# Patient Record
Sex: FEMALE | Race: WHITE | HISPANIC OR LATINO | Employment: UNEMPLOYED | URBAN - METROPOLITAN AREA
[De-identification: names, ages, dates, MRNs, and addresses within clinical notes are randomized per-mention and may not be internally consistent; named-entity substitution may affect disease eponyms.]

---

## 2018-11-07 ENCOUNTER — APPOINTMENT (INPATIENT)
Dept: CT IMAGING | Facility: HOSPITAL | Age: 58
DRG: 681 | End: 2018-11-07
Payer: COMMERCIAL

## 2018-11-07 ENCOUNTER — APPOINTMENT (EMERGENCY)
Dept: CT IMAGING | Facility: HOSPITAL | Age: 58
DRG: 681 | End: 2018-11-07
Payer: COMMERCIAL

## 2018-11-07 ENCOUNTER — HOSPITAL ENCOUNTER (INPATIENT)
Facility: HOSPITAL | Age: 58
LOS: 3 days | Discharge: HOME | DRG: 681 | End: 2018-11-10
Attending: EMERGENCY MEDICINE | Admitting: FAMILY MEDICINE
Payer: COMMERCIAL

## 2018-11-07 DIAGNOSIS — R19.00 ABDOMINAL MASS, UNSPECIFIED ABDOMINAL LOCATION: Primary | ICD-10-CM

## 2018-11-07 DIAGNOSIS — C85.93 NON-HODGKIN LYMPHOMA OF INTRA-ABDOMINAL LYMPH NODES (HCC): ICD-10-CM

## 2018-11-07 DIAGNOSIS — R10.84 GENERALIZED ABDOMINAL PAIN: ICD-10-CM

## 2018-11-07 DIAGNOSIS — C85.93 LYMPHOMA OF INTRA-ABDOMINAL LYMPH NODES, UNSPECIFIED LYMPHOMA TYPE (HCC): ICD-10-CM

## 2018-11-07 PROBLEM — K59.01 SLOW TRANSIT CONSTIPATION: Status: ACTIVE | Noted: 2018-11-07

## 2018-11-07 PROBLEM — F33.41 RECURRENT MAJOR DEPRESSIVE DISORDER, IN PARTIAL REMISSION (HCC): Status: ACTIVE | Noted: 2018-11-07

## 2018-11-07 PROBLEM — R10.9 ABDOMINAL PAIN: Status: ACTIVE | Noted: 2018-11-07

## 2018-11-07 PROBLEM — I10 ESSENTIAL HYPERTENSION: Status: ACTIVE | Noted: 2018-11-07

## 2018-11-07 LAB
ALBUMIN SERPL BCP-MCNC: 3.5 G/DL (ref 3–5.2)
ALP SERPL-CCNC: 150 U/L (ref 43–122)
ALT SERPL W P-5'-P-CCNC: 21 U/L (ref 9–52)
ANION GAP SERPL CALCULATED.3IONS-SCNC: 8 MMOL/L (ref 5–14)
AST SERPL W P-5'-P-CCNC: 36 U/L (ref 14–36)
ATRIAL RATE: 0 BPM
ATRIAL RATE: 70 BPM
BILIRUB SERPL-MCNC: 0.4 MG/DL
BUN SERPL-MCNC: 19 MG/DL (ref 5–25)
CALCIUM SERPL-MCNC: 9.4 MG/DL (ref 8.4–10.2)
CHLORIDE SERPL-SCNC: 104 MMOL/L (ref 97–108)
CO2 SERPL-SCNC: 29 MMOL/L (ref 22–30)
CREAT SERPL-MCNC: 1.02 MG/DL (ref 0.6–1.2)
EOSINOPHIL # BLD AUTO: 0.81 THOUSAND/UL (ref 0–0.4)
EOSINOPHIL NFR BLD MANUAL: 7 % (ref 0–6)
ERYTHROCYTE [DISTWIDTH] IN BLOOD BY AUTOMATED COUNT: 18.1 %
GFR SERPL CREATININE-BSD FRML MDRD: 62 ML/MIN/1.73SQ M
GLUCOSE SERPL-MCNC: 104 MG/DL (ref 70–99)
HCT VFR BLD AUTO: 30.9 % (ref 36–46)
HGB BLD-MCNC: 9.5 G/DL (ref 12–16)
HYPERCHROMIA BLD QL SMEAR: PRESENT
LDH SERPL-CCNC: 1170 U/L (ref 313–618)
LIPASE SERPL-CCNC: 32 U/L (ref 23–300)
LYMPHOCYTES # BLD AUTO: 47 % (ref 20–50)
LYMPHOCYTES # BLD AUTO: 5.41 THOUSAND/UL (ref 0.5–4)
MCH RBC QN AUTO: 21.4 PG (ref 26–34)
MCHC RBC AUTO-ENTMCNC: 30.7 G/DL (ref 31–36)
MCV RBC AUTO: 70 FL (ref 80–100)
MONOCYTES # BLD AUTO: 0.92 THOUSAND/UL (ref 0.2–0.9)
MONOCYTES NFR BLD AUTO: 8 % (ref 1–10)
NEUTS BAND NFR BLD MANUAL: 3 % (ref 0–8)
NEUTS SEG # BLD: 4.37 THOUSAND/UL (ref 1.8–7.8)
NEUTS SEG NFR BLD AUTO: 35 %
P AXIS: 34 DEGREES
PLATELET # BLD AUTO: 117 THOUSANDS/UL (ref 150–450)
PLATELET BLD QL SMEAR: ABNORMAL
PMV BLD AUTO: 9 FL (ref 8.9–12.7)
POTASSIUM SERPL-SCNC: 3.9 MMOL/L (ref 3.6–5)
PR INTERVAL: 154 MS
PROT SERPL-MCNC: 6.4 G/DL (ref 5.9–8.4)
QRS AXIS: 0 DEGREES
QRS AXIS: 16 DEGREES
QRSD INTERVAL: 0 MS
QRSD INTERVAL: 80 MS
QT INTERVAL: 0 MS
QT INTERVAL: 408 MS
QTC INTERVAL: 0 MS
QTC INTERVAL: 440 MS
RBC # BLD AUTO: 4.44 MILLION/UL (ref 4–5.2)
RBC MORPH BLD: ABNORMAL
SODIUM SERPL-SCNC: 141 MMOL/L (ref 137–147)
T WAVE AXIS: 0 DEGREES
T WAVE AXIS: 18 DEGREES
TOTAL CELLS COUNTED SPEC: 100
VENTRICULAR RATE: 0 BPM
VENTRICULAR RATE: 70 BPM
WBC # BLD AUTO: 11.5 THOUSAND/UL (ref 4.5–11)

## 2018-11-07 PROCEDURE — 96361 HYDRATE IV INFUSION ADD-ON: CPT

## 2018-11-07 PROCEDURE — 96375 TX/PRO/DX INJ NEW DRUG ADDON: CPT

## 2018-11-07 PROCEDURE — 71260 CT THORAX DX C+: CPT

## 2018-11-07 PROCEDURE — 85007 BL SMEAR W/DIFF WBC COUNT: CPT | Performed by: EMERGENCY MEDICINE

## 2018-11-07 PROCEDURE — 74177 CT ABD & PELVIS W/CONTRAST: CPT

## 2018-11-07 PROCEDURE — 83550 IRON BINDING TEST: CPT | Performed by: FAMILY MEDICINE

## 2018-11-07 PROCEDURE — 96374 THER/PROPH/DIAG INJ IV PUSH: CPT

## 2018-11-07 PROCEDURE — 99285 EMERGENCY DEPT VISIT HI MDM: CPT

## 2018-11-07 PROCEDURE — 85027 COMPLETE CBC AUTOMATED: CPT | Performed by: EMERGENCY MEDICINE

## 2018-11-07 PROCEDURE — 83540 ASSAY OF IRON: CPT | Performed by: FAMILY MEDICINE

## 2018-11-07 PROCEDURE — 83615 LACTATE (LD) (LDH) ENZYME: CPT | Performed by: FAMILY MEDICINE

## 2018-11-07 PROCEDURE — 83690 ASSAY OF LIPASE: CPT | Performed by: EMERGENCY MEDICINE

## 2018-11-07 PROCEDURE — 80053 COMPREHEN METABOLIC PANEL: CPT | Performed by: EMERGENCY MEDICINE

## 2018-11-07 PROCEDURE — 36415 COLL VENOUS BLD VENIPUNCTURE: CPT | Performed by: EMERGENCY MEDICINE

## 2018-11-07 PROCEDURE — 93010 ELECTROCARDIOGRAM REPORT: CPT | Performed by: INTERNAL MEDICINE

## 2018-11-07 PROCEDURE — 93005 ELECTROCARDIOGRAM TRACING: CPT

## 2018-11-07 PROCEDURE — 99222 1ST HOSP IP/OBS MODERATE 55: CPT | Performed by: FAMILY MEDICINE

## 2018-11-07 RX ORDER — OXYCODONE HYDROCHLORIDE AND ACETAMINOPHEN 5; 325 MG/1; MG/1
1 TABLET ORAL 3 TIMES DAILY
Status: DISCONTINUED | OUTPATIENT
Start: 2018-11-07 | End: 2018-11-08

## 2018-11-07 RX ORDER — POLYETHYLENE GLYCOL 3350 17 G/17G
17 POWDER, FOR SOLUTION ORAL DAILY
Status: DISCONTINUED | OUTPATIENT
Start: 2018-11-08 | End: 2018-11-10 | Stop reason: HOSPADM

## 2018-11-07 RX ORDER — LOSARTAN POTASSIUM 100 MG/1
100 TABLET ORAL DAILY
COMMUNITY
End: 2018-12-10 | Stop reason: HOSPADM

## 2018-11-07 RX ORDER — ACETAMINOPHEN 325 MG/1
650 TABLET ORAL EVERY 6 HOURS PRN
Status: DISCONTINUED | OUTPATIENT
Start: 2018-11-07 | End: 2018-11-10 | Stop reason: HOSPADM

## 2018-11-07 RX ORDER — CITALOPRAM 20 MG/1
10 TABLET ORAL DAILY
COMMUNITY
End: 2019-07-09 | Stop reason: ALTCHOICE

## 2018-11-07 RX ORDER — SENNOSIDES 8.6 MG
1 TABLET ORAL 2 TIMES DAILY
Status: DISCONTINUED | OUTPATIENT
Start: 2018-11-07 | End: 2018-11-10 | Stop reason: HOSPADM

## 2018-11-07 RX ORDER — SODIUM CHLORIDE 9 MG/ML
100 INJECTION, SOLUTION INTRAVENOUS CONTINUOUS
Status: DISCONTINUED | OUTPATIENT
Start: 2018-11-07 | End: 2018-11-08

## 2018-11-07 RX ORDER — HYDROCHLOROTHIAZIDE 25 MG/1
50 TABLET ORAL DAILY
COMMUNITY
End: 2018-12-10 | Stop reason: HOSPADM

## 2018-11-07 RX ORDER — BISACODYL 10 MG
10 SUPPOSITORY, RECTAL RECTAL DAILY PRN
Status: DISCONTINUED | OUTPATIENT
Start: 2018-11-07 | End: 2018-11-10 | Stop reason: HOSPADM

## 2018-11-07 RX ORDER — ONDANSETRON 2 MG/ML
4 INJECTION INTRAMUSCULAR; INTRAVENOUS ONCE
Status: COMPLETED | OUTPATIENT
Start: 2018-11-07 | End: 2018-11-07

## 2018-11-07 RX ORDER — MORPHINE SULFATE 4 MG/ML
4 INJECTION, SOLUTION INTRAMUSCULAR; INTRAVENOUS ONCE
Status: COMPLETED | OUTPATIENT
Start: 2018-11-07 | End: 2018-11-07

## 2018-11-07 RX ORDER — ONDANSETRON 2 MG/ML
4 INJECTION INTRAMUSCULAR; INTRAVENOUS EVERY 6 HOURS PRN
Status: DISCONTINUED | OUTPATIENT
Start: 2018-11-07 | End: 2018-11-08

## 2018-11-07 RX ADMIN — SODIUM CHLORIDE 1000 ML: 9 INJECTION, SOLUTION INTRAVENOUS at 12:30

## 2018-11-07 RX ADMIN — IOHEXOL 100 ML: 350 INJECTION, SOLUTION INTRAVENOUS at 14:33

## 2018-11-07 RX ADMIN — SODIUM CHLORIDE 100 ML/HR: 9 INJECTION, SOLUTION INTRAVENOUS at 20:27

## 2018-11-07 RX ADMIN — IODIXANOL 50 ML: 320 INJECTION, SOLUTION INTRAVASCULAR at 18:51

## 2018-11-07 RX ADMIN — IOHEXOL 50 ML: 240 INJECTION, SOLUTION INTRATHECAL; INTRAVASCULAR; INTRAVENOUS; ORAL at 12:40

## 2018-11-07 RX ADMIN — MORPHINE SULFATE 4 MG: 4 INJECTION INTRAVENOUS at 12:40

## 2018-11-07 RX ADMIN — OXYCODONE HYDROCHLORIDE AND ACETAMINOPHEN 1 TABLET: 5; 325 TABLET ORAL at 20:26

## 2018-11-07 RX ADMIN — ONDANSETRON 4 MG: 2 INJECTION, SOLUTION INTRAMUSCULAR; INTRAVENOUS at 12:40

## 2018-11-07 RX ADMIN — OXYCODONE HYDROCHLORIDE AND ACETAMINOPHEN 1 TABLET: 5; 325 TABLET ORAL at 17:34

## 2018-11-07 RX ADMIN — SENNOSIDES 8.6 MG: 8.6 TABLET, FILM COATED ORAL at 17:35

## 2018-11-07 NOTE — ASSESSMENT & PLAN NOTE
Patient came to the ER secondary to progressively worsening swelling of her abdomen and also secondary to increasing abdominal pain  CT of the abdomen pelvis shows Massive lymphadenopathy throughout the abdomen and pelvis raising suspicion for lymphoma  Associated hepatomegaly and massive splenomegaly  Patient has no known history of any malignancy or knowledge of having any of these problems  Will do CT of the chest for further evaluation for lymphoma  Will plan for an IR guided biopsy or biopsy with General surgery as per the results  Patient is agreeable in pursuing further investigation and treatment    Will place on IV morphine and oral Percocet for pain control in addition to Tylenol

## 2018-11-07 NOTE — ASSESSMENT & PLAN NOTE
Patient has history of hypertension and takes medications for that  I have asked her to bring in her medication bottles so that we may resume them here    So far her blood pressure is well controlled

## 2018-11-07 NOTE — NURSING NOTE
Admission Note  Patient arrive on unit A & O X 3  Pleasant, cooperative and speaks only [place],  in room for admission questions and a family member who spoke a little english  Vitals stable  Oriented to room with call bell within reach  Patient stated she does not see properly out of her right eye and does not hear well out of her left ear  Stated pain at level 0 medicated with 1 Percocet  All safety maintained  Will continue to monitor

## 2018-11-07 NOTE — ED PROVIDER NOTES
History  Chief Complaint   Patient presents with    Abdominal Pain     patient states that she feels a lump on the left side of abdomen for several months, but today the pain to the site has become unbearable, no meds taken, has nausea, denies vomiting or diarrhea     Patient is a 61-year-old female who presents with a 3+ week history left lower quadrant pain  Patient was recently admitted to a psychiatric facility in [place] when she started having the left lower quadrant pain  States that she feels a ball there  Instructed by the staff there just to watch it  But pain has been consistent for the patient and actually gotten worse over the course of yesterday  Tenderness 10/10 nonradiating and does endorse some nausea but no vomiting or diarrhea patient states also has some constipation with this  But is passing gas  No fevers sweats or chills no medication taken for it  No history of any pain like this in the past and does have a surgical history of cholecystectomy  None       Past Medical History:   Diagnosis Date    Asthma     Migraine     Osteoporosis     Psychiatric disorder        Past Surgical History:   Procedure Laterality Date    CHOLECYSTECTOMY         Family History   Problem Relation Age of Onset    Cancer Mother     Cancer Father      I have reviewed and agree with the history as documented  Social History   Substance Use Topics    Smoking status: Former Smoker    Smokeless tobacco: Never Used    Alcohol use No        Review of Systems   Constitutional: Positive for appetite change  Negative for fatigue and fever  HENT: Negative  Eyes: Negative  Respiratory: Negative  Negative for cough and shortness of breath  Cardiovascular: Negative  Negative for chest pain  Gastrointestinal: Positive for abdominal distention, abdominal pain, constipation and nausea  Negative for diarrhea and vomiting  Endocrine: Negative  Genitourinary: Negative  Musculoskeletal: Negative  Skin: Negative  Allergic/Immunologic: Negative  Neurological: Negative  Hematological: Negative  Psychiatric/Behavioral: Negative  All other systems reviewed and are negative  Physical Exam  Physical Exam   Constitutional: She is oriented to person, place, and time  She appears well-developed and well-nourished  HENT:   Head: Normocephalic  Left Ear: External ear normal    Nose: Nose normal    Mouth/Throat: Oropharynx is clear and moist    Eyes: Pupils are equal, round, and reactive to light  Conjunctivae and EOM are normal    Neck: Normal range of motion  Neck supple  Cardiovascular: Normal rate, regular rhythm, normal heart sounds and intact distal pulses  Pulmonary/Chest: Effort normal and breath sounds normal    Abdominal: Soft  She exhibits distension  There is tenderness  Tenderness to the left lower quadrant  No guarding or rebound  Musculoskeletal: Normal range of motion  Neurological: She is alert and oriented to person, place, and time  Skin: Skin is warm and dry  Capillary refill takes less than 2 seconds  Psychiatric: She has a normal mood and affect  Her behavior is normal    Nursing note and vitals reviewed        Vital Signs  ED Triage Vitals [11/07/18 1146]   Temperature Pulse Respirations Blood Pressure SpO2   97 7 °F (36 5 °C) 73 18 136/70 98 %      Temp Source Heart Rate Source Patient Position - Orthostatic VS BP Location FiO2 (%)   Tympanic Monitor Sitting Left arm --      Pain Score       8           Vitals:    11/07/18 1146 11/07/18 1444 11/07/18 1609   BP: 136/70 157/73 122/64   Pulse: 73 71 70   Patient Position - Orthostatic VS: Sitting Sitting Lying       Visual Acuity      ED Medications  Medications   morphine (PF) 4 mg/mL injection 4 mg (4 mg Intravenous Given 11/7/18 1240)   ondansetron (ZOFRAN) injection 4 mg (4 mg Intravenous Given 11/7/18 1240)   sodium chloride 0 9 % bolus 1,000 mL (0 mL Intravenous Stopped 11/7/18 1355)   iohexol (OMNIPAQUE) 240 MG/ML solution 50 mL (50 mL Oral Given 11/7/18 1240)   iohexol (OMNIPAQUE) 350 MG/ML injection (MULTI-DOSE) 100 mL (100 mL Intravenous Given 11/7/18 1433)       Diagnostic Studies  Results Reviewed     Procedure Component Value Units Date/Time    Lipase [[id number]]  (Normal) Collected:  11/07/18 1239    Lab Status:  Final result Specimen:  Blood from Arm, Right Updated:  11/07/18 1303     Lipase 32 u/L     Comprehensive metabolic panel [[id number]]  (Abnormal) Collected:  11/07/18 1239    Lab Status:  Final result Specimen:  Blood from Arm, Right Updated:  11/07/18 1303     Sodium 141 mmol/L      Potassium 3 9 mmol/L      Chloride 104 mmol/L      CO2 29 mmol/L      ANION GAP 8 mmol/L      BUN 19 mg/dL      Creatinine 1 02 mg/dL      Glucose 104 (H) mg/dL      Calcium 9 4 mg/dL      AST 36 U/L      ALT 21 U/L      Alkaline Phosphatase 150 (H) U/L      Total Protein 6 4 g/dL      Albumin 3 5 g/dL      Total Bilirubin 0 40 mg/dL      eGFR 62 ml/min/1 73sq m     Narrative:         National Kidney Disease Education Program recommendations are as follows:  GFR calculation is accurate only with a steady state creatinine  Chronic Kidney disease less than 60 ml/min/1 73 sq  meters  Kidney failure less than 15 ml/min/1 73 sq  meters  CBC and differential [[id number]]  (Abnormal) Collected:  11/07/18 1239    Lab Status:  Final result Specimen:  Blood from Arm, Right Updated:  11/07/18 1259     WBC 11 50 (H) Thousand/uL      RBC 4 44 Million/uL      Hemoglobin 9 5 (L) g/dL      Hematocrit 30 9 (L) %      MCV 70 (L) fL      MCH 21 4 (L) pg      MCHC 30 7 (L) g/dL      RDW 18 1 (H) %      MPV 9 0 fL      Platelets 823 (L) Thousands/uL                  CT abdomen pelvis w contrast   Final Result by Aure Rider MD (11/07 1452)   Massive lymphadenopathy throughout the abdomen and pelvis raising suspicion for lymphoma  Associated hepatomegaly and massive splenomegaly        No acute inflammatory or infectious process  I personally discussed this study with Rosalina Crum on 11/7/2018 at 2:43 PM                         Workstation performed: [id number]                    Procedures  Procedures       Phone Contacts  ED Phone Contact    ED Course  ED Course as of Nov 07 1654 Wed Nov 07, 2018   1450 Called by Radiology most likely diagnosis lymphoma from CT scan  Spoke with patient about it  Slim paged for admission  [address] with Dr Nathan Patel will admit med surge  MDM  Number of Diagnoses or Management Options  Abdominal mass, unspecified abdominal location:      Amount and/or Complexity of Data Reviewed  Clinical lab tests: ordered and reviewed  Tests in the radiology section of CPT®: ordered and reviewed  Discuss the patient with other providers: yes      CritCare Time    Disposition  Final diagnoses:   Abdominal mass, unspecified abdominal location     Time reflects when diagnosis was documented in both MDM as applicable and the Disposition within this note     Time User Action Codes Description Comment    11/7/2018  3:18 PM Hermilo Petersen [R19 [id number]] Abdominal mass, unspecified abdominal location       ED Disposition     ED Disposition Condition Comment    Admit  Case was discussed with Dr Nathan Patel and the patient's admission status was agreed to be Admission Status: inpatient status to the service of Dr Nathan Patel   Follow-up Information    None         Patient's Medications    No medications on file     No discharge procedures on file      ED Provider  Electronically Signed by           Grant Rodriguez MD  11/07/18 5302

## 2018-11-07 NOTE — H&P
H&P- Savanah Feliciano 1960, 66 y o  female MRN: [id number]    Unit/Bed#: HECTOR Encounter: [id number]    Primary Care Provider: Stefany Aldrich DO   Date and time admitted to hospital: 11/7/2018 11:43 AM        * Abdominal pain   Assessment & Plan    Patient came to the ER secondary to progressively worsening swelling of her abdomen and also secondary to increasing abdominal pain  CT of the abdomen pelvis shows Massive lymphadenopathy throughout the abdomen and pelvis raising suspicion for lymphoma  Associated hepatomegaly and massive splenomegaly  Patient has no known history of any malignancy or knowledge of having any of these problems  Will do CT of the chest for further evaluation for lymphoma  Will plan for an IR guided biopsy or biopsy with General surgery as per the results  Patient is agreeable in pursuing further investigation and treatment  Will place on IV morphine and oral Percocet for pain control in addition to Tylenol         Slow transit constipation   Assessment & Plan    Patient is complaining of some constipation abdominal distention  Will place her on laxatives and observe for now  Essential hypertension   Assessment & Plan    Patient has history of hypertension and takes medications for that  I have asked her to bring in her medication bottles so that we may resume them here  So far her blood pressure is well controlled     Recurrent major depressive disorder, in partial remission [facility])   Assessment & Plan    Patient takes medications for depression however she does not know the names of them  Will ask her family to bring them in so that we can place her on it here  Patient has been seeking care in [place] in the past and hence we have no record of her care in [place]     Lymphoma of intra-abdominal lymph nodes [facility])   Assessment & Plan    Patient has an abnormal CT of the abdomen pelvis which is suspicious for lymphoma    Will plan on further investigations and workup to establish a diagnosis  Check an LDH level  VTE Prophylaxis: Enoxaparin (Lovenox)  / sequential compression device   Code Status:  full Code  POLST: There is no POLST form on file for this patient (pre-hospital)  Discussion with family:   None    Anticipated Length of Stay:  Patient will be admitted on an Inpatient basis with an anticipated length of stay of   more than 2 midnights  Justification for Hospital Stay:   Abdominal pain    Total Time for Visit, including Counseling / Coordination of Care: 45 minutes  Greater than 50% of this total time spent on direct patient counseling and coordination of care  Chief Complaint:     Abdominal pain    History of Present Illness:    Hailey Boateng is a 66 y o  female who presents with abdominal pain  Patient states that she has been noticing progressive worsening swelling and pain of her abdomen for the last few weeks  She was recently admitted to a psychiatric facility for her depression in [place] and was told that she needs to follow up outpatient for her abdominal issues  She does not have a PCP and hence did not pursue anything  Today her pain continued to get worse and it was 8/10  Her swelling and distention was also worsening and she decided to come to the emergency room for help  She denies any chest pain but does feel shortness of breath secondary to an enlarged abdomen  She denies any fevers chills, no nausea or vomiting, no diarrhea  She is complaining of some constipation at this time  Review of Systems:    Review of Systems   Constitutional: Positive for appetite change and fatigue  Negative for chills and fever  HENT: Negative for hearing loss, sore throat and trouble swallowing  Eyes: Negative for photophobia, discharge and visual disturbance  Respiratory: Positive for chest tightness  Cardiovascular: Negative for chest pain and palpitations     Gastrointestinal: Positive for abdominal distention, abdominal pain and constipation  Negative for blood in stool and vomiting  Endocrine: Negative for polydipsia and polyuria  Genitourinary: Positive for flank pain  Negative for difficulty urinating, dysuria and hematuria  Musculoskeletal: Positive for arthralgias and back pain  Negative for gait problem  Skin: Negative for rash  Allergic/Immunologic: Negative for environmental allergies and food allergies  Neurological: Negative for dizziness, seizures, syncope and headaches  Hematological: Does not bruise/bleed easily  Psychiatric/Behavioral: Positive for behavioral problems  The patient is nervous/anxious  All other systems reviewed and are negative  Past Medical and Surgical History:     Past Medical History:   Diagnosis Date    Asthma     Migraine     Osteoporosis     Psychiatric disorder        Past Surgical History:   Procedure Laterality Date    CHOLECYSTECTOMY         Meds/Allergies:    Prior to Admission medications    Not on File     I have been unable to obtain / verify an up to date medication list despite all reasonable attempts  Allergies: Allergies   Allergen Reactions    Penicillins        Social History:     Marital Status: Single   History   Alcohol Use No     History   Smoking Status    Former Smoker   Smokeless Tobacco    Never Used     History   Drug Use No       Family History:    Family History   Problem Relation Age of Onset    Cancer Mother     Cancer Father        Physical Exam:     Vitals:   Blood Pressure: 122/64 (11/07/18 1609)  Pulse: 70 (11/07/18 1609)  Temperature: 97 7 °F (36 5 °C) (11/07/18 1146)  Temp Source: Tympanic (11/07/18 1146)  Respirations: 12 (11/07/18 1609)  Height: 5' 6" (167 6 cm) (11/07/18 1146)  Weight - Scale: 91 6 kg (202 lb) (11/07/18 1146)  SpO2: 97 % (11/07/18 1609)    Physical Exam   Constitutional: She is oriented to person, place, and time  She appears well-developed and well-nourished  HENT:   Head: Normocephalic and atraumatic  Eyes: Pupils are equal, round, and reactive to light  Neck: Neck supple  Cardiovascular: Normal rate and regular rhythm  Pulmonary/Chest: Effort normal    Abdominal: She exhibits distension and mass  There is tenderness  There is guarding  There is no rebound  Hypoactive bowel sounds   Musculoskeletal: Normal range of motion  Neurological: She is alert and oriented to person, place, and time  Skin: Skin is warm  Psychiatric: She has a normal mood and affect  Additional Data:     Lab Results: I have personally reviewed pertinent films in PACS      Results from last 7 days  Lab Units 11/07/18  1239   WBC Thousand/uL 11 50*   HEMOGLOBIN g/dL 9 5*   HEMATOCRIT % 30 9*   PLATELETS Thousands/uL 117*   BANDS PCT % 3   TOTAL NEUT ABS Thousand/uL 4 37   LYMPHO PCT % 47   MONO PCT % 8   EOS PCT % 7*       Results from last 7 days  Lab Units 11/07/18  1239   POTASSIUM mmol/L 3 9   CHLORIDE mmol/L 104   CO2 mmol/L 29   BUN mg/dL 19   CREATININE mg/dL 1 02   ANION GAP mmol/L 8   CALCIUM mg/dL 9 4   ALBUMIN g/dL 3 5   TOTAL BILIRUBIN mg/dL 0 40   ALK PHOS U/L 150*   ALT U/L 21   AST U/L 36                       Imaging: I have personally reviewed pertinent films in PACS    CT abdomen pelvis w contrast   Final Result by Jd Cobb MD (11/07 1452)   Massive lymphadenopathy throughout the abdomen and pelvis raising suspicion for lymphoma  Associated hepatomegaly and massive splenomegaly  No acute inflammatory or infectious process  I personally discussed this study with Marisol Maloney on 11/7/2018 at 2:43 PM                         Workstation performed: [id number]             EKG, Pathology, and Other Studies Reviewed on Admission:   · EKG: ordered    Allscripts / Epic Records Reviewed: Yes     ** Please Note: This note has been constructed using a voice recognition system   **

## 2018-11-07 NOTE — ASSESSMENT & PLAN NOTE
Patient is complaining of some constipation abdominal distention  Will place her on laxatives and observe for now

## 2018-11-07 NOTE — ED NOTES
Pt ambulatory within room with steady gait  Pt in no distress  Pt stable        Felix Rosas, RN  11/07/18 7015

## 2018-11-07 NOTE — ASSESSMENT & PLAN NOTE
Patient takes medications for depression however she does not know the names of them  Will ask her family to bring them in so that we can place her on it here    Patient has been seeking care in [place] in the past and hence we have no record of her care in [place]

## 2018-11-07 NOTE — ASSESSMENT & PLAN NOTE
Patient has an abnormal CT of the abdomen pelvis which is suspicious for lymphoma  Will plan on further investigations and workup to establish a diagnosis  Check an LDH level

## 2018-11-08 PROBLEM — R74.01 TRANSAMINITIS: Status: ACTIVE | Noted: 2018-11-08

## 2018-11-08 PROBLEM — D69.6 THROMBOCYTOPENIA (HCC): Status: ACTIVE | Noted: 2018-11-08

## 2018-11-08 LAB
ALBUMIN SERPL BCP-MCNC: 3.4 G/DL (ref 3–5.2)
ALP SERPL-CCNC: 165 U/L (ref 43–122)
ALT SERPL W P-5'-P-CCNC: 28 U/L (ref 9–52)
ANION GAP SERPL CALCULATED.3IONS-SCNC: 7 MMOL/L (ref 5–14)
AST SERPL W P-5'-P-CCNC: 50 U/L (ref 14–36)
BILIRUB SERPL-MCNC: 0.6 MG/DL
BUN SERPL-MCNC: 19 MG/DL (ref 5–25)
CALCIUM SERPL-MCNC: 8.9 MG/DL (ref 8.4–10.2)
CHLORIDE SERPL-SCNC: 104 MMOL/L (ref 97–108)
CO2 SERPL-SCNC: 27 MMOL/L (ref 22–30)
CREAT SERPL-MCNC: 0.97 MG/DL (ref 0.6–1.2)
ERYTHROCYTE [DISTWIDTH] IN BLOOD BY AUTOMATED COUNT: 18.9 %
FERRITIN SERPL-MCNC: 124 NG/ML (ref 8–388)
GFR SERPL CREATININE-BSD FRML MDRD: 65 ML/MIN/1.73SQ M
GLUCOSE SERPL-MCNC: 73 MG/DL (ref 70–99)
HCT VFR BLD AUTO: 31.6 % (ref 36–46)
HEMOCCULT STL QL: NEGATIVE
HGB BLD-MCNC: 9.6 G/DL (ref 12–16)
IRON SATN MFR SERPL: 18 %
IRON SERPL-MCNC: 44 UG/DL (ref 50–170)
MCH RBC QN AUTO: 21.3 PG (ref 26–34)
MCHC RBC AUTO-ENTMCNC: 30.3 G/DL (ref 31–36)
MCV RBC AUTO: 70 FL (ref 80–100)
PLATELET # BLD AUTO: 118 THOUSANDS/UL (ref 150–450)
PMV BLD AUTO: 9.2 FL (ref 8.9–12.7)
POTASSIUM SERPL-SCNC: 4.3 MMOL/L (ref 3.6–5)
PROT SERPL-MCNC: 6.2 G/DL (ref 5.9–8.4)
RBC # BLD AUTO: 4.5 MILLION/UL (ref 4–5.2)
SODIUM SERPL-SCNC: 138 MMOL/L (ref 137–147)
TIBC SERPL-MCNC: 249 UG/DL (ref 250–450)
TSH SERPL DL<=0.05 MIU/L-ACNC: 3.45 UIU/ML (ref 0.47–4.68)
WBC # BLD AUTO: 10.7 THOUSAND/UL (ref 4.5–11)

## 2018-11-08 PROCEDURE — 82272 OCCULT BLD FECES 1-3 TESTS: CPT | Performed by: FAMILY MEDICINE

## 2018-11-08 PROCEDURE — 82728 ASSAY OF FERRITIN: CPT | Performed by: FAMILY MEDICINE

## 2018-11-08 PROCEDURE — 85027 COMPLETE CBC AUTOMATED: CPT | Performed by: FAMILY MEDICINE

## 2018-11-08 PROCEDURE — 80053 COMPREHEN METABOLIC PANEL: CPT | Performed by: FAMILY MEDICINE

## 2018-11-08 PROCEDURE — 84443 ASSAY THYROID STIM HORMONE: CPT | Performed by: FAMILY MEDICINE

## 2018-11-08 PROCEDURE — 99232 SBSQ HOSP IP/OBS MODERATE 35: CPT | Performed by: FAMILY MEDICINE

## 2018-11-08 RX ORDER — OXYCODONE HCL 10 MG/1
10 TABLET, FILM COATED, EXTENDED RELEASE ORAL EVERY 12 HOURS SCHEDULED
Status: DISCONTINUED | OUTPATIENT
Start: 2018-11-08 | End: 2018-11-10 | Stop reason: HOSPADM

## 2018-11-08 RX ORDER — OXYCODONE HYDROCHLORIDE 5 MG/1
5 TABLET ORAL EVERY 4 HOURS PRN
Status: DISCONTINUED | OUTPATIENT
Start: 2018-11-08 | End: 2018-11-10 | Stop reason: HOSPADM

## 2018-11-08 RX ORDER — MORPHINE SULFATE 4 MG/ML
4 INJECTION, SOLUTION INTRAMUSCULAR; INTRAVENOUS EVERY 4 HOURS PRN
Status: DISCONTINUED | OUTPATIENT
Start: 2018-11-08 | End: 2018-11-10 | Stop reason: HOSPADM

## 2018-11-08 RX ORDER — MAGNESIUM CARB/ALUMINUM HYDROX 105-160MG
296 TABLET,CHEWABLE ORAL ONCE
Status: DISCONTINUED | OUTPATIENT
Start: 2018-11-08 | End: 2018-11-08

## 2018-11-08 RX ORDER — MAGNESIUM CARB/ALUMINUM HYDROX 105-160MG
148 TABLET,CHEWABLE ORAL ONCE
Status: COMPLETED | OUTPATIENT
Start: 2018-11-08 | End: 2018-11-08

## 2018-11-08 RX ORDER — MORPHINE SULFATE 4 MG/ML
4 INJECTION, SOLUTION INTRAMUSCULAR; INTRAVENOUS EVERY 4 HOURS PRN
Status: DISCONTINUED | OUTPATIENT
Start: 2018-11-08 | End: 2018-11-08

## 2018-11-08 RX ORDER — SODIUM PHOSPHATE, DIBASIC AND SODIUM PHOSPHATE, MONOBASIC 7; 19 G/133ML; G/133ML
1 ENEMA RECTAL ONCE
Status: COMPLETED | OUTPATIENT
Start: 2018-11-08 | End: 2018-11-08

## 2018-11-08 RX ADMIN — ENOXAPARIN SODIUM 40 MG: 40 INJECTION SUBCUTANEOUS at 08:26

## 2018-11-08 RX ADMIN — MAGESIUM CITRATE 148 ML: 1.75 LIQUID ORAL at 15:37

## 2018-11-08 RX ADMIN — POLYETHYLENE GLYCOL 3350 17 G: 17 POWDER, FOR SOLUTION ORAL at 08:26

## 2018-11-08 RX ADMIN — SODIUM PHOSPHATE, DIBASIC AND SODIUM PHOSPHATE, MONOBASIC 1 ENEMA: 7; 19 ENEMA RECTAL at 15:37

## 2018-11-08 RX ADMIN — OXYCODONE HYDROCHLORIDE 10 MG: 10 TABLET, FILM COATED, EXTENDED RELEASE ORAL at 21:17

## 2018-11-08 RX ADMIN — OXYCODONE HYDROCHLORIDE 10 MG: 10 TABLET, FILM COATED, EXTENDED RELEASE ORAL at 15:34

## 2018-11-08 RX ADMIN — OXYCODONE HYDROCHLORIDE AND ACETAMINOPHEN 1 TABLET: 5; 325 TABLET ORAL at 08:25

## 2018-11-08 RX ADMIN — SENNOSIDES 8.6 MG: 8.6 TABLET, FILM COATED ORAL at 17:17

## 2018-11-08 RX ADMIN — SENNOSIDES 8.6 MG: 8.6 TABLET, FILM COATED ORAL at 08:26

## 2018-11-08 NOTE — MEDICAL STUDENT
MEDICAL STUDENT  Inpatient Progress Note for TRAINING ONLY  Not Part of Legal Medical Record       Progress Note - Hospitalist   Angella Jordan 66 y o  female MRN: [id number]  Unit/Bed#: 5T -01 Encounter: [id number]          Assessment and Plan    Abdominal pain  · Related to probable lymphoma and constipation  · Added OxyContin 10 mg BID with PRN Morphine Oxycodone  · Discontinued Tylenol  · Mag Citrate once today  · Encourage ambulation  · Continue Miralax, senna      Lymphoma of intra-abdominal lymph nodes (HCC)  · CT of Abdomen and Pelvis showed massive diffuse lymphadenopathy suspicious for Malignancy  · Surgery consulted for Biopsy      Recurrent major depressive disorder, in partial remission ([facility] [address] )  · Was recently in [facility] in [place] for depression, during that stay she reports she experienced increasing abdominal pain and distension  · Came to ED reporting severe abdominal pain  · Does not know the names of her depression medications  · Requesting that family bring in medications from home      Essential hypertension  · Patient states that she does take BP medications but does not know the names  · BP currently 119/55  · Requested that family bring in medications        Slow transit constipation  · Continue Senna and Miralax  · Mag Citrate once today  · Encourage PO fluids and fiber  · Encourage ambulation      Transaminitis  · Hold Tylenol  · AST 50  · Check Hepatitis panel  · Avoid hepatotoxic agents      Thrombocytopenia (HCC)  · Platelet count 820-705  · Daily CBC  · SCDs       ------------------------------------------------------------------------------------------------  Chief Complaint: Abdominal pain and distenstion    HPI/24hr events: Angella Jordan is a 66 y o  female who presents with abdominal pain  Patient states that she has been noticing progressive worsening swelling and pain of her abdomen for the last few weeks    She was recently admitted to a psychiatric facility for her depression in [place] and was told that she needs to follow up outpatient for her abdominal issues  She does not have a PCP and hence did not pursue anything  Today her pain continued to get worse and it was 8/10  Her swelling and distention was also worsening and she decided to come to the emergency room for help  She denies any chest pain but does feel shortness of breath secondary to an enlarged abdomen  She denies any fevers chills, no nausea or vomiting, no diarrhea  She is complaining of some constipation at this time  ----------------------------------------------------------------------------------------------  Subjective: "My belly hurts"      Physical Exam   Constitutional: She appears well-developed and well-nourished  She appears distressed  HENT:   Head: Normocephalic  Eyes: Pupils are equal, round, and reactive to light  Cardiovascular: Regular rhythm and intact distal pulses  Tachycardia present  Pulses:       Radial pulses are 2+ on the right side, and 2+ on the left side  Dorsalis pedis pulses are 2+ on the right side, and 2+ on the left side  Pulmonary/Chest: Tachypnea noted  No respiratory distress  She has decreased breath sounds in the right lower field and the left lower field  Abdominal: She exhibits distension  There is tenderness  There is guarding  Lymphadenopathy:     She has cervical adenopathy  Left cervical: Deep cervical adenopathy present  Neurological: She is alert  Skin: Skin is warm and dry  Psychiatric: She has a normal mood and affect                 Vitals   Vitals:    11/07/18 1900 11/08/18 0001 11/08/18 0724 11/08/18 1522   BP: 115/62 123/71 119/55 154/73   BP Location: Left arm Left arm Left arm Left arm   Pulse: 69 71 71 79   Resp: 18 18 20 20   Temp: 97 9 °F (36 6 °C) (!) 97 °F (36 1 °C) 97 5 °F (36 4 °C) 97 5 °F (36 4 °C)   TempSrc: Temporal Temporal Temporal Temporal   SpO2: 94% 95% 96% 98%   Weight:       Height: Temp (24hrs), Av 6 °F (36 4 °C), Min:97 °F (36 1 °C), Max:97 9 °F (36 6 °C)  Current: Temperature: 97 5 °F (36 4 °C)  HR: 79  BP: 119/55  RR: 20  SpO2: 98      Height and Weights   Height: 5' 6" (167 6 cm)  IBW: 59 3 kg  Body mass index is 32 91 kg/m²  Weight (last 2 days)     Date/Time   Weight    18 1705  92 5 (203 93)    18 1146  91 6 (202)              Intake and Output  I/O       701 -  -  - 700    P  O    300    IV Piggyback  1000     Total Intake(mL/kg)  1000 (10 8) 300 (3 2)    Net   +1000 +300                    Nutrition       Diet Orders            Start     Ordered    18 0001  Diet NPO  Diet effective midnight     Question Answer Comment   Diet Type NPO    RD to adjust diet per protocol? Yes        18 1407    18 1705  Diet Regular; Regular House  Diet effective now     Question Answer Comment   Diet Type Regular    Regular Regular House    RD to adjust diet per protocol? Yes        18 1704          Laboratory and Diagnostics:    Results from last 7 days  Lab Units 18  0501 18  1239   WBC Thousand/uL 10 70 11 50*   HEMOGLOBIN g/dL 9 6* 9 5*   HEMATOCRIT % 31 6* 30 9*   PLATELETS Thousands/uL 118* 117*   BANDS PCT %  --  3   MONO PCT %  --  8       Results from last 7 days  Lab Units 18  0502 18  1239   POTASSIUM mmol/L 4 3 3 9   CHLORIDE mmol/L 104 104   CO2 mmol/L 27 29   BUN mg/dL 19 19   CREATININE mg/dL 0 97 1 02   CALCIUM mg/dL 8 9 9 4   ALBUMIN g/dL 3 4 3 5   ALK PHOS U/L 165* 150*   ALT U/L 28 21   AST U/L 50* 36                      EKG: Reviewed  Imaging: I have personally reviewed pertinent reports        Micro        Allergies   Allergies   Allergen Reactions    Penicillins        Active Medications  Scheduled Meds:  Current Facility-Administered Medications:  acetaminophen 650 mg Oral Q6H PRN Joaquín Dupont MD   bisacodyl 10 mg Rectal Daily PRN Joaquín Dupont MD   morphine injection 4 mg Intravenous Q4H PRN Joaquín Dupont MD   oxyCODONE 10 mg Oral Q12H [address] Joaquín Dupont MD   oxyCODONE 5 mg Oral Q4H PRN Joaquín Dupont MD   polyethylene glycol 17 g Oral Daily Joaquín Dupont MD   senna 1 tablet Oral BID Joaquín Dupont MD     Continuous Infusions:     PRN Meds:     acetaminophen 650 mg Q6H PRN   bisacodyl 10 mg Daily PRN   morphine injection 4 mg Q4H PRN   oxyCODONE 5 mg Q4H PRN       VTE Pharmacologic Prophylaxis: Pharmacologic VTE Prophylaxis contraindicated due to Thrombocytopenia   VTE Mechanical Prophylaxis: sequential compression device

## 2018-11-08 NOTE — NURSING NOTE
Patient given fleets enema and mag citrate at this time, had a small hard BM, was told not seen  Family members in room visiting  All safety maintained  Will continue to monitor

## 2018-11-08 NOTE — NURSING NOTE
On initial rounds patient in no apparent distress  Skin warm and dry to touch  At present time patient complained of generalize itching and DR Ayala on unit and is aware  Ambulate well by self encourage to call for assistance when needed  All safety maintained  Will continue to monitor

## 2018-11-08 NOTE — ASSESSMENT & PLAN NOTE
Patient has history of hypertension and takes medications for that  I have asked her to bring in her medication bottles   Blood pressure is well controlled without any medications

## 2018-11-08 NOTE — UTILIZATION REVIEW
Initial Clinical Review    Admission: Date/Time/Statement: 11/7/18 @ 1519 INPATIENT    Orders Placed This Encounter   Procedures    Inpatient Admission (expected length of stay for this patient is greater than two midnights)     Standing Status:   Standing     Number of Occurrences:   1     Order Specific Question:   Admitting Physician     Answer:   Suzanne Blake [[id number]]     Order Specific Question:   Level of Care     Answer:   Med Surg [16]     Order Specific Question:   Estimated length of stay     Answer:   More than 2 Midnights     Order Specific Question:   Certification     Answer:   I certify that inpatient services are medically necessary for this patient for a duration of greater than two midnights  See H&P and MD Progress Notes for additional information about the patient's course of treatment  ED: Date/Time/Mode of Arrival:   ED Arrival Information     Expected Arrival Acuity Means of Arrival Escorted By Service Admission Type    - 11/7/2018 11:27 Urgent Walk-In Self General Medicine Urgent    Arrival Complaint    abdominal pain         Chief Complaint:   Chief Complaint   Patient presents with    Abdominal Pain     patient states that she feels a lump on the left side of abdomen for several months, but today the pain to the site has become unbearable, no meds taken, has nausea, denies vomiting or diarrhea     History of Illness:      Patient is a 51-year-old female who presents with a 3+ week history left lower quadrant pain  Tenderness 10/10 nonradiating and does endorse some nausea and constipation         ED Vital Signs:   ED Triage Vitals [11/07/18 1146]   Temperature Pulse Respirations Blood Pressure SpO2   97 7 °F (36 5 °C) 73 18 136/70 98 %   Pain Score       8        Wt Readings from Last 1 Encounters:   11/07/18 92 5 kg (203 lb 14 8 oz)     Vital Signs: WNL    Abnormal Labs/Diagnostic Test Results:     CT abdomen and pelvis: Massive lymphadenopathy throughout the abdomen and pelvis raising suspicion for lymphoma  Associated hepatomegaly and massive splenomegaly  CT chest:  Bulky supraclavicular, axillary and mediastinal lymphadenopathy  Extensive abdominal pelvic lymphadenopathy, further described on the abdomen and pelvis CT report  Findings are likely to represent lymphoma  Alk Phos = 150     11/07/18 1239     - 618 U/L 1,170       11/07/18 1239     WBC 4 50 - 11 00 Thousand/uL 11 50     RBC 4 00 - 5 20 Million/uL 4 44    Hemoglobin 12 0 - 16 0 g/dL 9 5     Hematocrit 36 0 - 46 0 % 30 9     MCV 80 - 100 fL 70     Comment:     MCH 26 0 - 34 0 pg 21 4     MCHC 31 0 - 36 0 g/dL 30 7     RDW <15 3 % 18 1     MPV 8 9 - 12 7 fL 9 0    Platelets 614 - 363 Thousands/uL 117       ED Treatment:   Medication Administration from 11/07/2018 1127 to 11/07/2018 1701       Date/Time Order Dose Route Action     11/07/2018 1240 morphine (PF) 4 mg/mL injection 4 mg 4 mg Intravenous Given     11/07/2018 1240 ondansetron (ZOFRAN) injection 4 mg 4 mg Intravenous Given     11/07/2018 1230 sodium chloride 0 9 % bolus 1,000 mL 1,000 mL Intravenous New Bag     11/07/2018 1240 iohexol (OMNIPAQUE) 240 MG/ML solution 50 mL 50 mL Oral Given     11/07/2018 1433 iohexol (OMNIPAQUE) 350 MG/ML injection (MULTI-DOSE) 100 mL 100 mL Intravenous Given        Past Medical/Surgical History:     Past Medical History:   Diagnosis Date    Asthma     Migraine     Osteoporosis     Psychiatric disorder        Admitting Diagnosis: Abdominal pain [R10 9]  Abdominal mass, unspecified abdominal location [R19 00]    Age/Sex: 66 y o  female    Assessment/Plan:      Daniel Brown is a 66 y o  female who presented to the ED with progressive worsening, swelling and pain of her abdomen for the last few weeks  CT suspicious for lymphoma      * Abdominal pain   Assessment & Plan     Plan for an IR guided biopsy or biopsy with General surgery as per the results   ill place on IV morphine and oral Percocet for pain control in addition to Tylenol       Slow transit constipation   Assessment & Plan     Patient is complaining of some constipation abdominal distention  Will place her on laxatives and observe for now       Recurrent major depressive disorder, in partial remission [facility])   Assessment & Plan     Patient takes medications for depression however she does not know the names of them  Will ask her family to bring them in so that we can place her on it here  Patient has been seeking care in [place] in the past and hence we have no record of her care in [place]      Lymphoma of intra-abdominal lymph nodes [facility])   Assessment & Plan     Patient has an abnormal CT of the abdomen pelvis which is suspicious for lymphoma  Will plan on further investigations and workup to establish a diagnosis  Check an LDH level          Admission Orders: sequential compression device  Scheduled Meds:   Current Facility-Administered Medications:  acetaminophen 650 mg Oral Q6H PRN   bisacodyl 10 mg Rectal Daily PRN   enoxaparin 40 mg Subcutaneous Daily   morphine injection 2 mg Intravenous Q4H PRN   ondansetron 4 mg Intravenous Q6H PRN   oxyCODONE-acetaminophen 1 tablet Oral TID   polyethylene glycol 17 g Oral Daily   senna 1 tablet Oral BID                 [address] Utilization Review Department  Phone: [phone]; Fax [phone]  [email or web address]  [email or web address]  ATTENTION: Please call with any questions or concerns to [phone]  and carefully listen to the prompts so that you are directed to the right person  Send all requests for admission clinical reviews, approved or denied determinations and any other requests to fax [phone]   All voicemails are confidential

## 2018-11-08 NOTE — ASSESSMENT & PLAN NOTE
Patient is complaining of some constipation abdominal distention  Will place her on laxatives and observe for now  Also added mag citrate and an enema as she has not had a bowel movement for over a week

## 2018-11-08 NOTE — ASSESSMENT & PLAN NOTE
Patient came to the ER secondary to progressively worsening swelling of her abdomen and also secondary to increasing abdominal pain  CT of the abdomen pelvis shows Massive lymphadenopathy throughout the abdomen and pelvis raising suspicion for lymphoma  Associated hepatomegaly and massive splenomegaly  Patient has no known history of any malignancy or knowledge of having any of these problems  CT of the chest for further evaluation for lymphoma done and shows supraclavicular, axillary and mediastinal lymphadenopathy as well  Will plan for an IR guided biopsy or biopsy with General surgery as per the results  Patient is agreeable in pursuing further investigation and treatment  Will place on IV morphine and oral oxycodone and Oxy Contin for pain control  Patient's abdominal pain is still 6/10    Hence pain medications adjusted further

## 2018-11-08 NOTE — ASSESSMENT & PLAN NOTE
Patient has an abnormal CT of the abdomen pelvis which is suspicious for lymphoma  Will plan on further investigations and workup to establish a diagnosis  Consult placed to General surgery to see if biopsy possible    If not then will ask interventional Radiology to proceed with biopsy

## 2018-11-08 NOTE — NURSING NOTE
AOX3 HR regular Lungs clear hypoactive Bowel sounds x4 + PP ABD softly distended  Denies any pain  Will continue to monitor call bell within reach

## 2018-11-08 NOTE — CONSULTS
Consulting physician:  Dr Yohannes Melton    Physician consulted:  Dr Piedad Belle    Reason for consultation:  Generalized lymphadenopathy  Possible lymphoma  For lymph node biopsy and placement of Port-A-Cath  Patient is a 51-year-old Eritrean-speaking female who presented to the emergency room at [facility] with progressively worsening abdominal distension with increasing pain  She had the obligatory laboratory values that demonstrated a minimally elevated white count but hemoglobin of 9 5  There are also a few mildly elevated liver function tests  She did have a CT scan of the chest abdomen and pelvis that demonstrated massive intra abdominal lymphadenopathy as well as significant hepatosplenomegaly  She has no history of malignancy  Consultation was obtained for 1  Lymph node biopsy for a diagnosis  2   Port-A-Cath placement    Physical exam:  Middle-aged  female awake alert in no distress  A  was in the room to facilitate history taking  Neck:  There is a posterior triangle palpable lymph node on the left side  No obvious cervical adenopathy is appreciated  Axilla:  Some slightly tender lymph nodes deep in the axilla  Abdomen:  Distended soft mildly tender to palpation  Hepatosplenomegaly is appreciated  Impression:  Generalized adenopathy most likely lymphoma  A tissue diagnosis is necessary  Also venous access for chemotherapy is also essential     Plan:  Biopsy left cervical lymph node and placement of Port-A-Cath at the same time ASAP

## 2018-11-08 NOTE — ASSESSMENT & PLAN NOTE
Patient takes medications for depression however she does not know the names of them  Will ask her family to bring them in so that we can place her on it here    Patient has been seeking care in [place] in the past and hence we have no record of her care in [place]

## 2018-11-08 NOTE — PROGRESS NOTES
Progress Note - Gibson Parsons 1960, 66 y o  female MRN: [id number]    Unit/Bed#: 5T -01 Encounter: [id number]    Primary Care Provider: Jacqueline Suh DO   Date and time admitted to hospital: 11/7/2018 11:43 AM        * Abdominal pain   Assessment & Plan    Patient came to the ER secondary to progressively worsening swelling of her abdomen and also secondary to increasing abdominal pain  CT of the abdomen pelvis shows Massive lymphadenopathy throughout the abdomen and pelvis raising suspicion for lymphoma  Associated hepatomegaly and massive splenomegaly  Patient has no known history of any malignancy or knowledge of having any of these problems  CT of the chest for further evaluation for lymphoma done and shows supraclavicular, axillary and mediastinal lymphadenopathy as well  Will plan for an IR guided biopsy or biopsy with General surgery as per the results  Patient is agreeable in pursuing further investigation and treatment  Will place on IV morphine and oral oxycodone and Oxy Contin for pain control  Patient's abdominal pain is still 6/10  Hence pain medications adjusted further         Thrombocytopenia (HCC)   Assessment & Plan    Patient's platelet count today is 118  Continue to observe for now  Transaminitis   Assessment & Plan    Hold Tylenol secondary to AST of 50  Will check hepatitis panel  Avoid hepatotoxic agents     Slow transit constipation   Assessment & Plan    Patient is complaining of some constipation abdominal distention  Will place her on laxatives and observe for now  Also added mag citrate and an enema as she has not had a bowel movement for over a week  Essential hypertension   Assessment & Plan    Patient has history of hypertension and takes medications for that  I have asked her to bring in her medication bottles   Blood pressure is well controlled without any medications       Recurrent major depressive disorder, in partial remission ([facility] [address] ) Assessment & Plan    Patient takes medications for depression however she does not know the names of them  Will ask her family to bring them in so that we can place her on it here  Patient has been seeking care in [place] in the past and hence we have no record of her care in [place]     Lymphoma of intra-abdominal lymph nodes [facility])   Assessment & Plan    Patient has an abnormal CT of the abdomen pelvis which is suspicious for lymphoma  Will plan on further investigations and workup to establish a diagnosis  Consult placed to General surgery to see if biopsy possible  If not then will ask interventional Radiology to proceed with biopsy       VTE Pharmacologic Prophylaxis:   Pharmacologic: Enoxaparin (Lovenox)  Mechanical VTE Prophylaxis in Place: Yes    Patient Centered Rounds: I have performed bedside rounds with nursing staff today  Discussions with Specialists or Other Care Team Provider:   Discussed the case with Oncology and General surgery    Education and Discussions with Family / Patient:   Discussed with patient at bedside in detail about her hospital course and the need to proceed with a biopsy    Time Spent for Care: 30 minutes  More than 50% of total time spent on counseling and coordination of care as described above  Current Length of Stay: 1 day(s)    Current Patient Status: Inpatient   Certification Statement: The patient will continue to require additional inpatient hospital stay due to Abdominal pain    Discharge Plan:   In 1-2 days to go home    Code Status: Level 1 - Full Code      Subjective:   Patient complains of 4 to 6/10 abdominal pain and distension  She still has not had any bowel movements  She feels a little dyspneic while sitting in bed secondary to her distended abdomen      Objective:     Vitals:   Temp (24hrs), Av 6 °F (36 4 °C), Min:97 °F (36 1 °C), Max:97 9 °F (36 6 °C)    Temp:  [97 °F (36 1 °C)-97 9 °F (36 6 °C)] 97 5 °F (36 4 °C)  HR:  [69-71] 71  Resp: [12-20] 20  BP: (115-157)/(55-73) 119/55  SpO2:  [94 %-97 %] 96 %  Body mass index is 32 91 kg/m²  Input and Output Summary (last 24 hours): Intake/Output Summary (Last 24 hours) at 11/08/18 1351  Last data filed at 11/08/18 1249   Gross per 24 hour   Intake             1300 ml   Output                0 ml   Net             1300 ml       Physical Exam:     Physical Exam   Constitutional: She is oriented to person, place, and time  She appears well-developed  HENT:   Head: Normocephalic and atraumatic  Eyes: Pupils are equal, round, and reactive to light  Conjunctivae are normal    Neck: Normal range of motion  Neck supple  Cardiovascular: Normal rate, regular rhythm and normal heart sounds  Pulmonary/Chest: Effort normal and breath sounds normal  No respiratory distress  She has no wheezes  She has no rales  Abdominal: Soft  She exhibits distension  She exhibits no mass  There is tenderness  There is no rebound and no guarding  Moderate distention with tenderness generalized  No rebound tenderness noted  Voluntary guarding noted  Hypoactive bowel sounds   Genitourinary:   Genitourinary Comments: deferred   Musculoskeletal: She exhibits edema  Neurological: She is alert and oriented to person, place, and time  Skin: Skin is warm and dry  No rash noted  Psychiatric:   Anxious   Nursing note and vitals reviewed          Additional Data:     Labs:      Results from last 7 days  Lab Units 11/08/18  0501 11/07/18  1239   WBC Thousand/uL 10 70 11 50*   HEMOGLOBIN g/dL 9 6* 9 5*   HEMATOCRIT % 31 6* 30 9*   PLATELETS Thousands/uL 118* 117*   BANDS PCT %  --  3   LYMPHO PCT %  --  47   MONO PCT %  --  8   EOS PCT %  --  7*       Results from last 7 days  Lab Units 11/08/18  0502   POTASSIUM mmol/L 4 3   CHLORIDE mmol/L 104   CO2 mmol/L 27   BUN mg/dL 19   CREATININE mg/dL 0 97   ANION GAP mmol/L 7   CALCIUM mg/dL 8 9   ALBUMIN g/dL 3 4   TOTAL BILIRUBIN mg/dL 0 60   ALK PHOS U/L 165*   ALT U/L 28   AST U/L 50*                           * I Have Reviewed All Lab Data Listed Above  * Additional Pertinent Lab Tests Reviewed: [address] Admission Reviewed    Imaging:    Imaging Reports Reviewed Today Include: ct chest abd pelvis  Imaging Personally Reviewed by Myself Includes:  Ct chest abd pelvis    Recent Cultures (last 7 days):           Last 24 Hours Medication List:     Current Facility-Administered Medications:  acetaminophen 650 mg Oral Q6H PRN Barbara Gibson MD   bisacodyl 10 mg Rectal Daily PRN Barbara Gibson MD   enoxaparin 40 mg Subcutaneous Daily Barbara Gibson MD   magnesium citrate 148 mL Oral Once Barbara Gibson MD   morphine injection 4 mg Intravenous Q4H PRN Barbara Gibson MD   oxyCODONE 10 mg Oral Q12H [facility] Barbara Gibson MD   oxyCODONE 5 mg Oral Q4H PRN Barbara Gibson MD   polyethylene glycol 17 g Oral Daily Barbara Gibson MD   senna 1 tablet Oral BID Barbara Gibson MD   sodium phosphate-biphosphate 1 enema Rectal Once Barbara Gibson MD        Today, Patient Was Seen By: Barbara Gibson MD    ** Please Note: Dictation voice to text software may have been used in the creation of this document   **

## 2018-11-08 NOTE — SOCIAL WORK
SW met with this pt, her daughter-in-law, and grandchildren x2 today using a peer to translate  Pt resides with her son and dil in their 2 story home  She is independent with all daily tasks but her dil cooks the meals and family transports  Pt goes to [address] for her PCP appointments and has been put on antidepressants since a recent hospital stay in [address] [place] [address]  Pt will follow up with her PCP for additional scripts on 12/20/18  SW will follow for additional needs

## 2018-11-09 ENCOUNTER — APPOINTMENT (INPATIENT)
Dept: RADIOLOGY | Facility: HOSPITAL | Age: 58
DRG: 681 | End: 2018-11-09
Payer: COMMERCIAL

## 2018-11-09 ENCOUNTER — ANESTHESIA EVENT (OUTPATIENT)
Dept: MEDSURG UNIT | Facility: HOSPITAL | Age: 58
End: 2018-11-09

## 2018-11-09 ENCOUNTER — ANESTHESIA (INPATIENT)
Dept: PERIOP | Facility: HOSPITAL | Age: 58
DRG: 681 | End: 2018-11-09
Payer: COMMERCIAL

## 2018-11-09 ENCOUNTER — ANESTHESIA EVENT (INPATIENT)
Dept: PERIOP | Facility: HOSPITAL | Age: 58
DRG: 681 | End: 2018-11-09
Payer: COMMERCIAL

## 2018-11-09 ENCOUNTER — ANESTHESIA (OUTPATIENT)
Dept: MEDSURG UNIT | Facility: HOSPITAL | Age: 58
End: 2018-11-09

## 2018-11-09 PROBLEM — D50.8 IRON DEFICIENCY ANEMIA SECONDARY TO INADEQUATE DIETARY IRON INTAKE: Status: ACTIVE | Noted: 2018-11-09

## 2018-11-09 PROCEDURE — C1788 PORT, INDWELLING, IMP: HCPCS | Performed by: SPECIALIST

## 2018-11-09 PROCEDURE — 88341 IMHCHEM/IMCYTCHM EA ADD ANTB: CPT | Performed by: PATHOLOGY

## 2018-11-09 PROCEDURE — 88305 TISSUE EXAM BY PATHOLOGIST: CPT | Performed by: PATHOLOGY

## 2018-11-09 PROCEDURE — 36561 INSERT TUNNELED CV CATH: CPT | Performed by: SPECIALIST

## 2018-11-09 PROCEDURE — 71045 X-RAY EXAM CHEST 1 VIEW: CPT

## 2018-11-09 PROCEDURE — 88185 FLOWCYTOMETRY/TC ADD-ON: CPT

## 2018-11-09 PROCEDURE — 77001 FLUOROGUIDE FOR VEIN DEVICE: CPT

## 2018-11-09 PROCEDURE — B5181ZA FLUOROSCOPY OF SUPERIOR VENA CAVA USING LOW OSMOLAR CONTRAST, GUIDANCE: ICD-10-PCS | Performed by: SPECIALIST

## 2018-11-09 PROCEDURE — 07B20ZX EXCISION OF LEFT NECK LYMPHATIC, OPEN APPROACH, DIAGNOSTIC: ICD-10-PCS | Performed by: SPECIALIST

## 2018-11-09 PROCEDURE — 0JH60WZ INSERTION OF TOTALLY IMPLANTABLE VASCULAR ACCESS DEVICE INTO CHEST SUBCUTANEOUS TISSUE AND FASCIA, OPEN APPROACH: ICD-10-PCS | Performed by: SPECIALIST

## 2018-11-09 PROCEDURE — 99232 SBSQ HOSP IP/OBS MODERATE 35: CPT | Performed by: FAMILY MEDICINE

## 2018-11-09 PROCEDURE — 88360 TUMOR IMMUNOHISTOCHEM/MANUAL: CPT | Performed by: PATHOLOGY

## 2018-11-09 PROCEDURE — 88184 FLOWCYTOMETRY/ TC 1 MARKER: CPT | Performed by: SPECIALIST

## 2018-11-09 PROCEDURE — 88342 IMHCHEM/IMCYTCHM 1ST ANTB: CPT | Performed by: PATHOLOGY

## 2018-11-09 PROCEDURE — 02HV33Z INSERTION OF INFUSION DEVICE INTO SUPERIOR VENA CAVA, PERCUTANEOUS APPROACH: ICD-10-PCS | Performed by: SPECIALIST

## 2018-11-09 PROCEDURE — 38500 BIOPSY/REMOVAL LYMPH NODES: CPT | Performed by: SPECIALIST

## 2018-11-09 RX ORDER — SODIUM CHLORIDE, SODIUM LACTATE, POTASSIUM CHLORIDE, CALCIUM CHLORIDE 600; 310; 30; 20 MG/100ML; MG/100ML; MG/100ML; MG/100ML
50 INJECTION, SOLUTION INTRAVENOUS CONTINUOUS
Status: DISCONTINUED | OUTPATIENT
Start: 2018-11-09 | End: 2018-11-09

## 2018-11-09 RX ORDER — PROPOFOL 10 MG/ML
INJECTION, EMULSION INTRAVENOUS CONTINUOUS PRN
Status: DISCONTINUED | OUTPATIENT
Start: 2018-11-09 | End: 2018-11-09 | Stop reason: SURG

## 2018-11-09 RX ORDER — HYDROCHLOROTHIAZIDE 25 MG/1
50 TABLET ORAL DAILY
Status: DISCONTINUED | OUTPATIENT
Start: 2018-11-09 | End: 2018-11-10 | Stop reason: HOSPADM

## 2018-11-09 RX ORDER — GLYCOPYRROLATE 0.2 MG/ML
INJECTION INTRAMUSCULAR; INTRAVENOUS AS NEEDED
Status: DISCONTINUED | OUTPATIENT
Start: 2018-11-09 | End: 2018-11-09 | Stop reason: SURG

## 2018-11-09 RX ORDER — ONDANSETRON 2 MG/ML
4 INJECTION INTRAMUSCULAR; INTRAVENOUS ONCE AS NEEDED
Status: COMPLETED | OUTPATIENT
Start: 2018-11-09 | End: 2018-11-09

## 2018-11-09 RX ORDER — PROPOFOL 10 MG/ML
INJECTION, EMULSION INTRAVENOUS AS NEEDED
Status: DISCONTINUED | OUTPATIENT
Start: 2018-11-09 | End: 2018-11-09 | Stop reason: SURG

## 2018-11-09 RX ORDER — LOSARTAN POTASSIUM 50 MG/1
100 TABLET ORAL DAILY
Status: DISCONTINUED | OUTPATIENT
Start: 2018-11-09 | End: 2018-11-10 | Stop reason: HOSPADM

## 2018-11-09 RX ORDER — HYDROMORPHONE HCL/PF 1 MG/ML
0.5 SYRINGE (ML) INJECTION
Status: DISCONTINUED | OUTPATIENT
Start: 2018-11-09 | End: 2018-11-09 | Stop reason: HOSPADM

## 2018-11-09 RX ORDER — LIDOCAINE HYDROCHLORIDE 10 MG/ML
INJECTION, SOLUTION INFILTRATION; PERINEURAL AS NEEDED
Status: DISCONTINUED | OUTPATIENT
Start: 2018-11-09 | End: 2018-11-09 | Stop reason: SURG

## 2018-11-09 RX ORDER — LIDOCAINE HYDROCHLORIDE 10 MG/ML
INJECTION, SOLUTION INFILTRATION; PERINEURAL AS NEEDED
Status: DISCONTINUED | OUTPATIENT
Start: 2018-11-09 | End: 2018-11-09 | Stop reason: HOSPADM

## 2018-11-09 RX ORDER — MIDAZOLAM HYDROCHLORIDE 1 MG/ML
INJECTION INTRAMUSCULAR; INTRAVENOUS AS NEEDED
Status: DISCONTINUED | OUTPATIENT
Start: 2018-11-09 | End: 2018-11-09 | Stop reason: SURG

## 2018-11-09 RX ORDER — CITALOPRAM 20 MG/1
10 TABLET ORAL DAILY
Status: DISCONTINUED | OUTPATIENT
Start: 2018-11-09 | End: 2018-11-10 | Stop reason: HOSPADM

## 2018-11-09 RX ORDER — FENTANYL CITRATE 50 UG/ML
INJECTION, SOLUTION INTRAMUSCULAR; INTRAVENOUS AS NEEDED
Status: DISCONTINUED | OUTPATIENT
Start: 2018-11-09 | End: 2018-11-09 | Stop reason: SURG

## 2018-11-09 RX ORDER — MEPERIDINE HYDROCHLORIDE 25 MG/ML
12.5 INJECTION INTRAMUSCULAR; INTRAVENOUS; SUBCUTANEOUS
Status: DISCONTINUED | OUTPATIENT
Start: 2018-11-09 | End: 2018-11-09 | Stop reason: HOSPADM

## 2018-11-09 RX ADMIN — HYDROCHLOROTHIAZIDE 50 MG: 25 TABLET ORAL at 17:22

## 2018-11-09 RX ADMIN — SENNOSIDES 8.6 MG: 8.6 TABLET, FILM COATED ORAL at 17:23

## 2018-11-09 RX ADMIN — CEFAZOLIN SODIUM 2000 MG: 2 SOLUTION INTRAVENOUS at 13:30

## 2018-11-09 RX ADMIN — OXYCODONE HYDROCHLORIDE 10 MG: 10 TABLET, FILM COATED, EXTENDED RELEASE ORAL at 20:06

## 2018-11-09 RX ADMIN — LOSARTAN POTASSIUM 100 MG: 50 TABLET, FILM COATED ORAL at 17:23

## 2018-11-09 RX ADMIN — ONDANSETRON 4 MG: 2 INJECTION, SOLUTION INTRAMUSCULAR; INTRAVENOUS at 15:28

## 2018-11-09 RX ADMIN — PROPOFOL 30 MG: 10 INJECTION, EMULSION INTRAVENOUS at 13:36

## 2018-11-09 RX ADMIN — OXYCODONE HYDROCHLORIDE 10 MG: 10 TABLET, FILM COATED, EXTENDED RELEASE ORAL at 08:53

## 2018-11-09 RX ADMIN — CITALOPRAM HYDROBROMIDE 10 MG: 20 TABLET ORAL at 17:21

## 2018-11-09 RX ADMIN — OXYCODONE HYDROCHLORIDE 5 MG: 5 TABLET ORAL at 23:45

## 2018-11-09 RX ADMIN — PROPOFOL 75 MCG/KG/MIN: 10 INJECTION, EMULSION INTRAVENOUS at 13:36

## 2018-11-09 RX ADMIN — SODIUM CHLORIDE, POTASSIUM CHLORIDE, SODIUM LACTATE AND CALCIUM CHLORIDE: 600; 310; 30; 20 INJECTION, SOLUTION INTRAVENOUS at 13:26

## 2018-11-09 RX ADMIN — LIDOCAINE HYDROCHLORIDE 50 MG: 10 INJECTION, SOLUTION INFILTRATION; PERINEURAL at 13:36

## 2018-11-09 RX ADMIN — SENNOSIDES 8.6 MG: 8.6 TABLET, FILM COATED ORAL at 08:53

## 2018-11-09 RX ADMIN — GLYCOPYRROLATE 0.1 MG: 0.2 INJECTION, SOLUTION INTRAMUSCULAR; INTRAVENOUS at 13:36

## 2018-11-09 RX ADMIN — OXYCODONE HYDROCHLORIDE 5 MG: 5 TABLET ORAL at 17:22

## 2018-11-09 RX ADMIN — FENTANYL CITRATE 50 MCG: 50 INJECTION INTRAMUSCULAR; INTRAVENOUS at 13:34

## 2018-11-09 RX ADMIN — MIDAZOLAM HYDROCHLORIDE 1 MG: 1 INJECTION, SOLUTION INTRAMUSCULAR; INTRAVENOUS at 13:34

## 2018-11-09 RX ADMIN — MIDAZOLAM HYDROCHLORIDE 1 MG: 1 INJECTION, SOLUTION INTRAMUSCULAR; INTRAVENOUS at 13:30

## 2018-11-09 RX ADMIN — FENTANYL CITRATE 50 MCG: 50 INJECTION INTRAMUSCULAR; INTRAVENOUS at 13:30

## 2018-11-09 RX ADMIN — SODIUM CHLORIDE, POTASSIUM CHLORIDE, SODIUM LACTATE AND CALCIUM CHLORIDE 50 ML/HR: 600; 310; 30; 20 INJECTION, SOLUTION INTRAVENOUS at 13:07

## 2018-11-09 NOTE — ASSESSMENT & PLAN NOTE
Patient came to the ER secondary to progressively worsening swelling of her abdomen and also secondary to increasing abdominal pain  CT of the abdomen pelvis shows Massive lymphadenopathy throughout the abdomen and pelvis raising suspicion for lymphoma  Associated hepatomegaly and massive splenomegaly  Patient has no known history of any malignancy or knowledge of having any of these problems  CT of the chest for further evaluation for lymphoma done and shows supraclavicular, axillary and mediastinal lymphadenopathy as well  Patient underwent excisional biopsy of neck lymph nodes and also placement of Port-A-Cath today  Her abdominal pain is moderately well controlled with current regimen of narcotics    She finally had a bowel movement this morning and is feeling better now

## 2018-11-09 NOTE — ANESTHESIA PREPROCEDURE EVALUATION
Review of Systems/Medical History      No history of anesthetic complications     Cardiovascular  Exercise tolerance (METS): <4,  Hypertension controlled,    Pulmonary  Smoker: quit 2 months ago  , Asthma , well controlled/ stable , Shortness of breath,   Comment: cough     GI/Hepatic      Comment: abdominall Lymphaadenopathy  Constipation   Abdominal pain   AST elevated          Endo/Other     GYN       Hematology   Musculoskeletal       Neurology    Headaches,    Psychology   Depression (with recent h/o hospitalization for it ) ,              Physical Exam    Airway    Mallampati score: II  TM Distance: >3 FB  Neck ROM: full     Dental       Cardiovascular  Cardiovascular exam normal    Pulmonary  Pulmonary exam normal     Other Findings        Anesthesia Plan  ASA Score- 3     Anesthesia Type- IV sedation with anesthesia with ASA Monitors  Additional Monitors:   Airway Plan:         Plan Factors-Patient not instructed to abstain from smoking on day of procedure  Patient did not smoke on day of surgery  Induction- intravenous  Postoperative Plan- Plan for postoperative opioid use  Informed Consent- Anesthetic plan and risks discussed with patient and son (pt wants her son to sign consent)                No results found for: HGBA1C    Lab Results   Component Value Date    K 4 3 11/08/2018     11/08/2018    CO2 27 11/08/2018    BUN 19 11/08/2018    CREATININE 0 97 11/08/2018    CALCIUM 8 9 11/08/2018    AST 50 (H) 11/08/2018    ALT 28 11/08/2018    ALKPHOS 165 (H) 11/08/2018    EGFR 65 11/08/2018       Lab Results   Component Value Date    WBC 10 70 11/08/2018    HGB 9 6 (L) 11/08/2018    HCT 31 6 (L) 11/08/2018    MCV 70 (L) 11/08/2018     (L) 11/08/2018   Normal sinus rhythm  Normal ECG  No previous ECGs available

## 2018-11-09 NOTE — OP NOTE
OPERATIVE REPORT  PATIENT NAME: Yuni Wright    :  1960  MRN: [id number]  Pt Location:  OR ROOM 07    SURGERY DATE: 2018    Surgeon(s) and Role:     * Dominga Spencer MD - Primary    Preop Diagnosis:  Lymphoma of intra-abdominal lymph nodes, unspecified lymphoma type ([address] ) [C85 93]  Non-Hodgkin lymphoma of intra-abdominal lymph nodes ([address] ) [C85 93]    Post-Op Diagnosis Codes:  Path pending     * Lymphoma of intra-abdominal lymph nodes, unspecified lymphoma type ([address] ) [C85 93]     * Non-Hodgkin lymphoma of intra-abdominal lymph nodes ([address] ) [C85 93]    Procedure(s) (LRB):  EXCISION BIOPSY LYMPH NODE SUPRACLAVICULAR (Left)  INSERTION OF PORT-A-CATH (N/A)    Specimen(s):  ID Type Source Tests Collected by Time Destination   1 : Left Neck Tissue Lymph Node TISSUE EXAM Dominga Spencer MD 2018  2:07 PM    2 : Left Neck Tissue Lymph Node TISSUE EXAM, LEUKEMIA/LYMPHOMA FLOW CYTOMETRY Dominga Spencer MD 2018  2:59 PM        Estimated Blood Loss:   Minimal    Drains:       Anesthesia Type:   IV Sedation with Anesthesia    Operative Indications:  Lymphoma of intra-abdominal lymph nodes, unspecified lymphoma type ([address] ) [C85 93]  Non-Hodgkin lymphoma of intra-abdominal lymph nodes ([address] ) [C85 93]      Operative Findings:      Complications:   None    Procedure and Technique:  Patient brought to the operating room placed on the operating table in a supine position  Under adequate IV sedation had the left neck than left superior chest and clavicular area were prepped and draped in usual sterile fashion  A palpable posterior left cervical lymph node was identified  1% lidocaine was used to infiltrate the skin and subcutaneous tissue over this  A curvilinear transverse incision was made over this about 2 cm with a 15  Scalpel blade  CT infected tissue using the Bovie  Bleeders were cauterized at that time  The node was palpated and the platysma was divided in direction of the incision    Gentle dissection of the node proceeded and was eventually visualized  Some of the connective tissue attached the node was grasped and was rotated and carefully dissected from the surrounding tissue  Small hemoclips were used to facilitate this  The node was eventually circumferentially released from the surrounding tissue and sent to pathology for histological diagnosis  There was inspected for bleeding small amount of bleeding was controlled electrocautery  The sponge was placed in the wound and attention was placed to the left of clavicular area  At this time the patient was placed in Trendelenburg  Some local anesthesia was used to infiltrate the improve clavicular area  The subclavian vein was cannulated on the 2nd pass and the guidewire was passed through the needle without difficulty  The position of the wire was verified it was noted be going down toward the heart  At that point additional local was used to infiltrate the infraclavicular skin and subcutaneous tissue  A 15  Scalpel was used to make an incision under the clavicle and a transverse fashion  A pocket was created under the subcutaneous tissue anterior to the pectoralis muscle using the Bovie and also gentle blunt dissection  The port was obtained and passed to the pocket to measure it  Appeared to be more than adequate to house the port  At this point the cannula and sheath were passed over the wire without difficulty  The wire was removed as was the cannula  The catheter was passed without difficulty through the sheath and passed down toward the heart  The sheath was removed and repeat a x-ray was obtained  Catheter was in the somewhat formed backed out to an appropriate position  The Port-A-Cath was obtained and flushed  The catheter itself was cut to the appropriate position after measuring on the chest x-ray  It was attached to the port and the port was placed in the pocket    Repeat fluoro verified that the catheter was in the right position but was slightly in 2 4  Catheter was then shortened and the entire process was repeated  Repeat fluoro demonstrated the catheter to be in excellent position and the port was also in good position  At that point port was sutured in place using 2-0 Prolenes  Port was flushed and was quite functional as blood was aspirated and flushed  At that point the areas that were bleeding no bleeding was noted  The wound was then closed with interrupted Vicryl in the subcutaneous tissue and then the skin was closed with 4 0 Monocryl  Benzoin Steri-Strips were applied  Going back to the cervical wound there was no bleeding noted at this time  Platysma and subcu reapproximated 3 0 Vicryl interrupted fashion  The skin was closed for Monocryl in a running subcuticular fashion  Benzoin Steri-Strips applied  The EBL for both procedures minimal the patient about tolerated the procedure well and delivered to the recovery room in stable condition chest x-ray will be obtained in the PACU       I was present for the entire procedure    Patient Disposition:  PACU     SIGNATURE: Jeffeyr Dc MD  DATE: November 9, 2018  TIME: 3:10 PM

## 2018-11-09 NOTE — ASSESSMENT & PLAN NOTE
Patient finally had a bowel movement after being constipated for a week  Continue laxatives    Encourage oral diet

## 2018-11-09 NOTE — ANESTHESIA POSTPROCEDURE EVALUATION
Post-Op Assessment Note      CV Status:  Stable    Mental Status:  Alert and awake    Hydration Status:  Euvolemic    PONV Controlled:  Controlled    Airway Patency:  Patent    Post Op Vitals Reviewed: Yes          Staff: Anesthesiologist           BP   172/78   Temp 97 5   Pulse 81   Resp 20   SpO2 97%RA

## 2018-11-09 NOTE — ASSESSMENT & PLAN NOTE
Patient has chronic iron-deficiency anemia with hemoglobin of 9 6  Now that constipation has resolved will place her on iron supplements

## 2018-11-09 NOTE — ASSESSMENT & PLAN NOTE
Patient has history of hypertension and takes medications for that  I have asked her to bring in her medication bottles   Blood pressure is moderately well controlled without any medications

## 2018-11-09 NOTE — ASSESSMENT & PLAN NOTE
Patient takes medications for depression however she does not know the names of them  Will ask her family to bring them in so that we can place her on it here    Patient has been seeking care in [place] in the past and hence we have no record of her care in [place]

## 2018-11-09 NOTE — NURSING NOTE
Pt sleeping ,easily aroused to verbal stimuli  VS stable  Denies pain/SOB/N/V  LUQ, LLQ tender to touch  Hypoactive BS  Skin dry warm to touch  No new changes from previous assessment  Pt instructed to be on NPO after MN, verbalizes understanding  Will continue to monitor  Call bell in reach

## 2018-11-09 NOTE — PROGRESS NOTES
Progress Note - Maribel Steele 1960, 66 y o  female MRN: [id number]    Unit/Bed#: 5T -01 Encounter: [id number]    Primary Care Provider: Dileep Olsen DO   Date and time admitted to hospital: 11/7/2018 11:43 AM        * Abdominal pain   Assessment & Plan    Patient came to the ER secondary to progressively worsening swelling of her abdomen and also secondary to increasing abdominal pain  CT of the abdomen pelvis shows Massive lymphadenopathy throughout the abdomen and pelvis raising suspicion for lymphoma  Associated hepatomegaly and massive splenomegaly  Patient has no known history of any malignancy or knowledge of having any of these problems  CT of the chest for further evaluation for lymphoma done and shows supraclavicular, axillary and mediastinal lymphadenopathy as well  Patient underwent excisional biopsy of neck lymph nodes and also placement of Port-A-Cath today  Her abdominal pain is moderately well controlled with current regimen of narcotics  She finally had a bowel movement this morning and is feeling better now        Iron deficiency anemia secondary to inadequate dietary iron intake   Assessment & Plan    Patient has chronic iron-deficiency anemia with hemoglobin of 9 6  Now that constipation has resolved will place her on iron supplements  Thrombocytopenia ([address] )   Assessment & Plan    Patient's platelet count today is 118  Continue to observe for now  Transaminitis   Assessment & Plan    Hold Tylenol secondary to AST of 50  Will check hepatitis panel  Avoid hepatotoxic agents     Slow transit constipation   Assessment & Plan    Patient finally had a bowel movement after being constipated for a week  Continue laxatives  Encourage oral diet     Essential hypertension   Assessment & Plan    Patient has history of hypertension and takes medications for that  I have asked her to bring in her medication bottles     Blood pressure is moderately well controlled without any medications  Recurrent major depressive disorder, in partial remission [facility])   Assessment & Plan    Patient takes medications for depression however she does not know the names of them  Will ask her family to bring them in so that we can place her on it here  Patient has been seeking care in [place] in the past and hence we have no record of her care in [place]     Lymphoma of intra-abdominal lymph nodes [facility])   Assessment & Plan    Patient has an abnormal CT of the abdomen pelvis which is suspicious for lymphoma  Excisional biopsy done  Discussed with Heme-Onc and arranged for outpatient follow-up next week  I had a long talk with the family and explained to them about her hospital course      Diarrhea: Will check stool culture and C diff  VTE Pharmacologic Prophylaxis:   Pharmacologic: Pharmacologic VTE Prophylaxis contraindicated due to low hb  Mechanical VTE Prophylaxis in Place: Yes    Patient Centered Rounds: I have performed bedside rounds with nursing staff today  Discussions with Specialists or Other Care Team Provider:  Discussed with surgery    Education and Discussions with Family / Patient:  Discussed with patient at bedside about hospital course    Time Spent for Care: 30 minutes  More than 50% of total time spent on counseling and coordination of care as described above  Current Length of Stay: 2 day(s)    Current Patient Status: Inpatient   Certification Statement: The patient will continue to require additional inpatient hospital stay due to Abdominal pain    Discharge Plan:  Discharge home tomorrow    Code Status: Level 1 - Full Code      Subjective:   Patient states her abdominal pain is 4/10  She is currently having pain in her neck at the site of biopsy being done    States her shortness of breath and abdominal pain are overall improving    Objective:     Vitals:   Temp (24hrs), Av 8 °F (36 6 °C), Min:97 4 °F (36 3 °C), Max:98 4 °F (36 9 °C)    Temp:  [97 4 °F (36 3 °C)-98 4 °F (36 9 °C)] 97 5 °F (36 4 °C)  HR:  [75-88] 81  Resp:  [16-20] 19  BP: (118-172)/(52-78) 158/73  SpO2:  [94 %-97 %] 97 %  Body mass index is 32 91 kg/m²  Input and Output Summary (last 24 hours): Intake/Output Summary (Last 24 hours) at 11/09/18 1658  Last data filed at 11/09/18 1445   Gross per 24 hour   Intake             1440 ml   Output             1180 ml   Net              260 ml       Physical Exam:     Physical Exam   Constitutional: She is oriented to person, place, and time  She appears well-developed  HENT:   Head: Normocephalic and atraumatic  Right Ear: External ear normal    Left Ear: External ear normal    Mouth/Throat: Oropharynx is clear and moist    Eyes: Pupils are equal, round, and reactive to light  Conjunctivae and EOM are normal    Neck:   Dressings on the neck   Cardiovascular: Normal rate, regular rhythm and normal heart sounds  Pulmonary/Chest: Effort normal and breath sounds normal  She has no wheezes  She has no rales  She exhibits no tenderness  Abdominal: Soft  Bowel sounds are normal  She exhibits distension  She exhibits no mass  There is tenderness  There is no rebound and no guarding  Genitourinary:   Genitourinary Comments: deferred   Musculoskeletal: She exhibits no edema or tenderness  Neurological: She is alert and oriented to person, place, and time  Skin: Skin is warm and dry  No rash noted  Psychiatric: She has a normal mood and affect  Nursing note and vitals reviewed          Additional Data:     Labs:      Results from last 7 days  Lab Units 11/08/18  0501 11/07/18  1239   WBC Thousand/uL 10 70 11 50*   HEMOGLOBIN g/dL 9 6* 9 5*   HEMATOCRIT % 31 6* 30 9*   PLATELETS Thousands/uL 118* 117*   BANDS PCT %  --  3   LYMPHO PCT %  --  47   MONO PCT %  --  8   EOS PCT %  --  7*       Results from last 7 days  Lab Units 11/08/18  0502   POTASSIUM mmol/L 4 3   CHLORIDE mmol/L 104   CO2 mmol/L 27   BUN mg/dL 19   CREATININE mg/dL 0 97 ANION GAP mmol/L 7   CALCIUM mg/dL 8 9   ALBUMIN g/dL 3 4   TOTAL BILIRUBIN mg/dL 0 60   ALK PHOS U/L 165*   ALT U/L 28   AST U/L 50*                           * I Have Reviewed All Lab Data Listed Above  * Additional Pertinent Lab Tests Reviewed: [address] Admission Reviewed    Imaging:    Imaging Reports Reviewed Today Include: none  Imaging Personally Reviewed by Myself Includes:  none    Recent Cultures (last 7 days):           Last 24 Hours Medication List:     Current Facility-Administered Medications:  acetaminophen 650 mg Oral Q6H PRN Chepe Mederos MD   bisacodyl 10 mg Rectal Daily PRN Chepe Mederos MD   citalopram 10 mg Oral Daily Toya Engle MD   hydrochlorothiazide 50 mg Oral Daily Toya Engle MD   losartan 100 mg Oral Daily Toya Engle MD   morphine injection 4 mg Intravenous Q4H PRN Chepe Mederos MD   oxyCODONE 10 mg Oral Q12H [facility] Chepe Mederos MD   oxyCODONE 5 mg Oral Q4H PRN Chepe Mederos MD   polyethylene glycol 17 g Oral Daily Chepe Mederos MD   senna 1 tablet Oral BID Chepe Mederos MD        Today, Patient Was Seen By: Chepe Mederos MD    ** Please Note: Dictation voice to text software may have been used in the creation of this document   **

## 2018-11-09 NOTE — ASSESSMENT & PLAN NOTE
Patient has an abnormal CT of the abdomen pelvis which is suspicious for lymphoma  Excisional biopsy done  Discussed with Heme-Onc and arranged for outpatient follow-up next week    I had a long talk with the family and explained to them about her hospital course

## 2018-11-10 VITALS
BODY MASS INDEX: 32.77 KG/M2 | HEIGHT: 66 IN | HEART RATE: 68 BPM | WEIGHT: 203.93 LBS | TEMPERATURE: 97.1 F | SYSTOLIC BLOOD PRESSURE: 160 MMHG | RESPIRATION RATE: 18 BRPM | DIASTOLIC BLOOD PRESSURE: 80 MMHG | OXYGEN SATURATION: 94 %

## 2018-11-10 PROCEDURE — 80074 ACUTE HEPATITIS PANEL: CPT | Performed by: FAMILY MEDICINE

## 2018-11-10 PROCEDURE — 99239 HOSP IP/OBS DSCHRG MGMT >30: CPT | Performed by: NURSE PRACTITIONER

## 2018-11-10 RX ORDER — OXYCODONE HYDROCHLORIDE 5 MG/1
5 TABLET ORAL EVERY 4 HOURS PRN
Qty: 20 TABLET | Refills: 0 | Status: SHIPPED | OUTPATIENT
Start: 2018-11-10 | End: 2018-11-10

## 2018-11-10 RX ORDER — SENNOSIDES 8.6 MG
1 TABLET ORAL
Qty: 120 EACH | Refills: 0 | Status: SHIPPED | OUTPATIENT
Start: 2018-11-10 | End: 2018-11-10

## 2018-11-10 RX ORDER — OXYCODONE HCL 10 MG/1
10 TABLET, FILM COATED, EXTENDED RELEASE ORAL EVERY 12 HOURS SCHEDULED
Qty: 20 TABLET | Refills: 0 | Status: SHIPPED | OUTPATIENT
Start: 2018-11-10 | End: 2018-11-20

## 2018-11-10 RX ORDER — OXYCODONE HYDROCHLORIDE 5 MG/1
5 TABLET ORAL EVERY 4 HOURS PRN
Qty: 20 TABLET | Refills: 0 | Status: SHIPPED | OUTPATIENT
Start: 2018-11-10 | End: 2018-11-27 | Stop reason: SDUPTHER

## 2018-11-10 RX ORDER — OXYCODONE HCL 10 MG/1
10 TABLET, FILM COATED, EXTENDED RELEASE ORAL EVERY 12 HOURS SCHEDULED
Qty: 60 TABLET | Refills: 0 | Status: SHIPPED | OUTPATIENT
Start: 2018-11-10 | End: 2018-11-10

## 2018-11-10 RX ORDER — SENNOSIDES 8.6 MG
1 TABLET ORAL
Qty: 120 EACH | Refills: 0 | Status: SHIPPED | OUTPATIENT
Start: 2018-11-10 | End: 2019-10-14 | Stop reason: SDUPTHER

## 2018-11-10 RX ORDER — POLYETHYLENE GLYCOL 3350 17 G/17G
17 POWDER, FOR SOLUTION ORAL DAILY
Qty: 14 EACH | Refills: 0 | Status: SHIPPED | OUTPATIENT
Start: 2018-11-11 | End: 2018-11-10

## 2018-11-10 RX ORDER — POLYETHYLENE GLYCOL 3350 17 G/17G
17 POWDER, FOR SOLUTION ORAL DAILY
Qty: 14 EACH | Refills: 0 | Status: SHIPPED | OUTPATIENT
Start: 2018-11-11 | End: 2019-10-14 | Stop reason: SDUPTHER

## 2018-11-10 RX ADMIN — SENNOSIDES 8.6 MG: 8.6 TABLET, FILM COATED ORAL at 09:03

## 2018-11-10 RX ADMIN — OXYCODONE HYDROCHLORIDE 10 MG: 10 TABLET, FILM COATED, EXTENDED RELEASE ORAL at 09:02

## 2018-11-10 RX ADMIN — CITALOPRAM HYDROBROMIDE 10 MG: 20 TABLET ORAL at 09:01

## 2018-11-10 RX ADMIN — LOSARTAN POTASSIUM 100 MG: 50 TABLET, FILM COATED ORAL at 09:02

## 2018-11-10 RX ADMIN — HYDROCHLOROTHIAZIDE 50 MG: 25 TABLET ORAL at 09:02

## 2018-11-10 NOTE — NURSING NOTE
Patient is A+Ox3, VSS  Patient returned from PACU in stable condition  Steri-strips to L neck are C/D/I  Steri-strips to LCW are C/D/I  Patient is tolerating diet, good appetite  Denies N/V  C/O tenderness and pain to abdomen LUQ, pain med given with relief  Patient ambulates to  with standby assist, gait steady  Voiding adequate amounts of urine  Patient is resting comfortably in bed, SCDs on, call bell in reach  Visitors at bedside  Will continue to monitor closely

## 2018-11-10 NOTE — ASSESSMENT & PLAN NOTE
Patient has chronic iron-deficiency anemia with hemoglobin of 9 6  Now that constipation resolved, started on iron supplements

## 2018-11-10 NOTE — ASSESSMENT & PLAN NOTE
Patient has an abnormal CT of the abdomen pelvis which is suspicious for lymphoma  Excisional biopsy done  Port-a-cath placed  Discussed with Heme-Onc and arranged for outpatient follow-up next week, Wednesday     Additionally provider had a long talk with the family and explained to them about her hospital course

## 2018-11-10 NOTE — NURSING NOTE
Patient in bed  C/O pain at IV site  IV site noted to with erythema  IV d/c'd  VSS  C/O headache  Pain medicine given as ordered with relief  See flowsheet for details  Call bell in reach  Will continue to monitor

## 2018-11-10 NOTE — ASSESSMENT & PLAN NOTE
Patient had a bowel movement after being constipated for a week  Continue laxatives  Encourage oral diet

## 2018-11-10 NOTE — NURSING NOTE
Patient discharged to home  Instructions given to patient via translation from son  Scripts given to patient

## 2018-11-10 NOTE — SOCIAL WORK
SW discussed IMM #2 with patient, patient signed and copy placed in scan bin  Patient will contact her son to pick her up  Nursing made aware

## 2018-11-10 NOTE — NURSING NOTE
Patient given scheduled HS pain medication, no other complaints upon rounds  Assessment as charted  Family at bedside, call bell in reach  Will continue to monitor

## 2018-11-10 NOTE — ASSESSMENT & PLAN NOTE
Patient has been seeking care in [place] in the past and hence we have no record of her care [facility]

## 2018-11-10 NOTE — ASSESSMENT & PLAN NOTE
Patient came to the ER secondary to progressively worsening swelling of her abdomen and also secondary to increasing abdominal pain  CT of the abdomen pelvis showed massive lymphadenopathy throughout the abdomen and pelvis raising suspicion for lymphoma  Associated hepatomegaly and massive splenomegaly  Patient has no known history of any malignancy or knowledge of having any of these problems  CT of the chest for further evaluation for lymphoma done and shows supraclavicular, axillary and mediastinal lymphadenopathy as well      Patient underwent excisional biopsy of neck lymph nodes and also placement of Port-A-Cath 11/9/18 with Dr Franklyn Jane  Her abdominal pain is moderately well controlled with current regimen, Oxycodone 12 hr tablet BID and Oxycodone 5 mg IR as needed  Continue Senakot at HS and Miralax daily   Pt had a normal BM on 11/9 and feels better

## 2018-11-10 NOTE — DISCHARGE SUMMARY
Discharge- Will Monge 1960, 66 y o  female MRN: [id number]    Unit/Bed#: 5T -01 Encounter: [id number]    Primary Care Provider: Lizbeth Acosta DO   Date and time admitted to hospital: 11/7/2018 11:43 AM        Lymphoma of intra-abdominal lymph nodes [facility])   Assessment & Plan    Patient has an abnormal CT of the abdomen pelvis which is suspicious for lymphoma  Excisional biopsy done  Port-a-cath placed  Discussed with Heme-Onc and arranged for outpatient follow-up next week, Wednesday  Additionally provider had a long talk with the family and explained to them about her hospital course     * Abdominal pain   Assessment & Plan    Patient came to the ER secondary to progressively worsening swelling of her abdomen and also secondary to increasing abdominal pain  CT of the abdomen pelvis showed massive lymphadenopathy throughout the abdomen and pelvis raising suspicion for lymphoma  Associated hepatomegaly and massive splenomegaly  Patient has no known history of any malignancy or knowledge of having any of these problems  CT of the chest for further evaluation for lymphoma done and shows supraclavicular, axillary and mediastinal lymphadenopathy as well  Patient underwent excisional biopsy of neck lymph nodes and also placement of Port-A-Cath 11/9/18 with Dr Tyron Block  Her abdominal pain is moderately well controlled with current regimen, Oxycodone 12 hr tablet BID and Oxycodone 5 mg IR as needed  Continue Senakot at HS and Miralax daily   Pt had a normal BM on 11/9 and feels better        Thrombocytopenia ([address] )   Assessment & Plan    Patient's platelet count 669  Monitor counts as outpatient with oncology  Slow transit constipation   Assessment & Plan    Patient had a bowel movement after being constipated for a week  Continue laxatives  Encourage oral diet       Iron deficiency anemia secondary to inadequate dietary iron intake   Assessment & Plan    Patient has chronic iron-deficiency anemia with hemoglobin of 9 6  Now that constipation resolved, started on iron supplements  Transaminitis   Assessment & Plan    AST of 50  ALK Phos 165  F/U hepatitis panel  Avoid hepatotoxic agents  Oncology follow-up  Essential hypertension   Assessment & Plan    Continue home medications, Losartan, HCTZ     Recurrent major depressive disorder, in partial remission [facility])   Assessment & Plan    Patient has been seeking care in [place] in the past and hence we have no record of her care in [place]  Continue Celexa            Discharging Physician / Practitioner: YE Muñoz  PCP: Mara Valiente DO  Admission Date:   Admission Orders     Ordered        11/07/18 1519  Inpatient Admission (expected length of stay for this patient is greater than two midnights)  Once             Discharge Date: 11/10/18    Resolved Problems  Date Reviewed: 11/10/2018    None          Consultations During Hospital Stay:  · General surgery - Dr Carrillo Band     Procedures Performed:     · CT abd/pelvis: Massive lymphadenopathy throughout the abdomen and pelvis raising suspicion for lymphoma   Associated hepatomegaly and massive splenomegaly  No acute inflammatory or infectious process  · CT chest: Bulky supraclavicular, axillary and mediastinal lymphadenopathy   Extensive abdominal pelvic lymphadenopathy, further described on the abdomen and pelvis CT report   Findings are likely to represent lymphoma  · CXR: No pneumothorax status post left subclavian chest port placement  Significant Findings / Test Results:     · Massive lymphadenopathy throughout the abdomen and pelvis raising suspicion for lymphoma  · Hepatomegaly and massive splenomegaly  · Bulky supraclavicular, axillary and mediastinal lymphadenopathy   Extensive abdominal pelvic lymphadenopathy    Incidental Findings:   · As above     Test Results Pending at Discharge (will require follow up):    · Hepatitis panel, Leukemia/ Lymphoma floq cytometry, tissue exam     Outpatient Tests Requested:  · None    Complications:  None    Reason for Admission: Abdominal pain     Hospital Course:     Coleen Liang is a 66 y o  female patient who originally presented to the hospital on 11/7/2018 due to worsening swelling of her abdomen and abdominal pain  Work up included a CT of the abdomen/ pelvis revealing massive lymphadenopathy throughout the abdomen and pelvis raising suspicion for lymphoma associated hepatomegaly and massive splenomegaly  A CT chest was done for further investigation which revealed bulky supraclavicular, axillary and mediastinal lymphadenopathy   Extensive abdominal pelvic lymphadenopathy, findings are likely to represent lymphoma  A surgical consultation was obtained  Oncologist, Dr Teddy Willis was made aware of the findings  Patient underwent a left anterior chest wall port-a-cath insertion and left supraclavicular lymph node biopsy on 11/9/18  Her abdominal pain is controlled on Oxycodone 10 mg 12 hr tablet BID and Oxycodone IR 5 mg tablet as needed  She had a BM on 11/9/18 with the initiation of laxatives  She will be discharged on above pain medication with Miralax and Senakot daily  She has a follow-up appt with oncology this Wednesday  Please see above list of diagnoses and related plan for additional information  Condition at Discharge: stable     Discharge Day Visit / Exam:     Subjective:  Patient observed resting in bed  States she had a normal BM yesterday  She is ambulating to the bathroom without concern  Tolerating oral intake  Pain level controlled  Feels tired  Would like to go home today  Denies headache, dizziness, chest pain, shortness of breath  A [facility] [employer] speaking employee helped with initial translation  Patient requesting I speak with her son at time of discharge which I will do as well       Vitals: Blood Pressure: 160/80 (11/10/18 0902)  Pulse: 68 (11/10/18 0755)  Temperature: (!) 97 1 °F (36 2 °C) (11/10/18 0755)  Temp Source: Temporal (11/10/18 0755)  Respirations: 18 (11/10/18 0755)  Height: 5' 6" (167 6 cm) (11/07/18 1705)  Weight - Scale: 92 5 kg (203 lb 14 8 oz) (11/07/18 1705)  SpO2: 94 % (11/10/18 0755)  Exam:   Physical Exam   Constitutional: She is oriented to person, place, and time  She appears well-developed  No distress  HENT:   Head: Normocephalic  Neck: Normal range of motion  Cardiovascular: Normal rate and regular rhythm  Pulmonary/Chest: Effort normal  No accessory muscle usage  No respiratory distress  She has decreased breath sounds in the right lower field and the left lower field  She has no wheezes  She has no rhonchi  She has no rales  Abdominal: Soft  Bowel sounds are normal  She exhibits distension  There is tenderness  Large   Musculoskeletal: Normal range of motion  She exhibits no edema or tenderness  Neurological: She is alert and oriented to person, place, and time  Skin: Skin is warm and dry  She is not diaphoretic  Left neck, supraclavicular biopsy site with dry, clean dressing  Left anterior chest port-a-cath in place, no signs of infection      Psychiatric: Her speech is normal and behavior is normal  She exhibits a depressed mood  Nursing note and vitals reviewed  Discussion with Family: Will discuss with son at bedside     Discharge instructions/Information to patient and family:   See after visit summary for information provided to patient and family  Provisions for Follow-Up Care:  See after visit summary for information related to follow-up care and any pertinent home health orders  Disposition:     Home    For Discharges to [facility] SNF:   · Not Applicable to this Patient - Not Applicable to this Patient    Planned Readmission: None      Discharge Statement:  I spent > 30 minutes discharging the patient  This time was spent on the day of discharge  I had direct contact with the patient on the day of discharge  Greater than 50% of the total time was spent examining patient, answering all patient questions, arranging and discussing plan of care with patient as well as directly providing post-discharge instructions  Additional time then spent on discharge activities  Discharge Medications:  See after visit summary for reconciled discharge medications provided to patient and family        ** Please Note: This note has been constructed using a voice recognition system **

## 2018-11-10 NOTE — DISCHARGE INSTRUCTIONS
Abdominal pain:  CT of the abdomen pelvis shows massive lymphadenopathy, hepatosplenomegaly throughout the abdomen and pelvis raising suspicion for lymphoma     Underwent excisional biopsy of neck lymph nodes and also placement of Port-A-Cath  Follow-up with oncology for the appointment scheduled for this Wednesday with Dr Inna Sylvester   For pain control, start Oxycodone 12 hr tablet twice daily along with stool softeners/ laxatives to prevent constipation   For breakthrough pain, you may take Oxycodone IR 5 mg tablet as needed

## 2018-11-11 LAB
HAV IGM SER QL: NORMAL
HBV CORE IGM SER QL: NORMAL
HBV SURFACE AG SER QL: NORMAL
HCV AB SER QL: NORMAL

## 2018-11-11 NOTE — UTILIZATION REVIEW
Notification of Discharge  This is a Notification of Discharge from our facility [address]  Please be advised that this patient has been discharge from our facility  Below you will find the admission and discharge date and time including the patients disposition  PRESENTATION DATE: 11/7/2018 11:43 AM  IP ADMISSION DATE: 11/7/18 1519  DISCHARGE DATE: 11/10/2018  1:30 PM  DISPOSITION: [facility] Utilization Review Department  Phone: [phone]; Fax [phone]  ATTENTION: Please call with any questions or concerns to [phone]  and carefully listen to the prompts so that you are directed to the right person  Send all requests for admission clinical reviews, approved or denied determinations and any other requests to fax [phone]   All voicemails are confidential

## 2018-11-14 ENCOUNTER — OFFICE VISIT (OUTPATIENT)
Dept: HEMATOLOGY ONCOLOGY | Facility: CLINIC | Age: 58
End: 2018-11-14
Payer: COMMERCIAL

## 2018-11-14 VITALS
SYSTOLIC BLOOD PRESSURE: 110 MMHG | HEIGHT: 66 IN | RESPIRATION RATE: 18 BRPM | OXYGEN SATURATION: 96 % | WEIGHT: 204 LBS | TEMPERATURE: 98.6 F | BODY MASS INDEX: 32.78 KG/M2 | DIASTOLIC BLOOD PRESSURE: 78 MMHG | HEART RATE: 72 BPM

## 2018-11-14 DIAGNOSIS — C85.93 LYMPHOMA OF INTRA-ABDOMINAL LYMPH NODES, UNSPECIFIED LYMPHOMA TYPE (HCC): Primary | ICD-10-CM

## 2018-11-14 LAB — SCAN RESULT: NORMAL

## 2018-11-14 PROCEDURE — 99205 OFFICE O/P NEW HI 60 MIN: CPT | Performed by: INTERNAL MEDICINE

## 2018-11-14 NOTE — PROGRESS NOTES
Hematology/Oncology Outpatient Follow-up  Daniel Brown 66 y o  female 1960 [id number]    Date:  11/14/2018        Assessment and Plan:  1  Lymphoma of intra-abdominal lymph nodes, unspecified lymphoma type ([address] )  The patient has massive adenopathy above and below the diaphragm compatible with non-Hodgkin's lymphoma the exact subtype of lymphoma is not entirely clear at this point in time  I would like to pursue a bone marrow biopsy tomorrow and get a PET-CT scan along with initial workup to accelerate the initiation of chemotherapy treatment as much as possible  The patient is at a very high risk for tumor lysis syndrome once chemotherapy get started  And for that reason I think it is more appropriate to do the chemotherapy in the hospital after obtaining the final pathology  All this was explained to the patient and her daughter-in-law through an , they seem to understand the gravity of the situation and agree with the plan  - CBC and differential; Future  - Comprehensive metabolic panel; Future  - C-reactive protein; Future  - Vitamin B12; Future  - Iron Panel; Future  - TSH, 3rd generation with Free T4 reflex; Future  - LD,Blood; Future  - Hemolysis Smear; Future  - Sedimentation rate, automated; Future  - Reticulocytes; Future  - Uric acid; Future  - IgG, IgA, IgM; Future  - Protein electrophoresis, serum; Future  - Echo complete with contrast if indicated; Future  - NM PET CT skull base to mid thigh; Future  - Chronic Hepatitis Panel; Future  - CBC and differential  - Comprehensive metabolic panel  - C-reactive protein  - Vitamin B12  - TSH, 3rd generation with Free T4 reflex  - LD,Blood  - Sedimentation rate, automated  - Reticulocytes  - Uric acid  - Protein electrophoresis, serum        HPI:  This is a 51-year-old  female originally from [place] who [place] ago    She has a history of asthma, osteoporosis, psychiatric disorder, and migraines  The patient started to notice significant abdominal pain about 8 months ago she then was evaluated in the hospital recently where she had a CT scan of the chest abdomen pelvis on the 7th of November  This showed massive lymphadenopathy throughout the abdomen and pelvis with hepatic and massive splenomegaly  She also has bulky supraclavicular, axillary and mediastinal adenopathy  She then had a supra clavicular lymph node excisional biopsy on the 9th of November , the pathology report is still pending  However, the preliminary result is compatible with non-Hodgkin's lymphoma pending further immunohistochemical staining by the hematopathologist   Her CBC on the 8th of November showed white cell count of 10 7 with hemoglobin of 9 7 with MCV of 70 and platelet count of a [id number]  The patient is very symptomatic in seems to have shallow breathing with the significant abdominal pain responding to the Percocet  Interval history:    ROS: Review of Systems   Constitutional: Positive for appetite change, diaphoresis, fatigue and unexpected weight change  Negative for activity change, chills and fever  Severe fatigue, diaphoresis  HENT: Negative for congestion, dental problem, ear discharge, ear pain, facial swelling, hearing loss, mouth sores, nosebleeds, postnasal drip, sore throat, tinnitus and trouble swallowing  Eyes: Negative for discharge, redness, itching and visual disturbance  Respiratory: Positive for cough and shortness of breath ( on minimal activities)  Negative for chest tightness and wheezing  Cardiovascular: Negative for chest pain, palpitations and leg swelling  Gastrointestinal: Positive for abdominal pain (Severe abdominal pain breast finding to Percocet), constipation and nausea  Negative for abdominal distention, anal bleeding, blood in stool, diarrhea and vomiting  Genitourinary: Negative for difficulty urinating, dysuria, flank pain, frequency, hematuria and urgency  Musculoskeletal: Negative for arthralgias, back pain, gait problem, joint swelling, myalgias and neck pain  Skin: Negative for color change, pallor, rash and wound  Neurological: Positive for dizziness, numbness and headaches  Negative for syncope, speech difficulty, weakness and light-headedness  Hematological: Negative for adenopathy  Does not bruise/bleed easily  Psychiatric/Behavioral: Positive for sleep disturbance  Negative for agitation, behavioral problems and confusion         Past Medical History:   Diagnosis Date    Asthma     Migraine     Osteoporosis     Psychiatric disorder        Past Surgical History:   Procedure Laterality Date    CHOLECYSTECTOMY      FL GUIDED CENTRAL VENOUS ACCESS REPLACEMENT  11/9/2018    LYMPH NODE BIOPSY Left 11/9/2018    Procedure: EXCISION BIOPSY LYMPH NODE SUPRACLAVICULAR;  Surgeon: Jeffery Dc MD;  Location: [facility] MAIN OR;  Service: General    TUNNELED VENOUS PORT PLACEMENT N/A 11/9/2018    Procedure: INSERTION OF PORT-A-CATH;  Surgeon: Jeffery Dc MD;  Location: [facility] MAIN OR;  Service: General       Social History     Social History    Marital status: Single     Spouse name: N/A    Number of children: N/A    Years of education: N/A     Social History Main Topics    Smoking status: Former Smoker    Smokeless tobacco: Never Used    Alcohol use No    Drug use: No    Sexual activity: Not Asked     Other Topics Concern    None     Social History Narrative    None       Family History   Problem Relation Age of Onset    Cancer Mother     Cancer Father        Allergies   Allergen Reactions    Penicillins          Current Outpatient Prescriptions:     citalopram (CeleXA) 20 mg tablet, Take 10 mg by mouth daily, Disp: , Rfl:     hydrochlorothiazide (HYDRODIURIL) 25 mg tablet, Take 50 mg by mouth daily, Disp: , Rfl:     losartan (COZAAR) 100 MG tablet, Take 100 mg by mouth daily, Disp: , Rfl:     oxyCODONE (OxyCONTIN) 10 mg 12 hr tablet, Take 1 tablet (10 mg total) by mouth every 12 (twelve) hours for 10 days Max Daily Amount: 20 mg, Disp: 20 tablet, Rfl: 0    oxyCODONE (ROXICODONE) 5 mg immediate release tablet, Take 1 tablet (5 mg total) by mouth every 4 (four) hours as needed for moderate pain for up to 20 doses Max Daily Amount: 30 mg, Disp: 20 tablet, Rfl: 0    polyethylene glycol (MIRALAX) 17 g packet, Take 17 g by mouth daily, Disp: 14 each, Rfl: 0    senna (SENOKOT) 8 6 mg, Take 1 tablet (8 6 mg total) by mouth daily at bedtime, Disp: 120 each, Rfl: 0      Physical Exam:  /78 (BP Location: Left arm, Patient Position: Sitting, Cuff Size: Adult)   Pulse 72   Temp 98 6 °F (37 °C) (Tympanic)   Resp 18   Ht 5' 6" (1 676 m)   Wt 92 5 kg (204 lb)   SpO2 96%   BMI 32 93 kg/m²     Physical Exam   Constitutional: She is oriented to person, place, and time  She appears well-developed and well-nourished  No distress  HENT:   Head: Normocephalic and atraumatic  Nose: Nose normal    Mouth/Throat: Oropharynx is clear and moist    Eyes: Pupils are equal, round, and reactive to light  Conjunctivae and EOM are normal  Right eye exhibits no discharge  Left eye exhibits no discharge  No scleral icterus  Neck: Normal range of motion  Neck supple  No JVD present  No tracheal deviation present  No thyromegaly present  Cardiovascular: Normal rate, regular rhythm and normal heart sounds  Exam reveals no friction rub  No murmur heard  Pulmonary/Chest: Effort normal and breath sounds normal  No stridor  No respiratory distress  She has no wheezes  She has no rales  She exhibits no tenderness  Abdominal: Soft  Bowel sounds are normal  She exhibits distension  She exhibits no mass  There is no hepatosplenomegaly, splenomegaly or hepatomegaly  There is tenderness (Specially in the left upper quadrant and lower quadrant area)  There is no rebound and no guarding  Musculoskeletal: Normal range of motion  She exhibits no edema, tenderness or deformity  Lymphadenopathy:     She has cervical adenopathy  Neurological: She is alert and oriented to person, place, and time  She has normal reflexes  No cranial nerve deficit  Coordination normal    Skin: Skin is warm and dry  No rash noted  She is not diaphoretic  No erythema  No pallor  Psychiatric: She has a normal mood and affect  Her behavior is normal  Judgment and thought content normal          Labs:  Lab Results   Component Value Date    WBC 10 70 11/08/2018    HGB 9 6 (L) 11/08/2018    HCT 31 6 (L) 11/08/2018    MCV 70 (L) 11/08/2018     (L) 11/08/2018     Lab Results   Component Value Date    K 4 3 11/08/2018     11/08/2018    CO2 27 11/08/2018    BUN 19 11/08/2018    CREATININE 0 97 11/08/2018    CALCIUM 8 9 11/08/2018    AST 50 (H) 11/08/2018    ALT 28 11/08/2018    ALKPHOS 165 (H) 11/08/2018    EGFR 65 11/08/2018     No results found for: TSH    Patient voiced understanding and agreement in the above discussion  Aware to contact our office with questions/symptoms in the interim

## 2018-11-15 ENCOUNTER — DOCUMENTATION (OUTPATIENT)
Dept: HEMATOLOGY ONCOLOGY | Facility: CLINIC | Age: 58
End: 2018-11-15

## 2018-11-15 ENCOUNTER — PROCEDURE VISIT (OUTPATIENT)
Dept: HEMATOLOGY ONCOLOGY | Facility: CLINIC | Age: 58
End: 2018-11-15
Payer: COMMERCIAL

## 2018-11-15 DIAGNOSIS — C85.93 LYMPHOMA OF INTRA-ABDOMINAL LYMPH NODES, UNSPECIFIED LYMPHOMA TYPE (HCC): Primary | ICD-10-CM

## 2018-11-15 PROCEDURE — 88341 IMHCHEM/IMCYTCHM EA ADD ANTB: CPT | Performed by: PATHOLOGY

## 2018-11-15 PROCEDURE — 88313 SPECIAL STAINS GROUP 2: CPT | Performed by: PATHOLOGY

## 2018-11-15 PROCEDURE — 88305 TISSUE EXAM BY PATHOLOGIST: CPT | Performed by: PATHOLOGY

## 2018-11-15 PROCEDURE — 88311 DECALCIFY TISSUE: CPT | Performed by: PATHOLOGY

## 2018-11-15 PROCEDURE — 88342 IMHCHEM/IMCYTCHM 1ST ANTB: CPT | Performed by: PATHOLOGY

## 2018-11-15 PROCEDURE — 88365 INSITU HYBRIDIZATION (FISH): CPT | Performed by: PATHOLOGY

## 2018-11-15 PROCEDURE — 38222 DX BONE MARROW BX & ASPIR: CPT | Performed by: INTERNAL MEDICINE

## 2018-11-15 PROCEDURE — 88189 FLOWCYTOMETRY/READ 16 & >: CPT | Performed by: PATHOLOGY

## 2018-11-15 NOTE — PROGRESS NOTES
Attempted bone marrow biopsy procedure completed at 12:40pm   Only a core was obtained  Patient was unable to tolerate the procedure  Patient placed in supine position applying direct pressure to the site  At 1:10pm   dressing to the right iliac crest is clean dry and intact no oozing noted from the site  Applied large Band-Aid  Patient denies pain, dizziness or headache  Blood pressure 110/70 , heart rate 74bpm , O2 on room air  97%  Patient instructed to keep the site clean and dry for 24 hours and may resume bathing and remove bandage in 24 hours  Educated to call if he/she develops severe pain, significant bleeding from the site or infection/fever  Also instructed to take over-the-counter Tylenol as needed for pain and discomfort especially during the 1st 24-48 hours  Patient discharged home

## 2018-11-15 NOTE — PROGRESS NOTES
I just spoke with Joseph Hopkins, Patients daughter in law who stated she is the  and will be assisting the patient with any and all concerns  I spoke with Jen regarding the insurance and a possible change and she stated she will make the call as they were planning on making changes to her PCP anyways  I did advise once they start the process if they needed any further assistance to contact me and I will assist them  They will be making the call sometime tomorrow as the patient had a procedure today and they are focusing on her at the moment

## 2018-11-15 NOTE — PROGRESS NOTES
Hematology/Oncology Outpatient Follow-up  Augustin Mcclain 66 y o  female 1960 [id number]    Date:  11/15/2018        Assessment and Plan:  1  Lymphoma of intra-abdominal lymph nodes, unspecified lymphoma type [facility])      The patient was asked to get her PET-CT scan and initial blood work done as soon as possible so we can get her started on chemotherapy as soon as we get the final pathology  Bone Marrow Biopsy and Aspiration Procedure Note     Informed consent was obtained and potential risks including bleeding, infection and pain were reviewed with the patient  Posterior iliac crest(s) prepped with Betadine  Lidocaine 2% local anesthesia infiltrated into the subcutaneous tissue  A small bone marrow biopsy from the right posterior iliac crest area was obtain  However, I was not able to obtain an aspirate since the patient was in severe pain and was not able to tolerate the procedure to be completed  We Will ask the interventional radiology team to obtain a bone marrow aspirate and a better core biopsy of the bone marrow under anesthesia as soon as possible  Physician: Anum Cortez MD      HPI:  The patient came in today to get her bone marrow biopsy done for further staging of her very bulky in massive at least stage IIIB non-Hodgkin's lymphoma  Interval history:    ROS: Review of Systems   Constitutional: Positive for diaphoresis and fatigue  Negative for activity change, appetite change, chills, fever and unexpected weight change  HENT: Negative for congestion, dental problem, ear discharge, ear pain, facial swelling, hearing loss, mouth sores, nosebleeds, postnasal drip, sore throat, tinnitus and trouble swallowing  Eyes: Negative for discharge, redness, itching and visual disturbance  Respiratory: Negative for cough, chest tightness, shortness of breath and wheezing  Cardiovascular: Negative for chest pain, palpitations and leg swelling     Gastrointestinal: Negative for abdominal distention, abdominal pain, anal bleeding, blood in stool, constipation, diarrhea, nausea and vomiting  Genitourinary: Negative for difficulty urinating, dysuria, flank pain, frequency, hematuria and urgency  Musculoskeletal: Negative for arthralgias, back pain, gait problem, joint swelling, myalgias and neck pain  Skin: Negative for color change, pallor, rash and wound  Neurological: Negative for dizziness, syncope, speech difficulty, weakness, light-headedness, numbness and headaches  Hematological: Negative for adenopathy  Does not bruise/bleed easily  Psychiatric/Behavioral: Negative for agitation, behavioral problems, confusion and sleep disturbance         Past Medical History:   Diagnosis Date    Asthma     Migraine     Osteoporosis     Psychiatric disorder        Past Surgical History:   Procedure Laterality Date    CHOLECYSTECTOMY      [facility]AC[facility]  11/9/2018    LYMPH NODE BIOPSY Left 11/9/2018    Procedure: EXCISION BIOPSY LYMPH NODE SUPRACLAVICULAR;  Surgeon: Garland Beyer MD;  Location: [address] [place] [address];  Service: General    TUNNELED VENOUS PORT PLACEMENT N/A 11/9/2018    Procedure: INSERTION OF PORT-A-CATH;  Surgeon: Garland Beyer MD;  Location: [address] [place] [address];  Service: General       Social History     Social History    Marital status: Single     Spouse name: N/A    Number of children: N/A    Years of education: N/A     Social History Main Topics    Smoking status: Former Smoker    Smokeless tobacco: Never Used    Alcohol use No    Drug use: No    Sexual activity: Not on file     Other Topics Concern    Not on file     Social History Narrative    No narrative on file       Family History   Problem Relation Age of Onset    Cancer Mother     Cancer Father        Allergies   Allergen Reactions    Penicillins          Current Outpatient Prescriptions:     citalopram (CeleXA) 20 mg tablet, Take 10 mg by mouth daily, Disp: , Rfl:    hydrochlorothiazide (HYDRODIURIL) 25 mg tablet, Take 50 mg by mouth daily, Disp: , Rfl:     losartan (COZAAR) 100 MG tablet, Take 100 mg by mouth daily, Disp: , Rfl:     oxyCODONE (OxyCONTIN) 10 mg 12 hr tablet, Take 1 tablet (10 mg total) by mouth every 12 (twelve) hours for 10 days Max Daily Amount: 20 mg, Disp: 20 tablet, Rfl: 0    oxyCODONE (ROXICODONE) 5 mg immediate release tablet, Take 1 tablet (5 mg total) by mouth every 4 (four) hours as needed for moderate pain for up to 20 doses Max Daily Amount: 30 mg, Disp: 20 tablet, Rfl: 0    polyethylene glycol (MIRALAX) 17 g packet, Take 17 g by mouth daily, Disp: 14 each, Rfl: 0    senna (SENOKOT) 8 6 mg, Take 1 tablet (8 6 mg total) by mouth daily at bedtime, Disp: 120 each, Rfl: 0      Physical Exam:  There were no vitals taken for this visit  Physical Exam   Constitutional: She is oriented to person, place, and time  She appears well-developed and well-nourished  No distress  HENT:   Head: Normocephalic and atraumatic  Nose: Nose normal    Mouth/Throat: Oropharynx is clear and moist    Eyes: Pupils are equal, round, and reactive to light  Conjunctivae and EOM are normal  Right eye exhibits no discharge  Left eye exhibits no discharge  No scleral icterus  Neck: Normal range of motion  Neck supple  No JVD present  No tracheal deviation present  No thyromegaly present  Cardiovascular: Normal rate, regular rhythm and normal heart sounds  Exam reveals no friction rub  No murmur heard  Pulmonary/Chest: Effort normal and breath sounds normal  No stridor  No respiratory distress  She has no wheezes  She has no rales  She exhibits no tenderness  Abdominal: Soft  Bowel sounds are normal  She exhibits distension  She exhibits no mass  There is no hepatosplenomegaly, splenomegaly or hepatomegaly  There is tenderness  There is no rebound and no guarding  Musculoskeletal: Normal range of motion  She exhibits no edema, tenderness or deformity  Lymphadenopathy:     She has no cervical adenopathy  Neurological: She is alert and oriented to person, place, and time  She has normal reflexes  No cranial nerve deficit  Coordination normal    Skin: Skin is warm and dry  No rash noted  She is not diaphoretic  No erythema  No pallor  Psychiatric: She has a normal mood and affect  Her behavior is normal  Judgment and thought content normal          Labs:  Lab Results   Component Value Date    WBC 10 70 11/08/2018    HGB 9 6 (L) 11/08/2018    HCT 31 6 (L) 11/08/2018    MCV 70 (L) 11/08/2018     (L) 11/08/2018     Lab Results   Component Value Date    K 4 3 11/08/2018     11/08/2018    CO2 27 11/08/2018    BUN 19 11/08/2018    CREATININE 0 97 11/08/2018    CALCIUM 8 9 11/08/2018    AST 50 (H) 11/08/2018    ALT 28 11/08/2018    ALKPHOS 165 (H) 11/08/2018    EGFR 65 11/08/2018     No results found for: TSH    Patient voiced understanding and agreement in the above discussion  Aware to contact our office with questions/symptoms in the interim

## 2018-11-16 ENCOUNTER — LAB REQUISITION (OUTPATIENT)
Dept: LAB | Facility: HOSPITAL | Age: 58
End: 2018-11-16
Payer: COMMERCIAL

## 2018-11-16 DIAGNOSIS — C85.90 NON-HODGKIN LYMPHOMA (HCC): ICD-10-CM

## 2018-11-19 ENCOUNTER — APPOINTMENT (OUTPATIENT)
Dept: LAB | Facility: HOSPITAL | Age: 58
End: 2018-11-19
Attending: INTERNAL MEDICINE
Payer: COMMERCIAL

## 2018-11-19 DIAGNOSIS — C85.93 LYMPHOMA OF INTRA-ABDOMINAL LYMPH NODES, UNSPECIFIED LYMPHOMA TYPE (HCC): ICD-10-CM

## 2018-11-19 LAB
BLD SMEAR INTERP: NORMAL
FERRITIN SERPL-MCNC: 137 NG/ML (ref 8–388)
IGA SERPL-MCNC: 392 MG/DL (ref 70–400)
IGG SERPL-MCNC: 969 MG/DL (ref 700–1600)
IGM SERPL-MCNC: 82 MG/DL (ref 40–230)
IRON SATN MFR SERPL: 19 %
IRON SERPL-MCNC: 53 UG/DL (ref 50–170)
TIBC SERPL-MCNC: 286 UG/DL (ref 250–450)

## 2018-11-19 PROCEDURE — 36415 COLL VENOUS BLD VENIPUNCTURE: CPT

## 2018-11-19 PROCEDURE — 83540 ASSAY OF IRON: CPT

## 2018-11-19 PROCEDURE — 87340 HEPATITIS B SURFACE AG IA: CPT

## 2018-11-19 PROCEDURE — 86803 HEPATITIS C AB TEST: CPT

## 2018-11-19 PROCEDURE — 86705 HEP B CORE ANTIBODY IGM: CPT

## 2018-11-19 PROCEDURE — 83550 IRON BINDING TEST: CPT

## 2018-11-19 PROCEDURE — 86704 HEP B CORE ANTIBODY TOTAL: CPT

## 2018-11-19 PROCEDURE — 82784 ASSAY IGA/IGD/IGG/IGM EACH: CPT

## 2018-11-19 PROCEDURE — 82728 ASSAY OF FERRITIN: CPT

## 2018-11-20 LAB
HBV CORE AB SER QL: NORMAL
HBV CORE IGM SER QL: NORMAL
HBV SURFACE AG SER QL: NORMAL
HCV AB SER QL: NORMAL

## 2018-11-27 DIAGNOSIS — R10.84 GENERALIZED ABDOMINAL PAIN: ICD-10-CM

## 2018-11-27 RX ORDER — OXYCODONE HYDROCHLORIDE 5 MG/1
5 TABLET ORAL EVERY 4 HOURS PRN
Qty: 30 TABLET | Refills: 0 | Status: SHIPPED | OUTPATIENT
Start: 2018-11-27 | End: 2018-11-27 | Stop reason: SDUPTHER

## 2018-11-27 RX ORDER — OXYCODONE HYDROCHLORIDE 5 MG/1
5 TABLET ORAL EVERY 4 HOURS PRN
Qty: 30 TABLET | Refills: 0 | Status: SHIPPED | OUTPATIENT
Start: 2018-11-27 | End: 2018-12-03 | Stop reason: SDUPTHER

## 2018-11-27 NOTE — TELEPHONE ENCOUNTER
Shanna Ania and her son stopped in  He spoke some english he  Wanted to know if she could get a rx for ocycodone for  Her pain called into [address] [place] [address] pharmacy  If you have any   Questions you can call the son @ [phone]   Miranda

## 2018-11-28 PROBLEM — C82.18 GRADE 2 FOLLICULAR LYMPHOMA OF LYMPH NODES OF MULTIPLE REGIONS (HCC): Status: ACTIVE | Noted: 2018-11-07

## 2018-11-28 PROBLEM — C82.13 FOLLICULAR LYMPHOMA GRADE II OF INTRA-ABDOMINAL LYMPH NODES (HCC): Status: ACTIVE | Noted: 2018-11-07

## 2018-12-03 DIAGNOSIS — R10.84 GENERALIZED ABDOMINAL PAIN: ICD-10-CM

## 2018-12-03 RX ORDER — OXYCODONE HYDROCHLORIDE 5 MG/1
5 TABLET ORAL EVERY 4 HOURS PRN
Qty: 30 TABLET | Refills: 0 | Status: SHIPPED | OUTPATIENT
Start: 2018-12-03 | End: 2018-12-20 | Stop reason: SDUPTHER

## 2018-12-03 RX ORDER — OXYCODONE HYDROCHLORIDE 5 MG/1
5 TABLET ORAL EVERY 4 HOURS PRN
Qty: 30 TABLET | Refills: 0 | Status: SHIPPED | OUTPATIENT
Start: 2018-12-03 | End: 2018-12-03 | Stop reason: SDUPTHER

## 2018-12-04 ENCOUNTER — HOSPITAL ENCOUNTER (EMERGENCY)
Facility: HOSPITAL | Age: 58
Discharge: HOME | End: 2018-12-04
Attending: EMERGENCY MEDICINE
Payer: COMMERCIAL

## 2018-12-04 ENCOUNTER — APPOINTMENT (EMERGENCY)
Dept: CT IMAGING | Facility: HOSPITAL | Age: 58
End: 2018-12-04
Payer: COMMERCIAL

## 2018-12-04 ENCOUNTER — APPOINTMENT (EMERGENCY)
Dept: RADIOLOGY | Facility: HOSPITAL | Age: 58
End: 2018-12-04
Payer: COMMERCIAL

## 2018-12-04 VITALS
DIASTOLIC BLOOD PRESSURE: 86 MMHG | TEMPERATURE: 97.8 F | OXYGEN SATURATION: 95 % | SYSTOLIC BLOOD PRESSURE: 162 MMHG | RESPIRATION RATE: 18 BRPM | HEART RATE: 94 BPM

## 2018-12-04 DIAGNOSIS — R10.9 ABDOMINAL PAIN: Primary | ICD-10-CM

## 2018-12-04 DIAGNOSIS — C85.90 LYMPHOMA (HCC): ICD-10-CM

## 2018-12-04 DIAGNOSIS — D64.9 ANEMIA: ICD-10-CM

## 2018-12-04 LAB
ALBUMIN SERPL BCP-MCNC: 4.1 G/DL (ref 3–5.2)
ALP SERPL-CCNC: 170 U/L (ref 43–122)
ALT SERPL W P-5'-P-CCNC: 21 U/L (ref 9–52)
ANION GAP SERPL CALCULATED.3IONS-SCNC: 10 MMOL/L (ref 5–14)
ANISOCYTOSIS BLD QL SMEAR: PRESENT
APTT PPP: 21 SECONDS (ref 23–34)
AST SERPL W P-5'-P-CCNC: 48 U/L (ref 14–36)
BACTERIA UR QL AUTO: ABNORMAL /HPF
BILIRUB SERPL-MCNC: 0.7 MG/DL
BILIRUB UR QL STRIP: NEGATIVE
BUN SERPL-MCNC: 15 MG/DL (ref 5–25)
CALCIUM SERPL-MCNC: 9.8 MG/DL (ref 8.4–10.2)
CHLORIDE SERPL-SCNC: 102 MMOL/L (ref 97–108)
CLARITY UR: CLEAR
CO2 SERPL-SCNC: 26 MMOL/L (ref 22–30)
COLOR UR: YELLOW
CREAT SERPL-MCNC: 1.03 MG/DL (ref 0.6–1.2)
EOSINOPHIL # BLD AUTO: 0.24 THOUSAND/UL (ref 0–0.4)
EOSINOPHIL NFR BLD MANUAL: 2 % (ref 0–6)
ERYTHROCYTE [DISTWIDTH] IN BLOOD BY AUTOMATED COUNT: 18.8 %
GFR SERPL CREATININE-BSD FRML MDRD: 61 ML/MIN/1.73SQ M
GLUCOSE SERPL-MCNC: 102 MG/DL (ref 70–99)
GLUCOSE UR STRIP-MCNC: NEGATIVE MG/DL
HCT VFR BLD AUTO: 32.2 % (ref 36–46)
HGB BLD-MCNC: 9.6 G/DL (ref 12–16)
HGB UR QL STRIP.AUTO: NEGATIVE
HYPERCHROMIA BLD QL SMEAR: PRESENT
INR PPP: 1.03 (ref 0.89–1.1)
KETONES UR STRIP-MCNC: NEGATIVE MG/DL
LEUKOCYTE ESTERASE UR QL STRIP: NEGATIVE
LYMPHOCYTES # BLD AUTO: 39 % (ref 20–50)
LYMPHOCYTES # BLD AUTO: 4.6 THOUSAND/UL (ref 0.5–4)
MCH RBC QN AUTO: 21.1 PG (ref 26–34)
MCHC RBC AUTO-ENTMCNC: 29.8 G/DL (ref 31–36)
MCV RBC AUTO: 71 FL (ref 80–100)
MONOCYTES # BLD AUTO: 0.47 THOUSAND/UL (ref 0.2–0.9)
MONOCYTES NFR BLD AUTO: 4 % (ref 1–10)
NEUTS BAND NFR BLD MANUAL: 10 % (ref 0–8)
NEUTS SEG # BLD: 4.01 THOUSAND/UL (ref 1.8–7.8)
NEUTS SEG NFR BLD AUTO: 24 %
NITRITE UR QL STRIP: NEGATIVE
NON-SQ EPI CELLS URNS QL MICRO: ABNORMAL /HPF
NRBC BLD AUTO-RTO: 1 /100 WBC (ref 0–2)
PH UR STRIP.AUTO: 5 [PH] (ref 4.5–8)
PLATELET # BLD AUTO: 134 THOUSANDS/UL (ref 150–450)
PLATELET BLD QL SMEAR: ABNORMAL
PMV BLD AUTO: 8.4 FL (ref 8.9–12.7)
POLYCHROMASIA BLD QL SMEAR: PRESENT
POTASSIUM SERPL-SCNC: 4.3 MMOL/L (ref 3.6–5)
PROT SERPL-MCNC: 7.3 G/DL (ref 5.9–8.4)
PROT UR STRIP-MCNC: ABNORMAL MG/DL
PROTHROMBIN TIME: 10.9 SECONDS (ref 9.5–11.6)
RBC # BLD AUTO: 4.54 MILLION/UL (ref 4–5.2)
RBC #/AREA URNS AUTO: ABNORMAL /HPF
RBC MORPH BLD: ABNORMAL
SMUDGE CELLS BLD QL SMEAR: PRESENT
SODIUM SERPL-SCNC: 138 MMOL/L (ref 137–147)
SP GR UR STRIP.AUTO: 1.01 (ref 1–1.04)
TOTAL CELLS COUNTED SPEC: 100
TROPONIN I SERPL-MCNC: <0.01 NG/ML (ref 0–0.03)
UROBILINOGEN UA: NEGATIVE MG/DL
VARIANT LYMPHS # BLD AUTO: 21 % (ref 0–0)
WBC # BLD AUTO: 11.8 THOUSAND/UL (ref 4.5–11)
WBC #/AREA URNS AUTO: ABNORMAL /HPF

## 2018-12-04 PROCEDURE — 36415 COLL VENOUS BLD VENIPUNCTURE: CPT | Performed by: EMERGENCY MEDICINE

## 2018-12-04 PROCEDURE — 96361 HYDRATE IV INFUSION ADD-ON: CPT

## 2018-12-04 PROCEDURE — 71046 X-RAY EXAM CHEST 2 VIEWS: CPT

## 2018-12-04 PROCEDURE — 96375 TX/PRO/DX INJ NEW DRUG ADDON: CPT

## 2018-12-04 PROCEDURE — 80053 COMPREHEN METABOLIC PANEL: CPT | Performed by: EMERGENCY MEDICINE

## 2018-12-04 PROCEDURE — 85007 BL SMEAR W/DIFF WBC COUNT: CPT | Performed by: EMERGENCY MEDICINE

## 2018-12-04 PROCEDURE — 85730 THROMBOPLASTIN TIME PARTIAL: CPT | Performed by: EMERGENCY MEDICINE

## 2018-12-04 PROCEDURE — 93005 ELECTROCARDIOGRAM TRACING: CPT

## 2018-12-04 PROCEDURE — 99285 EMERGENCY DEPT VISIT HI MDM: CPT

## 2018-12-04 PROCEDURE — 81001 URINALYSIS AUTO W/SCOPE: CPT | Performed by: EMERGENCY MEDICINE

## 2018-12-04 PROCEDURE — 84484 ASSAY OF TROPONIN QUANT: CPT | Performed by: EMERGENCY MEDICINE

## 2018-12-04 PROCEDURE — 85027 COMPLETE CBC AUTOMATED: CPT | Performed by: EMERGENCY MEDICINE

## 2018-12-04 PROCEDURE — 85610 PROTHROMBIN TIME: CPT | Performed by: EMERGENCY MEDICINE

## 2018-12-04 PROCEDURE — 74177 CT ABD & PELVIS W/CONTRAST: CPT

## 2018-12-04 PROCEDURE — 96374 THER/PROPH/DIAG INJ IV PUSH: CPT

## 2018-12-04 RX ORDER — MORPHINE SULFATE 4 MG/ML
4 INJECTION, SOLUTION INTRAMUSCULAR; INTRAVENOUS ONCE
Status: COMPLETED | OUTPATIENT
Start: 2018-12-04 | End: 2018-12-04

## 2018-12-04 RX ORDER — ONDANSETRON 2 MG/ML
4 INJECTION INTRAMUSCULAR; INTRAVENOUS ONCE
Status: COMPLETED | OUTPATIENT
Start: 2018-12-04 | End: 2018-12-04

## 2018-12-04 RX ADMIN — SODIUM CHLORIDE 1000 ML: 9 INJECTION, SOLUTION INTRAVENOUS at 13:09

## 2018-12-04 RX ADMIN — ONDANSETRON 4 MG: 2 INJECTION INTRAMUSCULAR; INTRAVENOUS at 13:18

## 2018-12-04 RX ADMIN — IOHEXOL 50 ML: 240 INJECTION, SOLUTION INTRATHECAL; INTRAVASCULAR; INTRAVENOUS; ORAL at 13:20

## 2018-12-04 RX ADMIN — MORPHINE SULFATE 4 MG: 4 INJECTION INTRAVENOUS at 13:18

## 2018-12-04 RX ADMIN — IOHEXOL 100 ML: 350 INJECTION, SOLUTION INTRAVENOUS at 15:37

## 2018-12-04 NOTE — ED PROVIDER NOTES
History  Chief Complaint   Patient presents with    Abdominal Pain     pt states that she has stomach cancer and feels like the mass is getting bigger since this morning  pt also having body aches  72-year-old female presents with complaint of increasing abdominal discomfort  She has a history of stomach cancer and states that she feels like the swelling is worse  She now has anorexia along her increasing pain and nausea  She denies vomiting, diarrhea, fevers, chest pain, shortness of breath, or other acute issues at this point time  She reports taking her medications as prescribed but is unaware of who her physician is or what her medications are or what her medications are for  Abdominal Pain   Pain location:  Epigastric  Pain quality: aching    Pain radiates to:  Does not radiate  Onset quality:  Gradual  Timing:  Constant  Progression:  Worsening  Context: not alcohol use, not diet changes and not eating    Relieved by:  Nothing  Worsened by:  Nothing  Ineffective treatments:  None tried  Associated symptoms: anorexia, fatigue and nausea    Associated symptoms: no belching, no chest pain, no chills, no constipation, no cough, no diarrhea, no dysuria, no fever, no flatus, no hematuria, no melena, no shortness of breath, no sore throat and no vomiting        Prior to Admission Medications   Prescriptions Last Dose Informant Patient Reported?  Taking?   citalopram (CeleXA) 20 mg tablet   Yes No   Sig: Take 10 mg by mouth daily   hydrochlorothiazide (HYDRODIURIL) 25 mg tablet   Yes No   Sig: Take 50 mg by mouth daily   losartan (COZAAR) 100 MG tablet   Yes No   Sig: Take 100 mg by mouth daily   oxyCODONE (ROXICODONE) 5 mg immediate release tablet   No No   Sig: Take 1 tablet (5 mg total) by mouth every 4 (four) hours as needed for moderate pain for up to 30 doses Earliest Fill Date: 12/3/18 Max Daily Amount: 30 mg   polyethylene glycol (MIRALAX) 17 g packet   No No   Sig: Take 17 g by mouth daily senna (SENOKOT) 8 6 mg   No No   Sig: Take 1 tablet (8 6 mg total) by mouth daily at bedtime      Facility-Administered Medications: None       Past Medical History:   Diagnosis Date    Asthma     Cancer ([facility] [address] )     Migraine     Osteoporosis     Psychiatric disorder        Past Surgical History:   Procedure Laterality Date    CHOLECYSTECTOMY      [place] GUIDED CENTRAL VENOUS ACCESS REPLACEMENT  11/9/2018    HYSTERECTOMY      LYMPH NODE BIOPSY Left 11/9/2018    Procedure: EXCISION BIOPSY LYMPH NODE SUPRACLAVICULAR;  Surgeon: Thiago Aguilera MD;  Location: [address] [place] [address];  Service: General    TUNNELED VENOUS PORT PLACEMENT N/A 11/9/2018    Procedure: INSERTION OF PORT-A-CATH;  Surgeon: Thiago Aguilera MD;  Location: [address] [place] [address];  Service: General       Family History   Problem Relation Age of Onset    Cancer Mother     Cancer Father      I have reviewed and agree with the history as documented  Social History   Substance Use Topics    Smoking status: Former Smoker    Smokeless tobacco: Never Used    Alcohol use No        Review of Systems   Constitutional: Positive for fatigue  Negative for appetite change, chills and fever  HENT: Negative for postnasal drip, sinus pain, sore throat and trouble swallowing  Eyes: Negative for redness and itching  Respiratory: Negative for cough, chest tightness, shortness of breath and wheezing  Cardiovascular: Negative for chest pain and leg swelling  Gastrointestinal: Positive for abdominal pain, anorexia and nausea  Negative for constipation, diarrhea, flatus, melena and vomiting  Endocrine: Negative  Genitourinary: Negative for difficulty urinating, dysuria and hematuria  Musculoskeletal: Negative for back pain and myalgias  Skin: Negative for rash  Allergic/Immunologic: Negative  Neurological: Negative for dizziness, numbness and headaches  Hematological: Negative  Psychiatric/Behavioral: Negative          Physical Exam  Physical Exam Constitutional: She is oriented to person, place, and time  She appears well-developed and well-nourished  HENT:   Head: Normocephalic and atraumatic  Nose: Nose normal    Mouth/Throat: Oropharynx is clear and moist    Eyes: Pupils are equal, round, and reactive to light  Conjunctivae and EOM are normal    Neck: Normal range of motion  Neck supple  Cardiovascular: Normal rate, regular rhythm and normal heart sounds  Pulmonary/Chest: Effort normal and breath sounds normal  No respiratory distress  She has no wheezes  Abdominal: Soft  Bowel sounds are normal  There is tenderness (Diffuse abdominal tenderness most pronounced across the upper aspect of her abdomen)  There is no guarding  Musculoskeletal: She exhibits no edema, tenderness or deformity  Neurological: She is alert and oriented to person, place, and time  Skin: Skin is warm and dry  Capillary refill takes less than 2 seconds  No rash noted  Psychiatric: She has a normal mood and affect  Her behavior is normal    Nursing note and vitals reviewed        Vital Signs  ED Triage Vitals   Temperature Pulse Respirations Blood Pressure SpO2   12/04/18 1234 12/04/18 1234 12/04/18 1234 12/04/18 1234 12/04/18 1234   97 8 °F (36 6 °C) 95 14 (!) 177/91 98 %      Temp Source Heart Rate Source Patient Position - Orthostatic VS BP Location FiO2 (%)   12/04/18 1234 12/04/18 1234 12/04/18 1234 12/04/18 1234 --   Tympanic Monitor Lying Left arm       Pain Score       12/04/18 1422       No Pain           Vitals:    12/04/18 1234 12/04/18 1324 12/04/18 1422   BP: (!) 177/91 150/84 156/90   Pulse: 95 90 92   Patient Position - Orthostatic VS: Lying Lying Lying       Visual Acuity      ED Medications  Medications   sodium chloride 0 9 % bolus 1,000 mL (1,000 mL Intravenous New Bag 12/4/18 1309)   morphine (PF) 4 mg/mL injection 4 mg (4 mg Intravenous Given 12/4/18 1318)   ondansetron (ZOFRAN) injection 4 mg (4 mg Intravenous Given 12/4/18 1318)   iohexol (OMNIPAQUE) 240 MG/ML solution 50 mL (50 mL Oral Given 12/4/18 1320)   iohexol (OMNIPAQUE) 350 MG/ML injection (MULTI-DOSE) 100 mL (100 mL Intravenous Given 12/4/18 1537)       Diagnostic Studies  Results Reviewed     Procedure Component Value Units Date/Time    Troponin I [[id number]]  (Normal) Collected:  12/04/18 1307    Lab Status:  Final result Specimen:  Blood from Arm, Left Updated:  12/04/18 1349     Troponin I <0 01 ng/mL     Protime-INR [[id number]]  (Normal) Collected:  12/04/18 1307    Lab Status:  Final result Specimen:  Blood from Arm, Left Updated:  12/04/18 1340     Protime 10 9 seconds      INR 1 03    Narrative:       INR:  ,PROTIME:      APTT [[id number]]  (Abnormal) Collected:  12/04/18 1307    Lab Status:  Final result Specimen:  Blood from Arm, Left Updated:  12/04/18 1340     PTT 21 (L) seconds     Narrative:       PTT:      CBC and differential [[id number]]  (Abnormal) Collected:  12/04/18 1307    Lab Status:  Final result Specimen:  Blood from Arm, Left Updated:  12/04/18 1338     WBC 11 80 (H) Thousand/uL      RBC 4 54 Million/uL      Hemoglobin 9 6 (L) g/dL      Hematocrit 32 2 (L) %      MCV 71 (L) fL      MCH 21 1 (L) pg      MCHC 29 8 (L) g/dL      RDW 18 8 (H) %      MPV 8 4 (L) fL      Platelets 951 (L) Thousands/uL     Comprehensive metabolic panel [[id number]]  (Abnormal) Collected:  12/04/18 1307    Lab Status:  Final result Specimen:  Blood from Arm, Left Updated:  12/04/18 1337     Sodium 138 mmol/L      Potassium 4 3 mmol/L      Chloride 102 mmol/L      CO2 26 mmol/L      ANION GAP 10 mmol/L      BUN 15 mg/dL      Creatinine 1 03 mg/dL      Glucose 102 (H) mg/dL      Calcium 9 8 mg/dL      AST 48 (H) U/L      ALT 21 U/L      Alkaline Phosphatase 170 (H) U/L      Total Protein 7 3 g/dL      Albumin 4 1 g/dL      Total Bilirubin 0 70 mg/dL      eGFR 61 ml/min/1 73sq m     Narrative:         National Kidney Disease Education Program recommendations are as follows:  GFR calculation is accurate only with a steady state creatinine  Chronic Kidney disease less than 60 ml/min/1 73 sq  meters  Kidney failure less than 15 ml/min/1 73 sq  meters  UA w Reflex to Microscopic [[id number]]     Lab Status:  No result Specimen:  Urine                  CT abdomen pelvis with contrast   Final Result by Edenilson Anderson MD (12/04 1713)      Extensive adenopathy in the chest, abdomen, retroperitoneum and groin as detailed above  Although much of the adenopathy is not measurably different within the abdomen and retroperitoneum the overall bulk does appear mildly increased  There is some    measurable degree of interval increase regarding the iliac and inguinal adenopathy as detailed above  Severe splenomegaly, increasing compared to prior  Minimal ascites  Workstation performed: [id number]         XR chest 2 views   Final Result by Chelsy Garcia MD (12/04 7471)      No acute cardiopulmonary disease  Workstation performed: [id number]                    Procedures  ECG 12 Lead Documentation  Date/Time: 12/4/2018 3:58 PM  Performed by: Diya Santamaria  Authorized by: Diya Santamaria     Rate:     ECG rate:  85    ECG rate assessment: normal    Rhythm:     Rhythm: sinus rhythm    QRS:     QRS axis:  Normal  Conduction:     Conduction: normal    ST segments:     ST segments:  Normal  T waves:     T waves: inverted             Phone Contacts  ED Phone Contact    ED Course                               MDM  Number of Diagnoses or Management Options  Diagnosis management comments: 61-year-old female presents with increase in her abdominal pain  She has a history of lymphoma and is undergoing treatment for this  She states that today she was having more pain and also felt like there could have been increased swelling in her abdomen as well  She denies any vomiting, diarrhea, fevers, chest pain, shortness of breath, or other acute issues    She was given IV fluids and one dose of morphine  She remained completely comfortable after this  Her laboratory studies show chronic, ongoing anemia  Her CT scan shows minimal increase and the sides of her lymph nodes  There are otherwise no acute findings  I discussed workup results with the patient and she is comfortable with discharge home and outpatient follow-up  She does have a prescription for oxycodone but a it is not a complete prescription as she could not afford to have it fully filled  Discussed need to take this medication as prescribed and follow up very closely as an outpatient  She is aware of reasons return to the ER  Amount and/or Complexity of Data Reviewed  Clinical lab tests: ordered and reviewed  Tests in the radiology section of CPT®: ordered and reviewed  Tests in the medicine section of CPT®: ordered and reviewed  Independent visualization of images, tracings, or specimens: yes      CritCare Time    Disposition  Final diagnoses:   None     ED Disposition     None      Follow-up Information    None         Patient's Medications   Discharge Prescriptions    No medications on file     No discharge procedures on file      ED Provider  Electronically Signed by           Connie Perry DO  12/04/18 4346

## 2018-12-04 NOTE — DISCHARGE INSTRUCTIONS
Dolor abdominal, cuidados ambulatorios   INFORMACIÓN GENERAL:   El dolor abdominal  puede ser sordo, molesto o [place]  Puede sentir dolor en amaury dom del abdomen o en todo el abdomen  El dolor puede deberse a ciertos estados aurelio estreñimiento, sensibilidad o intoxicación alimentaria, infección o amaury obstrucción  Asimismo, el dolor abdominal puede deberse a amaury hernia, apendicitis o úlcera  La causa del dolor abdominal puede ser desconocida  Busque cuidados inmediatos para los siguientes síntomas:   · [place] dolor de pecho o falta de aliento    · Dolor pulsátil en el abdomen superior o en la parte inferior de la espalda que de repente es pearl    · Dolor que se localiza en el abdomen inferior derecho y empeora con el movimiento    · Fiebre por encima de 100 4°F (38°C) o escalofríos quin    · Vómito de todo lo que usted come y monet    · Dolor que no mejora o más carline empeora adriano las siguientes 8 a 12 horas    · Ruddy en el vómito o heces que se gurjit negras y alquitranadas    · La piel o el tamayo de los ojos se vuelven amarillentos    · Grandes cantidades de sangrado vaginal que no es valdez periodo menstrual  El tratamiento para el dolor abdominal  puede llegar a incluir medicamentos para calmar valdez estómago, prevenir el vómito o disminuir el dolor  Programe amaury mars con valdez proveedor de [employer] se le haya indicado: Anote miko preguntas para que se acuerde de hacerlas adriano miko visitas  ACUERDOS SOBRE VALDEZ CUIDADO:   Usted tiene el derecho de participar en la planificación de valdez cuidado  Aprenda todo lo que pueda sobre valdez condición y aurelio darle tratamiento  Discuta con miko médicos miko opciones de tratamiento para juntos decidir el cuidado que usted quiere recibir  Usted siempre tiene el derecho a rechazar valdez tratamiento  Esta información es sólo para uso en educación  Valdez intención no es darle un consejo médico sobre enfermedades o tratamientos   Colsulte con valdez Charna Heckler farmacéutico antes de seguir cualquier régimen médico para saber si es seguro y efectivo para usted  © 2014 [address] is for End User's use only and may not be sold, redistributed or otherwise used for commercial purposes  All illustrations and images included in CareNotes® are the copyrighted property of A D A M , Inc  or Darrell Townsend

## 2018-12-05 ENCOUNTER — OFFICE VISIT (OUTPATIENT)
Dept: HEMATOLOGY ONCOLOGY | Facility: CLINIC | Age: 58
End: 2018-12-05
Payer: COMMERCIAL

## 2018-12-05 VITALS
TEMPERATURE: 99.2 F | HEIGHT: 66 IN | RESPIRATION RATE: 18 BRPM | BODY MASS INDEX: 32.14 KG/M2 | SYSTOLIC BLOOD PRESSURE: 140 MMHG | OXYGEN SATURATION: 99 % | WEIGHT: 200 LBS | DIASTOLIC BLOOD PRESSURE: 82 MMHG | HEART RATE: 87 BPM

## 2018-12-05 DIAGNOSIS — C82.18 GRADE 2 FOLLICULAR LYMPHOMA OF LYMPH NODES OF MULTIPLE REGIONS (HCC): Primary | ICD-10-CM

## 2018-12-05 DIAGNOSIS — Z51.11 ENCOUNTER FOR ANTINEOPLASTIC CHEMOTHERAPY: ICD-10-CM

## 2018-12-05 PROCEDURE — 99215 OFFICE O/P EST HI 40 MIN: CPT | Performed by: INTERNAL MEDICINE

## 2018-12-05 RX ORDER — ALLOPURINOL 100 MG/1
100 TABLET ORAL DAILY
Qty: 30 TABLET | Refills: 0 | Status: SHIPPED | OUTPATIENT
Start: 2018-12-05 | End: 2019-02-20 | Stop reason: ALTCHOICE

## 2018-12-05 RX ORDER — ACETAMINOPHEN 325 MG/1
650 TABLET ORAL ONCE
Status: COMPLETED | OUTPATIENT
Start: 2018-12-06 | End: 2018-12-06

## 2018-12-05 RX ORDER — OMEPRAZOLE 20 MG/1
20 CAPSULE, DELAYED RELEASE ORAL DAILY
Qty: 30 CAPSULE | Refills: 5 | Status: SHIPPED | OUTPATIENT
Start: 2018-12-05 | End: 2020-03-25 | Stop reason: SDUPTHER

## 2018-12-05 RX ORDER — HEPARIN SODIUM (PORCINE) LOCK FLUSH IV SOLN 100 UNIT/ML 100 UNIT/ML
300 SOLUTION INTRAVENOUS AS NEEDED
Status: DISCONTINUED | OUTPATIENT
Start: 2018-12-06 | End: 2018-12-06

## 2018-12-05 RX ORDER — SODIUM CHLORIDE 9 MG/ML
1000 INJECTION, SOLUTION INTRAVENOUS ONCE
Status: COMPLETED | OUTPATIENT
Start: 2018-12-06 | End: 2018-12-06

## 2018-12-05 RX ORDER — ONDANSETRON HYDROCHLORIDE 8 MG/1
8 TABLET, FILM COATED ORAL EVERY 8 HOURS PRN
Qty: 20 TABLET | Refills: 5 | Status: SHIPPED | OUTPATIENT
Start: 2018-12-05 | End: 2019-10-14

## 2018-12-05 RX ORDER — SODIUM CHLORIDE 9 MG/ML
20 INJECTION, SOLUTION INTRAVENOUS ONCE
Status: COMPLETED | OUTPATIENT
Start: 2018-12-06 | End: 2018-12-06

## 2018-12-05 NOTE — PROGRESS NOTES
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 [facility]  The patient has extensive very bulky stage IVB low-grade follicular lymphoma which needs to be treated as soon as possible with bendamustine and rituximab along with Neulasta  We will aim to pursue a PET-CT scan pretty soon as well but it should not be a limiting factor for initiating the treatment  The patient will need to be monitored closely for any hint of tumor lysis during the treatment with the potential need for hospitalization with any hint of worsening of her overall condition  I explained to the patient and her family through the  the gravity of the situation and the need for immediate treatment for her advanced stage follicular lymphoma  HPI:  This is a 57-year-old  female originally from [facility] [place] who moved to the [facility]  about 8 months ago  She has a history of asthma, osteoporosis, psychiatric disorder, and migraines  The patient started to notice significant abdominal pain about 8 months ago she then was evaluated in the hospital recently where she had a CT scan of the chest abdomen pelvis on the 7th of November  This showed massive lymphadenopathy throughout the abdomen and pelvis with hepatic and massive splenomegaly  She also has bulky supraclavicular, axillary and mediastinal adenopathy  She then had a supra clavicular lymph node excisional biopsy on the 9th of November , the pathology was compatible with Follicular lymphoma, WHO grade 1-2  She then had a bone marrow biopsy on the 20th of November which showed also low grade B-cell lymphoma CD10 positive compromising 63% of the total cells  The patient presented to the emergency room yesterday with the same abdominal symptoms including severe pain, nausea and distension    His CT scan of the abdomen pelvis was done with contrast which showed worsening of her massive adenopathy in comparison to the previous study from the 7th of November  A chest x-ray was read as normal         Interval history:    ROS: Review of Systems   Constitutional: Positive for appetite change, fatigue and unexpected weight change  Negative for activity change, chills, diaphoresis and fever  HENT: Positive for hearing loss and trouble swallowing  Negative for congestion, dental problem, ear discharge, ear pain, facial swelling, mouth sores, nosebleeds, postnasal drip, sore throat and tinnitus  Eyes: Negative for discharge, redness, itching and visual disturbance  Respiratory: Positive for shortness of breath  Negative for cough, chest tightness and wheezing  Cardiovascular: Negative for chest pain, palpitations and leg swelling  Gastrointestinal: Positive for abdominal distention, abdominal pain (Severe abdominal pain and distention), constipation and nausea  Negative for anal bleeding, blood in stool, diarrhea and vomiting  Genitourinary: Negative for difficulty urinating, dysuria, flank pain, frequency, hematuria and urgency  Musculoskeletal: Negative for arthralgias, back pain, gait problem, joint swelling, myalgias and neck pain  Skin: Negative for color change, pallor, rash and wound  Neurological: Positive for dizziness, numbness and headaches  Negative for syncope, speech difficulty, weakness and light-headedness  Hematological: Negative for adenopathy  Does not bruise/bleed easily  Psychiatric/Behavioral: Positive for sleep disturbance  Negative for agitation, behavioral problems and confusion          Depressed mood       Past Medical History:   Diagnosis Date    Asthma     Cancer ([facility] [address] )     Migraine     Osteoporosis     Psychiatric disorder        Past Surgical History:   Procedure Laterality Date    CHOLECYSTECTOMY      FL GUIDED CENTRAL VENOUS ACCESS REPLACEMENT  11/9/2018    HYSTERECTOMY      LYMPH NODE BIOPSY Left 11/9/2018    Procedure: EXCISION BIOPSY LYMPH NODE SUPRACLAVICULAR;  Surgeon: Dee Morel MD;  Location: [address] [place] [address] OR;  Service: General    TUNNELED VENOUS PORT PLACEMENT N/A 11/9/2018    Procedure: INSERTION OF PORT-A-CATH;  Surgeon: Dee Morel MD;  Location: [address] [place] [address] OR;  Service: General       Social History     Social History    Marital status: Single     Spouse name: N/A    Number of children: N/A    Years of education: N/A     Social History Main Topics    Smoking status: Former Smoker    Smokeless tobacco: Never Used    Alcohol use No    Drug use: No    Sexual activity: Not on file     Other Topics Concern    Not on file     Social History Narrative    No narrative on file       Family History   Problem Relation Age of Onset    Cancer Mother     Cancer Father        Allergies   Allergen Reactions    Penicillins          Current Outpatient Prescriptions:     citalopram (CeleXA) 20 mg tablet, Take 10 mg by mouth daily, Disp: , Rfl:     hydrochlorothiazide (HYDRODIURIL) 25 mg tablet, Take 50 mg by mouth daily, Disp: , Rfl:     losartan (COZAAR) 100 MG tablet, Take 100 mg by mouth daily, Disp: , Rfl:     oxyCODONE (ROXICODONE) 5 mg immediate release tablet, Take 1 tablet (5 mg total) by mouth every 4 (four) hours as needed for moderate pain for up to 30 doses Earliest Fill Date: 12/3/18 Max Daily Amount: 30 mg, Disp: 30 tablet, Rfl: 0    polyethylene glycol (MIRALAX) 17 g packet, Take 17 g by mouth daily, Disp: 14 each, Rfl: 0    senna (SENOKOT) 8 6 mg, Take 1 tablet (8 6 mg total) by mouth daily at bedtime, Disp: 120 each, Rfl: 0    allopurinol (ZYLOPRIM) 100 mg tablet, Take 1 tablet (100 mg total) by mouth daily For 30 days than stop, Disp: 30 tablet, Rfl: 0    omeprazole (PriLOSEC) 20 mg delayed release capsule, Take 1 capsule (20 mg total) by mouth daily To prevent stomach upset, Disp: 30 capsule, Rfl: 5    ondansetron (ZOFRAN) 8 mg tablet, Take 1 tablet (8 mg total) by mouth every 8 (eight) hours as needed for nausea or vomiting, Disp: 20 tablet, Rfl: 5  No current facility-administered medications for this visit  Physical Exam:  /82 (BP Location: Left arm, Patient Position: Sitting)   Pulse 87   Temp 99 2 °F (37 3 °C) (Tympanic)   Resp 18   Ht 5' 6" (1 676 m)   Wt 90 7 kg (200 lb)   SpO2 99%   BMI 32 28 kg/m²     Physical Exam   Constitutional: She is oriented to person, place, and time  She appears well-developed and well-nourished  She appears distressed  HENT:   Head: Normocephalic and atraumatic  Nose: Nose normal    Mouth/Throat: Oropharynx is clear and moist    Eyes: Pupils are equal, round, and reactive to light  Conjunctivae and EOM are normal  Right eye exhibits no discharge  Left eye exhibits no discharge  No scleral icterus  Neck: Normal range of motion  Neck supple  No JVD present  No tracheal deviation present  No thyromegaly present  Cardiovascular: Normal rate, regular rhythm and normal heart sounds  Exam reveals no friction rub  No murmur heard  Pulmonary/Chest: Effort normal and breath sounds normal  No stridor  No respiratory distress  She has no wheezes  She has no rales  She exhibits no tenderness  Abdominal: Soft  Bowel sounds are normal  She exhibits distension  She exhibits no mass  There is no hepatosplenomegaly, splenomegaly or hepatomegaly  There is tenderness (To mild palpation all over her abdomen)  There is rebound and guarding  Musculoskeletal: Normal range of motion  She exhibits edema (Mild lower extremities)  She exhibits no tenderness or deformity  Lymphadenopathy:     She has no cervical adenopathy  Neurological: She is alert and oriented to person, place, and time  She has normal reflexes  No cranial nerve deficit  Coordination normal    Skin: Skin is warm  No rash noted  No erythema  No pallor  Psychiatric: She has a normal mood and affect   Her behavior is normal  Judgment and thought content normal          Labs:  Lab Results   Component Value Date    WBC 11 80 (H) 12/04/2018    HGB 9 6 (L) 12/04/2018    HCT 32 2 (L) 12/04/2018    MCV 71 (L) 12/04/2018     (L) 12/04/2018     Lab Results   Component Value Date    K 4 3 12/04/2018     12/04/2018    CO2 26 12/04/2018    BUN 15 12/04/2018    CREATININE 1 03 12/04/2018    CALCIUM 9 8 12/04/2018    AST 48 (H) 12/04/2018    ALT 21 12/04/2018    ALKPHOS 170 (H) 12/04/2018    EGFR 61 12/04/2018     No results found for: TSH    Patient voiced understanding and agreement in the above discussion  Aware to contact our office with questions/symptoms in the interim

## 2018-12-05 NOTE — PROGRESS NOTES
Patient education given on Rituxan, Bendamustine and Neulasta on pro  Patient's son and daughter-in-law present and engaged  The patient and her family are all [place]-speaking,  via [employer] system  Discussed mechanism of action, administration, adverse effects, and supportive care measures  Patient provided with a new patient packet, educational brochures on each medication and a thermometer  All patient education provided in [place]  Patient instructed to call if she develops significant adverse effects, temperature over 100° F, or with any further questions or concerns  She was encouraged to drink a lot of fluid po while on treatment, minimum of 2lt per day  Patient is aware of how to contact provider/nurse during and after office hours  Patient and family's questions answered to their satisfaction and the patient expresses understanding and acceptance of instructions  Patient is scheduled to start treatment tomorrow am at 8 am, [place] infusions  Patient's son will stop by our office tomorrow afternoon to  paper work and give an update on patients pain to see if further titration is needed  She will be hydrated IV daily x 5 days and monitored closely for TLS with cycle 1

## 2018-12-06 ENCOUNTER — HOSPITAL ENCOUNTER (OUTPATIENT)
Dept: INFUSION CENTER | Facility: HOSPITAL | Age: 58
Discharge: HOME | End: 2018-12-06
Payer: COMMERCIAL

## 2018-12-06 VITALS
HEART RATE: 99 BPM | SYSTOLIC BLOOD PRESSURE: 121 MMHG | DIASTOLIC BLOOD PRESSURE: 66 MMHG | TEMPERATURE: 99.2 F | BODY MASS INDEX: 33.5 KG/M2 | HEIGHT: 65 IN | RESPIRATION RATE: 18 BRPM | WEIGHT: 201.06 LBS

## 2018-12-06 LAB
ATRIAL RATE: 85 BPM
P AXIS: 37 DEGREES
PR INTERVAL: 150 MS
QRS AXIS: 6 DEGREES
QRSD INTERVAL: 86 MS
QT INTERVAL: 384 MS
QTC INTERVAL: 456 MS
T WAVE AXIS: 20 DEGREES
VENTRICULAR RATE: 85 BPM

## 2018-12-06 PROCEDURE — 96411 CHEMO IV PUSH ADDL DRUG: CPT

## 2018-12-06 PROCEDURE — 96375 TX/PRO/DX INJ NEW DRUG ADDON: CPT

## 2018-12-06 PROCEDURE — 93010 ELECTROCARDIOGRAM REPORT: CPT | Performed by: INTERNAL MEDICINE

## 2018-12-06 PROCEDURE — 96367 TX/PROPH/DG ADDL SEQ IV INF: CPT

## 2018-12-06 PROCEDURE — 96413 CHEMO IV INFUSION 1 HR: CPT

## 2018-12-06 PROCEDURE — 96415 CHEMO IV INFUSION ADDL HR: CPT

## 2018-12-06 RX ORDER — SODIUM CHLORIDE 9 MG/ML
999 INJECTION, SOLUTION INTRAVENOUS ONCE
Status: COMPLETED | OUTPATIENT
Start: 2018-12-07 | End: 2018-12-07

## 2018-12-06 RX ORDER — SODIUM CHLORIDE 9 MG/ML
20 INJECTION, SOLUTION INTRAVENOUS ONCE
Status: COMPLETED | OUTPATIENT
Start: 2018-12-07 | End: 2018-12-07

## 2018-12-06 RX ORDER — HEPARIN SODIUM (PORCINE) LOCK FLUSH IV SOLN 100 UNIT/ML 100 UNIT/ML
300 SOLUTION INTRAVENOUS AS NEEDED
Status: DISPENSED | OUTPATIENT
Start: 2018-12-07 | End: 2018-12-07

## 2018-12-06 RX ORDER — KETOROLAC TROMETHAMINE 30 MG/ML
30 INJECTION, SOLUTION INTRAMUSCULAR; INTRAVENOUS ONCE
Status: COMPLETED | OUTPATIENT
Start: 2018-12-06 | End: 2018-12-06

## 2018-12-06 RX ADMIN — RITUXIMAB 758 MG: 10 INJECTION, SOLUTION INTRAVENOUS at 10:48

## 2018-12-06 RX ADMIN — SODIUM CHLORIDE 20 ML/HR: 9 INJECTION, SOLUTION INTRAVENOUS at 08:35

## 2018-12-06 RX ADMIN — DEXAMETHASONE SODIUM PHOSPHATE: 10 INJECTION, SOLUTION INTRAMUSCULAR; INTRAVENOUS at 09:36

## 2018-12-06 RX ADMIN — Medication 300 UNITS: at 15:16

## 2018-12-06 RX ADMIN — SODIUM CHLORIDE 150 MG: 9 INJECTION, SOLUTION INTRAVENOUS at 09:02

## 2018-12-06 RX ADMIN — DIPHENHYDRAMINE HYDROCHLORIDE 50 MG: 50 INJECTION, SOLUTION INTRAMUSCULAR; INTRAVENOUS at 10:01

## 2018-12-06 RX ADMIN — KETOROLAC TROMETHAMINE 30 MG: 30 INJECTION, SOLUTION INTRAMUSCULAR; INTRAVENOUS at 12:31

## 2018-12-06 RX ADMIN — BENDAMUSTINE HYDROCHLORIDE 182 MG: 25 INJECTION, SOLUTION INTRAVENOUS at 14:57

## 2018-12-06 RX ADMIN — SODIUM CHLORIDE 1000 ML/HR: 9 INJECTION, SOLUTION INTRAVENOUS at 08:41

## 2018-12-06 RX ADMIN — ACETAMINOPHEN 650 MG: 325 TABLET ORAL at 10:02

## 2018-12-06 NOTE — PLAN OF CARE
Problem: Potential for Falls  Goal: Patient will remain free of falls  INTERVENTIONS:  - Assess patient frequently for physical needs  -  Identify cognitive and physical deficits and behaviors that affect risk of falls    -  [place] fall precautions as indicated by assessment   - Educate patient/family on patient safety including physical limitations  - Instruct patient to call for assistance with activity based on assessment  - Modify environment to reduce risk of injury  - Consider OT/PT consult to assist with strengthening/mobility   Outcome: Progressing

## 2018-12-06 NOTE — PROGRESS NOTES
Patient tolerated chemo without complications other than headache  Patient ate snacks and ham sandwich  Still continues 10/10 with headache, appears without distress  YE Solomon aware  Patient states she has her oxycodone at home to take and will reassess when patient returns tomorrow  Patient given AVS and confirmed appt

## 2018-12-07 ENCOUNTER — TELEPHONE (OUTPATIENT)
Dept: INFUSION CENTER | Facility: CLINIC | Age: 58
End: 2018-12-07

## 2018-12-07 ENCOUNTER — HOSPITAL ENCOUNTER (OUTPATIENT)
Dept: INFUSION CENTER | Facility: HOSPITAL | Age: 58
Discharge: HOME | End: 2018-12-07
Payer: COMMERCIAL

## 2018-12-07 ENCOUNTER — HOSPITAL ENCOUNTER (INPATIENT)
Facility: HOSPITAL | Age: 58
LOS: 3 days | Discharge: HOME | DRG: 469 | End: 2018-12-10
Attending: EMERGENCY MEDICINE | Admitting: FAMILY MEDICINE
Payer: COMMERCIAL

## 2018-12-07 VITALS
RESPIRATION RATE: 16 BRPM | DIASTOLIC BLOOD PRESSURE: 65 MMHG | TEMPERATURE: 97.5 F | SYSTOLIC BLOOD PRESSURE: 152 MMHG | HEART RATE: 69 BPM

## 2018-12-07 DIAGNOSIS — T45.1X5A TUMOR LYSIS SYNDROME FOLLOWING ANTINEOPLASTIC DRUG THERAPY: Primary | ICD-10-CM

## 2018-12-07 DIAGNOSIS — C82.18 GRADE 2 FOLLICULAR LYMPHOMA OF LYMPH NODES OF MULTIPLE REGIONS (HCC): ICD-10-CM

## 2018-12-07 DIAGNOSIS — R79.89 ELEVATED SERUM CREATININE: Primary | ICD-10-CM

## 2018-12-07 DIAGNOSIS — E79.0 ELEVATED URIC ACID IN BLOOD: ICD-10-CM

## 2018-12-07 DIAGNOSIS — E88.3 TUMOR LYSIS SYNDROME FOLLOWING ANTINEOPLASTIC DRUG THERAPY: Primary | ICD-10-CM

## 2018-12-07 DIAGNOSIS — E86.0 DEHYDRATION: ICD-10-CM

## 2018-12-07 DIAGNOSIS — T45.1X5A TUMOR LYSIS SYNDROME FOLLOWING ANTINEOPLASTIC DRUG THERAPY: ICD-10-CM

## 2018-12-07 DIAGNOSIS — E88.3 TUMOR LYSIS SYNDROME FOLLOWING ANTINEOPLASTIC DRUG THERAPY: ICD-10-CM

## 2018-12-07 DIAGNOSIS — I10 ESSENTIAL HYPERTENSION: ICD-10-CM

## 2018-12-07 PROBLEM — R29.6 FREQUENT FALLS: Status: ACTIVE | Noted: 2018-12-07

## 2018-12-07 PROBLEM — Z87.39 HISTORY OF GOUT: Status: ACTIVE | Noted: 2018-12-07

## 2018-12-07 LAB
ALBUMIN SERPL BCP-MCNC: 3.5 G/DL (ref 3–5.2)
ALP SERPL-CCNC: 147 U/L (ref 43–122)
ALT SERPL W P-5'-P-CCNC: 21 U/L (ref 9–52)
ANION GAP SERPL CALCULATED.3IONS-SCNC: 6 MMOL/L (ref 5–14)
ANION GAP SERPL CALCULATED.3IONS-SCNC: 9 MMOL/L (ref 5–14)
AST SERPL W P-5'-P-CCNC: 44 U/L (ref 14–36)
BACTERIA UR QL AUTO: ABNORMAL /HPF
BASOPHILS # BLD AUTO: 0 THOUSANDS/ΜL (ref 0–0.1)
BASOPHILS NFR BLD AUTO: 0 % (ref 0–1)
BILIRUB SERPL-MCNC: 0.4 MG/DL
BILIRUB UR QL STRIP: NEGATIVE
BUN SERPL-MCNC: 34 MG/DL (ref 5–25)
BUN SERPL-MCNC: 36 MG/DL (ref 5–25)
CALCIUM SERPL-MCNC: 9.1 MG/DL (ref 8.4–10.2)
CALCIUM SERPL-MCNC: 9.2 MG/DL (ref 8.4–10.2)
CHLORIDE SERPL-SCNC: 104 MMOL/L (ref 97–108)
CHLORIDE SERPL-SCNC: 106 MMOL/L (ref 97–108)
CK SERPL-CCNC: <20 U/L (ref 30–135)
CLARITY UR: CLEAR
CO2 SERPL-SCNC: 25 MMOL/L (ref 22–30)
CO2 SERPL-SCNC: 26 MMOL/L (ref 22–30)
COLOR UR: ABNORMAL
CREAT SERPL-MCNC: 1.53 MG/DL (ref 0.6–1.2)
CREAT SERPL-MCNC: 1.62 MG/DL (ref 0.6–1.2)
EOSINOPHIL # BLD AUTO: 0 THOUSAND/ΜL (ref 0–0.4)
EOSINOPHIL NFR BLD AUTO: 0 % (ref 0–6)
ERYTHROCYTE [DISTWIDTH] IN BLOOD BY AUTOMATED COUNT: 18.8 %
GFR SERPL CREATININE-BSD FRML MDRD: 35 ML/MIN/1.73SQ M
GFR SERPL CREATININE-BSD FRML MDRD: 38 ML/MIN/1.73SQ M
GLUCOSE SERPL-MCNC: 123 MG/DL (ref 70–99)
GLUCOSE SERPL-MCNC: 149 MG/DL (ref 70–99)
GLUCOSE UR STRIP-MCNC: NEGATIVE MG/DL
HCT VFR BLD AUTO: 26.2 % (ref 36–46)
HELMET CELLS BLD QL SMEAR: PRESENT
HGB BLD-MCNC: 7.9 G/DL (ref 12–16)
HGB UR QL STRIP.AUTO: NEGATIVE
HYALINE CASTS #/AREA URNS LPF: ABNORMAL /LPF
HYPERCHROMIA BLD QL SMEAR: PRESENT
KETONES UR STRIP-MCNC: NEGATIVE MG/DL
LACTATE SERPL-SCNC: 1.5 MMOL/L (ref 0.7–2)
LDH SERPL-CCNC: 2019 U/L (ref 313–618)
LDH SERPL-CCNC: 2141 U/L (ref 313–618)
LEUKOCYTE ESTERASE UR QL STRIP: NEGATIVE
LIPASE SERPL-CCNC: 38 U/L (ref 23–300)
LYMPHOCYTES # BLD AUTO: 1.6 THOUSANDS/ΜL (ref 0.5–4)
LYMPHOCYTES NFR BLD AUTO: 22 % (ref 25–45)
MAGNESIUM SERPL-MCNC: 1.9 MG/DL (ref 1.6–2.3)
MCH RBC QN AUTO: 21.3 PG (ref 26–34)
MCHC RBC AUTO-ENTMCNC: 29.9 G/DL (ref 31–36)
MCV RBC AUTO: 71 FL (ref 80–100)
MONOCYTES # BLD AUTO: 0.2 THOUSAND/ΜL (ref 0.2–0.9)
MONOCYTES NFR BLD AUTO: 3 % (ref 1–10)
MUCOUS THREADS UR QL AUTO: ABNORMAL
NEUTROPHILS # BLD AUTO: 5.3 THOUSANDS/ΜL (ref 1.8–7.8)
NEUTS SEG NFR BLD AUTO: 75 % (ref 45–65)
NITRITE UR QL STRIP: POSITIVE
NON-SQ EPI CELLS URNS QL MICRO: ABNORMAL /HPF
NT-PROBNP SERPL-MCNC: 2150 PG/ML (ref 0–299)
OVALOCYTES BLD QL SMEAR: PRESENT
PH UR STRIP.AUTO: 5 [PH] (ref 4.5–8)
PHOSPHATE SERPL-MCNC: 4.9 MG/DL (ref 2.5–4.8)
PHOSPHATE SERPL-MCNC: 5.1 MG/DL (ref 2.5–4.8)
PLATELET # BLD AUTO: 76 THOUSANDS/UL (ref 150–450)
PLATELET BLD QL SMEAR: ABNORMAL
PMV BLD AUTO: 8.6 FL (ref 8.9–12.7)
POTASSIUM SERPL-SCNC: 4.3 MMOL/L (ref 3.6–5)
POTASSIUM SERPL-SCNC: 4.8 MMOL/L (ref 3.6–5)
PROT SERPL-MCNC: 6.5 G/DL (ref 5.9–8.4)
PROT UR STRIP-MCNC: ABNORMAL MG/DL
RBC # BLD AUTO: 3.69 MILLION/UL (ref 4–5.2)
RBC #/AREA URNS AUTO: ABNORMAL /HPF
RBC MORPH BLD: ABNORMAL
SCHISTOCYTES BLD QL SMEAR: PRESENT
SODIUM SERPL-SCNC: 138 MMOL/L (ref 137–147)
SODIUM SERPL-SCNC: 138 MMOL/L (ref 137–147)
SP GR UR STRIP.AUTO: 1.01 (ref 1–1.04)
URATE SERPL-MCNC: 11.7 MG/DL (ref 2.7–7.5)
URATE SERPL-MCNC: 8.8 MG/DL (ref 2.7–7.5)
UROBILINOGEN UA: NEGATIVE MG/DL
WBC # BLD AUTO: 7.2 THOUSAND/UL (ref 4.5–11)
WBC #/AREA URNS AUTO: ABNORMAL /HPF

## 2018-12-07 PROCEDURE — 80048 BASIC METABOLIC PNL TOTAL CA: CPT | Performed by: INTERNAL MEDICINE

## 2018-12-07 PROCEDURE — 84550 ASSAY OF BLOOD/URIC ACID: CPT | Performed by: NURSE PRACTITIONER

## 2018-12-07 PROCEDURE — 99285 EMERGENCY DEPT VISIT HI MDM: CPT

## 2018-12-07 PROCEDURE — 84100 ASSAY OF PHOSPHORUS: CPT | Performed by: EMERGENCY MEDICINE

## 2018-12-07 PROCEDURE — 84100 ASSAY OF PHOSPHORUS: CPT | Performed by: NURSE PRACTITIONER

## 2018-12-07 PROCEDURE — 83615 LACTATE (LD) (LDH) ENZYME: CPT | Performed by: INTERNAL MEDICINE

## 2018-12-07 PROCEDURE — 84100 ASSAY OF PHOSPHORUS: CPT | Performed by: INTERNAL MEDICINE

## 2018-12-07 PROCEDURE — 82550 ASSAY OF CK (CPK): CPT | Performed by: EMERGENCY MEDICINE

## 2018-12-07 PROCEDURE — 80053 COMPREHEN METABOLIC PANEL: CPT | Performed by: EMERGENCY MEDICINE

## 2018-12-07 PROCEDURE — 96377 APPLICATON ON-BODY INJECTOR: CPT

## 2018-12-07 PROCEDURE — 84550 ASSAY OF BLOOD/URIC ACID: CPT | Performed by: INTERNAL MEDICINE

## 2018-12-07 PROCEDURE — 99223 1ST HOSP IP/OBS HIGH 75: CPT | Performed by: FAMILY MEDICINE

## 2018-12-07 PROCEDURE — 81001 URINALYSIS AUTO W/SCOPE: CPT | Performed by: EMERGENCY MEDICINE

## 2018-12-07 PROCEDURE — 85025 COMPLETE CBC W/AUTO DIFF WBC: CPT | Performed by: EMERGENCY MEDICINE

## 2018-12-07 PROCEDURE — 93005 ELECTROCARDIOGRAM TRACING: CPT

## 2018-12-07 PROCEDURE — 83615 LACTATE (LD) (LDH) ENZYME: CPT | Performed by: NURSE PRACTITIONER

## 2018-12-07 PROCEDURE — 83880 ASSAY OF NATRIURETIC PEPTIDE: CPT | Performed by: EMERGENCY MEDICINE

## 2018-12-07 PROCEDURE — 96367 TX/PROPH/DG ADDL SEQ IV INF: CPT

## 2018-12-07 PROCEDURE — 83690 ASSAY OF LIPASE: CPT | Performed by: EMERGENCY MEDICINE

## 2018-12-07 PROCEDURE — 84550 ASSAY OF BLOOD/URIC ACID: CPT | Performed by: EMERGENCY MEDICINE

## 2018-12-07 PROCEDURE — 83605 ASSAY OF LACTIC ACID: CPT | Performed by: EMERGENCY MEDICINE

## 2018-12-07 PROCEDURE — 83615 LACTATE (LD) (LDH) ENZYME: CPT | Performed by: EMERGENCY MEDICINE

## 2018-12-07 PROCEDURE — 80048 BASIC METABOLIC PNL TOTAL CA: CPT | Performed by: NURSE PRACTITIONER

## 2018-12-07 PROCEDURE — 36415 COLL VENOUS BLD VENIPUNCTURE: CPT | Performed by: EMERGENCY MEDICINE

## 2018-12-07 PROCEDURE — 83735 ASSAY OF MAGNESIUM: CPT | Performed by: EMERGENCY MEDICINE

## 2018-12-07 PROCEDURE — 96361 HYDRATE IV INFUSION ADD-ON: CPT

## 2018-12-07 PROCEDURE — 96409 CHEMO IV PUSH SNGL DRUG: CPT

## 2018-12-07 RX ORDER — POLYETHYLENE GLYCOL 3350 17 G/17G
17 POWDER, FOR SOLUTION ORAL DAILY
Status: DISCONTINUED | OUTPATIENT
Start: 2018-12-08 | End: 2018-12-10 | Stop reason: HOSPADM

## 2018-12-07 RX ORDER — LOSARTAN POTASSIUM 50 MG/1
100 TABLET ORAL DAILY
Status: DISCONTINUED | OUTPATIENT
Start: 2018-12-08 | End: 2018-12-08

## 2018-12-07 RX ORDER — SODIUM CHLORIDE 9 MG/ML
75 INJECTION, SOLUTION INTRAVENOUS CONTINUOUS
Status: DISCONTINUED | OUTPATIENT
Start: 2018-12-07 | End: 2018-12-10 | Stop reason: HOSPADM

## 2018-12-07 RX ORDER — OXYCODONE HYDROCHLORIDE AND ACETAMINOPHEN 5; 325 MG/1; MG/1
1 TABLET ORAL AS NEEDED
COMMUNITY
End: 2018-12-20

## 2018-12-07 RX ORDER — HYDROCHLOROTHIAZIDE 25 MG/1
50 TABLET ORAL DAILY
Status: DISCONTINUED | OUTPATIENT
Start: 2018-12-08 | End: 2018-12-08

## 2018-12-07 RX ORDER — ACETAMINOPHEN 325 MG/1
650 TABLET ORAL EVERY 6 HOURS PRN
Status: DISCONTINUED | OUTPATIENT
Start: 2018-12-07 | End: 2018-12-10 | Stop reason: HOSPADM

## 2018-12-07 RX ORDER — ONDANSETRON 4 MG/1
8 TABLET, ORALLY DISINTEGRATING ORAL EVERY 8 HOURS PRN
Status: DISCONTINUED | OUTPATIENT
Start: 2018-12-07 | End: 2018-12-10 | Stop reason: HOSPADM

## 2018-12-07 RX ORDER — ALLOPURINOL 100 MG/1
100 TABLET ORAL DAILY
Status: DISCONTINUED | OUTPATIENT
Start: 2018-12-08 | End: 2018-12-10 | Stop reason: HOSPADM

## 2018-12-07 RX ORDER — HEPARIN SODIUM (PORCINE) LOCK FLUSH IV SOLN 100 UNIT/ML 100 UNIT/ML
300 SOLUTION INTRAVENOUS AS NEEDED
Status: ACTIVE | OUTPATIENT
Start: 2018-12-10 | End: 2018-12-11

## 2018-12-07 RX ORDER — CITALOPRAM 20 MG/1
10 TABLET ORAL DAILY
Status: DISCONTINUED | OUTPATIENT
Start: 2018-12-08 | End: 2018-12-10 | Stop reason: HOSPADM

## 2018-12-07 RX ORDER — SODIUM CHLORIDE 9 MG/ML
125 INJECTION, SOLUTION INTRAVENOUS CONTINUOUS
Status: CANCELLED | OUTPATIENT
Start: 2018-12-07

## 2018-12-07 RX ORDER — SODIUM CHLORIDE 9 MG/ML
999 INJECTION, SOLUTION INTRAVENOUS ONCE
Status: DISCONTINUED | OUTPATIENT
Start: 2018-12-10 | End: 2018-12-14 | Stop reason: HOSPADM

## 2018-12-07 RX ORDER — SENNOSIDES 8.6 MG
1 TABLET ORAL
Status: DISCONTINUED | OUTPATIENT
Start: 2018-12-07 | End: 2018-12-10 | Stop reason: HOSPADM

## 2018-12-07 RX ORDER — PANTOPRAZOLE SODIUM 40 MG/1
40 TABLET, DELAYED RELEASE ORAL
Status: DISCONTINUED | OUTPATIENT
Start: 2018-12-08 | End: 2018-12-10 | Stop reason: HOSPADM

## 2018-12-07 RX ADMIN — PEGFILGRASTIM 6 MG: KIT SUBCUTANEOUS at 13:54

## 2018-12-07 RX ADMIN — SENNOSIDES 8.6 MG: 8.6 TABLET, FILM COATED ORAL at 21:18

## 2018-12-07 RX ADMIN — SODIUM CHLORIDE 999 ML/HR: 9 INJECTION, SOLUTION INTRAVENOUS at 12:14

## 2018-12-07 RX ADMIN — SODIUM CHLORIDE 6 MG: 9 INJECTION, SOLUTION INTRAVENOUS at 14:43

## 2018-12-07 RX ADMIN — DEXAMETHASONE SODIUM PHOSPHATE: 10 INJECTION, SOLUTION INTRAMUSCULAR; INTRAVENOUS at 12:18

## 2018-12-07 RX ADMIN — SODIUM CHLORIDE 20 ML/HR: 9 INJECTION, SOLUTION INTRAVENOUS at 12:10

## 2018-12-07 RX ADMIN — BENDAMUSTINE HYDROCHLORIDE 182 MG: 25 INJECTION, SOLUTION INTRAVENOUS at 13:28

## 2018-12-07 RX ADMIN — SODIUM CHLORIDE 1000 ML: 9 INJECTION, SOLUTION INTRAVENOUS at 16:18

## 2018-12-07 RX ADMIN — SODIUM CHLORIDE 200 ML/HR: 9 INJECTION, SOLUTION INTRAVENOUS at 18:13

## 2018-12-07 NOTE — ED PROCEDURE NOTE
PROCEDURE  ECG 12 Lead Documentation  Date/Time: 12/7/2018 4:28 PM  Performed by: Veena Ojeda by: Indigo Mishra     ECG reviewed by me, the ED Provider: yes    Patient location:  ED  Previous ECG:     Previous ECG:  Compared to current    Similarity:  No change  Interpretation:     Interpretation: normal    Rate:     ECG rate assessment: normal    Rhythm:     Rhythm: sinus rhythm    Ectopy:     Ectopy: none    QRS:     QRS axis:  Normal    QRS intervals:  Normal  Conduction:     Conduction: normal    ST segments:     ST segments:  Normal  T waves:     T waves: normal           Dale Cooley DO  12/07/18 1628

## 2018-12-07 NOTE — PROGRESS NOTES
Patient in infusions today for cycle 1 day 2 chemotherapy (Rituxan plus bendamustine)  Her tumor lysis labs from today are indicative of tumor lysis syndrome with worsening kidney function creatinine 1 53 GFR 38, uric acid is elevated 11 7, LDH is almost doubled at 2141, phosphorus mildly elevated 4 9, calcium and potassium within normal limits  Ordered stat dose of rasburicase at high risk dosing 0 2 milligrams/kilogram x1 now  Discussed case with Dr Araceli Granda he would like patient to be further evaluated in the emergency department after completing her treatment today and admission for close monitoring over the weekend of the tumor lysis syndrome  She will likely need aggressive hydration, close monitoring of tumor lysis labs, and possible repeated doses of rasburicase if her uric acid continues to be elevated

## 2018-12-07 NOTE — ED NOTES
Pt transported with belongings and chart to assigned unit  Pt in no distress upon transport        Dearl Sharyn, JASON  12/07/18 9809

## 2018-12-07 NOTE — ASSESSMENT & PLAN NOTE
The patient appears to be a poor historian  States that she has cancer everywhere but does not know what type of cancer she has  States that it started in her stomach and her initial symptom was abdominal pain  She currently has a distended abdomen but denies pain nausea vomiting and diarrhea  She does report constipation  She had a small bowel movement 2 days ago  Her only symptom at this time is fatigue  She follows with Dr Hien Glover, and I spoke to his nurse practitioner who stated she would check BMP,ldh, uric acid, and phosphorus every 6 hours  Will also check daily magnesium, CMP, and CK  She is currently getting normal saline at 200 cc an hour and had a 1 L normal saline bolus ordered prior to that  Dr Hien Glover was consulted to follow patient

## 2018-12-07 NOTE — ASSESSMENT & PLAN NOTE
Hg 7 9  Discussed with heme onc  Will repeat CBC in AM and if Hg drops more, will give 1u leukoreduced PRBC  Pt feels tired, but no SOB

## 2018-12-07 NOTE — H&P
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  Patient had infusion today for cycle 1 day 2 chemotherapy (Rituxan plus bendamustine)  Her tumor lysis labs from today are indicative of tumor lysis syndrome with worsening kidney function  She ws given a stat dose of rasburicase, and may need another dose if uric acid levels trend up, per CRNP with heme/onc  Dr Rosemary Gordillo wanted  patient to be further evaluated in the emergency department after completing her treatment today, and admission for close monitoring over the weekend for her tumor lysis syndrome  She will need aggressive hydration, close monitoring of tumor lysis labs, and possible repeated doses of rasburicase if her uric acid continues to be elevated, as per discussion with heme/onc  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 [facility])   Assessment & Plan    The patient appears to be a poor historian  States that she has cancer everywhere but does not know what type of cancer she has  States that it started in her stomach and her initial symptom was abdominal pain  She currently has a distended abdomen but denies pain nausea vomiting and diarrhea  She does report constipation  She had a small bowel movement 2 days ago  Her only symptom at this time is fatigue  She follows with Dr Rosemary Gordillo, and I spoke to his nurse practitioner who stated she would check BMP,ldh, uric acid, and phosphorus every 6 hours  Will also check daily magnesium, CMP, and CK  She is currently getting normal saline at 200 cc an hour and had a 1 L normal saline bolus ordered prior to that  Dr Rosemary Gordillo was consulted to follow patient  Elevated uric acid in blood   Assessment & Plan    Continue allopurinol  Frequent falls   Assessment & Plan    Patient reports living home with her son  Has had frequent falls due to dizziness and weakness in legs  Denies ever hitting her head  Maintain fall precautions  Consult PT/OT  Pt aware she must call for assistance when getting out of bed  Iron deficiency anemia secondary to inadequate dietary iron intake   Assessment & Plan    Hg 7 9  Discussed with heme onc  Will repeat CBC in AM and if Hg drops more, will give 1u leukoreduced PRBC  Pt feels tired, but no SOB  Transaminitis   Assessment & Plan    Will repeat daily CMP and monitor for now  Slow transit constipation   Assessment & Plan    Will order bowel regimen  Had small BM 2 days ago  Essential hypertension   Assessment & Plan    Will continue cozaar and HCTZ  VS stable at this time  Continue to monitor  Recurrent major depressive disorder, in partial remission (HCC)   Assessment & Plan    Stable on celexa - continue to assess  VTE Prophylaxis: Hg 7 9  / sequential compression device   Code Status: Level 1 Full Code  POLST: POLST form is not discussed and not completed at this time  Anticipated Length of Stay:  Patient will be admitted on an Inpatient basis with an anticipated length of stay of  > 2 midnights  Justification for Hospital Stay: Tumor Lysis Syndrome    Total Time for Visit, including Counseling / Coordination of Care: 45 minutes  Greater than 50% of this total time spent on direct patient counseling and coordination of care  Chief Complaint:   fatigue    History of Present Illness:    Yuni Wright is a 66 y o  female who presents with fatigue and abnormal labs indicative of tumor lysis syndrome  She was admitted for monitoring over the weekend per heme-onc  Review of Systems:    Review of Systems   Constitutional: Negative for chills and fever     HENT: Negative for congestion, rhinorrhea, sinus pain, sinus pressure, sneezing and sore throat  Respiratory: Negative  Cardiovascular: Negative  Gastrointestinal: Negative for abdominal distention, abdominal pain, constipation, diarrhea, nausea and vomiting  Genitourinary: Negative for difficulty urinating and dysuria  Musculoskeletal: Negative for arthralgias, back pain and myalgias  Neurological: Negative for dizziness and light-headedness  Hx of falls with dizziness, but none current   Psychiatric/Behavioral: Negative for agitation, behavioral problems and confusion  Past Medical and Surgical History:     Past Medical History:   Diagnosis Date    Arthritis     Asthma     Cancer ([address] )     Migraine     Osteoporosis     Psychiatric disorder        Past Surgical History:   Procedure Laterality Date    CHOLECYSTECTOMY      FL GUIDED CENTRAL VENOUS ACCESS REPLACEMENT  11/9/2018    HYSTERECTOMY      LYMPH NODE BIOPSY Left 11/9/2018    Procedure: EXCISION BIOPSY LYMPH NODE SUPRACLAVICULAR;  Surgeon: Sheila Arriaza MD;  Location: [facility] MAIN OR;  Service: General    TUNNELED VENOUS PORT PLACEMENT N/A 11/9/2018    Procedure: INSERTION OF PORT-A-CATH;  Surgeon: Sheila Arriaza MD;  Location: [facility] MAIN OR;  Service: General       Meds/Allergies:    Prior to Admission medications    Medication Sig Start Date End Date Taking?  Authorizing Provider   allopurinol (ZYLOPRIM) 100 mg tablet Take 1 tablet (100 mg total) by mouth daily For 30 days than stop 12/5/18  Yes YE Brown   citalopram (CeleXA) 20 mg tablet Take 10 mg by mouth daily   Yes Historical Provider, MD   hydrochlorothiazide (HYDRODIURIL) 25 mg tablet Take 50 mg by mouth daily   Yes Historical Provider, MD   losartan (COZAAR) 100 MG tablet Take 100 mg by mouth daily   Yes Historical Provider, MD   omeprazole (PriLOSEC) 20 mg delayed release capsule Take 1 capsule (20 mg total) by mouth daily To prevent stomach upset 12/5/18  Yes YE Enciso ondansetron (ZOFRAN) 8 mg tablet Take 1 tablet (8 mg total) by mouth every 8 (eight) hours as needed for nausea or vomiting 12/5/18  Yes YE Luu   oxyCODONE (ROXICODONE) 5 mg immediate release tablet Take 1 tablet (5 mg total) by mouth every 4 (four) hours as needed for moderate pain for up to 30 doses Earliest Fill Date: 12/3/18 Max Daily Amount: 30 mg 12/3/18  Yes Louisa Thomas MD   oxyCODONE-acetaminophen (PERCOCET) 5-325 mg per tablet Take 1 tablet by mouth as needed for moderate pain (Pt unsure of last dose)   Yes Historical Provider, MD   polyethylene glycol (MIRALAX) 17 g packet Take 17 g by mouth daily 11/11/18   YE Jorgensen   senna (SENOKOT) 8 6 mg Take 1 tablet (8 6 mg total) by mouth daily at bedtime 11/10/18   YE Jorgensen     I have reviewed home medications with patient personally  Allergies: Allergies   Allergen Reactions    Penicillins        Social History:     Marital Status: Single   Occupation: unemployed   Patient Pre-hospital Living Situation: lives with son  Patient Pre-hospital Level of Mobility: needs assistance with ADL's - modified independence  Patient Pre-hospital Diet Restrictions: none  Substance Use History:   History   Alcohol Use No     History   Smoking Status    Former Smoker   Smokeless Tobacco    Never Used     History   Drug Use No       Family History:    non-contributory    Physical Exam:     Vitals:   Blood Pressure: 152/70 (12/07/18 1741)  Pulse: 73 (12/07/18 1741)  Temperature: (!) 97 1 °F (36 2 °C) (12/07/18 1545)  Temp Source: Tympanic (12/07/18 1545)  Respirations: 16 (12/07/18 1741)  Weight - Scale: 91 2 kg (201 lb) (12/07/18 1545)  SpO2: 96 % (12/07/18 1741)    Physical Exam   Constitutional: She is oriented to person, place, and time  She appears well-developed and well-nourished  No distress  HENT:   Head: Normocephalic and atraumatic  Eyes: Right eye exhibits no discharge  Left eye exhibits no discharge  Neck: No JVD present  Cardiovascular: Normal rate, regular rhythm, normal heart sounds and intact distal pulses  Exam reveals no gallop and no friction rub  No murmur heard  Pulmonary/Chest: Effort normal and breath sounds normal  No stridor  No respiratory distress  She has no wheezes  She has no rales  Abdominal: Soft  Bowel sounds are normal  She exhibits distension  There is no tenderness  There is no rebound and no guarding  Musculoskeletal: She exhibits edema (trace BLE edema)  She exhibits no tenderness  Neurological: She is alert and oriented to person, place, and time  Skin: Skin is warm and dry  She is not diaphoretic  Psychiatric: She has a normal mood and affect  Additional Data:     Lab Results: I have personally reviewed pertinent reports  Results from last 7 days  Lab Units 12/07/18  1618 12/04/18  1307   WBC Thousand/uL 7 20 11 80*   HEMOGLOBIN g/dL 7 9* 9 6*   HEMATOCRIT % 26 2* 32 2*   PLATELETS Thousands/uL 76* 134*   BANDS PCT %  --  10*   NEUTROS PCT % 75*  --    LYMPHS PCT % 22*  --    LYMPHO PCT %  --  39   MONOS PCT % 3  --    MONO PCT %  --  4   EOS PCT % 0 2       Results from last 7 days  Lab Units 12/07/18  1619   SODIUM mmol/L 138   POTASSIUM mmol/L 4 8   CHLORIDE mmol/L 106   CO2 mmol/L 26   BUN mg/dL 34*   CREATININE mg/dL 1 62*   ANION GAP mmol/L 6   CALCIUM mg/dL 9 1   ALBUMIN g/dL 3 5   TOTAL BILIRUBIN mg/dL 0 40   ALK PHOS U/L 147*   ALT U/L 21   AST U/L 44*   GLUCOSE RANDOM mg/dL 149*       Results from last 7 days  Lab Units 12/04/18  1307   INR  1 03               Results from last 7 days  Lab Units 12/07/18  1618   LACTIC ACID mmol/L 1 5       Imaging: I have personally reviewed pertinent reports  VAS lower limb venous duplex study, complete bilateral    (Results Pending)       EKG, Pathology, and Other Studies Reviewed on Admission:   · EKG: sinus arrhythmia 12/4/18    [facility] Reviewed:  Yes

## 2018-12-07 NOTE — NURSING NOTE
PT arrived on floor @ 1800 from ER AOX3 HR regular Lungs clear + bowel Sounds x4 + PP ABD soft distended  Denies any pain  PT resting comfortable no distress noted  Will continue to monitor call bell within reach

## 2018-12-07 NOTE — PLAN OF CARE
Problem: Potential for Falls  Goal: Patient will remain free of falls  INTERVENTIONS:  - Assess patient frequently for physical needs  -  Identify cognitive and physical deficits and behaviors that affect risk of falls    -  [place] fall precautions as indicated by assessment   - Educate patient/family on patient safety including physical limitations  - Instruct patient to call for assistance with activity based on assessment  - Modify environment to reduce risk of injury  - Consider OT/PT consult to assist with strengthening/mobility   Outcome: Progressing

## 2018-12-07 NOTE — PROGRESS NOTES
Pt here for day 2 bendeka  States she is feeling well  No side effects to report from yesterday  Her headache is now a 0/10  Patient walked into infusion center day  Yesterday she came in by wheel chair  Patient tolerated Bendeka well today  Also gave 1 hour NSS and central labs per order  Labs reported to [facility] [place], and will give elitek to patient and then send to ER for patient to be evaluated over the weekend for tumor lysis syndrome

## 2018-12-07 NOTE — ASSESSMENT & PLAN NOTE
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  Patient had infusion today for cycle 1 day 2 chemotherapy (Rituxan plus bendamustine)  Her tumor lysis labs from today are indicative of tumor lysis syndrome with worsening kidney function  She ws given a stat dose of rasburicase, and may need another dose if uric acid levels trend up, per CRNP with heme/onc  Dr Alexei Rose wanted  patient to be further evaluated in the emergency department after completing her treatment today, and admission for close monitoring over the weekend for her tumor lysis syndrome  She will need aggressive hydration, close monitoring of tumor lysis labs, and possible repeated doses of rasburicase if her uric acid continues to be elevated, as per discussion with heme/onc

## 2018-12-07 NOTE — ASSESSMENT & PLAN NOTE
Patient reports living home with her son  Has had frequent falls due to dizziness and weakness in legs  Denies ever hitting her head  Maintain fall precautions  Consult PT/OT  Pt aware she must call for assistance when getting out of bed

## 2018-12-07 NOTE — ED PROVIDER NOTES
History  Chief Complaint   Patient presents with    Fatigue     Pt brought to [facility] from GI lab for observation after rec'ing meds for tumor lysis syndrome  Pts only complaint is fatigue  Pt arrives with left sidedSQ port accessed  Pt AAO x 3  History provided by:  Patient  Fatigue   Severity:  Moderate  Onset quality:  Sudden  Duration:  30 minutes  Timing:  Rare  Progression:  Resolved  Chronicity:  New  Relieved by:  Nothing  Worsened by:  Nothing  Ineffective treatments:  None tried  Associated symptoms: no abdominal pain, no chest pain, no cough, no diarrhea, no dizziness, no dysuria, no fever, no headaches, no myalgias, no nausea, no shortness of breath and no urgency    Associated symptoms comment:  Emotional upset sxs and anxiety noted         Prior to Admission Medications   Prescriptions Last Dose Informant Patient Reported?  Taking?   allopurinol (ZYLOPRIM) 100 mg tablet 12/6/2018 at Unknown time  No Yes   Sig: Take 1 tablet (100 mg total) by mouth daily For 30 days than stop   citalopram (CeleXA) 20 mg tablet 12/6/2018 at Unknown time Family Member Yes Yes   Sig: Take 10 mg by mouth daily   hydrochlorothiazide (HYDRODIURIL) 25 mg tablet Past Week at Unknown time Family Member Yes Yes   Sig: Take 50 mg by mouth daily   losartan (COZAAR) 100 MG tablet 12/6/2018 at Unknown time Family Member Yes Yes   Sig: Take 100 mg by mouth daily   omeprazole (PriLOSEC) 20 mg delayed release capsule Past Week at Unknown time  No Yes   Sig: Take 1 capsule (20 mg total) by mouth daily To prevent stomach upset   ondansetron (ZOFRAN) 8 mg tablet 12/6/2018 at Unknown time  No Yes   Sig: Take 1 tablet (8 mg total) by mouth every 8 (eight) hours as needed for nausea or vomiting   oxyCODONE (ROXICODONE) 5 mg immediate release tablet Past Week at Unknown time Family Member No Yes   Sig: Take 1 tablet (5 mg total) by mouth every 4 (four) hours as needed for moderate pain for up to 30 doses Earliest Fill Date: 12/3/18 Max Daily Amount: 30 mg   oxyCODONE-acetaminophen (PERCOCET) 5-325 mg per tablet   Yes Yes   Sig: Take 1 tablet by mouth as needed for moderate pain (Pt unsure of last dose)   polyethylene glycol (MIRALAX) 17 g packet Unknown at Unknown time Family Member No No   Sig: Take 17 g by mouth daily   senna (SENOKOT) 8 6 mg Unknown at Unknown time Family Member No No   Sig: Take 1 tablet (8 6 mg total) by mouth daily at bedtime      Facility-Administered Medications: None       Past Medical History:   Diagnosis Date    Arthritis     Asthma     Cancer ([facility] [address] )     Migraine     Osteoporosis     Psychiatric disorder        Past Surgical History:   Procedure Laterality Date    CHOLECYSTECTOMY      FL GUIDED CENTRAL VENOUS ACCESS REPLACEMENT  11/9/2018    HYSTERECTOMY      LYMPH NODE BIOPSY Left 11/9/2018    Procedure: EXCISION BIOPSY LYMPH NODE SUPRACLAVICULAR;  Surgeon: Danny Mccracken MD;  Location: [facility] MAIN OR;  Service: General    TUNNELED VENOUS PORT PLACEMENT N/A 11/9/2018    Procedure: INSERTION OF PORT-A-CATH;  Surgeon: Danny Mccracken MD;  Location: [facility] MAIN OR;  Service: General       Family History   Problem Relation Age of Onset    Cancer Mother     Cancer Father      I have reviewed and agree with the history as documented  Social History   Substance Use Topics    Smoking status: Former Smoker    Smokeless tobacco: Never Used    Alcohol use No        Review of Systems   Constitutional: Positive for fatigue  Negative for chills and fever  HENT: Negative for rhinorrhea, sore throat and trouble swallowing  Eyes: Negative for pain  Respiratory: Positive for chest tightness  Negative for cough, shortness of breath, wheezing and stridor  Cardiovascular: Negative for chest pain and leg swelling  Gastrointestinal: Negative for abdominal pain, diarrhea and nausea  Endocrine: Negative for polyuria  Genitourinary: Negative for dysuria, flank pain and urgency     Musculoskeletal: Negative for joint swelling, myalgias and neck stiffness  Skin: Negative for rash  Allergic/Immunologic: Negative for immunocompromised state  Neurological: Negative for dizziness, syncope, weakness, numbness and headaches  Psychiatric/Behavioral: Negative for confusion and suicidal ideas  All other systems reviewed and are negative  Physical Exam  Physical Exam   Constitutional: She is oriented to person, place, and time  She appears well-developed and well-nourished  HENT:   Head: Normocephalic and atraumatic  Dry MM noted      Eyes: Pupils are equal, round, and reactive to light  EOM are normal    Neck: Normal range of motion  Neck supple  Cardiovascular: Normal rate and regular rhythm  Exam reveals no friction rub  No murmur heard  Pulmonary/Chest: Breath sounds normal  No respiratory distress  She has no wheezes  She has no rales  Abdominal: Soft  Bowel sounds are normal  She exhibits no distension  There is no tenderness  Musculoskeletal: Normal range of motion  She exhibits no edema or tenderness  Neurological: She is alert and oriented to person, place, and time  Skin: Skin is warm  No rash noted  Psychiatric: She has a normal mood and affect  Nursing note and vitals reviewed        Vital Signs  ED Triage Vitals [12/07/18 1545]   Temperature Pulse Respirations Blood Pressure SpO2   (!) 97 1 °F (36 2 °C) 70 16 148/81 98 %      Temp Source Heart Rate Source Patient Position - Orthostatic VS BP Location FiO2 (%)   Tympanic Monitor Sitting Left arm --      Pain Score       --           Vitals:    12/07/18 1545   BP: 148/81   Pulse: 70   Patient Position - Orthostatic VS: Sitting       Visual Acuity      ED Medications  Medications   sodium chloride 0 9 % bolus 1,000 mL (not administered)       Diagnostic Studies  Results Reviewed     Procedure Component Value Units Date/Time    Uric acid [[id number]]     Lab Status:  No result Specimen:  Blood     CK [[id number]]     Lab Status:  No result Specimen:  Blood     LD,Blood [[id number]]     Lab Status:  No result Specimen:  Blood     Phosphorus [[id number]]     Lab Status:  No result Specimen:  Blood     Magnesium [[id number]]     Lab Status:  No result Specimen:  Blood     CBC and differential [[id number]]     Lab Status:  No result Specimen:  Blood     Comprehensive metabolic panel [[id number]]     Lab Status:  No result Specimen:  Blood     Lipase [[id number]]     Lab Status:  No result Specimen:  Blood     Lactic acid, plasma [[id number]]     Lab Status:  No result Specimen:  Blood     B-type natriuretic peptide [[id number]]     Lab Status:  No result Specimen:  Blood     UA w Reflex to Microscopic [[id number]]     Lab Status:  No result Specimen:  Urine                  No orders to display              Procedures  Procedures       Phone Contacts  ED Phone Contact    ED Course                               MDM  Number of Diagnoses or Management Options  Dehydration: new and requires workup  Elevated serum creatinine: new and requires workup  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: new and requires workup  Diagnosis management comments: 72-year-old female presents with possible tumor lysis syndrome from Oncology Department  Lab sent off at this point time IV fluids to be given and patient to be admitted to the medical service         Amount and/or Complexity of Data Reviewed  Clinical lab tests: reviewed  Tests in the radiology section of CPT®: ordered and reviewed  Decide to obtain previous medical records or to obtain history from someone other than the patient: yes  Review and summarize past medical records: yes  Independent visualization of images, tracings, or specimens: yes (All labs reviewed and utilized in the medical decision making process    All radiology studies independently viewed by me and interpreted by the radiologist )      CritCare Time    Disposition  Final diagnoses:   Elevated serum creatinine   Dehydration   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     Time reflects when diagnosis was documented in both MDM as applicable and the Disposition within this note     Time User Action Codes Description Comment    12/7/2018  4:11 PM Cr Francie Add [R79 89] Elevated serum creatinine     12/7/2018  4:11 PM Cr Occidental Add [E86 0] Dehydration     12/7/2018  4:11 PM Ab Lan Add [E88 3,  T45 1X5A] 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

## 2018-12-07 NOTE — PROGRESS NOTES
Report given to Glen in ER    Notified patient's port will remain accessed and Neulasta onpro is applied on patient's arm (pt advised to take off at 6:00pm tomorrow, 12-8-18)

## 2018-12-08 ENCOUNTER — TELEPHONE (OUTPATIENT)
Dept: OTHER | Facility: OTHER | Age: 58
End: 2018-12-08

## 2018-12-08 LAB
ABO GROUP BLD: NORMAL
ABO GROUP BLD: NORMAL
ALBUMIN SERPL BCP-MCNC: 3.2 G/DL (ref 3–5.2)
ALP SERPL-CCNC: 114 U/L (ref 43–122)
ALT SERPL W P-5'-P-CCNC: 16 U/L (ref 9–52)
ANION GAP SERPL CALCULATED.3IONS-SCNC: 5 MMOL/L (ref 5–14)
ANION GAP SERPL CALCULATED.3IONS-SCNC: 7 MMOL/L (ref 5–14)
ANISOCYTOSIS BLD QL SMEAR: PRESENT
AST SERPL W P-5'-P-CCNC: 29 U/L (ref 14–36)
BASOPHILS # BLD AUTO: 0 THOUSANDS/ΜL (ref 0–0.1)
BASOPHILS NFR BLD AUTO: 0 % (ref 0–1)
BILIRUB SERPL-MCNC: 0.3 MG/DL
BLD GP AB SCN SERPL QL: NEGATIVE
BUN SERPL-MCNC: 32 MG/DL (ref 5–25)
BUN SERPL-MCNC: 36 MG/DL (ref 5–25)
CALCIUM SERPL-MCNC: 7.6 MG/DL (ref 8.4–10.2)
CALCIUM SERPL-MCNC: 8.5 MG/DL (ref 8.4–10.2)
CHLORIDE SERPL-SCNC: 108 MMOL/L (ref 97–108)
CHLORIDE SERPL-SCNC: 112 MMOL/L (ref 97–108)
CK SERPL-CCNC: <20 U/L (ref 30–135)
CO2 SERPL-SCNC: 23 MMOL/L (ref 22–30)
CO2 SERPL-SCNC: 24 MMOL/L (ref 22–30)
CREAT SERPL-MCNC: 1.3 MG/DL (ref 0.6–1.2)
CREAT SERPL-MCNC: 1.4 MG/DL (ref 0.6–1.2)
EOSINOPHIL # BLD AUTO: 0 THOUSAND/ΜL (ref 0–0.4)
EOSINOPHIL NFR BLD AUTO: 1 % (ref 0–6)
ERYTHROCYTE [DISTWIDTH] IN BLOOD BY AUTOMATED COUNT: 19.1 %
GFR SERPL CREATININE-BSD FRML MDRD: 42 ML/MIN/1.73SQ M
GFR SERPL CREATININE-BSD FRML MDRD: 46 ML/MIN/1.73SQ M
GLUCOSE SERPL-MCNC: 130 MG/DL (ref 70–99)
GLUCOSE SERPL-MCNC: 149 MG/DL (ref 70–99)
GLUCOSE SERPL-MCNC: 86 MG/DL (ref 70–99)
HCT VFR BLD AUTO: 24.9 % (ref 36–46)
HGB BLD-MCNC: 7.5 G/DL (ref 12–16)
LDH SERPL-CCNC: 1363 U/L (ref 313–618)
LDH SERPL-CCNC: 1445 U/L (ref 313–618)
LYMPHOCYTES # BLD AUTO: 1.2 THOUSANDS/ΜL (ref 0.5–4)
LYMPHOCYTES NFR BLD AUTO: 25 % (ref 25–45)
MAGNESIUM SERPL-MCNC: 1.9 MG/DL (ref 1.6–2.3)
MCH RBC QN AUTO: 21.5 PG (ref 26–34)
MCHC RBC AUTO-ENTMCNC: 30.3 G/DL (ref 31–36)
MCV RBC AUTO: 71 FL (ref 80–100)
MICROCYTES BLD QL AUTO: PRESENT
MONOCYTES # BLD AUTO: 0.2 THOUSAND/ΜL (ref 0.2–0.9)
MONOCYTES NFR BLD AUTO: 4 % (ref 1–10)
NEUTROPHILS # BLD AUTO: 3.3 THOUSANDS/ΜL (ref 1.8–7.8)
NEUTS SEG NFR BLD AUTO: 70 % (ref 45–65)
OVALOCYTES BLD QL SMEAR: PRESENT
PHOSPHATE SERPL-MCNC: 5 MG/DL (ref 2.5–4.8)
PHOSPHATE SERPL-MCNC: 5.7 MG/DL (ref 2.5–4.8)
PHOSPHATE SERPL-MCNC: 5.8 MG/DL (ref 2.5–4.8)
PLATELET # BLD AUTO: 64 THOUSANDS/UL (ref 150–450)
PLATELET BLD QL SMEAR: ABNORMAL
PMV BLD AUTO: 8.6 FL (ref 8.9–12.7)
POIKILOCYTOSIS BLD QL SMEAR: PRESENT
POTASSIUM SERPL-SCNC: 4.3 MMOL/L (ref 3.6–5)
POTASSIUM SERPL-SCNC: 4.5 MMOL/L (ref 3.6–5)
PROT SERPL-MCNC: 5.9 G/DL (ref 5.9–8.4)
RBC # BLD AUTO: 3.51 MILLION/UL (ref 4–5.2)
RBC MORPH BLD: PRESENT
RH BLD: POSITIVE
RH BLD: POSITIVE
SODIUM SERPL-SCNC: 139 MMOL/L (ref 137–147)
SODIUM SERPL-SCNC: 140 MMOL/L (ref 137–147)
SPECIMEN EXPIRATION DATE: NORMAL
URATE SERPL-MCNC: 5.5 MG/DL (ref 2.7–7.5)
URATE SERPL-MCNC: 5.6 MG/DL (ref 2.7–7.5)
WBC # BLD AUTO: 4.7 THOUSAND/UL (ref 4.5–11)

## 2018-12-08 PROCEDURE — 30233N1 TRANSFUSION OF NONAUTOLOGOUS RED BLOOD CELLS INTO PERIPHERAL VEIN, PERCUTANEOUS APPROACH: ICD-10-PCS | Performed by: FAMILY MEDICINE

## 2018-12-08 PROCEDURE — 85025 COMPLETE CBC W/AUTO DIFF WBC: CPT | Performed by: NURSE PRACTITIONER

## 2018-12-08 PROCEDURE — P9016 RBC LEUKOCYTES REDUCED: HCPCS

## 2018-12-08 PROCEDURE — 99233 SBSQ HOSP IP/OBS HIGH 50: CPT | Performed by: FAMILY MEDICINE

## 2018-12-08 PROCEDURE — 82948 REAGENT STRIP/BLOOD GLUCOSE: CPT

## 2018-12-08 PROCEDURE — 82550 ASSAY OF CK (CPK): CPT | Performed by: NURSE PRACTITIONER

## 2018-12-08 PROCEDURE — 83615 LACTATE (LD) (LDH) ENZYME: CPT | Performed by: NURSE PRACTITIONER

## 2018-12-08 PROCEDURE — 84100 ASSAY OF PHOSPHORUS: CPT | Performed by: NURSE PRACTITIONER

## 2018-12-08 PROCEDURE — 86850 RBC ANTIBODY SCREEN: CPT | Performed by: FAMILY MEDICINE

## 2018-12-08 PROCEDURE — 86920 COMPATIBILITY TEST SPIN: CPT

## 2018-12-08 PROCEDURE — 84550 ASSAY OF BLOOD/URIC ACID: CPT | Performed by: NURSE PRACTITIONER

## 2018-12-08 PROCEDURE — 80053 COMPREHEN METABOLIC PANEL: CPT | Performed by: NURSE PRACTITIONER

## 2018-12-08 PROCEDURE — 86901 BLOOD TYPING SEROLOGIC RH(D): CPT | Performed by: FAMILY MEDICINE

## 2018-12-08 PROCEDURE — 84100 ASSAY OF PHOSPHORUS: CPT | Performed by: FAMILY MEDICINE

## 2018-12-08 PROCEDURE — 83735 ASSAY OF MAGNESIUM: CPT | Performed by: NURSE PRACTITIONER

## 2018-12-08 PROCEDURE — 86900 BLOOD TYPING SEROLOGIC ABO: CPT | Performed by: FAMILY MEDICINE

## 2018-12-08 RX ORDER — FUROSEMIDE 10 MG/ML
20 INJECTION INTRAMUSCULAR; INTRAVENOUS ONCE
Status: COMPLETED | OUTPATIENT
Start: 2018-12-08 | End: 2018-12-08

## 2018-12-08 RX ORDER — AMLODIPINE BESYLATE 5 MG/1
5 TABLET ORAL DAILY
Status: DISCONTINUED | OUTPATIENT
Start: 2018-12-09 | End: 2018-12-10 | Stop reason: HOSPADM

## 2018-12-08 RX ORDER — AMLODIPINE BESYLATE 5 MG/1
5 TABLET ORAL DAILY
Status: DISCONTINUED | OUTPATIENT
Start: 2018-12-08 | End: 2018-12-08

## 2018-12-08 RX ADMIN — SODIUM CHLORIDE 200 ML/HR: 9 INJECTION, SOLUTION INTRAVENOUS at 04:14

## 2018-12-08 RX ADMIN — SENNOSIDES 8.6 MG: 8.6 TABLET, FILM COATED ORAL at 21:35

## 2018-12-08 RX ADMIN — CITALOPRAM HYDROBROMIDE 10 MG: 20 TABLET ORAL at 08:39

## 2018-12-08 RX ADMIN — POLYETHYLENE GLYCOL 3350 17 G: 17 POWDER, FOR SOLUTION ORAL at 08:39

## 2018-12-08 RX ADMIN — FUROSEMIDE 20 MG: 10 INJECTION, SOLUTION INTRAVENOUS at 09:39

## 2018-12-08 RX ADMIN — ALLOPURINOL 100 MG: 100 TABLET ORAL at 08:38

## 2018-12-08 RX ADMIN — HYDROCHLOROTHIAZIDE 50 MG: 25 TABLET ORAL at 08:38

## 2018-12-08 RX ADMIN — PANTOPRAZOLE SODIUM 40 MG: 40 TABLET, DELAYED RELEASE ORAL at 06:21

## 2018-12-08 RX ADMIN — LOSARTAN POTASSIUM 100 MG: 50 TABLET, FILM COATED ORAL at 08:38

## 2018-12-08 NOTE — TELEPHONE ENCOUNTER
Lb [facility] oncologist (approximated)  CONFIDENTIALTY NOTICE: This fax transmission is intended only for the addressee  It contains information that is legally privileged,  confidential or otherwise protected from use or disclosure  If you are not the intended recipient, you are strictly prohibited from reviewing,  disclosing, copying using or disseminating any of this information or taking any action in reliance on or regarding this information  If you have  received this fax in error, please notify us immediately by telephone so that we can arrange for its return to us  Page: 1 of 2  Call Id: [id number]  Health Call  Standard Call Report  Health Call  Patient Name: Lb Lovell oncologist  Gender: Female  : (approximated)  Age:  Return Phone  Number: [phone] (Work)  Address:  City/State/Zip:  Practice Name: [address] [place] [address]  Practice Charged:  Physician:  [address]  Name:  Relationship To  Patient:  Return Phone Number: Unavailable  Presenting Problem: Routine/SL KRISTA/Helen  Service Type: Consults  Charged Service 1: Messages  Pharmacy Name and  Number:  Nurse Assessment  Nurse: Anabel Esposito Date/Time: 2018 10:13:10 AM  Type of assessment required:  ---Consult  DateTime called Dee Burns Name  ---2018 @ 0847/Wei Monteiro of appointment:  ---Routine  Facility/Unit/Room Number/Unit Phone Number  ---[facility]/T5/512/ [phone]  Practice consulted:  ---Oncology  Requesting physician:  ---Jazmine Barclay  Patient's name//Medical Record Number  ---Kassidy Lyle [facility]/6 58 1216/ MR #[id number]  Admitting diagnosis/Reason for consult  ---Tumorlicis Syndrome following antineoplastic drug therapy (both)  Physician Notified/DateTime:  ---Paged Dr Ginna Garrison on 2018 @ 2158

## 2018-12-08 NOTE — UTILIZATION REVIEW
Initial Clinical Review    Admission: Date/Time/Statement: 12/7/18 @ 1610     Orders Placed This Encounter   Procedures    Inpatient Admission (expected length of stay for this patient is greater than two midnights)     Standing Status:   Standing     Number of Occurrences:   1     Order Specific Question:   Admitting Physician     Answer:   Suzanne Blake [[id number]]     Order Specific Question:   Level of Care     Answer:   Med Surg [16]     Order Specific Question:   Estimated length of stay     Answer:   More than 2 Midnights     Order Specific Question:   Certification     Answer:   I certify that inpatient services are medically necessary for this patient for a duration of greater than two midnights  See H&P and MD Progress Notes for additional information about the patient's course of treatment  ED: Date/Time/Mode of Arrival:   ED Arrival Information     Expected Arrival Acuity Means of Arrival Escorted By Service Admission Type    12/7/2018 12/7/2018 15:30 Urgent Other Other General Medicine Urgent    Arrival Complaint    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          Chief Complaint:   Chief Complaint   Patient presents with    Fatigue     Pt brought to [facility] from GI lab for observation after rec'ing meds for tumor lysis syndrome  Pts only complaint is fatigue  Pt arrives with left sidedSQ port accessed  Pt AAO x 3          History of Illness: 67 yo female to ED from home w/ fatigue and abnormal labs indicative of tumor lysis syndrome     PE : abd distention , edema     ED Vital Signs:   ED Triage Vitals   Temperature Pulse Respirations Blood Pressure SpO2   12/07/18 1545 12/07/18 1545 12/07/18 1545 12/07/18 1545 12/07/18 1545   (!) 97 1 °F (36 2 °C) 70 16 148/81 98 %      Temp Source Heart Rate Source Patient Position - Orthostatic VS BP Location FiO2 (%)   12/07/18 1545 12/07/18 1545 12/07/18 1545 12/07/18 1545 --   Tympanic Monitor Sitting Left arm       Pain Score 12/07/18 1819       No Pain        Wt Readings from Last 1 Encounters:   12/08/18 97 9 kg (215 lb 13 3 oz)       Vital Signs (abnormal): wnl     Abnormal Labs/Diagnostic Test Results:BNP 2150, uric acid   8 8, phos  5 1, LD  2019, total CK  <20  BUN creat 34 1 62, gluc  149, ast 44, alk phos  147, H&H  7 9 26 2, plt  76    ED Treatment:   Medication Administration from 12/07/2018 1356 to 12/07/2018 1809       Date/Time Order Dose Route Action Action by Comments     12/07/2018 1618 sodium chloride 0 9 % bolus 1,000 mL 1,000 mL Intravenous New Bag Casie Villegas RN           Past Medical/Surgical History: Active Ambulatory Problems     Diagnosis Date Noted    Abdominal pain 71/36/6331    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 ([facility] ) 11/07/2018    Recurrent major depressive disorder, in partial remission ([facility] ) 11/07/2018    Essential hypertension 11/07/2018    Slow transit constipation 11/07/2018    Transaminitis 11/08/2018    Thrombocytopenia ([facility] [address] ) 11/08/2018    Iron deficiency anemia secondary to inadequate dietary iron intake 11/09/2018    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 12/07/2018     Past Medical History:   Diagnosis Date    Arthritis     Asthma     [facility]     Migraine     Osteoporosis     Psychiatric disorder        Admitting Diagnosis: Dehydration [E86 0]  Fatigue [R53 83]  Elevated serum creatinine [R79 89]  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 [E88 3, T45 1X5A]    Age/Sex: 66 y o  female    Assessment/Plan:   * 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   Assessment & Plan     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  Patient had infusion today for cycle 1 day 2 chemotherapy (Rituxan plus bendamustine)     Her tumor lysis labs from today are indicative of tumor lysis syndrome with worsening kidney function  She ws given a stat dose of rasburicase, and may need another dose if uric acid levels trend up, per CRNP with heme/onc   Dr Ky Jack wanted  patient to be further evaluated in the emergency department after completing her treatment today, and admission for close monitoring over the weekend for her tumor lysis syndrome  She will need aggressive hydration, close monitoring of tumor lysis labs, and possible repeated doses of rasburicase if her uric acid continues to be elevated, as per discussion with heme/onc       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 [facility])   Assessment & Plan     The patient appears to be a poor historian  States that she has cancer everywhere but does not know what type of cancer she has  States that it started in her stomach and her initial symptom was abdominal pain  She currently has a distended abdomen but denies pain nausea vomiting and diarrhea  She does report constipation  She had a small bowel movement 2 days ago  Her only symptom at this time is fatigue  She follows with Dr Ky Jack, and I spoke to his nurse practitioner who stated she would check BMP,ldh, uric acid, and phosphorus every 6 hours  Will also check daily magnesium, CMP, and CK  She is currently getting normal saline at 200 cc an hour and had a 1 L normal saline bolus ordered prior to that  Dr Ky Jack was consulted to follow patient       Elevated uric acid in blood   Assessment & Plan     Continue allopurinol        Frequent falls   Assessment & Plan     Patient reports living home with her son  Has had frequent falls due to dizziness and weakness in legs  Denies ever hitting her head  Maintain fall precautions  Consult PT/OT  Pt aware she must call for assistance when getting out of bed        Iron deficiency anemia secondary to inadequate dietary iron intake   Assessment & Plan     Hg 7 9  Discussed with heme onc  Will repeat CBC in AM and if Hg drops more, will give 1u leukoreduced PRBC  Pt feels tired, but no SOB         Transaminitis   Assessment & Plan     Will repeat daily CMP and monitor for now        Slow transit constipation   Assessment & Plan     Will order bowel regimen  Had small BM 2 days ago       Essential hypertension   Assessment & Plan     Will continue cozaar and HCTZ  VS stable at this time   Continue to monitor       Recurrent major depressive disorder, in partial remission (HCC)   Assessment & Plan     Stable on celexa - continue to assess              Admission Orders:  Scheduled Meds:   Current Facility-Administered Medications:  acetaminophen 650 mg Oral Q6H PRN     allopurinol 100 mg Oral Daily     citalopram 10 mg Oral Daily     hydrochlorothiazide 50 mg Oral Daily     losartan 100 mg Oral Daily     ondansetron 8 mg Oral Q8H PRN     pantoprazole 40 mg Oral Early Morning     polyethylene glycol 17 g Oral Daily     senna 1 tablet Oral HS     sodium chloride 200 mL/hr Intravenous Continuous  Last Rate: 200 mL/hr (12/08/18 0414)       SCD  Daily weight   Oncology consult   I&O   OT PT eval   Up w/ assist  Cardiac diet   Access port a cath  Echo

## 2018-12-08 NOTE — PROGRESS NOTES
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  Patient had infusion today for cycle 1 day 2 chemotherapy (Rituxan plus bendamustine)  patient developed tumor lysis syndrome yesterday  Received a dose of rasburicase yesterday  Along with aggressive IV hydration  Her renal function, uric acid level and LDH level are improving  She has some worsening of her phosphorus level today  Will recheck a phosphorus level in the evening and again in the morning tomorrow  Will decrease IV fluid hydration rate and gave her dose of Lasix that she was feeling fluid overload and short of breath  Will continue hydration with normal saline at 75 cc/hour and also give 1 unit of PRBC  Will try to increase IV fluid hydration again if her shortness of breath improves     Elevated uric acid in blood   Assessment & Plan    Continue allopurinol  Also received a dose of rasburicase  Frequent falls   Assessment & Plan    Patient reports living home with her son  Has had frequent falls due to dizziness and weakness in legs  Denies ever hitting her head  Maintain fall precautions  Consult PT/OT  Pt aware she must call for assistance when getting out of bed  Iron deficiency anemia secondary to inadequate dietary iron intake   Assessment & Plan    Will check stool Hemoccult  She does have low iron levels and requires to be on iron therapy  Hemoglobin dropped from 7 9-7 5 probably secondary to dilutional effect however she is also short of breath today  Will give her 1 unit of packed RBCs which are leuko reduced and recheck hemoglobin again tomorrow       Transaminitis   Assessment & Plan Resolved with hydration     Slow transit constipation   Assessment & Plan    Will order bowel regimen  Had small BM 2 days ago  Essential hypertension   Assessment & Plan    Blood pressure is well controlled at this time  Will hold losartan secondary to Pop I and also hold HCTZ secondary to the hyperuricemia and tumor lysis syndrome  Will place her on Norvasc if required for blood pressure control     Recurrent major depressive disorder, in partial remission ([facility] [address] )   Assessment & Plan    Stable on celexa - continue to assess  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 [facility])   Assessment & Plan    The patient appears to be a poor historian  She received her 1st round of chemotherapy recently and developed tumor lysis syndrome from it  Will continue outpatient follow-up with Heme-Onc for continual chemotherapy as she does have bulky tumor with metastasis      Pop I on CKD stage 3:  Creatinine has improved compared to yesterday and is down to 1 4 which is her baseline  VTE Pharmacologic Prophylaxis:   Pharmacologic: Pharmacologic VTE Prophylaxis contraindicated due to low hb  Mechanical VTE Prophylaxis in Place: Yes    Patient Centered Rounds: I have performed bedside rounds with nursing staff today  Discussions with Specialists or Other Care Team Provider: heme onc    Education and Discussions with Family / Patient:  Discussed with patient at bedside about her hospital course    Time Spent for Care: 45 minutes  More than 50% of total time spent on counseling and coordination of care as described above  Current Length of Stay: 1 day(s)    Current Patient Status: Inpatient   Certification Statement: The patient will continue to require additional inpatient hospital stay due to tumor lysis syndrome    Discharge Plan: in 1-2 days      Code Status: Level 1 - Full Code      Subjective:   Patient states that she feels fatigued and also short of breath at rest and with activity    She denies any chest pain or abdominal pain  Objective:     Vitals:   Temp (24hrs), Av 5 °F (36 4 °C), Min:96 7 °F (35 9 °C), Max:98 4 °F (36 9 °C)    Temp:  [96 7 °F (35 9 °C)-98 4 °F (36 9 °C)] 96 7 °F (35 9 °C)  HR:  [67-77] 73  Resp:  [16-20] 20  BP: (133-152)/(60-81) 133/60  SpO2:  [96 %-98 %] 98 %  Body mass index is 35 92 kg/m²  Input and Output Summary (last 24 hours): Intake/Output Summary (Last 24 hours) at 18 1134  Last data filed at 18 0834   Gross per 24 hour   Intake             1740 ml   Output                0 ml   Net             1740 ml       Physical Exam:     Physical Exam   Constitutional: She is oriented to person, place, and time  She appears well-developed and well-nourished  HENT:   Head: Normocephalic and atraumatic  Eyes: Conjunctivae and EOM are normal    Neck: Normal range of motion  Neck supple  Cardiovascular: Normal rate, regular rhythm and normal heart sounds  Pulmonary/Chest: She is in respiratory distress  Mild decreased breath sounds bilateral bases  Positive use of accessory muscles of respiration   Abdominal: Soft  Bowel sounds are normal  She exhibits distension  She exhibits no mass  There is no tenderness  There is no rebound and no guarding  Genitourinary:   Genitourinary Comments: deferred   Musculoskeletal: Normal range of motion  She exhibits edema  Neurological: She is alert and oriented to person, place, and time  Skin: Skin is warm and dry  No rash noted  Psychiatric: She has a normal mood and affect  Nursing note and vitals reviewed          Additional Data:     Labs:      Results from last 7 days  Lab Units 18  0501  18  1307   WBC Thousand/uL 4 70  < > 11 80*   HEMOGLOBIN g/dL 7 5*  < > 9 6*   HEMATOCRIT % 24 9*  < > 32 2*   PLATELETS Thousands/uL 64*  < > 134*   BANDS PCT %  --   --  10*   NEUTROS PCT % 70*  < >  --    LYMPHS PCT % 25  < >  --    LYMPHO PCT %  --   --  39   MONOS PCT % 4  < >  --    MONO PCT %  --   --  4   EOS PCT % 1  < > 2   < > = values in this interval not displayed  Results from last 7 days  Lab Units 12/08/18  0501   SODIUM mmol/L 139   POTASSIUM mmol/L 4 5   CHLORIDE mmol/L 108   CO2 mmol/L 24   BUN mg/dL 36*   CREATININE mg/dL 1 40*   ANION GAP mmol/L 7   CALCIUM mg/dL 8 5   ALBUMIN g/dL 3 2   TOTAL BILIRUBIN mg/dL 0 30   ALK PHOS U/L 114   ALT U/L 16   AST U/L 29   GLUCOSE RANDOM mg/dL 130*       Results from last 7 days  Lab Units 12/04/18  1307   INR  1 03       Results from last 7 days  Lab Units 12/08/18  1108   POC GLUCOSE mg/dl 86           Results from last 7 days  Lab Units 12/07/18  1618   LACTIC ACID mmol/L 1 5           * I Have Reviewed All Lab Data Listed Above  * Additional Pertinent Lab Tests Reviewed:  [address] Admission Reviewed    Imaging:    Imaging Reports Reviewed Today Include: none  Imaging Personally Reviewed by Myself Includes:  none    Recent Cultures (last 7 days):           Last 24 Hours Medication List:     Current Facility-Administered Medications:  acetaminophen 650 mg Oral Q6H PRN Analia Ciminieri, YE    allopurinol 100 mg Oral Daily YE Sood    [START ON 12/9/2018] amLODIPine 5 mg Oral Daily Robinson Sheikh MD    citalopram 10 mg Oral Daily Analia Ciminieri, YE    ondansetron 8 mg Oral Q8H PRN Analia Ciminieri, YE    pantoprazole 40 mg Oral Early Morning Analia Ciminieri, YE    polyethylene glycol 17 g Oral Daily Analia Ciminieri, YE    senna 1 tablet Oral HS Analia Ciminieri, JULIANNP    sodium chloride 75 mL/hr Intravenous Continuous Robinson Sheikh MD Last Rate: 75 mL/hr (12/08/18 0939)     Facility-Administered Medications Ordered in Other Encounters:  [START ON 12/10/2018] heparin flush (porcine) 300 Units Intracatheter PRN MD Jose Daniel Marie ON 12/10/2018] sodium chloride 999 mL/hr Intravenous Once Nara Davis MD        Today, Patient Was Seen By: Robinson Sheikh MD    ** Please Note: Dictation voice to text software may have been used in the creation of this document   **

## 2018-12-08 NOTE — ASSESSMENT & PLAN NOTE
Will check stool Hemoccult  She does have low iron levels and requires to be on iron therapy  Hemoglobin dropped from 7 9-7 5 probably secondary to dilutional effect however she is also short of breath today  Will give her 1 unit of packed RBCs which are leuko reduced and recheck hemoglobin again tomorrow

## 2018-12-08 NOTE — ASSESSMENT & PLAN NOTE
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  Patient had infusion today for cycle 1 day 2 chemotherapy (Rituxan plus bendamustine)  patient developed tumor lysis syndrome yesterday  Received a dose of rasburicase yesterday  Along with aggressive IV hydration  Her renal function, uric acid level and LDH level are improving  She has some worsening of her phosphorus level today  Will recheck a phosphorus level in the evening and again in the morning tomorrow  Will decrease IV fluid hydration rate and gave her dose of Lasix that she was feeling fluid overload and short of breath  Will continue hydration with normal saline at 75 cc/hour and also give 1 unit of PRBC    Will try to increase IV fluid hydration again if her shortness of breath improves

## 2018-12-08 NOTE — NURSING NOTE
Pt comfortable  Breathing improved after lasix  VSS  Denies pain   Continues with left sided abdominal pain, pt reports minimal   Call bell in reach Will continue to monitor

## 2018-12-08 NOTE — ASSESSMENT & PLAN NOTE
The patient appears to be a poor historian  She received her 1st round of chemotherapy recently and developed tumor lysis syndrome from it    Will continue outpatient follow-up with Heme-Onc for continual chemotherapy as she does have bulky tumor with metastasis

## 2018-12-08 NOTE — ASSESSMENT & PLAN NOTE
Blood pressure is well controlled at this time  Will hold losartan secondary to Pop I and also hold HCTZ secondary to the hyperuricemia and tumor lysis syndrome    Will place her on Norvasc if required for blood pressure control

## 2018-12-08 NOTE — NURSING NOTE
Pt sitting up in bed, blood transfusing  VSS  No complaints  SR on monitor  Call bell in reach Will continue to monitor

## 2018-12-09 LAB
ALBUMIN SERPL BCP-MCNC: 3 G/DL (ref 3–5.2)
ALP SERPL-CCNC: 109 U/L (ref 43–122)
ALT SERPL W P-5'-P-CCNC: 20 U/L (ref 9–52)
ANION GAP SERPL CALCULATED.3IONS-SCNC: 5 MMOL/L (ref 5–14)
ANISOCYTOSIS BLD QL SMEAR: PRESENT
AST SERPL W P-5'-P-CCNC: 25 U/L (ref 14–36)
BASOPHILS # BLD AUTO: 0 THOUSANDS/ΜL (ref 0–0.1)
BASOPHILS NFR BLD AUTO: 1 % (ref 0–1)
BILIRUB SERPL-MCNC: 0.4 MG/DL
BUN SERPL-MCNC: 29 MG/DL (ref 5–25)
CALCIUM SERPL-MCNC: 8.5 MG/DL (ref 8.4–10.2)
CHLORIDE SERPL-SCNC: 108 MMOL/L (ref 97–108)
CO2 SERPL-SCNC: 25 MMOL/L (ref 22–30)
CREAT SERPL-MCNC: 1.24 MG/DL (ref 0.6–1.2)
DACRYOCYTES BLD QL SMEAR: PRESENT
EOSINOPHIL # BLD AUTO: 0.1 THOUSAND/ΜL (ref 0–0.4)
EOSINOPHIL NFR BLD AUTO: 3 % (ref 0–6)
ERYTHROCYTE [DISTWIDTH] IN BLOOD BY AUTOMATED COUNT: 19.4 %
GFR SERPL CREATININE-BSD FRML MDRD: 49 ML/MIN/1.73SQ M
GLUCOSE SERPL-MCNC: 80 MG/DL (ref 70–99)
HCT VFR BLD AUTO: 27.4 % (ref 36–46)
HGB BLD-MCNC: 8.4 G/DL (ref 12–16)
LDH SERPL-CCNC: 1125 U/L (ref 313–618)
LYMPHOCYTES # BLD AUTO: 0.9 THOUSANDS/ΜL (ref 0.5–4)
LYMPHOCYTES NFR BLD AUTO: 23 % (ref 25–45)
MAGNESIUM SERPL-MCNC: 1.6 MG/DL (ref 1.6–2.3)
MCH RBC QN AUTO: 22.2 PG (ref 26–34)
MCHC RBC AUTO-ENTMCNC: 30.8 G/DL (ref 31–36)
MCV RBC AUTO: 72 FL (ref 80–100)
MICROCYTES BLD QL AUTO: PRESENT
MONOCYTES # BLD AUTO: 0.2 THOUSAND/ΜL (ref 0.2–0.9)
MONOCYTES NFR BLD AUTO: 5 % (ref 1–10)
NEUTROPHILS # BLD AUTO: 2.5 THOUSANDS/ΜL (ref 1.8–7.8)
NEUTS SEG NFR BLD AUTO: 68 % (ref 45–65)
OVALOCYTES BLD QL SMEAR: PRESENT
PHOSPHATE SERPL-MCNC: 5.3 MG/DL (ref 2.5–4.8)
PLATELET # BLD AUTO: 68 THOUSANDS/UL (ref 150–450)
PLATELET BLD QL SMEAR: ABNORMAL
PMV BLD AUTO: 8.4 FL (ref 8.9–12.7)
POIKILOCYTOSIS BLD QL SMEAR: PRESENT
POTASSIUM SERPL-SCNC: 4.7 MMOL/L (ref 3.6–5)
PROT SERPL-MCNC: 5.7 G/DL (ref 5.9–8.4)
RBC # BLD AUTO: 3.8 MILLION/UL (ref 4–5.2)
RBC MORPH BLD: PRESENT
SODIUM SERPL-SCNC: 138 MMOL/L (ref 137–147)
URATE SERPL-MCNC: 4.8 MG/DL (ref 2.7–7.5)
WBC # BLD AUTO: 3.7 THOUSAND/UL (ref 4.5–11)

## 2018-12-09 PROCEDURE — 83615 LACTATE (LD) (LDH) ENZYME: CPT | Performed by: FAMILY MEDICINE

## 2018-12-09 PROCEDURE — 84100 ASSAY OF PHOSPHORUS: CPT | Performed by: FAMILY MEDICINE

## 2018-12-09 PROCEDURE — 84550 ASSAY OF BLOOD/URIC ACID: CPT | Performed by: FAMILY MEDICINE

## 2018-12-09 PROCEDURE — 99232 SBSQ HOSP IP/OBS MODERATE 35: CPT | Performed by: FAMILY MEDICINE

## 2018-12-09 PROCEDURE — 85025 COMPLETE CBC W/AUTO DIFF WBC: CPT | Performed by: NURSE PRACTITIONER

## 2018-12-09 PROCEDURE — 83735 ASSAY OF MAGNESIUM: CPT | Performed by: NURSE PRACTITIONER

## 2018-12-09 PROCEDURE — 80053 COMPREHEN METABOLIC PANEL: CPT | Performed by: NURSE PRACTITIONER

## 2018-12-09 RX ORDER — FUROSEMIDE 10 MG/ML
20 INJECTION INTRAMUSCULAR; INTRAVENOUS ONCE
Status: COMPLETED | OUTPATIENT
Start: 2018-12-09 | End: 2018-12-09

## 2018-12-09 RX ORDER — MAGNESIUM GLUCONATE 27 MG(500)
500 TABLET ORAL DAILY
Status: DISCONTINUED | OUTPATIENT
Start: 2018-12-09 | End: 2018-12-09

## 2018-12-09 RX ADMIN — SENNOSIDES 8.6 MG: 8.6 TABLET, FILM COATED ORAL at 21:25

## 2018-12-09 RX ADMIN — SODIUM CHLORIDE 75 ML/HR: 9 INJECTION, SOLUTION INTRAVENOUS at 17:30

## 2018-12-09 RX ADMIN — AMLODIPINE BESYLATE 5 MG: 5 TABLET ORAL at 08:29

## 2018-12-09 RX ADMIN — PANTOPRAZOLE SODIUM 40 MG: 40 TABLET, DELAYED RELEASE ORAL at 05:52

## 2018-12-09 RX ADMIN — CITALOPRAM HYDROBROMIDE 10 MG: 20 TABLET ORAL at 08:29

## 2018-12-09 RX ADMIN — FUROSEMIDE 20 MG: 10 INJECTION, SOLUTION INTRAVENOUS at 12:37

## 2018-12-09 RX ADMIN — ALLOPURINOL 100 MG: 100 TABLET ORAL at 08:29

## 2018-12-09 RX ADMIN — MAGNESIUM OXIDE TAB 400 MG (241.3 MG ELEMENTAL MG) 400 MG: 400 (241.3 MG) TAB at 12:57

## 2018-12-09 RX ADMIN — POLYETHYLENE GLYCOL 3350 17 G: 17 POWDER, FOR SOLUTION ORAL at 08:28

## 2018-12-09 NOTE — ASSESSMENT & PLAN NOTE
Patient reports living home with her son  Has had frequent falls due to dizziness and weakness in legs  Denies ever hitting her head  Maintain fall precautions    Plan discharge home tomorrow

## 2018-12-09 NOTE — PROGRESS NOTES
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   Assessment & Plan    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  Patient had infusion today for cycle 1 day 2 chemotherapy (Rituxan plus bendamustine)  patient developed tumor lysis syndrome yesterday  Received a dose of rasburicase on admission  Along with aggressive IV hydration  Her renal function, uric acid level and LDH level are improving  Phosphorus is still slightly elevated  Continue IV fluid hydration at 75 mL/hour and will give a dose of Lasix again today  Planned discharge tomorrow following review of labs including renal function, uric acid, LDH and phosphorus level     Elevated uric acid in blood   Assessment & Plan    Continue allopurinol  Also received a dose of rasburicase  Frequent falls   Assessment & Plan    Patient reports living home with her son  Has had frequent falls due to dizziness and weakness in legs  Denies ever hitting her head  Maintain fall precautions  Plan discharge home tomorrow     Iron deficiency anemia secondary to inadequate dietary iron intake   Assessment & Plan    Hemoglobin improved from 7 5-8 4 after 1 unit PRBC infusion yesterday  Transaminitis   Assessment & Plan    Resolved with hydration     Slow transit constipation   Assessment & Plan    resolved     Essential hypertension   Assessment & Plan    Blood pressure is well controlled at this time  Will hold losartan secondary to Pop I and also hold HCTZ secondary to the hyperuricemia and tumor lysis syndrome    Will place her on Norvasc if required for blood pressure control     Recurrent major depressive disorder, in partial remission ([facility] [address] )   Assessment & Plan    Stable on celexa - continue to assess  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 [facility])   Assessment & Plan    The patient appears to be a poor historian  She received her 1st round of chemotherapy recently and developed tumor lysis syndrome from it  Will continue outpatient follow-up with Heme-Onc for continual chemotherapy as she does have bulky tumor with metastasis       VTE Pharmacologic Prophylaxis:   Pharmacologic: Pharmacologic VTE Prophylaxis contraindicated due to low hb  Mechanical VTE Prophylaxis in Place: Yes    Patient Centered Rounds: I have performed bedside rounds with nursing staff today  Discussions with Specialists or Other Care Team Provider:  None    Education and Discussions with Family / Patient:  Discussed with patient at bedside about hospital course    Time Spent for Care: 30 minutes  More than 50% of total time spent on counseling and coordination of care as described above  Current Length of Stay: 2 day(s)    Current Patient Status: Inpatient   Certification Statement: The patient will continue to require additional inpatient hospital stay due to tumor lysis syndrome    Discharge Plan:  Discharge home tomorrow    Code Status: Level 1 - Full Code      Subjective:   Patient denies any shortness of breath today  She feels better  Denies any chest pain  Denies any abdominal pain  Objective:     Vitals:   Temp (24hrs), Av 5 °F (36 4 °C), Min:97 °F (36 1 °C), Max:98 3 °F (36 8 °C)    Temp:  [97 °F (36 1 °C)-98 3 °F (36 8 °C)] 97 °F (36 1 °C)  HR:  [75-96] 83  Resp:  [16-20] 18  BP: (103-142)/(55-66) 142/66  SpO2:  [93 %-98 %] 98 %  Body mass index is 35 95 kg/m²  Input and Output Summary (last 24 hours):        Intake/Output Summary (Last 24 hours) at 18 1229  Last data filed at 18 0849   Gross per 24 hour   Intake             1270 ml   Output                0 ml   Net             1270 ml       Physical Exam:     Physical Exam   Constitutional: She is oriented to person, place, and time  She appears well-developed and well-nourished  HENT:   Head: Normocephalic and atraumatic  Eyes: Conjunctivae and EOM are normal    Neck: Normal range of motion  Neck supple  No JVD present  No thyromegaly present  Cardiovascular: Normal rate, regular rhythm, normal heart sounds and intact distal pulses  Pulmonary/Chest: Effort normal and breath sounds normal    Abdominal: Soft  Bowel sounds are normal  She exhibits distension  She exhibits no mass  There is no tenderness  There is no rebound and no guarding  Genitourinary:   Genitourinary Comments: deferred   Musculoskeletal: Normal range of motion  Neurological: She is alert and oriented to person, place, and time  Skin: Skin is warm and dry  No rash noted  Psychiatric: She has a normal mood and affect  Nursing note and vitals reviewed  Additional Data:     Labs:      Results from last 7 days  Lab Units 12/09/18  0451  12/04/18  1307   WBC Thousand/uL 3 70*  < > 11 80*   HEMOGLOBIN g/dL 8 4*  < > 9 6*   HEMATOCRIT % 27 4*  < > 32 2*   PLATELETS Thousands/uL 68*  < > 134*   BANDS PCT %  --   --  10*   NEUTROS PCT % 68*  < >  --    LYMPHS PCT % 23*  < >  --    LYMPHO PCT %  --   --  39   MONOS PCT % 5  < >  --    MONO PCT %  --   --  4   EOS PCT % 3  < > 2   < > = values in this interval not displayed      Results from last 7 days  Lab Units 12/09/18  0451   SODIUM mmol/L 138   POTASSIUM mmol/L 4 7   CHLORIDE mmol/L 108   CO2 mmol/L 25   BUN mg/dL 29*   CREATININE mg/dL 1 24*   ANION GAP mmol/L 5   CALCIUM mg/dL 8 5   ALBUMIN g/dL 3 0   TOTAL BILIRUBIN mg/dL 0 40   ALK PHOS U/L 109   ALT U/L 20   AST U/L 25   GLUCOSE RANDOM mg/dL 80       Results from last 7 days  Lab Units 12/04/18  1307   INR  1 03       Results from last 7 days  Lab Units 12/08/18  1108   POC GLUCOSE mg/dl 86           Results from last 7 days  Lab Units 12/07/18  1618   LACTIC ACID mmol/L 1 5           * I Have Reviewed All Lab Data Listed Above  * Additional Pertinent Lab Tests Reviewed: [address] Admission Reviewed    Imaging:    Imaging Reports Reviewed Today Include: none  Imaging Personally Reviewed by Myself Includes:  none    Recent Cultures (last 7 days):           Last 24 Hours Medication List:     Current Facility-Administered Medications:  acetaminophen 650 mg Oral Q6H PRN Analia Ciminieri, CRNP    allopurinol 100 mg Oral Daily Analia Ciminieri, CRNP    amLODIPine 5 mg Oral Daily Chepe Mederos MD    citalopram 10 mg Oral Daily Analia Ciminieri, CRNP    furosemide 20 mg Intravenous Once Chepe Mederos MD    ondansetron 8 mg Oral Q8H PRN Analia Ciminieri, CRNP    pantoprazole 40 mg Oral Early Morning Analia Ciminieri, CRNP    polyethylene glycol 17 g Oral Daily Analia Ciminieri, CRNP    senna 1 tablet Oral HS Analia Ciminieri, CRNP    sodium chloride 75 mL/hr Intravenous Continuous Chepe Mederos MD Last Rate: 75 mL/hr (12/08/18 0939)     Facility-Administered Medications Ordered in Other Encounters:  [START ON 12/10/2018] heparin flush (porcine) 300 Units Intracatheter PRN MD Екатерина Velarde ON 12/10/2018] sodium chloride 999 mL/hr Intravenous Once Cyndi Cardoso MD        Today, Patient Was Seen By: Chepe Mederos MD    ** Please Note: Dictation voice to text software may have been used in the creation of this document   **

## 2018-12-09 NOTE — NURSING NOTE
Pt with no complaints    IV fluids hanging,  SR on the monitor  Call bell with in reach, continue to monitor

## 2018-12-09 NOTE — NURSING NOTE
AOX3 HR regular Lungs decreased clear + bowel Sounds x4 + PP ABD softly distended  Denies any pain  Will continue to monitor call bell within reach

## 2018-12-09 NOTE — ASSESSMENT & PLAN NOTE
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

## 2018-12-10 ENCOUNTER — HOSPITAL ENCOUNTER (OUTPATIENT)
Dept: INFUSION CENTER | Facility: HOSPITAL | Age: 58
Discharge: HOME | End: 2018-12-10

## 2018-12-10 ENCOUNTER — APPOINTMENT (INPATIENT)
Dept: NON INVASIVE DIAGNOSTICS | Facility: HOSPITAL | Age: 58
DRG: 469 | End: 2018-12-10
Payer: COMMERCIAL

## 2018-12-10 VITALS
OXYGEN SATURATION: 91 % | TEMPERATURE: 97.3 F | DIASTOLIC BLOOD PRESSURE: 67 MMHG | HEART RATE: 92 BPM | SYSTOLIC BLOOD PRESSURE: 142 MMHG | RESPIRATION RATE: 20 BRPM | WEIGHT: 207.67 LBS | HEIGHT: 65 IN | BODY MASS INDEX: 34.6 KG/M2

## 2018-12-10 LAB
ANION GAP SERPL CALCULATED.3IONS-SCNC: 7 MMOL/L (ref 5–14)
ANISOCYTOSIS BLD QL SMEAR: PRESENT
ATRIAL RATE: 76 BPM
BASOPHILS # BLD AUTO: 0 THOUSANDS/ΜL (ref 0–0.1)
BASOPHILS NFR BLD AUTO: 1 % (ref 0–1)
BUN SERPL-MCNC: 22 MG/DL (ref 5–25)
CALCIUM SERPL-MCNC: 8.6 MG/DL (ref 8.4–10.2)
CHLORIDE SERPL-SCNC: 106 MMOL/L (ref 97–108)
CO2 SERPL-SCNC: 25 MMOL/L (ref 22–30)
CREAT SERPL-MCNC: 1.01 MG/DL (ref 0.6–1.2)
EOSINOPHIL # BLD AUTO: 0.1 THOUSAND/ΜL (ref 0–0.4)
EOSINOPHIL NFR BLD AUTO: 3 % (ref 0–6)
ERYTHROCYTE [DISTWIDTH] IN BLOOD BY AUTOMATED COUNT: 19.4 %
GFR SERPL CREATININE-BSD FRML MDRD: 62 ML/MIN/1.73SQ M
GLUCOSE SERPL-MCNC: 93 MG/DL (ref 70–99)
HCT VFR BLD AUTO: 27.5 % (ref 36–46)
HGB BLD-MCNC: 8.4 G/DL (ref 12–16)
LDH SERPL-CCNC: 907 U/L (ref 313–618)
LYMPHOCYTES # BLD AUTO: 0.6 THOUSANDS/ΜL (ref 0.5–4)
LYMPHOCYTES NFR BLD AUTO: 16 % (ref 25–45)
MAGNESIUM SERPL-MCNC: 1.6 MG/DL (ref 1.6–2.3)
MCH RBC QN AUTO: 22 PG (ref 26–34)
MCHC RBC AUTO-ENTMCNC: 30.6 G/DL (ref 31–36)
MCV RBC AUTO: 72 FL (ref 80–100)
MONOCYTES # BLD AUTO: 0.2 THOUSAND/ΜL (ref 0.2–0.9)
MONOCYTES NFR BLD AUTO: 6 % (ref 1–10)
NEUTROPHILS # BLD AUTO: 2.6 THOUSANDS/ΜL (ref 1.8–7.8)
NEUTS SEG NFR BLD AUTO: 74 % (ref 45–65)
P AXIS: 31 DEGREES
PHOSPHATE SERPL-MCNC: 4.5 MG/DL (ref 2.5–4.8)
PLATELET # BLD AUTO: 71 THOUSANDS/UL (ref 150–450)
PLATELET BLD QL SMEAR: ABNORMAL
PMV BLD AUTO: 8.3 FL (ref 8.9–12.7)
POIKILOCYTOSIS BLD QL SMEAR: PRESENT
POTASSIUM SERPL-SCNC: 4.5 MMOL/L (ref 3.6–5)
PR INTERVAL: 166 MS
QRS AXIS: 12 DEGREES
QRSD INTERVAL: 88 MS
QT INTERVAL: 400 MS
QTC INTERVAL: 450 MS
RBC # BLD AUTO: 3.83 MILLION/UL (ref 4–5.2)
RBC MORPH BLD: PRESENT
SODIUM SERPL-SCNC: 138 MMOL/L (ref 137–147)
T WAVE AXIS: 16 DEGREES
VENTRICULAR RATE: 76 BPM
WBC # BLD AUTO: 3.6 THOUSAND/UL (ref 4.5–11)

## 2018-12-10 PROCEDURE — 93306 TTE W/DOPPLER COMPLETE: CPT

## 2018-12-10 PROCEDURE — 83615 LACTATE (LD) (LDH) ENZYME: CPT | Performed by: FAMILY MEDICINE

## 2018-12-10 PROCEDURE — 93970 EXTREMITY STUDY: CPT

## 2018-12-10 PROCEDURE — 93010 ELECTROCARDIOGRAM REPORT: CPT | Performed by: INTERNAL MEDICINE

## 2018-12-10 PROCEDURE — 99239 HOSP IP/OBS DSCHRG MGMT >30: CPT | Performed by: INTERNAL MEDICINE

## 2018-12-10 PROCEDURE — 84100 ASSAY OF PHOSPHORUS: CPT | Performed by: FAMILY MEDICINE

## 2018-12-10 PROCEDURE — 93970 EXTREMITY STUDY: CPT | Performed by: SURGERY

## 2018-12-10 PROCEDURE — 93306 TTE W/DOPPLER COMPLETE: CPT | Performed by: INTERNAL MEDICINE

## 2018-12-10 PROCEDURE — 85025 COMPLETE CBC W/AUTO DIFF WBC: CPT | Performed by: NURSE PRACTITIONER

## 2018-12-10 PROCEDURE — 80048 BASIC METABOLIC PNL TOTAL CA: CPT | Performed by: FAMILY MEDICINE

## 2018-12-10 PROCEDURE — 83735 ASSAY OF MAGNESIUM: CPT | Performed by: NURSE PRACTITIONER

## 2018-12-10 RX ORDER — SODIUM CHLORIDE 9 MG/ML
999 INJECTION, SOLUTION INTRAVENOUS ONCE
Status: COMPLETED | OUTPATIENT
Start: 2018-12-11 | End: 2018-12-11

## 2018-12-10 RX ORDER — HEPARIN SODIUM (PORCINE) LOCK FLUSH IV SOLN 100 UNIT/ML 100 UNIT/ML
300 SOLUTION INTRAVENOUS AS NEEDED
Status: DISCONTINUED | OUTPATIENT
Start: 2018-12-10 | End: 2018-12-10 | Stop reason: HOSPADM

## 2018-12-10 RX ORDER — AMLODIPINE BESYLATE 5 MG/1
5 TABLET ORAL DAILY
Qty: 30 TABLET | Refills: 0 | Status: SHIPPED | OUTPATIENT
Start: 2018-12-11 | End: 2021-12-07

## 2018-12-10 RX ADMIN — Medication 300 UNITS: at 12:49

## 2018-12-10 RX ADMIN — SODIUM CHLORIDE 75 ML/HR: 9 INJECTION, SOLUTION INTRAVENOUS at 05:26

## 2018-12-10 RX ADMIN — MAGNESIUM OXIDE TAB 400 MG (241.3 MG ELEMENTAL MG) 400 MG: 400 (241.3 MG) TAB at 08:46

## 2018-12-10 RX ADMIN — AMLODIPINE BESYLATE 5 MG: 5 TABLET ORAL at 08:46

## 2018-12-10 RX ADMIN — ALLOPURINOL 100 MG: 100 TABLET ORAL at 08:46

## 2018-12-10 RX ADMIN — PANTOPRAZOLE SODIUM 40 MG: 40 TABLET, DELAYED RELEASE ORAL at 05:21

## 2018-12-10 RX ADMIN — CITALOPRAM HYDROBROMIDE 10 MG: 20 TABLET ORAL at 08:46

## 2018-12-10 NOTE — NURSING NOTE
Patient sitting up in bed  Patient denies pain at this time and is absent from signs of distress  Patient transferred off unit for VDE and Echo  NSS running @ 75 mL/hr

## 2018-12-10 NOTE — PLAN OF CARE
INFECTION - ADULT     Absence or prevention of progression during hospitalization Adequate for Discharge     Absence of fever/infection during neutropenic period Adequate for Discharge        PAIN - ADULT     Verbalizes/displays adequate comfort level or baseline comfort level Adequate for Discharge        Potential for Falls     Patient will remain free of falls Adequate for Discharge        SAFETY ADULT     Maintain or return to baseline ADL function Adequate for Discharge     Maintain or return mobility status to optimal level Adequate for Discharge     Patient will remain free of falls Adequate for Discharge

## 2018-12-10 NOTE — DISCHARGE SUMMARY
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 (HCC)    Recurrent major depressive disorder, in partial remission ([facility] [address] )    Essential hypertension    Slow transit constipation    Transaminitis    Iron deficiency anemia secondary to inadequate dietary iron intake    Frequent falls    Elevated uric acid in blood  Resolved Problems:    * No resolved hospital problems  *      Consultations During Hospital Stay:  · Oncology    Procedures Performed:     Xr Chest 2 Views    Result Date: 12/4/2018  Narrative: CHEST INDICATION:   Epigastric pain  COMPARISON:  Chest radiograph 11/9/2018 EXAM PERFORMED/VIEWS:  XR CHEST PA & LATERAL FINDINGS:  Left chest wall port with catheter tip in the region the SVC  Heart shadow is enlarged but unchanged from prior exam  The lungs are clear  No pneumothorax or pleural effusion  Osseous structures appear within normal limits for patient age  Impression: No acute cardiopulmonary disease  Workstation performed: [id number]     Vas Lower Limb Venous Duplex Study, Complete Bilateral    Result Date: 12/10/2018  Narrative:  [facility] REPORT CLINICAL: Indications: Patient presents with bilateral lower extremity edema  Patient currently has lymphoma and has enlarged lymph nodes in bilateral groins  Risk Factors The patient has history of HTN    FINDINGS:  Segment  Right            Left              Impression       Impression       CFV      Normal (Patent) Normal (Patent)     CONCLUSION:  Impression: RIGHT LOWER LIMB: No evidence of acute or chronic deep vein thrombosis  No evidence of superficial thrombophlebitis noted  Doppler evaluation shows a normal response to augmentation maneuvers  Popliteal, posterior tibial and anterior tibial arterial Doppler waveforms are triphasic  LEFT LOWER LIMB: No evidence of acute or chronic deep vein thrombosis  No evidence of superficial thrombophlebitis noted  Doppler evaluation shows a normal response to augmentation maneuvers  Popliteal, posterior tibial and anterior tibial arterial Doppler waveforms are triphasic  Tech Note: There is an echogenic structure located in the bilateral inguinal region consistent with enlarged lymph node and channels  SIGNATURE: Electronically Signed by: Missael Santacruz on 2018-12-10 11:31:32 AM    Ct Abdomen Pelvis With Contrast    Result Date: 12/4/2018  Narrative: CT ABDOMEN AND PELVIS WITH IV CONTRAST INDICATION:   Severe abdominal pain in the setting of cancer  History of lymphoma  Patient complaint of increasing abdomen size today  COMPARISON:  November 7, 2018 TECHNIQUE:  CT examination of the abdomen and pelvis was performed  Axial, sagittal, and coronal 2D reformatted images were created from the source data and submitted for interpretation  Radiation dose length product (DLP) for this visit:  918 mGy-cm   This examination, like all CT scans performed in the [facility], was performed utilizing techniques to minimize radiation dose exposure, including the use of iterative reconstruction and automated exposure control  IV Contrast:  100 mL of iohexol (OMNIPAQUE) Enteric Contrast:  Enteric contrast was not administered  FINDINGS: ABDOMEN LOWER CHEST:  Lung bases are clear  No pleural fluid collections evident  Mild cardiomegaly  Stable adenopathy seen in the cardiophrenic angle bilaterally and posterior mediastinum  LIVER/BILIARY TREE:  Unremarkable  GALLBLADDER:  No calcified gallstones  No pericholecystic inflammatory change  SPLEEN:  Severe daily, 21 cm in length, overall increasing compared to prior, although not significantly different in length, is increasing in overall bulbous contour and bulk  [place] PANCREAS:  Unremarkable  ADRENAL GLANDS:  Unremarkable  KIDNEYS/URETERS:  Unremarkable  No hydronephrosis  STOMACH AND BOWEL:  Unremarkable  APPENDIX:  A normal appendix was visualized  ABDOMINOPELVIC CAVITY:  No ascites or free intraperitoneal air  There is extensive bulky adenopathy seen throughout the retroperitoneum, jun hepatis, gastrohepatic, mesenteric and retrocrural spaces  This is extensive bulky adenopathy does not show any significant change compared to the recent prior  There is also bilateral internal and external iliac adenopathy, the iliac chain adenopathy bilaterally is mildly larger compared to the prior  Specific examples right sided lymph node measuring 3 8 cm in short axis dimension compared to 3 6 cm  Similarly, the bilateral inguinal adenopathy also shows mild interval increase in overall dimensions and bulk  Left inguinal node 3 0 cm (prior 2 5 cm)  Right inguinal node, 2 3 cm (prior 1 8 cm  Mild ascites present, increased compared to prior  No loculated collections  VESSELS:  Unremarkable for patient's age  PELVIS REPRODUCTIVE ORGANS:  Unremarkable for patient's age  URINARY BLADDER:  Unremarkable  ABDOMINAL WALL/INGUINAL REGIONS:  Unremarkable  OSSEOUS STRUCTURES:  No acute fracture or destructive osseous lesion  Impression: Extensive adenopathy in the chest, abdomen, retroperitoneum and groin as detailed above  Although much of the adenopathy is not measurably different within the abdomen and retroperitoneum the overall bulk does appear mildly increased  There is some measurable degree of interval increase regarding the iliac and inguinal adenopathy as detailed above  Severe splenomegaly, increasing compared to prior  Minimal ascites  Workstation performed: [id number]         Significant Findings:     · 51-year-old female with known grade 2 follicular lymphoma of lymph nodes and multiple regions underwent cycle 1 of day 2 chemotherapy with Rituxanan bendamustine/with entrance in to tumor lysis syndrome with worsening acute kidney injury elevated uric acid levels 11 7 elevated LDH of 1/20/2041 with a phosphorus of 5 7 and hemoglobin 7 5  Creatinine on entrance 1 53 rising  within 12 hours after admission  Radiologic imaging as above with extensive adenopathy in the chest abdomen retroperitoneum and groin severe splenomegaly  · Patient was admitted and placed on vigorous IV fluids and IV diuresis with furosemide and a dose of Rasburicase  Given at high risk dosage of 0 2 milligrams/kilos  · Consultation was placed with Oncology and Dr Gilbert Mccall  · If she responded with IV diuresis and IV fluids with trending reductions in LDH phosphorus and uric acid and at time of discharge uric acid of 4 5 she will continue her dosage of previous allopurinol  · We have chosen to withhold further usage of losartan and hydrochlorothiazide as may aggravate uric acid levels further and acute kidney injury in favor of placing her on amlodipine 5 mg p  O  Daily she was given prescription for that at time of discharge  Incidental Findings:   · Bilateral a venous Doppler DVTs were within normal limits  · A 2D echocardiogram is likewise within normal limits other than grade 1 diastolic dysfunction with normal left atrial pressures and trace valvulopathy to the mitral, tricuspid, pulmonic valve  No AS or regurg was noted  · Discharge creatinine 1 01, discharge phosphorus 4 5    Test Results Pending at Discharge (will require follow up):    · None     Outpatient Tests Requested:  · None    Complications:      Hospital Course:     Maggy Heath is a 66 y o  female patient who originally presented to the hospital on 12/7/2018 due to weakness abdominal distension  Please see above significant findings for hospital course and treatment plan    Condition at Discharge: good     Discharge Day Visit / Exam:     * Please refer to separate progress for these details *    Discharge instructions/Information to patient and family:   See after visit summary for information provided to patient and family  Provisions for Follow-Up Care:  See after visit summary for information related to follow-up care and any pertinent home health orders  Disposition:     Home    For Discharges to [facility]:   · Not Applicable to this Patient - Not Applicable to this Patient      Discharge Statement:  I spent 45 minutes discharging the patient  This time was spent on the day of discharge  I had direct contact with the patient on the day of discharge  Greater than 50% of the total time was spent examining patient, answering all patient questions, arranging and discussing plan of care with patient as well as directly providing post-discharge instructions  Additional time then spent on discharge activities  Discharge Medications:  See after visit summary for reconciled discharge medications provided to patient and family  ** Please Note: Dragon 360 Dictation voice to text software may have been used in the creation of this document   **

## 2018-12-10 NOTE — NURSING NOTE
Pt very quiet, no complaints of any pain  Independent to the bathroom  Call bell with in reach, continue to monitor

## 2018-12-10 NOTE — NURSING NOTE
[place] version of AVS printed and given to patient's son and daughter whom are at the bedside  Family has no questions at this time  Port flushed with 300 mL heparin and de-accessed  Patient tolerated well  Belongings present and accounted for at the bedside  Patient left unit with family and RN

## 2018-12-11 ENCOUNTER — HOSPITAL ENCOUNTER (OUTPATIENT)
Dept: INFUSION CENTER | Facility: HOSPITAL | Age: 58
Discharge: HOME | End: 2018-12-11
Payer: COMMERCIAL

## 2018-12-11 VITALS
RESPIRATION RATE: 18 BRPM | HEART RATE: 84 BPM | TEMPERATURE: 98.6 F | DIASTOLIC BLOOD PRESSURE: 67 MMHG | SYSTOLIC BLOOD PRESSURE: 143 MMHG

## 2018-12-11 LAB
ANION GAP SERPL CALCULATED.3IONS-SCNC: 5 MMOL/L (ref 5–14)
BUN SERPL-MCNC: 15 MG/DL (ref 5–25)
CALCIUM SERPL-MCNC: 8.6 MG/DL (ref 8.4–10.2)
CHLORIDE SERPL-SCNC: 104 MMOL/L (ref 97–108)
CO2 SERPL-SCNC: 27 MMOL/L (ref 22–30)
CREAT SERPL-MCNC: 0.82 MG/DL (ref 0.6–1.2)
GFR SERPL CREATININE-BSD FRML MDRD: 80 ML/MIN/1.73SQ M
GLUCOSE SERPL-MCNC: 108 MG/DL (ref 70–99)
LDH SERPL-CCNC: 866 U/L (ref 313–618)
PHOSPHATE SERPL-MCNC: 3.7 MG/DL (ref 2.5–4.8)
POTASSIUM SERPL-SCNC: 4.3 MMOL/L (ref 3.6–5)
SODIUM SERPL-SCNC: 136 MMOL/L (ref 137–147)
URATE SERPL-MCNC: 6.3 MG/DL (ref 2.7–7.5)

## 2018-12-11 PROCEDURE — 83615 LACTATE (LD) (LDH) ENZYME: CPT | Performed by: INTERNAL MEDICINE

## 2018-12-11 PROCEDURE — 84550 ASSAY OF BLOOD/URIC ACID: CPT | Performed by: INTERNAL MEDICINE

## 2018-12-11 PROCEDURE — 96360 HYDRATION IV INFUSION INIT: CPT

## 2018-12-11 PROCEDURE — 84100 ASSAY OF PHOSPHORUS: CPT | Performed by: INTERNAL MEDICINE

## 2018-12-11 PROCEDURE — 80048 BASIC METABOLIC PNL TOTAL CA: CPT | Performed by: INTERNAL MEDICINE

## 2018-12-11 RX ORDER — HEPARIN SODIUM (PORCINE) LOCK FLUSH IV SOLN 100 UNIT/ML 100 UNIT/ML
300 SOLUTION INTRAVENOUS AS NEEDED
Status: ACTIVE | OUTPATIENT
Start: 2018-12-11 | End: 2018-12-12

## 2018-12-11 RX ORDER — SODIUM CHLORIDE 9 MG/ML
999 INJECTION, SOLUTION INTRAVENOUS ONCE
Status: DISCONTINUED | OUTPATIENT
Start: 2018-12-12 | End: 2018-12-15 | Stop reason: HOSPADM

## 2018-12-11 RX ORDER — HEPARIN SODIUM (PORCINE) LOCK FLUSH IV SOLN 100 UNIT/ML 100 UNIT/ML
300 SOLUTION INTRAVENOUS AS NEEDED
Status: ACTIVE | OUTPATIENT
Start: 2018-12-12 | End: 2018-12-13

## 2018-12-11 RX ADMIN — SODIUM CHLORIDE 999 ML/HR: 9 INJECTION, SOLUTION INTRAVENOUS at 12:03

## 2018-12-11 RX ADMIN — Medication 300 UNITS: at 13:14

## 2018-12-11 NOTE — UTILIZATION REVIEW
Notification of Discharge  This is a Notification of Discharge from our facility [address]  Please be advised that this patient has been discharge from our facility  Below you will find the admission and discharge date and time including the patients disposition  PRESENTATION DATE: 12/7/2018  4:10 PM  IP ADMISSION DATE: 12/7/18 1610  DISCHARGE DATE: 12/10/2018  1:02 PM  DISPOSITION: [facility] Utilization Review Department  Phone: [phone]; Fax [phone]  ATTENTION: Please call with any questions or concerns to [phone]  and carefully listen to the prompts so that you are directed to the right person  Send all requests for admission clinical reviews, approved or denied determinations and any other requests to fax [phone]   All voicemails are confidential

## 2018-12-11 NOTE — PROGRESS NOTES
Todays labs reviewed  Pt received 1 liter of ivf over 1 hour  Tolerated well  Port flushed and deaccessed  Awaiting family

## 2018-12-12 ENCOUNTER — HOSPITAL ENCOUNTER (OUTPATIENT)
Dept: INFUSION CENTER | Facility: HOSPITAL | Age: 58
Discharge: HOME | End: 2018-12-12
Payer: COMMERCIAL

## 2018-12-12 VITALS
HEART RATE: 87 BPM | DIASTOLIC BLOOD PRESSURE: 58 MMHG | TEMPERATURE: 98.7 F | RESPIRATION RATE: 20 BRPM | SYSTOLIC BLOOD PRESSURE: 125 MMHG

## 2018-12-12 LAB
ANION GAP SERPL CALCULATED.3IONS-SCNC: 7 MMOL/L (ref 5–14)
BUN SERPL-MCNC: 15 MG/DL (ref 5–25)
CALCIUM SERPL-MCNC: 8.8 MG/DL (ref 8.4–10.2)
CHLORIDE SERPL-SCNC: 105 MMOL/L (ref 97–108)
CO2 SERPL-SCNC: 27 MMOL/L (ref 22–30)
CREAT SERPL-MCNC: 0.78 MG/DL (ref 0.6–1.2)
GFR SERPL CREATININE-BSD FRML MDRD: 85 ML/MIN/1.73SQ M
GLUCOSE SERPL-MCNC: 115 MG/DL (ref 70–99)
LDH SERPL-CCNC: 821 U/L (ref 313–618)
PHOSPHATE SERPL-MCNC: 3.8 MG/DL (ref 2.5–4.8)
POTASSIUM SERPL-SCNC: 4.1 MMOL/L (ref 3.6–5)
SODIUM SERPL-SCNC: 139 MMOL/L (ref 137–147)
URATE SERPL-MCNC: 6.8 MG/DL (ref 2.7–7.5)

## 2018-12-12 PROCEDURE — 84550 ASSAY OF BLOOD/URIC ACID: CPT | Performed by: NURSE PRACTITIONER

## 2018-12-12 PROCEDURE — 83615 LACTATE (LD) (LDH) ENZYME: CPT | Performed by: NURSE PRACTITIONER

## 2018-12-12 PROCEDURE — 96360 HYDRATION IV INFUSION INIT: CPT

## 2018-12-12 PROCEDURE — 84100 ASSAY OF PHOSPHORUS: CPT | Performed by: NURSE PRACTITIONER

## 2018-12-12 PROCEDURE — 80048 BASIC METABOLIC PNL TOTAL CA: CPT | Performed by: NURSE PRACTITIONER

## 2018-12-12 RX ORDER — SODIUM CHLORIDE 9 MG/ML
999 INJECTION, SOLUTION INTRAVENOUS ONCE
Status: COMPLETED | OUTPATIENT
Start: 2018-12-12 | End: 2018-12-12

## 2018-12-12 RX ORDER — HEPARIN SODIUM (PORCINE) LOCK FLUSH IV SOLN 100 UNIT/ML 100 UNIT/ML
300 SOLUTION INTRAVENOUS AS NEEDED
Status: DISPENSED | OUTPATIENT
Start: 2018-12-12 | End: 2018-12-13

## 2018-12-12 RX ADMIN — Medication 300 UNITS: at 12:07

## 2018-12-12 RX ADMIN — SODIUM CHLORIDE 999 ML/HR: 9 INJECTION, SOLUTION INTRAVENOUS at 10:50

## 2018-12-12 NOTE — PROGRESS NOTES
Pt here for hydration and labs  Central labs taken via port, port flushed per protocol  Patient tolerated hydration without complications   services used and patient aware of labs normal today and will f/u with Dr Maryann Waddell next week and AVS provided

## 2018-12-12 NOTE — PLAN OF CARE
Problem: Potential for Falls  Goal: Patient will remain free of falls  INTERVENTIONS:  - Assess patient frequently for physical needs  -  Identify cognitive and physical deficits and behaviors that affect risk of falls    -  [place] fall precautions as indicated by assessment   - Educate patient/family on patient safety including physical limitations  - Instruct patient to call for assistance with activity based on assessment  - Modify environment to reduce risk of injury  - Consider OT/PT consult to assist with strengthening/mobility   Outcome: Progressing

## 2018-12-20 ENCOUNTER — OFFICE VISIT (OUTPATIENT)
Dept: HEMATOLOGY ONCOLOGY | Facility: CLINIC | Age: 58
End: 2018-12-20
Payer: COMMERCIAL

## 2018-12-20 VITALS
DIASTOLIC BLOOD PRESSURE: 80 MMHG | RESPIRATION RATE: 18 BRPM | SYSTOLIC BLOOD PRESSURE: 140 MMHG | OXYGEN SATURATION: 98 % | HEIGHT: 65 IN | WEIGHT: 183 LBS | TEMPERATURE: 98.6 F | HEART RATE: 78 BPM | BODY MASS INDEX: 30.49 KG/M2

## 2018-12-20 DIAGNOSIS — C82.18 GRADE 2 FOLLICULAR LYMPHOMA OF LYMPH NODES OF MULTIPLE REGIONS (HCC): Primary | ICD-10-CM

## 2018-12-20 DIAGNOSIS — R10.84 GENERALIZED ABDOMINAL PAIN: ICD-10-CM

## 2018-12-20 PROCEDURE — 99214 OFFICE O/P EST MOD 30 MIN: CPT | Performed by: INTERNAL MEDICINE

## 2018-12-20 RX ORDER — OXYCODONE HYDROCHLORIDE 5 MG/1
5 TABLET ORAL EVERY 4 HOURS PRN
Qty: 30 TABLET | Refills: 0 | Status: SHIPPED | OUTPATIENT
Start: 2018-12-20 | End: 2019-01-09 | Stop reason: SDUPTHER

## 2018-12-20 NOTE — PROGRESS NOTES
Hematology/Oncology Outpatient Follow-up  Edilberto Ashley 66 y o  female 1960 [id number]    Date:  12/20/2018        Assessment and Plan:  1  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 [facility]  The patient is responding to the current line of treatment for her stage IVB follicular lymphoma  We will pursue cycle 2 of rituximab and bendamustine on the 3rd of January with the support of Neulasta as planned  We will monitor her closely for tumor lysis in consider hospitalization with any hint of renal dysfunction or TLS  - CBC and differential; Future  - Comprehensive metabolic panel; Future  - LD,Blood; Future  - Uric acid; Future  - CBC and differential  - Comprehensive metabolic panel  - LD,Blood  - Uric acid  - CBC and differential; Future  - Comprehensive metabolic panel; Future  - LD,Blood; Future  - Uric acid; Future  - CBC and differential  - Comprehensive metabolic panel  - LD,Blood  - Uric acid    2  Generalized abdominal pain  Will refill her oxycodone for pain management which hopefully will get better as we go forward with the chemotherapy  HPI:  The patient came today for a follow-up visit  She was treated with cycle 1 rituximab and bendamustine on the 6th of December with the supportive Neulasta  She was then admitted to the hospital for close monitoring since she had a high risk of tumor lysis  She was given then 2 doses of rasburicase which lowered her uric acid level and improved her kidney function  Subsequently the patient had a PET-CT scan on the 12 of December at [facility] which showed as expected extensive hypermetabolic activities above and below the diaphragm with splenomegaly and bone marrow involvement compatible with stage IVB follicular lymphoma WHO grade 2  Interval history:  The patient after she got discharged from the hospital for had significant improvement of her abdominal distention and pain    She has not been using pain medication for about 2 weeks  She did lose about 13-17 lb since last visit  All in all, she feels much better in comparison to how she felt prior initiating chemotherapy  ROS: Review of Systems   Constitutional: Positive for appetite change and unexpected weight change (17 lb since last visit)  Negative for activity change, chills, diaphoresis, fatigue and fever  HENT: Positive for trouble swallowing  Negative for congestion, dental problem, ear discharge, ear pain, facial swelling, hearing loss, mouth sores, nosebleeds, postnasal drip, sore throat and tinnitus  Eyes: Negative for discharge, redness, itching and visual disturbance  Respiratory: Positive for cough and shortness of breath  Negative for chest tightness and wheezing  Cardiovascular: Negative for chest pain, palpitations and leg swelling  Gastrointestinal: Positive for abdominal pain and constipation  Negative for abdominal distention, anal bleeding, blood in stool, diarrhea, nausea and vomiting  Genitourinary: Negative for difficulty urinating, dysuria, flank pain, frequency, hematuria and urgency  Musculoskeletal: Negative for arthralgias, back pain, gait problem, joint swelling, myalgias and neck pain  Skin: Negative for color change, pallor, rash and wound  Neurological: Positive for dizziness and headaches  Negative for syncope, speech difficulty, weakness, light-headedness and numbness  Hematological: Negative for adenopathy  Does not bruise/bleed easily  Psychiatric/Behavioral: Positive for sleep disturbance  Negative for agitation, behavioral problems and confusion         Past Medical History:   Diagnosis Date    Arthritis     Asthma     Cancer ([facility] [address] )     Migraine     Osteoporosis     Psychiatric disorder        Past Surgical History:   Procedure Laterality Date    CHOLECYSTECTOMY      FL GUIDED CENTRAL VENOUS ACCESS REPLACEMENT  11/9/2018    HYSTERECTOMY      LYMPH NODE BIOPSY Left 11/9/2018    Procedure: EXCISION BIOPSY LYMPH NODE SUPRACLAVICULAR;  Surgeon: Andrzej Guerrier MD;  Location: [facility] MAIN OR;  Service: General    TUNNELED VENOUS PORT PLACEMENT N/A 11/9/2018    Procedure: INSERTION OF PORT-A-CATH;  Surgeon: Andrzej Guerrier MD;  Location: [facility] MAIN OR;  Service: General       Social History     Social History    Marital status: Single     Spouse name: N/A    Number of children: N/A    Years of education: N/A     Social History Main Topics    Smoking status: Former Smoker    Smokeless tobacco: Never Used    Alcohol use No    Drug use: No    Sexual activity: Not Asked     Other Topics Concern    None     Social History Narrative    None       Family History   Problem Relation Age of Onset    Cancer Mother     Cancer Father        Allergies   Allergen Reactions    Penicillins          Current Outpatient Prescriptions:     allopurinol (ZYLOPRIM) 100 mg tablet, Take 1 tablet (100 mg total) by mouth daily For 30 days than stop, Disp: 30 tablet, Rfl: 0    amLODIPine (NORVASC) 5 mg tablet, Take 1 tablet (5 mg total) by mouth daily, Disp: 30 tablet, Rfl: 0    citalopram (CeleXA) 20 mg tablet, Take 10 mg by mouth daily, Disp: , Rfl:     omeprazole (PriLOSEC) 20 mg delayed release capsule, Take 1 capsule (20 mg total) by mouth daily To prevent stomach upset, Disp: 30 capsule, Rfl: 5    ondansetron (ZOFRAN) 8 mg tablet, Take 1 tablet (8 mg total) by mouth every 8 (eight) hours as needed for nausea or vomiting, Disp: 20 tablet, Rfl: 5    oxyCODONE (ROXICODONE) 5 mg immediate release tablet, Take 1 tablet (5 mg total) by mouth every 4 (four) hours as needed for moderate pain for up to 30 doses Earliest Fill Date: 12/3/18 Max Daily Amount: 30 mg, Disp: 30 tablet, Rfl: 0    polyethylene glycol (MIRALAX) 17 g packet, Take 17 g by mouth daily, Disp: 14 each, Rfl: 0    senna (SENOKOT) 8 6 mg, Take 1 tablet (8 6 mg total) by mouth daily at bedtime, Disp: 120 each, Rfl: 0      Physical Exam:  /80 (BP Location: Left arm, Patient Position: Sitting, Cuff Size: Large)   Pulse 78   Temp 98 6 °F (37 °C) (Tympanic)   Resp 18   Ht 5' 5" (1 651 m)   Wt 83 kg (183 lb)   SpO2 98%   BMI 30 45 kg/m²     Physical Exam   Constitutional: She is oriented to person, place, and time  She appears well-developed and well-nourished  No distress  HENT:   Head: Normocephalic and atraumatic  Nose: Nose normal    Mouth/Throat: Oropharynx is clear and moist    Eyes: Pupils are equal, round, and reactive to light  Conjunctivae and EOM are normal  Right eye exhibits no discharge  Left eye exhibits no discharge  No scleral icterus  Neck: Normal range of motion  Neck supple  No JVD present  No tracheal deviation present  No thyromegaly present  Cardiovascular: Normal rate, regular rhythm and normal heart sounds  Exam reveals no friction rub  No murmur heard  Pulmonary/Chest: Effort normal and breath sounds normal  No stridor  No respiratory distress  She has no wheezes  She has no rales  She exhibits no tenderness  Abdominal: Soft  Bowel sounds are normal  She exhibits distension (Less distended)  She exhibits no mass  There is no hepatosplenomegaly, splenomegaly or hepatomegaly  There is tenderness ( on mild palpation all over her abdomen)  There is no rebound and no guarding  Musculoskeletal: Normal range of motion  She exhibits no edema, tenderness or deformity  Lymphadenopathy:     She has no cervical adenopathy  Neurological: She is alert and oriented to person, place, and time  She has normal reflexes  No cranial nerve deficit  Coordination normal    Skin: Skin is warm and dry  No rash noted  She is not diaphoretic  No erythema  No pallor  Psychiatric: She has a normal mood and affect   Her behavior is normal  Judgment and thought content normal          Labs:  Lab Results   Component Value Date    WBC 3 60 (L) 12/10/2018    HGB 8 4 (L) 12/10/2018    HCT 27 5 (L) 12/10/2018    MCV 72 (L) 12/10/2018    PLT 71 (L) 12/10/2018 Lab Results   Component Value Date    K 4 1 12/12/2018     12/12/2018    CO2 27 12/12/2018    BUN 15 12/12/2018    CREATININE 0 78 12/12/2018    CALCIUM 8 8 12/12/2018    AST 25 12/09/2018    ALT 20 12/09/2018    ALKPHOS 109 12/09/2018    EGFR 85 12/12/2018     No results found for: TSH    Patient voiced understanding and agreement in the above discussion  Aware to contact our office with questions/symptoms in the interim

## 2018-12-21 ENCOUNTER — APPOINTMENT (OUTPATIENT)
Dept: LAB | Facility: HOSPITAL | Age: 58
End: 2018-12-21
Attending: INTERNAL MEDICINE
Payer: COMMERCIAL

## 2018-12-21 ENCOUNTER — TRANSCRIBE ORDERS (OUTPATIENT)
Dept: ADMINISTRATIVE | Facility: HOSPITAL | Age: 58
End: 2018-12-21

## 2018-12-21 DIAGNOSIS — C82.18 FOLLICULAR LYMPHOMA GRADE II OF LYMPH NODES OF MULTIPLE SITES (HCC): Primary | ICD-10-CM

## 2018-12-21 DIAGNOSIS — C82.12: Primary | ICD-10-CM

## 2018-12-21 LAB
ALBUMIN SERPL BCP-MCNC: 3.8 G/DL (ref 3–5.2)
ALP SERPL-CCNC: 90 U/L (ref 43–122)
ALT SERPL W P-5'-P-CCNC: 18 U/L (ref 9–52)
ANION GAP SERPL CALCULATED.3IONS-SCNC: 8 MMOL/L (ref 5–14)
ANISOCYTOSIS BLD QL SMEAR: PRESENT
AST SERPL W P-5'-P-CCNC: 29 U/L (ref 14–36)
BASOPHILS # BLD AUTO: 0 THOUSANDS/ΜL (ref 0–0.1)
BASOPHILS NFR BLD AUTO: 0 % (ref 0–1)
BILIRUB SERPL-MCNC: 0.5 MG/DL
BUN SERPL-MCNC: 17 MG/DL (ref 5–25)
CALCIUM SERPL-MCNC: 9.1 MG/DL (ref 8.4–10.2)
CHLORIDE SERPL-SCNC: 103 MMOL/L (ref 97–108)
CO2 SERPL-SCNC: 28 MMOL/L (ref 22–30)
CREAT SERPL-MCNC: 0.66 MG/DL (ref 0.6–1.2)
CRP SERPL QL: 11.5 MG/L
EOSINOPHIL # BLD AUTO: 0.1 THOUSAND/ΜL (ref 0–0.4)
EOSINOPHIL NFR BLD AUTO: 3 % (ref 0–6)
ERYTHROCYTE [DISTWIDTH] IN BLOOD BY AUTOMATED COUNT: 20.3 %
ERYTHROCYTE [SEDIMENTATION RATE] IN BLOOD: 55 MM/HOUR (ref 1–20)
GFR SERPL CREATININE-BSD FRML MDRD: 99 ML/MIN/1.73SQ M
GLUCOSE P FAST SERPL-MCNC: 85 MG/DL (ref 70–99)
HCT VFR BLD AUTO: 29.9 % (ref 36–46)
HGB BLD-MCNC: 9.1 G/DL (ref 12–16)
HYPERCHROMIA BLD QL SMEAR: PRESENT
LDH SERPL-CCNC: 791 U/L (ref 313–618)
LYMPHOCYTES # BLD AUTO: 3.1 THOUSANDS/ΜL (ref 0.5–4)
LYMPHOCYTES NFR BLD AUTO: 55 % (ref 25–45)
MCH RBC QN AUTO: 22.1 PG (ref 26–34)
MCHC RBC AUTO-ENTMCNC: 30.4 G/DL (ref 31–36)
MCV RBC AUTO: 73 FL (ref 80–100)
MICROCYTES BLD QL AUTO: PRESENT
MONOCYTES # BLD AUTO: 0.6 THOUSAND/ΜL (ref 0.2–0.9)
MONOCYTES NFR BLD AUTO: 10 % (ref 1–10)
NEUTROPHILS # BLD AUTO: 1.9 THOUSANDS/ΜL (ref 1.8–7.8)
NEUTS SEG NFR BLD AUTO: 32 % (ref 45–65)
PLATELET # BLD AUTO: 229 THOUSANDS/UL (ref 150–450)
PLATELET BLD QL SMEAR: ADEQUATE
PMV BLD AUTO: 8.4 FL (ref 8.9–12.7)
POIKILOCYTOSIS BLD QL SMEAR: PRESENT
POTASSIUM SERPL-SCNC: 3.9 MMOL/L (ref 3.6–5)
PROT SERPL-MCNC: 6.8 G/DL (ref 5.9–8.4)
RBC # BLD AUTO: 4.12 MILLION/UL (ref 4–5.2)
RBC MORPH BLD: NORMAL
RETICS # CALC: 2.39 % (ref 0.87–2.63)
SODIUM SERPL-SCNC: 139 MMOL/L (ref 137–147)
TSH SERPL DL<=0.05 MIU/L-ACNC: 2.53 UIU/ML (ref 0.47–4.68)
URATE SERPL-MCNC: 5.8 MG/DL (ref 2.7–7.5)
VIT B12 SERPL-MCNC: 432 PG/ML (ref 100–900)
WBC # BLD AUTO: 5.7 THOUSAND/UL (ref 4.5–11)

## 2018-12-21 PROCEDURE — 85045 AUTOMATED RETICULOCYTE COUNT: CPT

## 2018-12-21 PROCEDURE — 85652 RBC SED RATE AUTOMATED: CPT

## 2018-12-21 PROCEDURE — 84165 PROTEIN E-PHORESIS SERUM: CPT | Performed by: PATHOLOGY

## 2018-12-21 PROCEDURE — 84443 ASSAY THYROID STIM HORMONE: CPT

## 2018-12-21 PROCEDURE — 84165 PROTEIN E-PHORESIS SERUM: CPT

## 2018-12-21 PROCEDURE — 85025 COMPLETE CBC W/AUTO DIFF WBC: CPT

## 2018-12-21 PROCEDURE — 80053 COMPREHEN METABOLIC PANEL: CPT

## 2018-12-21 PROCEDURE — 86140 C-REACTIVE PROTEIN: CPT

## 2018-12-21 PROCEDURE — 36415 COLL VENOUS BLD VENIPUNCTURE: CPT

## 2018-12-21 PROCEDURE — 84550 ASSAY OF BLOOD/URIC ACID: CPT

## 2018-12-21 PROCEDURE — 83615 LACTATE (LD) (LDH) ENZYME: CPT

## 2018-12-21 PROCEDURE — 82607 VITAMIN B-12: CPT

## 2018-12-25 LAB
ALBUMIN SERPL ELPH-MCNC: 3.74 G/DL (ref 3.5–5)
ALBUMIN SERPL ELPH-MCNC: 58.5 % (ref 52–65)
ALPHA1 GLOB SERPL ELPH-MCNC: 0.39 G/DL (ref 0.1–0.4)
ALPHA1 GLOB SERPL ELPH-MCNC: 6.1 % (ref 2.5–5)
ALPHA2 GLOB SERPL ELPH-MCNC: 0.79 G/DL (ref 0.4–1.2)
ALPHA2 GLOB SERPL ELPH-MCNC: 12.4 % (ref 7–13)
BETA GLOB ABNORMAL SERPL ELPH-MCNC: 0.4 G/DL (ref 0.4–0.8)
BETA1 GLOB SERPL ELPH-MCNC: 6.2 % (ref 5–13)
BETA2 GLOB SERPL ELPH-MCNC: 5.7 % (ref 2–8)
BETA2+GAMMA GLOB SERPL ELPH-MCNC: 0.36 G/DL (ref 0.2–0.5)
GAMMA GLOB ABNORMAL SERPL ELPH-MCNC: 0.71 G/DL (ref 0.5–1.6)
GAMMA GLOB SERPL ELPH-MCNC: 11.1 % (ref 12–22)
IGG/ALB SER: 1.41 {RATIO} (ref 1.1–1.8)
PROT PATTERN SERPL ELPH-IMP: ABNORMAL
PROT SERPL-MCNC: 6.4 G/DL (ref 6.4–8.2)

## 2019-01-02 ENCOUNTER — APPOINTMENT (OUTPATIENT)
Dept: LAB | Facility: HOSPITAL | Age: 59
End: 2019-01-02
Attending: INTERNAL MEDICINE
Payer: COMMERCIAL

## 2019-01-02 DIAGNOSIS — C82.18 FOLLICULAR LYMPHOMA GRADE II OF LYMPH NODES OF MULTIPLE SITES (HCC): ICD-10-CM

## 2019-01-02 LAB
ALBUMIN SERPL BCP-MCNC: 3.6 G/DL (ref 3–5.2)
ALP SERPL-CCNC: 85 U/L (ref 43–122)
ALT SERPL W P-5'-P-CCNC: 23 U/L (ref 9–52)
ANION GAP SERPL CALCULATED.3IONS-SCNC: 10 MMOL/L (ref 5–14)
ANISOCYTOSIS BLD QL SMEAR: PRESENT
AST SERPL W P-5'-P-CCNC: 20 U/L (ref 14–36)
BASOPHILS # BLD AUTO: 0 THOUSANDS/ΜL (ref 0–0.1)
BASOPHILS NFR BLD AUTO: 1 % (ref 0–1)
BILIRUB SERPL-MCNC: 0.3 MG/DL
BUN SERPL-MCNC: 13 MG/DL (ref 5–25)
CALCIUM SERPL-MCNC: 9.1 MG/DL (ref 8.4–10.2)
CHLORIDE SERPL-SCNC: 106 MMOL/L (ref 97–108)
CO2 SERPL-SCNC: 25 MMOL/L (ref 22–30)
CREAT SERPL-MCNC: 0.7 MG/DL (ref 0.6–1.2)
EOSINOPHIL # BLD AUTO: 0.1 THOUSAND/ΜL (ref 0–0.4)
EOSINOPHIL NFR BLD AUTO: 3 % (ref 0–6)
ERYTHROCYTE [DISTWIDTH] IN BLOOD BY AUTOMATED COUNT: 18.2 %
GFR SERPL CREATININE-BSD FRML MDRD: 97 ML/MIN/1.73SQ M
GLUCOSE P FAST SERPL-MCNC: 85 MG/DL (ref 70–99)
HCT VFR BLD AUTO: 30.3 % (ref 36–46)
HGB BLD-MCNC: 9.1 G/DL (ref 12–16)
HYPERCHROMIA BLD QL SMEAR: PRESENT
LDH SERPL-CCNC: 447 U/L (ref 313–618)
LYMPHOCYTES # BLD AUTO: 1.4 THOUSANDS/ΜL (ref 0.5–4)
LYMPHOCYTES NFR BLD AUTO: 41 % (ref 25–45)
MCH RBC QN AUTO: 21.8 PG (ref 26–34)
MCHC RBC AUTO-ENTMCNC: 29.9 G/DL (ref 31–36)
MCV RBC AUTO: 73 FL (ref 80–100)
MICROCYTES BLD QL AUTO: PRESENT
MONOCYTES # BLD AUTO: 0.3 THOUSAND/ΜL (ref 0.2–0.9)
MONOCYTES NFR BLD AUTO: 8 % (ref 1–10)
NEUTROPHILS # BLD AUTO: 1.7 THOUSANDS/ΜL (ref 1.8–7.8)
NEUTS SEG NFR BLD AUTO: 49 % (ref 45–65)
OVALOCYTES BLD QL SMEAR: PRESENT
PLATELET # BLD AUTO: 104 THOUSANDS/UL (ref 150–450)
PLATELET BLD QL SMEAR: ABNORMAL
PMV BLD AUTO: 9 FL (ref 8.9–12.7)
POIKILOCYTOSIS BLD QL SMEAR: PRESENT
POTASSIUM SERPL-SCNC: 3.7 MMOL/L (ref 3.6–5)
PROT SERPL-MCNC: 6.4 G/DL (ref 5.9–8.4)
RBC # BLD AUTO: 4.17 MILLION/UL (ref 4–5.2)
RBC MORPH BLD: ABNORMAL
SODIUM SERPL-SCNC: 141 MMOL/L (ref 137–147)
URATE SERPL-MCNC: 3.5 MG/DL (ref 2.7–7.5)
WBC # BLD AUTO: 3.5 THOUSAND/UL (ref 4.5–11)

## 2019-01-02 PROCEDURE — 36415 COLL VENOUS BLD VENIPUNCTURE: CPT

## 2019-01-02 PROCEDURE — 80053 COMPREHEN METABOLIC PANEL: CPT

## 2019-01-02 PROCEDURE — 84550 ASSAY OF BLOOD/URIC ACID: CPT

## 2019-01-02 PROCEDURE — 85025 COMPLETE CBC W/AUTO DIFF WBC: CPT

## 2019-01-02 PROCEDURE — 83615 LACTATE (LD) (LDH) ENZYME: CPT

## 2019-01-02 RX ORDER — SODIUM CHLORIDE 9 MG/ML
20 INJECTION, SOLUTION INTRAVENOUS ONCE
Status: DISCONTINUED | OUTPATIENT
Start: 2019-01-03 | End: 2019-01-07 | Stop reason: HOSPADM

## 2019-01-02 RX ORDER — ACETAMINOPHEN 325 MG/1
650 TABLET ORAL ONCE
Status: DISCONTINUED | OUTPATIENT
Start: 2019-01-03 | End: 2019-01-07 | Stop reason: HOSPADM

## 2019-01-03 ENCOUNTER — HOSPITAL ENCOUNTER (OUTPATIENT)
Dept: INFUSION CENTER | Facility: HOSPITAL | Age: 59
Discharge: HOME | End: 2019-01-03

## 2019-01-03 ENCOUNTER — OFFICE VISIT (OUTPATIENT)
Dept: HEMATOLOGY ONCOLOGY | Facility: CLINIC | Age: 59
End: 2019-01-03
Payer: COMMERCIAL

## 2019-01-03 VITALS
WEIGHT: 181 LBS | RESPIRATION RATE: 18 BRPM | HEIGHT: 65 IN | DIASTOLIC BLOOD PRESSURE: 76 MMHG | BODY MASS INDEX: 30.16 KG/M2 | SYSTOLIC BLOOD PRESSURE: 140 MMHG | TEMPERATURE: 98.6 F | HEART RATE: 78 BPM

## 2019-01-03 DIAGNOSIS — C82.18 GRADE 2 FOLLICULAR LYMPHOMA OF LYMPH NODES OF MULTIPLE REGIONS (HCC): Primary | ICD-10-CM

## 2019-01-03 PROCEDURE — 99214 OFFICE O/P EST MOD 30 MIN: CPT | Performed by: INTERNAL MEDICINE

## 2019-01-03 RX ORDER — LEVOFLOXACIN 500 MG/1
500 TABLET, FILM COATED ORAL EVERY 24 HOURS
Qty: 14 TABLET | Refills: 0 | Status: SHIPPED | OUTPATIENT
Start: 2019-01-03 | End: 2019-01-09 | Stop reason: ALTCHOICE

## 2019-01-03 NOTE — PROGRESS NOTES
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 [facility])  The patient seems to have upper respiratory airway infection with low white cell count and absolute neutrophil count  It would be more appropriate to delayed the second cycle of chemotherapy for a week and treat her with Levaquin for 5-7 days  This will also allow her white cell count for further recovery  We will then see her again next week to make a decision if she would be ready for cycle 2 of rituximab and bendamustine with the support of Neulasta  - levofloxacin (LEVAQUIN) 500 mg tablet; Take 1 tablet (500 mg total) by mouth every 24 hours for 10 days  Dispense: 14 tablet; Refill: 0  - CBC and differential; Future  - Comprehensive metabolic panel; Future        HPI:  The patient came today for a follow-up visit  She received her 1st cycle of rituximab and bendamustine with the support of Neulasta on the 6 of December 2018  She is due for cycle 2 today  The patient complained about significant productive cough with shortness of breath  Her abdominal distension and PN seems to be much better but she continues to have some residual pain mainly in the epigastric area  She did complain about couple episodes of vomiting recently  Her most recent blood work on the 2nd of January showed a white cell count of 3 5 with hemoglobin of 9 1 her platelet count was [id number] with ANC of 1 7  Oncology History    The patient started to notice significant abdominal pain about 8 months prior to presentation, she then was evaluated in the hospital with a CT scan of the chest abdomen pelvis on the 7th of November   This showed massive lymphadenopathy throughout the abdomen and pelvis with hepatic and massive splenomegaly   She also was found to have bulky supraclavicular, axillary and mediastinal adenopathy  She then had a supra clavicular lymph node excisional biopsy on the 9th of November , the pathology was compatible with Follicular lymphoma, WHO grade 1-2  She then had a bone marrow biopsy on the 20th of November which showed also low grade B-cell lymphoma CD10 positive compromising 63% of the total cells  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 ([facility] [address] )    11/7/2018 Initial Diagnosis     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 ([facility] [address] )         12/6/2018 -  Chemotherapy     Cycle 1 of rituximab and bendamustine was started on 12/6/2018 12/7/2018 Adverse Reaction     C1D2 labs reflective of tumor lysis syndrome, dose of rasburicase given x1 and admitted for close observation/hydration            Interval history:    ROS: Review of Systems   Constitutional: Negative for activity change, appetite change, chills, diaphoresis, fatigue, fever and unexpected weight change  HENT: Negative for congestion, dental problem, ear discharge, ear pain, facial swelling, hearing loss, mouth sores, nosebleeds, postnasal drip, sore throat, tinnitus and trouble swallowing  Eyes: Negative for discharge, redness, itching and visual disturbance  Respiratory: Positive for cough and shortness of breath  Negative for chest tightness and wheezing  Cardiovascular: Negative for chest pain, palpitations and leg swelling  Gastrointestinal: Positive for abdominal pain  Negative for abdominal distention, anal bleeding, blood in stool, constipation, diarrhea, nausea and vomiting  Genitourinary: Negative for difficulty urinating, dysuria, flank pain, frequency, hematuria and urgency  Musculoskeletal: Negative for arthralgias, back pain, gait problem, joint swelling, myalgias and neck pain  Skin: Negative for color change, pallor, rash and wound  Neurological: Negative for dizziness, syncope, speech difficulty, weakness, light-headedness, numbness and headaches  Hematological: Negative for adenopathy  Does not bruise/bleed easily  Psychiatric/Behavioral: Negative for agitation, behavioral problems, confusion and sleep disturbance         Past Medical History:   Diagnosis Date    Arthritis     Asthma     Cancer ([address] )     Migraine     Osteoporosis     Psychiatric disorder        Past Surgical History:   Procedure Laterality Date    CHOLECYSTECTOMY      FL GUIDED CENTRAL VENOUS ACCESS REPLACEMENT  11/9/2018    HYSTERECTOMY      LYMPH NODE BIOPSY Left 11/9/2018    Procedure: EXCISION BIOPSY LYMPH NODE SUPRACLAVICULAR;  Surgeon: Andreia Schulte MD;  Location[facility] MAIN OR;  Service: General    TUNNELED VENOUS PORT PLACEMENT N/A 11/9/2018    Procedure: INSERTION OF PORT-A-CATH;  Surgeon: Andreia Schulte MD;  Location: [facility] MAIN OR;  Service: General       Social History     Social History    Marital status: Single     Spouse name: N/A    Number of children: N/A    Years of education: N/A     Social History Main Topics    Smoking status: Former Smoker    Smokeless tobacco: Never Used    Alcohol use No    Drug use: No    Sexual activity: Not on file     Other Topics Concern    Not on file     Social History Narrative    No narrative on file       Family History   Problem Relation Age of Onset    Cancer Mother     Cancer Father        Allergies   Allergen Reactions    Penicillins          Current Outpatient Prescriptions:     allopurinol (ZYLOPRIM) 100 mg tablet, Take 1 tablet (100 mg total) by mouth daily For 30 days than stop, Disp: 30 tablet, Rfl: 0    amLODIPine (NORVASC) 5 mg tablet, Take 1 tablet (5 mg total) by mouth daily, Disp: 30 tablet, Rfl: 0    citalopram (CeleXA) 20 mg tablet, Take 10 mg by mouth daily, Disp: , Rfl:     omeprazole (PriLOSEC) 20 mg delayed release capsule, Take 1 capsule (20 mg total) by mouth daily To prevent stomach upset, Disp: 30 capsule, Rfl: 5    ondansetron (ZOFRAN) 8 mg tablet, Take 1 tablet (8 mg total) by mouth every 8 (eight) hours as needed for nausea or vomiting, Disp: 20 tablet, Rfl: 5    oxyCODONE (ROXICODONE) 5 mg immediate release tablet, Take 1 tablet (5 mg total) by mouth every 4 (four) hours as needed for moderate pain for up to 30 doses Max Daily Amount: 30 mg, Disp: 30 tablet, Rfl: 0    polyethylene glycol (MIRALAX) 17 g packet, Take 17 g by mouth daily, Disp: 14 each, Rfl: 0    senna (SENOKOT) 8 6 mg, Take 1 tablet (8 6 mg total) by mouth daily at bedtime, Disp: 120 each, Rfl: 0    levofloxacin (LEVAQUIN) 500 mg tablet, Take 1 tablet (500 mg total) by mouth every 24 hours for 10 days, Disp: 14 tablet, Rfl: 0  No current facility-administered medications for this visit  Facility-Administered Medications Ordered in Other Visits:     acetaminophen (TYLENOL) tablet 650 mg, 650 mg, Oral, Once, Shree Greene MD    bendamustine HCl (BENDEKA) 174 mg in sodium chloride 0 9 % 50 mL IVPB, 174 mg, Intravenous, Once, Shree Greene MD    diphenhydrAMINE (BENADRYL) 50 mg in sodium chloride 0 9 % 50 mL IVPB, 50 mg, Intravenous, Once, Shree Greene MD    fosaprepitant (EMEND) 150 mg in sodium chloride 0 9 % 250 mL IVPB, 150 mg, Intravenous, Once, Shree Greene MD    ondansetron (ZOFRAN) 16 mg, dexamethasone (DECADRON) 10 mg in sodium chloride 0 9 % 50 mL IVPB, , Intravenous, Once, Shree Greene MD    riTUXimab (RITUXAN) 700 mg in sodium chloride 0 9 % 280 mL subsequent titrated chemo infusion, 700 mg, Intravenous, Once, Shree Greene MD    sodium chloride 0 9 % infusion, 20 mL/hr, Intravenous, Once, Shree Greene MD      Physical Exam:  /76 (BP Location: Left arm, Patient Position: Sitting, Cuff Size: Adult)   Pulse 78   Temp 98 6 °F (37 °C) (Tympanic)   Resp 18   Ht 5' 5" (1 651 m)   Wt 82 1 kg (181 lb)   BMI 30 12 kg/m²     Physical Exam   Constitutional: She is oriented to person, place, and time  She appears well-developed and well-nourished  No distress  HENT:   Head: Normocephalic and atraumatic     Nose: Nose normal    Mouth/Throat: Oropharynx is clear and moist    Eyes: Pupils are equal, round, and reactive to light  Conjunctivae and EOM are normal  Right eye exhibits no discharge  Left eye exhibits no discharge  No scleral icterus  Neck: Normal range of motion  Neck supple  No JVD present  No tracheal deviation present  No thyromegaly present  Cardiovascular: Normal rate, regular rhythm and normal heart sounds  Exam reveals no friction rub  No murmur heard  Pulmonary/Chest: Effort normal and breath sounds normal  No stridor  No respiratory distress  She has no wheezes  She has no rales  She exhibits no tenderness  Abdominal: Soft  Bowel sounds are normal  She exhibits distension  She exhibits no mass  There is no hepatosplenomegaly, splenomegaly or hepatomegaly  There is tenderness (On very mild palpation all over the abdomen including mainly the epigastric area)  There is no rebound and no guarding  Musculoskeletal: Normal range of motion  She exhibits no edema, tenderness or deformity  Lymphadenopathy:     She has no cervical adenopathy  Neurological: She is alert and oriented to person, place, and time  She has normal reflexes  No cranial nerve deficit  Coordination normal    Skin: Skin is warm and dry  No rash noted  She is not diaphoretic  No erythema  No pallor  Psychiatric: She has a normal mood and affect   Her behavior is normal  Judgment and thought content normal          Labs:  Lab Results   Component Value Date    WBC 3 50 (L) 01/02/2019    HGB 9 1 (L) 01/02/2019    HCT 30 3 (L) 01/02/2019    MCV 73 (L) 01/02/2019     (L) 01/02/2019     Lab Results   Component Value Date    K 3 7 01/02/2019     01/02/2019    CO2 25 01/02/2019    BUN 13 01/02/2019    CREATININE 0 70 01/02/2019    GLUF 85 01/02/2019    CALCIUM 9 1 01/02/2019    AST 20 01/02/2019    ALT 23 01/02/2019    ALKPHOS 85 01/02/2019    EGFR 97 01/02/2019     No results found for: TSH    Patient voiced understanding and agreement in the above discussion  Aware to contact our office with questions/symptoms in the interim

## 2019-01-03 NOTE — PROGRESS NOTES
TIME OUT:    Spoke with YE Edmondson [id number] 1-3-19, delay chemo x 1 week due to URI, will be seen before next treatment next week  Neelam Camara aware in pharmacy

## 2019-01-08 ENCOUNTER — APPOINTMENT (OUTPATIENT)
Dept: LAB | Facility: HOSPITAL | Age: 59
End: 2019-01-08
Attending: INTERNAL MEDICINE
Payer: COMMERCIAL

## 2019-01-08 DIAGNOSIS — C82.18 GRADE 2 FOLLICULAR LYMPHOMA OF LYMPH NODES OF MULTIPLE REGIONS (HCC): ICD-10-CM

## 2019-01-08 LAB
ALBUMIN SERPL BCP-MCNC: 3.6 G/DL (ref 3–5.2)
ALP SERPL-CCNC: 88 U/L (ref 43–122)
ALT SERPL W P-5'-P-CCNC: 20 U/L (ref 9–52)
ANION GAP SERPL CALCULATED.3IONS-SCNC: 7 MMOL/L (ref 5–14)
ANISOCYTOSIS BLD QL SMEAR: PRESENT
AST SERPL W P-5'-P-CCNC: 23 U/L (ref 14–36)
BASOPHILS # BLD AUTO: 0.04 THOUSAND/UL (ref 0–0.1)
BASOPHILS NFR MAR MANUAL: 1 % (ref 0–1)
BILIRUB SERPL-MCNC: 0.4 MG/DL
BUN SERPL-MCNC: 12 MG/DL (ref 5–25)
CALCIUM SERPL-MCNC: 9.5 MG/DL (ref 8.4–10.2)
CHLORIDE SERPL-SCNC: 104 MMOL/L (ref 97–108)
CO2 SERPL-SCNC: 29 MMOL/L (ref 22–30)
CREAT SERPL-MCNC: 0.73 MG/DL (ref 0.6–1.2)
EOSINOPHIL # BLD AUTO: 0.04 THOUSAND/UL (ref 0–0.4)
EOSINOPHIL NFR BLD MANUAL: 1 % (ref 0–6)
ERYTHROCYTE [DISTWIDTH] IN BLOOD BY AUTOMATED COUNT: 17.9 %
GFR SERPL CREATININE-BSD FRML MDRD: 92 ML/MIN/1.73SQ M
GLUCOSE P FAST SERPL-MCNC: 92 MG/DL (ref 70–99)
HCT VFR BLD AUTO: 29.5 % (ref 36–46)
HGB BLD-MCNC: 9 G/DL (ref 12–16)
HYPERCHROMIA BLD QL SMEAR: PRESENT
LYMPHOCYTES # BLD AUTO: 1.6 THOUSAND/UL (ref 0.5–4)
LYMPHOCYTES # BLD AUTO: 41 % (ref 25–45)
MCH RBC QN AUTO: 21.9 PG (ref 26–34)
MCHC RBC AUTO-ENTMCNC: 30.5 G/DL (ref 31–36)
MCV RBC AUTO: 72 FL (ref 80–100)
MONOCYTES # BLD AUTO: 0.2 THOUSAND/UL (ref 0.2–0.9)
MONOCYTES NFR BLD AUTO: 5 % (ref 1–10)
NEUTS BAND NFR BLD MANUAL: 11 % (ref 0–8)
NEUTS SEG # BLD: 2.03 THOUSAND/UL (ref 1.8–7.8)
NEUTS SEG NFR BLD AUTO: 41 %
OVALOCYTES BLD QL SMEAR: PRESENT
PLATELET # BLD AUTO: 149 THOUSANDS/UL (ref 150–450)
PLATELET BLD QL SMEAR: ABNORMAL
PMV BLD AUTO: 9.2 FL (ref 8.9–12.7)
POIKILOCYTOSIS BLD QL SMEAR: PRESENT
POTASSIUM SERPL-SCNC: 4.1 MMOL/L (ref 3.6–5)
PROT SERPL-MCNC: 6.3 G/DL (ref 5.9–8.4)
RBC # BLD AUTO: 4.12 MILLION/UL (ref 4–5.2)
RBC MORPH BLD: ABNORMAL
SODIUM SERPL-SCNC: 140 MMOL/L (ref 137–147)
TOTAL CELLS COUNTED SPEC: 100
WBC # BLD AUTO: 3.9 THOUSAND/UL (ref 4.5–11)

## 2019-01-08 PROCEDURE — 80053 COMPREHEN METABOLIC PANEL: CPT

## 2019-01-08 PROCEDURE — 36415 COLL VENOUS BLD VENIPUNCTURE: CPT

## 2019-01-08 PROCEDURE — 85007 BL SMEAR W/DIFF WBC COUNT: CPT

## 2019-01-08 PROCEDURE — 85027 COMPLETE CBC AUTOMATED: CPT

## 2019-01-08 RX ORDER — SODIUM CHLORIDE 9 MG/ML
20 INJECTION, SOLUTION INTRAVENOUS ONCE
Status: COMPLETED | OUTPATIENT
Start: 2019-01-09 | End: 2019-01-09

## 2019-01-08 RX ORDER — ACETAMINOPHEN 325 MG/1
650 TABLET ORAL ONCE
Status: COMPLETED | OUTPATIENT
Start: 2019-01-09 | End: 2019-01-09

## 2019-01-09 ENCOUNTER — HOSPITAL ENCOUNTER (OUTPATIENT)
Dept: INFUSION CENTER | Facility: HOSPITAL | Age: 59
Discharge: HOME | End: 2019-01-09
Payer: COMMERCIAL

## 2019-01-09 ENCOUNTER — OFFICE VISIT (OUTPATIENT)
Dept: HEMATOLOGY ONCOLOGY | Facility: CLINIC | Age: 59
End: 2019-01-09
Payer: COMMERCIAL

## 2019-01-09 VITALS
RESPIRATION RATE: 20 BRPM | SYSTOLIC BLOOD PRESSURE: 120 MMHG | HEIGHT: 65 IN | DIASTOLIC BLOOD PRESSURE: 70 MMHG | HEART RATE: 63 BPM | WEIGHT: 182.8 LBS | BODY MASS INDEX: 30.46 KG/M2 | TEMPERATURE: 98.1 F

## 2019-01-09 VITALS
HEART RATE: 54 BPM | BODY MASS INDEX: 30.71 KG/M2 | TEMPERATURE: 98.7 F | WEIGHT: 184.3 LBS | SYSTOLIC BLOOD PRESSURE: 107 MMHG | DIASTOLIC BLOOD PRESSURE: 50 MMHG | HEIGHT: 65 IN | RESPIRATION RATE: 18 BRPM

## 2019-01-09 DIAGNOSIS — C82.18 GRADE 2 FOLLICULAR LYMPHOMA OF LYMPH NODES OF MULTIPLE REGIONS (HCC): Primary | ICD-10-CM

## 2019-01-09 DIAGNOSIS — R21 RASH AND NONSPECIFIC SKIN ERUPTION: ICD-10-CM

## 2019-01-09 DIAGNOSIS — M79.601 PAIN OF RIGHT UPPER EXTREMITY: ICD-10-CM

## 2019-01-09 DIAGNOSIS — D50.9 MICROCYTIC ANEMIA: ICD-10-CM

## 2019-01-09 DIAGNOSIS — R10.84 GENERALIZED ABDOMINAL PAIN: ICD-10-CM

## 2019-01-09 PROCEDURE — 96411 CHEMO IV PUSH ADDL DRUG: CPT

## 2019-01-09 PROCEDURE — 96367 TX/PROPH/DG ADDL SEQ IV INF: CPT

## 2019-01-09 PROCEDURE — 96415 CHEMO IV INFUSION ADDL HR: CPT

## 2019-01-09 PROCEDURE — 96413 CHEMO IV INFUSION 1 HR: CPT

## 2019-01-09 PROCEDURE — 99215 OFFICE O/P EST HI 40 MIN: CPT | Performed by: NURSE PRACTITIONER

## 2019-01-09 RX ORDER — OXYCODONE HYDROCHLORIDE 5 MG/1
5 TABLET ORAL EVERY 4 HOURS PRN
Qty: 30 TABLET | Refills: 0 | Status: SHIPPED | OUTPATIENT
Start: 2019-01-09 | End: 2019-01-23 | Stop reason: SDUPTHER

## 2019-01-09 RX ORDER — SODIUM CHLORIDE 9 MG/ML
20 INJECTION, SOLUTION INTRAVENOUS ONCE
Status: COMPLETED | OUTPATIENT
Start: 2019-01-10 | End: 2019-01-10

## 2019-01-09 RX ADMIN — BENDAMUSTINE HYDROCHLORIDE 174 MG: 25 INJECTION, SOLUTION INTRAVENOUS at 14:23

## 2019-01-09 RX ADMIN — ACETAMINOPHEN 650 MG: 325 TABLET ORAL at 10:33

## 2019-01-09 RX ADMIN — SODIUM CHLORIDE 20 ML/HR: 9 INJECTION, SOLUTION INTRAVENOUS at 10:05

## 2019-01-09 RX ADMIN — SODIUM CHLORIDE 150 MG: 9 INJECTION, SOLUTION INTRAVENOUS at 10:54

## 2019-01-09 RX ADMIN — RITUXIMAB 700 MG: 10 INJECTION, SOLUTION INTRAVENOUS at 11:34

## 2019-01-09 RX ADMIN — DIPHENHYDRAMINE HYDROCHLORIDE 50 MG: 50 INJECTION, SOLUTION INTRAMUSCULAR; INTRAVENOUS at 10:33

## 2019-01-09 RX ADMIN — DEXAMETHASONE SODIUM PHOSPHATE: 10 INJECTION, SOLUTION INTRAMUSCULAR; INTRAVENOUS at 10:12

## 2019-01-09 NOTE — PROGRESS NOTES
Pt here for cycle 2 of rituxan/bendeka  Patient tolerated well without complications  Patient given AVS to return tomorrow for bendeka only

## 2019-01-09 NOTE — PLAN OF CARE
Problem: Potential for Falls  Goal: Patient will remain free of falls  INTERVENTIONS:  - Assess patient frequently for physical needs  -  Identify cognitive and physical deficits and behaviors that affect risk of falls    -  Rebecca fall precautions as indicated by assessment   - Educate patient/family on patient safety including physical limitations  - Instruct patient to call for assistance with activity based on assessment  - Modify environment to reduce risk of injury  - Consider OT/PT consult to assist with strengthening/mobility   Outcome: Progressing

## 2019-01-09 NOTE — PROGRESS NOTES
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 [facility])  Patient will resume her chemotherapy of Bendamustine and Rituximab with Neulasta support  She is due for cycle # 2 today  We will see patient back in 2 weeks for a lesvia visit for close monitoring  Her lab work is within the normal limits with improvement in her absolute neutrophil count this week  - CBC and differential; Future  - Comprehensive metabolic panel; Future  - LD,Blood; Future  - Uric acid; Future  - C-reactive protein; Future  - Sedimentation rate, automated; Future  - Ferritin; Future  - Iron Panel; Future  - oxyCODONE (ROXICODONE) 5 mg immediate release tablet; Take 1 tablet (5 mg total) by mouth every 4 (four) hours as needed for moderate pain for up to 30 doses Max Daily Amount: 30 mg  Dispense: 30 tablet; Refill: 0    2  Generalized abdominal pain  Patient continues to have chronic abdominal pain, likely due to bulky abdominal adenopathy and splenomegaly  She does note some improvement after her 1st cycle of chemotherapy  Continue chemo and p r n  Oxycodone for moderate to severe pain  Refill sent to patient's pharmacy  [place] PDMP checked and appropriate  - oxyCODONE (ROXICODONE) 5 mg immediate release tablet; Take 1 tablet (5 mg total) by mouth every 4 (four) hours as needed for moderate pain for up to 30 doses Max Daily Amount: 30 mg  Dispense: 30 tablet; Refill: 0    3  Microcytic anemia  Patient continues to have microcytic anemia which is stable hemoglobin 9 0  Will check iron panel with next blood draw to evaluate for iron deficiency  - CBC and differential; Future  - Comprehensive metabolic panel; Future  - LD,Blood; Future  - Uric acid; Future  - C-reactive protein; Future  - Sedimentation rate, automated; Future  - Ferritin; Future  - Iron Panel; Future    4   Pain of right upper extremity  Patient does have chronic right upper extremity pain but mentions that the pain seems to be getting worse  She is rating the pain at an 8/10 today  We will order a Doppler study of the right upper extremity to rule out DVT since patient does have active malignancy and high risk of thromboembolic events  - VAS upper limb venous duplex scan, unilateral/limited; Future  - oxyCODONE (ROXICODONE) 5 mg immediate release tablet; Take 1 tablet (5 mg total) by mouth every 4 (four) hours as needed for moderate pain for up to 30 doses Max Daily Amount: 30 mg  Dispense: 30 tablet; Refill: 0    5  Rash and nonspecific skin eruption (Grade 2)  According to patient she had this rash prior to starting therapy with some improvement after her 1st treatment  Rash will likely improve again after her treatment today, may be related to her lymphoma  We will continue to monitor  Instructed patient to take an over-the-counter Benadryl pill 1 every 6 hr as needed for itching/discomfort from the rash  Cautioned her that the Benadryl will likely make her drowsy  She could also apply Benadryl cream/gel directly to the rash as needed for itching  I encouraged her to continue to apply the Aquaphor liberally to her rash and also her dry skin  HPI:  Patient presents today with her daughter for a 1 week follow-up appointment  Patient and daughter Burkinan speaking only  via [employer] interpretation system  Her treatment was held last week due to an upper respiratory infection  Patient reports completing 7 days of her Levaquin with significant improvement in her respiratory symptoms  She denies shortness of breath, cough or fevers  She is complaining about pain in her entire right arm especially above the elbow  She rates the pain 8/10  States that she  has had this pain since she was in [place] prior to treatment and diagnosis but it appears to be getting worse    She also continues to complain about pain on the left side of her abdomen but does note slight improvement since her last chemo treatment  Patient also reports a generalized rash mostly on the upper extremities, back and groin  She states that she had the rash prior to her chemo treatment which improved after her 1st cycle  Now the rash is reoccurring, it is pruritic and she is applying Aquaphor moisturizer  Oncology History    The patient started to notice significant abdominal pain about 8 months prior to presentation, she then was evaluated in the hospital with a CT scan of the chest abdomen pelvis on the 7th of November   This showed massive lymphadenopathy throughout the abdomen and pelvis with hepatic and massive splenomegaly   She also was found to have bulky supraclavicular, axillary and mediastinal adenopathy  She then had a supra clavicular lymph node excisional biopsy on the 9th of November , the pathology was compatible with Follicular lymphoma, WHO grade 1-2  She then had a bone marrow biopsy on the 20th of November which showed also low grade B-cell lymphoma CD10 positive compromising 63% of the total cells  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 ([facility] [address] )    11/7/2018 Initial Diagnosis     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 ([facility] [address] )         12/6/2018 -  Chemotherapy     Cycle 1 of rituximab and bendamustine was started on 12/6/2018 12/7/2018 Adverse Reaction     C1D2 labs reflective of tumor lysis syndrome, dose of rasburicase given x1 and admitted for close observation/hydration            Interval history:    ROS: Review of Systems   Constitutional: Negative for activity change, appetite change, chills, fatigue, fever and unexpected weight change  Reports hot flashes more than 1 per day   HENT: Negative for congestion, mouth sores, nosebleeds, sore throat and trouble swallowing  Reports sore mouth  Eyes: Negative      Respiratory: Negative for cough, chest tightness and shortness of breath  Cardiovascular: Negative for chest pain, palpitations and leg swelling  Gastrointestinal: Positive for constipation  Negative for abdominal distention, abdominal pain, blood in stool, diarrhea, nausea and vomiting  Genitourinary: Negative for difficulty urinating, dysuria, frequency, hematuria and urgency  Musculoskeletal: Positive for arthralgias  Negative for back pain, gait problem, joint swelling and myalgias  Skin: Positive for rash  Negative for color change and pallor  Neurological: Positive for dizziness ( moderate, occasional), numbness ( and tingling) and headaches (Moderate at times, admits she has a history of chronic migraines)  Negative for weakness and light-headedness  Hematological: Negative for adenopathy  Does not bruise/bleed easily  Psychiatric/Behavioral: Positive for dysphoric mood ( mild) and sleep disturbance ( occasional)  The patient is not nervous/anxious          Past Medical History:   Diagnosis Date    Arthritis     Asthma     Cancer ([facility] [address] )     Migraine     Osteoporosis     Psychiatric disorder        Past Surgical History:   Procedure Laterality Date    CHOLECYSTECTOMY      FL GUIDED CENTRAL VENOUS ACCESS REPLACEMENT  11/9/2018    HYSTERECTOMY      LYMPH NODE BIOPSY Left 11/9/2018    Procedure: EXCISION BIOPSY LYMPH NODE SUPRACLAVICULAR;  Surgeon: Cheyenne Sherman MD;  Location: [facility] MAIN OR;  Service: General    TUNNELED VENOUS PORT PLACEMENT N/A 11/9/2018    Procedure: Iona Latin;  Surgeon: Cheyenne Sherman MD;  Location: [facility] MAIN OR;  Service: General       Social History     Social History    Marital status: Single     Spouse name: N/A    Number of children: N/A    Years of education: N/A     Social History Main Topics    Smoking status: Former Smoker    Smokeless tobacco: Never Used    Alcohol use No    Drug use: No    Sexual activity: Not Asked     Other Topics Concern    None Social History Narrative    None       Family History   Problem Relation Age of Onset    Cancer Mother     Cancer Father        Allergies   Allergen Reactions    Penicillins          Current Outpatient Prescriptions:     allopurinol (ZYLOPRIM) 100 mg tablet, Take 1 tablet (100 mg total) by mouth daily For 30 days than stop, Disp: 30 tablet, Rfl: 0    amLODIPine (NORVASC) 5 mg tablet, Take 1 tablet (5 mg total) by mouth daily, Disp: 30 tablet, Rfl: 0    citalopram (CeleXA) 20 mg tablet, Take 10 mg by mouth daily, Disp: , Rfl:     omeprazole (PriLOSEC) 20 mg delayed release capsule, Take 1 capsule (20 mg total) by mouth daily To prevent stomach upset, Disp: 30 capsule, Rfl: 5    ondansetron (ZOFRAN) 8 mg tablet, Take 1 tablet (8 mg total) by mouth every 8 (eight) hours as needed for nausea or vomiting, Disp: 20 tablet, Rfl: 5    polyethylene glycol (MIRALAX) 17 g packet, Take 17 g by mouth daily, Disp: 14 each, Rfl: 0    senna (SENOKOT) 8 6 mg, Take 1 tablet (8 6 mg total) by mouth daily at bedtime, Disp: 120 each, Rfl: 0    oxyCODONE (ROXICODONE) 5 mg immediate release tablet, Take 1 tablet (5 mg total) by mouth every 4 (four) hours as needed for moderate pain for up to 30 doses Max Daily Amount: 30 mg, Disp: 30 tablet, Rfl: 0  No current facility-administered medications for this visit       Facility-Administered Medications Ordered in Other Visits:     acetaminophen (TYLENOL) tablet 650 mg, 650 mg, Oral, Once, Guru Arnold MD    bendamustine HCl (BENDEKA) 174 mg in sodium chloride 0 9 % 50 mL IVPB, 174 mg, Intravenous, Once, Guru Arnold MD    diphenhydrAMINE (BENADRYL) 50 mg in sodium chloride 0 9 % 50 mL IVPB, 50 mg, Intravenous, Once, Guru Arnold MD    fosaprepitant (EMEND) 150 mg in sodium chloride 0 9 % 250 mL IVPB, 150 mg, Intravenous, Once, Guru Arnold MD    ondansetron (ZOFRAN) 16 mg, dexamethasone (DECADRON) 10 mg in sodium chloride 0 9 % 50 mL IVPB, , Intravenous, Once, Guru Arnold MD    riTUXimab (RITUXAN) 700 mg in sodium chloride 0 9 % 280 mL subsequent titrated chemo infusion, 700 mg, Intravenous, Once, Coleen Stewart MD    sodium chloride 0 9 % infusion, 20 mL/hr, Intravenous, Once, Coleen Stewart MD      Physical Exam:  /70 (BP Location: Left arm, Cuff Size: Large)   Pulse 63   Temp 98 1 °F (36 7 °C) (Tympanic)   Resp 20   Ht 5' 5" (1 651 m)   Wt 82 9 kg (182 lb 12 8 oz)   BMI 30 42 kg/m²     Physical Exam   Constitutional: She is oriented to person, place, and time  She appears well-developed and well-nourished  No distress  HENT:   Head: Normocephalic and atraumatic  Mouth/Throat: Oropharynx is clear and moist  No oropharyngeal exudate  Eyes: Pupils are equal, round, and reactive to light  Conjunctivae are normal  No scleral icterus  Neck: Normal range of motion  Neck supple  No thyromegaly present  Cardiovascular: Normal rate, regular rhythm, normal heart sounds and intact distal pulses  No murmur heard  Pulmonary/Chest: Effort normal and breath sounds normal  No respiratory distress  Abdominal: Soft  Bowel sounds are normal  She exhibits no distension  There is no hepatosplenomegaly  There is tenderness (Right upper quadrant and left upper quadrant)  Musculoskeletal: Normal range of motion  She exhibits edema (Trace bilateral lower extremities)  Lymphadenopathy:     She has no cervical adenopathy  She has axillary adenopathy  Right axillary: Pectoral adenopathy present  Neurological: She is alert and oriented to person, place, and time  Skin: Skin is warm and dry  Rash (localized maculopapular red rash noted to bilateral upper extremities and 1 area at right thigh proximal to the groin) noted  She is not diaphoretic  No erythema  No pallor  Patient has extremely dry skin  Psychiatric: Her behavior is normal  Judgment and thought content normal    Flat affect   Vitals reviewed          Labs:  Lab Results   Component Value Date    WBC 3 90 (L) 01/08/2019    HGB 9 0 (L) 01/08/2019    HCT 29 5 (L) 01/08/2019    MCV 72 (L) 01/08/2019     (L) 01/08/2019     Lab Results   Component Value Date    K 4 1 01/08/2019     01/08/2019    CO2 29 01/08/2019    BUN 12 01/08/2019    CREATININE 0 73 01/08/2019    GLUF 92 01/08/2019    CALCIUM 9 5 01/08/2019    AST 23 01/08/2019    ALT 20 01/08/2019    ALKPHOS 88 01/08/2019    EGFR 92 01/08/2019     Component      Latest Ref Rng & Units 1/8/2019   Abs Neutrophils      1 80 - 7 80 Thousand/uL 2 03         Patient voiced understanding and agreement in the above discussion  Aware to contact our office with questions/symptoms in the interim

## 2019-01-10 ENCOUNTER — HOSPITAL ENCOUNTER (OUTPATIENT)
Dept: NON INVASIVE DIAGNOSTICS | Facility: HOSPITAL | Age: 59
Discharge: HOME | End: 2019-01-10
Payer: COMMERCIAL

## 2019-01-10 ENCOUNTER — HOSPITAL ENCOUNTER (OUTPATIENT)
Dept: INFUSION CENTER | Facility: HOSPITAL | Age: 59
Discharge: HOME | End: 2019-01-10
Payer: COMMERCIAL

## 2019-01-10 VITALS
SYSTOLIC BLOOD PRESSURE: 128 MMHG | TEMPERATURE: 98.7 F | DIASTOLIC BLOOD PRESSURE: 58 MMHG | HEART RATE: 58 BPM | RESPIRATION RATE: 16 BRPM

## 2019-01-10 DIAGNOSIS — M79.601 PAIN OF RIGHT UPPER EXTREMITY: ICD-10-CM

## 2019-01-10 PROCEDURE — 96367 TX/PROPH/DG ADDL SEQ IV INF: CPT

## 2019-01-10 PROCEDURE — 96409 CHEMO IV PUSH SNGL DRUG: CPT

## 2019-01-10 PROCEDURE — 93971 EXTREMITY STUDY: CPT | Performed by: SURGERY

## 2019-01-10 PROCEDURE — 93971 EXTREMITY STUDY: CPT

## 2019-01-10 PROCEDURE — 96377 APPLICATON ON-BODY INJECTOR: CPT

## 2019-01-10 RX ADMIN — SODIUM CHLORIDE 20 ML/HR: 9 INJECTION, SOLUTION INTRAVENOUS at 14:20

## 2019-01-10 RX ADMIN — BENDAMUSTINE HYDROCHLORIDE 174 MG: 25 INJECTION, SOLUTION INTRAVENOUS at 14:58

## 2019-01-10 RX ADMIN — PEGFILGRASTIM 6 MG: KIT SUBCUTANEOUS at 15:16

## 2019-01-10 RX ADMIN — DEXAMETHASONE SODIUM PHOSPHATE: 10 INJECTION, SOLUTION INTRAMUSCULAR; INTRAVENOUS at 14:27

## 2019-01-10 NOTE — PLAN OF CARE
Problem: Potential for Falls  Goal: Patient will remain free of falls  INTERVENTIONS:  - Assess patient frequently for physical needs  -  Identify cognitive and physical deficits and behaviors that affect risk of falls    -  [place] fall precautions as indicated by assessment   - Educate patient/family on patient safety including physical limitations  - Instruct patient to call for assistance with activity based on assessment  - Modify environment to reduce risk of injury  - Consider OT/PT consult to assist with strengthening/mobility   Outcome: Progressing

## 2019-01-10 NOTE — PROGRESS NOTES
Pt here for day 2 chemo  Tolerated well without complications  Neulasta onpro applied to left upper arm and patient aware of take off tomorrow at 7:30pm   AVS provided and appts confirmed

## 2019-01-22 ENCOUNTER — APPOINTMENT (OUTPATIENT)
Dept: LAB | Facility: HOSPITAL | Age: 59
End: 2019-01-22
Payer: COMMERCIAL

## 2019-01-22 DIAGNOSIS — D50.9 MICROCYTIC ANEMIA: ICD-10-CM

## 2019-01-22 DIAGNOSIS — C82.18 GRADE 2 FOLLICULAR LYMPHOMA OF LYMPH NODES OF MULTIPLE REGIONS (HCC): ICD-10-CM

## 2019-01-22 LAB
ALBUMIN SERPL BCP-MCNC: 4 G/DL (ref 3–5.2)
ALP SERPL-CCNC: 103 U/L (ref 43–122)
ALT SERPL W P-5'-P-CCNC: 25 U/L (ref 9–52)
ANION GAP SERPL CALCULATED.3IONS-SCNC: 5 MMOL/L (ref 5–14)
ANISOCYTOSIS BLD QL SMEAR: PRESENT
AST SERPL W P-5'-P-CCNC: 34 U/L (ref 14–36)
BILIRUB SERPL-MCNC: 0.4 MG/DL
BUN SERPL-MCNC: 11 MG/DL (ref 5–25)
CALCIUM SERPL-MCNC: 9.8 MG/DL (ref 8.4–10.2)
CHLORIDE SERPL-SCNC: 104 MMOL/L (ref 97–108)
CO2 SERPL-SCNC: 30 MMOL/L (ref 22–30)
CREAT SERPL-MCNC: 0.73 MG/DL (ref 0.6–1.2)
CRP SERPL QL: 4.3 MG/L
EOSINOPHIL # BLD AUTO: 0.21 THOUSAND/UL (ref 0–0.4)
EOSINOPHIL NFR BLD MANUAL: 4 % (ref 0–6)
ERYTHROCYTE [DISTWIDTH] IN BLOOD BY AUTOMATED COUNT: 17.9 %
ERYTHROCYTE [SEDIMENTATION RATE] IN BLOOD: 35 MM/HOUR (ref 1–20)
FERRITIN SERPL-MCNC: 306 NG/ML (ref 8–388)
GFR SERPL CREATININE-BSD FRML MDRD: 92 ML/MIN/1.73SQ M
GLUCOSE P FAST SERPL-MCNC: 99 MG/DL (ref 70–99)
HCT VFR BLD AUTO: 33.8 % (ref 36–46)
HGB BLD-MCNC: 10.5 G/DL (ref 12–16)
IRON SATN MFR SERPL: 17 %
IRON SERPL-MCNC: 44 UG/DL (ref 50–170)
LDH SERPL-CCNC: 1133 U/L (ref 313–618)
LYMPHOCYTES # BLD AUTO: 1.17 THOUSAND/UL (ref 0.5–4)
LYMPHOCYTES # BLD AUTO: 22 % (ref 25–45)
MCH RBC QN AUTO: 22.4 PG (ref 26–34)
MCHC RBC AUTO-ENTMCNC: 31 G/DL (ref 31–36)
MCV RBC AUTO: 72 FL (ref 80–100)
MICROCYTES BLD QL AUTO: PRESENT
MONOCYTES # BLD AUTO: 0.42 THOUSAND/UL (ref 0.2–0.9)
MONOCYTES NFR BLD AUTO: 8 % (ref 1–10)
NEUTS BAND NFR BLD MANUAL: 8 % (ref 0–8)
NEUTS SEG # BLD: 3.5 THOUSAND/UL (ref 1.8–7.8)
NEUTS SEG NFR BLD AUTO: 58 %
OVALOCYTES BLD QL SMEAR: PRESENT
PLATELET # BLD AUTO: 149 THOUSANDS/UL (ref 150–450)
PLATELET BLD QL SMEAR: ABNORMAL
PMV BLD AUTO: 9.1 FL (ref 8.9–12.7)
POIKILOCYTOSIS BLD QL SMEAR: PRESENT
POTASSIUM SERPL-SCNC: 4.3 MMOL/L (ref 3.6–5)
PROT SERPL-MCNC: 6.8 G/DL (ref 5.9–8.4)
RBC # BLD AUTO: 4.67 MILLION/UL (ref 4–5.2)
RBC MORPH BLD: ABNORMAL
SODIUM SERPL-SCNC: 139 MMOL/L (ref 137–147)
TIBC SERPL-MCNC: 262 UG/DL (ref 250–450)
TOTAL CELLS COUNTED SPEC: 100
URATE SERPL-MCNC: 4.3 MG/DL (ref 2.7–7.5)
WBC # BLD AUTO: 5.3 THOUSAND/UL (ref 4.5–11)

## 2019-01-22 PROCEDURE — 36415 COLL VENOUS BLD VENIPUNCTURE: CPT

## 2019-01-22 PROCEDURE — 84550 ASSAY OF BLOOD/URIC ACID: CPT

## 2019-01-22 PROCEDURE — 85652 RBC SED RATE AUTOMATED: CPT

## 2019-01-22 PROCEDURE — 80053 COMPREHEN METABOLIC PANEL: CPT

## 2019-01-22 PROCEDURE — 83540 ASSAY OF IRON: CPT

## 2019-01-22 PROCEDURE — 83615 LACTATE (LD) (LDH) ENZYME: CPT

## 2019-01-22 PROCEDURE — 83550 IRON BINDING TEST: CPT

## 2019-01-22 PROCEDURE — 85007 BL SMEAR W/DIFF WBC COUNT: CPT

## 2019-01-22 PROCEDURE — 82728 ASSAY OF FERRITIN: CPT

## 2019-01-22 PROCEDURE — 85027 COMPLETE CBC AUTOMATED: CPT

## 2019-01-22 PROCEDURE — 86140 C-REACTIVE PROTEIN: CPT

## 2019-01-22 RX ORDER — SODIUM CHLORIDE 9 MG/ML
20 INJECTION, SOLUTION INTRAVENOUS ONCE
Status: DISCONTINUED | OUTPATIENT
Start: 2019-01-23 | End: 2019-01-27 | Stop reason: HOSPADM

## 2019-01-23 ENCOUNTER — HOSPITAL ENCOUNTER (OUTPATIENT)
Dept: INFUSION CENTER | Facility: HOSPITAL | Age: 59
Discharge: HOME | End: 2019-01-23

## 2019-01-23 ENCOUNTER — OFFICE VISIT (OUTPATIENT)
Dept: HEMATOLOGY ONCOLOGY | Facility: CLINIC | Age: 59
End: 2019-01-23
Payer: COMMERCIAL

## 2019-01-23 VITALS
TEMPERATURE: 98.1 F | HEART RATE: 70 BPM | HEIGHT: 65 IN | RESPIRATION RATE: 16 BRPM | DIASTOLIC BLOOD PRESSURE: 80 MMHG | OXYGEN SATURATION: 99 % | WEIGHT: 179 LBS | SYSTOLIC BLOOD PRESSURE: 140 MMHG | BODY MASS INDEX: 29.82 KG/M2

## 2019-01-23 DIAGNOSIS — R21 RASH AND NONSPECIFIC SKIN ERUPTION: ICD-10-CM

## 2019-01-23 DIAGNOSIS — D50.9 MICROCYTIC ANEMIA: ICD-10-CM

## 2019-01-23 DIAGNOSIS — M79.601 PAIN OF RIGHT UPPER EXTREMITY: ICD-10-CM

## 2019-01-23 DIAGNOSIS — C82.18 GRADE 2 FOLLICULAR LYMPHOMA OF LYMPH NODES OF MULTIPLE REGIONS (HCC): Primary | ICD-10-CM

## 2019-01-23 DIAGNOSIS — R10.84 GENERALIZED ABDOMINAL PAIN: ICD-10-CM

## 2019-01-23 PROBLEM — E88.3 TUMOR LYSIS SYNDROME FOLLOWING ANTINEOPLASTIC DRUG THERAPY: Status: RESOLVED | Noted: 2018-12-07 | Resolved: 2019-01-23

## 2019-01-23 PROBLEM — E79.0 ELEVATED URIC ACID IN BLOOD: Status: RESOLVED | Noted: 2018-12-07 | Resolved: 2019-01-23

## 2019-01-23 PROBLEM — T45.1X5A TUMOR LYSIS SYNDROME FOLLOWING ANTINEOPLASTIC DRUG THERAPY: Status: RESOLVED | Noted: 2018-12-07 | Resolved: 2019-01-23

## 2019-01-23 PROCEDURE — 99214 OFFICE O/P EST MOD 30 MIN: CPT | Performed by: NURSE PRACTITIONER

## 2019-01-23 RX ORDER — OXYCODONE HYDROCHLORIDE 5 MG/1
5 TABLET ORAL EVERY 4 HOURS PRN
Qty: 30 TABLET | Refills: 0 | Status: SHIPPED | OUTPATIENT
Start: 2019-01-23 | End: 2019-02-06 | Stop reason: SDUPTHER

## 2019-01-23 NOTE — PROGRESS NOTES
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)  Patient tolerated cycle 2  bendamustine and Rituxan well  She will be back in 2 weeks for repeat labs and follow-up visit prior to cycle 3       - CBC and differential; Future  - Comprehensive metabolic panel; Future  - C-reactive protein; Future  - Sedimentation rate, automated; Future  - LD,Blood; Future  - oxyCODONE (ROXICODONE) 5 mg immediate release tablet; Take 1 tablet (5 mg total) by mouth every 4 (four) hours as needed for moderate pain for up to 30 doses Max Daily Amount: 30 mg  Dispense: 30 tablet; Refill: 0    2  Rash and nonspecific skin eruption  No evidence of rash on today's exam   Patient does have dry scaly skin to the bilateral upper extremities encouraged her to continue to moisturize frequently  3  Generalized abdominal pain  Continue oxycodone 5mg as needed for severe pain  Hopefully this will continue to improve with  Chemotherapy  [place] checked and appropriate  - oxyCODONE (ROXICODONE) 5 mg immediate release tablet; Take 1 tablet (5 mg total) by mouth every 4 (four) hours as needed for moderate pain for up to 30 doses Max Daily Amount: 30 mg  Dispense: 30 tablet; Refill: 0    4  Microcytic anemia  Anemia is somewhat improved with the hemoglobin of 10 5  Iron level is slightly low with normal ferritin, no iron indicated at this time  We will continue to monitor closely and recheck at iron panel in a few months  HPI:  Patient presents today for a 2 week lesvia visit with her daughter  Yoruba speaking and interpretor via [employer] interpretation system  She states that she is feeling well  Reports no new problems or symptoms  States that her rash is much improved after chemotherapy 2 weeks ago    She continues to have abdominal pain mostly on the left side which she currently rates 8/10  She is taking her oxycodone as needed for severe pain which is effective  She also continues to have mild right arm pain mostly in the antecubital/elbow area that comes and goes  She did have a venous duplex done of the right upper extremity 2 weeks ago which was negative for DVT/thrombosis  Oncology History    The patient started to notice significant abdominal pain about 8 months prior to presentation, she then was evaluated in the hospital with a CT scan of the chest abdomen pelvis on the 7th of November   This showed massive lymphadenopathy throughout the abdomen and pelvis with hepatic and massive splenomegaly   She also was found to have bulky supraclavicular, axillary and mediastinal adenopathy  She then had a supra clavicular lymph node excisional biopsy on the 9th of November , the pathology was compatible with Follicular lymphoma, WHO grade 1-2  She then had a bone marrow biopsy on the 20th of November which showed also low grade B-cell lymphoma CD10 positive compromising 63% of the total cells  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 [address] )         12/6/2018 -  Chemotherapy     Cycle 1 of rituximab and bendamustine was started on 12/6/2018 12/7/2018 Adverse Reaction     C1D2 labs reflective of tumor lysis syndrome, dose of rasburicase given x1 and admitted for close observation/hydration            Interval history:    ROS: Review of Systems   Constitutional: Positive for fatigue (Mild)  Negative for activity change, appetite change, chills, fever and unexpected weight change  HENT: Positive for hearing loss ( does not interfere with daily activities) and trouble swallowing ( able to eat)  Negative for congestion, mouth sores, nosebleeds and sore throat  Eyes: Negative      Respiratory: Positive for shortness of breath ( with exertion)  Negative for cough and chest tightness  Cardiovascular: Negative for chest pain, palpitations and leg swelling  Gastrointestinal: Positive for abdominal pain, constipation and nausea ( mild after eating, no vomiting)  Negative for abdominal distention, blood in stool, diarrhea and vomiting  Genitourinary: Negative for difficulty urinating, dysuria, frequency, hematuria and urgency  Musculoskeletal: Negative for arthralgias, back pain, gait problem, joint swelling and myalgias  Skin: Positive for rash ( bilateral upper extremities, improved after chemotherapy, + pruritus relieved with p r n  Benadryl)  Negative for color change and pallor  Neurological: Positive for dizziness ( moderate, no change from baseline), numbness ( and tingling, chronic) and headaches ( mild/chronic, at times)  Negative for weakness and light-headedness  Hematological: Negative for adenopathy  Does not bruise/bleed easily  Psychiatric/Behavioral: Positive for dysphoric mood  Negative for sleep disturbance  The patient is not nervous/anxious          Past Medical History:   Diagnosis Date    Arthritis     Asthma     Cancer ([facility] [address] )     Migraine     Osteoporosis     Psychiatric disorder        Past Surgical History:   Procedure Laterality Date    CHOLECYSTECTOMY      FL GUIDED CENTRAL VENOUS ACCESS REPLACEMENT  11/9/2018    HYSTERECTOMY      LYMPH NODE BIOPSY Left 11/9/2018    Procedure: EXCISION BIOPSY LYMPH NODE SUPRACLAVICULAR;  Surgeon: Maricel Conway MD;  Location: [facility] MAIN OR;  Service: General    TUNNELED VENOUS PORT PLACEMENT N/A 11/9/2018    Procedure: Ravi Ma;  Surgeon: Maricel Conway MD;  Location: [facility] MAIN OR;  Service: General       Social History     Social History    Marital status: Single     Spouse name: N/A    Number of children: N/A    Years of education: N/A     Social History Main Topics    Smoking status: Former Smoker    Smokeless tobacco: Never Used    Alcohol use No  Drug use: No    Sexual activity: Not Asked     Other Topics Concern    None     Social History Narrative    None       Family History   Problem Relation Age of Onset    Cancer Mother     Cancer Father        Allergies   Allergen Reactions    Penicillins          Current Outpatient Prescriptions:     allopurinol (ZYLOPRIM) 100 mg tablet, Take 1 tablet (100 mg total) by mouth daily For 30 days than stop, Disp: 30 tablet, Rfl: 0    amLODIPine (NORVASC) 5 mg tablet, Take 1 tablet (5 mg total) by mouth daily, Disp: 30 tablet, Rfl: 0    citalopram (CeleXA) 20 mg tablet, Take 10 mg by mouth daily, Disp: , Rfl:     omeprazole (PriLOSEC) 20 mg delayed release capsule, Take 1 capsule (20 mg total) by mouth daily To prevent stomach upset, Disp: 30 capsule, Rfl: 5    ondansetron (ZOFRAN) 8 mg tablet, Take 1 tablet (8 mg total) by mouth every 8 (eight) hours as needed for nausea or vomiting, Disp: 20 tablet, Rfl: 5    oxyCODONE (ROXICODONE) 5 mg immediate release tablet, Take 1 tablet (5 mg total) by mouth every 4 (four) hours as needed for moderate pain for up to 30 doses Max Daily Amount: 30 mg, Disp: 30 tablet, Rfl: 0    polyethylene glycol (MIRALAX) 17 g packet, Take 17 g by mouth daily, Disp: 14 each, Rfl: 0    senna (SENOKOT) 8 6 mg, Take 1 tablet (8 6 mg total) by mouth daily at bedtime, Disp: 120 each, Rfl: 0  No current facility-administered medications for this visit  Facility-Administered Medications Ordered in Other Visits:     sodium chloride 0 9 % infusion, 20 mL/hr, Intravenous, Once, Ever MD Leon      Physical Exam:  /80 (BP Location: Left arm)   Pulse 70   Temp 98 1 °F (36 7 °C) (Tympanic)   Resp 16   Ht 5' 5" (1 651 m)   Wt 81 2 kg (179 lb)   SpO2 99%   BMI 29 79 kg/m²     Physical Exam   Constitutional: She is oriented to person, place, and time  She appears well-developed and well-nourished  No distress  HENT:   Head: Normocephalic and atraumatic     Mouth/Throat: Oropharynx is clear and moist  No oropharyngeal exudate  Eyes: Pupils are equal, round, and reactive to light  Conjunctivae are normal  No scleral icterus  Neck: Normal range of motion  Neck supple  No thyromegaly present  Cardiovascular: Normal rate, regular rhythm, normal heart sounds and intact distal pulses  No murmur heard  Pulmonary/Chest: Effort normal and breath sounds normal  No respiratory distress  Abdominal: Soft  Bowel sounds are normal  She exhibits no distension  There is no hepatosplenomegaly  There is tenderness (Left upper quadrant)  Musculoskeletal: Normal range of motion  She exhibits edema (Trace bilateral lower extremities)  Lymphadenopathy:        Head (right side): Submental (tiny, tender to touch) adenopathy present  She has no cervical adenopathy  Neurological: She is alert and oriented to person, place, and time  Skin: Skin is warm and dry  No rash noted  She is not diaphoretic  No erythema  No pallor  Bilateral upper extremities dry and scaly   Psychiatric: She has a normal mood and affect  Her behavior is normal  Judgment and thought content normal    Vitals reviewed          Labs:  Lab Results   Component Value Date    WBC 5 30 01/22/2019    HGB 10 5 (L) 01/22/2019    HCT 33 8 (L) 01/22/2019    MCV 72 (L) 01/22/2019     (L) 01/22/2019     Lab Results   Component Value Date    K 4 3 01/22/2019     01/22/2019    CO2 30 01/22/2019    BUN 11 01/22/2019    CREATININE 0 73 01/22/2019    GLUF 99 01/22/2019    CALCIUM 9 8 01/22/2019    AST 34 01/22/2019    ALT 25 01/22/2019    ALKPHOS 103 01/22/2019    EGFR 92 01/22/2019     Component      Latest Ref Rng & Units 1/22/2019   Ferritin      8 - 388 ng/mL 306     Component      Latest Ref Rng & Units 1/22/2019   Iron Saturation      % 17   TIBC      250 - 450 ug/dL 262   Iron      50 - 170 ug/dL 44 (L)     Component      Latest Ref Rng & Units 1/22/2019   C-REACTIVE PROTEIN      <3 0 mg/L 4 3 (H) Component      Latest Ref Rng & Units 1/22/2019   Sed Rate      1 - 20 mm/hour 35 (H)         Patient voiced understanding and agreement in the above discussion  Aware to contact our office with questions/symptoms in the interim

## 2019-01-31 ENCOUNTER — APPOINTMENT (EMERGENCY)
Dept: CT IMAGING | Facility: HOSPITAL | Age: 59
End: 2019-01-31
Payer: COMMERCIAL

## 2019-01-31 ENCOUNTER — HOSPITAL ENCOUNTER (EMERGENCY)
Facility: HOSPITAL | Age: 59
Discharge: HOME | End: 2019-01-31
Attending: EMERGENCY MEDICINE | Admitting: EMERGENCY MEDICINE
Payer: COMMERCIAL

## 2019-01-31 VITALS
RESPIRATION RATE: 16 BRPM | BODY MASS INDEX: 30.95 KG/M2 | HEART RATE: 65 BPM | TEMPERATURE: 98 F | OXYGEN SATURATION: 97 % | SYSTOLIC BLOOD PRESSURE: 133 MMHG | WEIGHT: 186 LBS | DIASTOLIC BLOOD PRESSURE: 71 MMHG

## 2019-01-31 DIAGNOSIS — R51.9 HEADACHE: Primary | ICD-10-CM

## 2019-01-31 LAB
ALBUMIN SERPL BCP-MCNC: 4.2 G/DL (ref 3–5.2)
ALP SERPL-CCNC: 119 U/L (ref 43–122)
ALT SERPL W P-5'-P-CCNC: 19 U/L (ref 9–52)
ANION GAP SERPL CALCULATED.3IONS-SCNC: 8 MMOL/L (ref 5–14)
AST SERPL W P-5'-P-CCNC: 27 U/L (ref 14–36)
BASOPHILS # BLD AUTO: 0.07 THOUSAND/UL (ref 0–0.1)
BASOPHILS NFR MAR MANUAL: 1 % (ref 0–1)
BILIRUB SERPL-MCNC: 0.5 MG/DL
BUN SERPL-MCNC: 13 MG/DL (ref 5–25)
CALCIUM SERPL-MCNC: 9.6 MG/DL (ref 8.4–10.2)
CHLORIDE SERPL-SCNC: 103 MMOL/L (ref 97–108)
CO2 SERPL-SCNC: 27 MMOL/L (ref 22–30)
CREAT SERPL-MCNC: 0.58 MG/DL (ref 0.6–1.2)
EOSINOPHIL # BLD AUTO: 0.49 THOUSAND/UL (ref 0–0.4)
EOSINOPHIL NFR BLD MANUAL: 7 % (ref 0–6)
ERYTHROCYTE [DISTWIDTH] IN BLOOD BY AUTOMATED COUNT: 17.3 %
GFR SERPL CREATININE-BSD FRML MDRD: 103 ML/MIN/1.73SQ M
GLUCOSE SERPL-MCNC: 89 MG/DL (ref 70–99)
HCT VFR BLD AUTO: 35.2 % (ref 36–46)
HGB BLD-MCNC: 11 G/DL (ref 12–16)
HYPERCHROMIA BLD QL SMEAR: PRESENT
LYMPHOCYTES # BLD AUTO: 1.96 THOUSAND/UL (ref 0.5–4)
LYMPHOCYTES # BLD AUTO: 28 % (ref 25–45)
MCH RBC QN AUTO: 22.5 PG (ref 26–34)
MCHC RBC AUTO-ENTMCNC: 31.1 G/DL (ref 31–36)
MCV RBC AUTO: 72 FL (ref 80–100)
MICROCYTES BLD QL AUTO: PRESENT
MONOCYTES # BLD AUTO: 0.35 THOUSAND/UL (ref 0.2–0.9)
MONOCYTES NFR BLD AUTO: 5 % (ref 1–10)
NEUTS BAND NFR BLD MANUAL: 11 % (ref 0–8)
NEUTS SEG # BLD: 3.78 THOUSAND/UL (ref 1.8–7.8)
NEUTS SEG NFR BLD AUTO: 43 %
PLATELET # BLD AUTO: 173 THOUSANDS/UL (ref 150–450)
PLATELET BLD QL SMEAR: ADEQUATE
PMV BLD AUTO: 9.1 FL (ref 8.9–12.7)
POIKILOCYTOSIS BLD QL SMEAR: PRESENT
POTASSIUM SERPL-SCNC: 3.9 MMOL/L (ref 3.6–5)
PROT SERPL-MCNC: 7 G/DL (ref 5.9–8.4)
RBC # BLD AUTO: 4.87 MILLION/UL (ref 4–5.2)
RBC MORPH BLD: ABNORMAL
SODIUM SERPL-SCNC: 138 MMOL/L (ref 137–147)
TOTAL CELLS COUNTED SPEC: 100
VARIANT LYMPHS # BLD AUTO: 5 % (ref 0–0)
WBC # BLD AUTO: 7 THOUSAND/UL (ref 4.5–11)

## 2019-01-31 PROCEDURE — 36415 COLL VENOUS BLD VENIPUNCTURE: CPT | Performed by: PHYSICIAN ASSISTANT

## 2019-01-31 PROCEDURE — 85027 COMPLETE CBC AUTOMATED: CPT | Performed by: PHYSICIAN ASSISTANT

## 2019-01-31 PROCEDURE — 96375 TX/PRO/DX INJ NEW DRUG ADDON: CPT

## 2019-01-31 PROCEDURE — 85007 BL SMEAR W/DIFF WBC COUNT: CPT | Performed by: PHYSICIAN ASSISTANT

## 2019-01-31 PROCEDURE — 96374 THER/PROPH/DIAG INJ IV PUSH: CPT

## 2019-01-31 PROCEDURE — 96361 HYDRATE IV INFUSION ADD-ON: CPT

## 2019-01-31 PROCEDURE — 80053 COMPREHEN METABOLIC PANEL: CPT | Performed by: PHYSICIAN ASSISTANT

## 2019-01-31 PROCEDURE — 99284 EMERGENCY DEPT VISIT MOD MDM: CPT

## 2019-01-31 PROCEDURE — 70450 CT HEAD/BRAIN W/O DYE: CPT

## 2019-01-31 RX ORDER — METOCLOPRAMIDE HYDROCHLORIDE 5 MG/ML
10 INJECTION INTRAMUSCULAR; INTRAVENOUS ONCE
Status: COMPLETED | OUTPATIENT
Start: 2019-01-31 | End: 2019-01-31

## 2019-01-31 RX ORDER — DIPHENHYDRAMINE HYDROCHLORIDE 50 MG/ML
25 INJECTION INTRAMUSCULAR; INTRAVENOUS ONCE
Status: COMPLETED | OUTPATIENT
Start: 2019-01-31 | End: 2019-01-31

## 2019-01-31 RX ORDER — DEXAMETHASONE SODIUM PHOSPHATE 4 MG/ML
10 INJECTION, SOLUTION INTRA-ARTICULAR; INTRALESIONAL; INTRAMUSCULAR; INTRAVENOUS; SOFT TISSUE ONCE
Status: COMPLETED | OUTPATIENT
Start: 2019-01-31 | End: 2019-01-31

## 2019-01-31 RX ADMIN — METOCLOPRAMIDE 10 MG: 5 INJECTION, SOLUTION INTRAMUSCULAR; INTRAVENOUS at 13:01

## 2019-01-31 RX ADMIN — SODIUM CHLORIDE 1000 ML: 9 INJECTION, SOLUTION INTRAVENOUS at 13:01

## 2019-01-31 RX ADMIN — DEXAMETHASONE SODIUM PHOSPHATE 10 MG: 4 INJECTION, SOLUTION INTRAMUSCULAR; INTRAVENOUS at 13:02

## 2019-01-31 RX ADMIN — DIPHENHYDRAMINE HYDROCHLORIDE 25 MG: 50 INJECTION INTRAMUSCULAR; INTRAVENOUS at 13:02

## 2019-01-31 NOTE — DISCHARGE INSTRUCTIONS
Dolor de russ severo, cuidados ambulatorios   INFORMACIÓN GENERAL:   Un dolor de russ severo  es un dolor o incomodidad que empieza de repente y empeora rápidamente  Se desconoce la causa de un dolor de russ severo o aaron  Es posible que sea provocado por estrés, fatiga, hormonas, alimentos o traumas  Los siguientes son los síntomas más comunes de un aaron dolor de russ:   · Fiebre    · Presión en los senos paranasales (sinusitis)    · Pérdida de la memoria    · Náuseas o vómito    · Problemas con la visión, aurelio ojos rojos, lagrimeo, pérdida de la visión o dolor con la sai brillante    · Rigidez del jacqui    · Sensibilidad del área de la russ y el jacqui    · Mayor dificultad de la acostumbrada para permanecer despierto o para estar alerta que     · Debilidad o falta de energía  Busque atención inmediata al presentar los siguientes síntomas:   · Dolor aaron    · Un dolor de russ que ocurre después de un golpe a la russ, amaury caída o un trauma     · Confusión o estar olvidadizo    · Entumecimiento a un costado de la maximiliano o del cuerpo  El tratamiento para un dolor de russ aaron  puede incluir medicamento para tratar el dolor  También puede necesitar terapia de retro-alimentiación (biofeedback) o terapia de comportamiento cognitivo  Consulte con smith proveedor de [employer] y otros tratamientos para un dolor de russ aaron  La forma de lidiar con los síntomas:   · Aplique calor  en smith russ adriano 20 a 30 minutos cada 2 horas por los [employer] indicaron  El calor ayuda a disminuir el dolor y los espasmos musculares  Se puede alternar entre calor y frío  · Aplique hielo  en smith russ por 15 a 20 minutos cada hora según las indicaciones  Use amaury compresa de hielo o coloque hielo triturado en amaury bolsa plástica y cúbrala con amaury toalla  El hielo disminuye el dolor  · Relaje miko músculos  Acuéstese en amaury posición cómoda y cierre los ojos  Relaje miko músculos despacio   Empiece con los dedos de miko pies y Daphine Furry subiendo por valdez cuerpo  · Mantenga un registro de los mary de [place]  Henery Early y terminó valdez dolor de [place]  Incluya los síntomas y lo que estaba haciendo cuando el dolor de [place]  Escriba en el registro lo que usted comió o bebió adriano las 24 horas antes de que empezara valdez dolor de [place]  Flory Setswana dolor y [employer]  Registre lo que hizo para tratar valdez dolor de russ y si funciono o no  Acuda a valdez mars de control con valdez proveedor de jerome según le indicaron:  Lleve valdez registro de los mary de russ cuando acuda a la [place] con valdez proveedor de jerome  Escriba las preguntas que tenga para que no se le olvide hacerlas adriano las citas médicas  ACUERDOS SOBRE VALDEZ CUIDADO:   Usted tiene el derecho de participar en la planificación de valdez cuidado  Aprenda todo lo que pueda sobre valdez condición y aurelio darle tratamiento  Discuta con miko médicos miko opciones de tratamiento para juntos decidir el cuidado que usted quiere recibir  Usted siempre tiene el derecho a rechazar valdez tratamiento  Esta información es sólo para uso en educación  Valdez intención no es darle un consejo médico sobre enfermedades o tratamientos  Colsulte con valdez [employer] farm[employer] antes de seguir cualquier régimen médico para saber si es seguro y efectivo para usted  © 2014 [address] is for End User's use only and may not be sold, redistributed or otherwise used for commercial purposes  All illustrations and images included in CareNotes® are the copyrighted property of A D A M , Inc  or Darrell Townsend

## 2019-01-31 NOTE — ED NOTES
Patient reports that headache is improved, pain 7/10 from 9/10    Nausea has resolved     Eliel Garcia RN  01/31/19 0376

## 2019-01-31 NOTE — ED NOTES
Pt attached to continuous bedside cardiac and pulse oximetry monitoring     Dayami Logan RN  01/31/19 0348

## 2019-01-31 NOTE — ED PROVIDER NOTES
History  Chief Complaint   Patient presents with    Headache     pt c/o sinus pressure, headache, and left-sided facial numbness which all started about 1 week ago, states it feels like her typical migraine      81-year-old female past medical history of migraines, lupus, lymphoma currently receiving chemotherapy presenting with right-sided headache x1 week  She reports that this headache was gradual in onset and feels similar to her previous migraines  She states that she has been using her Percocet at home but has run out of her prescription and has not went to the pharmacy for refill  She reports nausea and photophobia denies any phonophobia  She denies any blurry vision dizziness loss of vision diplopia numbness tingling or weakness in the upper or lower extremities  She denies any chest pain shortness breath abdominal pain vomiting or diarrhea  History was provided using the language line  Prior to Admission Medications   Prescriptions Last Dose Informant Patient Reported?  Taking?   allopurinol (ZYLOPRIM) 100 mg tablet   No No   Sig: Take 1 tablet (100 mg total) by mouth daily For 30 days than stop   amLODIPine (NORVASC) 5 mg tablet   No No   Sig: Take 1 tablet (5 mg total) by mouth daily   citalopram (CeleXA) 20 mg tablet  Family Member Yes No   Sig: Take 10 mg by mouth daily   omeprazole (PriLOSEC) 20 mg delayed release capsule   No No   Sig: Take 1 capsule (20 mg total) by mouth daily To prevent stomach upset   ondansetron (ZOFRAN) 8 mg tablet   No No   Sig: Take 1 tablet (8 mg total) by mouth every 8 (eight) hours as needed for nausea or vomiting   oxyCODONE (ROXICODONE) 5 mg immediate release tablet   No No   Sig: Take 1 tablet (5 mg total) by mouth every 4 (four) hours as needed for moderate pain for up to 30 doses Max Daily Amount: 30 mg   polyethylene glycol (MIRALAX) 17 g packet  Family Member No No   Sig: Take 17 g by mouth daily   senna (SENOKOT) 8 6 mg  Family Member No No   Sig: Take 1 tablet (8 6 mg total) by mouth daily at bedtime      Facility-Administered Medications: None       Past Medical History:   Diagnosis Date    Arthritis     Asthma     Lupus     Migraine     Osteoporosis     Psychiatric disorder        Past Surgical History:   Procedure Laterality Date    CHOLECYSTECTOMY      FL GUIDED CENTRAL VENOUS ACCESS REPLACEMENT  11/9/2018    HYSTERECTOMY      LYMPH NODE BIOPSY Left 11/9/2018    Procedure: EXCISION BIOPSY LYMPH NODE SUPRACLAVICULAR;  Surgeon: Dominga Spencer MD;  Location: [address] [place] [address];  Service: General    TUNNELED VENOUS PORT PLACEMENT N/A 11/9/2018    Procedure: INSERTION OF PORT-A-CATH;  Surgeon: Dominga Spencer MD;  Location: [address] [place] [address];  Service: General       Family History   Problem Relation Age of Onset    Cancer Mother     Cancer Father      I have reviewed and agree with the history as documented  Social History   Substance Use Topics    Smoking status: Former Smoker    Smokeless tobacco: Never Used    Alcohol use No        Review of Systems   All other systems reviewed and are negative  Physical Exam  Physical Exam   Constitutional: She is oriented to person, place, and time  She appears well-developed and well-nourished  No distress  HENT:   Head: Normocephalic and atraumatic  Eyes: Pupils are equal, round, and reactive to light  Conjunctivae and EOM are normal    Neck: Normal range of motion  Neck supple  No JVD present  Cardiovascular: Normal rate  Pulmonary/Chest: Effort normal  No respiratory distress  She has no wheezes  Abdominal: Soft  Musculoskeletal: She exhibits no edema, tenderness or deformity  FROM, steady gait, cap refill brisk, strength and sensation grossly intact throughout   Lymphadenopathy:     She has no cervical adenopathy  Neurological: She is alert and oriented to person, place, and time  No cranial nerve deficit or sensory deficit  She exhibits normal muscle tone  Skin: Skin is warm and dry  Capillary refill takes less than 2 seconds  Psychiatric: She has a normal mood and affect  Her behavior is normal    Nursing note and vitals reviewed        Vital Signs  ED Triage Vitals   Temperature Pulse Respirations Blood Pressure SpO2   01/31/19 1200 01/31/19 1200 01/31/19 1200 01/31/19 1200 01/31/19 1357   98 °F (36 7 °C) 68 (!) 24 132/58 100 %      Temp Source Heart Rate Source Patient Position - Orthostatic VS BP Location FiO2 (%)   01/31/19 1200 01/31/19 1357 01/31/19 1200 01/31/19 1200 --   Tympanic Monitor Lying Left arm       Pain Score       01/31/19 1230       9           Vitals:    01/31/19 1200 01/31/19 1357   BP: 132/58 114/64   Pulse: 68 66   Patient Position - Orthostatic VS: Lying Lying       Visual Acuity  Visual Acuity      Most Recent Value   L Pupil Size (mm)  2   R Pupil Size (mm)  2          ED Medications  Medications   sodium chloride 0 9 % bolus 1,000 mL (0 mL Intravenous Stopped 1/31/19 1450)   diphenhydrAMINE (BENADRYL) injection 25 mg (25 mg Intravenous Given 1/31/19 1302)   metoclopramide (REGLAN) injection 10 mg (10 mg Intravenous Given 1/31/19 1301)   dexamethasone (DECADRON) injection 10 mg (10 mg Intravenous Given 1/31/19 1302)       Diagnostic Studies  Results Reviewed     Procedure Component Value Units Date/Time    Comprehensive metabolic panel [[id number]]  (Abnormal) Collected:  01/31/19 1245    Lab Status:  Final result Specimen:  Blood from Arm, Left Updated:  01/31/19 1302     Sodium 138 mmol/L      Potassium 3 9 mmol/L      Chloride 103 mmol/L      CO2 27 mmol/L      ANION GAP 8 mmol/L      BUN 13 mg/dL      Creatinine 0 58 (L) mg/dL      Glucose 89 mg/dL      Calcium 9 6 mg/dL      AST 27 U/L      ALT 19 U/L      Alkaline Phosphatase 119 U/L      Total Protein 7 0 g/dL      Albumin 4 2 g/dL      Total Bilirubin 0 50 mg/dL      eGFR 103 ml/min/1 73sq m     Narrative:         National Kidney Disease Education Program recommendations are as follows:  GFR calculation is accurate only with a steady state creatinine  Chronic Kidney disease less than 60 ml/min/1 73 sq  meters  Kidney failure less than 15 ml/min/1 73 sq  meters  CBC and differential [[id number]]  (Abnormal) Collected:  01/31/19 1245    Lab Status:  Final result Specimen:  Blood from Arm, Left Updated:  01/31/19 1254     WBC 7 00 Thousand/uL      RBC 4 87 Million/uL      Hemoglobin 11 0 (L) g/dL      Hematocrit 35 2 (L) %      MCV 72 (L) fL      MCH 22 5 (L) pg      MCHC 31 1 g/dL      RDW 17 3 (H) %      MPV 9 1 fL      Platelets 748 Thousands/uL                  CT head without contrast   Final Result by Inocencia Ferrer MD (01/31 1453)      No acute intracranial abnormality  Encephalomalacia within the left frontal lobe  Workstation performed: [id number]                    Procedures  Procedures       Phone Contacts  ED Phone Contact    ED Course  ED Course as of Jan 31 1502   Thu Jan 31, 2019   1451 Pt feeling improved after migraine cocktail, will await CTHead results and then dispo                                MDM  Number of Diagnoses or Management Options  Diagnosis management comments: 68-year-old female with past history significant for migraine presenting today with 1 week history of gradual onset migraine, similar to past HA, patient felt improved with the Benadryl IV fluids and Decadron here, CT of the head showed no acute intracranial hemorrhage or fracture, there was some encephalomalacia in the frontal lobe noted, advised patient to follow up with her PCP who prescribed her Percocet, she has f/u appt next week, otherwise no focal neuro deficits, f/u with pcp as needed outpatient as needed    All labs and imaging discussed with patient, strict return to ED precautions discussed  Pt verbalizes understanding and agrees with plan  Pt is stable for discharge    Portions of the record may have been created with voice recognition software   Occasional wrong word or "sound a like" substitutions may have occurred due to the inherent limitations of voice recognition software  Read the chart carefully and recognize, using context, where substitutions have occurred  Disposition  Final diagnoses:   Headache     Time reflects when diagnosis was documented in both MDM as applicable and the Disposition within this note     Time User Action Codes Description Comment    1/31/2019  3:02 PM Thaokerry Alcala Add [R51] Headache       ED Disposition     ED Disposition Condition Date/Time Comment    Discharge  Thu Jan 31, 2019  3:02 PM Maribel Beckycarlos a discharge to home/self care  Condition at discharge: Good        Follow-up Information     Follow up With Specialties Details Why Contact Info Additional [address] [facility] [address],  Family Medicine Schedule an appointment as soon as possible for a visit As needed [address] Neurology [facility] Neurology Schedule an appointment as soon as possible for a visit As needed [place] [phone]  [address] [facility] Neurology [facility], [address] [place], [place], [address]          Patient's Medications   Discharge Prescriptions    No medications on file     No discharge procedures on file      ED Provider  Electronically Signed by           Ramesh Can PA-C  01/31/19 4937

## 2019-02-05 RX ORDER — ACETAMINOPHEN 325 MG/1
650 TABLET ORAL ONCE
Status: COMPLETED | OUTPATIENT
Start: 2019-02-06 | End: 2019-02-06

## 2019-02-05 RX ORDER — SODIUM CHLORIDE 9 MG/ML
20 INJECTION, SOLUTION INTRAVENOUS ONCE
Status: COMPLETED | OUTPATIENT
Start: 2019-02-06 | End: 2019-02-06

## 2019-02-06 ENCOUNTER — HOSPITAL ENCOUNTER (OUTPATIENT)
Dept: INFUSION CENTER | Facility: HOSPITAL | Age: 59
Discharge: HOME | End: 2019-02-06
Payer: COMMERCIAL

## 2019-02-06 ENCOUNTER — OFFICE VISIT (OUTPATIENT)
Dept: HEMATOLOGY ONCOLOGY | Facility: CLINIC | Age: 59
End: 2019-02-06
Payer: COMMERCIAL

## 2019-02-06 VITALS
RESPIRATION RATE: 18 BRPM | BODY MASS INDEX: 31.22 KG/M2 | SYSTOLIC BLOOD PRESSURE: 148 MMHG | DIASTOLIC BLOOD PRESSURE: 78 MMHG | WEIGHT: 187.4 LBS | HEART RATE: 64 BPM | TEMPERATURE: 97.7 F | HEIGHT: 65 IN

## 2019-02-06 VITALS
WEIGHT: 187.39 LBS | TEMPERATURE: 97.7 F | BODY MASS INDEX: 31.22 KG/M2 | DIASTOLIC BLOOD PRESSURE: 83 MMHG | HEART RATE: 60 BPM | RESPIRATION RATE: 16 BRPM | HEIGHT: 65 IN | SYSTOLIC BLOOD PRESSURE: 144 MMHG

## 2019-02-06 DIAGNOSIS — C82.18 GRADE 2 FOLLICULAR LYMPHOMA OF LYMPH NODES OF MULTIPLE REGIONS (HCC): Primary | ICD-10-CM

## 2019-02-06 DIAGNOSIS — M79.601 PAIN OF RIGHT UPPER EXTREMITY: ICD-10-CM

## 2019-02-06 DIAGNOSIS — R10.84 GENERALIZED ABDOMINAL PAIN: ICD-10-CM

## 2019-02-06 PROCEDURE — 96413 CHEMO IV INFUSION 1 HR: CPT

## 2019-02-06 PROCEDURE — 96411 CHEMO IV PUSH ADDL DRUG: CPT

## 2019-02-06 PROCEDURE — 96367 TX/PROPH/DG ADDL SEQ IV INF: CPT

## 2019-02-06 PROCEDURE — 96415 CHEMO IV INFUSION ADDL HR: CPT

## 2019-02-06 PROCEDURE — 99214 OFFICE O/P EST MOD 30 MIN: CPT | Performed by: INTERNAL MEDICINE

## 2019-02-06 RX ORDER — OXYCODONE HYDROCHLORIDE 5 MG/1
5 TABLET ORAL EVERY 4 HOURS PRN
Qty: 30 TABLET | Refills: 0 | Status: SHIPPED | OUTPATIENT
Start: 2019-02-06 | End: 2019-02-20 | Stop reason: SDUPTHER

## 2019-02-06 RX ORDER — SODIUM CHLORIDE 9 MG/ML
20 INJECTION, SOLUTION INTRAVENOUS ONCE
Status: COMPLETED | OUTPATIENT
Start: 2019-02-07 | End: 2019-02-07

## 2019-02-06 RX ADMIN — BENDAMUSTINE HYDROCHLORIDE 174 MG: 25 INJECTION, SOLUTION INTRAVENOUS at 13:43

## 2019-02-06 RX ADMIN — SODIUM CHLORIDE 20 ML/HR: 9 INJECTION, SOLUTION INTRAVENOUS at 09:15

## 2019-02-06 RX ADMIN — ACETAMINOPHEN 650 MG: 325 TABLET ORAL at 10:17

## 2019-02-06 RX ADMIN — RITUXIMAB 700 MG: 10 INJECTION, SOLUTION INTRAVENOUS at 10:54

## 2019-02-06 RX ADMIN — SODIUM CHLORIDE 150 MG: 9 INJECTION, SOLUTION INTRAVENOUS at 09:46

## 2019-02-06 RX ADMIN — DEXAMETHASONE SODIUM PHOSPHATE: 10 INJECTION, SOLUTION INTRAMUSCULAR; INTRAVENOUS at 09:24

## 2019-02-06 RX ADMIN — DIPHENHYDRAMINE HYDROCHLORIDE 50 MG: 50 INJECTION, SOLUTION INTRAMUSCULAR; INTRAVENOUS at 10:19

## 2019-02-06 NOTE — PLAN OF CARE
Problem: Potential for Falls  Goal: Patient will remain free of falls  INTERVENTIONS:  - Assess patient frequently for physical needs  -  Identify cognitive and physical deficits and behaviors that affect risk of falls    -  [place] fall precautions as indicated by assessment   - Educate patient/family on patient safety including physical limitations  - Instruct patient to call for assistance with activity based on assessment  - Modify environment to reduce risk of injury  - Consider OT/PT consult to assist with strengthening/mobility   Outcome: Progressing

## 2019-02-07 ENCOUNTER — HOSPITAL ENCOUNTER (OUTPATIENT)
Dept: INFUSION CENTER | Facility: HOSPITAL | Age: 59
Discharge: HOME | End: 2019-02-07
Payer: COMMERCIAL

## 2019-02-07 VITALS
HEART RATE: 65 BPM | TEMPERATURE: 98 F | DIASTOLIC BLOOD PRESSURE: 71 MMHG | RESPIRATION RATE: 16 BRPM | SYSTOLIC BLOOD PRESSURE: 161 MMHG

## 2019-02-07 PROCEDURE — 96377 APPLICATON ON-BODY INJECTOR: CPT

## 2019-02-07 PROCEDURE — 96367 TX/PROPH/DG ADDL SEQ IV INF: CPT

## 2019-02-07 PROCEDURE — 96413 CHEMO IV INFUSION 1 HR: CPT

## 2019-02-07 RX ADMIN — DEXAMETHASONE SODIUM PHOSPHATE: 10 INJECTION, SOLUTION INTRAMUSCULAR; INTRAVENOUS at 10:31

## 2019-02-07 RX ADMIN — BENDAMUSTINE HYDROCHLORIDE 174 MG: 25 INJECTION, SOLUTION INTRAVENOUS at 10:51

## 2019-02-07 RX ADMIN — PEGFILGRASTIM 6 MG: KIT SUBCUTANEOUS at 11:13

## 2019-02-07 RX ADMIN — SODIUM CHLORIDE 20 ML/HR: 9 INJECTION, SOLUTION INTRAVENOUS at 10:27

## 2019-02-19 ENCOUNTER — TELEPHONE (OUTPATIENT)
Dept: HEMATOLOGY ONCOLOGY | Facility: CLINIC | Age: 59
End: 2019-02-19

## 2019-02-19 ENCOUNTER — APPOINTMENT (OUTPATIENT)
Dept: LAB | Facility: HOSPITAL | Age: 59
End: 2019-02-19
Payer: COMMERCIAL

## 2019-02-19 ENCOUNTER — TRANSCRIBE ORDERS (OUTPATIENT)
Dept: ADMINISTRATIVE | Facility: HOSPITAL | Age: 59
End: 2019-02-19

## 2019-02-19 DIAGNOSIS — C82.18 GRADE 2 FOLLICULAR LYMPHOMA OF LYMPH NODES OF MULTIPLE REGIONS (HCC): ICD-10-CM

## 2019-02-19 LAB
ALBUMIN SERPL BCP-MCNC: 4 G/DL (ref 3–5.2)
ALP SERPL-CCNC: 104 U/L (ref 43–122)
ALT SERPL W P-5'-P-CCNC: 23 U/L (ref 9–52)
ANION GAP SERPL CALCULATED.3IONS-SCNC: 8 MMOL/L (ref 5–14)
AST SERPL W P-5'-P-CCNC: 20 U/L (ref 14–36)
BASOPHILS # BLD AUTO: 0.01 THOUSAND/UL (ref 0–0.1)
BASOPHILS NFR MAR MANUAL: 1 % (ref 0–1)
BILIRUB SERPL-MCNC: 0.4 MG/DL
BUN SERPL-MCNC: 12 MG/DL (ref 5–25)
CALCIUM SERPL-MCNC: 9.3 MG/DL (ref 8.4–10.2)
CHLORIDE SERPL-SCNC: 105 MMOL/L (ref 97–108)
CO2 SERPL-SCNC: 27 MMOL/L (ref 22–30)
CREAT SERPL-MCNC: 0.7 MG/DL (ref 0.6–1.2)
CRP SERPL QL: 3.5 MG/L
EOSINOPHIL # BLD AUTO: 0.2 THOUSAND/UL (ref 0–0.4)
EOSINOPHIL NFR BLD MANUAL: 17 % (ref 0–6)
ERYTHROCYTE [DISTWIDTH] IN BLOOD BY AUTOMATED COUNT: 15.6 %
ERYTHROCYTE [SEDIMENTATION RATE] IN BLOOD: 26 MM/HOUR (ref 1–20)
GFR SERPL CREATININE-BSD FRML MDRD: 97 ML/MIN/1.73SQ M
GLUCOSE P FAST SERPL-MCNC: 81 MG/DL (ref 70–99)
HCT VFR BLD AUTO: 33.1 % (ref 36–46)
HGB BLD-MCNC: 10.2 G/DL (ref 12–16)
LDH SERPL-CCNC: 482 U/L (ref 313–618)
LYMPHOCYTES # BLD AUTO: 0.49 THOUSAND/UL (ref 0.5–4)
LYMPHOCYTES # BLD AUTO: 41 % (ref 25–45)
MAGNESIUM SERPL-MCNC: 2 MG/DL (ref 1.6–2.3)
MCH RBC QN AUTO: 21.8 PG (ref 26–34)
MCHC RBC AUTO-ENTMCNC: 30.9 G/DL (ref 31–36)
MCV RBC AUTO: 70 FL (ref 80–100)
MICROCYTES BLD QL AUTO: PRESENT
MONOCYTES # BLD AUTO: 0.2 THOUSAND/UL (ref 0.2–0.9)
MONOCYTES NFR BLD AUTO: 17 % (ref 1–10)
NEUTS SEG # BLD: 0.29 THOUSAND/UL (ref 1.8–7.8)
NEUTS SEG NFR BLD AUTO: 24 %
PLATELET # BLD AUTO: 101 THOUSANDS/UL (ref 150–450)
PLATELET BLD QL SMEAR: ABNORMAL
PMV BLD AUTO: 9 FL (ref 8.9–12.7)
POTASSIUM SERPL-SCNC: 4.2 MMOL/L (ref 3.6–5)
PROT SERPL-MCNC: 6.5 G/DL (ref 5.9–8.4)
RBC # BLD AUTO: 4.7 MILLION/UL (ref 4–5.2)
RBC MORPH BLD: PRESENT
SODIUM SERPL-SCNC: 140 MMOL/L (ref 137–147)
TOTAL CELLS COUNTED SPEC: 100
WBC # BLD AUTO: 1.2 THOUSAND/UL (ref 4.5–11)

## 2019-02-19 PROCEDURE — 85007 BL SMEAR W/DIFF WBC COUNT: CPT

## 2019-02-19 PROCEDURE — 36415 COLL VENOUS BLD VENIPUNCTURE: CPT

## 2019-02-19 PROCEDURE — 83615 LACTATE (LD) (LDH) ENZYME: CPT

## 2019-02-19 PROCEDURE — 85027 COMPLETE CBC AUTOMATED: CPT

## 2019-02-19 PROCEDURE — 83735 ASSAY OF MAGNESIUM: CPT

## 2019-02-19 PROCEDURE — 85652 RBC SED RATE AUTOMATED: CPT

## 2019-02-19 PROCEDURE — 80053 COMPREHEN METABOLIC PANEL: CPT

## 2019-02-19 PROCEDURE — 86140 C-REACTIVE PROTEIN: CPT

## 2019-02-19 NOTE — TELEPHONE ENCOUNTER
Jasmin Wilson from [facility] called with critical white count of 1 2 for the patient  Clifford Lima can be reached at [phone]

## 2019-02-20 ENCOUNTER — OFFICE VISIT (OUTPATIENT)
Dept: HEMATOLOGY ONCOLOGY | Facility: CLINIC | Age: 59
End: 2019-02-20
Payer: COMMERCIAL

## 2019-02-20 VITALS
HEART RATE: 72 BPM | SYSTOLIC BLOOD PRESSURE: 130 MMHG | DIASTOLIC BLOOD PRESSURE: 80 MMHG | BODY MASS INDEX: 30.32 KG/M2 | WEIGHT: 182 LBS | TEMPERATURE: 98 F | HEIGHT: 65 IN | OXYGEN SATURATION: 99 % | RESPIRATION RATE: 20 BRPM

## 2019-02-20 DIAGNOSIS — K59.01 SLOW TRANSIT CONSTIPATION: ICD-10-CM

## 2019-02-20 DIAGNOSIS — R21 RASH AND NONSPECIFIC SKIN ERUPTION: ICD-10-CM

## 2019-02-20 DIAGNOSIS — D70.1 CHEMOTHERAPY INDUCED NEUTROPENIA (HCC): ICD-10-CM

## 2019-02-20 DIAGNOSIS — T45.1X5A CHEMOTHERAPY INDUCED NEUTROPENIA (HCC): ICD-10-CM

## 2019-02-20 DIAGNOSIS — R10.84 GENERALIZED ABDOMINAL PAIN: ICD-10-CM

## 2019-02-20 DIAGNOSIS — C82.18 GRADE 2 FOLLICULAR LYMPHOMA OF LYMPH NODES OF MULTIPLE REGIONS (HCC): Primary | ICD-10-CM

## 2019-02-20 DIAGNOSIS — G93.89 ENCEPHALOMALACIA ON IMAGING STUDY: ICD-10-CM

## 2019-02-20 PROCEDURE — 99215 OFFICE O/P EST HI 40 MIN: CPT | Performed by: NURSE PRACTITIONER

## 2019-02-20 RX ORDER — OXYCODONE HYDROCHLORIDE 5 MG/1
5 TABLET ORAL EVERY 4 HOURS PRN
Qty: 30 TABLET | Refills: 0 | Status: SHIPPED | OUTPATIENT
Start: 2019-02-20 | End: 2019-03-27 | Stop reason: SDUPTHER

## 2019-02-20 NOTE — PROGRESS NOTES
Hematology/Oncology Outpatient Follow-up  Angella Jordan 66 y o  female 1960 [id number]    Date:  2/20/2019      Assessment and Plan:  1  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 [facility])  Patient is tolerating her bendamustine and Rituxan fairly well  She will be back in 2 weeks for follow-up appointment and repeat blood work prior to cycle 4      - CBC and differential; Future  - Comprehensive metabolic panel; Future  - LD,Blood; Future  - oxyCODONE (ROXICODONE) 5 mg immediate release tablet; Take 1 tablet (5 mg total) by mouth every 4 (four) hours as needed for moderate pain for up to 30 dosesMax Daily Amount: 30 mg  Dispense: 30 tablet; Refill: 0    2  Chemotherapy induced neutropenia ([address] )  Patient's most recent blood work that was drawn yesterday reveal significant chemo induced neutropenia with an [address] of 0 29  She is currently afebrile without any signs of infection  Patient and her daughter educated on neutropenic precautions  She was encouraged to monitor her temperature and symptoms and report any temperature over 100 or any signs and symptoms of infection immediately or go to the emergency department if that should occur  They agree  - CBC and differential; Future    3  Encephalomalacia on imaging study  Patient does have history of chronic migraines and history of falls  Report on CT scan from 01/31/2019 reveals a focus of left frontal encephalomalacia consistent with remote infarct  We will send patient to the Neurology team for further workup/recommendations  - Ambulatory referral to Neurology; Future    4  Generalized abdominal pain  Patient is also reporting constipation which may be aggravating her abdominal pain  Patient will continue her oxycodone every 4-6 hours as needed for moderate/severe pain  We will continue to monitor     - oxyCODONE (ROXICODONE) 5 mg immediate release tablet;  Take 1 tablet (5 mg total) by mouth every 4 (four) hours as needed for moderate pain for up to 30 dosesMax Daily Amount: 30 mg  Dispense: 30 tablet; Refill: 0    5  Slow transit constipation  Patient is not taking her laxatives that have been prescribed  Educated patient her daughter that constipation will likely be chronic and ongoing while she is taking oxycodone  Encouraged her to take her MiraLax on a daily basis which can be increased to b i d  If it is ineffective  6  Rash and nonspecific skin eruption  Grade 1 rash to hands, arms and per patient's report thighs as well  Patient encouraged to continue to use moisturizer and Benadryl cream as needed  HPI:  Patient presents today for a lesvia visit accompanied by her daughter, there Russian-speaking only interpretation done via [employer] interpretation system  She continues to report abdominal pain rated 8/10 mostly in the left upper quadrant but radiates across entire upper abdomen  Taking her oxycodone as needed with relief  She denies indigestion  States that she does have moderate amount of nausea with no vomiting, her antiemetic relieves the nausea  She also admits to constipation and infrequent bowel movements  She is not currently taking the laxatives that were prescribed for her, she is only drinking water and juice  Patient did present to the emergency department on 01/31/2019 with complaints of headache for 1 week  She does have history of migraine headaches and falls  She was treated in the ER department for the headache and a CT of the head without contrast was performed which was read as:  IMPRESSION:  No acute intracranial abnormality  Encephalomalacia within the left frontal lobe    Patient's lab work drawn yesterday revealed low WBC 1 2 she is neutropenic with an ANC of 0 29  Hemoglobin 10 2 and platelets 670  She is currently afebrile and denies any signs or symptoms of infection at present      Oncology History    The patient started to notice significant abdominal pain about 8 months prior to presentation, she then was evaluated in the hospital with a CT scan of the chest abdomen pelvis on the 7th of November   This showed massive lymphadenopathy throughout the abdomen and pelvis with hepatic and massive splenomegaly   She also was found to have bulky supraclavicular, axillary and mediastinal adenopathy  She then had a supra clavicular lymph node excisional biopsy on the 9th of November , the pathology was compatible with Follicular lymphoma, WHO grade 1-2  She then had a bone marrow biopsy on the 20th of November which showed also low grade B-cell lymphoma CD10 positive compromising 63% of the total cells  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 ([facility] [address] )    11/7/2018 Initial Diagnosis     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 ([facility] [address] )         12/6/2018 -  Chemotherapy     Cycle 1 of rituximab and bendamustine was started on 12/6/2018 12/7/2018 Adverse Reaction     C1D2 labs reflective of tumor lysis syndrome, dose of rasburicase given x1 and admitted for close observation/hydration            Interval history:    ROS: Review of Systems   Constitutional: Negative for activity change, appetite change, chills, fatigue, fever and unexpected weight change  HENT: Negative for congestion, mouth sores, nosebleeds, sore throat and trouble swallowing  Eyes: Negative  Respiratory: Positive for shortness of breath (exertional)  Negative for cough and chest tightness  Cardiovascular: Positive for leg swelling (at times, comes and goes)  Negative for chest pain and palpitations  Gastrointestinal: Positive for abdominal pain, constipation and nausea  Negative for abdominal distention, blood in stool, diarrhea and vomiting  Genitourinary: Negative for difficulty urinating, dysuria, frequency, hematuria and urgency  Musculoskeletal: Negative for arthralgias, back pain, gait problem, joint swelling and myalgias  Skin: Positive for rash  Negative for color change and pallor  Neurological: Positive for dizziness and headaches  Negative for weakness, light-headedness and numbness  Hematological: Positive for adenopathy (tenderness to LN right neck)  Does not bruise/bleed easily  Psychiatric/Behavioral: Positive for sleep disturbance  The patient is not nervous/anxious          Past Medical History:   Diagnosis Date    Arthritis     Asthma     Lupus     Migraine     Osteoporosis     Psychiatric disorder        Past Surgical History:   Procedure Laterality Date    CHOLECYSTECTOMY      FL GUIDED CENTRAL VENOUS ACCESS REPLACEMENT  11/9/2018    HYSTERECTOMY      LYMPH NODE BIOPSY Left 11/9/2018    Procedure: EXCISION BIOPSY LYMPH NODE SUPRACLAVICULAR;  Surgeon: Zhane Rothman MD;  Location: [address] [place] [address];  Service: General    TUNNELED VENOUS PORT PLACEMENT N/A 11/9/2018    Procedure: INSERTION OF PORT-A-CATH;  Surgeon: Zhane Rothman MD;  Location: [address] [place] [address];  Service: General       Social History     Socioeconomic History    Marital status: Single     Spouse name: None    Number of children: None    Years of education: None    Highest education level: None   Occupational History    None   Social Needs    Financial resource strain: None    Food insecurity:     Worry: None     Inability: None    Transportation needs:     Medical: None     Non-medical: None   Tobacco Use    Smoking status: Former Smoker    Smokeless tobacco: Never Used   Substance and Sexual Activity    Alcohol use: No    Drug use: No    Sexual activity: None   Lifestyle    Physical activity:     Days per week: None     Minutes per session: None    Stress: None   Relationships    Social connections:     Talks on phone: None     Gets together: None     Attends Restorationist service: None     Active member of club or organization: None     Attends meetings of clubs or organizations: None     Relationship status: None    Intimate partner violence:     Fear of current or ex partner: None     Emotionally abused: None     Physically abused: None     Forced sexual activity: None   Other Topics Concern    None   Social History Narrative    None       Family History   Problem Relation Age of Onset    Cancer Mother     Cancer Father        Allergies   Allergen Reactions    Penicillins          Current Outpatient Medications:     amLODIPine (NORVASC) 5 mg tablet, Take 1 tablet (5 mg total) by mouth daily, Disp: 30 tablet, Rfl: 0    citalopram (CeleXA) 20 mg tablet, Take 10 mg by mouth daily, Disp: , Rfl:     omeprazole (PriLOSEC) 20 mg delayed release capsule, Take 1 capsule (20 mg total) by mouth daily To prevent stomach upset, Disp: 30 capsule, Rfl: 5    ondansetron (ZOFRAN) 8 mg tablet, Take 1 tablet (8 mg total) by mouth every 8 (eight) hours as needed for nausea or vomiting, Disp: 20 tablet, Rfl: 5    oxyCODONE (ROXICODONE) 5 mg immediate release tablet, Take 1 tablet (5 mg total) by mouth every 4 (four) hours as needed for moderate pain for up to 30 dosesMax Daily Amount: 30 mg, Disp: 30 tablet, Rfl: 0    polyethylene glycol (MIRALAX) 17 g packet, Take 17 g by mouth daily, Disp: 14 each, Rfl: 0    senna (SENOKOT) 8 6 mg, Take 1 tablet (8 6 mg total) by mouth daily at bedtime, Disp: 120 each, Rfl: 0      Physical Exam:  /80 (BP Location: Left arm, Cuff Size: Large)   Pulse 72   Temp 98 °F (36 7 °C) (Tympanic)   Resp 20   Ht 5' 4 96" (1 65 m)   Wt 82 6 kg (182 lb)   SpO2 99%   BMI 30 32 kg/m²     Physical Exam   Constitutional: She is oriented to person, place, and time  She appears well-developed and well-nourished  No distress  HENT:   Head: Normocephalic and atraumatic  Mouth/Throat: Oropharynx is clear and moist  No oropharyngeal exudate  Eyes: Pupils are equal, round, and reactive to light  Conjunctivae are normal  No scleral icterus  Neck: Normal range of motion  Neck supple  No thyromegaly present     Cardiovascular: Normal rate, regular rhythm, normal heart sounds and intact distal pulses  No murmur heard  Pulmonary/Chest: Effort normal and breath sounds normal  No respiratory distress  Abdominal: Soft  Bowel sounds are normal  She exhibits no distension  There is no hepatosplenomegaly  There is tenderness (widespread with light palpation)  Musculoskeletal: Normal range of motion  She exhibits no edema  Lymphadenopathy:     She has no cervical adenopathy  She has no axillary adenopathy  Neurological: She is alert and oriented to person, place, and time  Skin: Skin is warm and dry  Rash (mild pink macular rash at the crease of bilateral elbows) noted  She is not diaphoretic  No erythema  No pallor  Dry scaley patch skin between left thumb and pointer finger   Psychiatric: Her behavior is normal  Judgment and thought content normal  Her affect is blunt  Vitals reviewed  Labs:  Lab Results   Component Value Date    WBC 1 20 (LL) 02/19/2019    HGB 10 2 (L) 02/19/2019    HCT 33 1 (L) 02/19/2019    MCV 70 (L) 02/19/2019     (L) 02/19/2019     Lab Results   Component Value Date    K 4 2 02/19/2019     02/19/2019    CO2 27 02/19/2019    BUN 12 02/19/2019    CREATININE 0 70 02/19/2019    GLUF 81 02/19/2019    CALCIUM 9 3 02/19/2019    AST 20 02/19/2019    ALT 23 02/19/2019    ALKPHOS 104 02/19/2019    EGFR 97 02/19/2019     Component      Latest Ref Rng & Units 2/19/2019   Abs Neutrophils      1 80 - 7 80 Thousand/uL 0 29 (L)         Patient voiced understanding and agreement in the above discussion  Aware to contact our office with questions/symptoms in the interim

## 2019-03-05 ENCOUNTER — APPOINTMENT (OUTPATIENT)
Dept: LAB | Facility: HOSPITAL | Age: 59
End: 2019-03-05
Payer: COMMERCIAL

## 2019-03-05 ENCOUNTER — TELEPHONE (OUTPATIENT)
Dept: HEMATOLOGY ONCOLOGY | Facility: CLINIC | Age: 59
End: 2019-03-05

## 2019-03-05 DIAGNOSIS — D70.1 CHEMOTHERAPY INDUCED NEUTROPENIA (HCC): ICD-10-CM

## 2019-03-05 DIAGNOSIS — T45.1X5A CHEMOTHERAPY INDUCED NEUTROPENIA (HCC): ICD-10-CM

## 2019-03-05 DIAGNOSIS — C82.18 GRADE 2 FOLLICULAR LYMPHOMA OF LYMPH NODES OF MULTIPLE REGIONS (HCC): ICD-10-CM

## 2019-03-05 LAB
ALBUMIN SERPL BCP-MCNC: 4.2 G/DL (ref 3–5.2)
ALP SERPL-CCNC: 120 U/L (ref 43–122)
ALT SERPL W P-5'-P-CCNC: 22 U/L (ref 9–52)
ANION GAP SERPL CALCULATED.3IONS-SCNC: 9 MMOL/L (ref 5–14)
ANISOCYTOSIS BLD QL SMEAR: PRESENT
AST SERPL W P-5'-P-CCNC: 25 U/L (ref 14–36)
BASOPHILS # BLD AUTO: 0.03 THOUSAND/UL (ref 0–0.1)
BASOPHILS NFR MAR MANUAL: 2 % (ref 0–1)
BILIRUB SERPL-MCNC: 0.5 MG/DL
BUN SERPL-MCNC: 14 MG/DL (ref 5–25)
CALCIUM SERPL-MCNC: 9.8 MG/DL (ref 8.4–10.2)
CHLORIDE SERPL-SCNC: 103 MMOL/L (ref 97–108)
CO2 SERPL-SCNC: 29 MMOL/L (ref 22–30)
CREAT SERPL-MCNC: 0.76 MG/DL (ref 0.6–1.2)
EOSINOPHIL # BLD AUTO: 0.27 THOUSAND/UL (ref 0–0.4)
EOSINOPHIL NFR BLD MANUAL: 16 % (ref 0–6)
ERYTHROCYTE [DISTWIDTH] IN BLOOD BY AUTOMATED COUNT: 15.4 %
GFR SERPL CREATININE-BSD FRML MDRD: 88 ML/MIN/1.73SQ M
GLUCOSE P FAST SERPL-MCNC: 81 MG/DL (ref 70–99)
HCT VFR BLD AUTO: 36.1 % (ref 36–46)
HGB BLD-MCNC: 11 G/DL (ref 12–16)
HYPERCHROMIA BLD QL SMEAR: PRESENT
LDH SERPL-CCNC: 386 U/L (ref 313–618)
LYMPHOCYTES # BLD AUTO: 0.7 THOUSAND/UL (ref 0.5–4)
LYMPHOCYTES # BLD AUTO: 41 % (ref 25–45)
MCH RBC QN AUTO: 21.4 PG (ref 26–34)
MCHC RBC AUTO-ENTMCNC: 30.4 G/DL (ref 31–36)
MCV RBC AUTO: 70 FL (ref 80–100)
MONOCYTES # BLD AUTO: 0.24 THOUSAND/UL (ref 0.2–0.9)
MONOCYTES NFR BLD AUTO: 14 % (ref 1–10)
NEUTS BAND NFR BLD MANUAL: 5 % (ref 0–8)
NEUTS SEG # BLD: 0.46 THOUSAND/UL (ref 1.8–7.8)
NEUTS SEG NFR BLD AUTO: 22 %
PLATELET # BLD AUTO: 131 THOUSANDS/UL (ref 150–450)
PLATELET BLD QL SMEAR: ABNORMAL
PMV BLD AUTO: 9.1 FL (ref 8.9–12.7)
POIKILOCYTOSIS BLD QL SMEAR: PRESENT
POTASSIUM SERPL-SCNC: 4.5 MMOL/L (ref 3.6–5)
PROT SERPL-MCNC: 6.9 G/DL (ref 5.9–8.4)
RBC # BLD AUTO: 5.14 MILLION/UL (ref 4–5.2)
RBC MORPH BLD: ABNORMAL
SODIUM SERPL-SCNC: 141 MMOL/L (ref 137–147)
TOTAL CELLS COUNTED SPEC: 100
WBC # BLD AUTO: 1.7 THOUSAND/UL (ref 4.5–11)

## 2019-03-05 PROCEDURE — 36415 COLL VENOUS BLD VENIPUNCTURE: CPT

## 2019-03-05 PROCEDURE — 85027 COMPLETE CBC AUTOMATED: CPT

## 2019-03-05 PROCEDURE — 85007 BL SMEAR W/DIFF WBC COUNT: CPT

## 2019-03-05 PROCEDURE — 80053 COMPREHEN METABOLIC PANEL: CPT

## 2019-03-05 PROCEDURE — 83615 LACTATE (LD) (LDH) ENZYME: CPT

## 2019-03-05 RX ORDER — ACETAMINOPHEN 325 MG/1
650 TABLET ORAL ONCE
Status: DISCONTINUED | OUTPATIENT
Start: 2019-03-06 | End: 2019-03-10 | Stop reason: HOSPADM

## 2019-03-05 RX ORDER — SODIUM CHLORIDE 9 MG/ML
20 INJECTION, SOLUTION INTRAVENOUS ONCE
Status: DISCONTINUED | OUTPATIENT
Start: 2019-03-06 | End: 2019-03-10 | Stop reason: HOSPADM

## 2019-03-06 ENCOUNTER — HOSPITAL ENCOUNTER (OUTPATIENT)
Dept: INFUSION CENTER | Facility: HOSPITAL | Age: 59
Discharge: HOME | End: 2019-03-06

## 2019-03-06 ENCOUNTER — OFFICE VISIT (OUTPATIENT)
Dept: HEMATOLOGY ONCOLOGY | Facility: CLINIC | Age: 59
End: 2019-03-06
Payer: COMMERCIAL

## 2019-03-06 ENCOUNTER — DOCUMENTATION (OUTPATIENT)
Dept: HEMATOLOGY ONCOLOGY | Facility: CLINIC | Age: 59
End: 2019-03-06

## 2019-03-06 VITALS
RESPIRATION RATE: 18 BRPM | OXYGEN SATURATION: 99 % | WEIGHT: 180.6 LBS | SYSTOLIC BLOOD PRESSURE: 140 MMHG | HEART RATE: 72 BPM | HEIGHT: 65 IN | DIASTOLIC BLOOD PRESSURE: 80 MMHG | BODY MASS INDEX: 30.09 KG/M2 | TEMPERATURE: 98.2 F

## 2019-03-06 DIAGNOSIS — C82.18 GRADE 2 FOLLICULAR LYMPHOMA OF LYMPH NODES OF MULTIPLE REGIONS (HCC): Primary | ICD-10-CM

## 2019-03-06 PROCEDURE — 99214 OFFICE O/P EST MOD 30 MIN: CPT | Performed by: INTERNAL MEDICINE

## 2019-03-06 NOTE — PROGRESS NOTES
TIME OUT:  Spoke with Navid Escalona RN, delay treatment x 1 week d/t decreased ANC  Keke in pharmacy aware

## 2019-03-06 NOTE — PROGRESS NOTES
Time out was phoned to [facility], rn in the  infusion room as chemo to be delayed by one week D/T neutropenia

## 2019-03-06 NOTE — PROGRESS NOTES
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)  The patient seems to be very neutropenic due to the current chemotherapy related to the bendamustine  We will put the chemotherapy treatment on hold until she has a good recovery of the white cells  We will recheck her CBC in about a week from now to see if we can move forward with cycle 4  Possibly with adjusted dose of the bendamustine  The patient was prescribed the Magic mouthwash for her a grade 2 mucositis  She also should continue with the oxycodone for her abdominal pain which needs to be further investigated if she continues to have severe pain with imaging study  However, the most likely etiology of her pain is the lymphoma which needs to be evaluated after the completion of 6 cycles of chemotherapy  - al mag oxide-diphenhydramine-lidocaine viscous (MAGIC MOUTHWASH) 1:1:1 suspension; Swish and spit 10 mL every 4 (four) hours as needed for mouth pain or discomfort  Dispense: 180 mL; Refill: 0  - CBC and differential; Future  - Comprehensive metabolic panel; Future  - LD,Blood; Future        HPI:  The patient came today for a follow-up visit  She received so far is 3 cycles worth of chemotherapy with bendamustine and rituximab with the supportive Neulasta for her stage IV follicular lymphoma  Today she complained about significant mucositis, ulcerated skin lesion in the left armpit area and diffuse tenderness/pain of her abdominal area as well  Her blood work from the 5th of March showed significant neutropenia with a white cell count of 1 7 and ANC of 0 4, her hemoglobin was 11 0 and platelet count of 387  She denied high temperature or any significant hint of infection    Oncology History    The patient started to notice significant abdominal pain about 8 months prior to presentation, she then was evaluated in the hospital with a CT scan of the chest abdomen pelvis on the 7th of November   This showed massive lymphadenopathy throughout the abdomen and pelvis with hepatic and massive splenomegaly   She also was found to have bulky supraclavicular, axillary and mediastinal adenopathy  She then had a supra clavicular lymph node excisional biopsy on the 9th of November , the pathology was compatible with Follicular lymphoma, WHO grade 1-2  She then had a bone marrow biopsy on the 20th of November which showed also low grade B-cell lymphoma CD10 positive compromising 63% of the total cells  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 ([facility] [place] [address] )    11/7/2018 Initial Diagnosis     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 ([facility] [place] [address] )         12/6/2018 -  Chemotherapy     Cycle 1 of rituximab and bendamustine was started on 12/6/2018 12/7/2018 Adverse Reaction     C1D2 labs reflective of tumor lysis syndrome, dose of rasburicase given x1 and admitted for close observation/hydration            Interval history:    ROS: Review of Systems   Constitutional: Positive for fatigue  Negative for activity change, appetite change, chills, diaphoresis, fever and unexpected weight change  HENT: Positive for mouth sores and trouble swallowing  Negative for congestion, dental problem, ear discharge, ear pain, facial swelling, hearing loss, nosebleeds, postnasal drip, sore throat and tinnitus  Eyes: Negative for discharge, redness, itching and visual disturbance  Respiratory: Positive for cough and shortness of breath  Negative for chest tightness and wheezing  Cardiovascular: Negative for chest pain, palpitations and leg swelling  Gastrointestinal: Positive for constipation  Negative for abdominal distention, abdominal pain, anal bleeding, blood in stool, diarrhea, nausea and vomiting     Genitourinary: Negative for difficulty urinating, dysuria, flank pain, frequency, hematuria and urgency  Musculoskeletal: Negative for arthralgias, back pain, gait problem, joint swelling, myalgias and neck pain  Skin: Positive for rash  Negative for color change, pallor and wound  Neurological: Positive for dizziness, numbness and headaches  Negative for syncope, speech difficulty, weakness and light-headedness  Hematological: Negative for adenopathy  Does not bruise/bleed easily  Psychiatric/Behavioral: Positive for sleep disturbance  Negative for agitation, behavioral problems and confusion         Past Medical History:   Diagnosis Date    Arthritis     Asthma     Lupus     Migraine     Osteoporosis     Psychiatric disorder        Past Surgical History:   Procedure Laterality Date    CHOLECYSTECTOMY      FL GUIDED CENTRAL VENOUS ACCESS REPLACEMENT  11/9/2018    HYSTERECTOMY      LYMPH NODE BIOPSY Left 11/9/2018    Procedure: EXCISION BIOPSY LYMPH NODE SUPRACLAVICULAR;  Surgeon: Naila Rodríguez MD;  Location: [address] [place] [address];  Service: General    TUNNELED VENOUS PORT PLACEMENT N/A 11/9/2018    Procedure: INSERTION OF PORT-A-CATH;  Surgeon: Naila Rodríguez MD;  Location: [address] [place] [address];  Service: General       Social History     Socioeconomic History    Marital status: Single     Spouse name: None    Number of children: None    Years of education: None    Highest education level: None   Occupational History    None   Social Needs    Financial resource strain: None    Food insecurity:     Worry: None     Inability: None    Transportation needs:     Medical: None     Non-medical: None   Tobacco Use    Smoking status: Former Smoker    Smokeless tobacco: Never Used   Substance and Sexual Activity    Alcohol use: No    Drug use: No    Sexual activity: None   Lifestyle    Physical activity:     Days per week: None     Minutes per session: None    Stress: None   Relationships    Social connections:     Talks on phone: None     Gets together: None Attends Episcopal service: None     Active member of club or organization: None     Attends meetings of clubs or organizations: None     Relationship status: None    Intimate partner violence:     Fear of current or ex partner: None     Emotionally abused: None     Physically abused: None     Forced sexual activity: None   Other Topics Concern    None   Social History Narrative    None       Family History   Problem Relation Age of Onset    Cancer Mother     Cancer Father        Allergies   Allergen Reactions    Penicillins          Current Outpatient Medications:     amLODIPine (NORVASC) 5 mg tablet, Take 1 tablet (5 mg total) by mouth daily, Disp: 30 tablet, Rfl: 0    citalopram (CeleXA) 20 mg tablet, Take 10 mg by mouth daily, Disp: , Rfl:     omeprazole (PriLOSEC) 20 mg delayed release capsule, Take 1 capsule (20 mg total) by mouth daily To prevent stomach upset, Disp: 30 capsule, Rfl: 5    ondansetron (ZOFRAN) 8 mg tablet, Take 1 tablet (8 mg total) by mouth every 8 (eight) hours as needed for nausea or vomiting, Disp: 20 tablet, Rfl: 5    oxyCODONE (ROXICODONE) 5 mg immediate release tablet, Take 1 tablet (5 mg total) by mouth every 4 (four) hours as needed for moderate pain for up to 30 dosesMax Daily Amount: 30 mg, Disp: 30 tablet, Rfl: 0    polyethylene glycol (MIRALAX) 17 g packet, Take 17 g by mouth daily, Disp: 14 each, Rfl: 0    senna (SENOKOT) 8 6 mg, Take 1 tablet (8 6 mg total) by mouth daily at bedtime, Disp: 120 each, Rfl: 0    al mag oxide-diphenhydramine-lidocaine viscous (MAGIC MOUTHWASH) 1:1:1 suspension, Swish and spit 10 mL every 4 (four) hours as needed for mouth pain or discomfort, Disp: 180 mL, Rfl: 0  No current facility-administered medications for this visit       Facility-Administered Medications Ordered in Other Visits:     acetaminophen (TYLENOL) tablet 650 mg, 650 mg, Oral, Once, Herlinda Butler MD    diphenhydrAMINE (BENADRYL) 50 mg in sodium chloride 0 9 % 50 mL IVPB, 50 mg, Intravenous, Once, Krissy Nelson MD    fosaprepitant (EMEND) 150 mg in sodium chloride 0 9 % 250 mL IVPB, 150 mg, Intravenous, Once, Krissy Nelson MD    ondansetron (ZOFRAN) 16 mg, dexamethasone (DECADRON) 10 mg in sodium chloride 0 9 % 50 mL IVPB, , Intravenous, Once, Krissy Nelson MD    sodium chloride 0 9 % infusion, 20 mL/hr, Intravenous, Once, Krissy Nelson MD      Physical Exam:  /80 (BP Location: Right arm, Cuff Size: Adult)   Pulse 72   Temp 98 2 °F (36 8 °C) (Tympanic)   Resp 18   Ht 5' 4 96" (1 65 m)   Wt 81 9 kg (180 lb 9 6 oz)   SpO2 99%   BMI 30 09 kg/m²     Physical Exam   Constitutional: She is oriented to person, place, and time  She appears well-developed and well-nourished  No distress  HENT:   Head: Normocephalic and atraumatic  Nose: Nose normal    She has mild mucositis in the region of the hard palate   Eyes: Pupils are equal, round, and reactive to light  Conjunctivae and EOM are normal  Right eye exhibits no discharge  Left eye exhibits no discharge  No scleral icterus  Neck: Normal range of motion  Neck supple  No JVD present  No tracheal deviation present  No thyromegaly present  Cardiovascular: Normal rate, regular rhythm and normal heart sounds  Exam reveals no friction rub  No murmur heard  Pulmonary/Chest: Effort normal and breath sounds normal  No stridor  No respiratory distress  She has no wheezes  She has no rales  She exhibits no tenderness  Abdominal: Soft  Bowel sounds are normal  She exhibits no distension and no mass  There is no hepatosplenomegaly, splenomegaly or hepatomegaly  There is tenderness (To mild palpation in all over her abdomen)  There is no rebound and no guarding  Musculoskeletal: Normal range of motion  She exhibits no edema, tenderness or deformity  Lymphadenopathy:     She has no cervical adenopathy  Neurological: She is alert and oriented to person, place, and time  She has normal reflexes  No cranial nerve deficit  Coordination normal    Skin: Skin is warm and dry  No rash noted  She is not diaphoretic  No erythema  No pallor  Psychiatric: She has a normal mood and affect  Her behavior is normal  Judgment and thought content normal          Labs:  Lab Results   Component Value Date    WBC 1 70 (LL) 03/05/2019    HGB 11 0 (L) 03/05/2019    HCT 36 1 03/05/2019    MCV 70 (L) 03/05/2019     (L) 03/05/2019     Lab Results   Component Value Date    K 4 5 03/05/2019     03/05/2019    CO2 29 03/05/2019    BUN 14 03/05/2019    CREATININE 0 76 03/05/2019    GLUF 81 03/05/2019    CALCIUM 9 8 03/05/2019    AST 25 03/05/2019    ALT 22 03/05/2019    ALKPHOS 120 03/05/2019    EGFR 88 03/05/2019     No results found for: TSH    Patient voiced understanding and agreement in the above discussion  Aware to contact our office with questions/symptoms in the interim

## 2019-03-07 ENCOUNTER — DOCUMENTATION (OUTPATIENT)
Dept: HEMATOLOGY ONCOLOGY | Facility: CLINIC | Age: 59
End: 2019-03-07

## 2019-03-07 NOTE — PROGRESS NOTES
3/6/2019 received a message stating that the pt stated she is unable to  her Oxycodone 5 mg since it was not authorized  Pt has Alvin Cantor  ID [facility][id number]  [id number]  [facility] [id number]    Completed the auth request form and sent it to Dr Siomara Tompkins for review and signature  Received it back and submitted for auth  3/6/2019 received approval letter from [facility]  Latrice Back is valid from 3/7/2019 through 3/7/2020  The pt is allowed to get 204 tablets for 34 days     [address] [facility] @ 3:27 ([phone]) spoke with the pharmacist who stated the claim went through and the pt can come down and  the medication    Notified clinical   Also called the pt @ 3:35 but there was not an answer

## 2019-03-12 ENCOUNTER — APPOINTMENT (OUTPATIENT)
Dept: LAB | Facility: HOSPITAL | Age: 59
End: 2019-03-12
Attending: INTERNAL MEDICINE
Payer: COMMERCIAL

## 2019-03-12 DIAGNOSIS — C82.18 GRADE 2 FOLLICULAR LYMPHOMA OF LYMPH NODES OF MULTIPLE REGIONS (HCC): ICD-10-CM

## 2019-03-12 LAB
ALBUMIN SERPL BCP-MCNC: 3.9 G/DL (ref 3–5.2)
ALP SERPL-CCNC: 115 U/L (ref 43–122)
ALT SERPL W P-5'-P-CCNC: 21 U/L (ref 9–52)
ANION GAP SERPL CALCULATED.3IONS-SCNC: 8 MMOL/L (ref 5–14)
ANISOCYTOSIS BLD QL SMEAR: PRESENT
AST SERPL W P-5'-P-CCNC: 20 U/L (ref 14–36)
BASOPHILS # BLD AUTO: 0 THOUSANDS/ΜL (ref 0–0.1)
BASOPHILS NFR BLD AUTO: 1 % (ref 0–1)
BILIRUB SERPL-MCNC: 0.3 MG/DL
BUN SERPL-MCNC: 15 MG/DL (ref 5–25)
CALCIUM SERPL-MCNC: 9.4 MG/DL (ref 8.4–10.2)
CHLORIDE SERPL-SCNC: 104 MMOL/L (ref 97–108)
CO2 SERPL-SCNC: 28 MMOL/L (ref 22–30)
CREAT SERPL-MCNC: 0.73 MG/DL (ref 0.6–1.2)
EOSINOPHIL # BLD AUTO: 0.2 THOUSAND/ΜL (ref 0–0.4)
EOSINOPHIL NFR BLD AUTO: 7 % (ref 0–6)
ERYTHROCYTE [DISTWIDTH] IN BLOOD BY AUTOMATED COUNT: 15.6 %
GFR SERPL CREATININE-BSD FRML MDRD: 92 ML/MIN/1.73SQ M
GLUCOSE SERPL-MCNC: 98 MG/DL (ref 70–99)
HCT VFR BLD AUTO: 34.9 % (ref 36–46)
HGB BLD-MCNC: 10.6 G/DL (ref 12–16)
HYPERCHROMIA BLD QL SMEAR: PRESENT
LDH SERPL-CCNC: 389 U/L (ref 313–618)
LYMPHOCYTES # BLD AUTO: 0.5 THOUSANDS/ΜL (ref 0.5–4)
LYMPHOCYTES NFR BLD AUTO: 19 % (ref 25–45)
MCH RBC QN AUTO: 21.2 PG (ref 26–34)
MCHC RBC AUTO-ENTMCNC: 30.3 G/DL (ref 31–36)
MCV RBC AUTO: 70 FL (ref 80–100)
MONOCYTES # BLD AUTO: 0.3 THOUSAND/ΜL (ref 0.2–0.9)
MONOCYTES NFR BLD AUTO: 10 % (ref 1–10)
NEUTROPHILS # BLD AUTO: 1.8 THOUSANDS/ΜL (ref 1.8–7.8)
NEUTS SEG NFR BLD AUTO: 64 % (ref 45–65)
PLATELET # BLD AUTO: 141 THOUSANDS/UL (ref 150–450)
PLATELET BLD QL SMEAR: ABNORMAL
PMV BLD AUTO: 9.2 FL (ref 8.9–12.7)
POIKILOCYTOSIS BLD QL SMEAR: PRESENT
POTASSIUM SERPL-SCNC: 4.5 MMOL/L (ref 3.6–5)
PROT SERPL-MCNC: 6.3 G/DL (ref 5.9–8.4)
RBC # BLD AUTO: 5 MILLION/UL (ref 4–5.2)
RBC MORPH BLD: ABNORMAL
SODIUM SERPL-SCNC: 140 MMOL/L (ref 137–147)
WBC # BLD AUTO: 2.9 THOUSAND/UL (ref 4.5–11)

## 2019-03-12 PROCEDURE — 85025 COMPLETE CBC W/AUTO DIFF WBC: CPT

## 2019-03-12 PROCEDURE — 83615 LACTATE (LD) (LDH) ENZYME: CPT

## 2019-03-12 PROCEDURE — 80053 COMPREHEN METABOLIC PANEL: CPT

## 2019-03-12 PROCEDURE — 36415 COLL VENOUS BLD VENIPUNCTURE: CPT

## 2019-03-12 RX ORDER — SODIUM CHLORIDE 9 MG/ML
20 INJECTION, SOLUTION INTRAVENOUS ONCE
Status: COMPLETED | OUTPATIENT
Start: 2019-03-13 | End: 2019-03-13

## 2019-03-12 RX ORDER — ACETAMINOPHEN 325 MG/1
650 TABLET ORAL ONCE
Status: COMPLETED | OUTPATIENT
Start: 2019-03-13 | End: 2019-03-13

## 2019-03-13 ENCOUNTER — HOSPITAL ENCOUNTER (OUTPATIENT)
Dept: INFUSION CENTER | Facility: HOSPITAL | Age: 59
Discharge: HOME | End: 2019-03-13
Payer: COMMERCIAL

## 2019-03-13 ENCOUNTER — OFFICE VISIT (OUTPATIENT)
Dept: HEMATOLOGY ONCOLOGY | Facility: CLINIC | Age: 59
End: 2019-03-13
Payer: COMMERCIAL

## 2019-03-13 VITALS
BODY MASS INDEX: 30.66 KG/M2 | WEIGHT: 184 LBS | RESPIRATION RATE: 18 BRPM | HEART RATE: 75 BPM | DIASTOLIC BLOOD PRESSURE: 60 MMHG | SYSTOLIC BLOOD PRESSURE: 130 MMHG | HEIGHT: 65 IN | TEMPERATURE: 98.3 F | OXYGEN SATURATION: 98 %

## 2019-03-13 VITALS
RESPIRATION RATE: 18 BRPM | SYSTOLIC BLOOD PRESSURE: 130 MMHG | HEART RATE: 75 BPM | BODY MASS INDEX: 30.67 KG/M2 | HEIGHT: 65 IN | DIASTOLIC BLOOD PRESSURE: 60 MMHG | WEIGHT: 184.08 LBS | TEMPERATURE: 98.3 F

## 2019-03-13 DIAGNOSIS — R39.89 BLADDER PAIN: Primary | ICD-10-CM

## 2019-03-13 DIAGNOSIS — R39.89 BLADDER PAIN: ICD-10-CM

## 2019-03-13 DIAGNOSIS — R10.84 GENERALIZED ABDOMINAL PAIN: ICD-10-CM

## 2019-03-13 DIAGNOSIS — N30.00 ACUTE CYSTITIS WITHOUT HEMATURIA: ICD-10-CM

## 2019-03-13 DIAGNOSIS — C82.18 GRADE 2 FOLLICULAR LYMPHOMA OF LYMPH NODES OF MULTIPLE REGIONS (HCC): Primary | ICD-10-CM

## 2019-03-13 LAB
BACTERIA UR QL AUTO: ABNORMAL /HPF
BILIRUB UR QL STRIP: NEGATIVE
CLARITY UR: CLEAR
COLOR UR: YELLOW
GLUCOSE UR STRIP-MCNC: NEGATIVE MG/DL
HGB UR QL STRIP.AUTO: NEGATIVE
KETONES UR STRIP-MCNC: NEGATIVE MG/DL
LEUKOCYTE ESTERASE UR QL STRIP: 500
MUCOUS THREADS UR QL AUTO: ABNORMAL
NITRITE UR QL STRIP: NEGATIVE
NON-SQ EPI CELLS URNS QL MICRO: ABNORMAL /HPF
PH UR STRIP.AUTO: 5 [PH]
PROT UR STRIP-MCNC: NEGATIVE MG/DL
RBC #/AREA URNS AUTO: ABNORMAL /HPF
SP GR UR STRIP.AUTO: 1.02 (ref 1–1.04)
UROBILINOGEN UA: NEGATIVE MG/DL
WBC #/AREA URNS AUTO: ABNORMAL /HPF

## 2019-03-13 PROCEDURE — 87086 URINE CULTURE/COLONY COUNT: CPT

## 2019-03-13 PROCEDURE — 96415 CHEMO IV INFUSION ADDL HR: CPT

## 2019-03-13 PROCEDURE — 81001 URINALYSIS AUTO W/SCOPE: CPT | Performed by: INTERNAL MEDICINE

## 2019-03-13 PROCEDURE — 96411 CHEMO IV PUSH ADDL DRUG: CPT

## 2019-03-13 PROCEDURE — 96377 APPLICATON ON-BODY INJECTOR: CPT

## 2019-03-13 PROCEDURE — 96367 TX/PROPH/DG ADDL SEQ IV INF: CPT

## 2019-03-13 PROCEDURE — 99214 OFFICE O/P EST MOD 30 MIN: CPT | Performed by: NURSE PRACTITIONER

## 2019-03-13 PROCEDURE — 96413 CHEMO IV INFUSION 1 HR: CPT

## 2019-03-13 RX ORDER — SODIUM CHLORIDE 9 MG/ML
20 INJECTION, SOLUTION INTRAVENOUS ONCE
Status: DISCONTINUED | OUTPATIENT
Start: 2019-03-14 | End: 2019-03-13

## 2019-03-13 RX ORDER — CIPROFLOXACIN 500 MG/1
500 TABLET, FILM COATED ORAL EVERY 12 HOURS SCHEDULED
Qty: 6 TABLET | Refills: 0 | Status: SHIPPED | OUTPATIENT
Start: 2019-03-13 | End: 2019-03-16

## 2019-03-13 RX ADMIN — DEXAMETHASONE SODIUM PHOSPHATE: 10 INJECTION, SOLUTION INTRAMUSCULAR; INTRAVENOUS at 10:22

## 2019-03-13 RX ADMIN — SODIUM CHLORIDE 20 ML/HR: 9 INJECTION, SOLUTION INTRAVENOUS at 09:55

## 2019-03-13 RX ADMIN — PEGFILGRASTIM 6 MG: KIT SUBCUTANEOUS at 15:01

## 2019-03-13 RX ADMIN — SODIUM CHLORIDE 150 MG: 9 INJECTION, SOLUTION INTRAVENOUS at 11:06

## 2019-03-13 RX ADMIN — ACETAMINOPHEN 650 MG: 325 TABLET ORAL at 11:06

## 2019-03-13 RX ADMIN — DIPHENHYDRAMINE HYDROCHLORIDE 50 MG: 50 INJECTION INTRAMUSCULAR; INTRAVENOUS at 10:45

## 2019-03-13 RX ADMIN — RITUXIMAB 700 MG: 10 INJECTION, SOLUTION INTRAVENOUS at 11:46

## 2019-03-13 RX ADMIN — BENDAMUSTINE HYDROCHLORIDE 173 MG: 25 INJECTION, SOLUTION INTRAVENOUS at 14:37

## 2019-03-13 NOTE — PROGRESS NOTES
Patient here for rituxan/bendeka  Tolerated well  Patient more tired today than usual and slept whole visit  Patient spoke with YE Hester on phone end of visit via interpretor at office and patient has possible UTI and is going on antibiotics  Neulasta onpro applied to left upper arm and daughter aware to take off tomorrow at 7:00pm   Confirmed next appts and AVS provided

## 2019-03-13 NOTE — PROGRESS NOTES
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 ([address] )  Patient's blood counts have recovered over the past week, she will proceed with cycle #4 Rituxan and bendamustine with Neulasta support today as scheduled  She will be back in 2 weeks for a lesvia visit for close monitoring     - CBC and differential; Future  - Comprehensive metabolic panel; Future    2  Generalized abdominal pain  Patient continues to have generalized abdominal pain which does not seem to be worsening  There were some delays in obtaining her pain medication but hopefully she will be able to get it today  Patient was encouraged to continue her Percocet every 4-6 hours as needed for severe pain  - US abdomen limited; Future    3  Bladder pain  Patient is now complaining of a new suprapubic pain which is not typical of her chronic abdominal pain  She also mentions that she is having a twisting or squeezing sensation at that area as well  She denies any urinary symptoms  We will pursue an ultrasound of the abdomen/bladder to further investigate the new finding  We will also check a urinalysis today for completeness sake  - US abdomen limited; Future  - Urinalysis with reflex to microscopic    HPI:  Patient presents today for a 1 week follow-up appointment  She is Vietnamese-speaking only translation done via [employer] interpretation system  Patient's chemotherapy was delayed last week due to neutropenia  Her blood counts have recovered, most recent blood work from yesterday revealed a low white cell count of 2 9 with a normal ANC of 1 8, stable anemia with a hemoglobin of 10 6, and mildly decreased platelets 637  Her chemistry is unremarkable and her LDH is in the normal range  Patient continues to complain about widespread abdominal pain rated 8/10    She states that she has not been able to get her Percocet yet, authorization was obtained on 03/07/2019 and patient will be stopping by the pharmacy today to see if she can pick it up  She does report that she has a new abdominal/pelvic pain now in the lower abdomen (suprapubic) which she also rates 8/10  She states that she is also noticing of squeezing or twisting sensation to that area  She denies any urinary symptoms including hematuria, dysuria, frequency or urgency  She also mentions that she has bone aches especially in the knees that she feels is most likely related to her arthritis  The bone pain is exacerbated by the cold weather  Patient was supposed to go to neurology for evaluation of her abnormal CT  She states that the appointment was canceled by the doctor's office and she was told to seek care with her PCP  Oncology History    The patient started to notice significant abdominal pain about 8 months prior to presentation, she then was evaluated in the hospital with a CT scan of the chest abdomen pelvis on the 7th of November   This showed massive lymphadenopathy throughout the abdomen and pelvis with hepatic and massive splenomegaly   She also was found to have bulky supraclavicular, axillary and mediastinal adenopathy  She then had a supra clavicular lymph node excisional biopsy on the 9th of November , the pathology was compatible with Follicular lymphoma, WHO grade 1-2  She then had a bone marrow biopsy on the 20th of November which showed also low grade B-cell lymphoma CD10 positive compromising 63% of the total cells          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 ([address] )    11/7/2018 Initial Diagnosis     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 ([address] )         12/6/2018 -  Chemotherapy     Cycle 1 of rituximab and bendamustine was started on 12/6/2018 12/7/2018 Adverse Reaction     C1D2 labs reflective of tumor lysis syndrome, dose of rasburicase given x1 and admitted for close observation/hydration            Interval history:    ROS: Review of Systems   Constitutional: Positive for fatigue (Mild)  Negative for activity change, appetite change, chills, fever and unexpected weight change  HENT: Negative for congestion, mouth sores, nosebleeds, sore throat and trouble swallowing  Eyes: Negative  Respiratory: Positive for cough  Negative for chest tightness and shortness of breath  Cardiovascular: Negative for chest pain, palpitations and leg swelling  Gastrointestinal: Positive for abdominal pain and constipation ( improved with p r n  MiraLax)  Negative for abdominal distention, blood in stool, diarrhea, nausea and vomiting  Genitourinary: Negative for difficulty urinating, dysuria, frequency, hematuria and urgency  Musculoskeletal: Positive for arthralgias (8/10)  Negative for back pain, gait problem, joint swelling and myalgias  Skin: Positive for rash ( left axilla, improves with chemotherapy)  Negative for color change and pallor  Neurological: Positive for dizziness ( chronic), numbness ( and tingling lips, upper torso and back  Has been present for about 2-3 months improved with chemotherapy) and headaches ( chronic migraines)  Hematological: Negative for adenopathy  Does not bruise/bleed easily  Psychiatric/Behavioral: Positive for dysphoric mood and sleep disturbance         Past Medical History:   Diagnosis Date    Arthritis     Asthma     Lupus     Migraine     Osteoporosis     Psychiatric disorder        Past Surgical History:   Procedure Laterality Date    CHOLECYSTECTOMY      [facility] GUIDED CENTRAL VENOUS ACCESS REPLACEMENT  11/9/2018    HYSTERECTOMY      LYMPH NODE BIOPSY Left 11/9/2018    Procedure: EXCISION BIOPSY LYMPH NODE SUPRACLAVICULAR;  Surgeon: Erika Kimball MD;  Location: [facility] MAIN OR;  Service: General    TUNNELED VENOUS PORT PLACEMENT N/A 11/9/2018    Procedure: INSERTION OF PORT-A-CATH;  Surgeon: Fermín Bonner Evelyn Moody MD;  Location: [address] [place] [address] OR;  Service: General       Social History     Socioeconomic History    Marital status: Single     Spouse name: None    Number of children: None    Years of education: None    Highest education level: None   Occupational History    None   Social Needs    Financial resource strain: None    Food insecurity:     Worry: None     Inability: None    Transportation needs:     Medical: None     Non-medical: None   Tobacco Use    Smoking status: Former Smoker    Smokeless tobacco: Never Used   Substance and Sexual Activity    Alcohol use: No    Drug use: No    Sexual activity: None   Lifestyle    Physical activity:     Days per week: None     Minutes per session: None    Stress: None   Relationships    Social connections:     Talks on phone: None     Gets together: None     Attends Yarsanism service: None     Active member of club or organization: None     Attends meetings of clubs or organizations: None     Relationship status: None    Intimate partner violence:     Fear of current or ex partner: None     Emotionally abused: None     Physically abused: None     Forced sexual activity: None   Other Topics Concern    None   Social History Narrative    None       Family History   Problem Relation Age of Onset    Cancer Mother     Cancer Father        Allergies   Allergen Reactions    Penicillins          Current Outpatient Medications:     al mag oxide-diphenhydramine-lidocaine viscous (MAGIC MOUTHWASH) 1:1:1 suspension, Swish and spit 10 mL every 4 (four) hours as needed for mouth pain or discomfort, Disp: 180 mL, Rfl: 0    amLODIPine (NORVASC) 5 mg tablet, Take 1 tablet (5 mg total) by mouth daily, Disp: 30 tablet, Rfl: 0    citalopram (CeleXA) 20 mg tablet, Take 10 mg by mouth daily, Disp: , Rfl:     omeprazole (PriLOSEC) 20 mg delayed release capsule, Take 1 capsule (20 mg total) by mouth daily To prevent stomach upset, Disp: 30 capsule, Rfl: 5    ondansetron (ZOFRAN) 8 mg tablet, Take 1 tablet (8 mg total) by mouth every 8 (eight) hours as needed for nausea or vomiting, Disp: 20 tablet, Rfl: 5    oxyCODONE (ROXICODONE) 5 mg immediate release tablet, Take 1 tablet (5 mg total) by mouth every 4 (four) hours as needed for moderate pain for up to 30 dosesMax Daily Amount: 30 mg, Disp: 30 tablet, Rfl: 0    polyethylene glycol (MIRALAX) 17 g packet, Take 17 g by mouth daily, Disp: 14 each, Rfl: 0    senna (SENOKOT) 8 6 mg, Take 1 tablet (8 6 mg total) by mouth daily at bedtime, Disp: 120 each, Rfl: 0  No current facility-administered medications for this visit  Facility-Administered Medications Ordered in Other Visits:     bendamustine HCl (BENDEKA) 173 mg in sodium chloride 0 9 % 50 mL IVPB, 173 mg, Intravenous, Once, Pinky Rob MD    fosaprepitant (EMEND) 150 mg in sodium chloride 0 9 % 250 mL IVPB, 150 mg, Intravenous, Once, Pinky Rob MD, Last Rate: 500 mL/hr at 03/13/19 1106, 150 mg at 03/13/19 1106    riTUXimab (RITUXAN) 700 mg in sodium chloride 0 9 % 280 mL first titrated chemo infusion, 700 mg, Intravenous, Once, Pinky Rob MD      Physical Exam:  /60 (BP Location: Left arm, Cuff Size: Adult)   Pulse 75   Temp 98 3 °F (36 8 °C) (Tympanic)   Resp 18   Ht 5' 4 96" (1 65 m)   Wt 83 5 kg (184 lb)   SpO2 98%   BMI 30 66 kg/m²     Physical Exam   Constitutional: She is oriented to person, place, and time  She appears well-developed and well-nourished  No distress  HENT:   Head: Normocephalic and atraumatic  Mouth/Throat: Oropharynx is clear and moist  No oropharyngeal exudate  Eyes: Pupils are equal, round, and reactive to light  Conjunctivae are normal  No scleral icterus  Neck: Normal range of motion  Neck supple  No thyromegaly present  Cardiovascular: Normal rate, regular rhythm, normal heart sounds and intact distal pulses  No murmur heard  Pulmonary/Chest: Effort normal and breath sounds normal  No respiratory distress  Abdominal: Soft  Bowel sounds are normal  She exhibits no distension  There is no hepatosplenomegaly  There is generalized tenderness (Diffuse with palpation) and tenderness in the suprapubic area  Musculoskeletal: Normal range of motion  She exhibits no edema  Lymphadenopathy:     She has no cervical adenopathy  She has no axillary adenopathy  Neurological: She is alert and oriented to person, place, and time  Skin: Skin is warm and dry  Rash (Papular red rash noted below left axilla, small area) noted  She is not diaphoretic  No erythema  There is pallor  Small scabbed lesion noted left axilla  Tender to the touch approximately 1 5 x 1 5 cm  Pink discolored skin at the web of the left hand   Psychiatric: Her behavior is normal  Judgment and thought content normal  Her affect is blunt  Vitals reviewed  Labs:  Lab Results   Component Value Date    WBC 2 90 (L) 03/12/2019    HGB 10 6 (L) 03/12/2019    HCT 34 9 (L) 03/12/2019    MCV 70 (L) 03/12/2019     (L) 03/12/2019     Lab Results   Component Value Date    K 4 5 03/12/2019     03/12/2019    CO2 28 03/12/2019    BUN 15 03/12/2019    CREATININE 0 73 03/12/2019    GLUF 81 03/05/2019    CALCIUM 9 4 03/12/2019    AST 20 03/12/2019    ALT 21 03/12/2019    ALKPHOS 115 03/12/2019    EGFR 92 03/12/2019         Patient voiced understanding and agreement in the above discussion  Aware to contact our office with questions/symptoms in the interim

## 2019-03-13 NOTE — PLAN OF CARE
Problem: Potential for Falls  Goal: Patient will remain free of falls  Description  INTERVENTIONS:  - Assess patient frequently for physical needs  -  Identify cognitive and physical deficits and behaviors that affect risk of falls    -  [place] fall precautions as indicated by assessment   - Educate patient/family on patient safety including physical limitations  - Instruct patient to call for assistance with activity based on assessment  - Modify environment to reduce risk of injury  - Consider OT/PT consult to assist with strengthening/mobility  Outcome: Progressing

## 2019-03-13 NOTE — PROGRESS NOTES
Patient's urinalysis appears to be consistent with a UTI leukocytes 500, WBC 10-20, positive moderate amount of bacteria  Patient was symptomatic complaining of suprapubic pain and discomfort  Will pursue urine culture if possible and start patient on Cipro 500 mg p o  B i d  For 3 days  Attempt to notifiy patient in infusions she is resting quietly at present, infusions will call back when patient awakens in order to give her results/instructions for antibiotics

## 2019-03-13 NOTE — PROGRESS NOTES
TIME OUT:  SPOKE WITH Tess Valdez, [address] 3-13-19 AT [phone], HOLD DAY 2 D/T PREVIOUS NEUTROPENIA AND GIVE NEULASTA ONPRO TODAY  JODEE IN PHARM AWARE

## 2019-03-14 ENCOUNTER — HOSPITAL ENCOUNTER (OUTPATIENT)
Dept: INFUSION CENTER | Facility: HOSPITAL | Age: 59
Discharge: HOME | End: 2019-03-14

## 2019-03-14 ENCOUNTER — HOSPITAL ENCOUNTER (OUTPATIENT)
Dept: ULTRASOUND IMAGING | Facility: HOSPITAL | Age: 59
Discharge: HOME | End: 2019-03-14
Payer: COMMERCIAL

## 2019-03-14 DIAGNOSIS — R39.89 BLADDER PAIN: ICD-10-CM

## 2019-03-14 DIAGNOSIS — R10.84 GENERALIZED ABDOMINAL PAIN: ICD-10-CM

## 2019-03-14 LAB — BACTERIA UR CULT: NORMAL

## 2019-03-14 PROCEDURE — 76857 US EXAM PELVIC LIMITED: CPT

## 2019-03-25 ENCOUNTER — TELEPHONE (OUTPATIENT)
Dept: HEMATOLOGY ONCOLOGY | Facility: CLINIC | Age: 59
End: 2019-03-25

## 2019-03-25 NOTE — TELEPHONE ENCOUNTER
I called the patient and I spoke to her daughter-in-law Refugio Arora who said that the patient is going to get the blood work done tomorrow 03/26/19

## 2019-03-26 ENCOUNTER — APPOINTMENT (OUTPATIENT)
Dept: LAB | Facility: HOSPITAL | Age: 59
End: 2019-03-26
Payer: COMMERCIAL

## 2019-03-26 DIAGNOSIS — C82.18 GRADE 2 FOLLICULAR LYMPHOMA OF LYMPH NODES OF MULTIPLE REGIONS (HCC): ICD-10-CM

## 2019-03-26 LAB
ALBUMIN SERPL BCP-MCNC: 3.8 G/DL (ref 3–5.2)
ALP SERPL-CCNC: 117 U/L (ref 43–122)
ALT SERPL W P-5'-P-CCNC: 23 U/L (ref 9–52)
ANION GAP SERPL CALCULATED.3IONS-SCNC: 7 MMOL/L (ref 5–14)
ANISOCYTOSIS BLD QL SMEAR: PRESENT
AST SERPL W P-5'-P-CCNC: 25 U/L (ref 14–36)
BACTERIA UR QL AUTO: ABNORMAL /HPF
BILIRUB SERPL-MCNC: 0.3 MG/DL
BILIRUB UR QL STRIP: NEGATIVE
BUN SERPL-MCNC: 20 MG/DL (ref 5–25)
CALCIUM SERPL-MCNC: 9.5 MG/DL (ref 8.4–10.2)
CHLORIDE SERPL-SCNC: 105 MMOL/L (ref 97–108)
CLARITY UR: CLEAR
CO2 SERPL-SCNC: 28 MMOL/L (ref 22–30)
COLOR UR: YELLOW
CREAT SERPL-MCNC: 0.78 MG/DL (ref 0.6–1.2)
EOSINOPHIL # BLD AUTO: 0.17 THOUSAND/UL (ref 0–0.4)
EOSINOPHIL NFR BLD MANUAL: 6 % (ref 0–6)
ERYTHROCYTE [DISTWIDTH] IN BLOOD BY AUTOMATED COUNT: 16.4 %
GFR SERPL CREATININE-BSD FRML MDRD: 85 ML/MIN/1.73SQ M
GLUCOSE P FAST SERPL-MCNC: 82 MG/DL (ref 70–99)
GLUCOSE UR STRIP-MCNC: NEGATIVE MG/DL
HCT VFR BLD AUTO: 35.9 % (ref 36–46)
HGB BLD-MCNC: 11 G/DL (ref 12–16)
HGB UR QL STRIP.AUTO: NEGATIVE
HYPERCHROMIA BLD QL SMEAR: PRESENT
KETONES UR STRIP-MCNC: NEGATIVE MG/DL
LDH SERPL-CCNC: 367 U/L (ref 313–618)
LEUKOCYTE ESTERASE UR QL STRIP: 500
LYMPHOCYTES # BLD AUTO: 0.32 THOUSAND/UL (ref 0.5–4)
LYMPHOCYTES # BLD AUTO: 11 % (ref 25–45)
MCH RBC QN AUTO: 21.3 PG (ref 26–34)
MCHC RBC AUTO-ENTMCNC: 30.6 G/DL (ref 31–36)
MCV RBC AUTO: 70 FL (ref 80–100)
MICROCYTES BLD QL AUTO: PRESENT
MONOCYTES # BLD AUTO: 0.35 THOUSAND/UL (ref 0.2–0.9)
MONOCYTES NFR BLD AUTO: 12 % (ref 1–10)
NEUTS SEG # BLD: 2.06 THOUSAND/UL (ref 1.8–7.8)
NEUTS SEG NFR BLD AUTO: 71 %
NITRITE UR QL STRIP: NEGATIVE
NON-SQ EPI CELLS URNS QL MICRO: ABNORMAL /HPF
PH UR STRIP.AUTO: 5 [PH]
PLATELET # BLD AUTO: 158 THOUSANDS/UL (ref 150–450)
PLATELET BLD QL SMEAR: ADEQUATE
PMV BLD AUTO: 9 FL (ref 8.9–12.7)
POIKILOCYTOSIS BLD QL SMEAR: PRESENT
POTASSIUM SERPL-SCNC: 3.9 MMOL/L (ref 3.6–5)
PROT SERPL-MCNC: 6.4 G/DL (ref 5.9–8.4)
PROT UR STRIP-MCNC: NEGATIVE MG/DL
RBC # BLD AUTO: 5.16 MILLION/UL (ref 4–5.2)
RBC #/AREA URNS AUTO: ABNORMAL /HPF
RBC MORPH BLD: ABNORMAL
SODIUM SERPL-SCNC: 140 MMOL/L (ref 137–147)
SP GR UR STRIP.AUTO: 1.02 (ref 1–1.04)
TOTAL CELLS COUNTED SPEC: 100
UROBILINOGEN UA: NEGATIVE MG/DL
WBC # BLD AUTO: 2.9 THOUSAND/UL (ref 4.5–11)
WBC #/AREA URNS AUTO: ABNORMAL /HPF

## 2019-03-26 PROCEDURE — 85007 BL SMEAR W/DIFF WBC COUNT: CPT

## 2019-03-26 PROCEDURE — 85027 COMPLETE CBC AUTOMATED: CPT

## 2019-03-26 PROCEDURE — 83615 LACTATE (LD) (LDH) ENZYME: CPT

## 2019-03-26 PROCEDURE — 81001 URINALYSIS AUTO W/SCOPE: CPT | Performed by: NURSE PRACTITIONER

## 2019-03-26 PROCEDURE — 36415 COLL VENOUS BLD VENIPUNCTURE: CPT

## 2019-03-26 PROCEDURE — 80053 COMPREHEN METABOLIC PANEL: CPT

## 2019-03-27 ENCOUNTER — OFFICE VISIT (OUTPATIENT)
Dept: HEMATOLOGY ONCOLOGY | Facility: CLINIC | Age: 59
End: 2019-03-27
Payer: COMMERCIAL

## 2019-03-27 VITALS
RESPIRATION RATE: 18 BRPM | BODY MASS INDEX: 31.49 KG/M2 | DIASTOLIC BLOOD PRESSURE: 70 MMHG | WEIGHT: 189 LBS | HEART RATE: 69 BPM | HEIGHT: 65 IN | SYSTOLIC BLOOD PRESSURE: 124 MMHG | OXYGEN SATURATION: 99 % | TEMPERATURE: 97.7 F

## 2019-03-27 DIAGNOSIS — R10.84 GENERALIZED ABDOMINAL PAIN: ICD-10-CM

## 2019-03-27 DIAGNOSIS — C82.18 GRADE 2 FOLLICULAR LYMPHOMA OF LYMPH NODES OF MULTIPLE REGIONS (HCC): Primary | ICD-10-CM

## 2019-03-27 PROCEDURE — 99214 OFFICE O/P EST MOD 30 MIN: CPT | Performed by: NURSE PRACTITIONER

## 2019-03-27 RX ORDER — OXYCODONE HYDROCHLORIDE 5 MG/1
5 TABLET ORAL EVERY 4 HOURS PRN
Qty: 30 TABLET | Refills: 0 | Status: SHIPPED | OUTPATIENT
Start: 2019-03-27 | End: 2019-04-10 | Stop reason: SDUPTHER

## 2019-03-27 NOTE — PROGRESS NOTES
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 [facility])  Patient tolerated her last cycle of treatment well, she is not currently neutropenic  She will be back in 2 weeks for follow-up prior to cycle #5  We will make a decision if she will resume day 2 or discontinue it permanently  Patient was encouraged to make an appointment for an eye exam which most likely will correct her vision  This may also improve some of her symptoms including headaches that she often gets  - CBC and differential; Future  - Comprehensive metabolic panel; Future  - LD,Blood; Future  - Sedimentation rate, automated; Future  - C-reactive protein; Future  - oxyCODONE (ROXICODONE) 5 mg immediate release tablet; Take 1 tablet (5 mg total) by mouth every 4 (four) hours as needed for moderate pain for up to 30 dosesMax Daily Amount: 30 mg  Dispense: 30 tablet; Refill: 0    2  Generalized abdominal pain  Suprapubic pain has resolved; patient instructed to report if she has a recurrence of her urinary symptoms or suprapubic pain  Patient continues to have widespread abdominal pain which is stable  She states that she does get relief with her p r n  Oxycodone  Script refilled  [place] PDMP checked and appropriate  - oxyCODONE (ROXICODONE) 5 mg immediate release tablet; Take 1 tablet (5 mg total) by mouth every 4 (four) hours as needed for moderate pain for up to 30 dosesMax Daily Amount: 30 mg  Dispense: 30 tablet; Refill: 0    HPI:  Patient presents today for a 2 week lesvia visit  She is Yoruba-speaking only translation done via [employer] system  Patient received cycle #4 Bendamustine and Rituxan on 03/13/2019 with Neulasta support  Her day 2 was held due to significant neutropenia with her last cycle    Her most recent blood work from yesterday showed WBC  2 9 ANC normal at 2 0, stable hemoglobin at 11 and normal platelets 786  She did have a repeat urine for some reason yesterday which showed some white cells and bacteria but patient is completely asymptomatic after completing her course of Cipro 2 weeks ago  She denies any urinary symptoms and her suprapubic pain has resolved  Her culture on 03/13/2019 was negative and ultrasound the bladder was also unremarkable on 03/14/2019  Patient states that she feels good today  She does mention that she can feel a lymph node in her right axilla with associated tenderness  She also mentions that she has been having dizziness for about 2 months as well as worsening vision since she started chemotherapy  She did have a CT scan of her brain done recently at the end of January which was negative for any intracranial abnormality with the exception of some encephalomalacia of the left frontal lobe  She admits that she has not had her eyes checked for a long time  Oncology History    The patient started to notice significant abdominal pain about 8 months prior to presentation, she then was evaluated in the hospital with a CT scan of the chest abdomen pelvis on the 7th of November   This showed massive lymphadenopathy throughout the abdomen and pelvis with hepatic and massive splenomegaly   She also was found to have bulky supraclavicular, axillary and mediastinal adenopathy  She then had a supra clavicular lymph node excisional biopsy on the 9th of November , the pathology was compatible with Follicular lymphoma, WHO grade 1-2  She then had a bone marrow biopsy on the 20th of November which showed also low grade B-cell lymphoma CD10 positive compromising 63% of the total cells          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 ([facility] )    11/7/2018 Initial Diagnosis     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 ([facility] )         12/6/2018 -  Chemotherapy     Cycle 1 of rituximab and bendamustine was started on 12/6/2018 12/7/2018 Adverse Reaction     C1D2 labs reflective of tumor lysis syndrome, dose of rasburicase given x1 and admitted for close observation/hydration            Interval history:    ROS: Review of Systems   Constitutional: Positive for fatigue (Mild)  Negative for activity change, appetite change, chills, fever and unexpected weight change  HENT: Negative for congestion, mouth sores, nosebleeds, sore throat and trouble swallowing  Eyes: Positive for visual disturbance  Respiratory: Positive for shortness of breath ( mild exertional)  Negative for cough and chest tightness  Cardiovascular: Negative for chest pain, palpitations and leg swelling  Gastrointestinal: Positive for abdominal pain ( right upper quadrant rated 6 or 7/10 at present) and constipation ( mild)  Negative for abdominal distention, blood in stool, diarrhea, nausea and vomiting  Genitourinary: Negative for difficulty urinating, dysuria, frequency, hematuria and urgency  Musculoskeletal: Negative for arthralgias, back pain, gait problem, joint swelling and myalgias  Skin: Positive for rash  Negative for color change  Reports breast tenderness bilaterally   Neurological: Positive for dizziness ( mild) and numbness ( and tingling mild)  Negative for weakness, light-headedness and headaches  Hematological: Positive for adenopathy ( right axilla, tenderness to bilateral axilla)  Does not bruise/bleed easily  Psychiatric/Behavioral: Positive for dysphoric mood ( mild) and sleep disturbance ( occasional)         Past Medical History:   Diagnosis Date    Arthritis     Asthma     Lupus     Migraine     Osteoporosis     Psychiatric disorder        Past Surgical History:   Procedure Laterality Date    CHOLECYSTECTOMY      [place] GUIDED CENTRAL VENOUS ACCESS REPLACEMENT  11/9/2018    HYSTERECTOMY      LYMPH NODE BIOPSY Left 11/9/2018    Procedure: EXCISION BIOPSY LYMPH NODE SUPRACLAVICULAR; Surgeon: Jeffery Dc MD;  Location: [facility] MAIN OR;  Service: General    TUNNELED VENOUS PORT PLACEMENT N/A 11/9/2018    Procedure: INSERTION OF PORT-A-CATH;  Surgeon: Jeffery Dc MD;  Location: [facility] MAIN OR;  Service: General       Social History     Socioeconomic History    Marital status: Single     Spouse name: None    Number of children: None    Years of education: None    Highest education level: None   Occupational History    None   Social Needs    Financial resource strain: None    Food insecurity:     Worry: None     Inability: None    Transportation needs:     Medical: None     Non-medical: None   Tobacco Use    Smoking status: Former Smoker    Smokeless tobacco: Never Used   Substance and Sexual Activity    Alcohol use: No    Drug use: No    Sexual activity: None   Lifestyle    Physical activity:     Days per week: None     Minutes per session: None    Stress: None   Relationships    Social connections:     Talks on phone: None     Gets together: None     Attends Restorationism service: None     Active member of club or organization: None     Attends meetings of clubs or organizations: None     Relationship status: None    Intimate partner violence:     Fear of current or ex partner: None     Emotionally abused: None     Physically abused: None     Forced sexual activity: None   Other Topics Concern    None   Social History Narrative    None       Family History   Problem Relation Age of Onset    Cancer Mother     Cancer Father        Allergies   Allergen Reactions    Penicillins          Current Outpatient Medications:     al mag oxide-diphenhydramine-lidocaine viscous (MAGIC MOUTHWASH) 1:1:1 suspension, Swish and spit 10 mL every 4 (four) hours as needed for mouth pain or discomfort, Disp: 180 mL, Rfl: 0    amLODIPine (NORVASC) 5 mg tablet, Take 1 tablet (5 mg total) by mouth daily, Disp: 30 tablet, Rfl: 0    citalopram (CeleXA) 20 mg tablet, Take 10 mg by mouth daily, Disp: , Rfl:   omeprazole (PriLOSEC) 20 mg delayed release capsule, Take 1 capsule (20 mg total) by mouth daily To prevent stomach upset, Disp: 30 capsule, Rfl: 5    ondansetron (ZOFRAN) 8 mg tablet, Take 1 tablet (8 mg total) by mouth every 8 (eight) hours as needed for nausea or vomiting, Disp: 20 tablet, Rfl: 5    oxyCODONE (ROXICODONE) 5 mg immediate release tablet, Take 1 tablet (5 mg total) by mouth every 4 (four) hours as needed for moderate pain for up to 30 dosesMax Daily Amount: 30 mg, Disp: 30 tablet, Rfl: 0    polyethylene glycol (MIRALAX) 17 g packet, Take 17 g by mouth daily, Disp: 14 each, Rfl: 0    senna (SENOKOT) 8 6 mg, Take 1 tablet (8 6 mg total) by mouth daily at bedtime, Disp: 120 each, Rfl: 0      Physical Exam:  /70 (BP Location: Left arm, Cuff Size: Adult)   Pulse 69   Temp 97 7 °F (36 5 °C) (Tympanic)   Resp 18   Ht 5' 4 96" (1 65 m)   Wt 85 7 kg (189 lb)   SpO2 99%   BMI 31 49 kg/m²     Physical Exam   Constitutional: She is oriented to person, place, and time  She appears well-developed and well-nourished  No distress  HENT:   Head: Normocephalic and atraumatic  Mouth/Throat: Oropharynx is clear and moist  No oropharyngeal exudate  Eyes: Pupils are equal, round, and reactive to light  Conjunctivae are normal  No scleral icterus  Neck: Normal range of motion  Neck supple  No thyromegaly present  Cardiovascular: Normal rate, regular rhythm, normal heart sounds and intact distal pulses  No murmur heard  Pulmonary/Chest: Effort normal and breath sounds normal  No respiratory distress  Abdominal: Soft  Bowel sounds are normal  She exhibits no distension  There is no hepatosplenomegaly  There is tenderness ( widespread with light palpation)  Musculoskeletal: Normal range of motion  She exhibits no edema  Lymphadenopathy:     She has no cervical adenopathy  She has axillary adenopathy ( patient is very tender to the touch both axilla)          Right axillary: Lateral (One node palpated) adenopathy present  Neurological: She is alert and oriented to person, place, and time  Skin: Skin is warm and dry  No rash noted  She is not diaphoretic  No erythema  There is pallor  Psychiatric: Her behavior is normal  Judgment and thought content normal  Her affect is blunt  Vitals reviewed  Labs:  Lab Results   Component Value Date    WBC 2 90 (L) 03/26/2019    HGB 11 0 (L) 03/26/2019    HCT 35 9 (L) 03/26/2019    MCV 70 (L) 03/26/2019     03/26/2019     Lab Results   Component Value Date    K 3 9 03/26/2019     03/26/2019    CO2 28 03/26/2019    BUN 20 03/26/2019    CREATININE 0 78 03/26/2019    GLUF 82 03/26/2019    CALCIUM 9 5 03/26/2019    AST 25 03/26/2019    ALT 23 03/26/2019    ALKPHOS 117 03/26/2019    EGFR 85 03/26/2019     Component      Latest Ref Rng & Units 3/26/2019   Abs Neutrophils      1 80 - 7 80 Thousand/uL 2 06          Ref Range & Units 3/26/19  9:08 AM    - 618 U/L 367        Patient voiced understanding and agreement in the above discussion  Aware to contact our office with questions/symptoms in the interim

## 2019-04-09 ENCOUNTER — TRANSCRIBE ORDERS (OUTPATIENT)
Dept: ADMINISTRATIVE | Facility: HOSPITAL | Age: 59
End: 2019-04-09

## 2019-04-09 ENCOUNTER — TELEPHONE (OUTPATIENT)
Dept: HEMATOLOGY ONCOLOGY | Facility: CLINIC | Age: 59
End: 2019-04-09

## 2019-04-09 ENCOUNTER — APPOINTMENT (OUTPATIENT)
Dept: LAB | Facility: HOSPITAL | Age: 59
End: 2019-04-09
Payer: COMMERCIAL

## 2019-04-09 DIAGNOSIS — C82.18 GRADE 2 FOLLICULAR LYMPHOMA OF LYMPH NODES OF MULTIPLE REGIONS (HCC): ICD-10-CM

## 2019-04-09 LAB
ALBUMIN SERPL BCP-MCNC: 3.9 G/DL (ref 3–5.2)
ALP SERPL-CCNC: 117 U/L (ref 43–122)
ALT SERPL W P-5'-P-CCNC: 28 U/L (ref 9–52)
ANION GAP SERPL CALCULATED.3IONS-SCNC: 7 MMOL/L (ref 5–14)
ANISOCYTOSIS BLD QL SMEAR: PRESENT
AST SERPL W P-5'-P-CCNC: 28 U/L (ref 14–36)
BILIRUB SERPL-MCNC: 0.4 MG/DL
BUN SERPL-MCNC: 15 MG/DL (ref 5–25)
CALCIUM SERPL-MCNC: 9.4 MG/DL (ref 8.4–10.2)
CHLORIDE SERPL-SCNC: 103 MMOL/L (ref 97–108)
CO2 SERPL-SCNC: 28 MMOL/L (ref 22–30)
CREAT SERPL-MCNC: 0.71 MG/DL (ref 0.6–1.2)
CRP SERPL QL: <3 MG/L
EOSINOPHIL # BLD AUTO: 0.4 THOUSAND/UL (ref 0–0.4)
EOSINOPHIL NFR BLD MANUAL: 22 % (ref 0–6)
ERYTHROCYTE [DISTWIDTH] IN BLOOD BY AUTOMATED COUNT: 17 %
ERYTHROCYTE [SEDIMENTATION RATE] IN BLOOD: 17 MM/HOUR (ref 1–20)
GFR SERPL CREATININE-BSD FRML MDRD: 96 ML/MIN/1.73SQ M
GLUCOSE P FAST SERPL-MCNC: 86 MG/DL (ref 70–99)
HCT VFR BLD AUTO: 34.6 % (ref 36–46)
HGB BLD-MCNC: 10.7 G/DL (ref 12–16)
HYPERCHROMIA BLD QL SMEAR: PRESENT
LDH SERPL-CCNC: 417 U/L (ref 313–618)
LYMPHOCYTES # BLD AUTO: 0.25 THOUSAND/UL (ref 0.5–4)
LYMPHOCYTES # BLD AUTO: 14 % (ref 25–45)
MCH RBC QN AUTO: 21.3 PG (ref 26–34)
MCHC RBC AUTO-ENTMCNC: 30.8 G/DL (ref 31–36)
MCV RBC AUTO: 69 FL (ref 80–100)
MONOCYTES # BLD AUTO: 0.41 THOUSAND/UL (ref 0.2–0.9)
MONOCYTES NFR BLD AUTO: 23 % (ref 1–10)
NEUTS BAND NFR BLD MANUAL: 3 % (ref 0–8)
NEUTS SEG # BLD: 0.74 THOUSAND/UL (ref 1.8–7.8)
NEUTS SEG NFR BLD AUTO: 38 %
OVALOCYTES BLD QL SMEAR: PRESENT
PLATELET # BLD AUTO: 130 THOUSANDS/UL (ref 150–450)
PLATELET BLD QL SMEAR: ADEQUATE
PMV BLD AUTO: 9.2 FL (ref 8.9–12.7)
POIKILOCYTOSIS BLD QL SMEAR: PRESENT
POTASSIUM SERPL-SCNC: 4.4 MMOL/L (ref 3.6–5)
PROT SERPL-MCNC: 6.3 G/DL (ref 5.9–8.4)
RBC # BLD AUTO: 5.01 MILLION/UL (ref 4–5.2)
RBC MORPH BLD: ABNORMAL
SODIUM SERPL-SCNC: 138 MMOL/L (ref 137–147)
TOTAL CELLS COUNTED SPEC: 100
WBC # BLD AUTO: 1.8 THOUSAND/UL (ref 4.5–11)

## 2019-04-09 PROCEDURE — 85652 RBC SED RATE AUTOMATED: CPT

## 2019-04-09 PROCEDURE — 36415 COLL VENOUS BLD VENIPUNCTURE: CPT

## 2019-04-09 PROCEDURE — 80053 COMPREHEN METABOLIC PANEL: CPT

## 2019-04-09 PROCEDURE — 85007 BL SMEAR W/DIFF WBC COUNT: CPT

## 2019-04-09 PROCEDURE — 85027 COMPLETE CBC AUTOMATED: CPT

## 2019-04-09 PROCEDURE — 86140 C-REACTIVE PROTEIN: CPT

## 2019-04-09 PROCEDURE — 83615 LACTATE (LD) (LDH) ENZYME: CPT

## 2019-04-09 RX ORDER — ACETAMINOPHEN 325 MG/1
650 TABLET ORAL ONCE
Status: DISCONTINUED | OUTPATIENT
Start: 2019-04-10 | End: 2019-04-10

## 2019-04-09 RX ORDER — SODIUM CHLORIDE 9 MG/ML
20 INJECTION, SOLUTION INTRAVENOUS ONCE
Status: DISCONTINUED | OUTPATIENT
Start: 2019-04-10 | End: 2019-04-10

## 2019-04-09 NOTE — PROGRESS NOTES
TIME OUT:  Spoke with Delmi Cancino, RN  Hold treatment on 4-10-19 due to [address]  Patient will have Dr Neva Bennett on 4-10-19  Fidelia Lizarraga in pharmacy aware

## 2019-04-10 ENCOUNTER — OFFICE VISIT (OUTPATIENT)
Dept: HEMATOLOGY ONCOLOGY | Facility: CLINIC | Age: 59
End: 2019-04-10
Payer: COMMERCIAL

## 2019-04-10 ENCOUNTER — HOSPITAL ENCOUNTER (OUTPATIENT)
Dept: INFUSION CENTER | Facility: HOSPITAL | Age: 59
Discharge: HOME | End: 2019-04-10

## 2019-04-10 VITALS
WEIGHT: 195.6 LBS | RESPIRATION RATE: 18 BRPM | HEIGHT: 65 IN | TEMPERATURE: 98 F | HEART RATE: 74 BPM | SYSTOLIC BLOOD PRESSURE: 140 MMHG | DIASTOLIC BLOOD PRESSURE: 74 MMHG | BODY MASS INDEX: 32.59 KG/M2

## 2019-04-10 DIAGNOSIS — C82.18 GRADE 2 FOLLICULAR LYMPHOMA OF LYMPH NODES OF MULTIPLE REGIONS (HCC): Primary | ICD-10-CM

## 2019-04-10 DIAGNOSIS — R10.84 GENERALIZED ABDOMINAL PAIN: ICD-10-CM

## 2019-04-10 PROCEDURE — 99214 OFFICE O/P EST MOD 30 MIN: CPT | Performed by: INTERNAL MEDICINE

## 2019-04-10 RX ORDER — OXYCODONE HYDROCHLORIDE 5 MG/1
5 TABLET ORAL EVERY 4 HOURS PRN
Qty: 30 TABLET | Refills: 0 | Status: SHIPPED | OUTPATIENT
Start: 2019-04-10 | End: 2019-04-17 | Stop reason: SDUPTHER

## 2019-04-16 ENCOUNTER — APPOINTMENT (OUTPATIENT)
Dept: LAB | Facility: HOSPITAL | Age: 59
End: 2019-04-16
Attending: INTERNAL MEDICINE
Payer: COMMERCIAL

## 2019-04-16 ENCOUNTER — TELEPHONE (OUTPATIENT)
Dept: HEMATOLOGY ONCOLOGY | Facility: CLINIC | Age: 59
End: 2019-04-16

## 2019-04-16 DIAGNOSIS — C82.18 GRADE 2 FOLLICULAR LYMPHOMA OF LYMPH NODES OF MULTIPLE REGIONS (HCC): ICD-10-CM

## 2019-04-16 LAB
ALBUMIN SERPL BCP-MCNC: 4.2 G/DL (ref 3–5.2)
ALP SERPL-CCNC: 108 U/L (ref 43–122)
ALT SERPL W P-5'-P-CCNC: 29 U/L (ref 9–52)
ANION GAP SERPL CALCULATED.3IONS-SCNC: 10 MMOL/L (ref 5–14)
AST SERPL W P-5'-P-CCNC: 22 U/L (ref 14–36)
BILIRUB SERPL-MCNC: 0.4 MG/DL
BLD SMEAR INTERP: NORMAL
BUN SERPL-MCNC: 20 MG/DL (ref 5–25)
CALCIUM SERPL-MCNC: 9.7 MG/DL (ref 8.4–10.2)
CHLORIDE SERPL-SCNC: 103 MMOL/L (ref 97–108)
CO2 SERPL-SCNC: 27 MMOL/L (ref 22–30)
CREAT SERPL-MCNC: 0.78 MG/DL (ref 0.6–1.2)
EOSINOPHIL # BLD AUTO: 0.29 THOUSAND/UL (ref 0–0.4)
EOSINOPHIL NFR BLD MANUAL: 18 % (ref 0–6)
ERYTHROCYTE [DISTWIDTH] IN BLOOD BY AUTOMATED COUNT: 16.6 %
ERYTHROCYTE [SEDIMENTATION RATE] IN BLOOD: 23 MM/HOUR (ref 1–20)
GFR SERPL CREATININE-BSD FRML MDRD: 85 ML/MIN/1.73SQ M
GLUCOSE P FAST SERPL-MCNC: 86 MG/DL (ref 70–99)
HCT VFR BLD AUTO: 35.6 % (ref 36–46)
HGB BLD-MCNC: 11.4 G/DL (ref 12–16)
LDH SERPL-CCNC: 435 U/L (ref 313–618)
LYMPHOCYTES # BLD AUTO: 0.32 THOUSAND/UL (ref 0.5–4)
LYMPHOCYTES # BLD AUTO: 20 % (ref 25–45)
MCH RBC QN AUTO: 22 PG (ref 26–34)
MCHC RBC AUTO-ENTMCNC: 32.1 G/DL (ref 31–36)
MCV RBC AUTO: 69 FL (ref 80–100)
MICROCYTES BLD QL AUTO: PRESENT
MONOCYTES # BLD AUTO: 0.29 THOUSAND/UL (ref 0.2–0.9)
MONOCYTES NFR BLD AUTO: 18 % (ref 1–10)
NEUTS BAND NFR BLD MANUAL: 4 % (ref 0–8)
NEUTS SEG # BLD: 0.7 THOUSAND/UL (ref 1.8–7.8)
NEUTS SEG NFR BLD AUTO: 40 %
PLATELET # BLD AUTO: 147 THOUSANDS/UL (ref 150–450)
PLATELET BLD QL SMEAR: ABNORMAL
PMV BLD AUTO: 8.9 FL (ref 8.9–12.7)
POTASSIUM SERPL-SCNC: 4.2 MMOL/L (ref 3.6–5)
PROT SERPL-MCNC: 7 G/DL (ref 5.9–8.4)
RBC # BLD AUTO: 5.19 MILLION/UL (ref 4–5.2)
RBC MORPH BLD: ABNORMAL
SODIUM SERPL-SCNC: 140 MMOL/L (ref 137–147)
TOTAL CELLS COUNTED SPEC: 50
WBC # BLD AUTO: 1.6 THOUSAND/UL (ref 4.5–11)

## 2019-04-16 PROCEDURE — 85007 BL SMEAR W/DIFF WBC COUNT: CPT

## 2019-04-16 PROCEDURE — 85652 RBC SED RATE AUTOMATED: CPT

## 2019-04-16 PROCEDURE — 83615 LACTATE (LD) (LDH) ENZYME: CPT

## 2019-04-16 PROCEDURE — 36415 COLL VENOUS BLD VENIPUNCTURE: CPT

## 2019-04-16 PROCEDURE — 80053 COMPREHEN METABOLIC PANEL: CPT

## 2019-04-16 PROCEDURE — 83020 HEMOGLOBIN ELECTROPHORESIS: CPT

## 2019-04-16 PROCEDURE — 83540 ASSAY OF IRON: CPT

## 2019-04-16 PROCEDURE — 85027 COMPLETE CBC AUTOMATED: CPT

## 2019-04-16 PROCEDURE — 83550 IRON BINDING TEST: CPT

## 2019-04-16 PROCEDURE — 82728 ASSAY OF FERRITIN: CPT

## 2019-04-16 PROCEDURE — 82607 VITAMIN B-12: CPT

## 2019-04-16 PROCEDURE — 86140 C-REACTIVE PROTEIN: CPT

## 2019-04-16 RX ORDER — SODIUM CHLORIDE 9 MG/ML
20 INJECTION, SOLUTION INTRAVENOUS ONCE
Status: COMPLETED | OUTPATIENT
Start: 2019-04-17 | End: 2019-04-17

## 2019-04-16 RX ORDER — ACETAMINOPHEN 325 MG/1
650 TABLET ORAL ONCE
Status: COMPLETED | OUTPATIENT
Start: 2019-04-17 | End: 2019-04-17

## 2019-04-17 ENCOUNTER — OFFICE VISIT (OUTPATIENT)
Dept: HEMATOLOGY ONCOLOGY | Facility: CLINIC | Age: 59
End: 2019-04-17
Payer: COMMERCIAL

## 2019-04-17 ENCOUNTER — HOSPITAL ENCOUNTER (OUTPATIENT)
Dept: INFUSION CENTER | Facility: HOSPITAL | Age: 59
Discharge: HOME | End: 2019-04-17
Payer: COMMERCIAL

## 2019-04-17 VITALS
RESPIRATION RATE: 18 BRPM | HEIGHT: 65 IN | SYSTOLIC BLOOD PRESSURE: 133 MMHG | DIASTOLIC BLOOD PRESSURE: 75 MMHG | BODY MASS INDEX: 31.81 KG/M2 | WEIGHT: 190.92 LBS | TEMPERATURE: 98 F | HEART RATE: 63 BPM

## 2019-04-17 VITALS
DIASTOLIC BLOOD PRESSURE: 70 MMHG | RESPIRATION RATE: 18 BRPM | WEIGHT: 191.2 LBS | SYSTOLIC BLOOD PRESSURE: 150 MMHG | HEIGHT: 65 IN | TEMPERATURE: 98.7 F | HEART RATE: 73 BPM | BODY MASS INDEX: 31.86 KG/M2 | OXYGEN SATURATION: 99 %

## 2019-04-17 DIAGNOSIS — D51.8 OTHER VITAMIN B12 DEFICIENCY ANEMIAS: ICD-10-CM

## 2019-04-17 DIAGNOSIS — C82.18 GRADE 2 FOLLICULAR LYMPHOMA OF LYMPH NODES OF MULTIPLE REGIONS (HCC): Primary | ICD-10-CM

## 2019-04-17 DIAGNOSIS — R10.84 GENERALIZED ABDOMINAL PAIN: ICD-10-CM

## 2019-04-17 LAB
CRP SERPL QL: <3 MG/L
FERRITIN SERPL-MCNC: 83 NG/ML (ref 8–388)
IRON SATN MFR SERPL: 21 %
IRON SERPL-MCNC: 61 UG/DL (ref 50–170)
TIBC SERPL-MCNC: 297 UG/DL (ref 250–450)
VIT B12 SERPL-MCNC: 298 PG/ML (ref 100–900)

## 2019-04-17 PROCEDURE — 99214 OFFICE O/P EST MOD 30 MIN: CPT | Performed by: NURSE PRACTITIONER

## 2019-04-17 PROCEDURE — 96415 CHEMO IV INFUSION ADDL HR: CPT

## 2019-04-17 PROCEDURE — 96413 CHEMO IV INFUSION 1 HR: CPT

## 2019-04-17 PROCEDURE — 96372 THER/PROPH/DIAG INJ SC/IM: CPT

## 2019-04-17 PROCEDURE — 96367 TX/PROPH/DG ADDL SEQ IV INF: CPT

## 2019-04-17 RX ORDER — CYANOCOBALAMIN 1000 UG/ML
1000 INJECTION INTRAMUSCULAR; SUBCUTANEOUS ONCE
Status: COMPLETED | OUTPATIENT
Start: 2019-04-17 | End: 2019-04-17

## 2019-04-17 RX ORDER — OXYCODONE HYDROCHLORIDE 5 MG/1
5 TABLET ORAL EVERY 4 HOURS PRN
Qty: 20 TABLET | Refills: 0 | Status: SHIPPED | OUTPATIENT
Start: 2019-04-17 | End: 2019-05-15 | Stop reason: SDUPTHER

## 2019-04-17 RX ORDER — CYANOCOBALAMIN 1000 UG/ML
1000 INJECTION INTRAMUSCULAR; SUBCUTANEOUS ONCE
Status: CANCELLED | OUTPATIENT
Start: 2019-05-15

## 2019-04-17 RX ADMIN — CYANOCOBALAMIN 1000 MCG: 1000 INJECTION INTRAMUSCULAR; SUBCUTANEOUS at 09:40

## 2019-04-17 RX ADMIN — RITUXIMAB 700 MG: 10 INJECTION, SOLUTION INTRAVENOUS at 10:14

## 2019-04-17 RX ADMIN — DIPHENHYDRAMINE HYDROCHLORIDE 50 MG: 50 INJECTION INTRAMUSCULAR; INTRAVENOUS at 09:26

## 2019-04-17 RX ADMIN — ACETAMINOPHEN 650 MG: 325 TABLET ORAL at 09:26

## 2019-04-17 RX ADMIN — SODIUM CHLORIDE 20 ML/HR: 9 INJECTION, SOLUTION INTRAVENOUS at 09:26

## 2019-04-17 RX ADMIN — DEXAMETHASONE SODIUM PHOSPHATE 10 MG: 10 INJECTION, SOLUTION INTRAMUSCULAR; INTRAVENOUS at 09:52

## 2019-04-17 NOTE — PLAN OF CARE
Problem: SAFETY ADULT  Goal: Patient will remain free of falls  Description  INTERVENTIONS:  - Assess patient frequently for physical needs  -  Identify cognitive and physical deficits and behaviors that affect risk of falls  -  [place] fall precautions as indicated by assessment   - Educate patient/family on patient safety including physical limitations  - Instruct patient to call for assistance with activity based on assessment  - Modify environment to reduce risk of injury  - Consider OT/PT consult to assist with strengthening/mobility  Outcome: Progressing     Problem: Knowledge Deficit  Goal: Patient/family/caregiver demonstrates understanding of disease process, treatment plan, medications, and discharge instructions  Description  Complete learning assessment and assess knowledge base    Interventions:  - Provide teaching at level of understanding  - Provide teaching via preferred learning methods  Outcome: Progressing

## 2019-04-17 NOTE — PROGRESS NOTES
TIME OUT    Spoke with JULIAN PollockNP  Discontinue Zofran, Emend, Bendeka, and Neulasta Onpro kit  Will continue with Tylenol, Benadryl, decadron, and Rituxan  Add B12 injection monthly, starting today  Al, pharmacist notified  Orders received

## 2019-04-18 DIAGNOSIS — C82.18 GRADE 2 FOLLICULAR LYMPHOMA OF LYMPH NODES OF MULTIPLE REGIONS (HCC): ICD-10-CM

## 2019-04-18 LAB
DEPRECATED HGB OTHER BLD-IMP: 0 %
HGB A MFR BLD: 2.1 % (ref 1.8–3.2)
HGB A MFR BLD: 97.9 % (ref 96.4–98.8)
HGB C MFR BLD: 0 %
HGB F MFR BLD: 0 % (ref 0–2)
HGB FRACT BLD-IMP: NORMAL
HGB S BLD QL SOLY: NEGATIVE
HGB S MFR BLD: 0 %

## 2019-04-18 RX ORDER — SODIUM CHLORIDE 9 MG/ML
20 INJECTION, SOLUTION INTRAVENOUS ONCE
Status: CANCELLED | OUTPATIENT
Start: 2019-05-15

## 2019-04-18 RX ORDER — ACETAMINOPHEN 325 MG/1
650 TABLET ORAL ONCE
Status: CANCELLED | OUTPATIENT
Start: 2019-05-15

## 2019-04-18 RX ORDER — DEXAMETHASONE SODIUM PHOSPHATE 10 MG/ML
10 INJECTION, SOLUTION INTRAMUSCULAR; INTRAVENOUS ONCE
Status: CANCELLED
Start: 2019-05-15

## 2019-05-06 ENCOUNTER — HOSPITAL ENCOUNTER (OUTPATIENT)
Dept: NUCLEAR MEDICINE | Facility: HOSPITAL | Age: 59
Discharge: HOME | End: 2019-05-06
Payer: COMMERCIAL

## 2019-05-06 DIAGNOSIS — C82.18 GRADE 2 FOLLICULAR LYMPHOMA OF LYMPH NODES OF MULTIPLE REGIONS (HCC): ICD-10-CM

## 2019-05-06 LAB — GLUCOSE SERPL-MCNC: 95 MG/DL (ref 65–140)

## 2019-05-06 PROCEDURE — A9552 F18 FDG: HCPCS

## 2019-05-06 PROCEDURE — 82948 REAGENT STRIP/BLOOD GLUCOSE: CPT

## 2019-05-06 PROCEDURE — 78815 PET IMAGE W/CT SKULL-THIGH: CPT

## 2019-05-14 ENCOUNTER — APPOINTMENT (OUTPATIENT)
Dept: LAB | Facility: HOSPITAL | Age: 59
End: 2019-05-14
Payer: COMMERCIAL

## 2019-05-14 DIAGNOSIS — C82.18 GRADE 2 FOLLICULAR LYMPHOMA OF LYMPH NODES OF MULTIPLE REGIONS (HCC): ICD-10-CM

## 2019-05-14 LAB
ALBUMIN SERPL BCP-MCNC: 4 G/DL (ref 3–5.2)
ALP SERPL-CCNC: 101 U/L (ref 43–122)
ALT SERPL W P-5'-P-CCNC: 19 U/L (ref 9–52)
ANION GAP SERPL CALCULATED.3IONS-SCNC: 8 MMOL/L (ref 5–14)
ANISOCYTOSIS BLD QL SMEAR: PRESENT
AST SERPL W P-5'-P-CCNC: 17 U/L (ref 14–36)
BILIRUB SERPL-MCNC: 0.3 MG/DL
BUN SERPL-MCNC: 20 MG/DL (ref 5–25)
CALCIUM SERPL-MCNC: 9.4 MG/DL (ref 8.4–10.2)
CHLORIDE SERPL-SCNC: 107 MMOL/L (ref 97–108)
CO2 SERPL-SCNC: 26 MMOL/L (ref 22–30)
CREAT SERPL-MCNC: 0.72 MG/DL (ref 0.6–1.2)
CRP SERPL QL: <3 MG/L
EOSINOPHIL # BLD AUTO: 0.24 THOUSAND/UL (ref 0–0.4)
EOSINOPHIL NFR BLD MANUAL: 12 % (ref 0–6)
ERYTHROCYTE [DISTWIDTH] IN BLOOD BY AUTOMATED COUNT: 16.9 %
ERYTHROCYTE [SEDIMENTATION RATE] IN BLOOD: 19 MM/HOUR (ref 1–20)
GFR SERPL CREATININE-BSD FRML MDRD: 94 ML/MIN/1.73SQ M
GLUCOSE P FAST SERPL-MCNC: 88 MG/DL (ref 70–99)
HCT VFR BLD AUTO: 35.8 % (ref 36–46)
HGB BLD-MCNC: 11.2 G/DL (ref 12–16)
HYPERCHROMIA BLD QL SMEAR: PRESENT
LDH SERPL-CCNC: 381 U/L (ref 313–618)
LYMPHOCYTES # BLD AUTO: 0.8 THOUSAND/UL (ref 0.5–4)
LYMPHOCYTES # BLD AUTO: 40 % (ref 25–45)
MCH RBC QN AUTO: 21.4 PG (ref 26–34)
MCHC RBC AUTO-ENTMCNC: 31.2 G/DL (ref 31–36)
MCV RBC AUTO: 69 FL (ref 80–100)
MONOCYTES # BLD AUTO: 0.38 THOUSAND/UL (ref 0.2–0.9)
MONOCYTES NFR BLD AUTO: 19 % (ref 1–10)
NEUTS BAND NFR BLD MANUAL: 4 % (ref 0–8)
NEUTS SEG # BLD: 0.58 THOUSAND/UL (ref 1.8–7.8)
NEUTS SEG NFR BLD AUTO: 25 %
PLATELET # BLD AUTO: 171 THOUSANDS/UL (ref 150–450)
PLATELET BLD QL SMEAR: ADEQUATE
PMV BLD AUTO: 9.4 FL (ref 8.9–12.7)
POIKILOCYTOSIS BLD QL SMEAR: PRESENT
POTASSIUM SERPL-SCNC: 4.1 MMOL/L (ref 3.6–5)
PROT SERPL-MCNC: 6.4 G/DL (ref 5.9–8.4)
RBC # BLD AUTO: 5.21 MILLION/UL (ref 4–5.2)
RBC MORPH BLD: ABNORMAL
SODIUM SERPL-SCNC: 141 MMOL/L (ref 137–147)
TOTAL CELLS COUNTED SPEC: 100
WBC # BLD AUTO: 2 THOUSAND/UL (ref 4.5–11)

## 2019-05-14 PROCEDURE — 80053 COMPREHEN METABOLIC PANEL: CPT

## 2019-05-14 PROCEDURE — 36415 COLL VENOUS BLD VENIPUNCTURE: CPT

## 2019-05-14 PROCEDURE — 86140 C-REACTIVE PROTEIN: CPT

## 2019-05-14 PROCEDURE — 85007 BL SMEAR W/DIFF WBC COUNT: CPT

## 2019-05-14 PROCEDURE — 83615 LACTATE (LD) (LDH) ENZYME: CPT

## 2019-05-14 PROCEDURE — 85027 COMPLETE CBC AUTOMATED: CPT

## 2019-05-14 PROCEDURE — 85652 RBC SED RATE AUTOMATED: CPT

## 2019-05-15 ENCOUNTER — OFFICE VISIT (OUTPATIENT)
Dept: HEMATOLOGY ONCOLOGY | Facility: CLINIC | Age: 59
End: 2019-05-15
Payer: COMMERCIAL

## 2019-05-15 ENCOUNTER — HOSPITAL ENCOUNTER (OUTPATIENT)
Dept: INFUSION CENTER | Facility: HOSPITAL | Age: 59
Discharge: HOME | End: 2019-05-15
Payer: COMMERCIAL

## 2019-05-15 VITALS
DIASTOLIC BLOOD PRESSURE: 71 MMHG | RESPIRATION RATE: 18 BRPM | HEART RATE: 64 BPM | TEMPERATURE: 98.2 F | WEIGHT: 197.97 LBS | BODY MASS INDEX: 32.98 KG/M2 | HEIGHT: 65 IN | SYSTOLIC BLOOD PRESSURE: 147 MMHG

## 2019-05-15 VITALS
HEIGHT: 65 IN | DIASTOLIC BLOOD PRESSURE: 90 MMHG | WEIGHT: 198 LBS | BODY MASS INDEX: 32.99 KG/M2 | HEART RATE: 71 BPM | OXYGEN SATURATION: 98 % | SYSTOLIC BLOOD PRESSURE: 130 MMHG | TEMPERATURE: 98.4 F | RESPIRATION RATE: 18 BRPM

## 2019-05-15 DIAGNOSIS — C82.18 GRADE 2 FOLLICULAR LYMPHOMA OF LYMPH NODES OF MULTIPLE REGIONS (HCC): ICD-10-CM

## 2019-05-15 DIAGNOSIS — R10.84 GENERALIZED ABDOMINAL PAIN: ICD-10-CM

## 2019-05-15 DIAGNOSIS — D51.8 OTHER VITAMIN B12 DEFICIENCY ANEMIAS: Primary | ICD-10-CM

## 2019-05-15 DIAGNOSIS — C82.18 GRADE 2 FOLLICULAR LYMPHOMA OF LYMPH NODES OF MULTIPLE REGIONS (HCC): Primary | ICD-10-CM

## 2019-05-15 PROCEDURE — 96372 THER/PROPH/DIAG INJ SC/IM: CPT

## 2019-05-15 PROCEDURE — 99214 OFFICE O/P EST MOD 30 MIN: CPT | Performed by: INTERNAL MEDICINE

## 2019-05-15 PROCEDURE — 96413 CHEMO IV INFUSION 1 HR: CPT

## 2019-05-15 PROCEDURE — 96367 TX/PROPH/DG ADDL SEQ IV INF: CPT

## 2019-05-15 PROCEDURE — 96415 CHEMO IV INFUSION ADDL HR: CPT

## 2019-05-15 RX ORDER — CYANOCOBALAMIN 1000 UG/ML
1000 INJECTION INTRAMUSCULAR; SUBCUTANEOUS ONCE
Status: COMPLETED | OUTPATIENT
Start: 2019-05-15 | End: 2019-05-15

## 2019-05-15 RX ORDER — ACETAMINOPHEN 325 MG/1
650 TABLET ORAL ONCE
Status: COMPLETED | OUTPATIENT
Start: 2019-05-15 | End: 2019-05-15

## 2019-05-15 RX ORDER — OXYCODONE HYDROCHLORIDE 5 MG/1
5 TABLET ORAL EVERY 4 HOURS PRN
Qty: 20 TABLET | Refills: 0 | Status: SHIPPED | OUTPATIENT
Start: 2019-05-15 | End: 2019-06-17 | Stop reason: SDUPTHER

## 2019-05-15 RX ORDER — CYANOCOBALAMIN 1000 UG/ML
1000 INJECTION INTRAMUSCULAR; SUBCUTANEOUS ONCE
Status: CANCELLED | OUTPATIENT
Start: 2019-06-12

## 2019-05-15 RX ORDER — SODIUM CHLORIDE 9 MG/ML
20 INJECTION, SOLUTION INTRAVENOUS ONCE
Status: COMPLETED | OUTPATIENT
Start: 2019-05-15 | End: 2019-05-15

## 2019-05-15 RX ADMIN — DIPHENHYDRAMINE HYDROCHLORIDE 50 MG: 50 INJECTION INTRAMUSCULAR; INTRAVENOUS at 09:58

## 2019-05-15 RX ADMIN — SODIUM CHLORIDE 20 ML/HR: 9 INJECTION, SOLUTION INTRAVENOUS at 09:45

## 2019-05-15 RX ADMIN — RITUXIMAB 727.6 MG: 10 INJECTION, SOLUTION INTRAVENOUS at 10:51

## 2019-05-15 RX ADMIN — DEXAMETHASONE SODIUM PHOSPHATE 10 MG: 10 INJECTION, SOLUTION INTRAMUSCULAR; INTRAVENOUS at 10:22

## 2019-05-15 RX ADMIN — CYANOCOBALAMIN 1000 MCG: 1000 INJECTION INTRAMUSCULAR; SUBCUTANEOUS at 13:50

## 2019-05-15 RX ADMIN — ACETAMINOPHEN 650 MG: 325 TABLET ORAL at 10:03

## 2019-05-15 NOTE — PROGRESS NOTES
Pt tolerated rituximab without adverse reaction  Port with excellent blood return before na dafter treatment  Port deaccessed per routine   Vitamin b 12 injection given in left deltoid at pt request  Discharged ambulatory with daughter and avs

## 2019-05-15 NOTE — PLAN OF CARE
Problem: Potential for Falls  Goal: Patient will remain free of falls  Description  INTERVENTIONS:  - Assess patient frequently for physical needs  -  Identify cognitive and physical deficits and behaviors that affect risk of falls    -  [place] fall precautions as indicated by assessment   - Educate patient/family on patient safety including physical limitations  - Instruct patient to call for assistance with activity based on assessment  - Modify environment to reduce risk of injury  - Consider OT/PT consult to assist with strengthening/mobility  Outcome: Progressing

## 2019-06-06 ENCOUNTER — APPOINTMENT (OUTPATIENT)
Dept: LAB | Facility: HOSPITAL | Age: 59
End: 2019-06-06
Attending: INTERNAL MEDICINE
Payer: COMMERCIAL

## 2019-06-06 LAB
ALBUMIN SERPL BCP-MCNC: 4 G/DL (ref 3–5.2)
ALP SERPL-CCNC: 96 U/L (ref 43–122)
ALT SERPL W P-5'-P-CCNC: 21 U/L (ref 9–52)
ANION GAP SERPL CALCULATED.3IONS-SCNC: 7 MMOL/L (ref 5–14)
AST SERPL W P-5'-P-CCNC: 23 U/L (ref 14–36)
BILIRUB SERPL-MCNC: 0.5 MG/DL
BUN SERPL-MCNC: 19 MG/DL (ref 5–25)
CALCIUM SERPL-MCNC: 9.2 MG/DL (ref 8.4–10.2)
CHLORIDE SERPL-SCNC: 104 MMOL/L (ref 97–108)
CO2 SERPL-SCNC: 30 MMOL/L (ref 22–30)
CREAT SERPL-MCNC: 0.78 MG/DL (ref 0.6–1.2)
EOSINOPHIL # BLD AUTO: 0.08 THOUSAND/UL (ref 0–0.4)
EOSINOPHIL NFR BLD MANUAL: 3 % (ref 0–6)
ERYTHROCYTE [DISTWIDTH] IN BLOOD BY AUTOMATED COUNT: 16.8 %
GFR SERPL CREATININE-BSD FRML MDRD: 85 ML/MIN/1.73SQ M
GLUCOSE P FAST SERPL-MCNC: 91 MG/DL (ref 70–99)
HCT VFR BLD AUTO: 34.7 % (ref 36–46)
HGB BLD-MCNC: 11 G/DL (ref 12–16)
LYMPHOCYTES # BLD AUTO: 0.59 THOUSAND/UL (ref 0.5–4)
LYMPHOCYTES # BLD AUTO: 21 % (ref 25–45)
MCH RBC QN AUTO: 21.7 PG (ref 26–34)
MCHC RBC AUTO-ENTMCNC: 31.6 G/DL (ref 31–36)
MCV RBC AUTO: 69 FL (ref 80–100)
MICROCYTES BLD QL AUTO: PRESENT
MONOCYTES # BLD AUTO: 0.2 THOUSAND/UL (ref 0.2–0.9)
MONOCYTES NFR BLD AUTO: 7 % (ref 1–10)
NEUTS BAND NFR BLD MANUAL: 1 % (ref 0–8)
NEUTS SEG # BLD: 1.93 THOUSAND/UL (ref 1.8–7.8)
NEUTS SEG NFR BLD AUTO: 68 %
PLATELET # BLD AUTO: 180 THOUSANDS/UL (ref 150–450)
PLATELET BLD QL SMEAR: ADEQUATE
PMV BLD AUTO: 9.4 FL (ref 8.9–12.7)
POTASSIUM SERPL-SCNC: 4.2 MMOL/L (ref 3.6–5)
PROT SERPL-MCNC: 6.6 G/DL (ref 5.9–8.4)
RBC # BLD AUTO: 5.06 MILLION/UL (ref 4–5.2)
RBC MORPH BLD: ABNORMAL
SODIUM SERPL-SCNC: 141 MMOL/L (ref 137–147)
TOTAL CELLS COUNTED SPEC: 100
WBC # BLD AUTO: 2.8 THOUSAND/UL (ref 4.5–11)

## 2019-06-06 PROCEDURE — 80053 COMPREHEN METABOLIC PANEL: CPT | Performed by: INTERNAL MEDICINE

## 2019-06-06 PROCEDURE — 36415 COLL VENOUS BLD VENIPUNCTURE: CPT | Performed by: INTERNAL MEDICINE

## 2019-06-06 PROCEDURE — 85007 BL SMEAR W/DIFF WBC COUNT: CPT | Performed by: INTERNAL MEDICINE

## 2019-06-06 PROCEDURE — 85027 COMPLETE CBC AUTOMATED: CPT | Performed by: INTERNAL MEDICINE

## 2019-06-12 ENCOUNTER — HOSPITAL ENCOUNTER (OUTPATIENT)
Dept: INFUSION CENTER | Facility: HOSPITAL | Age: 59
Discharge: HOME | End: 2019-06-12

## 2019-06-12 ENCOUNTER — OFFICE VISIT (OUTPATIENT)
Dept: FAMILY MEDICINE CLINIC | Facility: CLINIC | Age: 59
End: 2019-06-12

## 2019-06-12 VITALS
HEART RATE: 80 BPM | TEMPERATURE: 97.3 F | HEIGHT: 64 IN | DIASTOLIC BLOOD PRESSURE: 80 MMHG | WEIGHT: 198.3 LBS | OXYGEN SATURATION: 98 % | RESPIRATION RATE: 16 BRPM | BODY MASS INDEX: 33.86 KG/M2 | SYSTOLIC BLOOD PRESSURE: 134 MMHG

## 2019-06-12 DIAGNOSIS — H60.391 OTHER INFECTIVE ACUTE OTITIS EXTERNA OF RIGHT EAR: ICD-10-CM

## 2019-06-12 DIAGNOSIS — Z12.11 SCREENING FOR COLON CANCER: ICD-10-CM

## 2019-06-12 DIAGNOSIS — Z23 NEED FOR VACCINATION: ICD-10-CM

## 2019-06-12 DIAGNOSIS — Z12.39 BREAST CANCER SCREENING: ICD-10-CM

## 2019-06-12 DIAGNOSIS — H61.21 IMPACTED CERUMEN OF RIGHT EAR: ICD-10-CM

## 2019-06-12 DIAGNOSIS — Z00.00 PHYSICAL EXAM, ANNUAL: Primary | ICD-10-CM

## 2019-06-12 PROBLEM — H60.90 OTITIS EXTERNA: Status: ACTIVE | Noted: 2019-06-12

## 2019-06-12 PROCEDURE — 99386 PREV VISIT NEW AGE 40-64: CPT | Performed by: FAMILY MEDICINE

## 2019-06-12 RX ORDER — OFLOXACIN 3 MG/ML
5 SOLUTION AURICULAR (OTIC) DAILY
Qty: 10 ML | Refills: 0 | Status: SHIPPED | OUTPATIENT
Start: 2019-06-12 | End: 2019-06-19

## 2019-06-14 ENCOUNTER — TRANSCRIBE ORDERS (OUTPATIENT)
Dept: ADMINISTRATIVE | Facility: HOSPITAL | Age: 59
End: 2019-06-14

## 2019-06-14 ENCOUNTER — APPOINTMENT (OUTPATIENT)
Dept: LAB | Facility: HOSPITAL | Age: 59
End: 2019-06-14
Attending: INTERNAL MEDICINE
Payer: COMMERCIAL

## 2019-06-14 DIAGNOSIS — C82.18 GRADE 2 FOLLICULAR LYMPHOMA OF LYMPH NODES OF MULTIPLE REGIONS (HCC): ICD-10-CM

## 2019-06-14 LAB
ALBUMIN SERPL BCP-MCNC: 4.3 G/DL (ref 3–5.2)
ALP SERPL-CCNC: 113 U/L (ref 43–122)
ALT SERPL W P-5'-P-CCNC: 21 U/L (ref 9–52)
ANION GAP SERPL CALCULATED.3IONS-SCNC: 11 MMOL/L (ref 5–14)
ANISOCYTOSIS BLD QL SMEAR: PRESENT
AST SERPL W P-5'-P-CCNC: 22 U/L (ref 14–36)
BASOPHILS # BLD AUTO: 0 THOUSANDS/ΜL (ref 0–0.1)
BASOPHILS NFR BLD AUTO: 1 % (ref 0–1)
BILIRUB SERPL-MCNC: 0.4 MG/DL
BUN SERPL-MCNC: 15 MG/DL (ref 5–25)
CALCIUM SERPL-MCNC: 9.3 MG/DL (ref 8.4–10.2)
CHLORIDE SERPL-SCNC: 104 MMOL/L (ref 97–108)
CO2 SERPL-SCNC: 28 MMOL/L (ref 22–30)
CREAT SERPL-MCNC: 0.72 MG/DL (ref 0.6–1.2)
EOSINOPHIL # BLD AUTO: 0.3 THOUSAND/ΜL (ref 0–0.4)
EOSINOPHIL NFR BLD AUTO: 10 % (ref 0–6)
ERYTHROCYTE [DISTWIDTH] IN BLOOD BY AUTOMATED COUNT: 16.2 %
GFR SERPL CREATININE-BSD FRML MDRD: 93 ML/MIN/1.73SQ M
GLUCOSE P FAST SERPL-MCNC: 93 MG/DL (ref 70–99)
HCT VFR BLD AUTO: 37.3 % (ref 36–46)
HGB BLD-MCNC: 11.6 G/DL (ref 12–16)
HYPERCHROMIA BLD QL SMEAR: PRESENT
LYMPHOCYTES # BLD AUTO: 0.8 THOUSANDS/ΜL (ref 0.5–4)
LYMPHOCYTES NFR BLD AUTO: 26 % (ref 25–45)
MCH RBC QN AUTO: 21.7 PG (ref 26–34)
MCHC RBC AUTO-ENTMCNC: 31.2 G/DL (ref 31–36)
MCV RBC AUTO: 70 FL (ref 80–100)
MICROCYTES BLD QL AUTO: PRESENT
MONOCYTES # BLD AUTO: 0.3 THOUSAND/ΜL (ref 0.2–0.9)
MONOCYTES NFR BLD AUTO: 11 % (ref 1–10)
NEUTROPHILS # BLD AUTO: 1.6 THOUSANDS/ΜL (ref 1.8–7.8)
NEUTS SEG NFR BLD AUTO: 53 % (ref 45–65)
OVALOCYTES BLD QL SMEAR: PRESENT
PLATELET # BLD AUTO: 210 THOUSANDS/UL (ref 150–450)
PLATELET BLD QL SMEAR: ADEQUATE
PMV BLD AUTO: 9.5 FL (ref 8.9–12.7)
POIKILOCYTOSIS BLD QL SMEAR: PRESENT
POTASSIUM SERPL-SCNC: 4.2 MMOL/L (ref 3.6–5)
PROT SERPL-MCNC: 7 G/DL (ref 5.9–8.4)
RBC # BLD AUTO: 5.36 MILLION/UL (ref 4–5.2)
RBC MORPH BLD: NORMAL
SODIUM SERPL-SCNC: 143 MMOL/L (ref 137–147)
WBC # BLD AUTO: 3 THOUSAND/UL (ref 4.5–11)

## 2019-06-14 PROCEDURE — 80053 COMPREHEN METABOLIC PANEL: CPT

## 2019-06-14 PROCEDURE — 85025 COMPLETE CBC W/AUTO DIFF WBC: CPT

## 2019-06-14 PROCEDURE — 36415 COLL VENOUS BLD VENIPUNCTURE: CPT

## 2019-06-17 ENCOUNTER — HOSPITAL ENCOUNTER (OUTPATIENT)
Dept: INFUSION CENTER | Facility: HOSPITAL | Age: 59
Discharge: HOME | End: 2019-06-17
Payer: COMMERCIAL

## 2019-06-17 ENCOUNTER — OFFICE VISIT (OUTPATIENT)
Dept: HEMATOLOGY ONCOLOGY | Facility: CLINIC | Age: 59
End: 2019-06-17
Payer: COMMERCIAL

## 2019-06-17 VITALS
DIASTOLIC BLOOD PRESSURE: 80 MMHG | RESPIRATION RATE: 18 BRPM | TEMPERATURE: 98.3 F | HEART RATE: 78 BPM | WEIGHT: 203 LBS | SYSTOLIC BLOOD PRESSURE: 150 MMHG | OXYGEN SATURATION: 98 % | HEIGHT: 65 IN | BODY MASS INDEX: 33.82 KG/M2

## 2019-06-17 DIAGNOSIS — D51.8 OTHER VITAMIN B12 DEFICIENCY ANEMIAS: ICD-10-CM

## 2019-06-17 DIAGNOSIS — R10.84 GENERALIZED ABDOMINAL PAIN: ICD-10-CM

## 2019-06-17 DIAGNOSIS — C82.18 GRADE 2 FOLLICULAR LYMPHOMA OF LYMPH NODES OF MULTIPLE REGIONS (HCC): ICD-10-CM

## 2019-06-17 DIAGNOSIS — D51.8 OTHER VITAMIN B12 DEFICIENCY ANEMIAS: Primary | ICD-10-CM

## 2019-06-17 DIAGNOSIS — C82.18 GRADE 2 FOLLICULAR LYMPHOMA OF LYMPH NODES OF MULTIPLE REGIONS (HCC): Primary | ICD-10-CM

## 2019-06-17 PROCEDURE — 99214 OFFICE O/P EST MOD 30 MIN: CPT | Performed by: NURSE PRACTITIONER

## 2019-06-17 PROCEDURE — 96372 THER/PROPH/DIAG INJ SC/IM: CPT

## 2019-06-17 RX ORDER — CYANOCOBALAMIN 1000 UG/ML
1000 INJECTION INTRAMUSCULAR; SUBCUTANEOUS ONCE
Status: CANCELLED | OUTPATIENT
Start: 2019-06-17

## 2019-06-17 RX ORDER — CYANOCOBALAMIN 1000 UG/ML
1000 INJECTION INTRAMUSCULAR; SUBCUTANEOUS ONCE
Status: CANCELLED | OUTPATIENT
Start: 2019-07-15

## 2019-06-17 RX ORDER — ACETAMINOPHEN 325 MG/1
650 TABLET ORAL ONCE
Status: CANCELLED | OUTPATIENT
Start: 2019-07-10

## 2019-06-17 RX ORDER — OXYCODONE HYDROCHLORIDE 5 MG/1
5 TABLET ORAL EVERY 4 HOURS PRN
Qty: 15 TABLET | Refills: 0 | Status: SHIPPED | OUTPATIENT
Start: 2019-06-17 | End: 2019-07-08 | Stop reason: SDUPTHER

## 2019-06-17 RX ORDER — SODIUM CHLORIDE 9 MG/ML
20 INJECTION, SOLUTION INTRAVENOUS ONCE
Status: CANCELLED | OUTPATIENT
Start: 2019-07-10

## 2019-06-17 RX ORDER — CYANOCOBALAMIN 1000 UG/ML
1000 INJECTION INTRAMUSCULAR; SUBCUTANEOUS ONCE
Status: COMPLETED | OUTPATIENT
Start: 2019-06-17 | End: 2019-06-17

## 2019-06-17 RX ADMIN — CYANOCOBALAMIN 1000 MCG: 1000 INJECTION INTRAMUSCULAR; SUBCUTANEOUS at 09:45

## 2019-07-08 ENCOUNTER — OFFICE VISIT (OUTPATIENT)
Dept: PALLIATIVE MEDICINE | Facility: CLINIC | Age: 59
End: 2019-07-08
Payer: COMMERCIAL

## 2019-07-08 VITALS
RESPIRATION RATE: 20 BRPM | SYSTOLIC BLOOD PRESSURE: 140 MMHG | WEIGHT: 202.6 LBS | BODY MASS INDEX: 33.76 KG/M2 | OXYGEN SATURATION: 98 % | TEMPERATURE: 97.7 F | HEART RATE: 62 BPM | DIASTOLIC BLOOD PRESSURE: 72 MMHG

## 2019-07-08 DIAGNOSIS — C82.18 GRADE 2 FOLLICULAR LYMPHOMA OF LYMPH NODES OF MULTIPLE REGIONS (HCC): Primary | ICD-10-CM

## 2019-07-08 DIAGNOSIS — M79.601 PAIN OF RIGHT UPPER EXTREMITY: ICD-10-CM

## 2019-07-08 DIAGNOSIS — R10.84 GENERALIZED ABDOMINAL PAIN: ICD-10-CM

## 2019-07-08 DIAGNOSIS — F33.41 RECURRENT MAJOR DEPRESSIVE DISORDER, IN PARTIAL REMISSION (HCC): ICD-10-CM

## 2019-07-08 PROCEDURE — 99204 OFFICE O/P NEW MOD 45 MIN: CPT | Performed by: NURSE PRACTITIONER

## 2019-07-08 RX ORDER — OXYCODONE HYDROCHLORIDE 5 MG/1
5 TABLET ORAL 4 TIMES DAILY PRN
Qty: 120 TABLET | Refills: 0 | Status: SHIPPED | OUTPATIENT
Start: 2019-07-08 | End: 2019-08-19 | Stop reason: SDUPTHER

## 2019-07-08 NOTE — PROGRESS NOTES
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 ([address] )    2  Generalized abdominal pain    3  Pain of right upper extremity    4  Recurrent major depressive disorder, in partial remission (HCC)        Requested Prescriptions     Signed Prescriptions Disp Refills    oxyCODONE (ROXICODONE) 5 mg immediate release tablet 120 tablet 0     Sig: Take 1 tablet (5 mg total) by mouth 4 (four) times a day as needed for severe painMax Daily Amount: 20 mg     No orders of the defined types were placed in this encounter  Oxycodone prescribed as above  She will continue current bowel regimen and will call office if she develops constipation  Will consider adjunctive meds for neuropathic component to pain in the future, such as duloxetine (which may also help with depression)  Will consider referral to orthopedics for further eval of     Daughter in law will be calling to schedule counseling for patient and has location in mind, which also has psychiatrists available  Follow up in one month      Subjective    Chief Concern  New patient consultation for symptom management         History of Present Illness  Patient ID: Mauro Fox is a 64 y o  female with grade 2 follicular lymphoma initially diagnosed in Nov 2018  Tumor lysis syndrome in Dec 2018  Received chemo in early 2019  Started maintenance rituxan q 8 weeks starting May 2019  She presents today with her daughter-in-law  Phone translation line was utilized  Patient has abdominal pain and left arm pain that began around time of diagnosis of lymphoma  Pain is throughout left arm and has been worse since starting chemo  Pain is also worse with movement  It is associated decreased strength and movement of fingers and especially of left thumb  She has intermittent numbness of fingers of left hand   Abdominal pain is generalized, she notes that it extends down to the suprapubic area  Describes pain as "like (it's) eating me " She has had some improvement in abdominal pain since starting treatments  Patient also reports history of severe arthritic pain of joints of legs which has been longstanding  Patient was prescribed oxyIR q 4h PRN by oncology and was taking q 4h ATC  She ran out about 2 weeks ago  She got good relief and improvement of function from oxycodone  Since then she has been taking naproxen with minimal improvement in abdominal and arm pain  She usually takes naproxen for migraines  She is not sleeping well due to pain  She is tired  She has history of depression and had inpatient hospital stay for this reason in the past, in [place]  DIL reports that she was started on medications while inpatient but is not currently on any of them, such as citalopram  She is depressed  She is afraid to do counseling as she is worried that she would be hospitalized again  She does not have any suicidal ideation at present  Patient previously lived in [place]   She is staying with son, daughter-in-law, grandchildren right now and will be staying through the duration of her treatment  The following portions of the patient's history were reviewed and updated as appropriate: allergies, current medications, past family history, past medical history, past social history, past surgical history and problem list       Visit Information    Accompanied By: daughter in law  Source of History: Self, Family member  History Limitations: Language Barrier  Phone  utilized  ROS  Review of Systems   Constitutional: Positive for fatigue  Gastrointestinal: Positive for abdominal pain  Negative for constipation  Musculoskeletal: Positive for arthralgias  Neurological: Positive for weakness and numbness  Psychiatric/Behavioral: Positive for dysphoric mood and sleep disturbance  Negative for suicidal ideas             Objective     Physical Exam Constitutional: She is oriented to person, place, and time  She is cooperative  Pulmonary/Chest: Effort normal    Musculoskeletal:   Full ROM of right shoulder, with pain   Neurological: She is alert and oriented to person, place, and time  Skin: Skin is warm and dry  Psychiatric: Cognition and memory are normal  She exhibits a depressed mood           /72 (BP Location: Right arm, Patient Position: Sitting, Cuff Size: Standard)   Pulse 62   Temp 97 7 °F (36 5 °C) (Oral)   Resp 20   Wt 91 9 kg (202 lb 9 6 oz)   SpO2 98%   BMI 33 76 kg/m²         Current Outpatient Medications:     al mag oxide-diphenhydramine-lidocaine viscous (MAGIC MOUTHWASH) 1:1:1 suspension, Swish and spit 10 mL every 4 (four) hours as needed for mouth pain or discomfort, Disp: 180 mL, Rfl: 0    amLODIPine (NORVASC) 5 mg tablet, Take 1 tablet (5 mg total) by mouth daily, Disp: 30 tablet, Rfl: 0    carbamide peroxide (DEBROX) 6 5 % otic solution, Administer 5 drops to the right ear 2 (two) times a day, Disp: 15 mL, Rfl: 0    cyanocobalamin 1000 MCG tablet, Take 1 tablet (1,000 mcg total) by mouth daily, Disp: 90 tablet, Rfl: 3    omeprazole (PriLOSEC) 20 mg delayed release capsule, Take 1 capsule (20 mg total) by mouth daily To prevent stomach upset, Disp: 30 capsule, Rfl: 5    ondansetron (ZOFRAN) 8 mg tablet, Take 1 tablet (8 mg total) by mouth every 8 (eight) hours as needed for nausea or vomiting, Disp: 20 tablet, Rfl: 5    oxyCODONE (ROXICODONE) 5 mg immediate release tablet, Take 1 tablet (5 mg total) by mouth 4 (four) times a day as needed for severe painMax Daily Amount: 20 mg, Disp: 120 tablet, Rfl: 0    polyethylene glycol (MIRALAX) 17 g packet, Take 17 g by mouth daily, Disp: 14 each, Rfl: 0    senna (SENOKOT) 8 6 mg, Take 1 tablet (8 6 mg total) by mouth daily at bedtime, Disp: 120 each, Rfl: 0      Dede Weaver, [address] [place] [address] Palliative and Supportive Care  [phone]        Representatives have queried the patient's controlled substance dispensing history in the Prescription Drug Monitoring Program in compliance with the [facility] regulations before I have prescribed any controlled substances  The prescription history is consistent with prescribed therapy and our practice policies

## 2019-07-09 ENCOUNTER — APPOINTMENT (OUTPATIENT)
Dept: LAB | Facility: HOSPITAL | Age: 59
End: 2019-07-09
Payer: COMMERCIAL

## 2019-07-09 DIAGNOSIS — D51.8 OTHER VITAMIN B12 DEFICIENCY ANEMIAS: ICD-10-CM

## 2019-07-09 DIAGNOSIS — C82.18 GRADE 2 FOLLICULAR LYMPHOMA OF LYMPH NODES OF MULTIPLE REGIONS (HCC): ICD-10-CM

## 2019-07-09 LAB
ALBUMIN SERPL BCP-MCNC: 4.2 G/DL (ref 3–5.2)
ALP SERPL-CCNC: 97 U/L (ref 43–122)
ALT SERPL W P-5'-P-CCNC: 18 U/L (ref 9–52)
ANION GAP SERPL CALCULATED.3IONS-SCNC: 9 MMOL/L (ref 5–14)
ANISOCYTOSIS BLD QL SMEAR: PRESENT
AST SERPL W P-5'-P-CCNC: 24 U/L (ref 14–36)
BILIRUB SERPL-MCNC: 0.5 MG/DL
BUN SERPL-MCNC: 12 MG/DL (ref 5–25)
CALCIUM SERPL-MCNC: 9.2 MG/DL (ref 8.4–10.2)
CHLORIDE SERPL-SCNC: 108 MMOL/L (ref 97–108)
CO2 SERPL-SCNC: 27 MMOL/L (ref 22–30)
CREAT SERPL-MCNC: 0.68 MG/DL (ref 0.6–1.2)
CRP SERPL QL: 4.9 MG/L
EOSINOPHIL # BLD AUTO: 0.32 THOUSAND/UL (ref 0–0.4)
EOSINOPHIL NFR BLD MANUAL: 9 % (ref 0–6)
ERYTHROCYTE [DISTWIDTH] IN BLOOD BY AUTOMATED COUNT: 16.2 %
ERYTHROCYTE [SEDIMENTATION RATE] IN BLOOD: 26 MM/HOUR (ref 1–20)
GFR SERPL CREATININE-BSD FRML MDRD: 97 ML/MIN/1.73SQ M
GLUCOSE P FAST SERPL-MCNC: 128 MG/DL (ref 70–99)
HCT VFR BLD AUTO: 37 % (ref 36–46)
HGB BLD-MCNC: 11.5 G/DL (ref 12–16)
HYPERCHROMIA BLD QL SMEAR: PRESENT
LDH SERPL-CCNC: 718 U/L (ref 313–618)
LYMPHOCYTES # BLD AUTO: 0.65 THOUSAND/UL (ref 0.5–4)
LYMPHOCYTES # BLD AUTO: 18 % (ref 25–45)
MCH RBC QN AUTO: 21.9 PG (ref 26–34)
MCHC RBC AUTO-ENTMCNC: 31 G/DL (ref 31–36)
MCV RBC AUTO: 71 FL (ref 80–100)
MICROCYTES BLD QL AUTO: PRESENT
MONOCYTES # BLD AUTO: 0.47 THOUSAND/UL (ref 0.2–0.9)
MONOCYTES NFR BLD AUTO: 13 % (ref 1–10)
NEUTS SEG # BLD: 2.16 THOUSAND/UL (ref 1.8–7.8)
NEUTS SEG NFR BLD AUTO: 60 %
OVALOCYTES BLD QL SMEAR: PRESENT
PLATELET # BLD AUTO: 161 THOUSANDS/UL (ref 150–450)
PLATELET BLD QL SMEAR: ADEQUATE
PMV BLD AUTO: 9.6 FL (ref 8.9–12.7)
POIKILOCYTOSIS BLD QL SMEAR: PRESENT
POTASSIUM SERPL-SCNC: 3.8 MMOL/L (ref 3.6–5)
PROT SERPL-MCNC: 7 G/DL (ref 5.9–8.4)
RBC # BLD AUTO: 5.26 MILLION/UL (ref 4–5.2)
RBC MORPH BLD: ABNORMAL
SODIUM SERPL-SCNC: 144 MMOL/L (ref 137–147)
TOTAL CELLS COUNTED SPEC: 100
VIT B12 SERPL-MCNC: 835 PG/ML (ref 100–900)
WBC # BLD AUTO: 3.6 THOUSAND/UL (ref 4.5–11)

## 2019-07-09 PROCEDURE — 85652 RBC SED RATE AUTOMATED: CPT

## 2019-07-09 PROCEDURE — 80053 COMPREHEN METABOLIC PANEL: CPT

## 2019-07-09 PROCEDURE — 86140 C-REACTIVE PROTEIN: CPT

## 2019-07-09 PROCEDURE — 82607 VITAMIN B-12: CPT

## 2019-07-09 PROCEDURE — 83615 LACTATE (LD) (LDH) ENZYME: CPT

## 2019-07-09 PROCEDURE — 85007 BL SMEAR W/DIFF WBC COUNT: CPT

## 2019-07-09 PROCEDURE — 36415 COLL VENOUS BLD VENIPUNCTURE: CPT

## 2019-07-09 PROCEDURE — 85027 COMPLETE CBC AUTOMATED: CPT

## 2019-07-10 ENCOUNTER — HOSPITAL ENCOUNTER (OUTPATIENT)
Dept: INFUSION CENTER | Facility: HOSPITAL | Age: 59
Discharge: HOME | End: 2019-07-10
Attending: INTERNAL MEDICINE
Payer: COMMERCIAL

## 2019-07-10 ENCOUNTER — OFFICE VISIT (OUTPATIENT)
Dept: HEMATOLOGY ONCOLOGY | Facility: CLINIC | Age: 59
End: 2019-07-10
Payer: COMMERCIAL

## 2019-07-10 VITALS
HEIGHT: 65 IN | WEIGHT: 204.4 LBS | RESPIRATION RATE: 18 BRPM | SYSTOLIC BLOOD PRESSURE: 145 MMHG | DIASTOLIC BLOOD PRESSURE: 65 MMHG | BODY MASS INDEX: 34.05 KG/M2 | TEMPERATURE: 98.1 F | HEART RATE: 59 BPM

## 2019-07-10 VITALS
SYSTOLIC BLOOD PRESSURE: 128 MMHG | WEIGHT: 204.4 LBS | TEMPERATURE: 98.3 F | RESPIRATION RATE: 16 BRPM | OXYGEN SATURATION: 98 % | BODY MASS INDEX: 34.05 KG/M2 | HEART RATE: 69 BPM | HEIGHT: 65 IN | DIASTOLIC BLOOD PRESSURE: 70 MMHG

## 2019-07-10 DIAGNOSIS — D51.8 OTHER VITAMIN B12 DEFICIENCY ANEMIAS: ICD-10-CM

## 2019-07-10 DIAGNOSIS — C82.18 GRADE 2 FOLLICULAR LYMPHOMA OF LYMPH NODES OF MULTIPLE REGIONS (HCC): ICD-10-CM

## 2019-07-10 DIAGNOSIS — C82.18 GRADE 2 FOLLICULAR LYMPHOMA OF LYMPH NODES OF MULTIPLE REGIONS (HCC): Primary | ICD-10-CM

## 2019-07-10 DIAGNOSIS — D51.8 OTHER VITAMIN B12 DEFICIENCY ANEMIAS: Primary | ICD-10-CM

## 2019-07-10 PROCEDURE — 96367 TX/PROPH/DG ADDL SEQ IV INF: CPT

## 2019-07-10 PROCEDURE — 96372 THER/PROPH/DIAG INJ SC/IM: CPT

## 2019-07-10 PROCEDURE — 99214 OFFICE O/P EST MOD 30 MIN: CPT | Performed by: NURSE PRACTITIONER

## 2019-07-10 PROCEDURE — 96413 CHEMO IV INFUSION 1 HR: CPT

## 2019-07-10 PROCEDURE — 96415 CHEMO IV INFUSION ADDL HR: CPT

## 2019-07-10 RX ORDER — CYANOCOBALAMIN 1000 UG/ML
1000 INJECTION INTRAMUSCULAR; SUBCUTANEOUS ONCE
Status: COMPLETED | OUTPATIENT
Start: 2019-07-10 | End: 2019-07-10

## 2019-07-10 RX ORDER — SODIUM CHLORIDE 9 MG/ML
20 INJECTION, SOLUTION INTRAVENOUS ONCE
Status: CANCELLED | OUTPATIENT
Start: 2019-09-04

## 2019-07-10 RX ORDER — CYANOCOBALAMIN 1000 UG/ML
1000 INJECTION INTRAMUSCULAR; SUBCUTANEOUS ONCE
Status: CANCELLED | OUTPATIENT
Start: 2019-07-15

## 2019-07-10 RX ORDER — CYANOCOBALAMIN 1000 UG/ML
1000 INJECTION INTRAMUSCULAR; SUBCUTANEOUS ONCE
Status: CANCELLED | OUTPATIENT
Start: 2019-08-07

## 2019-07-10 RX ORDER — ACETAMINOPHEN 325 MG/1
650 TABLET ORAL ONCE
Status: CANCELLED | OUTPATIENT
Start: 2019-09-04

## 2019-07-10 RX ORDER — SODIUM CHLORIDE 9 MG/ML
20 INJECTION, SOLUTION INTRAVENOUS ONCE
Status: COMPLETED | OUTPATIENT
Start: 2019-07-10 | End: 2019-07-10

## 2019-07-10 RX ORDER — ACETAMINOPHEN 325 MG/1
650 TABLET ORAL ONCE
Status: COMPLETED | OUTPATIENT
Start: 2019-07-10 | End: 2019-07-10

## 2019-07-10 RX ADMIN — RITUXIMAB 700 MG: 10 INJECTION, SOLUTION INTRAVENOUS at 10:11

## 2019-07-10 RX ADMIN — SODIUM CHLORIDE 20 ML/HR: 9 INJECTION, SOLUTION INTRAVENOUS at 09:04

## 2019-07-10 RX ADMIN — DIPHENHYDRAMINE HYDROCHLORIDE 50 MG: 50 INJECTION, SOLUTION INTRAMUSCULAR; INTRAVENOUS at 09:16

## 2019-07-10 RX ADMIN — ACETAMINOPHEN 650 MG: 325 TABLET ORAL at 09:19

## 2019-07-10 RX ADMIN — CYANOCOBALAMIN 1000 MCG: 1000 INJECTION INTRAMUSCULAR; SUBCUTANEOUS at 13:21

## 2019-07-10 RX ADMIN — DEXAMETHASONE SODIUM PHOSPHATE 10 MG: 10 INJECTION, SOLUTION INTRAMUSCULAR; INTRAVENOUS at 09:40

## 2019-07-10 NOTE — PLAN OF CARE
Problem: Potential for Falls  Goal: Patient will remain free of falls  Description  INTERVENTIONS:  - Assess patient frequently for physical needs  -  Identify cognitive and physical deficits and behaviors that affect risk of falls    -  [place] fall precautions as indicated by assessment   - Educate patient/family on patient safety including physical limitations  - Instruct patient to call for assistance with activity based on assessment  - Modify environment to reduce risk of injury  - Consider OT/PT consult to assist with strengthening/mobility  Outcome: Progressing

## 2019-07-10 NOTE — PROGRESS NOTES
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 [facility])  Patient will be continued on her maintenance rituximab, she is due for her 2nd treatment today  Patient is without any constitutional symptoms and feels great  LDH is slightly elevated, we will continue to monitor it closely if it continues to be elevated repeat imaging will be pursued the near future  She will follow up in about 8 weeks prior to her next scheduled treatment  - Infusion Calculated Appointment Request; Future  - CBC and differential; Future  - Comprehensive metabolic panel; Future  - CBC and differential; Future  - Comprehensive metabolic panel; Future  - C-reactive protein; Future  - LD,Blood; Future  - Sedimentation rate, automated; Future  - Uric acid; Future    2  Other vitamin B12 deficiency anemias  Patient will continue her monthly vitamin B12 injections  HPI:  Patient presents for a follow-up visit today she is due for her maintenance Rituxan after the visit  She is Bulgarian speaking translation done via [email or web address] system  Patient states that she feels great, she has good appetite and better energy  Denies any constitutional symptoms  She continues to report chronic abdominal pain but states that it is significantly improved  She was seen in consultation by the palliative care team this week and will continue to follow them for her pain and symptom management  Patient is reporting some mild numbness and tingling in her left thumb and left fingers which may be related to a pinched nerve; palliative care is considering sending her to Orthopedics for further evaluation  Patient's most recent laboratory studies from yesterday showed WBC 3 6 with normal ANC 2 16, she has chronic stable microcytic anemia  H&H 11 5/37 MCV 71, normal platelet count 660  Her chemistry is unremarkable vitamin B12 is appropriate  Her inflammatory markers are increased CRP 4 9 and sed rate 26  Her LDH is slightly elevated at 718  Oncology History    The patient started to notice significant abdominal pain about 8 months prior to presentation, she then was evaluated in the hospital with a CT scan of the chest abdomen pelvis on the 7th of November   This showed massive lymphadenopathy throughout the abdomen and pelvis with hepatic and massive splenomegaly   She also was found to have bulky supraclavicular, axillary and mediastinal adenopathy  She then had a supra clavicular lymph node excisional biopsy on the 9th of November , the pathology was compatible with Follicular lymphoma, WHO grade 1-2  She then had a bone marrow biopsy on the 20th of November which showed also low grade B-cell lymphoma CD10 positive compromising 63% of the total cells  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 ([facility] )    11/7/2018 Initial Diagnosis     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 ([facility] )      12/6/2018 -  Chemotherapy     1   rituximab and bendamustine with neulasta support 12/6/2018- 3/13/19 (4 cycles total)  - day 2 bendamustine held with cycle 4  - administered Rituxan only 4/7/19    2  Started maintenance Rituxan every 8 weeks 5/15/19        12/7/2018 Adverse Reaction     C1D2 labs reflective of tumor lysis syndrome, dose of rasburicase given x1 and admitted for close observation/hydration         Interval history:    ROS: Review of Systems   Constitutional: Positive for fatigue (mild)  Negative for activity change, appetite change, chills, fever and unexpected weight change  HENT: Positive for hearing loss  Negative for congestion, mouth sores, nosebleeds, sore throat and trouble swallowing  Eyes: Negative  Respiratory: Positive for cough ( mild)  Negative for chest tightness and shortness of breath      Cardiovascular: Negative for chest pain, palpitations and leg swelling  Gastrointestinal: Positive for abdominal pain and constipation ( mild)  Negative for abdominal distention, blood in stool, diarrhea, nausea and vomiting  Genitourinary: Negative for difficulty urinating, dysuria, frequency, hematuria and urgency  Musculoskeletal: Negative for arthralgias, back pain, gait problem, joint swelling and myalgias  Skin: Negative for color change, pallor and rash  Neurological: Positive for dizziness ( mild chronic) and numbness ( and tingling left thumb and left fingers)  Negative for weakness and headaches  Hematological: Negative for adenopathy  Does not bruise/bleed easily  Psychiatric/Behavioral: Positive for dysphoric mood ( mild) and sleep disturbance ( mild)         Past Medical History:   Diagnosis Date    Anemia     Arthritis     Asthma     [facility]     Lymphoma ([facility] )     Lymphoma ([facility] )     Migraine     Osteoporosis     Psychiatric disorder        Past Surgical History:   Procedure Laterality Date    CHOLECYSTECTOMY      FL GUIDED CENTRAL VENOUS ACCESS DEVICE INSERTION  11/9/2018    HYSTERECTOMY      LYMPH NODE BIOPSY Left 11/9/2018    Procedure: EXCISION BIOPSY LYMPH NODE SUPRACLAVICULAR;  Surgeon: Sharon Mcdaniel MD;  Location: [address] [place] [address];  Service: General    TUNNELED VENOUS PORT PLACEMENT N/A 11/9/2018    Procedure: INSERTION OF PORT-A-CATH;  Surgeon: Sharon Mcdaniel MD;  Location: [address] [place] [address];  Service: General       Social History     Socioeconomic History    Marital status: Single     Spouse name: None    Number of children: None    Years of education: None    Highest education level: None   Occupational History    None   Social Needs    Financial resource strain: None    Food insecurity:     Worry: None     Inability: None    Transportation needs:     Medical: None     Non-medical: None   Tobacco Use    Smoking status: Former Smoker     Last attempt to quit: 6/12/2017     Years since quitting: 2 0    Smokeless tobacco: Never Used   Substance and Sexual Activity    Alcohol use: No    Drug use: No    Sexual activity: None   Lifestyle    Physical activity:     Days per week: None     Minutes per session: None    Stress: None   Relationships    Social connections:     Talks on phone: None     Gets together: None     Attends Judaism service: None     Active member of club or organization: None     Attends meetings of clubs or organizations: None     Relationship status: None    Intimate partner violence:     Fear of current or ex partner: None     Emotionally abused: None     Physically abused: None     Forced sexual activity: None   Other Topics Concern    None   Social History Narrative    None       Family History   Problem Relation Age of Onset    Cancer Mother     Diabetes Mother     Cancer Father     Diabetes Father        Allergies   Allergen Reactions    Penicillins          Current Outpatient Medications:     al mag oxide-diphenhydramine-lidocaine viscous (MAGIC MOUTHWASH) 1:1:1 suspension, Swish and spit 10 mL every 4 (four) hours as needed for mouth pain or discomfort, Disp: 180 mL, Rfl: 0    amLODIPine (NORVASC) 5 mg tablet, Take 1 tablet (5 mg total) by mouth daily, Disp: 30 tablet, Rfl: 0    carbamide peroxide (DEBROX) 6 5 % otic solution, Administer 5 drops to the right ear 2 (two) times a day, Disp: 15 mL, Rfl: 0    cyanocobalamin 1000 MCG tablet, Take 1 tablet (1,000 mcg total) by mouth daily, Disp: 90 tablet, Rfl: 3    omeprazole (PriLOSEC) 20 mg delayed release capsule, Take 1 capsule (20 mg total) by mouth daily To prevent stomach upset, Disp: 30 capsule, Rfl: 5    ondansetron (ZOFRAN) 8 mg tablet, Take 1 tablet (8 mg total) by mouth every 8 (eight) hours as needed for nausea or vomiting, Disp: 20 tablet, Rfl: 5    oxyCODONE (ROXICODONE) 5 mg immediate release tablet, Take 1 tablet (5 mg total) by mouth 4 (four) times a day as needed for severe painMax Daily Amount: 20 mg, Disp: 120 tablet, Rfl: 0    polyethylene glycol (MIRALAX) 17 g packet, Take 17 g by mouth daily, Disp: 14 each, Rfl: 0    senna (SENOKOT) 8 6 mg, Take 1 tablet (8 6 mg total) by mouth daily at bedtime, Disp: 120 each, Rfl: 0  No current facility-administered medications for this visit  Facility-Administered Medications Ordered in Other Visits:     acetaminophen (TYLENOL) tablet 650 mg, 650 mg, Oral, Once, Ericka Gracia MD    dexamethasone (PF) (DECADRON) 10 mg in sodium chloride 0 9 % 50 mL IVPB, 10 mg, Intravenous, Once, Ericka Gracia MD    diphenhydrAMINE (BENADRYL) 50 mg in sodium chloride 0 9 % 50 mL IVPB, 50 mg, Intravenous, Once, Ericka Gracia MD    riTUXimab (RITUXAN) 700 mg in sodium chloride 0 9 % 280 mL subsequent titrated chemo infusion, 700 mg, Intravenous, Once, Ericka Gracia MD      Physical Exam:  /70 (BP Location: Left arm, Cuff Size: Large)   Pulse 69   Temp 98 3 °F (36 8 °C) (Tympanic)   Resp 16   Ht 5' 4 96" (1 65 m)   Wt 92 7 kg (204 lb 6 4 oz)   SpO2 98%   BMI 34 06 kg/m²     Physical Exam   Constitutional: She is oriented to person, place, and time  She appears well-developed and well-nourished  No distress  HENT:   Head: Normocephalic and atraumatic  Mouth/Throat: Oropharynx is clear and moist  No oropharyngeal exudate  Eyes: Pupils are equal, round, and reactive to light  Conjunctivae are normal  No scleral icterus  Neck: Normal range of motion  Neck supple  No thyromegaly present  Cardiovascular: Normal rate, regular rhythm, normal heart sounds and intact distal pulses  No murmur heard  Pulmonary/Chest: Effort normal and breath sounds normal  No respiratory distress  Abdominal: Soft  Bowel sounds are normal  She exhibits no distension  There is no hepatosplenomegaly  There is generalized tenderness (with mild palpation)  Musculoskeletal: Normal range of motion  She exhibits no edema  Lymphadenopathy:     She has no cervical adenopathy       She has no axillary adenopathy  Neurological: She is alert and oriented to person, place, and time  Skin: Skin is warm and dry  No rash noted  She is not diaphoretic  No erythema  No pallor  Psychiatric: She has a normal mood and affect  Her behavior is normal  Judgment and thought content normal    Vitals reviewed  Labs:  Lab Results   Component Value Date    WBC 3 60 (L) 07/09/2019    HGB 11 5 (L) 07/09/2019    HCT 37 0 07/09/2019    MCV 71 (L) 07/09/2019     07/09/2019     Lab Results   Component Value Date    K 3 8 07/09/2019     07/09/2019    CO2 27 07/09/2019    BUN 12 07/09/2019    CREATININE 0 68 07/09/2019    GLUF 128 (H) 07/09/2019    CALCIUM 9 2 07/09/2019    AST 24 07/09/2019    ALT 18 07/09/2019    ALKPHOS 97 07/09/2019    EGFR 97 07/09/2019         Patient voiced understanding and agreement in the above discussion  Aware to contact our office with questions/symptoms in the interim

## 2019-07-10 NOTE — PROGRESS NOTES
Patient tolerated treatment, offers no complaints  AVS reviewed with and provided to patient and family

## 2019-07-16 ENCOUNTER — TELEPHONE (OUTPATIENT)
Dept: PALLIATIVE MEDICINE | Facility: CLINIC | Age: 59
End: 2019-07-16

## 2019-07-30 ENCOUNTER — TELEPHONE (OUTPATIENT)
Dept: FAMILY MEDICINE CLINIC | Facility: CLINIC | Age: 59
End: 2019-07-30

## 2019-07-30 NOTE — TELEPHONE ENCOUNTER
Called number of file asked for pt, was told "no one here by that name"  Letter and orders mailed to address on file for pt to s/x appt  Left Message in regard to patients out standing referral  Letter and orders mailed to address on file, detailing referral and where to call  If patient were to call the office please inform of missing referral and have them contact either central scheduling or the appropriate specialist office    Test or Specialist: Mammogram  Location:  Phone Number: [phone]

## 2019-08-07 RX ORDER — CYANOCOBALAMIN 1000 UG/ML
1000 INJECTION INTRAMUSCULAR; SUBCUTANEOUS ONCE
Status: CANCELLED | OUTPATIENT
Start: 2019-08-12

## 2019-08-19 ENCOUNTER — OFFICE VISIT (OUTPATIENT)
Dept: PALLIATIVE MEDICINE | Facility: CLINIC | Age: 59
End: 2019-08-19
Payer: COMMERCIAL

## 2019-08-19 VITALS
DIASTOLIC BLOOD PRESSURE: 80 MMHG | RESPIRATION RATE: 18 BRPM | TEMPERATURE: 97.5 F | BODY MASS INDEX: 35.25 KG/M2 | WEIGHT: 211.6 LBS | SYSTOLIC BLOOD PRESSURE: 140 MMHG | OXYGEN SATURATION: 99 % | HEIGHT: 65 IN | HEART RATE: 65 BPM

## 2019-08-19 DIAGNOSIS — C82.18 GRADE 2 FOLLICULAR LYMPHOMA OF LYMPH NODES OF MULTIPLE REGIONS (HCC): Primary | ICD-10-CM

## 2019-08-19 DIAGNOSIS — M79.601 PAIN OF RIGHT UPPER EXTREMITY: ICD-10-CM

## 2019-08-19 DIAGNOSIS — R10.84 GENERALIZED ABDOMINAL PAIN: ICD-10-CM

## 2019-08-19 PROCEDURE — 99214 OFFICE O/P EST MOD 30 MIN: CPT | Performed by: NURSE PRACTITIONER

## 2019-08-19 RX ORDER — OXYCODONE HYDROCHLORIDE 5 MG/1
5 TABLET ORAL 4 TIMES DAILY PRN
Qty: 120 TABLET | Refills: 0 | Status: SHIPPED | OUTPATIENT
Start: 2019-08-19 | End: 2019-09-16 | Stop reason: SDUPTHER

## 2019-08-19 NOTE — PATIENT INSTRUCTIONS
Oxycodone- maximum 4 tablets per day  Try to take less  Will start to taper down on amount in next couple months

## 2019-08-19 NOTE — PROGRESS NOTES
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 ([address] )    2  Generalized abdominal pain    3  Pain of right upper extremity        Requested Prescriptions     Signed Prescriptions Disp Refills    oxyCODONE (ROXICODONE) 5 mg immediate release tablet 120 tablet 0     Sig: Take 1 tablet (5 mg total) by mouth 4 (four) times a day as needed for severe painMax Daily Amount: 20 mg       Continue oxycodone as she has had good improvement of function and quality of life  We discussed that use of oxycodone will be short term and plan will ultimately be to taper off  Goal is improvement of function while she is going through treatments  If pain does not improve as she is going through treatments, could consider alternative explanations aside from cancer pain, such as neuropathic pain or fibromyalgia, and alternative meds could be considered, such as duloxetine  She will be seeing orthopedics in two days for further eval of left arm pain, weakness, numbness  She appears to have missed last infusion  Encouraged patient's son to call Dr Lonnie Finch office to reschedule         Follow up in one month      Subjective    Chief Concern  Follow up visit for:  Symptom management         History of Present Illness  Patient ID: Toya Pettit is a 64 y o  female with grade 2 follicular lymphoma initially diagnosed in Nov 2018  Tumor lysis syndrome in Dec 2018  Received chemo in early 2019  Started maintenance rituxan q 8 weeks starting May 2019  She presents today with her son  Phone translation line was utilized      Overall she has been feeling well  Ongoing bilateral lower abdominal pain has been improving over recent months with treatments and is improved by PRN oxycodone  She is taking oxycodone about three times daily   She has improvement of function and quality of life with increased amount of oxycodone  She will be seeing orthopedics in two days for ongoing left arm pain with decreased strength and numbness, as noted at last visit  Patient previously lived in [facility]   She is staying with son, daughter-in-law, grandchildren right now and will be staying through the duration of her treatment  The following portions of the patient's history were reviewed and updated as appropriate: allergies, current medications, past family history, past medical history, past social history, past surgical history and problem list       Visit Information    Accompanied By: son  Source of History: Self  History Limitations: Language Barrier, phone  utilized    ROS  Review of Systems   Constitutional: Negative  Gastrointestinal: Positive for abdominal pain  Negative for constipation (taking miralax)  Musculoskeletal: Positive for arthralgias (left shoulder)  Neurological: Positive for weakness and numbness  Objective     Physical Exam   Constitutional: She is oriented to person, place, and time  She is cooperative  Pulmonary/Chest: Effort normal    Neurological: She is alert and oriented to person, place, and time  Skin: Skin is warm and dry  Psychiatric: She has a normal mood and affect   Cognition and memory are normal          /80 (BP Location: Right arm, Patient Position: Sitting, Cuff Size: Standard)   Pulse 65   Temp 97 5 °F (36 4 °C) (Oral)   Resp 18   Ht 5' 4 96" (1 65 m)   Wt 96 kg (211 lb 9 6 oz)   SpO2 99%   BMI 35 26 kg/m²           Current Outpatient Medications:     al mag oxide-diphenhydramine-lidocaine viscous (MAGIC MOUTHWASH) 1:1:1 suspension, Swish and spit 10 mL every 4 (four) hours as needed for mouth pain or discomfort, Disp: 180 mL, Rfl: 0    amLODIPine (NORVASC) 5 mg tablet, Take 1 tablet (5 mg total) by mouth daily, Disp: 30 tablet, Rfl: 0    carbamide peroxide (DEBROX) 6 5 % otic solution, Administer 5 drops to the right ear 2 (two) times a day, Disp: 15 mL, Rfl: 0    cyanocobalamin 1000 MCG tablet, Take 1 tablet (1,000 mcg total) by mouth daily, Disp: 90 tablet, Rfl: 3    omeprazole (PriLOSEC) 20 mg delayed release capsule, Take 1 capsule (20 mg total) by mouth daily To prevent stomach upset, Disp: 30 capsule, Rfl: 5    ondansetron (ZOFRAN) 8 mg tablet, Take 1 tablet (8 mg total) by mouth every 8 (eight) hours as needed for nausea or vomiting, Disp: 20 tablet, Rfl: 5    oxyCODONE (ROXICODONE) 5 mg immediate release tablet, Take 1 tablet (5 mg total) by mouth 4 (four) times a day as needed for severe painMax Daily Amount: 20 mg, Disp: 120 tablet, Rfl: 0    polyethylene glycol (MIRALAX) 17 g packet, Take 17 g by mouth daily, Disp: 14 each, Rfl: 0    senna (SENOKOT) 8 6 mg, Take 1 tablet (8 6 mg total) by mouth daily at bedtime, Disp: 120 each, Rfl: [address] [phone]        Representatives have queried the patient's controlled substance dispensing history in the Prescription Drug Monitoring Program in compliance with the [facility] regulations before I have prescribed any controlled substances  The prescription history is consistent with prescribed therapy and our practice policies

## 2019-09-03 ENCOUNTER — APPOINTMENT (OUTPATIENT)
Dept: LAB | Facility: HOSPITAL | Age: 59
End: 2019-09-03
Attending: INTERNAL MEDICINE
Payer: COMMERCIAL

## 2019-09-03 DIAGNOSIS — C82.18 GRADE 2 FOLLICULAR LYMPHOMA OF LYMPH NODES OF MULTIPLE REGIONS (HCC): ICD-10-CM

## 2019-09-03 LAB
ALBUMIN SERPL BCP-MCNC: 4.2 G/DL (ref 3–5.2)
ALP SERPL-CCNC: 92 U/L (ref 43–122)
ALT SERPL W P-5'-P-CCNC: 21 U/L (ref 9–52)
ANION GAP SERPL CALCULATED.3IONS-SCNC: 10 MMOL/L (ref 5–14)
ANISOCYTOSIS BLD QL SMEAR: PRESENT
AST SERPL W P-5'-P-CCNC: 26 U/L (ref 14–36)
BILIRUB SERPL-MCNC: 0.4 MG/DL
BUN SERPL-MCNC: 18 MG/DL (ref 5–25)
CALCIUM SERPL-MCNC: 9.4 MG/DL (ref 8.4–10.2)
CHLORIDE SERPL-SCNC: 105 MMOL/L (ref 97–108)
CO2 SERPL-SCNC: 26 MMOL/L (ref 22–30)
CREAT SERPL-MCNC: 0.84 MG/DL (ref 0.6–1.2)
CRP SERPL QL: 7.2 MG/L
EOSINOPHIL # BLD AUTO: 0.16 THOUSAND/UL (ref 0–0.4)
EOSINOPHIL NFR BLD MANUAL: 3 % (ref 0–6)
ERYTHROCYTE [DISTWIDTH] IN BLOOD BY AUTOMATED COUNT: 15.9 %
ERYTHROCYTE [SEDIMENTATION RATE] IN BLOOD: 28 MM/HOUR (ref 1–20)
GFR SERPL CREATININE-BSD FRML MDRD: 77 ML/MIN/1.73SQ M
GLUCOSE P FAST SERPL-MCNC: 101 MG/DL (ref 70–99)
HCT VFR BLD AUTO: 37.6 % (ref 36–46)
HGB BLD-MCNC: 11.6 G/DL (ref 12–16)
HYPERCHROMIA BLD QL SMEAR: PRESENT
LDH SERPL-CCNC: 843 U/L (ref 313–618)
LYMPHOCYTES # BLD AUTO: 0.86 THOUSAND/UL (ref 0.5–4)
LYMPHOCYTES # BLD AUTO: 16 % (ref 25–45)
MCH RBC QN AUTO: 21.7 PG (ref 26–34)
MCHC RBC AUTO-ENTMCNC: 30.8 G/DL (ref 31–36)
MCV RBC AUTO: 71 FL (ref 80–100)
MICROCYTES BLD QL AUTO: PRESENT
MONOCYTES # BLD AUTO: 0.43 THOUSAND/UL (ref 0.2–0.9)
MONOCYTES NFR BLD AUTO: 8 % (ref 1–10)
NEUTS SEG # BLD: 3.94 THOUSAND/UL (ref 1.8–7.8)
NEUTS SEG NFR BLD AUTO: 73 %
OVALOCYTES BLD QL SMEAR: PRESENT
PLATELET # BLD AUTO: 154 THOUSANDS/UL (ref 150–450)
PLATELET BLD QL SMEAR: ADEQUATE
PMV BLD AUTO: 10.1 FL (ref 8.9–12.7)
POIKILOCYTOSIS BLD QL SMEAR: PRESENT
POTASSIUM SERPL-SCNC: 4.1 MMOL/L (ref 3.6–5)
PROT SERPL-MCNC: 7 G/DL (ref 5.9–8.4)
RBC # BLD AUTO: 5.33 MILLION/UL (ref 4–5.2)
RBC MORPH BLD: ABNORMAL
SODIUM SERPL-SCNC: 141 MMOL/L (ref 137–147)
TOTAL CELLS COUNTED SPEC: 100
URATE SERPL-MCNC: 4.6 MG/DL (ref 2.7–7.5)
WBC # BLD AUTO: 5.4 THOUSAND/UL (ref 4.5–11)

## 2019-09-03 PROCEDURE — 86140 C-REACTIVE PROTEIN: CPT

## 2019-09-03 PROCEDURE — 84550 ASSAY OF BLOOD/URIC ACID: CPT

## 2019-09-03 PROCEDURE — 85007 BL SMEAR W/DIFF WBC COUNT: CPT

## 2019-09-03 PROCEDURE — 80053 COMPREHEN METABOLIC PANEL: CPT

## 2019-09-03 PROCEDURE — 85027 COMPLETE CBC AUTOMATED: CPT

## 2019-09-03 PROCEDURE — 83615 LACTATE (LD) (LDH) ENZYME: CPT

## 2019-09-03 PROCEDURE — 85652 RBC SED RATE AUTOMATED: CPT

## 2019-09-03 PROCEDURE — 36415 COLL VENOUS BLD VENIPUNCTURE: CPT

## 2019-09-03 RX ORDER — CYANOCOBALAMIN 1000 UG/ML
1000 INJECTION INTRAMUSCULAR; SUBCUTANEOUS ONCE
Status: CANCELLED | OUTPATIENT
Start: 2019-09-04

## 2019-09-04 ENCOUNTER — OFFICE VISIT (OUTPATIENT)
Dept: HEMATOLOGY ONCOLOGY | Facility: CLINIC | Age: 59
End: 2019-09-04
Payer: COMMERCIAL

## 2019-09-04 ENCOUNTER — HOSPITAL ENCOUNTER (OUTPATIENT)
Dept: INFUSION CENTER | Facility: HOSPITAL | Age: 59
Discharge: HOME | End: 2019-09-04
Attending: INTERNAL MEDICINE
Payer: COMMERCIAL

## 2019-09-04 VITALS
RESPIRATION RATE: 16 BRPM | SYSTOLIC BLOOD PRESSURE: 120 MMHG | BODY MASS INDEX: 35.89 KG/M2 | DIASTOLIC BLOOD PRESSURE: 78 MMHG | TEMPERATURE: 97.8 F | HEART RATE: 68 BPM | OXYGEN SATURATION: 98 % | HEIGHT: 65 IN | WEIGHT: 215.4 LBS

## 2019-09-04 VITALS
WEIGHT: 215.39 LBS | HEART RATE: 61 BPM | TEMPERATURE: 97.9 F | BODY MASS INDEX: 35.89 KG/M2 | SYSTOLIC BLOOD PRESSURE: 139 MMHG | HEIGHT: 65 IN | DIASTOLIC BLOOD PRESSURE: 62 MMHG | RESPIRATION RATE: 18 BRPM

## 2019-09-04 DIAGNOSIS — C82.18 GRADE 2 FOLLICULAR LYMPHOMA OF LYMPH NODES OF MULTIPLE REGIONS (HCC): Primary | ICD-10-CM

## 2019-09-04 DIAGNOSIS — D51.8 OTHER VITAMIN B12 DEFICIENCY ANEMIAS: ICD-10-CM

## 2019-09-04 PROCEDURE — 96367 TX/PROPH/DG ADDL SEQ IV INF: CPT

## 2019-09-04 PROCEDURE — 96413 CHEMO IV INFUSION 1 HR: CPT

## 2019-09-04 PROCEDURE — 99214 OFFICE O/P EST MOD 30 MIN: CPT | Performed by: INTERNAL MEDICINE

## 2019-09-04 PROCEDURE — 96415 CHEMO IV INFUSION ADDL HR: CPT

## 2019-09-04 PROCEDURE — 96372 THER/PROPH/DIAG INJ SC/IM: CPT

## 2019-09-04 RX ORDER — SODIUM CHLORIDE 9 MG/ML
20 INJECTION, SOLUTION INTRAVENOUS ONCE
Status: CANCELLED | OUTPATIENT
Start: 2019-10-30

## 2019-09-04 RX ORDER — SODIUM CHLORIDE 9 MG/ML
20 INJECTION, SOLUTION INTRAVENOUS ONCE
Status: COMPLETED | OUTPATIENT
Start: 2019-09-04 | End: 2019-09-04

## 2019-09-04 RX ORDER — ACETAMINOPHEN 325 MG/1
650 TABLET ORAL ONCE
Status: COMPLETED | OUTPATIENT
Start: 2019-09-04 | End: 2019-09-04

## 2019-09-04 RX ORDER — CYANOCOBALAMIN 1000 UG/ML
1000 INJECTION INTRAMUSCULAR; SUBCUTANEOUS ONCE
Status: CANCELLED | OUTPATIENT
Start: 2019-10-02

## 2019-09-04 RX ORDER — ACETAMINOPHEN 325 MG/1
650 TABLET ORAL ONCE
Status: CANCELLED | OUTPATIENT
Start: 2019-10-30

## 2019-09-04 RX ORDER — CYANOCOBALAMIN 1000 UG/ML
1000 INJECTION INTRAMUSCULAR; SUBCUTANEOUS ONCE
Status: COMPLETED | OUTPATIENT
Start: 2019-09-04 | End: 2019-09-04

## 2019-09-04 RX ADMIN — DEXAMETHASONE SODIUM PHOSPHATE 10 MG: 10 INJECTION, SOLUTION INTRAMUSCULAR; INTRAVENOUS at 10:05

## 2019-09-04 RX ADMIN — SODIUM CHLORIDE 20 ML/HR: 9 INJECTION, SOLUTION INTRAVENOUS at 09:30

## 2019-09-04 RX ADMIN — DIPHENHYDRAMINE HYDROCHLORIDE 50 MG: 50 INJECTION, SOLUTION INTRAMUSCULAR; INTRAVENOUS at 09:42

## 2019-09-04 RX ADMIN — CYANOCOBALAMIN 1000 MCG: 1000 INJECTION INTRAMUSCULAR; SUBCUTANEOUS at 13:27

## 2019-09-04 RX ADMIN — RITUXIMAB 700 MG: 10 INJECTION, SOLUTION INTRAVENOUS at 10:34

## 2019-09-04 RX ADMIN — ACETAMINOPHEN 650 MG: 325 TABLET ORAL at 09:32

## 2019-09-04 NOTE — PROGRESS NOTES
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 [facility])  The patient seems to have further increase of her LDH which is concerning of progression of her follicular lymphoma  She did not have complete remission after the 4 cycles of bendamustine rituximab  I think would be appropriate to continue the patient on the current maintenance rituximab for now until we see significant hint of progression of her follicular lymphoma  If she becomes more symptomatic in the near future then imaging study will be ordered like a CT scan or a PET-CT scan preferably  With any hint of progression second-line therapy like the combination of rituximab and Revlimid will be entertained  - Infusion Calculated Appointment Request; Future  - CBC and differential; Future  - Comprehensive metabolic panel; Future  - CBC and differential; Future  - Comprehensive metabolic panel; Future  - C-reactive protein; Future  - Sedimentation rate, automated; Future  - Vitamin B12; Future  - Iron Panel (Includes Ferritin, Iron Sat%, Iron, and TIBC); Future  - Ferritin; Future  - LD,Blood; Future        HPI:  The patient came in today for a follow-up visit  She complained today about occasional gum bleeding when she brushes her teeth  She also seems to have pain in her left knee and mild fatigue  Her most recent blood work from the 3rd of September showed further increase of her LDH up to 843  Her white cell count was 5 4 with hemoglobin of 11 6 and MCV of 71  The platelet count was 011  Her creatinine was 0 8 with the normal liver enzymes    Oncology History    The patient started to notice significant abdominal pain about 8 months prior to presentation, she then was evaluated in the hospital with a CT scan of the chest abdomen pelvis on the 7th of November   This showed massive lymphadenopathy throughout the abdomen and pelvis with hepatic and massive splenomegaly   She also was found to have bulky supraclavicular, axillary and mediastinal adenopathy  She then had a supra clavicular lymph node excisional biopsy on the 9th of November , the pathology was compatible with Follicular lymphoma, WHO grade 1-2  She then had a bone marrow biopsy on the 20th of November which showed also low grade B-cell lymphoma CD10 positive compromising 63% of the total cells  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    11/7/2018 Initial Diagnosis     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 ([facility] )      12/6/2018 -  Chemotherapy     1   rituximab and bendamustine with neulasta support 12/6/2018- 3/13/19 (4 cycles total)  - day 2 bendamustine held with cycle 4  - administered Rituxan only 4/7/19    2  Started maintenance Rituxan every 8 weeks 5/15/19        12/7/2018 Adverse Reaction     C1D2 labs reflective of tumor lysis syndrome, dose of rasburicase given x1 and admitted for close observation/hydration         Interval history:    ROS: Review of Systems   Constitutional: Positive for fatigue  Negative for activity change, appetite change, chills, diaphoresis, fever and unexpected weight change  HENT: Negative for congestion, dental problem, ear discharge, ear pain, facial swelling, hearing loss, mouth sores, nosebleeds, postnasal drip, sore throat, tinnitus and trouble swallowing  Eyes: Negative for discharge, redness, itching and visual disturbance  Respiratory: Positive for cough  Negative for chest tightness, shortness of breath and wheezing  Cardiovascular: Negative for chest pain, palpitations and leg swelling  Gastrointestinal: Positive for constipation  Negative for abdominal distention, abdominal pain, anal bleeding, blood in stool, diarrhea, nausea and vomiting     Genitourinary: Negative for difficulty urinating, dysuria, flank pain, frequency, hematuria and urgency  Musculoskeletal: Positive for arthralgias  Negative for back pain, gait problem, joint swelling, myalgias and neck pain  Skin: Negative for color change, pallor, rash and wound  Neurological: Positive for dizziness and numbness  Negative for syncope, speech difficulty, weakness, light-headedness and headaches  Hematological: Negative for adenopathy  Does not bruise/bleed easily  Psychiatric/Behavioral: Positive for sleep disturbance  Negative for agitation, behavioral problems and confusion         Past Medical History:   Diagnosis Date    Anemia     Arthritis     Asthma     Lupus ([facility] )     Lymphoma ([facility] )     Lymphoma ([address] [address] )     Migraine     Osteoporosis     Psychiatric disorder        Past Surgical History:   Procedure Laterality Date    CHOLECYSTECTOMY      FL GUIDED CENTRAL VENOUS ACCESS DEVICE INSERTION  2018    HYSTERECTOMY      LYMPH NODE BIOPSY Left 2018    Procedure: EXCISION BIOPSY LYMPH NODE SUPRACLAVICULAR;  Surgeon: Kari Guevara MD;  Location: [address] [place] [address];  Service: General    TUNNELED VENOUS PORT PLACEMENT N/A 2018    Procedure: INSERTION OF PORT-A-CATH;  Surgeon: Kari Guevara MD;  Location: [address] [place] [address];  Service: General       Social History     Socioeconomic History    Marital status: Single     Spouse name: None    Number of children: None    Years of education: None    Highest education level: None   Occupational History    None   Social Needs    Financial resource strain: None    Food insecurity:     Worry: None     Inability: None    Transportation needs:     Medical: None     Non-medical: None   Tobacco Use    Smoking status: Former Smoker     Last attempt to quit: 2017     Years since quittin 2    Smokeless tobacco: Never Used   Substance and Sexual Activity    Alcohol use: No    Drug use: No    Sexual activity: None   Lifestyle    Physical activity:     Days per week: None     Minutes per session: None  Stress: None   Relationships    Social connections:     Talks on phone: None     Gets together: None     Attends Denominational service: None     Active member of club or organization: None     Attends meetings of clubs or organizations: None     Relationship status: None    Intimate partner violence:     Fear of current or ex partner: None     Emotionally abused: None     Physically abused: None     Forced sexual activity: None   Other Topics Concern    None   Social History Narrative    None       Family History   Problem Relation Age of Onset    Cancer Mother     Diabetes Mother     Cancer Father     Diabetes Father        Allergies   Allergen Reactions    Penicillins          Current Outpatient Medications:     al mag oxide-diphenhydramine-lidocaine viscous (MAGIC MOUTHWASH) 1:1:1 suspension, Swish and spit 10 mL every 4 (four) hours as needed for mouth pain or discomfort, Disp: 180 mL, Rfl: 0    amLODIPine (NORVASC) 5 mg tablet, Take 1 tablet (5 mg total) by mouth daily, Disp: 30 tablet, Rfl: 0    carbamide peroxide (DEBROX) 6 5 % otic solution, Administer 5 drops to the right ear 2 (two) times a day, Disp: 15 mL, Rfl: 0    cyanocobalamin 1000 MCG tablet, Take 1 tablet (1,000 mcg total) by mouth daily, Disp: 90 tablet, Rfl: 3    omeprazole (PriLOSEC) 20 mg delayed release capsule, Take 1 capsule (20 mg total) by mouth daily To prevent stomach upset, Disp: 30 capsule, Rfl: 5    ondansetron (ZOFRAN) 8 mg tablet, Take 1 tablet (8 mg total) by mouth every 8 (eight) hours as needed for nausea or vomiting, Disp: 20 tablet, Rfl: 5    oxyCODONE (ROXICODONE) 5 mg immediate release tablet, Take 1 tablet (5 mg total) by mouth 4 (four) times a day as needed for severe painMax Daily Amount: 20 mg, Disp: 120 tablet, Rfl: 0    polyethylene glycol (MIRALAX) 17 g packet, Take 17 g by mouth daily, Disp: 14 each, Rfl: 0    senna (SENOKOT) 8 6 mg, Take 1 tablet (8 6 mg total) by mouth daily at bedtime, Disp: 120 each, Rfl: 0      Physical Exam:  /78 (BP Location: Left arm, Cuff Size: Large)   Pulse 68   Temp 97 8 °F (36 6 °C) (Tympanic)   Resp 16   Ht 5' 4 96" (1 65 m)   Wt 97 7 kg (215 lb 6 4 oz)   SpO2 98%   BMI 35 89 kg/m²     Physical Exam   Constitutional: She is oriented to person, place, and time  She appears well-developed and well-nourished  No distress  HENT:   Head: Normocephalic and atraumatic  Nose: Nose normal    Mouth/Throat: Oropharynx is clear and moist    Eyes: Pupils are equal, round, and reactive to light  Conjunctivae and EOM are normal  Right eye exhibits no discharge  Left eye exhibits no discharge  No scleral icterus  Neck: Normal range of motion  Neck supple  No JVD present  No tracheal deviation present  No thyromegaly present  Cardiovascular: Normal rate, regular rhythm and normal heart sounds  Exam reveals no friction rub  No murmur heard  Pulmonary/Chest: Effort normal and breath sounds normal  No stridor  No respiratory distress  She has no wheezes  She has no rales  She exhibits no tenderness  Abdominal: Soft  Bowel sounds are normal  She exhibits no distension and no mass  There is no hepatosplenomegaly, splenomegaly or hepatomegaly  There is tenderness (On deep palpation in the epigastric area and right upper quadrant)  There is no rebound and no guarding  Musculoskeletal: Normal range of motion  She exhibits no edema, tenderness or deformity  Lymphadenopathy:     She has no cervical adenopathy  Neurological: She is alert and oriented to person, place, and time  She has normal reflexes  No cranial nerve deficit  Coordination normal    Skin: Skin is warm and dry  No rash noted  She is not diaphoretic  No erythema  No pallor  Psychiatric: She has a normal mood and affect   Her behavior is normal  Judgment and thought content normal          Labs:  Lab Results   Component Value Date    WBC 5 40 09/03/2019    HGB 11 6 (L) 09/03/2019    HCT 37 6 09/03/2019    MCV 71 (L) 09/03/2019     09/03/2019     Lab Results   Component Value Date    K 4 1 09/03/2019     09/03/2019    CO2 26 09/03/2019    BUN 18 09/03/2019    CREATININE 0 84 09/03/2019    GLUF 101 (H) 09/03/2019    CALCIUM 9 4 09/03/2019    AST 26 09/03/2019    ALT 21 09/03/2019    ALKPHOS 92 09/03/2019    EGFR 77 09/03/2019     No results found for: TSH    Patient voiced understanding and agreement in the above discussion  Aware to contact our office with questions/symptoms in the interim

## 2019-09-04 NOTE — PROGRESS NOTES
Pt tolerated pre meds and rituixan iv without adverse reactions  Port flushed and deaccessed per routine  vitamin b12 injection dose given as per monthly order  Awaiting dtr for

## 2019-09-04 NOTE — PLAN OF CARE
Problem: Knowledge Deficit  Goal: Patient/family/caregiver demonstrates understanding of disease process, treatment plan, medications, and discharge instructions  Description  Complete learning assessment and assess knowledge base    Interventions:  - Provide teaching at level of understanding  - Provide teaching via preferred learning methods  9/4/2019 0921 by Chon Larkin RN  Outcome: Progressing  9/4/2019 0920 by Chon Larkin RN  Outcome: Progressing

## 2019-09-12 ENCOUNTER — OFFICE VISIT (OUTPATIENT)
Dept: FAMILY MEDICINE CLINIC | Facility: CLINIC | Age: 59
End: 2019-09-12

## 2019-09-12 VITALS
HEART RATE: 78 BPM | OXYGEN SATURATION: 98 % | WEIGHT: 217.7 LBS | SYSTOLIC BLOOD PRESSURE: 140 MMHG | HEIGHT: 64 IN | TEMPERATURE: 97.3 F | DIASTOLIC BLOOD PRESSURE: 72 MMHG | RESPIRATION RATE: 16 BRPM | BODY MASS INDEX: 37.17 KG/M2

## 2019-09-12 DIAGNOSIS — I10 ESSENTIAL HYPERTENSION: Primary | ICD-10-CM

## 2019-09-12 DIAGNOSIS — R42 DIZZINESS: ICD-10-CM

## 2019-09-12 PROCEDURE — 99213 OFFICE O/P EST LOW 20 MIN: CPT | Performed by: FAMILY MEDICINE

## 2019-09-12 RX ORDER — MECLIZINE HCL 12.5 MG/1
12.5 TABLET ORAL EVERY 8 HOURS PRN
Qty: 30 TABLET | Refills: 3 | Status: ON HOLD | OUTPATIENT
Start: 2019-09-12 | End: 2021-01-26 | Stop reason: SDUPTHER

## 2019-09-12 NOTE — ASSESSMENT & PLAN NOTE
Blood pressure currently well controlled with current antihypertensive therapy  No complaints of adverse effects, including visual changes, dizziness, headaches or  syncope  Will continue current therapy  Encouraged diet and exercise regimen as tolerated

## 2019-09-12 NOTE — PROGRESS NOTES
Assessment/Plan:    Essential hypertension  Blood pressure currently well controlled with current antihypertensive therapy  No complaints of adverse effects, including visual changes, dizziness, headaches or  syncope  Will continue current therapy  Encouraged diet and exercise regimen as tolerated      Dizziness  Complains of dizziness, feeling the room is spinning for the last couple days  She had a previous episode in the past that felt similar  Positive head tilt test  Will start on meclizine 12 5 mg PRN TID  Follow-up in 4 weeks, will consider vestibular rehab if no improvement of symptoms       Diagnoses and all orders for this visit:    Essential hypertension    Dizziness  -     meclizine (ANTIVERT) 12 5 MG tablet; Take 1 tablet (12 5 mg total) by mouth every 8 (eight) hours as needed for dizziness          Subjective:      Patient ID: Aleida Hager is a 64 y o  female  Patient presents to follow-up on hypertension  She has complaints of dizziness that started couple days ago and feels like room spinning and related to blurry vision as well  She has similar episodes in the past couple years ago that resolved  No other new complaints at this time  The following portions of the patient's history were reviewed and updated as appropriate: allergies, current medications, past family history, past medical history, past social history, past surgical history and problem list     Review of Systems   Constitutional: Negative for chills, diaphoresis, fatigue and fever  Eyes: Negative for photophobia, pain and discharge  Respiratory: Negative for cough, chest tightness, shortness of breath and wheezing  Cardiovascular: Negative for chest pain, palpitations and leg swelling  Gastrointestinal: Negative for abdominal distention, abdominal pain, blood in stool, constipation, diarrhea and nausea  Genitourinary: Negative for difficulty urinating and frequency     Musculoskeletal: Positive for arthralgias (bilateral chronic knee pain)  Negative for joint swelling and neck stiffness  Skin: Negative for color change, pallor and rash  Neurological: Positive for dizziness  Negative for syncope, numbness and headaches  Objective:      /72 (BP Location: Right arm, Patient Position: Sitting, Cuff Size: Large)   Pulse 78   Temp (!) 97 3 °F (36 3 °C) (Tympanic)   Resp 16   Ht 5' 4" (1 626 m)   Wt 98 7 kg (217 lb 11 2 oz)   SpO2 98%   Breastfeeding? No   BMI 37 37 kg/m²          Physical Exam   Constitutional: She is oriented to person, place, and time  She appears well-developed and well-nourished  No distress  HENT:   Head: Normocephalic and atraumatic  Eyes: Pupils are equal, round, and reactive to light  EOM are normal  No scleral icterus  Neck: Normal range of motion  Cardiovascular: Normal rate, regular rhythm and normal heart sounds  Exam reveals no gallop and no friction rub  No murmur heard  Pulmonary/Chest: Effort normal and breath sounds normal  No respiratory distress  She has no wheezes  She has no rales  She exhibits no tenderness  Abdominal: Soft  Bowel sounds are normal  She exhibits no distension  There is no tenderness  There is no rebound  Musculoskeletal: Normal range of motion  She exhibits no edema, tenderness or deformity  Neurological: She is alert and oriented to person, place, and time  Positive modified head tilt test   Skin: Skin is warm and dry  No rash noted  No erythema  No pallor  Psychiatric: She has a normal mood and affect

## 2019-09-12 NOTE — ASSESSMENT & PLAN NOTE
Complains of dizziness, feeling the room is spinning for the last couple days    She had a previous episode in the past that felt similar  Positive head tilt test  Will start on meclizine 12 5 mg PRN TID  Follow-up in 4 weeks, will consider vestibular rehab if no improvement of symptoms

## 2019-09-16 ENCOUNTER — OFFICE VISIT (OUTPATIENT)
Dept: PALLIATIVE MEDICINE | Facility: CLINIC | Age: 59
End: 2019-09-16
Payer: COMMERCIAL

## 2019-09-16 VITALS
HEART RATE: 64 BPM | SYSTOLIC BLOOD PRESSURE: 150 MMHG | HEIGHT: 64 IN | DIASTOLIC BLOOD PRESSURE: 80 MMHG | OXYGEN SATURATION: 98 % | BODY MASS INDEX: 36.6 KG/M2 | WEIGHT: 214.4 LBS | RESPIRATION RATE: 16 BRPM | TEMPERATURE: 97.6 F

## 2019-09-16 DIAGNOSIS — C82.18 GRADE 2 FOLLICULAR LYMPHOMA OF LYMPH NODES OF MULTIPLE REGIONS (HCC): Primary | ICD-10-CM

## 2019-09-16 DIAGNOSIS — R10.84 GENERALIZED ABDOMINAL PAIN: ICD-10-CM

## 2019-09-16 PROCEDURE — 99214 OFFICE O/P EST MOD 30 MIN: CPT | Performed by: NURSE PRACTITIONER

## 2019-09-16 RX ORDER — OXYCODONE HYDROCHLORIDE 5 MG/1
5 TABLET ORAL 4 TIMES DAILY PRN
Qty: 120 TABLET | Refills: 0 | Status: SHIPPED | OUTPATIENT
Start: 2019-09-16 | End: 2019-10-14 | Stop reason: SDUPTHER

## 2019-09-16 NOTE — PROGRESS NOTES
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 ([address] )    2  Generalized abdominal pain        Requested Prescriptions     Signed Prescriptions Disp Refills    oxyCODONE (ROXICODONE) 5 mg immediate release tablet 120 tablet 0     Sig: Take 1 tablet (5 mg total) by mouth 4 (four) times a day as needed for severe painMax Daily Amount: 20 mg       No changes to oxycodone this month  Ok to increase senna to BID, continue Miralax  Encouraged patient to discuss symptoms such as ongoing umbilical discharge, ongoing vaginal discharge, bleeding of gums, etc with primary care and oncology  Encouraged to make appointment with orthopedics for evaluation of symptoms of left arm, thumb  Follow up in one month      Subjective    Chief Concern  Follow up visit for:  Symptom management         History of Present Illness  Patient ID: Guzman Melo is a 64 y o  female with grade 2 follicular lymphoma initially diagnosed in Nov 2018  Tumor lysis syndrome in Dec 2018  Received chemo in early 2019  Started maintenance rituxan q 8 weeks starting May 2019  At most recent visit with oncology, there was concern for possible progression  Plan is for her to continue maintenance rituximab for now  If progression is noted, potentially 2nd line therapy would be considered  She presents today with her son  Phone translation line was utilized  Seen by PCP last week, for vertigo  She notes worsened abdominal pain over past couple months  Taking oxycodone about 4x/day  Takes 3 times daily routinely, then often wakes at night with pain and takes another  She notes yellow discharge from umbilicus as well as vaginal discharge for months  States she was treated with antibiotic about 4 months ago, without improvement  She notes bleeding from gums with teeth brushing, for months    Ongoing left arm pain with thumb issues (locking)  Missed appt with orthopedics  The following portions of the patient's history were reviewed and updated as appropriate: allergies, current medications, past family history, past medical history, past social history, past surgical history and problem list       Visit Information    Accompanied By: son present for part of the visit  Source of History: Self,   History Limitations: Language Barrier, phone  utilized    ROS  Review of Systems   Gastrointestinal: Positive for abdominal pain  Negative for constipation  Genitourinary: Positive for vaginal discharge  Musculoskeletal: Positive for arthralgias  Objective     Physical Exam   Constitutional: She is oriented to person, place, and time  She is cooperative  Overweight, pleasant   Pulmonary/Chest: Effort normal    Neurological: She is alert and oriented to person, place, and time  Psychiatric: She has a normal mood and affect   Cognition and memory are normal          /80 (BP Location: Left arm, Patient Position: Sitting, Cuff Size: Standard)   Pulse 64   Temp 97 6 °F (36 4 °C) (Oral)   Resp 16   Ht 5' 4" (1 626 m)   Wt 97 3 kg (214 lb 6 4 oz)   SpO2 98%   BMI 36 80 kg/m²           Current Outpatient Medications:     al mag oxide-diphenhydramine-lidocaine viscous (MAGIC MOUTHWASH) 1:1:1 suspension, Swish and spit 10 mL every 4 (four) hours as needed for mouth pain or discomfort (Patient not taking: Reported on 9/12/2019), Disp: 180 mL, Rfl: 0    amLODIPine (NORVASC) 5 mg tablet, Take 1 tablet (5 mg total) by mouth daily, Disp: 30 tablet, Rfl: 0    carbamide peroxide (DEBROX) 6 5 % otic solution, Administer 5 drops to the right ear 2 (two) times a day, Disp: 15 mL, Rfl: 0    cyanocobalamin 1000 MCG tablet, Take 1 tablet (1,000 mcg total) by mouth daily, Disp: 90 tablet, Rfl: 3    meclizine (ANTIVERT) 12 5 MG tablet, Take 1 tablet (12 5 mg total) by mouth every 8 (eight) hours as needed for dizziness, Disp: 30 tablet, Rfl: 3    omeprazole (PriLOSEC) 20 mg delayed release capsule, Take 1 capsule (20 mg total) by mouth daily To prevent stomach upset, Disp: 30 capsule, Rfl: 5    ondansetron (ZOFRAN) 8 mg tablet, Take 1 tablet (8 mg total) by mouth every 8 (eight) hours as needed for nausea or vomiting, Disp: 20 tablet, Rfl: 5    oxyCODONE (ROXICODONE) 5 mg immediate release tablet, Take 1 tablet (5 mg total) by mouth 4 (four) times a day as needed for severe painMax Daily Amount: 20 mg, Disp: 120 tablet, Rfl: 0    polyethylene glycol (MIRALAX) 17 g packet, Take 17 g by mouth daily, Disp: 14 each, Rfl: 0    senna (SENOKOT) 8 6 mg, Take 1 tablet (8 6 mg total) by mouth daily at bedtime, Disp: 120 each, Rfl: [address] [phone]        Representatives have queried the patient's controlled substance dispensing history in the Prescription Drug Monitoring Program in compliance with the [facility] regulations before I have prescribed any controlled substances  The prescription history is consistent with prescribed therapy and our practice policies

## 2019-09-16 NOTE — PATIENT INSTRUCTIONS
No changes to oxycodone this month      Ok to take senna (laxative) - 1 tab twice daily  Continue Miralax    Please call to schedule orthopedics appointment: [phone]     Call primary care or oncology regarding other symptoms

## 2019-10-14 ENCOUNTER — OFFICE VISIT (OUTPATIENT)
Dept: PALLIATIVE MEDICINE | Facility: CLINIC | Age: 59
End: 2019-10-14
Payer: COMMERCIAL

## 2019-10-14 VITALS
TEMPERATURE: 97.7 F | RESPIRATION RATE: 16 BRPM | BODY MASS INDEX: 37.52 KG/M2 | HEIGHT: 64 IN | SYSTOLIC BLOOD PRESSURE: 150 MMHG | OXYGEN SATURATION: 99 % | DIASTOLIC BLOOD PRESSURE: 90 MMHG | WEIGHT: 219.8 LBS | HEART RATE: 62 BPM

## 2019-10-14 DIAGNOSIS — R10.84 GENERALIZED ABDOMINAL PAIN: ICD-10-CM

## 2019-10-14 DIAGNOSIS — C82.18 GRADE 2 FOLLICULAR LYMPHOMA OF LYMPH NODES OF MULTIPLE REGIONS (HCC): Primary | ICD-10-CM

## 2019-10-14 DIAGNOSIS — R11.2 NAUSEA AND VOMITING, INTRACTABILITY OF VOMITING NOT SPECIFIED, UNSPECIFIED VOMITING TYPE: ICD-10-CM

## 2019-10-14 PROCEDURE — 99214 OFFICE O/P EST MOD 30 MIN: CPT | Performed by: NURSE PRACTITIONER

## 2019-10-14 RX ORDER — POLYETHYLENE GLYCOL 3350 17 G/17G
17 POWDER, FOR SOLUTION ORAL DAILY
Qty: 30 EACH | Refills: 1 | Status: SHIPPED | OUTPATIENT
Start: 2019-10-14 | End: 2019-12-17 | Stop reason: SDUPTHER

## 2019-10-14 RX ORDER — SENNOSIDES 8.6 MG
1 TABLET ORAL 2 TIMES DAILY
Qty: 60 EACH | Refills: 1 | Status: SHIPPED | OUTPATIENT
Start: 2019-10-14 | End: 2019-12-17 | Stop reason: SDUPTHER

## 2019-10-14 RX ORDER — ONDANSETRON 4 MG/1
4 TABLET, FILM COATED ORAL EVERY 8 HOURS PRN
Qty: 20 TABLET | Refills: 0 | Status: SHIPPED | OUTPATIENT
Start: 2019-10-14 | End: 2020-09-15 | Stop reason: SDUPTHER

## 2019-10-14 RX ORDER — OXYCODONE HYDROCHLORIDE 5 MG/1
5 TABLET ORAL 4 TIMES DAILY PRN
Qty: 120 TABLET | Refills: 0 | Status: SHIPPED | OUTPATIENT
Start: 2019-10-14 | End: 2019-11-18 | Stop reason: SDUPTHER

## 2019-10-14 NOTE — PROGRESS NOTES
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 ([address] )    2  Generalized abdominal pain    3  Nausea and vomiting, intractability of vomiting not specified, unspecified vomiting type        Requested Prescriptions     Signed Prescriptions Disp Refills    ondansetron (ZOFRAN) 4 mg tablet 20 tablet 0     Sig: Take 1 tablet (4 mg total) by mouth every 8 (eight) hours as needed for nausea or vomiting    oxyCODONE (ROXICODONE) 5 mg immediate release tablet 120 tablet 0     Sig: Take 1 tablet (5 mg total) by mouth 4 (four) times a day as needed for severe painMax Daily Amount: 20 mg    senna (SENOKOT) 8 6 mg 60 each 1     Sig: Take 1 tablet (8 6 mg total) by mouth 2 (two) times a day    polyethylene glycol (MIRALAX) 17 g packet 30 each 1     Sig: Take 17 g by mouth daily       Symptom management: abdominal pain, nausea and vomiting, constipation, arm/hand pain and numbness  - no increase to oxycodone at this time  Would like to keep dose as low as possible, and would prefer further evaluation of pains before any increases of dose  - Pt to discuss vomiting and cough with PCP  Asked patient to attempt to move appointment sooner  - again encouraged patient to schedule (or have family schedule) appointment for orthopedics to evaluate arm and hand symptoms  - increase senna to BID, continue miralax- sent to pharmacy    - start ondansetron PRN for nausea/vomiting      Follow up in one month      Subjective    Chief Concern  Follow up visit for:  Symptom management         History of Present Illness  Patient ID: Jovita Rothman is a 64 y o  female with grade 2 follicular lymphoma initially diagnosed in Nov 2018  Tumor lysis syndrome in Dec 2018  Received chemo in early 2019  Started maintenance rituxan q 8 weeks starting May 2019   PET scan in May 2019 demonstrated improvement of adenopathy however at last oncology visit it was noted that she had had increase in LDH concerning for progression  Plan is for her to continue maintenance rituximab for now  If progression is noted, potentially 2nd line therapy would be considered  Phone translation line was utilized      Patient notes increase in abdominal pain, especially left lower quadrant and midline suprapubic area  She has had vomiting for past week, wakes at 4am  Feels nauseated during day, which is worse with eating  No vomiting during day  She has not been having fevers  No urinary symptoms  Mild constipation  Taking oxycodone 4x/day, including dose at night  She does not get as much relief with this as previously  Ongoing pain of left arm, also notes pain of right arm, and pain/ numbness in fingers  Ongoing dizziness/ vertigo, which has been occurring for months  The following portions of the patient's history were reviewed and updated as appropriate: allergies, current medications, past family history, past medical history, past social history, past surgical history and problem list       Visit Information    Accompanied By: No one  Source of History: Self  History Limitations: Language Barrier, phone interpretor utilized    ROS  Review of Systems   Constitutional: Negative for fever  Gastrointestinal: Positive for abdominal pain, constipation, nausea and vomiting  Genitourinary: Negative for difficulty urinating, dysuria and vaginal pain  Musculoskeletal: Positive for arthralgias  Neurological: Positive for dizziness and numbness  Psychiatric/Behavioral: Positive for sleep disturbance  Objective     Physical Exam   Constitutional: She is oriented to person, place, and time  She is cooperative  Pulmonary/Chest: Effort normal    Neurological: She is alert and oriented to person, place, and time  Psychiatric: She has a normal mood and affect   Cognition and memory are normal          /90   Pulse 62   Temp 97 7 °F (36 5 °C) (Oral)   Resp 16   Ht 5' 4" (1 626 m)   Wt 99 7 kg (219 lb 12 8 oz)   SpO2 99%   BMI 37 73 kg/m²           Current Outpatient Medications:     al mag oxide-diphenhydramine-lidocaine viscous (MAGIC MOUTHWASH) 1:1:1 suspension, Swish and spit 10 mL every 4 (four) hours as needed for mouth pain or discomfort (Patient not taking: Reported on 9/12/2019), Disp: 180 mL, Rfl: 0    amLODIPine (NORVASC) 5 mg tablet, Take 1 tablet (5 mg total) by mouth daily, Disp: 30 tablet, Rfl: 0    carbamide peroxide (DEBROX) 6 5 % otic solution, Administer 5 drops to the right ear 2 (two) times a day, Disp: 15 mL, Rfl: 0    cyanocobalamin 1000 MCG tablet, Take 1 tablet (1,000 mcg total) by mouth daily, Disp: 90 tablet, Rfl: 3    meclizine (ANTIVERT) 12 5 MG tablet, Take 1 tablet (12 5 mg total) by mouth every 8 (eight) hours as needed for dizziness, Disp: 30 tablet, Rfl: 3    omeprazole (PriLOSEC) 20 mg delayed release capsule, Take 1 capsule (20 mg total) by mouth daily To prevent stomach upset, Disp: 30 capsule, Rfl: 5    ondansetron (ZOFRAN) 4 mg tablet, Take 1 tablet (4 mg total) by mouth every 8 (eight) hours as needed for nausea or vomiting, Disp: 20 tablet, Rfl: 0    oxyCODONE (ROXICODONE) 5 mg immediate release tablet, Take 1 tablet (5 mg total) by mouth 4 (four) times a day as needed for severe painMax Daily Amount: 20 mg, Disp: 120 tablet, Rfl: 0    polyethylene glycol (MIRALAX) 17 g packet, Take 17 g by mouth daily, Disp: 30 each, Rfl: 1    senna (SENOKOT) 8 6 mg, Take 1 tablet (8 6 mg total) by mouth 2 (two) times a day, Disp: 60 each, Rfl: [address] [facility]  [phone]        Representatives have queried the patient's controlled substance dispensing history in the Prescription Drug Monitoring Program in compliance with the [facility] regulations before I have prescribed any controlled substances    The prescription history is consistent with prescribed therapy and our practice policies

## 2019-10-14 NOTE — PATIENT INSTRUCTIONS
Call primary care office to move appointment sooner to discuss vomiting, cough    Call for appointment with orthopedics regarding arm and hand pain [phone]     Start ondansetron - 1 tab every 8 hr as needed for nausea    Increase senna to 1 tab twice daily for constipation  Continue Miralax (polyethylene glycol) once daily for constipation

## 2019-10-18 ENCOUNTER — APPOINTMENT (OUTPATIENT)
Dept: LAB | Facility: HOSPITAL | Age: 59
End: 2019-10-18
Attending: INTERNAL MEDICINE
Payer: COMMERCIAL

## 2019-10-18 DIAGNOSIS — C82.18 GRADE 2 FOLLICULAR LYMPHOMA OF LYMPH NODES OF MULTIPLE REGIONS (HCC): ICD-10-CM

## 2019-10-18 LAB
ALBUMIN SERPL BCP-MCNC: 4.3 G/DL (ref 3–5.2)
ALP SERPL-CCNC: 97 U/L (ref 43–122)
ALT SERPL W P-5'-P-CCNC: 32 U/L (ref 9–52)
ANION GAP SERPL CALCULATED.3IONS-SCNC: 8 MMOL/L (ref 5–14)
ANISOCYTOSIS BLD QL SMEAR: PRESENT
AST SERPL W P-5'-P-CCNC: 31 U/L (ref 14–36)
BASOPHILS # BLD AUTO: 0 THOUSANDS/ΜL (ref 0–0.1)
BASOPHILS NFR BLD AUTO: 1 % (ref 0–1)
BILIRUB SERPL-MCNC: 0.4 MG/DL
BUN SERPL-MCNC: 15 MG/DL (ref 5–25)
CALCIUM SERPL-MCNC: 9.5 MG/DL (ref 8.4–10.2)
CHLORIDE SERPL-SCNC: 104 MMOL/L (ref 97–108)
CO2 SERPL-SCNC: 29 MMOL/L (ref 22–30)
CREAT SERPL-MCNC: 0.77 MG/DL (ref 0.6–1.2)
CRP SERPL QL: 9.7 MG/L
EOSINOPHIL # BLD AUTO: 0.2 THOUSAND/ΜL (ref 0–0.4)
EOSINOPHIL NFR BLD AUTO: 4 % (ref 0–6)
ERYTHROCYTE [DISTWIDTH] IN BLOOD BY AUTOMATED COUNT: 15.6 %
ERYTHROCYTE [SEDIMENTATION RATE] IN BLOOD: 28 MM/HOUR (ref 1–20)
FERRITIN SERPL-MCNC: 126 NG/ML (ref 8–388)
GFR SERPL CREATININE-BSD FRML MDRD: 86 ML/MIN/1.73SQ M
GLUCOSE P FAST SERPL-MCNC: 106 MG/DL (ref 70–99)
HCT VFR BLD AUTO: 39.1 % (ref 36–46)
HGB BLD-MCNC: 12.2 G/DL (ref 12–16)
IRON SATN MFR SERPL: 18 %
IRON SERPL-MCNC: 51 UG/DL (ref 50–170)
LDH SERPL-CCNC: 792 U/L (ref 313–618)
LYMPHOCYTES # BLD AUTO: 1.1 THOUSANDS/ΜL (ref 0.5–4)
LYMPHOCYTES NFR BLD AUTO: 19 % (ref 25–45)
MCH RBC QN AUTO: 22 PG (ref 26–34)
MCHC RBC AUTO-ENTMCNC: 31.2 G/DL (ref 31–36)
MCV RBC AUTO: 71 FL (ref 80–100)
MICROCYTES BLD QL AUTO: PRESENT
MONOCYTES # BLD AUTO: 0.4 THOUSAND/ΜL (ref 0.2–0.9)
MONOCYTES NFR BLD AUTO: 7 % (ref 1–10)
NEUTROPHILS # BLD AUTO: 4.2 THOUSANDS/ΜL (ref 1.8–7.8)
NEUTS SEG NFR BLD AUTO: 70 % (ref 45–65)
OVALOCYTES BLD QL SMEAR: PRESENT
PLATELET # BLD AUTO: 172 THOUSANDS/UL (ref 150–450)
PLATELET BLD QL SMEAR: ADEQUATE
PMV BLD AUTO: 9.8 FL (ref 8.9–12.7)
POIKILOCYTOSIS BLD QL SMEAR: PRESENT
POTASSIUM SERPL-SCNC: 4.4 MMOL/L (ref 3.6–5)
PROT SERPL-MCNC: 7.2 G/DL (ref 5.9–8.4)
RBC # BLD AUTO: 5.54 MILLION/UL (ref 4–5.2)
RBC MORPH BLD: NORMAL
SODIUM SERPL-SCNC: 141 MMOL/L (ref 137–147)
TIBC SERPL-MCNC: 278 UG/DL (ref 250–450)
VIT B12 SERPL-MCNC: 1130 PG/ML (ref 100–900)
WBC # BLD AUTO: 6 THOUSAND/UL (ref 4.5–11)

## 2019-10-18 PROCEDURE — 82607 VITAMIN B-12: CPT

## 2019-10-18 PROCEDURE — 86140 C-REACTIVE PROTEIN: CPT

## 2019-10-18 PROCEDURE — 83550 IRON BINDING TEST: CPT

## 2019-10-18 PROCEDURE — 85025 COMPLETE CBC W/AUTO DIFF WBC: CPT

## 2019-10-18 PROCEDURE — 85652 RBC SED RATE AUTOMATED: CPT

## 2019-10-18 PROCEDURE — 80053 COMPREHEN METABOLIC PANEL: CPT

## 2019-10-18 PROCEDURE — 83540 ASSAY OF IRON: CPT

## 2019-10-18 PROCEDURE — 83615 LACTATE (LD) (LDH) ENZYME: CPT

## 2019-10-18 PROCEDURE — 36415 COLL VENOUS BLD VENIPUNCTURE: CPT

## 2019-10-18 PROCEDURE — 82728 ASSAY OF FERRITIN: CPT

## 2019-10-28 ENCOUNTER — OFFICE VISIT (OUTPATIENT)
Dept: FAMILY MEDICINE CLINIC | Facility: CLINIC | Age: 59
End: 2019-10-28

## 2019-10-28 VITALS
OXYGEN SATURATION: 99 % | SYSTOLIC BLOOD PRESSURE: 140 MMHG | HEART RATE: 70 BPM | DIASTOLIC BLOOD PRESSURE: 86 MMHG | HEIGHT: 64 IN | RESPIRATION RATE: 18 BRPM | BODY MASS INDEX: 35.34 KG/M2 | TEMPERATURE: 97.2 F | WEIGHT: 207 LBS

## 2019-10-28 DIAGNOSIS — M79.641 PAIN IN BOTH HANDS: ICD-10-CM

## 2019-10-28 DIAGNOSIS — M79.642 PAIN IN BOTH HANDS: ICD-10-CM

## 2019-10-28 DIAGNOSIS — R42 DIZZINESS: Primary | ICD-10-CM

## 2019-10-28 PROCEDURE — 99213 OFFICE O/P EST LOW 20 MIN: CPT | Performed by: FAMILY MEDICINE

## 2019-10-28 NOTE — ASSESSMENT & PLAN NOTE
Complains of the pain in PIP of the thumbs and index fingers bilaterally, she states that she has been diagnosed with arthritis in the past  She also states that her finger "get stuck sometimes"  Most likely she has trigger fingers, offered the steroid injection to the patient, but she refused  She is currently taking Oxycodone for her lymphoma complications, instructed that that may help with pain in her hands as well  She has a scheduled appointment with orthopedic surgery in few weeks  Instructed to call the clinic if worsening of the symptoms

## 2019-10-28 NOTE — ASSESSMENT & PLAN NOTE
Patient states her dizziness is improving, and is not as bad as she had before   she still takes meclizine p r n , will continue

## 2019-10-28 NOTE — PROGRESS NOTES
Assessment/Plan:    Dizziness  Patient states her dizziness is improving, and is not as bad as she had before   she still takes meclizine p r n , will continue    Pain in both hands  Complains of the pain in PIP of the thumbs and index fingers bilaterally, she states that she has been diagnosed with arthritis in the past  She also states that her finger "get stuck sometimes"  Most likely she has trigger fingers, offered the steroid injection to the patient, but she refused  She is currently taking Oxycodone for her lymphoma complications, instructed that that may help with pain in her hands as well  She has a scheduled appointment with orthopedic surgery in few weeks  Instructed to call the clinic if worsening of the symptoms       Diagnoses and all orders for this visit:    Dizziness    Pain in both hands          Subjective:      Patient ID: Nancy Cage is a 64 y o  female  Patient presents to follow-up on dizziness  She states her dizziness is improving  She complains of the pain in both hands (at the PIP joints of the thumbs and index fingers)that has been going on for couple years, that get progressively worse  It is also worse with movements such as making a fist  She denies fever, swelling, and it is not hot to touch  She denies sensation changes, numbness or tingling  Dizziness   This is a recurrent problem  The current episode started more than 1 month ago  The problem occurs intermittently  The problem has been waxing and waning  Associated symptoms include arthralgias (fingers)  Pertinent negatives include no abdominal pain, chest pain, chills, congestion, coughing, diaphoresis, fatigue, fever, headaches, joint swelling, nausea, numbness, rash or sore throat  The symptoms are aggravated by bending and twisting  Treatments tried: meclizine  The treatment provided moderate relief         The following portions of the patient's history were reviewed and updated as appropriate: allergies, current medications, past family history, past medical history, past social history, past surgical history and problem list     Review of Systems   Constitutional: Negative for chills, diaphoresis, fatigue and fever  HENT: Negative for congestion, ear discharge, ear pain, mouth sores, rhinorrhea, sore throat and trouble swallowing  Eyes: Negative for photophobia, pain and discharge  Respiratory: Negative for cough, chest tightness, shortness of breath and wheezing  Cardiovascular: Negative for chest pain, palpitations and leg swelling  Gastrointestinal: Negative for abdominal distention, abdominal pain, blood in stool, constipation, diarrhea and nausea  Genitourinary: Negative for difficulty urinating and frequency  Musculoskeletal: Positive for arthralgias (fingers)  Negative for joint swelling and neck stiffness  Skin: Negative for color change, pallor and rash  Neurological: Positive for dizziness  Negative for syncope, numbness and headaches  Objective:      /86 (BP Location: Left arm, Patient Position: Sitting, Cuff Size: Large)   Pulse 70   Temp (!) 97 2 °F (36 2 °C) (Temporal)   Resp 18   Ht 5' 4" (1 626 m)   Wt 93 9 kg (207 lb)   SpO2 99%   Breastfeeding? No   BMI 35 53 kg/m²          Physical Exam   Constitutional: She is oriented to person, place, and time  She appears well-developed and well-nourished  No distress  HENT:   Head: Normocephalic and atraumatic  Cardiovascular: Normal rate, regular rhythm and normal heart sounds  Pulmonary/Chest: Effort normal and breath sounds normal  No respiratory distress  Abdominal: Soft  Bowel sounds are normal  She exhibits no distension  Musculoskeletal: She exhibits no edema, tenderness or deformity  Right hand: She exhibits decreased range of motion (thumb)  Left hand: She exhibits decreased range of motion (thumb)  Hands:  Neurological: She is alert and oriented to person, place, and time  Skin: Skin is warm and dry  No rash noted  No erythema  No pallor  Psychiatric: She has a normal mood and affect

## 2019-10-29 ENCOUNTER — DOCUMENTATION (OUTPATIENT)
Dept: HEMATOLOGY ONCOLOGY | Facility: CLINIC | Age: 59
End: 2019-10-29

## 2019-10-30 ENCOUNTER — HOSPITAL ENCOUNTER (OUTPATIENT)
Dept: INFUSION CENTER | Facility: HOSPITAL | Age: 59
Discharge: HOME | End: 2019-10-30
Attending: INTERNAL MEDICINE
Payer: COMMERCIAL

## 2019-10-30 ENCOUNTER — OFFICE VISIT (OUTPATIENT)
Dept: HEMATOLOGY ONCOLOGY | Facility: CLINIC | Age: 59
End: 2019-10-30
Payer: COMMERCIAL

## 2019-10-30 VITALS — BODY MASS INDEX: 37.41 KG/M2 | HEIGHT: 64 IN | WEIGHT: 219.14 LBS

## 2019-10-30 VITALS
HEIGHT: 64 IN | WEIGHT: 219 LBS | DIASTOLIC BLOOD PRESSURE: 80 MMHG | HEART RATE: 63 BPM | TEMPERATURE: 98 F | BODY MASS INDEX: 37.39 KG/M2 | RESPIRATION RATE: 20 BRPM | SYSTOLIC BLOOD PRESSURE: 128 MMHG | OXYGEN SATURATION: 97 %

## 2019-10-30 DIAGNOSIS — D51.8 OTHER VITAMIN B12 DEFICIENCY ANEMIAS: Primary | ICD-10-CM

## 2019-10-30 DIAGNOSIS — C82.18 GRADE 2 FOLLICULAR LYMPHOMA OF LYMPH NODES OF MULTIPLE REGIONS (HCC): ICD-10-CM

## 2019-10-30 DIAGNOSIS — D51.8 OTHER VITAMIN B12 DEFICIENCY ANEMIAS: ICD-10-CM

## 2019-10-30 DIAGNOSIS — C82.18 GRADE 2 FOLLICULAR LYMPHOMA OF LYMPH NODES OF MULTIPLE REGIONS (HCC): Primary | ICD-10-CM

## 2019-10-30 PROCEDURE — 96372 THER/PROPH/DIAG INJ SC/IM: CPT

## 2019-10-30 PROCEDURE — 96367 TX/PROPH/DG ADDL SEQ IV INF: CPT

## 2019-10-30 PROCEDURE — 96413 CHEMO IV INFUSION 1 HR: CPT

## 2019-10-30 PROCEDURE — 99214 OFFICE O/P EST MOD 30 MIN: CPT | Performed by: NURSE PRACTITIONER

## 2019-10-30 PROCEDURE — 96415 CHEMO IV INFUSION ADDL HR: CPT

## 2019-10-30 RX ORDER — CYANOCOBALAMIN 1000 UG/ML
1000 INJECTION INTRAMUSCULAR; SUBCUTANEOUS ONCE
Status: CANCELLED | OUTPATIENT
Start: 2019-11-27

## 2019-10-30 RX ORDER — ACETAMINOPHEN 325 MG/1
650 TABLET ORAL ONCE
Status: COMPLETED | OUTPATIENT
Start: 2019-10-30 | End: 2019-10-30

## 2019-10-30 RX ORDER — CYANOCOBALAMIN 1000 UG/ML
1000 INJECTION INTRAMUSCULAR; SUBCUTANEOUS ONCE
Status: COMPLETED | OUTPATIENT
Start: 2019-10-30 | End: 2019-10-30

## 2019-10-30 RX ORDER — ACETAMINOPHEN 325 MG/1
650 TABLET ORAL ONCE
Status: CANCELLED | OUTPATIENT
Start: 2019-12-26

## 2019-10-30 RX ORDER — SODIUM CHLORIDE 9 MG/ML
20 INJECTION, SOLUTION INTRAVENOUS ONCE
Status: CANCELLED | OUTPATIENT
Start: 2019-12-26

## 2019-10-30 RX ORDER — SODIUM CHLORIDE 9 MG/ML
20 INJECTION, SOLUTION INTRAVENOUS ONCE
Status: COMPLETED | OUTPATIENT
Start: 2019-10-30 | End: 2019-10-30

## 2019-10-30 RX ADMIN — ACETAMINOPHEN 650 MG: 325 TABLET ORAL at 09:12

## 2019-10-30 RX ADMIN — SODIUM CHLORIDE 20 ML/HR: 0.9 INJECTION, SOLUTION INTRAVENOUS at 09:09

## 2019-10-30 RX ADMIN — RITUXIMAB 700 MG: 10 INJECTION, SOLUTION INTRAVENOUS at 09:54

## 2019-10-30 RX ADMIN — DIPHENHYDRAMINE HYDROCHLORIDE 50 MG: 50 INJECTION, SOLUTION INTRAMUSCULAR; INTRAVENOUS at 09:09

## 2019-10-30 RX ADMIN — DEXAMETHASONE SODIUM PHOSPHATE 10 MG: 10 INJECTION, SOLUTION INTRAMUSCULAR; INTRAVENOUS at 09:31

## 2019-10-30 RX ADMIN — CYANOCOBALAMIN 1000 MCG: 1000 INJECTION INTRAMUSCULAR; SUBCUTANEOUS at 12:47

## 2019-10-30 NOTE — PROGRESS NOTES
Pt tolerated iv rituxan without issue today  Port flushed and deaccessed per routine  Tolerated vitamin b12 injection as well to left deltoid   Discharged ambulatory

## 2019-10-30 NOTE — PROGRESS NOTES
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 [facility])  Patient continues to have elevated LDH which has been stable since July 2019 without any significant increase  She does not appear to be symptomatic at this time reporting good appetite and good energy  Patient will be continued on her maintenance Rituxan every 8 weeks she is due for treatment today  Her next treatment will be delayed by 1 day due to the Christmas holiday  Should patient become symptomatic or if we see significant rise in her LDH further imaging with a PET-CT scan will be pursued  - Infusion Calculated Appointment Request; Future  - Infusion Calculated Appointment Request; Future  - Infusion Calculated Appointment Request; Future  - CBC and differential; Future  - Comprehensive metabolic panel; Future  - LD,Blood; Future  - Uric acid; Future  - C-reactive protein; Future  - Sedimentation rate, automated; Future    2  Other vitamin B12 deficiency anemias  Patient will be continued on her B12 injections which appear to be maintaining her B12 levels  - Vitamin B12; Future    HPI:  Patient presents today for a follow-up appointment she is due for her maintenance Rituxan after the visit  The patient is primarily Uzbek speaking translation was done via [employer] interpretation system  She reports that she is doing well she has good energy and appetite  The patient does admit to occasional night sweats but no increased or more than usual   She states that she continues to have chronic abdominal pain but notes improvement with her treatment from prior and states that is well controlled on her current pain medication        Her most recent laboratory studies from 10/18/2019 showed WBC 6 0, H&H 12 2/39 1, to continues to have microcytosis MCV 71 which is a chronic process, platelet count 172  CMP is essentially normal   Sed rate is slightly elevated in stable 28 C reactive protein was normal   B12 1130, transferrin iron saturation 18%, TIBC 278, serum iron 51 and ferritin 126  Her LDH continues to be elevated at 792 but somewhat improved from 1 month ago  Oncology History    The patient started to notice significant abdominal pain about 8 months prior to presentation, she then was evaluated in the hospital with a CT scan of the chest abdomen pelvis on the 7th of November   This showed massive lymphadenopathy throughout the abdomen and pelvis with hepatic and massive splenomegaly   She also was found to have bulky supraclavicular, axillary and mediastinal adenopathy  She then had a supra clavicular lymph node excisional biopsy on the 9th of November , the pathology was compatible with Follicular lymphoma, WHO grade 1-2  She then had a bone marrow biopsy on the 20th of November which showed also low grade B-cell lymphoma CD10 positive compromising 63% of the total cells  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 ([facility] [address] )    11/7/2018 Initial Diagnosis     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 ([address] )      12/6/2018 -  Chemotherapy     1   rituximab and bendamustine with neulasta support 12/6/2018- 3/13/19 (4 cycles total)  - day 2 bendamustine held with cycle 4  - administered Rituxan only 4/7/19    2  Started maintenance Rituxan every 8 weeks 5/15/19        12/7/2018 Adverse Reaction     C1D2 labs reflective of tumor lysis syndrome, dose of rasburicase given x1 and admitted for close observation/hydration         Interval history:    ROS: Review of Systems   Constitutional: Negative for activity change, appetite change, chills, fatigue, fever and unexpected weight change  HENT: Positive for trouble swallowing (admits to reflux)  Negative for congestion, mouth sores, nosebleeds and sore throat  Eyes: Negative      Respiratory: Positive for shortness of breath  Negative for cough and chest tightness  Cardiovascular: Negative for chest pain, palpitations and leg swelling  Gastrointestinal: Positive for constipation, diarrhea and nausea (mild)  Negative for abdominal distention, abdominal pain, blood in stool and vomiting  Genitourinary: Negative for difficulty urinating, dysuria, frequency, hematuria and urgency  Musculoskeletal: Positive for arthralgias (7/10 feet) and myalgias (2/10)  Negative for back pain, gait problem and joint swelling  Skin: Positive for rash  Negative for color change and pallor  Neurological: Positive for headaches (mild chronic)  Negative for dizziness, weakness, light-headedness and numbness  Hematological: Negative for adenopathy  Does not bruise/bleed easily  Psychiatric/Behavioral: Positive for dysphoric mood and sleep disturbance         Past Medical History:   Diagnosis Date    Anemia     Arthritis     Asthma     [facility]up[facility] )     Lymphoma ([facility] )     Lymphoma ([place] [address] )     Migraine     Osteoporosis     Psychiatric disorder        Past Surgical History:   Procedure Laterality Date    CHOLECYSTECTOMY      FL GUIDED CENTRAL VENOUS ACCESS DEVICE INSERTION  11/9/2018    HYSTERECTOMY      LYMPH NODE BIOPSY Left 11/9/2018    Procedure: EXCISION BIOPSY LYMPH NODE SUPRACLAVICULAR;  Surgeon: Kate Meyer MD;  Location: [address] [place] [address];  Service: General    TUNNELED VENOUS PORT PLACEMENT N/A 11/9/2018    Procedure: Gage Foncomfort;  Surgeon: Kate Meyer MD;  Location: [address] [place] [address];  Service: General       Social History     Socioeconomic History    Marital status: Single     Spouse name: None    Number of children: None    Years of education: None    Highest education level: None   Occupational History    None   Social Needs    Financial resource strain: None    Food insecurity:     Worry: None     Inability: None    Transportation needs:     Medical: None     Non-medical: None   Tobacco Use    Smoking status: Former Smoker     Last attempt to quit: 2017     Years since quittin 3    Smokeless tobacco: Never Used   Substance and Sexual Activity    Alcohol use: Never     Frequency: Never    Drug use: Never    Sexual activity: None   Lifestyle    Physical activity:     Days per week: None     Minutes per session: None    Stress: None   Relationships    Social connections:     Talks on phone: None     Gets together: None     Attends Synagogue service: None     Active member of club or organization: None     Attends meetings of clubs or organizations: None     Relationship status: None    Intimate partner violence:     Fear of current or ex partner: None     Emotionally abused: None     Physically abused: None     Forced sexual activity: None   Other Topics Concern    None   Social History Narrative    None       Family History   Problem Relation Age of Onset    Cancer Mother     Diabetes Mother     Cancer Father     Diabetes Father        Allergies   Allergen Reactions    Penicillins Anaphylaxis         Current Outpatient Medications:     al mag oxide-diphenhydramine-lidocaine viscous (MAGIC MOUTHWASH) 1:1:1 suspension, Swish and spit 10 mL every 4 (four) hours as needed for mouth pain or discomfort (Patient not taking: Reported on 2019), Disp: 180 mL, Rfl: 0    amLODIPine (NORVASC) 5 mg tablet, Take 1 tablet (5 mg total) by mouth daily, Disp: 30 tablet, Rfl: 0    carbamide peroxide (DEBROX) 6 5 % otic solution, Administer 5 drops to the right ear 2 (two) times a day, Disp: 15 mL, Rfl: 0    cyanocobalamin 1000 MCG tablet, Take 1 tablet (1,000 mcg total) by mouth daily, Disp: 90 tablet, Rfl: 3    meclizine (ANTIVERT) 12 5 MG tablet, Take 1 tablet (12 5 mg total) by mouth every 8 (eight) hours as needed for dizziness, Disp: 30 tablet, Rfl: 3    omeprazole (PriLOSEC) 20 mg delayed release capsule, Take 1 capsule (20 mg total) by mouth daily To prevent stomach upset, Disp: 30 capsule, Rfl: 5    ondansetron (ZOFRAN) 4 mg tablet, Take 1 tablet (4 mg total) by mouth every 8 (eight) hours as needed for nausea or vomiting (Patient not taking: Reported on 10/28/2019), Disp: 20 tablet, Rfl: 0    oxyCODONE (ROXICODONE) 5 mg immediate release tablet, Take 1 tablet (5 mg total) by mouth 4 (four) times a day as needed for severe painMax Daily Amount: 20 mg (Patient not taking: Reported on 10/28/2019), Disp: 120 tablet, Rfl: 0    polyethylene glycol (MIRALAX) 17 g packet, Take 17 g by mouth daily, Disp: 30 each, Rfl: 1    senna (SENOKOT) 8 6 mg, Take 1 tablet (8 6 mg total) by mouth 2 (two) times a day (Patient not taking: Reported on 10/28/2019), Disp: 60 each, Rfl: 1      Physical Exam:  /80 (BP Location: Left arm)   Pulse 63   Temp 98 °F (36 7 °C) (Oral)   Resp 20   Ht 5' 4" (1 626 m)   Wt 99 3 kg (219 lb)   SpO2 97%   BMI 37 59 kg/m²     Physical Exam   Constitutional: She is oriented to person, place, and time  She appears well-developed and well-nourished  No distress  HENT:   Head: Normocephalic and atraumatic  Mouth/Throat: Oropharynx is clear and moist  No oropharyngeal exudate  Eyes: Pupils are equal, round, and reactive to light  Conjunctivae are normal  No scleral icterus  Neck: Normal range of motion  Neck supple  No thyromegaly present  Cardiovascular: Normal rate, regular rhythm, normal heart sounds and intact distal pulses  No murmur heard  Pulmonary/Chest: Effort normal and breath sounds normal  No respiratory distress  Abdominal: Soft  Bowel sounds are normal  She exhibits no distension  There is no hepatosplenomegaly  There is generalized tenderness  obese   Musculoskeletal: Normal range of motion  She exhibits no edema  Lymphadenopathy:     She has no cervical adenopathy  She has no axillary adenopathy  Neurological: She is alert and oriented to person, place, and time  Skin: Skin is warm and dry  No rash noted  She is not diaphoretic  No erythema  No pallor  Psychiatric: Her behavior is normal  Judgment and thought content normal  Her affect is blunt  Vitals reviewed  Labs:  Lab Results   Component Value Date    WBC 6 00 10/18/2019    HGB 12 2 10/18/2019    HCT 39 1 10/18/2019    MCV 71 (L) 10/18/2019     10/18/2019     Lab Results   Component Value Date    K 4 4 10/18/2019     10/18/2019    CO2 29 10/18/2019    BUN 15 10/18/2019    CREATININE 0 77 10/18/2019    GLUF 106 (H) 10/18/2019    CALCIUM 9 5 10/18/2019    AST 31 10/18/2019    ALT 32 10/18/2019    ALKPHOS 97 10/18/2019    EGFR 86 10/18/2019         Patient voiced understanding and agreement in the above discussion  Aware to contact our office with questions/symptoms in the interim  This note has been generated by voice recognition software system  Therefore, there may be spelling, grammar, and or syntax errors  Please contact if questions arise

## 2019-10-30 NOTE — PLAN OF CARE
Problem: Potential for Falls  Goal: Patient will remain free of falls  Description  INTERVENTIONS:  - Assess patient frequently for physical needs  -  Identify cognitive and physical deficits and behaviors that affect risk of falls    -  [place] fall precautions as indicated by assessment   - Educate patient/family on patient safety including physical limitations  - Instruct patient to call for assistance with activity based on assessment  - Modify environment to reduce risk of injury  - Consider OT/PT consult to assist with strengthening/mobility  Outcome: Progressing

## 2019-11-12 ENCOUNTER — OFFICE VISIT (OUTPATIENT)
Dept: OBGYN CLINIC | Facility: CLINIC | Age: 59
End: 2019-11-12
Payer: COMMERCIAL

## 2019-11-12 VITALS
HEIGHT: 66 IN | BODY MASS INDEX: 35.36 KG/M2 | DIASTOLIC BLOOD PRESSURE: 84 MMHG | SYSTOLIC BLOOD PRESSURE: 134 MMHG | WEIGHT: 220 LBS | HEART RATE: 80 BPM

## 2019-11-12 DIAGNOSIS — M65.311 TRIGGER THUMB OF RIGHT HAND: Primary | ICD-10-CM

## 2019-11-12 DIAGNOSIS — C82.13 FOLLICULAR LYMPHOMA GRADE II OF INTRA-ABDOMINAL LYMPH NODES (HCC): ICD-10-CM

## 2019-11-12 PROCEDURE — 99244 OFF/OP CNSLTJ NEW/EST MOD 40: CPT | Performed by: ORTHOPAEDIC SURGERY

## 2019-11-12 NOTE — PATIENT INSTRUCTIONS
Plan :  I explained to the patient the use for  fact that she has a trigger thumb or catching of the tendon that leads out to the tip of her thumb  I went over conservative versus operative options for her  She is very tender in frightened of needles and therefore would prefer surgical release which carries a much higher success rate  We are going to try to schedule this around her chemotherapy infusion treatments being done on the 26th of the month  I will schedule this for Thursday 01/16/2020 as an outpatient under local anesthesia with sedation at [facility]   I explained the operation, its risks, its alternatives,  its postop course clearly and fully to the patient  She needs to get preop medical clearance by her family doctor, chemotherapist, and appropriate preop medical testing which I will order

## 2019-11-12 NOTE — LETTER
November 12, 2019     Tezladan Deandrecarlito, DO  [address] [address]    Patient: Mike Swartz   YOB: 1960   Date of Visit: 11/12/2019       Dear Dr Patricia Ramirez: Thank you for referring Mike Swartz to me for evaluation  Below are my notes for this consultation  If you have questions, please do not hesitate to call me  I look forward to following your patient along with you  Sincerely,        Arsen Park MD        CC: MD Amber Bains MD Bradley Snooks, MD  11/12/2019  1:58 PM  Incomplete  Chief Complaint   Patient presents with    Right Hand - Pain           Assessment:   Right trigger thumb    Plan :  I explained to the patient the use for  fact that she has a trigger thumb or catching of the tendon that leads out to the tip of her thumb  I went over conservative versus operative options for her  She is very tender in frightened of needles and therefore would prefer surgical release which carries a much higher success rate  We are going to try to schedule this around her chemotherapy infusion treatments being done on the 26th of the month  I will schedule this for Thursday 01/16/2020 as an outpatient under local anesthesia with sedation at [facility]   I explained the operation, its risks, its alternatives,  its postop course clearly and fully to the patient  She needs to get preop medical clearance by her family doctor, chemotherapist, and appropriate preop medical testing which I will order  HPI:   This 30-year-old Irish female was sent by her family physician for consultation for painful right thumb  This has bothered her  for over 3 years and she notes no injury, trauma, or fall that brought this on  She is left-hand dominant  She is currently being aggressively treated for lymphoma with chemotherapy with injections through her port on a monthly basis    She has no numbness, tingling, or paresthesias in this right hand but significant pain and limited motion with locking    PMHx:         Past Medical History:   Diagnosis Date    Anemia     Arthritis     Asthma     [facility] [address] [facility]     Lymphoma ([facility] [address] [facility]     Lymphoma ([facility] [address] )     Migraine     Osteoporosis     Psychiatric disorder        Past Surgical History:   Procedure Laterality Date    CHOLECYSTECTOMY      FL GUIDED CENTRAL VENOUS ACCESS DEVICE INSERTION  2018    HYSTERECTOMY      LYMPH NODE BIOPSY Left 2018    Procedure: EXCISION BIOPSY LYMPH NODE SUPRACLAVICULAR;  Surgeon: Delta Pappas MD;  Location: [address] [place] [address] OR;  Service: General    TUNNELED VENOUS PORT PLACEMENT N/A 2018    Procedure: INSERTION OF PORT-A-CATH;  Surgeon: Delta Pappas MD;  Location: [address] [place] [address] OR;  Service: General       Family History   Problem Relation Age of Onset    Cancer Mother     Diabetes Mother     Cancer Father     Diabetes Father        Social History     Socioeconomic History    Marital status: Single     Spouse name: Not on file    Number of children: Not on file    Years of education: Not on file    Highest education level: Not on file   Occupational History    Not on file   Social Needs    Financial resource strain: Not on file    Food insecurity:     Worry: Not on file     Inability: Not on file    Transportation needs:     Medical: Not on file     Non-medical: Not on file   Tobacco Use    Smoking status: Former Smoker     Last attempt to quit: 2017     Years since quittin 4    Smokeless tobacco: Never Used   Substance and Sexual Activity    Alcohol use: Never     Frequency: Never    Drug use: Never    Sexual activity: Not on file   Lifestyle    Physical activity:     Days per week: Not on file     Minutes per session: Not on file    Stress: Not on file   Relationships    Social connections:     Talks on phone: Not on file     Gets together: Not on file     Attends Sabianism service: Not on file     Active member of club or organization: Not on file     Attends meetings of clubs or organizations: Not on file     Relationship status: Not on file    Intimate partner violence:     Fear of current or ex partner: Not on file     Emotionally abused: Not on file     Physically abused: Not on file     Forced sexual activity: Not on file   Other Topics Concern    Not on file   Social History Narrative    Not on file       Current Outpatient Medications   Medication Sig Dispense Refill    al mag oxide-diphenhydramine-lidocaine viscous (MAGIC MOUTHWASH) 1:1:1 suspension Swish and spit 10 mL every 4 (four) hours as needed for mouth pain or discomfort (Patient not taking: Reported on 9/12/2019) 180 mL 0    amLODIPine (NORVASC) 5 mg tablet Take 1 tablet (5 mg total) by mouth daily 30 tablet 0    carbamide peroxide (DEBROX) 6 5 % otic solution Administer 5 drops to the right ear 2 (two) times a day 15 mL 0    cyanocobalamin 1000 MCG tablet Take 1 tablet (1,000 mcg total) by mouth daily 90 tablet 3    meclizine (ANTIVERT) 12 5 MG tablet Take 1 tablet (12 5 mg total) by mouth every 8 (eight) hours as needed for dizziness 30 tablet 3    omeprazole (PriLOSEC) 20 mg delayed release capsule Take 1 capsule (20 mg total) by mouth daily To prevent stomach upset 30 capsule 5    ondansetron (ZOFRAN) 4 mg tablet Take 1 tablet (4 mg total) by mouth every 8 (eight) hours as needed for nausea or vomiting (Patient not taking: Reported on 10/28/2019) 20 tablet 0    oxyCODONE (ROXICODONE) 5 mg immediate release tablet Take 1 tablet (5 mg total) by mouth 4 (four) times a day as needed for severe painMax Daily Amount: 20 mg (Patient not taking: Reported on 10/28/2019) 120 tablet 0    polyethylene glycol (MIRALAX) 17 g packet Take 17 g by mouth daily 30 each 1    senna (SENOKOT) 8 6 mg Take 1 tablet (8 6 mg total) by mouth 2 (two) times a day (Patient not taking: Reported on 10/28/2019) 60 each 1     No current facility-administered medications for this visit  Allergies: Penicillins    ROS:  Positive for asthma, migraines, nausea, skin rash, psychiatric disorder by history, easy bruisability, and orthopedic complaints as above    The remaining 5/12 systems on the intake sheet that I reviewed were negative  PE:  /84   Pulse 80   Ht 5' 6" (1 676 m)   Wt 99 8 kg (220 lb)   BMI 35 51 kg/m²    Constitutional: The patient was  oriented to person, place, and time  Well-developed and well-nourished  In no acute distress  HEENT: Vision intact  Hearing normal  Swallowing normal   Head: Normocephalic  Cardiovascular: Intact distal pulses  Pulse regular  Pulmonary/Chest: Effort normal  No respiratory distress  Neurological: Alert and oriented to person, place, and time  Skin: Skin is warm  Psychiatric:  Somewhat anxious and agitated    Ortho Exam:  On exam today she had a painful nodule at the base for right thumb without swelling or redness noted  She cannot flex the IP joint fully and had pain on any attempted passive motion  There was no angulation or rotation of this thumb  Sensation and capillary refill were normal the tip of the thumb  She had a good radial pulse  Elbow and wrist motion were normal   I did not palpate any antecubital adenopathy      Studies reviewed:  None

## 2019-11-12 NOTE — PROGRESS NOTES
Chief Complaint   Patient presents with    Right Hand - Pain           Assessment:   Right trigger thumb    Plan :  I explained to the patient the use for  fact that she has a trigger thumb or catching of the tendon that leads out to the tip of her thumb  I went over conservative versus operative options for her  She is very tender in frightened of needles and therefore would prefer surgical release which carries a much higher success rate  We are going to try to schedule this around her chemotherapy infusion treatments being done on the 26th of the month  I will schedule this for Thursday 01/16/2020 as an outpatient under local anesthesia with sedation at [facility]   I explained the operation, its risks, its alternatives,  its postop course clearly and fully to the patient  She needs to get preop medical clearance by her family doctor, chemotherapist, and appropriate preop medical testing which I will order  HPI:   This 63-year-old [place] female was sent by her family physician for consultation for painful right thumb  This has bothered her  for over 3 years and she notes no injury, trauma, or fall that brought this on  She is left-hand dominant  She is currently being aggressively treated for lymphoma with chemotherapy with injections through her port on a monthly basis    She has no numbness, tingling, or paresthesias in this right hand but significant pain and limited motion with locking    PMHx:         Past Medical History:   Diagnosis Date    Anemia     Arthritis     Asthma     [facility]     Lymphoma [facility] [facility]     [facility]     Migraine     Osteoporosis     Psychiatric disorder        Past Surgical History:   Procedure Laterality Date    CHOLECYSTECTOMY      FL GUIDED CENTRAL VENOUS ACCESS DEVICE INSERTION  11/9/2018    HYSTERECTOMY      LYMPH NODE BIOPSY Left 11/9/2018    Procedure: EXCISION BIOPSY LYMPH NODE SUPRACLAVICULAR;  Surgeon: Ozzy Ngo MD;  Location: [facility] MAIN OR;  Service: General    TUNNELED VENOUS PORT PLACEMENT N/A 2018    Procedure: INSERTION OF PORT-A-CATH;  Surgeon: Ozzy Ngo MD;  Location: [facility] MAIN OR;  Service: General       Family History   Problem Relation Age of Onset    Cancer Mother     Diabetes Mother     Cancer Father     Diabetes Father        Social History     Socioeconomic History    Marital status: Single     Spouse name: Not on file    Number of children: Not on file    Years of education: Not on file    Highest education level: Not on file   Occupational History    Not on file   Social Needs    Financial resource strain: Not on file    Food insecurity:     Worry: Not on file     Inability: Not on file    Transportation needs:     Medical: Not on file     Non-medical: Not on file   Tobacco Use    Smoking status: Former Smoker     Last attempt to quit: 2017     Years since quittin 4    Smokeless tobacco: Never Used   Substance and Sexual Activity    Alcohol use: Never     Frequency: Never    Drug use: Never    Sexual activity: Not on file   Lifestyle    Physical activity:     Days per week: Not on file     Minutes per session: Not on file    Stress: Not on file   Relationships    Social connections:     Talks on phone: Not on file     Gets together: Not on file     Attends Yarsanism service: Not on file     Active member of club or organization: Not on file     Attends meetings of clubs or organizations: Not on file     Relationship status: Not on file    Intimate partner violence:     Fear of current or ex partner: Not on file     Emotionally abused: Not on file     Physically abused: Not on file     Forced sexual activity: Not on file   Other Topics Concern    Not on file   Social History Narrative    Not on file       Current Outpatient Medications   Medication Sig Dispense Refill    al mag oxide-diphenhydramine-lidocaine viscous (MAGIC MOUTHWASH) 1:1:1 suspension Swish and spit 10 mL every 4 (four) hours as needed for mouth pain or discomfort (Patient not taking: Reported on 9/12/2019) 180 mL 0    amLODIPine (NORVASC) 5 mg tablet Take 1 tablet (5 mg total) by mouth daily 30 tablet 0    carbamide peroxide (DEBROX) 6 5 % otic solution Administer 5 drops to the right ear 2 (two) times a day 15 mL 0    cyanocobalamin 1000 MCG tablet Take 1 tablet (1,000 mcg total) by mouth daily 90 tablet 3    meclizine (ANTIVERT) 12 5 MG tablet Take 1 tablet (12 5 mg total) by mouth every 8 (eight) hours as needed for dizziness 30 tablet 3    omeprazole (PriLOSEC) 20 mg delayed release capsule Take 1 capsule (20 mg total) by mouth daily To prevent stomach upset 30 capsule 5    ondansetron (ZOFRAN) 4 mg tablet Take 1 tablet (4 mg total) by mouth every 8 (eight) hours as needed for nausea or vomiting (Patient not taking: Reported on 10/28/2019) 20 tablet 0    oxyCODONE (ROXICODONE) 5 mg immediate release tablet Take 1 tablet (5 mg total) by mouth 4 (four) times a day as needed for severe painMax Daily Amount: 20 mg (Patient not taking: Reported on 10/28/2019) 120 tablet 0    polyethylene glycol (MIRALAX) 17 g packet Take 17 g by mouth daily 30 each 1    senna (SENOKOT) 8 6 mg Take 1 tablet (8 6 mg total) by mouth 2 (two) times a day (Patient not taking: Reported on 10/28/2019) 60 each 1     No current facility-administered medications for this visit  Allergies: Penicillins    ROS:  Positive for asthma, migraines, nausea, skin rash, psychiatric disorder by history, easy bruisability, and orthopedic complaints as above    The remaining 5/12 systems on the intake sheet that I reviewed were negative  PE:  /84   Pulse 80   Ht 5' 6" (1 676 m)   Wt 99 8 kg (220 lb)   BMI 35 51 kg/m²   Constitutional: The patient was  oriented to person, place, and time  Well-developed and well-nourished  In no acute distress  HEENT: Vision intact   Hearing normal  Swallowing normal   Head: Normocephalic  Cardiovascular: Intact distal pulses  Pulse regular  Pulmonary/Chest: Effort normal  No respiratory distress  Neurological: Alert and oriented to person, place, and time  Skin: Skin is warm  Psychiatric:  Somewhat anxious and agitated    Ortho Exam:  On exam today she had a painful nodule at the base for right thumb without swelling or redness noted  She cannot flex the IP joint fully and had pain on any attempted passive motion  There was no angulation or rotation of this thumb  Sensation and capillary refill were normal the tip of the thumb  She had a good radial pulse  Elbow and wrist motion were normal   I did not palpate any antecubital adenopathy      Studies reviewed:  None

## 2019-11-18 ENCOUNTER — OFFICE VISIT (OUTPATIENT)
Dept: PALLIATIVE MEDICINE | Facility: CLINIC | Age: 59
End: 2019-11-18
Payer: COMMERCIAL

## 2019-11-18 VITALS
SYSTOLIC BLOOD PRESSURE: 150 MMHG | OXYGEN SATURATION: 98 % | RESPIRATION RATE: 16 BRPM | HEART RATE: 67 BPM | BODY MASS INDEX: 35.25 KG/M2 | DIASTOLIC BLOOD PRESSURE: 80 MMHG | TEMPERATURE: 97.9 F | WEIGHT: 218.4 LBS

## 2019-11-18 DIAGNOSIS — R10.84 GENERALIZED ABDOMINAL PAIN: ICD-10-CM

## 2019-11-18 DIAGNOSIS — C82.18 GRADE 2 FOLLICULAR LYMPHOMA OF LYMPH NODES OF MULTIPLE REGIONS (HCC): Primary | ICD-10-CM

## 2019-11-18 PROCEDURE — 99214 OFFICE O/P EST MOD 30 MIN: CPT | Performed by: NURSE PRACTITIONER

## 2019-11-18 RX ORDER — OXYCODONE HYDROCHLORIDE 5 MG/1
5 TABLET ORAL 4 TIMES DAILY PRN
Qty: 120 TABLET | Refills: 0 | Status: SHIPPED | OUTPATIENT
Start: 2019-11-18 | End: 2019-12-17

## 2019-11-18 NOTE — PROGRESS NOTES
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 ([address] )    2  Generalized abdominal pain        Requested Prescriptions     Signed Prescriptions Disp Refills    oxyCODONE (ROXICODONE) 5 mg immediate release tablet 120 tablet 0     Sig: Take 1 tablet (5 mg total) by mouth 4 (four) times a day as needed for severe painMax Daily Amount: 20 mg     No change to oxycodone at this time  Patient is undergoing cancer treatment and has improvement of function and quality of life with use of this med  She will be undergoing surgery for release of trigger finger in January  "Foul odor" of mouth, with gum pain and occasional bleeding from gums (ongoing for months): no sores visualized in mouth  - advised to follow up with dentist       Follow up in one month in [address]    Chief Concern  Follow up visit for:  Symptom management         History of Present Illness  Patient ID: Anette Champion is a 64 y o  female with grade 2 follicular lymphoma initially diagnosed in Nov 2018  Tumor lysis syndrome in Dec 2018  Received chemo in early 2019  Started maintenance rituxan q 8 weeks starting May 2019  PET scan in May 2019 demonstrated improvement of adenopathy  She has had elevated LDH which has been stable since July 2019  Plan is for her to continue maintenance rituximab for now  If she became symptomatic or had further rise in LDH, plan by oncology would be for PET scan  Phone translation line was utilized      Since last visit, patient has seen orthopedics and is scheduled for surgery for trigger finger in January  She has ongoing abdominal pain, which is overall less than previously, but remains severe  Improved by PRN oxycodone  No nausea, constipation, or diarrhea  She has pain of her arms  She has pain in mouth, which seems to be pain of gums  She describes foul odor from her mouth   She occasionally has bleeding from gums, ongoing for months  The following portions of the patient's history were reviewed and updated as appropriate: allergies, current medications, past family history, past medical history, past social history, past surgical history and problem list       Visit Information    Accompanied By: No one  Source of History: Self  History Limitations: Language Barrier, phone  utilized    ROS  Review of Systems   Constitutional: Negative  Respiratory: Positive for cough  Gastrointestinal: Positive for abdominal pain  Negative for diarrhea, nausea and vomiting  Musculoskeletal: Positive for arthralgias  Neurological: Positive for numbness  Objective     Physical Exam   Constitutional: She is oriented to person, place, and time  She is cooperative  Pulmonary/Chest: Effort normal    Neurological: She is alert and oriented to person, place, and time  Psychiatric: She has a normal mood and affect   Her speech is tangential  Cognition and memory are normal          /80 (BP Location: Left arm, Cuff Size: Standard)   Pulse 67   Temp 97 9 °F (36 6 °C) (Oral)   Resp 16   Wt 99 1 kg (218 lb 6 4 oz)   SpO2 98%   BMI 35 25 kg/m²         Current Outpatient Medications:     al mag oxide-diphenhydramine-lidocaine viscous (MAGIC MOUTHWASH) 1:1:1 suspension, Swish and spit 10 mL every 4 (four) hours as needed for mouth pain or discomfort, Disp: 180 mL, Rfl: 0    amLODIPine (NORVASC) 5 mg tablet, Take 1 tablet (5 mg total) by mouth daily, Disp: 30 tablet, Rfl: 0    meclizine (ANTIVERT) 12 5 MG tablet, Take 1 tablet (12 5 mg total) by mouth every 8 (eight) hours as needed for dizziness, Disp: 30 tablet, Rfl: 3    ondansetron (ZOFRAN) 4 mg tablet, Take 1 tablet (4 mg total) by mouth every 8 (eight) hours as needed for nausea or vomiting, Disp: 20 tablet, Rfl: 0    oxyCODONE (ROXICODONE) 5 mg immediate release tablet, Take 1 tablet (5 mg total) by mouth 4 (four) times a day as needed for severe painMax Daily Amount: 20 mg, Disp: 120 tablet, Rfl: 0    polyethylene glycol (MIRALAX) 17 g packet, Take 17 g by mouth daily, Disp: 30 each, Rfl: 1    senna (SENOKOT) 8 6 mg, Take 1 tablet (8 6 mg total) by mouth 2 (two) times a day, Disp: 60 each, Rfl: 1    carbamide peroxide (DEBROX) 6 5 % otic solution, Administer 5 drops to the right ear 2 (two) times a day (Patient not taking: Reported on 11/18/2019), Disp: 15 mL, Rfl: 0    cyanocobalamin 1000 MCG tablet, Take 1 tablet (1,000 mcg total) by mouth daily, Disp: 90 tablet, Rfl: 3    omeprazole (PriLOSEC) 20 mg delayed release capsule, Take 1 capsule (20 mg total) by mouth daily To prevent stomach upset (Patient not taking: Reported on 11/18/2019), Disp: 30 capsule, Rfl: [id number] and Supportive Care  [phone]        Representatives have queried the patient's controlled substance dispensing history in the Prescription Drug Monitoring Program in compliance with the [facility] regulations before I have prescribed any controlled substances  The prescription history is consistent with prescribed therapy and our practice policies

## 2019-11-18 NOTE — PATIENT INSTRUCTIONS
Make appointment with dentist    Call orthopedics to see if surgery date could be changed    Follow up with oncology regarding spots on skin    Follow up with primary care regarding BP although BP was normal at last visit with PCP

## 2019-11-22 ENCOUNTER — HOSPITAL ENCOUNTER (OUTPATIENT)
Dept: NON INVASIVE DIAGNOSTICS | Facility: HOSPITAL | Age: 59
Discharge: HOME | End: 2019-11-22
Attending: ORTHOPAEDIC SURGERY
Payer: COMMERCIAL

## 2019-11-22 ENCOUNTER — APPOINTMENT (OUTPATIENT)
Dept: LAB | Facility: HOSPITAL | Age: 59
End: 2019-11-22
Attending: ORTHOPAEDIC SURGERY
Payer: COMMERCIAL

## 2019-11-22 ENCOUNTER — TRANSCRIBE ORDERS (OUTPATIENT)
Dept: ADMINISTRATIVE | Facility: HOSPITAL | Age: 59
End: 2019-11-22

## 2019-11-22 DIAGNOSIS — C82.13 FOLLICULAR LYMPHOMA GRADE II OF INTRA-ABDOMINAL LYMPH NODES (HCC): ICD-10-CM

## 2019-11-22 LAB
ANION GAP SERPL CALCULATED.3IONS-SCNC: 5 MMOL/L (ref 5–14)
BUN SERPL-MCNC: 16 MG/DL (ref 5–25)
CALCIUM SERPL-MCNC: 9.1 MG/DL (ref 8.4–10.2)
CHLORIDE SERPL-SCNC: 106 MMOL/L (ref 97–108)
CO2 SERPL-SCNC: 28 MMOL/L (ref 22–30)
CREAT SERPL-MCNC: 0.72 MG/DL (ref 0.6–1.2)
ERYTHROCYTE [DISTWIDTH] IN BLOOD BY AUTOMATED COUNT: 15.6 %
GFR SERPL CREATININE-BSD FRML MDRD: 93 ML/MIN/1.73SQ M
GLUCOSE P FAST SERPL-MCNC: 94 MG/DL (ref 70–99)
HCT VFR BLD AUTO: 37.9 % (ref 36–46)
HGB BLD-MCNC: 11.7 G/DL (ref 12–16)
INR PPP: 0.94 (ref 0.91–1.09)
MCH RBC QN AUTO: 22 PG (ref 26–34)
MCHC RBC AUTO-ENTMCNC: 31 G/DL (ref 31–36)
MCV RBC AUTO: 71 FL (ref 80–100)
PLATELET # BLD AUTO: 196 THOUSANDS/UL (ref 150–450)
PMV BLD AUTO: 9.6 FL (ref 8.9–12.7)
POTASSIUM SERPL-SCNC: 4.5 MMOL/L (ref 3.6–5)
PROTHROMBIN TIME: 10.5 SECONDS (ref 9.8–12)
RBC # BLD AUTO: 5.34 MILLION/UL (ref 4–5.2)
SODIUM SERPL-SCNC: 139 MMOL/L (ref 137–147)
WBC # BLD AUTO: 4.1 THOUSAND/UL (ref 4.5–11)

## 2019-11-22 PROCEDURE — 36415 COLL VENOUS BLD VENIPUNCTURE: CPT

## 2019-11-22 PROCEDURE — 80048 BASIC METABOLIC PNL TOTAL CA: CPT

## 2019-11-22 PROCEDURE — 93005 ELECTROCARDIOGRAM TRACING: CPT

## 2019-11-22 PROCEDURE — 85027 COMPLETE CBC AUTOMATED: CPT

## 2019-11-22 PROCEDURE — 85610 PROTHROMBIN TIME: CPT

## 2019-11-23 LAB
ATRIAL RATE: 62 BPM
P AXIS: 39 DEGREES
PR INTERVAL: 152 MS
QRS AXIS: 45 DEGREES
QRSD INTERVAL: 88 MS
QT INTERVAL: 422 MS
QTC INTERVAL: 428 MS
T WAVE AXIS: 51 DEGREES
VENTRICULAR RATE: 62 BPM

## 2019-11-23 PROCEDURE — 93010 ELECTROCARDIOGRAM REPORT: CPT | Performed by: INTERNAL MEDICINE

## 2019-11-25 PROBLEM — Z45.2 ENCOUNTER FOR CENTRAL LINE CARE: Status: ACTIVE | Noted: 2019-11-25

## 2019-11-26 ENCOUNTER — HOSPITAL ENCOUNTER (OUTPATIENT)
Dept: INFUSION CENTER | Facility: HOSPITAL | Age: 59
Discharge: HOME | End: 2019-11-26
Attending: INTERNAL MEDICINE
Payer: COMMERCIAL

## 2019-11-26 DIAGNOSIS — D51.8 OTHER VITAMIN B12 DEFICIENCY ANEMIAS: Primary | ICD-10-CM

## 2019-11-26 PROCEDURE — 96372 THER/PROPH/DIAG INJ SC/IM: CPT

## 2019-11-26 RX ORDER — CYANOCOBALAMIN 1000 UG/ML
1000 INJECTION INTRAMUSCULAR; SUBCUTANEOUS ONCE
Status: CANCELLED | OUTPATIENT
Start: 2019-12-26

## 2019-11-26 RX ORDER — CYANOCOBALAMIN 1000 UG/ML
1000 INJECTION INTRAMUSCULAR; SUBCUTANEOUS ONCE
Status: COMPLETED | OUTPATIENT
Start: 2019-11-26 | End: 2019-11-26

## 2019-11-26 RX ADMIN — CYANOCOBALAMIN 1000 MCG: 1000 INJECTION INTRAMUSCULAR; SUBCUTANEOUS at 09:02

## 2019-11-26 NOTE — PLAN OF CARE
Problem: Potential for Falls  Goal: Patient will remain free of falls  Description  INTERVENTIONS:  - Assess patient frequently for physical needs  -  Identify cognitive and physical deficits and behaviors that affect risk of falls    -  [place] fall precautions as indicated by assessment   - Educate patient/family on patient safety including physical limitations  - Instruct patient to call for assistance with activity based on assessment  - Modify environment to reduce risk of injury  - Consider OT/PT consult to assist with strengthening/mobility  Outcome: Progressing

## 2019-11-26 NOTE — PROGRESS NOTES
Pt here for monthly vit b12 injection  Tolerated well without complications  Confirmed rituxan appt next month and AVS provided

## 2019-11-27 ENCOUNTER — HOSPITAL ENCOUNTER (OUTPATIENT)
Dept: INFUSION CENTER | Facility: HOSPITAL | Age: 59
Discharge: HOME | End: 2019-11-27
Attending: INTERNAL MEDICINE

## 2019-12-16 PROBLEM — Z51.5 PALLIATIVE CARE PATIENT: Status: ACTIVE | Noted: 2019-12-16

## 2019-12-16 NOTE — PROGRESS NOTES
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 ([address] )    2  Generalized abdominal pain    3  Slow transit constipation    4  Recurrent major depressive disorder, in partial remission (HCC)    5  Palliative care patient        Requested Prescriptions     Pending Prescriptions Disp Refills    senna (SENOKOT) 8 6 mg 60 each 3     Sig: Take 1 tablet (8 6 mg total) by mouth 2 (two) times a day    polyethylene glycol (MIRALAX) 17 g packet 30 each 3     Sig: Take 17 g by mouth daily     Signed Prescriptions Disp Refills    oxyCODONE (ROXICODONE) 10 MG TABS 180 tablet 0     Sig: Take 1 tablet (10 mg total) by mouth every 4 (four) hours as needed for moderate pain or severe painMax Daily Amount: 60 mg     Medications Discontinued During This Encounter   Medication Reason    oxyCODONE (ROXICODONE) 5 mg immediate release tablet        Representatives have queried the patient's controlled substance dispensing history in the Prescription Drug Monitoring Program in compliance with regulations before I have prescribed any controlled substances  The prescription history is consistent with prescribed therapy and our practice policies  25 minutes were spent face to face with Ángela Moraes and her son with greater than 50% of the time spent in counseling or coordination of care including discussions of treatment instructions and follow up requirements   All of the patient's questions were answered during this discussion  Return in about 5 weeks (around 1/21/2020), or if symptoms worsen or fail to improve  Subjective:   Chief Complaint  Follow up visit for:  symptom management  HPI     Ángela Moraes is a 64 y o  female with grade 2 follicular lymphoma initially diagnosed in Nov 2018  Tumor lysis syndrome in Dec 2018  Received chemo in early 2019   Started maintenance rituxan q 8 weeks starting May 2019  PET scan in May 2019 demonstrated improvement of adenopathy  She has had elevated LDH which has been stable since July 2019  Plan is for her to continue maintenance Rituxan every 8 weeks, next treatment 12/26/2019  Per oncology notes, if patient becomes symptomatic or if there is significant rise in her LDH further imaging with a PET-CT scan will be pursued  Translation by son and MA/Lizbet      Since last visit, patient has seen orthopedics and is scheduled for surgery for trigger finger in January  She has ongoing intermittent, severe abdominal pain improved by PRN oxycodone  Patient is out of pills and has been taking 1-2 tablets q4h prn pain, mostly 2 tablets  She feels the 5mg doesn't help as well with her pain  We discussed taking xs tylenol as well to assist with pain control  Patient will take 1-2 tabs 3x/day and use the oxycodone as needed for moderate to severe pain  We will increase the oxycodone to 10mg tabs, patient instructed to only take 1 tab q4h prn pain and can 1/2 tab(5mg) for less severe pain  Son/patient seemed to understand plan  Patient c/o difficulty sleeping, mostly due to pain  We discussed taking tylenol + oxy 1 hour prior to bed to assist with pain control throughout the night and hopefully help with maintaining sleep  Patient has constipation, but seems to control with this with adjusting her senna tablets  Patient c/o dry cough and sweating since diagnosis  She has tried medications for this and it hasn't helped  Patient also states her gums bleed when brushing teeth  She was instructed to use a soft bristle brush and notify oncology if these symptoms persist, worsen, or at upcoming visit on 12/26/2019  Five Wishes document discussed as patient has no advance directive  Patient/son will complete prior to next visit        The following portions of the medical history were reviewed: past medical history, problem list, medication list, and social history  2 sons, 1 in [facility], 1 present for visit/[place]  No advance directive      Current Outpatient Medications:     al mag oxide-diphenhydramine-lidocaine viscous (MAGIC MOUTHWASH) 1:1:1 suspension, Swish and spit 10 mL every 4 (four) hours as needed for mouth pain or discomfort, Disp: 180 mL, Rfl: 0    amLODIPine (NORVASC) 5 mg tablet, Take 1 tablet (5 mg total) by mouth daily, Disp: 30 tablet, Rfl: 0    cyanocobalamin 1000 MCG tablet, Take 1 tablet (1,000 mcg total) by mouth daily, Disp: 90 tablet, Rfl: 3    meclizine (ANTIVERT) 12 5 MG tablet, Take 1 tablet (12 5 mg total) by mouth every 8 (eight) hours as needed for dizziness, Disp: 30 tablet, Rfl: 3    ondansetron (ZOFRAN) 4 mg tablet, Take 1 tablet (4 mg total) by mouth every 8 (eight) hours as needed for nausea or vomiting, Disp: 20 tablet, Rfl: 0    oxyCODONE (ROXICODONE) 10 MG TABS, Take 1 tablet (10 mg total) by mouth every 4 (four) hours as needed for moderate pain or severe painMax Daily Amount: 60 mg, Disp: 180 tablet, Rfl: 0    polyethylene glycol (MIRALAX) 17 g packet, Take 17 g by mouth daily, Disp: 30 each, Rfl: 1    senna (SENOKOT) 8 6 mg, Take 1 tablet (8 6 mg total) by mouth 2 (two) times a day, Disp: 60 each, Rfl: 1    carbamide peroxide (DEBROX) 6 5 % otic solution, Administer 5 drops to the right ear 2 (two) times a day (Patient not taking: Reported on 11/18/2019), Disp: 15 mL, Rfl: 0    omeprazole (PriLOSEC) 20 mg delayed release capsule, Take 1 capsule (20 mg total) by mouth daily To prevent stomach upset (Patient not taking: Reported on 11/18/2019), Disp: 30 capsule, Rfl: 5     Review of Systems   Constitutional: Positive for diaphoresis  HENT: Positive for dental problem  Respiratory: Positive for cough  Cardiovascular: Positive for leg swelling  Gastrointestinal: Positive for abdominal pain and constipation  Musculoskeletal: Positive for myalgias  Neurological: Positive for weakness  Psychiatric/Behavioral: Positive for sleep disturbance  All other systems negative    Objective:  Vital Signs  /80 (BP Location: Right arm, Cuff Size: Standard)   Pulse 63   Temp 97 5 °F (36 4 °C) (Tympanic)   Resp 14   Wt 100 kg (220 lb 6 4 oz)   SpO2 96%   BMI 35 57 kg/m²    Physical Exam    Constitutional: Appears well-developed and well-nourished  In no acute physical or emotional distress  Dry cough  Head: Normocephalic and atraumatic  Eyes: EOM are normal  No ocular discharge  No scleral icterus  Neck: No visible adenopathy or masses  Respiratory: Effort normal  No stridor  No respiratory distress  Gastrointestinal: No abdominal distension  +abdominal pain diffuse, no rbt  Musculoskeletal: No edema  Neurological: Alert, oriented and appropriately conversant  Skin: No diaphoresis, no rashes seen on exposed areas of skin  Psychiatric: Displays a normal mood and affect   Behavior, judgement and thought content appear normal

## 2019-12-17 ENCOUNTER — TELEPHONE (OUTPATIENT)
Dept: PALLIATIVE MEDICINE | Facility: CLINIC | Age: 59
End: 2019-12-17

## 2019-12-17 ENCOUNTER — OFFICE VISIT (OUTPATIENT)
Dept: PALLIATIVE MEDICINE | Facility: CLINIC | Age: 59
End: 2019-12-17
Payer: COMMERCIAL

## 2019-12-17 VITALS
SYSTOLIC BLOOD PRESSURE: 140 MMHG | DIASTOLIC BLOOD PRESSURE: 80 MMHG | BODY MASS INDEX: 35.57 KG/M2 | OXYGEN SATURATION: 96 % | WEIGHT: 220.4 LBS | TEMPERATURE: 97.5 F | RESPIRATION RATE: 14 BRPM | HEART RATE: 63 BPM

## 2019-12-17 DIAGNOSIS — K59.01 SLOW TRANSIT CONSTIPATION: ICD-10-CM

## 2019-12-17 DIAGNOSIS — Z51.5 PALLIATIVE CARE PATIENT: ICD-10-CM

## 2019-12-17 DIAGNOSIS — C82.18 GRADE 2 FOLLICULAR LYMPHOMA OF LYMPH NODES OF MULTIPLE REGIONS (HCC): Primary | ICD-10-CM

## 2019-12-17 DIAGNOSIS — R10.84 GENERALIZED ABDOMINAL PAIN: ICD-10-CM

## 2019-12-17 DIAGNOSIS — F33.41 RECURRENT MAJOR DEPRESSIVE DISORDER, IN PARTIAL REMISSION (HCC): ICD-10-CM

## 2019-12-17 PROCEDURE — 99214 OFFICE O/P EST MOD 30 MIN: CPT | Performed by: SURGERY

## 2019-12-17 RX ORDER — SENNOSIDES 8.6 MG
1 TABLET ORAL 2 TIMES DAILY
Qty: 60 EACH | Refills: 3 | Status: SHIPPED | OUTPATIENT
Start: 2019-12-17

## 2019-12-17 RX ORDER — OXYCODONE HYDROCHLORIDE 10 MG/1
10 TABLET ORAL EVERY 4 HOURS PRN
Qty: 180 TABLET | Refills: 0 | Status: SHIPPED | OUTPATIENT
Start: 2019-12-17 | End: 2020-01-21 | Stop reason: SDUPTHER

## 2019-12-17 RX ORDER — POLYETHYLENE GLYCOL 3350 17 G/17G
17 POWDER, FOR SOLUTION ORAL DAILY
Qty: 30 EACH | Refills: 3 | Status: SHIPPED | OUTPATIENT
Start: 2019-12-17

## 2019-12-17 NOTE — TELEPHONE ENCOUNTER
I called left message informing family member to call pharmacy and make sure medication is ready for  if any questions call office before 4pm

## 2019-12-17 NOTE — TELEPHONE ENCOUNTER
Call to [address]  [phone]    ID [id number]    Verbal auth with representative Hosea Lopez  Approval for 1 year 12/17/19 to 12/17/20    [facility] notified and able to run script    [employer] speaking staff will notify pt of approval

## 2019-12-17 NOTE — PATIENT INSTRUCTIONS
PRESCRIPTION REFILL REMINDER:  All medication refills should be requested prior to [facility] on Friday  Any refill requests after noon on Friday would be addressed the following Monday  Tylenol xs(acetaminophen) 500mg, take 1-2 tabs three times a day for mild-moderate pain  Take oxycodone 10mg, take 1 tab every 4 hours as needed for moderate to severe pain

## 2019-12-23 ENCOUNTER — TELEPHONE (OUTPATIENT)
Dept: HEMATOLOGY ONCOLOGY | Facility: CLINIC | Age: 59
End: 2019-12-23

## 2019-12-23 ENCOUNTER — TELEPHONE (OUTPATIENT)
Dept: PALLIATIVE MEDICINE | Facility: CLINIC | Age: 59
End: 2019-12-23

## 2019-12-23 NOTE — TELEPHONE ENCOUNTER
Received another faxed request from cover my meds for Prior auth on Oxycodone 10 mg  This medication as noted is approved x 1 year  Call placed to [facility] to verify  Per representative Vicki Dohrety approved until 12/17/20

## 2019-12-26 ENCOUNTER — HOSPITAL ENCOUNTER (OUTPATIENT)
Dept: INFUSION CENTER | Facility: HOSPITAL | Age: 59
Discharge: HOME | End: 2019-12-26
Attending: INTERNAL MEDICINE
Payer: COMMERCIAL

## 2019-12-26 ENCOUNTER — OFFICE VISIT (OUTPATIENT)
Dept: HEMATOLOGY ONCOLOGY | Facility: CLINIC | Age: 59
End: 2019-12-26
Payer: COMMERCIAL

## 2019-12-26 VITALS
RESPIRATION RATE: 16 BRPM | WEIGHT: 218 LBS | HEART RATE: 67 BPM | BODY MASS INDEX: 35.03 KG/M2 | DIASTOLIC BLOOD PRESSURE: 82 MMHG | TEMPERATURE: 97.1 F | OXYGEN SATURATION: 98 % | SYSTOLIC BLOOD PRESSURE: 138 MMHG | HEIGHT: 66 IN

## 2019-12-26 VITALS
RESPIRATION RATE: 16 BRPM | TEMPERATURE: 97.3 F | WEIGHT: 217.81 LBS | DIASTOLIC BLOOD PRESSURE: 85 MMHG | HEIGHT: 66 IN | BODY MASS INDEX: 35.01 KG/M2 | SYSTOLIC BLOOD PRESSURE: 145 MMHG | HEART RATE: 66 BPM

## 2019-12-26 DIAGNOSIS — R05.9 COUGH: ICD-10-CM

## 2019-12-26 DIAGNOSIS — C82.18 GRADE 2 FOLLICULAR LYMPHOMA OF LYMPH NODES OF MULTIPLE REGIONS (HCC): ICD-10-CM

## 2019-12-26 DIAGNOSIS — D51.8 OTHER VITAMIN B12 DEFICIENCY ANEMIAS: Primary | ICD-10-CM

## 2019-12-26 DIAGNOSIS — D51.8 OTHER VITAMIN B12 DEFICIENCY ANEMIAS: ICD-10-CM

## 2019-12-26 DIAGNOSIS — C82.18 GRADE 2 FOLLICULAR LYMPHOMA OF LYMPH NODES OF MULTIPLE REGIONS (HCC): Primary | ICD-10-CM

## 2019-12-26 LAB
ALBUMIN SERPL BCP-MCNC: 4.1 G/DL (ref 3–5.2)
ALP SERPL-CCNC: 91 U/L (ref 43–122)
ALT SERPL W P-5'-P-CCNC: 28 U/L (ref 9–52)
ANION GAP SERPL CALCULATED.3IONS-SCNC: 10 MMOL/L (ref 5–14)
ANISOCYTOSIS BLD QL SMEAR: PRESENT
AST SERPL W P-5'-P-CCNC: 20 U/L (ref 14–36)
BASOPHILS # BLD AUTO: 0.04 THOUSAND/UL (ref 0–0.1)
BASOPHILS NFR MAR MANUAL: 1 % (ref 0–1)
BILIRUB SERPL-MCNC: 0.7 MG/DL
BUN SERPL-MCNC: 11 MG/DL (ref 5–25)
CALCIUM SERPL-MCNC: 9.5 MG/DL (ref 8.4–10.2)
CHLORIDE SERPL-SCNC: 103 MMOL/L (ref 97–108)
CO2 SERPL-SCNC: 27 MMOL/L (ref 22–30)
CREAT SERPL-MCNC: 0.74 MG/DL (ref 0.6–1.2)
CRP SERPL QL: 9.3 MG/L
EOSINOPHIL # BLD AUTO: 0.2 THOUSAND/UL (ref 0–0.4)
EOSINOPHIL NFR BLD MANUAL: 5 % (ref 0–6)
ERYTHROCYTE [DISTWIDTH] IN BLOOD BY AUTOMATED COUNT: 15.1 %
ERYTHROCYTE [SEDIMENTATION RATE] IN BLOOD: 30 MM/HOUR (ref 1–20)
GFR SERPL CREATININE-BSD FRML MDRD: 90 ML/MIN/1.73SQ M
GLUCOSE SERPL-MCNC: 109 MG/DL (ref 70–99)
HCT VFR BLD AUTO: 37.4 % (ref 36–46)
HGB BLD-MCNC: 12 G/DL (ref 12–16)
HYPERCHROMIA BLD QL SMEAR: PRESENT
LDH SERPL-CCNC: 444 U/L (ref 313–618)
LYMPHOCYTES # BLD AUTO: 0.64 THOUSAND/UL (ref 0.5–4)
LYMPHOCYTES # BLD AUTO: 16 % (ref 25–45)
MCH RBC QN AUTO: 22.2 PG (ref 26–34)
MCHC RBC AUTO-ENTMCNC: 32 G/DL (ref 31–36)
MCV RBC AUTO: 69 FL (ref 80–100)
MICROCYTES BLD QL AUTO: PRESENT
MONOCYTES # BLD AUTO: 0.44 THOUSAND/UL (ref 0.2–0.9)
MONOCYTES NFR BLD AUTO: 11 % (ref 1–10)
NEUTS BAND NFR BLD MANUAL: 3 % (ref 0–8)
NEUTS SEG # BLD: 2.68 THOUSAND/UL (ref 1.8–7.8)
NEUTS SEG NFR BLD AUTO: 64 %
OVALOCYTES BLD QL SMEAR: PRESENT
PLATELET # BLD AUTO: 209 THOUSANDS/UL (ref 150–450)
PLATELET BLD QL SMEAR: ADEQUATE
PMV BLD AUTO: 9.4 FL (ref 8.9–12.7)
POIKILOCYTOSIS BLD QL SMEAR: PRESENT
POTASSIUM SERPL-SCNC: 4.2 MMOL/L (ref 3.6–5)
PROT SERPL-MCNC: 6.9 G/DL (ref 5.9–8.4)
RBC # BLD AUTO: 5.39 MILLION/UL (ref 4–5.2)
RBC MORPH BLD: ABNORMAL
SODIUM SERPL-SCNC: 140 MMOL/L (ref 137–147)
TOTAL CELLS COUNTED SPEC: 100
URATE SERPL-MCNC: 4.5 MG/DL (ref 2.7–7.5)
VIT B12 SERPL-MCNC: 1726 PG/ML (ref 100–900)
WBC # BLD AUTO: 4 THOUSAND/UL (ref 4.5–11)

## 2019-12-26 PROCEDURE — 84550 ASSAY OF BLOOD/URIC ACID: CPT

## 2019-12-26 PROCEDURE — 96415 CHEMO IV INFUSION ADDL HR: CPT

## 2019-12-26 PROCEDURE — 85027 COMPLETE CBC AUTOMATED: CPT

## 2019-12-26 PROCEDURE — 80053 COMPREHEN METABOLIC PANEL: CPT

## 2019-12-26 PROCEDURE — 82607 VITAMIN B-12: CPT

## 2019-12-26 PROCEDURE — 96367 TX/PROPH/DG ADDL SEQ IV INF: CPT

## 2019-12-26 PROCEDURE — 96372 THER/PROPH/DIAG INJ SC/IM: CPT

## 2019-12-26 PROCEDURE — 85652 RBC SED RATE AUTOMATED: CPT

## 2019-12-26 PROCEDURE — 86140 C-REACTIVE PROTEIN: CPT

## 2019-12-26 PROCEDURE — 85007 BL SMEAR W/DIFF WBC COUNT: CPT

## 2019-12-26 PROCEDURE — 83615 LACTATE (LD) (LDH) ENZYME: CPT

## 2019-12-26 PROCEDURE — 96413 CHEMO IV INFUSION 1 HR: CPT

## 2019-12-26 PROCEDURE — 99214 OFFICE O/P EST MOD 30 MIN: CPT | Performed by: NURSE PRACTITIONER

## 2019-12-26 RX ORDER — ACETAMINOPHEN 325 MG/1
650 TABLET ORAL ONCE
Status: COMPLETED | OUTPATIENT
Start: 2019-12-26 | End: 2019-12-26

## 2019-12-26 RX ORDER — BENZONATATE 200 MG/1
200 CAPSULE ORAL 3 TIMES DAILY PRN
Qty: 20 CAPSULE | Refills: 1 | Status: ON HOLD | OUTPATIENT
Start: 2019-12-26 | End: 2021-01-26 | Stop reason: SDUPTHER

## 2019-12-26 RX ORDER — SODIUM CHLORIDE 9 MG/ML
20 INJECTION, SOLUTION INTRAVENOUS ONCE
Status: COMPLETED | OUTPATIENT
Start: 2019-12-26 | End: 2019-12-26

## 2019-12-26 RX ORDER — CYANOCOBALAMIN 1000 UG/ML
1000 INJECTION INTRAMUSCULAR; SUBCUTANEOUS ONCE
Status: COMPLETED | OUTPATIENT
Start: 2019-12-26 | End: 2019-12-26

## 2019-12-26 RX ORDER — AZITHROMYCIN 250 MG/1
TABLET, FILM COATED ORAL
Qty: 6 TABLET | Refills: 0 | Status: SHIPPED | OUTPATIENT
Start: 2019-12-26 | End: 2019-12-30

## 2019-12-26 RX ORDER — CYANOCOBALAMIN 1000 UG/ML
1000 INJECTION INTRAMUSCULAR; SUBCUTANEOUS ONCE
Status: CANCELLED | OUTPATIENT
Start: 2020-01-23

## 2019-12-26 RX ADMIN — RITUXIMAB 750 MG: 10 INJECTION, SOLUTION INTRAVENOUS at 10:13

## 2019-12-26 RX ADMIN — CYANOCOBALAMIN 1000 MCG: 1000 INJECTION INTRAMUSCULAR; SUBCUTANEOUS at 09:06

## 2019-12-26 RX ADMIN — DEXAMETHASONE SODIUM PHOSPHATE 10 MG: 10 INJECTION, SOLUTION INTRAMUSCULAR; INTRAVENOUS at 09:39

## 2019-12-26 RX ADMIN — ACETAMINOPHEN 650 MG: 325 TABLET ORAL at 09:12

## 2019-12-26 RX ADMIN — SODIUM CHLORIDE 20 ML/HR: 0.9 INJECTION, SOLUTION INTRAVENOUS at 09:16

## 2019-12-26 RX ADMIN — DIPHENHYDRAMINE HYDROCHLORIDE 50 MG: 50 INJECTION, SOLUTION INTRAMUSCULAR; INTRAVENOUS at 09:13

## 2019-12-26 NOTE — PROGRESS NOTES
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 [facility])  Patient will proceed with her maintenance rituximab today as scheduled  She will get her repeat laboratory studies when she arrives to the infusion center which will be reviewed and addressed once a become available  The patient continues to have multiple ongoing chronic complaints  She will continue to follow up with the palliative care team for her pain and symptom management  It is unclear if these are just her usual chronic complaints versus related to her malignancy  I think it would be appropriate to pursue PET-CT scan to evaluate her lymphadenopathy and response to her current treatment  We will arrange for the PET-CT within the next month and patient will follow up again in about 8 weeks with her next Rituxan treatment or sooner if needed  - Infusion Calculated Appointment Request  - CBC and differential; Future  - Comprehensive metabolic panel; Future  - C-reactive protein; Future  - Sedimentation rate, automated; Future  - Vitamin B12; Future  - NM PET CT skull base to mid thigh; Future    2  Cough  The patient states she has had an ongoing chronic cough at least the past 6 months which may or may not be due to allergies as she admits to postnasal drip and rhinorrhea  Cough was witnessed during the encounter and somewhat harsh  She reports that she does have occasional yellowish mucus and occasional small amount of red blood when she coughs  We will treat her with a Z-Ty to see if this will improve her cough  Will also start her on Tessalon Perles every 8 hours as needed  Will continue to monitor     - azithromycin (ZITHROMAX) 250 mg tablet; Take 2 tablets today then 1 tablet daily x 4 days  Dispense: 6 tablet; Refill: 0  - benzonatate (TESSALON) 200 MG capsule;  Take 1 capsule (200 mg total) by mouth 3 (three) times a day as needed for cough  Dispense: 20 capsule; Refill: 1    3  Other vitamin B12 deficiency anemias  Patient will be continued on her monthly vitamin B12 injections  - Vitamin B12; Future    HPI:  Patient presents today for follow-up visit she is Sami-speaking and translation done via [employer] system #[id number]  She continues to have multiple ongoing chronic complaints including dizziness, fatigue, generalized arthralgias, constipation, mild nausea, insomnia, cough and abdominal pain  Patient follows with the palliative care team on a regular basis regarding her pain management and symptom management  She states that her abdominal pain is improved in comparison to what it was in the past and well controlled on her oxycodone  The patient continues to have a chronic cough which she states has been going on for about 6 months  She reports that she is bringing up light yellow mucus and does admit to rhinorrhea and postnasal drip  She also is mentioning that occasionally she coughs up a very small amount of bright red blood which has been ongoing for the last 4-5 months but infrequent; the amount of the blood is very small and much less than the size of a dime  Denies fever  She reports tenderness to the right side of her neck without any obvious evidence of lymphadenopathy, her Port-A-Cath is on the left side  Her most recent laboratory studies from 11/22/2019 showed slightly decreased WBC 4 1 no diff was done, H&H 11 7/37 9, MCV 71 platelet count 483  BMP was normal   Patient is scheduled for repeat laboratory studies today in the [facility] center      Oncology History    The patient started to notice significant abdominal pain about 8 months prior to presentation, she then was evaluated in the hospital with a CT scan of the chest abdomen pelvis on the 7th of November   This showed massive lymphadenopathy throughout the abdomen and pelvis with hepatic and massive splenomegaly   She also was found to have bulky supraclavicular, axillary and mediastinal adenopathy  She then had a supra clavicular lymph node excisional biopsy on the 9th of November , the pathology was compatible with Follicular lymphoma, WHO grade 1-2  She then had a bone marrow biopsy on the 20th of November which showed also low grade B-cell lymphoma CD10 positive compromising 63% of the total cells  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 ([facility] [address] )    11/7/2018 Initial Diagnosis     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 ([facility] [address] )      12/6/2018 -  Chemotherapy     1   rituximab and bendamustine with neulasta support 12/6/2018- 3/13/19 (4 cycles total)  - day 2 bendamustine held with cycle 4  - administered Rituxan only 4/7/19    2  Started maintenance Rituxan every 8 weeks 5/15/19        12/7/2018 Adverse Reaction     C1D2 labs reflective of tumor lysis syndrome, dose of rasburicase given x1 and admitted for close observation/hydration         Interval history:    ROS: Review of Systems   Constitutional: Positive for fatigue (mild)  Negative for activity change, appetite change, chills, fever and unexpected weight change  Reports sweats at night to head only ? hotflashes   HENT: Positive for postnasal drip, rhinorrhea and trouble swallowing (mild d/t cough per pt)  Negative for mouth sores, nosebleeds and sore throat  Eyes: Negative  Respiratory: Positive for cough and shortness of breath  Negative for chest tightness  Cardiovascular: Negative for chest pain, palpitations and leg swelling  Gastrointestinal: Positive for abdominal pain (7/10), constipation and nausea (mild)  Negative for abdominal distention, blood in stool, diarrhea and vomiting  Genitourinary: Negative for difficulty urinating, dysuria, frequency, hematuria and urgency  Musculoskeletal: Positive for arthralgias and neck pain (tenderness to the left neck)   Negative for back pain, gait problem, joint swelling and myalgias  Skin: Negative for color change, pallor and rash  Neurological: Positive for dizziness and numbness (And tingling mild-also leg cramps)  Negative for weakness, light-headedness and headaches  Hematological: Negative for adenopathy  Does not bruise/bleed easily  Psychiatric/Behavioral: Positive for sleep disturbance  Negative for dysphoric mood  The patient is not nervous/anxious          Past Medical History:   Diagnosis Date    Anemia     Arthritis     Asthma     Lupus ([facility] )     Lymphoma ([facility] )     Lymphoma ([facility] )     Migraine     Osteoporosis     Psychiatric disorder        Past Surgical History:   Procedure Laterality Date    CHOLECYSTECTOMY      FL GUIDED CENTRAL VENOUS ACCESS DEVICE INSERTION  2018    HYSTERECTOMY      LYMPH NODE BIOPSY Left 2018    Procedure: EXCISION BIOPSY LYMPH NODE SUPRACLAVICULAR;  Surgeon: Hal Acuña MD;  Location: [address] [place] [address];  Service: General    TUNNELED VENOUS PORT PLACEMENT N/A 2018    Procedure: INSERTION OF PORT-A-CATH;  Surgeon: Hal Acuña MD;  Location: [address] [place] [address];  Service: General       Social History     Socioeconomic History    Marital status: Single     Spouse name: None    Number of children: None    Years of education: None    Highest education level: None   Occupational History    None   Social Needs    Financial resource strain: None    Food insecurity:     Worry: None     Inability: None    Transportation needs:     Medical: None     Non-medical: None   Tobacco Use    Smoking status: Former Smoker     Last attempt to quit: 2017     Years since quittin 5    Smokeless tobacco: Never Used   Substance and Sexual Activity    Alcohol use: Never     Frequency: Never    Drug use: Never    Sexual activity: None   Lifestyle    Physical activity:     Days per week: None     Minutes per session: None    Stress: None   Relationships    Social connections: Talks on phone: None     Gets together: None     Attends Restorationism service: None     Active member of club or organization: None     Attends meetings of clubs or organizations: None     Relationship status: None    Intimate partner violence:     Fear of current or ex partner: None     Emotionally abused: None     Physically abused: None     Forced sexual activity: None   Other Topics Concern    None   Social History Narrative    None       Family History   Problem Relation Age of Onset    Cancer Mother     Diabetes Mother     Cancer Father     Diabetes Father        Allergies   Allergen Reactions    Penicillins Anaphylaxis         Current Outpatient Medications:     al mag oxide-diphenhydramine-lidocaine viscous (MAGIC MOUTHWASH) 1:1:1 suspension, Swish and spit 10 mL every 4 (four) hours as needed for mouth pain or discomfort, Disp: 180 mL, Rfl: 0    amLODIPine (NORVASC) 5 mg tablet, Take 1 tablet (5 mg total) by mouth daily, Disp: 30 tablet, Rfl: 0    cyanocobalamin 1000 MCG tablet, Take 1 tablet (1,000 mcg total) by mouth daily, Disp: 90 tablet, Rfl: 3    meclizine (ANTIVERT) 12 5 MG tablet, Take 1 tablet (12 5 mg total) by mouth every 8 (eight) hours as needed for dizziness, Disp: 30 tablet, Rfl: 3    ondansetron (ZOFRAN) 4 mg tablet, Take 1 tablet (4 mg total) by mouth every 8 (eight) hours as needed for nausea or vomiting, Disp: 20 tablet, Rfl: 0    oxyCODONE (ROXICODONE) 10 MG TABS, Take 1 tablet (10 mg total) by mouth every 4 (four) hours as needed for moderate pain or severe painMax Daily Amount: 60 mg, Disp: 180 tablet, Rfl: 0    polyethylene glycol (MIRALAX) 17 g packet, Take 17 g by mouth daily, Disp: 30 each, Rfl: 3    senna (SENOKOT) 8 6 mg, Take 1 tablet (8 6 mg total) by mouth 2 (two) times a day, Disp: 60 each, Rfl: 3    azithromycin (ZITHROMAX) 250 mg tablet, Take 2 tablets today then 1 tablet daily x 4 days, Disp: 6 tablet, Rfl: 0    benzonatate (TESSALON) 200 MG capsule, Take 1 capsule (200 mg total) by mouth 3 (three) times a day as needed for cough, Disp: 20 capsule, Rfl: 1    carbamide peroxide (DEBROX) 6 5 % otic solution, Administer 5 drops to the right ear 2 (two) times a day (Patient not taking: Reported on 11/18/2019), Disp: 15 mL, Rfl: 0    omeprazole (PriLOSEC) 20 mg delayed release capsule, Take 1 capsule (20 mg total) by mouth daily To prevent stomach upset (Patient not taking: Reported on 12/26/2019), Disp: 30 capsule, Rfl: 5  No current facility-administered medications for this visit  Physical Exam:  /82 (BP Location: Left arm, Patient Position: Sitting, Cuff Size: Adult)   Pulse 67   Temp (!) 97 1 °F (36 2 °C) (Tympanic)   Resp 16   Ht 5' 6" (1 676 m)   Wt 98 9 kg (218 lb)   SpO2 98%   BMI 35 19 kg/m²     Physical Exam   Constitutional: She is oriented to person, place, and time  She appears well-developed and well-nourished  No distress  HENT:   Head: Normocephalic and atraumatic  Mouth/Throat: Oropharynx is clear and moist  No oropharyngeal exudate  Eyes: Pupils are equal, round, and reactive to light  Conjunctivae are normal  No scleral icterus  Neck: Normal range of motion  Neck supple  No thyromegaly present  Cardiovascular: Normal rate, regular rhythm, normal heart sounds and intact distal pulses  No murmur heard  Pulmonary/Chest: Effort normal and breath sounds normal  No respiratory distress  Witnessed harsh cough-appears to be dry at this time   Abdominal: Soft  Bowel sounds are normal  She exhibits no distension  There is no hepatosplenomegaly  There is tenderness (Diffuse generalized- increased in the right upper quadrant)  Musculoskeletal: Normal range of motion  She exhibits tenderness (with light palpation to the left neck)  She exhibits no edema  Lymphadenopathy:     She has no cervical adenopathy  She has no axillary adenopathy  Neurological: She is alert and oriented to person, place, and time     Skin: Skin is warm and dry  No rash noted  She is not diaphoretic  No erythema  No pallor  Port-A-Cath left chest wall intact with no erythema or warmth   Psychiatric: Her behavior is normal  Judgment and thought content normal  Her affect is blunt  She exhibits a depressed mood  Vitals reviewed  Labs:  Lab Results   Component Value Date    WBC 4 10 (L) 11/22/2019    HGB 11 7 (L) 11/22/2019    HCT 37 9 11/22/2019    MCV 71 (L) 11/22/2019     11/22/2019     Lab Results   Component Value Date    K 4 5 11/22/2019     11/22/2019    CO2 28 11/22/2019    BUN 16 11/22/2019    CREATININE 0 72 11/22/2019    GLUF 94 11/22/2019    CALCIUM 9 1 11/22/2019    AST 31 10/18/2019    ALT 32 10/18/2019    ALKPHOS 97 10/18/2019    EGFR 93 11/22/2019         Patient voiced understanding and agreement in the above discussion  Aware to contact our office with questions/symptoms in the interim  This note has been generated by voice recognition software system  Therefore, there may be spelling, grammar, and or syntax errors  Please contact if questions arise

## 2019-12-26 NOTE — PROGRESS NOTES
Pt tolerated todays rituxan well  No adverse reactions noted  Port flushed and deaccessed per routine  Excellent blood return before during and after chemo  Pt also given vitamin b12 injection   Discharged ambulatory with avs in Chinese

## 2020-01-13 ENCOUNTER — TELEPHONE (OUTPATIENT)
Dept: HEMATOLOGY ONCOLOGY | Facility: CLINIC | Age: 60
End: 2020-01-13

## 2020-01-13 NOTE — TELEPHONE ENCOUNTER
Carlos from [facility]  called to verify the dose of Rituxan administered on 5/15/19  Rituxan dose administered was 727 6 MG

## 2020-01-15 ENCOUNTER — ANESTHESIA EVENT (OUTPATIENT)
Dept: PERIOP | Facility: HOSPITAL | Age: 60
End: 2020-01-15
Payer: COMMERCIAL

## 2020-01-15 ENCOUNTER — CONSULT (OUTPATIENT)
Dept: FAMILY MEDICINE CLINIC | Facility: CLINIC | Age: 60
End: 2020-01-15

## 2020-01-15 VITALS
WEIGHT: 213 LBS | TEMPERATURE: 97.5 F | HEART RATE: 59 BPM | DIASTOLIC BLOOD PRESSURE: 72 MMHG | RESPIRATION RATE: 18 BRPM | BODY MASS INDEX: 34.23 KG/M2 | HEIGHT: 66 IN | SYSTOLIC BLOOD PRESSURE: 122 MMHG | OXYGEN SATURATION: 99 %

## 2020-01-15 DIAGNOSIS — Z01.818 PRE-OPERATIVE CLEARANCE: Primary | ICD-10-CM

## 2020-01-15 DIAGNOSIS — C82.18 GRADE 2 FOLLICULAR LYMPHOMA OF LYMPH NODES OF MULTIPLE REGIONS (HCC): ICD-10-CM

## 2020-01-15 DIAGNOSIS — M65.311 TRIGGER THUMB OF RIGHT HAND: ICD-10-CM

## 2020-01-15 PROCEDURE — 99242 OFF/OP CONSLTJ NEW/EST SF 20: CPT | Performed by: NURSE PRACTITIONER

## 2020-01-15 NOTE — H&P (VIEW-ONLY)
FAMILY PRACTICE PRE-OPERATIVE EVALUATION  [facility] PHYSICIAN GROUP - [facility] PRACTICE FAN    NAME: Jami Yuen  AGE: 64 y o  SEX: female  : 1960     DATE: 1/15/2020    Family Practice Pre-Operative Evaluation      Chief Complaint: Pre-operative Evaluation     Surgery: right trigger finger release with Dr Chely Jim of orthopedics   Anticipated Date of Surgery: 2020     History of Present Illness:     Patient has a hx of follicular lymphoma and is currently under infusion/ chemotherapy treatment directed by Dr Guillaume Whitman of [facility]   She is scheduled for right thumb trigger finger causing pain in the thumb scheduled for 2020 as an outpatient under local anesthesia with sedation at [facility]  She reports that the pain to her right hand  is constant and  severe all the time  As per Dr Sebastian Last last progress note--she was supposed to get clearance from PCP and hematologist  The patient reports that she was unaware that she was supposed to get clearance from [facility]  She last saw them on 2019  She has an upcoming appointment with [facility] on 2020  Patient has no prior history of bleeding issues or blood clots  Chronic conditions, medications and allergies were reviewed  There is no currently active infectious process  Assessment of Cardiac Risk:   · No unstable or severe angina or MI in the last 6 weeks or history of stent placement in the last year   · No decompensated heart failure (e g  New onset heart failure, NYHA functional class IV heart failure, or worsening existing heart failure) in past 3 mos    · No severe heart valve disease including aortic stenosis or symptomatic mitral stenosis  · ECG done on 2019: normal sinus rhythm, normal ECG      Exercise Capacity:  · Able to walk 4 blocks without symptoms  · Able to walk 2 flights without symptoms    NO Prior Anesthesia Reactions--cholecystectomy and hysterectomy prior w/ no anesthesia rx pre-intra-post operatively      No prolonged steroid use in past 6 mos  P A T  If done reviewed  Review of Systems:     Review of Systems   Constitutional: Negative for activity change, appetite change, chills, fatigue, fever and unexpected weight change  HENT: Negative for hearing loss, nosebleeds, sinus pain, sneezing, sore throat and trouble swallowing  Eyes: Negative for photophobia and visual disturbance  Respiratory: Negative for cough, chest tightness, shortness of breath and wheezing  Cardiovascular: Negative for chest pain, palpitations and leg swelling  Gastrointestinal: Negative for abdominal pain, constipation, nausea and vomiting  Genitourinary: Negative for decreased urine volume, difficulty urinating, dysuria, flank pain, genital sores, hematuria and urgency  Musculoskeletal: Negative for back pain and gait problem  Skin: Negative for pallor, rash and wound  Neurological: Negative for dizziness, seizures, syncope, weakness, numbness and headaches  Hematological: Negative for adenopathy  Does not bruise/bleed easily  Psychiatric/Behavioral: Negative for confusion, hallucinations, self-injury, sleep disturbance and suicidal ideas  The patient is not nervous/anxious          Current Problem List:     Patient Active Problem List   Diagnosis    Abdominal pain    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 (HCC)    Recurrent major depressive disorder, in partial remission (HCC)    Essential hypertension    Slow transit constipation    Transaminitis    Thrombocytopenia (HCC)    Iron deficiency anemia secondary to inadequate dietary iron intake    Frequent falls    Rash and nonspecific skin eruption    Microcytic anemia    Pain of right upper extremity    Bladder pain    Other vitamin B12 deficiency anemias    Physical exam, annual    Otitis externa    Dizziness    Nausea and vomiting    Pain in both hands    Trigger thumb of right hand    Encounter for central line care    Palliative care patient       Allergies:      Allergies   Allergen Reactions    Penicillins Anaphylaxis       Current Medications:       Current Outpatient Medications:     al mag oxide-diphenhydramine-lidocaine viscous (MAGIC MOUTHWASH) 1:1:1 suspension, Swish and spit 10 mL every 4 (four) hours as needed for mouth pain or discomfort, Disp: 180 mL, Rfl: 0    amLODIPine (NORVASC) 5 mg tablet, Take 1 tablet (5 mg total) by mouth daily, Disp: 30 tablet, Rfl: 0    benzonatate (TESSALON) 200 MG capsule, Take 1 capsule (200 mg total) by mouth 3 (three) times a day as needed for cough, Disp: 20 capsule, Rfl: 1    carbamide peroxide (DEBROX) 6 5 % otic solution, Administer 5 drops to the right ear 2 (two) times a day, Disp: 15 mL, Rfl: 0    cyanocobalamin 1000 MCG tablet, Take 1 tablet (1,000 mcg total) by mouth daily, Disp: 90 tablet, Rfl: 3    meclizine (ANTIVERT) 12 5 MG tablet, Take 1 tablet (12 5 mg total) by mouth every 8 (eight) hours as needed for dizziness, Disp: 30 tablet, Rfl: 3    omeprazole (PriLOSEC) 20 mg delayed release capsule, Take 1 capsule (20 mg total) by mouth daily To prevent stomach upset, Disp: 30 capsule, Rfl: 5    ondansetron (ZOFRAN) 4 mg tablet, Take 1 tablet (4 mg total) by mouth every 8 (eight) hours as needed for nausea or vomiting, Disp: 20 tablet, Rfl: 0    oxyCODONE (ROXICODONE) 10 MG TABS, Take 1 tablet (10 mg total) by mouth every 4 (four) hours as needed for moderate pain or severe painMax Daily Amount: 60 mg, Disp: 180 tablet, Rfl: 0    polyethylene glycol (MIRALAX) 17 g packet, Take 17 g by mouth daily, Disp: 30 each, Rfl: 3    senna (SENOKOT) 8 6 mg, Take 1 tablet (8 6 mg total) by mouth 2 (two) times a day, Disp: 60 each, Rfl: 3    Past Medical History:       Past Medical History:   Diagnosis Date    Anemia     iron and B12    Arthritis     Asthma     Cough     Depression     Falls frequently     Lupus (HCC)  [facility]     [facility]     Migraine     Osteoporosis     Psychiatric disorder     Vertigo         Past Surgical History:   Procedure Laterality Date    CHOLECYSTECTOMY      FL GUIDED CENTRAL VENOUS ACCESS DEVICE INSERTION  2018    HYSTERECTOMY      LYMPH NODE BIOPSY Left 2018    Procedure: EXCISION BIOPSY LYMPH NODE SUPRACLAVICULAR;  Surgeon: Maximo Urbina MD;  Location: [address] [place] [address];  Service: General    TUNNELED VENOUS PORT PLACEMENT N/A 2018    Procedure: INSERTION OF PORT-A-CATH;  Surgeon: Maximo Urbina MD;  Location: [address] [place] [address];  Service: General        Family History   Problem Relation Age of Onset    Cancer Mother     Diabetes Mother     Cancer Father     Diabetes Father         Social History     Socioeconomic History    Marital status: Single     Spouse name: Not on file    Number of children: Not on file    Years of education: Not on file    Highest education level: Not on file   Occupational History    Not on file   Social Needs    Financial resource strain: Not on file    Food insecurity:     Worry: Not on file     Inability: Not on file    Transportation needs:     Medical: Not on file     Non-medical: Not on file   Tobacco Use    Smoking status: Former Smoker     Last attempt to quit: 2017     Years since quittin 5    Smokeless tobacco: Never Used   Substance and Sexual Activity    Alcohol use: Never     Frequency: Never    Drug use: Never    Sexual activity: Not on file   Lifestyle    Physical activity:     Days per week: Not on file     Minutes per session: Not on file    Stress: Not on file   Relationships    Social connections:     Talks on phone: Not on file     Gets together: Not on file     Attends [employer] service: Not on file     Active member of club or organization: Not on file     Attends meetings of clubs or organizations: Not on file     Relationship status: Not on file    Intimate partner violence:     Fear of current or ex partner: Not on file     Emotionally abused: Not on file     Physically abused: Not on file     Forced sexual activity: Not on file   Other Topics Concern    Not on file   Social History Narrative    Not on file        Physical Exam:     /72 (BP Location: Right arm, Patient Position: Sitting, Cuff Size: Standard)   Pulse 59   Temp 97 5 °F (36 4 °C) (Temporal)   Resp 18   Ht 5' 6" (1 676 m)   Wt 96 6 kg (213 lb)   SpO2 99%   BMI 34 38 kg/m²     Physical Exam   Constitutional: She is oriented to person, place, and time  She appears well-developed and well-nourished  No distress  HENT:   Head: Normocephalic and atraumatic  Right Ear: External ear normal    Left Ear: External ear normal    Nose: Nose normal    Mouth/Throat: Oropharynx is clear and moist  No oropharyngeal exudate  Eyes: Pupils are equal, round, and reactive to light  Conjunctivae and EOM are normal  Right eye exhibits no discharge  Left eye exhibits no discharge  Neck: Normal range of motion  Neck supple  Cardiovascular: Normal rate, regular rhythm, normal heart sounds and intact distal pulses  No murmur heard  Pulmonary/Chest: Effort normal and breath sounds normal  No respiratory distress  She has no wheezes  Abdominal: Soft  Bowel sounds are normal  She exhibits no distension  There is no tenderness  Musculoskeletal: Normal range of motion  She exhibits no edema or deformity  Hands:  Lymphadenopathy:     She has no cervical adenopathy  Neurological: She is alert and oriented to person, place, and time  She displays normal reflexes  No sensory deficit  Coordination normal    Skin: Skin is warm and dry  Capillary refill takes less than 2 seconds  No rash noted  She is not diaphoretic  Psychiatric: She has a normal mood and affect  Her behavior is normal    Nursing note and vitals reviewed  Operative site has been examined and clear of skin infection and inflammation         Comprehensive metabolic panel    Ref Range & Units 12/26/19  9:01 AM   Sodium 137 - 147 mmol/L 140    Potassium 3 6 - 5 0 mmol/L 4 2    Chloride 97 - 108 mmol/L 103    CO2 22 - 30 mmol/L 27    ANION GAP 5 - 14 mmol/L 10    BUN 5 - 25 mg/dL 11    Creatinine 0 60 - 1 20 mg/dL 0 74    Comment: Standardized to IDMS reference method   Glucose 70 - 99 mg/dL 109High        Calcium 8 4 - 10 2 mg/dL 9 5    AST 14 - 36 U/L 20       ALT 9 - 52 U/L 28        Alkaline Phosphatase 43 - 122 U/L 91    Total Protein 5 9 - 8 4 g/dL 6 9    Albumin 3 0 - 5 2 g/dL 4 1    Total Bilirubin <1 30 mg/dL 0 70    eGFR >60 ml/min/1 73sq m 90                             CBC Ref Range & Units 12/26/19  9:01 AM   WBC 4 50 - 11 00 Thousand/uL 4 00Low     RBC 4 00 - 5 20 Million/uL 5  39High     Hemoglobin 12 0 - 16 0 g/dL 12 0    Hematocrit 36 0 - 46 0 % 37 4    MCV 80 - 100 fL 69Low     MCH 26 0 - 34 0 pg 22 2Low     MCHC 31 0 - 36 0 g/dL 32 0    RDW <15 3 % 15 1    MPV 8 9 - 12 7 fL 9 4    Platelets 770 - 895 Thousands/uL 209          Manual Differential (Non Wam)       Ref Range & Units 12/26/19  9:01 AM   Segmented % 45 - 65 % 64    Bands % 0 - 8 % 3    Lymphocytes % 25 - 45 % 16Low     Monocytes % 1 - 10 % 11High     Eosinophils, % 0 - 6 % 5    Basophils % 0 - 1 % 1    Neutrophils Absolute 1 80 - 7 80 Thousand/uL 2 68    Lymphocytes Absolute 0 50 - 4 00 Thousand/uL 0 64    Monocytes Absolute 0 20 - 0 90 Thousand/uL 0 44    Eosinophils Absolute 0 00 - 0 40 Thousand/uL 0 20    Basophils Absolute 0 00 - 0 10 Thousand/uL 0 04    Total Counted  100    RBC Morphology  abnormal    Anisocytosis  Present    Hypochromia  Present    Microcytes  Present    Ovalocytes  Present    Poikilocytes  Present    Platelet Estimate Adequate Adequate          Specimen Collected: 12/26/19  9:01 AM   Last Resulted: 12/26/19 11:00 AM                   Assessment & Recommendations:     Patient is cleared for surgery   Received communication of surgical clearance from [facility] nurse practitioner, Meggan Valdovinos, [address]  Surgical Procedure risk category:Minimal risk    Patient specific operative risk categegory: Moderate Risk    All of patients questions were answered  Patient understands and agrees with the above plan       YE Hylton  01/15/20

## 2020-01-15 NOTE — PRE-PROCEDURE INSTRUCTIONS
Pre-Surgery Instructions:   Medication Instructions    amLODIPine (NORVASC) 5 mg tablet Instructed patient per Anesthesia Guidelines   benzonatate (TESSALON) 200 MG capsule Instructed patient per Anesthesia Guidelines   oxyCODONE (ROXICODONE) 10 MG TABS Instructed patient per Anesthesia Guidelines  Pre-op Showering Instructions for Surgery Patients    Before your operation, you play an important role in decreasing your risk for infection by washing with special antiseptic soap  This is an effective way to reduce bacteria on the skin which may help to prevent infections at the surgical site  Please read the following directions in advance  1  In the week before your operation, purchase a 4 ounce bottle of antiseptic soap containing chlorhexidine gluconate (CHG)  4%  Some brand names include: Aplicare®, Endure, and Hibiclens®  The cost is usually less than $5 00   For your convenience, the [employer] carries the soap   It may also be available at your doctors office or pre-admission testing center, and at most retail pharmacies   If you are allergic or sensitive to soaps containing CHG, please let your doctor know so another antiseptic can be suggested   CHG antiseptic soap is for external use only  2   The day before your operation, follow these instructions carefully to get ready   Please clean linens (sheets) on your bed; you should sleep on clean sheets after your evening shower   Get clean towels and washcloth ready  you need enough for 2 showers   Set aside clean underwear, pajamas, and clothing to wear after the showers     Reminders:   DO NOT use any other soap or body rinse on your skin during or after the antiseptic showers   DO NOT use lotion, powder, deodorant, or perfume/aftershave of any kind on your skin after your antiseptic shower   DO NOT shave any body parts in the 24 hours/day before your operation   DO NOT get the antiseptic soap in your eyes, ears, nose, mouth, or vaginal area    3  You will need to shower the night before AND the morning of your surgery  Shower 1:   The first evening before the operation, take the first shower   First, shampoo your hair with regular shampoo and rinse it completely before you use the antiseptic soap  After washing and rinsing your hair, rinse your body   Next, use a clean washcloth to apply the antiseptic soap and wash your body from the neck down to your toes using ½ bottle of the antiseptic soap   You will use the other ½ bottle for the second shower   Clean the area where your incision will be; lather this area well for about 2 minutes   If you are having head or neck surgery, wash areas with the antiseptic soap   Rinse yourself completely with running water   Use a clean towel to dry off   Wear clean underwear and clothing/pajamas  Shower 2   The morning of your operation, take the second shower following the same steps as Shower 1 using the second ½ of the bottle of antiseptic soap   Use clean cloths and towels to wash and dry yourself   Wear clean underwear and clothing

## 2020-01-15 NOTE — PROGRESS NOTES
FAMILY PRACTICE PRE-OPERATIVE EVALUATION  [facility] PHYSICIAN GROUP - [facility] PRACTICE FAN    NAME: Ángela Moraes  AGE: 64 y o  SEX: female  : 1960     DATE: 1/15/2020    Family Practice Pre-Operative Evaluation      Chief Complaint: Pre-operative Evaluation     Surgery: right trigger finger release with Dr Venita Whitley of orthopedics   Anticipated Date of Surgery: 2020     History of Present Illness:     Patient has a hx of follicular lymphoma and is currently under infusion/ chemotherapy treatment directed by Dr Barbara Verduzco of [facility]   She is scheduled for right thumb trigger finger causing pain in the thumb scheduled for 2020 as an outpatient under local anesthesia with sedation at [facility]  She reports that the pain to her right hand  is constant and  severe all the time  As per Dr Sidonie Lombard last progress note--she was supposed to get clearance from PCP and hematologist  The patient reports that she was unaware that she was supposed to get clearance from [facility]  She last saw them on 2019  She has an upcoming appointment with [facility] on 2020  Patient has no prior history of bleeding issues or blood clots  Chronic conditions, medications and allergies were reviewed  There is no currently active infectious process  Assessment of Cardiac Risk:   · No unstable or severe angina or MI in the last 6 weeks or history of stent placement in the last year   · No decompensated heart failure (e g  New onset heart failure, NYHA functional class IV heart failure, or worsening existing heart failure) in past 3 mos    · No severe heart valve disease including aortic stenosis or symptomatic mitral stenosis  · ECG done on 2019: normal sinus rhythm, normal ECG      Exercise Capacity:  · Able to walk 4 blocks without symptoms  · Able to walk 2 flights without symptoms    NO Prior Anesthesia Reactions--cholecystectomy and hysterectomy prior w/ no anesthesia rx pre-intra-post operatively      No prolonged steroid use in past 6 mos  P A T  If done reviewed  Review of Systems:     Review of Systems   Constitutional: Negative for activity change, appetite change, chills, fatigue, fever and unexpected weight change  HENT: Negative for hearing loss, nosebleeds, sinus pain, sneezing, sore throat and trouble swallowing  Eyes: Negative for photophobia and visual disturbance  Respiratory: Negative for cough, chest tightness, shortness of breath and wheezing  Cardiovascular: Negative for chest pain, palpitations and leg swelling  Gastrointestinal: Negative for abdominal pain, constipation, nausea and vomiting  Genitourinary: Negative for decreased urine volume, difficulty urinating, dysuria, flank pain, genital sores, hematuria and urgency  Musculoskeletal: Negative for back pain and gait problem  Skin: Negative for pallor, rash and wound  Neurological: Negative for dizziness, seizures, syncope, weakness, numbness and headaches  Hematological: Negative for adenopathy  Does not bruise/bleed easily  Psychiatric/Behavioral: Negative for confusion, hallucinations, self-injury, sleep disturbance and suicidal ideas  The patient is not nervous/anxious          Current Problem List:     Patient Active Problem List   Diagnosis    Abdominal pain    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 (HCC)    Recurrent major depressive disorder, in partial remission (HCC)    Essential hypertension    Slow transit constipation    Transaminitis    Thrombocytopenia (HCC)    Iron deficiency anemia secondary to inadequate dietary iron intake    Frequent falls    Rash and nonspecific skin eruption    Microcytic anemia    Pain of right upper extremity    Bladder pain    Other vitamin B12 deficiency anemias    Physical exam, annual    Otitis externa    Dizziness    Nausea and vomiting    Pain in both hands    Trigger thumb of right hand    Encounter for central line care    Palliative care patient       Allergies:      Allergies   Allergen Reactions    Penicillins Anaphylaxis       Current Medications:       Current Outpatient Medications:     al mag oxide-diphenhydramine-lidocaine viscous (MAGIC MOUTHWASH) 1:1:1 suspension, Swish and spit 10 mL every 4 (four) hours as needed for mouth pain or discomfort, Disp: 180 mL, Rfl: 0    amLODIPine (NORVASC) 5 mg tablet, Take 1 tablet (5 mg total) by mouth daily, Disp: 30 tablet, Rfl: 0    benzonatate (TESSALON) 200 MG capsule, Take 1 capsule (200 mg total) by mouth 3 (three) times a day as needed for cough, Disp: 20 capsule, Rfl: 1    carbamide peroxide (DEBROX) 6 5 % otic solution, Administer 5 drops to the right ear 2 (two) times a day, Disp: 15 mL, Rfl: 0    cyanocobalamin 1000 MCG tablet, Take 1 tablet (1,000 mcg total) by mouth daily, Disp: 90 tablet, Rfl: 3    meclizine (ANTIVERT) 12 5 MG tablet, Take 1 tablet (12 5 mg total) by mouth every 8 (eight) hours as needed for dizziness, Disp: 30 tablet, Rfl: 3    omeprazole (PriLOSEC) 20 mg delayed release capsule, Take 1 capsule (20 mg total) by mouth daily To prevent stomach upset, Disp: 30 capsule, Rfl: 5    ondansetron (ZOFRAN) 4 mg tablet, Take 1 tablet (4 mg total) by mouth every 8 (eight) hours as needed for nausea or vomiting, Disp: 20 tablet, Rfl: 0    oxyCODONE (ROXICODONE) 10 MG TABS, Take 1 tablet (10 mg total) by mouth every 4 (four) hours as needed for moderate pain or severe painMax Daily Amount: 60 mg, Disp: 180 tablet, Rfl: 0    polyethylene glycol (MIRALAX) 17 g packet, Take 17 g by mouth daily, Disp: 30 each, Rfl: 3    senna (SENOKOT) 8 6 mg, Take 1 tablet (8 6 mg total) by mouth 2 (two) times a day, Disp: 60 each, Rfl: 3    Past Medical History:       Past Medical History:   Diagnosis Date    Anemia     iron and B12    Arthritis     Asthma     Cough     Depression     Falls frequently     Lupus (HCC)  Lymphoma ([facility] )     Lymphoma ([facility] )     Migraine     Osteoporosis     Psychiatric disorder     Vertigo         Past Surgical History:   Procedure Laterality Date    CHOLECYSTECTOMY      FL GUIDED CENTRAL VENOUS ACCESS DEVICE INSERTION  2018    HYSTERECTOMY      LYMPH NODE BIOPSY Left 2018    Procedure: EXCISION BIOPSY LYMPH NODE SUPRACLAVICULAR;  Surgeon: Colin Vasquez MD;  Location: [address] [place] [address];  Service: General    TUNNELED VENOUS PORT PLACEMENT N/A 2018    Procedure: INSERTION OF PORT-A-CATH;  Surgeon: Colin Vasquez MD;  Location: [address] [place] [address];  Service: General        Family History   Problem Relation Age of Onset    Cancer Mother     Diabetes Mother     Cancer Father     Diabetes Father         Social History     Socioeconomic History    Marital status: Single     Spouse name: Not on file    Number of children: Not on file    Years of education: Not on file    Highest education level: Not on file   Occupational History    Not on file   Social Needs    Financial resource strain: Not on file    Food insecurity:     Worry: Not on file     Inability: Not on file    Transportation needs:     Medical: Not on file     Non-medical: Not on file   Tobacco Use    Smoking status: Former Smoker     Last attempt to quit: 2017     Years since quittin 5    Smokeless tobacco: Never Used   Substance and Sexual Activity    Alcohol use: Never     Frequency: Never    Drug use: Never    Sexual activity: Not on file   Lifestyle    Physical activity:     Days per week: Not on file     Minutes per session: Not on file    Stress: Not on file   Relationships    Social connections:     Talks on phone: Not on file     Gets together: Not on file     Attends Church service: Not on file     Active member of club or organization: Not on file     Attends meetings of clubs or organizations: Not on file     Relationship status: Not on file    Intimate partner violence:     Fear of current or ex partner: Not on file     Emotionally abused: Not on file     Physically abused: Not on file     Forced sexual activity: Not on file   Other Topics Concern    Not on file   Social History Narrative    Not on file        Physical Exam:     /72 (BP Location: Right arm, Patient Position: Sitting, Cuff Size: Standard)   Pulse 59   Temp 97 5 °F (36 4 °C) (Temporal)   Resp 18   Ht 5' 6" (1 676 m)   Wt 96 6 kg (213 lb)   SpO2 99%   BMI 34 38 kg/m²     Physical Exam   Constitutional: She is oriented to person, place, and time  She appears well-developed and well-nourished  No distress  HENT:   Head: Normocephalic and atraumatic  Right Ear: External ear normal    Left Ear: External ear normal    Nose: Nose normal    Mouth/Throat: Oropharynx is clear and moist  No oropharyngeal exudate  Eyes: Pupils are equal, round, and reactive to light  Conjunctivae and EOM are normal  Right eye exhibits no discharge  Left eye exhibits no discharge  Neck: Normal range of motion  Neck supple  Cardiovascular: Normal rate, regular rhythm, normal heart sounds and intact distal pulses  No murmur heard  Pulmonary/Chest: Effort normal and breath sounds normal  No respiratory distress  She has no wheezes  Abdominal: Soft  Bowel sounds are normal  She exhibits no distension  There is no tenderness  Musculoskeletal: Normal range of motion  She exhibits no edema or deformity  Hands:  Lymphadenopathy:     She has no cervical adenopathy  Neurological: She is alert and oriented to person, place, and time  She displays normal reflexes  No sensory deficit  Coordination normal    Skin: Skin is warm and dry  Capillary refill takes less than 2 seconds  No rash noted  She is not diaphoretic  Psychiatric: She has a normal mood and affect  Her behavior is normal    Nursing note and vitals reviewed  Operative site has been examined and clear of skin infection and inflammation         Comprehensive metabolic panel    Ref Range & Units 12/26/19  9:01 AM   Sodium 137 - 147 mmol/L 140    Potassium 3 6 - 5 0 mmol/L 4 2    Chloride 97 - 108 mmol/L 103    CO2 22 - 30 mmol/L 27    ANION GAP 5 - 14 mmol/L 10    BUN 5 - 25 mg/dL 11    Creatinine 0 60 - 1 20 mg/dL 0 74    Comment: Standardized to IDMS reference method   Glucose 70 - 99 mg/dL 109High        Calcium 8 4 - 10 2 mg/dL 9 5    AST 14 - 36 U/L 20       ALT 9 - 52 U/L 28        Alkaline Phosphatase 43 - 122 U/L 91    Total Protein 5 9 - 8 4 g/dL 6 9    Albumin 3 0 - 5 2 g/dL 4 1    Total Bilirubin <1 30 mg/dL 0 70    eGFR >60 ml/min/1 73sq m 90                             CBC Ref Range & Units 12/26/19  9:01 AM   WBC 4 50 - 11 00 Thousand/uL 4 00Low     RBC 4 00 - 5 20 Million/uL 5  39High     Hemoglobin 12 0 - 16 0 g/dL 12 0    Hematocrit 36 0 - 46 0 % 37 4    MCV 80 - 100 fL 69Low     MCH 26 0 - 34 0 pg 22 2Low     MCHC 31 0 - 36 0 g/dL 32 0    RDW <15 3 % 15 1    MPV 8 9 - 12 7 fL 9 4    Platelets 910 - 162 Thousands/uL 209          Manual Differential (Non Wam)       Ref Range & Units 12/26/19  9:01 AM   Segmented % 45 - 65 % 64    Bands % 0 - 8 % 3    Lymphocytes % 25 - 45 % 16Low     Monocytes % 1 - 10 % 11High     Eosinophils, % 0 - 6 % 5    Basophils % 0 - 1 % 1    Neutrophils Absolute 1 80 - 7 80 Thousand/uL 2 68    Lymphocytes Absolute 0 50 - 4 00 Thousand/uL 0 64    Monocytes Absolute 0 20 - 0 90 Thousand/uL 0 44    Eosinophils Absolute 0 00 - 0 40 Thousand/uL 0 20    Basophils Absolute 0 00 - 0 10 Thousand/uL 0 04    Total Counted  100    RBC Morphology  abnormal    Anisocytosis  Present    Hypochromia  Present    Microcytes  Present    Ovalocytes  Present    Poikilocytes  Present    Platelet Estimate Adequate Adequate          Specimen Collected: 12/26/19  9:01 AM   Last Resulted: 12/26/19 11:00 AM                   Assessment & Recommendations:     Patient is cleared for surgery   Received communication of surgical clearance from [employer] nurse practitioner, Sarika Harmon, [facility]  Surgical Procedure risk category:Minimal risk    Patient specific operative risk categegory: Moderate Risk    All of patients questions were answered  Patient understands and agrees with the above plan       YE Gonzalez  01/15/20

## 2020-01-15 NOTE — ANESTHESIA PREPROCEDURE EVALUATION
Review of Systems/Medical History  Patient summary reviewed  Chart reviewed  No history of anesthetic complications     Cardiovascular  EKG reviewed, Exercise tolerance (METS): <4,  Hypertension controlled,    Pulmonary  Smoker ex-smoker  , Asthma , well controlled/ stable Last rescue: < 1 month ago Asthma type of rescue: PRN medication usage, No recent URI , No sleep apnea ,        GI/Hepatic  Negative GI/hepatic ROS          Negative  ROS        Endo/Other    Obesity    GYN    Hysterectomy,        Hematology  Anemia iron deficiency anemia,  Thrombocytopenia (platler ct now normal), Immunocompromised state (hx of Lupus) , Lymphoma   Musculoskeletal    Comment: Left POC Arthritis     Neurology    Headaches,    Psychology   Depression , depressed and being treated for depression,              Physical Exam    Airway    Mallampati score: II  TM Distance: >3 FB  Neck ROM: full     Dental       Cardiovascular  Cardiovascular exam normal    Pulmonary  Pulmonary exam normal     Other Findings  Fixed upper and lower teeth and in good repair      Anesthesia Plan  ASA Score- 3     Anesthesia Type- IV sedation with anesthesia with ASA Monitors  Additional Monitors:   Airway Plan:     Comment: Old charts reviewed  No interval change in health other than CC  Ms Gordon Tillman is   Plan Factors-Patient not instructed to abstain from smoking on day of procedure  Patient did not smoke on day of surgery  Induction- intravenous  Postoperative Plan- Plan for postoperative opioid use  Informed Consent- Anesthetic plan and risks discussed with patient  I personally reviewed this patient with the CRNA  Discussed and agreed on the Anesthesia Plan with the CRNA  Kameron Shepard

## 2020-01-16 ENCOUNTER — ANESTHESIA (OUTPATIENT)
Dept: PERIOP | Facility: HOSPITAL | Age: 60
End: 2020-01-16
Payer: COMMERCIAL

## 2020-01-16 ENCOUNTER — HOSPITAL ENCOUNTER (OUTPATIENT)
Facility: HOSPITAL | Age: 60
Setting detail: OUTPATIENT SURGERY
Discharge: HOME | End: 2020-01-16
Attending: ORTHOPAEDIC SURGERY | Admitting: ORTHOPAEDIC SURGERY
Payer: COMMERCIAL

## 2020-01-16 VITALS
RESPIRATION RATE: 16 BRPM | TEMPERATURE: 97.2 F | OXYGEN SATURATION: 99 % | SYSTOLIC BLOOD PRESSURE: 119 MMHG | HEART RATE: 52 BPM | DIASTOLIC BLOOD PRESSURE: 63 MMHG

## 2020-01-16 PROCEDURE — 26055 INCISE FINGER TENDON SHEATH: CPT | Performed by: ORTHOPAEDIC SURGERY

## 2020-01-16 PROCEDURE — 26055 INCISE FINGER TENDON SHEATH: CPT | Performed by: PHYSICIAN ASSISTANT

## 2020-01-16 RX ORDER — MIDAZOLAM HYDROCHLORIDE 2 MG/2ML
INJECTION, SOLUTION INTRAMUSCULAR; INTRAVENOUS AS NEEDED
Status: DISCONTINUED | OUTPATIENT
Start: 2020-01-16 | End: 2020-01-16 | Stop reason: SURG

## 2020-01-16 RX ORDER — MAGNESIUM HYDROXIDE 1200 MG/15ML
LIQUID ORAL AS NEEDED
Status: DISCONTINUED | OUTPATIENT
Start: 2020-01-16 | End: 2020-01-16 | Stop reason: HOSPADM

## 2020-01-16 RX ORDER — LIDOCAINE HYDROCHLORIDE 10 MG/ML
INJECTION, SOLUTION EPIDURAL; INFILTRATION; INTRACAUDAL; PERINEURAL AS NEEDED
Status: DISCONTINUED | OUTPATIENT
Start: 2020-01-16 | End: 2020-01-16 | Stop reason: SURG

## 2020-01-16 RX ORDER — FENTANYL CITRATE/PF 50 MCG/ML
25 SYRINGE (ML) INJECTION
Status: DISCONTINUED | OUTPATIENT
Start: 2020-01-16 | End: 2020-01-16 | Stop reason: HOSPADM

## 2020-01-16 RX ORDER — DEXAMETHASONE SODIUM PHOSPHATE 4 MG/ML
4 INJECTION, SOLUTION INTRA-ARTICULAR; INTRALESIONAL; INTRAMUSCULAR; INTRAVENOUS; SOFT TISSUE ONCE AS NEEDED
Status: DISCONTINUED | OUTPATIENT
Start: 2020-01-16 | End: 2020-01-16 | Stop reason: HOSPADM

## 2020-01-16 RX ORDER — MEPERIDINE HYDROCHLORIDE 25 MG/ML
12.5 INJECTION INTRAMUSCULAR; INTRAVENOUS; SUBCUTANEOUS
Status: DISCONTINUED | OUTPATIENT
Start: 2020-01-16 | End: 2020-01-16 | Stop reason: HOSPADM

## 2020-01-16 RX ORDER — DIPHENHYDRAMINE HYDROCHLORIDE 50 MG/ML
12.5 INJECTION INTRAMUSCULAR; INTRAVENOUS ONCE AS NEEDED
Status: DISCONTINUED | OUTPATIENT
Start: 2020-01-16 | End: 2020-01-16 | Stop reason: HOSPADM

## 2020-01-16 RX ORDER — PROMETHAZINE HYDROCHLORIDE 25 MG/ML
12.5 INJECTION, SOLUTION INTRAMUSCULAR; INTRAVENOUS ONCE AS NEEDED
Status: DISCONTINUED | OUTPATIENT
Start: 2020-01-16 | End: 2020-01-16 | Stop reason: HOSPADM

## 2020-01-16 RX ORDER — SODIUM CHLORIDE, SODIUM LACTATE, POTASSIUM CHLORIDE, CALCIUM CHLORIDE 600; 310; 30; 20 MG/100ML; MG/100ML; MG/100ML; MG/100ML
75 INJECTION, SOLUTION INTRAVENOUS CONTINUOUS
Status: DISCONTINUED | OUTPATIENT
Start: 2020-01-16 | End: 2020-01-16 | Stop reason: HOSPADM

## 2020-01-16 RX ORDER — FENTANYL CITRATE/PF 50 MCG/ML
12.5 SYRINGE (ML) INJECTION
Status: DISCONTINUED | OUTPATIENT
Start: 2020-01-16 | End: 2020-01-16 | Stop reason: HOSPADM

## 2020-01-16 RX ORDER — PROPOFOL 10 MG/ML
INJECTION, EMULSION INTRAVENOUS CONTINUOUS PRN
Status: DISCONTINUED | OUTPATIENT
Start: 2020-01-16 | End: 2020-01-16 | Stop reason: SURG

## 2020-01-16 RX ORDER — ROPIVACAINE HYDROCHLORIDE 2 MG/ML
INJECTION, SOLUTION EPIDURAL; INFILTRATION; PERINEURAL AS NEEDED
Status: DISCONTINUED | OUTPATIENT
Start: 2020-01-16 | End: 2020-01-16 | Stop reason: HOSPADM

## 2020-01-16 RX ORDER — FENTANYL CITRATE 50 UG/ML
INJECTION, SOLUTION INTRAMUSCULAR; INTRAVENOUS AS NEEDED
Status: DISCONTINUED | OUTPATIENT
Start: 2020-01-16 | End: 2020-01-16 | Stop reason: SURG

## 2020-01-16 RX ADMIN — SODIUM CHLORIDE, SODIUM LACTATE, POTASSIUM CHLORIDE, AND CALCIUM CHLORIDE: .6; .31; .03; .02 INJECTION, SOLUTION INTRAVENOUS at 07:28

## 2020-01-16 RX ADMIN — MIDAZOLAM HYDROCHLORIDE 2 MG: 1 INJECTION, SOLUTION INTRAMUSCULAR; INTRAVENOUS at 07:30

## 2020-01-16 RX ADMIN — FENTANYL CITRATE 25 MCG: 50 INJECTION, SOLUTION INTRAMUSCULAR; INTRAVENOUS at 08:36

## 2020-01-16 RX ADMIN — FENTANYL CITRATE 100 MCG: 50 INJECTION INTRAMUSCULAR; INTRAVENOUS at 07:30

## 2020-01-16 RX ADMIN — LIDOCAINE HYDROCHLORIDE 50 MG: 10 INJECTION, SOLUTION EPIDURAL; INFILTRATION; INTRACAUDAL; PERINEURAL at 07:34

## 2020-01-16 RX ADMIN — FENTANYL CITRATE 25 MCG: 50 INJECTION, SOLUTION INTRAMUSCULAR; INTRAVENOUS at 08:31

## 2020-01-16 RX ADMIN — PROPOFOL 75 MCG/KG/MIN: 10 INJECTION, EMULSION INTRAVENOUS at 07:34

## 2020-01-16 RX ADMIN — FENTANYL CITRATE 25 MCG: 50 INJECTION, SOLUTION INTRAMUSCULAR; INTRAVENOUS at 08:49

## 2020-01-16 RX ADMIN — SODIUM CHLORIDE, SODIUM LACTATE, POTASSIUM CHLORIDE, AND CALCIUM CHLORIDE 75 ML/HR: .6; .31; .03; .02 INJECTION, SOLUTION INTRAVENOUS at 06:20

## 2020-01-16 RX ADMIN — FENTANYL CITRATE 25 MCG: 50 INJECTION, SOLUTION INTRAMUSCULAR; INTRAVENOUS at 08:41

## 2020-01-16 NOTE — NURSING NOTE
Patients montero removed intact  No noted drainage  No noted distress  Fingers warm/good capillary refill noted

## 2020-01-16 NOTE — INTERVAL H&P NOTE
H&P reviewed  After examining the patient I find no changes in the patients condition since the H&P had been written      Vitals:    01/16/20 0618   BP: 160/70   Pulse: 100   Resp: 16   Temp: (!) 44 6 °F (7 °C)   SpO2: 97%

## 2020-01-16 NOTE — OP NOTE
OPERATIVE REPORT  PATIENT NAME: Kenia Birmingham    :  1960  MRN: [id number]  Pt Location:  OR ROOM 12    SURGERY DATE: 2020    Surgeon(s) and Role:     * Vidal Brady MD - Primary     * Chino Chamberlain PA-C - Assisting    Preop Diagnosis:  Trigger thumb of right hand [M65 311]    Post-Op Diagnosis Codes:     * Trigger thumb of right hand [M65 311]    Procedure(s) (LRB):  RELEASE TRIGGER FINGER RIGHT THUMB (Right)    Specimen(s):  * No specimens in log *    Estimated Blood Loss:   Minimal    Drains:  * No LDAs found *    Anesthesia Type:   IV Sedation with Anesthesia    Operative Indications:  Trigger thumb of right hand [M65 311]  Pain, limited motion, and catching right thumb    Operative Findings:  Marked thickening of the A1 pulley at base of right thumb    Complications:   None    Procedure and Technique:  After satisfactory IV sedation was induced, the right hand and arm were scrubbed with 3% PCMX, painted with ChloraPrep and draped in a sterile manner  The nodule at the base of the right thumb over the A1 pulley was marked and a transverse incision following the  skin lines was marked for approximately 2 5 cm  A digital block with 0 2% Noropin  was accomplished  The limb was then exsanguinated and  the pneumatic tourniquet was inflated to 250 mm of mercury  A skin incision only was made over the marked area only through skin and to the subcutaneous tissue to avoid injury to the digital nerves  Longitudinal dissection with dissecting scissors was done in the line of direction of the tendon and retractors were placed to protect the digital nerves radially and ulnarly  Using a 4 x 4,  soft tissue was dissected off the tendon sheath and  the A1 pulley was visualized  This pulley was markedly thickened  Then a 59 Washburn blade was used to incise the A1 pulley and the underlying tendons could be visualized  There was increased fluid in the tendon sheath upon opening    Dissecting scissors were used to free the pulley both proximally and distally and the flexor tendon was flexed and extended passively showing no further catching or triggering  A small Ragnell was used to lift the long flexor tendon out of the sheath showing again no further constriction or impingement  The tourniquet was then deflated  Bleeding vessels were coagulated with bipolar electrocautery  The skin only was closed with interrupted mattress sutures of 4-0 Prolene sutures  Adaptic gauze, 4x4s, and a bulky compression dressing were applied  There was good capillary refill in the fingers at the end of procedure  The patient tolerated the  procedure well and was transferred to the recovery room in satisfactory stable condition     A physician assistant was required during the procedure for retraction tissue handling,dissection and suturing    Patient Disposition:  PACU     SIGNATURE: Dilia Haley MD  DATE: January 16, 2020  TIME: 8:18 AM

## 2020-01-16 NOTE — DISCHARGE INSTR - AVS FIRST PAGE
Dr William Aguillon   for Patients   following   Trigger Finger Release      1  Apply ice to the finger using a small trash bag or small bag of frozen peas for 15 minutes, 4 times a day, for 2-3 days may help with pain or swelling  2  Remove the large, bulky dressing and cover the wound with band aids on the second day after your surgery  3  Sponge baths only for the next week; you may shower normally after the first week and pat the wounds dry carefully  Do not bathe, soak or swim until you are seen in the office; trash bags leak, do not use them  4  Elevate the arm for the next 4-5 days- rest your elbow on bed or the arm of a chair and put hand above heart; do not let your arm hang down  5  Wiggle, wiggle, wiggle your finger, even if slightly painful- do not let your finger get stiff  6  No lifting or driving for the first 5 days until the pain has decreased  7  Tramadol 50 mg- one tablet- or two Tylenol [or two Advil or one Aleve] every 4-6 hours, if needed, for pain  [facility] [numbers above] to make a post-operative appointment, unless one has been already made for you

## 2020-01-21 ENCOUNTER — OFFICE VISIT (OUTPATIENT)
Dept: PALLIATIVE MEDICINE | Facility: CLINIC | Age: 60
End: 2020-01-21
Payer: COMMERCIAL

## 2020-01-21 VITALS
BODY MASS INDEX: 34.64 KG/M2 | SYSTOLIC BLOOD PRESSURE: 120 MMHG | OXYGEN SATURATION: 99 % | DIASTOLIC BLOOD PRESSURE: 80 MMHG | HEART RATE: 67 BPM | WEIGHT: 214.6 LBS | TEMPERATURE: 97.6 F | RESPIRATION RATE: 14 BRPM

## 2020-01-21 DIAGNOSIS — C82.18 GRADE 2 FOLLICULAR LYMPHOMA OF LYMPH NODES OF MULTIPLE REGIONS (HCC): Primary | ICD-10-CM

## 2020-01-21 DIAGNOSIS — Z51.5 PALLIATIVE CARE PATIENT: ICD-10-CM

## 2020-01-21 DIAGNOSIS — F33.41 RECURRENT MAJOR DEPRESSIVE DISORDER, IN PARTIAL REMISSION (HCC): ICD-10-CM

## 2020-01-21 DIAGNOSIS — K59.01 SLOW TRANSIT CONSTIPATION: ICD-10-CM

## 2020-01-21 DIAGNOSIS — R10.84 GENERALIZED ABDOMINAL PAIN: ICD-10-CM

## 2020-01-21 PROCEDURE — 99214 OFFICE O/P EST MOD 30 MIN: CPT | Performed by: SURGERY

## 2020-01-21 RX ORDER — ACETAMINOPHEN 500 MG
TABLET ORAL
Qty: 30 TABLET | Refills: 0 | Status: SHIPPED | OUTPATIENT
Start: 2020-01-21 | End: 2020-02-25

## 2020-01-21 RX ORDER — OXYCODONE HYDROCHLORIDE 10 MG/1
10 TABLET ORAL EVERY 4 HOURS PRN
Qty: 180 TABLET | Refills: 0 | Status: SHIPPED | OUTPATIENT
Start: 2020-01-21 | End: 2020-02-25

## 2020-01-21 NOTE — PROGRESS NOTES
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 ([facility] [address] )    2  Generalized abdominal pain    3  Recurrent major depressive disorder, in partial remission ([facility] [address] )    4  Slow transit constipation    5  Palliative care patient        Requested Prescriptions     Signed Prescriptions Disp Refills    acetaminophen (TYLENOL) 500 mg tablet 30 tablet 0     Sig: May take 1 tablet (500 mg total) by mouth every 8 (eight) hours as needed for mild pain or moderate pain  May also take 1 tablet (500 mg total) every 8 (eight) hours as needed for mild pain or moderate pain   oxyCODONE (ROXICODONE) 10 MG TABS 180 tablet 0     Sig: Take 1 tablet (10 mg total) by mouth every 4 (four) hours as needed for moderate pain or severe painMax Daily Amount: 60 mg     Medications Discontinued During This Encounter   Medication Reason    oxyCODONE (ROXICODONE) 10 MG TABS Reorder       Representatives have queried the patient's controlled substance dispensing history in the Prescription Drug Monitoring Program in compliance with regulations before I have prescribed any controlled substances  The prescription history is consistent with prescribed therapy and our practice policies  25 minutes were spent face to face with Irsi Davis and her son with greater than 50% of the time spent in counseling or coordination of care including discussions of treatment instructions and follow up requirements   All of the patient's questions were answered during this discussion  Return in about 5 weeks (around 2/25/2020), or if symptoms worsen or fail to improve  Subjective: In attendance at this visit: Iris Davis and her son  Chief Complaint    Follow up visit for:  symptom management  HPI     Iris Davis is a 64 y o  female with  grade 2 follicular lymphoma initially diagnosed in Nov 2018   Tumor lysis syndrome in Dec 2018  Received chemo in early 2019  Started maintenance rituxan q 8 weeks starting May 2019  PET scan in May 2019 demonstrated improvement of adenopathy  She has had elevated LDH which has been stable since July 2019  Plan is for her to continue maintenance Rituxan every 8 weeks, next treatment 1/23/20  Per oncology notes, PET-CT scan will be done, patient states scheduled for 1/27/20  Translation by son and MA/Lizbet      Since last visit, patient had surgery for trigger finger 1/16/20  She c/o painful nodule on palm of hand along tendon sheath of 4th digit R hand  Painful to touch, but able to move finger throughout rom  Patient/son instructed to inform orthopedics of this, she is scheduled for f/u next week  She has ongoing intermittent, severe abdominal pain improved by PRN oxycodone  Patient was increased to oxyIR 10mg q4h last visit and instructed to take 1/2-1 pill q4h prn severe pain  Patient is out of pills and has been taking 1 tablet q4h prn pain, mostly 4+ tablets/day  We discussed taking xs tylenol as well to assist with pain control  Patient did not take  Instructed patient/son for patient to take tylenol xs 1-2 tabs q8h for mild to moderate pain and to use the oxyIR for severe pain  Patient asked to keep a daily log of how many oxyIR takes  Son/patient express understanding and agree with plan      Patient c/o difficulty sleeping, mostly due to pain  We discussed taking tylenol + oxy 1 hour prior to bed to assist with pain control throughout the night and hopefully help with maintaining sleep  Patient c/o R flank/back pain and nodule  Patient states she has been unable to sleep on R side because of this  In trying to exam for nodule, patient experienced to much pain with palpation and I was unable to locate her source of pain    Patient scheduled for PET-CT next week and will determine if there is nodular uptake in this region      Patient has constipation, but seems to control with this with adjusting her senna tablets  Instructed for patient to take this nightly      [employer] W[place] document discussed at last visit as patient has no advance directive  Patient/son did not complete and uncertain of location  Discussed document again and provided [place]  Asked to complete for next visit  The following portions of the medical history were reviewed: past medical history, problem list, medication list, and social history    2 sons, 1 in [place], 1 present for visit/[place]  No advance directive    Current Outpatient Medications:     al mag oxide-diphenhydramine-lidocaine viscous (MAGIC MOUTHWASH) 1:1:1 suspension, Swish and spit 10 mL every 4 (four) hours as needed for mouth pain or discomfort, Disp: 180 mL, Rfl: 0    amLODIPine (NORVASC) 5 mg tablet, Take 1 tablet (5 mg total) by mouth daily, Disp: 30 tablet, Rfl: 0    benzonatate (TESSALON) 200 MG capsule, Take 1 capsule (200 mg total) by mouth 3 (three) times a day as needed for cough, Disp: 20 capsule, Rfl: 1    carbamide peroxide (DEBROX) 6 5 % otic solution, Administer 5 drops to the right ear 2 (two) times a day, Disp: 15 mL, Rfl: 0    cyanocobalamin 1000 MCG tablet, Take 1 tablet (1,000 mcg total) by mouth daily, Disp: 90 tablet, Rfl: 3    meclizine (ANTIVERT) 12 5 MG tablet, Take 1 tablet (12 5 mg total) by mouth every 8 (eight) hours as needed for dizziness, Disp: 30 tablet, Rfl: 3    omeprazole (PriLOSEC) 20 mg delayed release capsule, Take 1 capsule (20 mg total) by mouth daily To prevent stomach upset, Disp: 30 capsule, Rfl: 5    ondansetron (ZOFRAN) 4 mg tablet, Take 1 tablet (4 mg total) by mouth every 8 (eight) hours as needed for nausea or vomiting, Disp: 20 tablet, Rfl: 0    oxyCODONE (ROXICODONE) 10 MG TABS, Take 1 tablet (10 mg total) by mouth every 4 (four) hours as needed for moderate pain or severe painMax Daily Amount: 60 mg, Disp: 180 tablet, Rfl: 0    polyethylene glycol (MIRALAX) 17 g packet, Take 17 g by mouth daily, Disp: 30 each, Rfl: 3    senna (SENOKOT) 8 6 mg, Take 1 tablet (8 6 mg total) by mouth 2 (two) times a day, Disp: 60 each, Rfl: 3    acetaminophen (TYLENOL) 500 mg tablet, May take 1 tablet (500 mg total) by mouth every 8 (eight) hours as needed for mild pain or moderate pain  May also take 1 tablet (500 mg total) every 8 (eight) hours as needed for mild pain or moderate pain , Disp: 30 tablet, Rfl: 0     Review of Systems   Constitutional: Positive for activity change  Gastrointestinal: Positive for constipation  Genitourinary: Positive for flank pain  Musculoskeletal: Positive for back pain and myalgias  R palmar painful nodule along tendon sheath of 4th digit   Neurological: Positive for weakness  Psychiatric/Behavioral: Positive for sleep disturbance  All other systems negative    Objective:  Vital Signs  /80 (BP Location: Left arm, Cuff Size: Standard)   Pulse 67   Temp 97 6 °F (36 4 °C) (Oral)   Resp 14   Wt 97 3 kg (214 lb 9 6 oz)   SpO2 99%   BMI 34 64 kg/m²    Physical Exam    Constitutional: Appears well-developed and well-nourished  In no acute physical or emotional distress  Lithuanian speaking  Head: Normocephalic and atraumatic  Eyes: EOM are normal  No ocular discharge  No scleral icterus  Neck: No visible adenopathy or masses  Respiratory: Effort normal  No stridor  No respiratory distress  Gastrointestinal: No abdominal distension  Musculoskeletal: No edema  Painful R back/flank, patient feels there is nodularity back there, but too painful for me to deeply palpate  R tender nodule palm of 4th digit, 4th digit rom intact  Neurological: Alert, oriented and appropriately conversant  Skin: No diaphoresis, no rashes seen on exposed areas of skin  Psychiatric: Displays a normal mood and affect   Behavior, judgement and thought content appear normal

## 2020-01-23 ENCOUNTER — HOSPITAL ENCOUNTER (OUTPATIENT)
Dept: INFUSION CENTER | Facility: HOSPITAL | Age: 60
Discharge: HOME | End: 2020-01-23
Attending: INTERNAL MEDICINE
Payer: COMMERCIAL

## 2020-01-23 DIAGNOSIS — D51.8 OTHER VITAMIN B12 DEFICIENCY ANEMIAS: Primary | ICD-10-CM

## 2020-01-23 PROCEDURE — 96372 THER/PROPH/DIAG INJ SC/IM: CPT

## 2020-01-23 RX ORDER — CYANOCOBALAMIN 1000 UG/ML
1000 INJECTION INTRAMUSCULAR; SUBCUTANEOUS ONCE
Status: CANCELLED | OUTPATIENT
Start: 2020-02-19

## 2020-01-23 RX ORDER — CYANOCOBALAMIN 1000 UG/ML
1000 INJECTION INTRAMUSCULAR; SUBCUTANEOUS ONCE
Status: COMPLETED | OUTPATIENT
Start: 2020-01-23 | End: 2020-01-23

## 2020-01-23 RX ADMIN — CYANOCOBALAMIN 1000 MCG: 1000 INJECTION INTRAMUSCULAR; SUBCUTANEOUS at 10:31

## 2020-01-27 ENCOUNTER — HOSPITAL ENCOUNTER (OUTPATIENT)
Dept: NUCLEAR MEDICINE | Facility: HOSPITAL | Age: 60
Discharge: HOME | End: 2020-01-27
Payer: COMMERCIAL

## 2020-01-27 DIAGNOSIS — C82.18 GRADE 2 FOLLICULAR LYMPHOMA OF LYMPH NODES OF MULTIPLE REGIONS (HCC): ICD-10-CM

## 2020-01-27 LAB — GLUCOSE SERPL-MCNC: 107 MG/DL (ref 65–140)

## 2020-01-27 PROCEDURE — 82948 REAGENT STRIP/BLOOD GLUCOSE: CPT

## 2020-01-27 PROCEDURE — 78815 PET IMAGE W/CT SKULL-THIGH: CPT

## 2020-01-27 PROCEDURE — A9552 F18 FDG: HCPCS

## 2020-02-17 ENCOUNTER — TELEPHONE (OUTPATIENT)
Dept: HEMATOLOGY ONCOLOGY | Facility: CLINIC | Age: 60
End: 2020-02-17

## 2020-02-17 NOTE — TELEPHONE ENCOUNTER
The patient was called to complete the blood work prior to the visit on 2/19/2020  Kali Tay stated she will informed the patient

## 2020-02-18 ENCOUNTER — OFFICE VISIT (OUTPATIENT)
Dept: OBGYN CLINIC | Facility: CLINIC | Age: 60
End: 2020-02-18

## 2020-02-18 VITALS — WEIGHT: 214 LBS | HEIGHT: 66 IN | BODY MASS INDEX: 34.39 KG/M2

## 2020-02-18 DIAGNOSIS — M65.311 TRIGGER THUMB OF RIGHT HAND: Primary | ICD-10-CM

## 2020-02-18 DIAGNOSIS — C82.13 FOLLICULAR LYMPHOMA GRADE II OF INTRA-ABDOMINAL LYMPH NODES (HCC): ICD-10-CM

## 2020-02-18 PROCEDURE — 99024 POSTOP FOLLOW-UP VISIT: CPT | Performed by: ORTHOPAEDIC SURGERY

## 2020-02-18 PROCEDURE — 3079F DIAST BP 80-89 MM HG: CPT | Performed by: ORTHOPAEDIC SURGERY

## 2020-02-18 PROCEDURE — 3008F BODY MASS INDEX DOCD: CPT | Performed by: ORTHOPAEDIC SURGERY

## 2020-02-18 PROCEDURE — 3074F SYST BP LT 130 MM HG: CPT | Performed by: ORTHOPAEDIC SURGERY

## 2020-02-18 NOTE — PATIENT INSTRUCTIONS
Plan :   I am happy with her clinical improvement and the motion she has gained  She has no further triggering  I am  somewhat disappointed she took the stitches out by herself as that is not what is normally done  She can rub cocoa butter or vitamin-E cream firmly into the scar for 1 minute 4 times a day to soften the scar  She should use her hand for normal activities of daily living  We will keep an eye on her ring finger to make sure she does not develop a similar trigger finger in this finger    I will see her back again in 7 weeks for follow-up of both areas

## 2020-02-18 NOTE — PROGRESS NOTES
Chief Complaint   Patient presents with    Right Hand - Post-op           Assessment:   Right trigger thumb - status post release 01/16/2020    Plan :   I am happy with her clinical improvement and the motion she has gained  She has no further triggering  I am  somewhat disappointed she took the stitches out by herself as that is not what is normally done  She can rub cocoa butter or vitamin-E cream firmly into the scar for 1 minute 4 times a day to soften the scar  She should use her hand for normal activities of daily living  We will keep an eye on her ring finger to make sure she does not develop a similar trigger finger in this finger  I will see her back again in 7 weeks for follow-up of both areas    HPI:   This 53-year-old [place] female was sent by her family physician for consultation for painful right thumb  This has bothered her  for over 3 years and she notes no injury, trauma, or fall that brought this on  She is left-hand dominant  She is currently being aggressively treated for lymphoma with chemotherapy with injections through her port on a monthly basis  She has no numbness, tingling, or paresthesias in this right hand but significant pain and limited motion with locking  She then had release of a right trigger thumb on 01/16/2020 as an outpatient at [facility]   She returns now for her 1st postop visit almost 5 weeks after the surgery on 02/18/2020  She took the stitches out by herself and started moving her finger is doing well  Her thumb does not hurt but she feels something is rubbing now or catching at the base of her ring finger of the same right hand    The remainder of this patient's past medical history, family history, social history, medicines, and allergies was reviewed in the chart  Please see HPI for pertinent review of systems  All other systems reviewed are negative    She does have history of hypertension, skin rash, lymphoma, thrombocytopenia, depression,  anemia, among others    PE:  Ht 5' 6" (1 676 m)   Wt 97 1 kg (214 lb)   BMI 34 54 kg/m²   The stitches are out of the base of her right thumb with some minimal thickening of the scar but no discharge or ecchymosis  She has full motion of the IP joint of the thumb with no further triggering  Sensation to light touch was intact in this finger as the other fingers  She has some pain over the A1 pulley of the right ring finger today without reji triggering or locking  There are no skin changes over this area    Radial pulse was normal   Wrist motion is normal     Studies reviewed:  None

## 2020-02-19 ENCOUNTER — HOSPITAL ENCOUNTER (OUTPATIENT)
Dept: INFUSION CENTER | Facility: HOSPITAL | Age: 60
Discharge: HOME | End: 2020-02-19
Attending: INTERNAL MEDICINE
Payer: COMMERCIAL

## 2020-02-19 ENCOUNTER — OFFICE VISIT (OUTPATIENT)
Dept: HEMATOLOGY ONCOLOGY | Facility: CLINIC | Age: 60
End: 2020-02-19
Payer: COMMERCIAL

## 2020-02-19 VITALS
RESPIRATION RATE: 18 BRPM | HEIGHT: 66 IN | SYSTOLIC BLOOD PRESSURE: 150 MMHG | BODY MASS INDEX: 33.85 KG/M2 | DIASTOLIC BLOOD PRESSURE: 92 MMHG | OXYGEN SATURATION: 98 % | WEIGHT: 210.6 LBS | HEART RATE: 67 BPM | TEMPERATURE: 97.8 F

## 2020-02-19 VITALS
WEIGHT: 210.76 LBS | TEMPERATURE: 97.6 F | SYSTOLIC BLOOD PRESSURE: 148 MMHG | HEART RATE: 60 BPM | RESPIRATION RATE: 16 BRPM | HEIGHT: 65 IN | BODY MASS INDEX: 35.11 KG/M2 | DIASTOLIC BLOOD PRESSURE: 66 MMHG

## 2020-02-19 DIAGNOSIS — D51.8 OTHER VITAMIN B12 DEFICIENCY ANEMIAS: ICD-10-CM

## 2020-02-19 DIAGNOSIS — R10.13 EPIGASTRIC PAIN: ICD-10-CM

## 2020-02-19 DIAGNOSIS — R05.9 COUGH: ICD-10-CM

## 2020-02-19 DIAGNOSIS — C82.18 GRADE 2 FOLLICULAR LYMPHOMA OF LYMPH NODES OF MULTIPLE REGIONS (HCC): Primary | ICD-10-CM

## 2020-02-19 DIAGNOSIS — Z45.2 ENCOUNTER FOR CENTRAL LINE CARE: ICD-10-CM

## 2020-02-19 LAB
ALBUMIN SERPL BCP-MCNC: 3.8 G/DL (ref 3–5.2)
ALP SERPL-CCNC: 109 U/L (ref 43–122)
ALT SERPL W P-5'-P-CCNC: 36 U/L (ref 9–52)
ANION GAP SERPL CALCULATED.3IONS-SCNC: 8 MMOL/L (ref 5–14)
ANISOCYTOSIS BLD QL SMEAR: PRESENT
AST SERPL W P-5'-P-CCNC: 24 U/L (ref 14–36)
BASOPHILS # BLD AUTO: 0.05 THOUSAND/UL (ref 0–0.1)
BASOPHILS NFR MAR MANUAL: 2 % (ref 0–1)
BILIRUB SERPL-MCNC: 0.3 MG/DL
BUN SERPL-MCNC: 18 MG/DL (ref 5–25)
CALCIUM SERPL-MCNC: 9.1 MG/DL (ref 8.4–10.2)
CHLORIDE SERPL-SCNC: 105 MMOL/L (ref 97–108)
CO2 SERPL-SCNC: 27 MMOL/L (ref 22–30)
CREAT SERPL-MCNC: 0.9 MG/DL (ref 0.6–1.2)
CRP SERPL QL: 9.3 MG/L
EOSINOPHIL # BLD AUTO: 0.2 THOUSAND/UL (ref 0–0.4)
EOSINOPHIL NFR BLD MANUAL: 8 % (ref 0–6)
ERYTHROCYTE [DISTWIDTH] IN BLOOD BY AUTOMATED COUNT: 16.2 %
ERYTHROCYTE [SEDIMENTATION RATE] IN BLOOD: 44 MM/HOUR (ref 1–20)
GFR SERPL CREATININE-BSD FRML MDRD: 71 ML/MIN/1.73SQ M
GLUCOSE SERPL-MCNC: 109 MG/DL (ref 70–99)
HCT VFR BLD AUTO: 35.9 % (ref 36–46)
HGB BLD-MCNC: 11 G/DL (ref 12–16)
HYPERCHROMIA BLD QL SMEAR: PRESENT
LYMPHOCYTES # BLD AUTO: 0.75 THOUSAND/UL (ref 0.5–4)
LYMPHOCYTES # BLD AUTO: 30 % (ref 25–45)
MCH RBC QN AUTO: 20.9 PG (ref 26–34)
MCHC RBC AUTO-ENTMCNC: 30.7 G/DL (ref 31–36)
MCV RBC AUTO: 68 FL (ref 80–100)
MICROCYTES BLD QL AUTO: PRESENT
MONOCYTES # BLD AUTO: 0.33 THOUSAND/UL (ref 0.2–0.9)
MONOCYTES NFR BLD AUTO: 13 % (ref 1–10)
NEUTS BAND NFR BLD MANUAL: 4 % (ref 0–8)
NEUTS SEG # BLD: 1.13 THOUSAND/UL (ref 1.8–7.8)
NEUTS SEG NFR BLD AUTO: 41 %
OVALOCYTES BLD QL SMEAR: PRESENT
PLATELET # BLD AUTO: 212 THOUSANDS/UL (ref 150–450)
PLATELET BLD QL SMEAR: ADEQUATE
PMV BLD AUTO: 9.9 FL (ref 8.9–12.7)
POIKILOCYTOSIS BLD QL SMEAR: PRESENT
POTASSIUM SERPL-SCNC: 4.1 MMOL/L (ref 3.6–5)
PROT SERPL-MCNC: 6.7 G/DL (ref 5.9–8.4)
RBC # BLD AUTO: 5.3 MILLION/UL (ref 4–5.2)
RBC MORPH BLD: ABNORMAL
SODIUM SERPL-SCNC: 140 MMOL/L (ref 137–147)
TOTAL CELLS COUNTED SPEC: 100
VARIANT LYMPHS # BLD AUTO: 2 % (ref 0–0)
VIT B12 SERPL-MCNC: 857 PG/ML (ref 100–900)
WBC # BLD AUTO: 2.5 THOUSAND/UL (ref 4.5–11)

## 2020-02-19 PROCEDURE — 96367 TX/PROPH/DG ADDL SEQ IV INF: CPT

## 2020-02-19 PROCEDURE — 82607 VITAMIN B-12: CPT

## 2020-02-19 PROCEDURE — 99215 OFFICE O/P EST HI 40 MIN: CPT | Performed by: INTERNAL MEDICINE

## 2020-02-19 PROCEDURE — 1036F TOBACCO NON-USER: CPT | Performed by: INTERNAL MEDICINE

## 2020-02-19 PROCEDURE — 80053 COMPREHEN METABOLIC PANEL: CPT

## 2020-02-19 PROCEDURE — 96413 CHEMO IV INFUSION 1 HR: CPT

## 2020-02-19 PROCEDURE — 3008F BODY MASS INDEX DOCD: CPT | Performed by: INTERNAL MEDICINE

## 2020-02-19 PROCEDURE — 3078F DIAST BP <80 MM HG: CPT | Performed by: INTERNAL MEDICINE

## 2020-02-19 PROCEDURE — 3077F SYST BP >= 140 MM HG: CPT | Performed by: INTERNAL MEDICINE

## 2020-02-19 PROCEDURE — 85652 RBC SED RATE AUTOMATED: CPT

## 2020-02-19 PROCEDURE — 85027 COMPLETE CBC AUTOMATED: CPT

## 2020-02-19 PROCEDURE — 85007 BL SMEAR W/DIFF WBC COUNT: CPT

## 2020-02-19 PROCEDURE — 96415 CHEMO IV INFUSION ADDL HR: CPT

## 2020-02-19 PROCEDURE — 96372 THER/PROPH/DIAG INJ SC/IM: CPT

## 2020-02-19 PROCEDURE — 86140 C-REACTIVE PROTEIN: CPT

## 2020-02-19 RX ORDER — ACETAMINOPHEN 325 MG/1
650 TABLET ORAL ONCE
Status: CANCELLED | OUTPATIENT
Start: 2020-02-19

## 2020-02-19 RX ORDER — ACETAMINOPHEN 325 MG/1
650 TABLET ORAL ONCE
Status: COMPLETED | OUTPATIENT
Start: 2020-02-19 | End: 2020-02-19

## 2020-02-19 RX ORDER — SODIUM CHLORIDE 9 MG/ML
20 INJECTION, SOLUTION INTRAVENOUS ONCE
Status: COMPLETED | OUTPATIENT
Start: 2020-02-19 | End: 2020-02-19

## 2020-02-19 RX ORDER — LEVOFLOXACIN 500 MG/1
500 TABLET, FILM COATED ORAL EVERY 24 HOURS
Qty: 10 TABLET | Refills: 0 | Status: SHIPPED | OUTPATIENT
Start: 2020-02-19 | End: 2020-02-29

## 2020-02-19 RX ORDER — LENALIDOMIDE 15 MG/1
15 CAPSULE ORAL DAILY
Qty: 21 CAPSULE | Refills: 0 | Status: SHIPPED | OUTPATIENT
Start: 2020-02-19 | End: 2020-02-25 | Stop reason: SDUPTHER

## 2020-02-19 RX ORDER — SODIUM CHLORIDE 9 MG/ML
20 INJECTION, SOLUTION INTRAVENOUS ONCE
Status: CANCELLED | OUTPATIENT
Start: 2020-02-19

## 2020-02-19 RX ORDER — CYANOCOBALAMIN 1000 UG/ML
1000 INJECTION INTRAMUSCULAR; SUBCUTANEOUS ONCE
Status: CANCELLED | OUTPATIENT
Start: 2020-03-18

## 2020-02-19 RX ORDER — CYANOCOBALAMIN 1000 UG/ML
1000 INJECTION INTRAMUSCULAR; SUBCUTANEOUS ONCE
Status: COMPLETED | OUTPATIENT
Start: 2020-02-19 | End: 2020-02-19

## 2020-02-19 RX ADMIN — RITUXIMAB 750 MG: 10 INJECTION, SOLUTION INTRAVENOUS at 11:17

## 2020-02-19 RX ADMIN — ACETAMINOPHEN 650 MG: 325 TABLET ORAL at 10:09

## 2020-02-19 RX ADMIN — CYANOCOBALAMIN 1000 MCG: 1000 INJECTION INTRAMUSCULAR; SUBCUTANEOUS at 10:17

## 2020-02-19 RX ADMIN — DEXAMETHASONE SODIUM PHOSPHATE 10 MG: 10 INJECTION, SOLUTION INTRAMUSCULAR; INTRAVENOUS at 10:37

## 2020-02-19 RX ADMIN — DIPHENHYDRAMINE HYDROCHLORIDE 50 MG: 50 INJECTION INTRAMUSCULAR; INTRAVENOUS at 10:12

## 2020-02-19 RX ADMIN — SODIUM CHLORIDE 20 ML/HR: 0.9 INJECTION, SOLUTION INTRAVENOUS at 10:00

## 2020-02-19 NOTE — PROGRESS NOTES
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 [facility])  I discussed with the patient the PET-CT scan findings which showed show some significant activities in the retroperitoneal region suggesting recurrence of her follicular lymphoma on the maintenance rituximab  The patient was told that we will add Revlimid to the current treatment at 15 mg once a day for 21 days then 1 week off  Subsequently the Revlimid dose will be increased to 20 mg once a day for 21 days on and 1 week off if she can tolerate the increased  The goal is to keep her on the Revlimid for a total of a year if possible  We will then monitor her closely on regular basis  - CBC and differential; Future  - Comprehensive metabolic panel; Future    2  Cough  Patient seems to have significant productive cough we will give her another course of antibiotic with Levaquin for up to 10 days and monitor her closely  If she continues to have significant productive cough after the completion of the antibiotics, we would consider sending her to the pulmonary team for further evaluation   - levofloxacin (LEVAQUIN) 500 mg tablet; Take 1 tablet (500 mg total) by mouth every 24 hours for 10 days  Dispense: 10 tablet; Refill: 0    3  Epigastric pain  She seems to have significant abdominal pain mainly in the epigastric area on mild palpation on physical examination  I think it would be appropriate to send her to the GI team for the consideration of upper endoscopy  - Ambulatory referral to Gastroenterology; Future        HPI:  Patient came today for a follow-up visit  She is on maintenance rituximab on every 2 months basis  She continues to complain about significant cough even though she was treated with azithromycin and Tessalon Perles    She just had a PET-CT scan on 01/27/2020 which showed:  IMPRESSION:  1  Interval increased nodular hypermetabolism within the retroperitoneum and mesentery, most concerning for tumor recurrence  2   Slight increased prominence of a left upper cervical node is nonspecific, possibly reactive, tumor recurrence also not excluded  This may be reassessed on follow-up exam   Her blood work from this morning showed a white cell count of 2 5 with hemoglobin of 11 0 and platelet count of 692  The patient continues to complain about productive cough with the white to green secretion  She also continues to have abdominal pain intermittently  Oncology History    The patient started to notice significant abdominal pain about 8 months prior to presentation, she then was evaluated in the hospital with a CT scan of the chest abdomen pelvis on the 7th of November   This showed massive lymphadenopathy throughout the abdomen and pelvis with hepatic and massive splenomegaly   She also was found to have bulky supraclavicular, axillary and mediastinal adenopathy  She then had a supra clavicular lymph node excisional biopsy on the 9th of November , the pathology was compatible with Follicular lymphoma, WHO grade 1-2  She then had a bone marrow biopsy on the 20th of November which showed also low grade B-cell lymphoma CD10 positive compromising 63% of the total cells  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 ([address] )    11/7/2018 Initial Diagnosis     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 ([address] )      12/6/2018 -  Chemotherapy     1   rituximab and bendamustine with neulasta support 12/6/2018- 3/13/19 (4 cycles total)  - day 2 bendamustine held with cycle 4  - administered Rituxan only 4/7/19    2   Started maintenance Rituxan every 8 weeks 5/15/19        12/7/2018 Adverse Reaction     C1D2 labs reflective of tumor lysis syndrome, dose of rasburicase given x1 and admitted for close observation/hydration         Interval history:    ROS: Review of Systems   Constitutional: Positive for fatigue  Negative for activity change, appetite change, chills, diaphoresis, fever and unexpected weight change  HENT: Negative for congestion, dental problem, ear discharge, ear pain, facial swelling, hearing loss, mouth sores, nosebleeds, postnasal drip, sore throat, tinnitus and trouble swallowing  Eyes: Negative for discharge, redness, itching and visual disturbance  Respiratory: Positive for cough and shortness of breath  Negative for chest tightness and wheezing  Cardiovascular: Negative for chest pain, palpitations and leg swelling  Gastrointestinal: Negative for abdominal distention, abdominal pain, anal bleeding, blood in stool, constipation, diarrhea, nausea and vomiting  Genitourinary: Negative for difficulty urinating, dysuria, flank pain, frequency, hematuria and urgency  Musculoskeletal: Negative for arthralgias, back pain, gait problem, joint swelling, myalgias and neck pain  Skin: Negative for color change, pallor, rash and wound  Neurological: Positive for dizziness and headaches  Negative for syncope, speech difficulty, weakness, light-headedness and numbness  Hematological: Negative for adenopathy  Does not bruise/bleed easily  Psychiatric/Behavioral: Positive for sleep disturbance  Negative for agitation, behavioral problems and confusion         Past Medical History:   Diagnosis Date    Anemia     iron and B12    Arthritis     Asthma     Cough     Depression     Falls frequently     [facility]upus [facility] [address] [facility]     Lymphoma ([facility] [address] [facility]     [facility]     Migraine     Osteoporosis     Psychiatric disorder     Vertigo        Past Surgical History:   Procedure Laterality Date    CHOLECYSTECTOMY      FL GUIDED CENTRAL VENOUS ACCESS DEVICE INSERTION  11/9/2018    HYSTERECTOMY      LYMPH NODE BIOPSY Left 11/9/2018    Procedure: EXCISION BIOPSY LYMPH NODE SUPRACLAVICULAR;  Surgeon: Singh Lopez MD;  Location: [address] [place] [address] MAIN OR;  Service: General    NH INCISE FINGER TENDON SHEATH Right 2020    Procedure: RELEASE TRIGGER FINGER RIGHT THUMB;  Surgeon: Jeremy Sykes MD;  Location: [facility] MAIN OR;  Service: Orthopedics    TUNNELED VENOUS PORT PLACEMENT N/A 2018    Procedure: INSERTION OF PORT-A-CATH;  Surgeon: Robin Whyte MD;  Location: [facility] MAIN OR;  Service: General       Social History     Socioeconomic History    Marital status: Single     Spouse name: None    Number of children: None    Years of education: None    Highest education level: None   Occupational History    None   Social Needs    Financial resource strain: None    Food insecurity:     Worry: None     Inability: None    Transportation needs:     Medical: None     Non-medical: None   Tobacco Use    Smoking status: Former Smoker     Last attempt to quit: 2017     Years since quittin 6    Smokeless tobacco: Never Used   Substance and Sexual Activity    Alcohol use: Never     Frequency: Never    Drug use: Never    Sexual activity: None   Lifestyle    Physical activity:     Days per week: None     Minutes per session: None    Stress: None   Relationships    Social connections:     Talks on phone: None     Gets together: None     Attends Buddhist service: None     Active member of club or organization: None     Attends meetings of clubs or organizations: None     Relationship status: None    Intimate partner violence:     Fear of current or ex partner: None     Emotionally abused: None     Physically abused: None     Forced sexual activity: None   Other Topics Concern    None   Social History Narrative    None       Family History   Problem Relation Age of Onset    Cancer Mother     Diabetes Mother     Cancer Father     Diabetes Father        Allergies   Allergen Reactions    Penicillins Anaphylaxis         Current Outpatient Medications:     acetaminophen (TYLENOL) 500 mg tablet, May take 1 tablet (500 mg total) by mouth every 8 (eight) hours as needed for mild pain or moderate pain   May also take 1 tablet (500 mg total) every 8 (eight) hours as needed for mild pain or moderate pain , Disp: 30 tablet, Rfl: 0    al mag oxide-diphenhydramine-lidocaine viscous (MAGIC MOUTHWASH) 1:1:1 suspension, Swish and spit 10 mL every 4 (four) hours as needed for mouth pain or discomfort, Disp: 180 mL, Rfl: 0    amLODIPine (NORVASC) 5 mg tablet, Take 1 tablet (5 mg total) by mouth daily, Disp: 30 tablet, Rfl: 0    benzonatate (TESSALON) 200 MG capsule, Take 1 capsule (200 mg total) by mouth 3 (three) times a day as needed for cough, Disp: 20 capsule, Rfl: 1    carbamide peroxide (DEBROX) 6 5 % otic solution, Administer 5 drops to the right ear 2 (two) times a day, Disp: 15 mL, Rfl: 0    cyanocobalamin 1000 MCG tablet, Take 1 tablet (1,000 mcg total) by mouth daily, Disp: 90 tablet, Rfl: 3    meclizine (ANTIVERT) 12 5 MG tablet, Take 1 tablet (12 5 mg total) by mouth every 8 (eight) hours as needed for dizziness, Disp: 30 tablet, Rfl: 3    omeprazole (PriLOSEC) 20 mg delayed release capsule, Take 1 capsule (20 mg total) by mouth daily To prevent stomach upset, Disp: 30 capsule, Rfl: 5    ondansetron (ZOFRAN) 4 mg tablet, Take 1 tablet (4 mg total) by mouth every 8 (eight) hours as needed for nausea or vomiting, Disp: 20 tablet, Rfl: 0    oxyCODONE (ROXICODONE) 10 MG TABS, Take 1 tablet (10 mg total) by mouth every 4 (four) hours as needed for moderate pain or severe painMax Daily Amount: 60 mg, Disp: 180 tablet, Rfl: 0    polyethylene glycol (MIRALAX) 17 g packet, Take 17 g by mouth daily, Disp: 30 each, Rfl: 3    senna (SENOKOT) 8 6 mg, Take 1 tablet (8 6 mg total) by mouth 2 (two) times a day, Disp: 60 each, Rfl: 3    levofloxacin (LEVAQUIN) 500 mg tablet, Take 1 tablet (500 mg total) by mouth every 24 hours for 10 days, Disp: 10 tablet, Rfl: 0      Physical Exam:  /92 (BP Location: Left arm, Cuff Size: Adult)   Pulse 67   Temp 97 8 °F (36 6 °C) (Tympanic)   Resp 18   Ht 5' 6" (1 676 m)   Wt 95 5 kg (210 lb 9 6 oz)   SpO2 98%   BMI 33 99 kg/m²     Physical Exam   Constitutional: She is oriented to person, place, and time  She appears well-developed and well-nourished  No distress  HENT:   Head: Normocephalic and atraumatic  Nose: Nose normal    Mouth/Throat: Oropharynx is clear and moist    Eyes: Pupils are equal, round, and reactive to light  Conjunctivae and EOM are normal  Right eye exhibits no discharge  Left eye exhibits no discharge  No scleral icterus  Neck: Normal range of motion  Neck supple  No JVD present  No tracheal deviation present  No thyromegaly present  Cardiovascular: Normal rate, regular rhythm and normal heart sounds  Exam reveals no friction rub  No murmur heard  Pulmonary/Chest: Effort normal and breath sounds normal  No stridor  No respiratory distress  She has no wheezes  She has no rales  She exhibits no tenderness  Abdominal: Soft  Bowel sounds are normal  She exhibits no distension and no mass  There is no hepatosplenomegaly, splenomegaly or hepatomegaly  There is tenderness (In the epigastric area on mild palpation  )  There is no rebound and no guarding  Musculoskeletal: Normal range of motion  She exhibits no edema, tenderness or deformity  Lymphadenopathy:     She has no cervical adenopathy  Neurological: She is alert and oriented to person, place, and time  She has normal reflexes  No cranial nerve deficit  Coordination normal    Skin: Skin is warm and dry  No rash noted  She is not diaphoretic  No erythema  No pallor  Psychiatric: She has a normal mood and affect   Her behavior is normal  Judgment and thought content normal          Labs:  Lab Results   Component Value Date    WBC 2 50 (L) 02/19/2020    HGB 11 0 (L) 02/19/2020    HCT 35 9 (L) 02/19/2020    MCV 68 (L) 02/19/2020     02/19/2020     Lab Results   Component Value Date    K 4 2 12/26/2019     12/26/2019    CO2 27 12/26/2019    BUN 11 12/26/2019    CREATININE 0 74 12/26/2019    GLUF 94 11/22/2019    CALCIUM 9 5 12/26/2019    AST 20 12/26/2019    ALT 28 12/26/2019    ALKPHOS 91 12/26/2019    EGFR 90 12/26/2019     No results found for: TSH    Patient voiced understanding and agreement in the above discussion  Aware to contact our office with questions/symptoms in the interim

## 2020-02-19 NOTE — PLAN OF CARE
Problem: Potential for Falls  Goal: Patient will remain free of falls  Description  INTERVENTIONS:  - Assess patient frequently for physical needs  -  Identify cognitive and physical deficits and behaviors that affect risk of falls  -  [place] fall precautions as indicated by assessment   - Educate patient/family on patient safety including physical limitations  - Instruct patient to call for assistance with activity based on assessment  - Modify environment to reduce risk of injury  - Consider OT/PT consult to assist with strengthening/mobility  Outcome: Progressing     Problem: Knowledge Deficit  Goal: Patient/family/caregiver demonstrates understanding of disease process, treatment plan, medications, and discharge instructions  Description  Complete learning assessment and assess knowledge base    Interventions:  - Provide teaching at level of understanding  - Provide teaching via preferred learning methods  Outcome: Progressing

## 2020-02-23 NOTE — PROGRESS NOTES
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 ([facility] [address] )    2  Generalized abdominal pain    3  Slow transit constipation    4  Nausea and vomiting, intractability of vomiting not specified, unspecified vomiting type    5  Recurrent major depressive disorder, in partial remission ([facility] [address] )    6  Palliative care patient        Requested Prescriptions     Signed Prescriptions Disp Refills    acetaminophen (TYLENOL) 500 mg tablet 180 tablet 0     Sig: May take 1 tablet (500 mg total) by mouth every 8 (eight) hours as needed for mild pain or moderate pain  May also take 1 tablet (500 mg total) every 8 (eight) hours as needed for mild pain or moderate pain   oxyCODONE (ROXICODONE) 10 MG TABS 180 tablet 0     Sig: Take 1 tablet (10 mg total) by mouth every 4 (four) hours as needed for moderate pain or severe painMax Daily Amount: 60 mg     Medications Discontinued During This Encounter   Medication Reason    acetaminophen (TYLENOL) 500 mg tablet     oxyCODONE (ROXICODONE) 10 MG TABS        Representatives have queried the patient's controlled substance dispensing history in the Prescription Drug Monitoring Program in compliance with regulations before I have prescribed any controlled substances  The prescription history is consistent with prescribed therapy and our practice policies  The visit was spent face to face with Doreen Fu and her son with greater than 50% of the time spent in counseling or coordination of care including discussions of treatment instructions and follow up requirements   All of the patient's questions were answered during this discussion  Return in about 4 weeks (around 3/24/2020), or if symptoms worsen or fail to improve  Subjective:   In attendance at this visit: Doreen Fu and her son   Chief Complaint  Follow up visit for:  symptom management  HPI Armando Villegas is a 64 y o  female with grade 2 follicular lymphoma initially diagnosed in Nov 2018  Tumor lysis syndrome in Dec 2018  Received chemo in early 2019  Started maintenance rituxan q 8 weeks starting May 2019  PET scan in May 2019 demonstrated improvement of adenopathy  She has had elevated LDH which has been stable since July 2019  PET-CT scan 1/27/2020 revealed interval increased nodular hypermetabolism within the retroperitoneum and mesentery, most concerning for tumor recurrence, slight increased prominence of a left upper cervical node is nonspecific, possibly reactive, tumor recurrence also not excluded  Patient follows Oncology/Dr Don Pacheco, last seen 2/19/2020  Per note, given recurrence on maintenance rituximab, revlimid will be added to the regimen 3 weeks on, 1 week off with increased dosing as tolerated and goal of maintaining her on this for 1 year  Translation by son and MA/Lizbet      Patient had surgery for trigger finger 1/16/20  She c/o painful nodule on palm of hand along tendon sheath of 4th digit R hand  Painful to touch, but able to move finger throughout rom  Patient/son instructed orthopedics of this during f/u 2/18/2020  Per note, patient will continue to monitor and f/u  Of note, patient removed her stitches on her own prior to post-op f/u      She has ongoing intermittent, severe abdominal pain and flank pain improved by PRN oxycodone  Patient was increased to oxyIR 10mg q4h last visit and instructed to take 1/2-1 pill q4h prn severe pain  Patient has been taking 1 tablet q4h prn pain, mostly 3-4 tablets/day   We discussed taking xs tylenol as well to assist with pain control   Patient did take and feels that has helped with pain control  Instructed patient/son for patient to take tylenol xs 1-2 tabs q8h for mild to moderate pain and to use the oxyIR for severe pain  Patient asked to keep a daily log of how many oxyIR takes but did not do this since last visit  Patient asked again to keep a daily log of pain medications  Son/patient express understanding and agree with plan  We discussed adding decadron to assist with abdominal pain and discomfort given progression of lymphadenopathy  We will hold until GI eval/poss EGD and rediscuss at next visit  Patient has GI eval scheduled for 3/3/2020      Patient c/o difficulty sleeping, mostly due to pain   We discussed taking tylenol + oxy 1 hour prior to bed to assist with pain control throughout the night and hopefully help with maintaining sleep  This has helped with nighttime pain  Patient PET-CT increased uptake in retroperitoneum and mesentery, but on left of midline  Patient has constipation, but seems to control with this with adjusting her senna tablets  Instructed for patient to take this bid now as patient feels she has constipation  Discussed adjusting senna according to bowel movement, can hold for loose stools or diarrhea      Five Wishes document discussed at prior visits as patient has no advance directive  Patient/son completed and brought in for copy and to scan into chart  Patient wishes for life-support treatment unless in a coma and not expected to wake up or recover, then only if it could help  The following portions of the medical history were reviewed: past medical history, family history, problem list, medication list, and social history  2 sons, 1 in [facility], 1 present for visit/Algenes  Five Wishes - names son/Algenes as [facility]       Current Outpatient Medications:     acetaminophen (TYLENOL) 500 mg tablet, May take 1 tablet (500 mg total) by mouth every 8 (eight) hours as needed for mild pain or moderate pain   May also take 1 tablet (500 mg total) every 8 (eight) hours as needed for mild pain or moderate pain , Disp: 180 tablet, Rfl: 0    al mag oxide-diphenhydramine-lidocaine viscous (MAGIC MOUTHWASH) 1:1:1 suspension, Swish and spit 10 mL every 4 (four) hours as needed for mouth pain or discomfort, Disp: 180 mL, Rfl: 0    amLODIPine (NORVASC) 5 mg tablet, Take 1 tablet (5 mg total) by mouth daily, Disp: 30 tablet, Rfl: 0    benzonatate (TESSALON) 200 MG capsule, Take 1 capsule (200 mg total) by mouth 3 (three) times a day as needed for cough, Disp: 20 capsule, Rfl: 1    carbamide peroxide (DEBROX) 6 5 % otic solution, Administer 5 drops to the right ear 2 (two) times a day, Disp: 15 mL, Rfl: 0    cyanocobalamin 1000 MCG tablet, Take 1 tablet (1,000 mcg total) by mouth daily, Disp: 90 tablet, Rfl: 3    lenalidomide (REVLIMID) 15 MG CAPS, Take 1 capsule (15 mg total) by mouth daily 15 mg daily for 21 days then 7 days rest, Disp: 21 capsule, Rfl: 0    levofloxacin (LEVAQUIN) 500 mg tablet, Take 1 tablet (500 mg total) by mouth every 24 hours for 10 days, Disp: 10 tablet, Rfl: 0    omeprazole (PriLOSEC) 20 mg delayed release capsule, Take 1 capsule (20 mg total) by mouth daily To prevent stomach upset, Disp: 30 capsule, Rfl: 5    ondansetron (ZOFRAN) 4 mg tablet, Take 1 tablet (4 mg total) by mouth every 8 (eight) hours as needed for nausea or vomiting, Disp: 20 tablet, Rfl: 0    oxyCODONE (ROXICODONE) 10 MG TABS, Take 1 tablet (10 mg total) by mouth every 4 (four) hours as needed for moderate pain or severe painMax Daily Amount: 60 mg, Disp: 180 tablet, Rfl: 0    polyethylene glycol (MIRALAX) 17 g packet, Take 17 g by mouth daily, Disp: 30 each, Rfl: 3    senna (SENOKOT) 8 6 mg, Take 1 tablet (8 6 mg total) by mouth 2 (two) times a day, Disp: 60 each, Rfl: 3    meclizine (ANTIVERT) 12 5 MG tablet, Take 1 tablet (12 5 mg total) by mouth every 8 (eight) hours as needed for dizziness, Disp: 30 tablet, Rfl: 3    Review of Systems   HENT: Positive for congestion  Gastrointestinal: Positive for abdominal distention, abdominal pain, constipation and diarrhea  Genitourinary: Positive for flank pain  Musculoskeletal: Positive for back pain     Psychiatric/Behavioral: Positive for sleep disturbance  All other systems negative    Objective:  Vital Signs  /72 (BP Location: Right arm, Cuff Size: Standard)   Pulse 62   Temp 97 8 °F (36 6 °C) (Oral)   Resp 16   Wt 95 6 kg (210 lb 12 8 oz)   SpO2 97%   BMI 35 46 kg/m²    Physical Exam    Constitutional: Appears well-developed and well-nourished  In no acute distress  Faroese speaking  Head/Mouth: Normocephalic and atraumatic  Oropharynx moist, no lesions or sores  Eyes: EOM are normal  No ocular discharge  No scleral icterus  Neck: No visible adenopathy or masses  Respiratory: Effort normal  No stridor  No respiratory distress  Gastrointestinal: No abdominal distension  +tenderness with palpation  Musculoskeletal/Extremities: No edema  No cyanosis  ROM appears normal, no tenderness  Neurological: Alert, oriented and appropriately conversant  Skin: Dry, no diaphoresis  Psychiatric: Displays a normal mood and affect   Behavior, judgement and thought content appear normal

## 2020-02-24 ENCOUNTER — DOCUMENTATION (OUTPATIENT)
Dept: HEMATOLOGY ONCOLOGY | Facility: CLINIC | Age: 60
End: 2020-02-24

## 2020-02-24 NOTE — PROGRESS NOTES
2/19/2020 received notification pt will be starting Revlimid 15 mg q d  X 21 days followed by 7 days off    Pt has [facility]  ID # [id number]  [facility] #[id number]  N #[id number]    [phone]    Completed auth request form and sent it to Dr Esther Stark for review and signature  2/20/2020 received the form back and submitted it     2/24/2020 received approval letter from [facility]  Per the approval letter Guy Ba is valid from 2/22/2020 through 2/22/2021     Called perform rx @ 11:05 ([phone]) per Hieu Angeles, they do not carry Revlimid  I asked what they do when they received the authorization notification from [facility] for the revlimid  She stated that they send it to [facility]  Per our past experience, [facility] is non- par with [facility] and they can't bill them  Notified clinical, [facility], and finance    Requested the rx be sent to [facility]   [address], [place]    Forwarded the pt information there as well      Notified [facility]

## 2020-02-25 ENCOUNTER — TELEPHONE (OUTPATIENT)
Dept: INFUSION CENTER | Facility: HOSPITAL | Age: 60
End: 2020-02-25

## 2020-02-25 ENCOUNTER — TELEPHONE (OUTPATIENT)
Dept: HEMATOLOGY ONCOLOGY | Facility: CLINIC | Age: 60
End: 2020-02-25

## 2020-02-25 ENCOUNTER — OFFICE VISIT (OUTPATIENT)
Dept: PALLIATIVE MEDICINE | Facility: CLINIC | Age: 60
End: 2020-02-25
Payer: COMMERCIAL

## 2020-02-25 VITALS
RESPIRATION RATE: 16 BRPM | SYSTOLIC BLOOD PRESSURE: 130 MMHG | TEMPERATURE: 97.8 F | DIASTOLIC BLOOD PRESSURE: 72 MMHG | HEART RATE: 62 BPM | WEIGHT: 210.8 LBS | BODY MASS INDEX: 35.46 KG/M2 | OXYGEN SATURATION: 97 %

## 2020-02-25 DIAGNOSIS — F33.41 RECURRENT MAJOR DEPRESSIVE DISORDER, IN PARTIAL REMISSION (HCC): ICD-10-CM

## 2020-02-25 DIAGNOSIS — C82.18 GRADE 2 FOLLICULAR LYMPHOMA OF LYMPH NODES OF MULTIPLE REGIONS (HCC): ICD-10-CM

## 2020-02-25 DIAGNOSIS — C82.18 GRADE 2 FOLLICULAR LYMPHOMA OF LYMPH NODES OF MULTIPLE REGIONS (HCC): Primary | ICD-10-CM

## 2020-02-25 DIAGNOSIS — Z51.5 PALLIATIVE CARE PATIENT: ICD-10-CM

## 2020-02-25 DIAGNOSIS — R11.2 NAUSEA AND VOMITING, INTRACTABILITY OF VOMITING NOT SPECIFIED, UNSPECIFIED VOMITING TYPE: ICD-10-CM

## 2020-02-25 DIAGNOSIS — K59.01 SLOW TRANSIT CONSTIPATION: ICD-10-CM

## 2020-02-25 DIAGNOSIS — R10.84 GENERALIZED ABDOMINAL PAIN: ICD-10-CM

## 2020-02-25 PROCEDURE — 1036F TOBACCO NON-USER: CPT | Performed by: SURGERY

## 2020-02-25 PROCEDURE — 3078F DIAST BP <80 MM HG: CPT | Performed by: SURGERY

## 2020-02-25 PROCEDURE — 3075F SYST BP GE 130 - 139MM HG: CPT | Performed by: SURGERY

## 2020-02-25 PROCEDURE — 99214 OFFICE O/P EST MOD 30 MIN: CPT | Performed by: SURGERY

## 2020-02-25 RX ORDER — LENALIDOMIDE 15 MG/1
15 CAPSULE ORAL DAILY
Qty: 21 CAPSULE | Refills: 0 | Status: SHIPPED | OUTPATIENT
Start: 2020-02-25 | End: 2020-05-04 | Stop reason: SDUPTHER

## 2020-02-25 RX ORDER — OXYCODONE HYDROCHLORIDE 10 MG/1
10 TABLET ORAL EVERY 4 HOURS PRN
Qty: 180 TABLET | Refills: 0 | Status: SHIPPED | OUTPATIENT
Start: 2020-02-25 | End: 2020-03-23 | Stop reason: SDUPTHER

## 2020-02-25 RX ORDER — ACETAMINOPHEN 500 MG
TABLET ORAL
Qty: 180 TABLET | Refills: 0 | Status: SHIPPED | OUTPATIENT
Start: 2020-02-25 | End: 2020-03-23 | Stop reason: SDUPTHER

## 2020-02-25 NOTE — TELEPHONE ENCOUNTER
----- Message from Anette Romero, RN sent at 2/25/2020 11:19 AM EST -----  Contact: [phone]  [place],       Would you be able to call Alonso Villafana  To tell her that she left [facility] and did not stop back to Dr Rj Alberts office  He wants her started on Rx Revlimid and I have the med for her to  at [address] I am there on Wed and Fri if she can stop by to  and for some teach,  I left a voice message with her relative on a voice message but she speaks limited [place]  I am at [place] [facility] today at [phone]   Thanks, Ellie Souza

## 2020-02-25 NOTE — PATIENT INSTRUCTIONS
PRESCRIPTION REFILL REMINDER:  All medication refills should be requested prior to [facility] on Friday  Any refill requests after noon on Friday would be addressed the following Monday  Continue tylenol 500mg, 1-2 tablets every 8 hours as needed for mild-moderate pain  Do not exceed 3000mg daily  Continue oxycodone 10mg, take 1 tablet every 4 hours as needed for moderate to severe pain  Keep a log of how many you take daily  Increase senna 8 6mg to twice a day  Can hold if develop loose stools or diarrhea

## 2020-02-25 NOTE — TELEPHONE ENCOUNTER
Spoke with patient and she will go in tomorrow 2 26 20 to  the Revlimid & for the teach  She said her son is going with her

## 2020-02-25 NOTE — TELEPHONE ENCOUNTER
Phoned [employer] and they removed flag, patient still has to do her survey, will inform patient tomorrow

## 2020-02-26 DIAGNOSIS — C82.18 GRADE 2 FOLLICULAR LYMPHOMA OF LYMPH NODES OF MULTIPLE REGIONS (HCC): Primary | ICD-10-CM

## 2020-02-26 NOTE — PROGRESS NOTES
Patient education given on Rx Revlimid 15 mg in [place] and [place] used [facility]  [place] #[id number]  Patient present and engaged  Discussed mechanism of action, administration, supportive care measures, and possible adverse effects   Patient provided with educational brochureon  medication  Patient instructed to hydrate herself well (minimum 2 lt per day excluding caffeineated beverages) call if she develops significant adverse effects, temperature over 100 4° F, or with any further questions or concerns  Patient is aware of how to contact provider/nurse during and after office hours  Patient's questions answered to her satisfaction and the patient expresses understanding and acceptance of instructions and treatment  Patient wants to start Revlimid on Friday as her son is going to [facility] and she states she will be depressed  Patient aware that she takes 15 mg daily for 21 days and then is off 7 days  Patient is menopausal and states she is not aware of having a hysterectomy  Chemo consent signed, patient instructed to have labs drawn Q week  And was given her F/U appt

## 2020-03-17 ENCOUNTER — DOCUMENTATION (OUTPATIENT)
Dept: HEMATOLOGY ONCOLOGY | Facility: CLINIC | Age: 60
End: 2020-03-17

## 2020-03-17 ENCOUNTER — TELEPHONE (OUTPATIENT)
Dept: HEMATOLOGY ONCOLOGY | Facility: CLINIC | Age: 60
End: 2020-03-17

## 2020-03-17 NOTE — TELEPHONE ENCOUNTER
I called the patient's   I reviewed the pre appt questions for the COVID-19  The  will contact the patient and go over the questions  If she has any concerns, they will call the office

## 2020-03-18 ENCOUNTER — OFFICE VISIT (OUTPATIENT)
Dept: HEMATOLOGY ONCOLOGY | Facility: CLINIC | Age: 60
End: 2020-03-18
Payer: COMMERCIAL

## 2020-03-18 ENCOUNTER — HOSPITAL ENCOUNTER (OUTPATIENT)
Dept: INFUSION CENTER | Facility: HOSPITAL | Age: 60
Discharge: HOME | End: 2020-03-18
Attending: INTERNAL MEDICINE
Payer: COMMERCIAL

## 2020-03-18 ENCOUNTER — APPOINTMENT (OUTPATIENT)
Dept: LAB | Facility: HOSPITAL | Age: 60
End: 2020-03-18
Payer: COMMERCIAL

## 2020-03-18 VITALS
BODY MASS INDEX: 34.85 KG/M2 | OXYGEN SATURATION: 98 % | TEMPERATURE: 98.4 F | RESPIRATION RATE: 16 BRPM | HEIGHT: 65 IN | HEART RATE: 55 BPM | WEIGHT: 209.2 LBS | DIASTOLIC BLOOD PRESSURE: 74 MMHG | SYSTOLIC BLOOD PRESSURE: 128 MMHG

## 2020-03-18 DIAGNOSIS — C82.18 GRADE 2 FOLLICULAR LYMPHOMA OF LYMPH NODES OF MULTIPLE REGIONS (HCC): Primary | ICD-10-CM

## 2020-03-18 DIAGNOSIS — D51.8 OTHER VITAMIN B12 DEFICIENCY ANEMIAS: Primary | ICD-10-CM

## 2020-03-18 DIAGNOSIS — D51.8 OTHER VITAMIN B12 DEFICIENCY ANEMIAS: ICD-10-CM

## 2020-03-18 DIAGNOSIS — C82.18 GRADE 2 FOLLICULAR LYMPHOMA OF LYMPH NODES OF MULTIPLE REGIONS (HCC): ICD-10-CM

## 2020-03-18 LAB
ALBUMIN SERPL BCP-MCNC: 3.8 G/DL (ref 3–5.2)
ALP SERPL-CCNC: 108 U/L (ref 43–122)
ALT SERPL W P-5'-P-CCNC: 32 U/L (ref 9–52)
ANION GAP SERPL CALCULATED.3IONS-SCNC: 8 MMOL/L (ref 5–14)
AST SERPL W P-5'-P-CCNC: 22 U/L (ref 14–36)
BASOPHILS # BLD AUTO: 0.02 THOUSAND/UL (ref 0–0.1)
BASOPHILS NFR MAR MANUAL: 1 % (ref 0–1)
BILIRUB SERPL-MCNC: 0.3 MG/DL
BUN SERPL-MCNC: 14 MG/DL (ref 5–25)
CALCIUM SERPL-MCNC: 9.1 MG/DL (ref 8.4–10.2)
CHLORIDE SERPL-SCNC: 104 MMOL/L (ref 97–108)
CO2 SERPL-SCNC: 28 MMOL/L (ref 22–30)
CREAT SERPL-MCNC: 0.65 MG/DL (ref 0.6–1.2)
EOSINOPHIL # BLD AUTO: 0.32 THOUSAND/UL (ref 0–0.4)
EOSINOPHIL NFR BLD MANUAL: 20 % (ref 0–6)
ERYTHROCYTE [DISTWIDTH] IN BLOOD BY AUTOMATED COUNT: 16.9 %
GFR SERPL CREATININE-BSD FRML MDRD: 99 ML/MIN/1.73SQ M
GLUCOSE P FAST SERPL-MCNC: 103 MG/DL (ref 70–99)
HCT VFR BLD AUTO: 34.1 % (ref 36–46)
HGB BLD-MCNC: 10.6 G/DL (ref 12–16)
LYMPHOCYTES # BLD AUTO: 0.48 THOUSAND/UL (ref 0.5–4)
LYMPHOCYTES # BLD AUTO: 30 % (ref 25–45)
MCH RBC QN AUTO: 21 PG (ref 26–34)
MCHC RBC AUTO-ENTMCNC: 31.2 G/DL (ref 31–36)
MCV RBC AUTO: 67 FL (ref 80–100)
MICROCYTES BLD QL AUTO: PRESENT
MONOCYTES # BLD AUTO: 0.13 THOUSAND/UL (ref 0.2–0.9)
MONOCYTES NFR BLD AUTO: 8 % (ref 1–10)
NEUTS BAND NFR BLD MANUAL: 4 % (ref 0–8)
NEUTS SEG # BLD: 0.66 THOUSAND/UL (ref 1.8–7.8)
NEUTS SEG NFR BLD AUTO: 37 %
OVALOCYTES BLD QL SMEAR: PRESENT
PLATELET # BLD AUTO: 308 THOUSANDS/UL (ref 150–450)
PLATELET BLD QL SMEAR: ADEQUATE
PMV BLD AUTO: 9.9 FL (ref 8.9–12.7)
POIKILOCYTOSIS BLD QL SMEAR: PRESENT
POTASSIUM SERPL-SCNC: 4.3 MMOL/L (ref 3.6–5)
PROT SERPL-MCNC: 6.8 G/DL (ref 5.9–8.4)
RBC # BLD AUTO: 5.07 MILLION/UL (ref 4–5.2)
RBC MORPH BLD: ABNORMAL
SCHISTOCYTES BLD QL SMEAR: PRESENT
SODIUM SERPL-SCNC: 140 MMOL/L (ref 137–147)
TOTAL CELLS COUNTED SPEC: 100
WBC # BLD AUTO: 1.6 THOUSAND/UL (ref 4.5–11)

## 2020-03-18 PROCEDURE — 85027 COMPLETE CBC AUTOMATED: CPT

## 2020-03-18 PROCEDURE — 36415 COLL VENOUS BLD VENIPUNCTURE: CPT

## 2020-03-18 PROCEDURE — 3008F BODY MASS INDEX DOCD: CPT | Performed by: NURSE PRACTITIONER

## 2020-03-18 PROCEDURE — 80053 COMPREHEN METABOLIC PANEL: CPT

## 2020-03-18 PROCEDURE — 85007 BL SMEAR W/DIFF WBC COUNT: CPT

## 2020-03-18 PROCEDURE — 96372 THER/PROPH/DIAG INJ SC/IM: CPT

## 2020-03-18 PROCEDURE — 3074F SYST BP LT 130 MM HG: CPT | Performed by: NURSE PRACTITIONER

## 2020-03-18 PROCEDURE — 3078F DIAST BP <80 MM HG: CPT | Performed by: NURSE PRACTITIONER

## 2020-03-18 PROCEDURE — 99214 OFFICE O/P EST MOD 30 MIN: CPT | Performed by: NURSE PRACTITIONER

## 2020-03-18 PROCEDURE — 1036F TOBACCO NON-USER: CPT | Performed by: NURSE PRACTITIONER

## 2020-03-18 RX ORDER — ASPIRIN 81 MG/1
81 TABLET ORAL DAILY
Qty: 30 TABLET | Refills: 11 | Status: SHIPPED | OUTPATIENT
Start: 2020-03-18

## 2020-03-18 RX ORDER — CYANOCOBALAMIN 1000 UG/ML
1000 INJECTION INTRAMUSCULAR; SUBCUTANEOUS ONCE
Status: CANCELLED | OUTPATIENT
Start: 2020-04-15

## 2020-03-18 RX ORDER — CYANOCOBALAMIN 1000 UG/ML
1000 INJECTION INTRAMUSCULAR; SUBCUTANEOUS ONCE
Status: COMPLETED | OUTPATIENT
Start: 2020-03-18 | End: 2020-03-18

## 2020-03-18 RX ADMIN — CYANOCOBALAMIN 1000 MCG: 1000 INJECTION, SOLUTION INTRAMUSCULAR; SUBCUTANEOUS at 11:32

## 2020-03-18 NOTE — PROGRESS NOTES
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 [facility])  Patient will be continued on her maintenance rituximab along with the Revlimid 15 mg daily 21 days on with 7 days of a break which was added earlier this month  She will be due for rituximab again next month  She seems to be in her 3rd week of her 1st cycle of Revlimid  which she is tolerating fairly well with the exception of some increased fatigue and increased headaches  Patient is not currently taking a baby aspirin for thrombosis prophylaxis  I did educate her about the increased risk of clotting and recommended that she take a baby aspirin daily with food to decrease her risk of thrombolic events; script was sent to her local pharmacy for compliance sake  I also asked patient to go to the lab today immediately after the visit to have repeat CBC and CMP since she has not had any recent labs since starting Revlimid  We will review and address the results once they become available  I have also asked her to get a weekly CBC done until further notice for close monitoring of cytopenias on the new treatment  She will be back for follow-up in about 2 weeks for close monitoring on her new treatment  We will make a decision in the near future if she will continue Revlimid 15 mg verses increasing the dose to 20 mg as per Dr Arturo Maki  The patient states that she is tentatively planning to travel to [facility] at the end of April 2020  I advised her against making her plans at this time until we know for sure she is stable on her new treatment  - CBC and differential; Future  - Comprehensive metabolic panel; Future  - C-reactive protein; Future  - Sedimentation rate, automated; Future  - LD,Blood;  Future  - Vitamin B12; Future  - CBC and differential; Standing  - TSH, 3rd generation with Free T4 reflex; Future  - aspirin (ECOTRIN LOW STRENGTH) 81 mg EC tablet; Take 1 tablet (81 mg total) by mouth daily  Dispense: 30 tablet; Refill: 11    2  Other vitamin B12 deficiency anemias  Patient will be continued on her monthly vitamin B12 injections which seem to be maintaining her levels  She is due for 1 today after the visit  HPI:  Patient presents today for a follow-up visit, she is Trinidadian-speaking translation done via [employer] system #[id number]  Patient was started on Revlimid 15 mg recently after she was found to have progression on her PET-CT scan  She is not entirely sure what day she started the Revlimid 15 mg but it seems like she is on her 3rd week of treatment at present as she reports she has less than 7 pills left in her bottle  The patient states that since starting Revlimid she has been having more drowsiness and pounding headaches (she does have a history of chronic migraines), otherwise no new complaints  Continues to report decreased appetite, chronic cough, insomnia and abdominal pain  She follows with the palliative care team on a regular basis for her pain and symptom management; is currently using Tylenol and oxycodone for her abdominal pain  The patient did not have any laboratory studies done prior to the visit today  Oncology History    The patient started to notice significant abdominal pain about 8 months prior to presentation, she then was evaluated in the hospital with a CT scan of the chest abdomen pelvis on the 7th of November   This showed massive lymphadenopathy throughout the abdomen and pelvis with hepatic and massive splenomegaly   She also was found to have bulky supraclavicular, axillary and mediastinal adenopathy  She then had a supra clavicular lymph node excisional biopsy on the 9th of November , the pathology was compatible with Follicular lymphoma, WHO grade 1-2    She then had a bone marrow biopsy on the 20th of November which showed also low grade B-cell lymphoma CD10 positive compromising 63% of the total cells  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 ([facility] [address] )    11/7/2018 Initial Diagnosis     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 ([facility] [address] )      12/6/2018 -  Chemotherapy     1   rituximab and bendamustine with neulasta support 12/6/2018- 3/13/19 (4 cycles total)  - day 2 bendamustine held with cycle 4  - administered Rituxan only 4/7/19    2  Started maintenance Rituxan every 8 weeks 5/15/19        12/7/2018 Adverse Reaction     C1D2 labs reflective of tumor lysis syndrome, dose of rasburicase given x1 and admitted for close observation/hydration           ROS: Review of Systems   Constitutional: Positive for appetite change and fatigue  Negative for activity change, chills, fever and unexpected weight change  HENT: Positive for hearing loss  Negative for congestion, mouth sores, nosebleeds, sore throat and trouble swallowing  Eyes: Negative  Respiratory: Positive for cough  Negative for chest tightness and shortness of breath  Cardiovascular: Negative for chest pain, palpitations and leg swelling  Gastrointestinal: Positive for abdominal pain  Negative for abdominal distention, blood in stool, constipation, diarrhea, nausea and vomiting  Genitourinary: Negative for difficulty urinating, dysuria, frequency, hematuria and urgency  Musculoskeletal: Positive for arthralgias (right knee)  Negative for back pain, gait problem, joint swelling and myalgias  Skin: Positive for rash  Negative for color change and pallor  Neurological: Positive for dizziness (mild), numbness (And tingling mild both hands) and headaches  Negative for weakness and light-headedness  Hematological: Negative for adenopathy  Does not bruise/bleed easily  Psychiatric/Behavioral: Positive for sleep disturbance  Negative for dysphoric mood  The patient is not nervous/anxious          Past Medical History: Diagnosis Date    Anemia     iron and B12    Arthritis     Asthma     Cough     Depression     Falls frequently     Lupus ([facility] [address] )     Lymphoma ([facility] [address] )     Lymphoma ([facility] [address] [facility]     Migraine     Osteoporosis     Psychiatric disorder     Vertigo        Past Surgical History:   Procedure Laterality Date    CHOLECYSTECTOMY      FL GUIDED CENTRAL VENOUS ACCESS DEVICE INSERTION  2018    HYSTERECTOMY      LYMPH NODE BIOPSY Left 2018    Procedure: EXCISION BIOPSY LYMPH NODE SUPRACLAVICULAR;  Surgeon: Felix Guadalupe MD;  Location: [address] [place] [address];  Service: General    MT INCISE FINGER TENDON SHEATH Right 2020    Procedure: RELEASE TRIGGER FINGER RIGHT THUMB;  Surgeon: Terry Harding MD;  Location: [address] [place] [address] OR;  Service: Orthopedics    TUNNELED VENOUS PORT PLACEMENT N/A 2018    Procedure: INSERTION OF PORT-A-CATH;  Surgeon: Felix Guadalupe MD;  Location: [address] [place] [address];  Service: General       Social History     Socioeconomic History    Marital status: Single     Spouse name: None    Number of children: None    Years of education: None    Highest education level: None   Occupational History    None   Social Needs    Financial resource strain: None    Food insecurity:     Worry: None     Inability: None    Transportation needs:     Medical: None     Non-medical: None   Tobacco Use    Smoking status: Former Smoker     Last attempt to quit: 2017     Years since quittin 7    Smokeless tobacco: Never Used   Substance and Sexual Activity    Alcohol use: Never     Frequency: Never    Drug use: Never    Sexual activity: None   Lifestyle    Physical activity:     Days per week: None     Minutes per session: None    Stress: None   Relationships    Social connections:     Talks on phone: None     Gets together: None     Attends [employer] service: None     Active member of club or organization: None     Attends meetings of clubs or organizations: None     Relationship status: None    Intimate partner violence:     Fear of current or ex partner: None     Emotionally abused: None     Physically abused: None     Forced sexual activity: None   Other Topics Concern    None   Social History Narrative    None       Family History   Problem Relation Age of Onset    Cancer Mother     Diabetes Mother     Cancer Father     Diabetes Father        Allergies   Allergen Reactions    Penicillins Anaphylaxis         Current Outpatient Medications:     acetaminophen (TYLENOL) 500 mg tablet, May take 1 tablet (500 mg total) by mouth every 8 (eight) hours as needed for mild pain or moderate pain   May also take 1 tablet (500 mg total) every 8 (eight) hours as needed for mild pain or moderate pain , Disp: 180 tablet, Rfl: 0    al mag oxide-diphenhydramine-lidocaine viscous (MAGIC MOUTHWASH) 1:1:1 suspension, Swish and spit 10 mL every 4 (four) hours as needed for mouth pain or discomfort, Disp: 180 mL, Rfl: 0    amLODIPine (NORVASC) 5 mg tablet, Take 1 tablet (5 mg total) by mouth daily, Disp: 30 tablet, Rfl: 0    benzonatate (TESSALON) 200 MG capsule, Take 1 capsule (200 mg total) by mouth 3 (three) times a day as needed for cough, Disp: 20 capsule, Rfl: 1    carbamide peroxide (DEBROX) 6 5 % otic solution, Administer 5 drops to the right ear 2 (two) times a day, Disp: 15 mL, Rfl: 0    cyanocobalamin 1000 MCG tablet, Take 1 tablet (1,000 mcg total) by mouth daily, Disp: 90 tablet, Rfl: 3    lenalidomide (REVLIMID) 15 MG CAPS, Take 1 capsule (15 mg total) by mouth daily 15 mg daily for 21 days then 7 days rest, Disp: 21 capsule, Rfl: 0    meclizine (ANTIVERT) 12 5 MG tablet, Take 1 tablet (12 5 mg total) by mouth every 8 (eight) hours as needed for dizziness, Disp: 30 tablet, Rfl: 3    omeprazole (PriLOSEC) 20 mg delayed release capsule, Take 1 capsule (20 mg total) by mouth daily To prevent stomach upset, Disp: 30 capsule, Rfl: 5    ondansetron (ZOFRAN) 4 mg tablet, Take 1 tablet (4 mg total) by mouth every 8 (eight) hours as needed for nausea or vomiting, Disp: 20 tablet, Rfl: 0    oxyCODONE (ROXICODONE) 10 MG TABS, Take 1 tablet (10 mg total) by mouth every 4 (four) hours as needed for moderate pain or severe painMax Daily Amount: 60 mg, Disp: 180 tablet, Rfl: 0    polyethylene glycol (MIRALAX) 17 g packet, Take 17 g by mouth daily, Disp: 30 each, Rfl: 3    senna (SENOKOT) 8 6 mg, Take 1 tablet (8 6 mg total) by mouth 2 (two) times a day, Disp: 60 each, Rfl: 3    aspirin (ECOTRIN LOW STRENGTH) 81 mg EC tablet, Take 1 tablet (81 mg total) by mouth daily, Disp: 30 tablet, Rfl: 11  No current facility-administered medications for this visit  Physical Exam:  /74 (BP Location: Left arm, Patient Position: Sitting, Cuff Size: Adult)   Pulse 55   Temp 98 4 °F (36 9 °C) (Tympanic)   Resp 16   Ht 5' 4 65" (1 642 m)   Wt 94 9 kg (209 lb 3 2 oz)   SpO2 98%   BMI 35 19 kg/m²     Physical Exam   Constitutional: She is oriented to person, place, and time  She appears well-developed and well-nourished  No distress  HENT:   Head: Normocephalic and atraumatic  Mouth/Throat: Oropharynx is clear and moist  No oropharyngeal exudate  Eyes: Pupils are equal, round, and reactive to light  Conjunctivae are normal  No scleral icterus  Neck: Normal range of motion  Neck supple  No thyromegaly present  Cardiovascular: Normal rate, regular rhythm, normal heart sounds and intact distal pulses  No murmur heard  Pulmonary/Chest: Effort normal and breath sounds normal  No respiratory distress  Abdominal: Soft  Bowel sounds are normal  She exhibits no distension  There is no hepatosplenomegaly  There is tenderness in the epigastric area and left upper quadrant  Musculoskeletal: Normal range of motion  She exhibits no edema  Lymphadenopathy:     She has no cervical adenopathy  She has no axillary adenopathy  Neurological: She is alert and oriented to person, place, and time  Skin: Skin is warm and dry  No rash noted  She is not diaphoretic  No erythema  No pallor  Psychiatric: Her behavior is normal  Judgment and thought content normal  Her affect is blunt  She exhibits a depressed mood  Vitals reviewed  Labs:  Lab Results   Component Value Date    WBC 2 50 (L) 02/19/2020    HGB 11 0 (L) 02/19/2020    HCT 35 9 (L) 02/19/2020    MCV 68 (L) 02/19/2020     02/19/2020     Lab Results   Component Value Date    K 4 1 02/19/2020     02/19/2020    CO2 27 02/19/2020    BUN 18 02/19/2020    CREATININE 0 90 02/19/2020    GLUF 94 11/22/2019    CALCIUM 9 1 02/19/2020    AST 24 02/19/2020    ALT 36 02/19/2020    ALKPHOS 109 02/19/2020    EGFR 71 02/19/2020         Patient voiced understanding and agreement in the above discussion  Aware to contact our office with questions/symptoms in the interim  This note has been generated by voice recognition software system  Therefore, there may be spelling, grammar, and or syntax errors  Please contact if questions arise

## 2020-03-23 DIAGNOSIS — C82.18 GRADE 2 FOLLICULAR LYMPHOMA OF LYMPH NODES OF MULTIPLE REGIONS (HCC): ICD-10-CM

## 2020-03-23 DIAGNOSIS — Z51.5 PALLIATIVE CARE PATIENT: ICD-10-CM

## 2020-03-23 DIAGNOSIS — R10.84 GENERALIZED ABDOMINAL PAIN: ICD-10-CM

## 2020-03-23 RX ORDER — ACETAMINOPHEN 500 MG
TABLET ORAL
Qty: 180 TABLET | Refills: 0 | Status: SHIPPED | OUTPATIENT
Start: 2020-03-23 | End: 2020-04-21 | Stop reason: SDUPTHER

## 2020-03-23 RX ORDER — OXYCODONE HYDROCHLORIDE 10 MG/1
10 TABLET ORAL EVERY 4 HOURS PRN
Qty: 180 TABLET | Refills: 0 | Status: SHIPPED | OUTPATIENT
Start: 2020-03-23 | End: 2020-04-21 | Stop reason: SDUPTHER

## 2020-03-23 NOTE — TELEPHONE ENCOUNTER
This MA reached out to patient who only a Japanese speaking patient to confirm appointment for tomorrow with Palliative Care  Pt stated pain is being manage correctly, does not want to come in to the office due to the COVID-19, patient stated " I will like to stay home as much as possible because of my low Immune system due to my cancer"  per patient will like to get the refill sent to the pharmacy

## 2020-03-25 ENCOUNTER — OFFICE VISIT (OUTPATIENT)
Dept: GASTROENTEROLOGY | Facility: MEDICAL CENTER | Age: 60
End: 2020-03-25

## 2020-03-25 VITALS
BODY MASS INDEX: 34.24 KG/M2 | SYSTOLIC BLOOD PRESSURE: 158 MMHG | DIASTOLIC BLOOD PRESSURE: 80 MMHG | HEIGHT: 65 IN | TEMPERATURE: 97.5 F | WEIGHT: 205.5 LBS

## 2020-03-25 DIAGNOSIS — R10.33 PERIUMBILICAL ABDOMINAL PAIN: ICD-10-CM

## 2020-03-25 DIAGNOSIS — C82.18 GRADE 2 FOLLICULAR LYMPHOMA OF LYMPH NODES OF MULTIPLE REGIONS (HCC): ICD-10-CM

## 2020-03-25 PROCEDURE — 99244 OFF/OP CNSLTJ NEW/EST MOD 40: CPT | Performed by: INTERNAL MEDICINE

## 2020-03-25 RX ORDER — OMEPRAZOLE 20 MG/1
20 CAPSULE, DELAYED RELEASE ORAL DAILY
Qty: 30 CAPSULE | Refills: 5 | Status: SHIPPED | OUTPATIENT
Start: 2020-03-25 | End: 2020-03-25 | Stop reason: SDUPTHER

## 2020-03-25 RX ORDER — OMEPRAZOLE 20 MG/1
40 CAPSULE, DELAYED RELEASE ORAL DAILY
Qty: 60 CAPSULE | Refills: 5 | Status: SHIPPED | OUTPATIENT
Start: 2020-03-25

## 2020-03-31 ENCOUNTER — TELEPHONE (OUTPATIENT)
Dept: HEMATOLOGY ONCOLOGY | Facility: CLINIC | Age: 60
End: 2020-03-31

## 2020-03-31 ENCOUNTER — DOCUMENTATION (OUTPATIENT)
Dept: HEMATOLOGY ONCOLOGY | Facility: CLINIC | Age: 60
End: 2020-03-31

## 2020-03-31 NOTE — TELEPHONE ENCOUNTER
I called PT and LM voicemail asking her to get blood work before her appt on 4/1/20 and also to call the office so we can ask her the Covid-19 questions

## 2020-04-01 ENCOUNTER — OFFICE VISIT (OUTPATIENT)
Dept: HEMATOLOGY ONCOLOGY | Facility: CLINIC | Age: 60
End: 2020-04-01
Payer: COMMERCIAL

## 2020-04-01 VITALS
HEIGHT: 65 IN | WEIGHT: 201.2 LBS | HEART RATE: 72 BPM | OXYGEN SATURATION: 98 % | RESPIRATION RATE: 16 BRPM | SYSTOLIC BLOOD PRESSURE: 134 MMHG | BODY MASS INDEX: 33.52 KG/M2 | DIASTOLIC BLOOD PRESSURE: 84 MMHG | TEMPERATURE: 98.1 F

## 2020-04-01 DIAGNOSIS — D51.8 OTHER VITAMIN B12 DEFICIENCY ANEMIAS: ICD-10-CM

## 2020-04-01 DIAGNOSIS — T45.1X5A CHEMOTHERAPY-INDUCED NEUTROPENIA (HCC): ICD-10-CM

## 2020-04-01 DIAGNOSIS — C82.18 GRADE 2 FOLLICULAR LYMPHOMA OF LYMPH NODES OF MULTIPLE REGIONS (HCC): Primary | ICD-10-CM

## 2020-04-01 DIAGNOSIS — M79.662 PAIN OF LEFT CALF: ICD-10-CM

## 2020-04-01 DIAGNOSIS — D70.1 CHEMOTHERAPY-INDUCED NEUTROPENIA (HCC): ICD-10-CM

## 2020-04-01 PROCEDURE — 3075F SYST BP GE 130 - 139MM HG: CPT | Performed by: NURSE PRACTITIONER

## 2020-04-01 PROCEDURE — 3008F BODY MASS INDEX DOCD: CPT | Performed by: NURSE PRACTITIONER

## 2020-04-01 PROCEDURE — 99214 OFFICE O/P EST MOD 30 MIN: CPT | Performed by: NURSE PRACTITIONER

## 2020-04-01 PROCEDURE — 3079F DIAST BP 80-89 MM HG: CPT | Performed by: NURSE PRACTITIONER

## 2020-04-01 PROCEDURE — 1036F TOBACCO NON-USER: CPT | Performed by: NURSE PRACTITIONER

## 2020-04-10 RX ORDER — ACETAMINOPHEN 325 MG/1
650 TABLET ORAL ONCE
Status: CANCELLED | OUTPATIENT
Start: 2020-04-15

## 2020-04-10 RX ORDER — SODIUM CHLORIDE 9 MG/ML
20 INJECTION, SOLUTION INTRAVENOUS ONCE
Status: CANCELLED | OUTPATIENT
Start: 2020-04-15

## 2020-04-14 ENCOUNTER — TELEPHONE (OUTPATIENT)
Dept: HEMATOLOGY ONCOLOGY | Facility: CLINIC | Age: 60
End: 2020-04-14

## 2020-04-15 ENCOUNTER — HOSPITAL ENCOUNTER (OUTPATIENT)
Dept: INFUSION CENTER | Facility: HOSPITAL | Age: 60
Discharge: HOME | End: 2020-04-15
Attending: INTERNAL MEDICINE
Payer: COMMERCIAL

## 2020-04-15 ENCOUNTER — OFFICE VISIT (OUTPATIENT)
Dept: HEMATOLOGY ONCOLOGY | Facility: CLINIC | Age: 60
End: 2020-04-15
Payer: COMMERCIAL

## 2020-04-15 VITALS
TEMPERATURE: 97.7 F | SYSTOLIC BLOOD PRESSURE: 136 MMHG | HEART RATE: 52 BPM | RESPIRATION RATE: 18 BRPM | OXYGEN SATURATION: 99 % | WEIGHT: 203 LBS | BODY MASS INDEX: 34.66 KG/M2 | DIASTOLIC BLOOD PRESSURE: 82 MMHG | HEIGHT: 64 IN

## 2020-04-15 VITALS — HEIGHT: 64 IN | BODY MASS INDEX: 34.63 KG/M2 | WEIGHT: 202.82 LBS

## 2020-04-15 DIAGNOSIS — D70.1 CHEMOTHERAPY-INDUCED NEUTROPENIA (HCC): ICD-10-CM

## 2020-04-15 DIAGNOSIS — C82.18 GRADE 2 FOLLICULAR LYMPHOMA OF LYMPH NODES OF MULTIPLE REGIONS (HCC): Primary | ICD-10-CM

## 2020-04-15 DIAGNOSIS — D51.8 OTHER VITAMIN B12 DEFICIENCY ANEMIAS: ICD-10-CM

## 2020-04-15 DIAGNOSIS — Z45.2 ENCOUNTER FOR CENTRAL LINE CARE: ICD-10-CM

## 2020-04-15 DIAGNOSIS — T45.1X5A CHEMOTHERAPY-INDUCED NEUTROPENIA (HCC): ICD-10-CM

## 2020-04-15 LAB
ALBUMIN SERPL BCP-MCNC: 4 G/DL (ref 3–5.2)
ALP SERPL-CCNC: 84 U/L (ref 43–122)
ALT SERPL W P-5'-P-CCNC: 18 U/L (ref 9–52)
ANION GAP SERPL CALCULATED.3IONS-SCNC: 7 MMOL/L (ref 5–14)
ANISOCYTOSIS BLD QL SMEAR: PRESENT
AST SERPL W P-5'-P-CCNC: 17 U/L (ref 14–36)
BASOPHILS # BLD AUTO: 0.05 THOUSAND/UL (ref 0–0.1)
BASOPHILS NFR MAR MANUAL: 2 % (ref 0–1)
BILIRUB SERPL-MCNC: 0.3 MG/DL
BUN SERPL-MCNC: 13 MG/DL (ref 5–25)
CALCIUM SERPL-MCNC: 9.1 MG/DL (ref 8.4–10.2)
CHLORIDE SERPL-SCNC: 104 MMOL/L (ref 97–108)
CO2 SERPL-SCNC: 29 MMOL/L (ref 22–30)
CREAT SERPL-MCNC: 0.98 MG/DL (ref 0.6–1.2)
EOSINOPHIL # BLD AUTO: 0.34 THOUSAND/UL (ref 0–0.4)
EOSINOPHIL NFR BLD MANUAL: 13 % (ref 0–6)
ERYTHROCYTE [DISTWIDTH] IN BLOOD BY AUTOMATED COUNT: 19.6 %
GFR SERPL CREATININE-BSD FRML MDRD: 64 ML/MIN/1.73SQ M
GLUCOSE SERPL-MCNC: 93 MG/DL (ref 70–99)
HCT VFR BLD AUTO: 36.8 % (ref 36–46)
HGB BLD-MCNC: 11.5 G/DL (ref 12–16)
LDH SERPL-CCNC: 323 U/L (ref 313–618)
LYMPHOCYTES # BLD AUTO: 0.68 THOUSAND/UL (ref 0.5–4)
LYMPHOCYTES # BLD AUTO: 26 % (ref 25–45)
MCH RBC QN AUTO: 20.9 PG (ref 26–34)
MCHC RBC AUTO-ENTMCNC: 31.3 G/DL (ref 31–36)
MCV RBC AUTO: 67 FL (ref 80–100)
MICROCYTES BLD QL AUTO: PRESENT
MONOCYTES # BLD AUTO: 0.47 THOUSAND/UL (ref 0.2–0.9)
MONOCYTES NFR BLD AUTO: 18 % (ref 1–10)
NEUTS BAND NFR BLD MANUAL: 6 % (ref 0–8)
NEUTS SEG # BLD: 1.07 THOUSAND/UL (ref 1.8–7.8)
NEUTS SEG NFR BLD AUTO: 35 %
OVALOCYTES BLD QL SMEAR: PRESENT
PLATELET # BLD AUTO: 196 THOUSANDS/UL (ref 150–450)
PLATELET BLD QL SMEAR: ADEQUATE
PMV BLD AUTO: 10.3 FL (ref 8.9–12.7)
POIKILOCYTOSIS BLD QL SMEAR: PRESENT
POTASSIUM SERPL-SCNC: 4.5 MMOL/L (ref 3.6–5)
PROT SERPL-MCNC: 6.7 G/DL (ref 5.9–8.4)
RBC # BLD AUTO: 5.51 MILLION/UL (ref 4–5.2)
RBC MORPH BLD: ABNORMAL
SODIUM SERPL-SCNC: 140 MMOL/L (ref 137–147)
TOTAL CELLS COUNTED SPEC: 100
TSH SERPL DL<=0.05 MIU/L-ACNC: 1.37 UIU/ML (ref 0.47–4.68)
WBC # BLD AUTO: 2.6 THOUSAND/UL (ref 4.5–11)

## 2020-04-15 PROCEDURE — 84443 ASSAY THYROID STIM HORMONE: CPT

## 2020-04-15 PROCEDURE — 80053 COMPREHEN METABOLIC PANEL: CPT

## 2020-04-15 PROCEDURE — 85027 COMPLETE CBC AUTOMATED: CPT

## 2020-04-15 PROCEDURE — 3075F SYST BP GE 130 - 139MM HG: CPT | Performed by: NURSE PRACTITIONER

## 2020-04-15 PROCEDURE — 96415 CHEMO IV INFUSION ADDL HR: CPT

## 2020-04-15 PROCEDURE — 96413 CHEMO IV INFUSION 1 HR: CPT

## 2020-04-15 PROCEDURE — 96372 THER/PROPH/DIAG INJ SC/IM: CPT

## 2020-04-15 PROCEDURE — 3008F BODY MASS INDEX DOCD: CPT | Performed by: NURSE PRACTITIONER

## 2020-04-15 PROCEDURE — 99214 OFFICE O/P EST MOD 30 MIN: CPT | Performed by: NURSE PRACTITIONER

## 2020-04-15 PROCEDURE — 3079F DIAST BP 80-89 MM HG: CPT | Performed by: NURSE PRACTITIONER

## 2020-04-15 PROCEDURE — 85007 BL SMEAR W/DIFF WBC COUNT: CPT

## 2020-04-15 PROCEDURE — 83615 LACTATE (LD) (LDH) ENZYME: CPT

## 2020-04-15 PROCEDURE — 1036F TOBACCO NON-USER: CPT | Performed by: NURSE PRACTITIONER

## 2020-04-15 PROCEDURE — 96367 TX/PROPH/DG ADDL SEQ IV INF: CPT

## 2020-04-15 PROCEDURE — 36593 DECLOT VASCULAR DEVICE: CPT

## 2020-04-15 RX ORDER — SODIUM CHLORIDE 9 MG/ML
20 INJECTION, SOLUTION INTRAVENOUS ONCE
Status: COMPLETED | OUTPATIENT
Start: 2020-04-15 | End: 2020-04-15

## 2020-04-15 RX ORDER — ACETAMINOPHEN 325 MG/1
650 TABLET ORAL ONCE
Status: CANCELLED | OUTPATIENT
Start: 2020-06-10

## 2020-04-15 RX ORDER — SODIUM CHLORIDE 9 MG/ML
20 INJECTION, SOLUTION INTRAVENOUS ONCE
Status: CANCELLED | OUTPATIENT
Start: 2020-06-10

## 2020-04-15 RX ORDER — ACETAMINOPHEN 325 MG/1
650 TABLET ORAL ONCE
Status: DISCONTINUED | OUTPATIENT
Start: 2020-04-15 | End: 2020-04-15

## 2020-04-15 RX ORDER — ACETAMINOPHEN 325 MG/1
650 TABLET ORAL ONCE
Status: COMPLETED | OUTPATIENT
Start: 2020-04-15 | End: 2020-04-15

## 2020-04-15 RX ORDER — CYANOCOBALAMIN 1000 UG/ML
1000 INJECTION INTRAMUSCULAR; SUBCUTANEOUS ONCE
Status: CANCELLED | OUTPATIENT
Start: 2020-05-13

## 2020-04-15 RX ORDER — CYANOCOBALAMIN 1000 UG/ML
1000 INJECTION INTRAMUSCULAR; SUBCUTANEOUS ONCE
Status: COMPLETED | OUTPATIENT
Start: 2020-04-15 | End: 2020-04-15

## 2020-04-15 RX ADMIN — CYANOCOBALAMIN 1000 MCG: 1000 INJECTION INTRAMUSCULAR; SUBCUTANEOUS at 15:50

## 2020-04-15 RX ADMIN — DIPHENHYDRAMINE HYDROCHLORIDE 50 MG: 50 INJECTION INTRAMUSCULAR; INTRAVENOUS at 11:57

## 2020-04-15 RX ADMIN — ALTEPLASE 2 MG: 2.2 INJECTION, POWDER, LYOPHILIZED, FOR SOLUTION INTRAVENOUS at 10:57

## 2020-04-15 RX ADMIN — DEXAMETHASONE SODIUM PHOSPHATE 10 MG: 10 INJECTION, SOLUTION INTRAMUSCULAR; INTRAVENOUS at 12:20

## 2020-04-15 RX ADMIN — SODIUM CHLORIDE 20 ML/HR: 9 INJECTION, SOLUTION INTRAVENOUS at 11:57

## 2020-04-15 RX ADMIN — RITUXIMAB 750 MG: 10 INJECTION, SOLUTION INTRAVENOUS at 12:42

## 2020-04-15 RX ADMIN — ACETAMINOPHEN 650 MG: 325 TABLET ORAL at 12:06

## 2020-04-21 ENCOUNTER — TELEMEDICINE (OUTPATIENT)
Dept: PALLIATIVE MEDICINE | Facility: CLINIC | Age: 60
End: 2020-04-21
Payer: COMMERCIAL

## 2020-04-21 DIAGNOSIS — K59.01 SLOW TRANSIT CONSTIPATION: ICD-10-CM

## 2020-04-21 DIAGNOSIS — R63.0 DECREASED APPETITE: ICD-10-CM

## 2020-04-21 DIAGNOSIS — R11.2 NAUSEA AND VOMITING, INTRACTABILITY OF VOMITING NOT SPECIFIED, UNSPECIFIED VOMITING TYPE: ICD-10-CM

## 2020-04-21 DIAGNOSIS — C82.18 GRADE 2 FOLLICULAR LYMPHOMA OF LYMPH NODES OF MULTIPLE REGIONS (HCC): Primary | ICD-10-CM

## 2020-04-21 DIAGNOSIS — F33.41 RECURRENT MAJOR DEPRESSIVE DISORDER, IN PARTIAL REMISSION (HCC): ICD-10-CM

## 2020-04-21 DIAGNOSIS — Z51.5 PALLIATIVE CARE PATIENT: ICD-10-CM

## 2020-04-21 DIAGNOSIS — R10.84 GENERALIZED ABDOMINAL PAIN: ICD-10-CM

## 2020-04-21 DIAGNOSIS — R53.83 DECREASED ENERGY: ICD-10-CM

## 2020-04-21 PROCEDURE — 99214 OFFICE O/P EST MOD 30 MIN: CPT | Performed by: SURGERY

## 2020-04-21 RX ORDER — OXYCODONE HYDROCHLORIDE 10 MG/1
10 TABLET ORAL EVERY 4 HOURS PRN
Qty: 180 TABLET | Refills: 0 | Status: SHIPPED | OUTPATIENT
Start: 2020-04-21 | End: 2020-05-19 | Stop reason: SDUPTHER

## 2020-04-21 RX ORDER — DEXAMETHASONE 1 MG
1 TABLET ORAL
Qty: 15 TABLET | Refills: 3 | Status: SHIPPED | OUTPATIENT
Start: 2020-04-21 | End: 2020-05-19 | Stop reason: SDUPTHER

## 2020-04-21 RX ORDER — ACETAMINOPHEN 500 MG
TABLET ORAL
Qty: 180 TABLET | Refills: 3 | Status: SHIPPED | OUTPATIENT
Start: 2020-04-21 | End: 2020-09-15

## 2020-04-22 ENCOUNTER — TELEPHONE (OUTPATIENT)
Dept: HEMATOLOGY ONCOLOGY | Facility: CLINIC | Age: 60
End: 2020-04-22

## 2020-04-28 ENCOUNTER — TELEPHONE (OUTPATIENT)
Dept: HEMATOLOGY ONCOLOGY | Facility: CLINIC | Age: 60
End: 2020-04-28

## 2020-04-29 ENCOUNTER — TELEPHONE (OUTPATIENT)
Dept: HEMATOLOGY ONCOLOGY | Facility: CLINIC | Age: 60
End: 2020-04-29

## 2020-05-04 ENCOUNTER — OFFICE VISIT (OUTPATIENT)
Dept: HEMATOLOGY ONCOLOGY | Facility: CLINIC | Age: 60
End: 2020-05-04
Payer: COMMERCIAL

## 2020-05-04 VITALS
OXYGEN SATURATION: 98 % | BODY MASS INDEX: 34.89 KG/M2 | SYSTOLIC BLOOD PRESSURE: 142 MMHG | RESPIRATION RATE: 18 BRPM | DIASTOLIC BLOOD PRESSURE: 84 MMHG | HEIGHT: 64 IN | TEMPERATURE: 97.7 F | HEART RATE: 61 BPM | WEIGHT: 204.4 LBS

## 2020-05-04 DIAGNOSIS — D51.8 OTHER VITAMIN B12 DEFICIENCY ANEMIAS: ICD-10-CM

## 2020-05-04 DIAGNOSIS — C82.18 GRADE 2 FOLLICULAR LYMPHOMA OF LYMPH NODES OF MULTIPLE REGIONS (HCC): ICD-10-CM

## 2020-05-04 DIAGNOSIS — C82.18 GRADE 2 FOLLICULAR LYMPHOMA OF LYMPH NODES OF MULTIPLE REGIONS (HCC): Primary | ICD-10-CM

## 2020-05-04 PROCEDURE — 3079F DIAST BP 80-89 MM HG: CPT | Performed by: INTERNAL MEDICINE

## 2020-05-04 PROCEDURE — 1036F TOBACCO NON-USER: CPT | Performed by: INTERNAL MEDICINE

## 2020-05-04 PROCEDURE — 99214 OFFICE O/P EST MOD 30 MIN: CPT | Performed by: INTERNAL MEDICINE

## 2020-05-04 PROCEDURE — 3008F BODY MASS INDEX DOCD: CPT | Performed by: INTERNAL MEDICINE

## 2020-05-04 PROCEDURE — 3077F SYST BP >= 140 MM HG: CPT | Performed by: INTERNAL MEDICINE

## 2020-05-04 RX ORDER — LENALIDOMIDE 15 MG/1
15 CAPSULE ORAL DAILY
Qty: 21 CAPSULE | Refills: 0 | Status: SHIPPED | OUTPATIENT
Start: 2020-05-04 | End: 2020-06-11

## 2020-05-13 ENCOUNTER — HOSPITAL ENCOUNTER (OUTPATIENT)
Dept: INFUSION CENTER | Facility: HOSPITAL | Age: 60
Discharge: HOME | End: 2020-05-13
Attending: INTERNAL MEDICINE
Payer: COMMERCIAL

## 2020-05-13 DIAGNOSIS — D51.8 OTHER VITAMIN B12 DEFICIENCY ANEMIAS: Primary | ICD-10-CM

## 2020-05-13 PROCEDURE — 96372 THER/PROPH/DIAG INJ SC/IM: CPT

## 2020-05-13 RX ORDER — CYANOCOBALAMIN 1000 UG/ML
1000 INJECTION INTRAMUSCULAR; SUBCUTANEOUS ONCE
Status: COMPLETED | OUTPATIENT
Start: 2020-05-13 | End: 2020-05-13

## 2020-05-13 RX ORDER — CYANOCOBALAMIN 1000 UG/ML
1000 INJECTION INTRAMUSCULAR; SUBCUTANEOUS ONCE
Status: CANCELLED | OUTPATIENT
Start: 2020-06-10

## 2020-05-13 RX ADMIN — CYANOCOBALAMIN 1000 MCG: 1000 INJECTION INTRAMUSCULAR; SUBCUTANEOUS at 10:48

## 2020-05-15 ENCOUNTER — TELEPHONE (OUTPATIENT)
Dept: HEMATOLOGY ONCOLOGY | Facility: CLINIC | Age: 60
End: 2020-05-15

## 2020-05-17 PROBLEM — G89.3 CANCER RELATED PAIN: Status: ACTIVE | Noted: 2020-05-17

## 2020-05-19 ENCOUNTER — TELEMEDICINE (OUTPATIENT)
Dept: PALLIATIVE MEDICINE | Facility: CLINIC | Age: 60
End: 2020-05-19
Payer: COMMERCIAL

## 2020-05-19 DIAGNOSIS — K59.01 SLOW TRANSIT CONSTIPATION: ICD-10-CM

## 2020-05-19 DIAGNOSIS — R10.84 GENERALIZED ABDOMINAL PAIN: ICD-10-CM

## 2020-05-19 DIAGNOSIS — G89.3 CANCER RELATED PAIN: ICD-10-CM

## 2020-05-19 DIAGNOSIS — F33.41 RECURRENT MAJOR DEPRESSIVE DISORDER, IN PARTIAL REMISSION (HCC): ICD-10-CM

## 2020-05-19 DIAGNOSIS — R11.2 NAUSEA AND VOMITING, INTRACTABILITY OF VOMITING NOT SPECIFIED, UNSPECIFIED VOMITING TYPE: ICD-10-CM

## 2020-05-19 DIAGNOSIS — C82.18 GRADE 2 FOLLICULAR LYMPHOMA OF LYMPH NODES OF MULTIPLE REGIONS (HCC): Primary | ICD-10-CM

## 2020-05-19 DIAGNOSIS — R63.0 DECREASED APPETITE: ICD-10-CM

## 2020-05-19 DIAGNOSIS — Z51.5 PALLIATIVE CARE PATIENT: ICD-10-CM

## 2020-05-19 DIAGNOSIS — R53.83 DECREASED ENERGY: ICD-10-CM

## 2020-05-19 PROCEDURE — G2012 BRIEF CHECK IN BY MD/QHP: HCPCS | Performed by: SURGERY

## 2020-05-19 RX ORDER — OXYCODONE HYDROCHLORIDE 10 MG/1
10 TABLET ORAL EVERY 4 HOURS PRN
Qty: 180 TABLET | Refills: 0 | Status: SHIPPED | OUTPATIENT
Start: 2020-05-19 | End: 2020-06-16

## 2020-05-19 RX ORDER — DEXAMETHASONE 1 MG
1 TABLET ORAL
Qty: 30 TABLET | Refills: 1 | Status: SHIPPED | OUTPATIENT
Start: 2020-05-19 | End: 2020-06-16

## 2020-05-28 ENCOUNTER — APPOINTMENT (OUTPATIENT)
Dept: LAB | Facility: HOSPITAL | Age: 60
End: 2020-05-28
Attending: INTERNAL MEDICINE
Payer: COMMERCIAL

## 2020-05-28 ENCOUNTER — TRANSCRIBE ORDERS (OUTPATIENT)
Dept: LAB | Facility: HOSPITAL | Age: 60
End: 2020-05-28

## 2020-05-28 DIAGNOSIS — C82.18 GRADE 2 FOLLICULAR LYMPHOMA OF LYMPH NODES OF MULTIPLE REGIONS (HCC): ICD-10-CM

## 2020-05-28 DIAGNOSIS — D51.8 OTHER VITAMIN B12 DEFICIENCY ANEMIAS: ICD-10-CM

## 2020-05-28 LAB
ALBUMIN SERPL BCP-MCNC: 4.1 G/DL (ref 3–5.2)
ALP SERPL-CCNC: 94 U/L (ref 43–122)
ALT SERPL W P-5'-P-CCNC: 18 U/L (ref 9–52)
ANION GAP SERPL CALCULATED.3IONS-SCNC: 6 MMOL/L (ref 5–14)
AST SERPL W P-5'-P-CCNC: 21 U/L (ref 14–36)
BILIRUB SERPL-MCNC: 0.5 MG/DL
BUN SERPL-MCNC: 13 MG/DL (ref 5–25)
CALCIUM SERPL-MCNC: 9.2 MG/DL (ref 8.4–10.2)
CHLORIDE SERPL-SCNC: 105 MMOL/L (ref 97–108)
CO2 SERPL-SCNC: 28 MMOL/L (ref 22–30)
CREAT SERPL-MCNC: 0.74 MG/DL (ref 0.6–1.2)
CRP SERPL QL: 10.1 MG/L
ERYTHROCYTE [SEDIMENTATION RATE] IN BLOOD: 24 MM/HOUR (ref 1–20)
GFR SERPL CREATININE-BSD FRML MDRD: 90 ML/MIN/1.73SQ M
GLUCOSE P FAST SERPL-MCNC: 100 MG/DL (ref 70–99)
LDH SERPL-CCNC: 347 U/L (ref 313–618)
MAGNESIUM SERPL-MCNC: 2.2 MG/DL (ref 1.6–2.3)
POTASSIUM SERPL-SCNC: 4.5 MMOL/L (ref 3.6–5)
PROT SERPL-MCNC: 6.7 G/DL (ref 5.9–8.4)
SODIUM SERPL-SCNC: 139 MMOL/L (ref 137–147)
URATE SERPL-MCNC: 5.1 MG/DL (ref 2.7–7.5)
VIT B12 SERPL-MCNC: 816 PG/ML (ref 100–900)

## 2020-05-28 PROCEDURE — 85652 RBC SED RATE AUTOMATED: CPT

## 2020-05-28 PROCEDURE — 83615 LACTATE (LD) (LDH) ENZYME: CPT

## 2020-05-28 PROCEDURE — 36415 COLL VENOUS BLD VENIPUNCTURE: CPT

## 2020-05-28 PROCEDURE — 84550 ASSAY OF BLOOD/URIC ACID: CPT

## 2020-05-28 PROCEDURE — 83735 ASSAY OF MAGNESIUM: CPT

## 2020-05-28 PROCEDURE — 80053 COMPREHEN METABOLIC PANEL: CPT

## 2020-05-28 PROCEDURE — 82607 VITAMIN B-12: CPT

## 2020-05-28 PROCEDURE — 86140 C-REACTIVE PROTEIN: CPT

## 2020-05-31 ENCOUNTER — DOCUMENTATION (OUTPATIENT)
Dept: OTHER | Facility: HOSPITAL | Age: 60
End: 2020-05-31

## 2020-06-01 ENCOUNTER — OFFICE VISIT (OUTPATIENT)
Dept: HEMATOLOGY ONCOLOGY | Facility: CLINIC | Age: 60
End: 2020-06-01
Payer: COMMERCIAL

## 2020-06-01 VITALS
DIASTOLIC BLOOD PRESSURE: 86 MMHG | BODY MASS INDEX: 34.79 KG/M2 | OXYGEN SATURATION: 98 % | TEMPERATURE: 98.6 F | RESPIRATION RATE: 18 BRPM | HEIGHT: 64 IN | WEIGHT: 203.8 LBS | HEART RATE: 57 BPM | SYSTOLIC BLOOD PRESSURE: 146 MMHG

## 2020-06-01 DIAGNOSIS — D51.8 OTHER VITAMIN B12 DEFICIENCY ANEMIAS: ICD-10-CM

## 2020-06-01 DIAGNOSIS — C82.18 GRADE 2 FOLLICULAR LYMPHOMA OF LYMPH NODES OF MULTIPLE REGIONS (HCC): Primary | ICD-10-CM

## 2020-06-01 PROCEDURE — 1036F TOBACCO NON-USER: CPT | Performed by: NURSE PRACTITIONER

## 2020-06-01 PROCEDURE — 3008F BODY MASS INDEX DOCD: CPT | Performed by: NURSE PRACTITIONER

## 2020-06-01 PROCEDURE — 3077F SYST BP >= 140 MM HG: CPT | Performed by: NURSE PRACTITIONER

## 2020-06-01 PROCEDURE — 3079F DIAST BP 80-89 MM HG: CPT | Performed by: NURSE PRACTITIONER

## 2020-06-01 PROCEDURE — 99214 OFFICE O/P EST MOD 30 MIN: CPT | Performed by: NURSE PRACTITIONER

## 2020-06-10 ENCOUNTER — HOSPITAL ENCOUNTER (OUTPATIENT)
Dept: INFUSION CENTER | Facility: HOSPITAL | Age: 60
Discharge: HOME | End: 2020-06-10
Attending: INTERNAL MEDICINE
Payer: COMMERCIAL

## 2020-06-10 VITALS
HEIGHT: 64 IN | TEMPERATURE: 97.2 F | DIASTOLIC BLOOD PRESSURE: 69 MMHG | WEIGHT: 207.23 LBS | BODY MASS INDEX: 35.38 KG/M2 | SYSTOLIC BLOOD PRESSURE: 164 MMHG | RESPIRATION RATE: 16 BRPM | HEART RATE: 55 BPM

## 2020-06-10 DIAGNOSIS — D51.8 OTHER VITAMIN B12 DEFICIENCY ANEMIAS: Primary | ICD-10-CM

## 2020-06-10 DIAGNOSIS — C82.18 GRADE 2 FOLLICULAR LYMPHOMA OF LYMPH NODES OF MULTIPLE REGIONS (HCC): ICD-10-CM

## 2020-06-10 LAB
ALBUMIN SERPL BCP-MCNC: 3.7 G/DL (ref 3–5.2)
ALP SERPL-CCNC: 90 U/L (ref 43–122)
ALT SERPL W P-5'-P-CCNC: 32 U/L (ref 9–52)
ANION GAP SERPL CALCULATED.3IONS-SCNC: 8 MMOL/L (ref 5–14)
ANISOCYTOSIS BLD QL SMEAR: PRESENT
AST SERPL W P-5'-P-CCNC: 27 U/L (ref 14–36)
BILIRUB SERPL-MCNC: 0.4 MG/DL
BUN SERPL-MCNC: 11 MG/DL (ref 5–25)
CALCIUM SERPL-MCNC: 8.7 MG/DL (ref 8.4–10.2)
CHLORIDE SERPL-SCNC: 107 MMOL/L (ref 97–108)
CO2 SERPL-SCNC: 24 MMOL/L (ref 22–30)
CREAT SERPL-MCNC: 0.65 MG/DL (ref 0.6–1.2)
EOSINOPHIL # BLD AUTO: 0.16 THOUSAND/UL (ref 0–0.4)
EOSINOPHIL NFR BLD MANUAL: 8 % (ref 0–6)
ERYTHROCYTE [DISTWIDTH] IN BLOOD BY AUTOMATED COUNT: 17.5 %
GFR SERPL CREATININE-BSD FRML MDRD: 98 ML/MIN/1.73SQ M
GLUCOSE SERPL-MCNC: 95 MG/DL (ref 70–99)
HCT VFR BLD AUTO: 34.6 % (ref 36–46)
HGB BLD-MCNC: 11 G/DL (ref 12–16)
HYPERCHROMIA BLD QL SMEAR: PRESENT
LDH SERPL-CCNC: 368 U/L (ref 313–618)
LYMPHOCYTES # BLD AUTO: 0.48 THOUSAND/UL (ref 0.5–4)
LYMPHOCYTES # BLD AUTO: 24 % (ref 25–45)
MCH RBC QN AUTO: 22.1 PG (ref 26–34)
MCHC RBC AUTO-ENTMCNC: 31.7 G/DL (ref 31–36)
MCV RBC AUTO: 70 FL (ref 80–100)
MICROCYTES BLD QL AUTO: PRESENT
MONOCYTES # BLD AUTO: 0.4 THOUSAND/UL (ref 0.2–0.9)
MONOCYTES NFR BLD AUTO: 20 % (ref 1–10)
NEUTS BAND NFR BLD MANUAL: 12 % (ref 0–8)
NEUTS SEG # BLD: 0.96 THOUSAND/UL (ref 1.8–7.8)
NEUTS SEG NFR BLD AUTO: 36 %
PLATELET # BLD AUTO: 174 THOUSANDS/UL (ref 150–450)
PLATELET BLD QL SMEAR: ADEQUATE
PMV BLD AUTO: 10.1 FL (ref 8.9–12.7)
POIKILOCYTOSIS BLD QL SMEAR: PRESENT
POTASSIUM SERPL-SCNC: 3.9 MMOL/L (ref 3.6–5)
PROT SERPL-MCNC: 6.3 G/DL (ref 5.9–8.4)
RBC # BLD AUTO: 4.96 MILLION/UL (ref 4–5.2)
RBC MORPH BLD: ABNORMAL
SODIUM SERPL-SCNC: 139 MMOL/L (ref 137–147)
TOTAL CELLS COUNTED SPEC: 50
URATE SERPL-MCNC: 4 MG/DL (ref 2.7–7.5)
WBC # BLD AUTO: 2 THOUSAND/UL (ref 4.5–11)

## 2020-06-10 PROCEDURE — 84550 ASSAY OF BLOOD/URIC ACID: CPT

## 2020-06-10 PROCEDURE — 96413 CHEMO IV INFUSION 1 HR: CPT

## 2020-06-10 PROCEDURE — 96372 THER/PROPH/DIAG INJ SC/IM: CPT

## 2020-06-10 PROCEDURE — 85007 BL SMEAR W/DIFF WBC COUNT: CPT | Performed by: NURSE PRACTITIONER

## 2020-06-10 PROCEDURE — 80053 COMPREHEN METABOLIC PANEL: CPT

## 2020-06-10 PROCEDURE — 85027 COMPLETE CBC AUTOMATED: CPT | Performed by: NURSE PRACTITIONER

## 2020-06-10 PROCEDURE — 96367 TX/PROPH/DG ADDL SEQ IV INF: CPT

## 2020-06-10 PROCEDURE — 96415 CHEMO IV INFUSION ADDL HR: CPT

## 2020-06-10 PROCEDURE — 83615 LACTATE (LD) (LDH) ENZYME: CPT

## 2020-06-10 RX ORDER — SODIUM CHLORIDE 9 MG/ML
20 INJECTION, SOLUTION INTRAVENOUS ONCE
Status: COMPLETED | OUTPATIENT
Start: 2020-06-10 | End: 2020-06-10

## 2020-06-10 RX ORDER — CYANOCOBALAMIN 1000 UG/ML
1000 INJECTION INTRAMUSCULAR; SUBCUTANEOUS ONCE
Status: COMPLETED | OUTPATIENT
Start: 2020-06-10 | End: 2020-06-10

## 2020-06-10 RX ORDER — ACETAMINOPHEN 325 MG/1
650 TABLET ORAL ONCE
Status: COMPLETED | OUTPATIENT
Start: 2020-06-10 | End: 2020-06-10

## 2020-06-10 RX ORDER — CYANOCOBALAMIN 1000 UG/ML
1000 INJECTION INTRAMUSCULAR; SUBCUTANEOUS ONCE
Status: CANCELLED | OUTPATIENT
Start: 2020-07-09

## 2020-06-10 RX ADMIN — DEXAMETHASONE SODIUM PHOSPHATE 10 MG: 10 INJECTION, SOLUTION INTRAMUSCULAR; INTRAVENOUS at 10:22

## 2020-06-10 RX ADMIN — CYANOCOBALAMIN 1000 MCG: 1000 INJECTION INTRAMUSCULAR; SUBCUTANEOUS at 10:05

## 2020-06-10 RX ADMIN — RITUXIMAB 750 MG: 10 INJECTION, SOLUTION INTRAVENOUS at 11:18

## 2020-06-10 RX ADMIN — SODIUM CHLORIDE 20 ML/HR: 9 INJECTION, SOLUTION INTRAVENOUS at 09:57

## 2020-06-10 RX ADMIN — DIPHENHYDRAMINE HYDROCHLORIDE 50 MG: 50 INJECTION INTRAMUSCULAR; INTRAVENOUS at 09:57

## 2020-06-10 RX ADMIN — ACETAMINOPHEN 650 MG: 325 TABLET ORAL at 10:04

## 2020-06-11 DIAGNOSIS — C82.18 GRADE 2 FOLLICULAR LYMPHOMA OF LYMPH NODES OF MULTIPLE REGIONS (HCC): ICD-10-CM

## 2020-06-11 RX ORDER — LENALIDOMIDE 15 MG/1
CAPSULE ORAL
Qty: 21 CAPSULE | Refills: 0 | Status: SHIPPED | OUTPATIENT
Start: 2020-06-11 | End: 2020-07-30

## 2020-06-12 ENCOUNTER — TELEPHONE (OUTPATIENT)
Dept: HEMATOLOGY ONCOLOGY | Facility: CLINIC | Age: 60
End: 2020-06-12

## 2020-06-15 ENCOUNTER — OFFICE VISIT (OUTPATIENT)
Dept: HEMATOLOGY ONCOLOGY | Facility: CLINIC | Age: 60
End: 2020-06-15
Payer: COMMERCIAL

## 2020-06-15 VITALS
DIASTOLIC BLOOD PRESSURE: 76 MMHG | HEIGHT: 64 IN | TEMPERATURE: 97.7 F | RESPIRATION RATE: 18 BRPM | HEART RATE: 57 BPM | OXYGEN SATURATION: 98 % | BODY MASS INDEX: 35.07 KG/M2 | WEIGHT: 205.4 LBS | SYSTOLIC BLOOD PRESSURE: 130 MMHG

## 2020-06-15 DIAGNOSIS — C82.18 GRADE 2 FOLLICULAR LYMPHOMA OF LYMPH NODES OF MULTIPLE REGIONS (HCC): Primary | ICD-10-CM

## 2020-06-15 DIAGNOSIS — D51.8 OTHER VITAMIN B12 DEFICIENCY ANEMIAS: ICD-10-CM

## 2020-06-15 PROCEDURE — 3008F BODY MASS INDEX DOCD: CPT | Performed by: NURSE PRACTITIONER

## 2020-06-15 PROCEDURE — 3075F SYST BP GE 130 - 139MM HG: CPT | Performed by: NURSE PRACTITIONER

## 2020-06-15 PROCEDURE — 1036F TOBACCO NON-USER: CPT | Performed by: NURSE PRACTITIONER

## 2020-06-15 PROCEDURE — 3078F DIAST BP <80 MM HG: CPT | Performed by: NURSE PRACTITIONER

## 2020-06-15 PROCEDURE — 99214 OFFICE O/P EST MOD 30 MIN: CPT | Performed by: NURSE PRACTITIONER

## 2020-06-15 RX ORDER — SODIUM CHLORIDE 9 MG/ML
20 INJECTION, SOLUTION INTRAVENOUS ONCE
Status: CANCELLED | OUTPATIENT
Start: 2020-08-05

## 2020-06-15 RX ORDER — ACETAMINOPHEN 325 MG/1
650 TABLET ORAL ONCE
Status: CANCELLED | OUTPATIENT
Start: 2020-08-05

## 2020-06-16 ENCOUNTER — OFFICE VISIT (OUTPATIENT)
Dept: PALLIATIVE MEDICINE | Facility: CLINIC | Age: 60
End: 2020-06-16
Payer: COMMERCIAL

## 2020-06-16 ENCOUNTER — TELEPHONE (OUTPATIENT)
Dept: PALLIATIVE MEDICINE | Facility: CLINIC | Age: 60
End: 2020-06-16

## 2020-06-16 ENCOUNTER — TELEPHONE (OUTPATIENT)
Dept: HEMATOLOGY ONCOLOGY | Facility: CLINIC | Age: 60
End: 2020-06-16

## 2020-06-16 VITALS
SYSTOLIC BLOOD PRESSURE: 130 MMHG | WEIGHT: 203.2 LBS | DIASTOLIC BLOOD PRESSURE: 80 MMHG | OXYGEN SATURATION: 99 % | HEART RATE: 60 BPM | RESPIRATION RATE: 14 BRPM | TEMPERATURE: 97.3 F | BODY MASS INDEX: 34.88 KG/M2

## 2020-06-16 DIAGNOSIS — K59.01 SLOW TRANSIT CONSTIPATION: ICD-10-CM

## 2020-06-16 DIAGNOSIS — C82.18 GRADE 2 FOLLICULAR LYMPHOMA OF LYMPH NODES OF MULTIPLE REGIONS (HCC): Primary | ICD-10-CM

## 2020-06-16 DIAGNOSIS — R10.84 GENERALIZED ABDOMINAL PAIN: ICD-10-CM

## 2020-06-16 DIAGNOSIS — R53.83 DECREASED ENERGY: ICD-10-CM

## 2020-06-16 DIAGNOSIS — Z51.5 PALLIATIVE CARE PATIENT: ICD-10-CM

## 2020-06-16 DIAGNOSIS — R63.0 DECREASED APPETITE: ICD-10-CM

## 2020-06-16 DIAGNOSIS — G89.3 CANCER RELATED PAIN: ICD-10-CM

## 2020-06-16 DIAGNOSIS — R11.2 NAUSEA AND VOMITING, INTRACTABILITY OF VOMITING NOT SPECIFIED, UNSPECIFIED VOMITING TYPE: ICD-10-CM

## 2020-06-16 DIAGNOSIS — F41.8 ANXIETY ABOUT HEALTH: ICD-10-CM

## 2020-06-16 DIAGNOSIS — F33.41 RECURRENT MAJOR DEPRESSIVE DISORDER, IN PARTIAL REMISSION (HCC): ICD-10-CM

## 2020-06-16 PROCEDURE — 3075F SYST BP GE 130 - 139MM HG: CPT | Performed by: SURGERY

## 2020-06-16 PROCEDURE — 1036F TOBACCO NON-USER: CPT | Performed by: SURGERY

## 2020-06-16 PROCEDURE — 3079F DIAST BP 80-89 MM HG: CPT | Performed by: SURGERY

## 2020-06-16 PROCEDURE — 99214 OFFICE O/P EST MOD 30 MIN: CPT | Performed by: SURGERY

## 2020-06-16 RX ORDER — DULOXETIN HYDROCHLORIDE 20 MG/1
20 CAPSULE, DELAYED RELEASE ORAL DAILY
Qty: 30 CAPSULE | Refills: 3 | Status: SHIPPED | OUTPATIENT
Start: 2020-06-16 | End: 2020-09-15 | Stop reason: SDUPTHER

## 2020-06-16 RX ORDER — OXYCODONE HYDROCHLORIDE 15 MG/1
15 TABLET ORAL EVERY 4 HOURS PRN
Qty: 180 TABLET | Refills: 0 | Status: SHIPPED | OUTPATIENT
Start: 2020-06-16 | End: 2020-07-21 | Stop reason: SDUPTHER

## 2020-06-16 RX ORDER — DEXAMETHASONE 1 MG
1 TABLET ORAL 2 TIMES DAILY WITH MEALS
Qty: 60 TABLET | Refills: 3 | Status: SHIPPED | OUTPATIENT
Start: 2020-06-16 | End: 2020-09-15 | Stop reason: SDUPTHER

## 2020-06-25 ENCOUNTER — TELEPHONE (OUTPATIENT)
Dept: HEMATOLOGY ONCOLOGY | Facility: CLINIC | Age: 60
End: 2020-06-25

## 2020-06-29 ENCOUNTER — OFFICE VISIT (OUTPATIENT)
Dept: HEMATOLOGY ONCOLOGY | Facility: CLINIC | Age: 60
End: 2020-06-29
Payer: COMMERCIAL

## 2020-06-29 VITALS
OXYGEN SATURATION: 99 % | RESPIRATION RATE: 16 BRPM | WEIGHT: 205 LBS | SYSTOLIC BLOOD PRESSURE: 140 MMHG | DIASTOLIC BLOOD PRESSURE: 70 MMHG | HEIGHT: 64 IN | HEART RATE: 48 BPM | TEMPERATURE: 97.5 F | BODY MASS INDEX: 35 KG/M2

## 2020-06-29 DIAGNOSIS — C82.18 GRADE 2 FOLLICULAR LYMPHOMA OF LYMPH NODES OF MULTIPLE REGIONS (HCC): Primary | ICD-10-CM

## 2020-06-29 DIAGNOSIS — R10.13 EPIGASTRIC PAIN: ICD-10-CM

## 2020-06-29 DIAGNOSIS — D51.8 OTHER VITAMIN B12 DEFICIENCY ANEMIAS: ICD-10-CM

## 2020-06-29 PROCEDURE — 3078F DIAST BP <80 MM HG: CPT | Performed by: INTERNAL MEDICINE

## 2020-06-29 PROCEDURE — 3077F SYST BP >= 140 MM HG: CPT | Performed by: INTERNAL MEDICINE

## 2020-06-29 PROCEDURE — 3008F BODY MASS INDEX DOCD: CPT | Performed by: INTERNAL MEDICINE

## 2020-06-29 PROCEDURE — 99214 OFFICE O/P EST MOD 30 MIN: CPT | Performed by: INTERNAL MEDICINE

## 2020-06-29 PROCEDURE — 1036F TOBACCO NON-USER: CPT | Performed by: INTERNAL MEDICINE

## 2020-07-06 ENCOUNTER — HOSPITAL ENCOUNTER (EMERGENCY)
Facility: HOSPITAL | Age: 60
Discharge: HOME | End: 2020-07-06
Attending: EMERGENCY MEDICINE | Admitting: EMERGENCY MEDICINE
Payer: COMMERCIAL

## 2020-07-06 ENCOUNTER — TELEPHONE (OUTPATIENT)
Dept: HEMATOLOGY ONCOLOGY | Facility: CLINIC | Age: 60
End: 2020-07-06

## 2020-07-06 ENCOUNTER — HOSPITAL ENCOUNTER (OUTPATIENT)
Dept: CT IMAGING | Facility: HOSPITAL | Age: 60
Discharge: HOME | End: 2020-07-06
Attending: INTERNAL MEDICINE
Payer: COMMERCIAL

## 2020-07-06 ENCOUNTER — APPOINTMENT (OUTPATIENT)
Dept: LAB | Facility: HOSPITAL | Age: 60
End: 2020-07-06
Payer: COMMERCIAL

## 2020-07-06 VITALS
DIASTOLIC BLOOD PRESSURE: 68 MMHG | TEMPERATURE: 97.4 F | OXYGEN SATURATION: 99 % | HEART RATE: 60 BPM | SYSTOLIC BLOOD PRESSURE: 117 MMHG | BODY MASS INDEX: 35.36 KG/M2 | WEIGHT: 206 LBS | RESPIRATION RATE: 18 BRPM

## 2020-07-06 DIAGNOSIS — C82.18 GRADE 2 FOLLICULAR LYMPHOMA OF LYMPH NODES OF MULTIPLE REGIONS (HCC): ICD-10-CM

## 2020-07-06 DIAGNOSIS — R10.13 EPIGASTRIC PAIN: ICD-10-CM

## 2020-07-06 DIAGNOSIS — F41.9 ANXIETY: Primary | ICD-10-CM

## 2020-07-06 LAB
ALBUMIN SERPL BCP-MCNC: 4.4 G/DL (ref 3–5.2)
ALP SERPL-CCNC: 130 U/L (ref 43–122)
ALT SERPL W P-5'-P-CCNC: 29 U/L (ref 9–52)
ANION GAP SERPL CALCULATED.3IONS-SCNC: 8 MMOL/L (ref 5–14)
ANISOCYTOSIS BLD QL SMEAR: PRESENT
AST SERPL W P-5'-P-CCNC: 32 U/L (ref 14–36)
BACTERIA UR QL AUTO: ABNORMAL /HPF
BILIRUB SERPL-MCNC: 0.4 MG/DL
BILIRUB UR QL STRIP: NEGATIVE
BUN SERPL-MCNC: 11 MG/DL (ref 5–25)
CALCIUM SERPL-MCNC: 9.2 MG/DL (ref 8.4–10.2)
CHLORIDE SERPL-SCNC: 100 MMOL/L (ref 97–108)
CLARITY UR: CLEAR
CO2 SERPL-SCNC: 28 MMOL/L (ref 22–30)
COLOR UR: YELLOW
CREAT SERPL-MCNC: 0.74 MG/DL (ref 0.6–1.2)
CRP SERPL QL: 19.2 MG/L
EOSINOPHIL # BLD AUTO: 0.33 THOUSAND/UL (ref 0–0.4)
EOSINOPHIL NFR BLD MANUAL: 13 % (ref 0–6)
ERYTHROCYTE [DISTWIDTH] IN BLOOD BY AUTOMATED COUNT: 15.6 %
ERYTHROCYTE [SEDIMENTATION RATE] IN BLOOD: 48 MM/HOUR (ref 1–20)
GFR SERPL CREATININE-BSD FRML MDRD: 90 ML/MIN/1.73SQ M
GLUCOSE P FAST SERPL-MCNC: 93 MG/DL (ref 70–99)
GLUCOSE UR STRIP-MCNC: NEGATIVE MG/DL
HCT VFR BLD AUTO: 37.3 % (ref 36–46)
HGB BLD-MCNC: 11.8 G/DL (ref 12–16)
HGB UR QL STRIP.AUTO: NEGATIVE
HYPERCHROMIA BLD QL SMEAR: PRESENT
KETONES UR STRIP-MCNC: NEGATIVE MG/DL
LDH SERPL-CCNC: 404 U/L (ref 313–618)
LEUKOCYTE ESTERASE UR QL STRIP: NEGATIVE
LYMPHOCYTES # BLD AUTO: 0.7 THOUSAND/UL (ref 0.5–4)
LYMPHOCYTES # BLD AUTO: 28 % (ref 25–45)
MCH RBC QN AUTO: 21.5 PG (ref 26–34)
MCHC RBC AUTO-ENTMCNC: 31.5 G/DL (ref 31–36)
MCV RBC AUTO: 68 FL (ref 80–100)
MICROCYTES BLD QL AUTO: PRESENT
MONOCYTES # BLD AUTO: 0.23 THOUSAND/UL (ref 0.2–0.9)
MONOCYTES NFR BLD AUTO: 9 % (ref 1–10)
MUCOUS THREADS UR QL AUTO: ABNORMAL
NEUTS SEG # BLD: 1.25 THOUSAND/UL (ref 1.8–7.8)
NEUTS SEG NFR BLD AUTO: 50 %
NITRITE UR QL STRIP: NEGATIVE
NON-SQ EPI CELLS URNS QL MICRO: ABNORMAL /HPF
PH UR STRIP.AUTO: 5 [PH]
PLATELET # BLD AUTO: 286 THOUSANDS/UL (ref 150–450)
PLATELET BLD QL SMEAR: ADEQUATE
PMV BLD AUTO: 9.7 FL (ref 8.9–12.7)
POIKILOCYTOSIS BLD QL SMEAR: PRESENT
POTASSIUM SERPL-SCNC: 3.5 MMOL/L (ref 3.6–5)
PROT SERPL-MCNC: 7.3 G/DL (ref 5.9–8.4)
PROT UR STRIP-MCNC: ABNORMAL MG/DL
RBC # BLD AUTO: 5.47 MILLION/UL (ref 4–5.2)
RBC #/AREA URNS AUTO: ABNORMAL /HPF
RBC MORPH BLD: ABNORMAL
SODIUM SERPL-SCNC: 136 MMOL/L (ref 137–147)
SP GR UR STRIP.AUTO: 1.01 (ref 1–1.04)
TOTAL CELLS COUNTED SPEC: 100
URATE SERPL-MCNC: 3.8 MG/DL (ref 2.7–7.5)
UROBILINOGEN UA: NEGATIVE MG/DL
WBC # BLD AUTO: 2.5 THOUSAND/UL (ref 4.5–11)
WBC #/AREA URNS AUTO: ABNORMAL /HPF

## 2020-07-06 PROCEDURE — 84550 ASSAY OF BLOOD/URIC ACID: CPT

## 2020-07-06 PROCEDURE — 80053 COMPREHEN METABOLIC PANEL: CPT

## 2020-07-06 PROCEDURE — 81001 URINALYSIS AUTO W/SCOPE: CPT

## 2020-07-06 PROCEDURE — 86140 C-REACTIVE PROTEIN: CPT

## 2020-07-06 PROCEDURE — 36415 COLL VENOUS BLD VENIPUNCTURE: CPT

## 2020-07-06 PROCEDURE — 85007 BL SMEAR W/DIFF WBC COUNT: CPT

## 2020-07-06 PROCEDURE — 74177 CT ABD & PELVIS W/CONTRAST: CPT

## 2020-07-06 PROCEDURE — 99283 EMERGENCY DEPT VISIT LOW MDM: CPT

## 2020-07-06 PROCEDURE — 99282 EMERGENCY DEPT VISIT SF MDM: CPT | Performed by: PHYSICIAN ASSISTANT

## 2020-07-06 PROCEDURE — 85652 RBC SED RATE AUTOMATED: CPT

## 2020-07-06 PROCEDURE — 83615 LACTATE (LD) (LDH) ENZYME: CPT

## 2020-07-06 PROCEDURE — 85027 COMPLETE CBC AUTOMATED: CPT

## 2020-07-06 RX ADMIN — IOHEXOL 100 ML: 350 INJECTION, SOLUTION INTRAVENOUS at 13:18

## 2020-07-06 NOTE — ED PROVIDER NOTES
History  Chief Complaint   Patient presents with    Anxiety     Came from out patient CT scan by tech; states durning procedure felt warm and IV site was uncomfortable and got anxious; brought to ER because staff not sure if patient was having a reaction; patient states she is ok and wants to go home  62-year-old female presents to the ED from the radiology suite after she was brought down but CT after patient became anxious after IV contrast was administered and patient went into the CT scan machine  Patient reports this is her 1st time receiving IV contrast   Patient reports having CT scans in the past and getting anxious when being placed inside the tube  Patient reports that when she was placed in this ET tube this time she felt a generalized body warm sensation, and burning sensation at the IV site when the contrast was administered  Patient reports that symptoms quickly had resolved but she continues to feel anxious  Patient reports now in the ED complete resolution of all symptoms  Patient denies any uticaria, skin reaction, shortness of breath, chest pain, difficulty swallowing, facial swelling, abdominal pain, nausea, vomiting and other complaints at this time  Anxiety   Presenting symptoms: no aggressive behavior, no agitation, no bizarre behavior, no delusions, no depression, no disorganized speech, no hallucinations and no paranoid behavior    Degree of incapacity (severity):  Mild  Onset quality:  Sudden  Progression:  Resolved  Chronicity:  New  Context comment:  Going into CT scanner  Associated symptoms: anxiety    Associated symptoms: no abdominal pain, no chest pain and no headaches        Prior to Admission Medications   Prescriptions Last Dose Informant Patient Reported? Taking?    DULoxetine (CYMBALTA) 20 mg capsule   No No   Sig: Take 1 capsule (20 mg total) by mouth daily   acetaminophen (TYLENOL) 500 mg tablet   No No   Sig: May take 1 tablet (500 mg total) by mouth every 8 (eight) hours as needed for mild pain or moderate pain  May also take 1 tablet (500 mg total) every 8 (eight) hours as needed for mild pain or moderate pain     al mag oxide-diphenhydramine-lidocaine viscous (MAGIC MOUTHWASH) 1:1:1 suspension  Self No No   Sig: Swish and spit 10 mL every 4 (four) hours as needed for mouth pain or discomfort   amLODIPine (NORVASC) 5 mg tablet  Self No No   Sig: Take 1 tablet (5 mg total) by mouth daily   aspirin (ECOTRIN LOW STRENGTH) 81 mg EC tablet  Self No No   Sig: Take 1 tablet (81 mg total) by mouth daily   benzonatate (TESSALON) 200 MG capsule  Self No No   Sig: Take 1 capsule (200 mg total) by mouth 3 (three) times a day as needed for cough   carbamide peroxide (DEBROX) 6 5 % otic solution  Self No No   Sig: Administer 5 drops to the right ear 2 (two) times a day   cyanocobalamin 1000 MCG tablet  Self No No   Sig: Take 1 tablet (1,000 mcg total) by mouth daily   dexamethasone (DECADRON) 1 mg tablet   No No   Sig: Take 1 tablet (1 mg total) by mouth 2 (two) times a day with meals Do not take afternoon dose after 2pm   lenalidomide (Revlimid) 15 MG CAPS   No No   Sig: TAKE 1 CAPSULE BY MOUTH DAILY FOR 21 DAYS, THEN 7 DAYS REST   meclizine (ANTIVERT) 12 5 MG tablet  Self No No   Sig: Take 1 tablet (12 5 mg total) by mouth every 8 (eight) hours as needed for dizziness   omeprazole (PriLOSEC) 20 mg delayed release capsule  Self No No   Sig: Take 2 capsules (40 mg total) by mouth daily To prevent stomach upset   ondansetron (ZOFRAN) 4 mg tablet  Self No No   Sig: Take 1 tablet (4 mg total) by mouth every 8 (eight) hours as needed for nausea or vomiting   oxyCODONE (ROXICODONE) 15 mg immediate release tablet   No No   Sig: Take 1 tablet (15 mg total) by mouth every 4 (four) hours as needed for moderate pain or severe pain Max 6 tablets/dayMax Daily Amount: 90 mg   polyethylene glycol (MIRALAX) 17 g packet  Self No No   Sig: Take 17 g by mouth daily   senna (SENOKOT) 8 6 mg  Self No No   Sig: Take 1 tablet (8 6 mg total) by mouth 2 (two) times a day      Facility-Administered Medications: None       Past Medical History:   Diagnosis Date    Anemia     iron and B12    Arthritis     Asthma     Cough     Depression     Falls frequently     [facility] [address] [facility]     [facility]oma [facility] [address] [facility]     [facility] ([facility]     Migraine     Osteoporosis     Psychiatric disorder     Vertigo        Past Surgical History:   Procedure Laterality Date    CHOLECYSTECTOMY      FL GUIDED CENTRAL VENOUS ACCESS DEVICE INSERTION  11/9/2018    HYSTERECTOMY      LYMPH NODE BIOPSY Left 11/9/2018    Procedure: EXCISION BIOPSY LYMPH NODE SUPRACLAVICULAR;  Surgeon: Andrzej Wills MD;  Location: [facility] MAIN OR;  Service: General    SD INCISE FINGER TENDON SHEATH Right 1/16/2020    Procedure: RELEASE TRIGGER FINGER RIGHT THUMB;  Surgeon: Beto Palacio MD;  Location: [facility] MAIN OR;  Service: Orthopedics    TUNNELED VENOUS PORT PLACEMENT N/A 11/9/2018    Procedure: INSERTION OF PORT-A-CATH;  Surgeon: Andrzej Wills MD;  Location: [facility] MAIN OR;  Service: General       Family History   Problem Relation Age of Onset    Cancer Mother     Diabetes Mother     Cancer Father     Diabetes Father      I have reviewed and agree with the history as documented  E-Cigarette/Vaping    E-Cigarette Use Never User      E-Cigarette/Vaping Substances     Social History     Tobacco Use    Smoking status: Former Smoker     Last attempt to quit: 6/12/2017     Years since quitting: 3 0    Smokeless tobacco: Never Used   Substance Use Topics    Alcohol use: Never     Frequency: Never    Drug use: Never       Review of Systems   Constitutional: Negative for chills, diaphoresis and fever  HENT: Negative for facial swelling and trouble swallowing  Eyes: Negative for pain, redness and visual disturbance  Respiratory: Negative for cough, chest tightness and shortness of breath  Cardiovascular: Negative for chest pain and palpitations  Gastrointestinal: Negative for abdominal pain, nausea and vomiting  Skin: Negative for color change, pallor, rash and wound  Neurological: Negative for dizziness, tremors, weakness, light-headedness, numbness and headaches  Psychiatric/Behavioral: Negative for agitation, hallucinations and paranoia  The patient is nervous/anxious  Physical Exam  Physical Exam   Constitutional: She is oriented to person, place, and time  She appears well-developed and well-nourished  No distress  HENT:   Head: Normocephalic and atraumatic  Right Ear: External ear normal    Left Ear: External ear normal    Nose: Nose normal    Mouth/Throat: Oropharynx is clear and moist  No oropharyngeal exudate  Eyes: Pupils are equal, round, and reactive to light  Conjunctivae and EOM are normal  Right eye exhibits no discharge  Left eye exhibits no discharge  No scleral icterus  Neck: Normal range of motion  Neck supple  Cardiovascular: Normal rate, regular rhythm, normal heart sounds and intact distal pulses  Pulmonary/Chest: Effort normal and breath sounds normal  No stridor  No respiratory distress  She has no wheezes  She has no rales  Abdominal: Soft  There is tenderness in the suprapubic area  There is no rigidity and no rebound  Suprapubic tenderness, chronic in nature according to patient since being diagnosed with cancer several months ago  Musculoskeletal: Normal range of motion  She exhibits no edema or deformity  Lymphadenopathy:     She has no cervical adenopathy  Neurological: She is alert and oriented to person, place, and time  No sensory deficit  She exhibits normal muscle tone  Skin: Skin is warm, dry and intact  Capillary refill takes less than 2 seconds  No petechiae and no rash noted  Rash is not urticarial  She is not diaphoretic  No cyanosis or erythema  No pallor  Upper Left chest port placement  No signs of infection  IV site in the right [facility]    No signs of thrombophlebitis, infection, skin reaction, surrounding erythema, edema, and petechiae  Psychiatric: She has a normal mood and affect  Her behavior is normal    Nursing note and vitals reviewed  Vital Signs  ED Triage Vitals [07/06/20 1354]   Temperature Pulse Respirations Blood Pressure SpO2   (!) 97 4 °F (36 3 °C) 62 16 158/71 98 %      Temp Source Heart Rate Source Patient Position - Orthostatic VS BP Location FiO2 (%)   Tympanic Monitor Lying Left arm --      Pain Score       --           Vitals:    07/06/20 1354   BP: 158/71   Pulse: 62   Patient Position - Orthostatic VS: Lying         Visual Acuity      ED Medications  Medications - No data to display    Diagnostic Studies  Results Reviewed     None                 No orders to display              Procedures  Procedures         ED Course       US AUDIT      Most Recent Value   Initial Alcohol Screen: US AUDIT-C    1  How often do you have a drink containing alcohol?  0 Filed at: 07/06/2020 1359   2  How many drinks containing alcohol do you have on a typical day you are drinking? 0 Filed at: 07/06/2020 1359   3a  Male UNDER 65: How often do you have five or more drinks on one occasion? 0 Filed at: 07/06/2020 1359   3b  FEMALE Any Age, or MALE 65+: How often do you have 4 or more drinks on one occassion? 0 Filed at: 07/06/2020 1359   Audit-C Score  0 Filed at: 07/06/2020 1359                  GIOVANNY/DAST-10      Most Recent Value   How many times in the past year have you    Used an illegal drug or used a prescription medication for non-medical reasons? Never Filed at: 07/06/2020 1359                                MDM  Number of Diagnoses or Management Options  Diagnosis management comments: 56-year-old female was brought down from the radiology suite after she began feeling a burn at the IV site when contrast was administered and then generalized anxiety after placed inside a CT scanner    Patient reports that burning lasted several seconds and then was completely resolved  Patient reports that she has a history of anxiety when receiving IV and CT test   Patient reports that she is 100%" back to her baseline prior to IV and CT test   On physical exam patient has no signs of anaphylaxis, respiratory distress, skin reaction, other symptoms of IV contrast reaction  Patient was observed in the ED for about 30 minutes and showed no signs of changes in symptoms  Patient was reminded to return to the emergency room if she starts to develop any new symptoms that may be indicative of an IV contrast reaction  Patient was thoroughly educated with those symptoms may be  Also informed patient to seek medical evaluation if she begins noticing any worsening suprapubic tenderness, hematuria, decreased urination and other urinary complaints, this could be a sign of SARAH from IV contrast   Vital signs are stable  Patient is stable  Safe for discharge  Amount and/or Complexity of Data Reviewed  Review and summarize past medical records: yes  Discuss the patient with other providers: yes    Risk of Complications, Morbidity, and/or Mortality  Presenting problems: moderate  Diagnostic procedures: low  Management options: low    Patient Progress  Patient progress: resolved        Disposition  Final diagnoses:   Anxiety     Time reflects when diagnosis was documented in both MDM as applicable and the Disposition within this note     Time User Action Codes Description Comment    7/6/2020  2:22 PM Vena Yuli [F41 9] Anxiety     7/6/2020  2:22 PM Merly Christian Add [Z91 041] Allergic to IV contrast     7/6/2020  2:22 PM Merly Christian Remove [Z91 041] Allergic to IV contrast       ED Disposition     ED Disposition Condition Date/Time Comment    Discharge Stable Mon Jul 6, 2020  2:22  W Cooper Mcpherson discharge to home/self care              Follow-up Information    None         Patient's Medications   Discharge Prescriptions    No medications on file     No discharge procedures on file      PDMP Review       Value Time User    PDMP Reviewed  Yes 6/16/2020 11:18 AM Madison Everett DO          ED Provider  Electronically Signed by           Debora Villa PA-C  07/06/20 8966

## 2020-07-06 NOTE — DISCHARGE INSTRUCTIONS
Return to emergency room if symptoms worsen, develop new symptoms, or have concern about progress of your condition  Take all medication as directed  Contact your primary care provider in 48 hours for evaluation of the established treatment plan and discussion on the progress of your condition

## 2020-07-06 NOTE — TELEPHONE ENCOUNTER
Patient is calling in requesting to speak to Palliative Care, I have provided her with palliative care's number, [phone] and have transferred her over

## 2020-07-08 ENCOUNTER — TELEPHONE (OUTPATIENT)
Dept: HEMATOLOGY ONCOLOGY | Facility: CLINIC | Age: 60
End: 2020-07-08

## 2020-07-08 ENCOUNTER — OFFICE VISIT (OUTPATIENT)
Dept: HEMATOLOGY ONCOLOGY | Facility: CLINIC | Age: 60
End: 2020-07-08
Payer: COMMERCIAL

## 2020-07-08 VITALS
OXYGEN SATURATION: 97 % | DIASTOLIC BLOOD PRESSURE: 68 MMHG | HEART RATE: 56 BPM | SYSTOLIC BLOOD PRESSURE: 118 MMHG | TEMPERATURE: 98.5 F | BODY MASS INDEX: 34.76 KG/M2 | WEIGHT: 203.6 LBS | RESPIRATION RATE: 18 BRPM | HEIGHT: 64 IN

## 2020-07-08 DIAGNOSIS — D51.8 OTHER VITAMIN B12 DEFICIENCY ANEMIAS: ICD-10-CM

## 2020-07-08 DIAGNOSIS — R59.1 LYMPHADENOPATHY OF HEAD AND NECK: ICD-10-CM

## 2020-07-08 DIAGNOSIS — C82.18 GRADE 2 FOLLICULAR LYMPHOMA OF LYMPH NODES OF MULTIPLE REGIONS (HCC): Primary | ICD-10-CM

## 2020-07-08 PROCEDURE — 99215 OFFICE O/P EST HI 40 MIN: CPT | Performed by: NURSE PRACTITIONER

## 2020-07-08 PROCEDURE — 1036F TOBACCO NON-USER: CPT | Performed by: NURSE PRACTITIONER

## 2020-07-08 PROCEDURE — 3008F BODY MASS INDEX DOCD: CPT | Performed by: NURSE PRACTITIONER

## 2020-07-08 PROCEDURE — 3074F SYST BP LT 130 MM HG: CPT | Performed by: NURSE PRACTITIONER

## 2020-07-08 PROCEDURE — 3078F DIAST BP <80 MM HG: CPT | Performed by: NURSE PRACTITIONER

## 2020-07-08 NOTE — PROGRESS NOTES
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 [facility])  Patient's recent CT scan of the abdomen and pelvis from this week shows stability without any evidence of pathologic lymphadenopathy or progressive disease  She is reporting new swelling/tenderness in the left neck which was evident and has persisted since her possible CT contrast reaction 2 days ago  We will start with an ultrasound of the neck to evaluate for lymphadenopathy to that area; if abnormal pathology found consider repeat PET-CT imaging  For now we will continue her on her rituximab on an every 8 week basis which will be due again in August   We will try 1 more time to get her restarted on the Revlimid 15 mg 3 weeks on with 1 week of a break since there is no obvious progression on her CT abdominal imaging  She was again educated on the importance of being compliant with her oral chemotherapy in order for it to be effective in treating her malignancy  Will contact her specialty pharmacy today and have them ship her medication to the doctor's office going forward for better compliance  The patient states that she will pick her medication up and get it started as soon as it arrives  If we continue to have issues with compliance going forward will likely need to change her to IV regimen  We will continue to monitor her very closely on an every other week basis since she is not the most reliable patient  Will repeat her lab again prior to her follow-up in 2 weeks  The patient will continue to follow up with the palliative care team for her chronic pain and symptom management  - CBC and differential; Future  - Comprehensive metabolic panel; Future  - LD,Blood; Future    2  Lymphadenopathy of head and neck  As above new tender swelling possible lymphadenopathy to the left neck  It is unclear if this is due to her lymphoma versus a reactive process  We will start with ultrasound of the neck since she had possible reaction to CT contrast earlier this week  If suspicious for progressive malignancy will consider PET-CT scan  - US head neck soft tissue; Future    3  Other vitamin B12 deficiency anemias  Continue vitamin B12 injections on a monthly basis which are maintaining her levels  HPI:  Patient presents today for a follow-up visit  She still has not restarted her Revlimid 15 mg reports that it has been about a month since her last dose  Apparently the package was sent to her home but she did not accepted as she was out therefore was sent back  She had a CT scan of the abdomen and pelvis 2 days ago and afterward developed dizziness and shortness of breath, was evaluated in the emergency department afterward; she felt fine during the ER visit and was sent home  It is not entirely clear if she had a true reaction to the CT dye verses anxiety from the experience  The patient does report that since the episode 2 days ago she has noticed tenderness and swelling to the left neck  Otherwise she has no new complaints  Her most recent laboratory studies from 07/06/2020 showed WBC 2 5 with ANC 1 25, stable macrocytic anemia H&H 11 8/37 3, MCV 68 normal platelet count 637  Potassium is slightly lower than average 3 5, sodium 136, alk-phos 130 remaining metabolic panel normal   Her LDH and uric acid are in the normal range  Inflammatory markers are elevated C reactive protein 19 2 and sed rate 48  UA showed trace proteinuria without any evidence of UTI  Her CT scan of the abdomen and pelvis with contrast from 07/06/2020 showed  IMPRESSION:  1  Diffuse mistiness of the mesenteric fat with a few subcentimeter mesenteric lymph nodes appears grossly stable from prior PET/CT study  This could represent sequela of treated lymphoma or possibly mesenteric panniculitis   No dominant mesenteric mass   or progressive adenopathy  2  Otherwise, no pathologic lymphadenopathy detected      Oncology History    The patient started to notice significant abdominal pain about 8 months prior to presentation, she then was evaluated in the hospital with a CT scan of the chest abdomen pelvis on the 7th of November   This showed massive lymphadenopathy throughout the abdomen and pelvis with hepatic and massive splenomegaly   She also was found to have bulky supraclavicular, axillary and mediastinal adenopathy  She then had a supra clavicular lymph node excisional biopsy on the 9th of November , the pathology was compatible with Follicular lymphoma, WHO grade 1-2  She then had a bone marrow biopsy on the 20th of November which showed also low grade B-cell lymphoma CD10 positive compromising 63% of the total cells  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 ([facility] [address] )    11/7/2018 Initial Diagnosis     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 ([facility] [address] )      12/6/2018 -  Chemotherapy     1   rituximab and bendamustine with neulasta support 12/6/2018- 3/13/19 (4 cycles total)  - day 2 bendamustine held with cycle 4  - administered Rituxan only 4/7/19    2  Started maintenance Rituxan every 8 weeks 5/15/19    3  Revlimid 15 mg 3 weeks on with 1 week of a break added to maintenance Rituxan 3/2020 due to progression        12/7/2018 Adverse Reaction     C1D2 labs reflective of tumor lysis syndrome, dose of rasburicase given x1 and admitted for close observation/hydration         Interval history:    ROS: Review of Systems   Constitutional: Positive for appetite change and fatigue  Negative for activity change, chills, fever and unexpected weight change  HENT: Negative for congestion, mouth sores, nosebleeds, sore throat and trouble swallowing  Eyes: Negative  Respiratory: Positive for cough and shortness of breath  Negative for chest tightness      Cardiovascular: Negative for chest pain, palpitations and leg swelling  Gastrointestinal: Positive for abdominal pain  Negative for abdominal distention, blood in stool, constipation, diarrhea, nausea and vomiting  Genitourinary: Negative for difficulty urinating, dysuria, frequency, hematuria and urgency  Musculoskeletal: Negative for arthralgias, back pain, gait problem, joint swelling and myalgias  Skin: Negative for color change, pallor and rash  Neurological: Positive for dizziness, numbness and headaches  Negative for weakness and light-headedness  Hematological: Positive for adenopathy  Does not bruise/bleed easily  Psychiatric/Behavioral: Positive for sleep disturbance  Negative for dysphoric mood         Past Medical History:   Diagnosis Date    Anemia     iron and B12    Arthritis     Asthma     Cough     Depression     Falls frequently     Lupus [facility] [address] )     [facility]     Lymphoma ([facility] )     Migraine     Osteoporosis     Psychiatric disorder     Vertigo        Past Surgical History:   Procedure Laterality Date    CHOLECYSTECTOMY      FL GUIDED CENTRAL VENOUS ACCESS DEVICE INSERTION  11/9/2018    HYSTERECTOMY      LYMPH NODE BIOPSY Left 11/9/2018    Procedure: EXCISION BIOPSY LYMPH NODE SUPRACLAVICULAR;  Surgeon: Garland Beyer MD;  Location: [facility] MAIN OR;  Service: General    [place] INCISE FINGER TENDON SHEATH Right 1/16/2020    Procedure: RELEASE TRIGGER FINGER RIGHT THUMB;  Surgeon: Harsh Salazar MD;  Location: [facility] MAIN OR;  Service: Orthopedics    TUNNELED VENOUS PORT PLACEMENT N/A 11/9/2018    Procedure: INSERTION OF PORT-A-CATH;  Surgeon: Garland Beyer MD;  Location: [facility] MAIN OR;  Service: General       Social History     Socioeconomic History    Marital status: Single     Spouse name: None    Number of children: None    Years of education: None    Highest education level: None   Occupational History    None   Social Needs    Financial resource strain: None    Food insecurity:     Worry: None Inability: None    Transportation needs:     Medical: None     Non-medical: None   Tobacco Use    Smoking status: Former Smoker     Last attempt to quit: 6/12/2017     Years since quitting: 3 0    Smokeless tobacco: Never Used   Substance and Sexual Activity    Alcohol use: Never     Frequency: Never    Drug use: Never    Sexual activity: None   Lifestyle    Physical activity:     Days per week: None     Minutes per session: None    Stress: None   Relationships    Social connections:     Talks on phone: None     Gets together: None     Attends L[employer] day service: None     Active member of club or organization: None     Attends meetings of clubs or organizations: None     Relationship status: None    Intimate partner violence:     Fear of current or ex partner: None     Emotionally abused: None     Physically abused: None     Forced sexual activity: None   Other Topics Concern    None   Social History Narrative    None       Family History   Problem Relation Age of Onset    Cancer Mother     Diabetes Mother     Cancer Father     Diabetes Father        Allergies   Allergen Reactions    Penicillins Anaphylaxis         Current Outpatient Medications:     acetaminophen (TYLENOL) 500 mg tablet, May take 1 tablet (500 mg total) by mouth every 8 (eight) hours as needed for mild pain or moderate pain   May also take 1 tablet (500 mg total) every 8 (eight) hours as needed for mild pain or moderate pain , Disp: 180 tablet, Rfl: 3    al mag oxide-diphenhydramine-lidocaine viscous (MAGIC MOUTHWASH) 1:1:1 suspension, Swish and spit 10 mL every 4 (four) hours as needed for mouth pain or discomfort, Disp: 180 mL, Rfl: 0    amLODIPine (NORVASC) 5 mg tablet, Take 1 tablet (5 mg total) by mouth daily, Disp: 30 tablet, Rfl: 0    aspirin (ECOTRIN LOW STRENGTH) 81 mg EC tablet, Take 1 tablet (81 mg total) by mouth daily, Disp: 30 tablet, Rfl: 11    benzonatate (TESSALON) 200 MG capsule, Take 1 capsule (200 mg total) by mouth 3 (three) times a day as needed for cough, Disp: 20 capsule, Rfl: 1    carbamide peroxide (DEBROX) 6 5 % otic solution, Administer 5 drops to the right ear 2 (two) times a day, Disp: 15 mL, Rfl: 0    cyanocobalamin 1000 MCG tablet, Take 1 tablet (1,000 mcg total) by mouth daily, Disp: 90 tablet, Rfl: 3    dexamethasone (DECADRON) 1 mg tablet, Take 1 tablet (1 mg total) by mouth 2 (two) times a day with meals Do not take afternoon dose after 2pm, Disp: 60 tablet, Rfl: 3    DULoxetine (CYMBALTA) 20 mg capsule, Take 1 capsule (20 mg total) by mouth daily, Disp: 30 capsule, Rfl: 3    lenalidomide (Revlimid) 15 MG CAPS, TAKE 1 CAPSULE BY MOUTH DAILY FOR 21 DAYS, THEN 7 DAYS REST, Disp: 21 capsule, Rfl: 0    meclizine (ANTIVERT) 12 5 MG tablet, Take 1 tablet (12 5 mg total) by mouth every 8 (eight) hours as needed for dizziness, Disp: 30 tablet, Rfl: 3    omeprazole (PriLOSEC) 20 mg delayed release capsule, Take 2 capsules (40 mg total) by mouth daily To prevent stomach upset, Disp: 60 capsule, Rfl: 5    ondansetron (ZOFRAN) 4 mg tablet, Take 1 tablet (4 mg total) by mouth every 8 (eight) hours as needed for nausea or vomiting, Disp: 20 tablet, Rfl: 0    oxyCODONE (ROXICODONE) 15 mg immediate release tablet, Take 1 tablet (15 mg total) by mouth every 4 (four) hours as needed for moderate pain or severe pain Max 6 tablets/dayMax Daily Amount: 90 mg, Disp: 180 tablet, Rfl: 0    polyethylene glycol (MIRALAX) 17 g packet, Take 17 g by mouth daily, Disp: 30 each, Rfl: 3    senna (SENOKOT) 8 6 mg, Take 1 tablet (8 6 mg total) by mouth 2 (two) times a day, Disp: 60 each, Rfl: 3      Physical Exam:  /68 (BP Location: Left arm, Patient Position: Sitting, Cuff Size: Adult)   Pulse 56   Temp 98 5 °F (36 9 °C) (Tympanic)   Resp 18   Ht 5' 4" (1 626 m)   Wt 92 4 kg (203 lb 9 6 oz)   SpO2 97%   BMI 34 95 kg/m²     Physical Exam   Constitutional: She is oriented to person, place, and time   She appears well-developed and well-nourished  No distress  HENT:   Head: Normocephalic and atraumatic  Eyes: Pupils are equal, round, and reactive to light  Conjunctivae are normal  No scleral icterus  Neck: Normal range of motion  Neck supple  No thyromegaly present  Cardiovascular: Normal rate, regular rhythm and intact distal pulses  Murmur heard  Pulmonary/Chest: Effort normal and breath sounds normal  No respiratory distress  Abdominal: Soft  Bowel sounds are normal  She exhibits no distension  There is no hepatosplenomegaly  There is generalized tenderness  Musculoskeletal: Normal range of motion  She exhibits no edema  Lymphadenopathy:     She has cervical adenopathy  Left cervical: Superficial cervical (x2 areas +tenderness) adenopathy present  She has no axillary adenopathy  Neurological: She is alert and oriented to person, place, and time  Skin: Skin is warm and dry  No rash noted  She is not diaphoretic  No erythema  No pallor  Psychiatric: Her behavior is normal  Judgment and thought content normal  Her affect is blunt  She exhibits a depressed mood  Vitals reviewed  Labs:  Lab Results   Component Value Date    WBC 2 50 (L) 07/06/2020    HGB 11 8 (L) 07/06/2020    HCT 37 3 07/06/2020    MCV 68 (L) 07/06/2020     07/06/2020     Lab Results   Component Value Date    K 3 5 (L) 07/06/2020     07/06/2020    CO2 28 07/06/2020    BUN 11 07/06/2020    CREATININE 0 74 07/06/2020    GLUF 93 07/06/2020    CALCIUM 9 2 07/06/2020    AST 32 07/06/2020    ALT 29 07/06/2020    ALKPHOS 130 (H) 07/06/2020    EGFR 90 07/06/2020       Patient voiced understanding and agreement in the above discussion  Aware to contact our office with questions/symptoms in the interim  This note has been generated by voice recognition software system  Therefore, there may be spelling, grammar, and or syntax errors  Please contact if questions arise

## 2020-07-08 NOTE — TELEPHONE ENCOUNTER
[facility] Revlimid [facility] office as pt missed last shipment  Rx to be shipped Fri

## 2020-07-09 ENCOUNTER — HOSPITAL ENCOUNTER (OUTPATIENT)
Dept: INFUSION CENTER | Facility: HOSPITAL | Age: 60
Discharge: HOME | End: 2020-07-09
Attending: INTERNAL MEDICINE
Payer: COMMERCIAL

## 2020-07-09 DIAGNOSIS — Z45.2 ENCOUNTER FOR CENTRAL LINE CARE: ICD-10-CM

## 2020-07-09 DIAGNOSIS — D51.8 OTHER VITAMIN B12 DEFICIENCY ANEMIAS: Primary | ICD-10-CM

## 2020-07-09 DIAGNOSIS — C82.18 GRADE 2 FOLLICULAR LYMPHOMA OF LYMPH NODES OF MULTIPLE REGIONS (HCC): ICD-10-CM

## 2020-07-09 PROCEDURE — 96372 THER/PROPH/DIAG INJ SC/IM: CPT

## 2020-07-09 RX ORDER — CYANOCOBALAMIN 1000 UG/ML
1000 INJECTION INTRAMUSCULAR; SUBCUTANEOUS ONCE
Status: CANCELLED | OUTPATIENT
Start: 2020-08-05

## 2020-07-09 RX ORDER — CYANOCOBALAMIN 1000 UG/ML
1000 INJECTION INTRAMUSCULAR; SUBCUTANEOUS ONCE
Status: COMPLETED | OUTPATIENT
Start: 2020-07-09 | End: 2020-07-09

## 2020-07-09 RX ADMIN — CYANOCOBALAMIN 1000 MCG: 1000 INJECTION INTRAMUSCULAR; SUBCUTANEOUS at 12:11

## 2020-07-09 NOTE — PLAN OF CARE
Problem: Potential for Falls  Goal: Patient will remain free of falls  Description  INTERVENTIONS:  - Assess patient frequently for physical needs  -  Identify cognitive and physical deficits and behaviors that affect risk of falls    -  [place] fall precautions as indicated by assessment   - Educate patient/family on patient safety including physical limitations  - Instruct patient to call for assistance with activity based on assessment  - Modify environment to reduce risk of injury  - Consider OT/PT consult to assist with strengthening/mobility  Outcome: Progressing

## 2020-07-09 NOTE — PROGRESS NOTES
Pt here for port flush/vit b12 today, port flush per protocol and b12 given without complications  Confirmed appt back next month for Rituxan and AVS provided

## 2020-07-13 ENCOUNTER — TELEPHONE (OUTPATIENT)
Dept: HEMATOLOGY ONCOLOGY | Facility: CLINIC | Age: 60
End: 2020-07-13

## 2020-07-13 ENCOUNTER — HOSPITAL ENCOUNTER (OUTPATIENT)
Dept: ULTRASOUND IMAGING | Facility: HOSPITAL | Age: 60
Discharge: HOME | End: 2020-07-13
Payer: COMMERCIAL

## 2020-07-13 DIAGNOSIS — R59.1 LYMPHADENOPATHY OF HEAD AND NECK: ICD-10-CM

## 2020-07-13 PROCEDURE — 76536 US EXAM OF HEAD AND NECK: CPT

## 2020-07-13 NOTE — TELEPHONE ENCOUNTER
Phoned pt using the [employer]  phones and a message was left informing pt that her Rx Revlimid pills were at our office ready for   I will try phoning her later to see if I can speak to her in person so she knows that no one is here at office on Tuesdays and Thursdays

## 2020-07-13 NOTE — TELEPHONE ENCOUNTER
Phoned pt and told her that he Rx Revlimid is here  She put family member on phone who spoke broken [place]   I gave instruction for him to  med on Wed we open at 8 am

## 2020-07-16 NOTE — PATIENT INSTRUCTIONS
Take your Revlimid (cancer medication), take 1 capsule daily for 21 days, then stop for 7 days per Dr Madonna Worrell instructions  Then start again, 1 capsule daily for 21 days, then stop for 7 days  Call Oncology/Dr Mathis's office if there are questions regarding how to take this medication  PRESCRIPTION REFILL REMINDER:  All medication refills should be requested prior to [facility] on Friday  Any refill requests after noon on Friday would be addressed the following Monday  Please protect yourself from the novel Coronavirus (COVID-19)! Even though we STILL do not have a vaccine or good antiviral drugs for this infection, the following strategies can help you stay healthy:    = Wash your hands with soap and water, or hand  with at least 60% alcohol, often  = Avoid touching your face!   = Avoid close contact with others, even if they seem healthy  Avoid gatherings of more than 10 people, and don't travel unnecessarily  = Stay home, except to get medical care or other essentials  If you can eat and drink and breathe and sleep, please consider calling your doctor's office instead of visiting in person, even if you are ill   = Cover your cough with a tissue, or your elbow  = Clean frequently touched surfaces and objects daily (e g , tables, countertops, light switches, doorknobs, and cabinet handles)  Regular household detergent and water are sufficient  Numbers of cases of Coronavirus are spiking in many [place] States  This is not a more dangerous virus, but a sign that more people in a community are spreading the virus  Please check the local disease reports near you if you consider travelling this summer  We do NOT advise travel to any community or State with a rising viral caseload  Check out [employer] data that are updated daily  http://www [employer]/   Global Epidemics  Org, from [facility] will give you [id number] information on virus cases  Https://globalepidemics  org/

## 2020-07-19 NOTE — PROGRESS NOTES
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 ([facility] [address] )    2  Cancer related pain    3  Generalized abdominal pain    4  Decreased appetite    5  Decreased energy    6  Recurrent major depressive disorder, in partial remission ([facility] [address] )    7  Nausea and vomiting, intractability of vomiting not specified, unspecified vomiting type    8  Slow transit constipation    9  Palliative care patient        Requested Prescriptions     Signed Prescriptions Disp Refills    oxyCODONE (ROXICODONE) 15 mg immediate release tablet 120 tablet 0     Sig: Take 1 tablet (15 mg total) by mouth every 4 (four) hours as needed for moderate pain or severe pain Max 6 tablets/dayMax Daily Amount: 90 mg    oxyCODONE (ROXICODONE) 15 mg immediate release tablet 180 tablet 0     Sig: Take 1 tablet (15 mg total) by mouth every 4 (four) hours as needed for moderate painMax Daily Amount: 90 mg     Medications Discontinued During This Encounter   Medication Reason    oxyCODONE (ROXICODONE) 15 mg immediate release tablet Reorder       Representatives have queried the patient's controlled substance dispensing history in the Prescription Drug Monitoring Program in compliance with regulations before I have prescribed any controlled substances  The prescription history is consistent with prescribed therapy and our practice policies  The visit was spent face to face with Joleen Alcantar with greater than 50% of the time spent in counseling or coordination of care including discussions of treatment instructions and follow up requirements   All of the patient's questions were answered during this discussion  Return in about 8 weeks (around 9/15/2020), or if symptoms worsen or fail to improve  Subjective:   In attendance at this visit: Joleen Alcantar   Chief Complaint  Follow up visit for:  symptom management  HPI Rosina Franco is a 64 y o  female with grade 2 follicular lymphoma initially diagnosed in Nov 2018  Tumor lysis syndrome in Dec 2018  Received chemo in early 2019  Started maintenance rituxan q 8 weeks starting May 2019  PET scan in May 2019 demonstrated improvement of adenopathy  She has [address], 404 on 7/6/2020  Per notes, given recurrence on maintenance rituximab, revlimid was added to the regimen for 3 cycles with goal of maintaining her on this for 1 year   Patient has been on maintenance rituximab and revlimid, issues with neutropenia, concerns with noncompliance and f/u with bloodwork   Per Oncology note, long discussion with patient regarding refractory and progressive grade 2 follicular lymphoma   Patient was encouraged to adhere to Revlimid 15mg once a day 3 weeks on/1 week off in combination with rituximab   Patient has not been compliant with taking revlimid consistently per notes  If continues, patient will be switched back to IV regimen  GI eval 3/3/2020, started on omeprazole 40mg daily, refused EGD/colonoscopy, instructed to increase use of miralax and limit senna  Patient with c/o increase L neck swelling/tenderness, US done       Translation by Deanna MCGINNIS  Patient follows:  PCP/Dr Mari Silverio/Dr Cody Davidson, last seen 6/29/20 and 7/8/20, f/u scheduled for 7/22/20    Imaging:  PETscan 1/27/20: revealed interval increased nodular hypermetabolism within the retroperitoneum and mesentery, most concerning for tumor recurrence, slight increased prominence of a left upper cervical node is nonspecific, possibly reactive, tumor recurrence also not excluded  CTimaging 7/6/20: Diffuse mistiness of the mesenteric fat with a few subcentimeter mesenteric lymph nodes appears grossly stable from prior PET/CT study  This could represent sequela of treated lymphoma or possibly mesenteric panniculitis  No dominant mesenteric mass or progressive adenopathy    Otherwise, no pathologic lymphadenopathy detected  US L neck 7/13/20: Borderline prominent left neck nodes measuring up to 2 3 x 0 9 cm  Previously described hypermetabolic subcentimeter node deep to the left parotid gland is not clearly visualized  Given the history of lymphoma, attention on follow-up PET/CT recommended    She has ongoing intermittent, severe abdominal pain and flank pain that has progressively worsened  Patient now taking oxyIR 15mg q4h daily for pain, mostly 4 tablets/day  Instructed patient to take tylenol xs 1-2 tabs q8h for mild to moderate pain and to use the oxyIR for severe pain  We discussed using a long-acting opioid  Patient states that when she is having severe pain, she just grabs the medicine and takes it, so I don't feel confident that adding a long-acting oral agent will be used properly or cautiously  We will consider transitioning her to fentanyl patch in future if her pain is not controlled on current regimen  Patient instructed that she can take oxyIR 15mg every 4 hours for max of 6 tablets/day   Patient to monitor how many tablets she takes on average daily  Patient asking for early refill in August as she will be traveling to [facility] for her daughter's wedding for 3 weeks  We discussed that insurance will have to approve this  Patient instructed to call insurance for approval, but will send for refill today and for 8/10/2020  In discussing Oncology medication regimen, patient states she is taking her Revlimid 15 days on, 15 days off  We explained how she should be taking this medication multiple times in [place] and wrote the instructions in [place]  We also provided patient with a medication chart, so that she can take this medication as directed by her Oncology team   Patient has f/u appointment tomorrow    Notified Oncology team of above      Patient c/o difficulty sleeping, mostly due to pain   We discussed taking tylenol + oxy 1 hour prior to bed to assist with pain control throughout the night and hopefully help with maintaining sleep  This has helped tremendously with nighttime pain  Patient instructed to try otc melatonin 3mg prn        Patient has constipation, but seems to control with this with adjusting her senna tablets  Discussed adjusting miralax and senna according to bowel movement, can hold for loose stools or diarrhea   Patient expressed understanding  This has been working well      We added decadron to assist with abdominal pain and discomfort given progression of lymphadenopathy   Patient also with intermittent nausea, decreased appetite and decreased energy   Patient's weight now 201lbs, down from 203lbs 6/16/20, weight 210lbs at our office visit 2/25/2020  Alexisrachel Galvan states that she was on steroids prior to chemo before and it made her jittery   Patient tolerating decadron 1mg bid with meals, do not take after 2pm   Discussed continuing daily prilosec with addition of steroid      Patient with anxiety regarding health and kids in [facility]   Patient also feels her mood has been depressed due to her condition  She is will to try cymbalta 20mg to help with depressed mood, anxiety and pain  Patient states that this has been helping      [employer] W[employer] document discussed at prior visits  Alexis Galvan completed and copy scanned into chart  Alexis Galvan wishes for life-support treatment unless in a coma and not expected to wake up or recover, then only if it could help  The following portions of the medical history were reviewed: past medical history, family history, problem list, medication list, and social history  2 sons, now both in [place], Algenes use to accompany patient to visits  [employer] - names son/Algenes as [facility]-son in [facility]      Current Outpatient Medications:     acetaminophen (TYLENOL) 500 mg tablet, May take 1 tablet (500 mg total) by mouth every 8 (eight) hours as needed for mild pain or moderate pain   May also take 1 tablet (500 mg total) every 8 (eight) hours as needed for mild pain or moderate pain , Disp: 180 tablet, Rfl: 3    al mag oxide-diphenhydramine-lidocaine viscous (MAGIC MOUTHWASH) 1:1:1 suspension, Swish and spit 10 mL every 4 (four) hours as needed for mouth pain or discomfort, Disp: 180 mL, Rfl: 0    amLODIPine (NORVASC) 5 mg tablet, Take 1 tablet (5 mg total) by mouth daily, Disp: 30 tablet, Rfl: 0    aspirin (ECOTRIN LOW STRENGTH) 81 mg EC tablet, Take 1 tablet (81 mg total) by mouth daily, Disp: 30 tablet, Rfl: 11    benzonatate (TESSALON) 200 MG capsule, Take 1 capsule (200 mg total) by mouth 3 (three) times a day as needed for cough, Disp: 20 capsule, Rfl: 1    carbamide peroxide (DEBROX) 6 5 % otic solution, Administer 5 drops to the right ear 2 (two) times a day, Disp: 15 mL, Rfl: 0    cyanocobalamin 1000 MCG tablet, Take 1 tablet (1,000 mcg total) by mouth daily, Disp: 90 tablet, Rfl: 3    dexamethasone (DECADRON) 1 mg tablet, Take 1 tablet (1 mg total) by mouth 2 (two) times a day with meals Do not take afternoon dose after 2pm, Disp: 60 tablet, Rfl: 3    DULoxetine (CYMBALTA) 20 mg capsule, Take 1 capsule (20 mg total) by mouth daily, Disp: 30 capsule, Rfl: 3    lenalidomide (Revlimid) 15 MG CAPS, TAKE 1 CAPSULE BY MOUTH DAILY FOR 21 DAYS, THEN 7 DAYS REST, Disp: 21 capsule, Rfl: 0    meclizine (ANTIVERT) 12 5 MG tablet, Take 1 tablet (12 5 mg total) by mouth every 8 (eight) hours as needed for dizziness, Disp: 30 tablet, Rfl: 3    omeprazole (PriLOSEC) 20 mg delayed release capsule, Take 2 capsules (40 mg total) by mouth daily To prevent stomach upset, Disp: 60 capsule, Rfl: 5    ondansetron (ZOFRAN) 4 mg tablet, Take 1 tablet (4 mg total) by mouth every 8 (eight) hours as needed for nausea or vomiting, Disp: 20 tablet, Rfl: 0    oxyCODONE (ROXICODONE) 15 mg immediate release tablet, Take 1 tablet (15 mg total) by mouth every 4 (four) hours as needed for moderate pain or severe pain Max 6 tablets/dayMax Daily Amount: 90 mg, Disp: 120 tablet, Rfl: 0    polyethylene glycol (MIRALAX) 17 g packet, Take 17 g by mouth daily, Disp: 30 each, Rfl: 3    senna (SENOKOT) 8 6 mg, Take 1 tablet (8 6 mg total) by mouth 2 (two) times a day, Disp: 60 each, Rfl: 3    [START ON 8/10/2020] oxyCODONE (ROXICODONE) 15 mg immediate release tablet, Take 1 tablet (15 mg total) by mouth every 4 (four) hours as needed for moderate painMax Daily Amount: 90 mg, Disp: 180 tablet, Rfl: 0    Review of Systems   Constitutional: Positive for activity change, appetite change, fatigue and unexpected weight change  Gastrointestinal: Positive for abdominal pain and constipation  Musculoskeletal: Positive for back pain and neck pain (at enlarged LN site)  Neurological: Positive for weakness  Psychiatric/Behavioral: Positive for sleep disturbance  The patient is nervous/anxious  All other systems negative    Objective:  Vital Signs  /80 (BP Location: Left arm, Cuff Size: Standard)   Pulse 60   Temp (!) 97 1 °F (36 2 °C) (Temporal)   Resp 14   Wt 91 4 kg (201 lb 9 6 oz)   SpO2 98%   BMI 34 60 kg/m²    Physical Exam    Constitutional: Appears well-developed and well-nourished  In no acute distress  Head/Mouth: Normocephalic and atraumatic  Oropharynx moist, no lesions or sores  Eyes: EOM are normal  No ocular discharge  No scleral icterus  Neck: No visible adenopathy or masses  L posterior/lateral enlarged LN, tender  Respiratory: Effort normal  No stridor  No respiratory distress  Gastrointestinal: No abdominal distension  Musculoskeletal/Extremities: No edema  No cyanosis  ROM appears normal, no tenderness  Neurological: Alert, oriented and appropriately conversant  Skin: Dry, no diaphoresis  Psychiatric: Displays a normal mood and affect   Behavior, judgement and thought content appear normal

## 2020-07-21 ENCOUNTER — OFFICE VISIT (OUTPATIENT)
Dept: PALLIATIVE MEDICINE | Facility: CLINIC | Age: 60
End: 2020-07-21
Payer: COMMERCIAL

## 2020-07-21 VITALS
WEIGHT: 201.6 LBS | BODY MASS INDEX: 34.6 KG/M2 | HEART RATE: 60 BPM | DIASTOLIC BLOOD PRESSURE: 80 MMHG | SYSTOLIC BLOOD PRESSURE: 130 MMHG | TEMPERATURE: 97.1 F | OXYGEN SATURATION: 98 % | RESPIRATION RATE: 14 BRPM

## 2020-07-21 DIAGNOSIS — K59.01 SLOW TRANSIT CONSTIPATION: ICD-10-CM

## 2020-07-21 DIAGNOSIS — F33.41 RECURRENT MAJOR DEPRESSIVE DISORDER, IN PARTIAL REMISSION (HCC): ICD-10-CM

## 2020-07-21 DIAGNOSIS — C82.18 GRADE 2 FOLLICULAR LYMPHOMA OF LYMPH NODES OF MULTIPLE REGIONS (HCC): Primary | ICD-10-CM

## 2020-07-21 DIAGNOSIS — R53.83 DECREASED ENERGY: ICD-10-CM

## 2020-07-21 DIAGNOSIS — R11.2 NAUSEA AND VOMITING, INTRACTABILITY OF VOMITING NOT SPECIFIED, UNSPECIFIED VOMITING TYPE: ICD-10-CM

## 2020-07-21 DIAGNOSIS — Z51.5 PALLIATIVE CARE PATIENT: ICD-10-CM

## 2020-07-21 DIAGNOSIS — R10.84 GENERALIZED ABDOMINAL PAIN: ICD-10-CM

## 2020-07-21 DIAGNOSIS — G89.3 CANCER RELATED PAIN: ICD-10-CM

## 2020-07-21 DIAGNOSIS — R63.0 DECREASED APPETITE: ICD-10-CM

## 2020-07-21 PROCEDURE — 1036F TOBACCO NON-USER: CPT | Performed by: SURGERY

## 2020-07-21 PROCEDURE — 99214 OFFICE O/P EST MOD 30 MIN: CPT | Performed by: SURGERY

## 2020-07-21 PROCEDURE — 3079F DIAST BP 80-89 MM HG: CPT | Performed by: SURGERY

## 2020-07-21 PROCEDURE — 3075F SYST BP GE 130 - 139MM HG: CPT | Performed by: SURGERY

## 2020-07-21 RX ORDER — OXYCODONE HYDROCHLORIDE 15 MG/1
15 TABLET ORAL EVERY 4 HOURS PRN
Qty: 180 TABLET | Refills: 0 | Status: SHIPPED | OUTPATIENT
Start: 2020-08-10 | End: 2020-09-15

## 2020-07-21 RX ORDER — OXYCODONE HYDROCHLORIDE 15 MG/1
15 TABLET ORAL EVERY 4 HOURS PRN
Qty: 120 TABLET | Refills: 0 | Status: SHIPPED | OUTPATIENT
Start: 2020-07-21 | End: 2020-09-15

## 2020-07-22 ENCOUNTER — OFFICE VISIT (OUTPATIENT)
Dept: HEMATOLOGY ONCOLOGY | Facility: CLINIC | Age: 60
End: 2020-07-22
Payer: COMMERCIAL

## 2020-07-22 VITALS
BODY MASS INDEX: 33.97 KG/M2 | TEMPERATURE: 98.8 F | OXYGEN SATURATION: 99 % | HEART RATE: 59 BPM | RESPIRATION RATE: 18 BRPM | SYSTOLIC BLOOD PRESSURE: 142 MMHG | WEIGHT: 199 LBS | HEIGHT: 64 IN | DIASTOLIC BLOOD PRESSURE: 86 MMHG

## 2020-07-22 DIAGNOSIS — C82.18 GRADE 2 FOLLICULAR LYMPHOMA OF LYMPH NODES OF MULTIPLE REGIONS (HCC): Primary | ICD-10-CM

## 2020-07-22 DIAGNOSIS — R10.84 GENERALIZED ABDOMINAL PAIN: ICD-10-CM

## 2020-07-22 PROCEDURE — 99214 OFFICE O/P EST MOD 30 MIN: CPT | Performed by: INTERNAL MEDICINE

## 2020-07-22 PROCEDURE — 3008F BODY MASS INDEX DOCD: CPT | Performed by: INTERNAL MEDICINE

## 2020-07-22 PROCEDURE — 1036F TOBACCO NON-USER: CPT | Performed by: INTERNAL MEDICINE

## 2020-07-22 PROCEDURE — 3077F SYST BP >= 140 MM HG: CPT | Performed by: INTERNAL MEDICINE

## 2020-07-22 PROCEDURE — 3079F DIAST BP 80-89 MM HG: CPT | Performed by: INTERNAL MEDICINE

## 2020-07-22 NOTE — PROGRESS NOTES
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 [facility]  The patient was encouraged to continue with the Revlimid at the current dose 15 mg once a day 3 weeks on and 1 week off  She will be off of the Revlimid on the 6th of August for a week  She will then need to be restarted on the next cycle of Revlimid on the 13th of August   She is also due for rituximab on the 5th of August   The patient clinically seems to be better with the current line of treatment  We will continue the current regimen without changes as long as she continues to be compliant with it   - MISC COVID-19 TEST; Future  - CBC and differential; Future  - Comprehensive metabolic panel; Future  - Magnesium; Future  - LD,Blood; Future  - CBC and differential; Future  - Comprehensive metabolic panel; Future  - Magnesium; Future    2  Generalized abdominal pain  The improvement of her abdominal pain is clinical hint that the current regimen is working in controlling her follicular lymphoma  HPI:  Patient came today for a follow-up visit  We used the  during this office visit  The patient claimed that she is now compliant with the Revlimid 15 mg once a day which was started on the 16th of July  She knows that she needs to take the Revlimid on a daily basis 3 weeks on 1 week off  The patient stated that her abdominal symptoms has improved after she started to become more compliant with the Revlimid  She has no significant tenderness of her abdomen  She did have a CT scan of the abdomen and pelvis on 07/06/2020 which showed diffuse subcentimeter mesenteric lymph nodes  She also had recently on ultrasound of the neck since she did complain about left neck pain    The ultrasound on 07/13/2020 showed  IMPRESSION:     Borderline prominent left neck nodes measuring up to 2 3 x 0 9 cm  Previously described hypermetabolic subcentimeter node deep to the left parotid gland is not clearly visualized  Her pain in the neck area is gone  She did not do her blood work prior to this office visit  Oncology History    The patient started to notice significant abdominal pain about 8 months prior to presentation, she then was evaluated in the hospital with a CT scan of the chest abdomen pelvis on the 7th of November   This showed massive lymphadenopathy throughout the abdomen and pelvis with hepatic and massive splenomegaly   She also was found to have bulky supraclavicular, axillary and mediastinal adenopathy  She then had a supra clavicular lymph node excisional biopsy on the 9th of November , the pathology was compatible with Follicular lymphoma, WHO grade 1-2  She then had a bone marrow biopsy on the 20th of November which showed also low grade B-cell lymphoma CD10 positive compromising 63% of the total cells  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 ([facility] [address] )    11/7/2018 Initial Diagnosis     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 ([facility] [address] )      12/6/2018 -  Chemotherapy     1   rituximab and bendamustine with neulasta support 12/6/2018- 3/13/19 (4 cycles total)  - day 2 bendamustine held with cycle 4  - administered Rituxan only 4/7/19    2  Started maintenance Rituxan every 8 weeks 5/15/19    3  Revlimid 15 mg 3 weeks on with 1 week of a break added to maintenance Rituxan 3/2020 due to progression        12/7/2018 Adverse Reaction     C1D2 labs reflective of tumor lysis syndrome, dose of rasburicase given x1 and admitted for close observation/hydration         Interval history:    ROS: Review of Systems   Constitutional: Positive for fatigue  Negative for activity change, appetite change, chills, diaphoresis, fever and unexpected weight change     HENT: Negative for congestion, dental problem, ear discharge, ear pain, facial swelling, hearing loss, mouth sores, nosebleeds, postnasal drip, sore throat, tinnitus and trouble swallowing  Eyes: Negative for discharge, redness, itching and visual disturbance  Respiratory: Negative for cough, chest tightness, shortness of breath and wheezing  Cardiovascular: Negative for chest pain, palpitations and leg swelling  Gastrointestinal: Negative for abdominal distention, abdominal pain, anal bleeding, blood in stool, constipation, diarrhea, nausea and vomiting  Genitourinary: Negative for difficulty urinating, dysuria, flank pain, frequency, hematuria and urgency  Musculoskeletal: Positive for arthralgias  Negative for back pain, gait problem, joint swelling, myalgias and neck pain  Skin: Negative for color change, pallor, rash and wound  Neurological: Negative for dizziness, syncope, speech difficulty, weakness, light-headedness, numbness and headaches  Hematological: Negative for adenopathy  Does not bruise/bleed easily  Psychiatric/Behavioral: Negative for agitation, behavioral problems, confusion and sleep disturbance         Past Medical History:   Diagnosis Date    Anemia     iron and B12    Arthritis     Asthma     Cough     Depression     Falls frequently     [facility]     [facility] [address] [facility]     Lymphoma [facility]     Migraine     Osteoporosis     Psychiatric disorder     Vertigo        Past Surgical History:   Procedure Laterality Date    CHOLECYSTECTOMY      FL GUIDED CENTRAL VENOUS ACCESS DEVICE INSERTION  11/9/2018    HYSTERECTOMY      LYMPH NODE BIOPSY Left 11/9/2018    Procedure: EXCISION BIOPSY LYMPH NODE SUPRACLAVICULAR;  Surgeon: Radha Cortez MD;  Location: [facility] MAIN OR;  Service: General    UT INCISE FINGER TENDON SHEATH Right 1/16/2020    Procedure: RELEASE TRIGGER FINGER RIGHT THUMB;  Surgeon: Renetta Winter MD;  Location: [facility] MAIN OR;  Service: Orthopedics    TUNNELED VENOUS PORT PLACEMENT N/A 11/9/2018    Procedure: INSERTION OF PORT-A-CATH; Surgeon: Damian Del Castillo MD;  Location: [address] [place] [address] OR;  Service: General       Social History     Socioeconomic History    Marital status: Single     Spouse name: None    Number of children: None    Years of education: None    Highest education level: None   Occupational History    None   Social Needs    Financial resource strain: None    Food insecurity:     Worry: None     Inability: None    Transportation needs:     Medical: None     Non-medical: None   Tobacco Use    Smoking status: Former Smoker     Last attempt to quit: 6/12/2017     Years since quitting: 3 1    Smokeless tobacco: Never Used   Substance and Sexual Activity    Alcohol use: Never     Frequency: Never    Drug use: Never    Sexual activity: None   Lifestyle    Physical activity:     Days per week: None     Minutes per session: None    Stress: None   Relationships    Social connections:     Talks on phone: None     Gets together: None     Attends Alevism service: None     Active member of club or organization: None     Attends meetings of clubs or organizations: None     Relationship status: None    Intimate partner violence:     Fear of current or ex partner: None     Emotionally abused: None     Physically abused: None     Forced sexual activity: None   Other Topics Concern    None   Social History Narrative    None       Family History   Problem Relation Age of Onset    Cancer Mother     Diabetes Mother     Cancer Father     Diabetes Father        Allergies   Allergen Reactions    Contrast [Iodinated Diagnostic Agents] Shortness Of Breath and Dizziness    Penicillins Anaphylaxis         Current Outpatient Medications:     acetaminophen (TYLENOL) 500 mg tablet, May take 1 tablet (500 mg total) by mouth every 8 (eight) hours as needed for mild pain or moderate pain   May also take 1 tablet (500 mg total) every 8 (eight) hours as needed for mild pain or moderate pain , Disp: 180 tablet, Rfl: 3    al [employer] oxide-diphenhydramine-lidocaine viscous (MAGIC MOUTHWASH) 1:1:1 suspension, Swish and spit 10 mL every 4 (four) hours as needed for mouth pain or discomfort, Disp: 180 mL, Rfl: 0    amLODIPine (NORVASC) 5 mg tablet, Take 1 tablet (5 mg total) by mouth daily, Disp: 30 tablet, Rfl: 0    aspirin (ECOTRIN LOW STRENGTH) 81 mg EC tablet, Take 1 tablet (81 mg total) by mouth daily, Disp: 30 tablet, Rfl: 11    benzonatate (TESSALON) 200 MG capsule, Take 1 capsule (200 mg total) by mouth 3 (three) times a day as needed for cough, Disp: 20 capsule, Rfl: 1    carbamide peroxide (DEBROX) 6 5 % otic solution, Administer 5 drops to the right ear 2 (two) times a day, Disp: 15 mL, Rfl: 0    cyanocobalamin 1000 MCG tablet, Take 1 tablet (1,000 mcg total) by mouth daily, Disp: 90 tablet, Rfl: 3    dexamethasone (DECADRON) 1 mg tablet, Take 1 tablet (1 mg total) by mouth 2 (two) times a day with meals Do not take afternoon dose after 2pm, Disp: 60 tablet, Rfl: 3    DULoxetine (CYMBALTA) 20 mg capsule, Take 1 capsule (20 mg total) by mouth daily, Disp: 30 capsule, Rfl: 3    lenalidomide (Revlimid) 15 MG CAPS, TAKE 1 CAPSULE BY MOUTH DAILY FOR 21 DAYS, THEN 7 DAYS REST, Disp: 21 capsule, Rfl: 0    meclizine (ANTIVERT) 12 5 MG tablet, Take 1 tablet (12 5 mg total) by mouth every 8 (eight) hours as needed for dizziness, Disp: 30 tablet, Rfl: 3    omeprazole (PriLOSEC) 20 mg delayed release capsule, Take 2 capsules (40 mg total) by mouth daily To prevent stomach upset, Disp: 60 capsule, Rfl: 5    ondansetron (ZOFRAN) 4 mg tablet, Take 1 tablet (4 mg total) by mouth every 8 (eight) hours as needed for nausea or vomiting, Disp: 20 tablet, Rfl: 0    oxyCODONE (ROXICODONE) 15 mg immediate release tablet, Take 1 tablet (15 mg total) by mouth every 4 (four) hours as needed for moderate pain or severe pain Max 6 tablets/dayMax Daily Amount: 90 mg, Disp: 120 tablet, Rfl: 0    [START ON 8/10/2020] oxyCODONE (ROXICODONE) 15 mg immediate release tablet, Take 1 tablet (15 mg total) by mouth every 4 (four) hours as needed for moderate painMax Daily Amount: 90 mg, Disp: 180 tablet, Rfl: 0    polyethylene glycol (MIRALAX) 17 g packet, Take 17 g by mouth daily, Disp: 30 each, Rfl: 3    senna (SENOKOT) 8 6 mg, Take 1 tablet (8 6 mg total) by mouth 2 (two) times a day, Disp: 60 each, Rfl: 3      Physical Exam:  /86 (BP Location: Left arm, Patient Position: Sitting, Cuff Size: Adult)   Pulse 59   Temp 98 8 °F (37 1 °C)   Resp 18   Ht 5' 4" (1 626 m)   Wt 90 3 kg (199 lb)   SpO2 99%   BMI 34 16 kg/m²     Physical Exam   Constitutional: She is oriented to person, place, and time  She appears well-developed and well-nourished  No distress  HENT:   Head: Normocephalic and atraumatic  Eyes: Pupils are equal, round, and reactive to light  Conjunctivae and EOM are normal  Right eye exhibits no discharge  Left eye exhibits no discharge  No scleral icterus  Neck: Normal range of motion  Neck supple  No JVD present  No tracheal deviation present  No thyromegaly present  Cardiovascular: Normal rate, regular rhythm and normal heart sounds  Exam reveals no friction rub  No murmur heard  Pulmonary/Chest: Effort normal and breath sounds normal  No stridor  No respiratory distress  She has no wheezes  She has no rales  She exhibits no tenderness  Abdominal: Soft  Bowel sounds are normal  She exhibits no distension and no mass  There is no hepatosplenomegaly, splenomegaly or hepatomegaly  There is no tenderness  There is no rebound and no guarding  Musculoskeletal: Normal range of motion  She exhibits no edema, tenderness or deformity  Lymphadenopathy:     She has cervical adenopathy  Neurological: She is alert and oriented to person, place, and time  She has normal reflexes  No cranial nerve deficit  Coordination normal    Skin: Skin is warm and dry  No rash noted  She is not diaphoretic  No erythema  No pallor     Psychiatric: She has a normal mood and affect  Her behavior is normal  Judgment and thought content normal          Labs:  Lab Results   Component Value Date    WBC 2 50 (L) 07/06/2020    HGB 11 8 (L) 07/06/2020    HCT 37 3 07/06/2020    MCV 68 (L) 07/06/2020     07/06/2020     Lab Results   Component Value Date    K 3 5 (L) 07/06/2020     07/06/2020    CO2 28 07/06/2020    BUN 11 07/06/2020    CREATININE 0 74 07/06/2020    GLUF 93 07/06/2020    CALCIUM 9 2 07/06/2020    AST 32 07/06/2020    ALT 29 07/06/2020    ALKPHOS 130 (H) 07/06/2020    EGFR 90 07/06/2020     No results found for: TSH    Patient voiced understanding and agreement in the above discussion  Aware to contact our office with questions/symptoms in the interim

## 2020-07-30 DIAGNOSIS — C82.18 GRADE 2 FOLLICULAR LYMPHOMA OF LYMPH NODES OF MULTIPLE REGIONS (HCC): ICD-10-CM

## 2020-07-30 RX ORDER — LENALIDOMIDE 15 MG/1
CAPSULE ORAL
Qty: 21 CAPSULE | Refills: 0 | Status: SHIPPED | OUTPATIENT
Start: 2020-07-30 | End: 2020-08-28

## 2020-08-04 ENCOUNTER — TELEPHONE (OUTPATIENT)
Dept: HEMATOLOGY ONCOLOGY | Facility: CLINIC | Age: 60
End: 2020-08-04

## 2020-08-04 NOTE — TELEPHONE ENCOUNTER
Phoned [facility] to give Lonza Wilver # [phone] as Dr Geena Chambers refilled the Rx via [id number] automatic refill  I provided the Lonza Wilver number and drug will be shipped to [facility] office on Mon 8/10/2020

## 2020-08-05 ENCOUNTER — HOSPITAL ENCOUNTER (OUTPATIENT)
Dept: INFUSION CENTER | Facility: HOSPITAL | Age: 60
Discharge: HOME | End: 2020-08-05
Attending: INTERNAL MEDICINE
Payer: COMMERCIAL

## 2020-08-05 VITALS
HEIGHT: 64 IN | WEIGHT: 200.18 LBS | DIASTOLIC BLOOD PRESSURE: 58 MMHG | HEART RATE: 55 BPM | BODY MASS INDEX: 34.18 KG/M2 | SYSTOLIC BLOOD PRESSURE: 128 MMHG | TEMPERATURE: 97.7 F | RESPIRATION RATE: 16 BRPM | OXYGEN SATURATION: 100 %

## 2020-08-05 DIAGNOSIS — D51.8 OTHER VITAMIN B12 DEFICIENCY ANEMIAS: ICD-10-CM

## 2020-08-05 DIAGNOSIS — C82.18 GRADE 2 FOLLICULAR LYMPHOMA OF LYMPH NODES OF MULTIPLE REGIONS (HCC): Primary | ICD-10-CM

## 2020-08-05 LAB
ALBUMIN SERPL BCP-MCNC: 3.9 G/DL (ref 3–5.2)
ALP SERPL-CCNC: 110 U/L (ref 43–122)
ALT SERPL W P-5'-P-CCNC: 25 U/L (ref 9–52)
ANION GAP SERPL CALCULATED.3IONS-SCNC: 6 MMOL/L (ref 5–14)
ANISOCYTOSIS BLD QL SMEAR: PRESENT
AST SERPL W P-5'-P-CCNC: 23 U/L (ref 14–36)
BILIRUB SERPL-MCNC: 0.6 MG/DL
BUN SERPL-MCNC: 10 MG/DL (ref 5–25)
CALCIUM SERPL-MCNC: 9.3 MG/DL (ref 8.4–10.2)
CHLORIDE SERPL-SCNC: 106 MMOL/L (ref 97–108)
CO2 SERPL-SCNC: 27 MMOL/L (ref 22–30)
CREAT SERPL-MCNC: 0.7 MG/DL (ref 0.6–1.2)
DACRYOCYTES BLD QL SMEAR: PRESENT
EOSINOPHIL # BLD AUTO: 0.32 THOUSAND/UL (ref 0–0.4)
EOSINOPHIL NFR BLD MANUAL: 17 % (ref 0–6)
ERYTHROCYTE [DISTWIDTH] IN BLOOD BY AUTOMATED COUNT: 15.5 %
GFR SERPL CREATININE-BSD FRML MDRD: 96 ML/MIN/1.73SQ M
GLUCOSE SERPL-MCNC: 94 MG/DL (ref 70–99)
HCT VFR BLD AUTO: 34.2 % (ref 36–46)
HGB BLD-MCNC: 11.2 G/DL (ref 12–16)
HYPERCHROMIA BLD QL SMEAR: PRESENT
LDH SERPL-CCNC: 349 U/L (ref 313–618)
LYMPHOCYTES # BLD AUTO: 0.63 THOUSAND/UL (ref 0.5–4)
LYMPHOCYTES # BLD AUTO: 33 % (ref 25–45)
MAGNESIUM SERPL-MCNC: 2.1 MG/DL (ref 1.6–2.3)
MCH RBC QN AUTO: 22.1 PG (ref 26–34)
MCHC RBC AUTO-ENTMCNC: 32.6 G/DL (ref 31–36)
MCV RBC AUTO: 68 FL (ref 80–100)
MICROCYTES BLD QL AUTO: PRESENT
MONOCYTES # BLD AUTO: 0.19 THOUSAND/UL (ref 0.2–0.9)
MONOCYTES NFR BLD AUTO: 10 % (ref 1–10)
NEUTS BAND NFR BLD MANUAL: 4 % (ref 0–8)
NEUTS SEG # BLD: 0.76 THOUSAND/UL (ref 1.8–7.8)
NEUTS SEG NFR BLD AUTO: 36 %
OVALOCYTES BLD QL SMEAR: PRESENT
PLATELET # BLD AUTO: 238 THOUSANDS/UL (ref 150–450)
PLATELET BLD QL SMEAR: ADEQUATE
PMV BLD AUTO: 10.1 FL (ref 8.9–12.7)
POIKILOCYTOSIS BLD QL SMEAR: PRESENT
POTASSIUM SERPL-SCNC: 4 MMOL/L (ref 3.6–5)
PROT SERPL-MCNC: 6.9 G/DL (ref 5.9–8.4)
RBC # BLD AUTO: 5.04 MILLION/UL (ref 4–5.2)
RBC MORPH BLD: ABNORMAL
SCHISTOCYTES BLD QL SMEAR: PRESENT
SODIUM SERPL-SCNC: 139 MMOL/L (ref 137–147)
TOTAL CELLS COUNTED SPEC: 100
WBC # BLD AUTO: 1.9 THOUSAND/UL (ref 4.5–11)

## 2020-08-05 PROCEDURE — 83615 LACTATE (LD) (LDH) ENZYME: CPT

## 2020-08-05 PROCEDURE — 96415 CHEMO IV INFUSION ADDL HR: CPT

## 2020-08-05 PROCEDURE — 96372 THER/PROPH/DIAG INJ SC/IM: CPT

## 2020-08-05 PROCEDURE — 85007 BL SMEAR W/DIFF WBC COUNT: CPT

## 2020-08-05 PROCEDURE — 85027 COMPLETE CBC AUTOMATED: CPT

## 2020-08-05 PROCEDURE — 96367 TX/PROPH/DG ADDL SEQ IV INF: CPT

## 2020-08-05 PROCEDURE — 96413 CHEMO IV INFUSION 1 HR: CPT

## 2020-08-05 PROCEDURE — 80053 COMPREHEN METABOLIC PANEL: CPT

## 2020-08-05 PROCEDURE — 83735 ASSAY OF MAGNESIUM: CPT

## 2020-08-05 RX ORDER — SODIUM CHLORIDE 9 MG/ML
20 INJECTION, SOLUTION INTRAVENOUS ONCE
Status: COMPLETED | OUTPATIENT
Start: 2020-08-05 | End: 2020-08-05

## 2020-08-05 RX ORDER — ACETAMINOPHEN 325 MG/1
650 TABLET ORAL ONCE
Status: COMPLETED | OUTPATIENT
Start: 2020-08-05 | End: 2020-08-05

## 2020-08-05 RX ORDER — CYANOCOBALAMIN 1000 UG/ML
1000 INJECTION INTRAMUSCULAR; SUBCUTANEOUS ONCE
Status: CANCELLED | OUTPATIENT
Start: 2020-08-06

## 2020-08-05 RX ORDER — CYANOCOBALAMIN 1000 UG/ML
1000 INJECTION INTRAMUSCULAR; SUBCUTANEOUS ONCE
Status: COMPLETED | OUTPATIENT
Start: 2020-08-05 | End: 2020-08-05

## 2020-08-05 RX ADMIN — SODIUM CHLORIDE 20 ML/HR: 0.9 INJECTION, SOLUTION INTRAVENOUS at 09:15

## 2020-08-05 RX ADMIN — ACETAMINOPHEN 650 MG: 325 TABLET ORAL at 09:24

## 2020-08-05 RX ADMIN — DIPHENHYDRAMINE HYDROCHLORIDE 50 MG: 50 INJECTION INTRAMUSCULAR; INTRAVENOUS at 09:24

## 2020-08-05 RX ADMIN — DEXAMETHASONE SODIUM PHOSPHATE 10 MG: 10 INJECTION, SOLUTION INTRAMUSCULAR; INTRAVENOUS at 09:46

## 2020-08-05 RX ADMIN — CYANOCOBALAMIN 1000 MCG: 1000 INJECTION INTRAMUSCULAR; SUBCUTANEOUS at 09:21

## 2020-08-05 RX ADMIN — RITUXIMAB 750 MG: 10 INJECTION, SOLUTION INTRAVENOUS at 10:24

## 2020-08-05 NOTE — PLAN OF CARE
Problem: Potential for Falls  Goal: Patient will remain free of falls  Description: INTERVENTIONS:  - Assess patient frequently for physical needs  -  Identify cognitive and physical deficits and behaviors that affect risk of falls    -  [place] fall precautions as indicated by assessment   - Educate patient/family on patient safety including physical limitations  - Instruct patient to call for assistance with activity based on assessment  - Modify environment to reduce risk of injury  - Consider OT/PT consult to assist with strengthening/mobility  Outcome: Progressing

## 2020-08-05 NOTE — PROGRESS NOTES
Pt here for every 8 week Rituxan, central labs drawn that were missed from 7-22-20 (no parameters for treatment today) pt tolerated well without complications, confirmed next appt with Sherita 8-10-20, has no more appt requests for next Rituxan, and AVS provided

## 2020-08-07 ENCOUNTER — APPOINTMENT (OUTPATIENT)
Dept: LAB | Facility: HOSPITAL | Age: 60
End: 2020-08-07
Payer: COMMERCIAL

## 2020-08-07 ENCOUNTER — TRANSCRIBE ORDERS (OUTPATIENT)
Dept: LAB | Facility: HOSPITAL | Age: 60
End: 2020-08-07

## 2020-08-07 DIAGNOSIS — C82.18 GRADE 2 FOLLICULAR LYMPHOMA OF LYMPH NODES OF MULTIPLE REGIONS (HCC): ICD-10-CM

## 2020-08-07 LAB
ALBUMIN SERPL BCP-MCNC: 4 G/DL (ref 3–5.2)
ALP SERPL-CCNC: 98 U/L (ref 43–122)
ALT SERPL W P-5'-P-CCNC: 12 U/L (ref 9–52)
ANION GAP SERPL CALCULATED.3IONS-SCNC: 13 MMOL/L (ref 5–14)
AST SERPL W P-5'-P-CCNC: 17 U/L (ref 14–36)
BILIRUB SERPL-MCNC: 0.3 MG/DL
BUN SERPL-MCNC: 18 MG/DL (ref 5–25)
CALCIUM SERPL-MCNC: 9.2 MG/DL (ref 8.4–10.2)
CHLORIDE SERPL-SCNC: 105 MMOL/L (ref 97–108)
CO2 SERPL-SCNC: 23 MMOL/L (ref 22–30)
CREAT SERPL-MCNC: 0.72 MG/DL (ref 0.6–1.2)
GFR SERPL CREATININE-BSD FRML MDRD: 93 ML/MIN/1.73SQ M
GLUCOSE P FAST SERPL-MCNC: 90 MG/DL (ref 70–99)
LDH SERPL-CCNC: 331 U/L (ref 313–618)
MAGNESIUM SERPL-MCNC: 1.9 MG/DL (ref 1.6–2.3)
POTASSIUM SERPL-SCNC: 3.9 MMOL/L (ref 3.6–5)
PROT SERPL-MCNC: 6.6 G/DL (ref 5.9–8.4)
SODIUM SERPL-SCNC: 141 MMOL/L (ref 137–147)

## 2020-08-07 PROCEDURE — 80053 COMPREHEN METABOLIC PANEL: CPT

## 2020-08-07 PROCEDURE — 83615 LACTATE (LD) (LDH) ENZYME: CPT

## 2020-08-07 PROCEDURE — 83735 ASSAY OF MAGNESIUM: CPT

## 2020-08-12 ENCOUNTER — TELEPHONE (OUTPATIENT)
Dept: HEMATOLOGY ONCOLOGY | Facility: CLINIC | Age: 60
End: 2020-08-12

## 2020-08-12 ENCOUNTER — OFFICE VISIT (OUTPATIENT)
Dept: HEMATOLOGY ONCOLOGY | Facility: CLINIC | Age: 60
End: 2020-08-12
Payer: COMMERCIAL

## 2020-08-12 ENCOUNTER — APPOINTMENT (OUTPATIENT)
Dept: LAB | Facility: HOSPITAL | Age: 60
End: 2020-08-12
Payer: COMMERCIAL

## 2020-08-12 VITALS
OXYGEN SATURATION: 99 % | TEMPERATURE: 98.2 F | SYSTOLIC BLOOD PRESSURE: 126 MMHG | DIASTOLIC BLOOD PRESSURE: 68 MMHG | BODY MASS INDEX: 35.19 KG/M2 | HEIGHT: 63 IN | WEIGHT: 198.6 LBS | HEART RATE: 57 BPM | RESPIRATION RATE: 16 BRPM

## 2020-08-12 DIAGNOSIS — D51.8 OTHER VITAMIN B12 DEFICIENCY ANEMIAS: ICD-10-CM

## 2020-08-12 DIAGNOSIS — C82.18 GRADE 2 FOLLICULAR LYMPHOMA OF LYMPH NODES OF MULTIPLE REGIONS (HCC): ICD-10-CM

## 2020-08-12 DIAGNOSIS — D50.9 MICROCYTIC ANEMIA: ICD-10-CM

## 2020-08-12 DIAGNOSIS — C82.18 GRADE 2 FOLLICULAR LYMPHOMA OF LYMPH NODES OF MULTIPLE REGIONS (HCC): Primary | ICD-10-CM

## 2020-08-12 LAB
ALBUMIN SERPL BCP-MCNC: 4.1 G/DL (ref 3–5.2)
ALP SERPL-CCNC: 107 U/L (ref 43–122)
ALT SERPL W P-5'-P-CCNC: 21 U/L (ref 9–52)
ANION GAP SERPL CALCULATED.3IONS-SCNC: 9 MMOL/L (ref 5–14)
AST SERPL W P-5'-P-CCNC: 28 U/L (ref 14–36)
BASOPHILS # BLD AUTO: 0.08 THOUSAND/UL (ref 0–0.1)
BASOPHILS NFR MAR MANUAL: 4 % (ref 0–1)
BILIRUB SERPL-MCNC: 0.5 MG/DL
BUN SERPL-MCNC: 16 MG/DL (ref 5–25)
CALCIUM SERPL-MCNC: 9.3 MG/DL (ref 8.4–10.2)
CHLORIDE SERPL-SCNC: 103 MMOL/L (ref 97–108)
CO2 SERPL-SCNC: 25 MMOL/L (ref 22–30)
CREAT SERPL-MCNC: 0.67 MG/DL (ref 0.6–1.2)
CRP SERPL QL: 73.9 MG/L
EOSINOPHIL # BLD AUTO: 0.25 THOUSAND/UL (ref 0–0.4)
EOSINOPHIL NFR BLD MANUAL: 13 % (ref 0–6)
ERYTHROCYTE [DISTWIDTH] IN BLOOD BY AUTOMATED COUNT: 15.3 %
ERYTHROCYTE [SEDIMENTATION RATE] IN BLOOD: 57 MM/HOUR (ref 0–29)
FERRITIN SERPL-MCNC: 259 NG/ML (ref 8–388)
FOLATE SERPL-MCNC: 5.2 NG/ML (ref 3.1–17.5)
GFR SERPL CREATININE-BSD FRML MDRD: 97 ML/MIN/1.73SQ M
GLUCOSE P FAST SERPL-MCNC: 97 MG/DL (ref 70–99)
HCT VFR BLD AUTO: 34.4 % (ref 36–46)
HGB BLD-MCNC: 10.9 G/DL (ref 12–16)
IRON SATN MFR SERPL: 9 %
IRON SERPL-MCNC: 20 UG/DL (ref 50–170)
LDH SERPL-CCNC: 405 U/L (ref 313–618)
LYMPHOCYTES # BLD AUTO: 0.55 THOUSAND/UL (ref 0.5–4)
LYMPHOCYTES # BLD AUTO: 29 % (ref 25–45)
MCH RBC QN AUTO: 21.4 PG (ref 26–34)
MCHC RBC AUTO-ENTMCNC: 31.8 G/DL (ref 31–36)
MCV RBC AUTO: 67 FL (ref 80–100)
MICROCYTES BLD QL AUTO: PRESENT
MONOCYTES # BLD AUTO: 0.21 THOUSAND/UL (ref 0.2–0.9)
MONOCYTES NFR BLD AUTO: 11 % (ref 1–10)
NEUTS BAND NFR BLD MANUAL: 2 % (ref 0–8)
NEUTS SEG # BLD: 0.82 THOUSAND/UL (ref 1.8–7.8)
NEUTS SEG NFR BLD AUTO: 41 %
OVALOCYTES BLD QL SMEAR: PRESENT
PLATELET # BLD AUTO: 203 THOUSANDS/UL (ref 150–450)
PLATELET BLD QL SMEAR: ADEQUATE
PMV BLD AUTO: 10.3 FL (ref 8.9–12.7)
POIKILOCYTOSIS BLD QL SMEAR: PRESENT
POTASSIUM SERPL-SCNC: 5 MMOL/L (ref 3.6–5)
PROT SERPL-MCNC: 7.2 G/DL (ref 5.9–8.4)
RBC # BLD AUTO: 5.11 MILLION/UL (ref 4–5.2)
RBC MORPH BLD: PRESENT
SODIUM SERPL-SCNC: 137 MMOL/L (ref 137–147)
TIBC SERPL-MCNC: 221 UG/DL (ref 250–450)
TOTAL CELLS COUNTED SPEC: 100
VIT B12 SERPL-MCNC: 1023 PG/ML (ref 100–900)
WBC # BLD AUTO: 1.9 THOUSAND/UL (ref 4.5–11)

## 2020-08-12 PROCEDURE — 3074F SYST BP LT 130 MM HG: CPT | Performed by: NURSE PRACTITIONER

## 2020-08-12 PROCEDURE — 85007 BL SMEAR W/DIFF WBC COUNT: CPT | Performed by: NURSE PRACTITIONER

## 2020-08-12 PROCEDURE — 3008F BODY MASS INDEX DOCD: CPT | Performed by: NURSE PRACTITIONER

## 2020-08-12 PROCEDURE — 83540 ASSAY OF IRON: CPT

## 2020-08-12 PROCEDURE — 83615 LACTATE (LD) (LDH) ENZYME: CPT

## 2020-08-12 PROCEDURE — 99214 OFFICE O/P EST MOD 30 MIN: CPT | Performed by: NURSE PRACTITIONER

## 2020-08-12 PROCEDURE — 82746 ASSAY OF FOLIC ACID SERUM: CPT

## 2020-08-12 PROCEDURE — 82607 VITAMIN B-12: CPT

## 2020-08-12 PROCEDURE — 86140 C-REACTIVE PROTEIN: CPT

## 2020-08-12 PROCEDURE — 36415 COLL VENOUS BLD VENIPUNCTURE: CPT | Performed by: NURSE PRACTITIONER

## 2020-08-12 PROCEDURE — 80053 COMPREHEN METABOLIC PANEL: CPT

## 2020-08-12 PROCEDURE — 82728 ASSAY OF FERRITIN: CPT

## 2020-08-12 PROCEDURE — 85027 COMPLETE CBC AUTOMATED: CPT | Performed by: NURSE PRACTITIONER

## 2020-08-12 PROCEDURE — 85652 RBC SED RATE AUTOMATED: CPT

## 2020-08-12 PROCEDURE — 3078F DIAST BP <80 MM HG: CPT | Performed by: NURSE PRACTITIONER

## 2020-08-12 PROCEDURE — 1036F TOBACCO NON-USER: CPT | Performed by: NURSE PRACTITIONER

## 2020-08-12 PROCEDURE — 83550 IRON BINDING TEST: CPT

## 2020-08-12 RX ORDER — SODIUM CHLORIDE 9 MG/ML
20 INJECTION, SOLUTION INTRAVENOUS ONCE
Status: CANCELLED | OUTPATIENT
Start: 2020-10-15

## 2020-08-12 RX ORDER — ACETAMINOPHEN 325 MG/1
650 TABLET ORAL ONCE
Status: CANCELLED | OUTPATIENT
Start: 2020-10-15

## 2020-08-12 NOTE — PROGRESS NOTES
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 [facility])  Patient will be continued on her current treatment with rituximab on an every 8 week basis along with Revlimid 15 mg 3 weeks on with 1 week of a break  She took her last dose of her Revlimid cycle yesterday and will be starting her next cycle 08/19/2020  The patient is reporting improvement in her overall status and abdominal pain  Continues to have neutropenia which was present prior to initiating treatment which needs to be monitored closely  She was educated about neutropenic precautions  Instructed to make sure she is wearing her mask and social distancing when possible  Should also wash her hands frequently  Encouraged her to be very cautious on her trip to [facility] not hesitating to seek care at the local emergency with any signs of infection or fever; patient agrees  I have asked patient to get repeat CBC today and weekly with the exception of next week while she is away  Patient will follow-up again in about 4 weeks with additional laboratory studies prior  She will continue to follow up with the palliative care team regarding her pain and symptom management  - Infusion Calculated Appointment Request; Future  - CBC and differential; Standing  - Comprehensive metabolic panel; Future  - LD,Blood; Future  - C-reactive protein; Future  - Sedimentation rate, automated; Future  - Vitamin B12; Future  - Folate; Future  - Iron Panel (Includes Ferritin, Iron Sat%, Iron, and TIBC); Future  - Ferritin; Future  - CBC and differential    2  Microcytic anemia  Patient has chronic stable microcytic anemia which was worked up in the past   We will repeat her iron panel for completeness sake in the near future  - CBC and differential; Standing  - Comprehensive metabolic panel;  Future  - LD,Blood; Future  - C-reactive protein; Future  - Sedimentation rate, automated; Future  - Vitamin B12; Future  - Folate; Future  - Iron Panel (Includes Ferritin, Iron Sat%, Iron, and TIBC); Future  - Ferritin; Future  - CBC and differential    3  Other vitamin B12 deficiency anemias  She will continue to get her vitamin B12 injections on a monthly basis  - Vitamin B12; Future    HPI:  Patient presents today for a follow-up visit; she is Kinyarwanda-speaking translation done via [employer] system #[id number]  She is doing well and has no new complaints  Continues to have chronic abdominal pain which she reports is improving  She does continue to have palpable lymph node in the left neck which has not changed  Reports compliance with her Revlimid 15 mg states that she took her last dose last evening and is starting her week of a break today  The patient states that she will be going to [place] for 1 week trip leaving tomorrow and returning 08/21/2020  States that she will start her next cycle of Revlimid next week 08/19/2020  Her most recent laboratory studies from 08/07/2020 showed WBC 2 4, ANC 0 96, continues to have stable chronic microcytic anemia H&H 10 8/34 1, MCV 69 with normal platelet count 554  CMP is normal   LDH normal 331  Oncology History   The patient started to notice significant abdominal pain about 8 months prior to presentation, she then was evaluated in the hospital with a CT scan of the chest abdomen pelvis on the 7th of November   This showed massive lymphadenopathy throughout the abdomen and pelvis with hepatic and massive splenomegaly   She also was found to have bulky supraclavicular, axillary and mediastinal adenopathy  She then had a supra clavicular lymph node excisional biopsy on the 9th of November , the pathology was compatible with Follicular lymphoma, WHO grade 1-2    She then had a bone marrow biopsy on the 20th of November which showed also low grade B-cell lymphoma CD10 positive compromising 63% of the total cells  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 ([facility] )   11/7/2018 Initial Diagnosis    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 ([facility] )     12/6/2018 -  Chemotherapy    1   rituximab and bendamustine with neulasta support 12/6/2018- 3/13/19 (4 cycles total)  - day 2 bendamustine held with cycle 4  - administered Rituxan only 4/7/19    2  Started maintenance Rituxan every 8 weeks 5/15/19    3  Revlimid 15 mg 3 weeks on with 1 week of a break added to maintenance Rituxan 3/2020 due to progression       12/7/2018 Adverse Reaction    C1D2 labs reflective of tumor lysis syndrome, dose of rasburicase given x1 and admitted for close observation/hydration         Interval history:    ROS: Review of Systems   Constitutional: Negative for activity change, appetite change, chills, fatigue, fever and unexpected weight change  HENT: Negative for congestion, mouth sores, nosebleeds, sore throat and trouble swallowing  Eyes: Negative  Respiratory: Negative for cough, chest tightness and shortness of breath  Cardiovascular: Negative for chest pain, palpitations and leg swelling  Gastrointestinal: Positive for abdominal pain  Negative for abdominal distention, blood in stool, constipation, diarrhea, nausea and vomiting  Genitourinary: Negative for difficulty urinating, dysuria, frequency, hematuria and urgency  Musculoskeletal: Negative for arthralgias, back pain, gait problem, joint swelling and myalgias  Skin: Negative for color change, pallor and rash  Neurological: Positive for dizziness (mild chronic) and headaches (mild chronic)  Negative for weakness, light-headedness and numbness  Hematological: Positive for adenopathy (left neck)  Does not bruise/bleed easily  Psychiatric/Behavioral: Negative for dysphoric mood and sleep disturbance  The patient is not nervous/anxious          Past Medical History: Diagnosis Date    Anemia     iron and B12    Arthritis     Asthma     Cough     Depression     Falls frequently     Lupus ([facility] )     Lymphoma ([facility]     Lymphoma ([facility]     Migraine     Osteoporosis     Psychiatric disorder     Vertigo        Past Surgical History:   Procedure Laterality Date    CHOLECYSTECTOMY      FL GUIDED CENTRAL VENOUS ACCESS DEVICE INSERTION  11/9/2018    HYSTERECTOMY      LYMPH NODE BIOPSY Left 11/9/2018    Procedure: EXCISION BIOPSY LYMPH NODE SUPRACLAVICULAR;  Surgeon: Thiago Aguilera MD;  Location: [facility] MAIN OR;  Service: General    NJ INCISE FINGER TENDON SHEATH Right 1/16/2020    Procedure: RELEASE TRIGGER FINGER RIGHT THUMB;  Surgeon: Gilberto Bernstein MD;  Location: [facility] MAIN OR;  Service: Orthopedics    TUNNELED VENOUS PORT PLACEMENT N/A 11/9/2018    Procedure: INSERTION OF PORT-A-CATH;  Surgeon: Thiago Aguilera MD;  Location: [facility] MAIN OR;  Service: General       Social History     Socioeconomic History    Marital status: Single     Spouse name: None    Number of children: None    Years of education: None    Highest education level: None   Occupational History    None   Social Needs    Financial resource strain: None    Food insecurity     Worry: None     Inability: None    Transportation needs     Medical: None     Non-medical: None   Tobacco Use    Smoking status: Former Smoker     Last attempt to quit: 6/12/2017     Years since quitting: 3 1    Smokeless tobacco: Never Used   Substance and Sexual Activity    Alcohol use: Never     Frequency: Never    Drug use: Never    Sexual activity: None   Lifestyle    Physical activity     Days per week: None     Minutes per session: None    Stress: None   Relationships    Social connections     Talks on phone: None     Gets together: None     Attends [employer] service: None     Active member of club or organization: None     Attends meetings of clubs or organizations: None     Relationship status: None    Intimate partner violence     Fear of current or ex partner: None     Emotionally abused: None     Physically abused: None     Forced sexual activity: None   Other Topics Concern    None   Social History Narrative    None       Family History   Problem Relation Age of Onset    Cancer Mother     Diabetes Mother     Cancer Father     Diabetes Father        Allergies   Allergen Reactions    Contrast [Iodinated Diagnostic Agents] Shortness Of Breath and Dizziness    Penicillins Anaphylaxis         Current Outpatient Medications:     acetaminophen (TYLENOL) 500 mg tablet, May take 1 tablet (500 mg total) by mouth every 8 (eight) hours as needed for mild pain or moderate pain   May also take 1 tablet (500 mg total) every 8 (eight) hours as needed for mild pain or moderate pain , Disp: 180 tablet, Rfl: 3    al mag oxide-diphenhydramine-lidocaine viscous (MAGIC MOUTHWASH) 1:1:1 suspension, Swish and spit 10 mL every 4 (four) hours as needed for mouth pain or discomfort, Disp: 180 mL, Rfl: 0    amLODIPine (NORVASC) 5 mg tablet, Take 1 tablet (5 mg total) by mouth daily, Disp: 30 tablet, Rfl: 0    aspirin (ECOTRIN LOW STRENGTH) 81 mg EC tablet, Take 1 tablet (81 mg total) by mouth daily, Disp: 30 tablet, Rfl: 11    benzonatate (TESSALON) 200 MG capsule, Take 1 capsule (200 mg total) by mouth 3 (three) times a day as needed for cough, Disp: 20 capsule, Rfl: 1    carbamide peroxide (DEBROX) 6 5 % otic solution, Administer 5 drops to the right ear 2 (two) times a day, Disp: 15 mL, Rfl: 0    cyanocobalamin 1000 MCG tablet, Take 1 tablet (1,000 mcg total) by mouth daily, Disp: 90 tablet, Rfl: 3    dexamethasone (DECADRON) 1 mg tablet, Take 1 tablet (1 mg total) by mouth 2 (two) times a day with meals Do not take afternoon dose after 2pm, Disp: 60 tablet, Rfl: 3    DULoxetine (CYMBALTA) 20 mg capsule, Take 1 capsule (20 mg total) by mouth daily, Disp: 30 capsule, Rfl: 3    lenalidomide (Revlimid) 15 MG CAPS, TOME 1 CAPSULA POR VIA ORAL TODOS RAJIV CHILD FOR 21 DAYS, THEN 7 DAYS REST, Disp: 21 capsule, Rfl: 0    meclizine (ANTIVERT) 12 5 MG tablet, Take 1 tablet (12 5 mg total) by mouth every 8 (eight) hours as needed for dizziness, Disp: 30 tablet, Rfl: 3    omeprazole (PriLOSEC) 20 mg delayed release capsule, Take 2 capsules (40 mg total) by mouth daily To prevent stomach upset, Disp: 60 capsule, Rfl: 5    ondansetron (ZOFRAN) 4 mg tablet, Take 1 tablet (4 mg total) by mouth every 8 (eight) hours as needed for nausea or vomiting, Disp: 20 tablet, Rfl: 0    oxyCODONE (ROXICODONE) 15 mg immediate release tablet, Take 1 tablet (15 mg total) by mouth every 4 (four) hours as needed for moderate pain or severe pain Max 6 tablets/dayMax Daily Amount: 90 mg, Disp: 120 tablet, Rfl: 0    oxyCODONE (ROXICODONE) 15 mg immediate release tablet, Take 1 tablet (15 mg total) by mouth every 4 (four) hours as needed for moderate painMax Daily Amount: 90 mg, Disp: 180 tablet, Rfl: 0    polyethylene glycol (MIRALAX) 17 g packet, Take 17 g by mouth daily, Disp: 30 each, Rfl: 3    senna (SENOKOT) 8 6 mg, Take 1 tablet (8 6 mg total) by mouth 2 (two) times a day, Disp: 60 each, Rfl: 3      Physical Exam:  /68 (BP Location: Left arm, Patient Position: Sitting, Cuff Size: Adult)   Pulse 57   Temp 98 2 °F (36 8 °C) (Tympanic)   Resp 16   Ht 5' 3" (1 6 m)   Wt 90 1 kg (198 lb 9 6 oz)   SpO2 99%   BMI 35 18 kg/m²     Physical Exam  Vitals signs reviewed  Constitutional:       General: She is not in acute distress  Appearance: She is well-developed  She is not diaphoretic  HENT:      Head: Normocephalic and atraumatic  Eyes:      General: No scleral icterus  Conjunctiva/sclera: Conjunctivae normal       Pupils: Pupils are equal, round, and reactive to light  Neck:      Musculoskeletal: Normal range of motion and neck supple  Thyroid: No thyromegaly  Cardiovascular:      Rate and Rhythm: Normal rate and regular rhythm        Heart sounds: Murmur present  Pulmonary:      Effort: Pulmonary effort is normal  No respiratory distress  Breath sounds: Normal breath sounds  Abdominal:      General: There is no distension  Palpations: Abdomen is soft  There is no hepatomegaly or splenomegaly  Tenderness: There is generalized abdominal tenderness  Musculoskeletal: Normal range of motion  General: No swelling  Lymphadenopathy:      Cervical: Cervical adenopathy present  Left cervical: Superficial cervical adenopathy present  Upper Body:      Right upper body: No axillary adenopathy  Left upper body: No axillary adenopathy  Skin:     General: Skin is warm and dry  Findings: No erythema or rash  Neurological:      General: No focal deficit present  Mental Status: She is alert and oriented to person, place, and time  Psychiatric:         Mood and Affect: Mood and affect normal          Behavior: Behavior normal  Behavior is cooperative  Thought Content: Thought content normal          Judgment: Judgment normal            Labs:  Lab Results   Component Value Date    WBC 2 40 (L) 08/07/2020    HGB 10 8 (L) 08/07/2020    HCT 34 1 (L) 08/07/2020    MCV 69 (L) 08/07/2020     08/07/2020     Lab Results   Component Value Date    K 3 9 08/07/2020     08/07/2020    CO2 23 08/07/2020    BUN 18 08/07/2020    CREATININE 0 72 08/07/2020    GLUF 90 08/07/2020    CALCIUM 9 2 08/07/2020    AST 17 08/07/2020    ALT 12 08/07/2020    ALKPHOS 98 08/07/2020    EGFR 93 08/07/2020       Patient voiced understanding and agreement in the above discussion  Aware to contact our office with questions/symptoms in the interim  This note has been generated by voice recognition software system  Therefore, there may be spelling, grammar, and or syntax errors  Please contact if questions arise

## 2020-08-17 ENCOUNTER — TELEPHONE (OUTPATIENT)
Dept: HEMATOLOGY ONCOLOGY | Facility: CLINIC | Age: 60
End: 2020-08-17

## 2020-08-17 NOTE — TELEPHONE ENCOUNTER
Phoned pt utilizing the [employer]  phone to inform pt we had her Rx Revlimid here to be picked up  Pt is in [place] and she will be back in town 8/21/2020   informed pt that there is someone at [address] [place] [address] on M-W-F and to  when she gets back in town

## 2020-08-28 DIAGNOSIS — C82.18 GRADE 2 FOLLICULAR LYMPHOMA OF LYMPH NODES OF MULTIPLE REGIONS (HCC): ICD-10-CM

## 2020-08-28 RX ORDER — LENALIDOMIDE 15 MG/1
CAPSULE ORAL
Qty: 21 CAPSULE | Refills: 0 | Status: SHIPPED | OUTPATIENT
Start: 2020-08-28 | End: 2020-08-31 | Stop reason: SDUPTHER

## 2020-08-31 ENCOUNTER — TELEPHONE (OUTPATIENT)
Dept: HEMATOLOGY ONCOLOGY | Facility: CLINIC | Age: 60
End: 2020-08-31

## 2020-08-31 DIAGNOSIS — C82.18 GRADE 2 FOLLICULAR LYMPHOMA OF LYMPH NODES OF MULTIPLE REGIONS (HCC): ICD-10-CM

## 2020-08-31 RX ORDER — CYANOCOBALAMIN 1000 UG/ML
1000 INJECTION INTRAMUSCULAR; SUBCUTANEOUS ONCE
Status: CANCELLED | OUTPATIENT
Start: 2020-09-03

## 2020-08-31 RX ORDER — LENALIDOMIDE 15 MG/1
CAPSULE ORAL
Qty: 21 CAPSULE | Refills: 0 | Status: SHIPPED | OUTPATIENT
Start: 2020-08-31 | End: 2020-10-05

## 2020-08-31 NOTE — TELEPHONE ENCOUNTER
Revlimid received without an auth #  Please call [phone] to provide  Will pass on to Dr Ernie Malcolm RN

## 2020-09-03 ENCOUNTER — HOSPITAL ENCOUNTER (OUTPATIENT)
Dept: INFUSION CENTER | Facility: HOSPITAL | Age: 60
Discharge: HOME | End: 2020-09-03
Attending: INTERNAL MEDICINE
Payer: COMMERCIAL

## 2020-09-03 DIAGNOSIS — D51.8 OTHER VITAMIN B12 DEFICIENCY ANEMIAS: Primary | ICD-10-CM

## 2020-09-03 PROCEDURE — 96372 THER/PROPH/DIAG INJ SC/IM: CPT

## 2020-09-03 RX ORDER — CYANOCOBALAMIN 1000 UG/ML
1000 INJECTION INTRAMUSCULAR; SUBCUTANEOUS ONCE
Status: COMPLETED | OUTPATIENT
Start: 2020-09-03 | End: 2020-09-03

## 2020-09-03 RX ORDER — CYANOCOBALAMIN 1000 UG/ML
1000 INJECTION INTRAMUSCULAR; SUBCUTANEOUS ONCE
Status: CANCELLED | OUTPATIENT
Start: 2020-09-30

## 2020-09-03 RX ADMIN — CYANOCOBALAMIN 1000 MCG: 1000 INJECTION INTRAMUSCULAR; SUBCUTANEOUS at 10:34

## 2020-09-03 NOTE — PLAN OF CARE
Problem: Potential for Falls  Goal: Patient will remain free of falls  Description: INTERVENTIONS:  - Assess patient frequently for physical needs  -  Identify cognitive and physical deficits and behaviors that affect risk of falls    -  [place] fall precautions as indicated by assessment   - Educate patient/family on patient safety including physical limitations  - Instruct patient to call for assistance with activity based on assessment  - Modify environment to reduce risk of injury  - Consider OT/PT consult to assist with strengthening/mobility  Outcome: Progressing

## 2020-09-03 NOTE — PROGRESS NOTES
Pt here for vit b12 injection, tolerated well to left deltoid IM without complications  Confirmed next appt and AVS provided

## 2020-09-10 ENCOUNTER — TELEPHONE (OUTPATIENT)
Dept: HEMATOLOGY ONCOLOGY | Facility: CLINIC | Age: 60
End: 2020-09-10

## 2020-09-14 ENCOUNTER — OFFICE VISIT (OUTPATIENT)
Dept: HEMATOLOGY ONCOLOGY | Facility: CLINIC | Age: 60
End: 2020-09-14
Payer: COMMERCIAL

## 2020-09-14 VITALS
BODY MASS INDEX: 35.68 KG/M2 | RESPIRATION RATE: 18 BRPM | OXYGEN SATURATION: 97 % | HEART RATE: 64 BPM | HEIGHT: 63 IN | WEIGHT: 201.4 LBS | DIASTOLIC BLOOD PRESSURE: 80 MMHG | TEMPERATURE: 98.1 F | SYSTOLIC BLOOD PRESSURE: 130 MMHG

## 2020-09-14 DIAGNOSIS — D50.9 MICROCYTIC ANEMIA: ICD-10-CM

## 2020-09-14 DIAGNOSIS — D51.8 OTHER VITAMIN B12 DEFICIENCY ANEMIAS: ICD-10-CM

## 2020-09-14 DIAGNOSIS — C82.18 GRADE 2 FOLLICULAR LYMPHOMA OF LYMPH NODES OF MULTIPLE REGIONS (HCC): Primary | ICD-10-CM

## 2020-09-14 PROCEDURE — 99214 OFFICE O/P EST MOD 30 MIN: CPT | Performed by: INTERNAL MEDICINE

## 2020-09-14 NOTE — PATIENT INSTRUCTIONS
PRESCRIPTION REFILL REMINDER:  All medication refills should be requested prior to [facility] on Friday  Any refill requests after noon on Friday would be addressed the following Monday  Please protect yourself from the novel Coronavirus (COVID-19)! Even though we STILL do not have a vaccine or good antiviral drugs for this infection, the following strategies can help you stay healthy:    = Wash your hands with soap and water, or hand  with at least 60% alcohol, often  = Avoid touching your face!   = Avoid close contact with others, even if they seem healthy  Avoid gatherings of more than 10 people, and don't travel unnecessarily  = Stay home, except to get medical care or other essentials  If you can eat and drink and breathe and sleep, please consider calling your doctor's office instead of visiting in person, even if you are ill   = Cover your cough with a tissue, or your elbow  = Clean frequently touched surfaces and objects daily (e g , tables, countertops, light switches, doorknobs, and cabinet handles)  Regular household detergent and water are sufficient  Numbers of cases of Coronavirus are spiking in many [place] States  This is not a more dangerous virus, but a sign that more people in a community are spreading the virus  Please check the local disease reports near you if you consider travelling this summer  We do NOT advise travel to any community or State with a rising viral caseload  Check out [place] for [place] data that are updated daily  http://www [employer]/   Global Epidemics  Org, from [facility] (OUTPATIENT C[facility]), will give you [place] information on virus cases  Https://globalepidemics  org/

## 2020-09-14 NOTE — PROGRESS NOTES
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 [facility])  The patient will be continue on the current treatment with rituximab on every 8 week basis along with the Revlimid 15 mg once a day 3 weeks on 1 week off  The Revlimid was added to the maintenance rituximab treatment in March of this year  The patient was encouraged to take the Revlimid as ordered and to get her blood work checked frequently specially prior to her office visit  The adenopathy in the left posterior all neck area which seems to be tender is concerning for progression of her disease  However, her abdominal tenderness in symptoms have improved significantly after the start of Revlimid back in March  The patient will need another imaging study in 1-2 months from now for close monitoring         - CBC and differential; Future  - Comprehensive metabolic panel; Future  - LD,Blood; Future  - C-reactive protein; Future  - Sedimentation rate, automated; Future  - CBC and differential; Future  - Comprehensive metabolic panel; Future  - LD,Blood; Future  - C-reactive protein; Future  - Sedimentation rate, automated; Future    2  Microcytic anemia  She had workup in the past which show showed high ferritin level and low saturation which is most likely compatible with anemia of chronic disease  She might benefit from iron IV with 1-2 doses of Venofer if her hemoglobin drops further  3  Other vitamin B12 deficiency anemias  She will be continue on the vitamin B12 injections on a monthly basis  HPI:  The patient came today for a follow-up visit  Zurn video was used to help with interpretation  The patient is claiming that she is taking the Revlimid with the scheduled dose of 50 mg once a day 3 weeks on 1 week off along with the rituximab    Her most recent available CBC was from 08/12/2020 which showed low white cell count of 1 9 with hemoglobin of 10 9 and normal platelets  Her LDH was normal with elevated C-reactive protein and sed rates  Iron panel showed high ferritin of 259 with saturation of 9%  She denies bleeding from any sites  She did complain about a lump in her neck on the left posterior aspect which is tender  She denied abdominal pain  Oncology History   The patient started to notice significant abdominal pain about 8 months prior to presentation, she then was evaluated in the hospital with a CT scan of the chest abdomen pelvis on the 7th of November   This showed massive lymphadenopathy throughout the abdomen and pelvis with hepatic and massive splenomegaly   She also was found to have bulky supraclavicular, axillary and mediastinal adenopathy  She then had a supra clavicular lymph node excisional biopsy on the 9th of November , the pathology was compatible with Follicular lymphoma, WHO grade 1-2  She then had a bone marrow biopsy on the 20th of November which showed also low grade B-cell lymphoma CD10 positive compromising 63% of the total cells  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 ([facility] [address] )   11/7/2018 Initial Diagnosis    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 ([facility] [address] )     12/6/2018 -  Chemotherapy    1   rituximab and bendamustine with neulasta support 12/6/2018- 3/13/19 (4 cycles total)  - day 2 bendamustine held with cycle 4  - administered Rituxan only 4/7/19    2  Started maintenance Rituxan every 8 weeks 5/15/19    3  Revlimid 15 mg 3 weeks on with 1 week of a break added to maintenance Rituxan 3/2020 due to progression       12/7/2018 Adverse Reaction    C1D2 labs reflective of tumor lysis syndrome, dose of rasburicase given x1 and admitted for close observation/hydration         Interval history:    ROS: Review of Systems   Constitutional: Positive for appetite change and fatigue   Negative for activity change, chills, diaphoresis, fever and unexpected weight change  HENT: Positive for mouth sores  Negative for congestion, dental problem, ear discharge, ear pain, facial swelling, hearing loss, nosebleeds, postnasal drip, sore throat, tinnitus and trouble swallowing  Eyes: Negative for discharge, redness, itching and visual disturbance  Respiratory: Negative for cough, chest tightness, shortness of breath and wheezing  Cardiovascular: Negative for chest pain, palpitations and leg swelling  Gastrointestinal: Negative for abdominal distention, abdominal pain, anal bleeding, blood in stool, constipation, diarrhea, nausea and vomiting  Genitourinary: Negative for difficulty urinating, dysuria, flank pain, frequency, hematuria and urgency  Musculoskeletal: Positive for arthralgias and neck pain  Negative for back pain, gait problem, joint swelling and myalgias  Skin: Negative for color change, pallor, rash and wound  Neurological: Positive for dizziness  Negative for syncope, speech difficulty, weakness, light-headedness, numbness and headaches  Hematological: Negative for adenopathy  Does not bruise/bleed easily  Psychiatric/Behavioral: Positive for sleep disturbance  Negative for agitation, behavioral problems and confusion         Past Medical History:   Diagnosis Date    Anemia     iron and B12    Arthritis     Asthma     Cough     Depression     Falls frequently     [facility]up[facility] [address] [facility]     Lymphoma [facility] [address] [facility]     Lymphoma ([facility] [address]     Migraine     Osteoporosis     Psychiatric disorder     Vertigo        Past Surgical History:   Procedure Laterality Date    CHOLECYSTECTOMY      FL GUIDED CENTRAL VENOUS ACCESS DEVICE INSERTION  11/9/2018    HYSTERECTOMY      LYMPH NODE BIOPSY Left 11/9/2018    Procedure: EXCISION BIOPSY LYMPH NODE SUPRACLAVICULAR;  Surgeon: Renu Paniagua MD;  Location: [address] [place] [address];  Service: General    MA INCISE FINGER TENDON SHEATH Right 1/16/2020    Procedure: RELEASE TRIGGER FINGER RIGHT THUMB;  Surgeon: Jan Miles MD;  Location: [address] [place] [address] OR;  Service: Orthopedics    TUNNELED VENOUS PORT PLACEMENT N/A 11/9/2018    Procedure: INSERTION OF PORT-A-CATH;  Surgeon: Charlee Castellanos MD;  Location: [address] [place] [address];  Service: General       Social History     Socioeconomic History    Marital status: Single     Spouse name: None    Number of children: None    Years of education: None    Highest education level: None   Occupational History    None   Social Needs    Financial resource strain: None    Food insecurity     Worry: None     Inability: None    Transportation needs     Medical: None     Non-medical: None   Tobacco Use    Smoking status: Former Smoker     Last attempt to quit: 6/12/2017     Years since quitting: 3 2    Smokeless tobacco: Never Used   Substance and Sexual Activity    Alcohol use: Never     Frequency: Never    Drug use: Never    Sexual activity: None   Lifestyle    Physical activity     Days per week: None     Minutes per session: None    Stress: None   Relationships    Social connections     Talks on phone: None     Gets together: None     Attends Restorationism service: None     Active member of club or organization: None     Attends meetings of clubs or organizations: None     Relationship status: None    Intimate partner violence     Fear of current or ex partner: None     Emotionally abused: None     Physically abused: None     Forced sexual activity: None   Other Topics Concern    None   Social History Narrative    None       Family History   Problem Relation Age of Onset    Cancer Mother     Diabetes Mother     Cancer Father     Diabetes Father        Allergies   Allergen Reactions    Contrast [Iodinated Diagnostic Agents] Shortness Of Breath and Dizziness    Penicillins Anaphylaxis         Current Outpatient Medications:     acetaminophen (TYLENOL) 500 mg tablet, May take 1 tablet (500 mg total) by mouth every 8 (eight) hours as needed for mild pain or moderate pain   May also take 1 tablet (500 mg total) every 8 (eight) hours as needed for mild pain or moderate pain , Disp: 180 tablet, Rfl: 3    al mag oxide-diphenhydramine-lidocaine viscous (MAGIC MOUTHWASH) 1:1:1 suspension, Swish and spit 10 mL every 4 (four) hours as needed for mouth pain or discomfort, Disp: 180 mL, Rfl: 0    amLODIPine (NORVASC) 5 mg tablet, Take 1 tablet (5 mg total) by mouth daily, Disp: 30 tablet, Rfl: 0    aspirin (ECOTRIN LOW STRENGTH) 81 mg EC tablet, Take 1 tablet (81 mg total) by mouth daily, Disp: 30 tablet, Rfl: 11    benzonatate (TESSALON) 200 MG capsule, Take 1 capsule (200 mg total) by mouth 3 (three) times a day as needed for cough, Disp: 20 capsule, Rfl: 1    carbamide peroxide (DEBROX) 6 5 % otic solution, Administer 5 drops to the right ear 2 (two) times a day, Disp: 15 mL, Rfl: 0    cyanocobalamin 1000 MCG tablet, Take 1 tablet (1,000 mcg total) by mouth daily, Disp: 90 tablet, Rfl: 3    dexamethasone (DECADRON) 1 mg tablet, Take 1 tablet (1 mg total) by mouth 2 (two) times a day with meals Do not take afternoon dose after 2pm, Disp: 60 tablet, Rfl: 3    DULoxetine (CYMBALTA) 20 mg capsule, Take 1 capsule (20 mg total) by mouth daily, Disp: 30 capsule, Rfl: 3    lenalidomide (REVLIMID) 15 MG CAPS, Take 15 mg by mouth daily for 21 days, then 7 days rest, Disp: 21 capsule, Rfl: 0    meclizine (ANTIVERT) 12 5 MG tablet, Take 1 tablet (12 5 mg total) by mouth every 8 (eight) hours as needed for dizziness, Disp: 30 tablet, Rfl: 3    omeprazole (PriLOSEC) 20 mg delayed release capsule, Take 2 capsules (40 mg total) by mouth daily To prevent stomach upset, Disp: 60 capsule, Rfl: 5    ondansetron (ZOFRAN) 4 mg tablet, Take 1 tablet (4 mg total) by mouth every 8 (eight) hours as needed for nausea or vomiting, Disp: 20 tablet, Rfl: 0    oxyCODONE (ROXICODONE) 15 mg immediate release tablet, Take 1 tablet (15 mg total) by mouth every 4 (four) hours as needed for moderate pain or severe pain Max 6 tablets/dayMax Daily Amount: 90 mg, Disp: 120 tablet, Rfl: 0    oxyCODONE (ROXICODONE) 15 mg immediate release tablet, Take 1 tablet (15 mg total) by mouth every 4 (four) hours as needed for moderate painMax Daily Amount: 90 mg, Disp: 180 tablet, Rfl: 0    polyethylene glycol (MIRALAX) 17 g packet, Take 17 g by mouth daily, Disp: 30 each, Rfl: 3    senna (SENOKOT) 8 6 mg, Take 1 tablet (8 6 mg total) by mouth 2 (two) times a day, Disp: 60 each, Rfl: 3      Physical Exam:  /80 (BP Location: Left arm, Patient Position: Sitting, Cuff Size: Adult)   Pulse 64   Temp 98 1 °F (36 7 °C) (Tympanic)   Resp 18   Ht 5' 3" (1 6 m)   Wt 91 4 kg (201 lb 6 4 oz)   SpO2 97%   BMI 35 68 kg/m²     Physical Exam  Constitutional:       General: She is not in acute distress  Appearance: She is well-developed  She is not diaphoretic  HENT:      Head: Normocephalic and atraumatic  Eyes:      General: No scleral icterus  Right eye: No discharge  Left eye: No discharge  Conjunctiva/sclera: Conjunctivae normal       Pupils: Pupils are equal, round, and reactive to light  Neck:      Musculoskeletal: Normal range of motion and neck supple  Thyroid: No thyromegaly  Vascular: No JVD  Trachea: No tracheal deviation  Cardiovascular:      Rate and Rhythm: Normal rate and regular rhythm  Heart sounds: Normal heart sounds  No murmur  No friction rub  Pulmonary:      Effort: Pulmonary effort is normal  No respiratory distress  Breath sounds: Normal breath sounds  No stridor  No wheezing or rales  Chest:      Chest wall: No tenderness  Abdominal:      General: Bowel sounds are normal  There is no distension  Palpations: Abdomen is soft  There is no hepatomegaly, splenomegaly or mass  Tenderness: There is no abdominal tenderness  There is no guarding or rebound  Musculoskeletal: Normal range of motion           General: No tenderness or deformity  Lymphadenopathy:      Cervical: Cervical adenopathy (On the left posterior all aspect 1 lymph node measuring about 1-2 cm) present  Skin:     General: Skin is warm and dry  Coloration: Skin is not pale  Findings: No erythema or rash  Neurological:      Mental Status: She is alert and oriented to person, place, and time  Cranial Nerves: No cranial nerve deficit  Coordination: Coordination normal       Deep Tendon Reflexes: Reflexes are normal and symmetric  Psychiatric:         Behavior: Behavior normal          Thought Content: Thought content normal          Judgment: Judgment normal            Labs:  Lab Results   Component Value Date    WBC 1 90 (LL) 08/12/2020    HGB 10 9 (L) 08/12/2020    HCT 34 4 (L) 08/12/2020    MCV 67 (L) 08/12/2020     08/12/2020     Lab Results   Component Value Date    K 5 0 08/12/2020     08/12/2020    CO2 25 08/12/2020    BUN 16 08/12/2020    CREATININE 0 67 08/12/2020    GLUF 97 08/12/2020    CALCIUM 9 3 08/12/2020    AST 28 08/12/2020    ALT 21 08/12/2020    ALKPHOS 107 08/12/2020    EGFR 97 08/12/2020     No results found for: TSH    Patient voiced understanding and agreement in the above discussion  Aware to contact our office with questions/symptoms in the interim

## 2020-09-15 ENCOUNTER — APPOINTMENT (OUTPATIENT)
Dept: LAB | Facility: HOSPITAL | Age: 60
End: 2020-09-15
Attending: INTERNAL MEDICINE
Payer: COMMERCIAL

## 2020-09-15 ENCOUNTER — TELEPHONE (OUTPATIENT)
Dept: HEMATOLOGY ONCOLOGY | Facility: CLINIC | Age: 60
End: 2020-09-15

## 2020-09-15 ENCOUNTER — OFFICE VISIT (OUTPATIENT)
Dept: PALLIATIVE MEDICINE | Facility: CLINIC | Age: 60
End: 2020-09-15
Payer: COMMERCIAL

## 2020-09-15 VITALS
RESPIRATION RATE: 12 BRPM | WEIGHT: 203.6 LBS | BODY MASS INDEX: 36.07 KG/M2 | SYSTOLIC BLOOD PRESSURE: 120 MMHG | OXYGEN SATURATION: 99 % | HEART RATE: 57 BPM | DIASTOLIC BLOOD PRESSURE: 70 MMHG | TEMPERATURE: 96.6 F

## 2020-09-15 DIAGNOSIS — F41.8 ANXIETY ABOUT HEALTH: ICD-10-CM

## 2020-09-15 DIAGNOSIS — F33.41 RECURRENT MAJOR DEPRESSIVE DISORDER, IN PARTIAL REMISSION (HCC): ICD-10-CM

## 2020-09-15 DIAGNOSIS — G89.3 CANCER RELATED PAIN: ICD-10-CM

## 2020-09-15 DIAGNOSIS — R53.83 DECREASED ENERGY: ICD-10-CM

## 2020-09-15 DIAGNOSIS — C82.18 GRADE 2 FOLLICULAR LYMPHOMA OF LYMPH NODES OF MULTIPLE REGIONS (HCC): ICD-10-CM

## 2020-09-15 DIAGNOSIS — C82.18 GRADE 2 FOLLICULAR LYMPHOMA OF LYMPH NODES OF MULTIPLE REGIONS (HCC): Primary | ICD-10-CM

## 2020-09-15 DIAGNOSIS — Z51.5 PALLIATIVE CARE PATIENT: ICD-10-CM

## 2020-09-15 DIAGNOSIS — K59.01 SLOW TRANSIT CONSTIPATION: ICD-10-CM

## 2020-09-15 DIAGNOSIS — R63.0 DECREASED APPETITE: ICD-10-CM

## 2020-09-15 DIAGNOSIS — K12.30 MUCOSITIS: ICD-10-CM

## 2020-09-15 DIAGNOSIS — R10.84 GENERALIZED ABDOMINAL PAIN: ICD-10-CM

## 2020-09-15 DIAGNOSIS — R11.2 NAUSEA AND VOMITING, INTRACTABILITY OF VOMITING NOT SPECIFIED, UNSPECIFIED VOMITING TYPE: ICD-10-CM

## 2020-09-15 LAB
CRP SERPL QL: 22.3 MG/L
ERYTHROCYTE [SEDIMENTATION RATE] IN BLOOD: 46 MM/HOUR (ref 0–29)

## 2020-09-15 PROCEDURE — 99214 OFFICE O/P EST MOD 30 MIN: CPT | Performed by: SURGERY

## 2020-09-15 PROCEDURE — 86140 C-REACTIVE PROTEIN: CPT

## 2020-09-15 PROCEDURE — 85652 RBC SED RATE AUTOMATED: CPT

## 2020-09-15 RX ORDER — OXYCODONE HYDROCHLORIDE 15 MG/1
15 TABLET ORAL EVERY 4 HOURS PRN
Qty: 180 TABLET | Refills: 0 | Status: SHIPPED | OUTPATIENT
Start: 2020-09-15 | End: 2020-10-21 | Stop reason: SDUPTHER

## 2020-09-15 RX ORDER — ONDANSETRON 4 MG/1
4 TABLET, FILM COATED ORAL EVERY 8 HOURS PRN
Qty: 30 TABLET | Refills: 3 | Status: SHIPPED | OUTPATIENT
Start: 2020-09-15

## 2020-09-15 RX ORDER — DEXAMETHASONE 1 MG
1 TABLET ORAL 2 TIMES DAILY WITH MEALS
Qty: 60 TABLET | Refills: 3 | Status: SHIPPED | OUTPATIENT
Start: 2020-09-15 | End: 2021-03-25 | Stop reason: SDUPTHER

## 2020-09-15 RX ORDER — ACETAMINOPHEN 500 MG
TABLET ORAL
Qty: 100 TABLET | Refills: 0 | Status: SHIPPED | OUTPATIENT
Start: 2020-09-15 | End: 2021-01-26 | Stop reason: HOSPADM

## 2020-09-15 RX ORDER — DULOXETIN HYDROCHLORIDE 20 MG/1
20 CAPSULE, DELAYED RELEASE ORAL DAILY
Qty: 30 CAPSULE | Refills: 3 | Status: SHIPPED | OUTPATIENT
Start: 2020-09-15 | End: 2021-12-07 | Stop reason: SDUPTHER

## 2020-09-15 RX ORDER — LIDOCAINE 40 MG/G
CREAM TOPICAL AS NEEDED
Qty: 30 G | Refills: 0 | Status: SHIPPED | OUTPATIENT
Start: 2020-09-15

## 2020-09-15 NOTE — PROGRESS NOTES
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 ([facility] )    2  Cancer related pain    3  Generalized abdominal pain    4  Decreased appetite    5  Decreased energy    6  Anxiety about health    7  Nausea and vomiting, intractability of vomiting not specified, unspecified vomiting type    8  Mucositis    9  Slow transit constipation    10  Recurrent major depressive disorder, in partial remission ([facility] )    11  Palliative care patient        Requested Prescriptions     Signed Prescriptions Disp Refills    oxyCODONE (ROXICODONE) 15 mg immediate release tablet 180 tablet 0     Sig: Take 1 tablet (15 mg total) by mouth every 4 (four) hours as needed for moderate painMax Daily Amount: 90 mg    DULoxetine (CYMBALTA) 20 mg capsule 30 capsule 3     Sig: Take 1 capsule (20 mg total) by mouth daily    dexamethasone (DECADRON) 1 mg tablet 60 tablet 3     Sig: Take 1 tablet (1 mg total) by mouth 2 (two) times a day with meals Do not take afternoon dose after 2pm    al mag oxide-diphenhydramine-lidocaine viscous (MAGIC MOUTHWASH) 1:1:1 suspension 180 mL 0     Sig: Swish and spit 10 mL every 4 (four) hours as needed for mouth pain or discomfort    ondansetron (ZOFRAN) 4 mg tablet 30 tablet 3     Sig: Take 1 tablet (4 mg total) by mouth every 8 (eight) hours as needed for nausea or vomiting    lidocaine (LMX) 4 % cream 30 g 0     Sig: Apply topically as needed for mild pain To neck    acetaminophen (TYLENOL) 500 mg tablet 100 tablet 0     Sig: May take 1 tablet (500 mg total) by mouth every 8 (eight) hours as needed for mild pain, moderate pain or headaches  May also take 1 tablet (500 mg total) every 8 (eight) hours as needed for mild pain, moderate pain or headaches  Take 1-2 tablets every 8 hours as needed for mild-moderate pain        Medications Discontinued During This Encounter   Medication Reason    oxyCODONE (ROXICODONE) 15 mg immediate release tablet     oxyCODONE (ROXICODONE) 15 mg immediate release tablet     acetaminophen (TYLENOL) 500 mg tablet     DULoxetine (CYMBALTA) 20 mg capsule Reorder    dexamethasone (DECADRON) 1 mg tablet Reorder    al mag oxide-diphenhydramine-lidocaine viscous (MAGIC MOUTHWASH) 1:1:1 suspension Reorder    ondansetron (ZOFRAN) 4 mg tablet Reorder       Representatives have queried the patient's controlled substance dispensing history in the Prescription Drug Monitoring Program in compliance with regulations before I have prescribed any controlled substances  The prescription history is consistent with prescribed therapy and our practice policies  The visit was spent face to face with Felton Kenney with greater than 50% of the time spent in counseling or coordination of care including discussions of treatment instructions and follow up requirements   All of the patient's questions were answered during this discussion  Return in about 1 month (around 10/15/2020), or if symptoms worsen or fail to improve, for w/ Dr Jennifer Orozco  Subjective: In attendance at this visit: Felton Kenney   Chief Complaint  Follow up visit for:  symptom management  HPI     Felton Kenney is a 64 y o  female with grade 2 follicular lymphoma initially diagnosed in Nov 2018  Tumor lysis syndrome in Dec 2018  Received chemo in early 2019  Started maintenance rituxan q 8 weeks starting May 2019  PET scan in May 2019 demonstrated improvement of adenopathy  She has [address], 404 on 7/6/2020    Per notes, given recurrence on maintenance rituximab, revlimid was added to the regimen for 3 cycles with goal of maintaining her on this for 1 year   Patient has been on maintenance rituximab and revlimid, issues with neutropenia, concerns with noncompliance and f/u with bloodwork   Per Oncology note, long discussion with patient regarding refractory and progressive grade 2 follicular lymphoma   Patient was encouraged to adhere to Revlimid 15mg once a day 3 weeks on/1 week off in combination with rituximab   Patient has not been compliant with taking revlimid consistently per notes  If continues, patient will be switched back to IV regimen  GI eval 3/3/2020, started on omeprazole 40mg daily, refused EGD/colonoscopy, instructed to increase use of miralax and limit senna  Patient with c/o increase L neck swelling/tenderness, US done, concerning for progression of her disease      Translation by Jayla MCGINNIS      Patient follows:  PCP/Dr Peter Silverio/Dr Martinez Wagner, last seen 9/15/20, f/u scheduled for 10/12/20     Imaging:  PETscan 1/27/20: revealed interval increased nodular hypermetabolism within the retroperitoneum and mesentery, most concerning for tumor recurrence, slight increased prominence of a left upper cervical node is nonspecific, possibly reactive, tumor recurrence also not excluded  CTimaging 7/6/20: Diffuse mistiness of the mesenteric fat with a few subcentimeter mesenteric lymph nodes appears grossly stable from prior PET/CT study  This could represent sequela of treated lymphoma or possibly mesenteric panniculitis  No dominant mesenteric mass or progressive adenopathy  Otherwise, no pathologic lymphadenopathy detected  US L neck 7/13/20: Borderline prominent left neck nodes measuring up to 2 3 x 0 9 cm  Previously described hypermetabolic subcentimeter node deep to the left parotid gland is not clearly visualized  Cait Nieves the history of lymphoma, attention on follow-up PET/CT recommended     She has ongoing intermittent, severe abdominal pain and flank pain that has progressively worsened   Patient now taking oxyIR 15mg q4h daily for pain, mostly 4-5 tablets/day   Instructed patient to take tylenol xs 1-2 tabs q8h for mild to moderate pain and to use the oxyIR for severe pain    We again discussed using a long-acting opioid  Narcisa Toni states that when she is having severe pain, she just grabs the medicine and takes it, so I don't feel confident that adding a long-acting oral agent will be used properly or cautiously  We discussed transitioning her to fentanyl patch and continuing with oxyIR 15mg q4h prn bt pain  We discussed cautions and safety with this regimen  Patient very apprehensive and did not seem to fully comprehend instructions with the patch  in future if her pain is not controlled on current regimen   Will continue with oxyIR 15mg q4h prn moderate-severe pain  She does feel that she gets relief with this  Patient with increased neck tenderness at site of adenopathy  We will order topical lidocaine cream to that area as needed for pain  Patient also complains of developing mouth sores and pain  In trying to examine mouth, patient pulls away  Some mild mucositis and does appear to have 1-2 ulcerations  Will order magic mouthwash  Patient has been on this before and stated that it helped  Patient instructed to call our office or Oncology if this worsens or there is no improvement in mouth pain  Patient has not seen a dentist in [address]  Encourage her to make an appointment as soon as possible  Patient also with nausea, unable to vomit most times  We will refill her zofran today  She states that this does help with her nausea      Patient c/o difficulty sleeping, mostly due to pain   We discussed taking tylenol + oxy 1 hour prior to bed to assist with pain control throughout the night and hopefully help with maintaining sleep  This has helped tremendously with nighttime pain   Patient instructed to try otc melatonin 3mg prn, she has not tried       Patient has constipation, but seems to control with this with adjusting her senna tablets  Discussed adjusting miralax and senna according to bowel movement, can hold for loose stools or diarrhea   Patient expressed understanding  This has been working well      We added decadron to assist with abdominal pain and discomfort given progression of lymphadenopathy   Patient also with intermittent nausea, decreased appetite and decreased energy   Patient's weight stable at  201lbs   Patient tolerating decadron 1mg bid with meals, do not take after 2pm   Patient does not want to increase dose, as she has become jittery on steroids in the past and feels that this current dosing has worked well  Discussed continuing daily prilosec with addition of steroid      Patient with anxiety regarding health and kids in [address] also feels her mood has been depressed due to her condition   She agreed to cymbalta 20mg to help with depressed mood, anxiety and pain  Patient states that this has been helping and she continues to take daily      [employer] Wishes document discussed at prior visits  Hannah Varun completed and copy scanned into chart  Hannah Bond wishes for life-support treatment unless in a coma and not expected to wake up or recover, then only if it could help      The following portions of the medical history were reviewed: past medical history, family history, problem list, medication list, and social history  2 sons, now both in [place], Algenes use to accompany patient to visits    [employer] W[facility] - names son/Algenes as [facility]-son in [facility]      Current Outpatient Medications:     al mag oxide-diphenhydramine-lidocaine viscous (MAGIC MOUTHWASH) 1:1:1 suspension, Swish and spit 10 mL every 4 (four) hours as needed for mouth pain or discomfort, Disp: 180 mL, Rfl: 0    amLODIPine (NORVASC) 5 mg tablet, Take 1 tablet (5 mg total) by mouth daily, Disp: 30 tablet, Rfl: 0    aspirin (ECOTRIN LOW STRENGTH) 81 mg EC tablet, Take 1 tablet (81 mg total) by mouth daily, Disp: 30 tablet, Rfl: 11    benzonatate (TESSALON) 200 MG capsule, Take 1 capsule (200 mg total) by mouth 3 (three) times a day as needed for cough, Disp: 20 capsule, Rfl: 1    carbamide peroxide (DEBROX) 6 5 % otic solution, Administer 5 drops to the right ear 2 (two) times a day, Disp: 15 mL, Rfl: 0    cyanocobalamin 1000 MCG tablet, Take 1 tablet (1,000 mcg total) by mouth daily, Disp: 90 tablet, Rfl: 3    dexamethasone (DECADRON) 1 mg tablet, Take 1 tablet (1 mg total) by mouth 2 (two) times a day with meals Do not take afternoon dose after 2pm, Disp: 60 tablet, Rfl: 3    DULoxetine (CYMBALTA) 20 mg capsule, Take 1 capsule (20 mg total) by mouth daily, Disp: 30 capsule, Rfl: 3    lenalidomide (REVLIMID) 15 MG CAPS, Take 15 mg by mouth daily for 21 days, then 7 days rest, Disp: 21 capsule, Rfl: 0    meclizine (ANTIVERT) 12 5 MG tablet, Take 1 tablet (12 5 mg total) by mouth every 8 (eight) hours as needed for dizziness, Disp: 30 tablet, Rfl: 3    omeprazole (PriLOSEC) 20 mg delayed release capsule, Take 2 capsules (40 mg total) by mouth daily To prevent stomach upset, Disp: 60 capsule, Rfl: 5    ondansetron (ZOFRAN) 4 mg tablet, Take 1 tablet (4 mg total) by mouth every 8 (eight) hours as needed for nausea or vomiting, Disp: 30 tablet, Rfl: 3    oxyCODONE (ROXICODONE) 15 mg immediate release tablet, Take 1 tablet (15 mg total) by mouth every 4 (four) hours as needed for moderate painMax Daily Amount: 90 mg, Disp: 180 tablet, Rfl: 0    polyethylene glycol (MIRALAX) 17 g packet, Take 17 g by mouth daily, Disp: 30 each, Rfl: 3    senna (SENOKOT) 8 6 mg, Take 1 tablet (8 6 mg total) by mouth 2 (two) times a day, Disp: 60 each, Rfl: 3    acetaminophen (TYLENOL) 500 mg tablet, May take 1 tablet (500 mg total) by mouth every 8 (eight) hours as needed for mild pain, moderate pain or headaches  May also take 1 tablet (500 mg total) every 8 (eight) hours as needed for mild pain, moderate pain or headaches  Take 1-2 tablets every 8 hours as needed for mild-moderate pain   , Disp: 100 tablet, Rfl: 0    lidocaine (LMX) 4 % cream, Apply topically as needed for mild pain To neck, Disp: 30 g, Rfl: 0    Review of Systems   Constitutional: Positive for activity change, appetite change and fatigue  HENT: Positive for mouth sores  Gastrointestinal: Positive for abdominal pain, constipation, nausea and vomiting  Musculoskeletal: Positive for arthralgias, myalgias and neck pain (at adenopathy site)  Hematological: Positive for adenopathy  Psychiatric/Behavioral: Positive for sleep disturbance  The patient is nervous/anxious  All other systems negative    Objective:  Vital Signs  /70 (BP Location: Left arm, Cuff Size: Standard)   Pulse 57   Temp (!) 96 6 °F (35 9 °C) (Temporal)   Resp 12   Wt 92 4 kg (203 lb 9 6 oz)   SpO2 99%   BMI 36 07 kg/m²    Physical Exam    Constitutional: Appears well-developed and well-nourished  In no acute distress  Head/Mouth: Normocephalic and atraumatic  Oropharynx moist, mild erythema, small ulcerations near gum line L, difficult to examine as patient gagged and pulled away  Eyes: EOM are normal  No ocular discharge  No scleral icterus  Neck: L posterior adenopathy, tender, firm  Respiratory: Effort normal  No stridor  No respiratory distress  Gastrointestinal: No abdominal distension  Mild tenderness to palpation  Musculoskeletal/Extremities: No edema  No cyanosis  ROM appears normal, no tenderness  Neurological: Alert, oriented and appropriately conversant  Skin: Dry, no diaphoresis  Psychiatric: Displays a normal mood and affect   Behavior, judgement and thought content appear normal

## 2020-09-16 ENCOUNTER — TELEPHONE (OUTPATIENT)
Dept: HEMATOLOGY ONCOLOGY | Facility: CLINIC | Age: 60
End: 2020-09-16

## 2020-09-16 DIAGNOSIS — C82.18 GRADE 2 FOLLICULAR LYMPHOMA OF LYMPH NODES OF MULTIPLE REGIONS (HCC): Primary | ICD-10-CM

## 2020-09-30 ENCOUNTER — TELEPHONE (OUTPATIENT)
Dept: HEMATOLOGY ONCOLOGY | Facility: CLINIC | Age: 60
End: 2020-09-30

## 2020-09-30 ENCOUNTER — HOSPITAL ENCOUNTER (OUTPATIENT)
Dept: INFUSION CENTER | Facility: HOSPITAL | Age: 60
Discharge: HOME | End: 2020-09-30
Attending: INTERNAL MEDICINE

## 2020-09-30 NOTE — TELEPHONE ENCOUNTER
Pt was phoned by Juan Francisco Emeryer who is fluent in [facility] and reminded pt she had Rituxan infusion today  Pt stated she called and told someone she could not come In due to another appt elsewhere  Pt also stated she had labs drawn last week but she did not have them drawn since 9/15/20  Pt will be here at [facility] infusion 10/6/2020 at 9 AM she was told if she does not get here on 10/6/20 she will be delayed as her insurance wants her switched to [id number] [place] [address]

## 2020-10-05 DIAGNOSIS — C82.18 GRADE 2 FOLLICULAR LYMPHOMA OF LYMPH NODES OF MULTIPLE REGIONS (HCC): ICD-10-CM

## 2020-10-05 RX ORDER — LENALIDOMIDE 15 MG/1
CAPSULE ORAL
Qty: 21 CAPSULE | Refills: 0 | Status: SHIPPED | OUTPATIENT
Start: 2020-10-05 | End: 2020-12-28 | Stop reason: ALTCHOICE

## 2020-10-06 ENCOUNTER — APPOINTMENT (OUTPATIENT)
Dept: LAB | Facility: HOSPITAL | Age: 60
End: 2020-10-06
Attending: INTERNAL MEDICINE
Payer: COMMERCIAL

## 2020-10-06 ENCOUNTER — TELEPHONE (OUTPATIENT)
Dept: HEMATOLOGY ONCOLOGY | Facility: CLINIC | Age: 60
End: 2020-10-06

## 2020-10-06 DIAGNOSIS — C82.18 GRADE 2 FOLLICULAR LYMPHOMA OF LYMPH NODES OF MULTIPLE REGIONS (HCC): ICD-10-CM

## 2020-10-06 LAB
ALBUMIN SERPL BCP-MCNC: 3.6 G/DL (ref 3–5.2)
ALP SERPL-CCNC: 93 U/L (ref 43–122)
ALT SERPL W P-5'-P-CCNC: 20 U/L (ref 9–52)
ANION GAP SERPL CALCULATED.3IONS-SCNC: 5 MMOL/L (ref 5–14)
AST SERPL W P-5'-P-CCNC: 20 U/L (ref 14–36)
BILIRUB SERPL-MCNC: 0.4 MG/DL
BUN SERPL-MCNC: 16 MG/DL (ref 5–25)
CALCIUM SERPL-MCNC: 9 MG/DL (ref 8.4–10.2)
CHLORIDE SERPL-SCNC: 106 MMOL/L (ref 97–108)
CO2 SERPL-SCNC: 29 MMOL/L (ref 22–30)
CREAT SERPL-MCNC: 0.79 MG/DL (ref 0.6–1.2)
CRP SERPL QL: 14.4 MG/L
ERYTHROCYTE [SEDIMENTATION RATE] IN BLOOD: 33 MM/HOUR (ref 0–29)
GFR SERPL CREATININE-BSD FRML MDRD: 83 ML/MIN/1.73SQ M
GLUCOSE P FAST SERPL-MCNC: 94 MG/DL (ref 70–99)
LDH SERPL-CCNC: 385 U/L (ref 313–618)
POTASSIUM SERPL-SCNC: 4.5 MMOL/L (ref 3.6–5)
PROT SERPL-MCNC: 6.2 G/DL (ref 5.9–8.4)
SODIUM SERPL-SCNC: 140 MMOL/L (ref 137–147)

## 2020-10-06 PROCEDURE — 83615 LACTATE (LD) (LDH) ENZYME: CPT

## 2020-10-06 PROCEDURE — 80053 COMPREHEN METABOLIC PANEL: CPT

## 2020-10-06 PROCEDURE — 86140 C-REACTIVE PROTEIN: CPT

## 2020-10-06 PROCEDURE — 85652 RBC SED RATE AUTOMATED: CPT

## 2020-10-07 ENCOUNTER — TELEPHONE (OUTPATIENT)
Dept: HEMATOLOGY ONCOLOGY | Facility: CLINIC | Age: 60
End: 2020-10-07

## 2020-10-09 RX ORDER — CYANOCOBALAMIN 1000 UG/ML
1000 INJECTION INTRAMUSCULAR; SUBCUTANEOUS ONCE
Status: CANCELLED | OUTPATIENT
Start: 2020-10-12

## 2020-10-12 ENCOUNTER — HOSPITAL ENCOUNTER (OUTPATIENT)
Dept: INFUSION CENTER | Facility: HOSPITAL | Age: 60
Discharge: HOME | End: 2020-10-12

## 2020-10-12 ENCOUNTER — OFFICE VISIT (OUTPATIENT)
Dept: HEMATOLOGY ONCOLOGY | Facility: CLINIC | Age: 60
End: 2020-10-12
Payer: COMMERCIAL

## 2020-10-12 VITALS
RESPIRATION RATE: 17 BRPM | SYSTOLIC BLOOD PRESSURE: 128 MMHG | HEIGHT: 63 IN | WEIGHT: 202.2 LBS | OXYGEN SATURATION: 99 % | BODY MASS INDEX: 35.83 KG/M2 | HEART RATE: 81 BPM | TEMPERATURE: 97.4 F | DIASTOLIC BLOOD PRESSURE: 76 MMHG

## 2020-10-12 DIAGNOSIS — C82.18 GRADE 2 FOLLICULAR LYMPHOMA OF LYMPH NODES OF MULTIPLE REGIONS (HCC): Primary | ICD-10-CM

## 2020-10-12 DIAGNOSIS — T45.1X5A CHEMOTHERAPY-INDUCED NEUTROPENIA (HCC): ICD-10-CM

## 2020-10-12 DIAGNOSIS — D70.1 CHEMOTHERAPY-INDUCED NEUTROPENIA (HCC): ICD-10-CM

## 2020-10-12 DIAGNOSIS — D51.8 OTHER VITAMIN B12 DEFICIENCY ANEMIAS: ICD-10-CM

## 2020-10-12 PROCEDURE — 1036F TOBACCO NON-USER: CPT | Performed by: NURSE PRACTITIONER

## 2020-10-12 PROCEDURE — 3078F DIAST BP <80 MM HG: CPT | Performed by: NURSE PRACTITIONER

## 2020-10-12 PROCEDURE — 99214 OFFICE O/P EST MOD 30 MIN: CPT | Performed by: NURSE PRACTITIONER

## 2020-10-12 RX ORDER — SODIUM CHLORIDE 9 MG/ML
20 INJECTION, SOLUTION INTRAVENOUS ONCE
Status: CANCELLED | OUTPATIENT
Start: 2020-12-01

## 2020-10-12 RX ORDER — ACETAMINOPHEN 325 MG/1
650 TABLET ORAL ONCE
Status: CANCELLED | OUTPATIENT
Start: 2020-12-01

## 2020-10-15 ENCOUNTER — HOSPITAL ENCOUNTER (OUTPATIENT)
Dept: INFUSION CENTER | Facility: HOSPITAL | Age: 60
Discharge: HOME | End: 2020-10-15
Payer: COMMERCIAL

## 2020-10-15 VITALS
OXYGEN SATURATION: 100 % | WEIGHT: 201.06 LBS | HEART RATE: 58 BPM | TEMPERATURE: 98.3 F | BODY MASS INDEX: 33.5 KG/M2 | DIASTOLIC BLOOD PRESSURE: 67 MMHG | SYSTOLIC BLOOD PRESSURE: 117 MMHG | RESPIRATION RATE: 18 BRPM | HEIGHT: 65 IN

## 2020-10-15 DIAGNOSIS — C82.18 GRADE 2 FOLLICULAR LYMPHOMA OF LYMPH NODES OF MULTIPLE REGIONS (HCC): Primary | ICD-10-CM

## 2020-10-15 DIAGNOSIS — Z95.828 PORT-A-CATH IN PLACE: Primary | ICD-10-CM

## 2020-10-15 DIAGNOSIS — D70.1 CHEMOTHERAPY-INDUCED NEUTROPENIA (HCC): ICD-10-CM

## 2020-10-15 DIAGNOSIS — T45.1X5A CHEMOTHERAPY-INDUCED NEUTROPENIA (HCC): ICD-10-CM

## 2020-10-15 LAB
DACRYOCYTES BLD QL SMEAR: PRESENT
EOSINOPHIL # BLD AUTO: 0.11 THOUSAND/UL (ref 0–0.4)
EOSINOPHIL NFR BLD MANUAL: 8 % (ref 0–6)
ERYTHROCYTE [DISTWIDTH] IN BLOOD BY AUTOMATED COUNT: 17.9 %
HCT VFR BLD AUTO: 33.4 % (ref 36–46)
HGB BLD-MCNC: 10.4 G/DL (ref 12–16)
LYMPHOCYTES # BLD AUTO: 0.62 THOUSAND/UL (ref 0.5–4)
LYMPHOCYTES # BLD AUTO: 44 % (ref 25–45)
MCH RBC QN AUTO: 20.7 PG (ref 26–34)
MCHC RBC AUTO-ENTMCNC: 31.2 G/DL (ref 31–36)
MCV RBC AUTO: 67 FL (ref 80–100)
MICROCYTES BLD QL AUTO: PRESENT
MONOCYTES # BLD AUTO: 0.11 THOUSAND/UL (ref 0.2–0.9)
MONOCYTES NFR BLD AUTO: 8 % (ref 1–10)
NEUTS BAND NFR BLD MANUAL: 10 % (ref 0–8)
NEUTS SEG # BLD: 0.56 THOUSAND/UL (ref 1.8–7.8)
NEUTS SEG NFR BLD AUTO: 30 %
OVALOCYTES BLD QL SMEAR: PRESENT
PLATELET # BLD AUTO: 205 THOUSANDS/UL (ref 150–450)
PLATELET BLD QL SMEAR: ADEQUATE
PMV BLD AUTO: 10.9 FL (ref 8.9–12.7)
POIKILOCYTOSIS BLD QL SMEAR: PRESENT
RBC # BLD AUTO: 5.03 MILLION/UL (ref 4–5.2)
RBC MORPH BLD: ABNORMAL
SCHISTOCYTES BLD QL SMEAR: PRESENT
TOTAL CELLS COUNTED SPEC: 50
WBC # BLD AUTO: 1.4 THOUSAND/UL (ref 4.5–11)

## 2020-10-15 PROCEDURE — 96367 TX/PROPH/DG ADDL SEQ IV INF: CPT

## 2020-10-15 PROCEDURE — 36593 DECLOT VASCULAR DEVICE: CPT

## 2020-10-15 PROCEDURE — 85007 BL SMEAR W/DIFF WBC COUNT: CPT

## 2020-10-15 PROCEDURE — 96415 CHEMO IV INFUSION ADDL HR: CPT

## 2020-10-15 PROCEDURE — 85027 COMPLETE CBC AUTOMATED: CPT

## 2020-10-15 PROCEDURE — 96413 CHEMO IV INFUSION 1 HR: CPT

## 2020-10-15 RX ORDER — SODIUM CHLORIDE 9 MG/ML
20 INJECTION, SOLUTION INTRAVENOUS ONCE
Status: COMPLETED | OUTPATIENT
Start: 2020-10-15 | End: 2020-10-15

## 2020-10-15 RX ORDER — ACETAMINOPHEN 325 MG/1
650 TABLET ORAL ONCE
Status: COMPLETED | OUTPATIENT
Start: 2020-10-15 | End: 2020-10-15

## 2020-10-15 RX ADMIN — SODIUM CHLORIDE 20 ML/HR: 0.9 INJECTION, SOLUTION INTRAVENOUS at 09:35

## 2020-10-15 RX ADMIN — DEXAMETHASONE SODIUM PHOSPHATE 10 MG: 10 INJECTION, SOLUTION INTRAMUSCULAR; INTRAVENOUS at 10:08

## 2020-10-15 RX ADMIN — ALTEPLASE 2 MG: 2.2 INJECTION, POWDER, LYOPHILIZED, FOR SOLUTION INTRAVENOUS at 13:04

## 2020-10-15 RX ADMIN — SODIUM CHLORIDE 700 MG: 0.9 INJECTION, SOLUTION INTRAVENOUS at 11:21

## 2020-10-15 RX ADMIN — DIPHENHYDRAMINE HYDROCHLORIDE 50 MG: 50 INJECTION INTRAMUSCULAR; INTRAVENOUS at 09:40

## 2020-10-15 RX ADMIN — ACETAMINOPHEN 650 MG: 325 TABLET ORAL at 10:09

## 2020-10-15 RX ADMIN — ALTEPLASE 2 MG: 2.2 INJECTION, POWDER, LYOPHILIZED, FOR SOLUTION INTRAVENOUS at 11:02

## 2020-10-16 ENCOUNTER — PATIENT OUTREACH (OUTPATIENT)
Dept: CASE MANAGEMENT | Facility: HOSPITAL | Age: 60
End: 2020-10-16

## 2020-10-21 DIAGNOSIS — C82.18 GRADE 2 FOLLICULAR LYMPHOMA OF LYMPH NODES OF MULTIPLE REGIONS (HCC): ICD-10-CM

## 2020-10-21 DIAGNOSIS — Z51.5 PALLIATIVE CARE PATIENT: ICD-10-CM

## 2020-10-21 DIAGNOSIS — G89.3 CANCER RELATED PAIN: ICD-10-CM

## 2020-10-21 DIAGNOSIS — R10.84 GENERALIZED ABDOMINAL PAIN: ICD-10-CM

## 2020-10-21 RX ORDER — OXYCODONE HYDROCHLORIDE 15 MG/1
15 TABLET ORAL EVERY 4 HOURS PRN
Qty: 180 TABLET | Refills: 0 | Status: SHIPPED | OUTPATIENT
Start: 2020-10-21 | End: 2020-11-19 | Stop reason: DRUGHIGH

## 2020-10-27 ENCOUNTER — TELEPHONE (OUTPATIENT)
Dept: HEMATOLOGY ONCOLOGY | Facility: CLINIC | Age: 60
End: 2020-10-27

## 2020-10-27 RX ORDER — CYANOCOBALAMIN 1000 UG/ML
1000 INJECTION INTRAMUSCULAR; SUBCUTANEOUS ONCE
Status: CANCELLED | OUTPATIENT
Start: 2020-10-29

## 2020-10-29 ENCOUNTER — TELEPHONE (OUTPATIENT)
Dept: INFUSION CENTER | Facility: HOSPITAL | Age: 60
End: 2020-10-29

## 2020-10-29 ENCOUNTER — HOSPITAL ENCOUNTER (OUTPATIENT)
Dept: INFUSION CENTER | Facility: HOSPITAL | Age: 60
Discharge: HOME | End: 2020-10-29
Attending: INTERNAL MEDICINE
Payer: COMMERCIAL

## 2020-10-29 DIAGNOSIS — D51.8 OTHER VITAMIN B12 DEFICIENCY ANEMIAS: Primary | ICD-10-CM

## 2020-10-29 PROCEDURE — 96372 THER/PROPH/DIAG INJ SC/IM: CPT

## 2020-10-29 RX ORDER — CYANOCOBALAMIN 1000 UG/ML
1000 INJECTION INTRAMUSCULAR; SUBCUTANEOUS ONCE
Status: CANCELLED | OUTPATIENT
Start: 2020-11-25

## 2020-10-29 RX ORDER — CYANOCOBALAMIN 1000 UG/ML
1000 INJECTION INTRAMUSCULAR; SUBCUTANEOUS ONCE
Status: COMPLETED | OUTPATIENT
Start: 2020-10-29 | End: 2020-10-29

## 2020-10-29 RX ADMIN — CYANOCOBALAMIN 1000 MCG: 1000 INJECTION INTRAMUSCULAR; SUBCUTANEOUS at 11:05

## 2020-11-02 ENCOUNTER — HOSPITAL ENCOUNTER (OUTPATIENT)
Dept: NUCLEAR MEDICINE | Facility: HOSPITAL | Age: 60
Discharge: HOME | End: 2020-11-02
Payer: COMMERCIAL

## 2020-11-02 DIAGNOSIS — C82.18 GRADE 2 FOLLICULAR LYMPHOMA OF LYMPH NODES OF MULTIPLE REGIONS (HCC): ICD-10-CM

## 2020-11-02 LAB — GLUCOSE SERPL-MCNC: 89 MG/DL (ref 65–140)

## 2020-11-02 PROCEDURE — 82948 REAGENT STRIP/BLOOD GLUCOSE: CPT

## 2020-11-02 PROCEDURE — G1004 CDSM NDSC: HCPCS

## 2020-11-02 PROCEDURE — 78815 PET IMAGE W/CT SKULL-THIGH: CPT

## 2020-11-02 PROCEDURE — A9552 F18 FDG: HCPCS

## 2020-11-09 ENCOUNTER — DOCUMENTATION (OUTPATIENT)
Dept: HEMATOLOGY ONCOLOGY | Facility: CLINIC | Age: 60
End: 2020-11-09

## 2020-11-09 ENCOUNTER — OFFICE VISIT (OUTPATIENT)
Dept: HEMATOLOGY ONCOLOGY | Facility: CLINIC | Age: 60
End: 2020-11-09
Payer: COMMERCIAL

## 2020-11-09 ENCOUNTER — PREP FOR PROCEDURE (OUTPATIENT)
Dept: INTERVENTIONAL RADIOLOGY/VASCULAR | Facility: CLINIC | Age: 60
End: 2020-11-09

## 2020-11-09 VITALS
DIASTOLIC BLOOD PRESSURE: 70 MMHG | RESPIRATION RATE: 20 BRPM | TEMPERATURE: 99 F | HEART RATE: 63 BPM | BODY MASS INDEX: 33.32 KG/M2 | HEIGHT: 65 IN | WEIGHT: 200 LBS | SYSTOLIC BLOOD PRESSURE: 122 MMHG | OXYGEN SATURATION: 99 %

## 2020-11-09 DIAGNOSIS — C82.18 GRADE 2 FOLLICULAR LYMPHOMA OF LYMPH NODES OF MULTIPLE REGIONS (HCC): Primary | ICD-10-CM

## 2020-11-09 DIAGNOSIS — T45.1X5A CHEMOTHERAPY-INDUCED NEUTROPENIA (HCC): Primary | ICD-10-CM

## 2020-11-09 DIAGNOSIS — D70.1 CHEMOTHERAPY-INDUCED NEUTROPENIA (HCC): Primary | ICD-10-CM

## 2020-11-09 DIAGNOSIS — D70.9 NEUTROPENIA, UNSPECIFIED TYPE (HCC): ICD-10-CM

## 2020-11-09 DIAGNOSIS — C82.18 GRADE 2 FOLLICULAR LYMPHOMA OF LYMPH NODES OF MULTIPLE REGIONS (HCC): ICD-10-CM

## 2020-11-09 PROCEDURE — 99215 OFFICE O/P EST HI 40 MIN: CPT | Performed by: INTERNAL MEDICINE

## 2020-11-09 RX ORDER — PREDNISONE 20 MG/1
100 TABLET ORAL DAILY
Qty: 25 TABLET | Refills: 5 | Status: SHIPPED | OUTPATIENT
Start: 2020-11-09 | End: 2020-12-28 | Stop reason: ALTCHOICE

## 2020-11-10 DIAGNOSIS — D70.1 CHEMOTHERAPY-INDUCED NEUTROPENIA (HCC): Primary | ICD-10-CM

## 2020-11-10 DIAGNOSIS — T45.1X5A CHEMOTHERAPY-INDUCED NEUTROPENIA (HCC): Primary | ICD-10-CM

## 2020-11-10 DIAGNOSIS — C82.18 GRADE 2 FOLLICULAR LYMPHOMA OF LYMPH NODES OF MULTIPLE REGIONS (HCC): ICD-10-CM

## 2020-11-11 ENCOUNTER — HOSPITAL ENCOUNTER (OUTPATIENT)
Dept: NON INVASIVE DIAGNOSTICS | Facility: HOSPITAL | Age: 60
Discharge: HOME | End: 2020-11-11
Attending: INTERNAL MEDICINE
Payer: COMMERCIAL

## 2020-11-11 DIAGNOSIS — C82.18 GRADE 2 FOLLICULAR LYMPHOMA OF LYMPH NODES OF MULTIPLE REGIONS (HCC): ICD-10-CM

## 2020-11-11 PROCEDURE — 93306 TTE W/DOPPLER COMPLETE: CPT

## 2020-11-11 RX ORDER — LENALIDOMIDE 15 MG/1
CAPSULE ORAL
Refills: 0 | OUTPATIENT
Start: 2020-11-11

## 2020-11-12 PROCEDURE — 93306 TTE W/DOPPLER COMPLETE: CPT | Performed by: INTERNAL MEDICINE

## 2020-11-13 ENCOUNTER — TELEPHONE (OUTPATIENT)
Dept: HEMATOLOGY ONCOLOGY | Facility: CLINIC | Age: 60
End: 2020-11-13

## 2020-11-13 DIAGNOSIS — T45.1X5A CHEMOTHERAPY-INDUCED NEUTROPENIA (HCC): Primary | ICD-10-CM

## 2020-11-13 DIAGNOSIS — D70.1 CHEMOTHERAPY-INDUCED NEUTROPENIA (HCC): Primary | ICD-10-CM

## 2020-11-13 DIAGNOSIS — C82.18 GRADE 2 FOLLICULAR LYMPHOMA OF LYMPH NODES OF MULTIPLE REGIONS (HCC): ICD-10-CM

## 2020-11-16 DIAGNOSIS — T45.1X5A CHEMOTHERAPY-INDUCED NEUTROPENIA (HCC): Primary | ICD-10-CM

## 2020-11-16 DIAGNOSIS — C82.18 GRADE 2 FOLLICULAR LYMPHOMA OF LYMPH NODES OF MULTIPLE REGIONS (HCC): ICD-10-CM

## 2020-11-16 DIAGNOSIS — D70.1 CHEMOTHERAPY-INDUCED NEUTROPENIA (HCC): Primary | ICD-10-CM

## 2020-11-16 RX ORDER — ACETAMINOPHEN 325 MG/1
650 TABLET ORAL EVERY 6 HOURS PRN
Status: CANCELLED
Start: 2020-11-18

## 2020-11-16 RX ORDER — DOXORUBICIN HYDROCHLORIDE 2 MG/ML
50 INJECTION, SOLUTION INTRAVENOUS ONCE
Status: CANCELLED | OUTPATIENT
Start: 2020-11-18

## 2020-11-16 RX ORDER — ACETAMINOPHEN 325 MG/1
650 TABLET ORAL EVERY 6 HOURS PRN
Status: CANCELLED
Start: 2020-11-19

## 2020-11-16 RX ORDER — SODIUM CHLORIDE 9 MG/ML
20 INJECTION, SOLUTION INTRAVENOUS ONCE
Status: CANCELLED | OUTPATIENT
Start: 2020-11-18

## 2020-11-17 ENCOUNTER — LAB (OUTPATIENT)
Dept: LAB | Facility: HOSPITAL | Age: 60
End: 2020-11-17
Attending: INTERNAL MEDICINE
Payer: COMMERCIAL

## 2020-11-17 ENCOUNTER — TELEPHONE (OUTPATIENT)
Dept: HEMATOLOGY ONCOLOGY | Facility: CLINIC | Age: 60
End: 2020-11-17

## 2020-11-17 DIAGNOSIS — D70.9 NEUTROPENIA, UNSPECIFIED TYPE (HCC): ICD-10-CM

## 2020-11-17 DIAGNOSIS — C82.18 GRADE 2 FOLLICULAR LYMPHOMA OF LYMPH NODES OF MULTIPLE REGIONS (HCC): ICD-10-CM

## 2020-11-17 LAB
ALBUMIN SERPL BCP-MCNC: 3.9 G/DL (ref 3–5.2)
ALP SERPL-CCNC: 91 U/L (ref 43–122)
ALT SERPL W P-5'-P-CCNC: <6 U/L (ref 9–52)
ANION GAP SERPL CALCULATED.3IONS-SCNC: 5 MMOL/L (ref 5–14)
ANISOCYTOSIS BLD QL SMEAR: PRESENT
AST SERPL W P-5'-P-CCNC: 20 U/L (ref 14–36)
BASOPHILS # BLD AUTO: 0.02 THOUSAND/UL (ref 0–0.1)
BASOPHILS NFR MAR MANUAL: 1 % (ref 0–1)
BILIRUB SERPL-MCNC: 0.5 MG/DL
BUN SERPL-MCNC: 16 MG/DL (ref 5–25)
CALCIUM SERPL-MCNC: 9.2 MG/DL (ref 8.4–10.2)
CHLORIDE SERPL-SCNC: 104 MMOL/L (ref 97–108)
CO2 SERPL-SCNC: 28 MMOL/L (ref 22–30)
CREAT SERPL-MCNC: 0.94 MG/DL (ref 0.6–1.2)
CRP SERPL QL: 124.5 MG/L
EOSINOPHIL # BLD AUTO: 0.26 THOUSAND/UL (ref 0–0.4)
EOSINOPHIL NFR BLD MANUAL: 16 % (ref 0–6)
ERYTHROCYTE [DISTWIDTH] IN BLOOD BY AUTOMATED COUNT: 18.8 %
ERYTHROCYTE [SEDIMENTATION RATE] IN BLOOD: 60 MM/HOUR (ref 0–29)
GFR SERPL CREATININE-BSD FRML MDRD: 67 ML/MIN/1.73SQ M
GLUCOSE P FAST SERPL-MCNC: 120 MG/DL (ref 70–99)
HCT VFR BLD AUTO: 34.7 % (ref 36–46)
HELMET CELLS BLD QL SMEAR: PRESENT
HGB BLD-MCNC: 10.8 G/DL (ref 12–16)
LDH SERPL-CCNC: 360 U/L (ref 313–618)
LYMPHOCYTES # BLD AUTO: 0.56 THOUSAND/UL (ref 0.5–4)
LYMPHOCYTES # BLD AUTO: 35 % (ref 25–45)
MAGNESIUM SERPL-MCNC: 2.1 MG/DL (ref 1.6–2.3)
MCH RBC QN AUTO: 20.3 PG (ref 26–34)
MCHC RBC AUTO-ENTMCNC: 31.1 G/DL (ref 31–36)
MCV RBC AUTO: 65 FL (ref 80–100)
METAMYELOCYTES NFR BLD MANUAL: 1 % (ref 0–1)
MICROCYTES BLD QL AUTO: PRESENT
MONOCYTES # BLD AUTO: 0.3 THOUSAND/UL (ref 0.2–0.9)
MONOCYTES NFR BLD AUTO: 19 % (ref 1–10)
MYELOCYTES NFR BLD MANUAL: 2 % (ref 0–1)
NEUTS BAND NFR BLD MANUAL: 2 % (ref 0–8)
NEUTS SEG # BLD: 0.26 THOUSAND/UL (ref 1.8–7.8)
NEUTS SEG NFR BLD AUTO: 14 %
OVALOCYTES BLD QL SMEAR: PRESENT
PLATELET # BLD AUTO: 249 THOUSANDS/UL (ref 150–450)
PLATELET BLD QL SMEAR: ADEQUATE
PMV BLD AUTO: 11.1 FL (ref 8.9–12.7)
POIKILOCYTOSIS BLD QL SMEAR: PRESENT
POTASSIUM SERPL-SCNC: 4.9 MMOL/L (ref 3.6–5)
PROT SERPL-MCNC: 7 G/DL (ref 5.9–8.4)
RBC # BLD AUTO: 5.32 MILLION/UL (ref 4–5.2)
RBC MORPH BLD: ABNORMAL
SCHISTOCYTES BLD QL SMEAR: PRESENT
SODIUM SERPL-SCNC: 137 MMOL/L (ref 137–147)
TOTAL CELLS COUNTED SPEC: 100
URATE SERPL-MCNC: 4.7 MG/DL (ref 2.7–7.5)
VARIANT LYMPHS # BLD AUTO: 10 % (ref 0–0)
WBC # BLD AUTO: 1.6 THOUSAND/UL (ref 4.5–11)

## 2020-11-17 PROCEDURE — 86140 C-REACTIVE PROTEIN: CPT

## 2020-11-17 PROCEDURE — 85007 BL SMEAR W/DIFF WBC COUNT: CPT

## 2020-11-17 PROCEDURE — 83735 ASSAY OF MAGNESIUM: CPT | Performed by: INTERNAL MEDICINE

## 2020-11-17 PROCEDURE — 83615 LACTATE (LD) (LDH) ENZYME: CPT

## 2020-11-17 PROCEDURE — 85027 COMPLETE CBC AUTOMATED: CPT

## 2020-11-17 PROCEDURE — 84550 ASSAY OF BLOOD/URIC ACID: CPT

## 2020-11-17 PROCEDURE — 36415 COLL VENOUS BLD VENIPUNCTURE: CPT

## 2020-11-17 PROCEDURE — 85652 RBC SED RATE AUTOMATED: CPT

## 2020-11-17 PROCEDURE — 80053 COMPREHEN METABOLIC PANEL: CPT

## 2020-11-18 ENCOUNTER — PREP FOR PROCEDURE (OUTPATIENT)
Dept: INTERVENTIONAL RADIOLOGY/VASCULAR | Facility: CLINIC | Age: 60
End: 2020-11-18

## 2020-11-18 ENCOUNTER — HOSPITAL ENCOUNTER (OUTPATIENT)
Dept: INFUSION CENTER | Facility: HOSPITAL | Age: 60
Discharge: HOME | End: 2020-11-18
Attending: INTERNAL MEDICINE | Admitting: INTERNAL MEDICINE

## 2020-11-18 DIAGNOSIS — C82.18 GRADE 2 FOLLICULAR LYMPHOMA OF LYMPH NODES OF MULTIPLE REGIONS (HCC): Primary | ICD-10-CM

## 2020-11-19 ENCOUNTER — HOSPITAL ENCOUNTER (OUTPATIENT)
Dept: INFUSION CENTER | Facility: HOSPITAL | Age: 60
Discharge: HOME | End: 2020-11-19
Attending: INTERNAL MEDICINE

## 2020-11-19 ENCOUNTER — TELEPHONE (OUTPATIENT)
Dept: PALLIATIVE MEDICINE | Facility: CLINIC | Age: 60
End: 2020-11-19

## 2020-11-19 ENCOUNTER — OFFICE VISIT (OUTPATIENT)
Dept: PALLIATIVE MEDICINE | Facility: CLINIC | Age: 60
End: 2020-11-19
Payer: COMMERCIAL

## 2020-11-19 VITALS
SYSTOLIC BLOOD PRESSURE: 130 MMHG | RESPIRATION RATE: 14 BRPM | TEMPERATURE: 96.7 F | WEIGHT: 203.4 LBS | DIASTOLIC BLOOD PRESSURE: 60 MMHG | BODY MASS INDEX: 34.37 KG/M2 | HEART RATE: 64 BPM | OXYGEN SATURATION: 94 %

## 2020-11-19 DIAGNOSIS — C82.18 GRADE 2 FOLLICULAR LYMPHOMA OF LYMPH NODES OF MULTIPLE REGIONS (HCC): Primary | ICD-10-CM

## 2020-11-19 DIAGNOSIS — R51.9 CHRONIC NONINTRACTABLE HEADACHE, UNSPECIFIED HEADACHE TYPE: ICD-10-CM

## 2020-11-19 DIAGNOSIS — G89.3 CANCER RELATED PAIN: ICD-10-CM

## 2020-11-19 DIAGNOSIS — Z51.5 PALLIATIVE CARE PATIENT: ICD-10-CM

## 2020-11-19 DIAGNOSIS — G89.29 CHRONIC NONINTRACTABLE HEADACHE, UNSPECIFIED HEADACHE TYPE: ICD-10-CM

## 2020-11-19 DIAGNOSIS — R10.30 LOWER ABDOMINAL PAIN: ICD-10-CM

## 2020-11-19 PROCEDURE — 99214 OFFICE O/P EST MOD 30 MIN: CPT | Performed by: INTERNAL MEDICINE

## 2020-11-19 RX ORDER — OXYCODONE HYDROCHLORIDE 20 MG/1
20 TABLET ORAL EVERY 4 HOURS PRN
Qty: 180 TABLET | Refills: 0 | Status: SHIPPED | OUTPATIENT
Start: 2020-11-19 | End: 2020-12-15 | Stop reason: SDUPTHER

## 2020-11-19 RX ORDER — ACETAMINOPHEN, ASPIRIN AND CAFFEINE 250; 250; 65 MG/1; MG/1; MG/1
1 TABLET, FILM COATED ORAL EVERY 6 HOURS PRN
Qty: 30 TABLET | Refills: 0 | Status: SHIPPED | OUTPATIENT
Start: 2020-11-19

## 2020-11-20 ENCOUNTER — HOSPITAL ENCOUNTER (OUTPATIENT)
Dept: INFUSION CENTER | Facility: HOSPITAL | Age: 60
End: 2020-11-20
Attending: INTERNAL MEDICINE

## 2020-11-24 ENCOUNTER — HOSPITAL ENCOUNTER (OUTPATIENT)
Dept: CT IMAGING | Facility: HOSPITAL | Age: 60
Discharge: HOME | End: 2020-11-24
Attending: STUDENT IN AN ORGANIZED HEALTH CARE EDUCATION/TRAINING PROGRAM
Payer: COMMERCIAL

## 2020-11-24 ENCOUNTER — HOSPITAL ENCOUNTER (OUTPATIENT)
Dept: RADIOLOGY | Facility: HOSPITAL | Age: 60
Discharge: HOME | End: 2020-11-24
Attending: STUDENT IN AN ORGANIZED HEALTH CARE EDUCATION/TRAINING PROGRAM
Payer: COMMERCIAL

## 2020-11-24 VITALS
HEART RATE: 61 BPM | OXYGEN SATURATION: 99 % | SYSTOLIC BLOOD PRESSURE: 144 MMHG | DIASTOLIC BLOOD PRESSURE: 67 MMHG | RESPIRATION RATE: 16 BRPM

## 2020-11-24 VITALS
DIASTOLIC BLOOD PRESSURE: 65 MMHG | TEMPERATURE: 97.5 F | HEIGHT: 65 IN | HEART RATE: 72 BPM | WEIGHT: 203 LBS | RESPIRATION RATE: 16 BRPM | SYSTOLIC BLOOD PRESSURE: 133 MMHG | BODY MASS INDEX: 33.82 KG/M2 | OXYGEN SATURATION: 98 %

## 2020-11-24 DIAGNOSIS — C82.18 GRADE 2 FOLLICULAR LYMPHOMA OF LYMPH NODES OF MULTIPLE REGIONS (HCC): ICD-10-CM

## 2020-11-24 PROCEDURE — 76942 ECHO GUIDE FOR BIOPSY: CPT | Performed by: RADIOLOGY

## 2020-11-24 PROCEDURE — 88341 IMHCHEM/IMCYTCHM EA ADD ANTB: CPT | Performed by: PATHOLOGY

## 2020-11-24 PROCEDURE — 88184 FLOWCYTOMETRY/ TC 1 MARKER: CPT | Performed by: INTERNAL MEDICINE

## 2020-11-24 PROCEDURE — 38221 DX BONE MARROW BIOPSIES: CPT

## 2020-11-24 PROCEDURE — 88262 CHROMOSOME ANALYSIS 15-20: CPT | Performed by: INTERNAL MEDICINE

## 2020-11-24 PROCEDURE — 88237 TISSUE CULTURE BONE MARROW: CPT | Performed by: INTERNAL MEDICINE

## 2020-11-24 PROCEDURE — 88184 FLOWCYTOMETRY/ TC 1 MARKER: CPT | Performed by: STUDENT IN AN ORGANIZED HEALTH CARE EDUCATION/TRAINING PROGRAM

## 2020-11-24 PROCEDURE — 38222 DX BONE MARROW BX & ASPIR: CPT | Performed by: RADIOLOGY

## 2020-11-24 PROCEDURE — 88360 TUMOR IMMUNOHISTOCHEM/MANUAL: CPT | Performed by: PATHOLOGY

## 2020-11-24 PROCEDURE — 88333 PATH CONSLTJ SURG CYTO XM 1: CPT | Performed by: PATHOLOGY

## 2020-11-24 PROCEDURE — 88342 IMHCHEM/IMCYTCHM 1ST ANTB: CPT | Performed by: PATHOLOGY

## 2020-11-24 PROCEDURE — 99153 MOD SED SAME PHYS/QHP EA: CPT

## 2020-11-24 PROCEDURE — 85097 BONE MARROW INTERPRETATION: CPT | Performed by: PATHOLOGY

## 2020-11-24 PROCEDURE — 88311 DECALCIFY TISSUE: CPT | Performed by: PATHOLOGY

## 2020-11-24 PROCEDURE — 88365 INSITU HYBRIDIZATION (FISH): CPT | Performed by: PATHOLOGY

## 2020-11-24 PROCEDURE — 88185 FLOWCYTOMETRY/TC ADD-ON: CPT

## 2020-11-24 PROCEDURE — 88305 TISSUE EXAM BY PATHOLOGIST: CPT | Performed by: PATHOLOGY

## 2020-11-24 PROCEDURE — 38505 NEEDLE BIOPSY LYMPH NODES: CPT | Performed by: RADIOLOGY

## 2020-11-24 PROCEDURE — 38505 NEEDLE BIOPSY LYMPH NODES: CPT

## 2020-11-24 PROCEDURE — 99152 MOD SED SAME PHYS/QHP 5/>YRS: CPT

## 2020-11-24 PROCEDURE — 76942 ECHO GUIDE FOR BIOPSY: CPT

## 2020-11-24 PROCEDURE — 99152 MOD SED SAME PHYS/QHP 5/>YRS: CPT | Performed by: RADIOLOGY

## 2020-11-24 PROCEDURE — 77012 CT SCAN FOR NEEDLE BIOPSY: CPT | Performed by: RADIOLOGY

## 2020-11-24 PROCEDURE — NC001 PR NO CHARGE: Performed by: RADIOLOGY

## 2020-11-24 PROCEDURE — 88313 SPECIAL STAINS GROUP 2: CPT | Performed by: PATHOLOGY

## 2020-11-24 PROCEDURE — 88364 INSITU HYBRIDIZATION (FISH): CPT | Performed by: PATHOLOGY

## 2020-11-24 RX ORDER — LIDOCAINE WITH 8.4% SOD BICARB 0.9%(10ML)
SYRINGE (ML) INJECTION CODE/TRAUMA/SEDATION MEDICATION
Status: COMPLETED | OUTPATIENT
Start: 2020-11-24 | End: 2020-11-24

## 2020-11-24 RX ORDER — MIDAZOLAM HYDROCHLORIDE 2 MG/2ML
INJECTION, SOLUTION INTRAMUSCULAR; INTRAVENOUS CODE/TRAUMA/SEDATION MEDICATION
Status: COMPLETED | OUTPATIENT
Start: 2020-11-24 | End: 2020-11-24

## 2020-11-24 RX ORDER — FENTANYL CITRATE 50 UG/ML
INJECTION, SOLUTION INTRAMUSCULAR; INTRAVENOUS CODE/TRAUMA/SEDATION MEDICATION
Status: COMPLETED | OUTPATIENT
Start: 2020-11-24 | End: 2020-11-24

## 2020-11-24 RX ORDER — SODIUM CHLORIDE 9 MG/ML
50 INJECTION, SOLUTION INTRAVENOUS ONCE
Status: COMPLETED | OUTPATIENT
Start: 2020-11-24 | End: 2020-11-24

## 2020-11-24 RX ADMIN — FENTANYL CITRATE 50 MCG: 50 INJECTION INTRAMUSCULAR; INTRAVENOUS at 10:00

## 2020-11-24 RX ADMIN — FENTANYL CITRATE 50 MCG: 50 INJECTION INTRAMUSCULAR; INTRAVENOUS at 10:02

## 2020-11-24 RX ADMIN — Medication 10 ML: at 09:26

## 2020-11-24 RX ADMIN — FENTANYL CITRATE 25 MCG: 50 INJECTION INTRAMUSCULAR; INTRAVENOUS at 09:55

## 2020-11-24 RX ADMIN — MIDAZOLAM 0.5 MG: 1 INJECTION INTRAMUSCULAR; INTRAVENOUS at 09:52

## 2020-11-24 RX ADMIN — MIDAZOLAM 0.5 MG: 1 INJECTION INTRAMUSCULAR; INTRAVENOUS at 09:58

## 2020-11-24 RX ADMIN — Medication 10 ML: at 09:58

## 2020-11-24 RX ADMIN — FENTANYL CITRATE 25 MCG: 50 INJECTION INTRAMUSCULAR; INTRAVENOUS at 09:58

## 2020-11-24 RX ADMIN — MIDAZOLAM 0.5 MG: 1 INJECTION INTRAMUSCULAR; INTRAVENOUS at 09:55

## 2020-11-24 RX ADMIN — MIDAZOLAM 0.5 MG: 1 INJECTION INTRAMUSCULAR; INTRAVENOUS at 10:02

## 2020-11-24 RX ADMIN — FENTANYL CITRATE 50 MCG: 50 INJECTION INTRAMUSCULAR; INTRAVENOUS at 09:32

## 2020-11-24 RX ADMIN — SODIUM CHLORIDE 50 ML/HR: 0.9 INJECTION, SOLUTION INTRAVENOUS at 08:51

## 2020-11-25 ENCOUNTER — HOSPITAL ENCOUNTER (OUTPATIENT)
Dept: INFUSION CENTER | Facility: HOSPITAL | Age: 60
Discharge: HOME | End: 2020-11-25
Attending: INTERNAL MEDICINE

## 2020-11-27 LAB — SCAN RESULT: NORMAL

## 2020-11-30 ENCOUNTER — OFFICE VISIT (OUTPATIENT)
Dept: HEMATOLOGY ONCOLOGY | Facility: CLINIC | Age: 60
End: 2020-11-30
Payer: COMMERCIAL

## 2020-11-30 ENCOUNTER — DOCUMENTATION (OUTPATIENT)
Dept: HEMATOLOGY ONCOLOGY | Facility: CLINIC | Age: 60
End: 2020-11-30

## 2020-11-30 VITALS
BODY MASS INDEX: 33.92 KG/M2 | OXYGEN SATURATION: 99 % | WEIGHT: 203.6 LBS | HEIGHT: 65 IN | RESPIRATION RATE: 18 BRPM | DIASTOLIC BLOOD PRESSURE: 82 MMHG | SYSTOLIC BLOOD PRESSURE: 142 MMHG | HEART RATE: 59 BPM | TEMPERATURE: 98.4 F

## 2020-11-30 DIAGNOSIS — C82.18 GRADE 2 FOLLICULAR LYMPHOMA OF LYMPH NODES OF MULTIPLE REGIONS (HCC): Primary | ICD-10-CM

## 2020-11-30 PROCEDURE — 99214 OFFICE O/P EST MOD 30 MIN: CPT | Performed by: INTERNAL MEDICINE

## 2020-11-30 PROCEDURE — 3079F DIAST BP 80-89 MM HG: CPT | Performed by: INTERNAL MEDICINE

## 2020-11-30 PROCEDURE — 1036F TOBACCO NON-USER: CPT | Performed by: INTERNAL MEDICINE

## 2020-11-30 PROCEDURE — 3077F SYST BP >= 140 MM HG: CPT | Performed by: INTERNAL MEDICINE

## 2020-11-30 PROCEDURE — 3008F BODY MASS INDEX DOCD: CPT | Performed by: INTERNAL MEDICINE

## 2020-12-09 ENCOUNTER — TELEPHONE (OUTPATIENT)
Dept: HEMATOLOGY ONCOLOGY | Facility: CLINIC | Age: 60
End: 2020-12-09

## 2020-12-10 ENCOUNTER — DOCUMENTATION (OUTPATIENT)
Dept: HEMATOLOGY ONCOLOGY | Facility: CLINIC | Age: 60
End: 2020-12-10

## 2020-12-10 LAB
MISCELLANEOUS LAB TEST RESULT: NORMAL
SCAN RESULT: NORMAL

## 2020-12-11 ENCOUNTER — TELEPHONE (OUTPATIENT)
Dept: HEMATOLOGY ONCOLOGY | Facility: CLINIC | Age: 60
End: 2020-12-11

## 2020-12-11 ENCOUNTER — LAB (OUTPATIENT)
Dept: LAB | Facility: HOSPITAL | Age: 60
End: 2020-12-11
Payer: COMMERCIAL

## 2020-12-11 ENCOUNTER — OFFICE VISIT (OUTPATIENT)
Dept: HEMATOLOGY ONCOLOGY | Facility: CLINIC | Age: 60
End: 2020-12-11
Payer: COMMERCIAL

## 2020-12-11 VITALS
HEIGHT: 65 IN | SYSTOLIC BLOOD PRESSURE: 130 MMHG | HEART RATE: 59 BPM | WEIGHT: 202.2 LBS | DIASTOLIC BLOOD PRESSURE: 72 MMHG | TEMPERATURE: 98 F | BODY MASS INDEX: 33.69 KG/M2 | RESPIRATION RATE: 16 BRPM | OXYGEN SATURATION: 98 %

## 2020-12-11 DIAGNOSIS — D51.8 OTHER VITAMIN B12 DEFICIENCY ANEMIAS: ICD-10-CM

## 2020-12-11 DIAGNOSIS — D70.9 NEUTROPENIA, UNSPECIFIED TYPE (HCC): ICD-10-CM

## 2020-12-11 DIAGNOSIS — C82.18 GRADE 2 FOLLICULAR LYMPHOMA OF LYMPH NODES OF MULTIPLE REGIONS (HCC): Primary | ICD-10-CM

## 2020-12-11 DIAGNOSIS — C82.18 GRADE 2 FOLLICULAR LYMPHOMA OF LYMPH NODES OF MULTIPLE REGIONS (HCC): ICD-10-CM

## 2020-12-11 LAB
ALBUMIN SERPL BCP-MCNC: 3.9 G/DL (ref 3–5.2)
ALP SERPL-CCNC: 107 U/L (ref 43–122)
ALT SERPL W P-5'-P-CCNC: 30 U/L (ref 9–52)
ANION GAP SERPL CALCULATED.3IONS-SCNC: 6 MMOL/L (ref 5–14)
ANISOCYTOSIS BLD QL SMEAR: PRESENT
AST SERPL W P-5'-P-CCNC: 41 U/L (ref 14–36)
BILIRUB SERPL-MCNC: 0.5 MG/DL
BUN SERPL-MCNC: 16 MG/DL (ref 5–25)
CALCIUM SERPL-MCNC: 9.4 MG/DL (ref 8.4–10.2)
CHLORIDE SERPL-SCNC: 103 MMOL/L (ref 97–108)
CO2 SERPL-SCNC: 32 MMOL/L (ref 22–30)
CREAT SERPL-MCNC: 0.89 MG/DL (ref 0.6–1.2)
CRP SERPL QL: 38.5 MG/L
EOSINOPHIL # BLD AUTO: 0.19 THOUSAND/UL (ref 0–0.4)
EOSINOPHIL NFR BLD MANUAL: 11 % (ref 0–6)
ERYTHROCYTE [DISTWIDTH] IN BLOOD BY AUTOMATED COUNT: 18.1 %
ERYTHROCYTE [SEDIMENTATION RATE] IN BLOOD: 57 MM/HOUR (ref 0–29)
GFR SERPL CREATININE-BSD FRML MDRD: 72 ML/MIN/1.73SQ M
GLUCOSE P FAST SERPL-MCNC: 97 MG/DL (ref 70–99)
HCT VFR BLD AUTO: 34.8 % (ref 36–46)
HELMET CELLS BLD QL SMEAR: PRESENT
HGB BLD-MCNC: 10.9 G/DL (ref 12–16)
HYPERCHROMIA BLD QL SMEAR: PRESENT
LDH SERPL-CCNC: 427 U/L (ref 313–618)
LYMPHOCYTES # BLD AUTO: 0.2 THOUSAND/UL (ref 0.5–4)
LYMPHOCYTES # BLD AUTO: 12 % (ref 25–45)
MAGNESIUM SERPL-MCNC: 2.1 MG/DL (ref 1.6–2.3)
MCH RBC QN AUTO: 20 PG (ref 26–34)
MCHC RBC AUTO-ENTMCNC: 31.2 G/DL (ref 31–36)
MCV RBC AUTO: 64 FL (ref 80–100)
MICROCYTES BLD QL AUTO: PRESENT
MONOCYTES # BLD AUTO: 0.26 THOUSAND/UL (ref 0.2–0.9)
MONOCYTES NFR BLD AUTO: 15 % (ref 1–10)
NEUTS BAND NFR BLD MANUAL: 7 % (ref 0–8)
NEUTS SEG # BLD: 1 THOUSAND/UL (ref 1.8–7.8)
NEUTS SEG NFR BLD AUTO: 52 %
OVALOCYTES BLD QL SMEAR: PRESENT
PLATELET # BLD AUTO: 262 THOUSANDS/UL (ref 150–450)
PLATELET BLD QL SMEAR: ADEQUATE
PMV BLD AUTO: 9.9 FL (ref 8.9–12.7)
POIKILOCYTOSIS BLD QL SMEAR: PRESENT
POTASSIUM SERPL-SCNC: 4.6 MMOL/L (ref 3.6–5)
PROT SERPL-MCNC: 7.1 G/DL (ref 5.9–8.4)
RBC # BLD AUTO: 5.42 MILLION/UL (ref 4–5.2)
RBC MORPH BLD: ABNORMAL
SCHISTOCYTES BLD QL SMEAR: PRESENT
SODIUM SERPL-SCNC: 141 MMOL/L (ref 137–147)
TOTAL CELLS COUNTED SPEC: 100
VARIANT LYMPHS # BLD AUTO: 3 % (ref 0–0)
WBC # BLD AUTO: 1.7 THOUSAND/UL (ref 4.5–11)

## 2020-12-11 PROCEDURE — 83615 LACTATE (LD) (LDH) ENZYME: CPT

## 2020-12-11 PROCEDURE — 36415 COLL VENOUS BLD VENIPUNCTURE: CPT | Performed by: NURSE PRACTITIONER

## 2020-12-11 PROCEDURE — 80053 COMPREHEN METABOLIC PANEL: CPT | Performed by: NURSE PRACTITIONER

## 2020-12-11 PROCEDURE — 86140 C-REACTIVE PROTEIN: CPT

## 2020-12-11 PROCEDURE — 3075F SYST BP GE 130 - 139MM HG: CPT | Performed by: NURSE PRACTITIONER

## 2020-12-11 PROCEDURE — 3078F DIAST BP <80 MM HG: CPT | Performed by: NURSE PRACTITIONER

## 2020-12-11 PROCEDURE — 85652 RBC SED RATE AUTOMATED: CPT

## 2020-12-11 PROCEDURE — 85027 COMPLETE CBC AUTOMATED: CPT | Performed by: NURSE PRACTITIONER

## 2020-12-11 PROCEDURE — 83735 ASSAY OF MAGNESIUM: CPT | Performed by: NURSE PRACTITIONER

## 2020-12-11 PROCEDURE — 1036F TOBACCO NON-USER: CPT | Performed by: NURSE PRACTITIONER

## 2020-12-11 PROCEDURE — 85007 BL SMEAR W/DIFF WBC COUNT: CPT | Performed by: NURSE PRACTITIONER

## 2020-12-11 PROCEDURE — 3008F BODY MASS INDEX DOCD: CPT | Performed by: NURSE PRACTITIONER

## 2020-12-11 PROCEDURE — 99214 OFFICE O/P EST MOD 30 MIN: CPT | Performed by: NURSE PRACTITIONER

## 2020-12-15 DIAGNOSIS — R10.30 LOWER ABDOMINAL PAIN: ICD-10-CM

## 2020-12-15 DIAGNOSIS — G89.3 CANCER RELATED PAIN: ICD-10-CM

## 2020-12-15 DIAGNOSIS — C82.18 GRADE 2 FOLLICULAR LYMPHOMA OF LYMPH NODES OF MULTIPLE REGIONS (HCC): ICD-10-CM

## 2020-12-15 RX ORDER — OXYCODONE HYDROCHLORIDE 20 MG/1
20 TABLET ORAL EVERY 4 HOURS PRN
Qty: 180 TABLET | Refills: 0 | Status: SHIPPED | OUTPATIENT
Start: 2020-12-15 | End: 2021-01-13 | Stop reason: SDUPTHER

## 2020-12-24 ENCOUNTER — TELEPHONE (OUTPATIENT)
Dept: HEMATOLOGY ONCOLOGY | Facility: CLINIC | Age: 60
End: 2020-12-24

## 2020-12-28 ENCOUNTER — OFFICE VISIT (OUTPATIENT)
Dept: HEMATOLOGY ONCOLOGY | Facility: CLINIC | Age: 60
End: 2020-12-28
Payer: COMMERCIAL

## 2020-12-28 VITALS
OXYGEN SATURATION: 98 % | SYSTOLIC BLOOD PRESSURE: 120 MMHG | WEIGHT: 203.8 LBS | HEART RATE: 66 BPM | TEMPERATURE: 99.3 F | RESPIRATION RATE: 16 BRPM | BODY MASS INDEX: 34.79 KG/M2 | HEIGHT: 64 IN | DIASTOLIC BLOOD PRESSURE: 60 MMHG

## 2020-12-28 DIAGNOSIS — D50.9 MICROCYTIC ANEMIA: ICD-10-CM

## 2020-12-28 DIAGNOSIS — C82.18 GRADE 2 FOLLICULAR LYMPHOMA OF LYMPH NODES OF MULTIPLE REGIONS (HCC): Primary | ICD-10-CM

## 2020-12-28 DIAGNOSIS — D51.8 OTHER VITAMIN B12 DEFICIENCY ANEMIAS: ICD-10-CM

## 2020-12-28 DIAGNOSIS — D70.9 NEUTROPENIA, UNSPECIFIED TYPE (HCC): ICD-10-CM

## 2020-12-28 PROCEDURE — 99214 OFFICE O/P EST MOD 30 MIN: CPT | Performed by: NURSE PRACTITIONER

## 2020-12-28 PROCEDURE — 1036F TOBACCO NON-USER: CPT | Performed by: NURSE PRACTITIONER

## 2020-12-28 PROCEDURE — 3078F DIAST BP <80 MM HG: CPT | Performed by: NURSE PRACTITIONER

## 2020-12-28 PROCEDURE — 3008F BODY MASS INDEX DOCD: CPT | Performed by: NURSE PRACTITIONER

## 2020-12-28 PROCEDURE — 3074F SYST BP LT 130 MM HG: CPT | Performed by: NURSE PRACTITIONER

## 2021-01-06 ENCOUNTER — TELEPHONE (OUTPATIENT)
Dept: HEMATOLOGY ONCOLOGY | Facility: CLINIC | Age: 61
End: 2021-01-06

## 2021-01-06 ENCOUNTER — LAB (OUTPATIENT)
Dept: LAB | Facility: HOSPITAL | Age: 61
End: 2021-01-06
Payer: COMMERCIAL

## 2021-01-06 DIAGNOSIS — D50.9 MICROCYTIC ANEMIA: ICD-10-CM

## 2021-01-06 DIAGNOSIS — C82.18 GRADE 2 FOLLICULAR LYMPHOMA OF LYMPH NODES OF MULTIPLE REGIONS (HCC): ICD-10-CM

## 2021-01-06 DIAGNOSIS — D70.9 NEUTROPENIA, UNSPECIFIED TYPE (HCC): ICD-10-CM

## 2021-01-06 DIAGNOSIS — D51.8 OTHER VITAMIN B12 DEFICIENCY ANEMIAS: ICD-10-CM

## 2021-01-06 LAB
ALBUMIN SERPL BCP-MCNC: 3.9 G/DL (ref 3–5.2)
ALP SERPL-CCNC: 108 U/L (ref 43–122)
ALT SERPL W P-5'-P-CCNC: 18 U/L (ref 9–52)
ANION GAP SERPL CALCULATED.3IONS-SCNC: 5 MMOL/L (ref 5–14)
AST SERPL W P-5'-P-CCNC: 20 U/L (ref 14–36)
BILIRUB SERPL-MCNC: 0.5 MG/DL
BUN SERPL-MCNC: 14 MG/DL (ref 5–25)
CALCIUM SERPL-MCNC: 9.3 MG/DL (ref 8.4–10.2)
CHLORIDE SERPL-SCNC: 105 MMOL/L (ref 97–108)
CO2 SERPL-SCNC: 32 MMOL/L (ref 22–30)
CREAT SERPL-MCNC: 1.19 MG/DL (ref 0.6–1.2)
CRP SERPL QL: 27.9 MG/L
ERYTHROCYTE [SEDIMENTATION RATE] IN BLOOD: 28 MM/HOUR (ref 0–29)
FERRITIN SERPL-MCNC: 223 NG/ML (ref 8–388)
GFR SERPL CREATININE-BSD FRML MDRD: 50 ML/MIN/1.73SQ M
GLUCOSE SERPL-MCNC: 124 MG/DL (ref 70–99)
IRON SATN MFR SERPL: 12 %
IRON SERPL-MCNC: 28 UG/DL (ref 50–170)
LDH SERPL-CCNC: 399 U/L (ref 313–618)
POTASSIUM SERPL-SCNC: 4.6 MMOL/L (ref 3.6–5)
PROT SERPL-MCNC: 6.7 G/DL (ref 5.9–8.4)
SODIUM SERPL-SCNC: 142 MMOL/L (ref 137–147)
TIBC SERPL-MCNC: 230 UG/DL (ref 250–450)
VIT B12 SERPL-MCNC: 715 PG/ML (ref 100–900)

## 2021-01-06 PROCEDURE — 86140 C-REACTIVE PROTEIN: CPT

## 2021-01-06 PROCEDURE — 83550 IRON BINDING TEST: CPT

## 2021-01-06 PROCEDURE — 83615 LACTATE (LD) (LDH) ENZYME: CPT

## 2021-01-06 PROCEDURE — 82728 ASSAY OF FERRITIN: CPT

## 2021-01-06 PROCEDURE — 83540 ASSAY OF IRON: CPT

## 2021-01-06 PROCEDURE — 80053 COMPREHEN METABOLIC PANEL: CPT

## 2021-01-06 PROCEDURE — 85652 RBC SED RATE AUTOMATED: CPT

## 2021-01-06 PROCEDURE — 36415 COLL VENOUS BLD VENIPUNCTURE: CPT

## 2021-01-06 PROCEDURE — 82607 VITAMIN B-12: CPT

## 2021-01-07 ENCOUNTER — HOSPITAL ENCOUNTER (OUTPATIENT)
Dept: INFUSION CENTER | Facility: HOSPITAL | Age: 61
Discharge: HOME | End: 2021-01-07
Payer: COMMERCIAL

## 2021-01-07 ENCOUNTER — TELEPHONE (OUTPATIENT)
Dept: HEMATOLOGY ONCOLOGY | Facility: CLINIC | Age: 61
End: 2021-01-07

## 2021-01-07 VITALS
DIASTOLIC BLOOD PRESSURE: 70 MMHG | TEMPERATURE: 98.1 F | RESPIRATION RATE: 18 BRPM | HEART RATE: 58 BPM | SYSTOLIC BLOOD PRESSURE: 135 MMHG

## 2021-01-07 DIAGNOSIS — D51.8 OTHER VITAMIN B12 DEFICIENCY ANEMIAS: ICD-10-CM

## 2021-01-07 DIAGNOSIS — Z45.2 ENCOUNTER FOR CENTRAL LINE CARE: Primary | ICD-10-CM

## 2021-01-07 DIAGNOSIS — C82.18 GRADE 2 FOLLICULAR LYMPHOMA OF LYMPH NODES OF MULTIPLE REGIONS (HCC): ICD-10-CM

## 2021-01-07 DIAGNOSIS — D51.8 OTHER VITAMIN B12 DEFICIENCY ANEMIAS: Primary | ICD-10-CM

## 2021-01-07 PROBLEM — R51.9 HEADACHE: Status: ACTIVE | Noted: 2021-01-07

## 2021-01-07 PROCEDURE — 96413 CHEMO IV INFUSION 1 HR: CPT

## 2021-01-07 PROCEDURE — 96372 THER/PROPH/DIAG INJ SC/IM: CPT

## 2021-01-07 RX ORDER — CYANOCOBALAMIN 1000 UG/ML
1000 INJECTION INTRAMUSCULAR; SUBCUTANEOUS ONCE
Status: CANCELLED | OUTPATIENT
Start: 2021-02-04

## 2021-01-07 RX ORDER — ACETAMINOPHEN 325 MG/1
650 TABLET ORAL ONCE
Status: COMPLETED | OUTPATIENT
Start: 2021-01-07 | End: 2021-01-07

## 2021-01-07 RX ORDER — ACETAMINOPHEN 325 MG/1
650 TABLET ORAL ONCE
Status: CANCELLED
Start: 2021-02-04

## 2021-01-07 RX ORDER — CYANOCOBALAMIN 1000 UG/ML
1000 INJECTION INTRAMUSCULAR; SUBCUTANEOUS ONCE
Status: COMPLETED | OUTPATIENT
Start: 2021-01-07 | End: 2021-01-07

## 2021-01-07 RX ORDER — SODIUM CHLORIDE 9 MG/ML
20 INJECTION, SOLUTION INTRAVENOUS ONCE
Status: COMPLETED | OUTPATIENT
Start: 2021-01-07 | End: 2021-01-07

## 2021-01-07 RX ORDER — CYANOCOBALAMIN 1000 UG/ML
1000 INJECTION INTRAMUSCULAR; SUBCUTANEOUS ONCE
Status: CANCELLED | OUTPATIENT
Start: 2021-01-20

## 2021-01-07 RX ORDER — ACETAMINOPHEN 325 MG/1
650 TABLET ORAL ONCE
Status: CANCELLED
Start: 2021-01-07

## 2021-01-07 RX ADMIN — CYANOCOBALAMIN 1000 MCG: 1000 INJECTION, SOLUTION INTRAMUSCULAR; SUBCUTANEOUS at 13:51

## 2021-01-07 RX ADMIN — COPANLISIB 60 MG: 15 INJECTION, POWDER, LYOPHILIZED, FOR SOLUTION INTRAVENOUS at 12:14

## 2021-01-07 RX ADMIN — ACETAMINOPHEN 650 MG: 325 TABLET ORAL at 13:52

## 2021-01-07 RX ADMIN — SODIUM CHLORIDE 20 ML/HR: 0.9 INJECTION, SOLUTION INTRAVENOUS at 11:25

## 2021-01-07 NOTE — PROGRESS NOTES
When switching IV to a flush after Copanlisib complete, pt c/o 8/10 headache with light sensitivity  VS checked and /72  Notified Nita Winter RN at Dr Jameson Briggs office  Instructed to give tylenol and monitor pt for 30 min

## 2021-01-07 NOTE — PROGRESS NOTES
30 min observation complete  Pt now states headache decreased to 6/10 and BP now 143/78  Notified Casi Dove RN  Await ok from Dr Doak Peabody for d/c home

## 2021-01-07 NOTE — TELEPHONE ENCOUNTER
Received a phone call from Tiny Valerio RN  infusion nurse to report that after infusion of Copanlisib and the flush pt C/O headache and reated pain an 8/10  BP had gone from 135/70 baseline to 176/72  Pt medicated with tylenol and to be observed for 30 mins  Pt at 2:20 pm rated headache a 7/10 and BP down to 143/78  Per Dr Milton Mosqueda pt may be discharged

## 2021-01-07 NOTE — PLAN OF CARE
Problem: Potential for Falls  Goal: Patient will remain free of falls  Description: INTERVENTIONS:  - Assess patient frequently for physical needs  -  Identify cognitive and physical deficits and behaviors that affect risk of falls  -  [place] fall precautions as indicated by assessment   - Educate patient/family on patient safety including physical limitations  - Instruct patient to call for assistance with activity based on assessment  - Modify environment to reduce risk of injury  - Consider OT/PT consult to assist with strengthening/mobility  Outcome: Progressing     Problem: Knowledge Deficit  Goal: Patient/family/caregiver demonstrates understanding of disease process, treatment plan, medications, and discharge instructions  Description: Complete learning assessment and assess knowledge base    Interventions:  - Provide teaching at level of understanding  - Provide teaching via preferred learning methods  Outcome: Progressing

## 2021-01-07 NOTE — PROGRESS NOTES
Ok to discharge home per Dr Rico Bradley  Pt reports feeling much better  Port flushed and deaccessed per protocol  Also tolerated B12 injection without difficulty  Next appt confirmed and AVS provided  Left ambulatory with [facility]

## 2021-01-08 ENCOUNTER — TELEPHONE (OUTPATIENT)
Dept: HEMATOLOGY ONCOLOGY | Facility: CLINIC | Age: 61
End: 2021-01-08

## 2021-01-08 NOTE — TELEPHONE ENCOUNTER
Called patient through guillermo Canales interpretor to remind  Nita Deluca of her Dr Antonio Garcia appt on   1/ 11/21 and that her   Transportation has been  Scheduled

## 2021-01-12 NOTE — PATIENT INSTRUCTIONS
PRESCRIPTION REFILL REMINDER:  All medication refills should be requested prior to [facility] on Friday  Any refill requests after noon on Friday would be addressed the following Monday  Please protect yourself from the COVID-19! Even though we do not good antiviral drugs for this infection, the following strategies can help you stay healthy:    = Wash your hands! Soap and water, or hand  with at least 60% alcohol, are both effective at killing the virus  = Wear a mask! This will help protect others from any virus particles you might spread  Your mouth and nose BOTH need to be covered  = Keep the distance! Keep 6 feet of distance from others people, even if they seem healthy  Keeping distance protects you from the other person's virus spread     = Get the vaccine! The [employer] and [employer] are approved for emergency use in the [place]  These vaccines have been shown to be 90+% effective at preventing severe infections when combined with masking, hand-washing, and distancing  As of 1/4/2021, only nursing home residents and front-line health workers have access to the first round of vaccines, but more are coming  We are recommending that all our Palliative and Supportive Care patients get two shots of either vaccine, as early as it is available to them  Numbers of Coronavirus cases are spiking in many [place] States  This is not a more dangerous virus, but a sign that more people in a community are spreading the virus  Please check the local disease reports near you if you consider travelling  As of 1/4/21, we do NOT advise travel outside the [facility] ([place] or [place])  Check out [employer] for Diaz data that are updated daily:    http://www [employer]/     Global Epidemics  Org, from [facility] (OUTPATIENT CAMP[facility]), will give you Iukulc-[id number] information on virus cases: Https://globalepidemics  org/

## 2021-01-13 ENCOUNTER — OFFICE VISIT (OUTPATIENT)
Dept: PALLIATIVE MEDICINE | Facility: CLINIC | Age: 61
End: 2021-01-13
Payer: COMMERCIAL

## 2021-01-13 VITALS
WEIGHT: 201.6 LBS | RESPIRATION RATE: 14 BRPM | HEART RATE: 55 BPM | SYSTOLIC BLOOD PRESSURE: 120 MMHG | OXYGEN SATURATION: 98 % | TEMPERATURE: 97.6 F | DIASTOLIC BLOOD PRESSURE: 64 MMHG | BODY MASS INDEX: 34.6 KG/M2

## 2021-01-13 DIAGNOSIS — Z71.89 ADVANCED CARE PLANNING/COUNSELING DISCUSSION: ICD-10-CM

## 2021-01-13 DIAGNOSIS — G89.3 CANCER RELATED PAIN: ICD-10-CM

## 2021-01-13 DIAGNOSIS — C82.18 GRADE 2 FOLLICULAR LYMPHOMA OF LYMPH NODES OF MULTIPLE REGIONS (HCC): Primary | ICD-10-CM

## 2021-01-13 DIAGNOSIS — R10.30 LOWER ABDOMINAL PAIN: ICD-10-CM

## 2021-01-13 DIAGNOSIS — R63.0 DECREASED APPETITE: ICD-10-CM

## 2021-01-13 PROCEDURE — 99214 OFFICE O/P EST MOD 30 MIN: CPT | Performed by: INTERNAL MEDICINE

## 2021-01-13 RX ORDER — SODIUM CHLORIDE 9 MG/ML
20 INJECTION, SOLUTION INTRAVENOUS ONCE
Status: DISCONTINUED | OUTPATIENT
Start: 2021-01-14 | End: 2021-01-18 | Stop reason: HOSPADM

## 2021-01-13 RX ORDER — OXYCODONE HYDROCHLORIDE 20 MG/1
20 TABLET ORAL EVERY 4 HOURS PRN
Qty: 90 TABLET | Refills: 0 | Status: SHIPPED | OUTPATIENT
Start: 2021-01-13 | End: 2021-02-09 | Stop reason: SDUPTHER

## 2021-01-13 NOTE — PROGRESS NOTES
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 ([address] )    2  Lower abdominal pain    3  Cancer related pain    4  Decreased appetite    5  Advanced care planning/counseling discussion         · Continue oxycodone 20mg PO q4H prn - averaging only 3 a day  · Moving her bowels independently  · Tolerating current treatments well  · She missed her appt with Dr Rodriguez Morel  Our office will help notify Dr Isaak Cool office  · Provided copy of the 5 Wishes in [address]  Encouraged her to finish at least Wish 1 - dPOA since most of her blood relatives are in [place]  She wishes for her boyfriend to be her 1st agent  · RTO in 6 weeks    Controlled Substance Review    [place] or [place]  reviewed: No red flags were identified; safe to proceed with prescription  Domo Rashawn 11/20/2020  2   11/19/2020  Oxycodone Hcl 20 MG Tablet  180 00  [address]   [id number]   S h (1081)   0  180 00 MME  [place]   10/21/2020  2   10/21/2020  Oxycodone Hcl 15 MG Tablet  180 00  [address]   [id number]   S h (1081)   0  135 00 [place]ME  [place]   09/15/2020  2   09/15/2020  Oxycodone Hcl 15 MG Tablet  180 00  30 [address]    [id number]   S h (1081)   0  135 00 MME  Medicaid        Requested Prescriptions     Signed Prescriptions Disp Refills    oxyCODONE (ROXICODONE) 20 MG TABS 90 tablet 0     Sig: Take 1 tablet (20 mg total) by mouth every 4 (four) hours as needed for moderate pain Erica 1 tableta cada 4 horas fernandez sea necesario para el dolor de estomagoMax Daily Amount: 120 mg     Medications Discontinued During This Encounter   Medication Reason    oxyCODONE (ROXICODONE) 20 MG TABS Reorder       Representatives have queried the patient's controlled substance dispensing history in the Prescription Drug Monitoring Program in compliance with regulations before I have prescribed any controlled substances    The prescription history is consistent with prescribed therapy and our practice policies  35 minutes were spent face to face with Lakia De Guzman with greater than 50% of the time spent in counseling or coordination of care including discussions of etiology of diagnosis, diagnostic results, impression, and recommendations, risks and benefits of treatment, instructions for disease self management, treatment instructions, follow up requirements, risk factors and risk reduction of disease, patient and family counseling/involvement in care and compliance with treatment regimen   All of the patient's questions were answered during this discussion  No follow-ups on file  Subjective:   Chief Complaint  Follow up visit for:  symptom management, pain, neoplasm related, assessment of goals of care, disease process education and discussion of prognosis, advance care planning  HPI     Lakia De Guzman is a 64 y o  female with grade 2 follicular lymphoma initially diagnosed in Nov 2018  Started on chemo (rituximab + bendamustine) soon after  Developed tumor lysis syndrome in Dec 2018, bendamustine removed  She completed rituxan on 4/2019 and had been on maintenance Rituxan since 5/2019  In March 2020, she had evidence of disease progression and was also started on Revlimid  Per review of notes, there are some concerns with noncompliance and f/u with bloodwork   Per Oncology note, long discussion with patient regarding refractory and progressive grade 2 follicular lymphoma   Patient with c/o increase L neck swelling/tenderness, US done, concerning for progression of her disease  Her last PET-CT showed new LNs in the abdominal area, concerning for transformation to diffuse B cell lymphoma  Her biopsy from the L iliac bone and L cervical LN came back positive for follicular lymphoma  She is currently on Obinutuzumab/CHOP with Udenyca  She is referred to Dr Purnima Valdez in [facility] for further recommendations  She comes in today for routine follow up   She reports that her abdominal pain is well-controlled with oxycodone 20mg PO that she averages 3 pills a day of  She is moving her bowels independently  She reports some but poor oral intake  She is encouraged to eat frequent small meals with protein supplementations (Boost/Ensure)  On review of records, she was supposed to meet with Dr Michell Parker in [facility] on 1/11  However, she said she never went because she had no transportation (notes from oncology indicate that they have arranged transport for her)  The following portions of the medical history were reviewed: past medical history, problem list, medication list, and social history  Current Outpatient Medications:     acetaminophen (TYLENOL) 500 mg tablet, May take 1 tablet (500 mg total) by mouth every 8 (eight) hours as needed for mild pain, moderate pain or headaches  May also take 1 tablet (500 mg total) every 8 (eight) hours as needed for mild pain, moderate pain or headaches  Take 1-2 tablets every 8 hours as needed for mild-moderate pain   , Disp: 100 tablet, Rfl: 0    al mag oxide-diphenhydramine-lidocaine viscous (MAGIC MOUTHWASH) 1:1:1 suspension, Swish and spit 10 mL every 4 (four) hours as needed for mouth pain or discomfort, Disp: 180 mL, Rfl: 0    amLODIPine (NORVASC) 5 mg tablet, Take 1 tablet (5 mg total) by mouth daily, Disp: 30 tablet, Rfl: 0    aspirin (ECOTRIN LOW STRENGTH) 81 mg EC tablet, Take 1 tablet (81 mg total) by mouth daily, Disp: 30 tablet, Rfl: 11    aspirin-acetaminophen-caffeine (EXCEDRIN MIGRAINE) 250-250-65 MG per tablet, Take 1 tablet by mouth every 6 (six) hours as needed for headaches Erica 1 tableta cada 6 horas fernandez sea necesario para el dolor de russ, Disp: 30 tablet, Rfl: 0    benzonatate (TESSALON) 200 MG capsule, Take 1 capsule (200 mg total) by mouth 3 (three) times a day as needed for cough, Disp: 20 capsule, Rfl: 1    carbamide peroxide (DEBROX) 6 5 % otic solution, Administer 5 drops to the right ear 2 (two) times a day, Disp: 15 mL, Rfl: 0    cyanocobalamin 1000 MCG tablet, Take 1 tablet (1,000 mcg total) by mouth daily, Disp: 90 tablet, Rfl: 3    dexamethasone (DECADRON) 1 mg tablet, Take 1 tablet (1 mg total) by mouth 2 (two) times a day with meals Do not take afternoon dose after 2pm, Disp: 60 tablet, Rfl: 3    DULoxetine (CYMBALTA) 20 mg capsule, Take 1 capsule (20 mg total) by mouth daily, Disp: 30 capsule, Rfl: 3    lidocaine (LMX) 4 % cream, Apply topically as needed for mild pain To neck, Disp: 30 g, Rfl: 0    meclizine (ANTIVERT) 12 5 MG tablet, Take 1 tablet (12 5 mg total) by mouth every 8 (eight) hours as needed for dizziness, Disp: 30 tablet, Rfl: 3    omeprazole (PriLOSEC) 20 mg delayed release capsule, Take 2 capsules (40 mg total) by mouth daily To prevent stomach upset, Disp: 60 capsule, Rfl: 5    ondansetron (ZOFRAN) 4 mg tablet, Take 1 tablet (4 mg total) by mouth every 8 (eight) hours as needed for nausea or vomiting, Disp: 30 tablet, Rfl: 3    oxyCODONE (ROXICODONE) 20 MG TABS, Take 1 tablet (20 mg total) by mouth every 4 (four) hours as needed for moderate pain Erica 1 tableta cada 4 horas fernandez sea necesario para el dolor de estomagoMax Daily Amount: 120 mg, Disp: 90 tablet, Rfl: 0    polyethylene glycol (MIRALAX) 17 g packet, Take 17 g by mouth daily, Disp: 30 each, Rfl: 3    senna (SENOKOT) 8 6 mg, Take 1 tablet (8 6 mg total) by mouth 2 (two) times a day, Disp: 60 each, Rfl: 3  Review of Systems   Constitutional: Negative for activity change and appetite change  HENT: Negative for trouble swallowing  Respiratory: Negative for shortness of breath  Gastrointestinal: Negative for constipation and diarrhea  Neurological: Negative for speech difficulty and light-headedness  Psychiatric/Behavioral: Negative for sleep disturbance  The patient is not nervous/anxious  All other systems reviewed and are negative        All other systems negative    Objective:  Vital Signs  /64 (BP Location: Left arm, Cuff Size: Standard)   Pulse 55   Temp 97 6 °F (36 4 °C) (Skin)   Resp 14   Wt 91 4 kg (201 lb 9 6 oz)   SpO2 98%   BMI 34 60 kg/m²    Physical Exam    Constitutional: Appears well-developed and well-nourished, appears chronically ill but not toci appearing  In no acute physical or emotional distress  Head: Normocephalic and atraumatic  Eyes: EOM are normal  No ocular discharge  No scleral icterus  Appears to have pterygium in the R eye  Neck: No visible adenopathy or masses  Respiratory: Effort normal  No stridor  No respiratory distress  Gastrointestinal: No abdominal distension  Musculoskeletal: No edema  Neurological: Alert, oriented and appropriately conversant  Skin: No diaphoresis, no rashes seen on exposed areas of skin  Psychiatric: Displays a normal mood and affect   Behavior, judgement and thought content appear normal      Sharon Ferrer MD  Palliative Medicine & Supportive Care  Internal Medicine  Available via [facility] Text  Office: [phone]  Fax: [phone]

## 2021-01-14 ENCOUNTER — TELEPHONE (OUTPATIENT)
Dept: HEMATOLOGY ONCOLOGY | Facility: CLINIC | Age: 61
End: 2021-01-14

## 2021-01-14 ENCOUNTER — HOSPITAL ENCOUNTER (OUTPATIENT)
Dept: INFUSION CENTER | Facility: HOSPITAL | Age: 61
Discharge: HOME | End: 2021-01-14
Payer: COMMERCIAL

## 2021-01-14 VITALS
TEMPERATURE: 99.3 F | SYSTOLIC BLOOD PRESSURE: 121 MMHG | DIASTOLIC BLOOD PRESSURE: 72 MMHG | RESPIRATION RATE: 18 BRPM | HEART RATE: 67 BPM

## 2021-01-14 DIAGNOSIS — C82.18 GRADE 2 FOLLICULAR LYMPHOMA OF LYMPH NODES OF MULTIPLE REGIONS (HCC): Primary | ICD-10-CM

## 2021-01-14 DIAGNOSIS — Z45.2 ENCOUNTER FOR CENTRAL LINE CARE: ICD-10-CM

## 2021-01-14 DIAGNOSIS — D50.9 MICROCYTIC ANEMIA: ICD-10-CM

## 2021-01-14 DIAGNOSIS — D70.9 NEUTROPENIA, UNSPECIFIED TYPE (HCC): ICD-10-CM

## 2021-01-14 LAB
ALBUMIN SERPL BCP-MCNC: 3.9 G/DL (ref 3–5.2)
ALP SERPL-CCNC: 101 U/L (ref 43–122)
ALT SERPL W P-5'-P-CCNC: 18 U/L (ref 9–52)
ANION GAP SERPL CALCULATED.3IONS-SCNC: 7 MMOL/L (ref 5–14)
ANISOCYTOSIS BLD QL SMEAR: PRESENT
AST SERPL W P-5'-P-CCNC: 22 U/L (ref 14–36)
BASOPHILS # BLD AUTO: 0.01 THOUSAND/UL (ref 0–0.1)
BASOPHILS NFR MAR MANUAL: 2 % (ref 0–1)
BILIRUB SERPL-MCNC: 0.5 MG/DL
BUN SERPL-MCNC: 10 MG/DL (ref 5–25)
CALCIUM SERPL-MCNC: 8.8 MG/DL (ref 8.4–10.2)
CHLORIDE SERPL-SCNC: 104 MMOL/L (ref 97–108)
CO2 SERPL-SCNC: 25 MMOL/L (ref 22–30)
CREAT SERPL-MCNC: 0.81 MG/DL (ref 0.6–1.2)
EOSINOPHIL # BLD AUTO: 0.01 THOUSAND/UL (ref 0–0.4)
EOSINOPHIL NFR BLD MANUAL: 2 % (ref 0–6)
ERYTHROCYTE [DISTWIDTH] IN BLOOD BY AUTOMATED COUNT: 19.5 %
GFR SERPL CREATININE-BSD FRML MDRD: 80 ML/MIN/1.73SQ M
GLUCOSE SERPL-MCNC: 115 MG/DL (ref 70–99)
HCT VFR BLD AUTO: 34.1 % (ref 36–46)
HGB BLD-MCNC: 10.4 G/DL (ref 12–16)
HYPERCHROMIA BLD QL SMEAR: PRESENT
LYMPHOCYTES # BLD AUTO: 0.26 THOUSAND/UL (ref 0.5–4)
LYMPHOCYTES # BLD AUTO: 44 % (ref 25–45)
MAGNESIUM SERPL-MCNC: 2.1 MG/DL (ref 1.6–2.3)
MCH RBC QN AUTO: 19.6 PG (ref 26–34)
MCHC RBC AUTO-ENTMCNC: 30.6 G/DL (ref 31–36)
MCV RBC AUTO: 64 FL (ref 80–100)
MICROCYTES BLD QL AUTO: PRESENT
MONOCYTES # BLD AUTO: 0.16 THOUSAND/UL (ref 0.2–0.9)
MONOCYTES NFR BLD AUTO: 26 % (ref 1–10)
NEUTS BAND NFR BLD MANUAL: 8 % (ref 0–8)
NEUTS SEG # BLD: 0.14 THOUSAND/UL (ref 1.8–7.8)
NEUTS SEG NFR BLD AUTO: 16 %
OVALOCYTES BLD QL SMEAR: PRESENT
PLATELET # BLD AUTO: 183 THOUSANDS/UL (ref 150–450)
PLATELET BLD QL SMEAR: ADEQUATE
PMV BLD AUTO: 9 FL (ref 8.9–12.7)
POIKILOCYTOSIS BLD QL SMEAR: PRESENT
POTASSIUM SERPL-SCNC: 3.7 MMOL/L (ref 3.6–5)
PROT SERPL-MCNC: 6.9 G/DL (ref 5.9–8.4)
RBC # BLD AUTO: 5.32 MILLION/UL (ref 4–5.2)
RBC MORPH BLD: ABNORMAL
SCHISTOCYTES BLD QL SMEAR: PRESENT
SODIUM SERPL-SCNC: 136 MMOL/L (ref 137–147)
TOTAL CELLS COUNTED SPEC: 50
VARIANT LYMPHS # BLD AUTO: 2 % (ref 0–0)
WBC # BLD AUTO: 0.6 THOUSAND/UL (ref 4.5–11)

## 2021-01-14 PROCEDURE — 83735 ASSAY OF MAGNESIUM: CPT

## 2021-01-14 PROCEDURE — 85007 BL SMEAR W/DIFF WBC COUNT: CPT

## 2021-01-14 PROCEDURE — 85027 COMPLETE CBC AUTOMATED: CPT

## 2021-01-14 PROCEDURE — 80053 COMPREHEN METABOLIC PANEL: CPT

## 2021-01-14 RX ORDER — ACETAMINOPHEN 325 MG/1
650 TABLET ORAL ONCE
Status: COMPLETED | OUTPATIENT
Start: 2021-01-14 | End: 2021-01-14

## 2021-01-14 RX ORDER — ACETAMINOPHEN 325 MG/1
650 TABLET ORAL ONCE
Status: CANCELLED
Start: 2021-01-14

## 2021-01-14 RX ADMIN — ACETAMINOPHEN 650 MG: 325 TABLET ORAL at 10:51

## 2021-01-14 NOTE — PROGRESS NOTES
Hold pt today and return next  week and recheck labs (  [address] mush be over 500)  spoke with Ibrahima Clinton

## 2021-01-14 NOTE — TELEPHONE ENCOUNTER
Received a phone call from [facility] infusion nurse to report that lab results are back and her [address] is only 0 14, per Dr Guillaume Whitman pt to be held this week, recheck next week can not have treatment until [address] is >/= 0 5

## 2021-01-14 NOTE — PROGRESS NOTES
Spoke with Jojo Perry - advised that pt is complaining of a headache ( hx of migraines ) will place order for tylenol

## 2021-01-18 ENCOUNTER — HOSPITAL ENCOUNTER (OUTPATIENT)
Dept: INFUSION CENTER | Facility: HOSPITAL | Age: 61
Discharge: HOME | End: 2021-01-18
Payer: COMMERCIAL

## 2021-01-18 ENCOUNTER — TELEPHONE (OUTPATIENT)
Dept: HEMATOLOGY ONCOLOGY | Facility: CLINIC | Age: 61
End: 2021-01-18

## 2021-01-18 ENCOUNTER — OFFICE VISIT (OUTPATIENT)
Dept: HEMATOLOGY ONCOLOGY | Facility: CLINIC | Age: 61
End: 2021-01-18
Payer: COMMERCIAL

## 2021-01-18 VITALS
SYSTOLIC BLOOD PRESSURE: 110 MMHG | BODY MASS INDEX: 34.04 KG/M2 | HEIGHT: 64 IN | WEIGHT: 199.4 LBS | RESPIRATION RATE: 16 BRPM | DIASTOLIC BLOOD PRESSURE: 84 MMHG | TEMPERATURE: 99.4 F | OXYGEN SATURATION: 100 % | HEART RATE: 68 BPM

## 2021-01-18 VITALS — TEMPERATURE: 98.2 F

## 2021-01-18 DIAGNOSIS — C82.18 GRADE 2 FOLLICULAR LYMPHOMA OF LYMPH NODES OF MULTIPLE REGIONS (HCC): Primary | ICD-10-CM

## 2021-01-18 DIAGNOSIS — J06.9 UPPER RESPIRATORY TRACT INFECTION, UNSPECIFIED TYPE: ICD-10-CM

## 2021-01-18 DIAGNOSIS — Z45.2 ENCOUNTER FOR CENTRAL LINE CARE: ICD-10-CM

## 2021-01-18 DIAGNOSIS — D70.9 NEUTROPENIA, UNSPECIFIED TYPE (HCC): ICD-10-CM

## 2021-01-18 DIAGNOSIS — D51.8 OTHER VITAMIN B12 DEFICIENCY ANEMIAS: ICD-10-CM

## 2021-01-18 DIAGNOSIS — D50.8 IRON DEFICIENCY ANEMIA SECONDARY TO INADEQUATE DIETARY IRON INTAKE: ICD-10-CM

## 2021-01-18 DIAGNOSIS — D50.9 MICROCYTIC ANEMIA: ICD-10-CM

## 2021-01-18 LAB
ALBUMIN SERPL BCP-MCNC: 4 G/DL (ref 3–5.2)
ALP SERPL-CCNC: 112 U/L (ref 43–122)
ALT SERPL W P-5'-P-CCNC: 28 U/L (ref 9–52)
ANION GAP SERPL CALCULATED.3IONS-SCNC: 8 MMOL/L (ref 5–14)
ANISOCYTOSIS BLD QL SMEAR: PRESENT
AST SERPL W P-5'-P-CCNC: 40 U/L (ref 14–36)
BILIRUB SERPL-MCNC: 0.9 MG/DL
BUN SERPL-MCNC: 12 MG/DL (ref 5–25)
CALCIUM SERPL-MCNC: 9 MG/DL (ref 8.4–10.2)
CHLORIDE SERPL-SCNC: 103 MMOL/L (ref 97–108)
CO2 SERPL-SCNC: 26 MMOL/L (ref 22–30)
CREAT SERPL-MCNC: 0.73 MG/DL (ref 0.6–1.2)
ERYTHROCYTE [DISTWIDTH] IN BLOOD BY AUTOMATED COUNT: 19.5 %
GFR SERPL CREATININE-BSD FRML MDRD: 91 ML/MIN/1.73SQ M
GLUCOSE SERPL-MCNC: 98 MG/DL (ref 70–99)
HCT VFR BLD AUTO: 34.2 % (ref 36–46)
HGB BLD-MCNC: 10.6 G/DL (ref 12–16)
HYPERCHROMIA BLD QL SMEAR: PRESENT
LYMPHOCYTES # BLD AUTO: 0.26 THOUSAND/UL (ref 0.5–4)
LYMPHOCYTES # BLD AUTO: 44 % (ref 25–45)
MCH RBC QN AUTO: 19.9 PG (ref 26–34)
MCHC RBC AUTO-ENTMCNC: 31 G/DL (ref 31–36)
MCV RBC AUTO: 64 FL (ref 80–100)
MICROCYTES BLD QL AUTO: PRESENT
MONOCYTES # BLD AUTO: 0.18 THOUSAND/UL (ref 0.2–0.9)
MONOCYTES NFR BLD AUTO: 30 % (ref 1–10)
NEUTS BAND NFR BLD MANUAL: 6 % (ref 0–8)
NEUTS SEG # BLD: 0.16 THOUSAND/UL (ref 1.8–7.8)
NEUTS SEG NFR BLD AUTO: 20 %
OVALOCYTES BLD QL SMEAR: PRESENT
PLATELET # BLD AUTO: 121 THOUSANDS/UL (ref 150–450)
PLATELET BLD QL SMEAR: ABNORMAL
PMV BLD AUTO: 10.3 FL (ref 8.9–12.7)
POIKILOCYTOSIS BLD QL SMEAR: PRESENT
POTASSIUM SERPL-SCNC: 4 MMOL/L (ref 3.6–5)
PROT SERPL-MCNC: 7 G/DL (ref 5.9–8.4)
RBC # BLD AUTO: 5.32 MILLION/UL (ref 4–5.2)
RBC MORPH BLD: ABNORMAL
SCHISTOCYTES BLD QL SMEAR: PRESENT
SODIUM SERPL-SCNC: 137 MMOL/L (ref 137–147)
TOTAL CELLS COUNTED SPEC: 50
WBC # BLD AUTO: 0.6 THOUSAND/UL (ref 4.5–11)

## 2021-01-18 PROCEDURE — 99215 OFFICE O/P EST HI 40 MIN: CPT | Performed by: NURSE PRACTITIONER

## 2021-01-18 PROCEDURE — 85007 BL SMEAR W/DIFF WBC COUNT: CPT

## 2021-01-18 PROCEDURE — 80053 COMPREHEN METABOLIC PANEL: CPT

## 2021-01-18 PROCEDURE — 3074F SYST BP LT 130 MM HG: CPT | Performed by: NURSE PRACTITIONER

## 2021-01-18 PROCEDURE — 85027 COMPLETE CBC AUTOMATED: CPT

## 2021-01-18 PROCEDURE — 3008F BODY MASS INDEX DOCD: CPT | Performed by: NURSE PRACTITIONER

## 2021-01-18 PROCEDURE — 3079F DIAST BP 80-89 MM HG: CPT | Performed by: NURSE PRACTITIONER

## 2021-01-18 RX ORDER — LEVOFLOXACIN 500 MG/1
500 TABLET, FILM COATED ORAL EVERY 24 HOURS
Qty: 5 TABLET | Refills: 0 | Status: SHIPPED | OUTPATIENT
Start: 2021-01-18 | End: 2021-01-26 | Stop reason: HOSPADM

## 2021-01-18 RX ORDER — SODIUM CHLORIDE 9 MG/ML
20 INJECTION, SOLUTION INTRAVENOUS ONCE
Status: CANCELLED | OUTPATIENT
Start: 2021-01-21

## 2021-01-18 NOTE — TELEPHONE ENCOUNTER
Phoned [facility] and spoke to Denia Mcdowell, RN to inform nurses that per Dr Maria Sargent her chemo plan will now be days 1 and day 15 Q 4 weeks with Fulphila 6 mg SQ on days 2 and 16  I phoned [facility] and spoke to Keke to let him know that plan was changing and pt will need Fulphila 6 mg on days 2 and 16 and if labs are good she would need this Fri  Keke will work on getting drug in [facility]

## 2021-01-18 NOTE — PROGRESS NOTES
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 [facility])  Patient only received cycle 1 day 1 of her  IV Copalisib 2 weeks ago  Day 8 had to be held last week for significant worsening neutropenia  We are waiting her repeat laboratory studies later this week to make a decision if she will be ready for her cycle 1 day 15 treatment which is scheduled for Thursday 01/21/2021  She will be back for close follow-up again in 2 weeks  Did discussed patient's case with Dr Doak Peabody today after the visit  He is planning to discontinue day 8 of the regimen and add G-CSF support day 2 and day 16  She again missed her appointment with Dr Larry Morris a 2nd time for unknown reason  Not sure if he would be willing to see her at this time after missing 2 appointments  - CBC and differential; Standing  - Comprehensive metabolic panel; Standing  - C-reactive protein; Future  - Sedimentation rate, automated; Future  - LD,Blood; Future  - Lipid panel; Future  - CBC and differential  - Comprehensive metabolic panel    2  Neutropenia, unspecified type ([address] )  The patient is reporting some mild symptoms today which have been occurring over the past week including cough productive for clear mucus, worsening headaches above her baseline and loss of taste  She denies fever or dyspnea  No increased arthralgias/ myalgias beyond her baseline  The patient will be sent to the lab today to have COVID-19 testing done to rule out  COVID-19 infection as the cause of her symptoms  We will also place her on antibiotics Levaquin 500 mg daily for 5 days for completeness sake to prevent worsening infection since she is very neutropenic     - levofloxacin (LEVAQUIN) 500 mg tablet; Take 1 tablet (500 mg total) by mouth every 24 hours for 5 days  Dispense: 5 tablet; Refill: 0    3   Upper respiratory tract infection, unspecified type   as above #2     - Novel Coronavirus 2019(COVID-19), Influenza A/B, RSV [facility] Collected at [facility] or [facility]; Future  - levofloxacin (LEVAQUIN) 500 mg tablet; Take 1 tablet (500 mg total) by mouth every 24 hours for 5 days  Dispense: 5 tablet; Refill: 0    4  Other vitamin B12 deficiency anemias   continue monthly vitamin B12 injections which are maintaining her B12 levels appropriately  5  Microcytic anemia   Patient continues to have chronic stable microcytic anemia which is likely due to anemia of chronic disease /inflammation  Her iron stores seem to be lower than average with the iron saturation 12%  We will treat her with 2 sessions of IV Venofer in hopes that this will improve her anemia and fatigue  6  Iron deficiency anemia secondary to inadequate dietary iron intake   as above  HPI:  Patient presents today for a follow-up visit; she is German-speaking translation done via [employer] #[id number]  She was started on her new treatment with IV Copalisib 2 weeks ago  Her day 8 treatment had to be held last week due to worsening significant neutropenia  Is due for day 15 later this week if her repeat laboratory studies allows  She did not note any significant adverse effects from the treatment per se but has noted over the past week that she has been experiencing worsening headache, congestion and cough productive for clear mucus  She does admit to achiness in her neck but no more than her usual   Admits to loss of taste for the past week or so but can smell  Denies fever  Continues to report chronic abdominal pain which she feels is somewhat increased from her baseline  She again was set up for a 2nd opinion with Dr Tommy Hicks at the [facility] along with transportation which she again did not go to for some reason      Her most recent laboratory studies from last week/ Thursday 01/14/2021 showed WBC 0 6 with low ANC 0 14, she has stable microcytic anemia H&H 10 4/34 1 with MCV 64, platelet count normal 183  Glucose 115, sodium 136 remaining metabolic panel is normal   She had some additional laboratory studies done 01/06/2020 which showed normal  and normal vitamin B12  C-reactive protein elevated 27 9  With normal sed rate 28  Iron panel showed lower than average iron saturation 12%, TIBC decreased 230, serum iron decreased 28 with ferritin 223  Oncology History Overview Note   The patient started to notice significant abdominal pain about 8 months prior to presentation, she then was evaluated in the hospital with a CT scan of the chest abdomen pelvis on the 7th of November   This showed massive lymphadenopathy throughout the abdomen and pelvis with hepatic and massive splenomegaly   She also was found to have bulky supraclavicular, axillary and mediastinal adenopathy  She then had a supra clavicular lymph node excisional biopsy on the 9th of November , the pathology was compatible with Follicular lymphoma, WHO grade 1-2  She then had a bone marrow biopsy on the 20th of November which showed also low grade B-cell lymphoma CD10 positive compromising 63% of the total cells  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 ([address] )   11/7/2018 Initial Diagnosis    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 ([address] )     12/6/2018 -  Chemotherapy    1   rituximab and bendamustine with neulasta support 12/6/2018- 3/13/19 (4 cycles total)  - day 2 bendamustine held with cycle 4  - administered Rituxan only 4/7/19    2  Started maintenance Rituxan every 8 weeks 5/15/19    3   Revlimid 15 mg 3 weeks on with 1 week of a break added to maintenance Rituxan 3/2020 due to progression       12/7/2018 Adverse Reaction    C1D2 labs reflective of tumor lysis syndrome, dose of rasburicase given x1 and admitted for close observation/hydration     11/18/2020 - 11/18/2020 Chemotherapy    DOXOrubicin (ADRIAMYCIN) injection 99 mg, 50 mg/m2 = 99 mg, Intravenous, Once, 0 of 6 cycles  pegfilgrastim-cbqv (UDENYCA) subcutaneous injection 6 mg, 6 mg, Subcutaneous, Once, 0 of 6 cycles  vinCRIStine (ONCOVIN) 2 mg in sodium chloride 0 9 % 50 mL chemo infusion, 2 mg (original dose 1 4 mg/m2), Intravenous, Once, 0 of 6 cycles  Dose modification: 2 mg (original dose 1 4 mg/m2, Cycle 1, Reason: Max Dose Reached)  cyclophosphamide (CYTOXAN) 1,478 mg in sodium chloride 0 9 % 250 mL IVPB, 750 mg/m2 = 1,478 mg, Intravenous, Once, 0 of 6 cycles  fosaprepitant (EMEND) 150 mg in sodium chloride 0 9 % 250 mL IVPB, 150 mg, Intravenous, Once, 0 of 6 cycles         Interval history:    ROS: Review of Systems   Constitutional: Positive for appetite change and fatigue  Negative for activity change, chills, fever and unexpected weight change  HENT: Positive for congestion and trouble swallowing  Negative for mouth sores, nosebleeds and sore throat  Eyes: Negative  Respiratory: Positive for cough  Negative for chest tightness and shortness of breath  Cardiovascular: Negative for chest pain, palpitations and leg swelling  Gastrointestinal: Positive for abdominal pain and nausea  Negative for abdominal distention, blood in stool, constipation, diarrhea and vomiting  Genitourinary: Negative for difficulty urinating, dysuria, frequency, hematuria and urgency  Musculoskeletal: Negative for arthralgias, back pain, gait problem, joint swelling and myalgias  Skin: Negative for color change, pallor and rash  Neurological: Positive for dizziness and headaches (more than usual over the past week)  Negative for weakness, light-headedness and numbness  Hematological: Negative for adenopathy  Does not bruise/bleed easily  Psychiatric/Behavioral: Positive for sleep disturbance  Negative for dysphoric mood         Past Medical History:   Diagnosis Date    Anemia     iron and B12    Arthritis     Asthma     Cough     Depression  Falls frequently     [facility]     Lymphoma [facility]     Lymphoma [facility] )     Migraine     Osteoporosis     Psychiatric disorder     Vertigo        Past Surgical History:   Procedure Laterality Date    CHOLECYSTECTOMY      FL GUIDED CENTRAL VENOUS ACCESS DEVICE INSERTION  11/9/2018    HYSTERECTOMY      IR BIOPSY BONE MARROW  11/24/2020    IR BIOPSY LYMPH NODE  11/24/2020    LYMPH NODE BIOPSY Left 11/9/2018    Procedure: EXCISION BIOPSY LYMPH NODE SUPRACLAVICULAR;  Surgeon: Delta Pappas MD;  Location: [facility] MAIN OR;  Service: General    CO INCISE FINGER TENDON SHEATH Right 1/16/2020    Procedure: RELEASE TRIGGER FINGER RIGHT THUMB;  Surgeon: Judson Ly MD;  Location: [facility] MAIN OR;  Service: Orthopedics    TUNNELED VENOUS PORT PLACEMENT N/A 11/9/2018    Procedure: INSERTION OF PORT-A-CATH;  Surgeon: Delta Pappas MD;  Location: [facility] MAIN OR;  Service: General       Social History     Socioeconomic History    Marital status: Single     Spouse name: Not on file    Number of children: Not on file    Years of education: Not on file    Highest education level: Not on file   Occupational History    Not on file   Social Needs    Financial resource strain: Not on file    Food insecurity     Worry: Not on file     Inability: Not on file    Transportation needs     Medical: Not on file     Non-medical: Not on file   Tobacco Use    Smoking status: Former Smoker     Quit date: 6/12/2017     Years since quitting: 3 6    Smokeless tobacco: Never Used   Substance and Sexual Activity    Alcohol use: Never     Frequency: Never    Drug use: Never    Sexual activity: Not on file   Lifestyle    Physical activity     Days per week: Not on file     Minutes per session: Not on file    Stress: Not on file   Relationships    Social connections     Talks on phone: Not on file     Gets together: Not on file     Attends Je[employer]'s witness service: Not on file     Active member of club or organization: Not on file     Attends meetings of clubs or organizations: Not on file     Relationship status: Not on file    Intimate partner violence     Fear of current or ex partner: Not on file     Emotionally abused: Not on file     Physically abused: Not on file     Forced sexual activity: Not on file   Other Topics Concern    Not on file   Social History Narrative    Not on file       Family History   Problem Relation Age of Onset    Cancer Mother     Diabetes Mother     Cancer Father     Diabetes Father        Allergies   Allergen Reactions    Contrast [Iodinated Diagnostic Agents] Shortness Of Breath and Dizziness    Penicillins Anaphylaxis         Current Outpatient Medications:     acetaminophen (TYLENOL) 500 mg tablet, May take 1 tablet (500 mg total) by mouth every 8 (eight) hours as needed for mild pain, moderate pain or headaches  May also take 1 tablet (500 mg total) every 8 (eight) hours as needed for mild pain, moderate pain or headaches  Take 1-2 tablets every 8 hours as needed for mild-moderate pain   , Disp: 100 tablet, Rfl: 0    al mag oxide-diphenhydramine-lidocaine viscous (MAGIC MOUTHWASH) 1:1:1 suspension, Swish and spit 10 mL every 4 (four) hours as needed for mouth pain or discomfort, Disp: 180 mL, Rfl: 0    amLODIPine (NORVASC) 5 mg tablet, Take 1 tablet (5 mg total) by mouth daily, Disp: 30 tablet, Rfl: 0    aspirin (ECOTRIN LOW STRENGTH) 81 mg EC tablet, Take 1 tablet (81 mg total) by mouth daily, Disp: 30 tablet, Rfl: 11    aspirin-acetaminophen-caffeine (EXCEDRIN MIGRAINE) 250-250-65 MG per tablet, Take 1 tablet by mouth every 6 (six) hours as needed for headaches Erica 1 tableta cada 6 horas fernandez sea necesario para el dolor de russ, Disp: 30 tablet, Rfl: 0    benzonatate (TESSALON) 200 MG capsule, Take 1 capsule (200 mg total) by mouth 3 (three) times a day as needed for cough, Disp: 20 capsule, Rfl: 1    carbamide peroxide (DEBROX) 6 5 % otic solution, Administer 5 drops to the right ear 2 (two) times a day, Disp: 15 mL, Rfl: 0    cyanocobalamin 1000 MCG tablet, Take 1 tablet (1,000 mcg total) by mouth daily, Disp: 90 tablet, Rfl: 3    dexamethasone (DECADRON) 1 mg tablet, Take 1 tablet (1 mg total) by mouth 2 (two) times a day with meals Do not take afternoon dose after 2pm, Disp: 60 tablet, Rfl: 3    DULoxetine (CYMBALTA) 20 mg capsule, Take 1 capsule (20 mg total) by mouth daily, Disp: 30 capsule, Rfl: 3    lidocaine (LMX) 4 % cream, Apply topically as needed for mild pain To neck, Disp: 30 g, Rfl: 0    meclizine (ANTIVERT) 12 5 MG tablet, Take 1 tablet (12 5 mg total) by mouth every 8 (eight) hours as needed for dizziness, Disp: 30 tablet, Rfl: 3    omeprazole (PriLOSEC) 20 mg delayed release capsule, Take 2 capsules (40 mg total) by mouth daily To prevent stomach upset, Disp: 60 capsule, Rfl: 5    ondansetron (ZOFRAN) 4 mg tablet, Take 1 tablet (4 mg total) by mouth every 8 (eight) hours as needed for nausea or vomiting, Disp: 30 tablet, Rfl: 3    oxyCODONE (ROXICODONE) 20 MG TABS, Take 1 tablet (20 mg total) by mouth every 4 (four) hours as needed for moderate pain Erica 1 tableta cada 4 horas fernandez sea necesario para el dolor de estomagoMax Daily Amount: 120 mg, Disp: 90 tablet, Rfl: 0    polyethylene glycol (MIRALAX) 17 g packet, Take 17 g by mouth daily, Disp: 30 each, Rfl: 3    senna (SENOKOT) 8 6 mg, Take 1 tablet (8 6 mg total) by mouth 2 (two) times a day, Disp: 60 each, Rfl: 3    levofloxacin (LEVAQUIN) 500 mg tablet, Take 1 tablet (500 mg total) by mouth every 24 hours for 5 days, Disp: 5 tablet, Rfl: 0  No current facility-administered medications for this visit  Physical Exam:  /84 (BP Location: Left arm, Patient Position: Sitting, Cuff Size: Large)   Pulse 68   Temp 99 4 °F (37 4 °C) (Tympanic)   Resp 16   Ht 5' 4" (1 626 m)   Wt 90 4 kg (199 lb 6 4 oz)   SpO2 100%   BMI 34 23 kg/m²     Physical Exam  Vitals signs reviewed     Constitutional:       General: She is not in acute distress  Appearance: She is well-developed  She is not diaphoretic  HENT:      Head: Normocephalic and atraumatic  Eyes:      General: No scleral icterus  Conjunctiva/sclera: Conjunctivae normal       Pupils: Pupils are equal, round, and reactive to light  Neck:      Musculoskeletal: Normal range of motion and neck supple  Thyroid: No thyromegaly  Cardiovascular:      Rate and Rhythm: Normal rate and regular rhythm  Heart sounds: Normal heart sounds  No murmur  Pulmonary:      Effort: Pulmonary effort is normal  No respiratory distress  Breath sounds: Normal breath sounds  Abdominal:      General: There is no distension  Palpations: Abdomen is soft  There is no hepatomegaly or splenomegaly  Tenderness: There is generalized abdominal tenderness  Musculoskeletal: Normal range of motion  General: No swelling  Lymphadenopathy:      Cervical: Cervical adenopathy present  Left cervical: Posterior cervical adenopathy (tender to touch) present  Upper Body:      Right upper body: No axillary adenopathy  Left upper body: No axillary adenopathy  Skin:     General: Skin is warm and dry  Findings: No erythema or rash  Neurological:      General: No focal deficit present  Mental Status: She is alert and oriented to person, place, and time  Psychiatric:         Mood and Affect: Mood normal  Affect is blunt  Behavior: Behavior normal  Behavior is cooperative  Thought Content:  Thought content normal          Judgment: Judgment normal            Labs:  Lab Results   Component Value Date    WBC 0 60 (LL) 01/14/2021    HGB 10 4 (L) 01/14/2021    HCT 34 1 (L) 01/14/2021    MCV 64 (L) 01/14/2021     01/14/2021     Lab Results   Component Value Date    K 3 7 01/14/2021     01/14/2021    CO2 25 01/14/2021    BUN 10 01/14/2021    CREATININE 0 81 01/14/2021    GLUF 97 12/11/2020    CALCIUM 8 8 01/14/2021 AST 22 01/14/2021    ALT 18 01/14/2021    ALKPHOS 101 01/14/2021    EGFR 80 01/14/2021       Patient voiced understanding and agreement in the above discussion  Aware to contact our office with questions/symptoms in the interim  This note has been generated by voice recognition software system  Therefore, there may be spelling, grammar, and or syntax errors  Please contact if questions arise

## 2021-01-18 NOTE — PROGRESS NOTES
Pt arrived for central lab draw  Pending COVID test noted when lab labels printed  No office note documented yet  Temp currently 98 2  Port accessed and labs obtained  Port flushed and deaccessed per protocol  Next appt confirmed    Left ambulatory declining AVS

## 2021-01-20 ENCOUNTER — TELEPHONE (OUTPATIENT)
Dept: PALLIATIVE MEDICINE | Facility: CLINIC | Age: 61
End: 2021-01-20

## 2021-01-20 ENCOUNTER — TELEPHONE (OUTPATIENT)
Dept: HEMATOLOGY ONCOLOGY | Facility: CLINIC | Age: 61
End: 2021-01-20

## 2021-01-20 DIAGNOSIS — J06.9 UPPER RESPIRATORY TRACT INFECTION, UNSPECIFIED TYPE: ICD-10-CM

## 2021-01-20 PROCEDURE — 87637 SARSCOV2&INF A&B&RSV AMP PRB: CPT | Performed by: NURSE PRACTITIONER

## 2021-01-20 NOTE — PROGRESS NOTES
TIME OUT: spoke with Corky Ruiz, RN  Patient chemo held 1-21-21 due to [address] and COVID test pending  Patient rescheduled one week  Jacob Burnette sending new orders as copanlisib day 1 and day 15 with fulphila day 2 and day 16  Iman Huddle in in pharmacy aware

## 2021-01-20 NOTE — TELEPHONE ENCOUNTER
Storm Aggarwal from Palliative care called regarding patient appointment's with Dr Singleton Re   Call transferred to [facility] in office

## 2021-01-20 NOTE — TELEPHONE ENCOUNTER
Had to phone pt via [employer]  phone and [place] #[id number] was the  who attempted two times to reach pt and a voice message was left instructing pt to go for Covid test today to [address] [place] [address], [place]  If pt does not go for test today her appt tomorrow is being cancelled

## 2021-01-20 NOTE — TELEPHONE ENCOUNTER
I reached out to Hematology & Oncology to explain of this patient, no knowing about Dr Germania Young appointment, Patient does not really understand and comprehen when the translation line is use, information needs to be repeated constantly to get some level of understanding  Patient does not know how to check her voicemail  I had asked her in the past and she had told me she doesn't know, I had written things in Hungarian and she stated she can't see  I spoke to [address] and offer help to reach out to me for me to reach out to patient for Dr Germania Young future appointment

## 2021-01-21 ENCOUNTER — HOSPITAL ENCOUNTER (OUTPATIENT)
Dept: INFUSION CENTER | Facility: HOSPITAL | Age: 61
Discharge: HOME | End: 2021-01-21

## 2021-01-21 LAB
FLUAV RNA NPH QL NAA+PROBE: NOT DETECTED
FLUBV RNA NPH QL NAA+PROBE: NOT DETECTED
RSV RNA NPH QL NAA+PROBE: NOT DETECTED
SARS-COV-2 RNA NPH QL NAA+PROBE: DETECTED

## 2021-01-23 ENCOUNTER — HOSPITAL ENCOUNTER (EMERGENCY)
Facility: HOSPITAL | Age: 61
Discharge: HOME | DRG: 720 | End: 2021-01-23
Attending: EMERGENCY MEDICINE | Admitting: EMERGENCY MEDICINE
Payer: COMMERCIAL

## 2021-01-23 ENCOUNTER — APPOINTMENT (EMERGENCY)
Dept: RADIOLOGY | Facility: HOSPITAL | Age: 61
DRG: 720 | End: 2021-01-23
Payer: COMMERCIAL

## 2021-01-23 VITALS
TEMPERATURE: 97.6 F | HEART RATE: 63 BPM | SYSTOLIC BLOOD PRESSURE: 111 MMHG | BODY MASS INDEX: 33.68 KG/M2 | RESPIRATION RATE: 18 BRPM | WEIGHT: 196.2 LBS | DIASTOLIC BLOOD PRESSURE: 59 MMHG | OXYGEN SATURATION: 96 %

## 2021-01-23 DIAGNOSIS — U07.1 COVID-19: ICD-10-CM

## 2021-01-23 DIAGNOSIS — R10.9 PAIN IN THE ABDOMEN: ICD-10-CM

## 2021-01-23 DIAGNOSIS — R42 DIZZINESS: Primary | ICD-10-CM

## 2021-01-23 LAB
ALBUMIN SERPL BCP-MCNC: 4.1 G/DL (ref 3–5.2)
ALP SERPL-CCNC: 124 U/L (ref 43–122)
ALT SERPL W P-5'-P-CCNC: 32 U/L (ref 9–52)
AMPHETAMINES SERPL QL SCN: NEGATIVE
ANION GAP SERPL CALCULATED.3IONS-SCNC: 8 MMOL/L (ref 5–14)
ANISOCYTOSIS BLD QL SMEAR: PRESENT
AST SERPL W P-5'-P-CCNC: 53 U/L (ref 14–36)
BACTERIA UR QL AUTO: ABNORMAL /HPF
BARBITURATES UR QL: NEGATIVE
BENZODIAZ UR QL: NEGATIVE
BILIRUB SERPL-MCNC: 1 MG/DL
BILIRUB UR QL STRIP: NEGATIVE
BUN SERPL-MCNC: 13 MG/DL (ref 5–25)
CALCIUM SERPL-MCNC: 8.9 MG/DL (ref 8.4–10.2)
CHLORIDE SERPL-SCNC: 102 MMOL/L (ref 97–108)
CLARITY UR: ABNORMAL
CO2 SERPL-SCNC: 28 MMOL/L (ref 22–30)
COCAINE UR QL: NEGATIVE
COLOR UR: YELLOW
CREAT SERPL-MCNC: 0.8 MG/DL (ref 0.6–1.2)
EOSINOPHIL # BLD AUTO: 0.01 THOUSAND/UL (ref 0–0.4)
EOSINOPHIL NFR BLD MANUAL: 1 % (ref 0–6)
ERYTHROCYTE [DISTWIDTH] IN BLOOD BY AUTOMATED COUNT: 19 %
ETHANOL SERPL-MCNC: <10 MG/DL (ref 0–10)
GFR SERPL CREATININE-BSD FRML MDRD: 82 ML/MIN/1.73SQ M
GLUCOSE SERPL-MCNC: 119 MG/DL (ref 70–99)
GLUCOSE UR STRIP-MCNC: NEGATIVE MG/DL
HCT VFR BLD AUTO: 36.9 % (ref 36–46)
HGB BLD-MCNC: 11.5 G/DL (ref 12–16)
HGB UR QL STRIP.AUTO: NEGATIVE
HYPERCHROMIA BLD QL SMEAR: PRESENT
KETONES UR STRIP-MCNC: NEGATIVE MG/DL
LEUKOCYTE ESTERASE UR QL STRIP: NEGATIVE
LIPASE SERPL-CCNC: 36 U/L (ref 23–300)
LYMPHOCYTES # BLD AUTO: 0.31 THOUSAND/UL (ref 0.5–4)
LYMPHOCYTES # BLD AUTO: 52 % (ref 25–45)
MCH RBC QN AUTO: 19.9 PG (ref 26–34)
MCHC RBC AUTO-ENTMCNC: 31 G/DL (ref 31–36)
MCV RBC AUTO: 64 FL (ref 80–100)
METHADONE UR QL: NEGATIVE
MICROCYTES BLD QL AUTO: PRESENT
MONOCYTES # BLD AUTO: 0.12 THOUSAND/UL (ref 0.2–0.9)
MONOCYTES NFR BLD AUTO: 20 % (ref 1–10)
MUCOUS THREADS UR QL AUTO: ABNORMAL
NEUTS BAND NFR BLD MANUAL: 3 % (ref 0–8)
NEUTS SEG # BLD: 0.16 THOUSAND/UL (ref 1.8–7.8)
NEUTS SEG NFR BLD AUTO: 23 %
NITRITE UR QL STRIP: NEGATIVE
NON-SQ EPI CELLS URNS QL MICRO: ABNORMAL /HPF
NT-PROBNP SERPL-MCNC: 32.3 PG/ML (ref 0–299)
OPIATES UR QL SCN: NEGATIVE
OTHER STN SPEC: ABNORMAL
OVALOCYTES BLD QL SMEAR: PRESENT
OXYCODONE+OXYMORPHONE UR QL SCN: NEGATIVE
PCP UR QL: NEGATIVE
PH UR STRIP.AUTO: 6 [PH]
PLATELET # BLD AUTO: 141 THOUSANDS/UL (ref 150–450)
PLATELET BLD QL SMEAR: ABNORMAL
PMV BLD AUTO: 10.1 FL (ref 8.9–12.7)
POIKILOCYTOSIS BLD QL SMEAR: PRESENT
POTASSIUM SERPL-SCNC: 3.7 MMOL/L (ref 3.6–5)
PROT SERPL-MCNC: 7.2 G/DL (ref 5.9–8.4)
PROT UR STRIP-MCNC: ABNORMAL MG/DL
RBC # BLD AUTO: 5.77 MILLION/UL (ref 4–5.2)
RBC #/AREA URNS AUTO: ABNORMAL /HPF
RBC MORPH BLD: ABNORMAL
SCHISTOCYTES BLD QL SMEAR: PRESENT
SODIUM SERPL-SCNC: 138 MMOL/L (ref 137–147)
SP GR UR STRIP.AUTO: 1.02 (ref 1–1.04)
THC UR QL: NEGATIVE
TOTAL CELLS COUNTED SPEC: 100
TROPONIN I SERPL-MCNC: <0.01 NG/ML (ref 0–0.03)
UROBILINOGEN UA: 1 MG/DL
VARIANT LYMPHS # BLD AUTO: 1 % (ref 0–0)
WBC # BLD AUTO: 0.6 THOUSAND/UL (ref 4.5–11)
WBC #/AREA URNS AUTO: ABNORMAL /HPF

## 2021-01-23 PROCEDURE — 96360 HYDRATION IV INFUSION INIT: CPT

## 2021-01-23 PROCEDURE — 83880 ASSAY OF NATRIURETIC PEPTIDE: CPT | Performed by: PHYSICIAN ASSISTANT

## 2021-01-23 PROCEDURE — 87040 BLOOD CULTURE FOR BACTERIA: CPT | Performed by: PHYSICIAN ASSISTANT

## 2021-01-23 PROCEDURE — 80053 COMPREHEN METABOLIC PANEL: CPT | Performed by: PHYSICIAN ASSISTANT

## 2021-01-23 PROCEDURE — 93005 ELECTROCARDIOGRAM TRACING: CPT

## 2021-01-23 PROCEDURE — 85007 BL SMEAR W/DIFF WBC COUNT: CPT | Performed by: PHYSICIAN ASSISTANT

## 2021-01-23 PROCEDURE — 84484 ASSAY OF TROPONIN QUANT: CPT | Performed by: PHYSICIAN ASSISTANT

## 2021-01-23 PROCEDURE — 83690 ASSAY OF LIPASE: CPT | Performed by: PHYSICIAN ASSISTANT

## 2021-01-23 PROCEDURE — 81003 URINALYSIS AUTO W/O SCOPE: CPT | Performed by: PHYSICIAN ASSISTANT

## 2021-01-23 PROCEDURE — 99284 EMERGENCY DEPT VISIT MOD MDM: CPT

## 2021-01-23 PROCEDURE — 85027 COMPLETE CBC AUTOMATED: CPT | Performed by: PHYSICIAN ASSISTANT

## 2021-01-23 PROCEDURE — 36415 COLL VENOUS BLD VENIPUNCTURE: CPT | Performed by: PHYSICIAN ASSISTANT

## 2021-01-23 PROCEDURE — 82077 ASSAY SPEC XCP UR&BREATH IA: CPT | Performed by: PHYSICIAN ASSISTANT

## 2021-01-23 PROCEDURE — 80307 DRUG TEST PRSMV CHEM ANLYZR: CPT | Performed by: PHYSICIAN ASSISTANT

## 2021-01-23 PROCEDURE — 99283 EMERGENCY DEPT VISIT LOW MDM: CPT | Performed by: PHYSICIAN ASSISTANT

## 2021-01-23 PROCEDURE — 71045 X-RAY EXAM CHEST 1 VIEW: CPT

## 2021-01-23 PROCEDURE — 96361 HYDRATE IV INFUSION ADD-ON: CPT

## 2021-01-23 PROCEDURE — 81001 URINALYSIS AUTO W/SCOPE: CPT | Performed by: PHYSICIAN ASSISTANT

## 2021-01-23 RX ORDER — SUCRALFATE 1 G/1
1 TABLET ORAL ONCE
Status: COMPLETED | OUTPATIENT
Start: 2021-01-23 | End: 2021-01-23

## 2021-01-23 RX ORDER — SODIUM CHLORIDE 9 MG/ML
250 INJECTION, SOLUTION INTRAVENOUS CONTINUOUS
Status: DISCONTINUED | OUTPATIENT
Start: 2021-01-23 | End: 2021-01-23 | Stop reason: HOSPADM

## 2021-01-23 RX ORDER — ACETAMINOPHEN 325 MG/1
650 TABLET ORAL ONCE
Status: COMPLETED | OUTPATIENT
Start: 2021-01-23 | End: 2021-01-23

## 2021-01-23 RX ORDER — MAGNESIUM HYDROXIDE/ALUMINUM HYDROXICE/SIMETHICONE 120; 1200; 1200 MG/30ML; MG/30ML; MG/30ML
30 SUSPENSION ORAL ONCE
Status: COMPLETED | OUTPATIENT
Start: 2021-01-23 | End: 2021-01-23

## 2021-01-23 RX ADMIN — SODIUM CHLORIDE 250 ML/HR: 0.9 INJECTION, SOLUTION INTRAVENOUS at 08:59

## 2021-01-23 RX ADMIN — ALUMINUM HYDROXIDE, MAGNESIUM HYDROXIDE, AND SIMETHICONE 30 ML: 200; 200; 20 SUSPENSION ORAL at 10:04

## 2021-01-23 RX ADMIN — ACETAMINOPHEN 650 MG: 325 TABLET ORAL at 10:04

## 2021-01-23 RX ADMIN — SUCRALFATE 1 G: 1 TABLET ORAL at 10:05

## 2021-01-23 NOTE — ED NOTES
Pt denies having to urinate at this time     Mitchell Ng, [facility]  01/23/21 9439

## 2021-01-23 NOTE — ED PROCEDURE NOTE
PROCEDURE  ECG 12 Lead Documentation Only    Date/Time: 1/23/2021 8:50 AM  Performed by: Toney Hatchet, DO  Authorized by: Orly Rod III, DO     Indications / Diagnosis:  Dizziness  Comments:      I personally reviewed this EKG is performed on the patient January 23, 2021  EKG was completed at 8:45 a m  And interpreted by me at 8:50 a m   Normal sinus rhythm with no acute ST abnormalities  All intervals within normal limits           Orly Rod III, DO  01/23/21 1400

## 2021-01-23 NOTE — ED PROVIDER NOTES
History  Chief Complaint   Patient presents with    Dizziness     Pt with 2 weeks of dizziness and abdomen pain,  Pt with no dizziness at this time           Prior to Admission Medications   Prescriptions Last Dose Informant Patient Reported? Taking? DULoxetine (CYMBALTA) 20 mg capsule  Self No No   Sig: Take 1 capsule (20 mg total) by mouth daily   acetaminophen (TYLENOL) 500 mg tablet  Self No No   Sig: May take 1 tablet (500 mg total) by mouth every 8 (eight) hours as needed for mild pain, moderate pain or headaches  May also take 1 tablet (500 mg total) every 8 (eight) hours as needed for mild pain, moderate pain or headaches  Take 1-2 tablets every 8 hours as needed for mild-moderate pain  Julio Pac    al mag oxide-diphenhydramine-lidocaine viscous (MAGIC MOUTHWASH) 1:1:1 suspension  Self No No   Sig: Swish and spit 10 mL every 4 (four) hours as needed for mouth pain or discomfort   amLODIPine (NORVASC) 5 mg tablet  Self No No   Sig: Take 1 tablet (5 mg total) by mouth daily   aspirin (ECOTRIN LOW STRENGTH) 81 mg EC tablet  Self No No   Sig: Take 1 tablet (81 mg total) by mouth daily   aspirin-acetaminophen-caffeine (EXCEDRIN MIGRAINE) 250-250-65 MG per tablet  Self No No   Sig: Take 1 tablet by mouth every 6 (six) hours as needed for headaches Erica 1 tableta cada 6 horas fernandez sea necesario para el dolor de russ   benzonatate (TESSALON) 200 MG capsule  Self No No   Sig: Take 1 capsule (200 mg total) by mouth 3 (three) times a day as needed for cough   carbamide peroxide (DEBROX) 6 5 % otic solution  Self No No   Sig: Administer 5 drops to the right ear 2 (two) times a day   cyanocobalamin 1000 MCG tablet  Self No No   Sig: Take 1 tablet (1,000 mcg total) by mouth daily   dexamethasone (DECADRON) 1 mg tablet  Self No No   Sig: Take 1 tablet (1 mg total) by mouth 2 (two) times a day with meals Do not take afternoon dose after 2pm   levofloxacin (LEVAQUIN) 500 mg tablet   No No   Sig: Take 1 tablet (500 mg total) by mouth every 24 hours for 5 days   lidocaine (LMX) 4 % cream  Self No No   Sig: Apply topically as needed for mild pain To neck   meclizine (ANTIVERT) 12 5 MG tablet  Self No No   Sig: Take 1 tablet (12 5 mg total) by mouth every 8 (eight) hours as needed for dizziness   omeprazole (PriLOSEC) 20 mg delayed release capsule  Self No No   Sig: Take 2 capsules (40 mg total) by mouth daily To prevent stomach upset   ondansetron (ZOFRAN) 4 mg tablet  Self No No   Sig: Take 1 tablet (4 mg total) by mouth every 8 (eight) hours as needed for nausea or vomiting   oxyCODONE (ROXICODONE) 20 MG TABS   No No   Sig: Take 1 tablet (20 mg total) by mouth every 4 (four) hours as needed for moderate pain Erica 1 tableta cada 4 horas fernandez sea necesario para el dolor de estomagoMax Daily Amount: 120 mg   polyethylene glycol (MIRALAX) 17 g packet  Self No No   Sig: Take 17 g by mouth daily   senna (SENOKOT) 8 6 mg  Self No No   Sig: Take 1 tablet (8 6 mg total) by mouth 2 (two) times a day      Facility-Administered Medications: None       Past Medical History:   Diagnosis Date    Anemia     iron and B12    Arthritis     Asthma     Cough     Depression     Falls frequently     [facility]     Lymphoma [facility]     Lymphoma [facility] [facility]     Migraine     Osteoporosis     Psychiatric disorder     Vertigo        Past Surgical History:   Procedure Laterality Date    CHOLECYSTECTOMY      FL GUIDED CENTRAL VENOUS ACCESS DEVICE INSERTION  11/9/2018    HYSTERECTOMY      IR BIOPSY BONE MARROW  11/24/2020    IR BIOPSY LYMPH NODE  11/24/2020    LYMPH NODE BIOPSY Left 11/9/2018    Procedure: EXCISION BIOPSY LYMPH NODE SUPRACLAVICULAR;  Surgeon: Hal Acuña MD;  Location: [address] [place] [address];  Service: General    NC INCISE FINGER TENDON SHEATH Right 1/16/2020    Procedure: RELEASE TRIGGER FINGER RIGHT THUMB;  Surgeon: Vinny Dupont MD;  Location: [address] [place] [address];  Service: Orthopedics    TUNNELED VENOUS PORT PLACEMENT N/A 11/9/2018    Procedure: INSERTION OF PORT-A-CATH;  Surgeon: Felix Guadalupe MD;  Location: [address] [place] [address] OR;  Service: General       Family History   Problem Relation Age of Onset    Cancer Mother     Diabetes Mother     Cancer Father     Diabetes Father      I have reviewed and agree with the history as documented  E-Cigarette/Vaping    E-Cigarette Use Never User      E-Cigarette/Vaping Substances    Nicotine No     THC No     CBD No     Flavoring No     Other No     Unknown No      Social History     Tobacco Use    Smoking status: Former Smoker     Quit date: 6/12/2017     Years since quitting: 3 6    Smokeless tobacco: Never Used   Substance Use Topics    Alcohol use: Never     Frequency: Never    Drug use: Never       Review of Systems   Constitutional: Negative  HENT: Negative  Eyes: Negative  Respiratory: Negative  Cardiovascular: Negative  Gastrointestinal: Positive for abdominal pain  Endocrine: Negative  Genitourinary: Negative  Musculoskeletal: Negative  Skin: Negative  Allergic/Immunologic: Negative  Neurological: Positive for dizziness  Hematological: Negative  Psychiatric/Behavioral: Negative  All other systems reviewed and are negative  Physical Exam  Physical Exam  Vitals signs and nursing note reviewed  Constitutional:       Appearance: Normal appearance  She is normal weight  Comments: 1120am pt is dizzy and abdomen pain free,  Pt with no complaints now    HENT:      Head: Normocephalic  Nose: Nose normal       Mouth/Throat:      Mouth: Mucous membranes are moist    Eyes:      Extraocular Movements: Extraocular movements intact  Pupils: Pupils are equal, round, and reactive to light  Neck:      Musculoskeletal: Normal range of motion and neck supple  Cardiovascular:      Rate and Rhythm: Normal rate  Pulses: Normal pulses  Heart sounds: Normal heart sounds     Pulmonary:      Effort: Pulmonary effort is normal       Breath sounds: Normal breath sounds  Abdominal:      General: Abdomen is flat  Bowel sounds are normal    Musculoskeletal: Normal range of motion  Skin:     Capillary Refill: Capillary refill takes less than 2 seconds  Neurological:      General: No focal deficit present  Mental Status: She is alert  Psychiatric:         Mood and Affect: Mood normal          Behavior: Behavior normal          Vital Signs  ED Triage Vitals [01/23/21 0825]   Temperature Pulse Respirations Blood Pressure SpO2   97 6 °F (36 4 °C) 80 18 145/82 95 %      Temp Source Heart Rate Source Patient Position - Orthostatic VS BP Location FiO2 (%)   Tympanic Monitor Sitting Left arm --      Pain Score       6           Vitals:    01/23/21 0825 01/23/21 1009 01/23/21 1133   BP: 145/82 137/73 111/59   Pulse: 80 70 63   Patient Position - Orthostatic VS: Sitting Lying Lying         Visual Acuity      ED Medications  Medications   acetaminophen (TYLENOL) tablet 650 mg (650 mg Oral Given 1/23/21 1004)   aluminum-magnesium hydroxide-simethicone (MYLANTA) oral suspension 30 mL (30 mL Oral Given 1/23/21 1004)   sucralfate (CARAFATE) tablet 1 g (1 g Oral Given 1/23/21 1005)       Diagnostic Studies  Results Reviewed     Procedure Component Value Units Date/Time    Blood culture [[id number]] Collected: 01/23/21 0853    Lab Status: Preliminary result Specimen: Blood from Arm, Left Updated: 01/23/21 1801     Blood Culture Received in Microbiology Lab  Culture in Progress  Blood culture [[id number]] Collected: 01/23/21 0853    Lab Status: Preliminary result Specimen: Blood from Arm, Right Updated: 01/23/21 1801     Blood Culture Received in Microbiology Lab  Culture in Progress      Urine Microscopic [[id number]]  (Abnormal) Collected: 01/23/21 1003    Lab Status: Final result Specimen: Urine, Clean Catch Updated: 01/23/21 1053     RBC, UA None Seen /hpf      WBC, UA 0-1 /hpf      Epithelial Cells Moderate /hpf      Bacteria, UA Moderate /hpf      OTHER OBSERVATIONS Yeast Cells Present     MUCUS THREADS Moderate    Rapid drug screen, urine [[id number]]  (Normal) Collected: 01/23/21 1003    Lab Status: Final result Specimen: Urine, Clean Catch Updated: 01/23/21 1050     Amph/Meth UR Negative     Barbiturate Ur Negative     Benzodiazepine Urine Negative     Cocaine Urine Negative     Methadone Urine Negative     Opiate Urine Negative     PCP Ur Negative     THC Urine Negative     Oxycodone Urine Negative    Narrative:      FOR MEDICAL PURPOSES ONLY  IF CONFIRMATION NEEDED PLEASE CONTACT THE LAB WITHIN 5 DAYS      Drug Screen Cutoff Levels:  AMPHETAMINE/METHAMPHETAMINES  1000 ng/mL  BARBITURATES     200 ng/mL  BENZODIAZEPINES     200 ng/mL  COCAINE      300 ng/mL  METHADONE      300 ng/mL  OPIATES      300 ng/mL  PHENCYCLIDINE     25 ng/mL  THC       50 ng/mL  OXYCODONE      100 ng/mL    Manual Differential (Non Wam) [[id number]]  (Abnormal) Collected: 01/23/21 0853    Lab Status: Final result Specimen: Blood from Arm, Left Updated: 01/23/21 1039     Segmented % 23 %      Bands % 3 %      Lymphocytes % 52 %      Monocytes % 20 %      Eosinophils, % 1 %      Atypical Lymphocytes % 1 %      Neutrophils Absolute 0 16 Thousand/uL      Lymphocytes Absolute 0 31 Thousand/uL      Monocytes Absolute 0 12 Thousand/uL      Eosinophils Absolute 0 01 Thousand/uL      Total Counted 100     RBC Morphology abnormal     Anisocytosis Present     Hypochromia Present     Microcytes Present     Ovalocytes Present     Poikilocytes Present     Schistocytes Present     Platelet Estimate Borderline    UA w Reflex to Microscopic w Reflex to Culture [[id number]]  (Abnormal) Collected: 01/23/21 1003    Lab Status: Final result Specimen: Urine, Clean Catch Updated: 01/23/21 1021     Color, UA Yellow     Clarity, UA Slightly Cloudy     Specific [place], UA 1 020     pH, UA 6 0     Leukocytes, UA Negative     Nitrite, UA Negative     Protein, UA 30 (1+) mg/dl      Glucose, UA Negative mg/dl      Ketones, UA Negative mg/dl      Bilirubin, UA Negative     Blood, UA Negative     UROBILINOGEN UA 1 0 mg/dL     Troponin I [[id number]]  (Normal) Collected: 01/23/21 0853    Lab Status: Final result Specimen: Blood from Arm, Left Updated: 01/23/21 0930     Troponin I <0 01 ng/mL     NT-BNP PRO [[id number]]  (Normal) Collected: 01/23/21 0853    Lab Status: Final result Specimen: Blood from Arm, Left Updated: 01/23/21 0928     NT-proBNP 32 3 pg/mL     Lipase [[id number]]  (Normal) Collected: 01/23/21 0853    Lab Status: Final result Specimen: Blood from Arm, Left Updated: 01/23/21 0921     Lipase 36 u/L     Ethanol [[id number]]  (Normal) Collected: 01/23/21 0853    Lab Status: Final result Specimen: Blood from Arm, Left Updated: 01/23/21 0920     Ethanol Lvl <10 mg/dL     Comprehensive metabolic panel [[id number]]  (Abnormal) Collected: 01/23/21 0853    Lab Status: Final result Specimen: Blood from Arm, Left Updated: 01/23/21 0920     Sodium 138 mmol/L      Potassium 3 7 mmol/L      Chloride 102 mmol/L      CO2 28 mmol/L      ANION GAP 8 mmol/L      BUN 13 mg/dL      Creatinine 0 80 mg/dL      Glucose 119 mg/dL      Calcium 8 9 mg/dL      AST 53 U/L      ALT 32 U/L      Alkaline Phosphatase 124 U/L      Total Protein 7 2 g/dL      Albumin 4 1 g/dL      Total Bilirubin 1 00 mg/dL      eGFR 82 ml/min/1 73sq m     Narrative:      [place] guidelines for Chronic Kidney Disease (CKD):     Stage 1 with normal or high GFR (GFR > 90 mL/min/1 73 square meters)    Stage 2 Mild CKD (GFR = 60-89 mL/min/1 73 square meters)    Stage 3A Moderate CKD (GFR = 45-59 mL/min/1 73 square meters)    Stage 3B Moderate CKD (GFR = 30-44 mL/min/1 73 square meters)    Stage 4 Severe CKD (GFR = 15-29 mL/min/1 73 square meters)    Stage 5 End Stage CKD (GFR <15 mL/min/1 73 square meters)  Note: GFR calculation is accurate only with a steady state creatinine    CBC and differential [[id number]]  (Abnormal) Collected: 01/23/21 4244 Lab Status: Final result Specimen: Blood from Arm, Left Updated: 01/23/21 0917     WBC 0 60 Thousand/uL      RBC 5 77 Million/uL      Hemoglobin 11 5 g/dL      Hematocrit 36 9 %      MCV 64 fL      MCH 19 9 pg      MCHC 31 0 g/dL      RDW 19 0 %      MPV 10 1 fL      Platelets 404 Thousands/uL                  XR chest 1 view portable   Final Result by Katiuska Martinez MD (01/23 0915)      No acute cardiopulmonary disease  Workstation performed: [id number]                    Procedures  Procedures         ED Course                             SBIRT 22yo+      Most Recent Value   SBIRT (22 yo +)   In order to provide better care to our patients, we are screening all of our patients for alcohol and drug use  Would it be okay to ask you these screening questions? No Filed at: 01/23/2021 3901                    MDM    Disposition  Final diagnoses:   Dizziness   Pain in the abdomen   COVID-19     Time reflects when diagnosis was documented in both MDM as applicable and the Disposition within this note     Time User Action Codes Description Comment    1/23/2021 11:23 AM [facility]  Add [R42] Dizziness     1/23/2021 11:24 AM [employer]  Add [R10 9] Pain in the abdomen     1/23/2021 11:24 AM [facility]  Add [U07 1] COVID-19       ED Disposition     ED Disposition Condition Date/Time Comment    Discharge Stable Sat Jan 23, 2021 11:23 AM Yokasta Moreno discharge to home/self care  Follow-up Information     Follow up With Specialties Details Why [id number] Jordon Hartmann MD Family Medicine   [address]  [phone]            Discharge Medication List as of 1/23/2021 11:24 AM      CONTINUE these medications which have NOT CHANGED    Details   acetaminophen (TYLENOL) 500 mg tablet Multiple Dosages:Starting Tue 9/15/2020May take 1 tablet (500 mg total) by mouth every 8 (eight) hours as needed for mild pain, moderate pain or headaches   May also take 1 tablet (500 mg total) every 8 (eight) hours as needed for mild pain, moderate pa in or headaches  Take 1-2 tablets every 8 hours as needed for mild-moderate pain   , Normal      al mag oxide-diphenhydramine-lidocaine viscous (MAGIC MOUTHWASH) 1:1:1 suspension Swish and spit 10 mL every 4 (four) hours as needed for mouth pain or discomfort, Starting Tue 9/15/2020, Normal      amLODIPine (NORVASC) 5 mg tablet Take 1 tablet (5 mg total) by mouth daily, Starting Tue 12/11/2018, Print      aspirin (ECOTRIN LOW STRENGTH) 81 mg EC tablet Take 1 tablet (81 mg total) by mouth daily, Starting Wed 3/18/2020, Normal      aspirin-acetaminophen-caffeine (EXCEDRIN MIGRAINE) 250-250-65 MG per tablet Take 1 tablet by mouth every 6 (six) hours as needed for headaches Erica 1 tableta cada 6 horas fernandez sea necesario para el dolor de russ, Starting Thu 11/19/2020, Normal      benzonatate (TESSALON) 200 MG capsule Take 1 capsule (200 mg total) by mouth 3 (three) times a day as needed for cough, Starting Thu 12/26/2019, Normal      carbamide peroxide (DEBROX) 6 5 % otic solution Administer 5 drops to the right ear 2 (two) times a day, Starting Wed 6/12/2019, Normal      cyanocobalamin 1000 MCG tablet Take 1 tablet (1,000 mcg total) by mouth daily, Starting Wed 4/17/2019, Normal      dexamethasone (DECADRON) 1 mg tablet Take 1 tablet (1 mg total) by mouth 2 (two) times a day with meals Do not take afternoon dose after 2pm, Starting Tue 9/15/2020, Normal      DULoxetine (CYMBALTA) 20 mg capsule Take 1 capsule (20 mg total) by mouth daily, Starting Tue 9/15/2020, Normal      levofloxacin (LEVAQUIN) 500 mg tablet Take 1 tablet (500 mg total) by mouth every 24 hours for 5 days, Starting Mon 1/18/2021, Until Sat 1/23/2021, Normal      lidocaine (LMX) 4 % cream Apply topically as needed for mild pain To neck, Starting Tue 9/15/2020, Normal      meclizine (ANTIVERT) 12 5 MG tablet Take 1 tablet (12 5 mg total) by mouth every 8 (eight) hours as needed for dizziness, Starting Thu 9/12/2019, Normal      omeprazole (PriLOSEC) 20 mg delayed release capsule Take 2 capsules (40 mg total) by mouth daily To prevent stomach upset, Starting Wed 3/25/2020, Normal      ondansetron (ZOFRAN) 4 mg tablet Take 1 tablet (4 mg total) by mouth every 8 (eight) hours as needed for nausea or vomiting, Starting Tue 9/15/2020, Normal      oxyCODONE (ROXICODONE) 20 MG TABS Take 1 tablet (20 mg total) by mouth every 4 (four) hours as needed for moderate pain Erica 1 tableta cada 4 horas fernandez sea necesario para el dolor de estomagoMax Daily Amount: 120 mg, Starting Wed 1/13/2021, Normal      polyethylene glycol (MIRALAX) 17 g packet Take 17 g by mouth daily, Starting Tue 12/17/2019, Normal      senna (SENOKOT) 8 6 mg Take 1 tablet (8 6 mg total) by mouth 2 (two) times a day, Starting Tue 12/17/2019, Normal           No discharge procedures on file      PDMP Review       Value Time User    PDMP Reviewed  Yes 1/13/2021 10:27 AM Kandi Aleman MD          ED Provider  Electronically Signed by           Candace Kelley PA-C  01/24/21 9981

## 2021-01-24 ENCOUNTER — APPOINTMENT (EMERGENCY)
Dept: CT IMAGING | Facility: HOSPITAL | Age: 61
DRG: 720 | End: 2021-01-24
Payer: COMMERCIAL

## 2021-01-24 ENCOUNTER — HOSPITAL ENCOUNTER (INPATIENT)
Facility: HOSPITAL | Age: 61
LOS: 1 days | Discharge: HOME HEALTH | DRG: 720 | End: 2021-01-26
Attending: EMERGENCY MEDICINE | Admitting: FAMILY MEDICINE
Payer: COMMERCIAL

## 2021-01-24 DIAGNOSIS — R05.9 COUGH: ICD-10-CM

## 2021-01-24 DIAGNOSIS — R63.8 DECREASED ORAL INTAKE: ICD-10-CM

## 2021-01-24 DIAGNOSIS — U07.1 COVID-19: ICD-10-CM

## 2021-01-24 DIAGNOSIS — R42 DIZZINESS: Primary | ICD-10-CM

## 2021-01-24 DIAGNOSIS — R29.6 FREQUENT FALLS: ICD-10-CM

## 2021-01-24 DIAGNOSIS — C82.18 GRADE 2 FOLLICULAR LYMPHOMA OF LYMPH NODES OF MULTIPLE REGIONS (HCC): ICD-10-CM

## 2021-01-24 PROBLEM — A41.9 SEPSIS (HCC): Status: RESOLVED | Noted: 2021-01-24 | Resolved: 2021-01-24

## 2021-01-24 PROBLEM — A41.9 SEPSIS (HCC): Status: ACTIVE | Noted: 2021-01-24

## 2021-01-24 PROBLEM — D72.819 LEUKOPENIA: Status: ACTIVE | Noted: 2021-01-24

## 2021-01-24 LAB
ALBUMIN SERPL BCP-MCNC: 3.8 G/DL (ref 3–5.2)
ALP SERPL-CCNC: 122 U/L (ref 43–122)
ALT SERPL W P-5'-P-CCNC: 22 U/L (ref 9–52)
ANION GAP SERPL CALCULATED.3IONS-SCNC: 8 MMOL/L (ref 5–14)
AST SERPL W P-5'-P-CCNC: 33 U/L (ref 14–36)
ATRIAL RATE: 76 BPM
BILIRUB SERPL-MCNC: 0.8 MG/DL
BUN SERPL-MCNC: 11 MG/DL (ref 5–25)
BURR CELLS BLD QL SMEAR: PRESENT
CALCIUM SERPL-MCNC: 8.7 MG/DL (ref 8.4–10.2)
CHLORIDE SERPL-SCNC: 102 MMOL/L (ref 97–108)
CK SERPL-CCNC: 23 U/L (ref 30–135)
CO2 SERPL-SCNC: 26 MMOL/L (ref 22–30)
CREAT SERPL-MCNC: 0.66 MG/DL (ref 0.6–1.2)
CRP SERPL QL: 52 MG/L
DACRYOCYTES BLD QL SMEAR: PRESENT
EOSINOPHIL # BLD AUTO: 0.03 THOUSAND/UL (ref 0–0.4)
EOSINOPHIL NFR BLD MANUAL: 4 % (ref 0–6)
ERYTHROCYTE [DISTWIDTH] IN BLOOD BY AUTOMATED COUNT: 18.9 %
GFR SERPL CREATININE-BSD FRML MDRD: 98 ML/MIN/1.73SQ M
GLUCOSE SERPL-MCNC: 87 MG/DL (ref 65–140)
GLUCOSE SERPL-MCNC: 98 MG/DL (ref 70–99)
HCT VFR BLD AUTO: 33.8 % (ref 36–46)
HGB BLD-MCNC: 10.6 G/DL (ref 12–16)
LIPASE SERPL-CCNC: 35 U/L (ref 23–300)
LYMPHOCYTES # BLD AUTO: 0.42 THOUSAND/UL (ref 0.5–4)
LYMPHOCYTES # BLD AUTO: 60 % (ref 25–45)
MCH RBC QN AUTO: 19.8 PG (ref 26–34)
MCHC RBC AUTO-ENTMCNC: 31.4 G/DL (ref 31–36)
MCV RBC AUTO: 63 FL (ref 80–100)
MICROCYTES BLD QL AUTO: PRESENT
MONOCYTES # BLD AUTO: 0.1 THOUSAND/UL (ref 0.2–0.9)
MONOCYTES NFR BLD AUTO: 14 % (ref 1–10)
NEUTS BAND NFR BLD MANUAL: 2 % (ref 0–8)
NEUTS SEG # BLD: 0.15 THOUSAND/UL (ref 1.8–7.8)
NEUTS SEG NFR BLD AUTO: 20 %
NT-PROBNP SERPL-MCNC: 77.5 PG/ML (ref 0–299)
OVALOCYTES BLD QL SMEAR: PRESENT
P AXIS: 18 DEGREES
PLATELET # BLD AUTO: 114 THOUSANDS/UL (ref 150–450)
PLATELET BLD QL SMEAR: ABNORMAL
PMV BLD AUTO: 9.4 FL (ref 8.9–12.7)
POIKILOCYTOSIS BLD QL SMEAR: PRESENT
POTASSIUM SERPL-SCNC: 3.6 MMOL/L (ref 3.6–5)
PR INTERVAL: 152 MS
PROT SERPL-MCNC: 6.7 G/DL (ref 5.9–8.4)
QRS AXIS: 30 DEGREES
QRSD INTERVAL: 84 MS
QT INTERVAL: 394 MS
QTC INTERVAL: 443 MS
RBC # BLD AUTO: 5.36 MILLION/UL (ref 4–5.2)
RBC MORPH BLD: ABNORMAL
SCHISTOCYTES BLD QL SMEAR: PRESENT
SODIUM SERPL-SCNC: 136 MMOL/L (ref 137–147)
T WAVE AXIS: 40 DEGREES
TOTAL CELLS COUNTED SPEC: 100
TROPONIN I SERPL-MCNC: <0.01 NG/ML (ref 0–0.03)
VENTRICULAR RATE: 76 BPM
WBC # BLD AUTO: 0.7 THOUSAND/UL (ref 4.5–11)

## 2021-01-24 PROCEDURE — 93010 ELECTROCARDIOGRAM REPORT: CPT

## 2021-01-24 PROCEDURE — 36415 COLL VENOUS BLD VENIPUNCTURE: CPT | Performed by: PHYSICIAN ASSISTANT

## 2021-01-24 PROCEDURE — 99285 EMERGENCY DEPT VISIT HI MDM: CPT | Performed by: PHYSICIAN ASSISTANT

## 2021-01-24 PROCEDURE — XW033E5 INTRODUCTION OF REMDESIVIR ANTI-INFECTIVE INTO PERIPHERAL VEIN, PERCUTANEOUS APPROACH, NEW TECHNOLOGY GROUP 5: ICD-10-PCS | Performed by: FAMILY MEDICINE

## 2021-01-24 PROCEDURE — 82948 REAGENT STRIP/BLOOD GLUCOSE: CPT

## 2021-01-24 PROCEDURE — 85007 BL SMEAR W/DIFF WBC COUNT: CPT | Performed by: PHYSICIAN ASSISTANT

## 2021-01-24 PROCEDURE — 93005 ELECTROCARDIOGRAM TRACING: CPT

## 2021-01-24 PROCEDURE — 80053 COMPREHEN METABOLIC PANEL: CPT | Performed by: PHYSICIAN ASSISTANT

## 2021-01-24 PROCEDURE — 84145 PROCALCITONIN (PCT): CPT | Performed by: STUDENT IN AN ORGANIZED HEALTH CARE EDUCATION/TRAINING PROGRAM

## 2021-01-24 PROCEDURE — 96375 TX/PRO/DX INJ NEW DRUG ADDON: CPT

## 2021-01-24 PROCEDURE — 70450 CT HEAD/BRAIN W/O DYE: CPT

## 2021-01-24 PROCEDURE — 85027 COMPLETE CBC AUTOMATED: CPT | Performed by: PHYSICIAN ASSISTANT

## 2021-01-24 PROCEDURE — 84484 ASSAY OF TROPONIN QUANT: CPT | Performed by: PHYSICIAN ASSISTANT

## 2021-01-24 PROCEDURE — 83690 ASSAY OF LIPASE: CPT | Performed by: PHYSICIAN ASSISTANT

## 2021-01-24 PROCEDURE — 82728 ASSAY OF FERRITIN: CPT | Performed by: STUDENT IN AN ORGANIZED HEALTH CARE EDUCATION/TRAINING PROGRAM

## 2021-01-24 PROCEDURE — 85379 FIBRIN DEGRADATION QUANT: CPT | Performed by: STUDENT IN AN ORGANIZED HEALTH CARE EDUCATION/TRAINING PROGRAM

## 2021-01-24 PROCEDURE — 86140 C-REACTIVE PROTEIN: CPT | Performed by: STUDENT IN AN ORGANIZED HEALTH CARE EDUCATION/TRAINING PROGRAM

## 2021-01-24 PROCEDURE — 82550 ASSAY OF CK (CPK): CPT | Performed by: STUDENT IN AN ORGANIZED HEALTH CARE EDUCATION/TRAINING PROGRAM

## 2021-01-24 PROCEDURE — 83880 ASSAY OF NATRIURETIC PEPTIDE: CPT | Performed by: PHYSICIAN ASSISTANT

## 2021-01-24 PROCEDURE — G1004 CDSM NDSC: HCPCS

## 2021-01-24 PROCEDURE — 96361 HYDRATE IV INFUSION ADD-ON: CPT

## 2021-01-24 PROCEDURE — 96374 THER/PROPH/DIAG INJ IV PUSH: CPT

## 2021-01-24 PROCEDURE — 99285 EMERGENCY DEPT VISIT HI MDM: CPT

## 2021-01-24 RX ORDER — ZINC SULFATE 50(220)MG
220 CAPSULE ORAL DAILY
Status: DISCONTINUED | OUTPATIENT
Start: 2021-01-25 | End: 2021-01-24

## 2021-01-24 RX ORDER — ACETAMINOPHEN 325 MG/1
650 TABLET ORAL ONCE
Status: COMPLETED | OUTPATIENT
Start: 2021-01-24 | End: 2021-01-24

## 2021-01-24 RX ORDER — DIAZEPAM 5 MG/ML
5 INJECTION, SOLUTION INTRAMUSCULAR; INTRAVENOUS ONCE
Status: COMPLETED | OUTPATIENT
Start: 2021-01-24 | End: 2021-01-24

## 2021-01-24 RX ORDER — SODIUM CHLORIDE 9 MG/ML
125 INJECTION, SOLUTION INTRAVENOUS CONTINUOUS
Status: DISCONTINUED | OUTPATIENT
Start: 2021-01-24 | End: 2021-01-25

## 2021-01-24 RX ORDER — ASCORBIC ACID 500 MG
1000 TABLET ORAL EVERY 12 HOURS SCHEDULED
Status: DISCONTINUED | OUTPATIENT
Start: 2021-01-24 | End: 2021-01-26 | Stop reason: HOSPADM

## 2021-01-24 RX ORDER — ACETAMINOPHEN 325 MG/1
650 TABLET ORAL EVERY 6 HOURS PRN
Status: DISCONTINUED | OUTPATIENT
Start: 2021-01-24 | End: 2021-01-26 | Stop reason: HOSPADM

## 2021-01-24 RX ORDER — MECLIZINE HCL 12.5 MG/1
25 TABLET ORAL ONCE
Status: COMPLETED | OUTPATIENT
Start: 2021-01-24 | End: 2021-01-24

## 2021-01-24 RX ORDER — MELATONIN
2000 DAILY
Status: DISCONTINUED | OUTPATIENT
Start: 2021-01-24 | End: 2021-01-26 | Stop reason: HOSPADM

## 2021-01-24 RX ORDER — MELATONIN
2000 DAILY
Status: DISCONTINUED | OUTPATIENT
Start: 2021-01-25 | End: 2021-01-24

## 2021-01-24 RX ORDER — ONDANSETRON 2 MG/ML
4 INJECTION INTRAMUSCULAR; INTRAVENOUS EVERY 6 HOURS PRN
Status: DISCONTINUED | OUTPATIENT
Start: 2021-01-24 | End: 2021-01-26 | Stop reason: HOSPADM

## 2021-01-24 RX ORDER — MULTIVITAMIN/IRON/FOLIC ACID 18MG-0.4MG
1 TABLET ORAL DAILY
Status: DISCONTINUED | OUTPATIENT
Start: 2021-02-01 | End: 2021-01-24

## 2021-01-24 RX ORDER — ZINC SULFATE 50(220)MG
220 CAPSULE ORAL DAILY
Status: DISCONTINUED | OUTPATIENT
Start: 2021-01-24 | End: 2021-01-26 | Stop reason: HOSPADM

## 2021-01-24 RX ORDER — DOCUSATE SODIUM 100 MG/1
100 CAPSULE, LIQUID FILLED ORAL 2 TIMES DAILY
Status: DISCONTINUED | OUTPATIENT
Start: 2021-01-25 | End: 2021-01-26 | Stop reason: HOSPADM

## 2021-01-24 RX ORDER — MECLIZINE HCL 12.5 MG/1
12.5 TABLET ORAL EVERY 8 HOURS PRN
Status: DISCONTINUED | OUTPATIENT
Start: 2021-01-24 | End: 2021-01-26 | Stop reason: HOSPADM

## 2021-01-24 RX ADMIN — MECLIZINE HYDROCHLORIDE 25 MG: 12.5 TABLET ORAL at 20:36

## 2021-01-24 RX ADMIN — HYDROCORTISONE SODIUM SUCCINATE 200 MG: 100 INJECTION, POWDER, FOR SOLUTION INTRAMUSCULAR; INTRAVENOUS at 20:37

## 2021-01-24 RX ADMIN — SODIUM CHLORIDE 125 ML/HR: 0.9 INJECTION, SOLUTION INTRAVENOUS at 23:10

## 2021-01-24 RX ADMIN — ENOXAPARIN SODIUM 30 MG: 30 INJECTION SUBCUTANEOUS at 23:09

## 2021-01-24 RX ADMIN — SODIUM CHLORIDE 1000 ML: 0.9 INJECTION, SOLUTION INTRAVENOUS at 20:34

## 2021-01-24 RX ADMIN — OXYCODONE HYDROCHLORIDE AND ACETAMINOPHEN 1000 MG: 500 TABLET ORAL at 23:10

## 2021-01-24 RX ADMIN — Medication 2000 UNITS: at 23:10

## 2021-01-24 RX ADMIN — ZINC SULFATE 220 MG (50 MG) CAPSULE 220 MG: CAPSULE at 23:10

## 2021-01-24 RX ADMIN — REMDESIVIR 200 MG: 100 INJECTION, POWDER, LYOPHILIZED, FOR SOLUTION INTRAVENOUS at 23:10

## 2021-01-24 RX ADMIN — DIAZEPAM 5 MG: 5 INJECTION, SOLUTION INTRAMUSCULAR; INTRAVENOUS at 20:34

## 2021-01-24 RX ADMIN — ACETAMINOPHEN 650 MG: 325 TABLET ORAL at 20:35

## 2021-01-25 LAB
ALBUMIN SERPL BCP-MCNC: 3.3 G/DL (ref 3–5.2)
ALP SERPL-CCNC: 101 U/L (ref 43–122)
ALT SERPL W P-5'-P-CCNC: 17 U/L (ref 9–52)
ANION GAP SERPL CALCULATED.3IONS-SCNC: 7 MMOL/L (ref 5–14)
ANISOCYTOSIS BLD QL SMEAR: PRESENT
AST SERPL W P-5'-P-CCNC: 26 U/L (ref 14–36)
ATRIAL RATE: 73 BPM
BILIRUB SERPL-MCNC: 0.6 MG/DL
BUN SERPL-MCNC: 10 MG/DL (ref 5–25)
CALCIUM ALBUM COR SERPL-MCNC: 8.7 MG/DL (ref 8.3–10.1)
CALCIUM SERPL-MCNC: 8.1 MG/DL (ref 8.4–10.2)
CHLORIDE SERPL-SCNC: 108 MMOL/L (ref 97–108)
CO2 SERPL-SCNC: 24 MMOL/L (ref 22–30)
CREAT SERPL-MCNC: 0.58 MG/DL (ref 0.6–1.2)
D DIMER PPP FEU-MCNC: 0.86 UG/ML FEU
DACRYOCYTES BLD QL SMEAR: PRESENT
ERYTHROCYTE [DISTWIDTH] IN BLOOD BY AUTOMATED COUNT: 19 %
FERRITIN SERPL-MCNC: 458 NG/ML (ref 8–388)
GFR SERPL CREATININE-BSD FRML MDRD: 102 ML/MIN/1.73SQ M
GLUCOSE P FAST SERPL-MCNC: 181 MG/DL (ref 70–99)
GLUCOSE SERPL-MCNC: 181 MG/DL (ref 70–99)
HCT VFR BLD AUTO: 30.8 % (ref 36–46)
HGB BLD-MCNC: 9.6 G/DL (ref 12–16)
HYPERCHROMIA BLD QL SMEAR: PRESENT
LYMPHOCYTES # BLD AUTO: 0.23 THOUSAND/UL (ref 0.5–4)
LYMPHOCYTES # BLD AUTO: 58 % (ref 25–45)
MCH RBC QN AUTO: 19.8 PG (ref 26–34)
MCHC RBC AUTO-ENTMCNC: 31.3 G/DL (ref 31–36)
MCV RBC AUTO: 63 FL (ref 80–100)
MICROCYTES BLD QL AUTO: PRESENT
MONOCYTES # BLD AUTO: 0.08 THOUSAND/UL (ref 0.2–0.9)
MONOCYTES NFR BLD AUTO: 19 % (ref 1–10)
NEUTS BAND NFR BLD MANUAL: 4 % (ref 0–8)
NEUTS SEG # BLD: 0.09 THOUSAND/UL (ref 1.8–7.8)
NEUTS SEG NFR BLD AUTO: 19 %
OVALOCYTES BLD QL SMEAR: PRESENT
P AXIS: 45 DEGREES
PLATELET # BLD AUTO: 107 THOUSANDS/UL (ref 150–450)
PLATELET BLD QL SMEAR: ABNORMAL
PMV BLD AUTO: 9.7 FL (ref 8.9–12.7)
POIKILOCYTOSIS BLD QL SMEAR: PRESENT
POTASSIUM SERPL-SCNC: 3.6 MMOL/L (ref 3.6–5)
PR INTERVAL: 154 MS
PROCALCITONIN SERPL-MCNC: <0.05 NG/ML
PROT SERPL-MCNC: 5.9 G/DL (ref 5.9–8.4)
QRS AXIS: 42 DEGREES
QRSD INTERVAL: 82 MS
QT INTERVAL: 386 MS
QTC INTERVAL: 425 MS
RBC # BLD AUTO: 4.86 MILLION/UL (ref 4–5.2)
RBC MORPH BLD: ABNORMAL
SCHISTOCYTES BLD QL SMEAR: PRESENT
SODIUM SERPL-SCNC: 139 MMOL/L (ref 137–147)
T WAVE AXIS: 29 DEGREES
TOTAL CELLS COUNTED SPEC: 100
VENTRICULAR RATE: 73 BPM
WBC # BLD AUTO: 0.4 THOUSAND/UL (ref 4.5–11)

## 2021-01-25 PROCEDURE — 85007 BL SMEAR W/DIFF WBC COUNT: CPT | Performed by: STUDENT IN AN ORGANIZED HEALTH CARE EDUCATION/TRAINING PROGRAM

## 2021-01-25 PROCEDURE — 93010 ELECTROCARDIOGRAM REPORT: CPT | Performed by: INTERNAL MEDICINE

## 2021-01-25 PROCEDURE — NC001 PR NO CHARGE: Performed by: FAMILY MEDICINE

## 2021-01-25 PROCEDURE — 85027 COMPLETE CBC AUTOMATED: CPT | Performed by: STUDENT IN AN ORGANIZED HEALTH CARE EDUCATION/TRAINING PROGRAM

## 2021-01-25 PROCEDURE — 99223 1ST HOSP IP/OBS HIGH 75: CPT | Performed by: FAMILY MEDICINE

## 2021-01-25 PROCEDURE — 80053 COMPREHEN METABOLIC PANEL: CPT | Performed by: STUDENT IN AN ORGANIZED HEALTH CARE EDUCATION/TRAINING PROGRAM

## 2021-01-25 RX ORDER — POLYETHYLENE GLYCOL 3350 17 G/17G
17 POWDER, FOR SOLUTION ORAL DAILY
Status: DISCONTINUED | OUTPATIENT
Start: 2021-01-25 | End: 2021-01-26 | Stop reason: HOSPADM

## 2021-01-25 RX ORDER — GUAIFENESIN 100 MG/5ML
200 SOLUTION ORAL EVERY 4 HOURS PRN
Status: DISCONTINUED | OUTPATIENT
Start: 2021-01-25 | End: 2021-01-26

## 2021-01-25 RX ORDER — DULOXETIN HYDROCHLORIDE 20 MG/1
20 CAPSULE, DELAYED RELEASE ORAL DAILY
Status: DISCONTINUED | OUTPATIENT
Start: 2021-01-25 | End: 2021-01-26 | Stop reason: HOSPADM

## 2021-01-25 RX ORDER — ATORVASTATIN CALCIUM 40 MG/1
40 TABLET, FILM COATED ORAL
Status: DISCONTINUED | OUTPATIENT
Start: 2021-01-25 | End: 2021-01-25

## 2021-01-25 RX ORDER — AMLODIPINE BESYLATE 5 MG/1
5 TABLET ORAL DAILY
Status: DISCONTINUED | OUTPATIENT
Start: 2021-01-25 | End: 2021-01-26 | Stop reason: HOSPADM

## 2021-01-25 RX ORDER — PANTOPRAZOLE SODIUM 40 MG/1
40 TABLET, DELAYED RELEASE ORAL
Status: DISCONTINUED | OUTPATIENT
Start: 2021-01-25 | End: 2021-01-26 | Stop reason: HOSPADM

## 2021-01-25 RX ORDER — ATORVASTATIN CALCIUM 40 MG/1
40 TABLET, FILM COATED ORAL
Status: DISCONTINUED | OUTPATIENT
Start: 2021-01-25 | End: 2021-01-26 | Stop reason: HOSPADM

## 2021-01-25 RX ORDER — ASPIRIN 81 MG/1
81 TABLET ORAL DAILY
Status: DISCONTINUED | OUTPATIENT
Start: 2021-01-25 | End: 2021-01-26 | Stop reason: HOSPADM

## 2021-01-25 RX ORDER — BENZONATATE 100 MG/1
200 CAPSULE ORAL 3 TIMES DAILY PRN
Status: DISCONTINUED | OUTPATIENT
Start: 2021-01-25 | End: 2021-01-26 | Stop reason: HOSPADM

## 2021-01-25 RX ORDER — DEXAMETHASONE 0.5 MG/1
1 TABLET ORAL 2 TIMES DAILY WITH MEALS
Status: DISCONTINUED | OUTPATIENT
Start: 2021-01-25 | End: 2021-01-26 | Stop reason: HOSPADM

## 2021-01-25 RX ORDER — OXYCODONE HYDROCHLORIDE 10 MG/1
20 TABLET ORAL EVERY 4 HOURS PRN
Status: DISCONTINUED | OUTPATIENT
Start: 2021-01-25 | End: 2021-01-26 | Stop reason: HOSPADM

## 2021-01-25 RX ORDER — DEXTROSE AND SODIUM CHLORIDE 5; .9 G/100ML; G/100ML
125 INJECTION, SOLUTION INTRAVENOUS CONTINUOUS
Status: DISCONTINUED | OUTPATIENT
Start: 2021-01-25 | End: 2021-01-25

## 2021-01-25 RX ORDER — SODIUM CHLORIDE 9 MG/ML
75 INJECTION, SOLUTION INTRAVENOUS CONTINUOUS
Status: DISCONTINUED | OUTPATIENT
Start: 2021-01-25 | End: 2021-01-26

## 2021-01-25 RX ADMIN — IRON SUCROSE 100 MG: 20 INJECTION, SOLUTION INTRAVENOUS at 11:05

## 2021-01-25 RX ADMIN — CYANOCOBALAMIN TAB 1000 MCG 1000 MCG: 1000 TAB at 08:34

## 2021-01-25 RX ADMIN — DEXAMETHASONE 1 MG: 0.5 TABLET ORAL at 08:33

## 2021-01-25 RX ADMIN — PANTOPRAZOLE SODIUM 40 MG: 40 TABLET, DELAYED RELEASE ORAL at 05:45

## 2021-01-25 RX ADMIN — OXYCODONE HYDROCHLORIDE AND ACETAMINOPHEN 1000 MG: 500 TABLET ORAL at 08:34

## 2021-01-25 RX ADMIN — DULOXETINE HYDROCHLORIDE 20 MG: 20 CAPSULE, DELAYED RELEASE ORAL at 08:34

## 2021-01-25 RX ADMIN — DOCUSATE SODIUM 100 MG: 100 CAPSULE, LIQUID FILLED ORAL at 08:34

## 2021-01-25 RX ADMIN — REMDESIVIR 100 MG: 100 INJECTION, POWDER, LYOPHILIZED, FOR SOLUTION INTRAVENOUS at 23:53

## 2021-01-25 RX ADMIN — ASPIRIN 81 MG: 81 TABLET, COATED ORAL at 08:33

## 2021-01-25 RX ADMIN — Medication 2000 UNITS: at 08:33

## 2021-01-25 RX ADMIN — SODIUM CHLORIDE 125 ML/HR: 0.9 INJECTION, SOLUTION INTRAVENOUS at 08:34

## 2021-01-25 RX ADMIN — DOCUSATE SODIUM 100 MG: 100 CAPSULE, LIQUID FILLED ORAL at 17:59

## 2021-01-25 RX ADMIN — OXYCODONE HYDROCHLORIDE 20 MG: 10 TABLET ORAL at 06:08

## 2021-01-25 RX ADMIN — DEXAMETHASONE 1 MG: 0.5 TABLET ORAL at 11:05

## 2021-01-25 RX ADMIN — ENOXAPARIN SODIUM 30 MG: 30 INJECTION SUBCUTANEOUS at 08:34

## 2021-01-25 RX ADMIN — ENOXAPARIN SODIUM 30 MG: 30 INJECTION SUBCUTANEOUS at 20:58

## 2021-01-25 RX ADMIN — POLYETHYLENE GLYCOL (3350) 17 G: 17 POWDER, FOR SOLUTION ORAL at 08:34

## 2021-01-25 RX ADMIN — AMLODIPINE BESYLATE 5 MG: 5 TABLET ORAL at 08:34

## 2021-01-25 RX ADMIN — ATORVASTATIN CALCIUM 40 MG: 40 TABLET, FILM COATED ORAL at 17:59

## 2021-01-25 RX ADMIN — TBO-FILGRASTIM 480 MCG: 480 INJECTION, SOLUTION SUBCUTANEOUS at 18:42

## 2021-01-25 RX ADMIN — ZINC SULFATE 220 MG (50 MG) CAPSULE 220 MG: CAPSULE at 08:33

## 2021-01-25 RX ADMIN — OXYCODONE HYDROCHLORIDE AND ACETAMINOPHEN 1000 MG: 500 TABLET ORAL at 20:58

## 2021-01-25 RX ADMIN — DEXTROSE AND SODIUM CHLORIDE 125 ML/HR: 5; .9 INJECTION, SOLUTION INTRAVENOUS at 01:45

## 2021-01-25 NOTE — PROGRESS NOTES
Progress Note    Dunia Fields 64 y o  female MRN: [id number]  Unit/Bed#: 7T [facility] 717-02 Encounter: [id number]  Admitting Physician: Zbigniew Cohen MD  PCP: Ashok Corrales MD  Date of Admission:  01/25/21    Assessment and Plan    * Leukopenia  Assessment & Plan  Severe neutropenia - WBC 0 4; ANC 16 36   Likely secondary to chemotherapy in conjunction with covid-19 infection   -Spoke to heme-onc, no official consult as they are familiar with patient   -Started tbp-filgrastim 480 mcg IM t i d, as per heme-onc   -Monitor for neutropenic fever   -Repeat CBC in AM     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 [facility])  Assessment & Plan  Currently managed by Hematology/Oncology and Dr Amber Farrell  Patient status post cycle 1 day 1 of her  IV Copalisib 1/7/2021; day 8 held due to significant worsening neutropenia and COVID infection   - Continue Decadron 1 mg BID with meals for appetite stimulation   - Continue roxicodone 20 mg Q4H PRN for moderate to severe pain as prescribed by palliative care (PDMP reviewed)   - Continue miralax and colace to prevent opioid induced constipation     COVID-19  Assessment & Plan  Mild disease, elevated D-dimer and ferritin  O2 Saturation 94% on RA   -Continue Remdesivir 100 mg IV #day 2/5 as patient is immunocompromised    -Continue Vitamins as per protocol (Day 2/7)   -Continue Atorvastatin 40 qhs, Lovenox 30 mg Q12    Dizziness  Assessment & Plan  Improved: History of vertigo, on meclizine 12 5mg Q8H PRN  Worsened due to dehydration and decreased PO intake in the setting of lymphoma and current COVID infection   - Continue IV fluid hydration with 125 cc/hr D5 NS   - Continue Meclizine 12 5mg Q8H PRN     Microcytic anemia  Assessment & Plan  Chronic; stable microcytic anemia, Likely due to anemia of chronic disease /inflammation  Hgb 9 6  S/p Venofer 100 mg x1 today   - Repeat CBC in AM    Essential hypertension  Assessment & Plan  Controlled as per JNC-8 guidelines   - Continue amlodipine 5mg daily     Sepsis (HCC)resolved as of 2021  Assessment & Plan  Patient meeting SIRS criteria with leukopenia and fever of 100 1      VTE Pharmacologic Prophylaxis:   Pharmacologic: Enoxaparin (Lovenox)  Mechanical VTE Prophylaxis in Place: Yes    Patient Centered Rounds: I have performed bedside rounds with nursing staff today  Discussions with Specialists or Other Care Team Provider:  Hematology-Oncology    Education and Discussions with Family / Patient:  Patient    Time Spent for Care: 45 minutes  More than 50% of total time spent on counseling and coordination of care as described above  Current Length of Stay: 0 day(s)    Current Patient Status: Observation   Certification Statement: The patient will continue to require additional inpatient hospital stay due to The condition in due to lymphoma, chemotherapy and COVID, requiring monitoring of oral intake    Discharge Plan:  Pending monitoring of adequate oral intake, Will revisit tomorrow    Code Status: Level 1 - Full Code      Subjective:   Patient was seen and examined at bedside with the help of a   Today she reports feeling much improved since yesterday  She reported last episode of vomiting last night  She reports resolution of her headache and dizziness and has increased appetite  She reports tolerating oral intake and would like to advance diet  Currently patient denies any fever, chills, shortness of breath, cough, chest pain, palpitations, light headedness, headaches, vision change, abdominal pain, urinary symptoms  Objective:     Vitals:   Temp (24hrs), Av 1 °F (36 7 °C), Min:97 2 °F (36 2 °C), Max:100 1 °F (37 8 °C)    Temp:  [97 2 °F (36 2 °C)-100 1 °F (37 8 °C)] 97 5 °F (36 4 °C)  HR:  [52-94] 65  Resp:  [18-24] 20  BP: (111-138)/(61-88) 137/61  SpO2:  [94 %-95 %] 94 %  Body mass index is 33 68 kg/m²  Input and Output Summary (last 24 hours):        Intake/Output Summary (Last 24 hours) at 1/25/2021 1655  Last data filed at 1/25/2021 1200  Gross per 24 hour   Intake 2145 ml   Output    Net 2145 ml       Physical Exam:     Physical Exam  Vitals signs reviewed  Constitutional:       General: She is not in acute distress  Appearance: She is obese  HENT:      Head: Normocephalic and atraumatic  Eyes:      General:         Right eye: No discharge  Left eye: No discharge  Conjunctiva/sclera: Conjunctivae normal    Neck:      Thyroid: No thyromegaly  Cardiovascular:      Rate and Rhythm: Normal rate and regular rhythm  Heart sounds: Normal heart sounds  No murmur  Pulmonary:      Effort: Pulmonary effort is normal  No respiratory distress  Breath sounds: Normal breath sounds  No wheezing  Abdominal:      General: Bowel sounds are normal       Palpations: Abdomen is soft  Tenderness: There is no abdominal tenderness  There is no right CVA tenderness, left CVA tenderness or guarding  Musculoskeletal:         General: No swelling or tenderness  Right lower leg: No edema  Left lower leg: No edema  Skin:     General: Skin is warm  Findings: No rash  Neurological:      Mental Status: She is alert and oriented to person, place, and time  Psychiatric:         Behavior: Behavior normal          Thought Content:  Thought content normal          Judgment: Judgment normal          Additional Data:     Labs:    Results from last 7 days   Lab Units 01/25/21  0607 01/24/21 2013   WBC Thousand/uL 0 40* 0 70*   HEMOGLOBIN g/dL 9 6* 10 6*   HEMATOCRIT % 30 8* 33 8*   PLATELETS Thousands/uL 107* 114*   LYMPHO PCT % 58* 60*   MONO PCT % 19* 14*   EOS PCT %  --  4     Results from last 7 days   Lab Units 01/25/21  0607   POTASSIUM mmol/L 3 6   CHLORIDE mmol/L 108   CO2 mmol/L 24   BUN mg/dL 10   CREATININE mg/dL 0 58*   CALCIUM mg/dL 8 1*   ALK PHOS U/L 101   ALT U/L 17   AST U/L 26         Results from last 7 days   Lab Units 01/24/21 2040   POC GLUCOSE mg/dl 87           * I Have Reviewed All Lab Data Listed Above  * Additional Pertinent Lab Tests Reviewed: [address] Admission Reviewed    Imaging:    Imaging Reports Reviewed Today Include:  None  Imaging Personally Reviewed by Myself Includes:  None    Recent Cultures (last 7 days):     Results from last 7 days   Lab Units 01/23/21  0853   BLOOD CULTURE  No Growth at 24 hrs  No Growth at 24 hrs         Last 24 Hours Medication List:   Current Facility-Administered Medications   Medication Dose Route Frequency Provider Last Rate    acetaminophen  650 mg Oral Q6H PRN Marielle Gudino MD      amLODIPine  5 mg Oral Daily Rodney Pérez MD      ascorbic acid  1,000 mg Oral Q12H [address] Marielle Gudino MD      aspirin  81 mg Oral Daily Rodney Pérez MD      atorvastatin  40 mg Oral Daily With Antony Mehta MD      benzonatate  200 mg Oral TID PRN Rodney Pérez MD      cholecalciferol  2,000 Units Oral Daily Marielle Gudino MD      cyanocobalamin  1,000 mcg Oral Daily Rodney Pérez MD      dexamethasone  1 mg Oral BID With Meals Rodney Pérez MD      docusate sodium  100 mg Oral BID Marielle Gudino MD      DULoxetine  20 mg Oral Daily Rodney Pérez MD      enoxaparin  30 mg Subcutaneous Q12H [address] Marielle Gudino MD      guaiFENesin  200 mg Oral Q4H PRN Valerie Lockhart MD      meclizine  12 5 mg Oral Q8H PRN Marielle Gudino MD      zinc sulfate  220 mg Oral Daily Marielle Gudino MD      Followed by   Barbara Dominguez ON 1/31/2021] multivitamin-minerals  1 tablet Oral Daily Marielle Gudino MD      ondansetron  4 mg Intravenous Q6H PRN Marielle Gudino MD      oxyCODONE  20 mg Oral Q4H PRN Rodney Pérez MD      pantoprazole  40 mg Oral Early Morning Rodney Pérez MD      polyethylene glycol  17 g Oral Daily Rodney Pérez MD      remdesivir  100 mg Intravenous Q24H Marielle Gudino MD      sodium chloride  125 mL/hr Intravenous Continuous Pooja Hernandez  mL/hr (01/25/21 0834)   Julieann Frankel [START ON 1/26/2021] tbo-filgrastim  480 mcg Subcutaneous Daily Pooja Hernandez MD          Today, Patient Was Seen By: Indy Vega MD    ** Please Note: Dictation voice to text software may have been used in the creation of this document   Renata Waller MD  01/25/21  4:55 PM

## 2021-01-25 NOTE — ASSESSMENT & PLAN NOTE
Resolved - IV fluids stopped     Improved: History of vertigo, on meclizine 12 5mg Q8H PRN  Worsened due to dehydration and decreased PO intake in the setting of lymphoma and current COVID infection   - Continue Meclizine 12 5mg Q8H PRN

## 2021-01-25 NOTE — DISCHARGE SUMMARY
Discharge Summary - Meredith Verduzco    Patient Information: Scar Joyce 64 y o  female MRN: [id number]  Unit/Bed#: 7T [facility] 717-02 Encounter: [id number]    Discharging Physician / Practitioner: Earle Solomon MD & Juan Pruett MD   PCP: Lorna Patel MD  Admission Date: 1/24/2021    Admission Orders (From admission, onward)     Ordered        01/25/21 1802  Inpatient Admission  Once         01/24/21 2157  Place in Observation  Once                   Discharge Date: 01/26/21    Reason for Admission: Dizziness and weakness in setting COVID and lymphoma with poor PO intake       Discharge Diagnoses:     Principal Problem:    Leukopenia  Active Problems:    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 [facility])    Essential hypertension    Microcytic anemia    COVID-19  Resolved Problems:    Dizziness    COVID-19  Mild disease, elevated D-dimer, fibrinogen, CRP & ferritin  O2 Saturation 94% on RA   -Received 3/5 days of Remdesivir as patient is immunocompromised, no need to continue on discharge     -Continue Vitamins for total of 7 days upon discharge    -Continue Atorvastatin 40 qhs for total of 14 days    -Productive cough most distressing symptom, will recommend schedule robitussin & mucinex with tessalon perles prn    - Tylenol & ibuprofen prn for pain from cough      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 [facility])  Currently managed by Hematology/Oncology and Dr Meredith Salcido  Patient status post cycle 1 day 1 of her  IV Copalisib 1/7/2021; day 8 held due to significant worsening neutropenia and COVID infection   - Continue Decadron 1 mg BID with meals for appetite stimulation   - Continue roxicodone 20 mg Q4H PRN for moderate to severe pain as prescribed by palliative care (PDMP reviewed)   - Continue miralax and colace to prevent opioid induced constipation       Leukopenia  Severe neutropenia - WBC improved from 0 4 to 1 1 after 1 dose of Granix 480 mcg ([address] from [address] to 374)  Likely secondary to chemotherapy in conjunction with covid-19 infection  Second dose of Granix given prior to discharge  Heme/onc contacted and does not require 3rd dose for patient  Follow up with heme/onc outpatient    Essential hypertension  Controlled as per JNC-8 guidelines   - Continue amlodipine 5mg daily      Microcytic anemia  Chronic; stable microcytic anemia, Likely due to anemia of chronic disease /inflammation  Hgb 9 6 --> 10 1 and remained stable S/p Venofer 100 mg x 1     Dizziness  Resolved - IV fluids stopped  Improved: History of vertigo, on meclizine 12 5mg Q8H PRN  Worsened due to dehydration and decreased PO intake in the setting of lymphoma and current COVID infection   - Continue Meclizine 12 5mg Q8H PRN     Sepsis ([address] ) - Resolved   Patient meeting SIRS criteria with leukopenia and fever of 100 1    Consultations During Hospital Stay:  · Tigertext and phone call with Hematology/oncology who knows patient well     Procedures Performed:   · None    Significant Findings / Test Results:   · Severe Neutropenia  · COVID positive on 1/20/2021 - subsequent elevated CRP, D-dimer, Ferritin  · Left subclavian Port-A-Cath terminates in the SVC on CXR     Incidental Findings:   · Remote infarct involving the left corpus stratum and surrounding left frontal periventricular white matter is again noted with overall stable appearance    Test Results Pending at Discharge (will require follow up):   · Procalcitonin   · Blood cultures are negative at 48hrs x 2      Outpatient Tests Requested:  · None    Outpatient follow up Requested:  · Hematology/Oncology  · PCP  · Skilled Home Therapy     Complications:  None     Hospital Course:     Dominic Irvin is a 64 y o  female patient who originally presented to the hospital on 1/24/2021 due to  nausea, vomiting, lightheadedness   She has a history of grade 2 follicular lymphoma (last chemotherapy approximately 3 weeks ago), recently diagnosed with COVID  She was afebrile, with WBC was 0 7  She received IVF and antiemetics and was admitted 1/24 - 1/26/2021 for observation and supportive care  During her stay here, she continued to improve  She reported resolution of her dizziness, she started tolerating p o  And was advanced to regular diet  Her cough persisted however she remained > 92% on RA throughout duration of admission  On day 2 of admission, she was noted to have WBC 0 4  Heme Onc was on board, suggested starting filgrastim 480 mcg daily for 3 days  She received 100 mg Venofer x1  On day of discharge [address] improved from 88 to 374 and the WBC increased 1 1  Patient received her second dose of Filgrastim 480 mcg  She was hemodynamically stable and set for discharge  PT evaluated patient and recommended home with Skilled home therapy  All covid precautions were discussed with patient who is to continue isolation until 2/9/2021 as she is severely immunocompromised  However should she need care in person prior to this for her cancer, patient is to inform office of covid status and all PPE must be warn  Condition at Discharge: fair     Discharge Day Visit / Exam:     Vitals: Blood Pressure: 142/63 (01/26/21 0700)  Pulse: (!) 54 (01/26/21 0700)  Temperature: (!) 97 1 °F (36 2 °C) (01/26/21 0700)  Temp Source: Temporal (01/26/21 0700)  Respirations: 18 (01/26/21 0700)  Height: 5' 4" (162 6 cm) (01/24/21 2249)  Weight - Scale: 89 kg (196 lb 3 4 oz) (01/24/21 2249)  SpO2: 92 % (01/26/21 0700)  Exam:   Physical Exam  Vitals signs reviewed  Constitutional:       General: She is not in acute distress  Appearance: She is obese  She is not diaphoretic  HENT:      Head: Normocephalic and atraumatic  Nose: Nose normal       Mouth/Throat:      Mouth: Mucous membranes are dry  Pharynx: Oropharynx is clear  No oropharyngeal exudate or posterior oropharyngeal erythema     Eyes:      General:         Right eye: No discharge  Left eye: No discharge  Conjunctiva/sclera: Conjunctivae normal    Neck:      Musculoskeletal: Normal range of motion  Thyroid: No thyromegaly  Cardiovascular:      Rate and Rhythm: Normal rate and regular rhythm  Pulses: Normal pulses  Heart sounds: Normal heart sounds  No murmur  Pulmonary:      Effort: Pulmonary effort is normal  No respiratory distress  Breath sounds: Normal breath sounds  No wheezing  Comments: Intermittent productive cough throughout exam  Abdominal:      General: Bowel sounds are normal       Palpations: Abdomen is soft  Tenderness: There is no abdominal tenderness  There is no guarding  Musculoskeletal:         General: No swelling or tenderness  Right lower leg: No edema  Left lower leg: No edema  Skin:     General: Skin is warm  Findings: No rash  Neurological:      Mental Status: She is alert and oriented to person, place, and time  Psychiatric:         Behavior: Behavior normal          Thought Content: Thought content normal          Judgment: Judgment normal        Discussion with Family: None, patient at beside with  services  Discharge instructions/Information to patient and family:   See after visit summary for information provided to patient and family        Discharge Medications:   Acetaminophen 650 mg Oral Every 6 hours PRN  Zinc Sulfate 220 mg Oral Daily   Benzonatate 200 mg Oral 3 times daily PRN  Cholecalciferol 2,000 Units Oral Daily  Ascorbic Acid 1,000 mg Oral Every 12 hours scheduled  guaiFENesin 200 mg Oral Every 4 hours  Meclizine HCl 12 5 mg Oral Every 8 hours PRN    amLODIPine Besylate 5 mg Oral Daily     Aspirin 81 mg Oral Daily  Aspirin-Acetaminophen-Caffeine 250-250-65 MG 1 tablet Oral Every 6 hours PRN, Erica 1 tableta cada 6 horas fernandez sea necesario para el dolor de russ  Atorvastatin Calcium 40 mg Oral Daily with dinner    DULoxetine HCl 20 mg Oral Daily     Omeprazole 40 mg Oral Daily, To prevent stomach upset    Dexamethasone 1 mg Oral 2 times daily with meals, Do not take afternoon dose after 2pm  Ondansetron HCl 4 mg Oral Every 8 hours PRN   oxyCODONE HCl 20 mg Oral Every 4 hours PRN  Polyethylene Glycol 3350 17 g Oral Daily  Sennosides 8 6 mg Oral 2 times daily  Lidocaine 4 % Topical As needed  Cyanocobalamin 1,000 mcg Oral Daily  diphenhydrAMINE HCl,Aluminum & Magnesium    1:1:1 10 mL Swish & Spit Every 4 hours PRN    Carbamide Peroxide 6 5 % 5 drops Right Ear 2 times daily   Patient not taking:  Reported on 1/24/2021    Provisions for Follow-Up Care:  See after visit summary for information related to follow-up care and any pertinent home health orders  Disposition:     Home with VNA Services (Reminder: Complete face to face encounter)    For Discharges to [facility] SNF:   · Not Applicable to this Patient - Not Applicable to this Patient    Planned Readmission: None     Discharge Statement:  I spent 60 minutes discharging the patient  This time was spent on the day of discharge  I had direct contact with the patient on the day of discharge  Greater than 50% of the total time was spent examining patient, answering all patient questions, arranging and discussing plan of care with patient as well as directly providing post-discharge instructions  Additional time then spent on discharge activities  Discharge Medications:  See after visit summary for reconciled discharge medications provided to patient and family        ** Please Note: This note has been constructed using a voice recognition system **    Ruthy Burgos MD  01/26/21  11:38 AM

## 2021-01-25 NOTE — UTILIZATION REVIEW
Initial Clinical Review    Admission: Date/Time/Statement:   Admission Orders (From admission, onward)     Ordered        01/24/21 2157  Place in Observation  Once                   Orders Placed This Encounter   Procedures    Place in Observation     Standing Status:   Standing     Number of Occurrences:   1     Order Specific Question:   Level of Care     Answer:   Med Surg [16]     ED Arrival Information     Expected Arrival Acuity Means of Arrival Escorted By Service Admission Type    - 1/24/2021 19:47 Urgent Walk-In Self General Medicine Urgent    Arrival Complaint    dizziness        Chief Complaint   Patient presents with    Dizziness     pt is covid positive, presents with increased dizziness, headache, n/v, chest pain     Assessment/Plan:  65 yo female Admitted to Observation  Due to COVID-19  And Dizziness - Suspect worsened due to dehydration and decreased PO intake in light of covid-19 infection  ED Findings: na 136,  WBC's 0 70,  Hg 10 6,  Plats 114  CXR negative, CT head shows  no acute intracranial abnormality  EKG SR    Plan: Clear liquids, supplemental O2 as needed - currently on RA, IV Remdesivir & Decardon, Vits c,d,zinc,statin, lovenox, self proning, IVF's, orthostatic VS's, prn Meclizine, CBC, CMP daily, f/u on procalcitonin, D-dimer, ferritin  Patient with severe neutropenia; likely secondary to chemotherapy in conjunction with covid-19 infection     Presented to ED with 2 weeks of fatigue, SOB, aching chest pain, dizziness, HA, body aches and also notes N/V    decreased appetite - has not eaten anything in the past 2 days  She was seen in the ED 1/23 for similar complaints but w/o dizziness and discharged home  PMH of  lymphoma, vertigo, lupus, htn, migraines and tested + for COVID on 1/18 after being seen by Hem/Onc & was started on  Levaquin 500 mg and completed a 5 day course   Chemotherapy treatments have been held due to an absolute neutrophil count of 160 and a positive COVID test  Very poor historian           ED Triage Vitals [01/24/21 1952]   Temperature Pulse Respirations Blood Pressure SpO2   100 1 °F (37 8 °C) 94 18 111/88 94 %      Temp Source Heart Rate Source Patient Position - Orthostatic VS BP Location FiO2 (%)   Tympanic Monitor Sitting Left arm --      Pain Score       8          Wt Readings from Last 1 Encounters:   01/24/21 89 kg (196 lb 3 4 oz)     Additional Vital Signs:   01/25/21 0800  97 2 °F (36 2 °C)   52Abnormal   20  136/65  94 %  None (Room air)  Lying   01/24/21 2249  97 5 °F (36 4 °C)  64  20  138/65  95 %  None (Room air)  Lying   01/24/21 2216            None (Room air)     01/24/21 2212    67  21  132/69  95 %  None (Room air)  Lying   01/24/21 1953      24Abnormal                Pertinent Labs/Diagnostic Test Results:   Results from last 7 days   Lab Units 01/20/21  1221   SARS-COV-2  Detected*     Lab Units 01/25/21  0607 01/24/21 2013 01/23/21  0853   WBC Thousand/uL 0 40* 0 70* 0 60*   HEMOGLOBIN g/dL 9 6* 10 6* 11 5*   HEMATOCRIT % 30 8* 33 8* 36 9   PLATELETS Thousands/uL 107* 114* 141*   TOTAL NEUT ABS Thousand/uL  --  0 15* 0 16*   BANDS PCT %  --  2 3     Lab Units 01/25/21  0607 01/24/21 2013 01/23/21  0853   SODIUM mmol/L 139 136* 138   POTASSIUM mmol/L 3 6 3 6 3 7   CHLORIDE mmol/L 108 102 102   CO2 mmol/L 24 26 28   ANION GAP mmol/L 7 8 8   BUN mg/dL 10 11 13   CREATININE mg/dL 0 58* 0 66 0 80   EGFR ml/min/1 73sq m 102 98 82   CALCIUM mg/dL 8 1* 8 7 8 9     Lab Units 01/25/21  0607 01/24/21 2013 01/23/21  0853   AST U/L 26 33 53*   ALT U/L 17 22 32   ALK PHOS U/L 101 122 124*   TOTAL PROTEIN g/dL 5 9 6 7 7 2   ALBUMIN g/dL 3 3 3 8 4 1   TOTAL BILIRUBIN mg/dL 0 60 0 80 1 00     Results from last 7 days   Lab Units 01/24/21  2040   POC GLUCOSE mg/dl 87     Lab Units 01/25/21  0607 01/24/21 2013 01/23/21  0853   GLUCOSE RANDOM mg/dL 181* 98 119*     Results from last 7 days   Lab Units 01/24/21 2013   CK TOTAL U/L 23*     Results from last 7 days   Lab Units 01/24/21 2013 01/23/21  0853   TROPONIN I ng/mL <0 01 <0 01     Results from last 7 days   Lab Units 01/24/21  2343   D-DIMER QUANTITATIVE ug/ml FEU 0 86*     Results from last 7 days   Lab Units 01/24/21 2013 01/23/21  0853   NT-PRO BNP pg/mL 77 5 32 3     Results from last 7 days   Lab Units 01/24/21 2013 01/23/21  0853   LIPASE u/L 35 36     Results from last 7 days   Lab Units 01/24/21 2013   CRP mg/L 52 0*     Results from last 7 days   Lab Units 01/23/21  1003   CLARITY UA  Slightly Cloudy*   COLOR UA  Yellow   SPEC GRAV UA  1 020   PH UA  6 0   GLUCOSE UA mg/dl Negative   KETONES UA mg/dl Negative   BLOOD UA  Negative   PROTEIN UA mg/dl 30 (1+)*   NITRITE UA  Negative   BILIRUBIN UA  Negative   UROBILINOGEN UA mg/dL 1 0   LEUKOCYTES UA  Negative   WBC UA /hpf 0-1   RBC UA /hpf None Seen   BACTERIA UA /hpf Moderate*   EPITHELIAL CELLS WET PREP /hpf Moderate*   MUCUS THREADS  Moderate*       Results from last 7 days   Lab Units 01/23/21  1003   AMPH/METH  Negative   BARBITURATE UR  Negative   BENZODIAZEPINE UR  Negative   COCAINE UR  Negative   METHADONE URINE  Negative   OPIATE UR  Negative   PCP UR  Negative   THC UR  Negative     Results from last 7 days   Lab Units 01/23/21  0853   ETHANOL LVL mg/dL <10     Results from last 7 days   Lab Units 01/23/21  0853   BLOOD CULTURE  No Growth at 24 hrs    No Growth at 24 hrs      1/24 EKG:  ECG rate:  76    Rhythm: sinus rhythm      Ectopy: none      QRS axis:  Normal    QRS intervals:  Normal    Conduction: normal      ST segments:  Normal     T waves: normal    Comments:      qtc 443    1/24 CXR:  No acute cardiopulmonary disease      1/24 CT Head:  Stable exam with no acute intracranial abnormality          ED Treatment:   Medication Administration from 01/24/2021 1947 to 01/24/2021 2233       Date/Time Order Dose Route Action Action by Comments     01/24/2021 2212 sodium chloride 0 9 % bolus 1,000 mL 0 mL Intravenous Stopped Darryl Gill, JASON      01/24/2021 2034 sodium chloride 0 9 % bolus 1,000 mL 1,000 mL Intravenous New Bag Darryl Gill RN      01/24/2021 2036 meclizine (ANTIVERT) tablet 25 mg 25 mg Oral Given Darryl Gill, JASON      01/24/2021 2034 diazepam (VALIUM) injection 5 mg 5 mg Intravenous Given Darryl Gill, JASON      01/24/2021 2037 hydrocortisone sodium succinate (PF) (Solu-CORTEF) injection 200 mg 200 mg Intravenous Given Darryl Gill, JASON      01/24/2021 2035 acetaminophen (TYLENOL) tablet 650 mg 650 mg Oral Given Darryl Gill RN         Past Medical History:   Diagnosis Date    Anemia     iron and B12    Arthritis     Asthma     Cough     Depression     Falls frequently     Lupus ([facility] )     Lymphoma ([place] [address] )     Lymphoma ([place] [address] )     Migraine     Osteoporosis     Psychiatric disorder     Vertigo      Present on Admission:   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 ([facility] )   Essential hypertension   Microcytic anemia   Dizziness    Admitting Diagnosis: Dizziness [R42]  Age/Sex: 64 y o  female  Admission Orders:  Scheduled Medications:  amLODIPine, 5 mg, Oral, Daily  ascorbic acid, 1,000 mg, Oral, Q12H JORDEN  aspirin, 81 mg, Oral, Daily  atorvastatin, 40 mg, Oral, Daily With Dinner  cholecalciferol, 2,000 Units, Oral, Daily  cyanocobalamin, 1,000 mcg, Oral, Daily  dexamethasone, 1 mg, Oral, BID With Meals  docusate sodium, 100 mg, Oral, BID  DULoxetine, 20 mg, Oral, Daily  enoxaparin, 30 mg, Subcutaneous, Q12H JORDEN  zinc sulfate, 220 mg, Oral, Daily    Followed by  Marta ON 1/31/2021] multivitamin-minerals, 1 tablet, Oral, Daily  pantoprazole, 40 mg, Oral, Early Morning  polyethylene glycol, 17 g, Oral, Daily  remdesivir, 100 mg, Intravenous, Q24H    Continuous IV Infusions:  sodium chloride, 125 mL/hr, Intravenous, Continuous    PRN Meds:  acetaminophen, 650 mg, Oral, Q6H PRN  benzonatate, 200 mg, Oral, TID PRN  meclizine, 12 5 mg, Oral, Q8H PRN  ondansetron, 4 mg, Intravenous, Q6H PRN  oxyCODONE, 20 mg, Oral, Q4H PRN x 1 1/25    SCDs  IP CONSULT TO FAMILY PRACTICE- RESIDENT SERVICE    Network Utilization Review Department  ATTENTION: Please call with any questions or concerns to [phone] and carefully listen to the prompts so that you are directed to the right person  All voicemails are confidential   [employer] all requests for admission clinical reviews, approved or denied determinations and any other requests to dedicated fax number below belonging to the campus where the patient is receiving treatment   List of dedicated fax numbers for the Facilities:  [address] DENIALS (Administrative/Medical Necessity) [phone]   [address] (Maternity/NICU/Pediatrics) [phone]   [address] Naheed Davis 6919 (  Yolanda Roman "Luanne" 103) [address] [place] [address] Koko Gustafson 1481 [address] [place] [address] [phone]

## 2021-01-25 NOTE — PLAN OF CARE
Problem: Potential for Falls  Goal: Patient will remain free of falls  Description: INTERVENTIONS:  - Assess patient frequently for physical needs  -  Identify cognitive and physical deficits and behaviors that affect risk of falls    -  [place] fall precautions as indicated by assessment   - Educate patient/family on patient safety including physical limitations  - Instruct patient to call for assistance with activity based on assessment  - Modify environment to reduce risk of injury  - Consider OT/PT consult to assist with strengthening/mobility  Outcome: Progressing     Problem: PAIN - ADULT  Goal: Verbalizes/displays adequate comfort level or baseline comfort level  Description: Interventions:  - Encourage patient to monitor pain and request assistance  - Assess pain using appropriate pain scale  - Administer analgesics based on type and severity of pain and evaluate response  - Implement non-pharmacological measures as appropriate and evaluate response  - Consider cultural and social influences on pain and pain management  - Notify physician/advanced practitioner if interventions unsuccessful or patient reports new pain  Outcome: Progressing     Problem: INFECTION - ADULT  Goal: Absence or prevention of progression during hospitalization  Description: INTERVENTIONS:  - Assess and monitor for signs and symptoms of infection  - Monitor lab/diagnostic results  - Monitor all insertion sites, i e  indwelling lines, tubes, and drains  - Monitor endotracheal if appropriate and nasal secretions for changes in amount and color  - [place] appropriate cooling/warming therapies per order  - Administer medications as ordered  - Instruct and encourage patient and family to use good hand hygiene technique  - Identify and instruct in appropriate isolation precautions for identified infection/condition  Outcome: Progressing  Goal: Absence of fever/infection during neutropenic period  Description: INTERVENTIONS:  - Monitor WBC    Outcome: Progressing     Problem: SAFETY ADULT  Goal: Patient will remain free of falls  Description: INTERVENTIONS:  - Assess patient frequently for physical needs  -  Identify cognitive and physical deficits and behaviors that affect risk of falls    -  [place] fall precautions as indicated by assessment   - Educate patient/family on patient safety including physical limitations  - Instruct patient to call for assistance with activity based on assessment  - Modify environment to reduce risk of injury  - Consider OT/PT consult to assist with strengthening/mobility  Outcome: Progressing  Goal: Maintain or return to baseline ADL function  Description: INTERVENTIONS:  -  Assess patient's ability to carry out ADLs; assess patient's baseline for ADL function and identify physical deficits which impact ability to perform ADLs (bathing, care of mouth/teeth, toileting, grooming, dressing, etc )  - Assess/evaluate cause of self-care deficits   - Assess range of motion  - Assess patient's mobility; develop plan if impaired  - Assess patient's need for assistive devices and provide as appropriate  - Encourage maximum independence but intervene and supervise when necessary  - Involve family in performance of ADLs  - Assess for home care needs following discharge   - Consider OT consult to assist with ADL evaluation and planning for discharge  - Provide patient education as appropriate  Outcome: Progressing  Goal: Maintain or return mobility status to optimal level  Description: INTERVENTIONS:  - Assess patient's baseline mobility status (ambulation, transfers, stairs, etc )    - Identify cognitive and physical deficits and behaviors that affect mobility  - Identify mobility aids required to assist with transfers and/or ambulation (gait belt, sit-to-stand, lift, walker, cane, etc )  - [place] fall precautions as indicated by assessment  - Record patient progress and toleration of activity level on Mobility SBAR; progress patient to next Phase/Stage  - Instruct patient to call for assistance with activity based on assessment  - Consider rehabilitation consult to assist with strengthening/weightbearing, etc   Outcome: Progressing     Problem: DISCHARGE PLANNING  Goal: Discharge to home or other facility with appropriate resources  Description: INTERVENTIONS:  - Identify barriers to discharge w/patient and caregiver  - Arrange for needed discharge resources and transportation as appropriate  - Identify discharge learning needs (meds, wound care, etc )  - Arrange for interpretive services to assist at discharge as needed  - Refer to Case Management Department for coordinating discharge planning if the patient needs post-hospital services based on physician/advanced practitioner order or complex needs related to functional status, cognitive ability, or social support system  Outcome: Progressing     Problem: Knowledge Deficit  Goal: Patient/family/caregiver demonstrates understanding of disease process, treatment plan, medications, and discharge instructions  Description: Complete learning assessment and assess knowledge base    Interventions:  - Provide teaching at level of understanding  - Provide teaching via preferred learning methods  Outcome: Progressing

## 2021-01-25 NOTE — ASSESSMENT & PLAN NOTE
Mild disease, elevated D-dimer, fibrinogen, CRP & ferritin  O2 Saturation 94% on RA   -Received 3/5 days of Remdesivir as patient is immunocompromised, no need to continue on discharge     -Continue Vitamins for total of 7 days upon discharge    -Continue Atorvastatin 40 qhs for total of 14 days    -Productive cough most distressing symptom, will recommend schedule robitussin & mucinex with tessalon perles prn    - Tylenol & ibuprofen prn for pain from cough

## 2021-01-25 NOTE — ASSESSMENT & PLAN NOTE
Chronic; stable microcytic anemia, Likely due to anemia of chronic disease /inflammation  Hgb 9 6 --> 10 1 and remained stable S/p Venofer 100 mg x 1

## 2021-01-25 NOTE — H&P
History and Physical - Meredith Verduzco    Patient Information: Gregory Sams 64 y o  female MRN: [id number]  Unit/Bed#: 7T [facility] 717-02 Encounter: [id number]  Admitting Physician: Laura Ojeda MD   PCP: Wilbert Branch MD  Date of Admission:  01/24/2021    Assessment and Plan    * COVID-19  Assessment & Plan  Assessment  1    Hemodynamically stable, disease stage is mild   2  Symptoms include fever, cough, shortness of breath, headache, myalgias, fatigue and dizziness  3  Pertinent Lab findings include: leukopenia in part due to chemotherapy and also covid-19 infection  4    Hyponatremia, likely secondary to dehydration  Plan per hospital guidelines:  1  CBC and CMP daily  2  Oxygen supplementation as needed to maintain O2 saturations >90% however patient is currently saturating at 94% on room air   3  Start Remdesivir 200 mg IV #day 1 then 100 mg IV daily for 4 days as patient is immunocompromised   4  No indication to start dexamethasone at this time as patient does not require oxygen  5  Start Vitamin D3 2000 IU daily on day (Day 1)  6  Start Vitamin C 1,000 mg BID and Zinc 220 mg daily for a total of 7 days (if discharged before this completion then transition to multivitamin  If at time of discharge patient cannot get these medications can switch to multivitamin early)   7  Start Atorvastatin 40 qhs   8  Start Lovenox 30 mg Q12  9  Promote Self proning for a goal of 12-18 hours a day  10  Follow up procalcitonin, D-dimer, ferritin       Dizziness  Assessment & Plan  History of vertigo for which she takes meclizine 12 5mg Q8H PRN  Suspect dizziness is worsened due to dehydration and decreased PO intake in light of recent covid-19 infection   CT head without contrast obtained which showed 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 and surrounding left frontal periventricular white matter but no other acute intracranial abnormality     EKG: NSR, HR-79 No significant ST-T wave changes     - Will obtain orthostatic vital signs AM  - Continue IV fluid hydration with 125 cc/hr D5 NS  - Continue Meclizine 12 5mg Q8H PRN     Essential hypertension  Assessment & Plan  Controlled as per JNC-8 guidelines    - Continue amlodipine 5mg daily     Leukopenia  Assessment & Plan  - Patient with severe neutropenia; likely secondary to chemotherapy in conjunction with covid-19 infection    - Repeat CBC in AM     Microcytic anemia  Assessment & Plan  Chronic; stable microcytic anemia  Likely due to anemia of chronic disease /inflammation    - Repeat CBC in AM    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 [facility])  Assessment & Plan  Currently managed by Hematology/Oncology and Dr Noel Ibrahim  Patient status post cycle 1 day 1 of her  IV Copalisib 1/7/2021; day 8 held due to significant worsening neutropenia and COVID infection    - Continue Decadron 1 mg BID with meals for appetite stimulation  - Continue roxicodone 20 mg Q4H PRN for moderate to severe pain as prescribed by palliative care (PDMP reviewed)  - Continue miralax and colace to prevent opioid induced constipation     VTE Prophylaxis: Enoxaparin (Lovenox)  Code Status: Level 1 - Full Code  Anticipated Length of Stay:  Patient will be admitted on an Observation basis with an anticipated length of stay of  less than 2 midnights  Justification for Hospital Stay: COVID-19 Infection  Total Time for Visit, including Counseling / Coordination of Care: >60 mins  Greater than 50% of this total time spent on direct patient counseling and coordination of care      Chief Complaint:     Chief Complaint   Patient presents with    Dizziness     pt is covid positive, presents with increased dizziness, headache, n/v, chest pain     History of Present Illness:    Tom Horton is a 64 y o  female with a past medical history of lymphoma, hypertension vertigo and migraines who presents today with a chief complaint of progressively worsening dizziness as well as headaches, decreased appetite, nausea, vomiting, abdominal pain and myalgias  Of note patient was seen in the Emergency department 1/23/2021 for a similar complaint but was discharged after workup was deemed negative  Patient did test positive for COVID-19 1/20 after being seen by the hematology/oncology team on 01/18  Due to symptoms at Hematology appointment, she was placed on Levaquin 500 mg and completed a 5 day course  At the talking with patient, patient states that she has had the symptoms for 3 weeks  Difficult to obtain a history even with  on the line  In the emergency department, she received 1 L normal saline bolus, meclizine 25 mg, Valium 5 mg, hydrocortisone injection 200 mg  Patient was due to get a CT scan with contrast but due to a active allergy to iodide she was given the above medications  Instead, she received a CT scan noncontrast of the head which resulted in an impression of stable exam with no acute intracranial abnormality  She had a negative troponin, lipase, CMP and her CBC was consistent with her previous labs secondary to lymphoma  She was admitted to our service for observation for COVID-19 infection, and dizziness  Patient follows with Dr Juan Thibodeaux of Hematology-Oncology for her grade 2 follicular lymphoma  Chemotherapy treatments have been held due to an absolute neutrophil count of 160 and a positive COVID test   She was started on IV Copanlisib day 1 and with day 15 with fulphila day 2 and day 16 3 weeks ago  Upon chart review, patient is a poor historian and has a tough time comprehending when translation line is in use  Information needs to be repeated to get some level understanding and when and words of in written Telugu she states that she cannot see them  Review of Systems:  Review of Systems   Constitutional: Positive for appetite change, fatigue and fever  Negative for chills     HENT: Negative for sore throat  Eyes: Positive for visual disturbance  Respiratory: Positive for cough and shortness of breath  Negative for chest tightness  Cardiovascular: Negative for chest pain and leg swelling  Gastrointestinal: Positive for abdominal pain and nausea  Negative for constipation, diarrhea and vomiting  Endocrine: Negative for polydipsia, polyphagia and polyuria  Genitourinary: Negative for dysuria  Musculoskeletal: Positive for myalgias  Negative for arthralgias  Skin: Negative for rash  Neurological: Positive for dizziness and headaches  Negative for light-headedness  Psychiatric/Behavioral: Negative for agitation and behavioral problems  Past Medical and Surgical History:   Past Medical History:   Diagnosis Date    Anemia     iron and B12    Arthritis     Asthma     Cough     Depression     Falls frequently     [facility]     [facility]     [facility]     Migraine     Osteoporosis     Psychiatric disorder     Vertigo      Past Surgical History:   Procedure Laterality Date    CHOLECYSTECTOMY      FL GUIDED CENTRAL VENOUS ACCESS DEVICE INSERTION  11/9/2018    HYSTERECTOMY      IR BIOPSY BONE MARROW  11/24/2020    IR BIOPSY LYMPH NODE  11/24/2020    LYMPH NODE BIOPSY Left 11/9/2018    Procedure: EXCISION BIOPSY LYMPH NODE SUPRACLAVICULAR;  Surgeon: Marisol Canchola MD;  Location: [address] [place] [address];  Service: General    NH INCISE FINGER TENDON SHEATH Right 1/16/2020    Procedure: RELEASE TRIGGER FINGER RIGHT THUMB;  Surgeon: Herlinda Spain MD;  Location: [address] [place] [address];  Service: Orthopedics    TUNNELED VENOUS PORT PLACEMENT N/A 11/9/2018    Procedure: INSERTION OF PORT-A-CATH;  Surgeon: Mariosl Canchola MD;  Location: [facility];  Service: General     Meds/Allergies: Allergies:    Allergies   Allergen Reactions    Contrast [Iodinated Diagnostic Agents] Shortness Of Breath and Dizziness    Penicillins Anaphylaxis     Prior to Admission Medications   Prescriptions Last Dose Informant Patient Reported? Taking? DULoxetine (CYMBALTA) 20 mg capsule 1/24/2021 at Unknown time Self No Yes   Sig: Take 1 capsule (20 mg total) by mouth daily   acetaminophen (TYLENOL) 500 mg tablet 1/24/2021 at Unknown time Self No Yes   Sig: May take 1 tablet (500 mg total) by mouth every 8 (eight) hours as needed for mild pain, moderate pain or headaches  May also take 1 tablet (500 mg total) every 8 (eight) hours as needed for mild pain, moderate pain or headaches  Take 1-2 tablets every 8 hours as needed for mild-moderate pain  Flor Sequin    al mag oxide-diphenhydramine-lidocaine viscous (MAGIC MOUTHWASH) 1:1:1 suspension Unknown at Unknown time Self No No   Sig: Swish and spit 10 mL every 4 (four) hours as needed for mouth pain or discomfort   amLODIPine (NORVASC) 5 mg tablet 1/24/2021 at Unknown time Self No Yes   Sig: Take 1 tablet (5 mg total) by mouth daily   aspirin (ECOTRIN LOW STRENGTH) 81 mg EC tablet 1/24/2021 at Unknown time Self No Yes   Sig: Take 1 tablet (81 mg total) by mouth daily   aspirin-acetaminophen-caffeine (EXCEDRIN MIGRAINE) 250-250-65 MG per tablet 1/24/2021 at Unknown time Self No Yes   Sig: Take 1 tablet by mouth every 6 (six) hours as needed for headaches Erica 1 tableta cada 6 horas fernandez sea necesario para el dolor de russ   benzonatate (TESSALON) 200 MG capsule Unknown at Unknown time Self No No   Sig: Take 1 capsule (200 mg total) by mouth 3 (three) times a day as needed for cough   carbamide peroxide (DEBROX) 6 5 % otic solution Not Taking at Unknown time Self No No   Sig: Administer 5 drops to the right ear 2 (two) times a day   Patient not taking: Reported on 1/24/2021   cyanocobalamin 1000 MCG tablet 1/24/2021 at Unknown time Self No Yes   Sig: Take 1 tablet (1,000 mcg total) by mouth daily   dexamethasone (DECADRON) 1 mg tablet 1/24/2021 at Unknown time Self No Yes   Sig: Take 1 tablet (1 mg total) by mouth 2 (two) times a day with meals Do not take afternoon dose after 2pm   levofloxacin (LEVAQUIN) 500 mg tablet   No No   Sig: Take 1 tablet (500 mg total) by mouth every 24 hours for 5 days   lidocaine (LMX) 4 % cream Not Taking at Unknown time Self No No   Sig: Apply topically as needed for mild pain To neck   Patient not taking: Reported on 1/24/2021   meclizine (ANTIVERT) 12 5 MG tablet 1/24/2021 at Unknown time Self No Yes   Sig: Take 1 tablet (12 5 mg total) by mouth every 8 (eight) hours as needed for dizziness   omeprazole (PriLOSEC) 20 mg delayed release capsule 1/24/2021 at Unknown time Self No Yes   Sig: Take 2 capsules (40 mg total) by mouth daily To prevent stomach upset   ondansetron (ZOFRAN) 4 mg tablet Not Taking at Unknown time Self No No   Sig: Take 1 tablet (4 mg total) by mouth every 8 (eight) hours as needed for nausea or vomiting   Patient not taking: Reported on 1/24/2021   oxyCODONE (ROXICODONE) 20 MG TABS 1/24/2021 at Unknown time  No Yes   Sig: Take 1 tablet (20 mg total) by mouth every 4 (four) hours as needed for moderate pain Erica 1 tableta cada 4 horas fernandez sea necesario para el dolor de estomagoMax Daily Amount: 120 mg   polyethylene glycol (MIRALAX) 17 g packet Past Week at Unknown time Self No Yes   Sig: Take 17 g by mouth daily   senna (SENOKOT) 8 6 mg Past Week at Unknown time Self No Yes   Sig: Take 1 tablet (8 6 mg total) by mouth 2 (two) times a day      Facility-Administered Medications: None     Social History:     Social History     Socioeconomic History    Marital status: Single     Spouse name: Not on file    Number of children: Not on file    Years of education: Not on file    Highest education level: Not on file   Occupational History    Not on file   Social Needs    Financial resource strain: Not on file    Food insecurity     Worry: Not on file     Inability: Not on file    Transportation needs     Medical: Not on file     Non-medical: Not on file   Tobacco Use    Smoking status: Former Smoker     Quit date: 6/12/2017     Years since quitting: 3  6    Smokeless tobacco: Never Used   Substance and Sexual Activity    Alcohol use: Never     Frequency: Never    Drug use: Never    Sexual activity: Not on file   Lifestyle    Physical activity     Days per week: Not on file     Minutes per session: Not on file    Stress: Not on file   Relationships    Social connections     Talks on phone: Not on file     Gets together: Not on file     Attends Caodaism service: Not on file     Active member of club or organization: Not on file     Attends meetings of clubs or organizations: Not on file     Relationship status: Not on file    Intimate partner violence     Fear of current or ex partner: Not on file     Emotionally abused: Not on file     Physically abused: Not on file     Forced sexual activity: Not on file   Other Topics Concern    Not on file   Social History Narrative    Not on file     Patient Pre-hospital Living Situation: Patient lives with her    Patient Pre-hospital Level of Mobility: Ambulates without assistance  Patient Pre-hospital Diet Restrictions: None    Family History:  Family History   Problem Relation Age of Onset    Cancer Mother     Diabetes Mother     Cancer Father     Diabetes Father        Physical Exam:   Vitals:   Blood Pressure: 138/65 (01/24/21 2249)  Pulse: 64 (01/24/21 2249)  Temperature: 97 5 °F (36 4 °C) (01/24/21 2249)  Temp Source: Temporal (01/24/21 2249)  Respirations: 20 (01/24/21 2249)  Height: 5' 4" (162 6 cm) (01/24/21 2249)  Weight - Scale: 89 kg (196 lb 3 4 oz) (01/24/21 2249)  SpO2: 95 % (01/24/21 2249)    Physical Exam  Constitutional:       Appearance: She is obese  She is ill-appearing  She is not diaphoretic  HENT:      Head: Normocephalic and atraumatic  Mouth/Throat:      Mouth: Mucous membranes are dry  Eyes:      General:         Right eye: No discharge  Left eye: No discharge  Extraocular Movements: Extraocular movements intact        Pupils: Pupils are equal, round, and reactive to light  Neck:      Musculoskeletal: Normal range of motion  Cardiovascular:      Rate and Rhythm: Normal rate  Heart sounds: No murmur  Pulmonary:      Effort: Pulmonary effort is normal  No respiratory distress  Breath sounds: Normal breath sounds  No wheezing  Abdominal:      General: Abdomen is flat  There is no distension  Palpations: There is no mass  Tenderness: There is abdominal tenderness  There is no guarding  Hernia: No hernia is present  Comments: Diffusely tender to palpation   Musculoskeletal:      Right lower leg: No edema  Left lower leg: No edema  Skin:     General: Skin is warm  Coloration: Skin is not jaundiced or pale  Findings: No bruising or erythema  Neurological:      Cranial Nerves: No cranial nerve deficit  Comments: Alert and oriented to person and place but not to time  4/5 motor strength bilateral upper and lower extremities  Difficult comprehending sensory and cerebellar exams  No signs of facial droop, pronator drift or cranial nerve abnormalities       Lab Results: I have personally reviewed pertinent reports      Results from last 7 days   Lab Units 01/24/21 2013   WBC Thousand/uL 0 70*   HEMOGLOBIN g/dL 10 6*   HEMATOCRIT % 33 8*   PLATELETS Thousands/uL 114*   LYMPHO PCT % 60*   MONO PCT % 14*   EOS PCT % 4   BANDS PCT % 2     Results from last 7 days   Lab Units 01/24/21 2013 01/23/21  0853 01/18/21  1116   POTASSIUM mmol/L 3 6 3 7 4 0   CHLORIDE mmol/L 102 102 103   CO2 mmol/L 26 28 26   BUN mg/dL 11 13 12   CREATININE mg/dL 0 66 0 80 0 73   CALCIUM mg/dL 8 7 8 9 9 0   ALK PHOS U/L 122 124* 112   ALT U/L 22 32 28   AST U/L 33 53* 40*   EGFR ml/min/1 73sq m 98 82 91         Results from last 7 days   Lab Units 01/24/21 2013 01/23/21  0853   CK TOTAL U/L 23*  --    TROPONIN I ng/mL <0 01 <0 01         Results from last 7 days   Lab Units 01/24/21  2343   D-DIMER QUANTITATIVE ug/ml FEU 0 86*     Results from last 7 days   Lab Units 01/24/21 2013 01/23/21  0853   NT-PRO BNP pg/mL 77 5 32 3      Results from last 7 days   Lab Units 01/23/21  1003   COLOR UA  Yellow   CLARITY UA  Slightly Cloudy*   SPEC GRAV UA  1 020   PH UA  6 0   LEUKOCYTES UA  Negative   NITRITE UA  Negative   GLUCOSE UA mg/dl Negative   KETONES UA mg/dl Negative   BILIRUBIN UA  Negative   BLOOD UA  Negative      Results from last 7 days   Lab Units 01/23/21  1003   RBC UA /hpf None Seen   WBC UA /hpf 0-1   EPITHELIAL CELLS WET PREP /hpf Moderate*   BACTERIA UA /hpf Moderate*        Imaging: I have personally reviewed pertinent reports  Xr Chest 1 View Portable    Result Date: 1/23/2021  Narrative: CHEST INDICATION:   dizzy  Patient has confirmed COVID-19  COMPARISON:  11/9/2018 EXAM PERFORMED/VIEWS:  XR CHEST PORTABLE Images:  1 FINDINGS:  Left subclavian Port-A-Cath terminates in the SVC  Cardiomediastinal silhouette appears unremarkable  The lungs are clear  No pneumothorax or pleural effusion  Osseous structures appear within normal limits for patient age  Impression: No acute cardiopulmonary disease  Workstation performed: [id number]     Ct Head Without Contrast    Result Date: 1/24/2021  Narrative: CT BRAIN - WITHOUT CONTRAST INDICATION:   Dizziness, non-specific dizziness  History lymphoma Patient has confirmed COVID-19  COMPARISON:  CT 1/31/2019 TECHNIQUE:  CT examination of the brain was performed  In addition to axial images, sagittal and coronal 2D reformatted images were created and submitted for interpretation  Radiation dose length product (DLP) for this visit:  762 mGy-cm   This examination, like all CT scans performed in the [facility], was performed utilizing techniques to minimize radiation dose exposure, including the use of iterative reconstruction and automated exposure control  IMAGE QUALITY:  Diagnostic  FINDINGS: PARENCHYMA:  No intracranial mass, mass effect or midline shift   No CT signs of acute infarction  No acute parenchymal hemorrhage  Remote infarct involving the left corpus stratum and surrounding left frontal periventricular white matter is again noted  with overall stable appearance  VENTRICLES AND EXTRA-AXIAL SPACES:  Normal for the patient's age  VISUALIZED ORBITS AND PARANASAL SINUSES:  Unremarkable  CALVARIUM AND EXTRACRANIAL SOFT TISSUES:  Normal      Impression: Stable exam with no acute intracranial abnormality   Workstation performed: [id number]     EKG, Pathology, and Other Studies Reviewed on Admission:   EKG  Result Date: 1/24/2021  Impression:  Normal sinus rhythm, no ST or interval changes     Entire H&P was discussed with Dr Marlon Winter who agreed to what is noted above    Sheri Mcmullen MD  01/25/21  1:56 AM

## 2021-01-25 NOTE — ED PROVIDER NOTES
History  Chief Complaint   Patient presents with    Dizziness     pt is covid positive, presents with increased dizziness, headache, n/v, chest pain       Patient is a 65-year-old female presents today for evaluation of 2 weeks of COVID symptoms including fatigue, shortness of breath, aching chest pain, dizziness, headache and body aches patient also endorses nausea vomiting and decreased appetite reports she has not eaten anything in the past 2 days  Patient reports she is not taking any medications to alleviate her symptoms at home patient does have a past medical history significant for lymphoma, vertigo, lupus  Patient is undergoing treatment at this time for this cancer and reports her symptoms have been getting worse over the past 2 days patient was seen and evaluated here yesterday and did not have dizziness the time of evaluation yesterday  Patient denies any numbness or tingling, weakness or paresthesias, paralysis, vision changes associated with her dizziness  Patient denies any sensation changes  Patient reports no recent head traumas  Dizziness  Quality:  Vertigo  Severity:  Severe  Onset quality:  Gradual  Duration:  2 days  Timing:  Constant  Progression:  Worsening  Chronicity:  New  Relieved by:  None tried  Worsened by:  Nothing  Ineffective treatments:  None tried  Associated symptoms: chest pain, headaches, nausea, shortness of breath and vomiting    Associated symptoms: no palpitations        Prior to Admission Medications   Prescriptions Last Dose Informant Patient Reported? Taking? DULoxetine (CYMBALTA) 20 mg capsule  Self No No   Sig: Take 1 capsule (20 mg total) by mouth daily   acetaminophen (TYLENOL) 500 mg tablet  Self No No   Sig: May take 1 tablet (500 mg total) by mouth every 8 (eight) hours as needed for mild pain, moderate pain or headaches  May also take 1 tablet (500 mg total) every 8 (eight) hours as needed for mild pain, moderate pain or headaches   Take 1-2 tablets every 8 hours as needed for mild-moderate pain  German singh mag oxide-diphenhydramine-lidocaine viscous (MAGIC MOUTHWASH) 1:1:1 suspension  Self No No   Sig: Swish and spit 10 mL every 4 (four) hours as needed for mouth pain or discomfort   amLODIPine (NORVASC) 5 mg tablet  Self No No   Sig: Take 1 tablet (5 mg total) by mouth daily   aspirin (ECOTRIN LOW STRENGTH) 81 mg EC tablet  Self No No   Sig: Take 1 tablet (81 mg total) by mouth daily   aspirin-acetaminophen-caffeine (EXCEDRIN MIGRAINE) 250-250-65 MG per tablet  Self No No   Sig: Take 1 tablet by mouth every 6 (six) hours as needed for headaches Erica 1 tableta cada 6 horas fernandez sea necesario para el dolor de russ   benzonatate (TESSALON) 200 MG capsule  Self No No   Sig: Take 1 capsule (200 mg total) by mouth 3 (three) times a day as needed for cough   carbamide peroxide (DEBROX) 6 5 % otic solution  Self No No   Sig: Administer 5 drops to the right ear 2 (two) times a day   cyanocobalamin 1000 MCG tablet  Self No No   Sig: Take 1 tablet (1,000 mcg total) by mouth daily   dexamethasone (DECADRON) 1 mg tablet  Self No No   Sig: Take 1 tablet (1 mg total) by mouth 2 (two) times a day with meals Do not take afternoon dose after 2pm   levofloxacin (LEVAQUIN) 500 mg tablet   No No   Sig: Take 1 tablet (500 mg total) by mouth every 24 hours for 5 days   lidocaine (LMX) 4 % cream  Self No No   Sig: Apply topically as needed for mild pain To neck   meclizine (ANTIVERT) 12 5 MG tablet  Self No No   Sig: Take 1 tablet (12 5 mg total) by mouth every 8 (eight) hours as needed for dizziness   omeprazole (PriLOSEC) 20 mg delayed release capsule  Self No No   Sig: Take 2 capsules (40 mg total) by mouth daily To prevent stomach upset   ondansetron (ZOFRAN) 4 mg tablet  Self No No   Sig: Take 1 tablet (4 mg total) by mouth every 8 (eight) hours as needed for nausea or vomiting   oxyCODONE (ROXICODONE) 20 MG TABS   No No   Sig: Take 1 tablet (20 mg total) by mouth every 4 (four) hours as needed for moderate pain Erica 1 tableta cada 4 horas fern[place] sea necesario para el dolor de estomagoMax Daily Amount: 120 mg   polyethylene glycol (MIRALAX) 17 g packet  Self No No   Sig: Take 17 g by mouth daily   senna (SENOKOT) 8 6 mg  Self No No   Sig: Take 1 tablet (8 6 mg total) by mouth 2 (two) times a day      Facility-Administered Medications: None       Past Medical History:   Diagnosis Date    Anemia     iron and B12    Arthritis     Asthma     Cough     Depression     Falls frequently     [facility]     Lymphoma ([facility] )     Lymphoma ([place] [address] )     Migraine     Osteoporosis     Psychiatric disorder     Vertigo        Past Surgical History:   Procedure Laterality Date    CHOLECYSTECTOMY      FL GUIDED CENTRAL VENOUS ACCESS DEVICE INSERTION  11/9/2018    HYSTERECTOMY      IR BIOPSY BONE MARROW  11/24/2020    IR BIOPSY LYMPH NODE  11/24/2020    LYMPH NODE BIOPSY Left 11/9/2018    Procedure: EXCISION BIOPSY LYMPH NODE SUPRACLAVICULAR;  Surgeon: Ellie Mccloud MD;  Location: [address] [place] [address];  Service: General    VT INCISE FINGER TENDON SHEATH Right 1/16/2020    Procedure: RELEASE TRIGGER FINGER RIGHT THUMB;  Surgeon: Kiki Westfall MD;  Location: [address] [place] [address];  Service: Orthopedics    TUNNELED VENOUS PORT PLACEMENT N/A 11/9/2018    Procedure: INSERTION OF PORT-A-CATH;  Surgeon: Ellie Mccloud MD;  Location: [address] [place] [address];  Service: General       Family History   Problem Relation Age of Onset    Cancer Mother     Diabetes Mother     Cancer Father     Diabetes Father      I have reviewed and agree with the history as documented      E-Cigarette/Vaping    E-Cigarette Use Never User      E-Cigarette/Vaping Substances    Nicotine No     THC No     CBD No     Flavoring No     Other No     Unknown No      Social History     Tobacco Use    Smoking status: Former Smoker     Quit date: 6/12/2017     Years since quitting: 3 6    Smokeless tobacco: Never Used   Substance Use Topics    Alcohol use: Never     Frequency: Never    Drug use: Never       Review of Systems   Constitutional: Positive for activity change, appetite change, chills and fatigue  HENT: Negative for congestion, ear pain, rhinorrhea and sore throat  Eyes: Negative for redness  Respiratory: Positive for cough and shortness of breath  Negative for chest tightness  Cardiovascular: Positive for chest pain  Negative for palpitations  Gastrointestinal: Positive for nausea and vomiting  Negative for abdominal pain  Genitourinary: Negative for dysuria and hematuria  Musculoskeletal: Positive for arthralgias and myalgias  Skin: Negative for rash  Neurological: Positive for dizziness and headaches  Negative for syncope, light-headedness and numbness  Physical Exam  Physical Exam  Vitals signs and nursing note reviewed  Constitutional:       Appearance: She is well-developed  Comments: Patient appears much older than stated age, is in no acute distress, masked  HENT:      Head: Normocephalic  Eyes:      General: No scleral icterus  Cardiovascular:      Rate and Rhythm: Regular rhythm  Tachycardia present  Pulmonary:      Effort: Pulmonary effort is normal       Breath sounds: Normal breath sounds  No stridor  Abdominal:      General: There is no distension  Palpations: Abdomen is soft  Tenderness: There is no abdominal tenderness  Musculoskeletal: Normal range of motion  Skin:     General: Skin is warm and dry  Capillary Refill: Capillary refill takes less than 2 seconds  Neurological:      Mental Status: She is alert and oriented to person, place, and time     Psychiatric:         Attention and Perception: Attention normal          Vital Signs  ED Triage Vitals [01/24/21 1952]   Temperature Pulse Respirations Blood Pressure SpO2   100 1 °F (37 8 °C) 94 18 111/88 94 %      Temp Source Heart Rate Source Patient Position - Orthostatic VS BP Location FiO2 (%)   Tympanic Monitor Sitting Left arm --      Pain Score       8           Vitals:    01/24/21 1952   BP: 111/88   Pulse: 94   Patient Position - Orthostatic VS: Sitting         Visual Acuity      ED Medications  Medications   sodium chloride 0 9 % bolus 1,000 mL (1,000 mL Intravenous New Bag 1/24/21 2034)   meclizine (ANTIVERT) tablet 25 mg (25 mg Oral Given 1/24/21 2036)   diazepam (VALIUM) injection 5 mg (5 mg Intravenous Given 1/24/21 2034)   hydrocortisone sodium succinate (PF) (Solu-CORTEF) injection 200 mg (200 mg Intravenous Given 1/24/21 2037)   acetaminophen (TYLENOL) tablet 650 mg (650 mg Oral Given 1/24/21 2035)       Diagnostic Studies  Results Reviewed     Procedure Component Value Units Date/Time    Manual Differential (Non Wam) [[id number]]  (Abnormal) Collected: 01/24/21 2013    Lab Status: Final result Specimen: Blood from Arm, Right Updated: 01/24/21 2103     Segmented % 20 %      Bands % 2 %      Lymphocytes % 60 %      Monocytes % 14 %      Eosinophils, % 4 %      Neutrophils Absolute 0 15 Thousand/uL      Lymphocytes Absolute 0 42 Thousand/uL      Monocytes Absolute 0 10 Thousand/uL      Eosinophils Absolute 0 03 Thousand/uL      Total Counted 100     RBC Morphology abnormal     Pati Cells Present     Microcytes Present     Ovalocytes Present     Poikilocytes Present     Schistocytes Present     Platelet Estimate Borderline     Tear Drop Cells Present    Troponin I [[id number]]  (Normal) Collected: 01/24/21 2013    Lab Status: Final result Specimen: Blood from Arm, Right Updated: 01/24/21 2047     Troponin I <0 01 ng/mL     NT-BNP PRO [[id number]]  (Normal) Collected: 01/24/21 2013    Lab Status: Final result Specimen: Blood from Arm, Right Updated: 01/24/21 2045     NT-proBNP 77 5 pg/mL     Fingerstick Glucose (POCT) [[id number]]  (Normal) Collected: 01/24/21 2040    Lab Status: Final result Updated: 01/24/21 2041     POC Glucose 87 mg/dl     Lipase [[id number]]  (Normal) Collected: 01/24/21 2013    Lab Status: Final result Specimen: Blood from Arm, Right Updated: 01/24/21 2036     Lipase 35 u/L     Comprehensive metabolic panel [[id number]]  (Abnormal) Collected: 01/24/21 2013    Lab Status: Final result Specimen: Blood from Arm, Right Updated: 01/24/21 2035     Sodium 136 mmol/L      Potassium 3 6 mmol/L      Chloride 102 mmol/L      CO2 26 mmol/L      ANION GAP 8 mmol/L      BUN 11 mg/dL      Creatinine 0 66 mg/dL      Glucose 98 mg/dL      Calcium 8 7 mg/dL      AST 33 U/L      ALT 22 U/L      Alkaline Phosphatase 122 U/L      Total Protein 6 7 g/dL      Albumin 3 8 g/dL      Total Bilirubin 0 80 mg/dL      eGFR 98 ml/min/1 73sq m     Narrative:      [place] guidelines for Chronic Kidney Disease (CKD):     Stage 1 with normal or high GFR (GFR > 90 mL/min/1 73 square meters)    Stage 2 Mild CKD (GFR = 60-89 mL/min/1 73 square meters)    Stage 3A Moderate CKD (GFR = 45-59 mL/min/1 73 square meters)    Stage 3B Moderate CKD (GFR = 30-44 mL/min/1 73 square meters)    Stage 4 Severe CKD (GFR = 15-29 mL/min/1 73 square meters)    Stage 5 End Stage CKD (GFR <15 mL/min/1 73 square meters)  Note: GFR calculation is accurate only with a steady state creatinine    CBC and differential [[id number]]  (Abnormal) Collected: 01/24/21 2013    Lab Status: Final result Specimen: Blood from Arm, Right Updated: 01/24/21 2033     WBC 0 70 Thousand/uL      RBC 5 36 Million/uL      Hemoglobin 10 6 g/dL      Hematocrit 33 8 %      MCV 63 fL      MCH 19 8 pg      MCHC 31 4 g/dL      RDW 18 9 %      MPV 9 4 fL      Platelets 229 Thousands/uL     UA (URINE) with reflex to Scope [[id number]]     Lab Status: No result Specimen: Urine                  CT head without contrast    (Results Pending)              Procedures  ECG 12 Lead Documentation Only    Date/Time: 1/24/2021 8:32 PM  Performed by: Joyce Irving PA-C  Authorized by: Joyce Irving PA-C     Indications / Diagnosis:  Dizziness  Patient location: ED  Interpretation:     Interpretation: normal    Rate:     ECG rate:  76    ECG rate assessment: normal    Rhythm:     Rhythm: sinus rhythm    Ectopy:     Ectopy: none    QRS:     QRS axis:  Normal    QRS intervals:  Normal  Conduction:     Conduction: normal    ST segments:     ST segments:  Normal  T waves:     T waves: normal    Comments:      qtc [id number]             ED Course  ED Course as of Jan 24 2157   Ihsan Landry Jan 24, 2021 2057  Patient has a baseline white blood cell count of around 1 likely secondary to patient's diagnosis of lymphoma  In the setting of COVID-19 leukopenia is expected and this value is consistent with the patient's condition at this time  WBC(!!): 0 70   2114 Inpatient family medicine team paged for admission  Stroke Assessment     Row Name 01/24/21 2011             NIH Stroke Scale    Interval  Baseline      Level of Consciousness (1a )  0      LOC Questions (1b )  0      LOC Commands (1c )  0      Best Gaze (2 )  0      Visual (3 )  0      Facial Palsy (4 )  0      Motor Arm, Left (5a )  0      Motor Arm, Right (5b )  0      Motor Leg, Left (6a )  0      Motor Leg, Right (6b )  0      Limb Ataxia (7 )  0      Sensory (8 )  0      Best Language (9 )  0      Dysarthria (10 )  0      Extinction and Inattention (11 ) (Formerly Neglect)  0      Total  0          First Filed Value   TPA Decision  Patient not a TPA candidate  SBIRT 22yo+      Most Recent Value   SBIRT (22 yo +)   In order to provide better care to our patients, we are screening all of our patients for alcohol and drug use  Would it be okay to ask you these screening questions? Yes Filed at: 01/24/2021 2042   Initial Alcohol Screen: US AUDIT-C    1  How often do you have a drink containing alcohol?  0 Filed at: 01/24/2021 2042   2  How many drinks containing alcohol do you have on a typical day you are drinking? 0 Filed at: 01/24/2021 2042   3b  FEMALE Any Age, or MALE 65+:  How often do you have 4 or more drinks on one occassion? 0 Filed at: 01/24/2021 2042   Audit-C Score  0 Filed at: 01/24/2021 2042   GIOVANNY: How many times in the past year have you    Used an illegal drug or used a prescription medication for non-medical reasons? Never Filed at: 01/24/2021 2042                    [facility]  Number of Diagnoses or Management Options  Dizziness:   Diagnosis management comments:   72-year-old female history of lymphoma and vertigo presents for evaluation of dizziness and other COVID-19 symptoms including chest pain, shortness of breath, fatigue, body aches  The patient has a an NIH stroke scale 0 however due to persistent worsening dizziness CT without contrast was obtained and premedication for CT a of the head and neck was initiated with the plan for admission and MRI for further evaluation  Of the dizziness  Will obtain labs and imaging to assess for other acute abnormalities  Will administer fluids, tylenol for pain, meclizine and valium for vertigo and other medications as necessary  All imaging and/or lab testing discussed with patient  Patient and/or family members verbalizes understanding and agrees with plan for admission  Patient is stable for admission      Portions of the record may have been created with voice recognition software  Occasional wrong word or "sound a like" substitutions may have occurred due to the inherent limitations of voice recognition software  Read the chart carefully and recognize, using context, where substitutions have occurred              Disposition  Final diagnoses:   Dizziness     Time reflects when diagnosis was documented in both MDM as applicable and the Disposition within this note     Time User Action Codes Description Comment    1/24/2021  9:57 PM Ese Abad Add [R42] Dizziness       ED Disposition     ED Disposition Condition Date/Time Comment    Admit Stable Sun Jan 24, 2021  9:57 PM Case was discussed with family med resident  and the patient's admission status was agreed to be Admission Status: observation status to the service of Dr Peter Bustamante   Follow-up Information    None         Patient's Medications   Discharge Prescriptions    No medications on file     No discharge procedures on file      PDMP Review       Value Time User    PDMP Reviewed  Yes 1/13/2021 10:27 AM Key Cloud MD          ED Provider  Electronically Signed by           Patricia Schaffer PA-C  01/24/21 3809

## 2021-01-25 NOTE — ASSESSMENT & PLAN NOTE
Severe neutropenia - WBC improved from 0 4 to 1 1 after 1 dose of Granix 480 mcg (ANC from 88 to 374)  Likely secondary to chemotherapy in conjunction with covid-19 infection  Second dose of Granix given prior to discharge  Heme/onc contacted and does not require 3rd dose for patient     Follow up with heme/onc outpatient

## 2021-01-25 NOTE — ASSESSMENT & PLAN NOTE
Currently managed by Hematology/Oncology and Dr Afsaneh Santiago  Patient status post cycle 1 day 1 of her  IV Copalisib 1/7/2021; day 8 held due to significant worsening neutropenia and COVID infection   - Continue Decadron 1 mg BID with meals for appetite stimulation   - Continue roxicodone 20 mg Q4H PRN for moderate to severe pain as prescribed by palliative care (PDMP reviewed)   - Continue miralax and colace to prevent opioid induced constipation

## 2021-01-26 VITALS
OXYGEN SATURATION: 92 % | RESPIRATION RATE: 18 BRPM | HEART RATE: 54 BPM | TEMPERATURE: 97.1 F | HEIGHT: 64 IN | DIASTOLIC BLOOD PRESSURE: 63 MMHG | WEIGHT: 196.21 LBS | BODY MASS INDEX: 33.5 KG/M2 | SYSTOLIC BLOOD PRESSURE: 142 MMHG

## 2021-01-26 PROBLEM — R42 DIZZINESS: Status: RESOLVED | Noted: 2019-09-12 | Resolved: 2021-01-26

## 2021-01-26 LAB
ANION GAP SERPL CALCULATED.3IONS-SCNC: 5 MMOL/L (ref 5–14)
ANISOCYTOSIS BLD QL SMEAR: PRESENT
BUN SERPL-MCNC: 9 MG/DL (ref 5–25)
CALCIUM SERPL-MCNC: 8.3 MG/DL (ref 8.4–10.2)
CHLORIDE SERPL-SCNC: 107 MMOL/L (ref 97–108)
CO2 SERPL-SCNC: 28 MMOL/L (ref 22–30)
CREAT SERPL-MCNC: 0.51 MG/DL (ref 0.6–1.2)
ERYTHROCYTE [DISTWIDTH] IN BLOOD BY AUTOMATED COUNT: 18.8 %
GFR SERPL CREATININE-BSD FRML MDRD: 106 ML/MIN/1.73SQ M
GLUCOSE SERPL-MCNC: 103 MG/DL (ref 70–99)
HCT VFR BLD AUTO: 32.7 % (ref 36–46)
HGB BLD-MCNC: 10.1 G/DL (ref 12–16)
HYPERCHROMIA BLD QL SMEAR: PRESENT
LYMPHOCYTES # BLD AUTO: 0.48 THOUSAND/UL (ref 0.5–4)
LYMPHOCYTES # BLD AUTO: 44 % (ref 25–45)
MCH RBC QN AUTO: 19.6 PG (ref 26–34)
MCHC RBC AUTO-ENTMCNC: 30.9 G/DL (ref 31–36)
MCV RBC AUTO: 64 FL (ref 80–100)
METAMYELOCYTES NFR BLD MANUAL: 2 % (ref 0–1)
MICROCYTES BLD QL AUTO: PRESENT
MONOCYTES # BLD AUTO: 0.22 THOUSAND/UL (ref 0.2–0.9)
MONOCYTES NFR BLD AUTO: 20 % (ref 1–10)
NEUTS BAND NFR BLD MANUAL: 8 % (ref 0–8)
NEUTS SEG # BLD: 0.37 THOUSAND/UL (ref 1.8–7.8)
NEUTS SEG NFR BLD AUTO: 26 %
PLATELET # BLD AUTO: 166 THOUSANDS/UL (ref 150–450)
PLATELET BLD QL SMEAR: ADEQUATE
PMV BLD AUTO: 10.3 FL (ref 8.9–12.7)
POIKILOCYTOSIS BLD QL SMEAR: PRESENT
POTASSIUM SERPL-SCNC: 3.7 MMOL/L (ref 3.6–5)
PROCALCITONIN SERPL-MCNC: <0.05 NG/ML
RBC # BLD AUTO: 5.16 MILLION/UL (ref 4–5.2)
RBC MORPH BLD: ABNORMAL
SCHISTOCYTES BLD QL SMEAR: PRESENT
SODIUM SERPL-SCNC: 140 MMOL/L (ref 137–147)
TOTAL CELLS COUNTED SPEC: 50
WBC # BLD AUTO: 1.1 THOUSAND/UL (ref 4.5–11)

## 2021-01-26 PROCEDURE — 97167 OT EVAL HIGH COMPLEX 60 MIN: CPT

## 2021-01-26 PROCEDURE — 80048 BASIC METABOLIC PNL TOTAL CA: CPT | Performed by: FAMILY MEDICINE

## 2021-01-26 PROCEDURE — 97163 PT EVAL HIGH COMPLEX 45 MIN: CPT

## 2021-01-26 PROCEDURE — 99239 HOSP IP/OBS DSCHRG MGMT >30: CPT | Performed by: FAMILY MEDICINE

## 2021-01-26 PROCEDURE — 85007 BL SMEAR W/DIFF WBC COUNT: CPT | Performed by: FAMILY MEDICINE

## 2021-01-26 PROCEDURE — 84145 PROCALCITONIN (PCT): CPT | Performed by: STUDENT IN AN ORGANIZED HEALTH CARE EDUCATION/TRAINING PROGRAM

## 2021-01-26 PROCEDURE — 85027 COMPLETE CBC AUTOMATED: CPT | Performed by: FAMILY MEDICINE

## 2021-01-26 RX ORDER — GUAIFENESIN 100 MG/5ML
200 SOLUTION ORAL EVERY 4 HOURS
Status: DISCONTINUED | OUTPATIENT
Start: 2021-01-26 | End: 2021-01-26 | Stop reason: HOSPADM

## 2021-01-26 RX ORDER — ATORVASTATIN CALCIUM 40 MG/1
40 TABLET, FILM COATED ORAL
Qty: 12 TABLET | Refills: 0 | Status: SHIPPED | OUTPATIENT
Start: 2021-01-26

## 2021-01-26 RX ORDER — ACETAMINOPHEN 325 MG/1
650 TABLET ORAL EVERY 6 HOURS PRN
Qty: 30 TABLET | Refills: 0 | Status: SHIPPED | OUTPATIENT
Start: 2021-01-26

## 2021-01-26 RX ORDER — BENZONATATE 200 MG/1
200 CAPSULE ORAL 3 TIMES DAILY PRN
Qty: 20 CAPSULE | Refills: 0 | Status: SHIPPED | OUTPATIENT
Start: 2021-01-26 | End: 2022-01-24 | Stop reason: SDUPTHER

## 2021-01-26 RX ORDER — GUAIFENESIN 100 MG/5ML
200 SOLUTION ORAL EVERY 4 HOURS
Qty: 473 ML | Refills: 0 | Status: SHIPPED | OUTPATIENT
Start: 2021-01-26

## 2021-01-26 RX ORDER — ZINC SULFATE 50(220)MG
220 CAPSULE ORAL DAILY
Qty: 4 CAPSULE | Refills: 0 | Status: SHIPPED | OUTPATIENT
Start: 2021-01-27 | End: 2021-01-31

## 2021-01-26 RX ORDER — MECLIZINE HCL 12.5 MG/1
12.5 TABLET ORAL EVERY 8 HOURS PRN
Qty: 30 TABLET | Refills: 0 | Status: SHIPPED | OUTPATIENT
Start: 2021-01-26

## 2021-01-26 RX ORDER — MELATONIN
2000 DAILY
Qty: 10 TABLET | Refills: 0 | Status: SHIPPED | OUTPATIENT
Start: 2021-01-27 | End: 2021-06-07

## 2021-01-26 RX ADMIN — GUAIFENESIN 200 MG: 200 SOLUTION ORAL at 08:48

## 2021-01-26 RX ADMIN — DOCUSATE SODIUM 100 MG: 100 CAPSULE, LIQUID FILLED ORAL at 08:49

## 2021-01-26 RX ADMIN — CYANOCOBALAMIN TAB 1000 MCG 1000 MCG: 1000 TAB at 08:48

## 2021-01-26 RX ADMIN — ZINC SULFATE 220 MG (50 MG) CAPSULE 220 MG: CAPSULE at 08:48

## 2021-01-26 RX ADMIN — ASPIRIN 81 MG: 81 TABLET, COATED ORAL at 08:49

## 2021-01-26 RX ADMIN — ENOXAPARIN SODIUM 30 MG: 30 INJECTION SUBCUTANEOUS at 08:49

## 2021-01-26 RX ADMIN — Medication 2000 UNITS: at 08:49

## 2021-01-26 RX ADMIN — DEXAMETHASONE 1 MG: 0.5 TABLET ORAL at 12:58

## 2021-01-26 RX ADMIN — DEXAMETHASONE 1 MG: 0.5 TABLET ORAL at 08:48

## 2021-01-26 RX ADMIN — OXYCODONE HYDROCHLORIDE AND ACETAMINOPHEN 1000 MG: 500 TABLET ORAL at 08:49

## 2021-01-26 RX ADMIN — SODIUM CHLORIDE 75 ML/HR: 0.9 INJECTION, SOLUTION INTRAVENOUS at 00:06

## 2021-01-26 RX ADMIN — GUAIFENESIN 200 MG: 200 SOLUTION ORAL at 12:59

## 2021-01-26 RX ADMIN — AMLODIPINE BESYLATE 5 MG: 5 TABLET ORAL at 08:49

## 2021-01-26 RX ADMIN — DULOXETINE HYDROCHLORIDE 20 MG: 20 CAPSULE, DELAYED RELEASE ORAL at 08:48

## 2021-01-26 RX ADMIN — OXYCODONE HYDROCHLORIDE 20 MG: 10 TABLET ORAL at 05:01

## 2021-01-26 RX ADMIN — POLYETHYLENE GLYCOL (3350) 17 G: 17 POWDER, FOR SOLUTION ORAL at 08:48

## 2021-01-26 RX ADMIN — PANTOPRAZOLE SODIUM 40 MG: 40 TABLET, DELAYED RELEASE ORAL at 05:01

## 2021-01-26 NOTE — DISCHARGE INSTRUCTIONS
COVID-19 (Enfermedad por coronavirus 2019)   LO QUE NECESITA SABER:   COVID-19 es la enfermedad causada por el nuevo coronavirus descubierto por primera vez en diciembre de 2019  Los coronavirus generalmente causan infecciones de las vías respiratorias superiores (nariz, garganta y pulmones), aurelio un resfriado  El nuevo virus también puede causar afecciones respiratorias inferiores graves, aurelio la neumonía o el síndrome de dificultad respiratoria aguda (SDRA)  Cualquier persona puede desarrollar problemas graves a causa del nuevo virus, dora el riesgo es mayor si tiene 72 años o [place]  Un sistema inmunitario débil, la diabetes o amaury enfermedad cardíaca o pulmonar también pueden aumentar el riesgo  INSTRUCCIONES SOBRE EL LESLI HOSPITALARIA:   Si keith que usted o alguien que conoce puede estar infectado: Louis lo siguiente para proteger a otras personas:  · Si se requiere atención de emergencia, avise al operador de la posible infección, o llame antes y avise al servicio de urgencias  · Llame a un médico para recibir instrucciones si los síntomas son leves  Cualquier persona que pueda estar infectada no  debe llegar sin llamar tamika  El médico deberá proteger a los miembros del personal y a otros pacientes  · La persona que puede estar infectada debe usar un tapabocas mientras reciben [employer]  Wann ayudará a reducir el riesgo de infectar a otras personas  Nadie que sea jared de 2 años, que tenga problemas respiratorios o que no pueda quitárselo debe usar un tapabocas  El médico puede darle instrucciones para cualquier persona que no pueda usar un tapabocas  Llame al Angie Evens local de emergencias (911 en los [place]) o pídale a alguien que lo lleve al departamento de emergencias si:  · Usted tiene dificultad para respirar o falta de aliento mientras descansa  · Usted siente presión o dolor en el pecho que dura más de 5 minutos  · Usted tiene confusión o es difícil despertarlo      · Yael labios o maximiliano están azules  · Usted tiene fiebre de 104 ºF (40 °C) o más  Llame a smith médico si:  · No  tiene síntomas de COVID-19 dora tuvo contacto físico cercano dentro de los 14 días con alguien que amparo positivo  · Usted tiene preguntas o inquietudes acerca de smith condición o cuidado  Medicamentos: Es posible que necesite alguno de los siguientes si los síntomas son leves:  · Los descongestionantes ayudan a reducir la congestión nasal y [facility] a respirar más fácilmente  Si monet pastillas descongestionantes, pueden causarle agitación o problemas para dormir  No use aerosol descongestionante por más de unos cuantos días  · Los jarabes para la tos ayudan a reducir la tos  Pregúntele a smith médico cuál tipo de medicamento para la tos es mejor para usted  · Acetaminofén maximino el dolor y baja la fiebre  Está disponible sin receta médica  Pregunte la cantidad y la frecuencia con que debe tomarlos  [address]  Magi las etiquetas de todos los demás medicamentos que esté usando para saber si también contienen acetaminofén, o pregunte a smith médico o farmacéutico  El acetaminofén puede causar daño en el hígado cuando no se monet de forma correcta  No use más de 4 gramos (4000 miligramos) en total de acetaminofeno en un día  · Los [place], aurelio el ibuprofeno, [facility] a disminuir la inflamación, el dolor y la [place]  Los ALEXYS pueden causar sangrado estomacal o problemas renales en ciertas personas  Si usted monet un medicamento anticoagulante, siempre pregúntele a smith médico si los ALEXYS son seguros para usted  Siempre magi la etiqueta de kimberlee medicamento y [place] instrucciones  · [place] miko medicamentos aurelio se le haya indicado  Consulte con smith médico si usted keith que smith medicamento no le está ayudando o si presenta efectos secundarios  Infórmele si es alérgico a cualquier medicamento  Mantenga amaury lista actualizada de los [place], las vitaminas y los productos herbales que monet   Incluya los siguientes datos de los medicamentos: cantidad, frecuencia y motivo de administración  Traiga con usted la lista o los envases de las píldoras a miko citas de seguimiento  Lleve la lista de los medicamentos con usted en jannette de glendy emergencia  Cómo se propaga el coronavirus 2019: El virus se propaga rápida y fácilmente  Puede infectarse si está en contacto con glendy gran cantidad del virus, incluso adriano poco tiempo  También puede infectarse por estar cerca de glendy pequeña cantidad del virus adriano mucho tiempo  A continuación se indican las formas en que se keith que se propaga el virus, dora es posible que surja más información:  · Las gotitas son la forma más común de propagación de todos los coronavirus  El virus puede viajar en gotitas que se gaston cuando glendy persona habla, tose o estornuda  Cualquiera que respire las gotitas o que las gotitas se le metan en los ojos puede infectarse con el virus  Se keith que el contacto personal cercano con glendy persona infectada es la principal forma de propagación del virus  El contacto personal cercano significa estar a menos de 6 pies (2 metros) de otra persona  · El contacto de persona a persona puede propagar el virus  Por ejemplo, glendy persona con el virus en miko josefina puede propagarlo al darle la mano a alguien  En kimberlee momento, no parece que el virus pueda transmitirse a un bebé adriano el embarazo o [address]  El bebé puede infectarse después de nacer por contacto de persona a persona  El virus tampoco parece propagarse por la [place]  Si está embarazada o amamantando, hable con smith médico u obstetra sobre cualquier preocupación que tenga  · El virus puede permanecer en objetos y superficies  Glendy persona puede contraer el virus en miko josefina al tocar el objeto o la superficie  La infección se produce si la persona se toca los ojos o la boca sin antes lavarse las josefina  Aún no se sabe cuánto tiempo puede permanecer el virus en un objeto o superficie   Por eso es importante limpiar todas las superficies que se usan regularmente  · Un animal infectado puede ser [place] de infectar a amaury persona que lo toque  [place] puede ocurrir en [facility] vivos o en amaury cathryn  Cómo todo el cisco puede reducir el riesgo de COVID-19: La mejor manera de prevenir la infección es evitar a cualquiera que esté infectado, dora esto puede ser difícil de lograr  Amaury persona infectada puede propagar el virus antes de que aparezcan los signos o síntomas, o incluso si los signos o síntomas nunca se desarrollan  Lo siguiente puede ayudar a reducir el riesgo de infección:      · [employer] las josefina con frecuencia adriano el día  Utilice agua y [place]  Frótese las josefina enjabonadas, [facility] dedos  Lávese el frente y el dorso de cada [place], y Joseph dedos  Use los dedos de amaury mano para restregar debajo de las uñas de la [place]  Lávese adriano al menos 20 segundos  Enjuague con agua corriente caliente adriano varios segundos  Luego séquese las josefina con amaury toalla limpia o amaury toalla de papel  Puede usar un desinfectante para josefina que contenga alcohol, si no hay agua y jabón disponibles  No se toque los ojos, la nariz o la boca sin antes [employer]  Enseñe a los niños a lavarse las josefina y a usar el desinfectante de [address]  · Cúbrase al toser o estornudar  [place] darlyn que las gotitas viajen de usted a los demás  Gire la maximiliano y cúbrase la boca y la [employer] con un pañuelo  Deseche el pañuelo  Use el ángulo del brazo si no tiene un pañuelo disponible  Luego lávese las josefina con agua y [place] o use un desinfectante de josefina  Gire la russ y cúbrase si está cerca de alguien que está estornudando o tosiendo  Enséñeles a los niños a cubrirse al toser o estornudar  · [address] distanciamiento social a nivel local, nacional y [place]  El distanciamiento social significa que las personas evitan el contacto físico cercano para que el virus no se propague de Fernando Alegria persona a otra  Mantenga al menos 6 pies (2 metros) de distancia entre usted y los demás  También mantenga esta distancia de cualquiera que venga a smith casa, aurelio alguien que louis amaury entrega  · Acostúmbrese a no tocarse la maximiliano  No se sabe cuánto tiempo puede permanecer el virus en los objetos y las superficies  Si tiene el virus [employer], puede transferirlo a los ojos, la nariz o la boca e infectarse  También puede transferirlo a los objetos, las [address]  Tenga cuidado con lo que toca [employer]  Por ejemplo, los pasamanos y botones de ascensor  Intente no tocar nada con las josefina descubiertas a menos que sea necesario  [employer] las josefina antes de salir de smith casa y cuando regresa  · Limpie y desinfecte a menudo los objetos y las superficies de alto contacto  Use amaury solución o toallitas desinfectantes  Puede hacer amaury solución diluyendo 4 cucharaditas de lejía en 1 cuarto de galón (4 tazas) de agua  Limpie y desinfecte aunque piense que nadie que viva o haya entrado en smith casa esté infectado con el virus  Puede limpiar los objetos con un paño desinfectante antes de llevarlos a smith casa  [employer] las josefina después de manipular cualquier cosa que traiga a smith casa  · Louis que smith sistema inmunitario esté lo más saludable posible  Un sistema inmunitario debilitado lo hace más vulnerable al nuevo coronavirus  No hay ninguna vacuna contra la COVID-19 disponible todavía  Las vacunas, aurelio la vacuna contra la gripe y la neumonía, pueden ayudar al sistema inmunitario  Smith médico le indicará qué vacunas debe recibir y cuándo aplicárselas  Mantenga smith sistema inmunitario lo más aaron posible  No fume  Consuma alimentos saludables, louis ejercicio regularmente e intente controlar el estrés  Acuéstese y levántese a la misma hora todos los días  Pautas de distanciamiento social: Las normas de distanciamiento social nacionales y locales varían   Las reglas pueden cambiar con el tiempo a medida que se [place] [employer] restricciones  Las restricciones pueden volver a aplicarse si se produce un brote en el lugar donde usted vive  Es importante conocer y seguir todas las reglas de distanciamiento social actuales en smith Idella Otter  Las siguientes son reglas generales al respecto:  · Limite los viajes fuera de smith casa  Es posible que se le entreguen alimentos, medicinas y otros suministros  Si es posible, imtiaz que dejen los [employer] entregan en smith travis o en [place]  Intente que nadie le entregue un [employer]  Estará tan cerca de la persona que el virus puede propagarse entre ustedes  · No tenga contacto físico cercano con nadie que no viva en smith casa  No le dé la mano, abrace o bese a amaury persona aurelio saludo  Párese o camine lo más lejos posible de los demás  Si tiene que usar el transporte público ([address] [place]), intente sentarse o pararse lejos de los demás  Puede mantenerse conectado de forma berman con los demás a través de llamadas telefónicas, mensajes de correo electrónico, sitios web de [employer] y videochats  Verifique cómo están las personas que pueden tener dificultades para distanciarse socialmente, o que viven solas  Pregunte a los administradores de los asilos de ancianos o de las instalaciones de cuidados a susana plazo cómo puede comunicarse con seguridad con alguien que vive allí  · Use un tapabocas de sandip cuando esté cerca de otras personas que no viven en smith casa  Los tapabocas ayudan evitar que el virus se propague a otras personas en las gotitas  Puede usar un tapabocas transparente si alguien necesita leer miko labios  Jennifer es un tapabocas con un plástico sobre el área de la boca para que se puedan salina los labios  No utilice tapabocas que tengan válvulas de respiración o respiraderos  El virus puede salir por la válvula o el respiradero y contagiar a otros  No se quite el tapabocas para hablar, toser o estornudar   No utilice tapabocas en niños menores de 2 años ni en personas que tengan problemas respiratorios o no puedan quitárselo  · Permita que solo los profesionales médicos u otros profesionales ingresen a smith casa  Use el tapabocas y recuérdeles a los profesionales que usen un tapabocas  Recuérdeles que se laven las josefina cuando lleguen y antes de irse  No  deje entrar a nadie que no viva en smith casa, aunque no esté enfermo  Amaury persona puede contagiar el virus a otros antes de que comiencen los síntomas de COVID-19  Algunas personas ni siquiera desarrollan síntomas  Los niños suelen tener síntomas leves o ningún síntoma  Puede ser difícil decirle a un nan que no lo abrace ni lo bese  Explíquele que así es aurelio puede ayudarlo a mantenerse saludable  · No vaya a la casa de [place] persona, a menos que sea necesario  No vaya de visita, aunque la persona esté segun  Vaya solo si necesita ayudarla  Asegúrese de que ambos usen un tapabocas mientras esté allí  · Evite las grandes reuniones y las multitudes  Las reuniones o multitudes de 10 o más individuos pueden hacer que el virus se propague  Por ejemplo, las reuniones incluyen fiestas, eventos deportivos, servicios religiosos y conferencias  Se pueden formar multitudes en [address], los parques y las atracciones turísticas  Protéjase manteniéndose alejado de las grandes reuniones y multitudes  · Pregunte a smith médico de qué otra forma [address] citas  Es posible que pueda tener citas sin tener que ir al consultorio del médico  Algunos médicos ofrecen citas por teléfono, video u otros tipos de citas  También puede obtener recetas de miko medicamentos para varios meses de amaury vez  · Manténgase a ghislaine si debe que salir a trabajar  Es posible que tenga un trabajo que solo se puede hacer fuera de smith casa  Mantenga la distancia física entre usted y los demás trabajadores tanto aurelio sea posible  Siga las reglas de smith empleador para que todos estén a ghislaine  Si tiene COVID-19 y se está recuperando en casa:  Los [employer] Veto [place]or instrucciones específicas que debe seguir  Las siguientes son pautas generales para recordarle cómo mantener a los demás a ghislaine hasta que usted esté carline:  · Lávese las josefina frecuentemente  Use agua y jabón tanto aurelio sea posible  Puede usar un desinfectante para josefina que contenga alcohol, si no hay agua y jabón disponibles  No comparta toallas con nadie  Si [place] toallas de papel, deséchelas en un cubo de basura recubierto que se guarda en smith habitación o área  Use un cubo de basura cubierto, si es posible  · No salga de smith casa a menos que sea necesario  Es posible que tenga que ir al [employer] de smith médico para hacerse chequeos o para resurtir amaury Marcus Simmer  No llegue al consultorio del médico sin amaury mars  Los médicos tienen que hacer que miko consultorios angelique seguros para el personal y [de-identified] pacientes  · No entre en contacto físico cercano con nadie, ghislaine que sea necesario  Solo tenga un contacto físico cercano con amaury persona que lo cuide directamente, o con un bebé o nan que deba cuidar  Los miembros de la shelley y los amigos no deben visitarlo  Si es posible, quédese en un área o habitación separada de smith casa si vive con [employer]  Yesika debe entrar en el área o en la habitación excepto para brindarle cuidados  Puede visitar a los demás por teléfono, videochat, correo electrónico o sistemas similares  Es importante mantenerse conectado con los demás en smith nikia mientras se recupera  · Use un tapabocas mientras haya otras personas cerca de usted  [place] puede ayudar a [employer] propaguen el virus cuando usted [place], estornuda o tose  Póngase el tapabocas antes de que la persona entre en smith habitación o [place]  Recuérdele a la persona que se cubra la nariz y la boca antes de entrar a brindarle cuidados  · No comparta artículos  No comparta platos, toallas ni otros artículos con nadie  Los artículos deben ser lavados después de usarlos  · Namita Mchugh a smith bebé   Dago con agua y jabón con frecuencia adriano todo el día  Use un tapabocas mientras amamanta o mientras se extrae o se saca la [address]  Si es posible, pídale a alguien que esté carline que cuide de smith bebé  Puede poner la [address] en biberones para que la persona la use, si es necesario  Hable con smith médico si tiene preguntas o inquietudes acerca de cómo cuidar o vincularse con smith bebé  También le dirá cuándo debe traer a smith bebé para los chequeos y las vacunas  También le dirá qué hacer si keith que smith bebé está infectado con el nuevo virus  · No manipule animales vivos  Hasta que se sepa más, es mejor no tocar, jugar o manipular animales vivos  Algunos animales, incluyendo las [place], maya sido infectados con el nuevo coronavirus  No manipule ni cuide animales hasta que esté carline  El cuidado incluye alimentar, acariciar y Noberto Flattery a smith mascota  No deje que smith mascota lo lama o comparta smith comida  Pídale a alguien que no esté infectado que cuide de [address], si es posible  Si debe cuidar a [employer], [place] un tapabocas  Lávese las josefina antes y después de cuidar a smith mascota  · Siga las instrucciones de smith médico para estar cerca de los demás después de recuperarse  Deberá esperar al menos 10 días después de la aparición de los síntomas  Entonces deberá pasar 24 horas sin fiebre sin recibir medicamentos para la fiebre, y sin otros síntomas  La pérdida del sentido del gusto o el olfato puede continuar adriano varios meses  Se considera que está carline estar cerca de otros si kimberlee es el único síntoma  No se sabe con certeza si amaury persona recuperada puede transmitir el virus a otros, ni por cuánto tiempo  Smith médico puede darle instrucciones, aurelio continuar con el distanciamiento social o usar un tapabocas cuando esté cerca de otras personas  Cómo cuidar de alguien que tiene COVID-19: Si la persona vive en otro hogar, coordine un tiempo para brindar cuidados   Recuerde llevar algunos pares de guantes desechables y un tapabocas  Myrl Read son las pautas generales para ayudarle a cuidar de forma berman a cualquier persona que tenga COVID-19:  · Lávese las josefina frecuentemente  Lávese antes y después de entrar en la casa, área o habitación de la persona  [address] papel en un cubo de basura recubierto que tenga amaury tapa, si es posible  · No permita que otros se acerquen a la persona  Nadie debe ingresar a la casa de la persona a menos que sea necesario  De ser posible, la persona debe estar en un área o habitación separada si vive con [employer]  Mantenga la travis de la habitación cerrada a menos que necesite entrar o salir  [place] que otras personas llamen, charlen por video o envíen un correo electrónico a la persona si se siente lo suficientemente carline  La persona puede sentirse segun si se la mantiene separada adriano un susana período de [place]  La comunicación berman puede ayudar a esta persona a mantenerse en contacto con smith shelley y amigos  · Asegúrese de que la habitación de la persona tenga un buen flujo de [place]  Puede abrir la ventana si el clima lo permite  También se puede encender el aire acondicionado para ayudar a que el aire se [place]  · Comuníquese con la persona antes de entrar para brindarle cuidados  Asegúrese de que la persona use un tapabocas  Recuérdele que se lave las josefina con agua y [place]  Puede usar un desinfectante para josefina que contenga alcohol, si no hay agua y jabón disponibles  Colóquese el tapabocas antes de entrar al lugar a brindar cuidados  · Use guantes mientras bobby cuidados y limpia  Limpie los [employer] persona [place] a menudo  Limpie las encimeras, las superficies de cocción y los frentes y el interior del microondas y el refrigerador  Limpie la ducha, el sanitario, el área alrededor del sanitario, el lavabo, el área alrededor del lavabo y los grifos  Junte la ropa sucia o la ropa de Rosette Arcos y seque los artículos con el agua más caliente que permita la sandip  Lave los platos y utensilios usados en [place] y [place] o en un lavavajillas  · Todo lo que deseche debe ir a un cubo de basura recubierto  Cuando necesite vaciar la basura, cierre el extremo abierto de la [place] y Sergio  Ovid ayuda a evitar que los artículos en los que está el virus se salgan de la basura  Quítese los guantes y deséchelos  Lávese las [place]  Acuda a la consulta de control con smith médico según las indicaciones: Anote miok preguntas para que se acuerde de hacerlas adriano miko visitas  Para más información:  · Centers for Disease Control and Prevention  [address] , [address] [employer] [facility]  Phone: [phone]  Web Address: [place]    [place] [address] Information is for End User's use only and may not be sold, redistributed or otherwise used for commercial purposes  All illustrations and images included in CareNotes® are the copyrighted property of [place] A 55social  or [address] [place] [address] es sólo para uso en educación  Smith intención no es darle un consejo médico sobre enfermedades o tratamientos  Colsulte con smith Rickford Veneta farmacéutico antes de seguir cualquier régimen médico para saber si es seguro y efectivo para usted  Neutropenia   LO QUE NECESITA SABER:   La neutropenia es amaury afección que provoca un número bajo de glóbulos blancos en smith ana rosa  Los neutrófilos son un tipo de glóbulos blancos que se producen en la médula ósea  Éstos ayudan a combatir la infección y las bacterias  INSTRUCCIONES SOBRE EL LESLI HOSPITALARIA:   Llame al número de emergencias local (911 en los [place]) si:  · Tiene amaury fiebre de 100 4°F (38°C) por más de 1 hora  · Usted tiene amaury fiebre de 101°F (38 3°C) o más amaury segun vez  Llame a smith médico si:  · Usted tiene fiebre o escalofríos  · Usted tiene tos reciente  · Usted tiene dolor de garganta o amaury llaga reciente en la boca      · Usted tiene enrojecimiento o inflamación en alguna parte de smith cuerpo  · Tiene dolor en el abdomen o el recto  · Usted tiene amaury sensación quemante o dolor al [place]  · Usted tiene diarrea  · Usted está más cansado o más olvidadizo de lo usual     · Usted tiene preguntas o inquietudes acerca de smith condición o cuidado  Medicamentos: Es posible que usted necesite alguno de los siguientes:  · Los antibióticos ayudan a tratar o evitar amaury infección a causa de amaury bacteria  · Los medicamentos antimicóticos sirven para [place] que pueden causar enfermedades  · Opa-locka miko medicamentos aurleio se le haya indicado  Consulte con smith médico si usted keith que smith medicamento no le está ayudando o si presenta efectos secundarios  Infórmele si es alérgico a cualquier medicamento  Mantenga amaury lista actualizada de los [place], las vitaminas y los productos herbales que monet  Incluya los siguientes datos de los medicamentos: cantidad, frecuencia y motivo de administración  Traiga con usted la lista o los envases de las píldoras a miko citas de seguimiento  Lleve la lista de los medicamentos con usted en jannette de amaury emergencia  Prevenga infecciones:  · [employer] josefina antes de preparar alimentos o comer y después de ir al baño  · Que se bañe a diario  En jannette de afeitarse es mejor utilizar amaury afeitadora eléctrica para prevenir cortadas en smith piel  · Use un cepillo de dientes de cerdas suaves y cepíllese los dientes suavemente 2 veces al día  Pregúntele a smith médico si le permite pasarse suavemente el hilo dental todos los días  · Evitar la muchedumbre y cualquier persona que esté enferma  Evitar el contacto con la saliva, la orina o la defecación de Valeri Rojas a alguien que limpie la caja de arena del [place], el tanque de los peces o recoja los desechos de smith cassandra  · Matheus muy carline las frutas crudas y verduras  Cocine muy carline las tammie y Diego  · Utilice laxantes si está estreñido   No utilice supositorios ni enemas  El estreñimiento, los supositorios y los enemas pueden provocar un rasgado en smith recto  [place] permite que los germénes entren en smith sistema y pueden aumentar smith riesgo de contraer amaury infección  · [facility] vacunas  Las vacunas contra la gripe o la neumonía pueden ayudar a prevenir las infecciones y Frannie  Acuda a yael consultas de control con smith médico según le indicaron  Anote yael preguntas para que se acuerde de hacerlas adriano yael visitas  © Copyright [address] Information is for End User's use only and may not be sold, redistributed or otherwise used for commercial purposes  All illustrations and images included in CareNotes® are the copyrighted property of [employer]  or [address] [place] [address] es sólo para uso en educación  Smith intención no es darle un consejo médico sobre enfermedades o tratamientos  Colsulte con smith [facility] farmacéutico antes de seguir cualquier régimen médico para saber si es seguro y efectivo para usted  Anemia   LO QUE NECESITA SABER:   La anemia es cuando el número de glóbulos rojos o la cantidad de [employer] glóbulos rojos es baja  La hemoglobina es amaury proteína que ayuda a transportar el oxígeno a través de smith cuerpo  Los glóbulos rojos usan el tara para crear hemoglobina  La anemia podría desarrollarse si smith cuerpo no tiene suficiente tara  También podría desarrollarse si smith cuerpo no produce suficientes glóbulos rojos o si ellos mueren antes de que smith cuerpo pueda producirlos  INSTRUCCIONES SOBRE EL LESLI HOSPITALARIA:   Llame al 911 o imtiaz que alguien llame al 911 en cualquiera de los siguientes casos:  · Usted pierde el conocimiento  · Usted tiene un dolor intenso en el pecho  Busque atención médica de inmediato si:  · Usted tiene evacuaciones intestinales oscuras o con ana rosa  Comuníquese con smith médico si:  · Yael síntomas empeoran, aún después del [place]      · Usted tiene preguntas o inquietudes acerca de smith condición o cuidado  Medicamentos:  · Suplementos de tara o ácido fólico ayudan a aumentar miok glóbulos rojos y los niveles de hemoglobina  · Inyecciones de vitamina B12 podrían ayudar a aumentar el conteo de miko glóbulos rojos y a disminuir miko síntomas  Pregunte a smith médico cómo se inyecta la B12  · [place] miko medicamentos aurelio se le haya indicado  Consulte con smith médico si usted keith que smith medicamento no le está ayudando o si presenta efectos secundarios  Infórmele si es alérgico a algún medicamento  Mantenga amaury lista actualizada de los [place], las vitaminas y los productos herbales que monet  Incluya los siguientes datos de los medicamentos: cantidad, frecuencia y motivo de administración  Traiga con usted la lista o los envases de las píldoras a miko citas de seguimiento  Lleve la lista de los medicamentos con usted en jannette de amaury emergencia  Evite la anemia: Consuma alimentos sanos altos en tara y vitamina C  Lily Jillian, vegetales de hojas sosa oscuro y frijoles son altos en tara y proteína  La vitamina C ayuda a smith cuerpo a absorber el tara  Los [employer] en vitamina C incluyen naranjas y otros cítricos  [place] a smith médico amaury lista de otros alimentos altos en tara y vitamina C  Pregunte si usted necesita llevar amaury dieta especial   [place] a miko consultas de control con msith médico según le indicaron  Anote miko preguntas para que se acuerde de hacerlas adriano miko visitas  © Copyright [address] Information is for End User's use only and may not be sold, redistributed or otherwise used for commercial purposes  All illustrations and images included in CareNotes® are the copyrighted property of A D A M , [address] es sólo para uso en educación  Smith intención no es darle un consejo médico sobre enfermedades o tratamientos   Colsulte con smith Mildred Edwinugh farmacéutico antes de seguir cualquier régimen médico para saber si es seguro y efectivo para usted

## 2021-01-26 NOTE — UTILIZATION REVIEW
Initial Inpatient Review (see initial OBS review completed by Pascale Strar  On 1/25 for additional detail)    Date: 1/26/21                          WAS OBSERVATION 1/24/21 @2157 CONVERTED TO INPATIENT ADMISSION 1/25/21 @1802 DUE TO CONTINUED STAY REQUIRED TO CARE FOR AT RISK PATIENT WITH COVID 19 INFECTION AND DEHYDRATION REQUIRING REMDESIVIR, IVF, AND MONITORING OF PO INTAKE  Admission Orders (From admission, onward)     Ordered        01/25/21 1802  Inpatient Admission  Once         01/24/21 2157  Place in Observation  Once                   Orders Placed This Encounter   Procedures    Inpatient Admission     Standing Status:   Standing     Number of Occurrences:   1     Order Specific Question:   Level of Care     Answer:   Med Surg [16]     Order Specific Question:   Estimated length of stay     Answer:   Not Applicable       YDL:65 y o  female w/hx lymphoma on chemo, vertigo, lupus, htn, migraines initially admitted on 1/24/21 under observation, converted to IP admit on 1/25/21 due to dehydration and dizziness with COVID infection  COVID regimen in progress with IV remdesivir  crp and d dimer elevated  She is already on decadron and roxicodone related to her lymphoma  Assessment/Plan:   1/25 PM update: feeling better than yesterday, last vomited the night before  Headache and dizziness are gone, appetite improving  Lungs clear, on RA   tbp-filgrastim started tid per heme onc for severe neutropenia  IV iron given x 1  Hematology declined a formal consult as patient is well known to them  They discussed case verbally with admitting service  1/26: lungs with bilateral crackles and nonproductive cough  92% on RA  IVF running through this AM, then dc  advanced to house diet-tolerated  wbc up to 1 1, abs neut   37     Pertinent Labs/Diagnostic Results:   No new rads or EKG  Results from last 7 days   Lab Units 01/20/21  1221   SARS-COV-2  Detected*     Results from last 7 days   Lab Units 01/26/21  2877 01/25/21  0607 01/24/21 2013 01/23/21  0853   WBC Thousand/uL 1 10* 0 40* 0 70* 0 60*   HEMOGLOBIN g/dL 10 1* 9 6* 10 6* 11 5*   HEMATOCRIT % 32 7* 30 8* 33 8* 36 9   PLATELETS Thousands/uL 166 107* 114* 141*   TOTAL NEUT ABS Thousand/uL 0 37* 0 09* 0 15* 0 16*   BANDS PCT % 8 4 2 3         Results from last 7 days   Lab Units 01/26/21  0504 01/25/21  0607 01/24/21 2013 01/23/21  0853   SODIUM mmol/L 140 139 136* 138   POTASSIUM mmol/L 3 7 3 6 3 6 3 7   CHLORIDE mmol/L 107 108 102 102   CO2 mmol/L 28 24 26 28   ANION GAP mmol/L 5 7 8 8   BUN mg/dL 9 10 11 13   CREATININE mg/dL 0 51* 0 58* 0 66 0 80   EGFR ml/min/1 73sq m 106 102 98 82   CALCIUM mg/dL 8 3* 8 1* 8 7 8 9     Results from last 7 days   Lab Units 01/25/21  0607 01/24/21 2013 01/23/21  0853   AST U/L 26 33 53*   ALT U/L 17 22 32   ALK PHOS U/L 101 122 124*   TOTAL PROTEIN g/dL 5 9 6 7 7 2   ALBUMIN g/dL 3 3 3 8 4 1   TOTAL BILIRUBIN mg/dL 0 60 0 80 1 00     Results from last 7 days   Lab Units 01/24/21  2040   POC GLUCOSE mg/dl 87     Results from last 7 days   Lab Units 01/26/21  0504 01/25/21  0607 01/24/21 2013 01/23/21  0853   GLUCOSE RANDOM mg/dL 103* 181* 98 119*     Results from last 7 days   Lab Units 01/24/21  2013   CK TOTAL U/L 23*     Results from last 7 days   Lab Units 01/24/21 2013 01/23/21  0853   TROPONIN I ng/mL <0 01 <0 01     Results from last 7 days   Lab Units 01/24/21  2343   D-DIMER QUANTITATIVE ug/ml FEU 0 86*       Results from last 7 days   Lab Units 01/24/21  2343   PROCALCITONIN ng/ml <0 05     Results from last 7 days   Lab Units 01/24/21 2013 01/23/21  0853   NT-PRO BNP pg/mL 77 5 32 3     Results from last 7 days   Lab Units 01/24/21 2013   FERRITIN ng/mL 458*         Results from last 7 days   Lab Units 01/24/21 2013 01/23/21  0853   LIPASE u/L 35 36     Results from last 7 days   Lab Units 01/24/21 2013   CRP mg/L 52 0*         Results from last 7 days   Lab Units 01/23/21  1003   CLARITY UA  Slightly Cloudy* COLOR UA  Yellow   SPEC GRAV UA  1 020   PH UA  6 0   GLUCOSE UA mg/dl Negative   KETONES UA mg/dl Negative   BLOOD UA  Negative   PROTEIN UA mg/dl 30 (1+)*   NITRITE UA  Negative   BILIRUBIN UA  Negative   UROBILINOGEN UA mg/dL 1 0   LEUKOCYTES UA  Negative   WBC UA /hpf 0-1   RBC UA /hpf None Seen   BACTERIA UA /hpf Moderate*   EPITHELIAL CELLS WET PREP /hpf Moderate*   MUCUS THREADS  Moderate*     Results from last 7 days   Lab Units 01/20/21  1221   INFLUENZA A PCR  Not Detected   INFLUENZA B PCR  Not Detected   RSV PCR  Not Detected         Results from last 7 days   Lab Units 01/23/21  1003   AMPH/METH  Negative   BARBITURATE UR  Negative   BENZODIAZEPINE UR  Negative   COCAINE UR  Negative   METHADONE URINE  Negative   OPIATE UR  Negative   PCP UR  Negative   THC UR  Negative     Results from last 7 days   Lab Units 01/23/21  0853   ETHANOL LVL mg/dL <10     Results from last 7 days   Lab Units 01/23/21  0853   BLOOD CULTURE  No Growth at 48 hrs  No Growth at 48 hrs       Results from last 7 days   Lab Units 01/26/21  0504 01/25/21  0607 01/24/21 2013 01/23/21  0853   TOTAL COUNTED  50 100 100 100           Vital Signs:   Date/Time  Temp  Pulse  Resp  BP  SpO2  O2 Device  Patient Position - Orthostatic VS   01/26/21 0700  97 1 °F (36 2 °C)Abnormal   54Abnormal   18  142/63  92 %  None (Room air)  Lying   01/26/21 0300          92 %  None (Room air)     01/25/21 2329  98 2 °F (36 8 °C)  64  20  139/70  93 %  None (Room air)  Lying   01/25/21 1900          94 %  None (Room air)     01/25/21 1540  97 5 °F (36 4 °C)  65  20  137/61  94 %  None (Room air)  Lying   01/25/21 0800  97 2 °F (36 2 °C)Abnormal   52Abnormal   20  136/65  94 %  None (Room air)  Lying   01/24/21 2249  97 5 °F (36 4 °C)  64  20  138/65  95 %  None (Room air)  Lying         Medications:   Scheduled Medications:  amLODIPine, 5 mg, Oral, Daily  ascorbic acid, 1,000 mg, Oral, Q12H JORDEN  aspirin, 81 mg, Oral, Daily  atorvastatin, 40 mg, Oral, Daily With Dinner  cholecalciferol, 2,000 Units, Oral, Daily  cyanocobalamin, 1,000 mcg, Oral, Daily  dexamethasone, 1 mg, Oral, BID With Meals  docusate sodium, 100 mg, Oral, BID  DULoxetine, 20 mg, Oral, Daily  enoxaparin, 30 mg, Subcutaneous, Q12H JORDEN  guaiFENesin, 200 mg, Oral, Q4H  zinc sulfate, 220 mg, Oral, Daily    Followed by  Fox Iglesias ON 1/31/2021] multivitamin-minerals, 1 tablet, Oral, Daily  pantoprazole, 40 mg, Oral, Early Morning  polyethylene glycol, 17 g, Oral, Daily  remdesivir, 100 mg, Intravenous, Q24H  tbo-filgrastim, 480 mcg, Subcutaneous, Daily    IV venofer x 1 on 1/25    Continuous IV Infusions:sodium chloride 0 9 % infusion   Rate: 75 mL/hr Dose: 75 mL/hr  Freq: Continuous Route: IV  Indications of Use: IV Hydration  Last Dose: Stopped (01/26/21 1047)  Start: 01/25/21 0800 End: 01/26/21 0912     PRN Meds:  acetaminophen, 650 mg, Oral, Q6H PRN  benzonatate, 200 mg, Oral, TID PRN  meclizine, 12 5 mg, Oral, Q8H PRN  ondansetron, 4 mg, Intravenous, Q6H PRN  oxyCODONE, 20 mg, Oral, Q4H PRN x 1 1/25, x 1 1/26    Discharge Plan: D    Network Utilization Review Department  ATTENTION: Please call with any questions or concerns to [phone] and carefully listen to the prompts so that you are directed to the right person  All voicemails are confidential   Eugene Marsh all requests for admission clinical reviews, approved or denied determinations and any other requests to dedicated fax number below belonging to the campus where the patient is receiving treatment   List of dedicated fax numbers for the Facilities:  [address] DENIALS (Administrative/Medical Necessity) [phone]   [address] (Maternity/NICU/Pediatrics) [phone]   [address] [place] [address] - Enrrique Mckeon (Martha Baker) [address] [place] [address] [place] [address] [phone]   Casmalia Moe Bir[address] [phone]

## 2021-01-26 NOTE — CASE MANAGEMENT
COVID positive  Norwegian speaking  CM utilized the  line and was assisted by Jayson Nayak #[id number]  Pt lives with her   She is ADL independent  She ambulates without an assistive device  She uses [address] [place] [address] Pharmacy  Her  will transport her home and will  meds from [address] [place] [address]  Pt agreeable to [facility]  CM sent a referral to [facility] [place] and they accepted  [facility] on 1/30/21  AVS updated

## 2021-01-26 NOTE — PLAN OF CARE
Problem: Potential for Falls  Goal: Patient will remain free of falls  Description: INTERVENTIONS:  - Assess patient frequently for physical needs  -  Identify cognitive and physical deficits and behaviors that affect risk of falls    -  [place] fall precautions as indicated by assessment   - Educate patient/family on patient safety including physical limitations  - Instruct patient to call for assistance with activity based on assessment  - Modify environment to reduce risk of injury  - Consider OT/PT consult to assist with strengthening/mobility  Outcome: Progressing     Problem: PAIN - ADULT  Goal: Verbalizes/displays adequate comfort level or baseline comfort level  Description: Interventions:  - Encourage patient to monitor pain and request assistance  - Assess pain using appropriate pain scale  - Administer analgesics based on type and severity of pain and evaluate response  - Implement non-pharmacological measures as appropriate and evaluate response  - Consider cultural and social influences on pain and pain management  - Notify physician/advanced practitioner if interventions unsuccessful or patient reports new pain  Outcome: Progressing     Problem: INFECTION - ADULT  Goal: Absence or prevention of progression during hospitalization  Description: INTERVENTIONS:  - Assess and monitor for signs and symptoms of infection  - Monitor lab/diagnostic results  - Monitor all insertion sites, i e  indwelling lines, tubes, and drains  - Monitor endotracheal if appropriate and nasal secretions for changes in amount and color  - [place] appropriate cooling/warming therapies per order  - Administer medications as ordered  - Instruct and encourage patient and family to use good hand hygiene technique  - Identify and instruct in appropriate isolation precautions for identified infection/condition  Outcome: Progressing  Goal: Absence of fever/infection during neutropenic period  Description: INTERVENTIONS:  - Monitor WBC    Outcome: Progressing     Problem: SAFETY ADULT  Goal: Patient will remain free of falls  Description: INTERVENTIONS:  - Assess patient frequently for physical needs  -  Identify cognitive and physical deficits and behaviors that affect risk of falls    -  [place] fall precautions as indicated by assessment   - Educate patient/family on patient safety including physical limitations  - Instruct patient to call for assistance with activity based on assessment  - Modify environment to reduce risk of injury  - Consider OT/PT consult to assist with strengthening/mobility  Outcome: Progressing  Goal: Maintain or return to baseline ADL function  Description: INTERVENTIONS:  -  Assess patient's ability to carry out ADLs; assess patient's baseline for ADL function and identify physical deficits which impact ability to perform ADLs (bathing, care of mouth/teeth, toileting, grooming, dressing, etc )  - Assess/evaluate cause of self-care deficits   - Assess range of motion  - Assess patient's mobility; develop plan if impaired  - Assess patient's need for assistive devices and provide as appropriate  - Encourage maximum independence but intervene and supervise when necessary  - Involve family in performance of ADLs  - Assess for home care needs following discharge   - Consider OT consult to assist with ADL evaluation and planning for discharge  - Provide patient education as appropriate  Outcome: Progressing  Goal: Maintain or return mobility status to optimal level  Description: INTERVENTIONS:  - Assess patient's baseline mobility status (ambulation, transfers, stairs, etc )    - Identify cognitive and physical deficits and behaviors that affect mobility  - Identify mobility aids required to assist with transfers and/or ambulation (gait belt, sit-to-stand, lift, walker, cane, etc )  - [place] fall precautions as indicated by assessment  - Record patient progress and toleration of activity level on Mobility SBAR; progress patient to next Phase/Stage  - Instruct patient to call for assistance with activity based on assessment  - Consider rehabilitation consult to assist with strengthening/weightbearing, etc   Outcome: Progressing     Problem: DISCHARGE PLANNING  Goal: Discharge to home or other facility with appropriate resources  Description: INTERVENTIONS:  - Identify barriers to discharge w/patient and caregiver  - Arrange for needed discharge resources and transportation as appropriate  - Identify discharge learning needs (meds, wound care, etc )  - Arrange for interpretive services to assist at discharge as needed  - Refer to Case Management Department for coordinating discharge planning if the patient needs post-hospital services based on physician/advanced practitioner order or complex needs related to functional status, cognitive ability, or social support system  Outcome: Progressing     Problem: Knowledge Deficit  Goal: Patient/family/caregiver demonstrates understanding of disease process, treatment plan, medications, and discharge instructions  Description: Complete learning assessment and assess knowledge base    Interventions:  - Provide teaching at level of understanding  - Provide teaching via preferred learning methods  Outcome: Progressing

## 2021-01-26 NOTE — PLAN OF CARE
Problem: OCCUPATIONAL THERAPY ADULT  Goal: Performs self-care activities at highest level of function for planned discharge setting  See evaluation for individualized goals  Description: Treatment Interventions: ADL retraining, Functional transfer training, UE strengthening/ROM, Endurance training, Continued evaluation, Activityengagement, Energy conservation          See flowsheet documentation for full assessment, interventions and recommendations  Note: Limitation: Decreased endurance, Decreased high-level ADLs  Prognosis: Good  Assessment: Pt is a 64 y o  female seen for OT evaluation s/p admit to [facility] on 1/24/2021 w/ Leukopenia, COVID (+)  See above for extensive list of comorbidities affecting Pt's functional performance at time of assessment  Personal factors affecting Pt at time of IE include:limited home support, difficulty performing IADLS , health management  and environment  Prior to admission, Pt was independent for ADLs, ambulated without a device  Upon evaluation: Pt requires supervision for LB ADLs and bathroom mobility  The following deficits impact occupational performance: decreased strength, decreased balance and decreased tolerance  Pt to benefit from continued skilled OT services while in the hospital to address deficits as defined above and maximize level of functional independence w ADL's and functional mobility  Occupational performance areas to address include: grooming, bathing/shower, toilet hygiene, medication management, functional mobility, community mobility and clothing management  From OT standpoint, recommendation at time of d/c would be return to previous environment with home health services         OT Discharge Recommendation: Home with skilled therapy

## 2021-01-26 NOTE — OCCUPATIONAL THERAPY NOTE
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 [facility])    Essential hypertension    Microcytic anemia    COVID-19    Past Medical History  Past Medical History:   Diagnosis Date    Anemia     iron and B12    Arthritis     Asthma     Cough     Depression     Falls frequently     [facility] [address] [facility]     Lymphoma [facility] [facility]     Lymphoma [facility] [address] [facility]     Migraine     Osteoporosis     Psychiatric disorder     Vertigo      Past Surgical History  Past Surgical History:   Procedure Laterality Date    CHOLECYSTECTOMY      FL GUIDED CENTRAL VENOUS ACCESS DEVICE INSERTION  11/9/2018    HYSTERECTOMY      IR BIOPSY BONE MARROW  11/24/2020    IR BIOPSY LYMPH NODE  11/24/2020    LYMPH NODE BIOPSY Left 11/9/2018    Procedure: EXCISION BIOPSY LYMPH NODE SUPRACLAVICULAR;  Surgeon: Ryan Kwon MD;  Location: [facility] MAIN OR;  Service: General    DC INCISE FINGER TENDON SHEATH Right 1/16/2020    Procedure: RELEASE TRIGGER FINGER RIGHT THUMB;  Surgeon: Sofy Zavala MD;  Location: [facility] MAIN OR;  Service: Orthopedics    TUNNELED VENOUS PORT PLACEMENT N/A 11/9/2018    Procedure: INSERTION OF PORT-A-CATH;  Surgeon: Ryan Kwon MD;  Location: [facility] MAIN OR;  Service: General             01/26/21 1041   OT Last Visit   OT Visit Date 01/26/21   Note Type   Note type Evaluation   Restrictions/Precautions   Other Precautions Multiple lines;Telemetry; Airborne/isolation;Contact/isolation   Pain Assessment   Pain Assessment Tool Pain Assessment not indicated - pt denies pain   Home Living   Type of [address] [place] [address] Two level;Stairs to enter with rails   Bathroom Shower/Tub Tub/shower unit   [address]   (none )   Additional Comments Pt reports living in the basement      Prior Function   Level of [place] Independent with ADLs and functional mobility; Needs assistance with IADLs   Lives With Spouse   Receives Help From Family   ADL Assistance Independent   IADLs Needs assistance   Falls in the last 6 months 0   Psychosocial   Psychosocial (WDL) WDL   ADL   Grooming Assistance 6  Modified Independent   UB Bathing Assistance 6  Modified Independent   LB Bathing Assistance 5  Supervision/Setup   UB Dressing Assistance 6  Modified independent   LB Dressing Assistance 5  Supervision/Setup   Toileting Assistance  5  Supervision/Setup   Bed Mobility   Supine to Sit 6  Modified independent   Sit to Supine 6  Modified independent   Transfers   Sit to Stand 6  Modified independent   Stand to Sit 6  Modified independent   Toilet transfer 5  Supervision   Functional Mobility   Functional Mobility 5  Supervision   Additional Comments short household distances    Balance   Static Sitting Good   Dynamic Sitting Fair +   Static Standing Fair   Dynamic Standing Fair -   Ambulatory Fair -   Activity Tolerance   Activity Tolerance Patient tolerated treatment well   RUE Assessment   RUE Assessment WFL   LUE Assessment   LUE Assessment WNL   Cognition   Overall Cognitive Status WFL   Arousal/Participation Alert; Cooperative   Attention Within functional limits   Orientation Level Oriented X4   Memory Within functional limits   Following Commands Follows all commands and directions without difficulty   Assessment   Limitation Decreased endurance;Decreased high-level ADLs   Prognosis Good   Assessment   Pt is a 64 y o  female seen for OT evaluation s/p admit to [facility] on [facility]/24/2021 w/ Leukopenia, COVID (+)  See above for extensive list of comorbidities affecting Pt's functional performance at time of assessment  Personal factors affecting Pt at time of IE include:limited home support, difficulty performing IADLS , health management  and environment  Prior to admission, Pt was independent for ADLs, ambulated without a device   Upon evaluation: Pt requires supervision for LB ADLs and bathroom mobility  The following deficits impact occupational performance: decreased strength, decreased balance and decreased tolerance  Pt to benefit from continued skilled OT services while in the hospital to address deficits as defined above and maximize level of functional independence w ADL's and functional mobility  Occupational performance areas to address include: grooming, bathing/shower, toilet hygiene, medication management, functional mobility, community mobility and clothing management  From OT standpoint, recommendation at time of d/c would be return to previous environment with home health services  Goals   STG Time Frame   (1 week)   Short Term Goals 1  Pt will complete bathroom mobility Enrique  2  Pt will tolerate ~ 10 minutes of standing activity without a seated rest break  3  Pt will complete toilet hygiene and clothing management Enrique  Plan   Treatment Interventions ADL retraining;Functional transfer training;UE strengthening/ROM; Endurance training;Continued evaluation; Activityengagement; Energy conservation   Goal Expiration Date 02/02/21   OT Frequency 2-3x/wk   Recommendation   OT Discharge Recommendation Home with skilled therapy

## 2021-01-26 NOTE — NURSING NOTE
IV removed with tip intact  Pressure held to control bleeding  Discharge instructions discussed with patient and she verbalizes understanding  Patient left with all her belongings and discharge instructions

## 2021-01-26 NOTE — PLAN OF CARE
Problem: PHYSICAL THERAPY ADULT  Goal: Performs mobility at highest level of function for planned discharge setting  See evaluation for individualized goals  Description: Treatment/Interventions: Functional transfer training, LE strengthening/ROM, Elevations, Therapeutic exercise, Patient/family training, Endurance training, Equipment eval/education, Bed mobility, Gait training, Spoke to nursing, Spoke to case management, OT          See flowsheet documentation for full assessment, interventions and recommendations  Note: Prognosis: Good  Problem List: Decreased strength, Decreased endurance, Impaired balance, Decreased mobility, Obesity     Barriers to Discharge: Inaccessible home environment     PT Discharge Recommendation: Home with skilled therapy     PT - OK to Discharge: Yes    See flowsheet documentation for full assessment

## 2021-01-26 NOTE — DISCHARGE INSTR - AVS FIRST PAGE
Your isolation for COVID infection should be for a total of 20 days after your positive test on 1/20/2021  This would be we recommend you to isolate from everyone as possible until 2/9/2021  IF you have need to go to treatment and office visits please inform them you are covid positive and always wear a mask  Tylenol and ibuprofen can be taken to help with the pain from coughing as well as your back pain  You can put a lidocaine patch over the counter 4% on your back at home and apply heat to the area when the patch is not on it  For your cough you can drink tea with honey  We also recommend Robitussin every 4-6 hours while you are awake  And you can take the Tessalon Perles pill three times a day  Mucinex can be used to break up the mucous you are coughing up

## 2021-01-26 NOTE — UTILIZATION REVIEW
Notification of Inpatient Admission/Inpatient Authorization Request   This is a Notification of Inpatient Admission for [address]  Be advised that this patient was admitted to our facility under Inpatient Status  Contact Severa Kingdom at [phone] for additional admission information  [address] [facility]   DEPT DEDICATED -[phone]  Patient Name:   Ricky Pinto   YOB: 1960       [address]   [address]:   [place] [address]  Tax ID: [id number]  NPI: [id number] Attending Provider/NPI:  Phone:  Address: Tod Long [[id number]]  [phone]  Same as SANTOS/Bhavana Jiang 1106 of Service Code: 24 Place of Service Name: [address] [place] [address]   Start Date: 1/25/21 1802 Discharge Date & Time: 1/26/2021  3:30 PM    Type of Admission: Inpatient Status Discharge Disposition (if discharged): Home with [address] [facility]   Patient Diagnoses: Dizziness [R42]     Orders: Admission Orders (From admission, onward)     Ordered        01/25/21 1802  Inpatient Admission  Once         01/24/21 2157  Place in Observation  Once                    Assigned Utilization Review Contact: Severa Kingdom  Utilization   Network Utilization Review Department  Phone: [phone]; Fax [phone]  Email: [email or web address]   ATTENTION PAYERS: Please call the assigned Utilization  directly with any questions or concerns ALL voicemails in the department are confidential  Send all requests for admission clinical reviews, approved or denied determinations and any other requests to dedicated fax number belonging to the campus where the patient is receiving treatment

## 2021-01-26 NOTE — PHYSICAL THERAPY NOTE
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 (HCC)    Recurrent major depressive disorder, in partial remission (HCC)    Essential hypertension    Slow transit constipation    Transaminitis    Thrombocytopenia (HCC)    Iron deficiency anemia secondary to inadequate dietary iron intake    Frequent falls    Rash and nonspecific skin eruption    Microcytic anemia    Pain of right upper extremity    Bladder pain    Other vitamin B12 deficiency anemias    Physical exam, annual    Otitis externa    Nausea and vomiting    Pain in both hands    Trigger thumb of right hand    Encounter for central line care    Palliative care patient    Cough    Epigastric pain    Decreased appetite    Decreased energy    Cancer related pain    Chemotherapy-induced neutropenia (HCC)    Headache    Advanced care planning/counseling discussion    Leukopenia    COVID-19       Past Medical History  Past Medical History:   Diagnosis Date    Anemia     iron and B12    Arthritis     Asthma     Cough     Depression     Falls frequently     [facility] [address]     Lymphoma ([facility] [address] [facility]     Lymphoma ([facility] [address] [facility]     Migraine     Osteoporosis     Psychiatric disorder     Vertigo        Past Surgical History  Past Surgical History:   Procedure Laterality Date    CHOLECYSTECTOMY      FL GUIDED CENTRAL VENOUS ACCESS DEVICE INSERTION  11/9/2018    HYSTERECTOMY      IR BIOPSY BONE MARROW  11/24/2020    IR BIOPSY LYMPH NODE  11/24/2020    LYMPH NODE BIOPSY Left 11/9/2018    Procedure: EXCISION BIOPSY LYMPH NODE SUPRACLAVICULAR;  Surgeon: Porfirio Hernandez MD;  Location:  MAIN OR;  Service: General    GA INCISE FINGER TENDON SHEATH Right 1/16/2020    Procedure: RELEASE TRIGGER FINGER RIGHT THUMB;  Surgeon: Nanda Spain MD; Location:  MAIN OR;  Service: Orthopedics    TUNNELED VENOUS PORT PLACEMENT N/A 11/9/2018    Procedure: INSERTION OF PORT-A-CATH;  Surgeon: Daniel Short MD;  Location: [address] [place] [address] MAIN OR;  Service: General       Recent Imaging  CT head without contrast   Final Result by Darion Medrano MD (01/24 2203)      Stable exam with no acute intracranial abnormality                    Workstation performed: [id number]             Recent Vital Signs  Vitals:    01/25/21 1900 01/25/21 2329 01/26/21 0300 01/26/21 0700   BP:  139/70  142/63   BP Location:  Left arm  Left arm   Pulse:  64  (!) 54   Resp:  20  18   Temp:  98 2 °F (36 8 °C)  (!) 97 1 °F (36 2 °C)   TempSrc:  Temporal  Temporal   SpO2: 94% 93% 92% 92%   Weight:       Height:            01/26/21 0916   PT Last Visit   PT Visit Date 01/26/21   Note Type   Note type Evaluation   Pain Assessment   Pain Assessment Tool Pain Assessment not indicated - pt denies pain   Home Living   Type of Home House   Additional Comments lives in basement of home with     Prior Function   Level of [place] Independent with ADLs and functional mobility   Lives With Spouse   Receives Help From Family   ADL Assistance Independent   IADLs Independent   Falls in the last 6 months 0   Restrictions/Precautions   Other Precautions Telemetry;Multiple lines   General   Family/Caregiver Present No   Cognition   Overall Cognitive Status WFL   Arousal/Participation Alert   Orientation Level Oriented X4   Memory Within functional limits   Following Commands Follows all commands and directions without difficulty   RLE Assessment   RLE Assessment WFL   LLE Assessment   LLE Assessment WFL   Coordination   Movements are Fluid and Coordinated 1   Sensation WFL   Light Touch   RLE Light Touch Grossly intact   LLE Light Touch Grossly intact   Bed Mobility   Supine to Sit 6  Modified independent   Sit to Supine 6  Modified independent   Transfers   Sit to Stand 6  Modified independent   Stand to Sit 6 Modified independent   Ambulation/Elevation   Gait pattern Excessively slow; Short stride;Decreased foot clearance; Wide [place]; Improper Weight shift   Gait Assistance 5  Supervision   Additional items Verbal cues   Assistive Device None   Distance 40ft   Balance   Static Sitting Good   Dynamic Sitting Fair +   Static Standing Fair   Dynamic Standing Fair -   Ambulatory Fair -   Endurance Deficit   Endurance Deficit Yes   Endurance Deficit Description reduced from baseline   Activity Tolerance   Activity Tolerance Patient tolerated treatment well   Medical Staff Made Aware spoke to CM   Nurse Made Aware spoke to RN   Assessment   Prognosis Good   Problem List Decreased strength;Decreased endurance; Impaired balance;Decreased mobility;Obesity   Barriers to Discharge Inaccessible home environment   Goals   Patient Goals to return home   STG Expiration Date 02/05/21   Short Term Goal #1 Pt will complete bed mobility, transfers, ambulation and elevation ind to return home   PT Treatment Day 0   Plan   Treatment/Interventions Functional transfer training;LE strengthening/ROM; Elevations; Therapeutic exercise;Patient/family training; Endurance training;Equipment eval/education; Bed mobility;Gait training;Spoke to nursing;Spoke to case management;OT   PT Frequency 2-3x/wk   Recommendation   PT Discharge Recommendation Home with skilled therapy   PT - OK to Discharge Yes   AM-PAC Basic Mobility Inpatient   Turning in Bed Without Bedrails 4   Lying on Back to Sitting on Edge of Flat Bed 4   Moving Bed to Chair 4   Standing Up From Chair 4   Walk in Room 4   Climb 3-5 Stairs 3   Basic Mobility Inpatient Raw Score 23   Basic Mobility Standardized Score 50 88         ASSESSMENT                                                                                                                     Jo Ann Allen is a 64 y o  female admitted to [address] [place] [address] on 1/24/2021 for Leukopenia   Pt  has a past medical history of Anemia, Arthritis, Asthma, Cough, Depression, Falls frequently, Lupus ([facility] [address] ), Lymphoma ([facility] [address] ), Lymphoma ([facility] [address] ), Migraine, Osteoporosis, Psychiatric disorder, and Vertigo    PT was consulted and pt was seen on 1/26/2021 for mobility assessment and d/c planning  Pt presents supine in bed alert and agreeable to therapy,  used for [place]  She is reporting no pain, demonstrates intact sensation to light tough  She has mild weakness of the LEs due to deconditioning  Endurance is also limited from her baseline  Pt is currently functioning at a modified independent assistance level for bed mobility, modified independent assistance level for transfers, supervision assistance x1 level for ambulation with no assistive device  Pt will benefit from continued skilled IP PT to address the above mentioned impairments  in order to maximize recovery and increase functional independence when completing mobility and ADLs  Currently PT recommendations for DME include none  At this time PT recommendations for d/c are home with skilled home therapy        Matilde Marcial PT, DPT

## 2021-01-28 ENCOUNTER — HOSPITAL ENCOUNTER (OUTPATIENT)
Dept: INFUSION CENTER | Facility: HOSPITAL | Age: 61
Discharge: HOME | End: 2021-01-28

## 2021-01-28 LAB
BACTERIA BLD CULT: NORMAL
BACTERIA BLD CULT: NORMAL

## 2021-02-09 DIAGNOSIS — R10.30 LOWER ABDOMINAL PAIN: ICD-10-CM

## 2021-02-09 DIAGNOSIS — G89.3 CANCER RELATED PAIN: ICD-10-CM

## 2021-02-09 DIAGNOSIS — C82.18 GRADE 2 FOLLICULAR LYMPHOMA OF LYMPH NODES OF MULTIPLE REGIONS (HCC): ICD-10-CM

## 2021-02-09 RX ORDER — OXYCODONE HYDROCHLORIDE 20 MG/1
20 TABLET ORAL EVERY 4 HOURS PRN
Qty: 180 TABLET | Refills: 0 | Status: SHIPPED | OUTPATIENT
Start: 2021-02-09 | End: 2021-03-25 | Stop reason: SDUPTHER

## 2021-02-17 DIAGNOSIS — U07.1 COVID-19: Primary | ICD-10-CM

## 2021-02-17 DIAGNOSIS — C82.18 GRADE 2 FOLLICULAR LYMPHOMA OF LYMPH NODES OF MULTIPLE REGIONS (HCC): ICD-10-CM

## 2021-02-17 PROCEDURE — U0003 INFECTIOUS AGENT DETECTION BY NUCLEIC ACID (DNA OR RNA); SEVERE ACUTE RESPIRATORY SYNDROME CORONAVIRUS 2 (SARS-COV-2) (CORONAVIRUS DISEASE [COVID-19]), AMPLIFIED PROBE TECHNIQUE, MAKING USE OF HIGH THROUGHPUT TECHNOLOGIES AS DESCRIBED BY CMS-2020-01-R: HCPCS

## 2021-02-17 PROCEDURE — U0005 INFEC AGEN DETEC AMPLI PROBE: HCPCS

## 2021-02-18 LAB — SARS-COV-2 RNA RESP QL NAA+PROBE: NEGATIVE

## 2021-03-08 NOTE — PLAN OF CARE
Problem: Potential for Falls  Goal: Patient will remain free of falls  Description: INTERVENTIONS:  - Assess patient frequently for physical needs  -  Identify cognitive and physical deficits and behaviors that affect risk of falls    -  [place] fall precautions as indicated by assessment   - Educate patient/family on patient safety including physical limitations  - Instruct patient to call for assistance with activity based on assessment  - Modify environment to reduce risk of injury  - Consider OT/PT consult to assist with strengthening/mobility  Outcome: Progressing itchy eyes

## 2021-03-25 DIAGNOSIS — C82.18 GRADE 2 FOLLICULAR LYMPHOMA OF LYMPH NODES OF MULTIPLE REGIONS (HCC): ICD-10-CM

## 2021-03-25 DIAGNOSIS — G89.3 CANCER RELATED PAIN: ICD-10-CM

## 2021-03-25 DIAGNOSIS — R11.2 NAUSEA AND VOMITING, INTRACTABILITY OF VOMITING NOT SPECIFIED, UNSPECIFIED VOMITING TYPE: ICD-10-CM

## 2021-03-25 DIAGNOSIS — Z51.5 PALLIATIVE CARE PATIENT: ICD-10-CM

## 2021-03-25 DIAGNOSIS — R53.83 DECREASED ENERGY: ICD-10-CM

## 2021-03-25 DIAGNOSIS — R10.84 GENERALIZED ABDOMINAL PAIN: ICD-10-CM

## 2021-03-25 DIAGNOSIS — R10.30 LOWER ABDOMINAL PAIN: ICD-10-CM

## 2021-03-25 DIAGNOSIS — R63.0 DECREASED APPETITE: ICD-10-CM

## 2021-03-25 RX ORDER — DEXAMETHASONE 1 MG
1 TABLET ORAL 2 TIMES DAILY WITH MEALS
Qty: 60 TABLET | Refills: 3 | Status: SHIPPED | OUTPATIENT
Start: 2021-03-25 | End: 2021-04-14 | Stop reason: SDUPTHER

## 2021-03-25 RX ORDER — OXYCODONE HYDROCHLORIDE 20 MG/1
20 TABLET ORAL EVERY 6 HOURS PRN
Qty: 20 TABLET | Refills: 0 | Status: SHIPPED | OUTPATIENT
Start: 2021-03-25 | End: 2021-04-14 | Stop reason: SDUPTHER

## 2021-04-12 ENCOUNTER — TELEPHONE (OUTPATIENT)
Dept: HEMATOLOGY ONCOLOGY | Facility: CLINIC | Age: 61
End: 2021-04-12

## 2021-04-14 ENCOUNTER — OFFICE VISIT (OUTPATIENT)
Dept: PALLIATIVE MEDICINE | Facility: CLINIC | Age: 61
End: 2021-04-14
Payer: COMMERCIAL

## 2021-04-14 ENCOUNTER — TELEPHONE (OUTPATIENT)
Dept: PALLIATIVE MEDICINE | Facility: CLINIC | Age: 61
End: 2021-04-14

## 2021-04-14 VITALS
DIASTOLIC BLOOD PRESSURE: 90 MMHG | HEART RATE: 60 BPM | OXYGEN SATURATION: 97 % | WEIGHT: 203 LBS | BODY MASS INDEX: 34.84 KG/M2 | RESPIRATION RATE: 20 BRPM | SYSTOLIC BLOOD PRESSURE: 152 MMHG | TEMPERATURE: 96.8 F

## 2021-04-14 DIAGNOSIS — R10.84 GENERALIZED ABDOMINAL PAIN: ICD-10-CM

## 2021-04-14 DIAGNOSIS — R53.83 DECREASED ENERGY: ICD-10-CM

## 2021-04-14 DIAGNOSIS — R11.2 NAUSEA AND VOMITING, INTRACTABILITY OF VOMITING NOT SPECIFIED, UNSPECIFIED VOMITING TYPE: ICD-10-CM

## 2021-04-14 DIAGNOSIS — R10.30 LOWER ABDOMINAL PAIN: ICD-10-CM

## 2021-04-14 DIAGNOSIS — G89.3 CANCER RELATED PAIN: ICD-10-CM

## 2021-04-14 DIAGNOSIS — Z51.5 PALLIATIVE CARE PATIENT: ICD-10-CM

## 2021-04-14 DIAGNOSIS — C82.18 GRADE 2 FOLLICULAR LYMPHOMA OF LYMPH NODES OF MULTIPLE REGIONS (HCC): ICD-10-CM

## 2021-04-14 DIAGNOSIS — R63.0 DECREASED APPETITE: ICD-10-CM

## 2021-04-14 PROCEDURE — 99214 OFFICE O/P EST MOD 30 MIN: CPT | Performed by: INTERNAL MEDICINE

## 2021-04-14 RX ORDER — DEXAMETHASONE 1 MG
1 TABLET ORAL
Qty: 60 TABLET | Refills: 3 | Status: SHIPPED | OUTPATIENT
Start: 2021-04-14 | End: 2022-04-27 | Stop reason: ALTCHOICE

## 2021-04-14 RX ORDER — OXYCODONE HYDROCHLORIDE 20 MG/1
20 TABLET ORAL EVERY 6 HOURS PRN
Qty: 60 TABLET | Refills: 0 | Status: SHIPPED | OUTPATIENT
Start: 2021-04-14 | End: 2021-05-03 | Stop reason: SDUPTHER

## 2021-04-14 NOTE — PATIENT INSTRUCTIONS
Por dolor:  1  Dejar de naproxeno  2  Oxycodone 20mg tablet  Erica 1 tableta cada 6 horas fernandez sea necesario para el dolor de estomago o de jacqui  No tome mas de 4 tabletas al misael  3  Dexamethasone 1mg tablet  Erica 1 tableta dos veces al misael (por la manana y al mediodia)  4  Senokot  Erica 1-2 tablets todos los leal  5  Llamenos para reabastecimientos, 2-3 misael antes de que se le acabe el medicamento

## 2021-04-14 NOTE — PROGRESS NOTES
Palliative and Supportive Care   Julio César Aragon 64 y o  female [id number]    Assessment/Plan:  1  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 ([address] )    2  Lower abdominal pain    3  Cancer related pain    4  Generalized abdominal pain    5  Nausea and vomiting, intractability of vomiting not specified, unspecified vomiting type    6  Decreased appetite    7  Decreased energy    8  Palliative care patient       · Stop naproxen  · Continue oxycodone 20mg PO q6H prn - averaging only 3 a day  She is allowed to take total of 4 a day if needed  · Moving her bowels independently  Regardless, she is instructed to take 1 tablet of senokot to prevent OIC  · Decadron 1mg BID (am and noon)  · Stressed importance of making it to her chemotherapy  She said she has one coming up on the 30th  This was verified on Epic  · Printed patient instructions in Sami  · RTO in 8 weeks    Controlled Substance [place]    [place] or [place]  reviewed: No red flags were identified; safe to proceed with prescription  Natividad Jarred     11/20/2020  2   11/19/2020  Oxycodone Hcl 20 MG Tablet  180 00  [address]   [id number]   S h (1081)   0  180 00 MME  [place]   10/21/2020  2   10/21/2020  Oxycodone Hcl 15 MG Tablet  180 00  [address]   [id number]   S h (1081)   0  135 00 M[place]   09/15/2020  2   09/15/2020  Oxycodone Hcl 15 MG Tablet  180 00  [address]    [id number]   S h (1081)   0  135 00 MME  Medicaid        Requested Prescriptions     Signed Prescriptions Disp Refills    oxyCODONE (ROXICODONE) 20 MG TABS 60 tablet 0     Sig: Take 1 tablet (20 mg total) by mouth every 6 (six) hours as needed for severe pain Erica 1 tableta cada 6 horas fernandez sea necesario para el dolor de estomagoMax Daily Amount: 80 mg    dexamethasone (DECADRON) 1 mg tablet 60 tablet 3     Sig: Take 1 tablet (1 mg total) by mouth 2 (two) times a day Erica 1 tableta dos veces al misael (por la manana y al mediodia)     Medications Discontinued During This Encounter   Medication Reason    oxyCODONE (ROXICODONE) 20 MG TABS Reorder    dexamethasone (DECADRON) 1 mg tablet Reorder       Representatives have queried the patient's controlled substance dispensing history in the Prescription Drug Monitoring Program in compliance with regulations before I have prescribed any controlled substances  The prescription history is consistent with prescribed therapy and our practice policies  25 minutes were spent face to face with Sanchez Mcguire with greater than 50% of the time spent in counseling or coordination of care including discussions of etiology of diagnosis, diagnostic results, impression, and recommendations, risks and benefits of treatment, instructions for disease self management, treatment instructions, follow up requirements, risk factors and risk reduction of disease, patient and family counseling/involvement in care and compliance with treatment regimen   All of the patient's questions were answered during this discussion  No follow-ups on file  Subjective:   Chief Complaint  Follow up visit for:  symptom management, pain, neoplasm related, assessment of goals of care, disease process education and discussion of prognosis, advance care planning    Medication Refill  Associated symptoms include abdominal pain and neck pain  Pertinent negatives include no chest pain, chills, coughing, fatigue or fever  Sanchez Mcguire is a 64 y o  female with grade 2 follicular lymphoma initially diagnosed in Nov 2018  Started on chemo (rituximab + bendamustine) soon after  Developed tumor lysis syndrome in Dec 2018, bendamustine removed  She completed rituxan on 4/2019 and had been on maintenance Rituxan since 5/2019  In March 2020, she had evidence of disease progression and was also started on Revlimid   Per review of notes, there are some concerns with noncompliance and f/u with bloodwork   Per Oncology note, long discussion with patient regarding refractory and progressive grade 2 follicular lymphoma   Patient with c/o increase L neck swelling/tenderness, US done, concerning for progression of her disease  Her last PET-CT showed new LNs in the abdominal area, concerning for transformation to diffuse B cell lymphoma  Her biopsy from the L iliac bone and L cervical LN came back positive for follicular lymphoma  She is currently on Copalisib  Was on Obinutuzumab/CHOP with Udenyca before  She was referred to Dr Christ El in [facility] for further recommendations, but she has yet to see Dr Christ El  There was a delay in her treatment for a few weeks when she went to [facility]  She was going to stay there to get treatments, but it didn't work out so she came back  She comes in today for routine follow up  [email or web address]  used  She looked like she gained weight  She appeared well-kempt  She reports that her abdominal and neck pain is well-controlled with oxycodone 20mg PO that she averages 3 pills a day of  However, she ran out but she didn't call the office for refills  She said she is relying on naproxen for the time being  She is moving her bowels independently  She said she is compliant with her chemo  We stressed importance of keeping up with treatments as well as with her transportation  She said she has an upcoming follow up with Dr Krish Bhardwaj on 4/30  This was verified on Epic  The following portions of the medical history were reviewed: past medical history, problem list, medication list, and social history      Current Outpatient Medications:     acetaminophen (TYLENOL) 325 mg tablet, Take 2 tablets (650 mg total) by mouth every 6 (six) hours as needed for mild pain, moderate pain, headaches or fever, Disp: 30 tablet, Rfl: 0    al mag oxide-diphenhydramine-lidocaine viscous (MAGIC MOUTHWASH) 1:1:1 suspension, Swish and spit 10 mL every 4 (four) hours as needed for mouth pain or discomfort, Disp: 180 mL, Rfl: 0    amLODIPine (NORVASC) 5 mg tablet, Take 1 tablet (5 mg total) by mouth daily, Disp: 30 tablet, Rfl: 0    aspirin-acetaminophen-caffeine (EXCEDRIN MIGRAINE) 250-250-65 MG per tablet, Take 1 tablet by mouth every 6 (six) hours as needed for headaches Erica 1 tableta cada 6 horas fernandez sea necesario para el dolor de russ, Disp: 30 tablet, Rfl: 0    benzonatate (TESSALON) 200 MG capsule, Take 1 capsule (200 mg total) by mouth 3 (three) times a day as needed for cough, Disp: 20 capsule, Rfl: 0    cyanocobalamin 1000 MCG tablet, Take 1 tablet (1,000 mcg total) by mouth daily, Disp: 90 tablet, Rfl: 3    ascorbic acid (VITAMIN C) 1000 MG tablet, Take 1 tablet (1,000 mg total) by mouth every 12 (twelve) hours for 10 doses, Disp: 10 tablet, Rfl: 0    aspirin (ECOTRIN LOW STRENGTH) 81 mg EC tablet, Take 1 tablet (81 mg total) by mouth daily, Disp: 30 tablet, Rfl: 11    atorvastatin (LIPITOR) 40 mg tablet, Take 1 tablet (40 mg total) by mouth daily with dinner (Patient not taking: Reported on 4/14/2021), Disp: 12 tablet, Rfl: 0    carbamide peroxide (DEBROX) 6 5 % otic solution, Administer 5 drops to the right ear 2 (two) times a day (Patient not taking: Reported on 1/24/2021), Disp: 15 mL, Rfl: 0    cholecalciferol (VITAMIN D3) 1,000 units tablet, Take 2 tablets (2,000 Units total) by mouth daily for 5 days, Disp: 10 tablet, Rfl: 0    dexamethasone (DECADRON) 1 mg tablet, Take 1 tablet (1 mg total) by mouth 2 (two) times a day Erica 1 tableta dos veces al misael (por la manana y al mediodia), Disp: 60 tablet, Rfl: 3    DULoxetine (CYMBALTA) 20 mg capsule, Take 1 capsule (20 mg total) by mouth daily, Disp: 30 capsule, Rfl: 3    guaiFENesin (ROBITUSSIN) 100 mg/5 mL oral solution, Take 10 mL (200 mg total) by mouth every 4 (four) hours, Disp: 473 mL, Rfl: 0    lidocaine (LMX) 4 % cream, Apply topically as needed for mild pain To neck (Patient not taking: Reported on 1/24/2021), Disp: 30 g, Rfl: 0    meclizine (ANTIVERT) 12 5 MG tablet, Take 1 tablet (12 5 mg total) by mouth every 8 (eight) hours as needed for dizziness, Disp: 30 tablet, Rfl: 0    omeprazole (PriLOSEC) 20 mg delayed release capsule, Take 2 capsules (40 mg total) by mouth daily To prevent stomach upset, Disp: 60 capsule, Rfl: 5    ondansetron (ZOFRAN) 4 mg tablet, Take 1 tablet (4 mg total) by mouth every 8 (eight) hours as needed for nausea or vomiting (Patient not taking: Reported on 1/24/2021), Disp: 30 tablet, Rfl: 3    oxyCODONE (ROXICODONE) 20 MG TABS, Take 1 tablet (20 mg total) by mouth every 6 (six) hours as needed for severe pain Erica 1 tableta cada 6 horas fernandez sea necesario para el dolor de estomagoMax Daily Amount: 80 mg, Disp: 60 tablet, Rfl: 0    polyethylene glycol (MIRALAX) 17 g packet, Take 17 g by mouth daily, Disp: 30 each, Rfl: 3    senna (SENOKOT) 8 6 mg, Take 1 tablet (8 6 mg total) by mouth 2 (two) times a day, Disp: 60 each, Rfl: 3    zinc sulfate (ZINCATE) 220 mg capsule, Take 1 capsule (220 mg total) by mouth daily for 4 doses, Disp: 4 capsule, Rfl: 0  Review of Systems   Constitutional: Negative for activity change, appetite change, chills, fatigue, fever and unexpected weight change  HENT: Negative for trouble swallowing  Respiratory: Negative for cough and shortness of breath  Cardiovascular: Negative for chest pain  Gastrointestinal: Positive for abdominal pain  Negative for constipation and diarrhea  Musculoskeletal: Positive for neck pain  Neurological: Negative for speech difficulty and light-headedness  Psychiatric/Behavioral: Negative for sleep disturbance  The patient is not nervous/anxious  All other systems reviewed and are negative        All other systems negative    Objective:  Vital Signs  /90 (BP Location: Left arm, Patient Position: Sitting, Cuff Size: Standard)   Pulse 60   Temp (!) 96 8 °F (36 °C) (Temporal)   Resp 20   Wt 92 1 kg (203 lb)   SpO2 97%   BMI 34 84 kg/m²    Physical Exam    Constitutional: Appears well-developed and well-nourished, she did not look sick, nor toxic-looking  In no acute physical or emotional distress  Pleasant, well-kempt, nails are done, appears to have gained weight  Head: Normocephalic and atraumatic  Eyes: EOM are normal  No ocular discharge  No scleral icterus  Appears to have pterygium in the R eye  Neck: (+) quarter-sized mass in the L side of her neck, painful on palpation  Not red  Respiratory: Effort normal  No stridor  No respiratory distress  Gastrointestinal: No abdominal distension  Musculoskeletal: No edema  Neurological: Alert, oriented and appropriately conversant  Skin: No diaphoresis, no rashes seen on exposed areas of skin  Psychiatric: Displays a normal mood and affect   Behavior, judgement and thought content appear normal      Matthias Rasheed MD  Palliative Medicine & Supportive Care  Internal Medicine  Available via [facility] Text  Office: [phone]  Fax: [phone]

## 2021-04-14 NOTE — TELEPHONE ENCOUNTER
Prior Authorization needed for oxyCODONE HCI 20 mg     [id number] [facility]: [facility]

## 2021-04-15 ENCOUNTER — TELEPHONE (OUTPATIENT)
Dept: PALLIATIVE MEDICINE | Facility: CLINIC | Age: 61
End: 2021-04-15

## 2021-04-15 ENCOUNTER — TELEPHONE (OUTPATIENT)
Dept: PALLIATIVE MEDICINE | Facility: HOSPITAL | Age: 61
End: 2021-04-15

## 2021-04-15 NOTE — TELEPHONE ENCOUNTER
I reached out to patient to to give her the number of [phone]  To call insurance to verify her coverage  [facility] and her health insurance [employer] it shows inactive

## 2021-04-15 NOTE — TELEPHONE ENCOUNTER
[facility] to inquire what pt needs to do to have her reinstate her insurance coverage, as it has lapsed  CHIP was told to call [employer]  This writer then called and spoke to someone from there and was told that because of covid no insurance should have lapsed, however I informed him that it had  He encouraged me to have the patient call [phone]  CHIP would not be able to call on behalf of the patient, as she does not have a release of information on file with me as the named person  MSW tiger texted [employer] [facility] to call the patient and relay this information

## 2021-04-15 NOTE — TELEPHONE ENCOUNTER
I received further request from the [place] department of human [place] where it is possible patient may have pharmacy benefit  I sent completed form with chart notes and supporting documentation  Will await determination

## 2021-04-15 NOTE — TELEPHONE ENCOUNTER
Prior authorization for pt's   Oxycodone 20 mg IR has been initiated via cover my meds and sent along with last office note  [place] Department [place]  ID [id number]  Ph [phone]    Awaiting determination    Requested urgent

## 2021-04-15 NOTE — TELEPHONE ENCOUNTER
This nurse called the pharmacy after discovering that patient does not have active pharmacy insurance right now  There is no way to get Prior Auth approval without active insurance  She paid out of pocket yesterday 84 dollars for the oxycodone 20mg tablets  Amaury Burnette, when you get a chance can you please reach out to patient or family to see if we can help them obtain pharmacy insurance again

## 2021-04-16 NOTE — TELEPHONE ENCOUNTER
I reached out to provider remembers [phone] and Edith Long 4/16/2021 stated patient's health insurance is not active she is not eligible

## 2021-04-26 DIAGNOSIS — C82.18 GRADE 2 FOLLICULAR LYMPHOMA OF LYMPH NODES OF MULTIPLE REGIONS (HCC): Primary | ICD-10-CM

## 2021-04-27 ENCOUNTER — TELEPHONE (OUTPATIENT)
Dept: HEMATOLOGY ONCOLOGY | Facility: CLINIC | Age: 61
End: 2021-04-27

## 2021-04-30 ENCOUNTER — HOSPITAL ENCOUNTER (OUTPATIENT)
Dept: ULTRASOUND IMAGING | Facility: HOSPITAL | Age: 61
Discharge: HOME | End: 2021-04-30
Payer: COMMERCIAL

## 2021-04-30 ENCOUNTER — OFFICE VISIT (OUTPATIENT)
Dept: HEMATOLOGY ONCOLOGY | Facility: CLINIC | Age: 61
End: 2021-04-30
Payer: COMMERCIAL

## 2021-04-30 VITALS
SYSTOLIC BLOOD PRESSURE: 115 MMHG | WEIGHT: 203.2 LBS | HEART RATE: 66 BPM | HEIGHT: 64 IN | TEMPERATURE: 97.2 F | BODY MASS INDEX: 34.69 KG/M2 | RESPIRATION RATE: 18 BRPM | OXYGEN SATURATION: 98 % | DIASTOLIC BLOOD PRESSURE: 68 MMHG

## 2021-04-30 DIAGNOSIS — Z11.52 ENCOUNTER FOR SCREENING FOR COVID-19: ICD-10-CM

## 2021-04-30 DIAGNOSIS — R10.9 LEFT FLANK PAIN: ICD-10-CM

## 2021-04-30 DIAGNOSIS — D50.9 MICROCYTIC ANEMIA: ICD-10-CM

## 2021-04-30 DIAGNOSIS — D51.8 OTHER VITAMIN B12 DEFICIENCY ANEMIAS: ICD-10-CM

## 2021-04-30 DIAGNOSIS — C82.18 GRADE 2 FOLLICULAR LYMPHOMA OF LYMPH NODES OF MULTIPLE REGIONS (HCC): Primary | ICD-10-CM

## 2021-04-30 PROCEDURE — 76770 US EXAM ABDO BACK WALL COMP: CPT

## 2021-04-30 PROCEDURE — 99214 OFFICE O/P EST MOD 30 MIN: CPT | Performed by: NURSE PRACTITIONER

## 2021-04-30 NOTE — PROGRESS NOTES
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 [facility])  Patient was last seen in our office mid January 2021  She received 1 treatment of IV Copanlisib and then had interruption in care due to hospitalization with COVID-19 infection followed by temporary relocation to [facility]  She has since moved back to the area and wants to reestablish care with us  Did discuss with Dr Krish Bhardwaj we will re-stage her with PET-CT scan since she had significant reaction to CT contrast dye in the past which led to hospitalization  She will also get repeat laboratory studies done  Patient will be back in about 2 weeks with Dr Krish Bhardwaj to review the findings and most likely get restarted on treatment at that time  She states today that she has a trip already scheduled to go back to [facility] for Mother's Day and will be leaving in the next few days  Patient will continue to follow up with the palliative care team on a regular basis for pain and symptom management  - CBC and differential; Future  - Comprehensive metabolic panel; Future  - C-reactive protein; Future  - Sedimentation rate, automated; Future  - LD,Blood; Future  - NM PET CT skull base to mid thigh; Future    2  Microcytic anemia  Patient has a history of microcytic anemia  No recent laboratory studies available for review  We will send her to the lab this week to have repeat labs done  - CBC and differential; Future  - Comprehensive metabolic panel; Future  - C-reactive protein; Future  - Sedimentation rate, automated; Future  - LD,Blood; Future  - Iron Panel (Includes Ferritin, Iron Sat%, Iron, and TIBC); Future  - Vitamin B12; Future  - Folate; Future  - Ferritin; Future    3  Left flank pain  Patient is reporting left flank pain that has been going on for the past few months   We will send her for a stat ultrasound of the kidney and bladders to ensure that she is not having any hydronephrosis due to lymphadenopathy     - US kidney and bladder; Future    4  Other vitamin B12 deficiency anemias  We will recheck patient's vitamin B12 level and likely get her started back up on her injections in the near future if need be  - CBC and differential; Future  - Comprehensive metabolic panel; Future  - C-reactive protein; Future  - Sedimentation rate, automated; Future  - LD,Blood; Future  - Iron Panel (Includes Ferritin, Iron Sat%, Iron, and TIBC); Future  - Vitamin B12; Future  - Folate; Future  - Ferritin; Future    5  Encounter for screening for COVID-19  Patient is asking for COVID-19 test  Since she will be traveling to [facility] in the next few days     - Novel Coronavirus (Covid-19),[facility]; Future    HPI:  Patient presents today for a follow-up visit after interruption and care; she is Yoruba-speaking translation done via [employer] system #[id number]  She was last seen in our office 01/18/2021 and ended up hospitalized shortly thereafter with COVID-19 infection 1/24 through 1/26  Afterward she went back to [facility] for treatment  States that she was establishing oncological care there but decided to move back to the [facility] since it was far to travel to the facility  She has not had any treatment  She is reporting tenderness and lymphadenopathy to the left posterior and anterior neck region  She denies any fevers  Has some rhinorrhea and cough that she attributes to allergies  Reports occasional mild night sweats  Appetite is fair has not lost any weight  She continues to have chronic abdominal pain which she reports has not worsened  She does mention that she has been experiencing some left flank pain that developed shortly after she went back to [facility]     Patient does not have any recent laboratory studies   Her most recent CBC was from her hospital admission  01/26/2021 WBC 1 1,  ANC 0 37, H&H 10 1/32 7, MCV 64 with platelet count 531  Oncology History Overview Note   The patient started to notice significant abdominal pain about 8 months prior to presentation, she then was evaluated in the hospital with a CT scan of the chest abdomen pelvis on the 7th of November   This showed massive lymphadenopathy throughout the abdomen and pelvis with hepatic and massive splenomegaly   She also was found to have bulky supraclavicular, axillary and mediastinal adenopathy  She then had a supra clavicular lymph node excisional biopsy on the 9th of November , the pathology was compatible with Follicular lymphoma, WHO grade 1-2  She then had a bone marrow biopsy on the 20th of November which showed also low grade B-cell lymphoma CD10 positive compromising 63% of the total cells  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 ([facility] )   11/7/2018 Initial Diagnosis    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 ([facility] )     12/6/2018 -  Chemotherapy    1   rituximab and bendamustine with neulasta support 12/6/2018- 3/13/19 (4 cycles total)  - day 2 bendamustine held with cycle 4  - administered Rituxan only 4/7/19    2  Started maintenance Rituxan every 8 weeks 5/15/19    3   Revlimid 15 mg 3 weeks on with 1 week of a break added to maintenance Rituxan 3/2020 due to progression       12/7/2018 Adverse Reaction    C1D2 labs reflective of tumor lysis syndrome, dose of rasburicase given x1 and admitted for close observation/hydration     11/18/2020 - 11/18/2020 Chemotherapy    DOXOrubicin (ADRIAMYCIN) injection 99 mg, 50 mg/m2 = 99 mg, Intravenous, Once, 0 of 6 cycles  pegfilgrastim-cbqv (UDENYCA) subcutaneous injection 6 mg, 6 mg, Subcutaneous, Once, 0 of 6 cycles  vinCRIStine (ONCOVIN) 2 mg in sodium chloride 0 9 % 50 mL chemo infusion, 2 mg (original dose 1 4 mg/m2), Intravenous, Once, 0 of 6 cycles  Dose modification: 2 mg (original dose 1 4 mg/m2, Cycle 1, Reason: Max Dose Reached)  cyclophosphamide (CYTOXAN) 1,478 mg in sodium chloride 0 9 % 250 mL IVPB, 750 mg/m2 = 1,478 mg, Intravenous, Once, 0 of 6 cycles  fosaprepitant (EMEND) 150 mg in sodium chloride 0 9 % 250 mL IVPB, 150 mg, Intravenous, Once, 0 of 6 cycles         Interval history:    ROS: Review of Systems   Constitutional: Positive for fatigue  Negative for activity change, appetite change, chills, fever and unexpected weight change  HENT: Positive for congestion and rhinorrhea  Negative for mouth sores, nosebleeds, sore throat and trouble swallowing  Eyes: Negative  Respiratory: Positive for cough and shortness of breath  Negative for chest tightness  Cardiovascular: Negative for chest pain, palpitations and leg swelling  Gastrointestinal: Positive for abdominal pain  Negative for abdominal distention, blood in stool, constipation, diarrhea, nausea and vomiting  Genitourinary: Positive for flank pain  Negative for difficulty urinating, dysuria, frequency, hematuria and urgency  Musculoskeletal: Positive for neck pain  Negative for arthralgias, back pain, gait problem, joint swelling and myalgias  Skin: Negative for color change, pallor and rash  Allergic/Immunologic: Positive for environmental allergies  Neurological: Positive for dizziness and numbness  Negative for weakness, light-headedness and headaches  Hematological: Positive for adenopathy  Does not bruise/bleed easily  Psychiatric/Behavioral: Negative for dysphoric mood and sleep disturbance  The patient is not nervous/anxious          Past Medical History:   Diagnosis Date    Anemia     iron and B12    Arthritis     Asthma     Cough     Depression     Falls frequently     [facility]     [facility]     Lymphoma ([facility]     Migraine     Osteoporosis     Psychiatric disorder     Vertigo        Past Surgical History:   Procedure Laterality Date    CHOLECYSTECTOMY      FL GUIDED CENTRAL VENOUS ACCESS DEVICE INSERTION  11/9/2018    HYSTERECTOMY      IR BIOPSY BONE MARROW  11/24/2020    IR BIOPSY LYMPH NODE  11/24/2020    LYMPH NODE BIOPSY Left 11/9/2018    Procedure: EXCISION BIOPSY LYMPH NODE SUPRACLAVICULAR;  Surgeon: Michela Hurst MD;  Location: [facility] MAIN OR;  Service: General    ME INCISE FINGER TENDON SHEATH Right 1/16/2020    Procedure: RELEASE TRIGGER FINGER RIGHT THUMB;  Surgeon: Edin Salmon MD;  Location: [facility] MAIN OR;  Service: Orthopedics    TUNNELED VENOUS PORT PLACEMENT N/A 11/9/2018    Procedure: Katelyn Cease;  Surgeon: Michela Hurst MD;  Location: [facility] MAIN OR;  Service: General       Social History     Socioeconomic History    Marital status: Single     Spouse name: None    Number of children: None    Years of education: None    Highest education level: None   Occupational History    None   Social Needs    Financial resource strain: None    Food insecurity     Worry: None     Inability: None    Transportation needs     Medical: None     Non-medical: None   Tobacco Use    Smoking status: Former Smoker     Quit date: 6/12/2017     Years since quitting: 3 8    Smokeless tobacco: Never Used   Substance and Sexual Activity    Alcohol use: Never     Frequency: Never    Drug use: Never    Sexual activity: None   Lifestyle    Physical activity     Days per week: None     Minutes per session: None    Stress: None   Relationships    Social connections     Talks on phone: None     Gets together: None     Attends Qu[employer] service: None     Active member of club or organization: None     Attends meetings of clubs or organizations: None     Relationship status: None    Intimate partner violence     Fear of current or ex partner: None     Emotionally abused: None     Physically abused: None     Forced sexual activity: None   Other Topics Concern    None   Social History Narrative    None       Family History   Problem Relation Age of Onset    Cancer Mother  Diabetes Mother     Cancer Father     Diabetes Father        Allergies   Allergen Reactions    Contrast [Iodinated Diagnostic Agents] Shortness Of Breath and Dizziness    Penicillins Anaphylaxis         Current Outpatient Medications:     acetaminophen (TYLENOL) 325 mg tablet, Take 2 tablets (650 mg total) by mouth every 6 (six) hours as needed for mild pain, moderate pain, headaches or fever, Disp: 30 tablet, Rfl: 0    al mag oxide-diphenhydramine-lidocaine viscous (MAGIC MOUTHWASH) 1:1:1 suspension, Swish and spit 10 mL every 4 (four) hours as needed for mouth pain or discomfort, Disp: 180 mL, Rfl: 0    amLODIPine (NORVASC) 5 mg tablet, Take 1 tablet (5 mg total) by mouth daily, Disp: 30 tablet, Rfl: 0    aspirin (ECOTRIN LOW STRENGTH) 81 mg EC tablet, Take 1 tablet (81 mg total) by mouth daily, Disp: 30 tablet, Rfl: 11    aspirin-acetaminophen-caffeine (EXCEDRIN MIGRAINE) 250-250-65 MG per tablet, Take 1 tablet by mouth every 6 (six) hours as needed for headaches Erica 1 tableta cada 6 horas fernandez sea necesario para el dolor de russ, Disp: 30 tablet, Rfl: 0    benzonatate (TESSALON) 200 MG capsule, Take 1 capsule (200 mg total) by mouth 3 (three) times a day as needed for cough, Disp: 20 capsule, Rfl: 0    cyanocobalamin 1000 MCG tablet, Take 1 tablet (1,000 mcg total) by mouth daily, Disp: 90 tablet, Rfl: 3    dexamethasone (DECADRON) 1 mg tablet, Take 1 tablet (1 mg total) by mouth 2 (two) times a day Erica 1 tableta dos veces al misael (por la manana y al mediodia), Disp: 60 tablet, Rfl: 3    DULoxetine (CYMBALTA) 20 mg capsule, Take 1 capsule (20 mg total) by mouth daily, Disp: 30 capsule, Rfl: 3    guaiFENesin (ROBITUSSIN) 100 mg/5 mL oral solution, Take 10 mL (200 mg total) by mouth every 4 (four) hours, Disp: 473 mL, Rfl: 0    meclizine (ANTIVERT) 12 5 MG tablet, Take 1 tablet (12 5 mg total) by mouth every 8 (eight) hours as needed for dizziness, Disp: 30 tablet, Rfl: 0    omeprazole (PriLOSEC) 20 mg delayed release capsule, Take 2 capsules (40 mg total) by mouth daily To prevent stomach upset, Disp: 60 capsule, Rfl: 5    oxyCODONE (ROXICODONE) 20 MG TABS, Take 1 tablet (20 mg total) by mouth every 6 (six) hours as needed for severe pain Erica 1 tableta cada 6 horas fernandez sea necesario para el dolor de estomagoMax Daily Amount: 80 mg, Disp: 60 tablet, Rfl: 0    polyethylene glycol (MIRALAX) 17 g packet, Take 17 g by mouth daily, Disp: 30 each, Rfl: 3    senna (SENOKOT) 8 6 mg, Take 1 tablet (8 6 mg total) by mouth 2 (two) times a day, Disp: 60 each, Rfl: 3    ascorbic acid (VITAMIN C) 1000 MG tablet, Take 1 tablet (1,000 mg total) by mouth every 12 (twelve) hours for 10 doses, Disp: 10 tablet, Rfl: 0    atorvastatin (LIPITOR) 40 mg tablet, Take 1 tablet (40 mg total) by mouth daily with dinner (Patient not taking: Reported on 4/14/2021), Disp: 12 tablet, Rfl: 0    carbamide peroxide (DEBROX) 6 5 % otic solution, Administer 5 drops to the right ear 2 (two) times a day (Patient not taking: Reported on 1/24/2021), Disp: 15 mL, Rfl: 0    cholecalciferol (VITAMIN D3) 1,000 units tablet, Take 2 tablets (2,000 Units total) by mouth daily for 5 days, Disp: 10 tablet, Rfl: 0    lidocaine (LMX) 4 % cream, Apply topically as needed for mild pain To neck (Patient not taking: Reported on 1/24/2021), Disp: 30 g, Rfl: 0    ondansetron (ZOFRAN) 4 mg tablet, Take 1 tablet (4 mg total) by mouth every 8 (eight) hours as needed for nausea or vomiting (Patient not taking: Reported on 1/24/2021), Disp: 30 tablet, Rfl: 3    zinc sulfate (ZINCATE) 220 mg capsule, Take 1 capsule (220 mg total) by mouth daily for 4 doses, Disp: 4 capsule, Rfl: 0      Physical Exam:  Pulse 66   Temp (!) 97 2 °F (36 2 °C) (Tympanic)   Resp 18   Ht 5' 4" (1 626 m)   Wt 92 2 kg (203 lb 3 2 oz)   SpO2 98%   BMI 34 88 kg/m²     Physical Exam  Vitals signs reviewed     Constitutional:       General: She is not in acute distress  Appearance: She is well-developed  She is not diaphoretic  HENT:      Head: Normocephalic and atraumatic  Eyes:      General: No scleral icterus  Conjunctiva/sclera: Conjunctivae normal       Pupils: Pupils are equal, round, and reactive to light  Neck:      Musculoskeletal: Normal range of motion and neck supple  Thyroid: No thyromegaly  Cardiovascular:      Rate and Rhythm: Normal rate and regular rhythm  Heart sounds: Murmur present  Pulmonary:      Effort: Pulmonary effort is normal  No respiratory distress  Breath sounds: Normal breath sounds  Abdominal:      General: There is no distension  Palpations: Abdomen is soft  There is no hepatomegaly or splenomegaly  Tenderness: There is abdominal tenderness  Musculoskeletal: Normal range of motion  General: No swelling  Lymphadenopathy:      Cervical: Cervical adenopathy present  Left cervical: Posterior cervical adenopathy present  Upper Body:      Right upper body: No axillary adenopathy  Left upper body: Supraclavicular adenopathy present  No axillary adenopathy  Skin:     General: Skin is warm and dry  Findings: No erythema or rash  Neurological:      General: No focal deficit present  Mental Status: She is alert and oriented to person, place, and time  Psychiatric:         Mood and Affect: Mood is depressed  Affect is blunt  Behavior: Behavior normal  Behavior is cooperative  Thought Content:  Thought content normal          Judgment: Judgment normal            Labs:  Lab Results   Component Value Date    WBC 1 10 (LL) 01/26/2021    HGB 10 1 (L) 01/26/2021    HCT 32 7 (L) 01/26/2021    MCV 64 (L) 01/26/2021     01/26/2021     Lab Results   Component Value Date    K 3 7 01/26/2021     01/26/2021    CO2 28 01/26/2021    BUN 9 01/26/2021    CREATININE 0 51 (L) 01/26/2021    GLUF 181 (H) 01/25/2021    CALCIUM 8 3 (L) 01/26/2021 CORRECTEDCA 8 7 01/25/2021    AST 26 01/25/2021    ALT 17 01/25/2021    ALKPHOS 101 01/25/2021    EGFR 106 01/26/2021       Patient voiced understanding and agreement in the above discussion  Aware to contact our office with questions/symptoms in the interim  This note has been generated by voice recognition software system  Therefore, there may be spelling, grammar, and or syntax errors  Please contact if questions arise

## 2021-05-03 DIAGNOSIS — C82.18 GRADE 2 FOLLICULAR LYMPHOMA OF LYMPH NODES OF MULTIPLE REGIONS (HCC): ICD-10-CM

## 2021-05-03 DIAGNOSIS — G89.3 CANCER RELATED PAIN: ICD-10-CM

## 2021-05-03 DIAGNOSIS — R10.30 LOWER ABDOMINAL PAIN: ICD-10-CM

## 2021-05-03 RX ORDER — OXYCODONE HYDROCHLORIDE 20 MG/1
20 TABLET ORAL EVERY 6 HOURS PRN
Qty: 90 TABLET | Refills: 0 | Status: SHIPPED | OUTPATIENT
Start: 2021-05-03 | End: 2021-05-19 | Stop reason: SDUPTHER

## 2021-05-04 NOTE — TELEPHONE ENCOUNTER
This nurse submitted an Urgent PA request for the oxycodone 20mg to [facility] via covermymeds  Awaiting determination

## 2021-05-05 ENCOUNTER — TELEPHONE (OUTPATIENT)
Dept: HEMATOLOGY ONCOLOGY | Facility: CLINIC | Age: 61
End: 2021-05-05

## 2021-05-05 NOTE — TELEPHONE ENCOUNTER
Left message that we had  To change her f/u appt to  June 7th due to the fact   She rs her pet scan  I also tried to call [facility] with  [employer] phones, but her  vm  was full

## 2021-05-05 NOTE — TELEPHONE ENCOUNTER
Appointment Cancellation Or Reschedule     Person calling in Spouse    Provider Dr Edy Christie   Office Visit Date and Time  05/14/2021   Office Visit Location [place]   Did patient want to reschedule their office appointment? If so, when was it scheduled to? Pt would like a call back to rescheduled   Is this patient calling to reschedule an infusion appointment? no   Is this patient a Chemo patient? yes   When is their next infusion visit? n/a   Reason for Cancellation or Reschedule Pt will need to reschedule follow up due to new date for NM PET CT         If the patient is a treatment patient, please route this to the office nurse  If the patient is not on treatment, please route to the office MA

## 2021-05-17 ENCOUNTER — HOSPITAL ENCOUNTER (EMERGENCY)
Facility: HOSPITAL | Age: 61
Discharge: HOME | End: 2021-05-17
Attending: EMERGENCY MEDICINE | Admitting: EMERGENCY MEDICINE
Payer: COMMERCIAL

## 2021-05-17 VITALS
OXYGEN SATURATION: 100 % | RESPIRATION RATE: 19 BRPM | WEIGHT: 207.7 LBS | DIASTOLIC BLOOD PRESSURE: 87 MMHG | SYSTOLIC BLOOD PRESSURE: 119 MMHG | HEART RATE: 60 BPM | TEMPERATURE: 97.7 F | BODY MASS INDEX: 35.65 KG/M2

## 2021-05-17 DIAGNOSIS — K08.89 DENTALGIA: Primary | ICD-10-CM

## 2021-05-17 PROCEDURE — 99283 EMERGENCY DEPT VISIT LOW MDM: CPT

## 2021-05-17 PROCEDURE — 99284 EMERGENCY DEPT VISIT MOD MDM: CPT | Performed by: PHYSICIAN ASSISTANT

## 2021-05-17 RX ORDER — LIDOCAINE HYDROCHLORIDE 20 MG/ML
15 SOLUTION OROPHARYNGEAL ONCE
Status: COMPLETED | OUTPATIENT
Start: 2021-05-17 | End: 2021-05-17

## 2021-05-17 RX ORDER — CLINDAMYCIN HYDROCHLORIDE 150 MG/1
300 CAPSULE ORAL EVERY 6 HOURS SCHEDULED
Qty: 56 CAPSULE | Refills: 0 | Status: SHIPPED | OUTPATIENT
Start: 2021-05-17 | End: 2021-05-24

## 2021-05-17 RX ORDER — CLINDAMYCIN HYDROCHLORIDE 150 MG/1
300 CAPSULE ORAL ONCE
Status: COMPLETED | OUTPATIENT
Start: 2021-05-17 | End: 2021-05-17

## 2021-05-17 RX ADMIN — LIDOCAINE HYDROCHLORIDE 15 ML: 20 SOLUTION ORAL; TOPICAL at 19:47

## 2021-05-17 RX ADMIN — CLINDAMYCIN HYDROCHLORIDE 300 MG: 150 CAPSULE ORAL at 19:48

## 2021-05-17 NOTE — ED PROVIDER NOTES
History  Chief Complaint   Patient presents with    Dental Pain     Right sided, into right ear  Began again 2 days ago  Took naprosyn 500mg tab PTA no relief  61-year-old female with past medical history anemia, asthma, lymphoma presenting for evaluation of right-sided dental pain  Patient states she has had dental pain on and off for last 2 months which became worse 2 days ago  She reports right lower dental pain radiating to the right ear  States she took naproxen prior to arrival for her pain  Unsure last time she was seen at the dentist  No fevers, chills or sweats  Prior to Admission Medications   Prescriptions Last Dose Informant Patient Reported? Taking?    DULoxetine (CYMBALTA) 20 mg capsule  Self No No   Sig: Take 1 capsule (20 mg total) by mouth daily   acetaminophen (TYLENOL) 325 mg tablet  Self No No   Sig: Take 2 tablets (650 mg total) by mouth every 6 (six) hours as needed for mild pain, moderate pain, headaches or fever   al mag oxide-diphenhydramine-lidocaine viscous (MAGIC MOUTHWASH) 1:1:1 suspension  Self No No   Sig: Swish and spit 10 mL every 4 (four) hours as needed for mouth pain or discomfort   amLODIPine (NORVASC) 5 mg tablet  Self No No   Sig: Take 1 tablet (5 mg total) by mouth daily   ascorbic acid (VITAMIN C) 1000 MG tablet   No No   Sig: Take 1 tablet (1,000 mg total) by mouth every 12 (twelve) hours for 10 doses   aspirin (ECOTRIN LOW STRENGTH) 81 mg EC tablet  Self No No   Sig: Take 1 tablet (81 mg total) by mouth daily   aspirin-acetaminophen-caffeine (EXCEDRIN MIGRAINE) 250-250-65 MG per tablet  Self No No   Sig: Take 1 tablet by mouth every 6 (six) hours as needed for headaches Erica 1 tableta cada 6 horas fernandez sea necesario para el dolor de russ   atorvastatin (LIPITOR) 40 mg tablet  Self No No   Sig: Take 1 tablet (40 mg total) by mouth daily with dinner   Patient not taking: Reported on 4/14/2021   benzonatate (TESSALON) 200 MG capsule  Self No No   Sig: Take 1 capsule (200 mg total) by mouth 3 (three) times a day as needed for cough   carbamide peroxide (DEBROX) 6 5 % otic solution  Self No No   Sig: Administer 5 drops to the right ear 2 (two) times a day   Patient not taking: Reported on 1/24/2021   cholecalciferol (VITAMIN D3) 1,000 units tablet   No No   Sig: Take 2 tablets (2,000 Units total) by mouth daily for 5 days   cyanocobalamin 1000 MCG tablet  Self No No   Sig: Take 1 tablet (1,000 mcg total) by mouth daily   dexamethasone (DECADRON) 1 mg tablet  Self No No   Sig: Take 1 tablet (1 mg total) by mouth 2 (two) times a day Erica 1 tableta dos veces al misael (por la manana y al mediodia)   guaiFENesin (ROBITUSSIN) 100 mg/5 mL oral solution  Self No No   Sig: Take 10 mL (200 mg total) by mouth every 4 (four) hours   lidocaine (LMX) 4 % cream  Self No No   Sig: Apply topically as needed for mild pain To neck   Patient not taking: Reported on 1/24/2021   meclizine (ANTIVERT) 12 5 MG tablet  Self No No   Sig: Take 1 tablet (12 5 mg total) by mouth every 8 (eight) hours as needed for dizziness   omeprazole (PriLOSEC) 20 mg delayed release capsule  Self No No   Sig: Take 2 capsules (40 mg total) by mouth daily To prevent stomach upset   ondansetron (ZOFRAN) 4 mg tablet  Self No No   Sig: Take 1 tablet (4 mg total) by mouth every 8 (eight) hours as needed for nausea or vomiting   Patient not taking: Reported on 1/24/2021   oxyCODONE (ROXICODONE) 20 MG TABS   No No   Sig: Take 1 tablet (20 mg total) by mouth every 6 (six) hours as needed for severe pain Erica 1 tableta cada 6 horas fernandez sea necesario para el dolor de estomagoMax Daily Amount: 80 mg   polyethylene glycol (MIRALAX) 17 g packet  Self No No   Sig: Take 17 g by mouth daily   senna (SENOKOT) 8 6 mg  Self No No   Sig: Take 1 tablet (8 6 mg total) by mouth 2 (two) times a day   zinc sulfate (ZINCATE) 220 mg capsule   No No   Sig: Take 1 capsule (220 mg total) by mouth daily for 4 doses Facility-Administered Medications: None       Past Medical History:   Diagnosis Date    Anemia     iron and B12    Arthritis     Asthma     Cough     Depression     Falls frequently     [facility] [address]     Lymphoma [facility] [address]     Lymphoma ([facility] [address]     Migraine     Osteoporosis     Psychiatric disorder     Vertigo        Past Surgical History:   Procedure Laterality Date    CHOLECYSTECTOMY      FL GUIDED CENTRAL VENOUS ACCESS DEVICE INSERTION  11/9/2018    HYSTERECTOMY      IR BIOPSY BONE MARROW  11/24/2020    IR BIOPSY LYMPH NODE  11/24/2020    LYMPH NODE BIOPSY Left 11/9/2018    Procedure: EXCISION BIOPSY LYMPH NODE SUPRACLAVICULAR;  Surgeon: Tyler Thomas MD;  Location[facility] MAIN OR;  Service: General    FL INCISE FINGER TENDON SHEATH Right 1/16/2020    Procedure: RELEASE TRIGGER FINGER RIGHT THUMB;  Surgeon: Isabella Ferguson MD;  Location: [facility] MAIN OR;  Service: Orthopedics    TUNNELED VENOUS PORT PLACEMENT N/A 11/9/2018    Procedure: INSERTION OF PORT-A-CATH;  Surgeon: Tyler Thomas MD;  Location: [facility] MAIN OR;  Service: General       Family History   Problem Relation Age of Onset    Cancer Mother     Diabetes Mother     Cancer Father     Diabetes Father      I have reviewed and agree with the history as documented  E-Cigarette/Vaping    E-Cigarette Use Never User      E-Cigarette/Vaping Substances    Nicotine No     THC No     CBD No     Flavoring No     Other No     Unknown No      Social History     Tobacco Use    Smoking status: Former Smoker     Quit date: 6/12/2017     Years since quitting: 3 9    Smokeless tobacco: Never Used   Substance Use Topics    Alcohol use: Never     Frequency: Never    Drug use: Never       Review of Systems   All other systems reviewed and are negative  Physical Exam  Physical Exam  Vitals signs and nursing note reviewed  Constitutional:       General: She is not in acute distress  Appearance: Normal appearance  She is well-developed   She is not ill-appearing, toxic-appearing or diaphoretic  Comments: Appears uncomfortable   HENT:      Head: Normocephalic and atraumatic  Mouth/Throat:     Eyes:      Conjunctiva/sclera: Conjunctivae normal       Comments: EOM grossly intact   Neck:      Musculoskeletal: Normal range of motion and neck supple  Vascular: No JVD  Cardiovascular:      Rate and Rhythm: Normal rate  Pulmonary:      Effort: Pulmonary effort is normal    Abdominal:      Palpations: Abdomen is soft  Musculoskeletal:      Comments: FROM, steady gait, cap refill brisk, strength and sensation grossly intact throughout   Skin:     General: Skin is warm and dry  Capillary Refill: Capillary refill takes less than 2 seconds  Neurological:      Mental Status: She is alert and oriented to person, place, and time  Psychiatric:         Behavior: Behavior normal          Vital Signs  ED Triage Vitals [05/17/21 1919]   Temperature Pulse Respirations Blood Pressure SpO2   97 7 °F (36 5 °C) 60 19 119/87 100 %      Temp Source Heart Rate Source Patient Position - Orthostatic VS BP Location FiO2 (%)   Tympanic Monitor Sitting Right arm --      Pain Score       Worst Possible Pain           Vitals:    05/17/21 1919   BP: 119/87   Pulse: 60   Patient Position - Orthostatic VS: Sitting         Visual Acuity      ED Medications  Medications   Lidocaine Viscous HCl (XYLOCAINE) 2 % mucosal solution 15 mL (15 mL Swish & Spit Given 5/17/21 1947)   clindamycin (CLEOCIN) capsule 300 mg (300 mg Oral Given 5/17/21 1948)       Diagnostic Studies  Results Reviewed     None                 No orders to display              Procedures  Procedures         ED Course                             SBIRT 22yo+      Most Recent Value   SBIRT (22 yo +)   In order to provide better care to our patients, we are screening all of our patients for alcohol and drug use  Would it be okay to ask you these screening questions?   Yes Filed at: 05/17/2021 1923   Initial Alcohol Screen: US AUDIT-C    1  How often do you have a drink containing alcohol?  0 Filed at: 05/17/2021 1923   2  How many drinks containing alcohol do you have on a typical day you are drinking? 0 Filed at: 05/17/2021 1923   3b  FEMALE Any Age, or MALE 65+: How often do you have 4 or more drinks on one occassion? 0 Filed at: 05/17/2021 1923   Audit-C Score  0 Filed at: 05/17/2021 5177   GIOVANNY: How many times in the past year have you    Used an illegal drug or used a prescription medication for non-medical reasons? Never Filed at: 05/17/2021 1923                    [facility]  Number of Diagnoses or Management Options  Diagnosis management comments: 63 yo F presenting for evaluation of right lower dental pain on and off for last 2 months worsening over last 2 days, unresolved with naproxen, likely will need tooth extraction, f/u with dentist, will place on clinda as she has an allergy to pcn, f/u with pcp as an outpatient    strict return to ED precautions discussed  Pt verbalizes understanding and agrees with plan  Pt is stable for discharge    Portions of the record may have been created with voice recognition software  Occasional wrong word or "sound a like" substitutions may have occurred due to the inherent limitations of voice recognition software  Read the chart carefully and recognize, using context, where substitutions have occurred  Disposition  Final diagnoses:   Georgean Morning     Time reflects when diagnosis was documented in both MDM as applicable and the Disposition within this note     Time User Action Codes Description Comment    5/17/2021  7:56 PM Jhony Davalos Add [K08 89] Georgean Morning       ED Disposition     ED Disposition Condition Date/Time Comment    Discharge Stable Mon May 17, 2021  7:56 PM COLTEN Goodrich 61 discharge to home/self care              Follow-up Information     Follow up With Specialties Details Why Contact Info [facility] Call in 1 day  [address], [address] [facility] [id number]  [address], [address], [address] [place], [place], [place] [address] [facility] Emergency Department Emergency Medicine Go to  If symptoms worsen [id number] [address]  [address] [facility] Emergency [facility]    [address]  Call in 1 day  [address] [facility]           Patient's Medications   Discharge Prescriptions    CLINDAMYCIN (CLEOCIN) 150 MG CAPSULE    Take 2 capsules (300 mg total) by mouth every 6 (six) hours for 7 days       Start Date: 5/17/2021 End Date: 5/24/2021       Order Dose: 300 mg       Quantity: 56 capsule    Refills: 0     No discharge procedures on file      PDMP Review       Value Time User    PDMP Reviewed  Yes 4/14/2021 11:18 AM Russell El MD          ED Provider  Electronically Signed by           Yennifer Hernández PA-C  05/17/21 1957

## 2021-05-18 NOTE — ED ATTENDING ATTESTATION
I was the attending physician on duty at the time the patient visited the emergency department  The patient was evaluated and dispositioned by the APC  I was personally available for consultation  I am administratively signing the chart after the fact      Mirna Newman MD

## 2021-05-19 DIAGNOSIS — R10.30 LOWER ABDOMINAL PAIN: ICD-10-CM

## 2021-05-19 DIAGNOSIS — C82.18 GRADE 2 FOLLICULAR LYMPHOMA OF LYMPH NODES OF MULTIPLE REGIONS (HCC): ICD-10-CM

## 2021-05-19 DIAGNOSIS — G89.3 CANCER RELATED PAIN: ICD-10-CM

## 2021-05-19 RX ORDER — OXYCODONE HYDROCHLORIDE 20 MG/1
20 TABLET ORAL EVERY 6 HOURS PRN
Qty: 90 TABLET | Refills: 0 | Status: SHIPPED | OUTPATIENT
Start: 2021-05-19 | End: 2021-06-09 | Stop reason: SDUPTHER

## 2021-05-19 NOTE — TELEPHONE ENCOUNTER
Primary palliative medicine provider: Dr Jessica Licea     Medication requested: Oxycodone     If for pain, how has the patient been taking their pain medicine? Patient stated   She is taking more due to having tooth pain, per patient she has four tablets left       Last appointment: 4/14    Next scheduled appointment: 6/9    PDMP review:    05/03/2021 2 05/03/2021   OXYCODONE HCL 20 MG TABLET  90 0 23 TI TERRELL   [id number]  S H P (1081) 0 117 39 MME Private Pay [place]  04/14/2021 2 04/14/2021   OXYCODONE HCL 20 MG TABLET  60 0 15 TI TERRELL   [id number]  S H P (1081) 0 120 0 MME Private Pay [place]  03/25/2021 1 03/25/2021   OXYCODONE HCL 20 MG TABLET  20 0 5 TI TERRELL   [id number]  S H P (1081) 0 120 0 MME Medicaid [place]  02/09/2021 1 02/09/2021   OXYCODONE HCL 20 MG TABLET  180 0 30 TI TERRELL   [id number]  S H P (1081) 0 180 0 MME [place]  01/13/2021 1 01/13/2021   OXYCODONE HCL 20 MG TABLET  90 0 15 TI TERRELL   [id number]  S H P (1081) 0 180 0 MME Medicaid [place]

## 2021-05-20 NOTE — TELEPHONE ENCOUNTER
Pt called [place] office  This nurse able to speak to pt through a friend translating  Pt has new phone number and wanted to make sure we knew the number  [phone]  Entered into demographics  This nurse instructed pt, per dr Adonis Olivarez, to remember to take the medication as ordered as it early to refill  Noted pt had co pain in right tooth  Was seen in ED 5/17  On oral antibiotics  Pain improved  Pt would appreciate call from [facility] speaking staff tomorrow to make sure there are no other questions       Thank you

## 2021-05-21 ENCOUNTER — HOSPITAL ENCOUNTER (OUTPATIENT)
Dept: NUCLEAR MEDICINE | Facility: HOSPITAL | Age: 61
Discharge: HOME | End: 2021-05-21

## 2021-05-21 DIAGNOSIS — C82.18 GRADE 2 FOLLICULAR LYMPHOMA OF LYMPH NODES OF MULTIPLE REGIONS (HCC): ICD-10-CM

## 2021-06-02 ENCOUNTER — DOCUMENTATION (OUTPATIENT)
Dept: HEMATOLOGY ONCOLOGY | Facility: MEDICAL CENTER | Age: 61
End: 2021-06-02

## 2021-06-02 NOTE — PROGRESS NOTES
I spoke with patient today and let her know that she needs to have her blood work done prior to her appointment of Monday, 6/7/21  Patient understood and will make sure she goes have it done before Monday

## 2021-06-04 ENCOUNTER — HOSPITAL ENCOUNTER (OUTPATIENT)
Dept: NUCLEAR MEDICINE | Facility: HOSPITAL | Age: 61
Discharge: HOME | End: 2021-06-04
Payer: COMMERCIAL

## 2021-06-04 LAB — GLUCOSE SERPL-MCNC: 88 MG/DL (ref 65–140)

## 2021-06-04 PROCEDURE — G1004 CDSM NDSC: HCPCS

## 2021-06-04 PROCEDURE — 82948 REAGENT STRIP/BLOOD GLUCOSE: CPT

## 2021-06-04 PROCEDURE — 78815 PET IMAGE W/CT SKULL-THIGH: CPT

## 2021-06-04 PROCEDURE — A9552 F18 FDG: HCPCS

## 2021-06-07 ENCOUNTER — APPOINTMENT (OUTPATIENT)
Dept: LAB | Facility: HOSPITAL | Age: 61
End: 2021-06-07
Payer: COMMERCIAL

## 2021-06-07 ENCOUNTER — OFFICE VISIT (OUTPATIENT)
Dept: HEMATOLOGY ONCOLOGY | Facility: CLINIC | Age: 61
End: 2021-06-07
Payer: COMMERCIAL

## 2021-06-07 ENCOUNTER — TELEPHONE (OUTPATIENT)
Dept: HEMATOLOGY ONCOLOGY | Facility: CLINIC | Age: 61
End: 2021-06-07

## 2021-06-07 VITALS
HEART RATE: 64 BPM | RESPIRATION RATE: 20 BRPM | SYSTOLIC BLOOD PRESSURE: 124 MMHG | WEIGHT: 207.4 LBS | TEMPERATURE: 98.1 F | HEIGHT: 64 IN | BODY MASS INDEX: 35.41 KG/M2 | DIASTOLIC BLOOD PRESSURE: 88 MMHG | OXYGEN SATURATION: 99 %

## 2021-06-07 DIAGNOSIS — D50.9 MICROCYTIC ANEMIA: ICD-10-CM

## 2021-06-07 DIAGNOSIS — C82.18 GRADE 2 FOLLICULAR LYMPHOMA OF LYMPH NODES OF MULTIPLE REGIONS (HCC): ICD-10-CM

## 2021-06-07 DIAGNOSIS — D51.8 OTHER VITAMIN B12 DEFICIENCY ANEMIAS: ICD-10-CM

## 2021-06-07 DIAGNOSIS — C82.18 GRADE 2 FOLLICULAR LYMPHOMA OF LYMPH NODES OF MULTIPLE REGIONS (HCC): Primary | ICD-10-CM

## 2021-06-07 LAB
ALBUMIN SERPL BCP-MCNC: 4 G/DL (ref 3–5.2)
ALP SERPL-CCNC: 98 U/L (ref 43–122)
ALT SERPL W P-5'-P-CCNC: 20 U/L
ANION GAP SERPL CALCULATED.3IONS-SCNC: 4 MMOL/L (ref 5–14)
ANISOCYTOSIS BLD QL SMEAR: PRESENT
AST SERPL W P-5'-P-CCNC: 31 U/L (ref 14–36)
BASOPHILS # BLD AUTO: 0 THOUSANDS/ΜL (ref 0–0.1)
BASOPHILS NFR BLD AUTO: 1 % (ref 0–1)
BILIRUB SERPL-MCNC: 0.52 MG/DL
BUN SERPL-MCNC: 19 MG/DL (ref 5–25)
CALCIUM SERPL-MCNC: 9.5 MG/DL (ref 8.4–10.2)
CHLORIDE SERPL-SCNC: 105 MMOL/L (ref 97–108)
CHOLEST SERPL-MCNC: 171 MG/DL
CO2 SERPL-SCNC: 30 MMOL/L (ref 22–30)
CREAT SERPL-MCNC: 0.88 MG/DL (ref 0.6–1.2)
CRP SERPL QL: 20.5 MG/L
EOSINOPHIL # BLD AUTO: 0.3 THOUSAND/ΜL (ref 0–0.4)
EOSINOPHIL NFR BLD AUTO: 6 % (ref 0–6)
ERYTHROCYTE [DISTWIDTH] IN BLOOD BY AUTOMATED COUNT: 15.2 %
ERYTHROCYTE [SEDIMENTATION RATE] IN BLOOD: 21 MM/HOUR (ref 0–29)
FERRITIN SERPL-MCNC: 78 NG/ML (ref 8–388)
FOLATE SERPL-MCNC: 5.9 NG/ML (ref 3.1–17.5)
GFR SERPL CREATININE-BSD FRML MDRD: 72 ML/MIN/1.73SQ M
GLUCOSE P FAST SERPL-MCNC: 85 MG/DL (ref 70–99)
HCT VFR BLD AUTO: 38 % (ref 36–46)
HDLC SERPL-MCNC: 54 MG/DL
HGB BLD-MCNC: 11.9 G/DL (ref 12–16)
HYPERCHROMIA BLD QL SMEAR: PRESENT
IRON SATN MFR SERPL: 17 %
IRON SERPL-MCNC: 51 UG/DL (ref 50–170)
LDH SERPL-CCNC: 644 U/L (ref 313–618)
LDLC SERPL CALC-MCNC: 104 MG/DL
LYMPHOCYTES # BLD AUTO: 1.2 THOUSANDS/ΜL (ref 0.5–4)
LYMPHOCYTES NFR BLD AUTO: 25 % (ref 25–45)
MCH RBC QN AUTO: 22 PG (ref 26–34)
MCHC RBC AUTO-ENTMCNC: 31.4 G/DL (ref 31–36)
MCV RBC AUTO: 70 FL (ref 80–100)
MICROCYTES BLD QL AUTO: PRESENT
MONOCYTES # BLD AUTO: 0.4 THOUSAND/ΜL (ref 0.2–0.9)
MONOCYTES NFR BLD AUTO: 8 % (ref 1–10)
NEUTROPHILS # BLD AUTO: 3 THOUSANDS/ΜL (ref 1.8–7.8)
NEUTS SEG NFR BLD AUTO: 61 % (ref 45–65)
OVALOCYTES BLD QL SMEAR: PRESENT
PLATELET # BLD AUTO: 136 THOUSANDS/UL (ref 150–450)
PLATELET BLD QL SMEAR: ABNORMAL
PMV BLD AUTO: 9.4 FL (ref 8.9–12.7)
POIKILOCYTOSIS BLD QL SMEAR: PRESENT
POTASSIUM SERPL-SCNC: 5.2 MMOL/L (ref 3.6–5)
PROT SERPL-MCNC: 6.8 G/DL (ref 5.9–8.4)
RBC # BLD AUTO: 5.41 MILLION/UL (ref 4–5.2)
RBC MORPH BLD: ABNORMAL
SCHISTOCYTES BLD QL SMEAR: PRESENT
SODIUM SERPL-SCNC: 139 MMOL/L (ref 137–147)
TIBC SERPL-MCNC: 293 UG/DL (ref 250–450)
TRIGL SERPL-MCNC: 64 MG/DL
VIT B12 SERPL-MCNC: 533 PG/ML (ref 100–900)
WBC # BLD AUTO: 4.9 THOUSAND/UL (ref 4.5–11)

## 2021-06-07 PROCEDURE — 83550 IRON BINDING TEST: CPT

## 2021-06-07 PROCEDURE — 80053 COMPREHEN METABOLIC PANEL: CPT

## 2021-06-07 PROCEDURE — 80061 LIPID PANEL: CPT

## 2021-06-07 PROCEDURE — 85652 RBC SED RATE AUTOMATED: CPT

## 2021-06-07 PROCEDURE — 99213 OFFICE O/P EST LOW 20 MIN: CPT | Performed by: INTERNAL MEDICINE

## 2021-06-07 PROCEDURE — 85025 COMPLETE CBC W/AUTO DIFF WBC: CPT

## 2021-06-07 PROCEDURE — 83540 ASSAY OF IRON: CPT

## 2021-06-07 PROCEDURE — 36415 COLL VENOUS BLD VENIPUNCTURE: CPT

## 2021-06-07 PROCEDURE — 82728 ASSAY OF FERRITIN: CPT

## 2021-06-07 PROCEDURE — 82746 ASSAY OF FOLIC ACID SERUM: CPT

## 2021-06-07 PROCEDURE — 83615 LACTATE (LD) (LDH) ENZYME: CPT

## 2021-06-07 PROCEDURE — 82607 VITAMIN B-12: CPT

## 2021-06-07 PROCEDURE — 86140 C-REACTIVE PROTEIN: CPT

## 2021-06-07 NOTE — TELEPHONE ENCOUNTER
Significant Findings     Call Received From  [facility]   Radiology Department Location  [facility] [address]    Date of Study 06/04   Relevant Information

## 2021-06-09 ENCOUNTER — OFFICE VISIT (OUTPATIENT)
Dept: PALLIATIVE MEDICINE | Facility: CLINIC | Age: 61
End: 2021-06-09
Payer: COMMERCIAL

## 2021-06-09 ENCOUNTER — SOCIAL WORK (OUTPATIENT)
Dept: PALLIATIVE MEDICINE | Facility: CLINIC | Age: 61
End: 2021-06-09
Payer: COMMERCIAL

## 2021-06-09 DIAGNOSIS — R10.30 LOWER ABDOMINAL PAIN: ICD-10-CM

## 2021-06-09 DIAGNOSIS — C82.18 GRADE 2 FOLLICULAR LYMPHOMA OF LYMPH NODES OF MULTIPLE REGIONS (HCC): ICD-10-CM

## 2021-06-09 DIAGNOSIS — K59.03 DRUG INDUCED CONSTIPATION: ICD-10-CM

## 2021-06-09 DIAGNOSIS — Z71.89 COUNSELING AND COORDINATION OF CARE: Primary | ICD-10-CM

## 2021-06-09 DIAGNOSIS — G89.3 CANCER RELATED PAIN: Primary | ICD-10-CM

## 2021-06-09 PROCEDURE — NC001 PR NO CHARGE

## 2021-06-09 PROCEDURE — 99215 OFFICE O/P EST HI 40 MIN: CPT | Performed by: INTERNAL MEDICINE

## 2021-06-09 RX ORDER — OXYCODONE HYDROCHLORIDE 20 MG/1
20 TABLET ORAL EVERY 4 HOURS PRN
Qty: 90 TABLET | Refills: 0 | Status: SHIPPED | OUTPATIENT
Start: 2021-06-09 | End: 2021-06-21 | Stop reason: SDUPTHER
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
## 2021-06-21 DIAGNOSIS — G89.3 CANCER RELATED PAIN: ICD-10-CM

## 2021-06-21 DIAGNOSIS — R10.30 LOWER ABDOMINAL PAIN: ICD-10-CM

## 2021-06-21 DIAGNOSIS — C82.18 GRADE 2 FOLLICULAR LYMPHOMA OF LYMPH NODES OF MULTIPLE REGIONS (HCC): ICD-10-CM

## 2021-06-21 RX ORDER — OXYCODONE HYDROCHLORIDE 20 MG/1
20 TABLET ORAL EVERY 4 HOURS PRN
Qty: 90 TABLET | Refills: 0 | Status: SHIPPED | OUTPATIENT
Start: 2021-06-21 | End: 2021-07-09 | Stop reason: SDUPTHER

## 2021-06-21 NOTE — TELEPHONE ENCOUNTER
Primary palliative medicine provider: Dr Janet Wilde   Medication requested: oxycodone 20 mg     If for pain, how has the patient been taking their pain medicine?   As prescribed has 10 tablets left     Last appointment: 6/9     Next scheduled appointment: 7/9   PDMP review:      06/09/2021 3 06/09/2021   OXYCODONE HCL 20 MG TABLET  90 0 15 TI TERRELL   [id number]  S H P (1081) 0 180 0 MME [place]  05/19/2021 3 05/19/2021   OXYCODONE HCL 20 MG TABLET  90 0 23 TI TERRELL   [id number]  S H P (1081) 0 117 39 MME [place]  05/03/2021 2 05/03/2021   OXYCODONE HCL 20 MG TABLET  90 0 23 TI TERRELL   [id number]  S H P (1081) 0 117 39 MME [place]  04/14/2021 2 04/14/2021   OXYCODONE HCL 20 MG TABLET  60 0 15 TI TERRELL   [id number]  S H P (1081) 0 120 0 MME [place]  03/25/2021 1 03/25/2021   OXYCODONE HCL 20 MG TABLET  20 0 5 TI TERRELL   [id number]  S H P (1081) 0 120 0 [place]  02/09/2021 1 02/09/2021   OXYCODONE HCL 20 MG TABLET  180 0 30 TI TERRELL   [id number]  S H P (1081) 0 180 0 [place]  01/13/2021 1 01/13/2021   OXYCODONE HCL 20 MG TABLET  90 0 15 TI TERRELL   [id number]  S H P (1081) 0 180 0 [place]  12/15/2020 1 12/15/2020   OXYCODONE HCL 20 MG TABLET  180 0 30 TI TERRELL   [id number]  S H P (1081) 0 180 0 [place]  11/20/2020 2 11/19/2020   OXYCODONE HCL 20 MG TABLET  180 0 30 TI TERRELL   [id number]  S H P (1081) 0 180 0 [place]  10/21/2020 2 10/21/2020   OXYCODONE HCL 15 MG TABLET  180 0 30 TI TERRELL   [id number]  S H P (1081) 0 135 0 [place]  09/15/2020 2 09/15/2020   OXYCODONE HCL 15 MG TABLET  180 0 30 DO    [id number]  S H P (1081) 0 135 0 [place]  08/10/2020 4 07/21/2020   OXYCODONE HCL 15 MG TABLET  180 0 30 DO    [id number]  S H P (1081) 0 135 0 [place]  07/21/2020 4 07/21/2020   OXYCODONE HCL 15 MG TABLET  120 0 20 DO    [id number]  S H P (1081) 0 135 0 [place]

## 2021-06-23 ENCOUNTER — TELEPHONE (OUTPATIENT)
Dept: HEMATOLOGY ONCOLOGY | Facility: CLINIC | Age: 61
End: 2021-06-23

## 2021-06-23 ENCOUNTER — OFFICE VISIT (OUTPATIENT)
Dept: HEMATOLOGY ONCOLOGY | Facility: CLINIC | Age: 61
End: 2021-06-23
Payer: COMMERCIAL

## 2021-06-23 VITALS
SYSTOLIC BLOOD PRESSURE: 122 MMHG | DIASTOLIC BLOOD PRESSURE: 82 MMHG | TEMPERATURE: 98.2 F | HEIGHT: 64 IN | HEART RATE: 81 BPM | OXYGEN SATURATION: 98 % | BODY MASS INDEX: 35.58 KG/M2 | RESPIRATION RATE: 18 BRPM | WEIGHT: 208.4 LBS

## 2021-06-23 DIAGNOSIS — C82.18 GRADE 2 FOLLICULAR LYMPHOMA OF LYMPH NODES OF MULTIPLE REGIONS (HCC): Primary | ICD-10-CM

## 2021-06-23 DIAGNOSIS — D51.8 OTHER VITAMIN B12 DEFICIENCY ANEMIAS: ICD-10-CM

## 2021-06-23 DIAGNOSIS — E53.8 FOLIC ACID DEFICIENCY: ICD-10-CM

## 2021-06-23 DIAGNOSIS — H53.9 CHANGES IN VISION: ICD-10-CM

## 2021-06-23 DIAGNOSIS — R42 DIZZINESS: ICD-10-CM

## 2021-06-23 PROBLEM — Z91.89 AT HIGH RISK OF TUMOR LYSIS SYNDROME: Status: ACTIVE | Noted: 2021-06-23

## 2021-06-23 PROCEDURE — 99215 OFFICE O/P EST HI 40 MIN: CPT | Performed by: NURSE PRACTITIONER

## 2021-06-23 RX ORDER — ALLOPURINOL 100 MG/1
100 TABLET ORAL DAILY
Qty: 30 TABLET | Refills: 0 | Status: SHIPPED | OUTPATIENT
Start: 2021-06-23

## 2021-06-23 RX ORDER — FOLIC ACID 1 MG/1
1 TABLET ORAL DAILY
Qty: 90 TABLET | Refills: 4 | Status: SHIPPED | OUTPATIENT
Start: 2021-06-23
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
## 2021-06-23 NOTE — TELEPHONE ENCOUNTER
I called patients cell phone 5x times, went straight to voicemail which is not active  I contact Jennifer Vargas one of the emergency contacts, Clovis Ana stated she hasn't heard from [place] ever since she changed her number  I called son's phone number turns out to be wrong number, also tried to get a hold of third emergency contact which is Alex Michelle and it went straight to voicemail the 3 times I called  I will try and contact patient tomorrow in the morning to let patient know Amarilys Pineda prescribed a medication patient needs to take 3 days before 1st chemo which is scheduled for 6/29 @9:30

## 2021-06-23 NOTE — Clinical Note
Can you please call the patient at your convenience and let her know that I send another prescription to her pharmacy for allopurinol 100 mg daily for 30 days  She should start the medication 3 days before her chemo treatment  Let me know if she has any further questions or concerns    Thank you

## 2021-06-24 DIAGNOSIS — C82.18 GRADE 2 FOLLICULAR LYMPHOMA OF LYMPH NODES OF MULTIPLE REGIONS (HCC): ICD-10-CM

## 2021-06-24 DIAGNOSIS — Z91.89 AT HIGH RISK OF TUMOR LYSIS SYNDROME: Primary | ICD-10-CM

## 2021-06-24 DIAGNOSIS — D50.8 IRON DEFICIENCY ANEMIA SECONDARY TO INADEQUATE DIETARY IRON INTAKE: ICD-10-CM

## 2021-06-25 DIAGNOSIS — Z91.89 AT HIGH RISK OF TUMOR LYSIS SYNDROME: Primary | ICD-10-CM

## 2021-06-25 DIAGNOSIS — T45.1X5A CHEMOTHERAPY-INDUCED NEUTROPENIA (HCC): ICD-10-CM

## 2021-06-25 DIAGNOSIS — D70.1 CHEMOTHERAPY-INDUCED NEUTROPENIA (HCC): ICD-10-CM

## 2021-06-25 DIAGNOSIS — C82.18 GRADE 2 FOLLICULAR LYMPHOMA OF LYMPH NODES OF MULTIPLE REGIONS (HCC): ICD-10-CM

## 2021-06-25 DIAGNOSIS — D50.8 IRON DEFICIENCY ANEMIA SECONDARY TO INADEQUATE DIETARY IRON INTAKE: ICD-10-CM

## 2021-06-28 ENCOUNTER — APPOINTMENT (OUTPATIENT)
Dept: LAB | Facility: HOSPITAL | Age: 61
End: 2021-06-28
Attending: INTERNAL MEDICINE
Payer: COMMERCIAL

## 2021-06-28 DIAGNOSIS — D70.1 CHEMOTHERAPY-INDUCED NEUTROPENIA (HCC): ICD-10-CM

## 2021-06-28 DIAGNOSIS — T45.1X5A CHEMOTHERAPY-INDUCED NEUTROPENIA (HCC): ICD-10-CM

## 2021-06-28 DIAGNOSIS — C82.18 GRADE 2 FOLLICULAR LYMPHOMA OF LYMPH NODES OF MULTIPLE REGIONS (HCC): Primary | ICD-10-CM

## 2021-06-28 DIAGNOSIS — Z91.89 AT HIGH RISK OF TUMOR LYSIS SYNDROME: Primary | ICD-10-CM

## 2021-06-28 DIAGNOSIS — C82.18 GRADE 2 FOLLICULAR LYMPHOMA OF LYMPH NODES OF MULTIPLE REGIONS (HCC): ICD-10-CM

## 2021-06-28 DIAGNOSIS — Z91.89 AT HIGH RISK OF TUMOR LYSIS SYNDROME: ICD-10-CM

## 2021-06-28 LAB
ALBUMIN SERPL BCP-MCNC: 4.1 G/DL (ref 3–5.2)
ALP SERPL-CCNC: 96 U/L (ref 43–122)
ALT SERPL W P-5'-P-CCNC: 14 U/L
ANION GAP SERPL CALCULATED.3IONS-SCNC: 11 MMOL/L (ref 5–14)
ANISOCYTOSIS BLD QL SMEAR: PRESENT
AST SERPL W P-5'-P-CCNC: 25 U/L (ref 14–36)
BASOPHILS # BLD AUTO: 0 THOUSANDS/ΜL (ref 0–0.1)
BASOPHILS NFR BLD AUTO: 0 % (ref 0–1)
BILIRUB SERPL-MCNC: 0.62 MG/DL
BUN SERPL-MCNC: 17 MG/DL (ref 5–25)
CALCIUM SERPL-MCNC: 9.5 MG/DL (ref 8.4–10.2)
CHLORIDE SERPL-SCNC: 104 MMOL/L (ref 97–108)
CO2 SERPL-SCNC: 26 MMOL/L (ref 22–30)
CREAT SERPL-MCNC: 0.97 MG/DL (ref 0.6–1.2)
EOSINOPHIL # BLD AUTO: 0.2 THOUSAND/ΜL (ref 0–0.4)
EOSINOPHIL NFR BLD AUTO: 4 % (ref 0–6)
ERYTHROCYTE [DISTWIDTH] IN BLOOD BY AUTOMATED COUNT: 15.2 %
GFR SERPL CREATININE-BSD FRML MDRD: 64 ML/MIN/1.73SQ M
GLUCOSE P FAST SERPL-MCNC: 131 MG/DL (ref 70–99)
HCT VFR BLD AUTO: 36.7 % (ref 36–46)
HGB BLD-MCNC: 11.5 G/DL (ref 12–16)
HYPERCHROMIA BLD QL SMEAR: PRESENT
LDH SERPL-CCNC: 657 U/L (ref 313–618)
LYMPHOCYTES # BLD AUTO: 1.6 THOUSANDS/ΜL (ref 0.5–4)
LYMPHOCYTES NFR BLD AUTO: 29 % (ref 25–45)
MCH RBC QN AUTO: 21.5 PG (ref 26–34)
MCHC RBC AUTO-ENTMCNC: 31.3 G/DL (ref 31–36)
MCV RBC AUTO: 69 FL (ref 80–100)
MICROCYTES BLD QL AUTO: PRESENT
MONOCYTES # BLD AUTO: 0.3 THOUSAND/ΜL (ref 0.2–0.9)
MONOCYTES NFR BLD AUTO: 6 % (ref 1–10)
NEUTROPHILS # BLD AUTO: 3.3 THOUSANDS/ΜL (ref 1.8–7.8)
NEUTS SEG NFR BLD AUTO: 60 % (ref 45–65)
OVALOCYTES BLD QL SMEAR: PRESENT
PLATELET # BLD AUTO: 127 THOUSANDS/UL (ref 150–450)
PLATELET BLD QL SMEAR: ABNORMAL
PMV BLD AUTO: 8.9 FL (ref 8.9–12.7)
POIKILOCYTOSIS BLD QL SMEAR: PRESENT
POTASSIUM SERPL-SCNC: 4.2 MMOL/L (ref 3.6–5)
PROT SERPL-MCNC: 6.7 G/DL (ref 5.9–8.4)
RBC # BLD AUTO: 5.34 MILLION/UL (ref 4–5.2)
RBC MORPH BLD: ABNORMAL
SODIUM SERPL-SCNC: 141 MMOL/L (ref 137–147)
WBC # BLD AUTO: 5.4 THOUSAND/UL (ref 4.5–11)

## 2021-06-28 PROCEDURE — 80053 COMPREHEN METABOLIC PANEL: CPT

## 2021-06-28 PROCEDURE — 83615 LACTATE (LD) (LDH) ENZYME: CPT

## 2021-06-28 PROCEDURE — 85025 COMPLETE CBC W/AUTO DIFF WBC: CPT

## 2021-06-28 PROCEDURE — 36415 COLL VENOUS BLD VENIPUNCTURE: CPT

## 2021-06-28 RX ORDER — SODIUM CHLORIDE 9 MG/ML
20 INJECTION, SOLUTION INTRAVENOUS ONCE
Status: CANCELLED | OUTPATIENT
Start: 2021-07-06

## 2021-06-28 RX ORDER — SODIUM CHLORIDE 9 MG/ML
20 INJECTION, SOLUTION INTRAVENOUS ONCE
Status: CANCELLED | OUTPATIENT
Start: 2021-07-13

## 2021-06-28 RX ORDER — ACYCLOVIR 400 MG/1
400 TABLET ORAL 2 TIMES DAILY
Qty: 60 TABLET | Refills: 11 | Status: SHIPPED | OUTPATIENT
Start: 2021-06-28 | End: 2021-07-28

## 2021-06-28 RX ORDER — SULFAMETHOXAZOLE AND TRIMETHOPRIM 800; 160 MG/1; MG/1
1 TABLET ORAL 3 TIMES WEEKLY
Qty: 12 TABLET | Refills: 11 | Status: SHIPPED | OUTPATIENT
Start: 2021-06-28 | End: 2021-07-28

## 2021-06-28 RX ORDER — SODIUM CHLORIDE 9 MG/ML
20 INJECTION, SOLUTION INTRAVENOUS ONCE
Status: CANCELLED | OUTPATIENT
Start: 2021-06-29

## 2021-06-29 ENCOUNTER — HOSPITAL ENCOUNTER (OUTPATIENT)
Dept: INFUSION CENTER | Facility: HOSPITAL | Age: 61
Discharge: HOME | End: 2021-06-29
Payer: COMMERCIAL

## 2021-06-29 VITALS
WEIGHT: 209.88 LBS | SYSTOLIC BLOOD PRESSURE: 147 MMHG | BODY MASS INDEX: 34.97 KG/M2 | HEART RATE: 57 BPM | HEIGHT: 65 IN | DIASTOLIC BLOOD PRESSURE: 65 MMHG

## 2021-06-29 DIAGNOSIS — Z91.89 AT HIGH RISK OF TUMOR LYSIS SYNDROME: ICD-10-CM

## 2021-06-29 DIAGNOSIS — D70.1 CHEMOTHERAPY-INDUCED NEUTROPENIA (HCC): Primary | ICD-10-CM

## 2021-06-29 DIAGNOSIS — C82.18 GRADE 2 FOLLICULAR LYMPHOMA OF LYMPH NODES OF MULTIPLE REGIONS (HCC): ICD-10-CM

## 2021-06-29 DIAGNOSIS — D51.8 OTHER VITAMIN B12 DEFICIENCY ANEMIAS: Primary | ICD-10-CM

## 2021-06-29 DIAGNOSIS — T45.1X5A CHEMOTHERAPY-INDUCED NEUTROPENIA (HCC): Primary | ICD-10-CM

## 2021-06-29 DIAGNOSIS — D51.8 OTHER VITAMIN B12 DEFICIENCY ANEMIAS: ICD-10-CM

## 2021-06-29 LAB
GLUCOSE SERPL-MCNC: 131 MG/DL (ref 65–140)
URATE SERPL-MCNC: 5.8 MG/DL (ref 2.7–7.5)

## 2021-06-29 PROCEDURE — 96367 TX/PROPH/DG ADDL SEQ IV INF: CPT

## 2021-06-29 PROCEDURE — 96413 CHEMO IV INFUSION 1 HR: CPT

## 2021-06-29 PROCEDURE — 82948 REAGENT STRIP/BLOOD GLUCOSE: CPT

## 2021-06-29 PROCEDURE — 84550 ASSAY OF BLOOD/URIC ACID: CPT | Performed by: INTERNAL MEDICINE

## 2021-06-29 PROCEDURE — 96372 THER/PROPH/DIAG INJ SC/IM: CPT

## 2021-06-29 RX ORDER — CYANOCOBALAMIN 1000 UG/ML
1000 INJECTION INTRAMUSCULAR; SUBCUTANEOUS ONCE
Status: CANCELLED | OUTPATIENT
Start: 2021-07-27

## 2021-06-29 RX ORDER — SODIUM CHLORIDE 9 MG/ML
20 INJECTION, SOLUTION INTRAVENOUS ONCE
Status: COMPLETED | OUTPATIENT
Start: 2021-06-29 | End: 2021-06-29

## 2021-06-29 RX ORDER — CYANOCOBALAMIN 1000 UG/ML
1000 INJECTION INTRAMUSCULAR; SUBCUTANEOUS ONCE
Status: CANCELLED | OUTPATIENT
Start: 2021-06-29

## 2021-06-29 RX ORDER — CYANOCOBALAMIN 1000 UG/ML
1000 INJECTION INTRAMUSCULAR; SUBCUTANEOUS ONCE
Status: COMPLETED | OUTPATIENT
Start: 2021-06-29 | End: 2021-06-29

## 2021-06-29 RX ADMIN — ONDANSETRON 8 MG: 2 INJECTION INTRAMUSCULAR; INTRAVENOUS at 10:28

## 2021-06-29 RX ADMIN — SODIUM CHLORIDE 20 ML/HR: 0.9 INJECTION, SOLUTION INTRAVENOUS at 10:27

## 2021-06-29 RX ADMIN — COPANLISIB 60 MG: 15 INJECTION, POWDER, LYOPHILIZED, FOR SOLUTION INTRAVENOUS at 11:07

## 2021-06-29 RX ADMIN — CYANOCOBALAMIN 1000 MCG: 1000 INJECTION INTRAMUSCULAR; SUBCUTANEOUS at 12:33

## 2021-06-29 NOTE — PROGRESS NOTES
Pt arrived this morning for cycle 2 copanlisib  States she did not go to pharmacy for her allopurinol which she was suppose to start a couple days prior to treatment  Spoke with Newton Luis RN and per DR Mathis, check stat uric acid and proceed with rasburicase if elevated  Patient's stat uric acid normal today and was able to proceed with treatment  Patient will be getting weekly hydrations/labs for tumor lysis on Fridays  Patient tolerated treatment well without complications as well as her monthly vit b12 injection  Patient aware to go to pharmacy to  allopurinol, acyclovir and bactrim  AVS provided

## 2021-07-01 ENCOUNTER — APPOINTMENT (OUTPATIENT)
Dept: LAB | Facility: HOSPITAL | Age: 61
End: 2021-07-01
Payer: COMMERCIAL

## 2021-07-01 DIAGNOSIS — Z91.89 AT HIGH RISK OF TUMOR LYSIS SYNDROME: ICD-10-CM

## 2021-07-01 DIAGNOSIS — C82.18 GRADE 2 FOLLICULAR LYMPHOMA OF LYMPH NODES OF MULTIPLE REGIONS (HCC): ICD-10-CM

## 2021-07-01 DIAGNOSIS — D50.8 IRON DEFICIENCY ANEMIA SECONDARY TO INADEQUATE DIETARY IRON INTAKE: ICD-10-CM

## 2021-07-01 LAB
ALBUMIN SERPL BCP-MCNC: 3.9 G/DL (ref 3–5.2)
ALP SERPL-CCNC: 89 U/L (ref 43–122)
ALT SERPL W P-5'-P-CCNC: 15 U/L
ANION GAP SERPL CALCULATED.3IONS-SCNC: 9 MMOL/L (ref 5–14)
ANISOCYTOSIS BLD QL SMEAR: PRESENT
AST SERPL W P-5'-P-CCNC: 30 U/L (ref 14–36)
BASOPHILS # BLD AUTO: 0.09 THOUSAND/UL (ref 0–0.1)
BASOPHILS NFR MAR MANUAL: 2 % (ref 0–1)
BILIRUB SERPL-MCNC: 0.28 MG/DL
BUN SERPL-MCNC: 14 MG/DL (ref 5–25)
CALCIUM SERPL-MCNC: 9.1 MG/DL (ref 8.4–10.2)
CHLORIDE SERPL-SCNC: 105 MMOL/L (ref 97–108)
CO2 SERPL-SCNC: 27 MMOL/L (ref 22–30)
CREAT SERPL-MCNC: 0.76 MG/DL (ref 0.6–1.2)
CRP SERPL QL: 7 MG/L
EOSINOPHIL # BLD AUTO: 0.04 THOUSAND/UL (ref 0–0.4)
EOSINOPHIL NFR BLD MANUAL: 1 % (ref 0–6)
ERYTHROCYTE [DISTWIDTH] IN BLOOD BY AUTOMATED COUNT: 16 %
ERYTHROCYTE [SEDIMENTATION RATE] IN BLOOD: 14 MM/HOUR (ref 0–29)
GFR SERPL CREATININE-BSD FRML MDRD: 86 ML/MIN/1.73SQ M
GLUCOSE P FAST SERPL-MCNC: 135 MG/DL (ref 70–99)
HCT VFR BLD AUTO: 36.4 % (ref 36–46)
HGB BLD-MCNC: 11.5 G/DL (ref 12–16)
HYPERCHROMIA BLD QL SMEAR: PRESENT
LDH SERPL-CCNC: 833 U/L (ref 313–618)
LYMPHOCYTES # BLD AUTO: 2.75 THOUSAND/UL (ref 0.5–4)
LYMPHOCYTES # BLD AUTO: 64 % (ref 25–45)
MAGNESIUM SERPL-MCNC: 2 MG/DL (ref 1.6–2.3)
MCH RBC QN AUTO: 21.9 PG (ref 26–34)
MCHC RBC AUTO-ENTMCNC: 31.6 G/DL (ref 31–36)
MCV RBC AUTO: 69 FL (ref 80–100)
MICROCYTES BLD QL AUTO: PRESENT
MONOCYTES # BLD AUTO: 0.17 THOUSAND/UL (ref 0.2–0.9)
MONOCYTES NFR BLD AUTO: 4 % (ref 1–10)
NEUTS BAND NFR BLD MANUAL: 1 % (ref 0–8)
NEUTS SEG # BLD: 0.9 THOUSAND/UL (ref 1.8–7.8)
NEUTS SEG NFR BLD AUTO: 20 %
OVALOCYTES BLD QL SMEAR: PRESENT
PHOSPHATE SERPL-MCNC: 3.6 MG/DL (ref 2.5–4.8)
PLATELET # BLD AUTO: 179 THOUSANDS/UL (ref 150–450)
PLATELET BLD QL SMEAR: ADEQUATE
PMV BLD AUTO: 8.6 FL (ref 8.9–12.7)
POIKILOCYTOSIS BLD QL SMEAR: PRESENT
POTASSIUM SERPL-SCNC: 4.8 MMOL/L (ref 3.6–5)
PROT SERPL-MCNC: 6.6 G/DL (ref 5.9–8.4)
RBC # BLD AUTO: 5.25 MILLION/UL (ref 4–5.2)
RBC MORPH BLD: ABNORMAL
SODIUM SERPL-SCNC: 141 MMOL/L (ref 137–147)
TOTAL CELLS COUNTED SPEC: 100
VARIANT LYMPHS # BLD AUTO: 8 % (ref 0–0)
WBC # BLD AUTO: 4.3 THOUSAND/UL (ref 4.5–11)

## 2021-07-01 PROCEDURE — 83735 ASSAY OF MAGNESIUM: CPT

## 2021-07-01 PROCEDURE — 85007 BL SMEAR W/DIFF WBC COUNT: CPT

## 2021-07-01 PROCEDURE — 83615 LACTATE (LD) (LDH) ENZYME: CPT

## 2021-07-01 PROCEDURE — 85027 COMPLETE CBC AUTOMATED: CPT

## 2021-07-01 PROCEDURE — 84100 ASSAY OF PHOSPHORUS: CPT

## 2021-07-01 PROCEDURE — 85652 RBC SED RATE AUTOMATED: CPT

## 2021-07-01 PROCEDURE — 80053 COMPREHEN METABOLIC PANEL: CPT

## 2021-07-01 PROCEDURE — 36415 COLL VENOUS BLD VENIPUNCTURE: CPT

## 2021-07-01 PROCEDURE — 86140 C-REACTIVE PROTEIN: CPT

## 2021-07-02 ENCOUNTER — HOSPITAL ENCOUNTER (OUTPATIENT)
Dept: INFUSION CENTER | Facility: HOSPITAL | Age: 61
Discharge: HOME | End: 2021-07-02
Attending: INTERNAL MEDICINE
Payer: COMMERCIAL

## 2021-07-02 VITALS
HEART RATE: 53 BPM | DIASTOLIC BLOOD PRESSURE: 60 MMHG | OXYGEN SATURATION: 98 % | RESPIRATION RATE: 16 BRPM | SYSTOLIC BLOOD PRESSURE: 122 MMHG | TEMPERATURE: 97.5 F

## 2021-07-02 DIAGNOSIS — D70.1 CHEMOTHERAPY-INDUCED NEUTROPENIA (HCC): ICD-10-CM

## 2021-07-02 DIAGNOSIS — Z91.89 AT HIGH RISK OF TUMOR LYSIS SYNDROME: ICD-10-CM

## 2021-07-02 DIAGNOSIS — C82.18 GRADE 2 FOLLICULAR LYMPHOMA OF LYMPH NODES OF MULTIPLE REGIONS (HCC): ICD-10-CM

## 2021-07-02 DIAGNOSIS — T45.1X5A CHEMOTHERAPY-INDUCED NEUTROPENIA (HCC): ICD-10-CM

## 2021-07-02 DIAGNOSIS — D50.8 IRON DEFICIENCY ANEMIA SECONDARY TO INADEQUATE DIETARY IRON INTAKE: Primary | ICD-10-CM

## 2021-07-02 LAB
ALBUMIN SERPL BCP-MCNC: 3.6 G/DL (ref 3–5.2)
ALP SERPL-CCNC: 83 U/L (ref 43–122)
ALT SERPL W P-5'-P-CCNC: 16 U/L
ANION GAP SERPL CALCULATED.3IONS-SCNC: 8 MMOL/L (ref 5–14)
AST SERPL W P-5'-P-CCNC: 26 U/L (ref 14–36)
BASOPHILS # BLD AUTO: 0.1 THOUSANDS/ΜL (ref 0–0.1)
BASOPHILS NFR BLD AUTO: 1 % (ref 0–1)
BILIRUB SERPL-MCNC: 0.43 MG/DL
BUN SERPL-MCNC: 17 MG/DL (ref 5–25)
CALCIUM SERPL-MCNC: 8.9 MG/DL (ref 8.4–10.2)
CHLORIDE SERPL-SCNC: 106 MMOL/L (ref 97–108)
CO2 SERPL-SCNC: 24 MMOL/L (ref 22–30)
CREAT SERPL-MCNC: 0.87 MG/DL (ref 0.6–1.2)
EOSINOPHIL # BLD AUTO: 0.1 THOUSAND/ΜL (ref 0–0.4)
EOSINOPHIL NFR BLD AUTO: 2 % (ref 0–6)
ERYTHROCYTE [DISTWIDTH] IN BLOOD BY AUTOMATED COUNT: 15.6 %
GFR SERPL CREATININE-BSD FRML MDRD: 73 ML/MIN/1.73SQ M
GLUCOSE SERPL-MCNC: 167 MG/DL (ref 70–99)
HCT VFR BLD AUTO: 34.1 % (ref 36–46)
HGB BLD-MCNC: 11 G/DL (ref 12–16)
LDH SERPL-CCNC: 702 U/L (ref 313–618)
LDH SERPL-CCNC: 703 U/L (ref 313–618)
LYMPHOCYTES # BLD AUTO: 2.7 THOUSANDS/ΜL (ref 0.5–4)
LYMPHOCYTES NFR BLD AUTO: 59 % (ref 25–45)
MAGNESIUM SERPL-MCNC: 1.9 MG/DL (ref 1.6–2.3)
MCH RBC QN AUTO: 22.2 PG (ref 26–34)
MCHC RBC AUTO-ENTMCNC: 32.2 G/DL (ref 31–36)
MCV RBC AUTO: 69 FL (ref 80–100)
MICROCYTES BLD QL AUTO: PRESENT
MONOCYTES # BLD AUTO: 0.2 THOUSAND/ΜL (ref 0.2–0.9)
MONOCYTES NFR BLD AUTO: 5 % (ref 1–10)
NEUTROPHILS # BLD AUTO: 1.5 THOUSANDS/ΜL (ref 1.8–7.8)
NEUTS SEG NFR BLD AUTO: 33 % (ref 45–65)
PHOSPHATE SERPL-MCNC: 3.9 MG/DL (ref 2.5–4.8)
PLATELET # BLD AUTO: 157 THOUSANDS/UL (ref 150–450)
PLATELET BLD QL SMEAR: ADEQUATE
PMV BLD AUTO: 9 FL (ref 8.9–12.7)
POTASSIUM SERPL-SCNC: 4.4 MMOL/L (ref 3.6–5)
PROT SERPL-MCNC: 6 G/DL (ref 5.9–8.4)
RBC # BLD AUTO: 4.94 MILLION/UL (ref 4–5.2)
RBC MORPH BLD: NORMAL
SODIUM SERPL-SCNC: 138 MMOL/L (ref 137–147)
URATE SERPL-MCNC: 4.9 MG/DL (ref 2.7–7.5)
WBC # BLD AUTO: 4.6 THOUSAND/UL (ref 4.5–11)

## 2021-07-02 PROCEDURE — 83615 LACTATE (LD) (LDH) ENZYME: CPT | Performed by: INTERNAL MEDICINE

## 2021-07-02 PROCEDURE — 96360 HYDRATION IV INFUSION INIT: CPT

## 2021-07-02 PROCEDURE — 84550 ASSAY OF BLOOD/URIC ACID: CPT | Performed by: INTERNAL MEDICINE

## 2021-07-02 PROCEDURE — 84100 ASSAY OF PHOSPHORUS: CPT | Performed by: INTERNAL MEDICINE

## 2021-07-02 PROCEDURE — 85025 COMPLETE CBC W/AUTO DIFF WBC: CPT | Performed by: INTERNAL MEDICINE

## 2021-07-02 PROCEDURE — 80053 COMPREHEN METABOLIC PANEL: CPT | Performed by: INTERNAL MEDICINE

## 2021-07-02 PROCEDURE — 83735 ASSAY OF MAGNESIUM: CPT | Performed by: INTERNAL MEDICINE

## 2021-07-02 RX ADMIN — SODIUM CHLORIDE 1000 ML: 0.9 INJECTION, SOLUTION INTRAVENOUS at 08:37

## 2021-07-02 NOTE — PLAN OF CARE
Problem: Potential for Falls  Goal: Patient will remain free of falls  Description: INTERVENTIONS:  - Educate patient/family on patient safety including physical limitations  - Instruct patient to call for assistance with activity   - Consult OT/PT to assist with strengthening/mobility   - Keep Call bell within reach  - Keep bed low and locked with side rails adjusted as appropriate  - Keep care items and personal belongings within reach    Outcome: Progressing     Problem: METABOLIC, FLUID AND ELECTROLYTES - ADULT  Goal: Electrolytes maintained within normal limits  Description: INTERVENTIONS:  - Monitor labs and assess patient for signs and symptoms of electrolyte imbalances  - Administer electrolyte replacement as ordered  - Monitor response to electrolyte replacements, including repeat lab results as appropriate  - Instruct patient on fluid and nutrition as appropriate  Outcome: Progressing  Goal: Fluid balance maintained  Description: INTERVENTIONS:  - Monitor labs   - Monitor I/O and WT  - Instruct patient on fluid and nutrition as appropriate  - Assess for signs & symptoms of volume excess or deficit  Outcome: Progressing

## 2021-07-02 NOTE — PROGRESS NOTES
Patient tolerated hydration therapy well with no adverse effects  Next appointment verified, AVS provided

## 2021-07-06 ENCOUNTER — HOSPITAL ENCOUNTER (OUTPATIENT)
Dept: INFUSION CENTER | Facility: HOSPITAL | Age: 61
Discharge: HOME | End: 2021-07-06
Payer: COMMERCIAL

## 2021-07-06 VITALS
DIASTOLIC BLOOD PRESSURE: 70 MMHG | HEART RATE: 55 BPM | SYSTOLIC BLOOD PRESSURE: 148 MMHG | RESPIRATION RATE: 20 BRPM | TEMPERATURE: 96.6 F

## 2021-07-06 DIAGNOSIS — T45.1X5A CHEMOTHERAPY-INDUCED NEUTROPENIA (HCC): Primary | ICD-10-CM

## 2021-07-06 DIAGNOSIS — C82.18 GRADE 2 FOLLICULAR LYMPHOMA OF LYMPH NODES OF MULTIPLE REGIONS (HCC): ICD-10-CM

## 2021-07-06 DIAGNOSIS — Z91.89 AT HIGH RISK OF TUMOR LYSIS SYNDROME: ICD-10-CM

## 2021-07-06 DIAGNOSIS — D70.1 CHEMOTHERAPY-INDUCED NEUTROPENIA (HCC): Primary | ICD-10-CM

## 2021-07-06 LAB — GLUCOSE SERPL-MCNC: 85 MG/DL (ref 65–140)

## 2021-07-06 PROCEDURE — 96413 CHEMO IV INFUSION 1 HR: CPT

## 2021-07-06 PROCEDURE — 82948 REAGENT STRIP/BLOOD GLUCOSE: CPT

## 2021-07-06 PROCEDURE — 96367 TX/PROPH/DG ADDL SEQ IV INF: CPT

## 2021-07-06 RX ORDER — SODIUM CHLORIDE 9 MG/ML
20 INJECTION, SOLUTION INTRAVENOUS ONCE
Status: COMPLETED | OUTPATIENT
Start: 2021-07-06 | End: 2021-07-06

## 2021-07-06 RX ADMIN — ONDANSETRON 8 MG: 2 INJECTION INTRAMUSCULAR; INTRAVENOUS at 10:56

## 2021-07-06 RX ADMIN — COPANLISIB 60 MG: 15 INJECTION, POWDER, LYOPHILIZED, FOR SOLUTION INTRAVENOUS at 11:52

## 2021-07-06 RX ADMIN — SODIUM CHLORIDE 20 ML/HR: 0.9 INJECTION, SOLUTION INTRAVENOUS at 10:56

## 2021-07-09 ENCOUNTER — TELEPHONE (OUTPATIENT)
Dept: PALLIATIVE MEDICINE | Facility: CLINIC | Age: 61
End: 2021-07-09

## 2021-07-09 ENCOUNTER — HOSPITAL ENCOUNTER (OUTPATIENT)
Dept: INFUSION CENTER | Facility: HOSPITAL | Age: 61
Discharge: HOME | End: 2021-07-09
Attending: INTERNAL MEDICINE
Payer: COMMERCIAL

## 2021-07-09 ENCOUNTER — OFFICE VISIT (OUTPATIENT)
Dept: PALLIATIVE MEDICINE | Facility: CLINIC | Age: 61
End: 2021-07-09
Payer: COMMERCIAL

## 2021-07-09 VITALS
OXYGEN SATURATION: 99 % | DIASTOLIC BLOOD PRESSURE: 70 MMHG | SYSTOLIC BLOOD PRESSURE: 104 MMHG | BODY MASS INDEX: 35.12 KG/M2 | HEART RATE: 77 BPM | TEMPERATURE: 96.6 F | WEIGHT: 210.8 LBS

## 2021-07-09 VITALS
TEMPERATURE: 97.7 F | HEART RATE: 68 BPM | RESPIRATION RATE: 18 BRPM | SYSTOLIC BLOOD PRESSURE: 130 MMHG | DIASTOLIC BLOOD PRESSURE: 70 MMHG

## 2021-07-09 DIAGNOSIS — G89.3 CANCER RELATED PAIN: ICD-10-CM

## 2021-07-09 DIAGNOSIS — R10.30 LOWER ABDOMINAL PAIN: ICD-10-CM

## 2021-07-09 DIAGNOSIS — K59.03 DRUG INDUCED CONSTIPATION: ICD-10-CM

## 2021-07-09 DIAGNOSIS — C82.18 GRADE 2 FOLLICULAR LYMPHOMA OF LYMPH NODES OF MULTIPLE REGIONS (HCC): ICD-10-CM

## 2021-07-09 DIAGNOSIS — D50.8 IRON DEFICIENCY ANEMIA SECONDARY TO INADEQUATE DIETARY IRON INTAKE: Primary | ICD-10-CM

## 2021-07-09 DIAGNOSIS — G89.3 CANCER RELATED PAIN: Primary | ICD-10-CM

## 2021-07-09 DIAGNOSIS — Z91.89 AT HIGH RISK OF TUMOR LYSIS SYNDROME: ICD-10-CM

## 2021-07-09 LAB
ALBUMIN SERPL BCP-MCNC: 4 G/DL (ref 3–5.2)
ALP SERPL-CCNC: 78 U/L (ref 43–122)
ALT SERPL W P-5'-P-CCNC: 13 U/L
ANION GAP SERPL CALCULATED.3IONS-SCNC: 7 MMOL/L (ref 5–14)
AST SERPL W P-5'-P-CCNC: 21 U/L (ref 14–36)
BILIRUB SERPL-MCNC: 0.61 MG/DL
BUN SERPL-MCNC: 15 MG/DL (ref 5–25)
CALCIUM SERPL-MCNC: 9.1 MG/DL (ref 8.4–10.2)
CHLORIDE SERPL-SCNC: 109 MMOL/L (ref 97–108)
CO2 SERPL-SCNC: 24 MMOL/L (ref 22–30)
CREAT SERPL-MCNC: 0.7 MG/DL (ref 0.6–1.2)
GFR SERPL CREATININE-BSD FRML MDRD: 95 ML/MIN/1.73SQ M
GLUCOSE SERPL-MCNC: 97 MG/DL (ref 70–99)
LDH SERPL-CCNC: 624 U/L (ref 313–618)
MAGNESIUM SERPL-MCNC: 1.9 MG/DL (ref 1.6–2.3)
PHOSPHATE SERPL-MCNC: 3.2 MG/DL (ref 2.5–4.8)
POTASSIUM SERPL-SCNC: 4.1 MMOL/L (ref 3.6–5)
PROT SERPL-MCNC: 6.5 G/DL (ref 5.9–8.4)
SODIUM SERPL-SCNC: 140 MMOL/L (ref 137–147)
URATE SERPL-MCNC: 3.5 MG/DL (ref 2.7–7.5)

## 2021-07-09 PROCEDURE — 80053 COMPREHEN METABOLIC PANEL: CPT | Performed by: INTERNAL MEDICINE

## 2021-07-09 PROCEDURE — 83615 LACTATE (LD) (LDH) ENZYME: CPT | Performed by: INTERNAL MEDICINE

## 2021-07-09 PROCEDURE — 99214 OFFICE O/P EST MOD 30 MIN: CPT | Performed by: INTERNAL MEDICINE

## 2021-07-09 PROCEDURE — 96360 HYDRATION IV INFUSION INIT: CPT

## 2021-07-09 PROCEDURE — 84550 ASSAY OF BLOOD/URIC ACID: CPT | Performed by: INTERNAL MEDICINE

## 2021-07-09 PROCEDURE — 83735 ASSAY OF MAGNESIUM: CPT | Performed by: INTERNAL MEDICINE

## 2021-07-09 PROCEDURE — 84100 ASSAY OF PHOSPHORUS: CPT | Performed by: INTERNAL MEDICINE

## 2021-07-09 RX ORDER — OXYCODONE HYDROCHLORIDE 20 MG/1
20 TABLET ORAL EVERY 4 HOURS PRN
Qty: 120 TABLET | Refills: 0 | Status: SHIPPED | OUTPATIENT
Start: 2021-07-09 | End: 2021-07-09 | Stop reason: SDUPTHER

## 2021-07-09 RX ORDER — OXYCODONE HYDROCHLORIDE 20 MG/1
20 TABLET ORAL EVERY 4 HOURS PRN
Qty: 120 TABLET | Refills: 0 | Status: CANCELLED | OUTPATIENT
Start: 2021-07-09

## 2021-07-09 RX ORDER — OXYCODONE HYDROCHLORIDE 20 MG/1
20 TABLET ORAL EVERY 4 HOURS PRN
Qty: 120 TABLET | Refills: 0 | Status: SHIPPED | OUTPATIENT
Start: 2021-07-09 | End: 2021-07-23 | Stop reason: SDUPTHER

## 2021-07-09 RX ADMIN — SODIUM CHLORIDE 1000 ML: 0.9 INJECTION, SOLUTION INTRAVENOUS at 08:29

## 2021-07-09 NOTE — PROGRESS NOTES
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 ([address] )    3  Lower abdominal pain    4  Drug induced constipation       · Now back on chemo and appears to be tolerating this well  · Spent time discussing CRP and expectation of management  · Increase oxycodone IR 20mg PO q4H prn  She is allowed to take total of 6 a day if needed  · Zofran prn N/V  · Moving her bowels with miralax  Explained to her to take it prn if constipated as she reports diarrhea with chemo  · RTO in 8 weeks    Controlled Substance Review    [place] or [place]  reviewed: No red flags were identified; safe to proceed with prescription  Xu Okeefe 06/21/2021  4   06/21/2021  Oxycodone Hcl 20 MG Tablet  90 00  [address]   [id number]   All (3848)   0  180 00 MME  [place]   06/09/2021  5   06/09/2021  Oxycodone Hcl 20 MG Tablet  90 00  [address]   [id number]   S h (1081)   0  180 00 MME  [place]   05/19/2021  5   05/19/2021  Oxycodone Hcl 20 MG Tablet  90 00  [address]   [id number]   S h (1081)   0  117 39 MME  [place]     Requested Prescriptions     Signed Prescriptions Disp Refills    oxyCODONE (ROXICODONE) 20 MG TABS 120 tablet 0     Sig: Take 1 tablet (20 mg total) by mouth every 4 (four) hours as needed for severe pain Erica 1 tableta cada 4 horas fernandez sea necesario para el dolor de estomagoMax Daily Amount: 120 mg     Medications Discontinued During This Encounter   Medication Reason    oxyCODONE (ROXICODONE) 20 MG TABS Reorder    oxyCODONE (ROXICODONE) 20 MG TABS Reorder       Representatives have queried the patient's controlled substance dispensing history in the Prescription Drug Monitoring Program in compliance with regulations before I have prescribed any controlled substances  The prescription history is consistent with prescribed therapy and our practice policies        36 minutes were spent face to face with Yusra Aquino with greater than 50% of the time spent in counseling or coordination of care including discussions of etiology of diagnosis, diagnostic results, impression, and recommendations, risks and benefits of treatment, instructions for disease self management, treatment instructions, follow up requirements, risk factors and risk reduction of disease, patient and family counseling/involvement in care and compliance with treatment regimen   All of the patient's questions were answered during this discussion  No follow-ups on file  Subjective:   Chief Complaint  Follow up visit for:  symptom management, pain, neoplasm related, assessment of goals of care, disease process education and discussion of prognosis, advance care planning    Medication Refill  Associated symptoms include abdominal pain, nausea and neck pain  Pertinent negatives include no chest pain, chills, coughing, fatigue or fever  Nausea  Associated symptoms include abdominal pain, nausea and neck pain  Pertinent negatives include no chest pain, chills, coughing, fatigue or fever  Yusra Aquino is a 63 y o  female with grade 2 follicular lymphoma initially diagnosed in Nov 2018  Started on chemo (rituximab + bendamustine) soon after  Developed tumor lysis syndrome in Dec 2018, bendamustine removed  She completed rituxan on 4/2019 and had been on maintenance Rituxan since 5/2019  In March 2020, she had evidence of disease progression and was also started on Revlimid  Per review of notes, there are some concerns with noncompliance and f/u with bloodwork   Per Oncology note, long discussion with patient regarding refractory and progressive grade 2 follicular lymphoma   Patient with c/o increase L neck swelling/tenderness, US done, concerning for progression of her disease  Her biopsy from the L iliac bone and L cervical LN came back positive for follicular lymphoma   She is currently on Copalisib  Was on Obinutuzumab/CHOP with Udenyca before  There was a delay in her treatment when she had Covid-19 as well as when she went to [place] to relocate  She was going to stay there to get treatments, but it didn't work out so she came back  Her latest PET-CT 6/4 unfortunately showed significant progression of disease in the neck, chest, abdomen, pelvis, including new lesion in the L ribs  She was last seen on 6/9 where she was distraught by the news of her son being shot at in [facility] as well as progression of her cancer on PET-CT  But she was off of treatments for awhile  She was planning on checking on his son in [place] and then coming back to resume treatments  She comes in today for routine follow up   used  She has since started chemo that she seems to be tolerating well except for self limiting diarrhea and nausea  Her pain is well controlled with her regimen  She averages 4-5 doses of oxycodone IR a day  She continues to endorse "bumps" in her neck and chest area  She tells me that her son did well and is out of danger now  He will come to live with her  She is explained when to take miralax (she thought this was for nausea) and when to take the zofran  The following portions of the medical history were reviewed: past medical history, problem list, medication list, and social history      Current Outpatient Medications:     acetaminophen (TYLENOL) 325 mg tablet, Take 2 tablets (650 mg total) by mouth every 6 (six) hours as needed for mild pain, moderate pain, headaches or fever, Disp: 30 tablet, Rfl: 0    acyclovir (ZOVIRAX) 400 MG tablet, Take 1 tablet (400 mg total) by mouth 2 (two) times a day, Disp: 60 tablet, Rfl: 11    al mag oxide-diphenhydramine-lidocaine viscous (MAGIC MOUTHWASH) 1:1:1 suspension, Swish and spit 10 mL every 4 (four) hours as needed for mouth pain or discomfort, Disp: 180 mL, Rfl: 0    allopurinol (ZYLOPRIM) 100 mg tablet, Take 1 tablet (100 mg total) by mouth daily, Disp: 30 tablet, Rfl: 0    amLODIPine (NORVASC) 5 mg tablet, Take 1 tablet (5 mg total) by mouth daily, Disp: 30 tablet, Rfl: 0    ascorbic acid (VITAMIN C) 1000 MG tablet, Take 1 tablet (1,000 mg total) by mouth every 12 (twelve) hours for 10 doses, Disp: 10 tablet, Rfl: 0    aspirin (ECOTRIN LOW STRENGTH) 81 mg EC tablet, Take 1 tablet (81 mg total) by mouth daily, Disp: 30 tablet, Rfl: 11    aspirin-acetaminophen-caffeine (EXCEDRIN MIGRAINE) 250-250-65 MG per tablet, Take 1 tablet by mouth every 6 (six) hours as needed for headaches Erica 1 tableta cada 6 horas fernandez sea necesario para el dolor de russ, Disp: 30 tablet, Rfl: 0    atorvastatin (LIPITOR) 40 mg tablet, Take 1 tablet (40 mg total) by mouth daily with dinner, Disp: 12 tablet, Rfl: 0    benzonatate (TESSALON) 200 MG capsule, Take 1 capsule (200 mg total) by mouth 3 (three) times a day as needed for cough, Disp: 20 capsule, Rfl: 0    carbamide peroxide (DEBROX) 6 5 % otic solution, Administer 5 drops to the right ear 2 (two) times a day, Disp: 15 mL, Rfl: 0    cholecalciferol (VITAMIN D3) 1,000 units tablet, Take 2 tablets (2,000 Units total) by mouth daily for 5 days, Disp: 10 tablet, Rfl: 0    cyanocobalamin 1000 MCG tablet, Take 1 tablet (1,000 mcg total) by mouth daily, Disp: 90 tablet, Rfl: 3    dexamethasone (DECADRON) 1 mg tablet, Take 1 tablet (1 mg total) by mouth 2 (two) times a day Erica 1 tableta dos veces al misael (por la manana y al mediodia), Disp: 60 tablet, Rfl: 3    DULoxetine (CYMBALTA) 20 mg capsule, Take 1 capsule (20 mg total) by mouth daily, Disp: 30 capsule, Rfl: 3    folic acid (FOLVITE) 1 mg tablet, Take 1 tablet (1 mg total) by mouth daily, Disp: 90 tablet, Rfl: 4    guaiFENesin (ROBITUSSIN) 100 mg/5 mL oral solution, Take 10 mL (200 mg total) by mouth every 4 (four) hours, Disp: 473 mL, Rfl: 0    lidocaine (LMX) 4 % cream, Apply topically as needed for mild pain To neck, Disp: 30 g, Rfl: 0    meclizine (ANTIVERT) 12 5 MG tablet, Take 1 tablet (12 5 mg total) by mouth every 8 (eight) hours as needed for dizziness, Disp: 30 tablet, Rfl: 0    omeprazole (PriLOSEC) 20 mg delayed release capsule, Take 2 capsules (40 mg total) by mouth daily To prevent stomach upset, Disp: 60 capsule, Rfl: 5    ondansetron (ZOFRAN) 4 mg tablet, Take 1 tablet (4 mg total) by mouth every 8 (eight) hours as needed for nausea or vomiting, Disp: 30 tablet, Rfl: 3    oxyCODONE (ROXICODONE) 20 MG TABS, Take 1 tablet (20 mg total) by mouth every 4 (four) hours as needed for severe pain Erica 1 tableta cada 4 horas fernandez sea necesario para el dolor de estomagoMax Daily Amount: 120 mg, Disp: 120 tablet, Rfl: 0    polyethylene glycol (MIRALAX) 17 g packet, Take 17 g by mouth daily, Disp: 30 each, Rfl: 3    senna (SENOKOT) 8 6 mg, Take 1 tablet (8 6 mg total) by mouth 2 (two) times a day, Disp: 60 each, Rfl: 3    sulfamethoxazole-trimethoprim (BACTRIM DS) 800-160 mg per tablet, Take 1 tablet by mouth 3 (three) times a week, Disp: 12 tablet, Rfl: 11    zinc sulfate (ZINCATE) 220 mg capsule, Take 1 capsule (220 mg total) by mouth daily for 4 doses, Disp: 4 capsule, Rfl: 0  No current facility-administered medications for this visit  Review of Systems   Constitutional: Negative for activity change, appetite change, chills, fatigue, fever and unexpected weight change  HENT: Negative for trouble swallowing  Respiratory: Negative for cough and shortness of breath  Cardiovascular: Negative for chest pain  Gastrointestinal: Positive for abdominal pain and nausea  Negative for constipation and diarrhea  Musculoskeletal: Positive for neck pain  Neurological: Negative for speech difficulty and light-headedness  Psychiatric/Behavioral: Negative for sleep disturbance  The patient is not nervous/anxious  All other systems reviewed and are negative        All other systems negative    Objective:  Vital Signs  /70 (BP Location: Right arm, Patient Position: Sitting, Cuff Size: Standard)   Pulse 77   Temp (!) 96 6 °F (35 9 °C) (Temporal)   Wt 95 6 kg (210 lb 12 8 oz)   SpO2 99%   BMI 35 12 kg/m²    Physical Exam    Constitutional: Appears well-developed and well-nourished, she did not look sick, nor toxic-looking  Pleasant, well-kempt, nails are done  Head: Normocephalic and atraumatic  Eyes: EOM are normal  No ocular discharge  No scleral icterus  Appears to have pterygium in the R eye  Respiratory: Effort normal  No stridor  No respiratory distress  Gastrointestinal: No abdominal distension  Musculoskeletal: No edema  Neurological: Alert, oriented and appropriately conversant  Skin: No diaphoresis, no rashes seen on exposed areas of skin  Psychiatric: Displays a normal mood and affect   Behavior, judgement and thought content appear normal  In better spirits today    Deann Sarmiento MD  Palliative Medicine & Supportive Care  Internal Medicine  Available via [facility] Text  Office: [phone]  Fax: [phone]

## 2021-07-09 NOTE — TELEPHONE ENCOUNTER
Please send Oxycodone to [facility] ( pt used this pharmacy last and wishes to stay with this pharmacy)    Sent to [facility] by mistake  Please cancel     I will notify [facility] to disregard the Oxycodone order

## 2021-07-09 NOTE — PLAN OF CARE
Problem: Potential for Falls  Goal: Patient will remain free of falls  Description: INTERVENTIONS:  - Educate patient/family on patient safety including physical limitations  - Instruct patient to call for assistance with activity   - Consult OT/PT to assist with strengthening/mobility   - Keep Call bell within reach  - Keep bed low and locked with side rails adjusted as appropriate  - Keep care items and personal belongings within reach  - Initiate and maintain comfort rounds  - Make Fall Risk Sign visible to staff  - Offer Toileting every  Hours, in advance of need  - Initiate/Maintain alarm  - Obtain necessary fall risk management equipment:  Apply yellow socks and bracelet for high fall risk patients  - Consider moving patient to room near nurses station  Outcome: Progressing

## 2021-07-12 ENCOUNTER — HOSPITAL ENCOUNTER (OUTPATIENT)
Dept: MRI IMAGING | Facility: HOSPITAL | Age: 61
Discharge: HOME | End: 2021-07-12

## 2021-07-12 ENCOUNTER — DOCUMENTATION (OUTPATIENT)
Dept: HEMATOLOGY ONCOLOGY | Facility: CLINIC | Age: 61
End: 2021-07-12

## 2021-07-12 ENCOUNTER — APPOINTMENT (OUTPATIENT)
Dept: LAB | Facility: HOSPITAL | Age: 61
End: 2021-07-12
Payer: COMMERCIAL

## 2021-07-12 ENCOUNTER — OFFICE VISIT (OUTPATIENT)
Dept: HEMATOLOGY ONCOLOGY | Facility: CLINIC | Age: 61
End: 2021-07-12
Payer: COMMERCIAL

## 2021-07-12 VITALS
TEMPERATURE: 97.9 F | HEART RATE: 56 BPM | OXYGEN SATURATION: 97 % | DIASTOLIC BLOOD PRESSURE: 82 MMHG | HEIGHT: 64 IN | RESPIRATION RATE: 16 BRPM | SYSTOLIC BLOOD PRESSURE: 146 MMHG | WEIGHT: 209.2 LBS | BODY MASS INDEX: 35.71 KG/M2

## 2021-07-12 DIAGNOSIS — D51.8 OTHER VITAMIN B12 DEFICIENCY ANEMIAS: ICD-10-CM

## 2021-07-12 DIAGNOSIS — C82.18 GRADE 2 FOLLICULAR LYMPHOMA OF LYMPH NODES OF MULTIPLE REGIONS (HCC): Primary | ICD-10-CM

## 2021-07-12 DIAGNOSIS — H53.9 CHANGES IN VISION: ICD-10-CM

## 2021-07-12 DIAGNOSIS — C82.18 GRADE 2 FOLLICULAR LYMPHOMA OF LYMPH NODES OF MULTIPLE REGIONS (HCC): ICD-10-CM

## 2021-07-12 DIAGNOSIS — T45.1X5A CHEMOTHERAPY-INDUCED NEUTROPENIA (HCC): ICD-10-CM

## 2021-07-12 DIAGNOSIS — D50.9 MICROCYTIC ANEMIA: ICD-10-CM

## 2021-07-12 DIAGNOSIS — D70.1 CHEMOTHERAPY-INDUCED NEUTROPENIA (HCC): ICD-10-CM

## 2021-07-12 DIAGNOSIS — R42 DIZZINESS: ICD-10-CM

## 2021-07-12 DIAGNOSIS — E53.8 FOLIC ACID DEFICIENCY: ICD-10-CM

## 2021-07-12 LAB
CRP SERPL QL: 8.6 MG/L
ERYTHROCYTE [SEDIMENTATION RATE] IN BLOOD: 21 MM/HOUR (ref 0–29)
FERRITIN SERPL-MCNC: 76 NG/ML (ref 8–388)
FOLATE SERPL-MCNC: 8.6 NG/ML (ref 3.1–17.5)
IRON SATN MFR SERPL: 21 %
IRON SERPL-MCNC: 58 UG/DL (ref 50–170)
LDH SERPL-CCNC: 607 U/L (ref 313–618)
TIBC SERPL-MCNC: 281 UG/DL (ref 250–450)
URATE SERPL-MCNC: 4.1 MG/DL (ref 2.7–7.5)
VIT B12 SERPL-MCNC: 652 PG/ML (ref 100–900)

## 2021-07-12 PROCEDURE — 99214 OFFICE O/P EST MOD 30 MIN: CPT | Performed by: NURSE PRACTITIONER

## 2021-07-12 PROCEDURE — 83550 IRON BINDING TEST: CPT

## 2021-07-12 PROCEDURE — 82607 VITAMIN B-12: CPT

## 2021-07-12 PROCEDURE — 86140 C-REACTIVE PROTEIN: CPT

## 2021-07-12 PROCEDURE — 83540 ASSAY OF IRON: CPT

## 2021-07-12 PROCEDURE — 82728 ASSAY OF FERRITIN: CPT

## 2021-07-12 PROCEDURE — 82746 ASSAY OF FOLIC ACID SERUM: CPT

## 2021-07-12 PROCEDURE — 84550 ASSAY OF BLOOD/URIC ACID: CPT

## 2021-07-12 PROCEDURE — 83615 LACTATE (LD) (LDH) ENZYME: CPT

## 2021-07-12 PROCEDURE — 85652 RBC SED RATE AUTOMATED: CPT

## 2021-07-12 RX ORDER — SODIUM CHLORIDE 9 MG/ML
20 INJECTION, SOLUTION INTRAVENOUS ONCE
Status: CANCELLED | OUTPATIENT
Start: 2021-08-10

## 2021-07-12 RX ORDER — SODIUM CHLORIDE 9 MG/ML
20 INJECTION, SOLUTION INTRAVENOUS ONCE
Status: CANCELLED | OUTPATIENT
Start: 2021-08-03

## 2021-07-12 RX ORDER — SODIUM CHLORIDE 9 MG/ML
20 INJECTION, SOLUTION INTRAVENOUS ONCE
Status: CANCELLED | OUTPATIENT
Start: 2021-07-27
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
## 2021-07-12 NOTE — PROGRESS NOTES
7-12-21  Received request from providers office regarding insurance coverage and costs of patient medication  Reviewed insurance  Patient has active YANETH MCGINNIS  Called Hunter with [facility] who stated patient has not provided them with any insurance card and has been paying for the medication  He stated she just picked up her prescription  He attempted to run a test claim and stated although it is coming up active now he can not reimburse her because she never gave the insurance card and it is a controlled substance  She would need to contact her   Called patient informed of the information obtained and she stated she was not aware she had insurance  She was grateful for the information and stated she would call   in the morning regarding possible refund

## 2021-07-13 ENCOUNTER — HOSPITAL ENCOUNTER (OUTPATIENT)
Dept: INFUSION CENTER | Facility: HOSPITAL | Age: 61
Discharge: HOME | End: 2021-07-13
Payer: COMMERCIAL

## 2021-07-13 DIAGNOSIS — Z91.89 AT HIGH RISK OF TUMOR LYSIS SYNDROME: ICD-10-CM

## 2021-07-13 DIAGNOSIS — D70.1 CHEMOTHERAPY-INDUCED NEUTROPENIA (HCC): Primary | ICD-10-CM

## 2021-07-13 DIAGNOSIS — T45.1X5A CHEMOTHERAPY-INDUCED NEUTROPENIA (HCC): Primary | ICD-10-CM

## 2021-07-13 DIAGNOSIS — C82.18 GRADE 2 FOLLICULAR LYMPHOMA OF LYMPH NODES OF MULTIPLE REGIONS (HCC): ICD-10-CM

## 2021-07-13 LAB — GLUCOSE SERPL-MCNC: 73 MG/DL (ref 65–140)

## 2021-07-13 PROCEDURE — 82948 REAGENT STRIP/BLOOD GLUCOSE: CPT

## 2021-07-13 PROCEDURE — 96413 CHEMO IV INFUSION 1 HR: CPT

## 2021-07-13 PROCEDURE — 96367 TX/PROPH/DG ADDL SEQ IV INF: CPT

## 2021-07-13 RX ORDER — SODIUM CHLORIDE 9 MG/ML
20 INJECTION, SOLUTION INTRAVENOUS ONCE
Status: COMPLETED | OUTPATIENT
Start: 2021-07-13 | End: 2021-07-13

## 2021-07-13 RX ADMIN — ONDANSETRON 8 MG: 2 INJECTION INTRAMUSCULAR; INTRAVENOUS at 10:28

## 2021-07-13 RX ADMIN — COPANLISIB 60 MG: 15 INJECTION, POWDER, LYOPHILIZED, FOR SOLUTION INTRAVENOUS at 11:23

## 2021-07-13 RX ADMIN — SODIUM CHLORIDE 20 ML/HR: 0.9 INJECTION, SOLUTION INTRAVENOUS at 10:24

## 2021-07-13 NOTE — PROGRESS NOTES
Pt tolerated treatment today with no adverse reactions  AVS declined  Left unit ambulatory with a steady gait

## 2021-07-16 ENCOUNTER — HOSPITAL ENCOUNTER (OUTPATIENT)
Dept: INFUSION CENTER | Facility: HOSPITAL | Age: 61
Discharge: HOME | End: 2021-07-16
Attending: INTERNAL MEDICINE
Payer: COMMERCIAL

## 2021-07-16 VITALS
SYSTOLIC BLOOD PRESSURE: 131 MMHG | RESPIRATION RATE: 16 BRPM | TEMPERATURE: 97.1 F | HEART RATE: 50 BPM | DIASTOLIC BLOOD PRESSURE: 61 MMHG | OXYGEN SATURATION: 98 %

## 2021-07-16 DIAGNOSIS — D50.8 IRON DEFICIENCY ANEMIA SECONDARY TO INADEQUATE DIETARY IRON INTAKE: Primary | ICD-10-CM

## 2021-07-16 DIAGNOSIS — Z91.89 AT HIGH RISK OF TUMOR LYSIS SYNDROME: ICD-10-CM

## 2021-07-16 DIAGNOSIS — C82.18 GRADE 2 FOLLICULAR LYMPHOMA OF LYMPH NODES OF MULTIPLE REGIONS (HCC): ICD-10-CM

## 2021-07-16 DIAGNOSIS — D50.9 MICROCYTIC ANEMIA: ICD-10-CM

## 2021-07-16 LAB
ALBUMIN SERPL BCP-MCNC: 3.7 G/DL (ref 3–5.2)
ALP SERPL-CCNC: 71 U/L (ref 43–122)
ALT SERPL W P-5'-P-CCNC: 13 U/L
ANION GAP SERPL CALCULATED.3IONS-SCNC: 5 MMOL/L (ref 5–14)
ANISOCYTOSIS BLD QL SMEAR: PRESENT
AST SERPL W P-5'-P-CCNC: 21 U/L (ref 14–36)
BASOPHILS # BLD AUTO: 0 THOUSANDS/ΜL (ref 0–0.1)
BASOPHILS NFR BLD AUTO: 1 % (ref 0–1)
BILIRUB SERPL-MCNC: 0.53 MG/DL
BUN SERPL-MCNC: 13 MG/DL (ref 5–25)
CALCIUM SERPL-MCNC: 8.7 MG/DL (ref 8.4–10.2)
CHLORIDE SERPL-SCNC: 109 MMOL/L (ref 97–108)
CO2 SERPL-SCNC: 26 MMOL/L (ref 22–30)
CREAT SERPL-MCNC: 0.67 MG/DL (ref 0.6–1.2)
EOSINOPHIL # BLD AUTO: 0.1 THOUSAND/ΜL (ref 0–0.4)
EOSINOPHIL NFR BLD AUTO: 2 % (ref 0–6)
ERYTHROCYTE [DISTWIDTH] IN BLOOD BY AUTOMATED COUNT: 16.4 %
GFR SERPL CREATININE-BSD FRML MDRD: 97 ML/MIN/1.73SQ M
GLUCOSE SERPL-MCNC: 112 MG/DL (ref 70–99)
HCT VFR BLD AUTO: 33.9 % (ref 36–46)
HGB BLD-MCNC: 10.6 G/DL (ref 12–16)
HYPERCHROMIA BLD QL SMEAR: PRESENT
LDH SERPL-CCNC: 542 U/L (ref 313–618)
LYMPHOCYTES # BLD AUTO: 1.1 THOUSANDS/ΜL (ref 0.5–4)
LYMPHOCYTES NFR BLD AUTO: 36 % (ref 25–45)
MAGNESIUM SERPL-MCNC: 1.7 MG/DL (ref 1.6–2.3)
MCH RBC QN AUTO: 21.5 PG (ref 26–34)
MCHC RBC AUTO-ENTMCNC: 31.2 G/DL (ref 31–36)
MCV RBC AUTO: 69 FL (ref 80–100)
MICROCYTES BLD QL AUTO: PRESENT
MONOCYTES # BLD AUTO: 0.2 THOUSAND/ΜL (ref 0.2–0.9)
MONOCYTES NFR BLD AUTO: 5 % (ref 1–10)
NEUTROPHILS # BLD AUTO: 1.8 THOUSANDS/ΜL (ref 1.8–7.8)
NEUTS SEG NFR BLD AUTO: 56 % (ref 45–65)
OVALOCYTES BLD QL SMEAR: PRESENT
PHOSPHATE SERPL-MCNC: 4.5 MG/DL (ref 2.5–4.8)
PLATELET # BLD AUTO: 100 THOUSANDS/UL (ref 150–450)
PLATELET BLD QL SMEAR: ABNORMAL
PMV BLD AUTO: 8.7 FL (ref 8.9–12.7)
POTASSIUM SERPL-SCNC: 4.1 MMOL/L (ref 3.6–5)
PROT SERPL-MCNC: 6 G/DL (ref 5.9–8.4)
RBC # BLD AUTO: 4.93 MILLION/UL (ref 4–5.2)
RBC MORPH BLD: ABNORMAL
SODIUM SERPL-SCNC: 140 MMOL/L (ref 137–147)
URATE SERPL-MCNC: 3.3 MG/DL (ref 2.7–7.5)
WBC # BLD AUTO: 3.1 THOUSAND/UL (ref 4.5–11)

## 2021-07-16 PROCEDURE — 96360 HYDRATION IV INFUSION INIT: CPT

## 2021-07-16 PROCEDURE — 85025 COMPLETE CBC W/AUTO DIFF WBC: CPT

## 2021-07-16 PROCEDURE — 83735 ASSAY OF MAGNESIUM: CPT | Performed by: INTERNAL MEDICINE

## 2021-07-16 PROCEDURE — 80053 COMPREHEN METABOLIC PANEL: CPT

## 2021-07-16 PROCEDURE — 83615 LACTATE (LD) (LDH) ENZYME: CPT | Performed by: INTERNAL MEDICINE

## 2021-07-16 PROCEDURE — 84550 ASSAY OF BLOOD/URIC ACID: CPT | Performed by: INTERNAL MEDICINE

## 2021-07-16 PROCEDURE — 84100 ASSAY OF PHOSPHORUS: CPT | Performed by: INTERNAL MEDICINE

## 2021-07-16 RX ADMIN — SODIUM CHLORIDE 1000 ML: 0.9 INJECTION, SOLUTION INTRAVENOUS at 08:22

## 2021-07-16 NOTE — PROGRESS NOTES
Central labs drawn per orders today, hydration tolerated well without complications, made next appts and aVS provided

## 2021-07-20 PROBLEM — E86.0 DEHYDRATION: Status: ACTIVE | Noted: 2021-07-20

## 2021-07-23 ENCOUNTER — HOSPITAL ENCOUNTER (OUTPATIENT)
Dept: INFUSION CENTER | Facility: HOSPITAL | Age: 61
Discharge: HOME | End: 2021-07-23
Attending: INTERNAL MEDICINE
Payer: COMMERCIAL

## 2021-07-23 VITALS
SYSTOLIC BLOOD PRESSURE: 110 MMHG | HEART RATE: 59 BPM | TEMPERATURE: 96.8 F | RESPIRATION RATE: 18 BRPM | DIASTOLIC BLOOD PRESSURE: 70 MMHG

## 2021-07-23 DIAGNOSIS — C82.18 GRADE 2 FOLLICULAR LYMPHOMA OF LYMPH NODES OF MULTIPLE REGIONS (HCC): ICD-10-CM

## 2021-07-23 DIAGNOSIS — R10.30 LOWER ABDOMINAL PAIN: ICD-10-CM

## 2021-07-23 DIAGNOSIS — D50.8 IRON DEFICIENCY ANEMIA SECONDARY TO INADEQUATE DIETARY IRON INTAKE: Primary | ICD-10-CM

## 2021-07-23 DIAGNOSIS — E86.0 DEHYDRATION: ICD-10-CM

## 2021-07-23 DIAGNOSIS — Z91.89 AT HIGH RISK OF TUMOR LYSIS SYNDROME: ICD-10-CM

## 2021-07-23 DIAGNOSIS — G89.3 CANCER RELATED PAIN: ICD-10-CM

## 2021-07-23 DIAGNOSIS — D50.9 MICROCYTIC ANEMIA: ICD-10-CM

## 2021-07-23 LAB
ALBUMIN SERPL BCP-MCNC: 3.9 G/DL (ref 3–5.2)
ALP SERPL-CCNC: 76 U/L (ref 43–122)
ALT SERPL W P-5'-P-CCNC: 11 U/L
ANION GAP SERPL CALCULATED.3IONS-SCNC: 9 MMOL/L (ref 5–14)
AST SERPL W P-5'-P-CCNC: 23 U/L (ref 14–36)
BILIRUB SERPL-MCNC: 0.61 MG/DL
BUN SERPL-MCNC: 18 MG/DL (ref 5–25)
CALCIUM SERPL-MCNC: 9.2 MG/DL (ref 8.4–10.2)
CHLORIDE SERPL-SCNC: 106 MMOL/L (ref 97–108)
CO2 SERPL-SCNC: 25 MMOL/L (ref 22–30)
CREAT SERPL-MCNC: 0.76 MG/DL (ref 0.6–1.2)
GFR SERPL CREATININE-BSD FRML MDRD: 86 ML/MIN/1.73SQ M
GLUCOSE SERPL-MCNC: 125 MG/DL (ref 70–99)
LDH SERPL-CCNC: 645 U/L (ref 313–618)
MAGNESIUM SERPL-MCNC: 1.8 MG/DL (ref 1.6–2.3)
PHOSPHATE SERPL-MCNC: 3.7 MG/DL (ref 2.5–4.8)
POTASSIUM SERPL-SCNC: 4.2 MMOL/L (ref 3.6–5)
PROT SERPL-MCNC: 6.3 G/DL (ref 5.9–8.4)
SODIUM SERPL-SCNC: 140 MMOL/L (ref 137–147)
URATE SERPL-MCNC: 4.1 MG/DL (ref 2.7–7.5)

## 2021-07-23 PROCEDURE — 83735 ASSAY OF MAGNESIUM: CPT | Performed by: INTERNAL MEDICINE

## 2021-07-23 PROCEDURE — 83615 LACTATE (LD) (LDH) ENZYME: CPT | Performed by: INTERNAL MEDICINE

## 2021-07-23 PROCEDURE — 80053 COMPREHEN METABOLIC PANEL: CPT | Performed by: INTERNAL MEDICINE

## 2021-07-23 PROCEDURE — 84550 ASSAY OF BLOOD/URIC ACID: CPT | Performed by: INTERNAL MEDICINE

## 2021-07-23 PROCEDURE — 84100 ASSAY OF PHOSPHORUS: CPT | Performed by: INTERNAL MEDICINE

## 2021-07-23 PROCEDURE — 96360 HYDRATION IV INFUSION INIT: CPT

## 2021-07-23 RX ORDER — OXYCODONE HYDROCHLORIDE 20 MG/1
20 TABLET ORAL EVERY 4 HOURS PRN
Qty: 120 TABLET | Refills: 0 | Status: SHIPPED | OUTPATIENT
Start: 2021-07-23 | End: 2021-08-10 | Stop reason: SDUPTHER

## 2021-07-23 RX ORDER — OXYCODONE HYDROCHLORIDE 20 MG/1
20 TABLET ORAL EVERY 4 HOURS PRN
Qty: 120 TABLET | Refills: 0 | Status: SHIPPED | OUTPATIENT
Start: 2021-07-23 | End: 2021-07-23 | Stop reason: SDUPTHER

## 2021-07-23 RX ORDER — OXYCODONE HYDROCHLORIDE 20 MG/1
20 TABLET ORAL EVERY 4 HOURS PRN
Qty: 120 TABLET | Refills: 0 | Status: CANCELLED | OUTPATIENT
Start: 2021-07-23

## 2021-07-23 RX ADMIN — SODIUM CHLORIDE 1000 ML: 0.9 INJECTION, SOLUTION INTRAVENOUS at 08:41

## 2021-07-23 NOTE — PROGRESS NOTES
Nurse informed Bryce Vela RN that nurse did not draw a CBCD today & that it was not included in the therapy plan which did include all other weekly labs that pt  Has ordered to be drawn weekly & requested that it be added as well  Maria E Hanson stated per Dr Abdias Wyman nurse can draw stat CBCD on 7/27/21 prior to pts  Treatment & wait for results to proceed

## 2021-07-23 NOTE — TELEPHONE ENCOUNTER
Primary palliative medicine provider: Dr Latesha Cisneros     Medication requested: oxycodone 20 mg     If for pain, how has the patient been taking their pain medicine?   As prescribed     Last appointment: 6/9    Next scheduled appointment: 9/3    PDMP review:    07/09/2021 2 07/09/2021   OXYCODONE HCL 20 MG TABLET  120 0 30 TI TERRELL   [id number]  ALYSON ([id number]) 0 120 0 MME Private Pay [place]  06/21/2021 2 06/21/2021   OXYCODONE HCL 20 MG TABLET  90 0 15 TI TERRELL   [id number]  ALYSON ([id number]) 0 180 0 MME Private Pay [place]  06/09/2021 4 06/09/2021   OXYCODONE HCL 20 MG TABLET  90 0 15 TI TERRELL   [id number]  S H P ([id number]) 0 180 0 MME Comm Ins [place]

## 2021-07-23 NOTE — TELEPHONE ENCOUNTER
Please re submit prescription to [facility]  Pt no longer using  [facility]  Pharmacy has been removed from profile  I will notify [address] [place] [address] pharmacy to cancel script         Thank you

## 2021-07-23 NOTE — TELEPHONE ENCOUNTER
Re Oxycodoen 20 mg script  1  Cancelled for [address]  2  Verified received by [address]  3  Last fill 07/09/21  120 /20  Pharmacist will fill 07/28  4  Pt to be notified of same by [employer] speaking staff as Nadia Johnson speaking friend not available when this nurse called pt

## 2021-07-27 ENCOUNTER — HOSPITAL ENCOUNTER (OUTPATIENT)
Dept: INFUSION CENTER | Facility: HOSPITAL | Age: 61
Discharge: HOME | End: 2021-07-27
Attending: INTERNAL MEDICINE
Payer: COMMERCIAL

## 2021-07-27 VITALS
TEMPERATURE: 97 F | SYSTOLIC BLOOD PRESSURE: 139 MMHG | RESPIRATION RATE: 16 BRPM | DIASTOLIC BLOOD PRESSURE: 67 MMHG | OXYGEN SATURATION: 98 % | HEART RATE: 50 BPM

## 2021-07-27 DIAGNOSIS — D70.1 CHEMOTHERAPY-INDUCED NEUTROPENIA (HCC): Primary | ICD-10-CM

## 2021-07-27 DIAGNOSIS — C82.18 GRADE 2 FOLLICULAR LYMPHOMA OF LYMPH NODES OF MULTIPLE REGIONS (HCC): ICD-10-CM

## 2021-07-27 DIAGNOSIS — T45.1X5A CHEMOTHERAPY-INDUCED NEUTROPENIA (HCC): Primary | ICD-10-CM

## 2021-07-27 LAB
ANISOCYTOSIS BLD QL SMEAR: PRESENT
BASOPHILS # BLD AUTO: 0 THOUSANDS/ΜL (ref 0–0.1)
BASOPHILS NFR BLD AUTO: 1 % (ref 0–1)
EOSINOPHIL # BLD AUTO: 0.2 THOUSAND/ΜL (ref 0–0.4)
EOSINOPHIL NFR BLD AUTO: 3 % (ref 0–6)
ERYTHROCYTE [DISTWIDTH] IN BLOOD BY AUTOMATED COUNT: 15.8 %
GLUCOSE SERPL-MCNC: 87 MG/DL (ref 65–140)
HCT VFR BLD AUTO: 34.2 % (ref 36–46)
HGB BLD-MCNC: 10.9 G/DL (ref 12–16)
HYPERCHROMIA BLD QL SMEAR: PRESENT
LYMPHOCYTES # BLD AUTO: 1.1 THOUSANDS/ΜL (ref 0.5–4)
LYMPHOCYTES NFR BLD AUTO: 22 % (ref 25–45)
MCH RBC QN AUTO: 21.9 PG (ref 26–34)
MCHC RBC AUTO-ENTMCNC: 31.9 G/DL (ref 31–36)
MCV RBC AUTO: 69 FL (ref 80–100)
MICROCYTES BLD QL AUTO: PRESENT
MONOCYTES # BLD AUTO: 0.5 THOUSAND/ΜL (ref 0.2–0.9)
MONOCYTES NFR BLD AUTO: 11 % (ref 1–10)
NEUTROPHILS # BLD AUTO: 3.2 THOUSANDS/ΜL (ref 1.8–7.8)
NEUTS SEG NFR BLD AUTO: 64 % (ref 45–65)
OVALOCYTES BLD QL SMEAR: PRESENT
PLATELET # BLD AUTO: 105 THOUSANDS/UL (ref 150–450)
PLATELET BLD QL SMEAR: ABNORMAL
PMV BLD AUTO: 9.1 FL (ref 8.9–12.7)
POIKILOCYTOSIS BLD QL SMEAR: PRESENT
RBC # BLD AUTO: 4.98 MILLION/UL (ref 4–5.2)
RBC MORPH BLD: PRESENT
WBC # BLD AUTO: 5 THOUSAND/UL (ref 4.5–11)

## 2021-07-27 PROCEDURE — 85025 COMPLETE CBC W/AUTO DIFF WBC: CPT | Performed by: INTERNAL MEDICINE

## 2021-07-27 PROCEDURE — 96413 CHEMO IV INFUSION 1 HR: CPT

## 2021-07-27 PROCEDURE — 96367 TX/PROPH/DG ADDL SEQ IV INF: CPT

## 2021-07-27 PROCEDURE — 82948 REAGENT STRIP/BLOOD GLUCOSE: CPT

## 2021-07-27 RX ORDER — SODIUM CHLORIDE 9 MG/ML
20 INJECTION, SOLUTION INTRAVENOUS ONCE
Status: COMPLETED | OUTPATIENT
Start: 2021-07-27 | End: 2021-07-27

## 2021-07-27 RX ADMIN — SODIUM CHLORIDE 20 ML/HR: 0.9 INJECTION, SOLUTION INTRAVENOUS at 10:29

## 2021-07-27 RX ADMIN — COPANLISIB 60 MG: 15 INJECTION, POWDER, LYOPHILIZED, FOR SOLUTION INTRAVENOUS at 11:02

## 2021-07-27 RX ADMIN — ONDANSETRON 8 MG: 2 SOLUTION INTRAMUSCULAR; INTRAVENOUS at 10:30

## 2021-07-27 NOTE — PROGRESS NOTES
Pt tolerated cycle 2 day 1 of copanlisib today without complications  Patient stated she is tired today  Confirmed hydration back on Friday, AVS provided

## 2021-07-28 ENCOUNTER — OFFICE VISIT (OUTPATIENT)
Dept: HEMATOLOGY ONCOLOGY | Facility: CLINIC | Age: 61
End: 2021-07-28
Payer: COMMERCIAL

## 2021-07-28 VITALS
RESPIRATION RATE: 16 BRPM | HEIGHT: 64 IN | SYSTOLIC BLOOD PRESSURE: 136 MMHG | WEIGHT: 207.6 LBS | DIASTOLIC BLOOD PRESSURE: 88 MMHG | OXYGEN SATURATION: 98 % | BODY MASS INDEX: 35.44 KG/M2 | HEART RATE: 52 BPM | TEMPERATURE: 97.1 F

## 2021-07-28 DIAGNOSIS — Z91.89 AT HIGH RISK OF TUMOR LYSIS SYNDROME: Primary | ICD-10-CM

## 2021-07-28 DIAGNOSIS — D51.8 OTHER VITAMIN B12 DEFICIENCY ANEMIAS: ICD-10-CM

## 2021-07-28 DIAGNOSIS — D50.9 MICROCYTIC ANEMIA: ICD-10-CM

## 2021-07-28 DIAGNOSIS — T45.1X5A CHEMOTHERAPY-INDUCED NEUTROPENIA (HCC): ICD-10-CM

## 2021-07-28 DIAGNOSIS — R42 DIZZINESS: ICD-10-CM

## 2021-07-28 DIAGNOSIS — E53.8 FOLIC ACID DEFICIENCY: ICD-10-CM

## 2021-07-28 DIAGNOSIS — D70.1 CHEMOTHERAPY-INDUCED NEUTROPENIA (HCC): ICD-10-CM

## 2021-07-28 DIAGNOSIS — C82.18 GRADE 2 FOLLICULAR LYMPHOMA OF LYMPH NODES OF MULTIPLE REGIONS (HCC): ICD-10-CM

## 2021-07-28 DIAGNOSIS — H53.9 CHANGES IN VISION: ICD-10-CM

## 2021-07-28 DIAGNOSIS — C82.18 GRADE 2 FOLLICULAR LYMPHOMA OF LYMPH NODES OF MULTIPLE REGIONS (HCC): Primary | ICD-10-CM

## 2021-07-28 PROCEDURE — 99214 OFFICE O/P EST MOD 30 MIN: CPT | Performed by: NURSE PRACTITIONER

## 2021-07-28 RX ORDER — LORAZEPAM 0.5 MG/1
TABLET ORAL
Qty: 2 TABLET | Refills: 0 | Status: SHIPPED | OUTPATIENT
Start: 2021-07-28 | End: 2021-08-11 | Stop reason: SDUPTHER
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
## 2021-07-30 ENCOUNTER — HOSPITAL ENCOUNTER (OUTPATIENT)
Dept: INFUSION CENTER | Facility: HOSPITAL | Age: 61
Discharge: HOME | End: 2021-07-30
Attending: INTERNAL MEDICINE
Payer: COMMERCIAL

## 2021-07-30 VITALS
HEART RATE: 53 BPM | RESPIRATION RATE: 16 BRPM | SYSTOLIC BLOOD PRESSURE: 138 MMHG | TEMPERATURE: 96.8 F | DIASTOLIC BLOOD PRESSURE: 64 MMHG | OXYGEN SATURATION: 96 %

## 2021-07-30 DIAGNOSIS — D51.8 OTHER VITAMIN B12 DEFICIENCY ANEMIAS: ICD-10-CM

## 2021-07-30 DIAGNOSIS — D50.8 IRON DEFICIENCY ANEMIA SECONDARY TO INADEQUATE DIETARY IRON INTAKE: Primary | ICD-10-CM

## 2021-07-30 DIAGNOSIS — C82.18 GRADE 2 FOLLICULAR LYMPHOMA OF LYMPH NODES OF MULTIPLE REGIONS (HCC): ICD-10-CM

## 2021-07-30 DIAGNOSIS — Z91.89 AT HIGH RISK OF TUMOR LYSIS SYNDROME: ICD-10-CM

## 2021-07-30 DIAGNOSIS — D50.9 MICROCYTIC ANEMIA: ICD-10-CM

## 2021-07-30 DIAGNOSIS — E86.0 DEHYDRATION: ICD-10-CM

## 2021-07-30 LAB
ALBUMIN SERPL BCP-MCNC: 4 G/DL (ref 3–5.2)
ALP SERPL-CCNC: 91 U/L (ref 43–122)
ALT SERPL W P-5'-P-CCNC: 14 U/L
ANION GAP SERPL CALCULATED.3IONS-SCNC: 10 MMOL/L (ref 5–14)
ANISOCYTOSIS BLD QL SMEAR: PRESENT
AST SERPL W P-5'-P-CCNC: 24 U/L (ref 14–36)
BASOPHILS # BLD AUTO: 0.1 THOUSANDS/ΜL (ref 0–0.1)
BASOPHILS NFR BLD AUTO: 1 % (ref 0–1)
BILIRUB SERPL-MCNC: 0.46 MG/DL
BUN SERPL-MCNC: 12 MG/DL (ref 5–25)
CALCIUM SERPL-MCNC: 8.9 MG/DL (ref 8.4–10.2)
CHLORIDE SERPL-SCNC: 105 MMOL/L (ref 97–108)
CO2 SERPL-SCNC: 24 MMOL/L (ref 22–30)
CREAT SERPL-MCNC: 0.73 MG/DL (ref 0.6–1.2)
EOSINOPHIL # BLD AUTO: 0.1 THOUSAND/ΜL (ref 0–0.4)
EOSINOPHIL NFR BLD AUTO: 2 % (ref 0–6)
ERYTHROCYTE [DISTWIDTH] IN BLOOD BY AUTOMATED COUNT: 15.8 %
GFR SERPL CREATININE-BSD FRML MDRD: 90 ML/MIN/1.73SQ M
GLUCOSE SERPL-MCNC: 209 MG/DL (ref 70–99)
HCT VFR BLD AUTO: 34.7 % (ref 36–46)
HGB BLD-MCNC: 11.1 G/DL (ref 12–16)
HYPERCHROMIA BLD QL SMEAR: PRESENT
LDH SERPL-CCNC: 703 U/L (ref 313–618)
LYMPHOCYTES # BLD AUTO: 1.9 THOUSANDS/ΜL (ref 0.5–4)
LYMPHOCYTES NFR BLD AUTO: 42 % (ref 25–45)
MAGNESIUM SERPL-MCNC: 1.9 MG/DL (ref 1.6–2.3)
MCH RBC QN AUTO: 22.1 PG (ref 26–34)
MCHC RBC AUTO-ENTMCNC: 32 G/DL (ref 31–36)
MCV RBC AUTO: 69 FL (ref 80–100)
MICROCYTES BLD QL AUTO: PRESENT
MONOCYTES # BLD AUTO: 0.3 THOUSAND/ΜL (ref 0.2–0.9)
MONOCYTES NFR BLD AUTO: 6 % (ref 1–10)
NEUTROPHILS # BLD AUTO: 2.3 THOUSANDS/ΜL (ref 1.8–7.8)
NEUTS SEG NFR BLD AUTO: 49 % (ref 45–65)
OVALOCYTES BLD QL SMEAR: PRESENT
PHOSPHATE SERPL-MCNC: 3.6 MG/DL (ref 2.5–4.8)
PLATELET # BLD AUTO: 137 THOUSANDS/UL (ref 150–450)
PLATELET BLD QL SMEAR: ABNORMAL
PMV BLD AUTO: 9 FL (ref 8.9–12.7)
POIKILOCYTOSIS BLD QL SMEAR: PRESENT
POTASSIUM SERPL-SCNC: 4.4 MMOL/L (ref 3.6–5)
PROT SERPL-MCNC: 6.5 G/DL (ref 5.9–8.4)
RBC # BLD AUTO: 5.02 MILLION/UL (ref 4–5.2)
RBC MORPH BLD: ABNORMAL
SCHISTOCYTES BLD QL SMEAR: PRESENT
SODIUM SERPL-SCNC: 139 MMOL/L (ref 137–147)
URATE SERPL-MCNC: 4.2 MG/DL (ref 2.7–7.5)
WBC # BLD AUTO: 4.6 THOUSAND/UL (ref 4.5–11)

## 2021-07-30 PROCEDURE — 96361 HYDRATE IV INFUSION ADD-ON: CPT

## 2021-07-30 PROCEDURE — 96360 HYDRATION IV INFUSION INIT: CPT

## 2021-07-30 PROCEDURE — 83615 LACTATE (LD) (LDH) ENZYME: CPT | Performed by: INTERNAL MEDICINE

## 2021-07-30 PROCEDURE — 83735 ASSAY OF MAGNESIUM: CPT | Performed by: INTERNAL MEDICINE

## 2021-07-30 PROCEDURE — 84550 ASSAY OF BLOOD/URIC ACID: CPT | Performed by: INTERNAL MEDICINE

## 2021-07-30 PROCEDURE — 85025 COMPLETE CBC W/AUTO DIFF WBC: CPT | Performed by: INTERNAL MEDICINE

## 2021-07-30 PROCEDURE — 84100 ASSAY OF PHOSPHORUS: CPT | Performed by: INTERNAL MEDICINE

## 2021-07-30 PROCEDURE — 80053 COMPREHEN METABOLIC PANEL: CPT | Performed by: INTERNAL MEDICINE

## 2021-07-30 PROCEDURE — 96372 THER/PROPH/DIAG INJ SC/IM: CPT

## 2021-07-30 RX ORDER — CYANOCOBALAMIN 1000 UG/ML
1000 INJECTION INTRAMUSCULAR; SUBCUTANEOUS ONCE
Status: CANCELLED | OUTPATIENT
Start: 2021-08-24

## 2021-07-30 RX ORDER — CYANOCOBALAMIN 1000 UG/ML
1000 INJECTION INTRAMUSCULAR; SUBCUTANEOUS ONCE
Status: COMPLETED | OUTPATIENT
Start: 2021-07-30 | End: 2021-07-30

## 2021-07-30 RX ADMIN — CYANOCOBALAMIN 1000 MCG: 1000 INJECTION INTRAMUSCULAR; SUBCUTANEOUS at 08:55

## 2021-07-30 RX ADMIN — SODIUM CHLORIDE 1000 ML: 0.9 INJECTION, SOLUTION INTRAVENOUS at 08:53

## 2021-07-30 NOTE — PROGRESS NOTES
Pt tolerated hydration well today without complications  Central labs also drawn per orders today  Confirmed appt for Tue, AVS provided

## 2021-08-03 ENCOUNTER — HOSPITAL ENCOUNTER (OUTPATIENT)
Dept: INFUSION CENTER | Facility: HOSPITAL | Age: 61
Discharge: HOME | End: 2021-08-03
Attending: INTERNAL MEDICINE
Payer: COMMERCIAL

## 2021-08-03 VITALS
TEMPERATURE: 97.3 F | RESPIRATION RATE: 18 BRPM | DIASTOLIC BLOOD PRESSURE: 62 MMHG | SYSTOLIC BLOOD PRESSURE: 137 MMHG | HEART RATE: 56 BPM

## 2021-08-03 DIAGNOSIS — D70.1 CHEMOTHERAPY-INDUCED NEUTROPENIA (HCC): Primary | ICD-10-CM

## 2021-08-03 DIAGNOSIS — T45.1X5A CHEMOTHERAPY-INDUCED NEUTROPENIA (HCC): Primary | ICD-10-CM

## 2021-08-03 DIAGNOSIS — C82.18 GRADE 2 FOLLICULAR LYMPHOMA OF LYMPH NODES OF MULTIPLE REGIONS (HCC): ICD-10-CM

## 2021-08-03 LAB — GLUCOSE SERPL-MCNC: 108 MG/DL (ref 65–140)

## 2021-08-03 PROCEDURE — 96367 TX/PROPH/DG ADDL SEQ IV INF: CPT

## 2021-08-03 PROCEDURE — 82948 REAGENT STRIP/BLOOD GLUCOSE: CPT

## 2021-08-03 PROCEDURE — 96413 CHEMO IV INFUSION 1 HR: CPT

## 2021-08-03 RX ORDER — SODIUM CHLORIDE 9 MG/ML
20 INJECTION, SOLUTION INTRAVENOUS ONCE
Status: COMPLETED | OUTPATIENT
Start: 2021-08-03 | End: 2021-08-03

## 2021-08-03 RX ADMIN — SODIUM CHLORIDE 20 ML/HR: 0.9 INJECTION, SOLUTION INTRAVENOUS at 13:13

## 2021-08-03 RX ADMIN — ONDANSETRON 8 MG: 2 SOLUTION INTRAMUSCULAR; INTRAVENOUS at 13:16

## 2021-08-03 RX ADMIN — COPANLISIB 60 MG: 15 INJECTION, POWDER, LYOPHILIZED, FOR SOLUTION INTRAVENOUS at 13:53

## 2021-08-03 NOTE — PLAN OF CARE
Problem: Potential for Falls  Goal: Patient will remain free of falls  Description: INTERVENTIONS:  - Educate patient/family on patient safety including physical limitations  - Instruct patient to call for assistance with activity   - Consult OT/PT to assist with strengthening/mobility   - Keep Call bell within reach  - Keep bed low and locked with side rails adjusted as appropriate  - Keep care items and personal belongings within reach  - Initiate and maintain comfort rounds  - Make Fall Risk Sign visible to staff  - Offer Toileting every  Hours, in advance of need  - Initiate/Maintain alarm  - Obtain necessary fall risk management equipment:   - Apply yellow socks and bracelet for high fall risk patients  - Consider moving patient to room near nurses station  Outcome: Progressing     Problem: Knowledge Deficit  Goal: Patient/family/caregiver demonstrates understanding of disease process, treatment plan, medications, and discharge instructions  Description: Complete learning assessment and assess knowledge base    Interventions:  - Provide teaching at level of understanding  - Provide teaching via preferred learning methods  Outcome: Progressing English

## 2021-08-03 NOTE — PROGRESS NOTES
Pt tolerated Aliqopa infusion without difficulty  No s/s reaction noted  Port flushed and deaccessed per protocol  Friday's hydration appt confirmed and AVS provided  Left ambulatory with [facility] transport

## 2021-08-06 ENCOUNTER — HOSPITAL ENCOUNTER (OUTPATIENT)
Dept: INFUSION CENTER | Facility: HOSPITAL | Age: 61
Discharge: HOME | End: 2021-08-06
Attending: INTERNAL MEDICINE
Payer: COMMERCIAL

## 2021-08-06 VITALS
TEMPERATURE: 97.4 F | HEART RATE: 57 BPM | DIASTOLIC BLOOD PRESSURE: 59 MMHG | RESPIRATION RATE: 18 BRPM | SYSTOLIC BLOOD PRESSURE: 131 MMHG

## 2021-08-06 DIAGNOSIS — D70.1 CHEMOTHERAPY-INDUCED NEUTROPENIA (HCC): ICD-10-CM

## 2021-08-06 DIAGNOSIS — Z91.89 AT HIGH RISK OF TUMOR LYSIS SYNDROME: ICD-10-CM

## 2021-08-06 DIAGNOSIS — D50.8 IRON DEFICIENCY ANEMIA SECONDARY TO INADEQUATE DIETARY IRON INTAKE: Primary | ICD-10-CM

## 2021-08-06 DIAGNOSIS — T45.1X5A CHEMOTHERAPY-INDUCED NEUTROPENIA (HCC): ICD-10-CM

## 2021-08-06 DIAGNOSIS — E86.0 DEHYDRATION: ICD-10-CM

## 2021-08-06 DIAGNOSIS — C82.18 GRADE 2 FOLLICULAR LYMPHOMA OF LYMPH NODES OF MULTIPLE REGIONS (HCC): ICD-10-CM

## 2021-08-06 LAB
ALBUMIN SERPL BCP-MCNC: 4.2 G/DL (ref 3–5.2)
ALP SERPL-CCNC: 93 U/L (ref 43–122)
ALT SERPL W P-5'-P-CCNC: 15 U/L
ANION GAP SERPL CALCULATED.3IONS-SCNC: 6 MMOL/L (ref 5–14)
ANISOCYTOSIS BLD QL SMEAR: PRESENT
AST SERPL W P-5'-P-CCNC: 23 U/L (ref 14–36)
BASOPHILS # BLD AUTO: 0 THOUSANDS/ΜL (ref 0–0.1)
BASOPHILS NFR BLD AUTO: 1 % (ref 0–1)
BILIRUB SERPL-MCNC: 0.61 MG/DL
BUN SERPL-MCNC: 10 MG/DL (ref 5–25)
CALCIUM SERPL-MCNC: 9.3 MG/DL (ref 8.4–10.2)
CHLORIDE SERPL-SCNC: 106 MMOL/L (ref 97–108)
CO2 SERPL-SCNC: 28 MMOL/L (ref 22–30)
CREAT SERPL-MCNC: 0.67 MG/DL (ref 0.6–1.2)
CRP SERPL QL: <5 MG/L
EOSINOPHIL # BLD AUTO: 0.1 THOUSAND/ΜL (ref 0–0.4)
EOSINOPHIL NFR BLD AUTO: 2 % (ref 0–6)
ERYTHROCYTE [DISTWIDTH] IN BLOOD BY AUTOMATED COUNT: 15.7 %
ERYTHROCYTE [SEDIMENTATION RATE] IN BLOOD: 27 MM/HOUR (ref 0–29)
GFR SERPL CREATININE-BSD FRML MDRD: 97 ML/MIN/1.73SQ M
GLUCOSE SERPL-MCNC: 86 MG/DL (ref 70–99)
HCT VFR BLD AUTO: 36.5 % (ref 36–46)
HGB BLD-MCNC: 11.5 G/DL (ref 12–16)
HYPERCHROMIA BLD QL SMEAR: PRESENT
LDH SERPL-CCNC: 720 U/L (ref 313–618)
LYMPHOCYTES # BLD AUTO: 2.1 THOUSANDS/ΜL (ref 0.5–4)
LYMPHOCYTES NFR BLD AUTO: 48 % (ref 25–45)
MAGNESIUM SERPL-MCNC: 1.9 MG/DL (ref 1.6–2.3)
MCH RBC QN AUTO: 21.5 PG (ref 26–34)
MCHC RBC AUTO-ENTMCNC: 31.5 G/DL (ref 31–36)
MCV RBC AUTO: 68 FL (ref 80–100)
MICROCYTES BLD QL AUTO: PRESENT
MONOCYTES # BLD AUTO: 0.2 THOUSAND/ΜL (ref 0.2–0.9)
MONOCYTES NFR BLD AUTO: 5 % (ref 1–10)
NEUTROPHILS # BLD AUTO: 1.9 THOUSANDS/ΜL (ref 1.8–7.8)
NEUTS SEG NFR BLD AUTO: 45 % (ref 45–65)
OVALOCYTES BLD QL SMEAR: PRESENT
PHOSPHATE SERPL-MCNC: 3.5 MG/DL (ref 2.5–4.8)
PLATELET # BLD AUTO: 152 THOUSANDS/UL (ref 150–450)
PLATELET BLD QL SMEAR: ABNORMAL
PMV BLD AUTO: 8.3 FL (ref 8.9–12.7)
POTASSIUM SERPL-SCNC: 4.3 MMOL/L (ref 3.6–5)
PROT SERPL-MCNC: 6.7 G/DL (ref 5.9–8.4)
RBC # BLD AUTO: 5.35 MILLION/UL (ref 4–5.2)
RBC MORPH BLD: ABNORMAL
SODIUM SERPL-SCNC: 140 MMOL/L (ref 137–147)
URATE SERPL-MCNC: 2.6 MG/DL (ref 2.7–7.5)
WBC # BLD AUTO: 4.3 THOUSAND/UL (ref 4.5–11)

## 2021-08-06 PROCEDURE — 86140 C-REACTIVE PROTEIN: CPT

## 2021-08-06 PROCEDURE — 85025 COMPLETE CBC W/AUTO DIFF WBC: CPT | Performed by: INTERNAL MEDICINE

## 2021-08-06 PROCEDURE — 80053 COMPREHEN METABOLIC PANEL: CPT | Performed by: INTERNAL MEDICINE

## 2021-08-06 PROCEDURE — 85652 RBC SED RATE AUTOMATED: CPT

## 2021-08-06 PROCEDURE — 84100 ASSAY OF PHOSPHORUS: CPT | Performed by: INTERNAL MEDICINE

## 2021-08-06 PROCEDURE — 84550 ASSAY OF BLOOD/URIC ACID: CPT | Performed by: INTERNAL MEDICINE

## 2021-08-06 PROCEDURE — 96360 HYDRATION IV INFUSION INIT: CPT

## 2021-08-06 PROCEDURE — 83615 LACTATE (LD) (LDH) ENZYME: CPT

## 2021-08-06 PROCEDURE — 83735 ASSAY OF MAGNESIUM: CPT | Performed by: INTERNAL MEDICINE

## 2021-08-06 RX ADMIN — SODIUM CHLORIDE 1000 ML: 0.9 INJECTION, SOLUTION INTRAVENOUS at 12:55

## 2021-08-06 NOTE — PLAN OF CARE
Problem: Potential for Falls  Goal: Patient will remain free of falls  Description: INTERVENTIONS:  - Educate patient/family on patient safety including physical limitations  - Instruct patient to call for assistance with activity   - Consult OT/PT to assist with strengthening/mobility   - Keep Call bell within reach  - Keep bed low and locked with side rails adjusted as appropriate  - Keep care items and personal belongings within reach  - Initiate and maintain comfort rounds  - Make Fall Risk Sign visible to staff  - Offer Toileting every  Hours, in advance of need  - Initiate/Maintain alarm  - Obtain necessary fall risk management equipment:   - Apply yellow socks and bracelet for high fall risk patients  - Consider moving patient to room near nurses station  Outcome: Progressing     Problem: Knowledge Deficit  Goal: Patient/family/caregiver demonstrates understanding of disease process, treatment plan, medications, and discharge instructions  Description: Complete learning assessment and assess knowledge base    Interventions:  - Provide teaching at level of understanding  - Provide teaching via preferred learning methods  Outcome: Progressing 2018

## 2021-08-06 NOTE — PROGRESS NOTES
Pt tolerated central lab draw and IV hydration without difficulty  Port flushed and deaccessed per protocol  Next appt confirmed    Left ambulatory declining AVS

## 2021-08-10 ENCOUNTER — HOSPITAL ENCOUNTER (OUTPATIENT)
Dept: INFUSION CENTER | Facility: HOSPITAL | Age: 61
Discharge: HOME | End: 2021-08-10
Attending: INTERNAL MEDICINE
Payer: COMMERCIAL

## 2021-08-10 VITALS
HEIGHT: 64 IN | WEIGHT: 210.32 LBS | HEART RATE: 62 BPM | TEMPERATURE: 97.8 F | OXYGEN SATURATION: 98 % | BODY MASS INDEX: 35.91 KG/M2 | SYSTOLIC BLOOD PRESSURE: 138 MMHG | DIASTOLIC BLOOD PRESSURE: 76 MMHG | RESPIRATION RATE: 18 BRPM

## 2021-08-10 DIAGNOSIS — T45.1X5A CHEMOTHERAPY-INDUCED NEUTROPENIA (HCC): Primary | ICD-10-CM

## 2021-08-10 DIAGNOSIS — C82.18 GRADE 2 FOLLICULAR LYMPHOMA OF LYMPH NODES OF MULTIPLE REGIONS (HCC): ICD-10-CM

## 2021-08-10 DIAGNOSIS — D70.1 CHEMOTHERAPY-INDUCED NEUTROPENIA (HCC): Primary | ICD-10-CM

## 2021-08-10 DIAGNOSIS — G89.3 CANCER RELATED PAIN: ICD-10-CM

## 2021-08-10 DIAGNOSIS — R10.30 LOWER ABDOMINAL PAIN: ICD-10-CM

## 2021-08-10 LAB — GLUCOSE SERPL-MCNC: 80 MG/DL (ref 65–140)

## 2021-08-10 PROCEDURE — 96367 TX/PROPH/DG ADDL SEQ IV INF: CPT

## 2021-08-10 PROCEDURE — 82948 REAGENT STRIP/BLOOD GLUCOSE: CPT

## 2021-08-10 PROCEDURE — 96413 CHEMO IV INFUSION 1 HR: CPT

## 2021-08-10 RX ORDER — OXYCODONE HYDROCHLORIDE 20 MG/1
20 TABLET ORAL EVERY 4 HOURS PRN
Qty: 60 TABLET | Refills: 0 | Status: SHIPPED | OUTPATIENT
Start: 2021-08-10 | End: 2021-09-03 | Stop reason: SDUPTHER

## 2021-08-10 RX ORDER — SODIUM CHLORIDE 9 MG/ML
20 INJECTION, SOLUTION INTRAVENOUS ONCE
Status: COMPLETED | OUTPATIENT
Start: 2021-08-10 | End: 2021-08-10

## 2021-08-10 RX ADMIN — COPANLISIB 60 MG: 15 INJECTION, POWDER, LYOPHILIZED, FOR SOLUTION INTRAVENOUS at 11:40

## 2021-08-10 RX ADMIN — ONDANSETRON 8 MG: 2 INJECTION INTRAMUSCULAR; INTRAVENOUS at 10:57

## 2021-08-10 RX ADMIN — SODIUM CHLORIDE 20 ML/HR: 0.9 INJECTION, SOLUTION INTRAVENOUS at 10:57

## 2021-08-10 NOTE — TELEPHONE ENCOUNTER
Patient called office stating she will be leaving to [facility] 8/13 due to a familky emergency and will be returning 9/2 back to the [address] [place] [address] [address]  Pt is asking for an early refill   To take with her to [facility] due to not being on time to come back when refill are due  Please advise         07/28/2021 2 07/23/2021   OXYCODONE HCL 20 MG TABLET  120 0 20 [address]   [id number]  ALYSON ([id number]) 0 180 0 [facility] Private Pay [place]  07/09/2021 2 07/09/2021   OXYCODONE HCL 20 MG TABLET  120 0 30 TI [address]RELL   [id number]  ALYSON ([id number]) 0 120 0 [facility] Private Pay [place]  06/21/2021 2 06/21/2021   OXYCODONE HCL 20 MG TABLET  90 0 15 TI TERRELL   [id number]  ALYSON ([id number]) 0 180 0

## 2021-08-10 NOTE — TELEPHONE ENCOUNTER
I will send an early 2 week supply only based on early conversation and pdmp that she should have enough until 8/23  This will be based on her insurance, however, if they will be able to pay  She should have a follow up with me when she gets back  Pls tell her to bring her pill bottles for a count  Controlled Substance Review    [place]MP or [place]  reviewed: No red flags were identified; safe to proceed with prescription        07/28/2021  5   07/23/2021  Oxycodone Hcl 20 MG Tablet  120 00  [address]   [id number]   All ([id number])   0  180 00 MME  Private [place]   [place]  07/09/2021  5   07/09/2021  Oxycodone Hcl 20 MG Tablet  120 00  [address]   [id number]   All ([id number])   0  120 00 MME  Private Pay

## 2021-08-11 ENCOUNTER — OFFICE VISIT (OUTPATIENT)
Dept: HEMATOLOGY ONCOLOGY | Facility: CLINIC | Age: 61
End: 2021-08-11
Payer: COMMERCIAL

## 2021-08-11 VITALS
SYSTOLIC BLOOD PRESSURE: 136 MMHG | OXYGEN SATURATION: 97 % | HEART RATE: 58 BPM | DIASTOLIC BLOOD PRESSURE: 74 MMHG | WEIGHT: 209.4 LBS | BODY MASS INDEX: 35.75 KG/M2 | TEMPERATURE: 97.6 F | RESPIRATION RATE: 16 BRPM | HEIGHT: 64 IN

## 2021-08-11 DIAGNOSIS — C82.18 GRADE 2 FOLLICULAR LYMPHOMA OF LYMPH NODES OF MULTIPLE REGIONS (HCC): Primary | ICD-10-CM

## 2021-08-11 DIAGNOSIS — D50.9 MICROCYTIC ANEMIA: ICD-10-CM

## 2021-08-11 DIAGNOSIS — R42 DIZZINESS: ICD-10-CM

## 2021-08-11 DIAGNOSIS — H53.9 CHANGES IN VISION: ICD-10-CM

## 2021-08-11 DIAGNOSIS — M79.605 LOWER EXTREMITY PAIN, LEFT: ICD-10-CM

## 2021-08-11 DIAGNOSIS — D51.8 OTHER VITAMIN B12 DEFICIENCY ANEMIAS: ICD-10-CM

## 2021-08-11 PROCEDURE — 99215 OFFICE O/P EST HI 40 MIN: CPT | Performed by: NURSE PRACTITIONER

## 2021-08-11 RX ORDER — SODIUM CHLORIDE 9 MG/ML
20 INJECTION, SOLUTION INTRAVENOUS ONCE
Status: CANCELLED | OUTPATIENT
Start: 2021-08-24

## 2021-08-11 RX ORDER — SODIUM CHLORIDE 9 MG/ML
20 INJECTION, SOLUTION INTRAVENOUS ONCE
Status: CANCELLED | OUTPATIENT
Start: 2021-09-07

## 2021-08-11 RX ORDER — SODIUM CHLORIDE 9 MG/ML
20 INJECTION, SOLUTION INTRAVENOUS ONCE
Status: CANCELLED | OUTPATIENT
Start: 2021-09-02

## 2021-08-11 RX ORDER — LORAZEPAM 0.5 MG/1
TABLET ORAL
Qty: 2 TABLET | Refills: 0 | Status: SHIPPED | OUTPATIENT
Start: 2021-08-11
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
## 2021-08-13 ENCOUNTER — HOSPITAL ENCOUNTER (OUTPATIENT)
Dept: INFUSION CENTER | Facility: HOSPITAL | Age: 61
Discharge: HOME | End: 2021-08-13
Attending: INTERNAL MEDICINE
Payer: COMMERCIAL

## 2021-08-13 VITALS
HEART RATE: 68 BPM | RESPIRATION RATE: 18 BRPM | DIASTOLIC BLOOD PRESSURE: 60 MMHG | SYSTOLIC BLOOD PRESSURE: 110 MMHG | TEMPERATURE: 98.1 F

## 2021-08-13 DIAGNOSIS — C82.18 GRADE 2 FOLLICULAR LYMPHOMA OF LYMPH NODES OF MULTIPLE REGIONS (HCC): ICD-10-CM

## 2021-08-13 DIAGNOSIS — Z91.89 AT HIGH RISK OF TUMOR LYSIS SYNDROME: ICD-10-CM

## 2021-08-13 DIAGNOSIS — E86.0 DEHYDRATION: ICD-10-CM

## 2021-08-13 DIAGNOSIS — D50.8 IRON DEFICIENCY ANEMIA SECONDARY TO INADEQUATE DIETARY IRON INTAKE: Primary | ICD-10-CM

## 2021-08-13 LAB
ALBUMIN SERPL BCP-MCNC: 3.9 G/DL (ref 3–5.2)
ALP SERPL-CCNC: 86 U/L (ref 43–122)
ALT SERPL W P-5'-P-CCNC: 14 U/L
ANION GAP SERPL CALCULATED.3IONS-SCNC: 6 MMOL/L (ref 5–14)
ANISOCYTOSIS BLD QL SMEAR: PRESENT
AST SERPL W P-5'-P-CCNC: 21 U/L (ref 14–36)
BASOPHILS # BLD AUTO: 0 THOUSANDS/ΜL (ref 0–0.1)
BASOPHILS NFR BLD AUTO: 1 % (ref 0–1)
BILIRUB SERPL-MCNC: 0.5 MG/DL
BUN SERPL-MCNC: 16 MG/DL (ref 5–25)
CALCIUM SERPL-MCNC: 8.7 MG/DL (ref 8.4–10.2)
CHLORIDE SERPL-SCNC: 106 MMOL/L (ref 97–108)
CO2 SERPL-SCNC: 26 MMOL/L (ref 22–30)
CREAT SERPL-MCNC: 0.68 MG/DL (ref 0.6–1.2)
EOSINOPHIL # BLD AUTO: 0 THOUSAND/ΜL (ref 0–0.4)
EOSINOPHIL NFR BLD AUTO: 1 % (ref 0–6)
ERYTHROCYTE [DISTWIDTH] IN BLOOD BY AUTOMATED COUNT: 15.7 %
GFR SERPL CREATININE-BSD FRML MDRD: 96 ML/MIN/1.73SQ M
GLUCOSE SERPL-MCNC: 205 MG/DL (ref 70–99)
HCT VFR BLD AUTO: 35.3 % (ref 36–46)
HGB BLD-MCNC: 10.9 G/DL (ref 12–16)
HYPERCHROMIA BLD QL SMEAR: PRESENT
LDH SERPL-CCNC: 712 U/L (ref 313–618)
LYMPHOCYTES # BLD AUTO: 1.5 THOUSANDS/ΜL (ref 0.5–4)
LYMPHOCYTES NFR BLD AUTO: 40 % (ref 25–45)
MAGNESIUM SERPL-MCNC: 1.8 MG/DL (ref 1.6–2.3)
MCH RBC QN AUTO: 21.1 PG (ref 26–34)
MCHC RBC AUTO-ENTMCNC: 30.9 G/DL (ref 31–36)
MCV RBC AUTO: 69 FL (ref 80–100)
MICROCYTES BLD QL AUTO: PRESENT
MONOCYTES # BLD AUTO: 0.2 THOUSAND/ΜL (ref 0.2–0.9)
MONOCYTES NFR BLD AUTO: 5 % (ref 1–10)
NEUTROPHILS # BLD AUTO: 2 THOUSANDS/ΜL (ref 1.8–7.8)
NEUTS SEG NFR BLD AUTO: 52 % (ref 45–65)
OVALOCYTES BLD QL SMEAR: PRESENT
PHOSPHATE SERPL-MCNC: 3.5 MG/DL (ref 2.5–4.8)
PLATELET # BLD AUTO: 118 THOUSANDS/UL (ref 150–450)
PLATELET BLD QL SMEAR: ABNORMAL
PMV BLD AUTO: 8.9 FL (ref 8.9–12.7)
POIKILOCYTOSIS BLD QL SMEAR: PRESENT
POTASSIUM SERPL-SCNC: 4.1 MMOL/L (ref 3.6–5)
PROT SERPL-MCNC: 6.3 G/DL (ref 5.9–8.4)
RBC # BLD AUTO: 5.15 MILLION/UL (ref 4–5.2)
RBC MORPH BLD: PRESENT
SODIUM SERPL-SCNC: 138 MMOL/L (ref 137–147)
URATE SERPL-MCNC: 3.2 MG/DL (ref 2.7–7.5)
WBC # BLD AUTO: 3.8 THOUSAND/UL (ref 4.5–11)

## 2021-08-13 PROCEDURE — 85025 COMPLETE CBC W/AUTO DIFF WBC: CPT | Performed by: INTERNAL MEDICINE

## 2021-08-13 PROCEDURE — 83615 LACTATE (LD) (LDH) ENZYME: CPT | Performed by: INTERNAL MEDICINE

## 2021-08-13 PROCEDURE — 84100 ASSAY OF PHOSPHORUS: CPT | Performed by: INTERNAL MEDICINE

## 2021-08-13 PROCEDURE — 83735 ASSAY OF MAGNESIUM: CPT | Performed by: INTERNAL MEDICINE

## 2021-08-13 PROCEDURE — 80053 COMPREHEN METABOLIC PANEL: CPT | Performed by: INTERNAL MEDICINE

## 2021-08-13 PROCEDURE — 96360 HYDRATION IV INFUSION INIT: CPT

## 2021-08-13 PROCEDURE — 84550 ASSAY OF BLOOD/URIC ACID: CPT | Performed by: INTERNAL MEDICINE

## 2021-08-13 RX ADMIN — SODIUM CHLORIDE 1000 ML: 0.9 INJECTION, SOLUTION INTRAVENOUS at 09:27

## 2021-08-13 NOTE — PROGRESS NOTES
Video  used with Chrissie Fortunato today, patient stated she is going to [place] the night of 8-24-21 and will be returning 9-2-21  Advised Zbigniew Ortega RN of this as her hydration on 8-27 and chemo 8-31 was canceled  Patient otherwise tolerated her hydration/central labs per orders today, AVS provided

## 2021-08-14 ENCOUNTER — HOSPITAL ENCOUNTER (OUTPATIENT)
Dept: MRI IMAGING | Facility: HOSPITAL | Age: 61
Discharge: HOME | End: 2021-08-14
Payer: COMMERCIAL

## 2021-08-14 DIAGNOSIS — H53.9 CHANGES IN VISION: ICD-10-CM

## 2021-08-14 DIAGNOSIS — C82.18 GRADE 2 FOLLICULAR LYMPHOMA OF LYMPH NODES OF MULTIPLE REGIONS (HCC): ICD-10-CM

## 2021-08-14 DIAGNOSIS — R42 DIZZINESS: ICD-10-CM

## 2021-08-14 PROCEDURE — 70551 MRI BRAIN STEM W/O DYE: CPT

## 2021-08-16 ENCOUNTER — HOSPITAL ENCOUNTER (OUTPATIENT)
Dept: NON INVASIVE DIAGNOSTICS | Facility: HOSPITAL | Age: 61
Discharge: HOME | End: 2021-08-16
Payer: COMMERCIAL

## 2021-08-16 DIAGNOSIS — M79.605 LOWER EXTREMITY PAIN, LEFT: ICD-10-CM

## 2021-08-16 PROCEDURE — 93970 EXTREMITY STUDY: CPT

## 2021-08-16 PROCEDURE — 93970 EXTREMITY STUDY: CPT | Performed by: SURGERY

## 2021-08-20 ENCOUNTER — HOSPITAL ENCOUNTER (OUTPATIENT)
Dept: INFUSION CENTER | Facility: HOSPITAL | Age: 61
Discharge: HOME | End: 2021-08-20
Attending: INTERNAL MEDICINE
Payer: COMMERCIAL

## 2021-08-20 VITALS
RESPIRATION RATE: 18 BRPM | DIASTOLIC BLOOD PRESSURE: 76 MMHG | SYSTOLIC BLOOD PRESSURE: 136 MMHG | OXYGEN SATURATION: 99 % | TEMPERATURE: 96.5 F | HEART RATE: 54 BPM

## 2021-08-20 DIAGNOSIS — Z91.89 AT HIGH RISK OF TUMOR LYSIS SYNDROME: ICD-10-CM

## 2021-08-20 DIAGNOSIS — C82.18 GRADE 2 FOLLICULAR LYMPHOMA OF LYMPH NODES OF MULTIPLE REGIONS (HCC): ICD-10-CM

## 2021-08-20 DIAGNOSIS — E86.0 DEHYDRATION: ICD-10-CM

## 2021-08-20 DIAGNOSIS — D70.1 CHEMOTHERAPY-INDUCED NEUTROPENIA (HCC): ICD-10-CM

## 2021-08-20 DIAGNOSIS — T45.1X5A CHEMOTHERAPY-INDUCED NEUTROPENIA (HCC): ICD-10-CM

## 2021-08-20 DIAGNOSIS — D50.9 MICROCYTIC ANEMIA: ICD-10-CM

## 2021-08-20 DIAGNOSIS — D50.8 IRON DEFICIENCY ANEMIA SECONDARY TO INADEQUATE DIETARY IRON INTAKE: Primary | ICD-10-CM

## 2021-08-20 LAB
ALBUMIN SERPL BCP-MCNC: 3.9 G/DL (ref 3–5.2)
ALP SERPL-CCNC: 82 U/L (ref 43–122)
ALT SERPL W P-5'-P-CCNC: 13 U/L
ANION GAP SERPL CALCULATED.3IONS-SCNC: 7 MMOL/L (ref 5–14)
ANISOCYTOSIS BLD QL SMEAR: PRESENT
AST SERPL W P-5'-P-CCNC: 24 U/L (ref 14–36)
BASOPHILS # BLD AUTO: 0 THOUSANDS/ΜL (ref 0–0.1)
BASOPHILS NFR BLD AUTO: 1 % (ref 0–1)
BILIRUB SERPL-MCNC: 0.7 MG/DL
BUN SERPL-MCNC: 18 MG/DL (ref 5–25)
BURR CELLS BLD QL SMEAR: PRESENT
CALCIUM SERPL-MCNC: 8.7 MG/DL (ref 8.4–10.2)
CHLORIDE SERPL-SCNC: 107 MMOL/L (ref 97–108)
CO2 SERPL-SCNC: 26 MMOL/L (ref 22–30)
CREAT SERPL-MCNC: 0.75 MG/DL (ref 0.6–1.2)
EOSINOPHIL # BLD AUTO: 0.1 THOUSAND/ΜL (ref 0–0.4)
EOSINOPHIL NFR BLD AUTO: 3 % (ref 0–6)
ERYTHROCYTE [DISTWIDTH] IN BLOOD BY AUTOMATED COUNT: 15.8 %
GFR SERPL CREATININE-BSD FRML MDRD: 88 ML/MIN/1.73SQ M
GLUCOSE SERPL-MCNC: 89 MG/DL (ref 70–99)
HCT VFR BLD AUTO: 35.1 % (ref 36–46)
HGB BLD-MCNC: 11 G/DL (ref 12–16)
HYPERCHROMIA BLD QL SMEAR: PRESENT
LDH SERPL-CCNC: 781 U/L (ref 313–618)
LYMPHOCYTES # BLD AUTO: 1.1 THOUSANDS/ΜL (ref 0.5–4)
LYMPHOCYTES NFR BLD AUTO: 32 % (ref 25–45)
MAGNESIUM SERPL-MCNC: 1.9 MG/DL (ref 1.6–2.3)
MCH RBC QN AUTO: 21.5 PG (ref 26–34)
MCHC RBC AUTO-ENTMCNC: 31.2 G/DL (ref 31–36)
MCV RBC AUTO: 69 FL (ref 80–100)
MICROCYTES BLD QL AUTO: PRESENT
MONOCYTES # BLD AUTO: 0.4 THOUSAND/ΜL (ref 0.2–0.9)
MONOCYTES NFR BLD AUTO: 10 % (ref 1–10)
NEUTROPHILS # BLD AUTO: 1.9 THOUSANDS/ΜL (ref 1.8–7.8)
NEUTS SEG NFR BLD AUTO: 54 % (ref 45–65)
OVALOCYTES BLD QL SMEAR: PRESENT
PHOSPHATE SERPL-MCNC: 3.8 MG/DL (ref 2.5–4.8)
PLATELET # BLD AUTO: 75 THOUSANDS/UL (ref 150–450)
PLATELET BLD QL SMEAR: ABNORMAL
PMV BLD AUTO: 8.6 FL (ref 8.9–12.7)
POIKILOCYTOSIS BLD QL SMEAR: PRESENT
POTASSIUM SERPL-SCNC: 4.9 MMOL/L (ref 3.6–5)
PROT SERPL-MCNC: 6.5 G/DL (ref 5.9–8.4)
RBC # BLD AUTO: 5.09 MILLION/UL (ref 4–5.2)
RBC MORPH BLD: PRESENT
SCHISTOCYTES BLD QL SMEAR: PRESENT
SODIUM SERPL-SCNC: 140 MMOL/L (ref 137–147)
URATE SERPL-MCNC: 3.2 MG/DL (ref 2.7–7.5)
WBC # BLD AUTO: 3.5 THOUSAND/UL (ref 4.5–11)

## 2021-08-20 PROCEDURE — 83735 ASSAY OF MAGNESIUM: CPT | Performed by: INTERNAL MEDICINE

## 2021-08-20 PROCEDURE — 85025 COMPLETE CBC W/AUTO DIFF WBC: CPT | Performed by: INTERNAL MEDICINE

## 2021-08-20 PROCEDURE — 83615 LACTATE (LD) (LDH) ENZYME: CPT | Performed by: INTERNAL MEDICINE

## 2021-08-20 PROCEDURE — 84550 ASSAY OF BLOOD/URIC ACID: CPT | Performed by: INTERNAL MEDICINE

## 2021-08-20 PROCEDURE — 80053 COMPREHEN METABOLIC PANEL: CPT

## 2021-08-20 PROCEDURE — 96360 HYDRATION IV INFUSION INIT: CPT

## 2021-08-20 PROCEDURE — 84100 ASSAY OF PHOSPHORUS: CPT | Performed by: INTERNAL MEDICINE

## 2021-08-20 RX ADMIN — SODIUM CHLORIDE 1000 ML: 0.9 INJECTION, SOLUTION INTRAVENOUS at 09:28

## 2021-08-20 NOTE — PLAN OF CARE
Problem: Potential for Falls  Goal: Patient will remain free of falls  Description: INTERVENTIONS:  - Educate patient/family on patient safety including physical limitations  - Instruct patient to call for assistance with activity   - Consult OT/PT to assist with strengthening/mobility   - Keep Call bell within reach  - Keep bed low and locked with side rails adjusted as appropriate  - Keep care items and personal belongings within reach  - Initiate and maintain comfort rounds    Outcome: Progressing     Problem: METABOLIC, FLUID AND ELECTROLYTES - ADULT  Goal: Fluid balance maintained  Description: INTERVENTIONS:  - Monitor labs   - Monitor I/O and WT  - Instruct patient on fluid and nutrition as appropriate  - Assess for signs & symptoms of volume excess or deficit  Outcome: Progressing

## 2021-08-24 ENCOUNTER — HOSPITAL ENCOUNTER (OUTPATIENT)
Dept: INFUSION CENTER | Facility: HOSPITAL | Age: 61
Discharge: HOME | End: 2021-08-24
Attending: INTERNAL MEDICINE
Payer: COMMERCIAL

## 2021-08-24 VITALS
OXYGEN SATURATION: 98 % | HEART RATE: 63 BPM | RESPIRATION RATE: 18 BRPM | SYSTOLIC BLOOD PRESSURE: 120 MMHG | TEMPERATURE: 97 F | DIASTOLIC BLOOD PRESSURE: 70 MMHG

## 2021-08-24 DIAGNOSIS — C82.18 GRADE 2 FOLLICULAR LYMPHOMA OF LYMPH NODES OF MULTIPLE REGIONS (HCC): ICD-10-CM

## 2021-08-24 DIAGNOSIS — T45.1X5A CHEMOTHERAPY-INDUCED NEUTROPENIA (HCC): ICD-10-CM

## 2021-08-24 DIAGNOSIS — Z91.89 AT HIGH RISK OF TUMOR LYSIS SYNDROME: ICD-10-CM

## 2021-08-24 DIAGNOSIS — D50.8 IRON DEFICIENCY ANEMIA SECONDARY TO INADEQUATE DIETARY IRON INTAKE: ICD-10-CM

## 2021-08-24 DIAGNOSIS — D70.1 CHEMOTHERAPY-INDUCED NEUTROPENIA (HCC): ICD-10-CM

## 2021-08-24 DIAGNOSIS — E86.0 DEHYDRATION: Primary | ICD-10-CM

## 2021-08-24 DIAGNOSIS — D51.8 OTHER VITAMIN B12 DEFICIENCY ANEMIAS: Primary | ICD-10-CM

## 2021-08-24 DIAGNOSIS — E86.0 DEHYDRATION: ICD-10-CM

## 2021-08-24 LAB — GLUCOSE SERPL-MCNC: 74 MG/DL (ref 65–140)

## 2021-08-24 PROCEDURE — 82948 REAGENT STRIP/BLOOD GLUCOSE: CPT

## 2021-08-24 PROCEDURE — 96413 CHEMO IV INFUSION 1 HR: CPT

## 2021-08-24 PROCEDURE — 96361 HYDRATE IV INFUSION ADD-ON: CPT

## 2021-08-24 PROCEDURE — 96372 THER/PROPH/DIAG INJ SC/IM: CPT

## 2021-08-24 PROCEDURE — 96367 TX/PROPH/DG ADDL SEQ IV INF: CPT

## 2021-08-24 RX ORDER — SODIUM CHLORIDE 9 MG/ML
20 INJECTION, SOLUTION INTRAVENOUS ONCE
Status: COMPLETED | OUTPATIENT
Start: 2021-08-24 | End: 2021-08-24

## 2021-08-24 RX ORDER — CYANOCOBALAMIN 1000 UG/ML
1000 INJECTION INTRAMUSCULAR; SUBCUTANEOUS ONCE
Status: CANCELLED | OUTPATIENT
Start: 2021-08-24

## 2021-08-24 RX ORDER — CYANOCOBALAMIN 1000 UG/ML
1000 INJECTION INTRAMUSCULAR; SUBCUTANEOUS ONCE
Status: CANCELLED | OUTPATIENT
Start: 2021-09-21

## 2021-08-24 RX ORDER — CYANOCOBALAMIN 1000 UG/ML
1000 INJECTION INTRAMUSCULAR; SUBCUTANEOUS ONCE
Status: COMPLETED | OUTPATIENT
Start: 2021-08-24 | End: 2021-08-24

## 2021-08-24 RX ADMIN — COPANLISIB 60 MG: 15 INJECTION, POWDER, LYOPHILIZED, FOR SOLUTION INTRAVENOUS at 10:23

## 2021-08-24 RX ADMIN — CYANOCOBALAMIN 1000 MCG: 1000 INJECTION INTRAMUSCULAR; SUBCUTANEOUS at 09:10

## 2021-08-24 RX ADMIN — SODIUM CHLORIDE 20 ML/HR: 0.9 INJECTION, SOLUTION INTRAVENOUS at 08:55

## 2021-08-24 RX ADMIN — SODIUM CHLORIDE 1000 ML: 0.9 INJECTION, SOLUTION INTRAVENOUS at 09:05

## 2021-08-24 RX ADMIN — ONDANSETRON 8 MG: 2 SOLUTION INTRAMUSCULAR; INTRAVENOUS at 09:07

## 2021-08-24 NOTE — PROGRESS NOTES
Pt tolerated 1L NSS hydration, B12 injection to L deltoid, and infusion of Aliqopa without difficulty  No s/s reaction noted  Hydration and B12 given today per Dr Shanelle Dial office since pt leaving tomorrow for vacation  Port flushed and deaccessed per protocol  Next appt confirmed and AVS provided  Left ambulatory with [facility] transport

## 2021-09-02 DIAGNOSIS — Z91.89 AT HIGH RISK OF TUMOR LYSIS SYNDROME: ICD-10-CM

## 2021-09-02 DIAGNOSIS — C82.18 GRADE 2 FOLLICULAR LYMPHOMA OF LYMPH NODES OF MULTIPLE REGIONS (HCC): ICD-10-CM

## 2021-09-02 DIAGNOSIS — E86.0 DEHYDRATION: Primary | ICD-10-CM

## 2021-09-02 DIAGNOSIS — D50.8 IRON DEFICIENCY ANEMIA SECONDARY TO INADEQUATE DIETARY IRON INTAKE: ICD-10-CM

## 2021-09-02 NOTE — PATIENT INSTRUCTIONS
PRESCRIPTION REFILL REMINDER:  All medication refills should be requested prior to [facility] on Friday  Any refill requests after noon on Friday would be addressed the following Monday  Please protect yourself from COVID-19 and the delta variant! Even though we do not good antiviral drugs for this infection, the following tools can help you stay healthy:    = Wash your hands! Soap and water, or hand  with at least 60% alcohol, are both effective at killing the virus  = Wear a mask! This will help protect others from any virus particles you might spread  Your mouth and nose BOTH need to be covered  = Keep the distance! Keep 6 feet of distance from other people, even if they seem healthy  Keeping distance protects you from the other person's virus spread     = Get a vaccine! Three vaccines are approved for emergency use in the [place]; they are made by [employer], and [employer]  These vaccines have been shown to be 90+% effective at preventing severe infections when combined with masking, hand-washing, and distancing  These vaccines do provide protection against the dangerous delta variant!  + Pfizer may be given to all persons age 15 and older   + Idelia Nghia may be given to all adults age 25 and older   + [employer] may be given to all adults age 25 and older  (Serious blood clot side effects have only been reported in reproductive-age women, and these are about 1-in-a-million  We do NOT recommend J&J for people who have had Guillan-Nunez syndrome )  = You may get a shot from many other locations outside [facility]: [facility], [employer], [employer], [facility], and certain [facility] locations  + As of 8/13/21, the CDC recommends booster shots on Pfizer and Moderna vaccines for immune-suppressed populations  Ask your doctor if you qualify    https://www FreakOut/      We are recommending that ALL our patients get a complete series of one of the three vaccines, as early as it is available to them  Please keep an eye on the [employer] [address], or call 5-726-OWREWDL (Choose Option 7 for faster service!) to see when you will become eligible  If you are eligible by the criteria above, please ask our team to help get you a shot! Informacion en espanol sobre vacunas, de nos companeros [address] --  [facility]      Numbers of Coronavirus cases (and COVID deaths) are worsening in many [place] States, among unvaccinated people, we think this is because vaccines are working, but only if you get one! As of 7/1/21, we do NOT advise travel OUTSIDE the [address], and we encourage you to be very careful when planning travel INSIDE the [address]  Check out [employer] for Diaz data that are updated daily:    http://www GroupStream/     Global Epidemics  Org, from [facility] (OUTPATIENT CAMPUS), will give you Nadjzo-cc-Gexzll information on virus cases and vaccination rates:    Https://globalepidemics  org/    Frequently Asked Questions about COVID, answered by [facility]    Joel es

## 2021-09-03 ENCOUNTER — TELEPHONE (OUTPATIENT)
Dept: FAMILY MEDICINE CLINIC | Facility: CLINIC | Age: 61
End: 2021-09-03

## 2021-09-03 ENCOUNTER — OFFICE VISIT (OUTPATIENT)
Dept: PALLIATIVE MEDICINE | Facility: CLINIC | Age: 61
End: 2021-09-03
Payer: COMMERCIAL

## 2021-09-03 ENCOUNTER — HOSPITAL ENCOUNTER (OUTPATIENT)
Dept: INFUSION CENTER | Facility: HOSPITAL | Age: 61
Discharge: HOME | End: 2021-09-03
Attending: INTERNAL MEDICINE

## 2021-09-03 VITALS
DIASTOLIC BLOOD PRESSURE: 72 MMHG | SYSTOLIC BLOOD PRESSURE: 130 MMHG | BODY MASS INDEX: 37.35 KG/M2 | WEIGHT: 217.6 LBS | HEART RATE: 65 BPM | TEMPERATURE: 96.8 F | RESPIRATION RATE: 24 BRPM | OXYGEN SATURATION: 98 %

## 2021-09-03 DIAGNOSIS — R05.9 COUGH: ICD-10-CM

## 2021-09-03 DIAGNOSIS — C82.18 GRADE 2 FOLLICULAR LYMPHOMA OF LYMPH NODES OF MULTIPLE REGIONS (HCC): ICD-10-CM

## 2021-09-03 DIAGNOSIS — G89.3 CANCER RELATED PAIN: Primary | ICD-10-CM

## 2021-09-03 DIAGNOSIS — R10.30 LOWER ABDOMINAL PAIN: ICD-10-CM

## 2021-09-03 DIAGNOSIS — R53.83 DECREASED ENERGY: ICD-10-CM

## 2021-09-03 PROCEDURE — 99213 OFFICE O/P EST LOW 20 MIN: CPT | Performed by: INTERNAL MEDICINE

## 2021-09-03 RX ORDER — OXYCODONE HYDROCHLORIDE 20 MG/1
20 TABLET ORAL EVERY 4 HOURS PRN
Qty: 60 TABLET | Refills: 0 | Status: SHIPPED | OUTPATIENT
Start: 2021-09-03 | End: 2021-09-22 | Stop reason: SDUPTHER

## 2021-09-03 NOTE — PROGRESS NOTES
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 ([address] )    3  Lower abdominal pain    4  Cough    5  Decreased energy       · Tolerating chemo well  · Now having cough and fatigue  Came back from [place]  Advised to call PCP for further evaluation  We called their office who advised rapid test at a [facility] and they will forward to her PCP  · Cancer related pain stable  Continue oxycodone IR 20mg PO q4H prn  She is allowed to take total of 6 a day if needed  · Zofran prn N/V  · Bowel regimen  · RTO in 8 weeks    Controlled Substance [place]    [place] or [place]  reviewed: No red flags were identified; safe to proceed with prescription  Cary Kaufman 08/11/2021  5   08/11/2021  Lorazepam 0 5 MG Tablet  2 00  [address]   [id number]   All ([id number])   0   [place]   08/10/2021  5   08/10/2021  Oxycodone Hcl 20 MG Tablet  60 00  [address]   [id number]   All ([id number])   0  180 00 MME  [place]   07/28/2021  5   07/23/2021  Oxycodone Hcl 20 MG Tablet  120 00  [address]   [id number]   All ([id number])   0  180 00 MME        Requested Prescriptions     Signed Prescriptions Disp Refills    oxyCODONE (ROXICODONE) 20 MG TABS 60 tablet 0     Sig: Take 1 tablet (20 mg total) by mouth every 4 (four) hours as needed for severe pain Erica 1 tableta cada 4 horas fernandez sea necesario para el dolor de estomagoMax Daily Amount: 120 mg     Medications Discontinued During This Encounter   Medication Reason    oxyCODONE (ROXICODONE) 20 MG TABS Reorder       Representatives have queried the patient's controlled substance dispensing history in the Prescription Drug Monitoring Program in compliance with regulations before I have prescribed any controlled substances  The prescription history is consistent with prescribed therapy and our practice policies        20 minutes were spent face to face with Adilson Collins Bassam Morrow with greater than 50% of the time spent in counseling or coordination of care including discussions of etiology of diagnosis, diagnostic results, impression, and recommendations, risks and benefits of treatment, instructions for disease self management, treatment instructions, follow up requirements, risk factors and risk reduction of disease, patient and family counseling/involvement in care and compliance with treatment regimen   All of the patient's questions were answered during this discussion  No follow-ups on file  Subjective:   Chief Complaint  Follow up visit for:  symptom management, pain, neoplasm related, assessment of goals of care, disease process education and discussion of prognosis, advance care planning    Nausea  Associated symptoms include abdominal pain, nausea and neck pain  Pertinent negatives include no chest pain, chills, coughing, fatigue or fever  Medication Refill  Associated symptoms include abdominal pain, nausea and neck pain  Pertinent negatives include no chest pain, chills, coughing, fatigue or fever  Jorge Lombardi is a 63 y o  female with grade 2 follicular lymphoma initially diagnosed in Nov 2018  Started on chemo (rituximab + bendamustine) soon after  Developed tumor lysis syndrome in Dec 2018, bendamustine removed  She completed rituxan on 4/2019 and had been on maintenance Rituxan since 5/2019  In March 2020, she had evidence of disease progression and was also started on Revlimid  Per review of notes, there are some concerns with noncompliance and f/u with bloodwork   Per Oncology note, long discussion with patient regarding refractory and progressive grade 2 follicular lymphoma   Patient with c/o increase L neck swelling/tenderness, US done, concerning for progression of her disease  Her biopsy from the L iliac bone and L cervical LN came back positive for follicular lymphoma   Her PET-CT 6/4 unfortunately showed significant progression of disease in the neck, chest, abdomen, pelvis, including new lesion in the L ribs  She was on Obinutuzumab/CHOP with Udenyca before  There was a delay in her treatment when she had Covid-19 as well as when she went to [facility] to relocate  She was going to stay there to get treatments, but it didn't work out so she came back  She is now on treatment with Copanlisib 60 mg 3 weeks on with 1 week of a break since   6/29/2021  Her latest MRI brain showed no brain lesions or any acute intracranial abnormality (except for 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)  This was ordered by oncology due to patient's complaints of dizziness and vision changes  She last saw oncology 8/11 where plan is to continue treatments  She was last seen 7/9 where she reports being back on chemo and tolerating this well  She comes back today for routine follow up of cancer related pain   utilized  She just came back from [place] last night  Her cancer pain is stable  She is only taking oxycodone 4 times a day  She is reminded she can take up to 6 a day, spaced 4 hours apart  She voiced understanding  She complains of many symptoms seemingly unrelated to her cancer diagnosis  She has cough, "bubbles in her throat", etc  Denies fever or SOB  She has normal VS in the office and she does not appear toxic  Encouraged to call PCP for a visit  The following portions of the medical history were reviewed: past medical history, problem list, medication list, and social history      Current Outpatient Medications:     acetaminophen (TYLENOL) 325 mg tablet, Take 2 tablets (650 mg total) by mouth every 6 (six) hours as needed for mild pain, moderate pain, headaches or fever, Disp: 30 tablet, Rfl: 0    acyclovir (ZOVIRAX) 400 MG tablet, Take 1 tablet (400 mg total) by mouth 2 (two) times a day, Disp: 60 tablet, Rfl: 11    al mag oxide-diphenhydramine-lidocaine viscous (MAGIC MOUTHWASH) 1:1:1 suspension, Swish and spit 10 mL every 4 (four) hours as needed for mouth pain or discomfort, Disp: 180 mL, Rfl: 0    allopurinol (ZYLOPRIM) 100 mg tablet, Take 1 tablet (100 mg total) by mouth daily, Disp: 30 tablet, Rfl: 0    amLODIPine (NORVASC) 5 mg tablet, Take 1 tablet (5 mg total) by mouth daily, Disp: 30 tablet, Rfl: 0    ascorbic acid (VITAMIN C) 1000 MG tablet, Take 1 tablet (1,000 mg total) by mouth every 12 (twelve) hours for 10 doses, Disp: 10 tablet, Rfl: 0    aspirin (ECOTRIN LOW STRENGTH) 81 mg EC tablet, Take 1 tablet (81 mg total) by mouth daily, Disp: 30 tablet, Rfl: 11    aspirin-acetaminophen-caffeine (EXCEDRIN MIGRAINE) 250-250-65 MG per tablet, Take 1 tablet by mouth every 6 (six) hours as needed for headaches Erica 1 tableta cada 6 horas fernandez sea necesario para el dolor de russ, Disp: 30 tablet, Rfl: 0    atorvastatin (LIPITOR) 40 mg tablet, Take 1 tablet (40 mg total) by mouth daily with dinner, Disp: 12 tablet, Rfl: 0    benzonatate (TESSALON) 200 MG capsule, Take 1 capsule (200 mg total) by mouth 3 (three) times a day as needed for cough, Disp: 20 capsule, Rfl: 0    carbamide peroxide (DEBROX) 6 5 % otic solution, Administer 5 drops to the right ear 2 (two) times a day, Disp: 15 mL, Rfl: 0    cholecalciferol (VITAMIN D3) 1,000 units tablet, Take 2 tablets (2,000 Units total) by mouth daily for 5 days, Disp: 10 tablet, Rfl: 0    cyanocobalamin 1000 MCG tablet, Take 1 tablet (1,000 mcg total) by mouth daily, Disp: 90 tablet, Rfl: 3    dexamethasone (DECADRON) 1 mg tablet, Take 1 tablet (1 mg total) by mouth 2 (two) times a day Erica 1 tableta dos veces al misael (por la manana y al mediodia), Disp: 60 tablet, Rfl: 3    DULoxetine (CYMBALTA) 20 mg capsule, Take 1 capsule (20 mg total) by mouth daily, Disp: 30 capsule, Rfl: 3    folic acid (FOLVITE) 1 mg tablet, Take 1 tablet (1 mg total) by mouth daily, Disp: 90 tablet, Rfl: 4    guaiFENesin (ROBITUSSIN) 100 mg/5 mL oral solution, Take 10 mL (200 mg total) by mouth every 4 (four) hours, Disp: 473 mL, Rfl: 0    lidocaine (LMX) 4 % cream, Apply topically as needed for mild pain To neck, Disp: 30 g, Rfl: 0    LORazepam (ATIVAN) 0 5 mg tablet, Take 1 tablet 30-60 minutes before MRI, can take an additional tablet if needed before test, Disp: 2 tablet, Rfl: 0    meclizine (ANTIVERT) 12 5 MG tablet, Take 1 tablet (12 5 mg total) by mouth every 8 (eight) hours as needed for dizziness, Disp: 30 tablet, Rfl: 0    omeprazole (PriLOSEC) 20 mg delayed release capsule, Take 2 capsules (40 mg total) by mouth daily To prevent stomach upset, Disp: 60 capsule, Rfl: 5    ondansetron (ZOFRAN) 4 mg tablet, Take 1 tablet (4 mg total) by mouth every 8 (eight) hours as needed for nausea or vomiting, Disp: 30 tablet, Rfl: 3    oxyCODONE (ROXICODONE) 20 MG TABS, Take 1 tablet (20 mg total) by mouth every 4 (four) hours as needed for severe pain Erica 1 tableta cada 4 horas fernandez sea necesario para el dolor de estomagoMax Daily Amount: 120 mg, Disp: 60 tablet, Rfl: 0    polyethylene glycol (MIRALAX) 17 g packet, Take 17 g by mouth daily, Disp: 30 each, Rfl: 3    senna (SENOKOT) 8 6 mg, Take 1 tablet (8 6 mg total) by mouth 2 (two) times a day, Disp: 60 each, Rfl: 3    zinc sulfate (ZINCATE) 220 mg capsule, Take 1 capsule (220 mg total) by mouth daily for 4 doses, Disp: 4 capsule, Rfl: 0  Review of Systems   Constitutional: Negative for activity change, appetite change, chills, fatigue, fever and unexpected weight change  HENT: Negative for trouble swallowing  Respiratory: Negative for cough and shortness of breath  Cardiovascular: Negative for chest pain  Gastrointestinal: Positive for abdominal pain and nausea  Negative for constipation and diarrhea  Musculoskeletal: Positive for neck pain  Neurological: Negative for speech difficulty and light-headedness  Psychiatric/Behavioral: Negative for sleep disturbance  The patient is not nervous/anxious      All other systems reviewed and are negative  All other systems negative    Objective:  Vital Signs  /72 (BP Location: Left arm, Patient Position: Sitting, Cuff Size: Standard)   Pulse 65   Temp (!) 96 8 °F (36 °C) (Temporal)   Resp (!) 24   Wt 98 7 kg (217 lb 9 6 oz)   SpO2 98%   BMI 37 35 kg/m²    Physical Exam    Constitutional: Appears well-developed and well-nourished, she did not look sick, nor toxic-looking  Pleasant, well-kempt, nails are done  Head: Normocephalic and atraumatic  Eyes: EOM are normal  No ocular discharge  No scleral icterus  Respiratory: Coughing, sounds wet  Effort normal  No stridor  No respiratory distress  Gastrointestinal: No abdominal distension  Musculoskeletal: No edema  Neurological: Alert, oriented and appropriately conversant  Skin: No diaphoresis, no rashes seen on exposed areas of skin  Psychiatric: Displays a normal mood and affect   Behavior, judgement and thought content appear normal  In better spirits today    Paulino Schwab MD  Palliative Medicine & Supportive Care  Internal Medicine  Available via [facility] Text  Office: [phone]  Fax: [phone]

## 2021-09-03 NOTE — TELEPHONE ENCOUNTER
Call from facility in regards to patient having appointment with them this morning and having bad cough with congestion  They are concerned and wanted patient to be tested for covid so they called here for order since we are listed as PCP  Patient had never seen us in this office so we are unable to complete this request at this time  Recommended a [facility] location OR pharmacy drive through for rapid test  They will inform patient and also tell her she needs to follow up with our office to establish care if she wishes to continue care with our office

## 2021-09-07 ENCOUNTER — HOSPITAL ENCOUNTER (OUTPATIENT)
Dept: INFUSION CENTER | Facility: HOSPITAL | Age: 61
Discharge: HOME | End: 2021-09-07
Attending: INTERNAL MEDICINE
Payer: COMMERCIAL

## 2021-09-07 VITALS
HEART RATE: 60 BPM | HEIGHT: 64 IN | OXYGEN SATURATION: 97 % | SYSTOLIC BLOOD PRESSURE: 158 MMHG | RESPIRATION RATE: 18 BRPM | TEMPERATURE: 96.6 F | WEIGHT: 216.05 LBS | DIASTOLIC BLOOD PRESSURE: 90 MMHG | BODY MASS INDEX: 36.89 KG/M2

## 2021-09-07 DIAGNOSIS — D70.1 CHEMOTHERAPY-INDUCED NEUTROPENIA (HCC): Primary | ICD-10-CM

## 2021-09-07 DIAGNOSIS — C82.18 GRADE 2 FOLLICULAR LYMPHOMA OF LYMPH NODES OF MULTIPLE REGIONS (HCC): ICD-10-CM

## 2021-09-07 DIAGNOSIS — Z91.89 AT HIGH RISK OF TUMOR LYSIS SYNDROME: ICD-10-CM

## 2021-09-07 DIAGNOSIS — T45.1X5A CHEMOTHERAPY-INDUCED NEUTROPENIA (HCC): Primary | ICD-10-CM

## 2021-09-07 DIAGNOSIS — D50.8 IRON DEFICIENCY ANEMIA SECONDARY TO INADEQUATE DIETARY IRON INTAKE: ICD-10-CM

## 2021-09-07 DIAGNOSIS — E86.0 DEHYDRATION: ICD-10-CM

## 2021-09-07 LAB
ALBUMIN SERPL BCP-MCNC: 4 G/DL (ref 3–5.2)
ALP SERPL-CCNC: 95 U/L (ref 43–122)
ALT SERPL W P-5'-P-CCNC: 15 U/L
ANION GAP SERPL CALCULATED.3IONS-SCNC: 8 MMOL/L (ref 5–14)
ANISOCYTOSIS BLD QL SMEAR: PRESENT
AST SERPL W P-5'-P-CCNC: 28 U/L (ref 14–36)
BASOPHILS # BLD AUTO: 0 THOUSANDS/ΜL (ref 0–0.1)
BASOPHILS NFR BLD AUTO: 1 % (ref 0–1)
BILIRUB SERPL-MCNC: 0.64 MG/DL
BUN SERPL-MCNC: 19 MG/DL (ref 5–25)
BURR CELLS BLD QL SMEAR: PRESENT
CALCIUM SERPL-MCNC: 8.9 MG/DL (ref 8.4–10.2)
CHLORIDE SERPL-SCNC: 106 MMOL/L (ref 97–108)
CO2 SERPL-SCNC: 24 MMOL/L (ref 22–30)
CREAT SERPL-MCNC: 0.76 MG/DL (ref 0.6–1.2)
EOSINOPHIL # BLD AUTO: 0.2 THOUSAND/ΜL (ref 0–0.4)
EOSINOPHIL NFR BLD AUTO: 4 % (ref 0–6)
ERYTHROCYTE [DISTWIDTH] IN BLOOD BY AUTOMATED COUNT: 15.5 %
GFR SERPL CREATININE-BSD FRML MDRD: 86 ML/MIN/1.73SQ M
GLUCOSE SERPL-MCNC: 79 MG/DL (ref 65–140)
GLUCOSE SERPL-MCNC: 85 MG/DL (ref 70–99)
HCT VFR BLD AUTO: 35.7 % (ref 36–46)
HGB BLD-MCNC: 11.2 G/DL (ref 12–16)
HYPERCHROMIA BLD QL SMEAR: PRESENT
LDH SERPL-CCNC: 587 U/L (ref 313–618)
LYMPHOCYTES # BLD AUTO: 1.4 THOUSANDS/ΜL (ref 0.5–4)
LYMPHOCYTES NFR BLD AUTO: 29 % (ref 25–45)
MCH RBC QN AUTO: 21.2 PG (ref 26–34)
MCHC RBC AUTO-ENTMCNC: 31.2 G/DL (ref 31–36)
MCV RBC AUTO: 68 FL (ref 80–100)
MICROCYTES BLD QL AUTO: PRESENT
MONOCYTES # BLD AUTO: 0.5 THOUSAND/ΜL (ref 0.2–0.9)
MONOCYTES NFR BLD AUTO: 10 % (ref 1–10)
NEUTROPHILS # BLD AUTO: 2.8 THOUSANDS/ΜL (ref 1.8–7.8)
NEUTS SEG NFR BLD AUTO: 57 % (ref 45–65)
OVALOCYTES BLD QL SMEAR: PRESENT
PLATELET # BLD AUTO: 157 THOUSANDS/UL (ref 150–450)
PLATELET BLD QL SMEAR: ADEQUATE
PMV BLD AUTO: 8.8 FL (ref 8.9–12.7)
POIKILOCYTOSIS BLD QL SMEAR: PRESENT
POTASSIUM SERPL-SCNC: 4.2 MMOL/L (ref 3.6–5)
PROT SERPL-MCNC: 6.6 G/DL (ref 5.9–8.4)
RBC # BLD AUTO: 5.26 MILLION/UL (ref 4–5.2)
RBC MORPH BLD: PRESENT
SCHISTOCYTES BLD QL SMEAR: PRESENT
SODIUM SERPL-SCNC: 138 MMOL/L (ref 137–147)
WBC # BLD AUTO: 4.9 THOUSAND/UL (ref 4.5–11)

## 2021-09-07 PROCEDURE — 96367 TX/PROPH/DG ADDL SEQ IV INF: CPT

## 2021-09-07 PROCEDURE — 83615 LACTATE (LD) (LDH) ENZYME: CPT | Performed by: INTERNAL MEDICINE

## 2021-09-07 PROCEDURE — 82948 REAGENT STRIP/BLOOD GLUCOSE: CPT

## 2021-09-07 PROCEDURE — 96413 CHEMO IV INFUSION 1 HR: CPT

## 2021-09-07 PROCEDURE — 85025 COMPLETE CBC W/AUTO DIFF WBC: CPT | Performed by: INTERNAL MEDICINE

## 2021-09-07 PROCEDURE — 85652 RBC SED RATE AUTOMATED: CPT | Performed by: INTERNAL MEDICINE

## 2021-09-07 PROCEDURE — 86140 C-REACTIVE PROTEIN: CPT | Performed by: INTERNAL MEDICINE

## 2021-09-07 PROCEDURE — 80053 COMPREHEN METABOLIC PANEL: CPT | Performed by: INTERNAL MEDICINE

## 2021-09-07 RX ORDER — SODIUM CHLORIDE 9 MG/ML
20 INJECTION, SOLUTION INTRAVENOUS ONCE
Status: COMPLETED | OUTPATIENT
Start: 2021-09-07 | End: 2021-09-07

## 2021-09-07 RX ADMIN — COPANLISIB 60 MG: 15 INJECTION, POWDER, LYOPHILIZED, FOR SOLUTION INTRAVENOUS at 10:06

## 2021-09-07 RX ADMIN — SODIUM CHLORIDE 20 ML/HR: 0.9 INJECTION, SOLUTION INTRAVENOUS at 09:04

## 2021-09-07 RX ADMIN — ONDANSETRON HYDROCHLORIDE 8 MG: 2 INJECTION, SOLUTION INTRAMUSCULAR; INTRAVENOUS at 09:24

## 2021-09-07 NOTE — PLAN OF CARE
Problem: Potential for Falls  Goal: Patient will remain free of falls  Description: INTERVENTIONS:  - Educate patient/family on patient safety including physical limitations  - Instruct patient to call for assistance with activity   - Consult OT/PT to assist with strengthening/mobility   - Keep Call bell within reach  - Keep bed low and locked with side rails adjusted as appropriate  - Keep care items and personal belongings within reach    Outcome: Progressing     Problem: Knowledge Deficit  Goal: Patient/family/caregiver demonstrates understanding of disease process, treatment plan, medications, and discharge instructions  Description: Complete learning assessment and assess knowledge base    Interventions:  - Provide teaching at level of understanding  - Provide teaching via preferred learning methods  Outcome: Progressing

## 2021-09-07 NOTE — PROGRESS NOTES
Patient presents for aliqopa treatment  Lab work and blood sugar within parameters for treatment  BP is 110/80  Patient tolerated treatment well with no adverse effects  Ending BP is 158/90, patient denies any other symptoms  Provider notified, patient is okay to discharge per Vitaly Jacobson RN  Next appointment verified, AVS provided

## 2021-09-08 DIAGNOSIS — C82.18 GRADE 2 FOLLICULAR LYMPHOMA OF LYMPH NODES OF MULTIPLE REGIONS (HCC): ICD-10-CM

## 2021-09-08 DIAGNOSIS — D50.8 IRON DEFICIENCY ANEMIA SECONDARY TO INADEQUATE DIETARY IRON INTAKE: ICD-10-CM

## 2021-09-08 DIAGNOSIS — E86.0 DEHYDRATION: Primary | ICD-10-CM

## 2021-09-08 DIAGNOSIS — Z91.89 AT HIGH RISK OF TUMOR LYSIS SYNDROME: ICD-10-CM

## 2021-09-08 LAB
CRP SERPL QL: 14.9 MG/L
ERYTHROCYTE [SEDIMENTATION RATE] IN BLOOD: 13 MM/HOUR (ref 0–29)

## 2021-09-10 ENCOUNTER — HOSPITAL ENCOUNTER (OUTPATIENT)
Dept: INFUSION CENTER | Facility: HOSPITAL | Age: 61
Discharge: HOME | End: 2021-09-10
Attending: INTERNAL MEDICINE
Payer: COMMERCIAL

## 2021-09-10 ENCOUNTER — TELEPHONE (OUTPATIENT)
Dept: HEMATOLOGY ONCOLOGY | Facility: CLINIC | Age: 61
End: 2021-09-10

## 2021-09-10 VITALS
HEART RATE: 51 BPM | TEMPERATURE: 97.4 F | RESPIRATION RATE: 18 BRPM | DIASTOLIC BLOOD PRESSURE: 70 MMHG | SYSTOLIC BLOOD PRESSURE: 168 MMHG

## 2021-09-10 DIAGNOSIS — E86.0 DEHYDRATION: ICD-10-CM

## 2021-09-10 DIAGNOSIS — Z91.89 AT HIGH RISK OF TUMOR LYSIS SYNDROME: ICD-10-CM

## 2021-09-10 DIAGNOSIS — D50.8 IRON DEFICIENCY ANEMIA SECONDARY TO INADEQUATE DIETARY IRON INTAKE: Primary | ICD-10-CM

## 2021-09-10 DIAGNOSIS — C82.18 GRADE 2 FOLLICULAR LYMPHOMA OF LYMPH NODES OF MULTIPLE REGIONS (HCC): ICD-10-CM

## 2021-09-10 LAB
LDH SERPL-CCNC: 602 U/L (ref 313–618)
MAGNESIUM SERPL-MCNC: 2.1 MG/DL (ref 1.6–2.3)
PHOSPHATE SERPL-MCNC: 3.6 MG/DL (ref 2.5–4.8)
URATE SERPL-MCNC: 4 MG/DL (ref 2.7–7.5)

## 2021-09-10 PROCEDURE — 84550 ASSAY OF BLOOD/URIC ACID: CPT | Performed by: INTERNAL MEDICINE

## 2021-09-10 PROCEDURE — 96360 HYDRATION IV INFUSION INIT: CPT

## 2021-09-10 PROCEDURE — 83615 LACTATE (LD) (LDH) ENZYME: CPT | Performed by: INTERNAL MEDICINE

## 2021-09-10 PROCEDURE — 84100 ASSAY OF PHOSPHORUS: CPT | Performed by: INTERNAL MEDICINE

## 2021-09-10 PROCEDURE — 83735 ASSAY OF MAGNESIUM: CPT | Performed by: INTERNAL MEDICINE

## 2021-09-10 RX ADMIN — SODIUM CHLORIDE 1000 ML: 0.9 INJECTION, SOLUTION INTRAVENOUS at 10:12

## 2021-09-10 NOTE — TELEPHONE ENCOUNTER
Patient missed 1:00 pm f/u apt today Friday 09/10/21 w/Dr Kenny Carter at the [facility] office  Called patient and left message to call the office to reschedule, [phone]

## 2021-09-10 NOTE — PROGRESS NOTES
Central labs drawn per orders, hydration tolerated well without complications  Patient didn't have any upcoming appointments  Those were scheduled and AVS provided and STAR aware

## 2021-09-17 ENCOUNTER — HOSPITAL ENCOUNTER (OUTPATIENT)
Dept: INFUSION CENTER | Facility: HOSPITAL | Age: 61
Discharge: HOME | End: 2021-09-17
Attending: INTERNAL MEDICINE
Payer: COMMERCIAL

## 2021-09-17 VITALS
HEART RATE: 52 BPM | DIASTOLIC BLOOD PRESSURE: 72 MMHG | SYSTOLIC BLOOD PRESSURE: 126 MMHG | RESPIRATION RATE: 20 BRPM | TEMPERATURE: 97 F

## 2021-09-17 DIAGNOSIS — Z91.89 AT HIGH RISK OF TUMOR LYSIS SYNDROME: ICD-10-CM

## 2021-09-17 DIAGNOSIS — E86.0 DEHYDRATION: ICD-10-CM

## 2021-09-17 DIAGNOSIS — D50.8 IRON DEFICIENCY ANEMIA SECONDARY TO INADEQUATE DIETARY IRON INTAKE: Primary | ICD-10-CM

## 2021-09-17 DIAGNOSIS — C82.18 GRADE 2 FOLLICULAR LYMPHOMA OF LYMPH NODES OF MULTIPLE REGIONS (HCC): ICD-10-CM

## 2021-09-17 LAB
ALBUMIN SERPL BCP-MCNC: 4 G/DL (ref 3–5.2)
ALP SERPL-CCNC: 89 U/L (ref 43–122)
ALT SERPL W P-5'-P-CCNC: 13 U/L
ANION GAP SERPL CALCULATED.3IONS-SCNC: 6 MMOL/L (ref 5–14)
AST SERPL W P-5'-P-CCNC: 28 U/L (ref 14–36)
BILIRUB SERPL-MCNC: 0.64 MG/DL
BUN SERPL-MCNC: 14 MG/DL (ref 5–25)
CALCIUM SERPL-MCNC: 8.9 MG/DL (ref 8.4–10.2)
CHLORIDE SERPL-SCNC: 108 MMOL/L (ref 97–108)
CO2 SERPL-SCNC: 23 MMOL/L (ref 22–30)
CREAT SERPL-MCNC: 0.66 MG/DL (ref 0.6–1.2)
GFR SERPL CREATININE-BSD FRML MDRD: 97 ML/MIN/1.73SQ M
GLUCOSE SERPL-MCNC: 84 MG/DL (ref 70–99)
LDH SERPL-CCNC: 543 U/L (ref 313–618)
MAGNESIUM SERPL-MCNC: 1.8 MG/DL (ref 1.6–2.3)
PHOSPHATE SERPL-MCNC: 3.5 MG/DL (ref 2.5–4.8)
POTASSIUM SERPL-SCNC: 3.9 MMOL/L (ref 3.6–5)
PROT SERPL-MCNC: 6.5 G/DL (ref 5.9–8.4)
SODIUM SERPL-SCNC: 137 MMOL/L (ref 137–147)
URATE SERPL-MCNC: 3.4 MG/DL (ref 2.7–7.5)

## 2021-09-17 PROCEDURE — 80053 COMPREHEN METABOLIC PANEL: CPT | Performed by: INTERNAL MEDICINE

## 2021-09-17 PROCEDURE — 84550 ASSAY OF BLOOD/URIC ACID: CPT | Performed by: INTERNAL MEDICINE

## 2021-09-17 PROCEDURE — 84100 ASSAY OF PHOSPHORUS: CPT | Performed by: INTERNAL MEDICINE

## 2021-09-17 PROCEDURE — 83735 ASSAY OF MAGNESIUM: CPT | Performed by: INTERNAL MEDICINE

## 2021-09-17 PROCEDURE — 96360 HYDRATION IV INFUSION INIT: CPT

## 2021-09-17 PROCEDURE — 83615 LACTATE (LD) (LDH) ENZYME: CPT | Performed by: INTERNAL MEDICINE

## 2021-09-17 RX ADMIN — SODIUM CHLORIDE 1000 ML: 0.9 INJECTION, SOLUTION INTRAVENOUS at 13:17

## 2021-09-22 ENCOUNTER — TELEPHONE (OUTPATIENT)
Dept: HEMATOLOGY ONCOLOGY | Facility: CLINIC | Age: 61
End: 2021-09-22

## 2021-09-22 DIAGNOSIS — C82.18 GRADE 2 FOLLICULAR LYMPHOMA OF LYMPH NODES OF MULTIPLE REGIONS (HCC): ICD-10-CM

## 2021-09-22 DIAGNOSIS — R10.30 LOWER ABDOMINAL PAIN: ICD-10-CM

## 2021-09-22 DIAGNOSIS — G89.3 CANCER RELATED PAIN: ICD-10-CM

## 2021-09-22 RX ORDER — OXYCODONE HYDROCHLORIDE 20 MG/1
20 TABLET ORAL EVERY 4 HOURS PRN
Qty: 60 TABLET | Refills: 0 | Status: SHIPPED | OUTPATIENT
Start: 2021-09-22 | End: 2021-10-11 | Stop reason: SDUPTHER

## 2021-09-22 NOTE — TELEPHONE ENCOUNTER
Primary palliative medicine provider: Dr Alec Dillon    Medication requested:  Oxycodone 20 mg     If for pain, how has the patient been taking their pain medicine?      Last appointment: 9/3    Next scheduled appointment: 10/27     PDMP review:       09/03/2021 2 09/03/2021   OXYCODONE HCL 20 MG TABLET  60 0 10 [address]   [id number]  [place] ([id number]) 0 180 0 MME [place]  08/11/2021 2 08/11/2021   LORAZEPAM 0 5 MG TABLET  2 0 1 [address]   [id number]  [place] ([id number]) 0  [place]  08/10/2021 2 08/10/2021   OXYCODONE HCL 20 MG TABLET  60 0 10 [address]   [id number]  [place] ([id number]) 0 180 0 MME [place]

## 2021-09-22 NOTE — TELEPHONE ENCOUNTER
Spoke with patient advised she has an appointment with Dr Batsheva Miller on Friday at 12:40 prior to her tx, patient voiced understanding

## 2021-09-24 ENCOUNTER — HOSPITAL ENCOUNTER (OUTPATIENT)
Dept: INFUSION CENTER | Facility: HOSPITAL | Age: 61
Discharge: HOME | End: 2021-09-24
Attending: INTERNAL MEDICINE
Payer: COMMERCIAL

## 2021-09-24 ENCOUNTER — OFFICE VISIT (OUTPATIENT)
Dept: HEMATOLOGY ONCOLOGY | Facility: CLINIC | Age: 61
End: 2021-09-24
Payer: COMMERCIAL

## 2021-09-24 VITALS
DIASTOLIC BLOOD PRESSURE: 80 MMHG | SYSTOLIC BLOOD PRESSURE: 135 MMHG | OXYGEN SATURATION: 98 % | WEIGHT: 217.8 LBS | TEMPERATURE: 98.8 F | BODY MASS INDEX: 37.18 KG/M2 | HEART RATE: 72 BPM | HEIGHT: 64 IN | RESPIRATION RATE: 17 BRPM

## 2021-09-24 DIAGNOSIS — D70.1 CHEMOTHERAPY-INDUCED NEUTROPENIA (HCC): ICD-10-CM

## 2021-09-24 DIAGNOSIS — E86.0 DEHYDRATION: ICD-10-CM

## 2021-09-24 DIAGNOSIS — Z91.89 AT HIGH RISK OF TUMOR LYSIS SYNDROME: ICD-10-CM

## 2021-09-24 DIAGNOSIS — D51.8 OTHER VITAMIN B12 DEFICIENCY ANEMIAS: ICD-10-CM

## 2021-09-24 DIAGNOSIS — C82.18 GRADE 2 FOLLICULAR LYMPHOMA OF LYMPH NODES OF MULTIPLE REGIONS (HCC): ICD-10-CM

## 2021-09-24 DIAGNOSIS — T45.1X5A CHEMOTHERAPY-INDUCED NEUTROPENIA (HCC): ICD-10-CM

## 2021-09-24 DIAGNOSIS — C82.18 GRADE 2 FOLLICULAR LYMPHOMA OF LYMPH NODES OF MULTIPLE REGIONS (HCC): Primary | ICD-10-CM

## 2021-09-24 DIAGNOSIS — D50.8 IRON DEFICIENCY ANEMIA SECONDARY TO INADEQUATE DIETARY IRON INTAKE: Primary | ICD-10-CM

## 2021-09-24 LAB
ALBUMIN SERPL BCP-MCNC: 4 G/DL (ref 3–5.2)
ALP SERPL-CCNC: 83 U/L (ref 43–122)
ALT SERPL W P-5'-P-CCNC: 16 U/L
ANION GAP SERPL CALCULATED.3IONS-SCNC: 6 MMOL/L (ref 5–14)
ANISOCYTOSIS BLD QL SMEAR: PRESENT
AST SERPL W P-5'-P-CCNC: 25 U/L (ref 14–36)
BASOPHILS # BLD AUTO: 0 THOUSANDS/ΜL (ref 0–0.1)
BASOPHILS NFR BLD AUTO: 1 % (ref 0–1)
BILIRUB SERPL-MCNC: 0.51 MG/DL
BUN SERPL-MCNC: 17 MG/DL (ref 5–25)
CALCIUM SERPL-MCNC: 9.2 MG/DL (ref 8.4–10.2)
CHLORIDE SERPL-SCNC: 105 MMOL/L (ref 97–108)
CO2 SERPL-SCNC: 29 MMOL/L (ref 22–30)
CREAT SERPL-MCNC: 1.05 MG/DL (ref 0.6–1.2)
EOSINOPHIL # BLD AUTO: 0.3 THOUSAND/ΜL (ref 0–0.4)
EOSINOPHIL NFR BLD AUTO: 5 % (ref 0–6)
ERYTHROCYTE [DISTWIDTH] IN BLOOD BY AUTOMATED COUNT: 16.2 %
GFR SERPL CREATININE-BSD FRML MDRD: 58 ML/MIN/1.73SQ M
GLUCOSE SERPL-MCNC: 99 MG/DL (ref 70–99)
HCT VFR BLD AUTO: 36.3 % (ref 36–46)
HGB BLD-MCNC: 11.3 G/DL (ref 12–16)
LDH SERPL-CCNC: 565 U/L (ref 313–618)
LYMPHOCYTES # BLD AUTO: 1.5 THOUSANDS/ΜL (ref 0.5–4)
LYMPHOCYTES NFR BLD AUTO: 28 % (ref 25–45)
MAGNESIUM SERPL-MCNC: 2 MG/DL (ref 1.6–2.3)
MCH RBC QN AUTO: 21 PG (ref 26–34)
MCHC RBC AUTO-ENTMCNC: 31 G/DL (ref 31–36)
MCV RBC AUTO: 68 FL (ref 80–100)
MICROCYTES BLD QL AUTO: PRESENT
MONOCYTES # BLD AUTO: 0.5 THOUSAND/ΜL (ref 0.2–0.9)
MONOCYTES NFR BLD AUTO: 9 % (ref 1–10)
NEUTROPHILS # BLD AUTO: 3.1 THOUSANDS/ΜL (ref 1.8–7.8)
NEUTS SEG NFR BLD AUTO: 59 % (ref 45–65)
OVALOCYTES BLD QL SMEAR: PRESENT
PHOSPHATE SERPL-MCNC: 3.5 MG/DL (ref 2.5–4.8)
PLATELET # BLD AUTO: 144 THOUSANDS/UL (ref 150–450)
PLATELET BLD QL SMEAR: ABNORMAL
PMV BLD AUTO: 8.8 FL (ref 8.9–12.7)
POTASSIUM SERPL-SCNC: 4.1 MMOL/L (ref 3.6–5)
PROT SERPL-MCNC: 6.5 G/DL (ref 5.9–8.4)
RBC # BLD AUTO: 5.37 MILLION/UL (ref 4–5.2)
RBC MORPH BLD: ABNORMAL
SODIUM SERPL-SCNC: 140 MMOL/L (ref 137–147)
URATE SERPL-MCNC: 4.7 MG/DL (ref 2.7–7.5)
WBC # BLD AUTO: 5.3 THOUSAND/UL (ref 4.5–11)

## 2021-09-24 PROCEDURE — 83735 ASSAY OF MAGNESIUM: CPT | Performed by: INTERNAL MEDICINE

## 2021-09-24 PROCEDURE — 84100 ASSAY OF PHOSPHORUS: CPT | Performed by: INTERNAL MEDICINE

## 2021-09-24 PROCEDURE — 99214 OFFICE O/P EST MOD 30 MIN: CPT | Performed by: INTERNAL MEDICINE

## 2021-09-24 PROCEDURE — 96372 THER/PROPH/DIAG INJ SC/IM: CPT

## 2021-09-24 PROCEDURE — 84550 ASSAY OF BLOOD/URIC ACID: CPT | Performed by: INTERNAL MEDICINE

## 2021-09-24 PROCEDURE — 80053 COMPREHEN METABOLIC PANEL: CPT | Performed by: INTERNAL MEDICINE

## 2021-09-24 PROCEDURE — 83615 LACTATE (LD) (LDH) ENZYME: CPT | Performed by: INTERNAL MEDICINE

## 2021-09-24 PROCEDURE — 85025 COMPLETE CBC W/AUTO DIFF WBC: CPT | Performed by: INTERNAL MEDICINE

## 2021-09-24 PROCEDURE — 96360 HYDRATION IV INFUSION INIT: CPT

## 2021-09-24 RX ORDER — CYANOCOBALAMIN 1000 UG/ML
1000 INJECTION INTRAMUSCULAR; SUBCUTANEOUS ONCE
Status: COMPLETED | OUTPATIENT
Start: 2021-09-24 | End: 2021-09-24

## 2021-09-24 RX ORDER — CYANOCOBALAMIN 1000 UG/ML
1000 INJECTION INTRAMUSCULAR; SUBCUTANEOUS ONCE
Status: CANCELLED | OUTPATIENT
Start: 2021-10-19

## 2021-09-24 RX ADMIN — CYANOCOBALAMIN 1000 MCG: 1000 INJECTION INTRAMUSCULAR; SUBCUTANEOUS at 13:38

## 2021-09-24 RX ADMIN — SODIUM CHLORIDE 1000 ML: 0.9 INJECTION, SOLUTION INTRAVENOUS at 13:21
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
## 2021-09-24 NOTE — PROGRESS NOTES
Central labs drawn today per orders as well as chemo labs for next week  Hydration tolerated well without complications  Confirmed appts next week and aVS provided

## 2021-09-28 ENCOUNTER — TELEPHONE (OUTPATIENT)
Dept: HEMATOLOGY ONCOLOGY | Facility: CLINIC | Age: 61
End: 2021-09-28

## 2021-09-28 ENCOUNTER — HOSPITAL ENCOUNTER (OUTPATIENT)
Dept: INFUSION CENTER | Facility: HOSPITAL | Age: 61
Discharge: HOME | End: 2021-09-28
Attending: INTERNAL MEDICINE
Payer: COMMERCIAL

## 2021-09-28 VITALS
WEIGHT: 215.17 LBS | DIASTOLIC BLOOD PRESSURE: 78 MMHG | HEIGHT: 64 IN | SYSTOLIC BLOOD PRESSURE: 147 MMHG | HEART RATE: 54 BPM | RESPIRATION RATE: 20 BRPM | BODY MASS INDEX: 36.73 KG/M2 | TEMPERATURE: 97.6 F

## 2021-09-28 DIAGNOSIS — Z91.89 AT HIGH RISK OF TUMOR LYSIS SYNDROME: ICD-10-CM

## 2021-09-28 DIAGNOSIS — T45.1X5A CHEMOTHERAPY-INDUCED NEUTROPENIA (HCC): Primary | ICD-10-CM

## 2021-09-28 DIAGNOSIS — C82.18 GRADE 2 FOLLICULAR LYMPHOMA OF LYMPH NODES OF MULTIPLE REGIONS (HCC): ICD-10-CM

## 2021-09-28 DIAGNOSIS — D70.1 CHEMOTHERAPY-INDUCED NEUTROPENIA (HCC): Primary | ICD-10-CM

## 2021-09-28 LAB
GLUCOSE SERPL-MCNC: 136 MG/DL (ref 65–140)
GLUCOSE SERPL-MCNC: 87 MG/DL (ref 65–140)

## 2021-09-28 PROCEDURE — 82948 REAGENT STRIP/BLOOD GLUCOSE: CPT

## 2021-09-28 PROCEDURE — 96375 TX/PRO/DX INJ NEW DRUG ADDON: CPT

## 2021-09-28 PROCEDURE — 96413 CHEMO IV INFUSION 1 HR: CPT

## 2021-09-28 PROCEDURE — 96367 TX/PROPH/DG ADDL SEQ IV INF: CPT

## 2021-09-28 RX ORDER — KETOROLAC TROMETHAMINE 30 MG/ML
15 INJECTION, SOLUTION INTRAMUSCULAR; INTRAVENOUS ONCE
Status: COMPLETED | OUTPATIENT
Start: 2021-09-28 | End: 2021-09-28

## 2021-09-28 RX ORDER — KETOROLAC TROMETHAMINE 30 MG/ML
15 INJECTION, SOLUTION INTRAMUSCULAR; INTRAVENOUS ONCE
Status: CANCELLED
Start: 2021-09-28 | End: 2021-09-28

## 2021-09-28 RX ORDER — SODIUM CHLORIDE 9 MG/ML
20 INJECTION, SOLUTION INTRAVENOUS ONCE
Status: COMPLETED | OUTPATIENT
Start: 2021-09-28 | End: 2021-09-28

## 2021-09-28 RX ORDER — CLONIDINE HYDROCHLORIDE 0.1 MG/1
0.1 TABLET ORAL ONCE
Status: CANCELLED
Start: 2021-09-28 | End: 2021-09-28

## 2021-09-28 RX ORDER — CLONIDINE HYDROCHLORIDE 0.1 MG/1
0.1 TABLET ORAL ONCE
Status: COMPLETED | OUTPATIENT
Start: 2021-09-28 | End: 2021-09-28

## 2021-09-28 RX ADMIN — SODIUM CHLORIDE 20 ML/HR: 0.9 INJECTION, SOLUTION INTRAVENOUS at 09:00

## 2021-09-28 RX ADMIN — KETOROLAC TROMETHAMINE 15 MG: 30 INJECTION, SOLUTION INTRAMUSCULAR; INTRAVENOUS at 12:05

## 2021-09-28 RX ADMIN — CLONIDINE HYDROCHLORIDE 0.1 MG: 0.1 TABLET ORAL at 12:05

## 2021-09-28 RX ADMIN — COPANLISIB 60 MG: 15 INJECTION, POWDER, LYOPHILIZED, FOR SOLUTION INTRAVENOUS at 10:17

## 2021-09-28 RX ADMIN — ONDANSETRON 8 MG: 2 INJECTION INTRAMUSCULAR; INTRAVENOUS at 09:04

## 2021-09-28 NOTE — TELEPHONE ENCOUNTER
Received a teams message from Clifton Agarwal RN to report that pt's baseline BP was 125/65  Fifteen mins after her chemo infusion pt c/o 9/10 headache and BP was 180/81  Orders given to administer Catapress 0 1 mg and toradol 15 mg IV And observe pt until she feels better and BP improves  Instruct pt to call her PCP about her BP

## 2021-10-01 ENCOUNTER — HOSPITAL ENCOUNTER (OUTPATIENT)
Dept: INFUSION CENTER | Facility: HOSPITAL | Age: 61
Discharge: HOME | End: 2021-10-01
Attending: INTERNAL MEDICINE
Payer: COMMERCIAL

## 2021-10-01 VITALS
RESPIRATION RATE: 18 BRPM | SYSTOLIC BLOOD PRESSURE: 157 MMHG | TEMPERATURE: 97.7 F | HEART RATE: 51 BPM | DIASTOLIC BLOOD PRESSURE: 77 MMHG

## 2021-10-01 DIAGNOSIS — E86.0 DEHYDRATION: ICD-10-CM

## 2021-10-01 DIAGNOSIS — Z91.89 AT HIGH RISK OF TUMOR LYSIS SYNDROME: ICD-10-CM

## 2021-10-01 DIAGNOSIS — C82.18 GRADE 2 FOLLICULAR LYMPHOMA OF LYMPH NODES OF MULTIPLE REGIONS (HCC): ICD-10-CM

## 2021-10-01 DIAGNOSIS — D70.1 CHEMOTHERAPY-INDUCED NEUTROPENIA (HCC): ICD-10-CM

## 2021-10-01 DIAGNOSIS — D50.8 IRON DEFICIENCY ANEMIA SECONDARY TO INADEQUATE DIETARY IRON INTAKE: Primary | ICD-10-CM

## 2021-10-01 DIAGNOSIS — T45.1X5A CHEMOTHERAPY-INDUCED NEUTROPENIA (HCC): ICD-10-CM

## 2021-10-01 LAB
ALBUMIN SERPL BCP-MCNC: 4.3 G/DL (ref 3–5.2)
ALP SERPL-CCNC: 95 U/L (ref 43–122)
ALT SERPL W P-5'-P-CCNC: 34 U/L
ANION GAP SERPL CALCULATED.3IONS-SCNC: 8 MMOL/L (ref 5–14)
AST SERPL W P-5'-P-CCNC: 41 U/L (ref 14–36)
BASOPHILS # BLD AUTO: 0 THOUSANDS/ΜL (ref 0–0.1)
BASOPHILS NFR BLD AUTO: 1 % (ref 0–1)
BILIRUB SERPL-MCNC: 0.35 MG/DL
BUN SERPL-MCNC: 15 MG/DL (ref 5–25)
CALCIUM SERPL-MCNC: 9.4 MG/DL (ref 8.4–10.2)
CHLORIDE SERPL-SCNC: 105 MMOL/L (ref 97–108)
CO2 SERPL-SCNC: 27 MMOL/L (ref 22–30)
CREAT SERPL-MCNC: 0.8 MG/DL (ref 0.6–1.2)
EOSINOPHIL # BLD AUTO: 0.1 THOUSAND/ΜL (ref 0–0.4)
EOSINOPHIL NFR BLD AUTO: 3 % (ref 0–6)
ERYTHROCYTE [DISTWIDTH] IN BLOOD BY AUTOMATED COUNT: 16.8 %
GFR SERPL CREATININE-BSD FRML MDRD: 81 ML/MIN/1.73SQ M
GLUCOSE SERPL-MCNC: 93 MG/DL (ref 70–99)
HCT VFR BLD AUTO: 39.4 % (ref 36–46)
HGB BLD-MCNC: 11.8 G/DL (ref 12–16)
LDH SERPL-CCNC: 729 U/L (ref 313–618)
LYMPHOCYTES # BLD AUTO: 2.5 THOUSANDS/ΜL (ref 0.5–4)
LYMPHOCYTES NFR BLD AUTO: 45 % (ref 25–45)
MAGNESIUM SERPL-MCNC: 2 MG/DL (ref 1.6–2.3)
MCH RBC QN AUTO: 20.5 PG (ref 26–34)
MCHC RBC AUTO-ENTMCNC: 30.1 G/DL (ref 31–36)
MCV RBC AUTO: 68 FL (ref 80–100)
MICROCYTES BLD QL AUTO: PRESENT
MONOCYTES # BLD AUTO: 0.3 THOUSAND/ΜL (ref 0.2–0.9)
MONOCYTES NFR BLD AUTO: 5 % (ref 1–10)
NEUTROPHILS # BLD AUTO: 2.6 THOUSANDS/ΜL (ref 1.8–7.8)
NEUTS SEG NFR BLD AUTO: 47 % (ref 45–65)
PHOSPHATE SERPL-MCNC: 4 MG/DL (ref 2.5–4.8)
PLATELET # BLD AUTO: 195 THOUSANDS/UL (ref 150–450)
PLATELET BLD QL SMEAR: ADEQUATE
PMV BLD AUTO: 8 FL (ref 8.9–12.7)
POTASSIUM SERPL-SCNC: 4.7 MMOL/L (ref 3.6–5)
PROT SERPL-MCNC: 6.9 G/DL (ref 5.9–8.4)
RBC # BLD AUTO: 5.77 MILLION/UL (ref 4–5.2)
RBC MORPH BLD: NORMAL
SODIUM SERPL-SCNC: 140 MMOL/L (ref 137–147)
URATE SERPL-MCNC: 4.3 MG/DL (ref 2.7–7.5)
WBC # BLD AUTO: 5.6 THOUSAND/UL (ref 4.5–11)

## 2021-10-01 PROCEDURE — 83735 ASSAY OF MAGNESIUM: CPT | Performed by: INTERNAL MEDICINE

## 2021-10-01 PROCEDURE — 96360 HYDRATION IV INFUSION INIT: CPT

## 2021-10-01 PROCEDURE — 84550 ASSAY OF BLOOD/URIC ACID: CPT | Performed by: INTERNAL MEDICINE

## 2021-10-01 PROCEDURE — 80053 COMPREHEN METABOLIC PANEL: CPT | Performed by: INTERNAL MEDICINE

## 2021-10-01 PROCEDURE — 83615 LACTATE (LD) (LDH) ENZYME: CPT | Performed by: INTERNAL MEDICINE

## 2021-10-01 PROCEDURE — 85025 COMPLETE CBC W/AUTO DIFF WBC: CPT | Performed by: INTERNAL MEDICINE

## 2021-10-01 PROCEDURE — 84100 ASSAY OF PHOSPHORUS: CPT | Performed by: INTERNAL MEDICINE

## 2021-10-01 RX ADMIN — SODIUM CHLORIDE 1000 ML: 0.9 INJECTION, SOLUTION INTRAVENOUS at 13:00

## 2021-10-05 ENCOUNTER — HOSPITAL ENCOUNTER (OUTPATIENT)
Dept: INFUSION CENTER | Facility: HOSPITAL | Age: 61
Discharge: HOME | End: 2021-10-05
Attending: INTERNAL MEDICINE
Payer: COMMERCIAL

## 2021-10-05 VITALS
TEMPERATURE: 97.5 F | RESPIRATION RATE: 18 BRPM | BODY MASS INDEX: 36.84 KG/M2 | DIASTOLIC BLOOD PRESSURE: 82 MMHG | HEART RATE: 63 BPM | SYSTOLIC BLOOD PRESSURE: 144 MMHG | OXYGEN SATURATION: 98 % | WEIGHT: 214.73 LBS

## 2021-10-05 DIAGNOSIS — D70.1 CHEMOTHERAPY-INDUCED NEUTROPENIA (HCC): Primary | ICD-10-CM

## 2021-10-05 DIAGNOSIS — T45.1X5A CHEMOTHERAPY-INDUCED NEUTROPENIA (HCC): Primary | ICD-10-CM

## 2021-10-05 DIAGNOSIS — C82.18 GRADE 2 FOLLICULAR LYMPHOMA OF LYMPH NODES OF MULTIPLE REGIONS (HCC): ICD-10-CM

## 2021-10-05 DIAGNOSIS — Z91.89 AT HIGH RISK OF TUMOR LYSIS SYNDROME: ICD-10-CM

## 2021-10-05 LAB — GLUCOSE SERPL-MCNC: 101 MG/DL (ref 65–140)

## 2021-10-05 PROCEDURE — 82948 REAGENT STRIP/BLOOD GLUCOSE: CPT

## 2021-10-05 PROCEDURE — 96413 CHEMO IV INFUSION 1 HR: CPT

## 2021-10-05 PROCEDURE — 96367 TX/PROPH/DG ADDL SEQ IV INF: CPT

## 2021-10-05 RX ORDER — SODIUM CHLORIDE 9 MG/ML
20 INJECTION, SOLUTION INTRAVENOUS ONCE
Status: COMPLETED | OUTPATIENT
Start: 2021-10-05 | End: 2021-10-05

## 2021-10-05 RX ADMIN — ONDANSETRON 8 MG: 2 SOLUTION INTRAMUSCULAR; INTRAVENOUS at 09:41

## 2021-10-05 RX ADMIN — COPANLISIB 60 MG: 15 INJECTION, POWDER, LYOPHILIZED, FOR SOLUTION INTRAVENOUS at 10:27

## 2021-10-05 RX ADMIN — SODIUM CHLORIDE 20 ML/HR: 0.9 INJECTION, SOLUTION INTRAVENOUS at 09:41

## 2021-10-08 ENCOUNTER — HOSPITAL ENCOUNTER (OUTPATIENT)
Dept: INFUSION CENTER | Facility: HOSPITAL | Age: 61
Discharge: HOME | End: 2021-10-08
Attending: INTERNAL MEDICINE
Payer: COMMERCIAL

## 2021-10-08 VITALS
HEART RATE: 56 BPM | TEMPERATURE: 98.3 F | DIASTOLIC BLOOD PRESSURE: 68 MMHG | RESPIRATION RATE: 18 BRPM | SYSTOLIC BLOOD PRESSURE: 138 MMHG

## 2021-10-08 DIAGNOSIS — T45.1X5A CHEMOTHERAPY-INDUCED NEUTROPENIA (HCC): ICD-10-CM

## 2021-10-08 DIAGNOSIS — E86.0 DEHYDRATION: ICD-10-CM

## 2021-10-08 DIAGNOSIS — D50.8 IRON DEFICIENCY ANEMIA SECONDARY TO INADEQUATE DIETARY IRON INTAKE: Primary | ICD-10-CM

## 2021-10-08 DIAGNOSIS — C82.18 GRADE 2 FOLLICULAR LYMPHOMA OF LYMPH NODES OF MULTIPLE REGIONS (HCC): ICD-10-CM

## 2021-10-08 DIAGNOSIS — D70.1 CHEMOTHERAPY-INDUCED NEUTROPENIA (HCC): ICD-10-CM

## 2021-10-08 DIAGNOSIS — Z91.89 AT HIGH RISK OF TUMOR LYSIS SYNDROME: ICD-10-CM

## 2021-10-08 LAB
ALBUMIN SERPL BCP-MCNC: 4 G/DL (ref 3–5.2)
ALP SERPL-CCNC: 82 U/L (ref 43–122)
ALT SERPL W P-5'-P-CCNC: 25 U/L
ANION GAP SERPL CALCULATED.3IONS-SCNC: 6 MMOL/L (ref 5–14)
ANISOCYTOSIS BLD QL SMEAR: PRESENT
AST SERPL W P-5'-P-CCNC: 22 U/L (ref 14–36)
BASOPHILS # BLD AUTO: 0 THOUSANDS/ΜL (ref 0–0.1)
BASOPHILS NFR BLD AUTO: 1 % (ref 0–1)
BILIRUB SERPL-MCNC: 0.38 MG/DL
BUN SERPL-MCNC: 15 MG/DL (ref 5–25)
CALCIUM SERPL-MCNC: 9.2 MG/DL (ref 8.4–10.2)
CHLORIDE SERPL-SCNC: 109 MMOL/L (ref 97–108)
CO2 SERPL-SCNC: 25 MMOL/L (ref 22–30)
CREAT SERPL-MCNC: 0.76 MG/DL (ref 0.6–1.2)
EOSINOPHIL # BLD AUTO: 0.1 THOUSAND/ΜL (ref 0–0.4)
EOSINOPHIL NFR BLD AUTO: 2 % (ref 0–6)
ERYTHROCYTE [DISTWIDTH] IN BLOOD BY AUTOMATED COUNT: 16.1 %
GFR SERPL CREATININE-BSD FRML MDRD: 86 ML/MIN/1.73SQ M
GLUCOSE SERPL-MCNC: 144 MG/DL (ref 70–99)
HCT VFR BLD AUTO: 36 % (ref 36–46)
HGB BLD-MCNC: 11.2 G/DL (ref 12–16)
HYPERCHROMIA BLD QL SMEAR: PRESENT
LDH SERPL-CCNC: 560 U/L (ref 313–618)
LYMPHOCYTES # BLD AUTO: 1.6 THOUSANDS/ΜL (ref 0.5–4)
LYMPHOCYTES NFR BLD AUTO: 34 % (ref 25–45)
MAGNESIUM SERPL-MCNC: 1.8 MG/DL (ref 1.6–2.3)
MCH RBC QN AUTO: 21 PG (ref 26–34)
MCHC RBC AUTO-ENTMCNC: 31 G/DL (ref 31–36)
MCV RBC AUTO: 68 FL (ref 80–100)
MICROCYTES BLD QL AUTO: PRESENT
MONOCYTES # BLD AUTO: 0.2 THOUSAND/ΜL (ref 0.2–0.9)
MONOCYTES NFR BLD AUTO: 5 % (ref 1–10)
NEUTROPHILS # BLD AUTO: 2.6 THOUSANDS/ΜL (ref 1.8–7.8)
NEUTS SEG NFR BLD AUTO: 57 % (ref 45–65)
OVALOCYTES BLD QL SMEAR: PRESENT
PHOSPHATE SERPL-MCNC: 3.2 MG/DL (ref 2.5–4.8)
PLATELET # BLD AUTO: 156 THOUSANDS/UL (ref 150–450)
PLATELET BLD QL SMEAR: ADEQUATE
PMV BLD AUTO: 8.6 FL (ref 8.9–12.7)
POTASSIUM SERPL-SCNC: 4 MMOL/L (ref 3.6–5)
PROT SERPL-MCNC: 6.3 G/DL (ref 5.9–8.4)
RBC # BLD AUTO: 5.32 MILLION/UL (ref 4–5.2)
RBC MORPH BLD: NORMAL
SODIUM SERPL-SCNC: 140 MMOL/L (ref 137–147)
URATE SERPL-MCNC: 3.2 MG/DL (ref 2.7–7.5)
WBC # BLD AUTO: 4.5 THOUSAND/UL (ref 4.5–11)

## 2021-10-08 PROCEDURE — 84550 ASSAY OF BLOOD/URIC ACID: CPT | Performed by: INTERNAL MEDICINE

## 2021-10-08 PROCEDURE — 84100 ASSAY OF PHOSPHORUS: CPT | Performed by: INTERNAL MEDICINE

## 2021-10-08 PROCEDURE — 83615 LACTATE (LD) (LDH) ENZYME: CPT | Performed by: INTERNAL MEDICINE

## 2021-10-08 PROCEDURE — 96360 HYDRATION IV INFUSION INIT: CPT

## 2021-10-08 PROCEDURE — 83735 ASSAY OF MAGNESIUM: CPT | Performed by: INTERNAL MEDICINE

## 2021-10-08 PROCEDURE — 85025 COMPLETE CBC W/AUTO DIFF WBC: CPT | Performed by: INTERNAL MEDICINE

## 2021-10-08 PROCEDURE — 80053 COMPREHEN METABOLIC PANEL: CPT | Performed by: INTERNAL MEDICINE

## 2021-10-08 RX ADMIN — SODIUM CHLORIDE 1000 ML: 0.9 INJECTION, SOLUTION INTRAVENOUS at 12:32

## 2021-10-11 DIAGNOSIS — R10.30 LOWER ABDOMINAL PAIN: ICD-10-CM

## 2021-10-11 DIAGNOSIS — G89.3 CANCER RELATED PAIN: ICD-10-CM

## 2021-10-11 DIAGNOSIS — C82.18 GRADE 2 FOLLICULAR LYMPHOMA OF LYMPH NODES OF MULTIPLE REGIONS (HCC): ICD-10-CM

## 2021-10-11 RX ORDER — OXYCODONE HYDROCHLORIDE 20 MG/1
20 TABLET ORAL EVERY 4 HOURS PRN
Qty: 60 TABLET | Refills: 0 | Status: SHIPPED | OUTPATIENT
Start: 2021-10-11 | End: 2021-10-27 | Stop reason: SDUPTHER

## 2021-10-12 ENCOUNTER — HOSPITAL ENCOUNTER (OUTPATIENT)
Dept: INFUSION CENTER | Facility: HOSPITAL | Age: 61
Discharge: HOME | End: 2021-10-12
Attending: INTERNAL MEDICINE
Payer: COMMERCIAL

## 2021-10-12 VITALS
RESPIRATION RATE: 18 BRPM | TEMPERATURE: 97.4 F | WEIGHT: 219.14 LBS | HEART RATE: 56 BPM | DIASTOLIC BLOOD PRESSURE: 66 MMHG | SYSTOLIC BLOOD PRESSURE: 145 MMHG | BODY MASS INDEX: 37.6 KG/M2

## 2021-10-12 DIAGNOSIS — D50.8 IRON DEFICIENCY ANEMIA SECONDARY TO INADEQUATE DIETARY IRON INTAKE: ICD-10-CM

## 2021-10-12 DIAGNOSIS — E86.0 DEHYDRATION: ICD-10-CM

## 2021-10-12 DIAGNOSIS — D70.1 CHEMOTHERAPY-INDUCED NEUTROPENIA (HCC): ICD-10-CM

## 2021-10-12 DIAGNOSIS — T45.1X5A CHEMOTHERAPY-INDUCED NEUTROPENIA (HCC): Primary | ICD-10-CM

## 2021-10-12 DIAGNOSIS — C82.18 GRADE 2 FOLLICULAR LYMPHOMA OF LYMPH NODES OF MULTIPLE REGIONS (HCC): ICD-10-CM

## 2021-10-12 DIAGNOSIS — Z91.89 AT HIGH RISK OF TUMOR LYSIS SYNDROME: Primary | ICD-10-CM

## 2021-10-12 DIAGNOSIS — Z91.89 AT HIGH RISK OF TUMOR LYSIS SYNDROME: ICD-10-CM

## 2021-10-12 DIAGNOSIS — T45.1X5A CHEMOTHERAPY-INDUCED NEUTROPENIA (HCC): ICD-10-CM

## 2021-10-12 DIAGNOSIS — D70.1 CHEMOTHERAPY-INDUCED NEUTROPENIA (HCC): Primary | ICD-10-CM

## 2021-10-12 LAB — GLUCOSE SERPL-MCNC: 75 MG/DL (ref 65–140)

## 2021-10-12 PROCEDURE — 96413 CHEMO IV INFUSION 1 HR: CPT

## 2021-10-12 PROCEDURE — 96367 TX/PROPH/DG ADDL SEQ IV INF: CPT

## 2021-10-12 PROCEDURE — 96361 HYDRATE IV INFUSION ADD-ON: CPT

## 2021-10-12 PROCEDURE — 82948 REAGENT STRIP/BLOOD GLUCOSE: CPT

## 2021-10-12 RX ORDER — SODIUM CHLORIDE 9 MG/ML
20 INJECTION, SOLUTION INTRAVENOUS ONCE
Status: COMPLETED | OUTPATIENT
Start: 2021-10-12 | End: 2021-10-12

## 2021-10-12 RX ADMIN — COPANLISIB 60 MG: 15 INJECTION, POWDER, LYOPHILIZED, FOR SOLUTION INTRAVENOUS at 10:47

## 2021-10-12 RX ADMIN — SODIUM CHLORIDE 1000 ML: 0.9 INJECTION, SOLUTION INTRAVENOUS at 09:23

## 2021-10-12 RX ADMIN — ONDANSETRON 8 MG: 2 INJECTION INTRAMUSCULAR; INTRAVENOUS at 09:24

## 2021-10-12 RX ADMIN — SODIUM CHLORIDE 20 ML/HR: 0.9 INJECTION, SOLUTION INTRAVENOUS at 09:24

## 2021-10-15 ENCOUNTER — HOSPITAL ENCOUNTER (OUTPATIENT)
Dept: INFUSION CENTER | Facility: HOSPITAL | Age: 61
End: 2021-10-15
Attending: INTERNAL MEDICINE

## 2021-10-19 ENCOUNTER — HOSPITAL ENCOUNTER (OUTPATIENT)
Dept: NUCLEAR MEDICINE | Facility: HOSPITAL | Age: 61
Discharge: HOME | End: 2021-10-19
Attending: INTERNAL MEDICINE
Payer: COMMERCIAL

## 2021-10-19 DIAGNOSIS — C82.18 GRADE 2 FOLLICULAR LYMPHOMA OF LYMPH NODES OF MULTIPLE REGIONS (HCC): ICD-10-CM

## 2021-10-19 LAB — GLUCOSE SERPL-MCNC: 79 MG/DL (ref 65–140)

## 2021-10-19 PROCEDURE — G1004 CDSM NDSC: HCPCS

## 2021-10-19 PROCEDURE — A9552 F18 FDG: HCPCS

## 2021-10-19 PROCEDURE — 82948 REAGENT STRIP/BLOOD GLUCOSE: CPT

## 2021-10-19 PROCEDURE — 78815 PET IMAGE W/CT SKULL-THIGH: CPT

## 2021-10-25 ENCOUNTER — OFFICE VISIT (OUTPATIENT)
Dept: HEMATOLOGY ONCOLOGY | Facility: CLINIC | Age: 61
End: 2021-10-25
Payer: COMMERCIAL

## 2021-10-25 ENCOUNTER — HOSPITAL ENCOUNTER (OUTPATIENT)
Dept: INFUSION CENTER | Facility: HOSPITAL | Age: 61
Discharge: HOME | End: 2021-10-25
Payer: COMMERCIAL

## 2021-10-25 VITALS
HEIGHT: 64 IN | WEIGHT: 215.8 LBS | SYSTOLIC BLOOD PRESSURE: 146 MMHG | HEART RATE: 57 BPM | TEMPERATURE: 97.4 F | OXYGEN SATURATION: 99 % | BODY MASS INDEX: 36.84 KG/M2 | RESPIRATION RATE: 18 BRPM | DIASTOLIC BLOOD PRESSURE: 88 MMHG

## 2021-10-25 DIAGNOSIS — Z45.2 ENCOUNTER FOR CENTRAL LINE CARE: ICD-10-CM

## 2021-10-25 DIAGNOSIS — T45.1X5A CHEMOTHERAPY-INDUCED NEUTROPENIA (HCC): ICD-10-CM

## 2021-10-25 DIAGNOSIS — C82.18 GRADE 2 FOLLICULAR LYMPHOMA OF LYMPH NODES OF MULTIPLE REGIONS (HCC): Primary | ICD-10-CM

## 2021-10-25 DIAGNOSIS — D70.1 CHEMOTHERAPY-INDUCED NEUTROPENIA (HCC): ICD-10-CM

## 2021-10-25 DIAGNOSIS — Z91.89 AT HIGH RISK OF TUMOR LYSIS SYNDROME: ICD-10-CM

## 2021-10-25 DIAGNOSIS — C82.18 GRADE 2 FOLLICULAR LYMPHOMA OF LYMPH NODES OF MULTIPLE REGIONS (HCC): ICD-10-CM

## 2021-10-25 DIAGNOSIS — D70.1 CHEMOTHERAPY-INDUCED NEUTROPENIA (HCC): Primary | ICD-10-CM

## 2021-10-25 DIAGNOSIS — T45.1X5A CHEMOTHERAPY-INDUCED NEUTROPENIA (HCC): Primary | ICD-10-CM

## 2021-10-25 LAB
ALBUMIN SERPL BCP-MCNC: 4.1 G/DL (ref 3–5.2)
ALP SERPL-CCNC: 95 U/L (ref 43–122)
ALT SERPL W P-5'-P-CCNC: 16 U/L
ANION GAP SERPL CALCULATED.3IONS-SCNC: 5 MMOL/L (ref 5–14)
ANISOCYTOSIS BLD QL SMEAR: PRESENT
AST SERPL W P-5'-P-CCNC: 22 U/L (ref 14–36)
BASOPHILS # BLD AUTO: 0 THOUSANDS/ΜL (ref 0–0.1)
BASOPHILS NFR BLD AUTO: 1 % (ref 0–1)
BILIRUB SERPL-MCNC: 0.51 MG/DL
BUN SERPL-MCNC: 17 MG/DL (ref 5–25)
CALCIUM SERPL-MCNC: 9.1 MG/DL (ref 8.4–10.2)
CHLORIDE SERPL-SCNC: 106 MMOL/L (ref 97–108)
CO2 SERPL-SCNC: 26 MMOL/L (ref 22–30)
CREAT SERPL-MCNC: 0.65 MG/DL (ref 0.6–1.2)
EOSINOPHIL # BLD AUTO: 0.1 THOUSAND/ΜL (ref 0–0.4)
EOSINOPHIL NFR BLD AUTO: 3 % (ref 0–6)
ERYTHROCYTE [DISTWIDTH] IN BLOOD BY AUTOMATED COUNT: 16.6 %
GFR SERPL CREATININE-BSD FRML MDRD: 97 ML/MIN/1.73SQ M
GLUCOSE P FAST SERPL-MCNC: 92 MG/DL (ref 70–99)
GLUCOSE SERPL-MCNC: 92 MG/DL (ref 70–99)
HCT VFR BLD AUTO: 36.1 % (ref 36–46)
HGB BLD-MCNC: 11.3 G/DL (ref 12–16)
LDH SERPL-CCNC: 533 U/L (ref 313–618)
LYMPHOCYTES # BLD AUTO: 1.8 THOUSANDS/ΜL (ref 0.5–4)
LYMPHOCYTES NFR BLD AUTO: 33 % (ref 25–45)
MCH RBC QN AUTO: 21 PG (ref 26–34)
MCHC RBC AUTO-ENTMCNC: 31.2 G/DL (ref 31–36)
MCV RBC AUTO: 67 FL (ref 80–100)
MICROCYTES BLD QL AUTO: PRESENT
MONOCYTES # BLD AUTO: 0.4 THOUSAND/ΜL (ref 0.2–0.9)
MONOCYTES NFR BLD AUTO: 7 % (ref 1–10)
NEUTROPHILS # BLD AUTO: 3.1 THOUSANDS/ΜL (ref 1.8–7.8)
NEUTS SEG NFR BLD AUTO: 57 % (ref 45–65)
OVALOCYTES BLD QL SMEAR: PRESENT
PLATELET # BLD AUTO: 133 THOUSANDS/UL (ref 150–450)
PLATELET BLD QL SMEAR: ABNORMAL
PMV BLD AUTO: 8.8 FL (ref 8.9–12.7)
POTASSIUM SERPL-SCNC: 4.5 MMOL/L (ref 3.6–5)
PROT SERPL-MCNC: 6.7 G/DL (ref 5.9–8.4)
RBC # BLD AUTO: 5.37 MILLION/UL (ref 4–5.2)
RBC MORPH BLD: ABNORMAL
SODIUM SERPL-SCNC: 137 MMOL/L (ref 137–147)
WBC # BLD AUTO: 5.5 THOUSAND/UL (ref 4.5–11)

## 2021-10-25 PROCEDURE — 83615 LACTATE (LD) (LDH) ENZYME: CPT | Performed by: INTERNAL MEDICINE

## 2021-10-25 PROCEDURE — 99214 OFFICE O/P EST MOD 30 MIN: CPT | Performed by: INTERNAL MEDICINE

## 2021-10-25 PROCEDURE — 85025 COMPLETE CBC W/AUTO DIFF WBC: CPT | Performed by: INTERNAL MEDICINE

## 2021-10-25 PROCEDURE — 80053 COMPREHEN METABOLIC PANEL: CPT | Performed by: INTERNAL MEDICINE

## 2021-10-25 RX ORDER — SODIUM CHLORIDE 9 MG/ML
20 INJECTION, SOLUTION INTRAVENOUS ONCE
Status: CANCELLED | OUTPATIENT
Start: 2021-11-11

## 2021-10-25 RX ORDER — SODIUM CHLORIDE 9 MG/ML
20 INJECTION, SOLUTION INTRAVENOUS ONCE
Status: CANCELLED | OUTPATIENT
Start: 2021-10-28

## 2021-10-25 RX ORDER — SODIUM CHLORIDE 9 MG/ML
20 INJECTION, SOLUTION INTRAVENOUS ONCE
Status: CANCELLED | OUTPATIENT
Start: 2021-11-04

## 2021-10-26 DIAGNOSIS — D51.8 OTHER VITAMIN B12 DEFICIENCY ANEMIAS: Primary | ICD-10-CM

## 2021-10-26 RX ORDER — CYANOCOBALAMIN 1000 UG/ML
1000 INJECTION INTRAMUSCULAR; SUBCUTANEOUS ONCE
Status: CANCELLED | OUTPATIENT
Start: 2021-10-28

## 2021-10-27 ENCOUNTER — OFFICE VISIT (OUTPATIENT)
Dept: PALLIATIVE MEDICINE | Facility: CLINIC | Age: 61
End: 2021-10-27
Payer: COMMERCIAL

## 2021-10-27 ENCOUNTER — SOCIAL WORK (OUTPATIENT)
Dept: PALLIATIVE MEDICINE | Facility: CLINIC | Age: 61
End: 2021-10-27
Payer: COMMERCIAL

## 2021-10-27 VITALS
RESPIRATION RATE: 18 BRPM | TEMPERATURE: 97.1 F | SYSTOLIC BLOOD PRESSURE: 150 MMHG | HEART RATE: 68 BPM | DIASTOLIC BLOOD PRESSURE: 76 MMHG | OXYGEN SATURATION: 99 % | WEIGHT: 226.6 LBS | BODY MASS INDEX: 38.9 KG/M2

## 2021-10-27 DIAGNOSIS — C82.18 GRADE 2 FOLLICULAR LYMPHOMA OF LYMPH NODES OF MULTIPLE REGIONS (HCC): ICD-10-CM

## 2021-10-27 DIAGNOSIS — R10.30 LOWER ABDOMINAL PAIN: ICD-10-CM

## 2021-10-27 DIAGNOSIS — Z71.89 COUNSELING AND COORDINATION OF CARE: Primary | ICD-10-CM

## 2021-10-27 DIAGNOSIS — K59.03 DRUG INDUCED CONSTIPATION: ICD-10-CM

## 2021-10-27 DIAGNOSIS — G89.3 CANCER RELATED PAIN: Primary | ICD-10-CM

## 2021-10-27 PROCEDURE — NC001 PR NO CHARGE

## 2021-10-27 PROCEDURE — 99214 OFFICE O/P EST MOD 30 MIN: CPT | Performed by: INTERNAL MEDICINE

## 2021-10-27 RX ORDER — OXYCODONE HYDROCHLORIDE 20 MG/1
20 TABLET ORAL EVERY 6 HOURS PRN
Qty: 120 TABLET | Refills: 0 | Status: SHIPPED | OUTPATIENT
Start: 2021-10-27 | End: 2021-11-23 | Stop reason: SDUPTHER

## 2021-10-28 ENCOUNTER — HOSPITAL ENCOUNTER (OUTPATIENT)
Dept: INFUSION CENTER | Facility: HOSPITAL | Age: 61
Discharge: HOME | End: 2021-10-28
Attending: INTERNAL MEDICINE
Payer: COMMERCIAL

## 2021-10-28 VITALS
TEMPERATURE: 97.9 F | DIASTOLIC BLOOD PRESSURE: 80 MMHG | RESPIRATION RATE: 20 BRPM | SYSTOLIC BLOOD PRESSURE: 140 MMHG | HEART RATE: 70 BPM

## 2021-10-28 DIAGNOSIS — T45.1X5A CHEMOTHERAPY-INDUCED NEUTROPENIA (HCC): Primary | ICD-10-CM

## 2021-10-28 DIAGNOSIS — Z91.89 AT HIGH RISK OF TUMOR LYSIS SYNDROME: ICD-10-CM

## 2021-10-28 DIAGNOSIS — D70.1 CHEMOTHERAPY-INDUCED NEUTROPENIA (HCC): Primary | ICD-10-CM

## 2021-10-28 DIAGNOSIS — C82.18 GRADE 2 FOLLICULAR LYMPHOMA OF LYMPH NODES OF MULTIPLE REGIONS (HCC): ICD-10-CM

## 2021-10-28 DIAGNOSIS — D51.8 OTHER VITAMIN B12 DEFICIENCY ANEMIAS: ICD-10-CM

## 2021-10-28 LAB — GLUCOSE SERPL-MCNC: 95 MG/DL (ref 65–140)

## 2021-10-28 PROCEDURE — 82948 REAGENT STRIP/BLOOD GLUCOSE: CPT

## 2021-10-28 PROCEDURE — 96413 CHEMO IV INFUSION 1 HR: CPT

## 2021-10-28 PROCEDURE — 96367 TX/PROPH/DG ADDL SEQ IV INF: CPT

## 2021-10-28 PROCEDURE — 96372 THER/PROPH/DIAG INJ SC/IM: CPT

## 2021-10-28 RX ORDER — CYANOCOBALAMIN 1000 UG/ML
1000 INJECTION INTRAMUSCULAR; SUBCUTANEOUS ONCE
Status: CANCELLED | OUTPATIENT
Start: 2021-11-25

## 2021-10-28 RX ORDER — SODIUM CHLORIDE 9 MG/ML
20 INJECTION, SOLUTION INTRAVENOUS ONCE
Status: COMPLETED | OUTPATIENT
Start: 2021-10-28 | End: 2021-10-28

## 2021-10-28 RX ORDER — ACETAMINOPHEN 325 MG/1
650 TABLET ORAL ONCE
Status: CANCELLED
Start: 2021-10-28 | End: 2021-10-28

## 2021-10-28 RX ORDER — CYANOCOBALAMIN 1000 UG/ML
1000 INJECTION INTRAMUSCULAR; SUBCUTANEOUS ONCE
Status: COMPLETED | OUTPATIENT
Start: 2021-10-28 | End: 2021-10-28

## 2021-10-28 RX ORDER — ACETAMINOPHEN 325 MG/1
650 TABLET ORAL ONCE
Status: COMPLETED | OUTPATIENT
Start: 2021-10-28 | End: 2021-10-28

## 2021-10-28 RX ADMIN — CYANOCOBALAMIN 1000 MCG: 1000 INJECTION INTRAMUSCULAR; SUBCUTANEOUS at 12:56

## 2021-10-28 RX ADMIN — ONDANSETRON 8 MG: 2 INJECTION INTRAMUSCULAR; INTRAVENOUS at 11:46

## 2021-10-28 RX ADMIN — SODIUM CHLORIDE 20 ML/HR: 0.9 INJECTION, SOLUTION INTRAVENOUS at 11:40

## 2021-10-28 RX ADMIN — ACETAMINOPHEN 650 MG: 325 TABLET ORAL at 12:56

## 2021-10-28 RX ADMIN — COPANLISIB 60 MG: 15 INJECTION, POWDER, LYOPHILIZED, FOR SOLUTION INTRAVENOUS at 12:20

## 2021-10-29 ENCOUNTER — HOSPITAL ENCOUNTER (OUTPATIENT)
Dept: INFUSION CENTER | Facility: HOSPITAL | Age: 61
Discharge: HOME | End: 2021-10-29
Attending: INTERNAL MEDICINE
Payer: COMMERCIAL

## 2021-10-29 ENCOUNTER — OFFICE VISIT (OUTPATIENT)
Dept: DENTISTRY | Facility: CLINIC | Age: 61
End: 2021-10-29

## 2021-10-29 VITALS
HEART RATE: 55 BPM | TEMPERATURE: 96.7 F | SYSTOLIC BLOOD PRESSURE: 150 MMHG | DIASTOLIC BLOOD PRESSURE: 63 MMHG | RESPIRATION RATE: 18 BRPM

## 2021-10-29 DIAGNOSIS — E86.0 DEHYDRATION: ICD-10-CM

## 2021-10-29 DIAGNOSIS — C82.18 GRADE 2 FOLLICULAR LYMPHOMA OF LYMPH NODES OF MULTIPLE REGIONS (HCC): ICD-10-CM

## 2021-10-29 DIAGNOSIS — D50.8 IRON DEFICIENCY ANEMIA SECONDARY TO INADEQUATE DIETARY IRON INTAKE: Primary | ICD-10-CM

## 2021-10-29 DIAGNOSIS — Z01.20 ENCOUNTER FOR DENTAL EXAMINATION: Primary | ICD-10-CM

## 2021-10-29 DIAGNOSIS — Z91.89 AT HIGH RISK OF TUMOR LYSIS SYNDROME: ICD-10-CM

## 2021-10-29 LAB
ALBUMIN SERPL BCP-MCNC: 4.1 G/DL (ref 3–5.2)
ALP SERPL-CCNC: 79 U/L (ref 43–122)
ALT SERPL W P-5'-P-CCNC: 17 U/L
ANION GAP SERPL CALCULATED.3IONS-SCNC: 5 MMOL/L (ref 5–14)
AST SERPL W P-5'-P-CCNC: 20 U/L (ref 14–36)
BILIRUB SERPL-MCNC: 0.28 MG/DL
BUN SERPL-MCNC: 19 MG/DL (ref 5–25)
CALCIUM SERPL-MCNC: 9 MG/DL (ref 8.4–10.2)
CHLORIDE SERPL-SCNC: 107 MMOL/L (ref 97–108)
CO2 SERPL-SCNC: 26 MMOL/L (ref 22–30)
CREAT SERPL-MCNC: 0.72 MG/DL (ref 0.6–1.2)
GFR SERPL CREATININE-BSD FRML MDRD: 92 ML/MIN/1.73SQ M
GLUCOSE SERPL-MCNC: 225 MG/DL (ref 70–99)
LDH SERPL-CCNC: 542 U/L (ref 313–618)
MAGNESIUM SERPL-MCNC: 2 MG/DL (ref 1.6–2.3)
PHOSPHATE SERPL-MCNC: 2.9 MG/DL (ref 2.5–4.8)
POTASSIUM SERPL-SCNC: 4.3 MMOL/L (ref 3.6–5)
PROT SERPL-MCNC: 6.8 G/DL (ref 5.9–8.4)
SODIUM SERPL-SCNC: 138 MMOL/L (ref 137–147)
URATE SERPL-MCNC: 3.9 MG/DL (ref 2.7–7.5)

## 2021-10-29 PROCEDURE — 83615 LACTATE (LD) (LDH) ENZYME: CPT | Performed by: INTERNAL MEDICINE

## 2021-10-29 PROCEDURE — 83735 ASSAY OF MAGNESIUM: CPT | Performed by: INTERNAL MEDICINE

## 2021-10-29 PROCEDURE — 80053 COMPREHEN METABOLIC PANEL: CPT | Performed by: INTERNAL MEDICINE

## 2021-10-29 PROCEDURE — 84550 ASSAY OF BLOOD/URIC ACID: CPT | Performed by: INTERNAL MEDICINE

## 2021-10-29 PROCEDURE — D0140 LIMITED ORAL EVALUATION - PROBLEM FOCUSED: HCPCS | Performed by: DENTIST

## 2021-10-29 PROCEDURE — 96360 HYDRATION IV INFUSION INIT: CPT

## 2021-10-29 PROCEDURE — D0330 PANORAMIC RADIOGRAPHIC IMAGE: HCPCS | Performed by: DENTIST

## 2021-10-29 PROCEDURE — 84100 ASSAY OF PHOSPHORUS: CPT | Performed by: INTERNAL MEDICINE

## 2021-10-29 RX ADMIN — SODIUM CHLORIDE 1000 ML: 0.9 INJECTION, SOLUTION INTRAVENOUS at 13:56

## 2021-11-02 NOTE — TELEPHONE ENCOUNTER
At [facility], we strive to deliver an exceptional experience to you, every time we see you. If you receive a survey, please complete it as we do value your feedback.  If you have MyChart, you can expect to receive results automatically within 24 hours of their completion.  Your provider will send a note interpreting your results as well.   If you do not have MyChart, you should receive your results in about a week by mail.    Your care team:                            Family Medicine Internal Medicine   MD Mu Buchanan MD Shantel Branch-Fleming, MD Srinivasa Vaka, MD Katya Belousova, PAVaniaC  Nory Ma, APRN CNP    Jordan Melton, MD Pediatrics   Haja Barraza, PACHRISTOPHER Richardson, CNP MD Daniella Roger APRN CNP   MD Makenzie Martin MD Deborah Mielke, MD Yara Ruano, APRN [facility]      Clinic hours: Monday - Thursday 7 am-6 pm; Fridays 7 am-5 pm.   Urgent care: Monday - Friday 10 am- 8 pm; Saturday and Sunday 9 am-5 pm.    Clinic: [phone]       [facility]: Monday - Thursday 8 am - 7 pm; Friday 8 am - 6 pm  [facility]: [phone]     Use [email or web address] for 24/7 diagnosis and treatment of dozens of conditions.    [facility] will call you to coordinate your care as prescribed by your provider. If you don't hear from a representative within 2 business days, please call [phone].      DICLOFENAC GEL OVER THE COUNTER   Chapincito Rojas from [facility] lab is calling in with a critical result    WBC- 1 8

## 2021-11-04 ENCOUNTER — HOSPITAL ENCOUNTER (OUTPATIENT)
Dept: INFUSION CENTER | Facility: HOSPITAL | Age: 61
Discharge: HOME | End: 2021-11-04
Attending: INTERNAL MEDICINE
Payer: COMMERCIAL

## 2021-11-04 ENCOUNTER — TELEPHONE (OUTPATIENT)
Dept: HEMATOLOGY ONCOLOGY | Facility: CLINIC | Age: 61
End: 2021-11-04

## 2021-11-04 VITALS
BODY MASS INDEX: 36.8 KG/M2 | WEIGHT: 220.9 LBS | RESPIRATION RATE: 18 BRPM | HEART RATE: 68 BPM | TEMPERATURE: 97.5 F | SYSTOLIC BLOOD PRESSURE: 132 MMHG | HEIGHT: 65 IN | DIASTOLIC BLOOD PRESSURE: 70 MMHG

## 2021-11-04 DIAGNOSIS — Z91.89 AT HIGH RISK OF TUMOR LYSIS SYNDROME: ICD-10-CM

## 2021-11-04 DIAGNOSIS — T45.1X5A CHEMOTHERAPY-INDUCED NEUTROPENIA (HCC): Primary | ICD-10-CM

## 2021-11-04 DIAGNOSIS — D70.1 CHEMOTHERAPY-INDUCED NEUTROPENIA (HCC): Primary | ICD-10-CM

## 2021-11-04 DIAGNOSIS — D51.8 OTHER VITAMIN B12 DEFICIENCY ANEMIAS: ICD-10-CM

## 2021-11-04 DIAGNOSIS — E86.0 DEHYDRATION: ICD-10-CM

## 2021-11-04 DIAGNOSIS — D50.8 IRON DEFICIENCY ANEMIA SECONDARY TO INADEQUATE DIETARY IRON INTAKE: ICD-10-CM

## 2021-11-04 DIAGNOSIS — C82.18 GRADE 2 FOLLICULAR LYMPHOMA OF LYMPH NODES OF MULTIPLE REGIONS (HCC): ICD-10-CM

## 2021-11-04 LAB
ALBUMIN SERPL BCP-MCNC: 4 G/DL (ref 3–5.2)
ALP SERPL-CCNC: 72 U/L (ref 43–122)
ALT SERPL W P-5'-P-CCNC: 17 U/L
ANION GAP SERPL CALCULATED.3IONS-SCNC: 3 MMOL/L (ref 5–14)
ANISOCYTOSIS BLD QL SMEAR: PRESENT
AST SERPL W P-5'-P-CCNC: 25 U/L (ref 14–36)
BASOPHILS # BLD AUTO: 0 THOUSANDS/ΜL (ref 0–0.1)
BASOPHILS NFR BLD AUTO: 1 % (ref 0–1)
BILIRUB SERPL-MCNC: 0.47 MG/DL
BUN SERPL-MCNC: 17 MG/DL (ref 5–25)
CALCIUM SERPL-MCNC: 9.4 MG/DL (ref 8.4–10.2)
CHLORIDE SERPL-SCNC: 109 MMOL/L (ref 97–108)
CO2 SERPL-SCNC: 27 MMOL/L (ref 22–30)
CREAT SERPL-MCNC: 0.67 MG/DL (ref 0.6–1.2)
EOSINOPHIL # BLD AUTO: 0.1 THOUSAND/ΜL (ref 0–0.4)
EOSINOPHIL NFR BLD AUTO: 2 % (ref 0–6)
ERYTHROCYTE [DISTWIDTH] IN BLOOD BY AUTOMATED COUNT: 17.7 %
GFR SERPL CREATININE-BSD FRML MDRD: 97 ML/MIN/1.73SQ M
GLUCOSE P FAST SERPL-MCNC: 93 MG/DL (ref 70–99)
GLUCOSE SERPL-MCNC: 85 MG/DL (ref 65–140)
GLUCOSE SERPL-MCNC: 93 MG/DL (ref 70–99)
HCT VFR BLD AUTO: 37 % (ref 36–46)
HGB BLD-MCNC: 11.5 G/DL (ref 12–16)
HYPERCHROMIA BLD QL SMEAR: PRESENT
LDH SERPL-CCNC: 549 U/L (ref 313–618)
LYMPHOCYTES # BLD AUTO: 2.2 THOUSANDS/ΜL (ref 0.5–4)
LYMPHOCYTES NFR BLD AUTO: 43 % (ref 25–45)
MAGNESIUM SERPL-MCNC: 2.1 MG/DL (ref 1.6–2.3)
MCH RBC QN AUTO: 21.1 PG (ref 26–34)
MCHC RBC AUTO-ENTMCNC: 31.1 G/DL (ref 31–36)
MCV RBC AUTO: 68 FL (ref 80–100)
MICROCYTES BLD QL AUTO: PRESENT
MONOCYTES # BLD AUTO: 0.4 THOUSAND/ΜL (ref 0.2–0.9)
MONOCYTES NFR BLD AUTO: 8 % (ref 1–10)
NEUTROPHILS # BLD AUTO: 2.5 THOUSANDS/ΜL (ref 1.8–7.8)
NEUTS SEG NFR BLD AUTO: 48 % (ref 45–65)
OVALOCYTES BLD QL SMEAR: PRESENT
PHOSPHATE SERPL-MCNC: 3.3 MG/DL (ref 2.5–4.8)
PLATELET # BLD AUTO: 184 THOUSANDS/UL (ref 150–450)
PLATELET BLD QL SMEAR: ADEQUATE
PMV BLD AUTO: 8.7 FL (ref 8.9–12.7)
POTASSIUM SERPL-SCNC: 4.3 MMOL/L (ref 3.6–5)
PROT SERPL-MCNC: 6.7 G/DL (ref 5.9–8.4)
RBC # BLD AUTO: 5.45 MILLION/UL (ref 4–5.2)
RBC MORPH BLD: NORMAL
SODIUM SERPL-SCNC: 139 MMOL/L (ref 137–147)
URATE SERPL-MCNC: 3.3 MG/DL (ref 2.7–7.5)
WBC # BLD AUTO: 5.2 THOUSAND/UL (ref 4.5–11)

## 2021-11-04 PROCEDURE — 83735 ASSAY OF MAGNESIUM: CPT | Performed by: INTERNAL MEDICINE

## 2021-11-04 PROCEDURE — 83615 LACTATE (LD) (LDH) ENZYME: CPT | Performed by: INTERNAL MEDICINE

## 2021-11-04 PROCEDURE — 96413 CHEMO IV INFUSION 1 HR: CPT

## 2021-11-04 PROCEDURE — 82948 REAGENT STRIP/BLOOD GLUCOSE: CPT

## 2021-11-04 PROCEDURE — 96361 HYDRATE IV INFUSION ADD-ON: CPT

## 2021-11-04 PROCEDURE — 84100 ASSAY OF PHOSPHORUS: CPT | Performed by: INTERNAL MEDICINE

## 2021-11-04 PROCEDURE — 84550 ASSAY OF BLOOD/URIC ACID: CPT | Performed by: INTERNAL MEDICINE

## 2021-11-04 PROCEDURE — 96367 TX/PROPH/DG ADDL SEQ IV INF: CPT

## 2021-11-04 PROCEDURE — 85025 COMPLETE CBC W/AUTO DIFF WBC: CPT | Performed by: INTERNAL MEDICINE

## 2021-11-04 PROCEDURE — 80053 COMPREHEN METABOLIC PANEL: CPT | Performed by: INTERNAL MEDICINE

## 2021-11-04 RX ORDER — ACETAMINOPHEN 325 MG/1
650 TABLET ORAL ONCE
Status: COMPLETED | OUTPATIENT
Start: 2021-11-04 | End: 2021-11-04

## 2021-11-04 RX ORDER — SODIUM CHLORIDE 9 MG/ML
20 INJECTION, SOLUTION INTRAVENOUS ONCE
Status: COMPLETED | OUTPATIENT
Start: 2021-11-04 | End: 2021-11-04

## 2021-11-04 RX ORDER — ACETAMINOPHEN 325 MG/1
650 TABLET ORAL ONCE
Status: CANCELLED
Start: 2021-11-04 | End: 2021-11-04

## 2021-11-04 RX ADMIN — SODIUM CHLORIDE 20 ML/HR: 0.9 INJECTION, SOLUTION INTRAVENOUS at 12:27

## 2021-11-04 RX ADMIN — ACETAMINOPHEN 650 MG: 325 TABLET ORAL at 11:56

## 2021-11-04 RX ADMIN — SODIUM CHLORIDE 1000 ML: 0.9 INJECTION, SOLUTION INTRAVENOUS at 15:03

## 2021-11-04 RX ADMIN — ONDANSETRON 8 MG: 2 SOLUTION INTRAMUSCULAR; INTRAVENOUS at 12:31

## 2021-11-04 RX ADMIN — COPANLISIB 60 MG: 15 INJECTION, POWDER, LYOPHILIZED, FOR SOLUTION INTRAVENOUS at 13:46

## 2021-11-05 ENCOUNTER — HOSPITAL ENCOUNTER (OUTPATIENT)
Dept: INFUSION CENTER | Facility: HOSPITAL | Age: 61
End: 2021-11-05
Attending: INTERNAL MEDICINE

## 2021-11-08 ENCOUNTER — HOSPITAL ENCOUNTER (OUTPATIENT)
Dept: INFUSION CENTER | Facility: HOSPITAL | Age: 61
End: 2021-11-08
Attending: INTERNAL MEDICINE

## 2021-11-09 ENCOUNTER — OFFICE VISIT (OUTPATIENT)
Dept: FAMILY MEDICINE CLINIC | Facility: CLINIC | Age: 61
End: 2021-11-09

## 2021-11-09 VITALS
TEMPERATURE: 97.3 F | DIASTOLIC BLOOD PRESSURE: 80 MMHG | HEART RATE: 60 BPM | RESPIRATION RATE: 16 BRPM | OXYGEN SATURATION: 98 % | WEIGHT: 220 LBS | HEIGHT: 65 IN | BODY MASS INDEX: 36.65 KG/M2 | SYSTOLIC BLOOD PRESSURE: 140 MMHG

## 2021-11-09 DIAGNOSIS — K04.7 DENTAL INFECTION: ICD-10-CM

## 2021-11-09 DIAGNOSIS — R51.9 HEADACHE AROUND THE EYES: Primary | ICD-10-CM

## 2021-11-09 DIAGNOSIS — C82.18 GRADE 2 FOLLICULAR LYMPHOMA OF LYMPH NODES OF MULTIPLE REGIONS (HCC): ICD-10-CM

## 2021-11-09 PROCEDURE — 3077F SYST BP >= 140 MM HG: CPT | Performed by: FAMILY MEDICINE

## 2021-11-09 PROCEDURE — 3079F DIAST BP 80-89 MM HG: CPT | Performed by: FAMILY MEDICINE

## 2021-11-09 PROCEDURE — 99214 OFFICE O/P EST MOD 30 MIN: CPT | Performed by: FAMILY MEDICINE

## 2021-11-09 RX ORDER — CLARITHROMYCIN 500 MG/1
500 TABLET, COATED ORAL EVERY 12 HOURS SCHEDULED
Qty: 20 TABLET | Refills: 0 | Status: SHIPPED | OUTPATIENT
Start: 2021-11-09 | End: 2021-11-19

## 2021-11-11 ENCOUNTER — TELEPHONE (OUTPATIENT)
Dept: FAMILY MEDICINE CLINIC | Facility: CLINIC | Age: 61
End: 2021-11-11

## 2021-11-11 ENCOUNTER — HOSPITAL ENCOUNTER (OUTPATIENT)
Dept: INFUSION CENTER | Facility: HOSPITAL | Age: 61
Discharge: HOME | End: 2021-11-11
Attending: INTERNAL MEDICINE
Payer: COMMERCIAL

## 2021-11-11 VITALS
DIASTOLIC BLOOD PRESSURE: 82 MMHG | SYSTOLIC BLOOD PRESSURE: 140 MMHG | HEART RATE: 62 BPM | TEMPERATURE: 97.6 F | RESPIRATION RATE: 16 BRPM

## 2021-11-11 DIAGNOSIS — E86.0 DEHYDRATION: Primary | ICD-10-CM

## 2021-11-11 DIAGNOSIS — C82.18 GRADE 2 FOLLICULAR LYMPHOMA OF LYMPH NODES OF MULTIPLE REGIONS (HCC): ICD-10-CM

## 2021-11-11 DIAGNOSIS — T45.1X5A CHEMOTHERAPY-INDUCED NEUTROPENIA (HCC): Primary | ICD-10-CM

## 2021-11-11 DIAGNOSIS — Z91.89 AT HIGH RISK OF TUMOR LYSIS SYNDROME: ICD-10-CM

## 2021-11-11 DIAGNOSIS — D70.1 CHEMOTHERAPY-INDUCED NEUTROPENIA (HCC): Primary | ICD-10-CM

## 2021-11-11 DIAGNOSIS — D50.8 IRON DEFICIENCY ANEMIA SECONDARY TO INADEQUATE DIETARY IRON INTAKE: ICD-10-CM

## 2021-11-11 LAB
ALBUMIN SERPL BCP-MCNC: 4.3 G/DL (ref 3–5.2)
ALP SERPL-CCNC: 87 U/L (ref 43–122)
ALT SERPL W P-5'-P-CCNC: 16 U/L
ANION GAP SERPL CALCULATED.3IONS-SCNC: 3 MMOL/L (ref 5–14)
ANISOCYTOSIS BLD QL SMEAR: PRESENT
AST SERPL W P-5'-P-CCNC: 23 U/L (ref 14–36)
BASOPHILS # BLD AUTO: 0 THOUSANDS/ΜL (ref 0–0.1)
BASOPHILS NFR BLD AUTO: 0 % (ref 0–1)
BILIRUB SERPL-MCNC: 0.73 MG/DL
BUN SERPL-MCNC: 15 MG/DL (ref 5–25)
CALCIUM SERPL-MCNC: 9.3 MG/DL (ref 8.4–10.2)
CHLORIDE SERPL-SCNC: 108 MMOL/L (ref 97–108)
CO2 SERPL-SCNC: 26 MMOL/L (ref 22–30)
CREAT SERPL-MCNC: 0.71 MG/DL (ref 0.6–1.2)
EOSINOPHIL # BLD AUTO: 0.1 THOUSAND/ΜL (ref 0–0.4)
EOSINOPHIL NFR BLD AUTO: 2 % (ref 0–6)
ERYTHROCYTE [DISTWIDTH] IN BLOOD BY AUTOMATED COUNT: 17.8 %
GFR SERPL CREATININE-BSD FRML MDRD: 94 ML/MIN/1.73SQ M
GLUCOSE SERPL-MCNC: 77 MG/DL (ref 65–140)
GLUCOSE SERPL-MCNC: 93 MG/DL (ref 70–99)
HCT VFR BLD AUTO: 38.4 % (ref 36–46)
HGB BLD-MCNC: 12 G/DL (ref 12–16)
HYPERCHROMIA BLD QL SMEAR: PRESENT
LDH SERPL-CCNC: 609 U/L (ref 313–618)
LYMPHOCYTES # BLD AUTO: 2.4 THOUSANDS/ΜL (ref 0.5–4)
LYMPHOCYTES NFR BLD AUTO: 48 % (ref 25–45)
MCH RBC QN AUTO: 21.1 PG (ref 26–34)
MCHC RBC AUTO-ENTMCNC: 31.3 G/DL (ref 31–36)
MCV RBC AUTO: 67 FL (ref 80–100)
MICROCYTES BLD QL AUTO: PRESENT
MONOCYTES # BLD AUTO: 0.3 THOUSAND/ΜL (ref 0.2–0.9)
MONOCYTES NFR BLD AUTO: 7 % (ref 1–10)
NEUTROPHILS # BLD AUTO: 2.1 THOUSANDS/ΜL (ref 1.8–7.8)
NEUTS SEG NFR BLD AUTO: 43 % (ref 45–65)
OVALOCYTES BLD QL SMEAR: PRESENT
PLATELET # BLD AUTO: 155 THOUSANDS/UL (ref 150–450)
PLATELET BLD QL SMEAR: ADEQUATE
PMV BLD AUTO: 8.6 FL (ref 8.9–12.7)
POTASSIUM SERPL-SCNC: 4.2 MMOL/L (ref 3.6–5)
PROT SERPL-MCNC: 7.3 G/DL (ref 5.9–8.4)
RBC # BLD AUTO: 5.7 MILLION/UL (ref 4–5.2)
RBC MORPH BLD: NORMAL
SODIUM SERPL-SCNC: 137 MMOL/L (ref 137–147)
WBC # BLD AUTO: 4.9 THOUSAND/UL (ref 4.5–11)

## 2021-11-11 PROCEDURE — 96367 TX/PROPH/DG ADDL SEQ IV INF: CPT

## 2021-11-11 PROCEDURE — 83615 LACTATE (LD) (LDH) ENZYME: CPT | Performed by: INTERNAL MEDICINE

## 2021-11-11 PROCEDURE — 82948 REAGENT STRIP/BLOOD GLUCOSE: CPT

## 2021-11-11 PROCEDURE — 85025 COMPLETE CBC W/AUTO DIFF WBC: CPT | Performed by: INTERNAL MEDICINE

## 2021-11-11 PROCEDURE — 80053 COMPREHEN METABOLIC PANEL: CPT | Performed by: INTERNAL MEDICINE

## 2021-11-11 PROCEDURE — 96413 CHEMO IV INFUSION 1 HR: CPT

## 2021-11-11 RX ORDER — SODIUM CHLORIDE 9 MG/ML
20 INJECTION, SOLUTION INTRAVENOUS ONCE
Status: COMPLETED | OUTPATIENT
Start: 2021-11-11 | End: 2021-11-11

## 2021-11-11 RX ORDER — CLINDAMYCIN HYDROCHLORIDE 150 MG/1
150 CAPSULE ORAL EVERY 6 HOURS SCHEDULED
Qty: 28 CAPSULE | Refills: 0 | Status: SHIPPED | OUTPATIENT
Start: 2021-11-11 | End: 2021-11-11

## 2021-11-11 RX ORDER — CLINDAMYCIN HYDROCHLORIDE 150 MG/1
150 CAPSULE ORAL EVERY 6 HOURS SCHEDULED
Qty: 28 CAPSULE | Refills: 0 | Status: SHIPPED | OUTPATIENT
Start: 2021-11-11 | End: 2021-11-18

## 2021-11-11 RX ADMIN — SODIUM CHLORIDE 20 ML/HR: 0.9 INJECTION, SOLUTION INTRAVENOUS at 11:58

## 2021-11-11 RX ADMIN — ONDANSETRON 8 MG: 2 SOLUTION INTRAMUSCULAR; INTRAVENOUS at 12:45

## 2021-11-11 RX ADMIN — COPANLISIB 60 MG: 15 INJECTION, POWDER, LYOPHILIZED, FOR SOLUTION INTRAVENOUS at 13:25

## 2021-11-12 ENCOUNTER — HOSPITAL ENCOUNTER (OUTPATIENT)
Dept: INFUSION CENTER | Facility: HOSPITAL | Age: 61
Discharge: HOME | End: 2021-11-12
Attending: INTERNAL MEDICINE
Payer: COMMERCIAL

## 2021-11-12 VITALS
SYSTOLIC BLOOD PRESSURE: 136 MMHG | DIASTOLIC BLOOD PRESSURE: 90 MMHG | HEART RATE: 66 BPM | TEMPERATURE: 97.3 F | RESPIRATION RATE: 20 BRPM

## 2021-11-12 DIAGNOSIS — E86.0 DEHYDRATION: ICD-10-CM

## 2021-11-12 DIAGNOSIS — D50.8 IRON DEFICIENCY ANEMIA SECONDARY TO INADEQUATE DIETARY IRON INTAKE: Primary | ICD-10-CM

## 2021-11-12 DIAGNOSIS — Z91.89 AT HIGH RISK OF TUMOR LYSIS SYNDROME: ICD-10-CM

## 2021-11-12 DIAGNOSIS — C82.18 GRADE 2 FOLLICULAR LYMPHOMA OF LYMPH NODES OF MULTIPLE REGIONS (HCC): ICD-10-CM

## 2021-11-12 LAB
ALBUMIN SERPL BCP-MCNC: 4.2 G/DL (ref 3–5.2)
ALP SERPL-CCNC: 81 U/L (ref 43–122)
ALT SERPL W P-5'-P-CCNC: 17 U/L
ANION GAP SERPL CALCULATED.3IONS-SCNC: 4 MMOL/L (ref 5–14)
AST SERPL W P-5'-P-CCNC: 22 U/L (ref 14–36)
BILIRUB SERPL-MCNC: 0.4 MG/DL
BUN SERPL-MCNC: 18 MG/DL (ref 5–25)
CALCIUM SERPL-MCNC: 9.1 MG/DL (ref 8.4–10.2)
CHLORIDE SERPL-SCNC: 105 MMOL/L (ref 97–108)
CO2 SERPL-SCNC: 28 MMOL/L (ref 22–30)
CREAT SERPL-MCNC: 0.82 MG/DL (ref 0.6–1.2)
GFR SERPL CREATININE-BSD FRML MDRD: 79 ML/MIN/1.73SQ M
GLUCOSE P FAST SERPL-MCNC: 221 MG/DL (ref 70–99)
GLUCOSE SERPL-MCNC: 221 MG/DL (ref 70–99)
LDH SERPL-CCNC: 596 U/L (ref 313–618)
MAGNESIUM SERPL-MCNC: 2 MG/DL (ref 1.6–2.3)
PHOSPHATE SERPL-MCNC: 2.9 MG/DL (ref 2.5–4.8)
POTASSIUM SERPL-SCNC: 4.8 MMOL/L (ref 3.6–5)
PROT SERPL-MCNC: 7 G/DL (ref 5.9–8.4)
SODIUM SERPL-SCNC: 137 MMOL/L (ref 137–147)
URATE SERPL-MCNC: 3.2 MG/DL (ref 2.7–7.5)

## 2021-11-12 PROCEDURE — 84550 ASSAY OF BLOOD/URIC ACID: CPT | Performed by: INTERNAL MEDICINE

## 2021-11-12 PROCEDURE — 83615 LACTATE (LD) (LDH) ENZYME: CPT | Performed by: INTERNAL MEDICINE

## 2021-11-12 PROCEDURE — 83735 ASSAY OF MAGNESIUM: CPT | Performed by: INTERNAL MEDICINE

## 2021-11-12 PROCEDURE — 84100 ASSAY OF PHOSPHORUS: CPT | Performed by: INTERNAL MEDICINE

## 2021-11-12 PROCEDURE — 96360 HYDRATION IV INFUSION INIT: CPT

## 2021-11-12 PROCEDURE — 80053 COMPREHEN METABOLIC PANEL: CPT | Performed by: INTERNAL MEDICINE

## 2021-11-12 RX ADMIN — SODIUM CHLORIDE 1000 ML: 0.9 INJECTION, SOLUTION INTRAVENOUS at 09:41

## 2021-11-18 DIAGNOSIS — T45.1X5A CHEMOTHERAPY-INDUCED NEUTROPENIA (HCC): ICD-10-CM

## 2021-11-18 DIAGNOSIS — Z91.89 AT HIGH RISK OF TUMOR LYSIS SYNDROME: Primary | ICD-10-CM

## 2021-11-18 DIAGNOSIS — D70.1 CHEMOTHERAPY-INDUCED NEUTROPENIA (HCC): ICD-10-CM

## 2021-11-18 DIAGNOSIS — C82.18 GRADE 2 FOLLICULAR LYMPHOMA OF LYMPH NODES OF MULTIPLE REGIONS (HCC): ICD-10-CM

## 2021-11-18 RX ORDER — SODIUM CHLORIDE 9 MG/ML
20 INJECTION, SOLUTION INTRAVENOUS ONCE
Status: CANCELLED | OUTPATIENT
Start: 2021-11-30

## 2021-11-18 RX ORDER — SODIUM CHLORIDE 9 MG/ML
20 INJECTION, SOLUTION INTRAVENOUS ONCE
Status: CANCELLED | OUTPATIENT
Start: 2021-12-07

## 2021-11-22 ENCOUNTER — OFFICE VISIT (OUTPATIENT)
Dept: HEMATOLOGY ONCOLOGY | Facility: CLINIC | Age: 61
End: 2021-11-22
Payer: COMMERCIAL

## 2021-11-22 VITALS
HEART RATE: 63 BPM | SYSTOLIC BLOOD PRESSURE: 148 MMHG | BODY MASS INDEX: 37.62 KG/M2 | RESPIRATION RATE: 18 BRPM | WEIGHT: 225.8 LBS | OXYGEN SATURATION: 96 % | TEMPERATURE: 98.1 F | HEIGHT: 65 IN | DIASTOLIC BLOOD PRESSURE: 88 MMHG

## 2021-11-22 DIAGNOSIS — D70.1 CHEMOTHERAPY-INDUCED NEUTROPENIA (HCC): ICD-10-CM

## 2021-11-22 DIAGNOSIS — Z91.89 AT HIGH RISK OF TUMOR LYSIS SYNDROME: ICD-10-CM

## 2021-11-22 DIAGNOSIS — D51.8 OTHER VITAMIN B12 DEFICIENCY ANEMIAS: ICD-10-CM

## 2021-11-22 DIAGNOSIS — C82.18 GRADE 2 FOLLICULAR LYMPHOMA OF LYMPH NODES OF MULTIPLE REGIONS (HCC): Primary | ICD-10-CM

## 2021-11-22 DIAGNOSIS — T45.1X5A CHEMOTHERAPY-INDUCED NEUTROPENIA (HCC): ICD-10-CM

## 2021-11-22 PROCEDURE — 99214 OFFICE O/P EST MOD 30 MIN: CPT | Performed by: INTERNAL MEDICINE

## 2021-11-22 RX ORDER — SODIUM CHLORIDE 9 MG/ML
20 INJECTION, SOLUTION INTRAVENOUS ONCE
Status: CANCELLED | OUTPATIENT
Start: 2021-12-28

## 2021-11-22 RX ORDER — SODIUM CHLORIDE 9 MG/ML
20 INJECTION, SOLUTION INTRAVENOUS ONCE
Status: CANCELLED | OUTPATIENT
Start: 2022-01-04

## 2021-11-22 RX ORDER — SODIUM CHLORIDE 9 MG/ML
20 INJECTION, SOLUTION INTRAVENOUS ONCE
Status: CANCELLED | OUTPATIENT
Start: 2021-12-21

## 2021-11-23 ENCOUNTER — APPOINTMENT (EMERGENCY)
Dept: CT IMAGING | Facility: HOSPITAL | Age: 61
End: 2021-11-23
Payer: COMMERCIAL

## 2021-11-23 ENCOUNTER — HOSPITAL ENCOUNTER (EMERGENCY)
Facility: HOSPITAL | Age: 61
Discharge: HOME | End: 2021-11-23
Attending: EMERGENCY MEDICINE | Admitting: EMERGENCY MEDICINE
Payer: COMMERCIAL

## 2021-11-23 ENCOUNTER — HOSPITAL ENCOUNTER (OUTPATIENT)
Dept: INFUSION CENTER | Facility: HOSPITAL | Age: 61
Discharge: HOME | End: 2021-11-23
Attending: INTERNAL MEDICINE
Payer: COMMERCIAL

## 2021-11-23 VITALS
OXYGEN SATURATION: 99 % | SYSTOLIC BLOOD PRESSURE: 197 MMHG | TEMPERATURE: 97.9 F | DIASTOLIC BLOOD PRESSURE: 102 MMHG | BODY MASS INDEX: 37.42 KG/M2 | HEART RATE: 56 BPM | WEIGHT: 224.87 LBS | RESPIRATION RATE: 20 BRPM

## 2021-11-23 DIAGNOSIS — R10.30 LOWER ABDOMINAL PAIN: ICD-10-CM

## 2021-11-23 DIAGNOSIS — C82.18 GRADE 2 FOLLICULAR LYMPHOMA OF LYMPH NODES OF MULTIPLE REGIONS (HCC): ICD-10-CM

## 2021-11-23 DIAGNOSIS — T45.1X5A CHEMOTHERAPY-INDUCED NEUTROPENIA (HCC): ICD-10-CM

## 2021-11-23 DIAGNOSIS — G89.3 CANCER RELATED PAIN: ICD-10-CM

## 2021-11-23 DIAGNOSIS — Z91.89 AT HIGH RISK OF TUMOR LYSIS SYNDROME: ICD-10-CM

## 2021-11-23 DIAGNOSIS — D70.1 CHEMOTHERAPY-INDUCED NEUTROPENIA (HCC): ICD-10-CM

## 2021-11-23 DIAGNOSIS — K08.89 PAIN, DENTAL: Primary | ICD-10-CM

## 2021-11-23 LAB
ALBUMIN SERPL BCP-MCNC: 3.9 G/DL (ref 3–5.2)
ALP SERPL-CCNC: 78 U/L (ref 43–122)
ALT SERPL W P-5'-P-CCNC: 24 U/L
ANION GAP SERPL CALCULATED.3IONS-SCNC: 3 MMOL/L (ref 5–14)
ANISOCYTOSIS BLD QL SMEAR: PRESENT
AST SERPL W P-5'-P-CCNC: 34 U/L (ref 14–36)
BILIRUB SERPL-MCNC: 0.65 MG/DL
BUN SERPL-MCNC: 12 MG/DL (ref 5–25)
CALCIUM SERPL-MCNC: 9.2 MG/DL (ref 8.4–10.2)
CHLORIDE SERPL-SCNC: 108 MMOL/L (ref 97–108)
CO2 SERPL-SCNC: 29 MMOL/L (ref 22–30)
CREAT SERPL-MCNC: 0.67 MG/DL (ref 0.6–1.2)
EOSINOPHIL # BLD AUTO: 0.12 THOUSAND/UL (ref 0–0.4)
EOSINOPHIL NFR BLD MANUAL: 3 % (ref 0–6)
ERYTHROCYTE [DISTWIDTH] IN BLOOD BY AUTOMATED COUNT: 18.2 %
FLUAV RNA RESP QL NAA+PROBE: NEGATIVE
FLUBV RNA RESP QL NAA+PROBE: NEGATIVE
GFR SERPL CREATININE-BSD FRML MDRD: 97 ML/MIN/1.73SQ M
GLUCOSE SERPL-MCNC: 98 MG/DL (ref 70–99)
HCT VFR BLD AUTO: 35 % (ref 36–46)
HGB BLD-MCNC: 11.1 G/DL (ref 12–16)
HYPERCHROMIA BLD QL SMEAR: PRESENT
INR PPP: 1.05 (ref 0.84–1.19)
LACTATE SERPL-SCNC: 0.6 MMOL/L (ref 0.7–2)
LIPASE SERPL-CCNC: 34 U/L (ref 23–300)
LYMPHOCYTES # BLD AUTO: 1.64 THOUSAND/UL (ref 0.5–4)
LYMPHOCYTES # BLD AUTO: 40 % (ref 25–45)
MCH RBC QN AUTO: 21.5 PG (ref 26–34)
MCHC RBC AUTO-ENTMCNC: 31.8 G/DL (ref 31–36)
MCV RBC AUTO: 68 FL (ref 80–100)
MICROCYTES BLD QL AUTO: PRESENT
MONOCYTES # BLD AUTO: 0.45 THOUSAND/UL (ref 0.2–0.9)
MONOCYTES NFR BLD AUTO: 11 % (ref 1–10)
NEUTS BAND NFR BLD MANUAL: 1 % (ref 0–8)
NEUTS SEG # BLD: 1.89 THOUSAND/UL (ref 1.8–7.8)
NEUTS SEG NFR BLD AUTO: 45 %
OVALOCYTES BLD QL SMEAR: PRESENT
PLATELET # BLD AUTO: 107 THOUSANDS/UL (ref 150–450)
PLATELET BLD QL SMEAR: ABNORMAL
PMV BLD AUTO: 8.7 FL (ref 8.9–12.7)
POTASSIUM SERPL-SCNC: 4.5 MMOL/L (ref 3.6–5)
PROT SERPL-MCNC: 6.7 G/DL (ref 5.9–8.4)
PROTHROMBIN TIME: 13.3 SECONDS (ref 11.6–14.5)
RBC # BLD AUTO: 5.17 MILLION/UL (ref 4–5.2)
RBC MORPH BLD: PRESENT
RSV RNA RESP QL NAA+PROBE: NEGATIVE
SARS-COV-2 RNA RESP QL NAA+PROBE: NEGATIVE
SODIUM SERPL-SCNC: 140 MMOL/L (ref 137–147)
TOTAL CELLS COUNTED SPEC: 100
WBC # BLD AUTO: 4.1 THOUSAND/UL (ref 4.5–11)

## 2021-11-23 PROCEDURE — 85610 PROTHROMBIN TIME: CPT | Performed by: PHYSICIAN ASSISTANT

## 2021-11-23 PROCEDURE — 80053 COMPREHEN METABOLIC PANEL: CPT | Performed by: PHYSICIAN ASSISTANT

## 2021-11-23 PROCEDURE — 85027 COMPLETE CBC AUTOMATED: CPT | Performed by: PHYSICIAN ASSISTANT

## 2021-11-23 PROCEDURE — 96360 HYDRATION IV INFUSION INIT: CPT

## 2021-11-23 PROCEDURE — 87040 BLOOD CULTURE FOR BACTERIA: CPT | Performed by: PHYSICIAN ASSISTANT

## 2021-11-23 PROCEDURE — 83690 ASSAY OF LIPASE: CPT | Performed by: PHYSICIAN ASSISTANT

## 2021-11-23 PROCEDURE — 85007 BL SMEAR W/DIFF WBC COUNT: CPT | Performed by: PHYSICIAN ASSISTANT

## 2021-11-23 PROCEDURE — 99283 EMERGENCY DEPT VISIT LOW MDM: CPT | Performed by: PHYSICIAN ASSISTANT

## 2021-11-23 PROCEDURE — 36415 COLL VENOUS BLD VENIPUNCTURE: CPT | Performed by: PHYSICIAN ASSISTANT

## 2021-11-23 PROCEDURE — 70490 CT SOFT TISSUE NECK W/O DYE: CPT

## 2021-11-23 PROCEDURE — 0241U HB NFCT DS VIR RESP RNA 4 TRGT: CPT | Performed by: PHYSICIAN ASSISTANT

## 2021-11-23 PROCEDURE — 70450 CT HEAD/BRAIN W/O DYE: CPT

## 2021-11-23 PROCEDURE — G1004 CDSM NDSC: HCPCS

## 2021-11-23 PROCEDURE — 99284 EMERGENCY DEPT VISIT MOD MDM: CPT

## 2021-11-23 PROCEDURE — 83605 ASSAY OF LACTIC ACID: CPT | Performed by: PHYSICIAN ASSISTANT

## 2021-11-23 RX ORDER — OXYCODONE HYDROCHLORIDE 20 MG/1
20 TABLET ORAL EVERY 6 HOURS PRN
Qty: 90 TABLET | Refills: 0 | Status: SHIPPED | OUTPATIENT
Start: 2021-11-23 | End: 2021-12-23 | Stop reason: SDUPTHER

## 2021-11-23 RX ORDER — CLINDAMYCIN HYDROCHLORIDE 300 MG/1
300 CAPSULE ORAL 4 TIMES DAILY
Qty: 40 CAPSULE | Refills: 0 | Status: SHIPPED | OUTPATIENT
Start: 2021-11-23 | End: 2021-12-03

## 2021-11-23 RX ORDER — NALOXONE HYDROCHLORIDE 4 MG/.1ML
SPRAY NASAL
Qty: 1 EACH | Refills: 0 | Status: SHIPPED | OUTPATIENT
Start: 2021-11-23

## 2021-11-23 RX ORDER — SODIUM CHLORIDE 9 MG/ML
250 INJECTION, SOLUTION INTRAVENOUS CONTINUOUS
Status: DISCONTINUED | OUTPATIENT
Start: 2021-11-23 | End: 2021-11-23 | Stop reason: HOSPADM

## 2021-11-23 RX ORDER — CLINDAMYCIN HYDROCHLORIDE 150 MG/1
300 CAPSULE ORAL ONCE
Status: COMPLETED | OUTPATIENT
Start: 2021-11-23 | End: 2021-11-23

## 2021-11-23 RX ORDER — OXYCODONE HYDROCHLORIDE 20 MG/1
20 TABLET ORAL EVERY 6 HOURS PRN
Qty: 90 TABLET | Refills: 0 | Status: SHIPPED | OUTPATIENT
Start: 2021-11-23 | End: 2021-11-23 | Stop reason: SDUPTHER

## 2021-11-23 RX ADMIN — SODIUM CHLORIDE 250 ML/HR: 0.9 INJECTION, SOLUTION INTRAVENOUS at 10:50

## 2021-11-23 RX ADMIN — CLINDAMYCIN HYDROCHLORIDE 300 MG: 150 CAPSULE ORAL at 10:54

## 2021-11-28 LAB
BACTERIA BLD CULT: NORMAL
BACTERIA BLD CULT: NORMAL

## 2021-11-29 ENCOUNTER — TELEPHONE (OUTPATIENT)
Dept: HEMATOLOGY ONCOLOGY | Facility: CLINIC | Age: 61
End: 2021-11-29

## 2021-11-29 DIAGNOSIS — Z91.89 AT HIGH RISK OF TUMOR LYSIS SYNDROME: Primary | ICD-10-CM

## 2021-11-29 DIAGNOSIS — C82.18 GRADE 2 FOLLICULAR LYMPHOMA OF LYMPH NODES OF MULTIPLE REGIONS (HCC): ICD-10-CM

## 2021-11-29 DIAGNOSIS — T45.1X5A CHEMOTHERAPY-INDUCED NEUTROPENIA (HCC): ICD-10-CM

## 2021-11-29 DIAGNOSIS — D70.1 CHEMOTHERAPY-INDUCED NEUTROPENIA (HCC): ICD-10-CM

## 2021-11-30 ENCOUNTER — HOSPITAL ENCOUNTER (OUTPATIENT)
Dept: INFUSION CENTER | Facility: HOSPITAL | Age: 61
Discharge: HOME | End: 2021-11-30
Attending: INTERNAL MEDICINE
Payer: COMMERCIAL

## 2021-11-30 ENCOUNTER — HOSPITAL ENCOUNTER (OUTPATIENT)
Dept: INFUSION CENTER | Facility: HOSPITAL | Age: 61
Discharge: HOME | End: 2021-11-30
Attending: INTERNAL MEDICINE

## 2021-11-30 VITALS
RESPIRATION RATE: 20 BRPM | DIASTOLIC BLOOD PRESSURE: 84 MMHG | SYSTOLIC BLOOD PRESSURE: 164 MMHG | HEART RATE: 65 BPM | TEMPERATURE: 97.2 F | OXYGEN SATURATION: 100 %

## 2021-11-30 DIAGNOSIS — D51.8 OTHER VITAMIN B12 DEFICIENCY ANEMIAS: Primary | ICD-10-CM

## 2021-11-30 DIAGNOSIS — C82.18 GRADE 2 FOLLICULAR LYMPHOMA OF LYMPH NODES OF MULTIPLE REGIONS (HCC): ICD-10-CM

## 2021-11-30 DIAGNOSIS — Z91.89 AT HIGH RISK OF TUMOR LYSIS SYNDROME: Primary | ICD-10-CM

## 2021-11-30 DIAGNOSIS — D70.1 CHEMOTHERAPY-INDUCED NEUTROPENIA (HCC): Primary | ICD-10-CM

## 2021-11-30 DIAGNOSIS — D70.1 CHEMOTHERAPY-INDUCED NEUTROPENIA (HCC): ICD-10-CM

## 2021-11-30 DIAGNOSIS — Z91.89 AT HIGH RISK OF TUMOR LYSIS SYNDROME: ICD-10-CM

## 2021-11-30 DIAGNOSIS — T45.1X5A CHEMOTHERAPY-INDUCED NEUTROPENIA (HCC): Primary | ICD-10-CM

## 2021-11-30 DIAGNOSIS — D51.8 OTHER VITAMIN B12 DEFICIENCY ANEMIAS: ICD-10-CM

## 2021-11-30 DIAGNOSIS — T45.1X5A CHEMOTHERAPY-INDUCED NEUTROPENIA (HCC): ICD-10-CM

## 2021-11-30 LAB
ALBUMIN SERPL BCP-MCNC: 4.2 G/DL (ref 3–5.2)
ALP SERPL-CCNC: 76 U/L (ref 43–122)
ALT SERPL W P-5'-P-CCNC: 19 U/L
ANION GAP SERPL CALCULATED.3IONS-SCNC: 2 MMOL/L (ref 5–14)
ANISOCYTOSIS BLD QL SMEAR: PRESENT
AST SERPL W P-5'-P-CCNC: 25 U/L (ref 14–36)
BILIRUB SERPL-MCNC: 0.55 MG/DL
BUN SERPL-MCNC: 25 MG/DL (ref 5–25)
CALCIUM SERPL-MCNC: 8.7 MG/DL (ref 8.4–10.2)
CHLORIDE SERPL-SCNC: 105 MMOL/L (ref 97–108)
CO2 SERPL-SCNC: 28 MMOL/L (ref 22–30)
CREAT SERPL-MCNC: 0.74 MG/DL (ref 0.6–1.2)
EOSINOPHIL # BLD AUTO: 0.05 THOUSAND/UL (ref 0–0.4)
EOSINOPHIL NFR BLD MANUAL: 1 % (ref 0–6)
ERYTHROCYTE [DISTWIDTH] IN BLOOD BY AUTOMATED COUNT: 18.5 %
GFR SERPL CREATININE-BSD FRML MDRD: 89 ML/MIN/1.73SQ M
GLUCOSE SERPL-MCNC: 83 MG/DL (ref 65–140)
GLUCOSE SERPL-MCNC: 98 MG/DL (ref 70–99)
HCT VFR BLD AUTO: 36.2 % (ref 36–46)
HGB BLD-MCNC: 11.4 G/DL (ref 12–16)
HYPERCHROMIA BLD QL SMEAR: PRESENT
LDH SERPL-CCNC: 726 U/L (ref 313–618)
LYMPHOCYTES # BLD AUTO: 1.52 THOUSAND/UL (ref 0.5–4)
LYMPHOCYTES # BLD AUTO: 31 % (ref 25–45)
MCH RBC QN AUTO: 21.6 PG (ref 26–34)
MCHC RBC AUTO-ENTMCNC: 31.5 G/DL (ref 31–36)
MCV RBC AUTO: 68 FL (ref 80–100)
METAMYELOCYTES NFR BLD MANUAL: 1 % (ref 0–1)
MICROCYTES BLD QL AUTO: PRESENT
MONOCYTES # BLD AUTO: 0.25 THOUSAND/UL (ref 0.2–0.9)
MONOCYTES NFR BLD AUTO: 5 % (ref 1–10)
NEUTS BAND NFR BLD MANUAL: 7 % (ref 0–8)
NEUTS SEG # BLD: 3.04 THOUSAND/UL (ref 1.8–7.8)
NEUTS SEG NFR BLD AUTO: 55 %
OVALOCYTES BLD QL SMEAR: PRESENT
PLATELET # BLD AUTO: 150 THOUSANDS/UL (ref 150–450)
PLATELET BLD QL SMEAR: ADEQUATE
PMV BLD AUTO: 9 FL (ref 8.9–12.7)
POIKILOCYTOSIS BLD QL SMEAR: PRESENT
POTASSIUM SERPL-SCNC: 4.4 MMOL/L (ref 3.6–5)
PROT SERPL-MCNC: 6.7 G/DL (ref 5.9–8.4)
RBC # BLD AUTO: 5.3 MILLION/UL (ref 4–5.2)
RBC MORPH BLD: NORMAL
SODIUM SERPL-SCNC: 135 MMOL/L (ref 137–147)
TOTAL CELLS COUNTED SPEC: 100
WBC # BLD AUTO: 4.9 THOUSAND/UL (ref 4.5–11)

## 2021-11-30 PROCEDURE — 96413 CHEMO IV INFUSION 1 HR: CPT

## 2021-11-30 PROCEDURE — 85027 COMPLETE CBC AUTOMATED: CPT | Performed by: INTERNAL MEDICINE

## 2021-11-30 PROCEDURE — 82948 REAGENT STRIP/BLOOD GLUCOSE: CPT

## 2021-11-30 PROCEDURE — 80053 COMPREHEN METABOLIC PANEL: CPT | Performed by: INTERNAL MEDICINE

## 2021-11-30 PROCEDURE — 85007 BL SMEAR W/DIFF WBC COUNT: CPT | Performed by: INTERNAL MEDICINE

## 2021-11-30 PROCEDURE — 83615 LACTATE (LD) (LDH) ENZYME: CPT | Performed by: INTERNAL MEDICINE

## 2021-11-30 PROCEDURE — 96372 THER/PROPH/DIAG INJ SC/IM: CPT

## 2021-11-30 PROCEDURE — 96367 TX/PROPH/DG ADDL SEQ IV INF: CPT

## 2021-11-30 RX ORDER — CYANOCOBALAMIN 1000 UG/ML
1000 INJECTION INTRAMUSCULAR; SUBCUTANEOUS ONCE
Status: CANCELLED | OUTPATIENT
Start: 2021-11-30

## 2021-11-30 RX ORDER — CLONIDINE HYDROCHLORIDE 0.1 MG/1
0.1 TABLET ORAL ONCE
Status: CANCELLED
Start: 2021-11-30 | End: 2021-11-30

## 2021-11-30 RX ORDER — CLONIDINE HYDROCHLORIDE 0.1 MG/1
0.1 TABLET ORAL ONCE
Status: COMPLETED | OUTPATIENT
Start: 2021-11-30 | End: 2021-11-30

## 2021-11-30 RX ORDER — CYANOCOBALAMIN 1000 UG/ML
1000 INJECTION INTRAMUSCULAR; SUBCUTANEOUS ONCE
Status: CANCELLED | OUTPATIENT
Start: 2021-12-28

## 2021-11-30 RX ORDER — CYANOCOBALAMIN 1000 UG/ML
1000 INJECTION INTRAMUSCULAR; SUBCUTANEOUS ONCE
Status: COMPLETED | OUTPATIENT
Start: 2021-11-30 | End: 2021-11-30

## 2021-11-30 RX ORDER — SODIUM CHLORIDE 9 MG/ML
20 INJECTION, SOLUTION INTRAVENOUS ONCE
Status: COMPLETED | OUTPATIENT
Start: 2021-11-30 | End: 2021-11-30

## 2021-11-30 RX ADMIN — COPANLISIB 60 MG: 15 INJECTION, POWDER, LYOPHILIZED, FOR SOLUTION INTRAVENOUS at 13:00

## 2021-11-30 RX ADMIN — CYANOCOBALAMIN 1000 MCG: 1000 INJECTION INTRAMUSCULAR; SUBCUTANEOUS at 11:00

## 2021-11-30 RX ADMIN — SODIUM CHLORIDE 20 ML/HR: 0.9 INJECTION, SOLUTION INTRAVENOUS at 12:33

## 2021-11-30 RX ADMIN — ONDANSETRON 8 MG: 2 INJECTION INTRAMUSCULAR; INTRAVENOUS at 12:33

## 2021-11-30 RX ADMIN — CLONIDINE HYDROCHLORIDE 0.1 MG: 0.1 TABLET ORAL at 15:20

## 2021-12-03 ENCOUNTER — HOSPITAL ENCOUNTER (OUTPATIENT)
Dept: INFUSION CENTER | Facility: HOSPITAL | Age: 61
Discharge: HOME | End: 2021-12-03
Attending: INTERNAL MEDICINE
Payer: COMMERCIAL

## 2021-12-03 VITALS
DIASTOLIC BLOOD PRESSURE: 59 MMHG | SYSTOLIC BLOOD PRESSURE: 139 MMHG | RESPIRATION RATE: 18 BRPM | HEART RATE: 63 BPM | TEMPERATURE: 98 F

## 2021-12-03 DIAGNOSIS — C82.18 GRADE 2 FOLLICULAR LYMPHOMA OF LYMPH NODES OF MULTIPLE REGIONS (HCC): ICD-10-CM

## 2021-12-03 DIAGNOSIS — D70.1 CHEMOTHERAPY-INDUCED NEUTROPENIA (HCC): ICD-10-CM

## 2021-12-03 DIAGNOSIS — D50.8 IRON DEFICIENCY ANEMIA SECONDARY TO INADEQUATE DIETARY IRON INTAKE: Primary | ICD-10-CM

## 2021-12-03 DIAGNOSIS — Z91.89 AT HIGH RISK OF TUMOR LYSIS SYNDROME: ICD-10-CM

## 2021-12-03 DIAGNOSIS — T45.1X5A CHEMOTHERAPY-INDUCED NEUTROPENIA (HCC): ICD-10-CM

## 2021-12-03 DIAGNOSIS — E86.0 DEHYDRATION: ICD-10-CM

## 2021-12-03 LAB
ALBUMIN SERPL BCP-MCNC: 3.9 G/DL (ref 3–5.2)
ALP SERPL-CCNC: 75 U/L (ref 43–122)
ALT SERPL W P-5'-P-CCNC: 19 U/L
ANION GAP SERPL CALCULATED.3IONS-SCNC: 0 MMOL/L (ref 5–14)
ANISOCYTOSIS BLD QL SMEAR: PRESENT
AST SERPL W P-5'-P-CCNC: 24 U/L (ref 14–36)
BASOPHILS # BLD AUTO: 0.1 THOUSANDS/ΜL (ref 0–0.1)
BASOPHILS NFR BLD AUTO: 2 % (ref 0–1)
BILIRUB SERPL-MCNC: 0.36 MG/DL
BUN SERPL-MCNC: 16 MG/DL (ref 5–25)
CALCIUM SERPL-MCNC: 8.6 MG/DL (ref 8.4–10.2)
CHLORIDE SERPL-SCNC: 107 MMOL/L (ref 97–108)
CO2 SERPL-SCNC: 30 MMOL/L (ref 22–30)
CREAT SERPL-MCNC: 0.82 MG/DL (ref 0.6–1.2)
EOSINOPHIL # BLD AUTO: 0.1 THOUSAND/ΜL (ref 0–0.4)
EOSINOPHIL NFR BLD AUTO: 1 % (ref 0–6)
ERYTHROCYTE [DISTWIDTH] IN BLOOD BY AUTOMATED COUNT: 19.1 %
GFR SERPL CREATININE-BSD FRML MDRD: 79 ML/MIN/1.73SQ M
GLUCOSE SERPL-MCNC: 171 MG/DL (ref 70–99)
HCT VFR BLD AUTO: 35 % (ref 36–46)
HGB BLD-MCNC: 11 G/DL (ref 12–16)
HYPERCHROMIA BLD QL SMEAR: PRESENT
LDH SERPL-CCNC: 657 U/L (ref 313–618)
LYMPHOCYTES # BLD AUTO: 2.8 THOUSANDS/ΜL (ref 0.5–4)
LYMPHOCYTES NFR BLD AUTO: 46 % (ref 25–45)
MAGNESIUM SERPL-MCNC: 1.9 MG/DL (ref 1.6–2.3)
MCH RBC QN AUTO: 21.4 PG (ref 26–34)
MCHC RBC AUTO-ENTMCNC: 31.4 G/DL (ref 31–36)
MCV RBC AUTO: 68 FL (ref 80–100)
MICROCYTES BLD QL AUTO: PRESENT
MONOCYTES # BLD AUTO: 0.3 THOUSAND/ΜL (ref 0.2–0.9)
MONOCYTES NFR BLD AUTO: 6 % (ref 1–10)
NEUTROPHILS # BLD AUTO: 2.7 THOUSANDS/ΜL (ref 1.8–7.8)
NEUTS SEG NFR BLD AUTO: 46 % (ref 45–65)
OVALOCYTES BLD QL SMEAR: PRESENT
PHOSPHATE SERPL-MCNC: 2.9 MG/DL (ref 2.5–4.8)
PLATELET # BLD AUTO: 166 THOUSANDS/UL (ref 150–450)
PLATELET BLD QL SMEAR: ADEQUATE
PMV BLD AUTO: 9 FL (ref 8.9–12.7)
POTASSIUM SERPL-SCNC: 4.2 MMOL/L (ref 3.6–5)
PROT SERPL-MCNC: 6.6 G/DL (ref 5.9–8.4)
RBC # BLD AUTO: 5.13 MILLION/UL (ref 4–5.2)
RBC MORPH BLD: NORMAL
SODIUM SERPL-SCNC: 137 MMOL/L (ref 137–147)
URATE SERPL-MCNC: 3.7 MG/DL (ref 2.7–7.5)
WBC # BLD AUTO: 6 THOUSAND/UL (ref 4.5–11)

## 2021-12-03 PROCEDURE — 96360 HYDRATION IV INFUSION INIT: CPT

## 2021-12-03 PROCEDURE — 83615 LACTATE (LD) (LDH) ENZYME: CPT | Performed by: INTERNAL MEDICINE

## 2021-12-03 PROCEDURE — 85025 COMPLETE CBC W/AUTO DIFF WBC: CPT | Performed by: INTERNAL MEDICINE

## 2021-12-03 PROCEDURE — 84100 ASSAY OF PHOSPHORUS: CPT | Performed by: INTERNAL MEDICINE

## 2021-12-03 PROCEDURE — 83735 ASSAY OF MAGNESIUM: CPT | Performed by: INTERNAL MEDICINE

## 2021-12-03 PROCEDURE — 80053 COMPREHEN METABOLIC PANEL: CPT | Performed by: INTERNAL MEDICINE

## 2021-12-03 PROCEDURE — 84550 ASSAY OF BLOOD/URIC ACID: CPT | Performed by: INTERNAL MEDICINE

## 2021-12-03 RX ADMIN — SODIUM CHLORIDE 1000 ML: 0.9 INJECTION, SOLUTION INTRAVENOUS at 13:31

## 2021-12-07 ENCOUNTER — APPOINTMENT (OUTPATIENT)
Dept: LAB | Facility: HOSPITAL | Age: 61
End: 2021-12-07
Payer: COMMERCIAL

## 2021-12-07 ENCOUNTER — OFFICE VISIT (OUTPATIENT)
Dept: FAMILY MEDICINE CLINIC | Facility: CLINIC | Age: 61
End: 2021-12-07

## 2021-12-07 ENCOUNTER — HOSPITAL ENCOUNTER (OUTPATIENT)
Dept: INFUSION CENTER | Facility: HOSPITAL | Age: 61
Discharge: HOME | End: 2021-12-07
Attending: INTERNAL MEDICINE
Payer: COMMERCIAL

## 2021-12-07 VITALS
SYSTOLIC BLOOD PRESSURE: 180 MMHG | TEMPERATURE: 97.5 F | DIASTOLIC BLOOD PRESSURE: 82 MMHG | OXYGEN SATURATION: 99 % | RESPIRATION RATE: 19 BRPM | BODY MASS INDEX: 37.11 KG/M2 | WEIGHT: 223 LBS | HEART RATE: 86 BPM

## 2021-12-07 VITALS
TEMPERATURE: 98.1 F | SYSTOLIC BLOOD PRESSURE: 152 MMHG | RESPIRATION RATE: 18 BRPM | HEART RATE: 54 BPM | DIASTOLIC BLOOD PRESSURE: 80 MMHG

## 2021-12-07 DIAGNOSIS — D70.1 CHEMOTHERAPY-INDUCED NEUTROPENIA (HCC): ICD-10-CM

## 2021-12-07 DIAGNOSIS — Z51.5 PALLIATIVE CARE PATIENT: ICD-10-CM

## 2021-12-07 DIAGNOSIS — I10 ESSENTIAL HYPERTENSION: Primary | ICD-10-CM

## 2021-12-07 DIAGNOSIS — C82.18 GRADE 2 FOLLICULAR LYMPHOMA OF LYMPH NODES OF MULTIPLE REGIONS (HCC): ICD-10-CM

## 2021-12-07 DIAGNOSIS — Z00.00 HEALTH CARE MAINTENANCE: ICD-10-CM

## 2021-12-07 DIAGNOSIS — K12.30 MUCOSITIS: ICD-10-CM

## 2021-12-07 DIAGNOSIS — E79.0 ELEVATED URIC ACID IN BLOOD: ICD-10-CM

## 2021-12-07 DIAGNOSIS — Z23 NEED FOR VACCINATION: ICD-10-CM

## 2021-12-07 DIAGNOSIS — R51.9 HEADACHE AROUND THE EYES: ICD-10-CM

## 2021-12-07 DIAGNOSIS — F41.8 ANXIETY ABOUT HEALTH: ICD-10-CM

## 2021-12-07 DIAGNOSIS — U07.1 COVID-19: ICD-10-CM

## 2021-12-07 DIAGNOSIS — Z91.89 AT HIGH RISK OF TUMOR LYSIS SYNDROME: ICD-10-CM

## 2021-12-07 DIAGNOSIS — T45.1X5A CHEMOTHERAPY-INDUCED NEUTROPENIA (HCC): ICD-10-CM

## 2021-12-07 DIAGNOSIS — D70.1 CHEMOTHERAPY-INDUCED NEUTROPENIA (HCC): Primary | ICD-10-CM

## 2021-12-07 DIAGNOSIS — G89.3 CANCER RELATED PAIN: ICD-10-CM

## 2021-12-07 DIAGNOSIS — T45.1X5A CHEMOTHERAPY-INDUCED NEUTROPENIA (HCC): Primary | ICD-10-CM

## 2021-12-07 DIAGNOSIS — K02.9 DENTAL CARIES: ICD-10-CM

## 2021-12-07 DIAGNOSIS — F33.41 RECURRENT MAJOR DEPRESSIVE DISORDER, IN PARTIAL REMISSION (HCC): ICD-10-CM

## 2021-12-07 DIAGNOSIS — E86.0 DEHYDRATION: ICD-10-CM

## 2021-12-07 PROBLEM — R45.89 ANXIETY ABOUT HEALTH: Status: ACTIVE | Noted: 2021-12-07

## 2021-12-07 PROBLEM — E78.5 HYPERLIPIDEMIA: Status: ACTIVE | Noted: 2021-12-07

## 2021-12-07 PROBLEM — K59.01 SLOW TRANSIT CONSTIPATION: Status: RESOLVED | Noted: 2018-11-07 | Resolved: 2021-12-07

## 2021-12-07 LAB
CRP SERPL QL: 9 MG/L
ERYTHROCYTE [SEDIMENTATION RATE] IN BLOOD: 17 MM/HOUR (ref 0–29)
GLUCOSE SERPL-MCNC: 72 MG/DL (ref 65–140)
LDH SERPL-CCNC: 603 U/L (ref 313–618)

## 2021-12-07 PROCEDURE — 99214 OFFICE O/P EST MOD 30 MIN: CPT | Performed by: INTERNAL MEDICINE

## 2021-12-07 PROCEDURE — 3077F SYST BP >= 140 MM HG: CPT | Performed by: INTERNAL MEDICINE

## 2021-12-07 PROCEDURE — 96413 CHEMO IV INFUSION 1 HR: CPT

## 2021-12-07 PROCEDURE — 36415 COLL VENOUS BLD VENIPUNCTURE: CPT

## 2021-12-07 PROCEDURE — 85652 RBC SED RATE AUTOMATED: CPT

## 2021-12-07 PROCEDURE — 96367 TX/PROPH/DG ADDL SEQ IV INF: CPT

## 2021-12-07 PROCEDURE — 83615 LACTATE (LD) (LDH) ENZYME: CPT

## 2021-12-07 PROCEDURE — 82948 REAGENT STRIP/BLOOD GLUCOSE: CPT

## 2021-12-07 PROCEDURE — 3079F DIAST BP 80-89 MM HG: CPT | Performed by: INTERNAL MEDICINE

## 2021-12-07 PROCEDURE — 86140 C-REACTIVE PROTEIN: CPT

## 2021-12-07 RX ORDER — SODIUM CHLORIDE 9 MG/ML
20 INJECTION, SOLUTION INTRAVENOUS ONCE
Status: COMPLETED | OUTPATIENT
Start: 2021-12-07 | End: 2021-12-07

## 2021-12-07 RX ORDER — DULOXETIN HYDROCHLORIDE 20 MG/1
20 CAPSULE, DELAYED RELEASE ORAL DAILY
Qty: 30 CAPSULE | Refills: 3 | Status: SHIPPED | OUTPATIENT
Start: 2021-12-07 | End: 2022-04-05

## 2021-12-07 RX ORDER — AMLODIPINE BESYLATE 5 MG/1
5 TABLET ORAL DAILY
Qty: 30 TABLET | Refills: 3 | Status: SHIPPED | OUTPATIENT
Start: 2021-12-07 | End: 2022-04-05

## 2021-12-07 RX ADMIN — ONDANSETRON 8 MG: 2 SOLUTION INTRAMUSCULAR; INTRAVENOUS at 10:07

## 2021-12-07 RX ADMIN — SODIUM CHLORIDE 20 ML/HR: 0.9 INJECTION, SOLUTION INTRAVENOUS at 10:06

## 2021-12-07 RX ADMIN — COPANLISIB 60 MG: 15 INJECTION, POWDER, LYOPHILIZED, FOR SOLUTION INTRAVENOUS at 10:49

## 2021-12-10 ENCOUNTER — HOSPITAL ENCOUNTER (OUTPATIENT)
Dept: INFUSION CENTER | Facility: HOSPITAL | Age: 61
Discharge: HOME | End: 2021-12-10
Payer: COMMERCIAL

## 2021-12-10 VITALS
SYSTOLIC BLOOD PRESSURE: 132 MMHG | HEART RATE: 57 BPM | RESPIRATION RATE: 16 BRPM | DIASTOLIC BLOOD PRESSURE: 74 MMHG | TEMPERATURE: 97.6 F

## 2021-12-10 DIAGNOSIS — D50.8 IRON DEFICIENCY ANEMIA SECONDARY TO INADEQUATE DIETARY IRON INTAKE: ICD-10-CM

## 2021-12-10 DIAGNOSIS — Z91.89 AT HIGH RISK OF TUMOR LYSIS SYNDROME: ICD-10-CM

## 2021-12-10 DIAGNOSIS — E86.0 DEHYDRATION: Primary | ICD-10-CM

## 2021-12-10 DIAGNOSIS — C82.18 GRADE 2 FOLLICULAR LYMPHOMA OF LYMPH NODES OF MULTIPLE REGIONS (HCC): ICD-10-CM

## 2021-12-10 LAB
LDH SERPL-CCNC: 558 U/L (ref 313–618)
MAGNESIUM SERPL-MCNC: 1.9 MG/DL (ref 1.6–2.3)
PHOSPHATE SERPL-MCNC: 2.6 MG/DL (ref 2.5–4.8)
URATE SERPL-MCNC: 2.1 MG/DL (ref 2.7–7.5)

## 2021-12-10 PROCEDURE — 83735 ASSAY OF MAGNESIUM: CPT | Performed by: INTERNAL MEDICINE

## 2021-12-10 PROCEDURE — 84100 ASSAY OF PHOSPHORUS: CPT | Performed by: INTERNAL MEDICINE

## 2021-12-10 PROCEDURE — 84550 ASSAY OF BLOOD/URIC ACID: CPT | Performed by: INTERNAL MEDICINE

## 2021-12-10 PROCEDURE — 96360 HYDRATION IV INFUSION INIT: CPT

## 2021-12-10 PROCEDURE — 83615 LACTATE (LD) (LDH) ENZYME: CPT | Performed by: INTERNAL MEDICINE

## 2021-12-10 RX ADMIN — SODIUM CHLORIDE 1000 ML: 0.9 INJECTION, SOLUTION INTRAVENOUS at 13:05

## 2021-12-21 ENCOUNTER — HOSPITAL ENCOUNTER (OUTPATIENT)
Dept: INFUSION CENTER | Facility: HOSPITAL | Age: 61
Discharge: HOME | End: 2021-12-21
Attending: INTERNAL MEDICINE
Payer: COMMERCIAL

## 2021-12-21 VITALS — HEIGHT: 64 IN | BODY MASS INDEX: 38.92 KG/M2 | WEIGHT: 227.96 LBS

## 2021-12-21 DIAGNOSIS — Z91.89 AT HIGH RISK OF TUMOR LYSIS SYNDROME: ICD-10-CM

## 2021-12-21 DIAGNOSIS — D70.1 CHEMOTHERAPY-INDUCED NEUTROPENIA (HCC): Primary | ICD-10-CM

## 2021-12-21 DIAGNOSIS — T45.1X5A CHEMOTHERAPY-INDUCED NEUTROPENIA (HCC): Primary | ICD-10-CM

## 2021-12-21 DIAGNOSIS — C82.18 GRADE 2 FOLLICULAR LYMPHOMA OF LYMPH NODES OF MULTIPLE REGIONS (HCC): ICD-10-CM

## 2021-12-21 LAB
ALBUMIN SERPL BCP-MCNC: 3.9 G/DL (ref 3–5.2)
ALP SERPL-CCNC: 79 U/L (ref 43–122)
ALT SERPL W P-5'-P-CCNC: 17 U/L
ANION GAP SERPL CALCULATED.3IONS-SCNC: 5 MMOL/L (ref 5–14)
AST SERPL W P-5'-P-CCNC: 23 U/L (ref 14–36)
BASOPHILS # BLD AUTO: 0 THOUSANDS/ΜL (ref 0–0.1)
BASOPHILS NFR BLD AUTO: 1 % (ref 0–1)
BILIRUB SERPL-MCNC: 0.57 MG/DL
BUN SERPL-MCNC: 15 MG/DL (ref 5–25)
CALCIUM SERPL-MCNC: 8.7 MG/DL (ref 8.4–10.2)
CHLORIDE SERPL-SCNC: 105 MMOL/L (ref 97–108)
CO2 SERPL-SCNC: 28 MMOL/L (ref 22–30)
CREAT SERPL-MCNC: 0.76 MG/DL (ref 0.6–1.2)
EOSINOPHIL # BLD AUTO: 0.2 THOUSAND/ΜL (ref 0–0.4)
EOSINOPHIL NFR BLD AUTO: 3 % (ref 0–6)
ERYTHROCYTE [DISTWIDTH] IN BLOOD BY AUTOMATED COUNT: 17.4 %
GFR SERPL CREATININE-BSD FRML MDRD: 86 ML/MIN/1.73SQ M
GLUCOSE SERPL-MCNC: 85 MG/DL (ref 70–99)
HCT VFR BLD AUTO: 36.2 % (ref 36–46)
HELMET CELLS BLD QL SMEAR: PRESENT
HGB BLD-MCNC: 11.1 G/DL (ref 12–16)
LDH SERPL-CCNC: 483 U/L (ref 313–618)
LYMPHOCYTES # BLD AUTO: 1.8 THOUSANDS/ΜL (ref 0.5–4)
LYMPHOCYTES NFR BLD AUTO: 34 % (ref 25–45)
MCH RBC QN AUTO: 21.3 PG (ref 26–34)
MCHC RBC AUTO-ENTMCNC: 30.8 G/DL (ref 31–36)
MCV RBC AUTO: 69 FL (ref 80–100)
MICROCYTES BLD QL AUTO: PRESENT
MONOCYTES # BLD AUTO: 0.5 THOUSAND/ΜL (ref 0.2–0.9)
MONOCYTES NFR BLD AUTO: 9 % (ref 1–10)
NEUTROPHILS # BLD AUTO: 2.9 THOUSANDS/ΜL (ref 1.8–7.8)
NEUTS SEG NFR BLD AUTO: 54 % (ref 45–65)
PLATELET # BLD AUTO: 136 THOUSANDS/UL (ref 150–450)
PLATELET BLD QL SMEAR: ABNORMAL
PMV BLD AUTO: 9.1 FL (ref 8.9–12.7)
POIKILOCYTOSIS BLD QL SMEAR: PRESENT
POTASSIUM SERPL-SCNC: 4.2 MMOL/L (ref 3.6–5)
PROT SERPL-MCNC: 6.7 G/DL (ref 5.9–8.4)
RBC # BLD AUTO: 5.22 MILLION/UL (ref 4–5.2)
RBC MORPH BLD: ABNORMAL
SODIUM SERPL-SCNC: 138 MMOL/L (ref 137–147)
WBC # BLD AUTO: 5.3 THOUSAND/UL (ref 4.5–11)

## 2021-12-21 PROCEDURE — 96367 TX/PROPH/DG ADDL SEQ IV INF: CPT

## 2021-12-21 PROCEDURE — 83615 LACTATE (LD) (LDH) ENZYME: CPT | Performed by: INTERNAL MEDICINE

## 2021-12-21 PROCEDURE — 96413 CHEMO IV INFUSION 1 HR: CPT

## 2021-12-21 PROCEDURE — 85025 COMPLETE CBC W/AUTO DIFF WBC: CPT | Performed by: INTERNAL MEDICINE

## 2021-12-21 PROCEDURE — 80053 COMPREHEN METABOLIC PANEL: CPT | Performed by: INTERNAL MEDICINE

## 2021-12-21 RX ORDER — SODIUM CHLORIDE 9 MG/ML
20 INJECTION, SOLUTION INTRAVENOUS ONCE
Status: COMPLETED | OUTPATIENT
Start: 2021-12-21 | End: 2021-12-21

## 2021-12-21 RX ADMIN — ONDANSETRON 8 MG: 2 SOLUTION INTRAMUSCULAR; INTRAVENOUS at 11:33

## 2021-12-21 RX ADMIN — COPANLISIB 60 MG: 15 INJECTION, POWDER, LYOPHILIZED, FOR SOLUTION INTRAVENOUS at 12:06

## 2021-12-21 RX ADMIN — SODIUM CHLORIDE 20 ML/HR: 0.9 INJECTION, SOLUTION INTRAVENOUS at 10:15

## 2021-12-23 DIAGNOSIS — G89.3 CANCER RELATED PAIN: ICD-10-CM

## 2021-12-23 DIAGNOSIS — C82.18 GRADE 2 FOLLICULAR LYMPHOMA OF LYMPH NODES OF MULTIPLE REGIONS (HCC): ICD-10-CM

## 2021-12-23 DIAGNOSIS — R10.30 LOWER ABDOMINAL PAIN: ICD-10-CM

## 2021-12-23 RX ORDER — OXYCODONE HYDROCHLORIDE 20 MG/1
20 TABLET ORAL EVERY 6 HOURS PRN
Qty: 90 TABLET | Refills: 0 | Status: SHIPPED | OUTPATIENT
Start: 2021-12-23 | End: 2022-01-26

## 2021-12-27 ENCOUNTER — HOSPITAL ENCOUNTER (OUTPATIENT)
Dept: INFUSION CENTER | Facility: HOSPITAL | Age: 61
Discharge: HOME | End: 2021-12-27
Payer: COMMERCIAL

## 2021-12-27 DIAGNOSIS — Z91.89 AT HIGH RISK OF TUMOR LYSIS SYNDROME: ICD-10-CM

## 2021-12-27 DIAGNOSIS — D70.1 CHEMOTHERAPY-INDUCED NEUTROPENIA (HCC): Primary | ICD-10-CM

## 2021-12-27 DIAGNOSIS — T45.1X5A CHEMOTHERAPY-INDUCED NEUTROPENIA (HCC): Primary | ICD-10-CM

## 2021-12-27 DIAGNOSIS — B34.9 VIRAL INFECTION: Primary | ICD-10-CM

## 2021-12-27 DIAGNOSIS — Z45.2 ENCOUNTER FOR CENTRAL LINE CARE: ICD-10-CM

## 2021-12-27 DIAGNOSIS — C82.18 GRADE 2 FOLLICULAR LYMPHOMA OF LYMPH NODES OF MULTIPLE REGIONS (HCC): ICD-10-CM

## 2021-12-27 LAB
ALBUMIN SERPL BCP-MCNC: 3.7 G/DL (ref 3–5.2)
ALP SERPL-CCNC: 73 U/L (ref 43–122)
ALT SERPL W P-5'-P-CCNC: 14 U/L
ANION GAP SERPL CALCULATED.3IONS-SCNC: 4 MMOL/L (ref 5–14)
ANISOCYTOSIS BLD QL SMEAR: PRESENT
AST SERPL W P-5'-P-CCNC: 18 U/L (ref 14–36)
BASOPHILS # BLD AUTO: 0 THOUSANDS/ΜL (ref 0–0.1)
BASOPHILS NFR BLD AUTO: 1 % (ref 0–1)
BILIRUB SERPL-MCNC: 0.45 MG/DL
BUN SERPL-MCNC: 18 MG/DL (ref 5–25)
CALCIUM SERPL-MCNC: 8.5 MG/DL (ref 8.4–10.2)
CHLORIDE SERPL-SCNC: 107 MMOL/L (ref 97–108)
CO2 SERPL-SCNC: 25 MMOL/L (ref 22–30)
CREAT SERPL-MCNC: 0.74 MG/DL (ref 0.6–1.2)
DACRYOCYTES BLD QL SMEAR: PRESENT
EOSINOPHIL # BLD AUTO: 0.1 THOUSAND/ΜL (ref 0–0.4)
EOSINOPHIL NFR BLD AUTO: 3 % (ref 0–6)
ERYTHROCYTE [DISTWIDTH] IN BLOOD BY AUTOMATED COUNT: 17.4 %
GFR SERPL CREATININE-BSD FRML MDRD: 88 ML/MIN/1.73SQ M
GLUCOSE SERPL-MCNC: 184 MG/DL (ref 70–99)
HCT VFR BLD AUTO: 36.3 % (ref 36–46)
HGB BLD-MCNC: 11.1 G/DL (ref 12–16)
LDH SERPL-CCNC: 491 U/L (ref 313–618)
LYMPHOCYTES # BLD AUTO: 1.3 THOUSANDS/ΜL (ref 0.5–4)
LYMPHOCYTES NFR BLD AUTO: 40 % (ref 25–45)
MCH RBC QN AUTO: 21.3 PG (ref 26–34)
MCHC RBC AUTO-ENTMCNC: 30.7 G/DL (ref 31–36)
MCV RBC AUTO: 69 FL (ref 80–100)
MONOCYTES # BLD AUTO: 0.2 THOUSAND/ΜL (ref 0.2–0.9)
MONOCYTES NFR BLD AUTO: 6 % (ref 1–10)
NEUTROPHILS # BLD AUTO: 1.6 THOUSANDS/ΜL (ref 1.8–7.8)
NEUTS SEG NFR BLD AUTO: 50 % (ref 45–65)
OVALOCYTES BLD QL SMEAR: PRESENT
PLATELET # BLD AUTO: 143 THOUSANDS/UL (ref 150–450)
PLATELET BLD QL SMEAR: ADEQUATE
PMV BLD AUTO: 9 FL (ref 8.9–12.7)
POTASSIUM SERPL-SCNC: 4.1 MMOL/L (ref 3.6–5)
PROT SERPL-MCNC: 6.3 G/DL (ref 5.9–8.4)
RBC # BLD AUTO: 5.23 MILLION/UL (ref 4–5.2)
RBC MORPH BLD: NORMAL
SODIUM SERPL-SCNC: 136 MMOL/L (ref 137–147)
WBC # BLD AUTO: 3.3 THOUSAND/UL (ref 4.5–11)

## 2021-12-27 PROCEDURE — 85025 COMPLETE CBC W/AUTO DIFF WBC: CPT | Performed by: INTERNAL MEDICINE

## 2021-12-27 PROCEDURE — 87636 SARSCOV2 & INF A&B AMP PRB: CPT | Performed by: NURSE PRACTITIONER

## 2021-12-27 PROCEDURE — 80053 COMPREHEN METABOLIC PANEL: CPT | Performed by: INTERNAL MEDICINE

## 2021-12-27 PROCEDURE — 83615 LACTATE (LD) (LDH) ENZYME: CPT | Performed by: INTERNAL MEDICINE

## 2021-12-27 NOTE — PROGRESS NOTES
Port flushed per protocol, central labs drawn per orders    Patient to return tomorrow for chemo  Patient also requested COVID test to travel, she will be traveling 12-30-21 to 1[place]

## 2021-12-28 ENCOUNTER — HOSPITAL ENCOUNTER (OUTPATIENT)
Dept: INFUSION CENTER | Facility: HOSPITAL | Age: 61
Discharge: HOME | End: 2021-12-28
Attending: INTERNAL MEDICINE
Payer: COMMERCIAL

## 2021-12-28 VITALS
DIASTOLIC BLOOD PRESSURE: 88 MMHG | HEART RATE: 57 BPM | RESPIRATION RATE: 18 BRPM | SYSTOLIC BLOOD PRESSURE: 162 MMHG | TEMPERATURE: 98 F

## 2021-12-28 DIAGNOSIS — E86.0 DEHYDRATION: ICD-10-CM

## 2021-12-28 DIAGNOSIS — Z91.89 AT HIGH RISK OF TUMOR LYSIS SYNDROME: ICD-10-CM

## 2021-12-28 DIAGNOSIS — C82.18 GRADE 2 FOLLICULAR LYMPHOMA OF LYMPH NODES OF MULTIPLE REGIONS (HCC): ICD-10-CM

## 2021-12-28 DIAGNOSIS — D51.8 OTHER VITAMIN B12 DEFICIENCY ANEMIAS: ICD-10-CM

## 2021-12-28 DIAGNOSIS — D50.8 IRON DEFICIENCY ANEMIA SECONDARY TO INADEQUATE DIETARY IRON INTAKE: ICD-10-CM

## 2021-12-28 DIAGNOSIS — T45.1X5A CHEMOTHERAPY-INDUCED NEUTROPENIA (HCC): Primary | ICD-10-CM

## 2021-12-28 DIAGNOSIS — D70.1 CHEMOTHERAPY-INDUCED NEUTROPENIA (HCC): Primary | ICD-10-CM

## 2021-12-28 LAB
GLUCOSE SERPL-MCNC: 83 MG/DL (ref 65–140)
LDH SERPL-CCNC: 505 U/L (ref 313–618)
MAGNESIUM SERPL-MCNC: 2.1 MG/DL (ref 1.6–2.3)
PHOSPHATE SERPL-MCNC: 3.2 MG/DL (ref 2.5–4.8)
URATE SERPL-MCNC: 2.8 MG/DL (ref 2.7–7.5)

## 2021-12-28 PROCEDURE — 83735 ASSAY OF MAGNESIUM: CPT | Performed by: INTERNAL MEDICINE

## 2021-12-28 PROCEDURE — 96372 THER/PROPH/DIAG INJ SC/IM: CPT

## 2021-12-28 PROCEDURE — 82948 REAGENT STRIP/BLOOD GLUCOSE: CPT

## 2021-12-28 PROCEDURE — 96367 TX/PROPH/DG ADDL SEQ IV INF: CPT

## 2021-12-28 PROCEDURE — 96413 CHEMO IV INFUSION 1 HR: CPT

## 2021-12-28 PROCEDURE — 84100 ASSAY OF PHOSPHORUS: CPT | Performed by: INTERNAL MEDICINE

## 2021-12-28 PROCEDURE — 84550 ASSAY OF BLOOD/URIC ACID: CPT | Performed by: INTERNAL MEDICINE

## 2021-12-28 PROCEDURE — 83615 LACTATE (LD) (LDH) ENZYME: CPT | Performed by: INTERNAL MEDICINE

## 2021-12-28 RX ORDER — SODIUM CHLORIDE 9 MG/ML
20 INJECTION, SOLUTION INTRAVENOUS ONCE
Status: COMPLETED | OUTPATIENT
Start: 2021-12-28 | End: 2021-12-28

## 2021-12-28 RX ORDER — CYANOCOBALAMIN 1000 UG/ML
1000 INJECTION INTRAMUSCULAR; SUBCUTANEOUS ONCE
Status: COMPLETED | OUTPATIENT
Start: 2021-12-28 | End: 2021-12-28

## 2021-12-28 RX ORDER — CLONIDINE HYDROCHLORIDE 0.1 MG/1
0.1 TABLET ORAL ONCE
Status: COMPLETED | OUTPATIENT
Start: 2021-12-28 | End: 2021-12-28

## 2021-12-28 RX ORDER — CYANOCOBALAMIN 1000 UG/ML
1000 INJECTION INTRAMUSCULAR; SUBCUTANEOUS ONCE
Status: CANCELLED | OUTPATIENT
Start: 2022-01-25

## 2021-12-28 RX ORDER — CLONIDINE HYDROCHLORIDE 0.1 MG/1
0.1 TABLET ORAL ONCE
Status: CANCELLED
Start: 2021-12-28 | End: 2021-12-28

## 2021-12-28 RX ADMIN — ONDANSETRON 8 MG: 2 SOLUTION INTRAMUSCULAR; INTRAVENOUS at 11:51

## 2021-12-28 RX ADMIN — SODIUM CHLORIDE 20 ML/HR: 0.9 INJECTION, SOLUTION INTRAVENOUS at 11:51

## 2021-12-28 RX ADMIN — CYANOCOBALAMIN 1000 MCG: 1000 INJECTION INTRAMUSCULAR; SUBCUTANEOUS at 15:05

## 2021-12-28 RX ADMIN — CLONIDINE HYDROCHLORIDE 0.1 MG: 0.1 TABLET ORAL at 15:20

## 2021-12-28 RX ADMIN — COPANLISIB 60 MG: 15 INJECTION, POWDER, LYOPHILIZED, FOR SOLUTION INTRAVENOUS at 12:39

## 2021-12-28 NOTE — PROGRESS NOTES
Notified Tawnya Dwyer RN that Oz Salomon   1960  She is here today for her Aliqopa  Her BP on Arrival was 174/79  She did not take her BP meds so I told her to take it and then I waited and rechecked her and her pressure is still 168/80 manually   I just rechecked her again and she is still high 162/88

## 2021-12-28 NOTE — PROGRESS NOTES
Clonidine 0 1mg given and BP rechecked 30 mins after and BP is coming down 162/88  Pt apt confirmed for tomorrow  AVS provided  Left unit ambulatory to lobby for lift

## 2021-12-28 NOTE — PROGRESS NOTES
Notified Kaylee Medrano RN that pt  finished her chemo and her BP is 186/88 manual  She is do for hydration after her chemo today and not sure with her pressure that high if she should have it today

## 2021-12-28 NOTE — PROGRESS NOTES
Per Sheryle Chambers Pt can come back tomorrow for her hydration  Clonidine 0 1mg will be ordered for her elevated BP  Will continue to monitor

## 2021-12-28 NOTE — PROGRESS NOTES
Per Rhina Jaramillo RN, Dr Gaby Prieto gave the okay to treat the pt today with an elevated BP  Will continue to monitor

## 2021-12-29 ENCOUNTER — OFFICE VISIT (OUTPATIENT)
Dept: HEMATOLOGY ONCOLOGY | Facility: CLINIC | Age: 61
End: 2021-12-29
Payer: COMMERCIAL

## 2021-12-29 VITALS
HEART RATE: 60 BPM | BODY MASS INDEX: 38.38 KG/M2 | WEIGHT: 224.8 LBS | DIASTOLIC BLOOD PRESSURE: 86 MMHG | TEMPERATURE: 98.3 F | SYSTOLIC BLOOD PRESSURE: 162 MMHG | RESPIRATION RATE: 18 BRPM | HEIGHT: 64 IN | OXYGEN SATURATION: 98 %

## 2021-12-29 DIAGNOSIS — D50.9 MICROCYTIC ANEMIA: ICD-10-CM

## 2021-12-29 DIAGNOSIS — R73.9 ELEVATED BLOOD SUGAR: ICD-10-CM

## 2021-12-29 DIAGNOSIS — M25.551 RIGHT HIP PAIN: ICD-10-CM

## 2021-12-29 DIAGNOSIS — C82.18 GRADE 2 FOLLICULAR LYMPHOMA OF LYMPH NODES OF MULTIPLE REGIONS (HCC): Primary | ICD-10-CM

## 2021-12-29 DIAGNOSIS — D51.8 OTHER VITAMIN B12 DEFICIENCY ANEMIAS: ICD-10-CM

## 2021-12-29 DIAGNOSIS — R10.9 RIGHT FLANK PAIN: ICD-10-CM

## 2021-12-29 PROCEDURE — 99215 OFFICE O/P EST HI 40 MIN: CPT | Performed by: NURSE PRACTITIONER

## 2021-12-29 RX ORDER — SODIUM CHLORIDE 9 MG/ML
20 INJECTION, SOLUTION INTRAVENOUS ONCE
Status: CANCELLED | OUTPATIENT
Start: 2022-02-01

## 2021-12-29 RX ORDER — SODIUM CHLORIDE 9 MG/ML
20 INJECTION, SOLUTION INTRAVENOUS ONCE
Status: CANCELLED | OUTPATIENT
Start: 2022-02-08

## 2021-12-29 RX ORDER — SODIUM CHLORIDE 9 MG/ML
20 INJECTION, SOLUTION INTRAVENOUS ONCE
Status: CANCELLED | OUTPATIENT
Start: 2022-02-15

## 2022-01-03 ENCOUNTER — HOSPITAL ENCOUNTER (OUTPATIENT)
Dept: RADIOLOGY | Facility: HOSPITAL | Age: 62
Discharge: HOME | End: 2022-01-03
Payer: COMMERCIAL

## 2022-01-03 ENCOUNTER — APPOINTMENT (OUTPATIENT)
Dept: LAB | Facility: HOSPITAL | Age: 62
End: 2022-01-03
Payer: COMMERCIAL

## 2022-01-03 ENCOUNTER — HOSPITAL ENCOUNTER (OUTPATIENT)
Dept: ULTRASOUND IMAGING | Facility: HOSPITAL | Age: 62
Discharge: HOME | End: 2022-01-03
Payer: COMMERCIAL

## 2022-01-03 DIAGNOSIS — C82.18 GRADE 2 FOLLICULAR LYMPHOMA OF LYMPH NODES OF MULTIPLE REGIONS (HCC): ICD-10-CM

## 2022-01-03 DIAGNOSIS — M25.551 RIGHT HIP PAIN: ICD-10-CM

## 2022-01-03 DIAGNOSIS — R73.9 ELEVATED BLOOD SUGAR: ICD-10-CM

## 2022-01-03 DIAGNOSIS — R10.9 RIGHT FLANK PAIN: ICD-10-CM

## 2022-01-03 DIAGNOSIS — D51.8 OTHER VITAMIN B12 DEFICIENCY ANEMIAS: ICD-10-CM

## 2022-01-03 DIAGNOSIS — D50.9 MICROCYTIC ANEMIA: ICD-10-CM

## 2022-01-03 LAB
ALBUMIN SERPL BCP-MCNC: 4.1 G/DL (ref 3–5.2)
ALP SERPL-CCNC: 95 U/L (ref 43–122)
ALT SERPL W P-5'-P-CCNC: 15 U/L
ANION GAP SERPL CALCULATED.3IONS-SCNC: 2 MMOL/L (ref 5–14)
ANISOCYTOSIS BLD QL SMEAR: PRESENT
AST SERPL W P-5'-P-CCNC: 23 U/L (ref 14–36)
BASOPHILS # BLD AUTO: 0 THOUSANDS/ΜL (ref 0–0.1)
BASOPHILS NFR BLD AUTO: 0 % (ref 0–1)
BILIRUB SERPL-MCNC: 0.65 MG/DL
BUN SERPL-MCNC: 15 MG/DL (ref 5–25)
CALCIUM SERPL-MCNC: 9 MG/DL (ref 8.4–10.2)
CHLORIDE SERPL-SCNC: 106 MMOL/L (ref 97–108)
CO2 SERPL-SCNC: 28 MMOL/L (ref 22–30)
CREAT SERPL-MCNC: 0.71 MG/DL (ref 0.6–1.2)
CRP SERPL QL: 12.9 MG/L
EOSINOPHIL # BLD AUTO: 0.1 THOUSAND/ΜL (ref 0–0.4)
EOSINOPHIL NFR BLD AUTO: 2 % (ref 0–6)
ERYTHROCYTE [DISTWIDTH] IN BLOOD BY AUTOMATED COUNT: 16.9 %
ERYTHROCYTE [SEDIMENTATION RATE] IN BLOOD: 48 MM/HOUR (ref 0–29)
EST. AVERAGE GLUCOSE BLD GHB EST-MCNC: 166 MG/DL
FERRITIN SERPL-MCNC: 59 NG/ML (ref 8–388)
GFR SERPL CREATININE-BSD FRML MDRD: 93 ML/MIN/1.73SQ M
GLUCOSE P FAST SERPL-MCNC: 105 MG/DL (ref 70–99)
HBA1C MFR BLD: 7.4 %
HCT VFR BLD AUTO: 36 % (ref 36–46)
HGB BLD-MCNC: 11.6 G/DL (ref 12–16)
IRON SATN MFR SERPL: 17 % (ref 15–50)
IRON SERPL-MCNC: 56 UG/DL (ref 50–170)
LDH SERPL-CCNC: 621 U/L (ref 313–618)
LYMPHOCYTES # BLD AUTO: 1.3 THOUSANDS/ΜL (ref 0.5–4)
LYMPHOCYTES NFR BLD AUTO: 29 % (ref 25–45)
MCH RBC QN AUTO: 22.2 PG (ref 26–34)
MCHC RBC AUTO-ENTMCNC: 32.4 G/DL (ref 31–36)
MCV RBC AUTO: 69 FL (ref 80–100)
MONOCYTES # BLD AUTO: 0.2 THOUSAND/ΜL (ref 0.2–0.9)
MONOCYTES NFR BLD AUTO: 6 % (ref 1–10)
NEUTROPHILS # BLD AUTO: 2.8 THOUSANDS/ΜL (ref 1.8–7.8)
NEUTS SEG NFR BLD AUTO: 63 % (ref 45–65)
OVALOCYTES BLD QL SMEAR: PRESENT
PLATELET # BLD AUTO: 177 THOUSANDS/UL (ref 150–450)
PLATELET BLD QL SMEAR: ADEQUATE
PMV BLD AUTO: 8.9 FL (ref 8.9–12.7)
POIKILOCYTOSIS BLD QL SMEAR: PRESENT
POTASSIUM SERPL-SCNC: 4.3 MMOL/L (ref 3.6–5)
PROT SERPL-MCNC: 7.3 G/DL (ref 5.9–8.4)
RBC # BLD AUTO: 5.25 MILLION/UL (ref 4–5.2)
RBC MORPH BLD: NORMAL
SODIUM SERPL-SCNC: 136 MMOL/L (ref 137–147)
TIBC SERPL-MCNC: 325 UG/DL (ref 250–450)
VIT B12 SERPL-MCNC: 987 PG/ML (ref 100–900)
WBC # BLD AUTO: 4.4 THOUSAND/UL (ref 4.5–11)

## 2022-01-03 PROCEDURE — 85652 RBC SED RATE AUTOMATED: CPT

## 2022-01-03 PROCEDURE — 82607 VITAMIN B-12: CPT

## 2022-01-03 PROCEDURE — 85025 COMPLETE CBC W/AUTO DIFF WBC: CPT | Performed by: NURSE PRACTITIONER

## 2022-01-03 PROCEDURE — 82728 ASSAY OF FERRITIN: CPT

## 2022-01-03 PROCEDURE — 36415 COLL VENOUS BLD VENIPUNCTURE: CPT | Performed by: NURSE PRACTITIONER

## 2022-01-03 PROCEDURE — 73502 X-RAY EXAM HIP UNI 2-3 VIEWS: CPT

## 2022-01-03 PROCEDURE — 76700 US EXAM ABDOM COMPLETE: CPT

## 2022-01-03 PROCEDURE — 83550 IRON BINDING TEST: CPT

## 2022-01-03 PROCEDURE — 80053 COMPREHEN METABOLIC PANEL: CPT | Performed by: NURSE PRACTITIONER

## 2022-01-03 PROCEDURE — 86140 C-REACTIVE PROTEIN: CPT

## 2022-01-03 PROCEDURE — 83615 LACTATE (LD) (LDH) ENZYME: CPT

## 2022-01-03 PROCEDURE — 83540 ASSAY OF IRON: CPT

## 2022-01-03 PROCEDURE — 83036 HEMOGLOBIN GLYCOSYLATED A1C: CPT

## 2022-01-04 ENCOUNTER — HOSPITAL ENCOUNTER (OUTPATIENT)
Dept: INFUSION CENTER | Facility: HOSPITAL | Age: 62
Discharge: HOME | End: 2022-01-04
Attending: INTERNAL MEDICINE
Payer: COMMERCIAL

## 2022-01-04 VITALS — RESPIRATION RATE: 16 BRPM | HEART RATE: 59 BPM | DIASTOLIC BLOOD PRESSURE: 81 MMHG | SYSTOLIC BLOOD PRESSURE: 139 MMHG

## 2022-01-04 DIAGNOSIS — C82.18 GRADE 2 FOLLICULAR LYMPHOMA OF LYMPH NODES OF MULTIPLE REGIONS (HCC): ICD-10-CM

## 2022-01-04 DIAGNOSIS — Z91.89 AT HIGH RISK OF TUMOR LYSIS SYNDROME: ICD-10-CM

## 2022-01-04 DIAGNOSIS — T45.1X5A CHEMOTHERAPY-INDUCED NEUTROPENIA (HCC): Primary | ICD-10-CM

## 2022-01-04 DIAGNOSIS — D70.1 CHEMOTHERAPY-INDUCED NEUTROPENIA (HCC): Primary | ICD-10-CM

## 2022-01-04 LAB — GLUCOSE SERPL-MCNC: 88 MG/DL (ref 65–140)

## 2022-01-04 PROCEDURE — 82948 REAGENT STRIP/BLOOD GLUCOSE: CPT

## 2022-01-04 PROCEDURE — 96367 TX/PROPH/DG ADDL SEQ IV INF: CPT

## 2022-01-04 PROCEDURE — 96413 CHEMO IV INFUSION 1 HR: CPT

## 2022-01-04 RX ORDER — SODIUM CHLORIDE 9 MG/ML
20 INJECTION, SOLUTION INTRAVENOUS ONCE
Status: COMPLETED | OUTPATIENT
Start: 2022-01-04 | End: 2022-01-04

## 2022-01-04 RX ADMIN — COPANLISIB 60 MG: 15 INJECTION, POWDER, LYOPHILIZED, FOR SOLUTION INTRAVENOUS at 12:08

## 2022-01-04 RX ADMIN — SODIUM CHLORIDE 20 ML/HR: 0.9 INJECTION, SOLUTION INTRAVENOUS at 11:29

## 2022-01-04 RX ADMIN — ONDANSETRON 8 MG: 2 SOLUTION INTRAMUSCULAR; INTRAVENOUS at 11:29

## 2022-01-04 NOTE — PROGRESS NOTES
Pt offers no new complaints today, tolerated oliqopa without complications, confirmed next appt and AVS provided

## 2022-01-05 ENCOUNTER — TELEPHONE (OUTPATIENT)
Dept: HEMATOLOGY ONCOLOGY | Facility: CLINIC | Age: 62
End: 2022-01-05

## 2022-01-05 NOTE — TELEPHONE ENCOUNTER
Patient advised that her ultrasound was normal per YE Agarwal      Patient verbalized understanding and gratitude for the phone call       ----- Message from [address] sent at 1/5/2022  8:54 AM EST -----  Regarding: call pt with results   Can you please call the patient at your convenience and let her know that her ultrasound came back normal     Thanks  Parminder Mace  ----- Message -----  From: Interface, Radiology Results In  Sent: 1/4/2022   5:02 PM EST  To: YE Burgos

## 2022-01-05 NOTE — RESULT ENCOUNTER NOTE
Patient advised that her ultrasound was normal per YE Us  Patient verbalized understanding and gratitude for the phone call

## 2022-01-18 ENCOUNTER — HOSPITAL ENCOUNTER (OUTPATIENT)
Dept: INFUSION CENTER | Facility: HOSPITAL | Age: 62
Discharge: HOME | End: 2022-01-18
Attending: INTERNAL MEDICINE

## 2022-01-18 VITALS — TEMPERATURE: 98.9 F

## 2022-01-18 DIAGNOSIS — C82.18 GRADE 2 FOLLICULAR LYMPHOMA OF LYMPH NODES OF MULTIPLE REGIONS (HCC): ICD-10-CM

## 2022-01-18 PROCEDURE — 87636 SARSCOV2 & INF A&B AMP PRB: CPT | Performed by: INTERNAL MEDICINE

## 2022-01-18 NOTE — PROGRESS NOTES
Pt arrives on unit for her treatment today  Using [employer]  She reports to  have a  cough, stuffy nose, sore throat, and body aches for 3 days  Notified Nikkie Storey RN   Per Kenia Harmon pt will need to go to the [facility] [place] for COVID/ FLU testing  Notified pt of same  Pt given address and states her family will come pick her up and take her

## 2022-01-24 ENCOUNTER — TELEPHONE (OUTPATIENT)
Dept: HEMATOLOGY ONCOLOGY | Facility: CLINIC | Age: 62
End: 2022-01-24

## 2022-01-24 ENCOUNTER — HOSPITAL ENCOUNTER (OUTPATIENT)
Dept: INFUSION CENTER | Facility: HOSPITAL | Age: 62
Discharge: HOME | End: 2022-01-24
Payer: COMMERCIAL

## 2022-01-24 ENCOUNTER — OFFICE VISIT (OUTPATIENT)
Dept: HEMATOLOGY ONCOLOGY | Facility: CLINIC | Age: 62
End: 2022-01-24
Payer: COMMERCIAL

## 2022-01-24 VITALS
RESPIRATION RATE: 16 BRPM | TEMPERATURE: 98.2 F | BODY MASS INDEX: 39.91 KG/M2 | OXYGEN SATURATION: 97 % | DIASTOLIC BLOOD PRESSURE: 86 MMHG | WEIGHT: 233.8 LBS | HEART RATE: 80 BPM | SYSTOLIC BLOOD PRESSURE: 142 MMHG | HEIGHT: 64 IN

## 2022-01-24 DIAGNOSIS — Z91.89 AT HIGH RISK OF TUMOR LYSIS SYNDROME: Primary | ICD-10-CM

## 2022-01-24 DIAGNOSIS — G89.3 CANCER RELATED PAIN: ICD-10-CM

## 2022-01-24 DIAGNOSIS — C82.18 GRADE 2 FOLLICULAR LYMPHOMA OF LYMPH NODES OF MULTIPLE REGIONS (HCC): Primary | ICD-10-CM

## 2022-01-24 DIAGNOSIS — R05.9 COUGH: ICD-10-CM

## 2022-01-24 DIAGNOSIS — J06.9 UPPER RESPIRATORY TRACT INFECTION, UNSPECIFIED TYPE: ICD-10-CM

## 2022-01-24 DIAGNOSIS — D70.1 CHEMOTHERAPY-INDUCED NEUTROPENIA (HCC): ICD-10-CM

## 2022-01-24 DIAGNOSIS — M54.50 ACUTE MIDLINE LOW BACK PAIN WITHOUT SCIATICA: ICD-10-CM

## 2022-01-24 DIAGNOSIS — K12.30 MUCOSITIS: ICD-10-CM

## 2022-01-24 DIAGNOSIS — T45.1X5A CHEMOTHERAPY-INDUCED NEUTROPENIA (HCC): ICD-10-CM

## 2022-01-24 DIAGNOSIS — C82.18 GRADE 2 FOLLICULAR LYMPHOMA OF LYMPH NODES OF MULTIPLE REGIONS (HCC): ICD-10-CM

## 2022-01-24 DIAGNOSIS — Z45.2 ENCOUNTER FOR CENTRAL LINE CARE: ICD-10-CM

## 2022-01-24 LAB
ALBUMIN SERPL BCP-MCNC: 3.8 G/DL (ref 3–5.2)
ALP SERPL-CCNC: 90 U/L (ref 43–122)
ALT SERPL W P-5'-P-CCNC: 16 U/L
ANION GAP SERPL CALCULATED.3IONS-SCNC: 3 MMOL/L (ref 5–14)
ANISOCYTOSIS BLD QL SMEAR: PRESENT
AST SERPL W P-5'-P-CCNC: 21 U/L (ref 14–36)
BASOPHILS # BLD AUTO: 0 THOUSANDS/ΜL (ref 0–0.1)
BASOPHILS NFR BLD AUTO: 1 % (ref 0–1)
BILIRUB SERPL-MCNC: 0.5 MG/DL
BUN SERPL-MCNC: 16 MG/DL (ref 5–25)
CALCIUM SERPL-MCNC: 8.8 MG/DL (ref 8.4–10.2)
CHLORIDE SERPL-SCNC: 105 MMOL/L (ref 97–108)
CO2 SERPL-SCNC: 29 MMOL/L (ref 22–30)
CREAT SERPL-MCNC: 0.78 MG/DL (ref 0.6–1.2)
EOSINOPHIL # BLD AUTO: 0.2 THOUSAND/ΜL (ref 0–0.4)
EOSINOPHIL NFR BLD AUTO: 2 % (ref 0–6)
ERYTHROCYTE [DISTWIDTH] IN BLOOD BY AUTOMATED COUNT: 15.5 %
GFR SERPL CREATININE-BSD FRML MDRD: 83 ML/MIN/1.73SQ M
GLUCOSE SERPL-MCNC: 91 MG/DL (ref 70–99)
HCT VFR BLD AUTO: 36.9 % (ref 36–46)
HGB BLD-MCNC: 11.2 G/DL (ref 12–16)
HYPERCHROMIA BLD QL SMEAR: PRESENT
LDH SERPL-CCNC: 513 U/L (ref 313–618)
LYMPHOCYTES # BLD AUTO: 1.9 THOUSANDS/ΜL (ref 0.5–4)
LYMPHOCYTES NFR BLD AUTO: 30 % (ref 25–45)
MCH RBC QN AUTO: 21.1 PG (ref 26–34)
MCHC RBC AUTO-ENTMCNC: 30.4 G/DL (ref 31–36)
MCV RBC AUTO: 69 FL (ref 80–100)
MICROCYTES BLD QL AUTO: PRESENT
MONOCYTES # BLD AUTO: 0.5 THOUSAND/ΜL (ref 0.2–0.9)
MONOCYTES NFR BLD AUTO: 7 % (ref 1–10)
NEUTROPHILS # BLD AUTO: 3.8 THOUSANDS/ΜL (ref 1.8–7.8)
NEUTS SEG NFR BLD AUTO: 60 % (ref 45–65)
OVALOCYTES BLD QL SMEAR: PRESENT
PLATELET # BLD AUTO: 197 THOUSANDS/UL (ref 150–450)
PLATELET BLD QL SMEAR: ADEQUATE
PMV BLD AUTO: 8.4 FL (ref 8.9–12.7)
POIKILOCYTOSIS BLD QL SMEAR: PRESENT
POTASSIUM SERPL-SCNC: 4.2 MMOL/L (ref 3.6–5)
PROT SERPL-MCNC: 6.7 G/DL (ref 5.9–8.4)
RBC # BLD AUTO: 5.32 MILLION/UL (ref 4–5.2)
RBC MORPH BLD: NORMAL
SCHISTOCYTES BLD QL SMEAR: PRESENT
SODIUM SERPL-SCNC: 137 MMOL/L (ref 137–147)
WBC # BLD AUTO: 6.4 THOUSAND/UL (ref 4.5–11)

## 2022-01-24 PROCEDURE — 36593 DECLOT VASCULAR DEVICE: CPT

## 2022-01-24 PROCEDURE — 83615 LACTATE (LD) (LDH) ENZYME: CPT | Performed by: INTERNAL MEDICINE

## 2022-01-24 PROCEDURE — 85025 COMPLETE CBC W/AUTO DIFF WBC: CPT

## 2022-01-24 PROCEDURE — 99214 OFFICE O/P EST MOD 30 MIN: CPT | Performed by: NURSE PRACTITIONER

## 2022-01-24 PROCEDURE — 80053 COMPREHEN METABOLIC PANEL: CPT

## 2022-01-24 RX ORDER — BENZONATATE 200 MG/1
200 CAPSULE ORAL 3 TIMES DAILY PRN
Qty: 20 CAPSULE | Refills: 1 | Status: SHIPPED | OUTPATIENT
Start: 2022-01-24 | End: 2022-01-24 | Stop reason: SDUPTHER

## 2022-01-24 RX ORDER — AZITHROMYCIN 250 MG/1
TABLET, FILM COATED ORAL
Qty: 6 TABLET | Refills: 0 | Status: SHIPPED | OUTPATIENT
Start: 2022-01-24 | End: 2022-01-28

## 2022-01-24 RX ORDER — BENZONATATE 200 MG/1
200 CAPSULE ORAL 3 TIMES DAILY PRN
Qty: 20 CAPSULE | Refills: 1 | Status: SHIPPED | OUTPATIENT
Start: 2022-01-24

## 2022-01-24 RX ADMIN — ALTEPLASE 2 MG: 2.2 INJECTION, POWDER, LYOPHILIZED, FOR SOLUTION INTRAVENOUS at 10:57

## 2022-01-24 NOTE — PROGRESS NOTES
Patients treatment to be held one week per L  V  [facility]  Patients port de accessed without difficulty, brisk blood return noted

## 2022-01-24 NOTE — PROGRESS NOTES
Port accessed with no blood return noted  Alteplase administered  Blood return noted after 30 minute instill time  Labs drawn via port  Port left accessed for tomorrows treatment  Patient offers no complaints  Next appointment verified, AVS declined

## 2022-01-24 NOTE — TELEPHONE ENCOUNTER
Phoned [address] and spoke to Albin Isabel RN to let her know that pts tx will be delayed by one week to give her time to recover from Resp infection
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
## 2022-01-25 ENCOUNTER — HOSPITAL ENCOUNTER (OUTPATIENT)
Dept: INFUSION CENTER | Facility: HOSPITAL | Age: 62
Discharge: HOME | End: 2022-01-25
Attending: INTERNAL MEDICINE

## 2022-01-25 NOTE — PATIENT INSTRUCTIONS
PRESCRIPTION REFILL REMINDER:  All medication refills should be requested prior to [facility] on Friday  Any refill requests after noon on Friday would be addressed the following Monday  Please protect yourself from COVID-19 and the delta and omicron variants! Even though we do not have good antiviral drugs for this infection, the following tools can help you stay healthy:    = Wash your hands! Soap and water, or hand  with at least 60% alcohol, are both effective at killing the virus  = Wear a mask! This will help protect others from any virus particles you might spread  Your mouth and nose BOTH need to be covered  = Keep the distance! Keep 6 feet of distance from other people, even if they seem healthy  Keeping distance protects you from the other person's virus spread     = Get a vaccine! Three vaccines are approved for use in the [place] for all adults  These vaccines do provide protection against the delta variant, and seem to reduce severity of omicron infection  + Pfizer is FDA approved for all persons age 11 and older   + Jerricassandran [place] may be given to all person age 25 and older   + Geryl Lown may be given to all person age 25 and older  (Serious blood clot side effects have only been reported in reproductive-age women, and these are about 1-in-a-million  We do NOT recommend J&J for people who have had [place] syndrome )  = You may get a shot from many other locations outside [facility]: [facility], [employer], [facility], [facility], and certain [facility] locations  + As of 11/29/21, the CDC recommends booster shots on ALL vaccines for ALL adults  https://malaveAppvancemilo com/      We are recommending that ALL our patients get a complete series of one of the three vaccines, and boost it ASAP  Call 3-881-OEAUTBQ (Choose Option 7 for faster service!) to get scheduled for your shot        Informacion en espanol sobre vacunas, de nos [facility] --  Amy rosales      Numbers of coronavirus cases (and COVID deaths) are worsening in many [place] States, among unvaccinated people, we think this is because vaccines are working, but only if you get one! As of 12/1/21, we do NOT advise travel OUTSIDE the [address], and we encourage you to be very careful when planning travel INSIDE the [address]  Check out [employer] for [place] data that are updated daily:    http://www Caterna/     Global Epidemics  Org, from [facility] (OUTPATIENT C[facility]), will give you Ftzvrz-hq-Bqaszs information on virus cases and vaccination rates:    Https://globalepidemics  org/    Frequently Asked Questions about COVID, answered by [facility]    [employer] es

## 2022-01-26 ENCOUNTER — TELEPHONE (OUTPATIENT)
Dept: PALLIATIVE MEDICINE | Facility: CLINIC | Age: 62
End: 2022-01-26

## 2022-01-26 ENCOUNTER — OFFICE VISIT (OUTPATIENT)
Dept: PALLIATIVE MEDICINE | Facility: CLINIC | Age: 62
End: 2022-01-26
Payer: COMMERCIAL

## 2022-01-26 ENCOUNTER — SOCIAL WORK (OUTPATIENT)
Dept: PALLIATIVE MEDICINE | Facility: CLINIC | Age: 62
End: 2022-01-26
Payer: COMMERCIAL

## 2022-01-26 VITALS
HEART RATE: 69 BPM | SYSTOLIC BLOOD PRESSURE: 172 MMHG | DIASTOLIC BLOOD PRESSURE: 98 MMHG | OXYGEN SATURATION: 98 % | BODY MASS INDEX: 40.96 KG/M2 | RESPIRATION RATE: 20 BRPM | TEMPERATURE: 97.4 F | WEIGHT: 238.6 LBS

## 2022-01-26 DIAGNOSIS — C82.18 GRADE 2 FOLLICULAR LYMPHOMA OF LYMPH NODES OF MULTIPLE REGIONS (HCC): ICD-10-CM

## 2022-01-26 DIAGNOSIS — G89.3 CANCER RELATED PAIN: Primary | ICD-10-CM

## 2022-01-26 DIAGNOSIS — Z71.89 COUNSELING AND COORDINATION OF CARE: Primary | ICD-10-CM

## 2022-01-26 DIAGNOSIS — K59.03 DRUG INDUCED CONSTIPATION: ICD-10-CM

## 2022-01-26 DIAGNOSIS — R10.30 LOWER ABDOMINAL PAIN: ICD-10-CM

## 2022-01-26 PROCEDURE — 99214 OFFICE O/P EST MOD 30 MIN: CPT | Performed by: INTERNAL MEDICINE

## 2022-01-26 PROCEDURE — NC001 PR NO CHARGE

## 2022-01-26 RX ORDER — OXYCODONE HYDROCHLORIDE 15 MG/1
TABLET ORAL
Qty: 90 TABLET | Refills: 0 | Status: SHIPPED | OUTPATIENT
Start: 2022-01-26 | End: 2022-02-25 | Stop reason: SDUPTHER

## 2022-01-26 NOTE — PROGRESS NOTES
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 [facility])       · Chronic cancer related pain mostly in her neck and abdomen is well-controlled with current regimen  Continue taper (started 10/2021)  · D/c oxy 20mg and start OxyIR 15mg PO q6H prn  Max of 4 a day  Next will be trail of 10mg IR if appropriate  · Of note, treatment strategy for patient was/is kept as simple as possible due to difficulty following regimen   · Bowel regimen to prevent OIC  · She will have a repeat PET-CT soon  · RTO in 3 months, call sooner if needed  Controlled Substance [place]    [place] or [place]  reviewed: No red flags were identified; safe to proceed with prescription         12/23/2021  2   12/23/2021  Oxycodone Hcl (Ir) 20 MG Tab    90 00  23 Ti Joselito   [id number]   All ([id number])   0  117 39 MME  [place]   11/26/2021  1   11/23/2021  Oxycodone Hcl (Ir) 20 MG Tab    90 00  30 [place]   [id number]   All ([id number])   0  90 00 MME  [place]   10/27/2021  1   10/27/2021  Oxycodone Hcl (Ir) 20 MG Tab    120 00  30 Ti Joselito   [id number]   All ([id number])   0  120 00 MME  [place]       Requested Prescriptions     Signed Prescriptions Disp Refills    oxyCODONE (ROXICODONE) 15 mg immediate release tablet 90 tablet 0     Sig: [place] 1 tableta cada 6 horas fernandez sea necesario solo para el dolor intenso  [place] 1 tableta cada 6 horas fernandez sea necesario solo para el dolor intenso  Sukhdeep de 4 comprimidos al d a  Sukhdeep de 4 comprimidos al misael  Trate de monique menos si puede       Medications Discontinued During This Encounter   Medication Reason    oxyCODONE ([place]) 20 MG TABS        Representatives have queried the patient's controlled substance dispensing history in the Prescription Drug Monitoring Program in compliance with regulations before I have prescribed any controlled substances  The prescription history is consistent with prescribed therapy and our practice policies  30 minutes were spent face to face with Juan Holliday with greater than 50% of the time spent in counseling or coordination of care including discussions of etiology of diagnosis, diagnostic results, impression, and recommendations, risks and benefits of treatment, instructions for disease self management, treatment instructions, follow up requirements, risk factors and risk reduction of disease, patient and family counseling/involvement in care and compliance with treatment regimen   All of the patient's questions were answered during this discussion  No follow-ups on file  Subjective:   Chief Complaint  Follow up visit for:  symptom management, pain, neoplasm related, assessment of goals of care, disease process education and discussion of prognosis, advance care planning    Nausea  Associated symptoms include abdominal pain  Pertinent negatives include no chest pain, chills, coughing, fatigue, fever, nausea or neck pain  Medication Refill  Associated symptoms include abdominal pain  Pertinent negatives include no chest pain, chills, coughing, fatigue, fever, nausea or neck pain  Juan Holliday is a 63 y o  female with grade 2 follicular lymphoma initially diagnosed in Nov 2018  Started on chemo (rituximab + bendamustine) soon after  Developed tumor lysis syndrome in Dec 2018, bendamustine removed  She completed rituxan on 4/2019 and had been on maintenance Rituxan since 5/2019  In March 2020, she had evidence of disease progression and was also started on Revlimid   Per review of notes, there are some concerns with noncompliance and f/u with bloodwork   Per Oncology note, long discussion with patient regarding refractory and progressive grade 2 follicular lymphoma   Patient with c/o increase L neck swelling/tenderness, US done, concerning for progression of her disease  Her biopsy from the L iliac bone and L cervical LN came back positive for follicular lymphoma  Her PET-CT 6/4 unfortunately showed significant progression of disease in the neck, chest, abdomen, pelvis, including new lesion in the L ribs  She was on Obinutuzumab/CHOP with Udenyca before  There was a delay in her treatment when she had Covid-19 as well as when she went to [facility] to relocate  She was going to stay there to get treatments, but it didn't work out so she came back  She is now on treatment with Copanlisib 60 mg 3 weeks on with 1 week of a break since   6/29/2021  She had an MRI brain done in 8/14/2021 that showed no brain lesions or any acute intracranial abnormality (except for 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)  This was ordered by oncology due to patient's complaints of dizziness and vision changes  Her latest PET-CT on 10/19 showed significantly improved hypermetabolic adenopathy throughout the neck, chest, abdomen and pelvis, however viable tumor likely remains  Increased activity is also noted in the proximal humeri and proximal right femoral shaft as above  She had an episode of increasing dental pain in 11/23 for which CT head was ordered that showed 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  CT ST neck also ordered that showed large cavities within the L maxillary molars and smaller cavities in the R mandibular molars  She was also ordered Xray of the R hip and US abdomen for R flank pain in 1/2022 that showed no abnormalities  She was last seen 10/27 where she reports controlled CRP  We started to taper her pain medications given results of last PET-CT showing improvement in activities of known LNs  Since her last visit, she continues to follow up with oncology  She last saw them 1/24/2022 where her treatments were deferred for a week due to respiratory infection (Covid and Flu negative)   She was prescribed azithromycin, tessalon perles and magic mouthwash  She comes back today for routine follow up of cancer related pain   utilized  She reports doing well  She was not able to go to [place] like she was planning before due to raging Covid cases there  She tells me about her recent experiences with R flank pain and URI  We reviewed what was ordered before  We spoke again about tapering her opioids given good PET-CT result in 11/2021  We will also await the next one  But we spoke about decreasing her oxy now to 15mg PO q6H prn  She can take up to 4 a day  She is agreeable  She is moving her bowels  No other issues  She will continue to follow up with her PCP for her non-cancer related issues  The following portions of the medical history were reviewed: past medical history, problem list, medication list, and social history      Current Outpatient Medications:     acetaminophen (TYLENOL) 325 mg tablet, Take 2 tablets (650 mg total) by mouth every 6 (six) hours as needed for mild pain, moderate pain, headaches or fever, Disp: 30 tablet, Rfl: 0    acyclovir (ZOVIRAX) 400 MG tablet, Take 1 tablet (400 mg total) by mouth 2 (two) times a day, Disp: 60 tablet, Rfl: 11    al mag oxide-diphenhydramine-lidocaine viscous (MAGIC MOUTHWASH) 1:1:1 suspension, Swish and spit 10 mL every 4 (four) hours as needed for mouth pain or discomfort or mucositis, Disp: 180 mL, Rfl: 3    allopurinol (ZYLOPRIM) 100 mg tablet, Take 1 tablet (100 mg total) by mouth daily, Disp: 30 tablet, Rfl: 0    amLODIPine (NORVASC) 5 mg tablet, Take 1 tablet (5 mg total) by mouth daily, Disp: 30 tablet, Rfl: 3    ascorbic acid (VITAMIN C) 1000 MG tablet, Take 1 tablet (1,000 mg total) by mouth every 12 (twelve) hours for 10 doses, Disp: 10 tablet, Rfl: 0    aspirin (ECOTRIN LOW STRENGTH) 81 mg EC tablet, Take 1 tablet (81 mg total) by mouth daily, Disp: 30 tablet, Rfl: 11    aspirin-acetaminophen-caffeine (EXCEDRIN MIGRAINE) 250-250-65 MG per tablet, Take 1 tablet by mouth every 6 (six) hours as needed for headaches Erica 1 tableta cada 6 horas fernandez sea necesario para el dolor de russ, Disp: 30 tablet, Rfl: 0    atorvastatin (LIPITOR) 40 mg tablet, Take 1 tablet (40 mg total) by mouth daily with dinner, Disp: 12 tablet, Rfl: 0    azithromycin (ZITHROMAX) 250 mg tablet, Take 2 tablets today then 1 tablet daily x 4 days, Disp: 6 tablet, Rfl: 0    benzonatate (TESSALON) 200 MG capsule, Take 1 capsule (200 mg total) by mouth 3 (three) times a day as needed for cough, Disp: 20 capsule, Rfl: 1    carbamide peroxide (DEBROX) 6 5 % otic solution, Administer 5 drops to the right ear 2 (two) times a day, Disp: 15 mL, Rfl: 0    cholecalciferol (VITAMIN D3) 1,000 units tablet, Take 2 tablets (2,000 Units total) by mouth daily for 5 days, Disp: 10 tablet, Rfl: 0    cyanocobalamin 1000 MCG tablet, Take 1 tablet (1,000 mcg total) by mouth daily, Disp: 90 tablet, Rfl: 3    dexamethasone (DECADRON) 1 mg tablet, Take 1 tablet (1 mg total) by mouth 2 (two) times a day Erica 1 tableta dos veces al misael (por la manana y al mediodia), Disp: 60 tablet, Rfl: 3    DULoxetine (CYMBALTA) 20 mg capsule, Take 1 capsule (20 mg total) by mouth daily, Disp: 30 capsule, Rfl: 3    folic acid (FOLVITE) 1 mg tablet, Take 1 tablet (1 mg total) by mouth daily, Disp: 90 tablet, Rfl: 4    guaiFENesin (ROBITUSSIN) 100 mg/5 mL oral solution, Take 10 mL (200 mg total) by mouth every 4 (four) hours, Disp: 473 mL, Rfl: 0    lidocaine (LMX) 4 % cream, Apply topically as needed for mild pain To neck, Disp: 30 g, Rfl: 0    LORazepam (ATIVAN) 0 5 mg tablet, Take 1 tablet 30-60 minutes before MRI, can take an additional tablet if needed before test (Patient not taking: Reported on 10/27/2021), Disp: 2 tablet, Rfl: 0    meclizine (ANTIVERT) 12 5 MG tablet, Take 1 tablet (12 5 mg total) by mouth every 8 (eight) hours as needed for dizziness, Disp: 30 tablet, Rfl: 0    naloxone (NARCAN) 4 mg/0 1 mL nasal spray, Administer 1 spray into a nostril  If no response after 2-3 minutes, give another dose in the other nostril using a new spray , Disp: 1 each, Rfl: 0    omeprazole (PriLOSEC) 20 mg delayed release capsule, Take 2 capsules (40 mg total) by mouth daily To prevent stomach upset, Disp: 60 capsule, Rfl: 5    ondansetron (ZOFRAN) 4 mg tablet, Take 1 tablet (4 mg total) by mouth every 8 (eight) hours as needed for nausea or vomiting, Disp: 30 tablet, Rfl: 3    oxyCODONE (ROXICODONE) 15 mg immediate release tablet, [place] 1 tableta cada 6 horas fernandez sea necesario solo para el dolor intenso  [place] 1 tableta cada 6 horas fernandez sea necesario solo para el dolor intenso  Sukhdeep de 4 comprimidos al d a  Suhkdeep de 4 comprimidos al misael  Trate de monique menos si puede , Disp: 90 tablet, Rfl: 0    polyethylene glycol (MIRALAX) 17 g packet, Take 17 g by mouth daily, Disp: 30 each, Rfl: 3    senna (SENOKOT) 8 6 mg, Take 1 tablet (8 6 mg total) by mouth 2 (two) times a day, Disp: 60 each, Rfl: 3    zinc sulfate (ZINCATE) 220 mg capsule, Take 1 capsule (220 mg total) by mouth daily for 4 doses, Disp: 4 capsule, Rfl: 0  Review of Systems   Constitutional: Negative for activity change, appetite change, chills, fatigue, fever and unexpected weight change  HENT: Negative for trouble swallowing  Respiratory: Negative for cough and shortness of breath  Cardiovascular: Negative for chest pain  Gastrointestinal: Positive for abdominal pain  Negative for constipation, diarrhea and nausea  Musculoskeletal: Negative for neck pain  Neurological: Negative for speech difficulty and light-headedness  Psychiatric/Behavioral: Negative for sleep disturbance  The patient is not nervous/anxious  All other systems reviewed and are negative        All other systems negative    Objective:  Vital Signs  BP (!) 172/98 (BP Location: Left arm, Patient Position: Sitting, Cuff Size: Large)   Pulse 69   Temp (!) 97 4 °F (36 3 °C) (Temporal) Resp 20   Wt 108 kg (238 lb 9 6 oz)   SpO2 98%   BMI 40 96 kg/m²    Physical Exam    Constitutional: Appears well-developed and well-nourished, she did not look sick, nor toxic-looking  Pleasant, well-kempt  Appeared to have gained some more weight  Head: Normocephalic and atraumatic  Eyes: EOM are normal  No ocular discharge  No scleral icterus  Respiratory: Effort normal  No stridor  No respiratory distress  Gastrointestinal: No abdominal distension  Musculoskeletal: No edema  Neurological: Alert, oriented and appropriately conversant  Skin: No diaphoresis, no rashes seen on exposed areas of skin  Psychiatric: Displays a normal mood and affect   Behavior, judgement and thought content appear normal      Shilo Randall MD  Palliative Medicine & Supportive Care  Internal Medicine  Available via [employer]  Office: [phone]  Fax: [phone]

## 2022-01-26 NOTE — PROGRESS NOTES
Palliative Supportive Care  met with patient to continue to provide emotional support and guidance  Translation line was utilized, as patient is Stateless speaking only  Updated biopsychosocial information relevant to support: Pt presents for her follow up appointment  Overall she is doing ok  She reports that for the past month she has had congestion, she was tested for Covid and it was negative, therefore antibiotics were prescribed for an upper respiratory infection  Dr Clau Kuo expressed that her last scan had good results, especially regarding the tumors shrinking  Dr Clau Kuo suggested decreasing her oxy from 20 mg to 15 mg  Pt verbalized agreement and understanding  Pt has not been able to travel to [facility] due to [address], however she has good support from her son and DIL     Identified areas of need include: Emotional Support  Resources provided: None  Areas that need future follow-up include:

## 2022-01-27 ENCOUNTER — TELEPHONE (OUTPATIENT)
Dept: PALLIATIVE MEDICINE | Facility: CLINIC | Age: 62
End: 2022-01-27

## 2022-01-27 DIAGNOSIS — C82.18 GRADE 2 FOLLICULAR LYMPHOMA OF LYMPH NODES OF MULTIPLE REGIONS (HCC): Primary | ICD-10-CM

## 2022-01-27 NOTE — TELEPHONE ENCOUNTER
Received a call from someone needing to discuss a prior auth requesting to speak to belkis   I sent her a tiger text

## 2022-01-28 NOTE — TELEPHONE ENCOUNTER
Prior Authorization needed for oxycodone [facility] 15 mg tablet    [address]: [address] [facility]
Received faxed letter stating need instructions on administering medication  Alexandr placed to [facility]  [phone]    Instructions given to representative  Case reopened  Case number [id number]  Rep Alexandra Yadav called to change case number)    Await determination 
Received prior authorization approval for Oxycodone 15mg tablet quantity of 136 for 34 days has been approved for 6 months from 1/27/2022 to 7/27/2022    Faxed results to Pharmacy  Pharmacy has been asked to inform pt of outcome 
Submitted a PA request through Covermymeds, awaiting determination 
98.8

## 2022-01-31 ENCOUNTER — HOSPITAL ENCOUNTER (OUTPATIENT)
Dept: INFUSION CENTER | Facility: HOSPITAL | Age: 62
Discharge: HOME | End: 2022-01-31
Payer: COMMERCIAL

## 2022-01-31 DIAGNOSIS — Z45.2 ENCOUNTER FOR CENTRAL LINE CARE: ICD-10-CM

## 2022-01-31 DIAGNOSIS — C82.18 GRADE 2 FOLLICULAR LYMPHOMA OF LYMPH NODES OF MULTIPLE REGIONS (HCC): Primary | ICD-10-CM

## 2022-01-31 LAB
ALBUMIN SERPL BCP-MCNC: 4 G/DL (ref 3–5.2)
ALP SERPL-CCNC: 84 U/L (ref 43–122)
ALT SERPL W P-5'-P-CCNC: 17 U/L
ANION GAP SERPL CALCULATED.3IONS-SCNC: 7 MMOL/L (ref 5–14)
ANISOCYTOSIS BLD QL SMEAR: PRESENT
AST SERPL W P-5'-P-CCNC: 20 U/L (ref 14–36)
BASOPHILS # BLD AUTO: 0 THOUSANDS/ΜL (ref 0–0.1)
BASOPHILS NFR BLD AUTO: 1 % (ref 0–1)
BILIRUB SERPL-MCNC: 0.3 MG/DL
BUN SERPL-MCNC: 16 MG/DL (ref 5–25)
CALCIUM SERPL-MCNC: 8.8 MG/DL (ref 8.4–10.2)
CHLORIDE SERPL-SCNC: 106 MMOL/L (ref 97–108)
CO2 SERPL-SCNC: 27 MMOL/L (ref 22–30)
CREAT SERPL-MCNC: 0.82 MG/DL (ref 0.6–1.2)
EOSINOPHIL # BLD AUTO: 0.2 THOUSAND/ΜL (ref 0–0.4)
EOSINOPHIL NFR BLD AUTO: 3 % (ref 0–6)
ERYTHROCYTE [DISTWIDTH] IN BLOOD BY AUTOMATED COUNT: 15.2 %
GFR SERPL CREATININE-BSD FRML MDRD: 78 ML/MIN/1.73SQ M
GLUCOSE SERPL-MCNC: 109 MG/DL (ref 70–99)
HCT VFR BLD AUTO: 35.4 % (ref 36–46)
HGB BLD-MCNC: 10.9 G/DL (ref 12–16)
HYPERCHROMIA BLD QL SMEAR: PRESENT
LDH SERPL-CCNC: 467 U/L (ref 313–618)
LYMPHOCYTES # BLD AUTO: 1.8 THOUSANDS/ΜL (ref 0.5–4)
LYMPHOCYTES NFR BLD AUTO: 28 % (ref 25–45)
MCH RBC QN AUTO: 21.1 PG (ref 26–34)
MCHC RBC AUTO-ENTMCNC: 30.7 G/DL (ref 31–36)
MCV RBC AUTO: 69 FL (ref 80–100)
MICROCYTES BLD QL AUTO: PRESENT
MONOCYTES # BLD AUTO: 0.5 THOUSAND/ΜL (ref 0.2–0.9)
MONOCYTES NFR BLD AUTO: 7 % (ref 1–10)
NEUTROPHILS # BLD AUTO: 4 THOUSANDS/ΜL (ref 1.8–7.8)
NEUTS SEG NFR BLD AUTO: 62 % (ref 45–65)
OVALOCYTES BLD QL SMEAR: PRESENT
PLATELET # BLD AUTO: 197 THOUSANDS/UL (ref 150–450)
PLATELET BLD QL SMEAR: ADEQUATE
PMV BLD AUTO: 8.6 FL (ref 8.9–12.7)
POIKILOCYTOSIS BLD QL SMEAR: PRESENT
POTASSIUM SERPL-SCNC: 4 MMOL/L (ref 3.6–5)
PROT SERPL-MCNC: 6.6 G/DL (ref 5.9–8.4)
RBC # BLD AUTO: 5.17 MILLION/UL (ref 4–5.2)
RBC MORPH BLD: NORMAL
SODIUM SERPL-SCNC: 140 MMOL/L (ref 137–147)
WBC # BLD AUTO: 6.5 THOUSAND/UL (ref 4.5–11)

## 2022-01-31 PROCEDURE — 80053 COMPREHEN METABOLIC PANEL: CPT

## 2022-01-31 PROCEDURE — 83615 LACTATE (LD) (LDH) ENZYME: CPT | Performed by: INTERNAL MEDICINE

## 2022-01-31 PROCEDURE — 85025 COMPLETE CBC W/AUTO DIFF WBC: CPT

## 2022-01-31 NOTE — PROGRESS NOTES
Labs drawn via port  Patient offers no complaints  Port left in place for tomorrows treatment  Next appointment verified, AVS provided

## 2022-01-31 NOTE — PLAN OF CARE
Problem: SAFETY ADULT  Goal: Patient will remain free of falls  Description: INTERVENTIONS:  - Educate patient/family on patient safety including physical limitations  - Instruct patient to call for assistance with activity   - Consult OT/PT to assist with strengthening/mobility   - Keep Call bell within reach    Outcome: Progressing     Problem: Knowledge Deficit  Goal: Patient/family/caregiver demonstrates understanding of disease process, treatment plan, medications, and discharge instructions  Description: Complete learning assessment and assess knowledge base    Interventions:  - Provide teaching at level of understanding  - Provide teaching via preferred learning methods  Outcome: Progressing

## 2022-02-01 ENCOUNTER — HOSPITAL ENCOUNTER (OUTPATIENT)
Dept: INFUSION CENTER | Facility: HOSPITAL | Age: 62
Discharge: HOME | End: 2022-02-01
Attending: INTERNAL MEDICINE
Payer: COMMERCIAL

## 2022-02-01 VITALS
TEMPERATURE: 96.8 F | RESPIRATION RATE: 16 BRPM | DIASTOLIC BLOOD PRESSURE: 62 MMHG | SYSTOLIC BLOOD PRESSURE: 134 MMHG | HEART RATE: 71 BPM

## 2022-02-01 DIAGNOSIS — D51.8 OTHER VITAMIN B12 DEFICIENCY ANEMIAS: Primary | ICD-10-CM

## 2022-02-01 DIAGNOSIS — C82.18 GRADE 2 FOLLICULAR LYMPHOMA OF LYMPH NODES OF MULTIPLE REGIONS (HCC): ICD-10-CM

## 2022-02-01 LAB — GLUCOSE SERPL-MCNC: 84 MG/DL (ref 65–140)

## 2022-02-01 PROCEDURE — 96372 THER/PROPH/DIAG INJ SC/IM: CPT

## 2022-02-01 PROCEDURE — 96367 TX/PROPH/DG ADDL SEQ IV INF: CPT

## 2022-02-01 PROCEDURE — 96413 CHEMO IV INFUSION 1 HR: CPT

## 2022-02-01 PROCEDURE — 82948 REAGENT STRIP/BLOOD GLUCOSE: CPT

## 2022-02-01 RX ORDER — CYANOCOBALAMIN 1000 UG/ML
1000 INJECTION INTRAMUSCULAR; SUBCUTANEOUS ONCE
Status: CANCELLED | OUTPATIENT
Start: 2022-03-01

## 2022-02-01 RX ORDER — CYANOCOBALAMIN 1000 UG/ML
1000 INJECTION INTRAMUSCULAR; SUBCUTANEOUS ONCE
Status: COMPLETED | OUTPATIENT
Start: 2022-02-01 | End: 2022-02-01

## 2022-02-01 RX ORDER — SODIUM CHLORIDE 9 MG/ML
20 INJECTION, SOLUTION INTRAVENOUS ONCE
Status: COMPLETED | OUTPATIENT
Start: 2022-02-01 | End: 2022-02-01

## 2022-02-01 RX ADMIN — COPANLISIB 60 MG: 15 INJECTION, POWDER, LYOPHILIZED, FOR SOLUTION INTRAVENOUS at 10:57

## 2022-02-01 RX ADMIN — CYANOCOBALAMIN 1000 MCG: 1000 INJECTION INTRAMUSCULAR; SUBCUTANEOUS at 09:58

## 2022-02-01 RX ADMIN — SODIUM CHLORIDE 20 ML/HR: 0.9 INJECTION, SOLUTION INTRAVENOUS at 10:03

## 2022-02-01 RX ADMIN — ONDANSETRON 8 MG: 2 SOLUTION INTRAMUSCULAR; INTRAVENOUS at 10:03

## 2022-02-01 NOTE — PROGRESS NOTES
Patient offers no new complaints today  Feels better, still has stuffy nose  No cough  Denies fevers/chills  Patient tolerated aliqopa well without complications  Confirmed next week and AVS provided

## 2022-02-07 ENCOUNTER — HOSPITAL ENCOUNTER (OUTPATIENT)
Dept: INFUSION CENTER | Facility: HOSPITAL | Age: 62
Discharge: HOME | End: 2022-02-07
Payer: COMMERCIAL

## 2022-02-07 DIAGNOSIS — C82.18 GRADE 2 FOLLICULAR LYMPHOMA OF LYMPH NODES OF MULTIPLE REGIONS (HCC): Primary | ICD-10-CM

## 2022-02-07 DIAGNOSIS — Z45.2 ENCOUNTER FOR CENTRAL LINE CARE: ICD-10-CM

## 2022-02-07 LAB
ALBUMIN SERPL BCP-MCNC: 3.9 G/DL (ref 3–5.2)
ALP SERPL-CCNC: 82 U/L (ref 43–122)
ALT SERPL W P-5'-P-CCNC: 17 U/L
ANION GAP SERPL CALCULATED.3IONS-SCNC: 3 MMOL/L (ref 5–14)
ANISOCYTOSIS BLD QL SMEAR: PRESENT
AST SERPL W P-5'-P-CCNC: 18 U/L (ref 14–36)
BASOPHILS # BLD AUTO: 0 THOUSANDS/ΜL (ref 0–0.1)
BASOPHILS NFR BLD AUTO: 0 % (ref 0–1)
BILIRUB SERPL-MCNC: 0.46 MG/DL
BUN SERPL-MCNC: 13 MG/DL (ref 5–25)
CALCIUM SERPL-MCNC: 8.5 MG/DL (ref 8.4–10.2)
CHLORIDE SERPL-SCNC: 106 MMOL/L (ref 97–108)
CO2 SERPL-SCNC: 29 MMOL/L (ref 22–30)
CREAT SERPL-MCNC: 0.91 MG/DL (ref 0.6–1.2)
EOSINOPHIL # BLD AUTO: 0.1 THOUSAND/ΜL (ref 0–0.4)
EOSINOPHIL NFR BLD AUTO: 2 % (ref 0–6)
ERYTHROCYTE [DISTWIDTH] IN BLOOD BY AUTOMATED COUNT: 15.4 %
GFR SERPL CREATININE-BSD FRML MDRD: 69 ML/MIN/1.73SQ M
GLUCOSE SERPL-MCNC: 137 MG/DL (ref 70–99)
HCT VFR BLD AUTO: 36.5 % (ref 36–46)
HGB BLD-MCNC: 11.2 G/DL (ref 12–16)
HYPERCHROMIA BLD QL SMEAR: PRESENT
LDH SERPL-CCNC: 483 U/L (ref 313–618)
LYMPHOCYTES # BLD AUTO: 1.8 THOUSANDS/ΜL (ref 0.5–4)
LYMPHOCYTES NFR BLD AUTO: 30 % (ref 25–45)
MCH RBC QN AUTO: 21.3 PG (ref 26–34)
MCHC RBC AUTO-ENTMCNC: 30.7 G/DL (ref 31–36)
MCV RBC AUTO: 69 FL (ref 80–100)
MICROCYTES BLD QL AUTO: PRESENT
MONOCYTES # BLD AUTO: 0.3 THOUSAND/ΜL (ref 0.2–0.9)
MONOCYTES NFR BLD AUTO: 5 % (ref 1–10)
NEUTROPHILS # BLD AUTO: 3.8 THOUSANDS/ΜL (ref 1.8–7.8)
NEUTS SEG NFR BLD AUTO: 63 % (ref 45–65)
OVALOCYTES BLD QL SMEAR: PRESENT
PLATELET # BLD AUTO: 197 THOUSANDS/UL (ref 150–450)
PLATELET BLD QL SMEAR: ADEQUATE
PMV BLD AUTO: 8.6 FL (ref 8.9–12.7)
POIKILOCYTOSIS BLD QL SMEAR: PRESENT
POTASSIUM SERPL-SCNC: 4 MMOL/L (ref 3.6–5)
PROT SERPL-MCNC: 6.7 G/DL (ref 5.9–8.4)
RBC # BLD AUTO: 5.27 MILLION/UL (ref 4–5.2)
RBC MORPH BLD: NORMAL
SODIUM SERPL-SCNC: 138 MMOL/L (ref 137–147)
WBC # BLD AUTO: 6.1 THOUSAND/UL (ref 4.5–11)

## 2022-02-07 PROCEDURE — 80053 COMPREHEN METABOLIC PANEL: CPT

## 2022-02-07 PROCEDURE — 83615 LACTATE (LD) (LDH) ENZYME: CPT | Performed by: INTERNAL MEDICINE

## 2022-02-07 PROCEDURE — 85025 COMPLETE CBC W/AUTO DIFF WBC: CPT

## 2022-02-07 NOTE — PROGRESS NOTES
Labs drawn via port  Patient offers no complaints  Port left in place for tomorrows treatment  Next appointment verified, AVS declined

## 2022-02-08 ENCOUNTER — HOSPITAL ENCOUNTER (OUTPATIENT)
Dept: INFUSION CENTER | Facility: HOSPITAL | Age: 62
Discharge: HOME | End: 2022-02-08
Attending: INTERNAL MEDICINE
Payer: COMMERCIAL

## 2022-02-08 VITALS
TEMPERATURE: 98.1 F | OXYGEN SATURATION: 100 % | HEART RATE: 65 BPM | RESPIRATION RATE: 20 BRPM | DIASTOLIC BLOOD PRESSURE: 80 MMHG | SYSTOLIC BLOOD PRESSURE: 148 MMHG

## 2022-02-08 DIAGNOSIS — C82.18 GRADE 2 FOLLICULAR LYMPHOMA OF LYMPH NODES OF MULTIPLE REGIONS (HCC): Primary | ICD-10-CM

## 2022-02-08 LAB — GLUCOSE SERPL-MCNC: 140 MG/DL (ref 65–140)

## 2022-02-08 PROCEDURE — 82948 REAGENT STRIP/BLOOD GLUCOSE: CPT

## 2022-02-08 PROCEDURE — 96367 TX/PROPH/DG ADDL SEQ IV INF: CPT

## 2022-02-08 PROCEDURE — 96413 CHEMO IV INFUSION 1 HR: CPT

## 2022-02-08 RX ORDER — SODIUM CHLORIDE 9 MG/ML
20 INJECTION, SOLUTION INTRAVENOUS ONCE
Status: COMPLETED | OUTPATIENT
Start: 2022-02-08 | End: 2022-02-08

## 2022-02-08 RX ADMIN — ONDANSETRON 8 MG: 2 SOLUTION INTRAMUSCULAR; INTRAVENOUS at 10:12

## 2022-02-08 RX ADMIN — SODIUM CHLORIDE 20 ML/HR: 0.9 INJECTION, SOLUTION INTRAVENOUS at 10:08

## 2022-02-08 RX ADMIN — COPANLISIB 60 MG: 15 INJECTION, POWDER, LYOPHILIZED, FOR SOLUTION INTRAVENOUS at 10:56

## 2022-02-08 NOTE — PROGRESS NOTES
Pt tolerated Aliqopa infusion without difficulty  No s/s reaction noted  Port flushed and deaccessed per protocol  Next appt confirmed    Left ambulatory with AVS

## 2022-02-14 ENCOUNTER — HOSPITAL ENCOUNTER (OUTPATIENT)
Dept: INFUSION CENTER | Facility: HOSPITAL | Age: 62
Discharge: HOME | End: 2022-02-14
Payer: COMMERCIAL

## 2022-02-14 DIAGNOSIS — Z45.2 ENCOUNTER FOR CENTRAL LINE CARE: ICD-10-CM

## 2022-02-14 DIAGNOSIS — C82.18 GRADE 2 FOLLICULAR LYMPHOMA OF LYMPH NODES OF MULTIPLE REGIONS (HCC): Primary | ICD-10-CM

## 2022-02-14 LAB
ALBUMIN SERPL BCP-MCNC: 4.1 G/DL (ref 3–5.2)
ALP SERPL-CCNC: 87 U/L (ref 43–122)
ALT SERPL W P-5'-P-CCNC: 15 U/L
ANION GAP SERPL CALCULATED.3IONS-SCNC: 4 MMOL/L (ref 5–14)
ANISOCYTOSIS BLD QL SMEAR: PRESENT
AST SERPL W P-5'-P-CCNC: 20 U/L (ref 14–36)
BASOPHILS # BLD AUTO: 0 THOUSANDS/ΜL (ref 0–0.1)
BASOPHILS NFR BLD AUTO: 1 % (ref 0–1)
BILIRUB SERPL-MCNC: 0.46 MG/DL
BUN SERPL-MCNC: 16 MG/DL (ref 5–25)
CALCIUM SERPL-MCNC: 9 MG/DL (ref 8.4–10.2)
CHLORIDE SERPL-SCNC: 108 MMOL/L (ref 97–108)
CO2 SERPL-SCNC: 29 MMOL/L (ref 22–30)
CREAT SERPL-MCNC: 0.84 MG/DL (ref 0.6–1.2)
EOSINOPHIL # BLD AUTO: 0.1 THOUSAND/ΜL (ref 0–0.4)
EOSINOPHIL NFR BLD AUTO: 2 % (ref 0–6)
ERYTHROCYTE [DISTWIDTH] IN BLOOD BY AUTOMATED COUNT: 15 %
GFR SERPL CREATININE-BSD FRML MDRD: 76 ML/MIN/1.73SQ M
GLUCOSE SERPL-MCNC: 123 MG/DL (ref 70–99)
HCT VFR BLD AUTO: 36.2 % (ref 36–46)
HGB BLD-MCNC: 11.5 G/DL (ref 12–16)
HYPERCHROMIA BLD QL SMEAR: PRESENT
LDH SERPL-CCNC: 558 U/L (ref 313–618)
LYMPHOCYTES # BLD AUTO: 1.4 THOUSANDS/ΜL (ref 0.5–4)
LYMPHOCYTES NFR BLD AUTO: 28 % (ref 25–45)
MCH RBC QN AUTO: 21.8 PG (ref 26–34)
MCHC RBC AUTO-ENTMCNC: 31.9 G/DL (ref 31–36)
MCV RBC AUTO: 68 FL (ref 80–100)
MICROCYTES BLD QL AUTO: PRESENT
MONOCYTES # BLD AUTO: 0.3 THOUSAND/ΜL (ref 0.2–0.9)
MONOCYTES NFR BLD AUTO: 6 % (ref 1–10)
NEUTROPHILS # BLD AUTO: 3.3 THOUSANDS/ΜL (ref 1.8–7.8)
NEUTS SEG NFR BLD AUTO: 64 % (ref 45–65)
OVALOCYTES BLD QL SMEAR: PRESENT
PLATELET # BLD AUTO: 175 THOUSANDS/UL (ref 150–450)
PLATELET BLD QL SMEAR: ADEQUATE
PMV BLD AUTO: 8.9 FL (ref 8.9–12.7)
POIKILOCYTOSIS BLD QL SMEAR: PRESENT
POTASSIUM SERPL-SCNC: 4.2 MMOL/L (ref 3.6–5)
PROT SERPL-MCNC: 6.9 G/DL (ref 5.9–8.4)
RBC # BLD AUTO: 5.29 MILLION/UL (ref 4–5.2)
RBC MORPH BLD: PRESENT
SCHISTOCYTES BLD QL SMEAR: PRESENT
SODIUM SERPL-SCNC: 141 MMOL/L (ref 137–147)
WBC # BLD AUTO: 5.1 THOUSAND/UL (ref 4.5–11)

## 2022-02-14 PROCEDURE — 85652 RBC SED RATE AUTOMATED: CPT | Performed by: INTERNAL MEDICINE

## 2022-02-14 PROCEDURE — 85025 COMPLETE CBC W/AUTO DIFF WBC: CPT | Performed by: INTERNAL MEDICINE

## 2022-02-14 PROCEDURE — 83615 LACTATE (LD) (LDH) ENZYME: CPT | Performed by: INTERNAL MEDICINE

## 2022-02-14 PROCEDURE — 86140 C-REACTIVE PROTEIN: CPT | Performed by: INTERNAL MEDICINE

## 2022-02-14 PROCEDURE — 80053 COMPREHEN METABOLIC PANEL: CPT | Performed by: INTERNAL MEDICINE

## 2022-02-14 NOTE — PROGRESS NOTES
Port flushed per protocol, central labs drawn per orders, CHG dressing applied and port staying accessed for chemo tomorrow  Confirmed appt

## 2022-02-15 ENCOUNTER — HOSPITAL ENCOUNTER (OUTPATIENT)
Dept: INFUSION CENTER | Facility: HOSPITAL | Age: 62
Discharge: HOME | End: 2022-02-15
Attending: INTERNAL MEDICINE
Payer: COMMERCIAL

## 2022-02-15 VITALS
DIASTOLIC BLOOD PRESSURE: 71 MMHG | HEART RATE: 63 BPM | RESPIRATION RATE: 20 BRPM | TEMPERATURE: 97.2 F | SYSTOLIC BLOOD PRESSURE: 156 MMHG

## 2022-02-15 DIAGNOSIS — C82.18 GRADE 2 FOLLICULAR LYMPHOMA OF LYMPH NODES OF MULTIPLE REGIONS (HCC): Primary | ICD-10-CM

## 2022-02-15 LAB — GLUCOSE SERPL-MCNC: 79 MG/DL (ref 65–140)

## 2022-02-15 PROCEDURE — 96413 CHEMO IV INFUSION 1 HR: CPT

## 2022-02-15 PROCEDURE — 82948 REAGENT STRIP/BLOOD GLUCOSE: CPT

## 2022-02-15 PROCEDURE — 96367 TX/PROPH/DG ADDL SEQ IV INF: CPT

## 2022-02-15 RX ORDER — SODIUM CHLORIDE 9 MG/ML
20 INJECTION, SOLUTION INTRAVENOUS ONCE
Status: COMPLETED | OUTPATIENT
Start: 2022-02-15 | End: 2022-02-15

## 2022-02-15 RX ADMIN — ONDANSETRON 8 MG: 2 SOLUTION INTRAMUSCULAR; INTRAVENOUS at 10:37

## 2022-02-15 RX ADMIN — COPANLISIB 60 MG: 15 INJECTION, POWDER, LYOPHILIZED, FOR SOLUTION INTRAVENOUS at 11:16

## 2022-02-15 RX ADMIN — SODIUM CHLORIDE 20 ML/HR: 0.9 INJECTION, SOLUTION INTRAVENOUS at 10:37

## 2022-02-16 DIAGNOSIS — C82.18 GRADE 2 FOLLICULAR LYMPHOMA OF LYMPH NODES OF MULTIPLE REGIONS (HCC): Primary | ICD-10-CM

## 2022-02-16 LAB
CRP SERPL QL: 20.4 MG/L
ERYTHROCYTE [SEDIMENTATION RATE] IN BLOOD: 5 MM/HOUR (ref 0–29)

## 2022-02-21 ENCOUNTER — OFFICE VISIT (OUTPATIENT)
Dept: HEMATOLOGY ONCOLOGY | Facility: CLINIC | Age: 62
End: 2022-02-21
Payer: COMMERCIAL

## 2022-02-21 VITALS
SYSTOLIC BLOOD PRESSURE: 150 MMHG | WEIGHT: 239.6 LBS | DIASTOLIC BLOOD PRESSURE: 88 MMHG | BODY MASS INDEX: 40.9 KG/M2 | TEMPERATURE: 97.4 F | HEIGHT: 64 IN | RESPIRATION RATE: 18 BRPM | OXYGEN SATURATION: 99 % | HEART RATE: 69 BPM

## 2022-02-21 DIAGNOSIS — R05.9 COUGH: ICD-10-CM

## 2022-02-21 DIAGNOSIS — C82.18 GRADE 2 FOLLICULAR LYMPHOMA OF LYMPH NODES OF MULTIPLE REGIONS (HCC): Primary | ICD-10-CM

## 2022-02-21 DIAGNOSIS — T45.1X5A CHEMOTHERAPY-INDUCED NEUTROPENIA (HCC): ICD-10-CM

## 2022-02-21 DIAGNOSIS — Z91.89 AT HIGH RISK OF TUMOR LYSIS SYNDROME: ICD-10-CM

## 2022-02-21 DIAGNOSIS — J06.9 UPPER RESPIRATORY TRACT INFECTION, UNSPECIFIED TYPE: ICD-10-CM

## 2022-02-21 DIAGNOSIS — D70.1 CHEMOTHERAPY-INDUCED NEUTROPENIA (HCC): ICD-10-CM

## 2022-02-21 PROCEDURE — 99214 OFFICE O/P EST MOD 30 MIN: CPT | Performed by: INTERNAL MEDICINE

## 2022-02-21 RX ORDER — LEVOFLOXACIN 500 MG/1
500 TABLET, FILM COATED ORAL EVERY 24 HOURS
Qty: 7 TABLET | Refills: 0 | Status: SHIPPED | OUTPATIENT
Start: 2022-02-21 | End: 2022-02-28

## 2022-02-21 RX ORDER — SODIUM CHLORIDE 9 MG/ML
20 INJECTION, SOLUTION INTRAVENOUS ONCE
Status: CANCELLED | OUTPATIENT
Start: 2022-03-08

## 2022-02-21 RX ORDER — SODIUM CHLORIDE 9 MG/ML
20 INJECTION, SOLUTION INTRAVENOUS ONCE
Status: CANCELLED | OUTPATIENT
Start: 2022-03-01

## 2022-02-21 RX ORDER — SODIUM CHLORIDE 9 MG/ML
20 INJECTION, SOLUTION INTRAVENOUS ONCE
Status: CANCELLED | OUTPATIENT
Start: 2022-03-15
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
## 2022-02-25 DIAGNOSIS — R10.30 LOWER ABDOMINAL PAIN: ICD-10-CM

## 2022-02-25 DIAGNOSIS — G89.3 CANCER RELATED PAIN: ICD-10-CM

## 2022-02-25 RX ORDER — OXYCODONE HYDROCHLORIDE 15 MG/1
TABLET ORAL
Qty: 90 TABLET | Refills: 0 | Status: SHIPPED | OUTPATIENT
Start: 2022-02-25 | End: 2022-03-18 | Stop reason: SDUPTHER

## 2022-02-28 ENCOUNTER — OFFICE VISIT (OUTPATIENT)
Dept: HEMATOLOGY ONCOLOGY | Facility: CLINIC | Age: 62
End: 2022-02-28
Payer: COMMERCIAL

## 2022-02-28 ENCOUNTER — HOSPITAL ENCOUNTER (OUTPATIENT)
Dept: INFUSION CENTER | Facility: HOSPITAL | Age: 62
Discharge: HOME | End: 2022-02-28
Payer: COMMERCIAL

## 2022-02-28 VITALS
RESPIRATION RATE: 16 BRPM | HEART RATE: 76 BPM | BODY MASS INDEX: 41.01 KG/M2 | TEMPERATURE: 97.2 F | WEIGHT: 240.2 LBS | OXYGEN SATURATION: 98 % | HEIGHT: 64 IN | SYSTOLIC BLOOD PRESSURE: 166 MMHG | DIASTOLIC BLOOD PRESSURE: 84 MMHG

## 2022-02-28 DIAGNOSIS — Z45.2 ENCOUNTER FOR CENTRAL LINE CARE: ICD-10-CM

## 2022-02-28 DIAGNOSIS — T45.1X5A CHEMOTHERAPY-INDUCED NEUTROPENIA (HCC): Primary | ICD-10-CM

## 2022-02-28 DIAGNOSIS — C82.18 GRADE 2 FOLLICULAR LYMPHOMA OF LYMPH NODES OF MULTIPLE REGIONS (HCC): ICD-10-CM

## 2022-02-28 DIAGNOSIS — Z91.89 AT HIGH RISK OF TUMOR LYSIS SYNDROME: ICD-10-CM

## 2022-02-28 DIAGNOSIS — J06.9 UPPER RESPIRATORY TRACT INFECTION, UNSPECIFIED TYPE: ICD-10-CM

## 2022-02-28 DIAGNOSIS — D70.1 CHEMOTHERAPY-INDUCED NEUTROPENIA (HCC): Primary | ICD-10-CM

## 2022-02-28 DIAGNOSIS — D51.8 OTHER VITAMIN B12 DEFICIENCY ANEMIAS: ICD-10-CM

## 2022-02-28 DIAGNOSIS — C82.18 GRADE 2 FOLLICULAR LYMPHOMA OF LYMPH NODES OF MULTIPLE REGIONS (HCC): Primary | ICD-10-CM

## 2022-02-28 LAB
ALBUMIN SERPL BCP-MCNC: 4 G/DL (ref 3–5.2)
ALP SERPL-CCNC: 84 U/L (ref 43–122)
ALT SERPL W P-5'-P-CCNC: 14 U/L
ANION GAP SERPL CALCULATED.3IONS-SCNC: 7 MMOL/L (ref 5–14)
ANISOCYTOSIS BLD QL SMEAR: PRESENT
AST SERPL W P-5'-P-CCNC: 19 U/L (ref 14–36)
BASOPHILS # BLD AUTO: 0 THOUSANDS/ΜL (ref 0–0.1)
BASOPHILS NFR BLD AUTO: 1 % (ref 0–1)
BILIRUB SERPL-MCNC: 0.45 MG/DL
BUN SERPL-MCNC: 16 MG/DL (ref 5–25)
CALCIUM SERPL-MCNC: 8.8 MG/DL (ref 8.4–10.2)
CHLORIDE SERPL-SCNC: 106 MMOL/L (ref 97–108)
CO2 SERPL-SCNC: 27 MMOL/L (ref 22–30)
CREAT SERPL-MCNC: 0.9 MG/DL (ref 0.6–1.2)
EOSINOPHIL # BLD AUTO: 0.1 THOUSAND/ΜL (ref 0–0.4)
EOSINOPHIL NFR BLD AUTO: 2 % (ref 0–6)
ERYTHROCYTE [DISTWIDTH] IN BLOOD BY AUTOMATED COUNT: 14.8 %
GFR SERPL CREATININE-BSD FRML MDRD: 70 ML/MIN/1.73SQ M
GLUCOSE SERPL-MCNC: 116 MG/DL (ref 70–99)
HCT VFR BLD AUTO: 35.5 % (ref 36–46)
HGB BLD-MCNC: 11.2 G/DL (ref 12–16)
HYPERCHROMIA BLD QL SMEAR: PRESENT
LDH SERPL-CCNC: 498 U/L (ref 313–618)
LYMPHOCYTES # BLD AUTO: 1.6 THOUSANDS/ΜL (ref 0.5–4)
LYMPHOCYTES NFR BLD AUTO: 31 % (ref 25–45)
MCH RBC QN AUTO: 21.5 PG (ref 26–34)
MCHC RBC AUTO-ENTMCNC: 31.4 G/DL (ref 31–36)
MCV RBC AUTO: 68 FL (ref 80–100)
MICROCYTES BLD QL AUTO: PRESENT
MONOCYTES # BLD AUTO: 0.4 THOUSAND/ΜL (ref 0.2–0.9)
MONOCYTES NFR BLD AUTO: 8 % (ref 1–10)
NEUTROPHILS # BLD AUTO: 2.9 THOUSANDS/ΜL (ref 1.8–7.8)
NEUTS SEG NFR BLD AUTO: 58 % (ref 45–65)
OVALOCYTES BLD QL SMEAR: PRESENT
PLATELET # BLD AUTO: 163 THOUSANDS/UL (ref 150–450)
PLATELET BLD QL SMEAR: ADEQUATE
PMV BLD AUTO: 8.9 FL (ref 8.9–12.7)
POIKILOCYTOSIS BLD QL SMEAR: PRESENT
POTASSIUM SERPL-SCNC: 4.1 MMOL/L (ref 3.6–5)
PROT SERPL-MCNC: 6.9 G/DL (ref 5.9–8.4)
RBC # BLD AUTO: 5.2 MILLION/UL (ref 4–5.2)
RBC MORPH BLD: NORMAL
SODIUM SERPL-SCNC: 140 MMOL/L (ref 137–147)
WBC # BLD AUTO: 5.1 THOUSAND/UL (ref 4.5–11)

## 2022-02-28 PROCEDURE — 99214 OFFICE O/P EST MOD 30 MIN: CPT | Performed by: INTERNAL MEDICINE

## 2022-02-28 PROCEDURE — 85025 COMPLETE CBC W/AUTO DIFF WBC: CPT | Performed by: INTERNAL MEDICINE

## 2022-02-28 PROCEDURE — 80053 COMPREHEN METABOLIC PANEL: CPT | Performed by: INTERNAL MEDICINE

## 2022-02-28 PROCEDURE — 83615 LACTATE (LD) (LDH) ENZYME: CPT | Performed by: INTERNAL MEDICINE

## 2022-02-28 NOTE — PROGRESS NOTES
Pt tolerated Lab draw today with no c/o  Port flushed per protocol  Port remained accessed for tomorrows treatment  Left unit ambulatory with a steady gait
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
## 2022-03-01 ENCOUNTER — TELEPHONE (OUTPATIENT)
Dept: FAMILY MEDICINE CLINIC | Facility: CLINIC | Age: 62
End: 2022-03-01

## 2022-03-01 ENCOUNTER — HOSPITAL ENCOUNTER (OUTPATIENT)
Dept: INFUSION CENTER | Facility: HOSPITAL | Age: 62
Discharge: HOME | End: 2022-03-01
Attending: INTERNAL MEDICINE
Payer: COMMERCIAL

## 2022-03-01 VITALS
HEART RATE: 57 BPM | TEMPERATURE: 97.5 F | DIASTOLIC BLOOD PRESSURE: 80 MMHG | SYSTOLIC BLOOD PRESSURE: 152 MMHG | RESPIRATION RATE: 16 BRPM

## 2022-03-01 DIAGNOSIS — D51.8 OTHER VITAMIN B12 DEFICIENCY ANEMIAS: ICD-10-CM

## 2022-03-01 DIAGNOSIS — C82.18 GRADE 2 FOLLICULAR LYMPHOMA OF LYMPH NODES OF MULTIPLE REGIONS (HCC): ICD-10-CM

## 2022-03-01 DIAGNOSIS — Z91.89 AT HIGH RISK OF TUMOR LYSIS SYNDROME: ICD-10-CM

## 2022-03-01 DIAGNOSIS — T45.1X5A CHEMOTHERAPY-INDUCED NEUTROPENIA (HCC): Primary | ICD-10-CM

## 2022-03-01 DIAGNOSIS — D70.1 CHEMOTHERAPY-INDUCED NEUTROPENIA (HCC): Primary | ICD-10-CM

## 2022-03-01 LAB — GLUCOSE SERPL-MCNC: 94 MG/DL (ref 65–140)

## 2022-03-01 PROCEDURE — 96367 TX/PROPH/DG ADDL SEQ IV INF: CPT

## 2022-03-01 PROCEDURE — 96372 THER/PROPH/DIAG INJ SC/IM: CPT

## 2022-03-01 PROCEDURE — 96413 CHEMO IV INFUSION 1 HR: CPT

## 2022-03-01 PROCEDURE — 82948 REAGENT STRIP/BLOOD GLUCOSE: CPT

## 2022-03-01 RX ORDER — CYANOCOBALAMIN 1000 UG/ML
1000 INJECTION INTRAMUSCULAR; SUBCUTANEOUS ONCE
Status: CANCELLED | OUTPATIENT
Start: 2022-03-29

## 2022-03-01 RX ORDER — SODIUM CHLORIDE 9 MG/ML
20 INJECTION, SOLUTION INTRAVENOUS ONCE
Status: COMPLETED | OUTPATIENT
Start: 2022-03-01 | End: 2022-03-01

## 2022-03-01 RX ORDER — CYANOCOBALAMIN 1000 UG/ML
1000 INJECTION INTRAMUSCULAR; SUBCUTANEOUS ONCE
Status: COMPLETED | OUTPATIENT
Start: 2022-03-01 | End: 2022-03-01

## 2022-03-01 RX ADMIN — CYANOCOBALAMIN 1000 MCG: 1000 INJECTION INTRAMUSCULAR; SUBCUTANEOUS at 10:16

## 2022-03-01 RX ADMIN — SODIUM CHLORIDE 20 ML/HR: 9 INJECTION, SOLUTION INTRAVENOUS at 10:16

## 2022-03-01 RX ADMIN — COPANLISIB 60 MG: 15 INJECTION, POWDER, LYOPHILIZED, FOR SOLUTION INTRAVENOUS at 10:55

## 2022-03-01 RX ADMIN — ONDANSETRON 8 MG: 2 INJECTION INTRAMUSCULAR; INTRAVENOUS at 10:16

## 2022-03-01 NOTE — PROGRESS NOTES
Spoke with [facility]  with patient, patient states she passed out last Saturday for 2 hours and has been having bilateral lower leg pains that go into her chest  Patient did not discuss at her office visit yesterday  Andres Morris RN aware and will discuss further with Dr Kaushik DEMPSEY to start patient premed while waiting

## 2022-03-01 NOTE — TELEPHONE ENCOUNTER
Attempted to call pt to reschedule ppt for 3/29/22 due to provider being unavailable, however I was unable to to reach patient, letter was mailed to call office to reschedule  and update information

## 2022-03-07 ENCOUNTER — HOSPITAL ENCOUNTER (OUTPATIENT)
Dept: INFUSION CENTER | Facility: HOSPITAL | Age: 62
Discharge: HOME | End: 2022-03-07
Payer: COMMERCIAL

## 2022-03-07 DIAGNOSIS — D70.1 CHEMOTHERAPY-INDUCED NEUTROPENIA (HCC): Primary | ICD-10-CM

## 2022-03-07 DIAGNOSIS — C82.18 GRADE 2 FOLLICULAR LYMPHOMA OF LYMPH NODES OF MULTIPLE REGIONS (HCC): ICD-10-CM

## 2022-03-07 DIAGNOSIS — T45.1X5A CHEMOTHERAPY-INDUCED NEUTROPENIA (HCC): Primary | ICD-10-CM

## 2022-03-07 DIAGNOSIS — Z45.2 ENCOUNTER FOR CENTRAL LINE CARE: ICD-10-CM

## 2022-03-07 DIAGNOSIS — Z91.89 AT HIGH RISK OF TUMOR LYSIS SYNDROME: ICD-10-CM

## 2022-03-07 LAB
ALBUMIN SERPL BCP-MCNC: 3.9 G/DL (ref 3–5.2)
ALP SERPL-CCNC: 85 U/L (ref 43–122)
ALT SERPL W P-5'-P-CCNC: 14 U/L
ANION GAP SERPL CALCULATED.3IONS-SCNC: 5 MMOL/L (ref 5–14)
AST SERPL W P-5'-P-CCNC: 19 U/L (ref 14–36)
BASOPHILS # BLD AUTO: 0 THOUSANDS/ΜL (ref 0–0.1)
BASOPHILS NFR BLD AUTO: 1 % (ref 0–1)
BILIRUB SERPL-MCNC: 0.59 MG/DL
BUN SERPL-MCNC: 13 MG/DL (ref 5–25)
CALCIUM SERPL-MCNC: 8.4 MG/DL (ref 8.4–10.2)
CHLORIDE SERPL-SCNC: 106 MMOL/L (ref 97–108)
CO2 SERPL-SCNC: 29 MMOL/L (ref 22–30)
CREAT SERPL-MCNC: 0.82 MG/DL (ref 0.6–1.2)
EOSINOPHIL # BLD AUTO: 0.1 THOUSAND/ΜL (ref 0–0.4)
EOSINOPHIL NFR BLD AUTO: 2 % (ref 0–6)
ERYTHROCYTE [DISTWIDTH] IN BLOOD BY AUTOMATED COUNT: 14.6 %
GFR SERPL CREATININE-BSD FRML MDRD: 78 ML/MIN/1.73SQ M
GLUCOSE SERPL-MCNC: 141 MG/DL (ref 70–99)
HCT VFR BLD AUTO: 33.8 % (ref 36–46)
HGB BLD-MCNC: 10.8 G/DL (ref 12–16)
LDH SERPL-CCNC: 527 U/L (ref 313–618)
LYMPHOCYTES # BLD AUTO: 1.5 THOUSANDS/ΜL (ref 0.5–4)
LYMPHOCYTES NFR BLD AUTO: 30 % (ref 25–45)
MCH RBC QN AUTO: 21.6 PG (ref 26–34)
MCHC RBC AUTO-ENTMCNC: 31.9 G/DL (ref 31–36)
MCV RBC AUTO: 68 FL (ref 80–100)
MICROCYTES BLD QL AUTO: PRESENT
MONOCYTES # BLD AUTO: 0.3 THOUSAND/ΜL (ref 0.2–0.9)
MONOCYTES NFR BLD AUTO: 5 % (ref 1–10)
NEUTROPHILS # BLD AUTO: 3.2 THOUSANDS/ΜL (ref 1.8–7.8)
NEUTS SEG NFR BLD AUTO: 62 % (ref 45–65)
PLATELET # BLD AUTO: 173 THOUSANDS/UL (ref 150–450)
PLATELET BLD QL SMEAR: ADEQUATE
PMV BLD AUTO: 8.7 FL (ref 8.9–12.7)
POTASSIUM SERPL-SCNC: 3.9 MMOL/L (ref 3.6–5)
PROT SERPL-MCNC: 6.7 G/DL (ref 5.9–8.4)
RBC # BLD AUTO: 4.98 MILLION/UL (ref 4–5.2)
RBC MORPH BLD: NORMAL
SODIUM SERPL-SCNC: 140 MMOL/L (ref 137–147)
WBC # BLD AUTO: 5 THOUSAND/UL (ref 4.5–11)

## 2022-03-07 PROCEDURE — 83615 LACTATE (LD) (LDH) ENZYME: CPT | Performed by: INTERNAL MEDICINE

## 2022-03-07 PROCEDURE — 80053 COMPREHEN METABOLIC PANEL: CPT | Performed by: INTERNAL MEDICINE

## 2022-03-07 PROCEDURE — 85025 COMPLETE CBC W/AUTO DIFF WBC: CPT | Performed by: INTERNAL MEDICINE

## 2022-03-07 NOTE — PROGRESS NOTES
Labs drawn via port  Brisk blood return noted  Port left accessed for tomorrows treatment  Patient offers no complaints  Next appointment verified, AVS declined

## 2022-03-08 ENCOUNTER — HOSPITAL ENCOUNTER (OUTPATIENT)
Dept: INFUSION CENTER | Facility: HOSPITAL | Age: 62
Discharge: HOME | End: 2022-03-08
Attending: INTERNAL MEDICINE
Payer: COMMERCIAL

## 2022-03-08 VITALS
BODY MASS INDEX: 41.7 KG/M2 | RESPIRATION RATE: 20 BRPM | SYSTOLIC BLOOD PRESSURE: 152 MMHG | WEIGHT: 242.95 LBS | TEMPERATURE: 97.2 F | DIASTOLIC BLOOD PRESSURE: 88 MMHG | HEART RATE: 60 BPM

## 2022-03-08 DIAGNOSIS — C82.18 GRADE 2 FOLLICULAR LYMPHOMA OF LYMPH NODES OF MULTIPLE REGIONS (HCC): ICD-10-CM

## 2022-03-08 DIAGNOSIS — T45.1X5A CHEMOTHERAPY-INDUCED NEUTROPENIA (HCC): Primary | ICD-10-CM

## 2022-03-08 DIAGNOSIS — D70.1 CHEMOTHERAPY-INDUCED NEUTROPENIA (HCC): Primary | ICD-10-CM

## 2022-03-08 DIAGNOSIS — Z91.89 AT HIGH RISK OF TUMOR LYSIS SYNDROME: ICD-10-CM

## 2022-03-08 LAB — GLUCOSE SERPL-MCNC: 167 MG/DL (ref 65–140)

## 2022-03-08 PROCEDURE — 82948 REAGENT STRIP/BLOOD GLUCOSE: CPT

## 2022-03-08 PROCEDURE — 96413 CHEMO IV INFUSION 1 HR: CPT

## 2022-03-08 PROCEDURE — 96367 TX/PROPH/DG ADDL SEQ IV INF: CPT

## 2022-03-08 RX ORDER — SODIUM CHLORIDE 9 MG/ML
20 INJECTION, SOLUTION INTRAVENOUS ONCE
Status: COMPLETED | OUTPATIENT
Start: 2022-03-08 | End: 2022-03-08

## 2022-03-08 RX ADMIN — SODIUM CHLORIDE 20 ML/HR: 0.9 INJECTION, SOLUTION INTRAVENOUS at 10:00

## 2022-03-08 RX ADMIN — ONDANSETRON 8 MG: 2 INJECTION INTRAMUSCULAR; INTRAVENOUS at 10:26

## 2022-03-08 RX ADMIN — COPANLISIB 60 MG: 15 INJECTION, POWDER, LYOPHILIZED, FOR SOLUTION INTRAVENOUS at 11:11

## 2022-03-08 NOTE — PROGRESS NOTES
Pt tolerated Aliqopa infusion without difficulty  Port flushed and deaccessed per protocol  Next appt confirmed  AVS provided  Left ambulatory with [facility]

## 2022-03-14 ENCOUNTER — HOSPITAL ENCOUNTER (OUTPATIENT)
Dept: INFUSION CENTER | Facility: HOSPITAL | Age: 62
Discharge: HOME | End: 2022-03-14
Payer: COMMERCIAL

## 2022-03-14 DIAGNOSIS — Z91.89 AT HIGH RISK OF TUMOR LYSIS SYNDROME: ICD-10-CM

## 2022-03-14 DIAGNOSIS — D70.1 CHEMOTHERAPY-INDUCED NEUTROPENIA (HCC): Primary | ICD-10-CM

## 2022-03-14 DIAGNOSIS — Z45.2 ENCOUNTER FOR CENTRAL LINE CARE: ICD-10-CM

## 2022-03-14 DIAGNOSIS — C82.18 GRADE 2 FOLLICULAR LYMPHOMA OF LYMPH NODES OF MULTIPLE REGIONS (HCC): ICD-10-CM

## 2022-03-14 DIAGNOSIS — T45.1X5A CHEMOTHERAPY-INDUCED NEUTROPENIA (HCC): Primary | ICD-10-CM

## 2022-03-14 LAB
ALBUMIN SERPL BCP-MCNC: 4.2 G/DL (ref 3–5.2)
ALP SERPL-CCNC: 91 U/L (ref 43–122)
ALT SERPL W P-5'-P-CCNC: 14 U/L
ANION GAP SERPL CALCULATED.3IONS-SCNC: 5 MMOL/L (ref 5–14)
AST SERPL W P-5'-P-CCNC: 20 U/L (ref 14–36)
BASOPHILS # BLD AUTO: 0 THOUSANDS/ΜL (ref 0–0.1)
BASOPHILS NFR BLD AUTO: 1 % (ref 0–1)
BILIRUB SERPL-MCNC: 0.55 MG/DL
BUN SERPL-MCNC: 14 MG/DL (ref 5–25)
CALCIUM SERPL-MCNC: 9 MG/DL (ref 8.4–10.2)
CHLORIDE SERPL-SCNC: 105 MMOL/L (ref 97–108)
CO2 SERPL-SCNC: 28 MMOL/L (ref 22–30)
CREAT SERPL-MCNC: 0.71 MG/DL (ref 0.6–1.2)
EOSINOPHIL # BLD AUTO: 0.1 THOUSAND/ΜL (ref 0–0.4)
EOSINOPHIL NFR BLD AUTO: 2 % (ref 0–6)
ERYTHROCYTE [DISTWIDTH] IN BLOOD BY AUTOMATED COUNT: 15.1 %
GFR SERPL CREATININE-BSD FRML MDRD: 93 ML/MIN/1.73SQ M
GLUCOSE SERPL-MCNC: 119 MG/DL (ref 70–99)
HCT VFR BLD AUTO: 37 % (ref 36–46)
HGB BLD-MCNC: 11.7 G/DL (ref 12–16)
LDH SERPL-CCNC: 519 U/L (ref 313–618)
LYMPHOCYTES # BLD AUTO: 1.4 THOUSANDS/ΜL (ref 0.5–4)
LYMPHOCYTES NFR BLD AUTO: 34 % (ref 25–45)
MCH RBC QN AUTO: 21.5 PG (ref 26–34)
MCHC RBC AUTO-ENTMCNC: 31.6 G/DL (ref 31–36)
MCV RBC AUTO: 68 FL (ref 80–100)
MICROCYTES BLD QL AUTO: PRESENT
MONOCYTES # BLD AUTO: 0.2 THOUSAND/ΜL (ref 0.2–0.9)
MONOCYTES NFR BLD AUTO: 6 % (ref 1–10)
NEUTROPHILS # BLD AUTO: 2.3 THOUSANDS/ΜL (ref 1.8–7.8)
NEUTS SEG NFR BLD AUTO: 57 % (ref 45–65)
PLATELET # BLD AUTO: 184 THOUSANDS/UL (ref 150–450)
PLATELET BLD QL SMEAR: ADEQUATE
PMV BLD AUTO: 8.4 FL (ref 8.9–12.7)
POTASSIUM SERPL-SCNC: 4.2 MMOL/L (ref 3.6–5)
PROT SERPL-MCNC: 7.3 G/DL (ref 5.9–8.4)
RBC # BLD AUTO: 5.43 MILLION/UL (ref 4–5.2)
RBC MORPH BLD: NORMAL
SODIUM SERPL-SCNC: 138 MMOL/L (ref 137–147)
WBC # BLD AUTO: 4.1 THOUSAND/UL (ref 4.5–11)

## 2022-03-14 PROCEDURE — 85025 COMPLETE CBC W/AUTO DIFF WBC: CPT | Performed by: INTERNAL MEDICINE

## 2022-03-14 PROCEDURE — 83615 LACTATE (LD) (LDH) ENZYME: CPT | Performed by: INTERNAL MEDICINE

## 2022-03-14 PROCEDURE — 80053 COMPREHEN METABOLIC PANEL: CPT | Performed by: INTERNAL MEDICINE

## 2022-03-15 ENCOUNTER — HOSPITAL ENCOUNTER (OUTPATIENT)
Dept: INFUSION CENTER | Facility: HOSPITAL | Age: 62
Discharge: HOME | End: 2022-03-15
Attending: INTERNAL MEDICINE
Payer: COMMERCIAL

## 2022-03-15 VITALS
DIASTOLIC BLOOD PRESSURE: 60 MMHG | TEMPERATURE: 98.1 F | RESPIRATION RATE: 16 BRPM | HEART RATE: 63 BPM | SYSTOLIC BLOOD PRESSURE: 142 MMHG

## 2022-03-15 DIAGNOSIS — T45.1X5A CHEMOTHERAPY-INDUCED NEUTROPENIA (HCC): Primary | ICD-10-CM

## 2022-03-15 DIAGNOSIS — Z91.89 AT HIGH RISK OF TUMOR LYSIS SYNDROME: ICD-10-CM

## 2022-03-15 DIAGNOSIS — D70.1 CHEMOTHERAPY-INDUCED NEUTROPENIA (HCC): Primary | ICD-10-CM

## 2022-03-15 DIAGNOSIS — C82.18 GRADE 2 FOLLICULAR LYMPHOMA OF LYMPH NODES OF MULTIPLE REGIONS (HCC): ICD-10-CM

## 2022-03-15 LAB — GLUCOSE SERPL-MCNC: 199 MG/DL (ref 65–140)

## 2022-03-15 PROCEDURE — 82948 REAGENT STRIP/BLOOD GLUCOSE: CPT

## 2022-03-15 PROCEDURE — 96367 TX/PROPH/DG ADDL SEQ IV INF: CPT

## 2022-03-15 PROCEDURE — 96413 CHEMO IV INFUSION 1 HR: CPT

## 2022-03-15 RX ORDER — SODIUM CHLORIDE 9 MG/ML
20 INJECTION, SOLUTION INTRAVENOUS ONCE
Status: COMPLETED | OUTPATIENT
Start: 2022-03-15 | End: 2022-03-15

## 2022-03-15 RX ADMIN — COPANLISIB 60 MG: 15 INJECTION, POWDER, LYOPHILIZED, FOR SOLUTION INTRAVENOUS at 10:30

## 2022-03-15 RX ADMIN — ONDANSETRON 8 MG: 2 INJECTION INTRAMUSCULAR; INTRAVENOUS at 09:50

## 2022-03-15 RX ADMIN — SODIUM CHLORIDE 20 ML/HR: 0.9 INJECTION, SOLUTION INTRAVENOUS at 09:36

## 2022-03-15 NOTE — PROGRESS NOTES
Patient tolerated chemo today without complications, next cycle made, confirmed Sherita's appt, schedule faxed to SELWYN FERRERA provided

## 2022-03-17 ENCOUNTER — TELEPHONE (OUTPATIENT)
Dept: FAMILY MEDICINE CLINIC | Facility: CLINIC | Age: 62
End: 2022-03-17

## 2022-03-18 ENCOUNTER — HOSPITAL ENCOUNTER (OUTPATIENT)
Dept: NUCLEAR MEDICINE | Facility: HOSPITAL | Age: 62
Discharge: HOME | End: 2022-03-18
Attending: INTERNAL MEDICINE
Payer: COMMERCIAL

## 2022-03-18 DIAGNOSIS — C82.18 GRADE 2 FOLLICULAR LYMPHOMA OF LYMPH NODES OF MULTIPLE REGIONS (HCC): ICD-10-CM

## 2022-03-18 DIAGNOSIS — R10.30 LOWER ABDOMINAL PAIN: ICD-10-CM

## 2022-03-18 DIAGNOSIS — G89.3 CANCER RELATED PAIN: ICD-10-CM

## 2022-03-18 LAB — GLUCOSE SERPL-MCNC: 357 MG/DL (ref 65–140)

## 2022-03-18 PROCEDURE — 82948 REAGENT STRIP/BLOOD GLUCOSE: CPT

## 2022-03-18 RX ORDER — OXYCODONE HYDROCHLORIDE 15 MG/1
TABLET ORAL
Qty: 90 TABLET | Refills: 0 | Status: SHIPPED | OUTPATIENT
Start: 2022-03-18 | End: 2022-04-27 | Stop reason: SDUPTHER

## 2022-03-18 NOTE — TELEPHONE ENCOUNTER
Primary palliative medicine provider: Dr Joshua Still     Medication requested:  Oxycodone 15 mg     If for pain, how has the patient been taking their pain medicine?  As prescribed, per patient she is due for her medication on 3/26    Last appointment: 1/26     Next scheduled appointment: 4/27     PDMP review:      [id number] 02/25/2022 02/25/2022 oxyCODONE HCL (Tablet) 90 0 23 15 MG 88 [facility] / 0 [place]     1 [id number] 01/26/2022 01/26/2022 oxyCODONE HCL (Tablet) 90 0 23 15 MG 88 04 [facility] [employer] [facility] Private Pay 0 / 0 [place]    1 [id number] 12/23/2021 12/23/2021 oxyCODONE HCL (Tablet) 90 0 23 20  39 [facility] Medicaid 0 / 0 [place]    2 [id number] 11/26/2021 11/23/2021 oxyCODONE HCL (Tablet) 90 0 30 20 MG 90 0 [facility] Private Pay 0 / 0 [place]

## 2022-03-22 DIAGNOSIS — D70.1 CHEMOTHERAPY-INDUCED NEUTROPENIA (HCC): ICD-10-CM

## 2022-03-22 DIAGNOSIS — C82.18 GRADE 2 FOLLICULAR LYMPHOMA OF LYMPH NODES OF MULTIPLE REGIONS (HCC): ICD-10-CM

## 2022-03-22 DIAGNOSIS — Z91.89 AT HIGH RISK OF TUMOR LYSIS SYNDROME: Primary | ICD-10-CM

## 2022-03-22 DIAGNOSIS — T45.1X5A CHEMOTHERAPY-INDUCED NEUTROPENIA (HCC): ICD-10-CM

## 2022-03-22 RX ORDER — SODIUM CHLORIDE 9 MG/ML
20 INJECTION, SOLUTION INTRAVENOUS ONCE
Status: CANCELLED | OUTPATIENT
Start: 2022-03-29

## 2022-03-22 RX ORDER — SODIUM CHLORIDE 9 MG/ML
20 INJECTION, SOLUTION INTRAVENOUS ONCE
Status: CANCELLED | OUTPATIENT
Start: 2022-04-12

## 2022-03-22 RX ORDER — SODIUM CHLORIDE 9 MG/ML
20 INJECTION, SOLUTION INTRAVENOUS ONCE
Status: CANCELLED | OUTPATIENT
Start: 2022-04-05

## 2022-03-25 ENCOUNTER — OFFICE VISIT (OUTPATIENT)
Dept: HEMATOLOGY ONCOLOGY | Facility: CLINIC | Age: 62
End: 2022-03-25
Payer: COMMERCIAL

## 2022-03-25 VITALS
DIASTOLIC BLOOD PRESSURE: 89 MMHG | SYSTOLIC BLOOD PRESSURE: 158 MMHG | OXYGEN SATURATION: 98 % | TEMPERATURE: 97.6 F | RESPIRATION RATE: 18 BRPM | BODY MASS INDEX: 41.88 KG/M2 | HEART RATE: 78 BPM | WEIGHT: 244 LBS

## 2022-03-25 DIAGNOSIS — I15.8 OTHER SECONDARY HYPERTENSION: ICD-10-CM

## 2022-03-25 DIAGNOSIS — D50.9 MICROCYTIC ANEMIA: ICD-10-CM

## 2022-03-25 DIAGNOSIS — C82.18 GRADE 2 FOLLICULAR LYMPHOMA OF LYMPH NODES OF MULTIPLE REGIONS (HCC): Primary | ICD-10-CM

## 2022-03-25 DIAGNOSIS — R73.9 ELEVATED BLOOD SUGAR: ICD-10-CM

## 2022-03-25 DIAGNOSIS — D51.8 OTHER VITAMIN B12 DEFICIENCY ANEMIAS: ICD-10-CM

## 2022-03-25 PROCEDURE — 99215 OFFICE O/P EST HI 40 MIN: CPT | Performed by: NURSE PRACTITIONER

## 2022-03-25 RX ORDER — SODIUM CHLORIDE 9 MG/ML
20 INJECTION, SOLUTION INTRAVENOUS ONCE
Status: CANCELLED | OUTPATIENT
Start: 2022-05-03

## 2022-03-25 RX ORDER — SODIUM CHLORIDE 9 MG/ML
20 INJECTION, SOLUTION INTRAVENOUS ONCE
Status: CANCELLED | OUTPATIENT
Start: 2022-05-10

## 2022-03-25 RX ORDER — SODIUM CHLORIDE 9 MG/ML
20 INJECTION, SOLUTION INTRAVENOUS ONCE
Status: CANCELLED | OUTPATIENT
Start: 2022-04-26
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
## 2022-03-29 ENCOUNTER — HOSPITAL ENCOUNTER (OUTPATIENT)
Dept: INFUSION CENTER | Facility: HOSPITAL | Age: 62
Discharge: HOME | End: 2022-03-29
Attending: INTERNAL MEDICINE
Payer: COMMERCIAL

## 2022-03-29 VITALS — TEMPERATURE: 97.4 F | DIASTOLIC BLOOD PRESSURE: 80 MMHG | HEART RATE: 80 BPM | SYSTOLIC BLOOD PRESSURE: 138 MMHG

## 2022-03-29 DIAGNOSIS — C82.18 GRADE 2 FOLLICULAR LYMPHOMA OF LYMPH NODES OF MULTIPLE REGIONS (HCC): Primary | ICD-10-CM

## 2022-03-29 DIAGNOSIS — R73.9 ELEVATED BLOOD SUGAR: ICD-10-CM

## 2022-03-29 DIAGNOSIS — D51.8 OTHER VITAMIN B12 DEFICIENCY ANEMIAS: ICD-10-CM

## 2022-03-29 LAB
ALBUMIN SERPL BCP-MCNC: 3.9 G/DL (ref 3–5.2)
ALP SERPL-CCNC: 90 U/L (ref 43–122)
ALT SERPL W P-5'-P-CCNC: 16 U/L
ANION GAP SERPL CALCULATED.3IONS-SCNC: 4 MMOL/L (ref 5–14)
ANISOCYTOSIS BLD QL SMEAR: PRESENT
AST SERPL W P-5'-P-CCNC: 21 U/L (ref 14–36)
BASOPHILS # BLD AUTO: 0 THOUSANDS/ΜL (ref 0–0.1)
BASOPHILS NFR BLD AUTO: 0 % (ref 0–1)
BILIRUB SERPL-MCNC: 0.53 MG/DL
BUN SERPL-MCNC: 9 MG/DL (ref 5–25)
CALCIUM SERPL-MCNC: 9 MG/DL (ref 8.4–10.2)
CHLORIDE SERPL-SCNC: 106 MMOL/L (ref 97–108)
CO2 SERPL-SCNC: 28 MMOL/L (ref 22–30)
CREAT SERPL-MCNC: 0.64 MG/DL (ref 0.6–1.2)
EOSINOPHIL # BLD AUTO: 0.2 THOUSAND/ΜL (ref 0–0.4)
EOSINOPHIL NFR BLD AUTO: 4 % (ref 0–6)
ERYTHROCYTE [DISTWIDTH] IN BLOOD BY AUTOMATED COUNT: 14.6 %
EST. AVERAGE GLUCOSE BLD GHB EST-MCNC: 180 MG/DL
GFR SERPL CREATININE-BSD FRML MDRD: 97 ML/MIN/1.73SQ M
GLUCOSE SERPL-MCNC: 86 MG/DL (ref 65–140)
GLUCOSE SERPL-MCNC: 96 MG/DL (ref 70–99)
HBA1C MFR BLD: 7.9 %
HCT VFR BLD AUTO: 35.1 % (ref 36–46)
HGB BLD-MCNC: 11 G/DL (ref 12–16)
HYPERCHROMIA BLD QL SMEAR: PRESENT
LDH SERPL-CCNC: 595 U/L (ref 313–618)
LYMPHOCYTES # BLD AUTO: 1.4 THOUSANDS/ΜL (ref 0.5–4)
LYMPHOCYTES NFR BLD AUTO: 28 % (ref 25–45)
MCH RBC QN AUTO: 21.4 PG (ref 26–34)
MCHC RBC AUTO-ENTMCNC: 31.4 G/DL (ref 31–36)
MCV RBC AUTO: 68 FL (ref 80–100)
MICROCYTES BLD QL AUTO: PRESENT
MONOCYTES # BLD AUTO: 0.4 THOUSAND/ΜL (ref 0.2–0.9)
MONOCYTES NFR BLD AUTO: 8 % (ref 1–10)
NEUTROPHILS # BLD AUTO: 2.9 THOUSANDS/ΜL (ref 1.8–7.8)
NEUTS SEG NFR BLD AUTO: 60 % (ref 45–65)
OVALOCYTES BLD QL SMEAR: PRESENT
PLATELET # BLD AUTO: 150 THOUSANDS/UL (ref 150–450)
PLATELET BLD QL SMEAR: ADEQUATE
PMV BLD AUTO: 8.7 FL (ref 8.9–12.7)
POTASSIUM SERPL-SCNC: 4.2 MMOL/L (ref 3.6–5)
PROT SERPL-MCNC: 6.7 G/DL (ref 5.9–8.4)
RBC # BLD AUTO: 5.15 MILLION/UL (ref 4–5.2)
RBC MORPH BLD: NORMAL
SODIUM SERPL-SCNC: 138 MMOL/L (ref 137–147)
WBC # BLD AUTO: 4.9 THOUSAND/UL (ref 4.5–11)

## 2022-03-29 PROCEDURE — 83615 LACTATE (LD) (LDH) ENZYME: CPT | Performed by: INTERNAL MEDICINE

## 2022-03-29 PROCEDURE — 80053 COMPREHEN METABOLIC PANEL: CPT | Performed by: INTERNAL MEDICINE

## 2022-03-29 PROCEDURE — 96372 THER/PROPH/DIAG INJ SC/IM: CPT

## 2022-03-29 PROCEDURE — 96413 CHEMO IV INFUSION 1 HR: CPT

## 2022-03-29 PROCEDURE — 83036 HEMOGLOBIN GLYCOSYLATED A1C: CPT | Performed by: INTERNAL MEDICINE

## 2022-03-29 PROCEDURE — 82948 REAGENT STRIP/BLOOD GLUCOSE: CPT

## 2022-03-29 PROCEDURE — 96367 TX/PROPH/DG ADDL SEQ IV INF: CPT

## 2022-03-29 PROCEDURE — 85025 COMPLETE CBC W/AUTO DIFF WBC: CPT | Performed by: INTERNAL MEDICINE

## 2022-03-29 RX ORDER — CYANOCOBALAMIN 1000 UG/ML
1000 INJECTION INTRAMUSCULAR; SUBCUTANEOUS ONCE
Status: CANCELLED | OUTPATIENT
Start: 2022-04-26

## 2022-03-29 RX ORDER — SODIUM CHLORIDE 9 MG/ML
20 INJECTION, SOLUTION INTRAVENOUS ONCE
Status: COMPLETED | OUTPATIENT
Start: 2022-03-29 | End: 2022-03-29

## 2022-03-29 RX ORDER — CYANOCOBALAMIN 1000 UG/ML
1000 INJECTION INTRAMUSCULAR; SUBCUTANEOUS ONCE
Status: COMPLETED | OUTPATIENT
Start: 2022-03-29 | End: 2022-03-29

## 2022-03-29 RX ADMIN — SODIUM CHLORIDE 20 ML/HR: 0.9 INJECTION, SOLUTION INTRAVENOUS at 11:58

## 2022-03-29 RX ADMIN — COPANLISIB 60 MG: 15 INJECTION, POWDER, LYOPHILIZED, FOR SOLUTION INTRAVENOUS at 12:47

## 2022-03-29 RX ADMIN — CYANOCOBALAMIN 1000 MCG: 1000 INJECTION INTRAMUSCULAR; SUBCUTANEOUS at 14:19

## 2022-03-29 RX ADMIN — ONDANSETRON 8 MG: 2 INJECTION INTRAMUSCULAR; INTRAVENOUS at 11:59

## 2022-04-04 ENCOUNTER — HOSPITAL ENCOUNTER (OUTPATIENT)
Dept: INFUSION CENTER | Facility: HOSPITAL | Age: 62
Discharge: HOME | End: 2022-04-04
Payer: COMMERCIAL

## 2022-04-04 DIAGNOSIS — C82.18 GRADE 2 FOLLICULAR LYMPHOMA OF LYMPH NODES OF MULTIPLE REGIONS (HCC): ICD-10-CM

## 2022-04-04 LAB
ALBUMIN SERPL BCP-MCNC: 4.2 G/DL (ref 3–5.2)
ALP SERPL-CCNC: 105 U/L (ref 43–122)
ALT SERPL W P-5'-P-CCNC: 22 U/L
ANION GAP SERPL CALCULATED.3IONS-SCNC: 6 MMOL/L (ref 5–14)
AST SERPL W P-5'-P-CCNC: 18 U/L (ref 14–36)
BASOPHILS # BLD AUTO: 0.03 THOUSANDS/ΜL (ref 0–0.1)
BASOPHILS NFR BLD AUTO: 1 % (ref 0–1)
BILIRUB SERPL-MCNC: 0.49 MG/DL
BUN SERPL-MCNC: 15 MG/DL (ref 5–25)
CALCIUM SERPL-MCNC: 9.3 MG/DL (ref 8.4–10.2)
CHLORIDE SERPL-SCNC: 105 MMOL/L (ref 97–108)
CO2 SERPL-SCNC: 26 MMOL/L (ref 22–30)
CREAT SERPL-MCNC: 0.64 MG/DL (ref 0.6–1.2)
EOSINOPHIL # BLD AUTO: 0.12 THOUSAND/ΜL (ref 0–0.61)
EOSINOPHIL NFR BLD AUTO: 2 % (ref 0–6)
ERYTHROCYTE [DISTWIDTH] IN BLOOD BY AUTOMATED COUNT: 14.8 % (ref 11.6–15.1)
GFR SERPL CREATININE-BSD FRML MDRD: 97 ML/MIN/1.73SQ M
GLUCOSE SERPL-MCNC: 160 MG/DL (ref 70–99)
HCT VFR BLD AUTO: 40.8 % (ref 34.8–46.1)
HGB BLD-MCNC: 11.7 G/DL (ref 11.5–15.4)
IMM GRANULOCYTES # BLD AUTO: 0.02 THOUSAND/UL (ref 0–0.2)
IMM GRANULOCYTES NFR BLD AUTO: 0 % (ref 0–2)
LDH SERPL-CCNC: 503 U/L (ref 313–618)
LYMPHOCYTES # BLD AUTO: 1.9 THOUSANDS/ΜL (ref 0.6–4.47)
LYMPHOCYTES NFR BLD AUTO: 34 % (ref 14–44)
MCH RBC QN AUTO: 20.9 PG (ref 26.8–34.3)
MCHC RBC AUTO-ENTMCNC: 28.7 G/DL (ref 31.4–37.4)
MCV RBC AUTO: 73 FL (ref 82–98)
MONOCYTES # BLD AUTO: 0.33 THOUSAND/ΜL (ref 0.17–1.22)
MONOCYTES NFR BLD AUTO: 6 % (ref 4–12)
NEUTROPHILS # BLD AUTO: 3.27 THOUSANDS/ΜL (ref 1.85–7.62)
NEUTS SEG NFR BLD AUTO: 57 % (ref 43–75)
NRBC BLD AUTO-RTO: 0 /100 WBCS
PLATELET # BLD AUTO: 195 THOUSANDS/UL (ref 149–390)
PMV BLD AUTO: 10.1 FL (ref 8.9–12.7)
POTASSIUM SERPL-SCNC: 4.2 MMOL/L (ref 3.6–5)
PROT SERPL-MCNC: 7.2 G/DL (ref 5.9–8.4)
RBC # BLD AUTO: 5.61 MILLION/UL (ref 3.81–5.12)
SODIUM SERPL-SCNC: 137 MMOL/L (ref 137–147)
WBC # BLD AUTO: 5.67 THOUSAND/UL (ref 4.31–10.16)

## 2022-04-04 PROCEDURE — 83615 LACTATE (LD) (LDH) ENZYME: CPT | Performed by: INTERNAL MEDICINE

## 2022-04-04 PROCEDURE — 80053 COMPREHEN METABOLIC PANEL: CPT

## 2022-04-04 PROCEDURE — 85025 COMPLETE CBC W/AUTO DIFF WBC: CPT

## 2022-04-05 ENCOUNTER — OFFICE VISIT (OUTPATIENT)
Dept: FAMILY MEDICINE CLINIC | Facility: CLINIC | Age: 62
End: 2022-04-05

## 2022-04-05 ENCOUNTER — HOSPITAL ENCOUNTER (OUTPATIENT)
Dept: INFUSION CENTER | Facility: HOSPITAL | Age: 62
Discharge: HOME | End: 2022-04-05
Attending: INTERNAL MEDICINE
Payer: COMMERCIAL

## 2022-04-05 VITALS
DIASTOLIC BLOOD PRESSURE: 90 MMHG | TEMPERATURE: 98.1 F | OXYGEN SATURATION: 97 % | HEART RATE: 60 BPM | BODY MASS INDEX: 38.89 KG/M2 | SYSTOLIC BLOOD PRESSURE: 146 MMHG | HEIGHT: 66 IN | WEIGHT: 242 LBS | RESPIRATION RATE: 18 BRPM

## 2022-04-05 VITALS
TEMPERATURE: 98.1 F | RESPIRATION RATE: 18 BRPM | SYSTOLIC BLOOD PRESSURE: 140 MMHG | HEART RATE: 64 BPM | DIASTOLIC BLOOD PRESSURE: 78 MMHG

## 2022-04-05 DIAGNOSIS — F33.41 RECURRENT MAJOR DEPRESSIVE DISORDER, IN PARTIAL REMISSION (HCC): ICD-10-CM

## 2022-04-05 DIAGNOSIS — G89.3 CANCER RELATED PAIN: ICD-10-CM

## 2022-04-05 DIAGNOSIS — C82.18 GRADE 2 FOLLICULAR LYMPHOMA OF LYMPH NODES OF MULTIPLE REGIONS (HCC): ICD-10-CM

## 2022-04-05 DIAGNOSIS — I10 ESSENTIAL HYPERTENSION: ICD-10-CM

## 2022-04-05 DIAGNOSIS — C82.18 GRADE 2 FOLLICULAR LYMPHOMA OF LYMPH NODES OF MULTIPLE REGIONS (HCC): Primary | ICD-10-CM

## 2022-04-05 DIAGNOSIS — R73.9 ELEVATED BLOOD SUGAR: ICD-10-CM

## 2022-04-05 PROBLEM — E11.9 TYPE 2 DIABETES MELLITUS (HCC): Status: ACTIVE | Noted: 2022-04-05

## 2022-04-05 LAB
GLUCOSE SERPL-MCNC: 198 MG/DL (ref 65–140)
GLUCOSE SERPL-MCNC: 220 MG/DL (ref 65–140)

## 2022-04-05 PROCEDURE — 96367 TX/PROPH/DG ADDL SEQ IV INF: CPT

## 2022-04-05 PROCEDURE — 96413 CHEMO IV INFUSION 1 HR: CPT

## 2022-04-05 PROCEDURE — 99213 OFFICE O/P EST LOW 20 MIN: CPT | Performed by: FAMILY MEDICINE

## 2022-04-05 PROCEDURE — 82948 REAGENT STRIP/BLOOD GLUCOSE: CPT

## 2022-04-05 PROCEDURE — 3077F SYST BP >= 140 MM HG: CPT | Performed by: FAMILY MEDICINE

## 2022-04-05 PROCEDURE — 3080F DIAST BP >= 90 MM HG: CPT | Performed by: FAMILY MEDICINE

## 2022-04-05 RX ORDER — SODIUM CHLORIDE 9 MG/ML
20 INJECTION, SOLUTION INTRAVENOUS ONCE
Status: COMPLETED | OUTPATIENT
Start: 2022-04-05 | End: 2022-04-05

## 2022-04-05 RX ORDER — AMLODIPINE BESYLATE 5 MG/1
10 TABLET ORAL DAILY
Qty: 30 TABLET | Refills: 3 | Status: SHIPPED | OUTPATIENT
Start: 2022-04-05 | End: 2022-05-31

## 2022-04-05 RX ORDER — DULOXETIN HYDROCHLORIDE 20 MG/1
40 CAPSULE, DELAYED RELEASE ORAL DAILY
Qty: 30 CAPSULE | Refills: 3 | Status: SHIPPED | OUTPATIENT
Start: 2022-04-05

## 2022-04-05 RX ADMIN — ONDANSETRON 8 MG: 2 INJECTION INTRAMUSCULAR; INTRAVENOUS at 11:08

## 2022-04-05 RX ADMIN — SODIUM CHLORIDE 20 ML/HR: 0.9 INJECTION, SOLUTION INTRAVENOUS at 11:00

## 2022-04-05 RX ADMIN — COPANLISIB 60 MG: 15 INJECTION, POWDER, LYOPHILIZED, FOR SOLUTION INTRAVENOUS at 11:45

## 2022-04-05 NOTE — ASSESSMENT & PLAN NOTE
BP Readings from Last 3 Encounters:   04/05/22 146/90   04/05/22 140/78   03/29/22 138/80     Patient blood pressure mildly hypertensive  Currently on Norvasc 5 mg  Plan  Will obtain protein microalbumin ratio  Will consider switching her medication to lisinopril or hydrochlorothiazide as Norvasc probably not the best agent considering her current cancer history  Will try to maintain patient on monotherapy to reduce drug drug interactions with other chemotherapy treatment agents

## 2022-04-05 NOTE — ASSESSMENT & PLAN NOTE
Lab Results   Component Value Date    HGBA1C 7 9 (H) 03/29/2022   Recently started on metformin 500 b i d   Patient reportedly only taking it once a day before breakfast     Plan  At this time will increase the dose to a 1000 mg b i d   Instructed patient to utilize medication before breakfast and before dinner

## 2022-04-05 NOTE — ASSESSMENT & PLAN NOTE
Follicular lymphoma, WHO grade 1-2      Chemotherapy:   - Chemotherapy Copanlisib  - Management per Oncology

## 2022-04-05 NOTE — PROGRESS NOTES
Assessment/Plan:    1  Essential hypertension  Assessment & Plan:  BP Readings from Last 3 Encounters:   04/05/22 146/90   04/05/22 140/78   03/29/22 138/80     Patient blood pressure mildly hypertensive  Currently on Norvasc 5 mg  Plan  Will obtain protein microalbumin ratio  Will consider switching her medication to lisinopril or hydrochlorothiazide as Norvasc probably not the best agent considering her current cancer history  Will try to maintain patient on monotherapy to reduce drug drug interactions with other chemotherapy treatment agents  Orders:  -     amLODIPine (NORVASC) 5 mg tablet; Take 2 tablets (10 mg total) by mouth daily  -     Microalbumin / creatinine urine ratio    2  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 [facility])  Assessment & Plan: Follicular lymphoma, WHO grade 1-2  Chemotherapy:   - Chemotherapy Copanlisib  - Management per Oncology           Orders:  -     DULoxetine (CYMBALTA) 20 mg capsule; Take 2 capsules (40 mg total) by mouth daily    3  Cancer related pain  Assessment & Plan:  Currently on Cymbalta 20 mg daily  Increase the dosage to 40 mg daily  Patient reports marked improvement with initial Cymbalta  Orders:  -     DULoxetine (CYMBALTA) 20 mg capsule; Take 2 capsules (40 mg total) by mouth daily    4  Recurrent major depressive disorder, in partial remission (HCC)  -     DULoxetine (CYMBALTA) 20 mg capsule; Take 2 capsules (40 mg total) by mouth daily    5  Elevated blood sugar  -     metFORMIN (GLUCOPHAGE) 500 mg tablet; Take 2 tablets (1,000 mg total) by mouth 2 (two) times a day with meals  -     Glucometer test strips  -     One Touch Verio IQ       Subjective:      Patient ID: Connell Epley is a 63 y o  female  65-year-old history significant for grade 2 follicular lymphoma currently followed by Oncology with radiation came in for follow-up visit her migraine  In addition patient recently had A1c done that was 7 9    At that time she was started on metformin 500 mg b i d  She also had elevated sugars that were found to be in the hospital of 200  Patient denies any nausea, vomiting patient denies any blurry vision or additional migraine  She does endorse generalized pain secondary to the chemotherapy  She states that Cymbalta she took has been helping with that pain  The following portions of the patient's history were reviewed and updated as appropriate: allergies, current medications, past family history, past medical history, past social history, past surgical history, and problem list     Review of Systems   Constitutional: Negative for fever  HENT: Negative for ear pain and sore throat  Eyes: Negative for pain  Respiratory: Negative for cough and shortness of breath  Gastrointestinal: Positive for abdominal pain  Negative for vomiting  Musculoskeletal: Positive for arthralgias  Negative for back pain, joint swelling, myalgias and neck pain  Skin: Negative for rash  Neurological: Negative for dizziness, seizures and headaches  Objective:      /90 (BP Location: Left arm, Patient Position: Sitting, Cuff Size: Adult)   Pulse 60   Temp 98 1 °F (36 7 °C) (Temporal)   Resp 18   Ht 5' 6" (1 676 m)   Wt 110 kg (242 lb)   SpO2 97%   BMI 39 06 kg/m²          Physical Exam  Constitutional:       General: She is not in acute distress  Appearance: Normal appearance  HENT:      Nose: No congestion or rhinorrhea  Eyes:      Extraocular Movements: Extraocular movements intact  Pupils: Pupils are equal, round, and reactive to light  Cardiovascular:      Rate and Rhythm: Normal rate and regular rhythm  Pulses: Normal pulses  Heart sounds: Normal heart sounds  No murmur heard  No gallop  Pulmonary:      Effort: Pulmonary effort is normal       Breath sounds: Normal breath sounds  No wheezing, rhonchi or rales     Abdominal:      General: Bowel sounds are normal  There is no distension  Palpations: Abdomen is soft  There is no mass  Tenderness: There is no abdominal tenderness  Hernia: No hernia is present  Skin:     General: Skin is warm  Coloration: Skin is not jaundiced  Findings: No bruising  Neurological:      General: No focal deficit present  Mental Status: She is alert and oriented to person, place, and time  Psychiatric:         Mood and Affect: Mood normal          Behavior: Behavior normal          Thought Content:  Thought content normal            Viktor Gregory DO   Family Medicine PGY-1   4/5/2022

## 2022-04-05 NOTE — ASSESSMENT & PLAN NOTE
Currently on Cymbalta 20 mg daily  Increase the dosage to 40 mg daily  Patient reports marked improvement with initial Cymbalta

## 2022-04-08 DIAGNOSIS — E11.9 TYPE 2 DIABETES MELLITUS WITHOUT COMPLICATION, WITHOUT LONG-TERM CURRENT USE OF INSULIN (HCC): Primary | ICD-10-CM

## 2022-04-08 RX ORDER — BLOOD SUGAR DIAGNOSTIC
1 STRIP MISCELLANEOUS DAILY
Qty: 50 STRIP | Refills: 6 | Status: SHIPPED | OUTPATIENT
Start: 2022-04-08

## 2022-04-08 RX ORDER — BLOOD-GLUCOSE METER
EACH MISCELLANEOUS DAILY
Qty: 1 KIT | Refills: 0 | Status: SHIPPED | OUTPATIENT
Start: 2022-04-08

## 2022-04-08 RX ORDER — LANCETS 33 GAUGE
EACH MISCELLANEOUS DAILY
Qty: 100 EACH | Refills: 4 | Status: SHIPPED | OUTPATIENT
Start: 2022-04-08

## 2022-04-08 NOTE — PROGRESS NOTES
DM supplies ordering by provider during visit were entered incorrectly as those orders do not go anywhere      Ordered new ones that go directly to pharmacy

## 2022-04-10 NOTE — TELEPHONE ENCOUNTER
Left a voice message for pt using Google translate instructing her to have a CBC repeated next week before restarting Revlimid  We will let her know when counts are high enough to restart  Someone did phone back and Google message was played for them 
(1) Other Diagnosis

## 2022-04-11 ENCOUNTER — HOSPITAL ENCOUNTER (OUTPATIENT)
Dept: INFUSION CENTER | Facility: HOSPITAL | Age: 62
Discharge: HOME | End: 2022-04-11
Payer: COMMERCIAL

## 2022-04-11 DIAGNOSIS — C82.18 GRADE 2 FOLLICULAR LYMPHOMA OF LYMPH NODES OF MULTIPLE REGIONS (HCC): Primary | ICD-10-CM

## 2022-04-11 DIAGNOSIS — Z45.2 ENCOUNTER FOR CENTRAL LINE CARE: ICD-10-CM

## 2022-04-11 LAB
ALBUMIN SERPL BCP-MCNC: 4 G/DL (ref 3–5.2)
ALP SERPL-CCNC: 96 U/L (ref 43–122)
ALT SERPL W P-5'-P-CCNC: 19 U/L
ANION GAP SERPL CALCULATED.3IONS-SCNC: 5 MMOL/L (ref 5–14)
AST SERPL W P-5'-P-CCNC: 22 U/L (ref 14–36)
BASOPHILS # BLD AUTO: 0.02 THOUSANDS/ΜL (ref 0–0.1)
BASOPHILS NFR BLD AUTO: 0 % (ref 0–1)
BILIRUB SERPL-MCNC: 0.49 MG/DL
BUN SERPL-MCNC: 17 MG/DL (ref 5–25)
CALCIUM SERPL-MCNC: 9 MG/DL (ref 8.4–10.2)
CHLORIDE SERPL-SCNC: 104 MMOL/L (ref 97–108)
CO2 SERPL-SCNC: 29 MMOL/L (ref 22–30)
CREAT SERPL-MCNC: 0.66 MG/DL (ref 0.6–1.2)
EOSINOPHIL # BLD AUTO: 0.09 THOUSAND/ΜL (ref 0–0.61)
EOSINOPHIL NFR BLD AUTO: 2 % (ref 0–6)
ERYTHROCYTE [DISTWIDTH] IN BLOOD BY AUTOMATED COUNT: 14.7 % (ref 11.6–15.1)
GFR SERPL CREATININE-BSD FRML MDRD: 97 ML/MIN/1.73SQ M
GLUCOSE SERPL-MCNC: 221 MG/DL (ref 70–99)
HCT VFR BLD AUTO: 39.8 % (ref 34.8–46.1)
HGB BLD-MCNC: 11.6 G/DL (ref 11.5–15.4)
IMM GRANULOCYTES # BLD AUTO: 0.01 THOUSAND/UL (ref 0–0.2)
IMM GRANULOCYTES NFR BLD AUTO: 0 % (ref 0–2)
LDH SERPL-CCNC: 548 U/L (ref 313–618)
LYMPHOCYTES # BLD AUTO: 1.69 THOUSANDS/ΜL (ref 0.6–4.47)
LYMPHOCYTES NFR BLD AUTO: 36 % (ref 14–44)
MCH RBC QN AUTO: 20.9 PG (ref 26.8–34.3)
MCHC RBC AUTO-ENTMCNC: 29.1 G/DL (ref 31.4–37.4)
MCV RBC AUTO: 72 FL (ref 82–98)
MONOCYTES # BLD AUTO: 0.33 THOUSAND/ΜL (ref 0.17–1.22)
MONOCYTES NFR BLD AUTO: 7 % (ref 4–12)
NEUTROPHILS # BLD AUTO: 2.59 THOUSANDS/ΜL (ref 1.85–7.62)
NEUTS SEG NFR BLD AUTO: 55 % (ref 43–75)
NRBC BLD AUTO-RTO: 0 /100 WBCS
PLATELET # BLD AUTO: 213 THOUSANDS/UL (ref 149–390)
PMV BLD AUTO: 10.9 FL (ref 8.9–12.7)
POTASSIUM SERPL-SCNC: 4.2 MMOL/L (ref 3.6–5)
PROT SERPL-MCNC: 6.9 G/DL (ref 5.9–8.4)
RBC # BLD AUTO: 5.56 MILLION/UL (ref 3.81–5.12)
SODIUM SERPL-SCNC: 138 MMOL/L (ref 137–147)
WBC # BLD AUTO: 4.73 THOUSAND/UL (ref 4.31–10.16)

## 2022-04-11 PROCEDURE — 80053 COMPREHEN METABOLIC PANEL: CPT | Performed by: INTERNAL MEDICINE

## 2022-04-11 PROCEDURE — 83615 LACTATE (LD) (LDH) ENZYME: CPT | Performed by: INTERNAL MEDICINE

## 2022-04-11 PROCEDURE — 85025 COMPLETE CBC W/AUTO DIFF WBC: CPT | Performed by: INTERNAL MEDICINE

## 2022-04-11 NOTE — PROGRESS NOTES
PT arrives on unit for lab work from port  Labs drawn and sent as ordered  Port remains accessed  Pt tolerated   Left unit ambulatory for star transport

## 2022-04-12 ENCOUNTER — HOSPITAL ENCOUNTER (OUTPATIENT)
Dept: INFUSION CENTER | Facility: HOSPITAL | Age: 62
Discharge: HOME | End: 2022-04-12
Attending: INTERNAL MEDICINE
Payer: COMMERCIAL

## 2022-04-12 VITALS
RESPIRATION RATE: 18 BRPM | SYSTOLIC BLOOD PRESSURE: 132 MMHG | DIASTOLIC BLOOD PRESSURE: 78 MMHG | TEMPERATURE: 97.8 F | HEART RATE: 60 BPM

## 2022-04-12 DIAGNOSIS — C82.18 GRADE 2 FOLLICULAR LYMPHOMA OF LYMPH NODES OF MULTIPLE REGIONS (HCC): Primary | ICD-10-CM

## 2022-04-12 LAB — GLUCOSE SERPL-MCNC: 143 MG/DL (ref 65–140)

## 2022-04-12 PROCEDURE — 96413 CHEMO IV INFUSION 1 HR: CPT

## 2022-04-12 PROCEDURE — 96367 TX/PROPH/DG ADDL SEQ IV INF: CPT

## 2022-04-12 PROCEDURE — 82948 REAGENT STRIP/BLOOD GLUCOSE: CPT

## 2022-04-12 RX ORDER — SODIUM CHLORIDE 9 MG/ML
20 INJECTION, SOLUTION INTRAVENOUS ONCE
Status: COMPLETED | OUTPATIENT
Start: 2022-04-12 | End: 2022-04-12

## 2022-04-12 RX ADMIN — SODIUM CHLORIDE 20 ML/HR: 0.9 INJECTION, SOLUTION INTRAVENOUS at 10:18

## 2022-04-12 RX ADMIN — COPANLISIB 60 MG: 15 INJECTION, POWDER, LYOPHILIZED, FOR SOLUTION INTRAVENOUS at 11:03

## 2022-04-12 RX ADMIN — ONDANSETRON 8 MG: 2 INJECTION INTRAMUSCULAR; INTRAVENOUS at 10:21

## 2022-04-12 NOTE — PROGRESS NOTES
Pt tolerated treatment today with no adverse reactions  Next apts scheduled  Left unit ambulatory with a steady gait

## 2022-04-25 ENCOUNTER — HOSPITAL ENCOUNTER (OUTPATIENT)
Dept: INFUSION CENTER | Facility: HOSPITAL | Age: 62
Discharge: HOME | End: 2022-04-25
Payer: COMMERCIAL

## 2022-04-25 ENCOUNTER — OFFICE VISIT (OUTPATIENT)
Dept: HEMATOLOGY ONCOLOGY | Facility: CLINIC | Age: 62
End: 2022-04-25
Payer: COMMERCIAL

## 2022-04-25 VITALS
SYSTOLIC BLOOD PRESSURE: 150 MMHG | OXYGEN SATURATION: 98 % | BODY MASS INDEX: 39.73 KG/M2 | WEIGHT: 247.2 LBS | TEMPERATURE: 98 F | DIASTOLIC BLOOD PRESSURE: 78 MMHG | RESPIRATION RATE: 18 BRPM | HEIGHT: 66 IN | HEART RATE: 86 BPM

## 2022-04-25 DIAGNOSIS — N64.4 BREAST PAIN: ICD-10-CM

## 2022-04-25 DIAGNOSIS — N93.9 ABNORMAL UTERINE BLEEDING (AUB): ICD-10-CM

## 2022-04-25 DIAGNOSIS — D51.8 OTHER VITAMIN B12 DEFICIENCY ANEMIAS: ICD-10-CM

## 2022-04-25 DIAGNOSIS — C82.18 GRADE 2 FOLLICULAR LYMPHOMA OF LYMPH NODES OF MULTIPLE REGIONS (HCC): Primary | ICD-10-CM

## 2022-04-25 DIAGNOSIS — Z45.2 ENCOUNTER FOR CENTRAL LINE CARE: ICD-10-CM

## 2022-04-25 DIAGNOSIS — D50.9 MICROCYTIC ANEMIA: ICD-10-CM

## 2022-04-25 LAB
ALBUMIN SERPL BCP-MCNC: 4 G/DL (ref 3–5.2)
ALP SERPL-CCNC: 96 U/L (ref 43–122)
ALT SERPL W P-5'-P-CCNC: 17 U/L
ANION GAP SERPL CALCULATED.3IONS-SCNC: 7 MMOL/L (ref 5–14)
AST SERPL W P-5'-P-CCNC: 20 U/L (ref 14–36)
BASOPHILS # BLD AUTO: 0.03 THOUSANDS/ΜL (ref 0–0.1)
BASOPHILS NFR BLD AUTO: 1 % (ref 0–1)
BILIRUB SERPL-MCNC: 0.5 MG/DL
BUN SERPL-MCNC: 15 MG/DL (ref 5–25)
CALCIUM SERPL-MCNC: 8.9 MG/DL (ref 8.4–10.2)
CHLORIDE SERPL-SCNC: 103 MMOL/L (ref 97–108)
CO2 SERPL-SCNC: 29 MMOL/L (ref 22–30)
CREAT SERPL-MCNC: 0.8 MG/DL (ref 0.6–1.2)
EOSINOPHIL # BLD AUTO: 0.13 THOUSAND/ΜL (ref 0–0.61)
EOSINOPHIL NFR BLD AUTO: 2 % (ref 0–6)
ERYTHROCYTE [DISTWIDTH] IN BLOOD BY AUTOMATED COUNT: 15.1 % (ref 11.6–15.1)
GFR SERPL CREATININE-BSD FRML MDRD: 80 ML/MIN/1.73SQ M
GLUCOSE SERPL-MCNC: 158 MG/DL (ref 70–99)
HCT VFR BLD AUTO: 37.3 % (ref 34.8–46.1)
HGB BLD-MCNC: 11.3 G/DL (ref 11.5–15.4)
IMM GRANULOCYTES # BLD AUTO: 0.01 THOUSAND/UL (ref 0–0.2)
IMM GRANULOCYTES NFR BLD AUTO: 0 % (ref 0–2)
LDH SERPL-CCNC: 562 U/L (ref 313–618)
LYMPHOCYTES # BLD AUTO: 1.93 THOUSANDS/ΜL (ref 0.6–4.47)
LYMPHOCYTES NFR BLD AUTO: 35 % (ref 14–44)
MCH RBC QN AUTO: 21.2 PG (ref 26.8–34.3)
MCHC RBC AUTO-ENTMCNC: 30.3 G/DL (ref 31.4–37.4)
MCV RBC AUTO: 70 FL (ref 82–98)
MONOCYTES # BLD AUTO: 0.5 THOUSAND/ΜL (ref 0.17–1.22)
MONOCYTES NFR BLD AUTO: 9 % (ref 4–12)
NEUTROPHILS # BLD AUTO: 2.85 THOUSANDS/ΜL (ref 1.85–7.62)
NEUTS SEG NFR BLD AUTO: 53 % (ref 43–75)
NRBC BLD AUTO-RTO: 0 /100 WBCS
PLATELET # BLD AUTO: 175 THOUSANDS/UL (ref 149–390)
PMV BLD AUTO: 11.2 FL (ref 8.9–12.7)
POTASSIUM SERPL-SCNC: 4.1 MMOL/L (ref 3.6–5)
PROT SERPL-MCNC: 6.8 G/DL (ref 5.9–8.4)
RBC # BLD AUTO: 5.33 MILLION/UL (ref 3.81–5.12)
SODIUM SERPL-SCNC: 139 MMOL/L (ref 137–147)
WBC # BLD AUTO: 5.45 THOUSAND/UL (ref 4.31–10.16)

## 2022-04-25 PROCEDURE — 85025 COMPLETE CBC W/AUTO DIFF WBC: CPT | Performed by: INTERNAL MEDICINE

## 2022-04-25 PROCEDURE — 99215 OFFICE O/P EST HI 40 MIN: CPT | Performed by: NURSE PRACTITIONER

## 2022-04-25 PROCEDURE — 83615 LACTATE (LD) (LDH) ENZYME: CPT | Performed by: INTERNAL MEDICINE

## 2022-04-25 PROCEDURE — 80053 COMPREHEN METABOLIC PANEL: CPT | Performed by: INTERNAL MEDICINE

## 2022-04-25 RX ORDER — SODIUM CHLORIDE 9 MG/ML
20 INJECTION, SOLUTION INTRAVENOUS ONCE
Status: CANCELLED | OUTPATIENT
Start: 2022-05-31

## 2022-04-25 RX ORDER — SODIUM CHLORIDE 9 MG/ML
20 INJECTION, SOLUTION INTRAVENOUS ONCE
Status: CANCELLED | OUTPATIENT
Start: 2022-05-24

## 2022-04-25 RX ORDER — SODIUM CHLORIDE 9 MG/ML
20 INJECTION, SOLUTION INTRAVENOUS ONCE
Status: CANCELLED | OUTPATIENT
Start: 2022-06-07

## 2022-04-25 NOTE — PROGRESS NOTES
Labs drawn via port  Port left accessed for tomorrows tx  Patient offers no complaints  Next appointment verified, AVS provided
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
## 2022-04-26 ENCOUNTER — HOSPITAL ENCOUNTER (OUTPATIENT)
Dept: INFUSION CENTER | Facility: HOSPITAL | Age: 62
Discharge: HOME | End: 2022-04-26
Attending: INTERNAL MEDICINE
Payer: COMMERCIAL

## 2022-04-26 VITALS
BODY MASS INDEX: 40 KG/M2 | OXYGEN SATURATION: 98 % | WEIGHT: 247.8 LBS | SYSTOLIC BLOOD PRESSURE: 120 MMHG | TEMPERATURE: 97.5 F | DIASTOLIC BLOOD PRESSURE: 86 MMHG | HEART RATE: 71 BPM | RESPIRATION RATE: 18 BRPM

## 2022-04-26 DIAGNOSIS — C82.18 GRADE 2 FOLLICULAR LYMPHOMA OF LYMPH NODES OF MULTIPLE REGIONS (HCC): Primary | ICD-10-CM

## 2022-04-26 DIAGNOSIS — D51.8 OTHER VITAMIN B12 DEFICIENCY ANEMIAS: ICD-10-CM

## 2022-04-26 LAB — GLUCOSE SERPL-MCNC: 145 MG/DL (ref 65–140)

## 2022-04-26 PROCEDURE — 96367 TX/PROPH/DG ADDL SEQ IV INF: CPT

## 2022-04-26 PROCEDURE — 96372 THER/PROPH/DIAG INJ SC/IM: CPT

## 2022-04-26 PROCEDURE — 96413 CHEMO IV INFUSION 1 HR: CPT

## 2022-04-26 PROCEDURE — 82948 REAGENT STRIP/BLOOD GLUCOSE: CPT

## 2022-04-26 RX ORDER — CYANOCOBALAMIN 1000 UG/ML
1000 INJECTION INTRAMUSCULAR; SUBCUTANEOUS ONCE
Status: CANCELLED | OUTPATIENT
Start: 2022-05-24

## 2022-04-26 RX ORDER — CYANOCOBALAMIN 1000 UG/ML
1000 INJECTION INTRAMUSCULAR; SUBCUTANEOUS ONCE
Status: COMPLETED | OUTPATIENT
Start: 2022-04-26 | End: 2022-04-26

## 2022-04-26 RX ORDER — SODIUM CHLORIDE 9 MG/ML
20 INJECTION, SOLUTION INTRAVENOUS ONCE
Status: COMPLETED | OUTPATIENT
Start: 2022-04-26 | End: 2022-04-26

## 2022-04-26 RX ADMIN — SODIUM CHLORIDE 20 ML/HR: 0.9 INJECTION, SOLUTION INTRAVENOUS at 13:16

## 2022-04-26 RX ADMIN — CYANOCOBALAMIN 1000 MCG: 1000 INJECTION INTRAMUSCULAR; SUBCUTANEOUS at 13:58

## 2022-04-26 RX ADMIN — ONDANSETRON 8 MG: 2 INJECTION INTRAMUSCULAR; INTRAVENOUS at 13:16

## 2022-04-26 RX ADMIN — COPANLISIB 60 MG: 15 INJECTION, POWDER, LYOPHILIZED, FOR SOLUTION INTRAVENOUS at 13:57

## 2022-04-26 NOTE — PATIENT INSTRUCTIONS
PRESCRIPTION REFILL REMINDER:  All medication refills should be requested prior to [facility] on Friday  Any refill requests after noon on Friday would be addressed the following Monday  Please protect yourself from COVID-19 and the delta and omicron variants! Even though we do not have good antiviral drugs for this infection, the following tools can help you stay healthy:    = Wash your hands! Soap and water, or hand  with at least 60% alcohol, are both effective at killing the virus  = Wear a mask! This will help protect others from any virus particles you might spread  Your mouth and nose BOTH need to be covered  = Keep the distance! Keep 6 feet of distance from other people, even if they seem healthy  Keeping distance protects you from the other person's virus spread     = Get a vaccine! Three vaccines are approved for use in the [place] for all adults  These vaccines do provide protection against the delta variant, and seem to reduce severity of omicron infection  + Pfizer is FDA approved for all persons age 11 and older   + Juris Ring may be given to all person age 25 and older   + [address] may be given to all person age 25 and older  (Serious blood clot side effects have only been reported in reproductive-age women, and these are about 1-in-a-million  We do NOT recommend J&J for people who have had Guillan-[place] syndrome )  = You may get a shot from many other locations outside [facility]: [facility], [employer], [facility], [facility], and certain [facility] locations  + As of 11/29/21, the CDC recommends booster shots on ALL vaccines for ALL adults  https://[employer] com/      We are recommending that ALL our patients get a complete series of one of the three vaccines, and boost it ASAP  Call 9-195-BKDEGEM (Choose Option 7 for faster service!) to get scheduled for your shot  Informacion en espanol sobre vacunas, de nos companeros [address] --  [employer]      Numbers of coronavirus cases (and COVID deaths) are worsening in many [place] States, among unvaccinated people, we think this is because vaccines are working, but only if you get one! As of 12/1/21, we do NOT advise travel OUTSIDE the [address], and we encourage you to be very careful when planning travel INSIDE the [address]  Check out [employer] for [place] data that are updated daily:    http://www [employer]/     Global Epidemics  Org, from [facility] will give you Inffow-dp-Evxykt information on virus cases and vaccination rates:    Https://globalepidemics  org/    Frequently Asked Questions about COVID, answered by [facility]    [employer]

## 2022-04-27 ENCOUNTER — OFFICE VISIT (OUTPATIENT)
Dept: PALLIATIVE MEDICINE | Facility: CLINIC | Age: 62
End: 2022-04-27
Payer: COMMERCIAL

## 2022-04-27 ENCOUNTER — SOCIAL WORK (OUTPATIENT)
Dept: PALLIATIVE MEDICINE | Facility: CLINIC | Age: 62
End: 2022-04-27
Payer: COMMERCIAL

## 2022-04-27 VITALS
HEART RATE: 56 BPM | TEMPERATURE: 95.9 F | SYSTOLIC BLOOD PRESSURE: 170 MMHG | DIASTOLIC BLOOD PRESSURE: 100 MMHG | WEIGHT: 245 LBS | RESPIRATION RATE: 20 BRPM | BODY MASS INDEX: 40.82 KG/M2 | HEIGHT: 65 IN | OXYGEN SATURATION: 99 %

## 2022-04-27 DIAGNOSIS — R10.30 LOWER ABDOMINAL PAIN: ICD-10-CM

## 2022-04-27 DIAGNOSIS — C82.18 GRADE 2 FOLLICULAR LYMPHOMA OF LYMPH NODES OF MULTIPLE REGIONS (HCC): ICD-10-CM

## 2022-04-27 DIAGNOSIS — G89.3 CANCER RELATED PAIN: Primary | ICD-10-CM

## 2022-04-27 DIAGNOSIS — R53.83 DECREASED ENERGY: ICD-10-CM

## 2022-04-27 DIAGNOSIS — Z71.89 COUNSELING AND COORDINATION OF CARE: Primary | ICD-10-CM

## 2022-04-27 PROBLEM — R63.0 DECREASED APPETITE: Status: RESOLVED | Noted: 2020-04-21 | Resolved: 2022-04-27

## 2022-04-27 PROCEDURE — NC001 PR NO CHARGE

## 2022-04-27 PROCEDURE — 99214 OFFICE O/P EST MOD 30 MIN: CPT | Performed by: INTERNAL MEDICINE

## 2022-04-27 RX ORDER — OXYCODONE HYDROCHLORIDE 15 MG/1
TABLET ORAL
Qty: 90 TABLET | Refills: 0 | Status: SHIPPED | OUTPATIENT
Start: 2022-04-27 | End: 2022-05-20 | Stop reason: SDUPTHER

## 2022-04-27 NOTE — PROGRESS NOTES
Palliative Supportive Care  met with patient to continue to provide emotional support and guidance  Updated biopsychosocial information relevant to support: Pt states that she is not tolerating her chemo well  She complains of fatigue, weight gain and pain  She describes herself as "fat", we validated that the chemo side effects can be very difficult  Dr Heriberto Smith encouraged her to speak with oncology regarding her concerns  We expressed that it is important to know what she is experiencing from the chemo  We encouraged her to try and exercise, however she was not receptive to this  Dr Heriberto Smith reviewed her medication regimen and educated her on the long term effects, suggesting that in the near future she continue to titrate  She will be seeing gynecology and receiving a mammogram, as she has been having pain in her breast and bleeding     Identified areas of need include: Emotional support  Resources provided: None  Areas that need future follow-up include: Continue to provide ongoing support

## 2022-04-27 NOTE — PROGRESS NOTES
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 ([address] )    3  Decreased energy       · Chronic cancer related pain mostly in her neck and abdomen is controlled with current regimen  Continue taper (started 10/2021)  · Continue OxyIR 15mg PO q6H prn  Max of 4 a day  Next will be trail of 10mg IR if appropriate, pending results of imaging  · Of note, treatment strategy for patient was/is kept as simple as possible due to difficulty following regimen   · Bowel regimen to prevent OIC  · She will have a repeat PET-CT, mammogram soon  · RTO in 3 months, call sooner if needed  Controlled Substance Review    [place] or [place]  reviewed: No red flags were identified; safe to proceed with prescription  Apurva Arbour     03/23/2022  2   03/18/2022  Oxycodone Hcl (Ir) 15 MG Tab    90 00  25 [address]   [id number]   All ([id number])    81 00 M[place]   02/25/2022  2   02/25/2022  Oxycodone Hcl (Ir) 15 MG Tab    90 00  23 [address]   [id number]   All ([id number])    88 04 [place]   01/26/2022  2   01/26/2022  Oxycodone Hcl (Ir) 15 MG Tab    90 00  23 [address]   [id number]   All (3848)    88 04 M[place]   12/23/2021  2   12/23/2021  Oxycodone Hcl (Ir) 20 MG Tab    90 00  23 [address]   [id number]   All (3848)    117 39 [place]   11/26/2021  1   11/23/2021  Oxycodone Hcl (Ir) 20 MG Tab    90 00  30 [address]   [id number]   All (3848)    90 00 MME  [place]   10/27/2021  1   10/27/2021  Oxycodone Hcl 20 MG Tablet    120 00  30 [address]   [id number]   All (3848)            Requested Prescriptions      No prescriptions requested or ordered in this encounter     Medications Discontinued During This Encounter   Medication Reason    dexamethasone (DECADRON) 1 mg tablet Therapy completed       Representatives have queried the patient's controlled substance dispensing history in the Prescription Drug Monitoring Program in compliance with regulations before I have prescribed any controlled substances  The prescription history is consistent with prescribed therapy and our practice policies  30 minutes were spent face to face with Reggie Maloney with greater than 50% of the time spent in counseling or coordination of care including discussions of etiology of diagnosis, diagnostic results, impression, and recommendations, risks and benefits of treatment, instructions for disease self management, treatment instructions, follow up requirements, risk factors and risk reduction of disease, patient and family counseling/involvement in care and compliance with treatment regimen   All of the patient's questions were answered during this discussion  No follow-ups on file  Subjective:   Chief Complaint  Follow up visit for:  symptom management, pain, neoplasm related, assessment of goals of care, disease process education and discussion of prognosis, advance care planning    Nausea  Associated symptoms include abdominal pain and fatigue  Pertinent negatives include no chest pain, chills, coughing, fever, nausea or neck pain  Medication Refill  Associated symptoms include abdominal pain and fatigue  Pertinent negatives include no chest pain, chills, coughing, fever, nausea or neck pain  Reggie Maloney is a 63 y o  female with grade 2 follicular lymphoma initially diagnosed in Nov 2018  Started on chemo (rituximab + bendamustine) soon after  Developed tumor lysis syndrome in Dec 2018, bendamustine removed  She completed rituxan on 4/2019 and had been on maintenance Rituxan since 5/2019  In March 2020, she had evidence of disease progression and was also started on Revlimid   Per review of notes, there are some concerns with noncompliance and f/u with bloodwork   Per Oncology note, long discussion with patient regarding refractory and progressive grade 2 follicular lymphoma   Patient with c/o increase L neck swelling/tenderness, US done, concerning for progression of her disease  Her biopsy from the L iliac bone and L cervical LN came back positive for follicular lymphoma  Her PET-CT 6/4 unfortunately showed significant progression of disease in the neck, chest, abdomen, pelvis, including new lesion in the L ribs  She was on Obinutuzumab/CHOP with Udenyca before  There was a delay in her treatment when she had Covid-19 as well as when she went to [facility] to relocate  She was going to stay there to get treatments, but it didn't work out so she came back  She is now on treatment with Copanlisib 60 mg 3 weeks on with 1 week of a break since 6/29/2021  She had an MRI brain done in 8/14/2021 that showed no brain lesions or any acute intracranial abnormality (except for 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)  This was ordered by oncology due to patient's complaints of dizziness and vision changes  Her latest PET-CT on 10/19 showed significantly improved hypermetabolic adenopathy throughout the neck, chest, abdomen and pelvis, however viable tumor likely remains  Increased activity is also noted in the proximal humeri and proximal right femoral shaft as above  She had an episode of increasing dental pain in 11/23 for which CT head was ordered that showed 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  CT ST neck also ordered that showed large cavities within the L maxillary molars and smaller cavities in the R mandibular molars  She was also ordered Xray of the R hip and US abdomen for R flank pain in 1/2022 that showed no abnormalities  She was last seen on 1/26/2022  Since her last visit, she continues to follow up with oncology  She last saw them 4/25/2022  Her PET-CT was delayed due to high blood sugars, but attempting to do again now that she is on metformin  She was ordered mammogram and gyn referral for her complaints of vaginal bleeding and breast pain  She continued with chemo        She comes back today for routine follow up of cancer related pain   utilized  She is tearful, saying she is not tolerating her chemo very well anymore  She is gaining weight and her "face looks terrible"  Her sugars and BP are up too  She is encouraged to exercise, which she said she is not doing, and watch what she eats  She is also encouraged to speak with oncology regarding her difficulty tolerating treatments so they can consider adjusting treatments  She also said about her breast pain and vaginal bleeding  She is referred to gyn and ordered a mammogram      We spoke about continuing trial of decreasing oxycodone to lowest dose as we are now approaching possible long term side effects  However, given her current issues, we will hold off with further weaning down until we see test results  I told her that if she her tests results are better/normal, we will continue further efforts of weaning down  She is reminded that she is only allowed to take 1 tablet of oxyIR up to 4 doses a day  The following portions of the medical history were reviewed: past medical history, problem list, medication list, and social history      Current Outpatient Medications:     al mag oxide-diphenhydramine-lidocaine viscous (MAGIC MOUTHWASH) 1:1:1 suspension, Swish and spit 10 mL every 4 (four) hours as needed for mouth pain or discomfort or mucositis, Disp: 180 mL, Rfl: 3    amLODIPine (NORVASC) 5 mg tablet, Take 2 tablets (10 mg total) by mouth daily, Disp: 30 tablet, Rfl: 3    aspirin-acetaminophen-caffeine (EXCEDRIN MIGRAINE) 250-250-65 MG per tablet, Take 1 tablet by mouth every 6 (six) hours as needed for headaches Erica 1 tableta cada 6 horas fernandez sea necesario para el dolor de russ, Disp: 30 tablet, Rfl: 0    metFORMIN (GLUCOPHAGE) 500 mg tablet, Take 2 tablets (1,000 mg total) by mouth 2 (two) times a day with meals, Disp: 60 tablet, Rfl: 5    oxyCODONE (ROXICODONE) 15 mg immediate release tablet, [place] 1 tableta cada 6 horas fernandez sea necesario solo para el dolor intenso  Fertile 1 tableta cada 6 horas fernandez sea necesario solo para el dolor intenso  Sukhdeep de 4 comprimidos al d a  Sukhdeep de 4 comprimidos al misael   Trate de monique menos si puede , Disp: 90 tablet, Rfl: 0    acetaminophen (TYLENOL) 325 mg tablet, Take 2 tablets (650 mg total) by mouth every 6 (six) hours as needed for mild pain, moderate pain, headaches or fever (Patient not taking: Reported on 4/27/2022 ), Disp: 30 tablet, Rfl: 0    acyclovir (ZOVIRAX) 400 MG tablet, Take 1 tablet (400 mg total) by mouth 2 (two) times a day, Disp: 60 tablet, Rfl: 11    allopurinol (ZYLOPRIM) 100 mg tablet, Take 1 tablet (100 mg total) by mouth daily (Patient not taking: Reported on 4/27/2022 ), Disp: 30 tablet, Rfl: 0    ascorbic acid (VITAMIN C) 1000 MG tablet, Take 1 tablet (1,000 mg total) by mouth every 12 (twelve) hours for 10 doses, Disp: 10 tablet, Rfl: 0    aspirin (ECOTRIN LOW STRENGTH) 81 mg EC tablet, Take 1 tablet (81 mg total) by mouth daily (Patient not taking: Reported on 4/27/2022 ), Disp: 30 tablet, Rfl: 11    atorvastatin (LIPITOR) 40 mg tablet, Take 1 tablet (40 mg total) by mouth daily with dinner (Patient not taking: Reported on 4/27/2022 ), Disp: 12 tablet, Rfl: 0    benzonatate (TESSALON) 200 MG capsule, Take 1 capsule (200 mg total) by mouth 3 (three) times a day as needed for cough, Disp: 20 capsule, Rfl: 1    Blood Glucose Monitoring Suppl (OneTouch Verio) w/Device KIT, Use in the morning, Disp: 1 kit, Rfl: 0    carbamide peroxide (DEBROX) 6 5 % otic solution, Administer 5 drops to the right ear 2 (two) times a day, Disp: 15 mL, Rfl: 0    cholecalciferol (VITAMIN D3) 1,000 units tablet, Take 2 tablets (2,000 Units total) by mouth daily for 5 days, Disp: 10 tablet, Rfl: 0    cyanocobalamin 1000 MCG tablet, Take 1 tablet (1,000 mcg total) by mouth daily, Disp: 90 tablet, Rfl: 3    DULoxetine (CYMBALTA) 20 mg capsule, Take 2 capsules (40 mg total) by mouth daily, Disp: 30 capsule, Rfl: 3    folic acid (FOLVITE) 1 mg tablet, Take 1 tablet (1 mg total) by mouth daily, Disp: 90 tablet, Rfl: 4    glucose blood (OneTouch Verio) test strip, Use 1 each in the morning Use as instructed, Disp: 50 strip, Rfl: 6    guaiFENesin (ROBITUSSIN) 100 mg/5 mL oral solution, Take 10 mL (200 mg total) by mouth every 4 (four) hours, Disp: 473 mL, Rfl: 0    lidocaine (LMX) 4 % cream, Apply topically as needed for mild pain To neck, Disp: 30 g, Rfl: 0    LORazepam (ATIVAN) 0 5 mg tablet, Take 1 tablet 30-60 minutes before MRI, can take an additional tablet if needed before test, Disp: 2 tablet, Rfl: 0    meclizine (ANTIVERT) 12 5 MG tablet, Take 1 tablet (12 5 mg total) by mouth every 8 (eight) hours as needed for dizziness, Disp: 30 tablet, Rfl: 0    naloxone (NARCAN) 4 mg/0 1 mL nasal spray, Administer 1 spray into a nostril  If no response after 2-3 minutes, give another dose in the other nostril using a new spray , Disp: 1 each, Rfl: 0    omeprazole (PriLOSEC) 20 mg delayed release capsule, Take 2 capsules (40 mg total) by mouth daily To prevent stomach upset, Disp: 60 capsule, Rfl: 5    ondansetron (ZOFRAN) 4 mg tablet, Take 1 tablet (4 mg total) by mouth every 8 (eight) hours as needed for nausea or vomiting, Disp: 30 tablet, Rfl: 3    OneTouch Delica Lancets 01P MISC, Use in the morning, Disp: 100 each, Rfl: 4    polyethylene glycol (MIRALAX) 17 g packet, Take 17 g by mouth daily, Disp: 30 each, Rfl: 3    senna (SENOKOT) 8 6 mg, Take 1 tablet (8 6 mg total) by mouth 2 (two) times a day, Disp: 60 each, Rfl: 3    zinc sulfate (ZINCATE) 220 mg capsule, Take 1 capsule (220 mg total) by mouth daily for 4 doses, Disp: 4 capsule, Rfl: 0  No current facility-administered medications for this visit  Review of Systems   Constitutional: Positive for fatigue  Negative for activity change, appetite change, chills, fever and unexpected weight change     HENT: Negative for trouble swallowing  Respiratory: Negative for cough and shortness of breath  Cardiovascular: Negative for chest pain  Gastrointestinal: Positive for abdominal pain  Negative for constipation, diarrhea and nausea  Genitourinary: Positive for vaginal bleeding  Bilateral breast pain   Musculoskeletal: Negative for neck pain  Neurological: Negative for speech difficulty and light-headedness  Psychiatric/Behavioral: Negative for sleep disturbance  The patient is not nervous/anxious  All other systems reviewed and are negative  All other systems negative    Objective:  Vital Signs  /100 (BP Location: Left arm, Patient Position: Sitting, Cuff Size: Large)   Pulse 56   Temp (!) 95 9 °F (35 5 °C) (Temporal)   Resp 20   Ht 5' 4 5" (1 638 m)   Wt 111 kg (245 lb)   SpO2 99%   BMI 41 40 kg/m²    Physical Exam    Constitutional: Appears well-developed and well-nourished, she did not look sick, nor toxic-looking  Pleasant, well-kempt  Appeared to have gained some more weight, compared to previous visit  Head: Normocephalic and atraumatic  Eyes: EOM are normal  No ocular discharge  No scleral icterus  Respiratory: Effort normal  No stridor  No respiratory distress  Gastrointestinal: No abdominal distension  Musculoskeletal: No edema  Neurological: Alert, oriented and appropriately conversant  Skin: No diaphoresis, no rashes seen on exposed areas of skin  Psychiatric: Displays a normal mood and affect   Behavior, judgement and thought content appear normal  Krystin Bryant MD  Palliative Medicine & Supportive Care  Internal Medicine  Available via [facility] Text  Office: [phone]  Fax: [phone]

## 2022-05-02 ENCOUNTER — HOSPITAL ENCOUNTER (OUTPATIENT)
Dept: INFUSION CENTER | Facility: HOSPITAL | Age: 62
Discharge: HOME | End: 2022-05-02
Payer: COMMERCIAL

## 2022-05-02 DIAGNOSIS — C82.18 GRADE 2 FOLLICULAR LYMPHOMA OF LYMPH NODES OF MULTIPLE REGIONS (HCC): Primary | ICD-10-CM

## 2022-05-02 DIAGNOSIS — Z45.2 ENCOUNTER FOR CENTRAL LINE CARE: ICD-10-CM

## 2022-05-02 LAB
ALBUMIN SERPL BCP-MCNC: 4.2 G/DL (ref 3–5.2)
ALP SERPL-CCNC: 107 U/L (ref 43–122)
ALT SERPL W P-5'-P-CCNC: 17 U/L
ANION GAP SERPL CALCULATED.3IONS-SCNC: 6 MMOL/L (ref 5–14)
AST SERPL W P-5'-P-CCNC: 19 U/L (ref 14–36)
BASOPHILS # BLD AUTO: 0.02 THOUSANDS/ΜL (ref 0–0.1)
BASOPHILS NFR BLD AUTO: 0 % (ref 0–1)
BILIRUB SERPL-MCNC: 0.59 MG/DL
BUN SERPL-MCNC: 16 MG/DL (ref 5–25)
CALCIUM SERPL-MCNC: 9.1 MG/DL (ref 8.4–10.2)
CHLORIDE SERPL-SCNC: 104 MMOL/L (ref 97–108)
CO2 SERPL-SCNC: 30 MMOL/L (ref 22–30)
CREAT SERPL-MCNC: 0.83 MG/DL (ref 0.6–1.2)
EOSINOPHIL # BLD AUTO: 0.12 THOUSAND/ΜL (ref 0–0.61)
EOSINOPHIL NFR BLD AUTO: 2 % (ref 0–6)
ERYTHROCYTE [DISTWIDTH] IN BLOOD BY AUTOMATED COUNT: 15 % (ref 11.6–15.1)
GFR SERPL CREATININE-BSD FRML MDRD: 77 ML/MIN/1.73SQ M
GLUCOSE SERPL-MCNC: 161 MG/DL (ref 70–99)
HCT VFR BLD AUTO: 39.7 % (ref 34.8–46.1)
HGB BLD-MCNC: 11.7 G/DL (ref 11.5–15.4)
IMM GRANULOCYTES # BLD AUTO: 0.01 THOUSAND/UL (ref 0–0.2)
IMM GRANULOCYTES NFR BLD AUTO: 0 % (ref 0–2)
LDH SERPL-CCNC: 593 U/L (ref 313–618)
LYMPHOCYTES # BLD AUTO: 1.96 THOUSANDS/ΜL (ref 0.6–4.47)
LYMPHOCYTES NFR BLD AUTO: 30 % (ref 14–44)
MCH RBC QN AUTO: 20.7 PG (ref 26.8–34.3)
MCHC RBC AUTO-ENTMCNC: 29.5 G/DL (ref 31.4–37.4)
MCV RBC AUTO: 70 FL (ref 82–98)
MONOCYTES # BLD AUTO: 0.31 THOUSAND/ΜL (ref 0.17–1.22)
MONOCYTES NFR BLD AUTO: 5 % (ref 4–12)
NEUTROPHILS # BLD AUTO: 4.05 THOUSANDS/ΜL (ref 1.85–7.62)
NEUTS SEG NFR BLD AUTO: 63 % (ref 43–75)
NRBC BLD AUTO-RTO: 0 /100 WBCS
PLATELET # BLD AUTO: 209 THOUSANDS/UL (ref 149–390)
PMV BLD AUTO: 9.9 FL (ref 8.9–12.7)
POTASSIUM SERPL-SCNC: 4 MMOL/L (ref 3.6–5)
PROT SERPL-MCNC: 7.2 G/DL (ref 5.9–8.4)
RBC # BLD AUTO: 5.65 MILLION/UL (ref 3.81–5.12)
SODIUM SERPL-SCNC: 140 MMOL/L (ref 137–147)
WBC # BLD AUTO: 6.47 THOUSAND/UL (ref 4.31–10.16)

## 2022-05-02 PROCEDURE — 83615 LACTATE (LD) (LDH) ENZYME: CPT | Performed by: INTERNAL MEDICINE

## 2022-05-02 PROCEDURE — 85025 COMPLETE CBC W/AUTO DIFF WBC: CPT | Performed by: INTERNAL MEDICINE

## 2022-05-02 PROCEDURE — 80053 COMPREHEN METABOLIC PANEL: CPT | Performed by: INTERNAL MEDICINE

## 2022-05-02 NOTE — PROGRESS NOTES
Port flushed per protocol and central labs drawn per orders for chemo tomorrow 5-3-22  Patient requests port to remain accessed  Confirmed appt

## 2022-05-03 ENCOUNTER — TELEPHONE (OUTPATIENT)
Dept: HEMATOLOGY ONCOLOGY | Facility: CLINIC | Age: 62
End: 2022-05-03

## 2022-05-03 ENCOUNTER — HOSPITAL ENCOUNTER (OUTPATIENT)
Dept: INFUSION CENTER | Facility: HOSPITAL | Age: 62
Discharge: HOME | End: 2022-05-03
Attending: INTERNAL MEDICINE
Payer: COMMERCIAL

## 2022-05-03 VITALS
SYSTOLIC BLOOD PRESSURE: 144 MMHG | TEMPERATURE: 97.4 F | DIASTOLIC BLOOD PRESSURE: 72 MMHG | HEART RATE: 72 BPM | OXYGEN SATURATION: 98 % | RESPIRATION RATE: 18 BRPM

## 2022-05-03 DIAGNOSIS — Z45.2 ENCOUNTER FOR CENTRAL LINE CARE: ICD-10-CM

## 2022-05-03 DIAGNOSIS — C82.18 GRADE 2 FOLLICULAR LYMPHOMA OF LYMPH NODES OF MULTIPLE REGIONS (HCC): Primary | ICD-10-CM

## 2022-05-03 LAB
GLUCOSE SERPL-MCNC: 213 MG/DL (ref 65–140)
GLUCOSE SERPL-MCNC: 222 MG/DL (ref 65–140)
GLUCOSE SERPL-MCNC: 241 MG/DL (ref 65–140)

## 2022-05-03 PROCEDURE — 82948 REAGENT STRIP/BLOOD GLUCOSE: CPT

## 2022-05-03 NOTE — TELEPHONE ENCOUNTER
Received teams message regarding pt having a blood sugar fingerstick of 213, pt had a ham and cheese sandwich before she came to infusion  BS repeated twenty mins later was 241  Per Dr Corina Zavaleta pt was to have blood sugar rechecked on hr later and then blood sugar was 222  Pt to come next week for treatment, however pt is going away to [place] so nurse will speak to pt about when she is coming back and dates will be changed

## 2022-05-03 NOTE — PROGRESS NOTES
Blood sugar rechecked with result of 222  Notified Corky Ruiz, RN at Dr Pierre Nathan office  Dr Jaden Hernandez would like tx deferred by one week, however pt will be on vacation in [facility] until 5/20  Next cycle already scheduled for 5/24 after office visit on 5/23  Ok to keep this schedule  Pt aware and agreeable  Pharmacy notified  Pt also encouraged to follow up with PCP for eval of blood sugar per advice of Dr Jaden Hernandez  Pt verbalizes understanding

## 2022-05-03 NOTE — PROGRESS NOTES
Per Dr Mauro Arvizu, blood sugar rechecked with result of 241  Notified Truman Sacks, RN  Per Dr Mauro Arvizu, wait one more hour and recheck  If blood sugar not below tx parameter, tx will have to be rescheduled

## 2022-05-03 NOTE — PROGRESS NOTES
Port flushed and deaccessed per protocol  Next appt confirmed and AVS provided  Left ambulatory with [facility]

## 2022-05-03 NOTE — PROGRESS NOTES
Pt's bp on arrival 183/79  Manual recheck 144/72  Also blood sugar on arrival 213  Notified Luisa Dwyer RN at Dr Sergio Bernardo office  Await decision of Dr Juan Thibodeaux

## 2022-05-04 ENCOUNTER — HOSPITAL ENCOUNTER (OUTPATIENT)
Dept: NUCLEAR MEDICINE | Facility: HOSPITAL | Age: 62
Discharge: HOME | End: 2022-05-04
Attending: INTERNAL MEDICINE
Payer: COMMERCIAL

## 2022-05-04 LAB — GLUCOSE SERPL-MCNC: 118 MG/DL (ref 65–140)

## 2022-05-04 PROCEDURE — 78815 PET IMAGE W/CT SKULL-THIGH: CPT

## 2022-05-04 PROCEDURE — G1004 CDSM NDSC: HCPCS

## 2022-05-04 PROCEDURE — A9552 F18 FDG: HCPCS

## 2022-05-04 PROCEDURE — 82948 REAGENT STRIP/BLOOD GLUCOSE: CPT

## 2022-05-05 NOTE — PROGRESS NOTES
Patient tolerated hydration therapy well with no adverse effects  Brisk blood return noted from port  Not enough blood to waste for lab draw  Labs drawn peripherally   Next appointment verified, patient declines AVS  No

## 2022-05-17 DIAGNOSIS — C82.18 GRADE 2 FOLLICULAR LYMPHOMA OF LYMPH NODES OF MULTIPLE REGIONS (HCC): Primary | ICD-10-CM

## 2022-05-17 RX ORDER — SODIUM CHLORIDE 9 MG/ML
20 INJECTION, SOLUTION INTRAVENOUS ONCE
Status: CANCELLED | OUTPATIENT
Start: 2022-05-24

## 2022-05-20 DIAGNOSIS — R10.30 LOWER ABDOMINAL PAIN: ICD-10-CM

## 2022-05-20 DIAGNOSIS — G89.3 CANCER RELATED PAIN: ICD-10-CM

## 2022-05-20 NOTE — TELEPHONE ENCOUNTER
Primary palliative medicine provider: Dr Zayra Ramirez     Medication requested: oxycodone 15 mg     If for pain, how has the patient been taking their pain medicine? As prescribed, pt is due for this medication 5/27 just calling with with to get it refill       Last appointment: 4/27     Next scheduled appointment: 5/27     PDMP review:  PATIENT ID PRESCRIPTION # FILLED WRITTEN DRUG LABEL QTY DAYS STRENGTH MME** PRESCRIBER PHARMACY PAYMENT REFILL #/AUTH STATE DETAIL   1 [id number] 04/27/2022 04/27/2022 oxyCODONE HCL (Tablet) 90 0 23 15 MG 88 04 [facility] [place] 0 / 0 [place]    1 [id number] 03/23/2022 03/18/2022 oxyCODONE HCL (Tablet) 90 0 25 15 MG 81 0 [facility] [place]    1 [id number] 02/25/2022 02/25/2022 oxyCODONE HCL (Tablet) 90 0 23 15 MG 88 04 [facility] [place] 0 / 0 [place]    1 [id number] 01/26/2022 01/26/2022 oxyCODONE HCL (Tablet) 90 0 23 15 MG 88 04 [facility] [place] 0 / 0 [place]    1 [id number] 12/23/2021 12/23/2021 oxyCODONE HCL (Tablet) 90 0 23 20  39 [facility] [place] 0 [place]

## 2022-05-23 ENCOUNTER — APPOINTMENT (OUTPATIENT)
Dept: LAB | Facility: HOSPITAL | Age: 62
End: 2022-05-23
Attending: INTERNAL MEDICINE
Payer: COMMERCIAL

## 2022-05-23 ENCOUNTER — OFFICE VISIT (OUTPATIENT)
Dept: HEMATOLOGY ONCOLOGY | Facility: CLINIC | Age: 62
End: 2022-05-23
Payer: COMMERCIAL

## 2022-05-23 VITALS
DIASTOLIC BLOOD PRESSURE: 78 MMHG | OXYGEN SATURATION: 97 % | WEIGHT: 247 LBS | HEART RATE: 95 BPM | RESPIRATION RATE: 18 BRPM | TEMPERATURE: 99.2 F | BODY MASS INDEX: 41.15 KG/M2 | SYSTOLIC BLOOD PRESSURE: 138 MMHG | HEIGHT: 65 IN

## 2022-05-23 DIAGNOSIS — C82.18 GRADE 2 FOLLICULAR LYMPHOMA OF LYMPH NODES OF MULTIPLE REGIONS (HCC): Primary | ICD-10-CM

## 2022-05-23 DIAGNOSIS — C82.18 GRADE 2 FOLLICULAR LYMPHOMA OF LYMPH NODES OF MULTIPLE REGIONS (HCC): ICD-10-CM

## 2022-05-23 LAB
ALBUMIN SERPL BCP-MCNC: 4 G/DL (ref 3–5.2)
ALP SERPL-CCNC: 100 U/L (ref 43–122)
ALT SERPL W P-5'-P-CCNC: 28 U/L
ANION GAP SERPL CALCULATED.3IONS-SCNC: 7 MMOL/L (ref 5–14)
AST SERPL W P-5'-P-CCNC: 24 U/L (ref 14–36)
BASOPHILS # BLD AUTO: 0.04 THOUSANDS/ΜL (ref 0–0.1)
BASOPHILS NFR BLD AUTO: 1 % (ref 0–1)
BILIRUB SERPL-MCNC: 0.55 MG/DL
BUN SERPL-MCNC: 18 MG/DL (ref 5–25)
CALCIUM SERPL-MCNC: 8.9 MG/DL (ref 8.4–10.2)
CHLORIDE SERPL-SCNC: 107 MMOL/L (ref 97–108)
CO2 SERPL-SCNC: 26 MMOL/L (ref 22–30)
CREAT SERPL-MCNC: 0.92 MG/DL (ref 0.6–1.2)
EOSINOPHIL # BLD AUTO: 0.28 THOUSAND/ΜL (ref 0–0.61)
EOSINOPHIL NFR BLD AUTO: 3 % (ref 0–6)
ERYTHROCYTE [DISTWIDTH] IN BLOOD BY AUTOMATED COUNT: 15.9 % (ref 11.6–15.1)
GFR SERPL CREATININE-BSD FRML MDRD: 68 ML/MIN/1.73SQ M
GLUCOSE SERPL-MCNC: 105 MG/DL (ref 70–99)
HCT VFR BLD AUTO: 37.4 % (ref 34.8–46.1)
HGB BLD-MCNC: 11.2 G/DL (ref 11.5–15.4)
IMM GRANULOCYTES # BLD AUTO: 0.05 THOUSAND/UL (ref 0–0.2)
IMM GRANULOCYTES NFR BLD AUTO: 1 % (ref 0–2)
LDH SERPL-CCNC: 550 U/L (ref 313–618)
LYMPHOCYTES # BLD AUTO: 2.22 THOUSANDS/ΜL (ref 0.6–4.47)
LYMPHOCYTES NFR BLD AUTO: 27 % (ref 14–44)
MAGNESIUM SERPL-MCNC: 2 MG/DL (ref 1.6–2.3)
MCH RBC QN AUTO: 20.8 PG (ref 26.8–34.3)
MCHC RBC AUTO-ENTMCNC: 29.9 G/DL (ref 31.4–37.4)
MCV RBC AUTO: 70 FL (ref 82–98)
MONOCYTES # BLD AUTO: 0.61 THOUSAND/ΜL (ref 0.17–1.22)
MONOCYTES NFR BLD AUTO: 8 % (ref 4–12)
NEUTROPHILS # BLD AUTO: 4.95 THOUSANDS/ΜL (ref 1.85–7.62)
NEUTS SEG NFR BLD AUTO: 60 % (ref 43–75)
NRBC BLD AUTO-RTO: 0 /100 WBCS
PLATELET # BLD AUTO: 271 THOUSANDS/UL (ref 149–390)
PMV BLD AUTO: 11 FL (ref 8.9–12.7)
POTASSIUM SERPL-SCNC: 4.3 MMOL/L (ref 3.6–5)
PROT SERPL-MCNC: 6.9 G/DL (ref 5.9–8.4)
RBC # BLD AUTO: 5.38 MILLION/UL (ref 3.81–5.12)
SODIUM SERPL-SCNC: 140 MMOL/L (ref 137–147)
WBC # BLD AUTO: 8.15 THOUSAND/UL (ref 4.31–10.16)

## 2022-05-23 PROCEDURE — 83735 ASSAY OF MAGNESIUM: CPT

## 2022-05-23 PROCEDURE — 83615 LACTATE (LD) (LDH) ENZYME: CPT

## 2022-05-23 PROCEDURE — 80053 COMPREHEN METABOLIC PANEL: CPT

## 2022-05-23 PROCEDURE — 99215 OFFICE O/P EST HI 40 MIN: CPT | Performed by: INTERNAL MEDICINE

## 2022-05-23 PROCEDURE — 36415 COLL VENOUS BLD VENIPUNCTURE: CPT

## 2022-05-23 PROCEDURE — 85025 COMPLETE CBC W/AUTO DIFF WBC: CPT

## 2022-05-23 RX ORDER — AMLODIPINE BESYLATE 10 MG/1
10 TABLET ORAL DAILY
COMMUNITY
Start: 2022-05-09 | End: 2022-05-31

## 2022-05-23 RX ORDER — OXYCODONE HYDROCHLORIDE 15 MG/1
TABLET ORAL
Qty: 90 TABLET | Refills: 0 | Status: SHIPPED | OUTPATIENT
Start: 2022-05-23 | End: 2022-06-24 | Stop reason: SDUPTHER
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
## 2022-05-24 ENCOUNTER — HOSPITAL ENCOUNTER (OUTPATIENT)
Dept: INFUSION CENTER | Facility: HOSPITAL | Age: 62
Discharge: HOME | End: 2022-05-24
Attending: INTERNAL MEDICINE
Payer: COMMERCIAL

## 2022-05-24 VITALS
HEART RATE: 70 BPM | SYSTOLIC BLOOD PRESSURE: 151 MMHG | TEMPERATURE: 98.1 F | RESPIRATION RATE: 20 BRPM | DIASTOLIC BLOOD PRESSURE: 68 MMHG

## 2022-05-24 DIAGNOSIS — C82.18 GRADE 2 FOLLICULAR LYMPHOMA OF LYMPH NODES OF MULTIPLE REGIONS (HCC): Primary | ICD-10-CM

## 2022-05-24 DIAGNOSIS — C82.18 GRADE 2 FOLLICULAR LYMPHOMA OF LYMPH NODES OF MULTIPLE REGIONS (HCC): ICD-10-CM

## 2022-05-24 DIAGNOSIS — D51.8 OTHER VITAMIN B12 DEFICIENCY ANEMIAS: Primary | ICD-10-CM

## 2022-05-24 LAB — GLUCOSE SERPL-MCNC: 99 MG/DL (ref 65–140)

## 2022-05-24 PROCEDURE — 82948 REAGENT STRIP/BLOOD GLUCOSE: CPT

## 2022-05-24 PROCEDURE — 96372 THER/PROPH/DIAG INJ SC/IM: CPT

## 2022-05-24 PROCEDURE — 96413 CHEMO IV INFUSION 1 HR: CPT

## 2022-05-24 PROCEDURE — 96367 TX/PROPH/DG ADDL SEQ IV INF: CPT

## 2022-05-24 RX ORDER — SODIUM CHLORIDE 9 MG/ML
20 INJECTION, SOLUTION INTRAVENOUS ONCE
Status: CANCELLED | OUTPATIENT
Start: 2022-06-14

## 2022-05-24 RX ORDER — SODIUM CHLORIDE 9 MG/ML
20 INJECTION, SOLUTION INTRAVENOUS ONCE
Status: CANCELLED | OUTPATIENT
Start: 2022-05-24

## 2022-05-24 RX ORDER — SODIUM CHLORIDE 9 MG/ML
20 INJECTION, SOLUTION INTRAVENOUS ONCE
Status: COMPLETED | OUTPATIENT
Start: 2022-05-24 | End: 2022-05-24

## 2022-05-24 RX ORDER — CYANOCOBALAMIN 1000 UG/ML
1000 INJECTION INTRAMUSCULAR; SUBCUTANEOUS ONCE
Status: COMPLETED | OUTPATIENT
Start: 2022-05-24 | End: 2022-05-24

## 2022-05-24 RX ORDER — SODIUM CHLORIDE 9 MG/ML
20 INJECTION, SOLUTION INTRAVENOUS ONCE
Status: CANCELLED | OUTPATIENT
Start: 2022-05-31

## 2022-05-24 RX ORDER — CYANOCOBALAMIN 1000 UG/ML
1000 INJECTION INTRAMUSCULAR; SUBCUTANEOUS ONCE
Status: CANCELLED | OUTPATIENT
Start: 2022-06-21

## 2022-05-24 RX ADMIN — CYANOCOBALAMIN 1000 MCG: 1000 INJECTION INTRAMUSCULAR; SUBCUTANEOUS at 11:01

## 2022-05-24 RX ADMIN — COPANLISIB 60 MG: 15 INJECTION, POWDER, LYOPHILIZED, FOR SOLUTION INTRAVENOUS at 11:33

## 2022-05-24 RX ADMIN — SODIUM CHLORIDE 20 ML/HR: 0.9 INJECTION, SOLUTION INTRAVENOUS at 10:55

## 2022-05-24 RX ADMIN — ONDANSETRON 8 MG: 2 INJECTION INTRAMUSCULAR; INTRAVENOUS at 10:57

## 2022-05-24 NOTE — PROGRESS NOTES
Pt tolerated Aliqopa infusion without difficulty  No s/s reaction noted  Port flushed and deaccessed per protocol  Next appt confirmed and AVS provided  Left ambulatory with [facility]

## 2022-05-24 NOTE — PROGRESS NOTES
Blood glucose level 99 this morning  Glucometer docked but result not flowing into lab result area in Epic  Also bp 151/68 and tx parameter is 150/90  Notified Augusto Doll RN at Dr Rada Closs office  Ok to treat today

## 2022-05-27 ENCOUNTER — HOSPITAL ENCOUNTER (OUTPATIENT)
Dept: INFUSION CENTER | Facility: HOSPITAL | Age: 62
Discharge: HOME | End: 2022-05-27
Payer: COMMERCIAL

## 2022-05-27 DIAGNOSIS — Z45.2 ENCOUNTER FOR CENTRAL LINE CARE: Primary | ICD-10-CM

## 2022-05-27 DIAGNOSIS — C82.18 GRADE 2 FOLLICULAR LYMPHOMA OF LYMPH NODES OF MULTIPLE REGIONS (HCC): ICD-10-CM

## 2022-05-27 DIAGNOSIS — D51.8 OTHER VITAMIN B12 DEFICIENCY ANEMIAS: ICD-10-CM

## 2022-05-27 LAB
ALBUMIN SERPL BCP-MCNC: 4.2 G/DL (ref 3–5.2)
ALP SERPL-CCNC: 110 U/L (ref 43–122)
ALT SERPL W P-5'-P-CCNC: 25 U/L
ANION GAP SERPL CALCULATED.3IONS-SCNC: 7 MMOL/L (ref 5–14)
AST SERPL W P-5'-P-CCNC: 26 U/L (ref 14–36)
BASOPHILS # BLD AUTO: 0.04 THOUSANDS/ΜL (ref 0–0.1)
BASOPHILS NFR BLD AUTO: 1 % (ref 0–1)
BILIRUB SERPL-MCNC: 0.59 MG/DL
BUN SERPL-MCNC: 10 MG/DL (ref 5–25)
CALCIUM SERPL-MCNC: 8.8 MG/DL (ref 8.4–10.2)
CHLORIDE SERPL-SCNC: 107 MMOL/L (ref 97–108)
CO2 SERPL-SCNC: 24 MMOL/L (ref 22–30)
CREAT SERPL-MCNC: 0.63 MG/DL (ref 0.6–1.2)
CRP SERPL QL: 6.3 MG/L
EOSINOPHIL # BLD AUTO: 0.06 THOUSAND/ΜL (ref 0–0.61)
EOSINOPHIL NFR BLD AUTO: 1 % (ref 0–6)
ERYTHROCYTE [DISTWIDTH] IN BLOOD BY AUTOMATED COUNT: 16 % (ref 11.6–15.1)
ERYTHROCYTE [SEDIMENTATION RATE] IN BLOOD: 56 MM/HOUR (ref 0–29)
GFR SERPL CREATININE-BSD FRML MDRD: 98 ML/MIN/1.73SQ M
GLUCOSE SERPL-MCNC: 153 MG/DL (ref 70–99)
HCT VFR BLD AUTO: 37.7 % (ref 34.8–46.1)
HGB BLD-MCNC: 11 G/DL (ref 11.5–15.4)
IMM GRANULOCYTES # BLD AUTO: 0.01 THOUSAND/UL (ref 0–0.2)
IMM GRANULOCYTES NFR BLD AUTO: 0 % (ref 0–2)
LDH SERPL-CCNC: 649 U/L (ref 313–618)
LYMPHOCYTES # BLD AUTO: 2.15 THOUSANDS/ΜL (ref 0.6–4.47)
LYMPHOCYTES NFR BLD AUTO: 37 % (ref 14–44)
MCH RBC QN AUTO: 20.4 PG (ref 26.8–34.3)
MCHC RBC AUTO-ENTMCNC: 29.2 G/DL (ref 31.4–37.4)
MCV RBC AUTO: 70 FL (ref 82–98)
MONOCYTES # BLD AUTO: 0.26 THOUSAND/ΜL (ref 0.17–1.22)
MONOCYTES NFR BLD AUTO: 4 % (ref 4–12)
NEUTROPHILS # BLD AUTO: 3.35 THOUSANDS/ΜL (ref 1.85–7.62)
NEUTS SEG NFR BLD AUTO: 57 % (ref 43–75)
NRBC BLD AUTO-RTO: 0 /100 WBCS
PLATELET # BLD AUTO: 293 THOUSANDS/UL (ref 149–390)
PMV BLD AUTO: 10 FL (ref 8.9–12.7)
POTASSIUM SERPL-SCNC: 3.8 MMOL/L (ref 3.6–5)
PROT SERPL-MCNC: 7.2 G/DL (ref 5.9–8.4)
RBC # BLD AUTO: 5.4 MILLION/UL (ref 3.81–5.12)
SODIUM SERPL-SCNC: 138 MMOL/L (ref 137–147)
VIT B12 SERPL-MCNC: 1336 PG/ML (ref 100–900)
WBC # BLD AUTO: 5.87 THOUSAND/UL (ref 4.31–10.16)

## 2022-05-27 PROCEDURE — 83615 LACTATE (LD) (LDH) ENZYME: CPT | Performed by: INTERNAL MEDICINE

## 2022-05-27 PROCEDURE — 85652 RBC SED RATE AUTOMATED: CPT

## 2022-05-27 PROCEDURE — 85025 COMPLETE CBC W/AUTO DIFF WBC: CPT | Performed by: INTERNAL MEDICINE

## 2022-05-27 PROCEDURE — 82607 VITAMIN B-12: CPT

## 2022-05-27 PROCEDURE — 80053 COMPREHEN METABOLIC PANEL: CPT | Performed by: INTERNAL MEDICINE

## 2022-05-27 PROCEDURE — 86140 C-REACTIVE PROTEIN: CPT

## 2022-05-31 ENCOUNTER — OFFICE VISIT (OUTPATIENT)
Dept: FAMILY MEDICINE CLINIC | Facility: CLINIC | Age: 62
End: 2022-05-31

## 2022-05-31 ENCOUNTER — HOSPITAL ENCOUNTER (OUTPATIENT)
Dept: INFUSION CENTER | Facility: HOSPITAL | Age: 62
Discharge: HOME | End: 2022-05-31
Attending: INTERNAL MEDICINE
Payer: COMMERCIAL

## 2022-05-31 VITALS
HEART RATE: 67 BPM | TEMPERATURE: 97.7 F | SYSTOLIC BLOOD PRESSURE: 138 MMHG | DIASTOLIC BLOOD PRESSURE: 80 MMHG | RESPIRATION RATE: 20 BRPM

## 2022-05-31 VITALS
HEART RATE: 90 BPM | OXYGEN SATURATION: 97 % | WEIGHT: 246.1 LBS | DIASTOLIC BLOOD PRESSURE: 70 MMHG | HEIGHT: 65 IN | RESPIRATION RATE: 18 BRPM | TEMPERATURE: 97.8 F | SYSTOLIC BLOOD PRESSURE: 128 MMHG | BODY MASS INDEX: 41 KG/M2

## 2022-05-31 DIAGNOSIS — I10 ESSENTIAL HYPERTENSION: Primary | ICD-10-CM

## 2022-05-31 DIAGNOSIS — C82.18 GRADE 2 FOLLICULAR LYMPHOMA OF LYMPH NODES OF MULTIPLE REGIONS (HCC): Primary | ICD-10-CM

## 2022-05-31 LAB — GLUCOSE SERPL-MCNC: 104 MG/DL (ref 65–140)

## 2022-05-31 PROCEDURE — 99213 OFFICE O/P EST LOW 20 MIN: CPT | Performed by: INTERNAL MEDICINE

## 2022-05-31 PROCEDURE — 3078F DIAST BP <80 MM HG: CPT | Performed by: INTERNAL MEDICINE

## 2022-05-31 PROCEDURE — 82948 REAGENT STRIP/BLOOD GLUCOSE: CPT

## 2022-05-31 PROCEDURE — 96367 TX/PROPH/DG ADDL SEQ IV INF: CPT

## 2022-05-31 PROCEDURE — 96413 CHEMO IV INFUSION 1 HR: CPT

## 2022-05-31 PROCEDURE — 3074F SYST BP LT 130 MM HG: CPT | Performed by: INTERNAL MEDICINE

## 2022-05-31 RX ORDER — CHLORTHALIDONE 25 MG/1
25 TABLET ORAL DAILY
Qty: 30 TABLET | Refills: 5 | Status: SHIPPED | OUTPATIENT
Start: 2022-05-31

## 2022-05-31 RX ORDER — SODIUM CHLORIDE 9 MG/ML
20 INJECTION, SOLUTION INTRAVENOUS ONCE
Status: COMPLETED | OUTPATIENT
Start: 2022-05-31 | End: 2022-05-31

## 2022-05-31 RX ADMIN — ONDANSETRON 8 MG: 2 INJECTION INTRAMUSCULAR; INTRAVENOUS at 10:23

## 2022-05-31 RX ADMIN — SODIUM CHLORIDE 20 ML/HR: 9 INJECTION, SOLUTION INTRAVENOUS at 10:23

## 2022-05-31 RX ADMIN — COPANLISIB 60 MG: 15 INJECTION, POWDER, LYOPHILIZED, FOR SOLUTION INTRAVENOUS at 11:03

## 2022-05-31 NOTE — ASSESSMENT & PLAN NOTE
Now well controlled with Norvasc  Last blood pressure was 155/86 most recent is 128/70  Lower extremity swelling likely secondary to Norvasc treatment for her hypertension  Plan   will switch Norvasc to   Chlorothalidone 25mg  will have patient follow-up in 3 months to recheck blood pressure and address any additional medical concerns

## 2022-05-31 NOTE — PROGRESS NOTES
Assessment/Plan:    1  Essential hypertension  Assessment & Plan:    Now well controlled with Norvasc  Last blood pressure was 155/86 most recent is 128/70  Lower extremity swelling likely secondary to Norvasc treatment for her hypertension  Plan   will switch Norvasc to   Chlorothalidone 25mg  will have patient follow-up in 3 months to recheck blood pressure and address any additional medical concerns  Orders:  -     chlorthalidone 25 mg tablet; Take 1 tablet (25 mg total) by mouth daily       Subjective:      Patient ID: Mara Ayala is a 63 y o  female  72-year-old history notable for B-cell lymphoma currently being treated and followed by hematology is coming in for follow-up visit  The last visit we found patient to be hypertensive and started patient on Norvasc  Patient has been taking her medication however states that she has lower extremity swelling  Patient denies any numbness or weakness or tingling in her lower extremities  Patient has been compliant with her medication  The following portions of the patient's history were reviewed and updated as appropriate: allergies, current medications, past family history, past medical history, past social history, past surgical history, and problem list     Review of Systems   Constitutional: Negative for fever  HENT: Negative for ear pain and sore throat  Eyes: Negative for pain  Respiratory: Negative for cough and shortness of breath  Gastrointestinal: Negative for abdominal pain and vomiting  Musculoskeletal: Positive for arthralgias  Negative for back pain, joint swelling, myalgias and neck pain  Skin: Negative for rash  Neurological: Negative for dizziness, seizures and headaches           Objective:      /70 (BP Location: Left arm, Patient Position: Sitting, Cuff Size: Standard)   Pulse 90   Temp 97 8 °F (36 6 °C) (Temporal)   Resp 18   Ht 5' 4 5" (1 638 m)   Wt 112 kg (246 lb 1 6 oz)   SpO2 97%   Breastfeeding No   BMI 41 59 kg/m²          Physical Exam  Constitutional:       General: She is not in acute distress  Appearance: Normal appearance  HENT:      Nose: No congestion or rhinorrhea  Eyes:      Extraocular Movements: Extraocular movements intact  Pupils: Pupils are equal, round, and reactive to light  Cardiovascular:      Rate and Rhythm: Normal rate and regular rhythm  Pulses: Normal pulses  Heart sounds: Normal heart sounds  No murmur heard  No gallop  Pulmonary:      Effort: Pulmonary effort is normal       Breath sounds: Normal breath sounds  No wheezing, rhonchi or rales  Abdominal:      General: Bowel sounds are normal  There is no distension  Palpations: Abdomen is soft  There is no mass  Tenderness: There is no abdominal tenderness  Hernia: No hernia is present  Musculoskeletal:         General: Swelling (Will extremity) present  Left lower leg: Left lower leg edema:  plus 1 pitting edema nor erythema  Skin:     General: Skin is warm  Coloration: Skin is not jaundiced  Findings: No bruising  Neurological:      General: No focal deficit present  Mental Status: She is alert and oriented to person, place, and time  Psychiatric:         Mood and Affect: Mood normal          Behavior: Behavior normal          Thought Content:  Thought content normal            Per Reyna DO   Family Medicine PGY-1   6/1/2022

## 2022-06-06 ENCOUNTER — LAB (OUTPATIENT)
Dept: LAB | Facility: HOSPITAL | Age: 62
End: 2022-06-06
Payer: COMMERCIAL

## 2022-06-06 ENCOUNTER — OFFICE VISIT (OUTPATIENT)
Dept: HEMATOLOGY ONCOLOGY | Facility: CLINIC | Age: 62
End: 2022-06-06
Payer: COMMERCIAL

## 2022-06-06 ENCOUNTER — HOSPITAL ENCOUNTER (OUTPATIENT)
Dept: INFUSION CENTER | Facility: HOSPITAL | Age: 62
Discharge: HOME | End: 2022-06-06

## 2022-06-06 VITALS
BODY MASS INDEX: 40.12 KG/M2 | OXYGEN SATURATION: 97 % | HEART RATE: 75 BPM | HEIGHT: 65 IN | SYSTOLIC BLOOD PRESSURE: 144 MMHG | DIASTOLIC BLOOD PRESSURE: 86 MMHG | RESPIRATION RATE: 18 BRPM | TEMPERATURE: 96.7 F | WEIGHT: 240.8 LBS

## 2022-06-06 DIAGNOSIS — C82.18 GRADE 2 FOLLICULAR LYMPHOMA OF LYMPH NODES OF MULTIPLE REGIONS (HCC): ICD-10-CM

## 2022-06-06 DIAGNOSIS — C82.18 GRADE 2 FOLLICULAR LYMPHOMA OF LYMPH NODES OF MULTIPLE REGIONS (HCC): Primary | ICD-10-CM

## 2022-06-06 LAB
ALBUMIN SERPL BCP-MCNC: 4.4 G/DL (ref 3–5.2)
ALP SERPL-CCNC: 116 U/L (ref 43–122)
ALT SERPL W P-5'-P-CCNC: 18 U/L
ANION GAP SERPL CALCULATED.3IONS-SCNC: 6 MMOL/L (ref 5–14)
AST SERPL W P-5'-P-CCNC: 20 U/L (ref 14–36)
BASOPHILS # BLD AUTO: 0.02 THOUSANDS/ΜL (ref 0–0.1)
BASOPHILS NFR BLD AUTO: 0 % (ref 0–1)
BILIRUB SERPL-MCNC: 0.95 MG/DL
BUN SERPL-MCNC: 17 MG/DL (ref 5–25)
CALCIUM SERPL-MCNC: 9.5 MG/DL (ref 8.4–10.2)
CHLORIDE SERPL-SCNC: 96 MMOL/L (ref 97–108)
CO2 SERPL-SCNC: 33 MMOL/L (ref 22–30)
CREAT SERPL-MCNC: 0.75 MG/DL (ref 0.6–1.2)
EOSINOPHIL # BLD AUTO: 0.11 THOUSAND/ΜL (ref 0–0.61)
EOSINOPHIL NFR BLD AUTO: 2 % (ref 0–6)
ERYTHROCYTE [DISTWIDTH] IN BLOOD BY AUTOMATED COUNT: 16.2 % (ref 11.6–15.1)
GFR SERPL CREATININE-BSD FRML MDRD: 86 ML/MIN/1.73SQ M
GLUCOSE SERPL-MCNC: 266 MG/DL (ref 70–99)
HCT VFR BLD AUTO: 42.1 % (ref 34.8–46.1)
HGB BLD-MCNC: 12.7 G/DL (ref 11.5–15.4)
IMM GRANULOCYTES # BLD AUTO: 0.02 THOUSAND/UL (ref 0–0.2)
IMM GRANULOCYTES NFR BLD AUTO: 0 % (ref 0–2)
LDH SERPL-CCNC: 600 U/L (ref 313–618)
LYMPHOCYTES # BLD AUTO: 1.44 THOUSANDS/ΜL (ref 0.6–4.47)
LYMPHOCYTES NFR BLD AUTO: 27 % (ref 14–44)
MCH RBC QN AUTO: 20.7 PG (ref 26.8–34.3)
MCHC RBC AUTO-ENTMCNC: 30.2 G/DL (ref 31.4–37.4)
MCV RBC AUTO: 69 FL (ref 82–98)
MONOCYTES # BLD AUTO: 0.34 THOUSAND/ΜL (ref 0.17–1.22)
MONOCYTES NFR BLD AUTO: 6 % (ref 4–12)
NEUTROPHILS # BLD AUTO: 3.4 THOUSANDS/ΜL (ref 1.85–7.62)
NEUTS SEG NFR BLD AUTO: 65 % (ref 43–75)
NRBC BLD AUTO-RTO: 0 /100 WBCS
PLATELET # BLD AUTO: 262 THOUSANDS/UL (ref 149–390)
PMV BLD AUTO: 11.9 FL (ref 8.9–12.7)
POTASSIUM SERPL-SCNC: 4 MMOL/L (ref 3.6–5)
PROT SERPL-MCNC: 7.7 G/DL (ref 5.9–8.4)
RBC # BLD AUTO: 6.14 MILLION/UL (ref 3.81–5.12)
SODIUM SERPL-SCNC: 135 MMOL/L (ref 137–147)
WBC # BLD AUTO: 5.33 THOUSAND/UL (ref 4.31–10.16)

## 2022-06-06 PROCEDURE — 85025 COMPLETE CBC W/AUTO DIFF WBC: CPT

## 2022-06-06 PROCEDURE — 83615 LACTATE (LD) (LDH) ENZYME: CPT

## 2022-06-06 PROCEDURE — 80053 COMPREHEN METABOLIC PANEL: CPT

## 2022-06-06 PROCEDURE — 36415 COLL VENOUS BLD VENIPUNCTURE: CPT

## 2022-06-06 PROCEDURE — 99214 OFFICE O/P EST MOD 30 MIN: CPT | Performed by: INTERNAL MEDICINE

## 2022-06-06 RX ORDER — AMLODIPINE BESYLATE 10 MG/1
10 TABLET ORAL DAILY
COMMUNITY
Start: 2022-06-02
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
## 2022-06-07 ENCOUNTER — HOSPITAL ENCOUNTER (OUTPATIENT)
Dept: INFUSION CENTER | Facility: HOSPITAL | Age: 62
Discharge: HOME | End: 2022-06-07
Attending: INTERNAL MEDICINE
Payer: COMMERCIAL

## 2022-06-07 VITALS
TEMPERATURE: 97.5 F | SYSTOLIC BLOOD PRESSURE: 133 MMHG | HEART RATE: 67 BPM | OXYGEN SATURATION: 97 % | RESPIRATION RATE: 18 BRPM | DIASTOLIC BLOOD PRESSURE: 86 MMHG

## 2022-06-07 DIAGNOSIS — C82.18 GRADE 2 FOLLICULAR LYMPHOMA OF LYMPH NODES OF MULTIPLE REGIONS (HCC): ICD-10-CM

## 2022-06-07 DIAGNOSIS — D51.8 OTHER VITAMIN B12 DEFICIENCY ANEMIAS: Primary | ICD-10-CM

## 2022-06-07 DIAGNOSIS — R51.9 HEADACHE AROUND THE EYES: ICD-10-CM

## 2022-06-07 DIAGNOSIS — C82.18 GRADE 2 FOLLICULAR LYMPHOMA OF LYMPH NODES OF MULTIPLE REGIONS (HCC): Primary | ICD-10-CM

## 2022-06-07 LAB
GLUCOSE SERPL-MCNC: 225 MG/DL (ref 65–140)
GLUCOSE SERPL-MCNC: 282 MG/DL (ref 65–140)

## 2022-06-07 PROCEDURE — 82948 REAGENT STRIP/BLOOD GLUCOSE: CPT

## 2022-06-07 RX ORDER — CYANOCOBALAMIN 1000 UG/ML
1000 INJECTION INTRAMUSCULAR; SUBCUTANEOUS ONCE
Status: CANCELLED | OUTPATIENT
Start: 2022-06-21

## 2022-06-07 NOTE — PROGRESS NOTES
Patient presented today with blood sugar stick 282  Contacted Yenifer Cortez RN and per jazmin Carmen BS in one hour and if not below 200, patient to be held one week and to followup with PCP and to watch sugar in her diet as she had coffee and a cookie for breakfast this morning  Patient BS in one hour was 225, pt to be held one week  Treatment rescheduled, AVS provided and [facility] notified

## 2022-06-09 ENCOUNTER — HOSPITAL ENCOUNTER (OUTPATIENT)
Dept: MAMMOGRAPHY | Facility: CLINIC | Age: 62
Discharge: HOME | End: 2022-06-09
Payer: COMMERCIAL

## 2022-06-09 DIAGNOSIS — N64.4 BREAST PAIN: ICD-10-CM

## 2022-06-09 PROCEDURE — 76642 ULTRASOUND BREAST LIMITED: CPT

## 2022-06-09 PROCEDURE — 77066 DX MAMMO INCL CAD BI: CPT

## 2022-06-09 PROCEDURE — G0279 TOMOSYNTHESIS, MAMMO: HCPCS

## 2022-06-13 ENCOUNTER — HOSPITAL ENCOUNTER (OUTPATIENT)
Dept: INFUSION CENTER | Facility: HOSPITAL | Age: 62
Discharge: HOME | End: 2022-06-13
Payer: COMMERCIAL

## 2022-06-13 DIAGNOSIS — E86.0 DEHYDRATION: ICD-10-CM

## 2022-06-13 DIAGNOSIS — C82.18 GRADE 2 FOLLICULAR LYMPHOMA OF LYMPH NODES OF MULTIPLE REGIONS (HCC): Primary | ICD-10-CM

## 2022-06-13 DIAGNOSIS — E87.6 HYPOKALEMIA: Primary | ICD-10-CM

## 2022-06-13 DIAGNOSIS — Z91.89 AT HIGH RISK OF TUMOR LYSIS SYNDROME: ICD-10-CM

## 2022-06-13 DIAGNOSIS — Z45.2 ENCOUNTER FOR CENTRAL LINE CARE: ICD-10-CM

## 2022-06-13 DIAGNOSIS — C82.18 GRADE 2 FOLLICULAR LYMPHOMA OF LYMPH NODES OF MULTIPLE REGIONS (HCC): ICD-10-CM

## 2022-06-13 DIAGNOSIS — D50.8 IRON DEFICIENCY ANEMIA SECONDARY TO INADEQUATE DIETARY IRON INTAKE: ICD-10-CM

## 2022-06-13 LAB
ALBUMIN SERPL BCP-MCNC: 4.2 G/DL (ref 3–5.2)
ALP SERPL-CCNC: 110 U/L (ref 43–122)
ALT SERPL W P-5'-P-CCNC: 19 U/L
ANION GAP SERPL CALCULATED.3IONS-SCNC: 9 MMOL/L (ref 5–14)
AST SERPL W P-5'-P-CCNC: 24 U/L (ref 14–36)
BASOPHILS # BLD AUTO: 0.03 THOUSANDS/ΜL (ref 0–0.1)
BASOPHILS NFR BLD AUTO: 1 % (ref 0–1)
BILIRUB SERPL-MCNC: 0.6 MG/DL
BUN SERPL-MCNC: 16 MG/DL (ref 5–25)
CALCIUM SERPL-MCNC: 9.1 MG/DL (ref 8.4–10.2)
CHLORIDE SERPL-SCNC: 100 MMOL/L (ref 97–108)
CO2 SERPL-SCNC: 29 MMOL/L (ref 22–30)
CREAT SERPL-MCNC: 0.78 MG/DL (ref 0.6–1.2)
EOSINOPHIL # BLD AUTO: 0.16 THOUSAND/ΜL (ref 0–0.61)
EOSINOPHIL NFR BLD AUTO: 3 % (ref 0–6)
ERYTHROCYTE [DISTWIDTH] IN BLOOD BY AUTOMATED COUNT: 15.7 % (ref 11.6–15.1)
GFR SERPL CREATININE-BSD FRML MDRD: 82 ML/MIN/1.73SQ M
GLUCOSE SERPL-MCNC: 113 MG/DL (ref 70–99)
HCT VFR BLD AUTO: 37 % (ref 34.8–46.1)
HGB BLD-MCNC: 11.1 G/DL (ref 11.5–15.4)
IMM GRANULOCYTES # BLD AUTO: 0.03 THOUSAND/UL (ref 0–0.2)
IMM GRANULOCYTES NFR BLD AUTO: 1 % (ref 0–2)
LDH SERPL-CCNC: 623 U/L (ref 313–618)
LYMPHOCYTES # BLD AUTO: 1.66 THOUSANDS/ΜL (ref 0.6–4.47)
LYMPHOCYTES NFR BLD AUTO: 27 % (ref 14–44)
MCH RBC QN AUTO: 20.9 PG (ref 26.8–34.3)
MCHC RBC AUTO-ENTMCNC: 30 G/DL (ref 31.4–37.4)
MCV RBC AUTO: 70 FL (ref 82–98)
MONOCYTES # BLD AUTO: 0.43 THOUSAND/ΜL (ref 0.17–1.22)
MONOCYTES NFR BLD AUTO: 7 % (ref 4–12)
NEUTROPHILS # BLD AUTO: 3.91 THOUSANDS/ΜL (ref 1.85–7.62)
NEUTS SEG NFR BLD AUTO: 61 % (ref 43–75)
NRBC BLD AUTO-RTO: 0 /100 WBCS
PLATELET # BLD AUTO: 186 THOUSANDS/UL (ref 149–390)
PMV BLD AUTO: 11 FL (ref 8.9–12.7)
POTASSIUM SERPL-SCNC: 3 MMOL/L (ref 3.6–5)
PROT SERPL-MCNC: 7 G/DL (ref 5.9–8.4)
RBC # BLD AUTO: 5.32 MILLION/UL (ref 3.81–5.12)
SODIUM SERPL-SCNC: 138 MMOL/L (ref 137–147)
WBC # BLD AUTO: 6.22 THOUSAND/UL (ref 4.31–10.16)

## 2022-06-13 PROCEDURE — 83615 LACTATE (LD) (LDH) ENZYME: CPT | Performed by: INTERNAL MEDICINE

## 2022-06-13 PROCEDURE — 85025 COMPLETE CBC W/AUTO DIFF WBC: CPT | Performed by: INTERNAL MEDICINE

## 2022-06-13 PROCEDURE — 80053 COMPREHEN METABOLIC PANEL: CPT | Performed by: INTERNAL MEDICINE

## 2022-06-13 RX ORDER — POTASSIUM CHLORIDE 14.9 MG/ML
20 INJECTION INTRAVENOUS ONCE
Status: CANCELLED | OUTPATIENT
Start: 2022-06-14

## 2022-06-13 RX ORDER — SODIUM CHLORIDE 9 MG/ML
20 INJECTION, SOLUTION INTRAVENOUS ONCE
Status: CANCELLED
Start: 2022-06-14 | End: 2022-06-14

## 2022-06-13 NOTE — PROGRESS NOTES
Pt tolerated central lab draw without difficulty  Port flushed and deaccessed per protocol  Next appt confirmed  Left ambulatory with [facility] transport

## 2022-06-14 ENCOUNTER — HOSPITAL ENCOUNTER (OUTPATIENT)
Dept: INFUSION CENTER | Facility: HOSPITAL | Age: 62
Discharge: HOME | End: 2022-06-14
Attending: INTERNAL MEDICINE
Payer: COMMERCIAL

## 2022-06-14 VITALS — SYSTOLIC BLOOD PRESSURE: 133 MMHG | HEART RATE: 70 BPM | TEMPERATURE: 98.7 F | DIASTOLIC BLOOD PRESSURE: 68 MMHG

## 2022-06-14 DIAGNOSIS — Z91.89 AT HIGH RISK OF TUMOR LYSIS SYNDROME: ICD-10-CM

## 2022-06-14 DIAGNOSIS — C82.18 GRADE 2 FOLLICULAR LYMPHOMA OF LYMPH NODES OF MULTIPLE REGIONS (HCC): Primary | ICD-10-CM

## 2022-06-14 DIAGNOSIS — E87.6 HYPOKALEMIA: ICD-10-CM

## 2022-06-14 DIAGNOSIS — E86.0 DEHYDRATION: ICD-10-CM

## 2022-06-14 DIAGNOSIS — D50.8 IRON DEFICIENCY ANEMIA SECONDARY TO INADEQUATE DIETARY IRON INTAKE: ICD-10-CM

## 2022-06-14 LAB — GLUCOSE SERPL-MCNC: 153 MG/DL (ref 65–140)

## 2022-06-14 PROCEDURE — 96367 TX/PROPH/DG ADDL SEQ IV INF: CPT

## 2022-06-14 PROCEDURE — 96413 CHEMO IV INFUSION 1 HR: CPT

## 2022-06-14 PROCEDURE — 96366 THER/PROPH/DIAG IV INF ADDON: CPT

## 2022-06-14 PROCEDURE — 82948 REAGENT STRIP/BLOOD GLUCOSE: CPT

## 2022-06-14 RX ORDER — POTASSIUM CHLORIDE 14.9 MG/ML
20 INJECTION INTRAVENOUS ONCE
Status: COMPLETED | OUTPATIENT
Start: 2022-06-14 | End: 2022-06-14

## 2022-06-14 RX ORDER — SODIUM CHLORIDE 9 MG/ML
20 INJECTION, SOLUTION INTRAVENOUS ONCE
Start: 2022-06-14 | End: 2022-06-14

## 2022-06-14 RX ORDER — POTASSIUM CHLORIDE 20 MEQ/1
20 TABLET, EXTENDED RELEASE ORAL DAILY
Qty: 30 TABLET | Refills: 11 | Status: SHIPPED | OUTPATIENT
Start: 2022-06-14

## 2022-06-14 RX ORDER — POTASSIUM CHLORIDE 14.9 MG/ML
20 INJECTION INTRAVENOUS ONCE
Status: CANCELLED | OUTPATIENT
Start: 2022-06-14

## 2022-06-14 RX ORDER — SODIUM CHLORIDE 9 MG/ML
20 INJECTION, SOLUTION INTRAVENOUS ONCE
Status: COMPLETED | OUTPATIENT
Start: 2022-06-14 | End: 2022-06-14

## 2022-06-14 RX ADMIN — SODIUM CHLORIDE 20 ML/HR: 9 INJECTION, SOLUTION INTRAVENOUS at 09:25

## 2022-06-14 RX ADMIN — POTASSIUM CHLORIDE 20 MEQ: 14.9 INJECTION, SOLUTION INTRAVENOUS at 11:30

## 2022-06-14 RX ADMIN — COPANLISIB 60 MG: 15 INJECTION, POWDER, LYOPHILIZED, FOR SOLUTION INTRAVENOUS at 10:08

## 2022-06-14 RX ADMIN — ONDANSETRON 8 MG: 2 INJECTION INTRAMUSCULAR; INTRAVENOUS at 09:25

## 2022-06-14 NOTE — PROGRESS NOTES
Patient tolerated chemo today without complications as well as potassium  Patient aware potassium po at pharmacy  Patient states she was having diarrhea but stopped yesterday  Confirmed next cycle and AVS provided

## 2022-06-21 DIAGNOSIS — C82.18 GRADE 2 FOLLICULAR LYMPHOMA OF LYMPH NODES OF MULTIPLE REGIONS (HCC): Primary | ICD-10-CM

## 2022-06-21 RX ORDER — SODIUM CHLORIDE 9 MG/ML
20 INJECTION, SOLUTION INTRAVENOUS ONCE
Status: CANCELLED | OUTPATIENT
Start: 2022-06-28

## 2022-06-21 RX ORDER — SODIUM CHLORIDE 9 MG/ML
20 INJECTION, SOLUTION INTRAVENOUS ONCE
Status: CANCELLED | OUTPATIENT
Start: 2022-07-12

## 2022-06-21 RX ORDER — SODIUM CHLORIDE 9 MG/ML
20 INJECTION, SOLUTION INTRAVENOUS ONCE
Status: CANCELLED | OUTPATIENT
Start: 2022-07-05

## 2022-06-22 ENCOUNTER — OFFICE VISIT (OUTPATIENT)
Dept: HEMATOLOGY ONCOLOGY | Facility: CLINIC | Age: 62
End: 2022-06-22
Payer: COMMERCIAL

## 2022-06-22 VITALS
OXYGEN SATURATION: 96 % | RESPIRATION RATE: 18 BRPM | HEART RATE: 66 BPM | HEIGHT: 65 IN | WEIGHT: 242.6 LBS | SYSTOLIC BLOOD PRESSURE: 116 MMHG | DIASTOLIC BLOOD PRESSURE: 80 MMHG | BODY MASS INDEX: 40.42 KG/M2 | TEMPERATURE: 97.7 F

## 2022-06-22 DIAGNOSIS — D51.8 OTHER VITAMIN B12 DEFICIENCY ANEMIAS: ICD-10-CM

## 2022-06-22 DIAGNOSIS — D50.9 MICROCYTIC ANEMIA: ICD-10-CM

## 2022-06-22 DIAGNOSIS — E87.6 HYPOKALEMIA: ICD-10-CM

## 2022-06-22 DIAGNOSIS — C82.18 GRADE 2 FOLLICULAR LYMPHOMA OF LYMPH NODES OF MULTIPLE REGIONS (HCC): Primary | ICD-10-CM

## 2022-06-22 PROCEDURE — 99214 OFFICE O/P EST MOD 30 MIN: CPT | Performed by: NURSE PRACTITIONER

## 2022-06-22 RX ORDER — SODIUM CHLORIDE 9 MG/ML
20 INJECTION, SOLUTION INTRAVENOUS ONCE
Status: CANCELLED | OUTPATIENT
Start: 2022-08-09

## 2022-06-22 RX ORDER — SODIUM CHLORIDE 9 MG/ML
20 INJECTION, SOLUTION INTRAVENOUS ONCE
Status: CANCELLED | OUTPATIENT
Start: 2022-08-02

## 2022-06-22 RX ORDER — SODIUM CHLORIDE 9 MG/ML
20 INJECTION, SOLUTION INTRAVENOUS ONCE
Status: CANCELLED | OUTPATIENT
Start: 2022-07-26
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
## 2022-06-24 DIAGNOSIS — G89.3 CANCER RELATED PAIN: ICD-10-CM

## 2022-06-24 DIAGNOSIS — R10.30 LOWER ABDOMINAL PAIN: ICD-10-CM

## 2022-06-24 RX ORDER — OXYCODONE HYDROCHLORIDE 15 MG/1
TABLET ORAL
Qty: 90 TABLET | Refills: 0 | Status: SHIPPED | OUTPATIENT
Start: 2022-06-24 | End: 2022-07-27 | Stop reason: SDUPTHER

## 2022-06-24 NOTE — TELEPHONE ENCOUNTER
Primary palliative medicine provider:  Dr Marybel Muñoz     Medication requested:  Oxycodone 15 mg     If for pain, how has the patient been taking their pain medicine?   As prescribed     Last appointment: 4/27     Next scheduled appointment: 7/27    PDMP review:    PATIENT ID PRESCRIPTION # FILLED WRITTEN DRUG LABEL QTY DAYS STRENGTH MME** PRESCRIBER PHARMACY PAYMENT REFILL #/AUTH STATE DETAIL   1 [id number] 05/23/2022 05/23/2022 oxyCODONE HCL (Tablet) 90 0 24 15 MG 84 38 [facility] [place] / 0 [place]    1 [id number] 04/27/2022 04/27/2022 oxyCODONE HCL (Tablet) 90 0 23 15 MG 88 04 [facility] [place] / [place]    1 [id number] 03/23/2022 03/18/2022 oxyCODONE HCL (Tablet) 90 0 25 15 MG 81 0 [facility] [place]    1 [id number] 02/25/2022 02/25/2022 oxyCODONE HCL (Tablet) 90 0 23 15 MG 88 04 [facility] Medicaid 0 / 0 [place]    1 [id number] 01/26/2022 01/26/2022 oxyCODONE HCL (Tablet) 90 0 23 15 MG 88 04 [facility] Private [place] 0 / 0 [place]    1 [id number] 12/23/2021 12/23/2021 oxyCODONE HCL (Tablet) 90 0 23 20  39 [facility] [place] 0 / 0 [place]

## 2022-06-27 ENCOUNTER — HOSPITAL ENCOUNTER (OUTPATIENT)
Dept: INFUSION CENTER | Facility: HOSPITAL | Age: 62
Discharge: HOME | End: 2022-06-27
Payer: COMMERCIAL

## 2022-06-27 DIAGNOSIS — Z45.2 ENCOUNTER FOR CENTRAL LINE CARE: ICD-10-CM

## 2022-06-27 DIAGNOSIS — C82.18 GRADE 2 FOLLICULAR LYMPHOMA OF LYMPH NODES OF MULTIPLE REGIONS (HCC): Primary | ICD-10-CM

## 2022-06-27 DIAGNOSIS — D50.9 MICROCYTIC ANEMIA: ICD-10-CM

## 2022-06-27 LAB
ALBUMIN SERPL BCP-MCNC: 4 G/DL (ref 3–5.2)
ALP SERPL-CCNC: 111 U/L (ref 43–122)
ALT SERPL W P-5'-P-CCNC: 19 U/L
ANION GAP SERPL CALCULATED.3IONS-SCNC: 7 MMOL/L (ref 5–14)
AST SERPL W P-5'-P-CCNC: 27 U/L (ref 14–36)
BASOPHILS # BLD AUTO: 0.03 THOUSANDS/ΜL (ref 0–0.1)
BASOPHILS NFR BLD AUTO: 1 % (ref 0–1)
BILIRUB SERPL-MCNC: 0.65 MG/DL
BUN SERPL-MCNC: 14 MG/DL (ref 5–25)
CALCIUM SERPL-MCNC: 8.8 MG/DL (ref 8.4–10.2)
CHLORIDE SERPL-SCNC: 104 MMOL/L (ref 97–108)
CO2 SERPL-SCNC: 27 MMOL/L (ref 22–30)
CREAT SERPL-MCNC: 0.69 MG/DL (ref 0.6–1.2)
CRP SERPL QL: 30.6 MG/L
EOSINOPHIL # BLD AUTO: 0.11 THOUSAND/ΜL (ref 0–0.61)
EOSINOPHIL NFR BLD AUTO: 2 % (ref 0–6)
ERYTHROCYTE [DISTWIDTH] IN BLOOD BY AUTOMATED COUNT: 15.9 % (ref 11.6–15.1)
ERYTHROCYTE [SEDIMENTATION RATE] IN BLOOD: 58 MM/HOUR (ref 0–29)
GFR SERPL CREATININE-BSD FRML MDRD: 94 ML/MIN/1.73SQ M
GLUCOSE SERPL-MCNC: 183 MG/DL (ref 70–99)
HCT VFR BLD AUTO: 37.2 % (ref 34.8–46.1)
HGB BLD-MCNC: 11.4 G/DL (ref 11.5–15.4)
IMM GRANULOCYTES # BLD AUTO: 0.02 THOUSAND/UL (ref 0–0.2)
IMM GRANULOCYTES NFR BLD AUTO: 0 % (ref 0–2)
LDH SERPL-CCNC: 564 U/L (ref 313–618)
LYMPHOCYTES # BLD AUTO: 1.56 THOUSANDS/ΜL (ref 0.6–4.47)
LYMPHOCYTES NFR BLD AUTO: 25 % (ref 14–44)
MCH RBC QN AUTO: 20.8 PG (ref 26.8–34.3)
MCHC RBC AUTO-ENTMCNC: 30.6 G/DL (ref 31.4–37.4)
MCV RBC AUTO: 68 FL (ref 82–98)
MONOCYTES # BLD AUTO: 0.44 THOUSAND/ΜL (ref 0.17–1.22)
MONOCYTES NFR BLD AUTO: 7 % (ref 4–12)
NEUTROPHILS # BLD AUTO: 4.07 THOUSANDS/ΜL (ref 1.85–7.62)
NEUTS SEG NFR BLD AUTO: 65 % (ref 43–75)
NRBC BLD AUTO-RTO: 0 /100 WBCS
PLATELET # BLD AUTO: 240 THOUSANDS/UL (ref 149–390)
PMV BLD AUTO: 11.5 FL (ref 8.9–12.7)
POTASSIUM SERPL-SCNC: 3.4 MMOL/L (ref 3.6–5)
PROT SERPL-MCNC: 7 G/DL (ref 5.9–8.4)
RBC # BLD AUTO: 5.49 MILLION/UL (ref 3.81–5.12)
SODIUM SERPL-SCNC: 138 MMOL/L (ref 137–147)
WBC # BLD AUTO: 6.23 THOUSAND/UL (ref 4.31–10.16)

## 2022-06-27 PROCEDURE — 85652 RBC SED RATE AUTOMATED: CPT

## 2022-06-27 PROCEDURE — 86140 C-REACTIVE PROTEIN: CPT

## 2022-06-27 PROCEDURE — 80053 COMPREHEN METABOLIC PANEL: CPT

## 2022-06-27 PROCEDURE — 83615 LACTATE (LD) (LDH) ENZYME: CPT

## 2022-06-27 PROCEDURE — 85025 COMPLETE CBC W/AUTO DIFF WBC: CPT

## 2022-06-27 NOTE — PROGRESS NOTES
Port flushed per protocol, central labs drawn per orders, port needle staying accessed for chemo tomorrow, confirmed appt with patient

## 2022-06-28 ENCOUNTER — HOSPITAL ENCOUNTER (OUTPATIENT)
Dept: INFUSION CENTER | Facility: HOSPITAL | Age: 62
Discharge: HOME | End: 2022-06-28
Attending: INTERNAL MEDICINE
Payer: COMMERCIAL

## 2022-06-28 VITALS
RESPIRATION RATE: 16 BRPM | SYSTOLIC BLOOD PRESSURE: 128 MMHG | TEMPERATURE: 97.3 F | DIASTOLIC BLOOD PRESSURE: 68 MMHG | HEART RATE: 56 BPM

## 2022-06-28 DIAGNOSIS — D51.8 OTHER VITAMIN B12 DEFICIENCY ANEMIAS: ICD-10-CM

## 2022-06-28 DIAGNOSIS — C82.18 GRADE 2 FOLLICULAR LYMPHOMA OF LYMPH NODES OF MULTIPLE REGIONS (HCC): Primary | ICD-10-CM

## 2022-06-28 LAB — GLUCOSE SERPL-MCNC: 109 MG/DL (ref 65–140)

## 2022-06-28 PROCEDURE — 96367 TX/PROPH/DG ADDL SEQ IV INF: CPT

## 2022-06-28 PROCEDURE — 96372 THER/PROPH/DIAG INJ SC/IM: CPT

## 2022-06-28 PROCEDURE — 82948 REAGENT STRIP/BLOOD GLUCOSE: CPT

## 2022-06-28 PROCEDURE — 96413 CHEMO IV INFUSION 1 HR: CPT

## 2022-06-28 RX ORDER — CYANOCOBALAMIN 1000 UG/ML
1000 INJECTION, SOLUTION INTRAMUSCULAR; SUBCUTANEOUS ONCE
Status: COMPLETED | OUTPATIENT
Start: 2022-06-28 | End: 2022-06-28

## 2022-06-28 RX ORDER — SODIUM CHLORIDE 9 MG/ML
20 INJECTION, SOLUTION INTRAVENOUS ONCE
Status: COMPLETED | OUTPATIENT
Start: 2022-06-28 | End: 2022-06-28

## 2022-06-28 RX ORDER — CYANOCOBALAMIN 1000 UG/ML
1000 INJECTION, SOLUTION INTRAMUSCULAR; SUBCUTANEOUS ONCE
Status: CANCELLED | OUTPATIENT
Start: 2022-07-05

## 2022-06-28 RX ADMIN — ONDANSETRON 8 MG: 2 INJECTION INTRAMUSCULAR; INTRAVENOUS at 10:19

## 2022-06-28 RX ADMIN — CYANOCOBALAMIN 1000 MCG: 1000 INJECTION INTRAMUSCULAR; SUBCUTANEOUS at 11:20

## 2022-06-28 RX ADMIN — SODIUM CHLORIDE 20 ML/HR: 0.9 INJECTION, SOLUTION INTRAVENOUS at 10:15

## 2022-06-28 RX ADMIN — COPANLISIB 60 MG: 15 INJECTION, POWDER, LYOPHILIZED, FOR SOLUTION INTRAVENOUS at 11:20

## 2022-07-01 ENCOUNTER — HOSPITAL ENCOUNTER (OUTPATIENT)
Dept: INFUSION CENTER | Facility: HOSPITAL | Age: 62
Discharge: HOME | End: 2022-07-01
Payer: COMMERCIAL

## 2022-07-01 DIAGNOSIS — Z45.2 ENCOUNTER FOR CENTRAL LINE CARE: Primary | ICD-10-CM

## 2022-07-01 DIAGNOSIS — C82.18 GRADE 2 FOLLICULAR LYMPHOMA OF LYMPH NODES OF MULTIPLE REGIONS (HCC): ICD-10-CM

## 2022-07-01 LAB
ALBUMIN SERPL BCP-MCNC: 4 G/DL (ref 3–5.2)
ALP SERPL-CCNC: 109 U/L (ref 43–122)
ALT SERPL W P-5'-P-CCNC: 16 U/L
ANION GAP SERPL CALCULATED.3IONS-SCNC: 7 MMOL/L (ref 5–14)
AST SERPL W P-5'-P-CCNC: 19 U/L (ref 14–36)
BASOPHILS # BLD AUTO: 0.03 THOUSANDS/ΜL (ref 0–0.1)
BASOPHILS NFR BLD AUTO: 1 % (ref 0–1)
BILIRUB SERPL-MCNC: 0.35 MG/DL
BUN SERPL-MCNC: 12 MG/DL (ref 5–25)
CALCIUM SERPL-MCNC: 8.9 MG/DL (ref 8.4–10.2)
CHLORIDE SERPL-SCNC: 103 MMOL/L (ref 97–108)
CO2 SERPL-SCNC: 26 MMOL/L (ref 22–30)
CREAT SERPL-MCNC: 0.69 MG/DL (ref 0.6–1.2)
EOSINOPHIL # BLD AUTO: 0.07 THOUSAND/ΜL (ref 0–0.61)
EOSINOPHIL NFR BLD AUTO: 1 % (ref 0–6)
ERYTHROCYTE [DISTWIDTH] IN BLOOD BY AUTOMATED COUNT: 15.9 % (ref 11.6–15.1)
GFR SERPL CREATININE-BSD FRML MDRD: 94 ML/MIN/1.73SQ M
GLUCOSE SERPL-MCNC: 244 MG/DL (ref 70–99)
HCT VFR BLD AUTO: 38 % (ref 34.8–46.1)
HGB BLD-MCNC: 11.2 G/DL (ref 11.5–15.4)
IMM GRANULOCYTES # BLD AUTO: 0.02 THOUSAND/UL (ref 0–0.2)
IMM GRANULOCYTES NFR BLD AUTO: 0 % (ref 0–2)
LDH SERPL-CCNC: 566 U/L (ref 313–618)
LYMPHOCYTES # BLD AUTO: 2.08 THOUSANDS/ΜL (ref 0.6–4.47)
LYMPHOCYTES NFR BLD AUTO: 34 % (ref 14–44)
MCH RBC QN AUTO: 20.4 PG (ref 26.8–34.3)
MCHC RBC AUTO-ENTMCNC: 29.5 G/DL (ref 31.4–37.4)
MCV RBC AUTO: 69 FL (ref 82–98)
MONOCYTES # BLD AUTO: 0.25 THOUSAND/ΜL (ref 0.17–1.22)
MONOCYTES NFR BLD AUTO: 4 % (ref 4–12)
NEUTROPHILS # BLD AUTO: 3.74 THOUSANDS/ΜL (ref 1.85–7.62)
NEUTS SEG NFR BLD AUTO: 60 % (ref 43–75)
NRBC BLD AUTO-RTO: 0 /100 WBCS
PLATELET # BLD AUTO: 248 THOUSANDS/UL (ref 149–390)
PMV BLD AUTO: 10.4 FL (ref 8.9–12.7)
POTASSIUM SERPL-SCNC: 4.3 MMOL/L (ref 3.6–5)
PROT SERPL-MCNC: 6.9 G/DL (ref 5.9–8.4)
RBC # BLD AUTO: 5.48 MILLION/UL (ref 3.81–5.12)
SODIUM SERPL-SCNC: 136 MMOL/L (ref 137–147)
WBC # BLD AUTO: 6.19 THOUSAND/UL (ref 4.31–10.16)

## 2022-07-01 PROCEDURE — 85025 COMPLETE CBC W/AUTO DIFF WBC: CPT | Performed by: INTERNAL MEDICINE

## 2022-07-01 PROCEDURE — 83615 LACTATE (LD) (LDH) ENZYME: CPT | Performed by: INTERNAL MEDICINE

## 2022-07-01 PROCEDURE — 80053 COMPREHEN METABOLIC PANEL: CPT | Performed by: INTERNAL MEDICINE

## 2022-07-01 NOTE — PROGRESS NOTES
Port flushed per protocol, central labs drawn per orders, confirmed appt back for chemo Tuesday, AVS declined

## 2022-07-05 ENCOUNTER — HOSPITAL ENCOUNTER (OUTPATIENT)
Dept: INFUSION CENTER | Facility: HOSPITAL | Age: 62
Discharge: HOME | End: 2022-07-05
Attending: INTERNAL MEDICINE
Payer: COMMERCIAL

## 2022-07-05 ENCOUNTER — HOSPITAL ENCOUNTER (EMERGENCY)
Facility: HOSPITAL | Age: 62
Discharge: HOME | End: 2022-07-05
Attending: EMERGENCY MEDICINE
Payer: COMMERCIAL

## 2022-07-05 VITALS
HEART RATE: 63 BPM | DIASTOLIC BLOOD PRESSURE: 81 MMHG | WEIGHT: 243.4 LBS | SYSTOLIC BLOOD PRESSURE: 189 MMHG | OXYGEN SATURATION: 98 % | RESPIRATION RATE: 18 BRPM | BODY MASS INDEX: 41.15 KG/M2 | TEMPERATURE: 98.1 F

## 2022-07-05 VITALS
BODY MASS INDEX: 41.63 KG/M2 | TEMPERATURE: 98.5 F | WEIGHT: 243.83 LBS | HEART RATE: 57 BPM | RESPIRATION RATE: 16 BRPM | SYSTOLIC BLOOD PRESSURE: 135 MMHG | DIASTOLIC BLOOD PRESSURE: 85 MMHG | HEIGHT: 64 IN

## 2022-07-05 DIAGNOSIS — K02.9 TOOTH DECAY: ICD-10-CM

## 2022-07-05 DIAGNOSIS — K04.7 DENTAL INFECTION: ICD-10-CM

## 2022-07-05 DIAGNOSIS — C82.18 GRADE 2 FOLLICULAR LYMPHOMA OF LYMPH NODES OF MULTIPLE REGIONS (HCC): Primary | ICD-10-CM

## 2022-07-05 DIAGNOSIS — K08.89 DENTALGIA: Primary | ICD-10-CM

## 2022-07-05 DIAGNOSIS — D51.8 OTHER VITAMIN B12 DEFICIENCY ANEMIAS: ICD-10-CM

## 2022-07-05 LAB — GLUCOSE SERPL-MCNC: 159 MG/DL (ref 65–140)

## 2022-07-05 PROCEDURE — 96372 THER/PROPH/DIAG INJ SC/IM: CPT

## 2022-07-05 PROCEDURE — 96413 CHEMO IV INFUSION 1 HR: CPT

## 2022-07-05 PROCEDURE — 82948 REAGENT STRIP/BLOOD GLUCOSE: CPT

## 2022-07-05 PROCEDURE — 99283 EMERGENCY DEPT VISIT LOW MDM: CPT

## 2022-07-05 PROCEDURE — 96367 TX/PROPH/DG ADDL SEQ IV INF: CPT

## 2022-07-05 PROCEDURE — 99284 EMERGENCY DEPT VISIT MOD MDM: CPT | Performed by: EMERGENCY MEDICINE

## 2022-07-05 RX ORDER — CLINDAMYCIN HYDROCHLORIDE 150 MG/1
300 CAPSULE ORAL ONCE
Status: COMPLETED | OUTPATIENT
Start: 2022-07-05 | End: 2022-07-05

## 2022-07-05 RX ORDER — CYANOCOBALAMIN 1000 UG/ML
1000 INJECTION, SOLUTION INTRAMUSCULAR; SUBCUTANEOUS ONCE
Status: CANCELLED | OUTPATIENT
Start: 2022-08-02

## 2022-07-05 RX ORDER — CYANOCOBALAMIN 1000 UG/ML
1000 INJECTION, SOLUTION INTRAMUSCULAR; SUBCUTANEOUS ONCE
Status: COMPLETED | OUTPATIENT
Start: 2022-07-05 | End: 2022-07-05

## 2022-07-05 RX ORDER — CLINDAMYCIN HYDROCHLORIDE 150 MG/1
450 CAPSULE ORAL EVERY 8 HOURS SCHEDULED
Qty: 63 CAPSULE | Refills: 0 | Status: SHIPPED | OUTPATIENT
Start: 2022-07-05 | End: 2022-07-12

## 2022-07-05 RX ORDER — SODIUM CHLORIDE 9 MG/ML
20 INJECTION, SOLUTION INTRAVENOUS ONCE
Status: COMPLETED | OUTPATIENT
Start: 2022-07-05 | End: 2022-07-05

## 2022-07-05 RX ADMIN — COPANLISIB 60 MG: 15 INJECTION, POWDER, LYOPHILIZED, FOR SOLUTION INTRAVENOUS at 10:25

## 2022-07-05 RX ADMIN — CYANOCOBALAMIN 1000 MCG: 1000 INJECTION INTRAMUSCULAR; SUBCUTANEOUS at 10:31

## 2022-07-05 RX ADMIN — ONDANSETRON 8 MG: 2 INJECTION INTRAMUSCULAR; INTRAVENOUS at 09:45

## 2022-07-05 RX ADMIN — CLINDAMYCIN HYDROCHLORIDE 300 MG: 150 CAPSULE ORAL at 13:50

## 2022-07-05 RX ADMIN — DIPHENHYDRAMINE HYDROCHLORIDE 10 ML: 25 LIQUID ORAL at 13:50

## 2022-07-05 RX ADMIN — SODIUM CHLORIDE 20 ML/HR: 9 INJECTION, SOLUTION INTRAVENOUS at 09:45

## 2022-07-05 NOTE — DISCHARGE INSTRUCTIONS
- Call [facility] for oral surgery to schedule an appointment for further evaluation and treatment of tooth pain  - return to the emergency department if you develop severe pain associated with gum swelling, fevers, or inability to open your mouth 
Yes

## 2022-07-05 NOTE — ED ATTENDING ATTESTATION
7/5/2022  IMayank MD, saw and evaluated the patient  I have discussed the patient with the resident/non-physician practitioner and agree with the resident's/non-physician practitioner's findings, Plan of Care, and MDM as documented in the resident's/non-physician practitioner's note, except where noted  All available labs and Radiology studies were reviewed  I was present for key portions of any procedure(s) performed by the resident/non-physician practitioner and I was immediately available to provide assistance  At this point I agree with the current assessment done in the Emergency Department  I have conducted an independent evaluation of this patient a history and physical is as follows:  82-year-old female with a history of lymphoma on chemotherapy presenting for evaluation of dental pain  Patient notes diffuse dental and mouth pain that started 2 weeks ago  Patient has had symptoms previously but has been unable to follow-up with Oral maxillofacial surgery  Her dentist states she is high risk because of increased risk of bleeding  Patient states she has had unrelieved pain for the last 2 months  She states pain is worse with opening her mouth and with eating  She denies significant difficulty with swallowing or difficulty breathing  Patient denies fevers and facial swelling  She has been taking over-the-counter analgesia at home without improvement  Triage note documented abdominal pain which patient states has resolved  She states she has chronic abdominal pain and sees palliative care  Physical exam:  Vital signs and nursing notes reviewed patient is hypertensive  General:  Awake, alert, no acute distress  HEENT:  Normocephalic, atraumatic, PERRL, mucous membranes are moist   No evidence of oropharyngeal lesions or ulcerations  Poor dentition throughout with tenderness to palpate caution of numerous teeth in the bilateral upper and lower dentition    Patient has dental caries, as well  No gingival swelling, bleeding, or palpable abscesses  Tongue is midline without deviation  No base of the tongue elevation  No uvular deviation  No submandibular fullness or tenderness  No crepitus  No palpable lymphadenopathy  No trismus  Diffuse mandibular maxillary tenderness to palpation  No facial swelling  Neck:  Supple, no palpable large mass lesions  Heart:  Regular rate and rhythm  Left chest wall port with bandage in place  Lungs:  Nonlabored respirations, clear to auscultation bilaterally  Abdomen:  Nontender, nondistended, no rebound or guarding  Extremity:  No deformity  Skin:  Warm, dry, no rash  Neuro:  No focal neurologic deficits  Psych:  Appropriate mood and affect    Assessment/plan:  82-year-old female presenting for evaluation of diffuse dental and mouth pain  Clinically, there is no evidence of palpable buccal or lingual abscess amenable to drainage  No evidence of deep space abscess  Low suspicion for Matthew's angina or submandibular abscess  Patient is afebrile and does not demonstrate facial swelling or trismus  I suspect dentalgia with possible mild mucositis, though there are no erosive lesions  Patient may also have small periapical abscesses that are contributing to her symptoms in addition to cavities  Patient would benefit from dental follow-up will be referred to on the FS  In the interim, patient will be initiated on antibiotics and Magic mouthwash  Patient counseled on the use of these medications  Return precautions were discussed  Patient is in agreement and understanding of these instructions  Diagnosis:  Dentalgia, periapical abscess plan  Disposition:  Discharge    Patient encounter was conducted using [place]i

## 2022-07-05 NOTE — ED PROVIDER NOTES
History  Chief Complaint   Patient presents with    Abdominal Pain     States under chemo treatments and having mouth and throat pain; also abdominal pain; had infusion today  Pari Quiñones is a 63-year-old female with PMH of asthma, depression, lupus, lymphoma for 2 years on chemotherapy presents the emergency department with mouth and throat pain that started 2 weeks ago  She reports repeated episodes of similar symptoms for the last year and reports that normally when she uses Listerine 3 times daily this will help her pain but says that for the last 2 weeks she has had no improvement of pain  She reports a small amount of blood when she brushes her teeth as well but denies any continuous bleeding from the gums  She denies swelling of the gums or tongue but says that it is painful when she opens her mouth and every tooth is painful, but that her pain is worse on the left  She also reports tenderness to palpation of the throat and neck which has been present for a long period of time due to lymphoma and procedures she has had on her neck  Upon initial presentation to the emergency department she reported some mild abdominal pain which has resolved upon arrival to the room and it is noted that she has chronic abdominal pain since 2018 for which she sees palliative care for pain management  She denies fevers, nausea, vomiting, chest pain, fatigue and reports that she felt well prior to dental pain 2 weeks ago  History provided by:  Patient   used:  Yes    Abdominal Pain  Associated symptoms: sore throat    Associated symptoms: no chest pain, no chills, no constipation, no cough, no diarrhea, no dysuria, no fever, no hematuria, no nausea, no shortness of breath and no vomiting    Dental Pain  Location:  Generalized  Severity:  Moderate  Onset quality:  Gradual  Duration:  2 weeks  Timing:  Constant  Progression:  Worsening  Chronicity:  Recurrent  Context: not abscess    Associated symptoms: no facial swelling, no fever, no headaches and no oral lesions        Prior to Admission Medications   Prescriptions Last Dose Informant Patient Reported? Taking?    Blood Glucose Monitoring Suppl (OneTouch Verio) w/Device KIT  Self No No   Sig: Use in the morning   DULoxetine (CYMBALTA) 20 mg capsule  Self No No   Sig: Take 2 capsules (40 mg total) by mouth daily   LORazepam (ATIVAN) 0 5 mg tablet  Self No No   Sig: Take 1 tablet 30-60 minutes before MRI, can take an additional tablet if needed before test   OneTouch Delica Lancets 71U MISC  Self No No   Sig: Use in the morning   acetaminophen (TYLENOL) 325 mg tablet  Self No No   Sig: Take 2 tablets (650 mg total) by mouth every 6 (six) hours as needed for mild pain, moderate pain, headaches or fever   acyclovir (ZOVIRAX) 400 MG tablet  Self No No   Sig: Take 1 tablet (400 mg total) by mouth 2 (two) times a day   al mag oxide-diphenhydramine-lidocaine viscous (MAGIC MOUTHWASH) 1:1:1 suspension  Self No No   Sig: Swish and spit 10 mL every 4 (four) hours as needed for mouth pain or discomfort or mucositis   allopurinol (ZYLOPRIM) 100 mg tablet  Self No No   Sig: Take 1 tablet (100 mg total) by mouth daily   amLODIPine (NORVASC) 10 mg tablet  Self Yes No   Sig: Take 10 mg by mouth daily   ascorbic acid (VITAMIN C) 1000 MG tablet  Self No No   Sig: Take 1 tablet (1,000 mg total) by mouth every 12 (twelve) hours for 10 doses   aspirin (ECOTRIN LOW STRENGTH) 81 mg EC tablet  Self No No   Sig: Take 1 tablet (81 mg total) by mouth daily   aspirin-acetaminophen-caffeine (EXCEDRIN MIGRAINE) 250-250-65 MG per tablet  Self No No   Sig: Take 1 tablet by mouth every 6 (six) hours as needed for headaches Erica 1 tableta cada 6 horas fernandez sea necesario para el dolor de russ   atorvastatin (LIPITOR) 40 mg tablet  Self No No   Sig: Take 1 tablet (40 mg total) by mouth daily with dinner   benzonatate (TESSALON) 200 MG capsule  Self No No   Sig: Take 1 capsule (200 mg total) by mouth 3 (three) times a day as needed for cough   carbamide peroxide (DEBROX) 6 5 % otic solution  Self No No   Sig: Administer 5 drops to the right ear 2 (two) times a day   chlorthalidone 25 mg tablet  Self No No   Sig: Take 1 tablet (25 mg total) by mouth daily   cholecalciferol (VITAMIN D3) 1,000 units tablet  Self No No   Sig: Take 2 tablets (2,000 Units total) by mouth daily for 5 days   cyanocobalamin 1000 MCG tablet  Self No No   Sig: Take 1 tablet (1,000 mcg total) by mouth daily   folic acid (FOLVITE) 1 mg tablet  Self No No   Sig: Take 1 tablet (1 mg total) by mouth daily   glucose blood (OneTouch Verio) test strip  Self No No   Sig: Use 1 each in the morning Use as instructed   guaiFENesin (ROBITUSSIN) 100 mg/5 mL oral solution  Self No No   Sig: Take 10 mL (200 mg total) by mouth every 4 (four) hours   lidocaine (LMX) 4 % cream  Self No No   Sig: Apply topically as needed for mild pain To neck   meclizine (ANTIVERT) 12 5 MG tablet  Self No No   Sig: Take 1 tablet (12 5 mg total) by mouth every 8 (eight) hours as needed for dizziness   metFORMIN (GLUCOPHAGE) 500 mg tablet  Self No No   Sig: Take 2 tablets (1,000 mg total) by mouth 2 (two) times a day with meals   naloxone (NARCAN) 4 mg/0 1 mL nasal spray  Self No No   Sig: Administer 1 spray into a nostril  If no response after 2-3 minutes, give another dose in the other nostril using a new spray  omeprazole (PriLOSEC) 20 mg delayed release capsule  Self No No   Sig: Take 2 capsules (40 mg total) by mouth daily To prevent stomach upset   ondansetron (ZOFRAN) 4 mg tablet  Self No No   Sig: Take 1 tablet (4 mg total) by mouth every 8 (eight) hours as needed for nausea or vomiting   oxyCODONE (ROXICODONE) 15 mg immediate release tablet   No No   Sig: [place] 1 tableta cada 6 horas fernandez sea necesario solo para el dolor intenso  [place] 1 tableta cada 6 horas fernandez sea necesario solo para el dolor intenso  Sukhdeep de 4 comprimidos al d a   Sukhdeep de 4 comprimidos al misael  Trate de monique menos si puede     polyethylene glycol (MIRALAX) 17 g packet  Self No No   Sig: Take 17 g by mouth daily   potassium chloride (K-DUR,KLOR-CON) 20 mEq tablet  Self No No   Sig: Take 1 tablet (20 mEq total) by mouth daily   senna (SENOKOT) 8 6 mg  Self No No   Sig: Take 1 tablet (8 6 mg total) by mouth 2 (two) times a day   zinc sulfate (ZINCATE) 220 mg capsule   No No   Sig: Take 1 capsule (220 mg total) by mouth daily for 4 doses      Facility-Administered Medications: None       Past Medical History:   Diagnosis Date    Anemia     iron and B12    Arthritis     Asthma     Cough     Depression     Falls frequently     [facility]     [facility]     Lymph[facility] ([facility]     Migraine     Osteoporosis     Psychiatric disorder     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[facility] )     Vertigo        Past Surgical History:   Procedure Laterality Date    CHOLECYSTECTOMY      FL GUIDED CENTRAL VENOUS ACCESS DEVICE INSERTION  11/9/2018    HYSTERECTOMY      IR BIOPSY BONE MARROW  11/24/2020    IR BIOPSY LYMPH NODE  11/24/2020    LYMPH NODE BIOPSY Left 11/9/2018    Procedure: EXCISION BIOPSY LYMPH NODE SUPRACLAVICULAR;  Surgeon: Lynnette Mccann MD;  Location: [address] [place] [address];  Service: General    AR INCISE FINGER TENDON SHEATH Right 1/16/2020    Procedure: RELEASE TRIGGER FINGER RIGHT THUMB;  Surgeon: Matthew Wiseman MD;  Location: [address] [place] [address];  Service: Orthopedics    TUNNELED VENOUS PORT PLACEMENT N/A 11/9/2018    Procedure: INSERTION OF PORT-A-CATH;  Surgeon: Lynnette Mccann MD;  Location: [address] [place] [address];  Service: General       Family History   Problem Relation Age of Onset    Cancer Mother     Diabetes Mother     Cancer Father     Diabetes Father     Breast cancer Sister     No Known Problems Sister     No Known Problems Sister     No Known Problems Sister     No Known Problems Maternal Aunt     No Known Problems Maternal Aunt     No Known Problems Maternal Aunt     No Known Problems Paternal Aunt      I have reviewed and agree with the history as documented  E-Cigarette/Vaping    E-Cigarette Use Never User      E-Cigarette/Vaping Substances    Nicotine No     THC No     CBD No     Flavoring No     Other No     Unknown No      Social History     Tobacco Use    Smoking status: Former Smoker     Quit date: 2017     Years since quittin 0    Smokeless tobacco: Never Used   Vaping Use    Vaping Use: Never used   Substance Use Topics    Alcohol use: Never    Drug use: Never        Review of Systems   Constitutional: Negative for chills and fever  HENT: Positive for dental problem and sore throat  Negative for ear pain, facial swelling, mouth sores, sinus pain, trouble swallowing and voice change  Eyes: Negative for pain and visual disturbance  Respiratory: Negative for cough and shortness of breath  Cardiovascular: Negative for chest pain and palpitations  Gastrointestinal: Negative for abdominal pain (Resolved before evaluation), constipation, diarrhea, nausea and vomiting  Genitourinary: Negative for dysuria and hematuria  Musculoskeletal: Negative for arthralgias and back pain  Skin: Negative for color change and rash  Neurological: Negative for dizziness, seizures, syncope, light-headedness and headaches  All other systems reviewed and are negative  Physical Exam  ED Triage Vitals [22 1228]   Temperature Pulse Respirations Blood Pressure SpO2   98 1 °F (36 7 °C) 63 18 (!) 189/81 98 %      Temp Source Heart Rate Source Patient Position - Orthostatic VS BP Location FiO2 (%)   Oral Monitor -- Right arm --      Pain Score       7             Orthostatic Vital Signs  Vitals:    22 1228   BP: (!) 189/81   Pulse: 63       Physical Exam  Vitals and nursing note reviewed  Constitutional:       General: She is not in acute distress  Appearance: Normal appearance  She is not ill-appearing  HENT:      Head: Normocephalic and atraumatic        Right Ear: External ear normal       Left Ear: External ear normal       Mouth/Throat:      Mouth: Mucous membranes are moist  No oral lesions or angioedema  Dentition: Abnormal dentition  Dental tenderness and dental caries present  No gingival swelling or dental abscesses  Tongue: No lesions  Tongue does not deviate from midline  Palate: No mass and lesions  Pharynx: Oropharynx is clear  No pharyngeal swelling, oropharyngeal exudate, posterior oropharyngeal erythema or uvula swelling  Eyes:      General:         Right eye: No discharge  Left eye: No discharge  Extraocular Movements: Extraocular movements intact  Conjunctiva/sclera: Conjunctivae normal    Cardiovascular:      Rate and Rhythm: Normal rate and regular rhythm  Pulses: Normal pulses  Heart sounds: Normal heart sounds  No murmur heard  Pulmonary:      Effort: Pulmonary effort is normal  No respiratory distress  Breath sounds: Normal breath sounds  No wheezing, rhonchi or rales  Abdominal:      Tenderness: There is no abdominal tenderness  There is no guarding or rebound  Musculoskeletal:         General: No swelling or deformity  Normal range of motion  Cervical back: Neck supple  No tenderness  Skin:     General: Skin is warm and dry  Capillary Refill: Capillary refill takes less than 2 seconds  Neurological:      Mental Status: She is alert           ED Medications  Medications   al mag oxide-diphenhydramine-lidocaine viscous (MAGIC MOUTHWASH) suspension 10 mL (10 mL Swish & Swallow Given 7/5/22 1350)   clindamycin (CLEOCIN) capsule 300 mg (300 mg Oral Given 7/5/22 1350)       Diagnostic Studies  Results Reviewed     None                 No orders to display         Procedures  Procedures      ED Course                             SBIRT 22yo+    Flowsheet Row Most Recent Value   SBIRT (25 yo +)    In order to provide better care to our patients, we are screening all of our patients for alcohol and drug use  Would it be okay to ask you these screening questions? No Filed at: 07/05/2022 1322                [facility]  Number of Diagnoses or Management Options  Dental infection  Dentalgia  Tooth decay  Diagnosis management comments: 62F with PMH of lymphoma he with periodic chemotherapy infusions presents the emergency department with repeat episode of dental pain and sore throat has been present for 2 weeks  She has a longstanding history of dental pain and has been evaluated and treated for this in the past   On physical exam there is obvious tooth decay, poor dentition, and tenderness to percussion of the teeth generally but there is no evidence of abscess, erythema, swelling of the gums, or trismus  There are no oral lesions including absence of ulceration and no white plaque buildup on the tongue  In the absence of concerning findings on physical exam the patient is appropriate for symptomatic treatment and antibiotic care for occult dental root infection  She is administered magic mouth wash and clindamycin in the emergency department with significant improvement of pain and prescribed these medications to her pharmacy  Additionally she is given the phone number for a local [facility] for Oral and maxillofacial surgery for further evaluation and treatment of dental pain  Return precautions are discussed with the patient and they demonstrate understanding of the plan  The patient's questions are all answered to the their satisfaction and the patient is discharged home          Disposition  Final diagnoses:   Dentalgia   Tooth decay   Dental infection     Time reflects when diagnosis was documented in both MDM as applicable and the Disposition within this note     Time User Action Codes Description Comment    7/5/2022  2:15 PM Yennifer Hinojosa [T27 28] Dentalgia     7/5/2022  2:15 PM Cherry Pan Add [K02 9] Tooth decay     7/5/2022  2:28 PM Cherry Pan Add [K04 7] Dental infection       ED Disposition ED Disposition   Discharge    Condition   Stable    Date/Time   Tue Jul 5, 2022  2:15 PM    Comment   Darwin Payne Rene discharge to home/self care  Follow-up Information     Follow up With Specialties Details Why Contact Info Additional [address]  for Oral and Maxillofacial Surgery [place]  Call  To schedule an appointment for further evaluation and treatment of tooth pain [address] [address] Heart Emergency Department Emergency Medicine Go to  If you develop severe pain associated with gum swelling, fevers, or inability to open your mouth  [address] [facility] [address]  [facility] Emergency Department          Discharge Medication List as of 7/5/2022  2:32 PM      START taking these medications    Details   !! al mag oxide-diphenhydramine-lidocaine viscous (MAGIC MOUTHWASH) 1:1:1 suspension Swish and swallow 10 mL every 4 (four) hours as needed for mouth pain or discomfort for up to 5 days, Starting Tue 7/5/2022, Until Sun 7/10/2022 at 2359, Normal      clindamycin (CLEOCIN) 150 mg capsule Take 3 capsules (450 mg total) by mouth every 8 (eight) hours for 7 days, Starting Tue 7/5/2022, Until Tue 7/12/2022, Normal       !! - Potential duplicate medications found  Please discuss with provider        CONTINUE these medications which have NOT CHANGED    Details   acetaminophen (TYLENOL) 325 mg tablet Take 2 tablets (650 mg total) by mouth every 6 (six) hours as needed for mild pain, moderate pain, headaches or fever, Starting Tue 1/26/2021, Normal      acyclovir (ZOVIRAX) 400 MG tablet Take 1 tablet (400 mg total) by mouth 2 (two) times a day, Starting Mon 6/28/2021, Until Wed 7/28/2021, Normal      !! al mag oxide-diphenhydramine-lidocaine viscous (MAGIC MOUTHWASH) 1:1:1 suspension Swish and spit 10 mL every 4 (four) hours as needed for mouth pain or discomfort or mucositis, Starting Mon 1/24/2022, Normal      allopurinol (ZYLOPRIM) 100 mg tablet Take 1 tablet (100 mg total) by mouth daily, Starting Wed 6/23/2021, Normal      amLODIPine (NORVASC) 10 mg tablet Take 10 mg by mouth daily, Starting Thu 6/2/2022, Historical Med      ascorbic acid (VITAMIN C) 1000 MG tablet Take 1 tablet (1,000 mg total) by mouth every 12 (twelve) hours for 10 doses, Starting Tue 1/26/2021, Until Mon 6/7/2021, Normal      aspirin (ECOTRIN LOW STRENGTH) 81 mg EC tablet Take 1 tablet (81 mg total) by mouth daily, Starting Wed 3/18/2020, Normal      aspirin-acetaminophen-caffeine (EXCEDRIN MIGRAINE) 250-250-65 MG per tablet Take 1 tablet by mouth every 6 (six) hours as needed for headaches Erica 1 tableta cada 6 horas fernandez sea necesario para el dolor de russ, Starting Thu 11/19/2020, Normal      atorvastatin (LIPITOR) 40 mg tablet Take 1 tablet (40 mg total) by mouth daily with dinner, Starting Tue 1/26/2021, Normal      benzonatate (TESSALON) 200 MG capsule Take 1 capsule (200 mg total) by mouth 3 (three) times a day as needed for cough, Starting Mon 1/24/2022, Normal      Blood Glucose Monitoring Suppl (OneTouch Verio) w/Device KIT Use in the morning, Starting Fri 4/8/2022, Normal      carbamide peroxide (DEBROX) 6 5 % otic solution Administer 5 drops to the right ear 2 (two) times a day, Starting Wed 6/12/2019, Normal      chlorthalidone 25 mg tablet Take 1 tablet (25 mg total) by mouth daily, Starting Tue 5/31/2022, Normal      cholecalciferol (VITAMIN D3) 1,000 units tablet Take 2 tablets (2,000 Units total) by mouth daily for 5 days, Starting Wed 1/27/2021, Until Mon 6/7/2021, Normal      cyanocobalamin 1000 MCG tablet Take 1 tablet (1,000 mcg total) by mouth daily, Starting Wed 4/17/2019, Normal      DULoxetine (CYMBALTA) 20 mg capsule Take 2 capsules (40 mg total) by mouth daily, Starting Tue 8/6/5338, Normal      folic acid (FOLVITE) 1 mg tablet Take 1 tablet (1 mg total) by mouth daily, Starting Wed 6/23/2021, Normal      glucose blood (OneTouch Verio) test strip Use 1 each in the morning Use as instructed, Starting Fri 4/8/2022, Normal      guaiFENesin (ROBITUSSIN) 100 mg/5 mL oral solution Take 10 mL (200 mg total) by mouth every 4 (four) hours, Starting Tue 1/26/2021, Normal      lidocaine (LMX) 4 % cream Apply topically as needed for mild pain To neck, Starting Tue 9/15/2020, Normal      LORazepam (ATIVAN) 0 5 mg tablet Take 1 tablet 30-60 minutes before MRI, can take an additional tablet if needed before test, Normal      meclizine (ANTIVERT) 12 5 MG tablet Take 1 tablet (12 5 mg total) by mouth every 8 (eight) hours as needed for dizziness, Starting Tue 1/26/2021, Normal      metFORMIN (GLUCOPHAGE) 500 mg tablet Take 2 tablets (1,000 mg total) by mouth 2 (two) times a day with meals, Starting Tue 4/5/2022, Normal      naloxone (NARCAN) 4 mg/0 1 mL nasal spray Administer 1 spray into a nostril  If no response after 2-3 minutes, give another dose in the other nostril using a new spray , Normal      omeprazole (PriLOSEC) 20 mg delayed release capsule Take 2 capsules (40 mg total) by mouth daily To prevent stomach upset, Starting Wed 3/25/2020, Normal      ondansetron (ZOFRAN) 4 mg tablet Take 1 tablet (4 mg total) by mouth every 8 (eight) hours as needed for nausea or vomiting, Starting Tue 9/15/2020, Normal      OneTouch Delica Lancets 85K MISC Use in the morning, Starting Fri 4/8/2022, Normal      oxyCODONE (ROXICODONE) 15 mg immediate release tablet [place] 1 tableta cada 6 horas fernandez sea necesario solo para el dolor intenso  [place] 1 tableta cada 6 horas fernandez sea necesario solo para el dolor intenso  Sukhdeep de 4 comprimidos al d a  Sukhdeep de 4 comprimidos al misael   Trate de monique menos si puede , Normal      polyethylene glycol (MIRALAX) 17 g packet Take 17 g by mouth daily, Starting Tue 12/17/2019, Normal      potassium chloride (K-DUR,KLOR-CON) 20 mEq tablet Take 1 tablet (20 mEq total) by mouth daily, Starting Tue 6/14/2022, Normal      senna (SENOKOT) 8 6 mg Take 1 tablet (8 6 mg total) by mouth 2 (two) times a day, Starting Tue 12/17/2019, Normal      zinc sulfate (ZINCATE) 220 mg capsule Take 1 capsule (220 mg total) by mouth daily for 4 doses, Starting Wed 1/27/2021, Until Sun 1/31/2021, Normal       !! - Potential duplicate medications found  Please discuss with provider  No discharge procedures on file  PDMP Review       Value Time User    PDMP Reviewed  Yes 4/27/2022  8:46 AM Russell El MD           ED Provider  Attending physically available and evaluated Savana Martinez  I managed the patient along with the ED Attending      Electronically Signed by         Patrick Hayes DO  07/05/22 1411

## 2022-07-05 NOTE — PROGRESS NOTES
Patient tolerated IV chemotherapy and L IM deltoid b12 injection without issue   Next appointment confirmed, AVS given

## 2022-07-11 ENCOUNTER — HOSPITAL ENCOUNTER (OUTPATIENT)
Dept: INFUSION CENTER | Facility: HOSPITAL | Age: 62
Discharge: HOME | End: 2022-07-11

## 2022-07-11 ENCOUNTER — HOSPITAL ENCOUNTER (OUTPATIENT)
Dept: INFUSION CENTER | Facility: CLINIC | Age: 62
Discharge: HOME | End: 2022-07-11
Payer: COMMERCIAL

## 2022-07-11 DIAGNOSIS — Z45.2 ENCOUNTER FOR CENTRAL LINE CARE: ICD-10-CM

## 2022-07-11 DIAGNOSIS — C82.18 GRADE 2 FOLLICULAR LYMPHOMA OF LYMPH NODES OF MULTIPLE REGIONS (HCC): Primary | ICD-10-CM

## 2022-07-11 LAB
ALBUMIN SERPL BCP-MCNC: 4 G/DL (ref 3.5–5)
ALP SERPL-CCNC: 90 U/L (ref 34–104)
ALT SERPL W P-5'-P-CCNC: 11 U/L (ref 7–52)
ANION GAP SERPL CALCULATED.3IONS-SCNC: 6 MMOL/L (ref 4–13)
AST SERPL W P-5'-P-CCNC: 11 U/L (ref 13–39)
BASOPHILS # BLD AUTO: 0.01 THOUSANDS/ΜL (ref 0–0.1)
BASOPHILS NFR BLD AUTO: 0 % (ref 0–1)
BILIRUB SERPL-MCNC: 0.56 MG/DL (ref 0.2–1)
BUN SERPL-MCNC: 13 MG/DL (ref 5–25)
CALCIUM SERPL-MCNC: 9.1 MG/DL (ref 8.4–10.2)
CHLORIDE SERPL-SCNC: 103 MMOL/L (ref 96–108)
CO2 SERPL-SCNC: 27 MMOL/L (ref 21–32)
CREAT SERPL-MCNC: 0.77 MG/DL (ref 0.6–1.3)
EOSINOPHIL # BLD AUTO: 0.1 THOUSAND/ΜL (ref 0–0.61)
EOSINOPHIL NFR BLD AUTO: 2 % (ref 0–6)
ERYTHROCYTE [DISTWIDTH] IN BLOOD BY AUTOMATED COUNT: 16.6 % (ref 11.6–15.1)
GFR SERPL CREATININE-BSD FRML MDRD: 84 ML/MIN/1.73SQ M
GLUCOSE SERPL-MCNC: 207 MG/DL (ref 65–140)
HCT VFR BLD AUTO: 39.7 % (ref 34.8–46.1)
HGB BLD-MCNC: 12 G/DL (ref 11.5–15.4)
IMM GRANULOCYTES # BLD AUTO: 0.02 THOUSAND/UL (ref 0–0.2)
IMM GRANULOCYTES NFR BLD AUTO: 0 % (ref 0–2)
LDH SERPL-CCNC: 215 U/L (ref 140–271)
LYMPHOCYTES # BLD AUTO: 1.15 THOUSANDS/ΜL (ref 0.6–4.47)
LYMPHOCYTES NFR BLD AUTO: 21 % (ref 14–44)
MCH RBC QN AUTO: 20.9 PG (ref 26.8–34.3)
MCHC RBC AUTO-ENTMCNC: 30.2 G/DL (ref 31.4–37.4)
MCV RBC AUTO: 69 FL (ref 82–98)
MONOCYTES # BLD AUTO: 0.27 THOUSAND/ΜL (ref 0.17–1.22)
MONOCYTES NFR BLD AUTO: 5 % (ref 4–12)
NEUTROPHILS # BLD AUTO: 4.01 THOUSANDS/ΜL (ref 1.85–7.62)
NEUTS SEG NFR BLD AUTO: 72 % (ref 43–75)
NRBC BLD AUTO-RTO: 0 /100 WBCS
PLATELET # BLD AUTO: 226 THOUSANDS/UL (ref 149–390)
PMV BLD AUTO: 10.8 FL (ref 8.9–12.7)
POTASSIUM SERPL-SCNC: 4.1 MMOL/L (ref 3.5–5.3)
PROT SERPL-MCNC: 6.8 G/DL (ref 6.4–8.4)
RBC # BLD AUTO: 5.75 MILLION/UL (ref 3.81–5.12)
SODIUM SERPL-SCNC: 136 MMOL/L (ref 135–147)
WBC # BLD AUTO: 5.56 THOUSAND/UL (ref 4.31–10.16)

## 2022-07-11 PROCEDURE — 83615 LACTATE (LD) (LDH) ENZYME: CPT | Performed by: INTERNAL MEDICINE

## 2022-07-11 PROCEDURE — 80053 COMPREHEN METABOLIC PANEL: CPT | Performed by: INTERNAL MEDICINE

## 2022-07-11 PROCEDURE — 85025 COMPLETE CBC W/AUTO DIFF WBC: CPT | Performed by: INTERNAL MEDICINE

## 2022-07-11 NOTE — PROGRESS NOTES
Pt here for central labs for treatment tomorrow, offers no complaints  Labs drawn and sent to the lab  Port flushed per protocol  Aware of next appointment 7/12 at 9am, AVS declined  Oz Jenkins

## 2022-07-12 ENCOUNTER — HOSPITAL ENCOUNTER (OUTPATIENT)
Dept: INFUSION CENTER | Facility: HOSPITAL | Age: 62
Discharge: HOME | End: 2022-07-12
Attending: INTERNAL MEDICINE
Payer: COMMERCIAL

## 2022-07-12 VITALS
HEART RATE: 66 BPM | DIASTOLIC BLOOD PRESSURE: 69 MMHG | TEMPERATURE: 98.9 F | SYSTOLIC BLOOD PRESSURE: 130 MMHG | RESPIRATION RATE: 14 BRPM

## 2022-07-12 DIAGNOSIS — C82.18 GRADE 2 FOLLICULAR LYMPHOMA OF LYMPH NODES OF MULTIPLE REGIONS (HCC): Primary | ICD-10-CM

## 2022-07-12 LAB — GLUCOSE SERPL-MCNC: 214 MG/DL (ref 65–140)

## 2022-07-12 PROCEDURE — 96413 CHEMO IV INFUSION 1 HR: CPT

## 2022-07-12 PROCEDURE — 82948 REAGENT STRIP/BLOOD GLUCOSE: CPT

## 2022-07-12 PROCEDURE — 96367 TX/PROPH/DG ADDL SEQ IV INF: CPT

## 2022-07-12 RX ORDER — SODIUM CHLORIDE 9 MG/ML
20 INJECTION, SOLUTION INTRAVENOUS ONCE
Status: COMPLETED | OUTPATIENT
Start: 2022-07-12 | End: 2022-07-12

## 2022-07-12 RX ADMIN — SODIUM CHLORIDE 20 ML/HR: 0.9 INJECTION, SOLUTION INTRAVENOUS at 10:26

## 2022-07-12 RX ADMIN — ONDANSETRON 8 MG: 2 INJECTION INTRAMUSCULAR; INTRAVENOUS at 10:27

## 2022-07-12 RX ADMIN — COPANLISIB 60 MG: 15 INJECTION, POWDER, LYOPHILIZED, FOR SOLUTION INTRAVENOUS at 10:59

## 2022-07-12 NOTE — PROGRESS NOTES
Patient glucose fingerstick before treatment was 219  Per protocol, Dr Yessy Davis office notified  Per Mi Kwok RN, OK to proceed with treatment instructed by Dr Martinez Wagner  Tolerated IV chemotherapy without issues  Next appointment made, AVS given

## 2022-07-25 ENCOUNTER — HOSPITAL ENCOUNTER (OUTPATIENT)
Dept: INFUSION CENTER | Facility: HOSPITAL | Age: 62
Discharge: HOME | End: 2022-07-25
Payer: COMMERCIAL

## 2022-07-25 ENCOUNTER — TELEPHONE (OUTPATIENT)
Dept: HEMATOLOGY ONCOLOGY | Facility: CLINIC | Age: 62
End: 2022-07-25

## 2022-07-25 ENCOUNTER — OFFICE VISIT (OUTPATIENT)
Dept: HEMATOLOGY ONCOLOGY | Facility: CLINIC | Age: 62
End: 2022-07-25
Payer: COMMERCIAL

## 2022-07-25 VITALS
BODY MASS INDEX: 40.97 KG/M2 | OXYGEN SATURATION: 97 % | RESPIRATION RATE: 20 BRPM | HEIGHT: 64 IN | DIASTOLIC BLOOD PRESSURE: 88 MMHG | WEIGHT: 240 LBS | SYSTOLIC BLOOD PRESSURE: 122 MMHG | HEART RATE: 58 BPM | TEMPERATURE: 97.4 F

## 2022-07-25 DIAGNOSIS — C82.18 GRADE 2 FOLLICULAR LYMPHOMA OF LYMPH NODES OF MULTIPLE REGIONS (HCC): ICD-10-CM

## 2022-07-25 DIAGNOSIS — Z45.2 ENCOUNTER FOR CENTRAL LINE CARE: Primary | ICD-10-CM

## 2022-07-25 DIAGNOSIS — D51.8 OTHER VITAMIN B12 DEFICIENCY ANEMIAS: ICD-10-CM

## 2022-07-25 DIAGNOSIS — C82.18 GRADE 2 FOLLICULAR LYMPHOMA OF LYMPH NODES OF MULTIPLE REGIONS (HCC): Primary | ICD-10-CM

## 2022-07-25 LAB
ALBUMIN SERPL BCP-MCNC: 3.9 G/DL (ref 3.5–5)
ALP SERPL-CCNC: 104 U/L (ref 43–122)
ALT SERPL W P-5'-P-CCNC: 16 U/L
ANION GAP SERPL CALCULATED.3IONS-SCNC: 8 MMOL/L (ref 5–14)
AST SERPL W P-5'-P-CCNC: 21 U/L (ref 14–36)
BASOPHILS # BLD AUTO: 0.03 THOUSANDS/ΜL (ref 0–0.1)
BASOPHILS NFR BLD AUTO: 1 % (ref 0–1)
BILIRUB SERPL-MCNC: 0.48 MG/DL (ref 0.2–1)
BUN SERPL-MCNC: 12 MG/DL (ref 5–25)
CALCIUM SERPL-MCNC: 8.6 MG/DL (ref 8.4–10.2)
CHLORIDE SERPL-SCNC: 103 MMOL/L (ref 96–108)
CO2 SERPL-SCNC: 27 MMOL/L (ref 21–32)
CREAT SERPL-MCNC: 0.71 MG/DL (ref 0.6–1.2)
CRP SERPL QL: 19.7 MG/L
EOSINOPHIL # BLD AUTO: 0.18 THOUSAND/ΜL (ref 0–0.61)
EOSINOPHIL NFR BLD AUTO: 3 % (ref 0–6)
ERYTHROCYTE [DISTWIDTH] IN BLOOD BY AUTOMATED COUNT: 15.8 % (ref 11.6–15.1)
ERYTHROCYTE [SEDIMENTATION RATE] IN BLOOD: 60 MM/HOUR (ref 0–29)
GFR SERPL CREATININE-BSD FRML MDRD: 92 ML/MIN/1.73SQ M
GLUCOSE SERPL-MCNC: 143 MG/DL (ref 70–99)
HCT VFR BLD AUTO: 37.6 % (ref 34.8–46.1)
HGB BLD-MCNC: 11.1 G/DL (ref 11.5–15.4)
IMM GRANULOCYTES # BLD AUTO: 0.02 THOUSAND/UL (ref 0–0.2)
IMM GRANULOCYTES NFR BLD AUTO: 0 % (ref 0–2)
LDH SERPL-CCNC: 549 U/L (ref 313–618)
LYMPHOCYTES # BLD AUTO: 1.8 THOUSANDS/ΜL (ref 0.6–4.47)
LYMPHOCYTES NFR BLD AUTO: 29 % (ref 14–44)
MCH RBC QN AUTO: 20.5 PG (ref 26.8–34.3)
MCHC RBC AUTO-ENTMCNC: 29.5 G/DL (ref 31.4–37.4)
MCV RBC AUTO: 70 FL (ref 82–98)
MONOCYTES # BLD AUTO: 0.5 THOUSAND/ΜL (ref 0.17–1.22)
MONOCYTES NFR BLD AUTO: 8 % (ref 4–12)
NEUTROPHILS # BLD AUTO: 3.67 THOUSANDS/ΜL (ref 1.85–7.62)
NEUTS SEG NFR BLD AUTO: 59 % (ref 43–75)
NRBC BLD AUTO-RTO: 0 /100 WBCS
PLATELET # BLD AUTO: 193 THOUSANDS/UL (ref 149–390)
PMV BLD AUTO: 9.9 FL (ref 8.9–12.7)
POTASSIUM SERPL-SCNC: 3.4 MMOL/L (ref 3.5–5.3)
PROT SERPL-MCNC: 6.8 G/DL (ref 6.4–8.4)
RBC # BLD AUTO: 5.41 MILLION/UL (ref 3.81–5.12)
SODIUM SERPL-SCNC: 138 MMOL/L (ref 135–147)
VIT B12 SERPL-MCNC: 1101 PG/ML (ref 100–900)
WBC # BLD AUTO: 6.2 THOUSAND/UL (ref 4.31–10.16)

## 2022-07-25 PROCEDURE — 80053 COMPREHEN METABOLIC PANEL: CPT | Performed by: INTERNAL MEDICINE

## 2022-07-25 PROCEDURE — 99214 OFFICE O/P EST MOD 30 MIN: CPT | Performed by: INTERNAL MEDICINE

## 2022-07-25 PROCEDURE — 82607 VITAMIN B-12: CPT

## 2022-07-25 PROCEDURE — 86140 C-REACTIVE PROTEIN: CPT

## 2022-07-25 PROCEDURE — 83615 LACTATE (LD) (LDH) ENZYME: CPT | Performed by: INTERNAL MEDICINE

## 2022-07-25 PROCEDURE — 85025 COMPLETE CBC W/AUTO DIFF WBC: CPT | Performed by: INTERNAL MEDICINE

## 2022-07-25 PROCEDURE — 85652 RBC SED RATE AUTOMATED: CPT

## 2022-07-25 NOTE — TELEPHONE ENCOUNTER
While we try to accommodate patient requests, our priority is to schedule treatment according to Doctor's orders and site availability  1  Does the Provider use the intake sheet or checkout note NO  2  What would be a preferred day of the week that would work best for your infusion appointment? WEDNESDAY  3  Do you prefer mornings or afternoons for your appointments? MORNING  4  Are there any days or dates that do not work for your schedule, including any upcoming vacations? NO  5  We are going to try our best to schedule you at the infusion center closest to your home  In the event that we are unable to what would be your next preferred infusion site or sites? [facility]        6  Do you have transportation to take you to all of your appointments? NO  7  Would you like the infusion center to draw labs from your port? (disregard if patient doesn't have a port or need labs for infusion appointment)     Patient has appointment for Aug 10th @ 9am  Please make her chemo appointments for Wed
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
## 2022-07-25 NOTE — TELEPHONE ENCOUNTER
Chivo Paul phoned back to report that pt has [employer] which is OON for [employer]  Pt needs to work on switching her ins to [facility] or [facility] and she can phone Laci German at [phone] at [place] for assistance with this  Phoned Dr Shima Strickland office at [phone] and spoke to Chivo Paul his intake staff to ask if pt could have chemo tomorrow and what the next steps were regarding the Car T truong tx  Per Chivo Holland said OK for chemo tomorrow and a nurse coordinator will be phoning pt in near future to schedule her

## 2022-07-25 NOTE — PROGRESS NOTES
Pt tolerated central lab draw without difficulty  Port flushed, capped, and NS locked per protocol  Kept accessed for tomorrow's chemo tx  Aware of return time for chemo tomorrow    Left ambulatory declining AVS

## 2022-07-26 ENCOUNTER — HOSPITAL ENCOUNTER (OUTPATIENT)
Dept: INFUSION CENTER | Facility: HOSPITAL | Age: 62
Discharge: HOME | End: 2022-07-26
Attending: INTERNAL MEDICINE
Payer: COMMERCIAL

## 2022-07-26 VITALS
HEART RATE: 58 BPM | DIASTOLIC BLOOD PRESSURE: 78 MMHG | TEMPERATURE: 97.1 F | OXYGEN SATURATION: 97 % | SYSTOLIC BLOOD PRESSURE: 136 MMHG | RESPIRATION RATE: 18 BRPM

## 2022-07-26 DIAGNOSIS — C82.18 GRADE 2 FOLLICULAR LYMPHOMA OF LYMPH NODES OF MULTIPLE REGIONS (HCC): Primary | ICD-10-CM

## 2022-07-26 LAB — GLUCOSE SERPL-MCNC: 121 MG/DL (ref 65–140)

## 2022-07-26 PROCEDURE — 96413 CHEMO IV INFUSION 1 HR: CPT

## 2022-07-26 PROCEDURE — 82948 REAGENT STRIP/BLOOD GLUCOSE: CPT

## 2022-07-26 PROCEDURE — 96367 TX/PROPH/DG ADDL SEQ IV INF: CPT

## 2022-07-26 RX ORDER — SODIUM CHLORIDE 9 MG/ML
20 INJECTION, SOLUTION INTRAVENOUS ONCE
Status: COMPLETED | OUTPATIENT
Start: 2022-07-26 | End: 2022-07-26

## 2022-07-26 RX ADMIN — ONDANSETRON 8 MG: 2 INJECTION INTRAMUSCULAR; INTRAVENOUS at 09:35

## 2022-07-26 RX ADMIN — COPANLISIB 60 MG: 15 INJECTION, POWDER, LYOPHILIZED, FOR SOLUTION INTRAVENOUS at 10:15

## 2022-07-26 RX ADMIN — SODIUM CHLORIDE 20 ML/HR: 0.9 INJECTION, SOLUTION INTRAVENOUS at 09:19

## 2022-07-26 NOTE — PROGRESS NOTES
Pt tolerated aliqopa today without complications, confirmed next appts and AVS provided  [facility] notified

## 2022-07-26 NOTE — PATIENT INSTRUCTIONS
PRESCRIPTION REFILL REMINDER:  All medication refills should be requested prior to [facility] on Friday  Any refill requests after noon on Friday would be addressed the following Monday  Please protect yourself from [place]   = Wash your hands  Soap and water, or hand  with at least 60% alcohol, are both effective at killing the virus  = Wear a mask  This will help protect others from any virus particles you might spread  Your mouth and nose BOTH need to be covered  = Keep the distance  Keep 6 feet of distance from other people, even if they seem healthy  Keeping distance protects you from the other person's virus spread     = Get a vaccine, and boost it  Three vaccines are approved for use in the [place] for all adults  These vaccines do provide protection against all known variants   + Pfizer is FDA approved for all persons age 11 and older   + Tasneem  may be given to all person age 25 and older   - We do NOT recommend [address] vaccine for our Palliative Care patients  = Call 4-419-WQVITRK (Choose Option 7 for faster service!) to get scheduled for your shot   = [facility], [employer], [facility], [facility], and many [facility] locations ALSO have shots   + The CDC recommends booster shots on ALL vaccines for ALL adults  = https://[employer]/  = If you are over 50 or have an immune compromise, the CDC recommends a fourth shot     = Test to treat  If you have symptoms, get tested, and get treatment!   New drugs like Paxlovid can prevent you from ever having to go to the hospital , and may be covered completely by your insurance or special government programs    - https://aspr hhs gov/TestToTreat/      Informacion en espanol sobre vacunas, de nos companeros de [facility] --  Amy rosales    Check out [employer] for [employer] that are updated daily:    http://www [employer]/     Global Epidemics  Org, from [facility] will give you [id number] information on virus cases and vaccination rates:    Https://globalepidemics  org/    Frequently Asked Questions about COVID, answered by [facility]    [employer] es

## 2022-07-27 ENCOUNTER — OFFICE VISIT (OUTPATIENT)
Dept: PALLIATIVE MEDICINE | Facility: CLINIC | Age: 62
End: 2022-07-27
Payer: COMMERCIAL

## 2022-07-27 VITALS
WEIGHT: 243 LBS | SYSTOLIC BLOOD PRESSURE: 140 MMHG | OXYGEN SATURATION: 99 % | TEMPERATURE: 97.1 F | DIASTOLIC BLOOD PRESSURE: 70 MMHG | HEART RATE: 53 BPM | BODY MASS INDEX: 41.71 KG/M2

## 2022-07-27 DIAGNOSIS — R10.30 LOWER ABDOMINAL PAIN: ICD-10-CM

## 2022-07-27 DIAGNOSIS — G89.3 CANCER RELATED PAIN: Primary | ICD-10-CM

## 2022-07-27 DIAGNOSIS — C82.18 GRADE 2 FOLLICULAR LYMPHOMA OF LYMPH NODES OF MULTIPLE REGIONS (HCC): ICD-10-CM

## 2022-07-27 PROCEDURE — 99214 OFFICE O/P EST MOD 30 MIN: CPT | Performed by: INTERNAL MEDICINE

## 2022-07-27 RX ORDER — OXYCODONE HYDROCHLORIDE 15 MG/1
TABLET ORAL
Qty: 90 TABLET | Refills: 0 | Status: SHIPPED | OUTPATIENT
Start: 2022-07-27 | End: 2022-08-25 | Stop reason: SDUPTHER

## 2022-07-27 NOTE — PROGRESS NOTES
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 (HCC)       · Chronic cancer related pain mostly in her abdomen is controlled with current regimen  Continue taper (started 10/2021) when appropriate  · For now continue OxyIR 15mg PO q6H prn  Max of 4 a day  Imaging in 5/2022 did mention possible progression in the abdomen  · Of note, treatment strategy for patient was/is kept as simple as possible due to difficulty following regimen   · Bowel regimen to prevent OIC  · She has many questions about her next treatment/CAR-T therapy  I advised her to discuss this again with her cancer team, as they be better versed on the logistics of it (she's asking where this will be done, what the process is), side effects, complications, etc    · RTO in 3 months, call sooner if needed  Controlled Substance Review    [place] or [place]  reviewed: No red flags were identified; safe to proceed with prescription  Manish Anderson   06/24/2022  2   06/24/2022  Oxycodone Hcl (Ir) 15 MG Tab    90 00  23 Ti Joselito   [id number]   All (3848)    88 04 M[place]   05/23/2022  2   05/23/2022  Oxycodone Hcl (Ir) 15 MG Tab    90 00  24 Ti Joselito   [id number]   All (3848)    84 38 [place]   04/27/2022  2   04/27/2022  Oxycodone Hcl (Ir) 15 MG Tab    90 00  23 Ti Joselito   [id number]   All (3848)    88 04 [place]     03/23/2022  2   03/18/2022  Oxycodone Hcl (Ir) 15 MG Tab    90 00  25 Ti Joselito   [id number]   All (3848)    81 00 [place]   02/25/2022  2   02/25/2022  Oxycodone Hcl (Ir) 15 MG Tab    90 00  23 [place]   [id number]   All (3848)    88 04 [place]   01/26/2022  2   01/26/2022  Oxycodone Hcl (Ir) 15 MG Tab    90 00  23 Ti Joselito   [id number]   All (3848)    88 04 MME  [place]   12/23/2021  2   12/23/2021  Oxycodone Hcl (Ir) 20 MG Tab    90 00  23 Ti Joselito [id number]   All (3848)    117 39 [place]   11/26/2021  1   11/23/2021  Oxycodone Hcl (Ir) 20 MG Tab    90 00  30 [place]   [id number]   All (3848)    90 00 MME  [place]   10/27/2021  1   10/27/2021  Oxycodone Hcl 20 MG Tablet    120 00  30 Ti Joselito   [id number]   All (3848)            Requested Prescriptions     Signed Prescriptions Disp Refills    oxyCODONE (ROXICODONE) 15 mg immediate release tablet 90 tablet 0     Sig: [place] 1 tableta cada 6 horas fernandez sea necesario solo para el dolor intenso  [place] 1 tableta cada 6 horas fernandez sea necesario solo para el dolor intenso  Sukhdeep de 4 comprimidos al d a  Sukhdeep de 4 comprimidos al misael  Trate de monique menos si puede  Medications Discontinued During This Encounter   Medication Reason    oxyCODONE (ROXICODONE) 15 mg immediate release tablet Reorder       Representatives have queried the patient's controlled substance dispensing history in the Prescription Drug Monitoring Program in compliance with regulations before I have prescribed any controlled substances  The prescription history is consistent with prescribed therapy and our practice policies  30 minutes were spent face to face with Germaine Foley with greater than 50% of the time spent in counseling or coordination of care including discussions of etiology of diagnosis, diagnostic results, impression, and recommendations, risks and benefits of treatment, instructions for disease self management, treatment instructions, follow up requirements, risk factors and risk reduction of disease, patient and family counseling/involvement in care and compliance with treatment regimen   All of the patient's questions were answered during this discussion  No follow-ups on file      Subjective:   Chief Complaint  Follow up visit for:  symptom management, pain, neoplasm related, assessment of goals of care, disease process education and discussion of prognosis, advance care planning    Nausea  Associated symptoms include abdominal pain and fatigue  Pertinent negatives include no chest pain, chills, coughing, fever, nausea or neck pain  Medication Refill  Associated symptoms include abdominal pain and fatigue  Pertinent negatives include no chest pain, chills, coughing, fever, nausea or neck pain  Naun Tee is a 63 y o  female with grade 2 follicular lymphoma initially diagnosed in Nov 2018  Started on chemo (rituximab + bendamustine) soon after  Developed tumor lysis syndrome in Dec 2018, bendamustine removed  She completed rituxan on 4/2019 and had been on maintenance Rituxan since 5/2019  In March 2020, she had evidence of disease progression and was switched to rituxan+Revlimid  Per review of notes, there are some concerns with noncompliance and f/u with bloodwork   Per Oncology note, long discussion with patient regarding refractory and progressive grade 2 follicular lymphoma   Patient with c/o increase L neck swelling/tenderness, US done, concerning for progression of her disease  Her biopsy from the L iliac bone and L cervical LN came back positive for follicular lymphoma  Plan was to start Obinutuzumab/CHOP with Udenyca but because of neutropenia, decision was to do copanlisib instead  However, there was a delay in her treatment when she had Covid-19 as well as when she went to [facility] to relocate  She was going to stay there to get treatments, but it didn't work out so she came back  There are frequent interruptions in her care, with her frequent trips to [facility] for personal/social reasons  Her PET-CT 6/4/2021 unfortunately showed significant progression of disease in the neck, chest, abdomen, pelvis, including new lesion in the L ribs  She is currently on copanlisib 60 mg 3 weeks on with 1 week of break since 6/29/2021   Her latest PET-CT in 5/2022 showed possible new progression in the mesenteric area with new LN behind the L ear on exam  For this reason, she was referred to Dr Samantha Martines who believes she can be candidate for CAR-T therapy  She was last seen 4/27/2022 where we discussed exercising and eating healthy as she was concerned about her weight gain and how she looks  We have had this discussion previously as well as she tells us she started to eat cakes because that's the only thing that tastes good with the sour/metallic taste from chemo  Since her last visit, she had been to Dr Samantha Martines to discuss further treatment options  She also saw Dr Janette Tsang 2 days ago to discuss the same  Today, she was asking if I can explain to her what they said, because she did not understand them  I tried to explain what CAR-T is in general  I encouraged her to call their office again to explain and answer her questions  She was concerned that it will be painful  We again discussed eating healthy and exercising as she again made a comment about her weight  When asked if she is eating healthier like vegetable and fruits, fish, she said she does not like vegetables, but eats chicken  She is now eating more soups  When asked what soups, she said they are ramen noodles  I discouraged ramen noodles as it has a lot of preservatives and empty calories  I encouraged her to eat more vegetables  She also does not exercise and I encouraged her to be more active  A healthy lifestyle with good food and regular exercise will help with weight loss  She was last seen on 1/26/2022  Since her last visit, she continues to follow up with oncology  She last saw them 4/25/2022  Her PET-CT was delayed due to high blood sugars, but attempting to do again now that she is on metformin  She was ordered mammogram and gyn referral for her complaints of vaginal bleeding and breast pain  She continued with chemo  She still complains of lower abdominal pain as before  She is comfortable with oxyIR 15mg PO q6H prn       The following portions of the medical history were reviewed: past medical history, problem list, medication list, and social history  Current Outpatient Medications:     oxyCODONE (ROXICODONE) 15 mg immediate release tablet, [place] 1 tableta cada 6 horas fernandez sea necesario solo para el dolor intenso  [place] 1 tableta cada 6 horas fernandez sea necesario solo para el dolor intenso  Sukhdeep de 4 comprimidos al d a  Sukhdeep de 4 comprimidos al misael   Trate de erica menos si puede , Disp: 90 tablet, Rfl: 0    acetaminophen (TYLENOL) 325 mg tablet, Take 2 tablets (650 mg total) by mouth every 6 (six) hours as needed for mild pain, moderate pain, headaches or fever, Disp: 30 tablet, Rfl: 0    acyclovir (ZOVIRAX) 400 MG tablet, Take 1 tablet (400 mg total) by mouth 2 (two) times a day, Disp: 60 tablet, Rfl: 11    al mag oxide-diphenhydramine-lidocaine viscous (MAGIC MOUTHWASH) 1:1:1 suspension, Swish and spit 10 mL every 4 (four) hours as needed for mouth pain or discomfort or mucositis, Disp: 180 mL, Rfl: 3    allopurinol (ZYLOPRIM) 100 mg tablet, Take 1 tablet (100 mg total) by mouth daily, Disp: 30 tablet, Rfl: 0    amLODIPine (NORVASC) 10 mg tablet, Take 10 mg by mouth daily, Disp: , Rfl:     ascorbic acid (VITAMIN C) 1000 MG tablet, Take 1 tablet (1,000 mg total) by mouth every 12 (twelve) hours for 10 doses, Disp: 10 tablet, Rfl: 0    aspirin (ECOTRIN LOW STRENGTH) 81 mg EC tablet, Take 1 tablet (81 mg total) by mouth daily, Disp: 30 tablet, Rfl: 11    aspirin-acetaminophen-caffeine (EXCEDRIN MIGRAINE) 250-250-65 MG per tablet, Take 1 tablet by mouth every 6 (six) hours as needed for headaches [place] 1 tableta cada 6 horas fernandez sea necesario para el dolor de russ, Disp: 30 tablet, Rfl: 0    atorvastatin (LIPITOR) 40 mg tablet, Take 1 tablet (40 mg total) by mouth daily with dinner, Disp: 12 tablet, Rfl: 0    benzonatate (TESSALON) 200 MG capsule, Take 1 capsule (200 mg total) by mouth 3 (three) times a day as needed for cough, Disp: 20 capsule, Rfl: 1    Blood Glucose Monitoring Suppl (OneTouch Verio) w/Device KIT, Use in the morning, Disp: 1 kit, Rfl: 0    carbamide peroxide (DEBROX) 6 5 % otic solution, Administer 5 drops to the right ear 2 (two) times a day, Disp: 15 mL, Rfl: 0    chlorthalidone 25 mg tablet, Take 1 tablet (25 mg total) by mouth daily, Disp: 30 tablet, Rfl: 5    cholecalciferol (VITAMIN D3) 1,000 units tablet, Take 2 tablets (2,000 Units total) by mouth daily for 5 days, Disp: 10 tablet, Rfl: 0    cyanocobalamin 1000 MCG tablet, Take 1 tablet (1,000 mcg total) by mouth daily, Disp: 90 tablet, Rfl: 3    DULoxetine (CYMBALTA) 20 mg capsule, Take 2 capsules (40 mg total) by mouth daily, Disp: 30 capsule, Rfl: 3    folic acid (FOLVITE) 1 mg tablet, Take 1 tablet (1 mg total) by mouth daily, Disp: 90 tablet, Rfl: 4    glucose blood (OneTouch Verio) test strip, Use 1 each in the morning Use as instructed, Disp: 50 strip, Rfl: 6    guaiFENesin (ROBITUSSIN) 100 mg/5 mL oral solution, Take 10 mL (200 mg total) by mouth every 4 (four) hours, Disp: 473 mL, Rfl: 0    lidocaine (LMX) 4 % cream, Apply topically as needed for mild pain To neck, Disp: 30 g, Rfl: 0    LORazepam (ATIVAN) 0 5 mg tablet, Take 1 tablet 30-60 minutes before MRI, can take an additional tablet if needed before test, Disp: 2 tablet, Rfl: 0    meclizine (ANTIVERT) 12 5 MG tablet, Take 1 tablet (12 5 mg total) by mouth every 8 (eight) hours as needed for dizziness, Disp: 30 tablet, Rfl: 0    metFORMIN (GLUCOPHAGE) 500 mg tablet, Take 2 tablets (1,000 mg total) by mouth 2 (two) times a day with meals, Disp: 60 tablet, Rfl: 5    naloxone (NARCAN) 4 mg/0 1 mL nasal spray, Administer 1 spray into a nostril  If no response after 2-3 minutes, give another dose in the other nostril using a new spray   (Patient not taking: Reported on 7/27/2022), Disp: 1 each, Rfl: 0    omeprazole (PriLOSEC) 20 mg delayed release capsule, Take 2 capsules (40 mg total) by mouth daily To prevent stomach upset, Disp: 60 capsule, Rfl: 5    ondansetron (ZOFRAN) 4 mg tablet, Take 1 tablet (4 mg total) by mouth every 8 (eight) hours as needed for nausea or vomiting, Disp: 30 tablet, Rfl: 3    OneTouch Delica Lancets 57T MISC, Use in the morning, Disp: 100 each, Rfl: 4    polyethylene glycol (MIRALAX) 17 g packet, Take 17 g by mouth daily, Disp: 30 each, Rfl: 3    potassium chloride (K-DUR,KLOR-CON) 20 mEq tablet, Take 1 tablet (20 mEq total) by mouth daily, Disp: 30 tablet, Rfl: 11    senna (SENOKOT) 8 6 mg, Take 1 tablet (8 6 mg total) by mouth 2 (two) times a day, Disp: 60 each, Rfl: 3    zinc sulfate (ZINCATE) 220 mg capsule, Take 1 capsule (220 mg total) by mouth daily for 4 doses, Disp: 4 capsule, Rfl: 0  Review of Systems   Constitutional: Positive for fatigue and unexpected weight change  Negative for activity change, appetite change, chills and fever  Gaining weight   HENT: Negative for trouble swallowing  Respiratory: Negative for cough and shortness of breath  Cardiovascular: Negative for chest pain  Gastrointestinal: Positive for abdominal pain  Negative for constipation, diarrhea and nausea  Musculoskeletal: Negative for neck pain  Neurological: Negative for speech difficulty and light-headedness  Psychiatric/Behavioral: Negative for sleep disturbance  The patient is not nervous/anxious  All other systems reviewed and are negative  All other systems negative    Objective:  Vital Signs  /70 (BP Location: Left arm, Cuff Size: Standard)   Pulse (!) 53   Temp (!) 97 1 °F (36 2 °C) (Temporal)   Wt 110 kg (243 lb)   SpO2 99%   BMI 41 71 kg/m²    Physical Exam    Constitutional: Appears well-developed and well-nourished, she does not look sick, nor toxic-looking  Pleasant, well-kempt  Head: Normocephalic and atraumatic  Eyes: EOM are normal  No ocular discharge  No scleral icterus  Respiratory: Effort normal  No stridor  No respiratory distress     Gastrointestinal: No abdominal distension  Musculoskeletal: No edema  Neurological: Alert, oriented and appropriately conversant  Skin: No diaphoresis, no rashes seen on exposed areas of skin  Psychiatric: Displays a normal mood and affect   Behavior, judgement and thought content appear normal  In good spirits    Lily Panda MD  Palliative Medicine & Supportive Care  Internal Medicine  Available via [facility] Text  Office: [phone]  Fax: [phone]

## 2022-08-01 ENCOUNTER — HOSPITAL ENCOUNTER (OUTPATIENT)
Dept: INFUSION CENTER | Facility: HOSPITAL | Age: 62
Discharge: HOME | End: 2022-08-01
Attending: INTERNAL MEDICINE
Payer: COMMERCIAL

## 2022-08-01 ENCOUNTER — TELEPHONE (OUTPATIENT)
Dept: HEMATOLOGY ONCOLOGY | Facility: CLINIC | Age: 62
End: 2022-08-01

## 2022-08-01 DIAGNOSIS — Z45.2 ENCOUNTER FOR CENTRAL LINE CARE: ICD-10-CM

## 2022-08-01 DIAGNOSIS — C82.18 GRADE 2 FOLLICULAR LYMPHOMA OF LYMPH NODES OF MULTIPLE REGIONS (HCC): Primary | ICD-10-CM

## 2022-08-01 LAB
ALBUMIN SERPL BCP-MCNC: 4 G/DL (ref 3.5–5)
ALP SERPL-CCNC: 105 U/L (ref 43–122)
ALT SERPL W P-5'-P-CCNC: 18 U/L
ANION GAP SERPL CALCULATED.3IONS-SCNC: 8 MMOL/L (ref 5–14)
AST SERPL W P-5'-P-CCNC: 21 U/L (ref 14–36)
BASOPHILS # BLD AUTO: 0.02 THOUSANDS/ΜL (ref 0–0.1)
BASOPHILS NFR BLD AUTO: 0 % (ref 0–1)
BILIRUB SERPL-MCNC: 0.8 MG/DL (ref 0.2–1)
BUN SERPL-MCNC: 11 MG/DL (ref 5–25)
CALCIUM SERPL-MCNC: 8.6 MG/DL (ref 8.4–10.2)
CHLORIDE SERPL-SCNC: 105 MMOL/L (ref 96–108)
CO2 SERPL-SCNC: 24 MMOL/L (ref 21–32)
CREAT SERPL-MCNC: 0.6 MG/DL (ref 0.6–1.2)
EOSINOPHIL # BLD AUTO: 0.09 THOUSAND/ΜL (ref 0–0.61)
EOSINOPHIL NFR BLD AUTO: 2 % (ref 0–6)
ERYTHROCYTE [DISTWIDTH] IN BLOOD BY AUTOMATED COUNT: 16.1 % (ref 11.6–15.1)
GFR SERPL CREATININE-BSD FRML MDRD: 99 ML/MIN/1.73SQ M
GLUCOSE SERPL-MCNC: 165 MG/DL (ref 70–99)
HCT VFR BLD AUTO: 38.8 % (ref 34.8–46.1)
HGB BLD-MCNC: 11.3 G/DL (ref 11.5–15.4)
IMM GRANULOCYTES # BLD AUTO: 0.02 THOUSAND/UL (ref 0–0.2)
IMM GRANULOCYTES NFR BLD AUTO: 0 % (ref 0–2)
LDH SERPL-CCNC: 607 U/L (ref 313–618)
LYMPHOCYTES # BLD AUTO: 1.56 THOUSANDS/ΜL (ref 0.6–4.47)
LYMPHOCYTES NFR BLD AUTO: 28 % (ref 14–44)
MCH RBC QN AUTO: 20.1 PG (ref 26.8–34.3)
MCHC RBC AUTO-ENTMCNC: 29.1 G/DL (ref 31.4–37.4)
MCV RBC AUTO: 69 FL (ref 82–98)
MONOCYTES # BLD AUTO: 0.26 THOUSAND/ΜL (ref 0.17–1.22)
MONOCYTES NFR BLD AUTO: 5 % (ref 4–12)
NEUTROPHILS # BLD AUTO: 3.56 THOUSANDS/ΜL (ref 1.85–7.62)
NEUTS SEG NFR BLD AUTO: 65 % (ref 43–75)
NRBC BLD AUTO-RTO: 0 /100 WBCS
PLATELET # BLD AUTO: 220 THOUSANDS/UL (ref 149–390)
PMV BLD AUTO: 10.1 FL (ref 8.9–12.7)
POTASSIUM SERPL-SCNC: 3.9 MMOL/L (ref 3.5–5.3)
PROT SERPL-MCNC: 7.1 G/DL (ref 6.4–8.4)
RBC # BLD AUTO: 5.61 MILLION/UL (ref 3.81–5.12)
SODIUM SERPL-SCNC: 137 MMOL/L (ref 135–147)
WBC # BLD AUTO: 5.51 THOUSAND/UL (ref 4.31–10.16)

## 2022-08-01 PROCEDURE — 85025 COMPLETE CBC W/AUTO DIFF WBC: CPT | Performed by: INTERNAL MEDICINE

## 2022-08-01 PROCEDURE — 83615 LACTATE (LD) (LDH) ENZYME: CPT | Performed by: INTERNAL MEDICINE

## 2022-08-01 PROCEDURE — 80053 COMPREHEN METABOLIC PANEL: CPT | Performed by: INTERNAL MEDICINE

## 2022-08-01 NOTE — TELEPHONE ENCOUNTER
Phoned pt via [employer] #[id number] Karolyn Elaine Henry attempted to leave a voice message but recording cut her off before she could leave the whole message regarding changing her ins so that she can have Car T cell therapy at [facility] as [facility] is OON with [facility]  Pt should call Jose German at [phone] to ask for help with changing insurance to [facility] as both [address] are in network  Will try pt later

## 2022-08-02 ENCOUNTER — HOSPITAL ENCOUNTER (OUTPATIENT)
Dept: INFUSION CENTER | Facility: HOSPITAL | Age: 62
Discharge: HOME | End: 2022-08-02
Attending: INTERNAL MEDICINE
Payer: COMMERCIAL

## 2022-08-02 VITALS
RESPIRATION RATE: 18 BRPM | TEMPERATURE: 98 F | SYSTOLIC BLOOD PRESSURE: 134 MMHG | HEIGHT: 64 IN | BODY MASS INDEX: 41.69 KG/M2 | DIASTOLIC BLOOD PRESSURE: 70 MMHG | HEART RATE: 58 BPM

## 2022-08-02 DIAGNOSIS — C82.18 GRADE 2 FOLLICULAR LYMPHOMA OF LYMPH NODES OF MULTIPLE REGIONS (HCC): Primary | ICD-10-CM

## 2022-08-02 DIAGNOSIS — D51.8 OTHER VITAMIN B12 DEFICIENCY ANEMIAS: ICD-10-CM

## 2022-08-02 LAB — GLUCOSE SERPL-MCNC: 230 MG/DL (ref 65–140)

## 2022-08-02 PROCEDURE — 96367 TX/PROPH/DG ADDL SEQ IV INF: CPT

## 2022-08-02 PROCEDURE — 82948 REAGENT STRIP/BLOOD GLUCOSE: CPT

## 2022-08-02 PROCEDURE — 96372 THER/PROPH/DIAG INJ SC/IM: CPT

## 2022-08-02 PROCEDURE — 96413 CHEMO IV INFUSION 1 HR: CPT

## 2022-08-02 RX ORDER — SODIUM CHLORIDE 9 MG/ML
20 INJECTION, SOLUTION INTRAVENOUS ONCE
Status: COMPLETED | OUTPATIENT
Start: 2022-08-02 | End: 2022-08-02

## 2022-08-02 RX ORDER — CYANOCOBALAMIN 1000 UG/ML
1000 INJECTION, SOLUTION INTRAMUSCULAR; SUBCUTANEOUS ONCE
Status: COMPLETED | OUTPATIENT
Start: 2022-08-02 | End: 2022-08-02

## 2022-08-02 RX ORDER — CYANOCOBALAMIN 1000 UG/ML
1000 INJECTION, SOLUTION INTRAMUSCULAR; SUBCUTANEOUS ONCE
Status: CANCELLED | OUTPATIENT
Start: 2022-08-30

## 2022-08-02 RX ADMIN — COPANLISIB 60 MG: 15 INJECTION, POWDER, LYOPHILIZED, FOR SOLUTION INTRAVENOUS at 10:08

## 2022-08-02 RX ADMIN — CYANOCOBALAMIN 1000 MCG: 1000 INJECTION INTRAMUSCULAR; SUBCUTANEOUS at 11:35

## 2022-08-02 RX ADMIN — SODIUM CHLORIDE 20 ML/HR: 0.9 INJECTION, SOLUTION INTRAVENOUS at 09:13

## 2022-08-02 RX ADMIN — ONDANSETRON 8 MG: 2 INJECTION INTRAMUSCULAR; INTRAVENOUS at 09:13

## 2022-08-03 DIAGNOSIS — C82.18 GRADE 2 FOLLICULAR LYMPHOMA OF LYMPH NODES OF MULTIPLE REGIONS (HCC): Primary | ICD-10-CM

## 2022-08-04 ENCOUNTER — PATIENT OUTREACH (OUTPATIENT)
Dept: CASE MANAGEMENT | Facility: OTHER | Age: 62
End: 2022-08-04

## 2022-08-04 ENCOUNTER — TELEPHONE (OUTPATIENT)
Dept: SURGICAL ONCOLOGY | Facility: CLINIC | Age: 62
End: 2022-08-04

## 2022-08-04 NOTE — PROGRESS NOTES
OSW team received a message from Carolina West RN, inquiring whether there is transportation for pt to be taken to [facility] for cancer treatment  OSW team called [place] to see if pt is enrolled in the [address] [place] [address] program  Pt is not enrolled, therefore she is not able to use [place] for out of county trips  OSW reached out to Carolinekerry Todd to see what type of appointments she would need transportation for  [facility] stated that she is unsure of what the program entails, but will call [facility] to inquire and then get back to this writer  OSW will continue to follow

## 2022-08-04 NOTE — TELEPHONE ENCOUNTER
08/04/22  Contacted the patient through the [facility]  Cesar Perez ID# [id number]  She was able to tell the patient that her current insurance [facility] has to be changed to one taken by both [facility] and [facility]  She should contact her Gilda Lauder to make that change  I went to the [facility] website that showed which major insurances accepted  Three insurances that were on the list accepted by both were    [facility] (which is similar to her existing insurance)  Darcie Jane  And   [place] Medicaid ( She must make sure they select a plan accepted by both)  The patient confirmed through the interpretor she will call the [facility] office to make the change  They thanked me for the call and thought it was funny that I couldn't speak Mongolian

## 2022-08-08 ENCOUNTER — HOSPITAL ENCOUNTER (OUTPATIENT)
Dept: INFUSION CENTER | Facility: HOSPITAL | Age: 62
Discharge: HOME | End: 2022-08-08
Payer: COMMERCIAL

## 2022-08-08 DIAGNOSIS — C82.18 GRADE 2 FOLLICULAR LYMPHOMA OF LYMPH NODES OF MULTIPLE REGIONS (HCC): Primary | ICD-10-CM

## 2022-08-08 DIAGNOSIS — Z45.2 ENCOUNTER FOR CENTRAL LINE CARE: ICD-10-CM

## 2022-08-08 LAB
ALBUMIN SERPL BCP-MCNC: 4.4 G/DL (ref 3.5–5)
ALP SERPL-CCNC: 129 U/L (ref 43–122)
ALT SERPL W P-5'-P-CCNC: 23 U/L
ANION GAP SERPL CALCULATED.3IONS-SCNC: 12 MMOL/L (ref 5–14)
AST SERPL W P-5'-P-CCNC: 18 U/L (ref 14–36)
BASOPHILS # BLD AUTO: 0.02 THOUSANDS/ΜL (ref 0–0.1)
BASOPHILS NFR BLD AUTO: 0 % (ref 0–1)
BILIRUB SERPL-MCNC: 0.66 MG/DL (ref 0.2–1)
BUN SERPL-MCNC: 19 MG/DL (ref 5–25)
CALCIUM SERPL-MCNC: 9.4 MG/DL (ref 8.4–10.2)
CHLORIDE SERPL-SCNC: 99 MMOL/L (ref 96–108)
CO2 SERPL-SCNC: 26 MMOL/L (ref 21–32)
CREAT SERPL-MCNC: 0.86 MG/DL (ref 0.6–1.2)
EOSINOPHIL # BLD AUTO: 0.07 THOUSAND/ΜL (ref 0–0.61)
EOSINOPHIL NFR BLD AUTO: 1 % (ref 0–6)
ERYTHROCYTE [DISTWIDTH] IN BLOOD BY AUTOMATED COUNT: 15.9 % (ref 11.6–15.1)
GFR SERPL CREATININE-BSD FRML MDRD: 73 ML/MIN/1.73SQ M
GLUCOSE SERPL-MCNC: 217 MG/DL (ref 70–99)
HCT VFR BLD AUTO: 41.9 % (ref 34.8–46.1)
HGB BLD-MCNC: 12.7 G/DL (ref 11.5–15.4)
IMM GRANULOCYTES # BLD AUTO: 0.01 THOUSAND/UL (ref 0–0.2)
IMM GRANULOCYTES NFR BLD AUTO: 0 % (ref 0–2)
LDH SERPL-CCNC: 571 U/L (ref 313–618)
LYMPHOCYTES # BLD AUTO: 1.6 THOUSANDS/ΜL (ref 0.6–4.47)
LYMPHOCYTES NFR BLD AUTO: 24 % (ref 14–44)
MCH RBC QN AUTO: 20.2 PG (ref 26.8–34.3)
MCHC RBC AUTO-ENTMCNC: 30.3 G/DL (ref 31.4–37.4)
MCV RBC AUTO: 67 FL (ref 82–98)
MONOCYTES # BLD AUTO: 0.31 THOUSAND/ΜL (ref 0.17–1.22)
MONOCYTES NFR BLD AUTO: 5 % (ref 4–12)
NEUTROPHILS # BLD AUTO: 4.61 THOUSANDS/ΜL (ref 1.85–7.62)
NEUTS SEG NFR BLD AUTO: 70 % (ref 43–75)
NRBC BLD AUTO-RTO: 0 /100 WBCS
PLATELET # BLD AUTO: 252 THOUSANDS/UL (ref 149–390)
PMV BLD AUTO: 10.6 FL (ref 8.9–12.7)
POTASSIUM SERPL-SCNC: 3.4 MMOL/L (ref 3.5–5.3)
PROT SERPL-MCNC: 7.7 G/DL (ref 6.4–8.4)
RBC # BLD AUTO: 6.29 MILLION/UL (ref 3.81–5.12)
SODIUM SERPL-SCNC: 137 MMOL/L (ref 135–147)
WBC # BLD AUTO: 6.62 THOUSAND/UL (ref 4.31–10.16)

## 2022-08-08 PROCEDURE — 83615 LACTATE (LD) (LDH) ENZYME: CPT | Performed by: INTERNAL MEDICINE

## 2022-08-08 PROCEDURE — 85025 COMPLETE CBC W/AUTO DIFF WBC: CPT | Performed by: INTERNAL MEDICINE

## 2022-08-08 PROCEDURE — 80053 COMPREHEN METABOLIC PANEL: CPT | Performed by: INTERNAL MEDICINE

## 2022-08-08 NOTE — PROGRESS NOTES
Labs drawn and sent as ordered, port flushed per protocol  Left intact for tomorrows treatment  Left unit ambulatory with a steady gait

## 2022-08-09 ENCOUNTER — HOSPITAL ENCOUNTER (OUTPATIENT)
Dept: INFUSION CENTER | Facility: HOSPITAL | Age: 62
Discharge: HOME | End: 2022-08-09
Attending: INTERNAL MEDICINE
Payer: COMMERCIAL

## 2022-08-09 VITALS
HEIGHT: 64 IN | OXYGEN SATURATION: 96 % | TEMPERATURE: 97.3 F | RESPIRATION RATE: 18 BRPM | WEIGHT: 237.22 LBS | HEART RATE: 66 BPM | BODY MASS INDEX: 40.5 KG/M2 | DIASTOLIC BLOOD PRESSURE: 80 MMHG | SYSTOLIC BLOOD PRESSURE: 138 MMHG

## 2022-08-09 DIAGNOSIS — C82.18 GRADE 2 FOLLICULAR LYMPHOMA OF LYMPH NODES OF MULTIPLE REGIONS (HCC): Primary | ICD-10-CM

## 2022-08-09 LAB
GLUCOSE SERPL-MCNC: 217 MG/DL (ref 65–140)
GLUCOSE SERPL-MCNC: 262 MG/DL (ref 65–140)

## 2022-08-09 PROCEDURE — 82948 REAGENT STRIP/BLOOD GLUCOSE: CPT

## 2022-08-09 PROCEDURE — 96367 TX/PROPH/DG ADDL SEQ IV INF: CPT

## 2022-08-09 PROCEDURE — 96413 CHEMO IV INFUSION 1 HR: CPT

## 2022-08-09 RX ORDER — SODIUM CHLORIDE 9 MG/ML
20 INJECTION, SOLUTION INTRAVENOUS ONCE
Status: COMPLETED | OUTPATIENT
Start: 2022-08-09 | End: 2022-08-09

## 2022-08-09 RX ADMIN — ONDANSETRON 8 MG: 2 INJECTION INTRAMUSCULAR; INTRAVENOUS at 10:51

## 2022-08-09 RX ADMIN — COPANLISIB 60 MG: 15 INJECTION, POWDER, LYOPHILIZED, FOR SOLUTION INTRAVENOUS at 11:34

## 2022-08-09 RX ADMIN — SODIUM CHLORIDE 20 ML/HR: 0.9 INJECTION, SOLUTION INTRAVENOUS at 10:00

## 2022-08-09 NOTE — PROGRESS NOTES
Pt tolerated Aliqopa infusion without difficulty  No s/s reaction noted  Port flushed and deaccessed per protocol  Next appt scheduled and AVS provided  Left ambulatory with [facility] transport

## 2022-08-10 ENCOUNTER — PATIENT OUTREACH (OUTPATIENT)
Dept: CASE MANAGEMENT | Facility: OTHER | Age: 62
End: 2022-08-10

## 2022-08-10 ENCOUNTER — TELEPHONE (OUTPATIENT)
Dept: HEMATOLOGY ONCOLOGY | Facility: CLINIC | Age: 62
End: 2022-08-10

## 2022-08-10 ENCOUNTER — OFFICE VISIT (OUTPATIENT)
Dept: HEMATOLOGY ONCOLOGY | Facility: CLINIC | Age: 62
End: 2022-08-10
Payer: COMMERCIAL

## 2022-08-10 VITALS
WEIGHT: 233.8 LBS | RESPIRATION RATE: 18 BRPM | SYSTOLIC BLOOD PRESSURE: 120 MMHG | HEART RATE: 64 BPM | OXYGEN SATURATION: 98 % | DIASTOLIC BLOOD PRESSURE: 68 MMHG | BODY MASS INDEX: 39.91 KG/M2 | TEMPERATURE: 97.5 F | HEIGHT: 64 IN

## 2022-08-10 DIAGNOSIS — D51.8 OTHER VITAMIN B12 DEFICIENCY ANEMIAS: ICD-10-CM

## 2022-08-10 DIAGNOSIS — E87.6 HYPOKALEMIA: ICD-10-CM

## 2022-08-10 DIAGNOSIS — C82.18 GRADE 2 FOLLICULAR LYMPHOMA OF LYMPH NODES OF MULTIPLE REGIONS (HCC): Primary | ICD-10-CM

## 2022-08-10 PROCEDURE — 99214 OFFICE O/P EST MOD 30 MIN: CPT | Performed by: NURSE PRACTITIONER

## 2022-08-10 NOTE — PROGRESS NOTES
OSW received email from Veronica Davis RN, from Dr Linda White office  [facility] was asking if this writer can assist pt with transportation to [facility]  OSW informed her that pt is not registered with the [address] [place] [address] program through [employer]  Pt will have to complete an application  OSW mailed out an application for this in [facility] [place] [place]  OSW highlighted the address where the application needs to be sent

## 2022-08-10 NOTE — TELEPHONE ENCOUNTER
While we try to accommodate patient requests, our priority is to schedule treatment according to Doctor's orders and site availability  1  Does the Provider use the intake sheet or checkout note? NO  2  What would be a preferred day of the week that would work best for your infusion appointment? TUESDAY  3  Do you prefer mornings or afternoons for your appointments? MORNING  4  Are there any days or dates that do not work for your schedule, including any upcoming vacations? NO  We are going to try our best to schedule you at the infusion center closest to your home  In the event that we are unable to what would be your next preferred infusion site or sites? [facility]  5  Do you have transportation to take you to all of your appointments? NO NEEDS TO BE SCHEDULED WITH [facility]  6  Would you like the infusion center to draw labs from your port? (disregard if patient doesn't have a port or need labs for infusion appointment) NO    Please schedule patient for a f/u apt with Dr Martinez Wagner in 4 weeks, Also Treatment is 3 wk on 1 week off (next week off) B-12 monthly  Needs to be set up for [facility] transportation

## 2022-08-16 DIAGNOSIS — C82.18 GRADE 2 FOLLICULAR LYMPHOMA OF LYMPH NODES OF MULTIPLE REGIONS (HCC): Primary | ICD-10-CM

## 2022-08-16 RX ORDER — SODIUM CHLORIDE 9 MG/ML
20 INJECTION, SOLUTION INTRAVENOUS ONCE
Status: CANCELLED | OUTPATIENT
Start: 2022-08-23

## 2022-08-16 RX ORDER — SODIUM CHLORIDE 9 MG/ML
20 INJECTION, SOLUTION INTRAVENOUS ONCE
Status: CANCELLED | OUTPATIENT
Start: 2022-09-06

## 2022-08-16 RX ORDER — SODIUM CHLORIDE 9 MG/ML
20 INJECTION, SOLUTION INTRAVENOUS ONCE
Status: CANCELLED | OUTPATIENT
Start: 2022-09-13

## 2022-08-17 ENCOUNTER — HOSPITAL ENCOUNTER (EMERGENCY)
Facility: HOSPITAL | Age: 62
Discharge: HOME | End: 2022-08-17
Attending: EMERGENCY MEDICINE
Payer: COMMERCIAL

## 2022-08-17 VITALS
OXYGEN SATURATION: 100 % | HEART RATE: 70 BPM | TEMPERATURE: 99.2 F | BODY MASS INDEX: 3.14 KG/M2 | RESPIRATION RATE: 20 BRPM | DIASTOLIC BLOOD PRESSURE: 89 MMHG | SYSTOLIC BLOOD PRESSURE: 178 MMHG | WEIGHT: 18.3 LBS

## 2022-08-17 DIAGNOSIS — K04.7 DENTAL INFECTION: Primary | ICD-10-CM

## 2022-08-17 PROCEDURE — 99284 EMERGENCY DEPT VISIT MOD MDM: CPT | Performed by: PHYSICIAN ASSISTANT

## 2022-08-17 PROCEDURE — 99282 EMERGENCY DEPT VISIT SF MDM: CPT

## 2022-08-17 RX ORDER — CLINDAMYCIN HYDROCHLORIDE 150 MG/1
150 CAPSULE ORAL 4 TIMES DAILY
Qty: 40 CAPSULE | Refills: 0 | Status: SHIPPED | OUTPATIENT
Start: 2022-08-17 | End: 2022-08-27

## 2022-08-17 RX ORDER — ACETAMINOPHEN 500 MG
500 TABLET ORAL EVERY 6 HOURS PRN
Qty: 30 TABLET | Refills: 0 | Status: SHIPPED | OUTPATIENT
Start: 2022-08-17

## 2022-08-17 RX ORDER — IBUPROFEN 400 MG/1
400 TABLET ORAL EVERY 6 HOURS PRN
Qty: 12 TABLET | Refills: 0 | Status: SHIPPED | OUTPATIENT
Start: 2022-08-17

## 2022-08-17 NOTE — ED PROVIDER NOTES
History  Chief Complaint   Patient presents with    Dental Pain     Patient is a 27-year-old female presenting today for evaluation of bilateral lower jaw dental pain ongoing for the past week  Patient reports he had a dentist however reports the dentist was unwilling to remove the molars that have been a problem for multiple years for her  Patient reports he is just not seen a dentist in multiple years  Patient denies any facial swelling, neck stiffness, difficulty managing oral secretions  Patient reports he is not taking any medications at home to alleviate the pain  Patient reports an aching throbbing pain which radiates mainly into her left ear  Patient denies any ear pain swelling or drainage  Patient denies any alleviating factors reports chewing as an exacerbating factor reports the pain is been constant over the past week          Prior to Admission Medications   Prescriptions Last Dose Informant Patient Reported? Taking?    Blood Glucose Monitoring Suppl (OneTouch Verio) w/Device KIT  Self No No   Sig: Use in the morning   DULoxetine (CYMBALTA) 20 mg capsule  Self No No   Sig: Take 2 capsules (40 mg total) by mouth daily   LORazepam (ATIVAN) 0 5 mg tablet  Self No No   Sig: Take 1 tablet 30-60 minutes before MRI, can take an additional tablet if needed before test   OneTouch Delica Lancets 29W MISC  Self No No   Sig: Use in the morning   acetaminophen (TYLENOL) 325 mg tablet  Self No No   Sig: Take 2 tablets (650 mg total) by mouth every 6 (six) hours as needed for mild pain, moderate pain, headaches or fever   acyclovir (ZOVIRAX) 400 MG tablet  Self No No   Sig: Take 1 tablet (400 mg total) by mouth 2 (two) times a day   al mag oxide-diphenhydramine-lidocaine viscous (MAGIC MOUTHWASH) 1:1:1 suspension  Self No No   Sig: Swish and spit 10 mL every 4 (four) hours as needed for mouth pain or discomfort or mucositis   allopurinol (ZYLOPRIM) 100 mg tablet  Self No No   Sig: Take 1 tablet (100 mg total) by mouth daily   amLODIPine (NORVASC) 10 mg tablet  Self Yes No   Sig: Take 10 mg by mouth daily   ascorbic acid (VITAMIN C) 1000 MG tablet  Self No No   Sig: Take 1 tablet (1,000 mg total) by mouth every 12 (twelve) hours for 10 doses   aspirin (ECOTRIN LOW STRENGTH) 81 mg EC tablet  Self No No   Sig: Take 1 tablet (81 mg total) by mouth daily   aspirin-acetaminophen-caffeine (EXCEDRIN MIGRAINE) 250-250-65 MG per tablet  Self No No   Sig: Take 1 tablet by mouth every 6 (six) hours as needed for headaches Erica 1 tableta cada 6 horas fernandez sea necesario para el dolor de russ   atorvastatin (LIPITOR) 40 mg tablet  Self No No   Sig: Take 1 tablet (40 mg total) by mouth daily with dinner   benzonatate (TESSALON) 200 MG capsule  Self No No   Sig: Take 1 capsule (200 mg total) by mouth 3 (three) times a day as needed for cough   carbamide peroxide (DEBROX) 6 5 % otic solution  Self No No   Sig: Administer 5 drops to the right ear 2 (two) times a day   chlorthalidone 25 mg tablet  Self No No   Sig: Take 1 tablet (25 mg total) by mouth daily   cholecalciferol (VITAMIN D3) 1,000 units tablet  Self No No   Sig: Take 2 tablets (2,000 Units total) by mouth daily for 5 days   cyanocobalamin 1000 MCG tablet  Self No No   Sig: Take 1 tablet (1,000 mcg total) by mouth daily   folic acid (FOLVITE) 1 mg tablet  Self No No   Sig: Take 1 tablet (1 mg total) by mouth daily   glucose blood (OneTouch Verio) test strip  Self No No   Sig: Use 1 each in the morning Use as instructed   guaiFENesin (ROBITUSSIN) 100 mg/5 mL oral solution  Self No No   Sig: Take 10 mL (200 mg total) by mouth every 4 (four) hours   lidocaine (LMX) 4 % cream  Self No No   Sig: Apply topically as needed for mild pain To neck   meclizine (ANTIVERT) 12 5 MG tablet  Self No No   Sig: Take 1 tablet (12 5 mg total) by mouth every 8 (eight) hours as needed for dizziness   metFORMIN (GLUCOPHAGE) 500 mg tablet  Self No No   Sig: Take 2 tablets (1,000 mg total) by mouth 2 (two) times a day with meals   naloxone (NARCAN) 4 mg/0 1 mL nasal spray   No No   Sig: Administer 1 spray into a nostril  If no response after 2-3 minutes, give another dose in the other nostril using a new spray  omeprazole (PriLOSEC) 20 mg delayed release capsule  Self No No   Sig: Take 2 capsules (40 mg total) by mouth daily To prevent stomach upset   ondansetron (ZOFRAN) 4 mg tablet  Self No No   Sig: Take 1 tablet (4 mg total) by mouth every 8 (eight) hours as needed for nausea or vomiting   oxyCODONE (ROXICODONE) 15 mg immediate release tablet  Self No No   Sig: [place] 1 tableta cada 6 horas fernandez sea necesario solo para el dolor intenso  [place] 1 tableta cada 6 horas fernandez sea necesario solo para el dolor intenso  Sukhdeep de 4 comprimidos al d a  Sukhdeep de 4 comprimidos al misael  Trate de monique menos si puede     polyethylene glycol (MIRALAX) 17 g packet  Self No No   Sig: Take 17 g by mouth daily   potassium chloride (K-DUR,KLOR-CON) 20 mEq tablet  Self No No   Sig: Take 1 tablet (20 mEq total) by mouth daily   senna (SENOKOT) 8 6 mg  Self No No   Sig: Take 1 tablet (8 6 mg total) by mouth 2 (two) times a day   zinc sulfate (ZINCATE) 220 mg capsule   No No   Sig: Take 1 capsule (220 mg total) by mouth daily for 4 doses      Facility-Administered Medications: None       Past Medical History:   Diagnosis Date    Anemia     iron and B12    Arthritis     Asthma     Cough     Depression     Falls frequently     [facility] [address] [facility]     Lymphoma [facility] [address] [facility]     Lymphoma [facility] [address] [facility]     Migraine     Osteoporosis     Psychiatric disorder     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[facility] [address] [facility]     Vertigo        Past Surgical History:   Procedure Laterality Date    CHOLECYSTECTOMY      FL GUIDED CENTRAL VENOUS ACCESS DEVICE INSERTION  11/9/2018    HYSTERECTOMY      IR BIOPSY BONE MARROW  11/24/2020    IR BIOPSY LYMPH NODE  11/24/2020    LYMPH NODE BIOPSY Left 11/9/2018    Procedure: EXCISION BIOPSY LYMPH NODE SUPRACLAVICULAR;  Surgeon: Tyler Thomas MD;  Location: [address] [place] [address] OR;  Service: General    CO INCISE FINGER TENDON SHEATH Right 2020    Procedure: RELEASE TRIGGER FINGER RIGHT THUMB;  Surgeon: Beto Palacio MD;  Location: [address] [place] [address];  Service: Orthopedics    TUNNELED VENOUS PORT PLACEMENT N/A 2018    Procedure: INSERTION OF PORT-A-CATH;  Surgeon: Andrzej Wills MD;  Location: [address] [place] [address];  Service: General       Family History   Problem Relation Age of Onset    Cancer Mother     Diabetes Mother     Cancer Father     Diabetes Father     Breast cancer Sister     No Known Problems Sister     No Known Problems Sister     No Known Problems Sister     No Known Problems Maternal Aunt     No Known Problems Maternal Aunt     No Known Problems Maternal Aunt     No Known Problems Paternal Aunt      I have reviewed and agree with the history as documented  E-Cigarette/Vaping    E-Cigarette Use Never User      E-Cigarette/Vaping Substances    Nicotine No     THC No     CBD No     Flavoring No     Other No     Unknown No      Social History     Tobacco Use    Smoking status: Former Smoker     Quit date: 2017     Years since quittin 1    Smokeless tobacco: Never Used   Vaping Use    Vaping Use: Never used   Substance Use Topics    Alcohol use: Never    Drug use: Never       Review of Systems   Constitutional: Negative for chills, fatigue and fever  HENT: Positive for dental problem  Negative for congestion, ear pain, rhinorrhea and sore throat  Eyes: Negative for redness  Respiratory: Negative for chest tightness and shortness of breath  Cardiovascular: Negative for chest pain and palpitations  Gastrointestinal: Negative for abdominal pain, nausea and vomiting  Genitourinary: Negative for dysuria and hematuria  Musculoskeletal: Negative  Skin: Negative for rash  Neurological: Negative for dizziness, syncope, light-headedness and numbness  Physical Exam  Physical Exam  Vitals and nursing note reviewed  Constitutional:       Appearance: She is well-developed  HENT:      Head: Normocephalic  Right Ear: Tympanic membrane normal       Left Ear: Tympanic membrane normal       Mouth/Throat:        Comments: Patient has bilateral swelling in the area depicted above no acute flexion  Both posterior molars have significant decay of teeth present    Poor dentition throughout with multiple cavities  Eyes:      General: No scleral icterus  Cardiovascular:      Rate and Rhythm: Normal rate and regular rhythm  Pulmonary:      Effort: Pulmonary effort is normal       Breath sounds: Normal breath sounds  No stridor  Abdominal:      General: There is no distension  Palpations: Abdomen is soft  Tenderness: There is no abdominal tenderness  Musculoskeletal:         General: Normal range of motion  Skin:     General: Skin is warm and dry  Capillary Refill: Capillary refill takes less than 2 seconds  Neurological:      Mental Status: She is alert and oriented to person, place, and time  Vital Signs  ED Triage Vitals [08/17/22 1711]   Temperature Pulse Respirations Blood Pressure SpO2   99 2 °F (37 3 °C) 70 20 (!) 178/89 100 %      Temp Source Heart Rate Source Patient Position - Orthostatic VS BP Location FiO2 (%)   Tympanic Monitor Sitting Left arm --      Pain Score       9           Vitals:    08/17/22 1711   BP: (!) 178/89   Pulse: 70   Patient Position - Orthostatic VS: Sitting         Visual Acuity      ED Medications  Medications - No data to display    Diagnostic Studies  Results Reviewed     None                 No orders to display              Procedures  Procedures         ED Course                                             MDM  Number of Diagnoses or Management Options  Dental infection  Diagnosis management comments: All imaging and/or lab testing discussed with patient, strict return to ED precautions discussed   Patient and/or family members verbalizes understanding and agrees with plan  Patient is stable for discharge     Portions of the record may have been created with voice recognition software  Occasional wrong word or "sound a like" substitutions may have occurred due to the inherent limitations of voice recognition software  Read the chart carefully and recognize, using context, where substitutions have occurred  Disposition  Final diagnoses:   Dental infection     Time reflects when diagnosis was documented in both MDM as applicable and the Disposition within this note     Time User Action Codes Description Comment    8/17/2022  5:25 PM Jenn Atkinson Add [K04 7] Dental infection       ED Disposition     ED Disposition   Discharge    Condition   Good    Date/Time   Wed Aug 17, 2022  5:25 PM    Comment   Helen Araujo Bob discharge to home/self care                 Follow-up Information     Follow up With Specialties Details Why [address]  Schedule an appointment as soon as possible for a visit in 2 days  [address]  [facility]  [address]  [phone]          Patient's Medications   Discharge Prescriptions    ACETAMINOPHEN (TYLENOL) 500 MG TABLET    Take 1 tablet (500 mg total) by mouth every 6 (six) hours as needed (pain)       Start Date: 8/17/2022 End Date: --       Order Dose: 500 mg       Quantity: 30 tablet    Refills: 0    BENZOCAINE (ORAJEL) 10 % MUCOSAL GEL    Apply 1 application to the mouth or throat as needed for mucositis       Start Date: 8/17/2022 End Date: --       Order Dose: 1 application       Quantity: 5 3 g    Refills: 0    CLINDAMYCIN (CLEOCIN) 150 MG CAPSULE    Take 1 capsule (150 mg total) by mouth 4 (four) times a day for 10 days       Start Date: 8/17/2022 End Date: 8/27/2022       Order Dose: 150 mg       Quantity: 40 capsule    Refills: 0    IBUPROFEN (MOTRIN) 400 MG TABLET    Take 1 tablet (400 mg total) by mouth every 6 (six) hours as needed for mild pain       Start Date: 8/17/2022 End Date: --       Order Dose: 400 mg       Quantity: 12 tablet    Refills: 0       No discharge procedures on file      PDMP Review       Value Time User    PDMP Reviewed  Yes 7/27/2022  2:02 PM Jessica Licea MD          ED Provider  Electronically Signed by           Soumya Dillon PA-C  08/17/22 3955

## 2022-08-22 ENCOUNTER — HOSPITAL ENCOUNTER (OUTPATIENT)
Dept: INFUSION CENTER | Facility: HOSPITAL | Age: 62
Discharge: HOME | End: 2022-08-22
Payer: COMMERCIAL

## 2022-08-22 DIAGNOSIS — Z45.2 ENCOUNTER FOR CENTRAL LINE CARE: Primary | ICD-10-CM

## 2022-08-22 DIAGNOSIS — C82.18 GRADE 2 FOLLICULAR LYMPHOMA OF LYMPH NODES OF MULTIPLE REGIONS (HCC): ICD-10-CM

## 2022-08-22 LAB
ALBUMIN SERPL BCP-MCNC: 3.8 G/DL (ref 3.5–5)
ALP SERPL-CCNC: 103 U/L (ref 43–122)
ALT SERPL W P-5'-P-CCNC: 19 U/L
ANION GAP SERPL CALCULATED.3IONS-SCNC: 7 MMOL/L (ref 5–14)
AST SERPL W P-5'-P-CCNC: 23 U/L (ref 14–36)
BASOPHILS # BLD AUTO: 0.01 THOUSANDS/ΜL (ref 0–0.1)
BASOPHILS NFR BLD AUTO: 0 % (ref 0–1)
BILIRUB SERPL-MCNC: 0.29 MG/DL (ref 0.2–1)
BUN SERPL-MCNC: 13 MG/DL (ref 5–25)
CALCIUM SERPL-MCNC: 9.3 MG/DL (ref 8.4–10.2)
CHLORIDE SERPL-SCNC: 105 MMOL/L (ref 96–108)
CO2 SERPL-SCNC: 27 MMOL/L (ref 21–32)
CREAT SERPL-MCNC: 0.78 MG/DL (ref 0.6–1.2)
EOSINOPHIL # BLD AUTO: 0.18 THOUSAND/ΜL (ref 0–0.61)
EOSINOPHIL NFR BLD AUTO: 3 % (ref 0–6)
ERYTHROCYTE [DISTWIDTH] IN BLOOD BY AUTOMATED COUNT: 16.4 % (ref 11.6–15.1)
GFR SERPL CREATININE-BSD FRML MDRD: 82 ML/MIN/1.73SQ M
GLUCOSE SERPL-MCNC: 113 MG/DL (ref 70–99)
HCT VFR BLD AUTO: 37.4 % (ref 34.8–46.1)
HGB BLD-MCNC: 11 G/DL (ref 11.5–15.4)
IMM GRANULOCYTES # BLD AUTO: 0.02 THOUSAND/UL (ref 0–0.2)
IMM GRANULOCYTES NFR BLD AUTO: 0 % (ref 0–2)
LDH SERPL-CCNC: 592 U/L (ref 313–618)
LYMPHOCYTES # BLD AUTO: 1.73 THOUSANDS/ΜL (ref 0.6–4.47)
LYMPHOCYTES NFR BLD AUTO: 27 % (ref 14–44)
MCH RBC QN AUTO: 20.4 PG (ref 26.8–34.3)
MCHC RBC AUTO-ENTMCNC: 29.4 G/DL (ref 31.4–37.4)
MCV RBC AUTO: 70 FL (ref 82–98)
MONOCYTES # BLD AUTO: 0.44 THOUSAND/ΜL (ref 0.17–1.22)
MONOCYTES NFR BLD AUTO: 7 % (ref 4–12)
NEUTROPHILS # BLD AUTO: 4.01 THOUSANDS/ΜL (ref 1.85–7.62)
NEUTS SEG NFR BLD AUTO: 63 % (ref 43–75)
NRBC BLD AUTO-RTO: 0 /100 WBCS
PLATELET # BLD AUTO: 218 THOUSANDS/UL (ref 149–390)
PMV BLD AUTO: 10.5 FL (ref 8.9–12.7)
POTASSIUM SERPL-SCNC: 3.5 MMOL/L (ref 3.5–5.3)
PROT SERPL-MCNC: 6.6 G/DL (ref 6.4–8.4)
RBC # BLD AUTO: 5.38 MILLION/UL (ref 3.81–5.12)
SODIUM SERPL-SCNC: 139 MMOL/L (ref 135–147)
WBC # BLD AUTO: 6.39 THOUSAND/UL (ref 4.31–10.16)

## 2022-08-22 PROCEDURE — 80053 COMPREHEN METABOLIC PANEL: CPT | Performed by: INTERNAL MEDICINE

## 2022-08-22 PROCEDURE — 85025 COMPLETE CBC W/AUTO DIFF WBC: CPT | Performed by: INTERNAL MEDICINE

## 2022-08-22 PROCEDURE — 83615 LACTATE (LD) (LDH) ENZYME: CPT | Performed by: INTERNAL MEDICINE

## 2022-08-22 NOTE — PROGRESS NOTES
Central labs drawn and sent  Port flushed per protocol  Patient port remains accessed for treatment tomorrow  Appointment confirmed, AVS declined

## 2022-08-23 ENCOUNTER — HOSPITAL ENCOUNTER (OUTPATIENT)
Dept: INFUSION CENTER | Facility: HOSPITAL | Age: 62
Discharge: HOME | End: 2022-08-23
Attending: INTERNAL MEDICINE
Payer: COMMERCIAL

## 2022-08-23 VITALS
RESPIRATION RATE: 18 BRPM | SYSTOLIC BLOOD PRESSURE: 142 MMHG | HEART RATE: 60 BPM | DIASTOLIC BLOOD PRESSURE: 71 MMHG | TEMPERATURE: 98.1 F

## 2022-08-23 DIAGNOSIS — C82.18 GRADE 2 FOLLICULAR LYMPHOMA OF LYMPH NODES OF MULTIPLE REGIONS (HCC): Primary | ICD-10-CM

## 2022-08-23 LAB — GLUCOSE SERPL-MCNC: 205 MG/DL (ref 65–140)

## 2022-08-23 PROCEDURE — 96367 TX/PROPH/DG ADDL SEQ IV INF: CPT

## 2022-08-23 PROCEDURE — 96413 CHEMO IV INFUSION 1 HR: CPT

## 2022-08-23 PROCEDURE — 82948 REAGENT STRIP/BLOOD GLUCOSE: CPT

## 2022-08-23 RX ORDER — SODIUM CHLORIDE 9 MG/ML
20 INJECTION, SOLUTION INTRAVENOUS ONCE
Status: COMPLETED | OUTPATIENT
Start: 2022-08-23 | End: 2022-08-23

## 2022-08-23 RX ADMIN — COPANLISIB 60 MG: 15 INJECTION, POWDER, LYOPHILIZED, FOR SOLUTION INTRAVENOUS at 11:09

## 2022-08-23 RX ADMIN — SODIUM CHLORIDE 20 ML/HR: 0.9 INJECTION, SOLUTION INTRAVENOUS at 10:27

## 2022-08-23 RX ADMIN — ONDANSETRON 8 MG: 2 INJECTION INTRAMUSCULAR; INTRAVENOUS at 10:28

## 2022-08-23 NOTE — PROGRESS NOTES
Notified Andrez Long RN of pt's BS of 205 over the parameter for treatment  Per Bobby Brooks is okay to treat with that result  Noted in orders

## 2022-08-23 NOTE — PROGRESS NOTES
Pt tolerated treatment today with no adverse reactions  AVS provided  Left unit ambulatory with a steady gait for star transport

## 2022-08-25 DIAGNOSIS — R10.30 LOWER ABDOMINAL PAIN: ICD-10-CM

## 2022-08-25 DIAGNOSIS — G89.3 CANCER RELATED PAIN: ICD-10-CM

## 2022-08-25 RX ORDER — OXYCODONE HYDROCHLORIDE 15 MG/1
TABLET ORAL
Qty: 90 TABLET | Refills: 0 | Status: SHIPPED | OUTPATIENT
Start: 2022-08-25 | End: 2022-09-23 | Stop reason: SDUPTHER

## 2022-08-26 ENCOUNTER — TELEPHONE (OUTPATIENT)
Dept: PALLIATIVE MEDICINE | Facility: CLINIC | Age: 62
End: 2022-08-26

## 2022-08-26 NOTE — TELEPHONE ENCOUNTER
Prior authorization for pt's   OXYCODONE 15 MG IR via CMM  has been completed and sent along with OFFICE NOTE    Key [id number]     Awaiting determination

## 2022-08-29 ENCOUNTER — HOSPITAL ENCOUNTER (OUTPATIENT)
Dept: INFUSION CENTER | Facility: HOSPITAL | Age: 62
Discharge: HOME | End: 2022-08-29
Payer: COMMERCIAL

## 2022-08-29 DIAGNOSIS — C82.18 GRADE 2 FOLLICULAR LYMPHOMA OF LYMPH NODES OF MULTIPLE REGIONS (HCC): ICD-10-CM

## 2022-08-29 LAB
BASOPHILS # BLD AUTO: 0.03 THOUSANDS/ΜL (ref 0–0.1)
BASOPHILS NFR BLD AUTO: 0 % (ref 0–1)
EOSINOPHIL # BLD AUTO: 0.11 THOUSAND/ΜL (ref 0–0.61)
EOSINOPHIL NFR BLD AUTO: 1 % (ref 0–6)
ERYTHROCYTE [DISTWIDTH] IN BLOOD BY AUTOMATED COUNT: 16.4 % (ref 11.6–15.1)
HCT VFR BLD AUTO: 40.1 % (ref 34.8–46.1)
HGB BLD-MCNC: 11.8 G/DL (ref 11.5–15.4)
IMM GRANULOCYTES # BLD AUTO: 0.03 THOUSAND/UL (ref 0–0.2)
IMM GRANULOCYTES NFR BLD AUTO: 0 % (ref 0–2)
LYMPHOCYTES # BLD AUTO: 2.13 THOUSANDS/ΜL (ref 0.6–4.47)
LYMPHOCYTES NFR BLD AUTO: 26 % (ref 14–44)
MCH RBC QN AUTO: 20.3 PG (ref 26.8–34.3)
MCHC RBC AUTO-ENTMCNC: 29.4 G/DL (ref 31.4–37.4)
MCV RBC AUTO: 69 FL (ref 82–98)
MONOCYTES # BLD AUTO: 0.4 THOUSAND/ΜL (ref 0.17–1.22)
MONOCYTES NFR BLD AUTO: 5 % (ref 4–12)
NEUTROPHILS # BLD AUTO: 5.46 THOUSANDS/ΜL (ref 1.85–7.62)
NEUTS SEG NFR BLD AUTO: 68 % (ref 43–75)
NRBC BLD AUTO-RTO: 0 /100 WBCS
PLATELET # BLD AUTO: 230 THOUSANDS/UL (ref 149–390)
PMV BLD AUTO: 10.7 FL (ref 8.9–12.7)
RBC # BLD AUTO: 5.81 MILLION/UL (ref 3.81–5.12)
WBC # BLD AUTO: 8.16 THOUSAND/UL (ref 4.31–10.16)

## 2022-08-29 PROCEDURE — 85025 COMPLETE CBC W/AUTO DIFF WBC: CPT | Performed by: INTERNAL MEDICINE

## 2022-08-29 NOTE — TELEPHONE ENCOUNTER
Received prior authorization approval for oxycodone [facility] oral tablet 15 mg  through 2/27/2023   Faxed results to Pharmacy  Pharmacy has been asked to inform pt of outcome

## 2022-08-29 NOTE — PROGRESS NOTES
Port flushed per protocol, central labs drawn and sent  Port remains accessed for tomorrow's treatment  Confirmed appointment  AVS declined

## 2022-08-30 ENCOUNTER — HOSPITAL ENCOUNTER (OUTPATIENT)
Dept: INFUSION CENTER | Facility: HOSPITAL | Age: 62
Discharge: HOME | End: 2022-08-30
Attending: INTERNAL MEDICINE
Payer: COMMERCIAL

## 2022-08-30 VITALS
TEMPERATURE: 98.6 F | BODY MASS INDEX: 40.42 KG/M2 | SYSTOLIC BLOOD PRESSURE: 146 MMHG | DIASTOLIC BLOOD PRESSURE: 77 MMHG | WEIGHT: 235.45 LBS | RESPIRATION RATE: 16 BRPM | HEART RATE: 70 BPM

## 2022-08-30 DIAGNOSIS — C82.18 GRADE 2 FOLLICULAR LYMPHOMA OF LYMPH NODES OF MULTIPLE REGIONS (HCC): ICD-10-CM

## 2022-08-30 DIAGNOSIS — D51.8 OTHER VITAMIN B12 DEFICIENCY ANEMIAS: Primary | ICD-10-CM

## 2022-08-30 LAB
ALBUMIN SERPL BCP-MCNC: 3.9 G/DL (ref 3.5–5)
ALP SERPL-CCNC: 101 U/L (ref 43–122)
ALT SERPL W P-5'-P-CCNC: 25 U/L
ANION GAP SERPL CALCULATED.3IONS-SCNC: 6 MMOL/L (ref 5–14)
AST SERPL W P-5'-P-CCNC: 24 U/L (ref 14–36)
BILIRUB SERPL-MCNC: 0.51 MG/DL (ref 0.2–1)
BUN SERPL-MCNC: 15 MG/DL (ref 5–25)
CALCIUM SERPL-MCNC: 8.8 MG/DL (ref 8.4–10.2)
CHLORIDE SERPL-SCNC: 96 MMOL/L (ref 96–108)
CO2 SERPL-SCNC: 33 MMOL/L (ref 21–32)
CREAT SERPL-MCNC: 0.81 MG/DL (ref 0.6–1.2)
GFR SERPL CREATININE-BSD FRML MDRD: 79 ML/MIN/1.73SQ M
GLUCOSE SERPL-MCNC: 329 MG/DL (ref 70–99)
POTASSIUM SERPL-SCNC: 3.1 MMOL/L (ref 3.5–5.3)
PROT SERPL-MCNC: 6.7 G/DL (ref 6.4–8.4)
SODIUM SERPL-SCNC: 135 MMOL/L (ref 135–147)

## 2022-08-30 PROCEDURE — 96372 THER/PROPH/DIAG INJ SC/IM: CPT

## 2022-08-30 PROCEDURE — 80053 COMPREHEN METABOLIC PANEL: CPT | Performed by: INTERNAL MEDICINE

## 2022-08-30 RX ORDER — CYANOCOBALAMIN 1000 UG/ML
1000 INJECTION, SOLUTION INTRAMUSCULAR; SUBCUTANEOUS ONCE
Status: COMPLETED | OUTPATIENT
Start: 2022-08-30 | End: 2022-08-30

## 2022-08-30 RX ORDER — CYANOCOBALAMIN 1000 UG/ML
1000 INJECTION, SOLUTION INTRAMUSCULAR; SUBCUTANEOUS ONCE
Status: CANCELLED | OUTPATIENT
Start: 2022-09-27

## 2022-08-30 RX ORDER — WATER 1000 ML/1000ML
INJECTION, SOLUTION INTRAVENOUS
Status: COMPLETED
Start: 2022-08-30 | End: 2022-08-30

## 2022-08-30 RX ADMIN — WATER 2.2 ML: 1 INJECTION INTRAMUSCULAR; INTRAVENOUS; SUBCUTANEOUS at 09:31

## 2022-08-30 RX ADMIN — ALTEPLASE 2 MG: 2.2 INJECTION, POWDER, LYOPHILIZED, FOR SOLUTION INTRAVENOUS at 09:32

## 2022-08-30 RX ADMIN — CYANOCOBALAMIN 1000 MCG: 1000 INJECTION INTRAMUSCULAR; SUBCUTANEOUS at 09:33

## 2022-08-30 NOTE — PROGRESS NOTES
Per Xuan Quintero RN/[facility], hold treatment for 1 week due to blood glucose of 348  Patient notified and aware  CMP to be drawn and sent, patient does not need to stay and wait for results  Brisk blood return noted after Cathflo administration  Central labs drawn and sent  Next appointment confirmed, AVS given

## 2022-09-02 ENCOUNTER — HOSPITAL ENCOUNTER (OUTPATIENT)
Dept: INFUSION CENTER | Facility: HOSPITAL | Age: 62
End: 2022-09-02
Payer: COMMERCIAL

## 2022-09-02 DIAGNOSIS — C82.18 GRADE 2 FOLLICULAR LYMPHOMA OF LYMPH NODES OF MULTIPLE REGIONS (HCC): ICD-10-CM

## 2022-09-02 LAB
ALBUMIN SERPL BCP-MCNC: 4.2 G/DL (ref 3.5–5)
ALP SERPL-CCNC: 115 U/L (ref 43–122)
ALT SERPL W P-5'-P-CCNC: 22 U/L
ANION GAP SERPL CALCULATED.3IONS-SCNC: 7 MMOL/L (ref 5–14)
AST SERPL W P-5'-P-CCNC: 20 U/L (ref 14–36)
BASOPHILS # BLD AUTO: 0.02 THOUSANDS/ΜL (ref 0–0.1)
BASOPHILS NFR BLD AUTO: 0 % (ref 0–1)
BILIRUB SERPL-MCNC: 0.64 MG/DL (ref 0.2–1)
BUN SERPL-MCNC: 12 MG/DL (ref 5–25)
CALCIUM SERPL-MCNC: 9.2 MG/DL (ref 8.4–10.2)
CHLORIDE SERPL-SCNC: 96 MMOL/L (ref 96–108)
CO2 SERPL-SCNC: 34 MMOL/L (ref 21–32)
CREAT SERPL-MCNC: 0.81 MG/DL (ref 0.6–1.2)
CRP SERPL QL: 28.3 MG/L
EOSINOPHIL # BLD AUTO: 0.18 THOUSAND/ΜL (ref 0–0.61)
EOSINOPHIL NFR BLD AUTO: 3 % (ref 0–6)
ERYTHROCYTE [DISTWIDTH] IN BLOOD BY AUTOMATED COUNT: 16 % (ref 11.6–15.1)
ERYTHROCYTE [SEDIMENTATION RATE] IN BLOOD: 72 MM/HOUR (ref 0–29)
GFR SERPL CREATININE-BSD FRML MDRD: 79 ML/MIN/1.73SQ M
GLUCOSE SERPL-MCNC: 226 MG/DL (ref 70–99)
HCT VFR BLD AUTO: 40.6 % (ref 34.8–46.1)
HGB BLD-MCNC: 12.2 G/DL (ref 11.5–15.4)
IMM GRANULOCYTES # BLD AUTO: 0.03 THOUSAND/UL (ref 0–0.2)
IMM GRANULOCYTES NFR BLD AUTO: 0 % (ref 0–2)
LDH SERPL-CCNC: 509 U/L (ref 313–618)
LYMPHOCYTES # BLD AUTO: 2.17 THOUSANDS/ΜL (ref 0.6–4.47)
LYMPHOCYTES NFR BLD AUTO: 32 % (ref 14–44)
MCH RBC QN AUTO: 20.4 PG (ref 26.8–34.3)
MCHC RBC AUTO-ENTMCNC: 30 G/DL (ref 31.4–37.4)
MCV RBC AUTO: 68 FL (ref 82–98)
MONOCYTES # BLD AUTO: 0.51 THOUSAND/ΜL (ref 0.17–1.22)
MONOCYTES NFR BLD AUTO: 8 % (ref 4–12)
NEUTROPHILS # BLD AUTO: 3.82 THOUSANDS/ΜL (ref 1.85–7.62)
NEUTS SEG NFR BLD AUTO: 57 % (ref 43–75)
NRBC BLD AUTO-RTO: 0 /100 WBCS
PLATELET # BLD AUTO: 240 THOUSANDS/UL (ref 149–390)
POTASSIUM SERPL-SCNC: 3.2 MMOL/L (ref 3.5–5.3)
PROT SERPL-MCNC: 7.4 G/DL (ref 6.4–8.4)
RBC # BLD AUTO: 5.97 MILLION/UL (ref 3.81–5.12)
SODIUM SERPL-SCNC: 137 MMOL/L (ref 135–147)
WBC # BLD AUTO: 6.73 THOUSAND/UL (ref 4.31–10.16)

## 2022-09-02 PROCEDURE — 85025 COMPLETE CBC W/AUTO DIFF WBC: CPT | Performed by: INTERNAL MEDICINE

## 2022-09-02 PROCEDURE — 83615 LACTATE (LD) (LDH) ENZYME: CPT | Performed by: INTERNAL MEDICINE

## 2022-09-02 PROCEDURE — 86140 C-REACTIVE PROTEIN: CPT

## 2022-09-02 PROCEDURE — 85652 RBC SED RATE AUTOMATED: CPT

## 2022-09-02 PROCEDURE — 80053 COMPREHEN METABOLIC PANEL: CPT | Performed by: INTERNAL MEDICINE

## 2022-09-06 ENCOUNTER — HOSPITAL ENCOUNTER (OUTPATIENT)
Dept: INFUSION CENTER | Facility: HOSPITAL | Age: 62
Discharge: HOME | End: 2022-09-06
Attending: INTERNAL MEDICINE
Payer: COMMERCIAL

## 2022-09-06 VITALS
DIASTOLIC BLOOD PRESSURE: 84 MMHG | OXYGEN SATURATION: 100 % | HEART RATE: 64 BPM | TEMPERATURE: 97.5 F | SYSTOLIC BLOOD PRESSURE: 142 MMHG | RESPIRATION RATE: 18 BRPM

## 2022-09-06 DIAGNOSIS — C82.18 GRADE 2 FOLLICULAR LYMPHOMA OF LYMPH NODES OF MULTIPLE REGIONS (HCC): Primary | ICD-10-CM

## 2022-09-06 LAB — GLUCOSE SERPL-MCNC: 129 MG/DL (ref 65–140)

## 2022-09-06 PROCEDURE — 82948 REAGENT STRIP/BLOOD GLUCOSE: CPT

## 2022-09-06 PROCEDURE — 96367 TX/PROPH/DG ADDL SEQ IV INF: CPT

## 2022-09-06 PROCEDURE — 96413 CHEMO IV INFUSION 1 HR: CPT

## 2022-09-06 RX ORDER — SODIUM CHLORIDE 9 MG/ML
20 INJECTION, SOLUTION INTRAVENOUS ONCE
Status: COMPLETED | OUTPATIENT
Start: 2022-09-06 | End: 2022-09-06

## 2022-09-06 RX ADMIN — ONDANSETRON 8 MG: 2 INJECTION INTRAMUSCULAR; INTRAVENOUS at 13:29

## 2022-09-06 RX ADMIN — SODIUM CHLORIDE 20 ML/HR: 0.9 INJECTION, SOLUTION INTRAVENOUS at 13:22

## 2022-09-06 RX ADMIN — COPANLISIB 60 MG: 15 INJECTION, POWDER, LYOPHILIZED, FOR SOLUTION INTRAVENOUS at 14:05

## 2022-09-12 ENCOUNTER — OFFICE VISIT (OUTPATIENT)
Dept: HEMATOLOGY ONCOLOGY | Facility: CLINIC | Age: 62
End: 2022-09-12
Payer: COMMERCIAL

## 2022-09-12 VITALS
HEIGHT: 64 IN | OXYGEN SATURATION: 98 % | BODY MASS INDEX: 40.53 KG/M2 | HEART RATE: 58 BPM | TEMPERATURE: 96.8 F | DIASTOLIC BLOOD PRESSURE: 88 MMHG | SYSTOLIC BLOOD PRESSURE: 132 MMHG | WEIGHT: 237.4 LBS | RESPIRATION RATE: 18 BRPM

## 2022-09-12 DIAGNOSIS — C82.18 GRADE 2 FOLLICULAR LYMPHOMA OF LYMPH NODES OF MULTIPLE REGIONS (HCC): Primary | ICD-10-CM

## 2022-09-12 PROCEDURE — 99214 OFFICE O/P EST MOD 30 MIN: CPT | Performed by: INTERNAL MEDICINE
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
## 2022-09-13 ENCOUNTER — HOSPITAL ENCOUNTER (OUTPATIENT)
Dept: INFUSION CENTER | Facility: HOSPITAL | Age: 62
Discharge: HOME | End: 2022-09-13
Attending: INTERNAL MEDICINE
Payer: COMMERCIAL

## 2022-09-13 VITALS
RESPIRATION RATE: 18 BRPM | TEMPERATURE: 97.1 F | HEART RATE: 51 BPM | DIASTOLIC BLOOD PRESSURE: 63 MMHG | OXYGEN SATURATION: 95 % | SYSTOLIC BLOOD PRESSURE: 126 MMHG

## 2022-09-13 DIAGNOSIS — C82.18 GRADE 2 FOLLICULAR LYMPHOMA OF LYMPH NODES OF MULTIPLE REGIONS (HCC): Primary | ICD-10-CM

## 2022-09-13 LAB
ALBUMIN SERPL BCP-MCNC: 3.6 G/DL (ref 3.5–5)
ALP SERPL-CCNC: 89 U/L (ref 43–122)
ALT SERPL W P-5'-P-CCNC: 19 U/L
ANION GAP SERPL CALCULATED.3IONS-SCNC: 3 MMOL/L (ref 5–14)
AST SERPL W P-5'-P-CCNC: 20 U/L (ref 14–36)
BASOPHILS # BLD AUTO: 0.02 THOUSANDS/ΜL (ref 0–0.1)
BASOPHILS NFR BLD AUTO: 0 % (ref 0–1)
BILIRUB SERPL-MCNC: 0.35 MG/DL (ref 0.2–1)
BUN SERPL-MCNC: 11 MG/DL (ref 5–25)
CALCIUM SERPL-MCNC: 8.4 MG/DL (ref 8.4–10.2)
CHLORIDE SERPL-SCNC: 107 MMOL/L (ref 96–108)
CO2 SERPL-SCNC: 28 MMOL/L (ref 21–32)
CREAT SERPL-MCNC: 0.85 MG/DL (ref 0.6–1.2)
CRP SERPL QL: 20.4 MG/L
EOSINOPHIL # BLD AUTO: 0.14 THOUSAND/ΜL (ref 0–0.61)
EOSINOPHIL NFR BLD AUTO: 3 % (ref 0–6)
ERYTHROCYTE [DISTWIDTH] IN BLOOD BY AUTOMATED COUNT: 17 % (ref 11.6–15.1)
ERYTHROCYTE [SEDIMENTATION RATE] IN BLOOD: 45 MM/HOUR (ref 0–29)
GFR SERPL CREATININE-BSD FRML MDRD: 74 ML/MIN/1.73SQ M
GLUCOSE SERPL-MCNC: 160 MG/DL (ref 70–99)
GLUCOSE SERPL-MCNC: 161 MG/DL (ref 65–140)
HCT VFR BLD AUTO: 36.2 % (ref 34.8–46.1)
HGB BLD-MCNC: 10.5 G/DL (ref 11.5–15.4)
IMM GRANULOCYTES # BLD AUTO: 0.01 THOUSAND/UL (ref 0–0.2)
IMM GRANULOCYTES NFR BLD AUTO: 0 % (ref 0–2)
LDH SERPL-CCNC: 521 U/L (ref 313–618)
LYMPHOCYTES # BLD AUTO: 1.21 THOUSANDS/ΜL (ref 0.6–4.47)
LYMPHOCYTES NFR BLD AUTO: 26 % (ref 14–44)
MAGNESIUM SERPL-MCNC: 1.9 MG/DL (ref 1.6–2.3)
MCH RBC QN AUTO: 20.3 PG (ref 26.8–34.3)
MCHC RBC AUTO-ENTMCNC: 29 G/DL (ref 31.4–37.4)
MCV RBC AUTO: 70 FL (ref 82–98)
MONOCYTES # BLD AUTO: 0.24 THOUSAND/ΜL (ref 0.17–1.22)
MONOCYTES NFR BLD AUTO: 5 % (ref 4–12)
NEUTROPHILS # BLD AUTO: 3.05 THOUSANDS/ΜL (ref 1.85–7.62)
NEUTS SEG NFR BLD AUTO: 66 % (ref 43–75)
NRBC BLD AUTO-RTO: 0 /100 WBCS
PLATELET # BLD AUTO: 191 THOUSANDS/UL (ref 149–390)
PMV BLD AUTO: 10.1 FL (ref 8.9–12.7)
POTASSIUM SERPL-SCNC: 3.9 MMOL/L (ref 3.5–5.3)
PROT SERPL-MCNC: 6.3 G/DL (ref 6.4–8.4)
RBC # BLD AUTO: 5.18 MILLION/UL (ref 3.81–5.12)
SODIUM SERPL-SCNC: 138 MMOL/L (ref 135–147)
WBC # BLD AUTO: 4.67 THOUSAND/UL (ref 4.31–10.16)

## 2022-09-13 PROCEDURE — 85025 COMPLETE CBC W/AUTO DIFF WBC: CPT | Performed by: INTERNAL MEDICINE

## 2022-09-13 PROCEDURE — 86140 C-REACTIVE PROTEIN: CPT

## 2022-09-13 PROCEDURE — 85652 RBC SED RATE AUTOMATED: CPT

## 2022-09-13 PROCEDURE — 80053 COMPREHEN METABOLIC PANEL: CPT | Performed by: INTERNAL MEDICINE

## 2022-09-13 PROCEDURE — 83735 ASSAY OF MAGNESIUM: CPT

## 2022-09-13 PROCEDURE — 96413 CHEMO IV INFUSION 1 HR: CPT

## 2022-09-13 PROCEDURE — 96367 TX/PROPH/DG ADDL SEQ IV INF: CPT

## 2022-09-13 PROCEDURE — 83615 LACTATE (LD) (LDH) ENZYME: CPT | Performed by: INTERNAL MEDICINE

## 2022-09-13 PROCEDURE — 82948 REAGENT STRIP/BLOOD GLUCOSE: CPT

## 2022-09-13 RX ORDER — SODIUM CHLORIDE 9 MG/ML
20 INJECTION, SOLUTION INTRAVENOUS ONCE
Status: COMPLETED | OUTPATIENT
Start: 2022-09-13 | End: 2022-09-13

## 2022-09-13 RX ADMIN — SODIUM CHLORIDE 20 ML/HR: 0.9 INJECTION, SOLUTION INTRAVENOUS at 11:25

## 2022-09-13 RX ADMIN — COPANLISIB 60 MG: 15 INJECTION, POWDER, LYOPHILIZED, FOR SOLUTION INTRAVENOUS at 11:56

## 2022-09-13 RX ADMIN — ONDANSETRON 8 MG: 2 INJECTION INTRAMUSCULAR; INTRAVENOUS at 11:25

## 2022-09-13 NOTE — PROGRESS NOTES
Central labs drawn per orders, parameters met for labs, blood sugar and blood pressure  Tolerated Aliqopa well without complications, confirmed next appt and AVS provided

## 2022-09-20 ENCOUNTER — HOSPITAL ENCOUNTER (OUTPATIENT)
Dept: NUCLEAR MEDICINE | Facility: HOSPITAL | Age: 62
Discharge: HOME | End: 2022-09-20
Attending: INTERNAL MEDICINE
Payer: COMMERCIAL

## 2022-09-20 DIAGNOSIS — C82.18 GRADE 2 FOLLICULAR LYMPHOMA OF LYMPH NODES OF MULTIPLE REGIONS (HCC): Primary | ICD-10-CM

## 2022-09-20 DIAGNOSIS — C82.18 GRADE 2 FOLLICULAR LYMPHOMA OF LYMPH NODES OF MULTIPLE REGIONS (HCC): ICD-10-CM

## 2022-09-20 LAB — GLUCOSE SERPL-MCNC: 259 MG/DL (ref 65–140)

## 2022-09-20 PROCEDURE — 82948 REAGENT STRIP/BLOOD GLUCOSE: CPT

## 2022-09-20 RX ORDER — SODIUM CHLORIDE 9 MG/ML
20 INJECTION, SOLUTION INTRAVENOUS ONCE
Status: CANCELLED | OUTPATIENT
Start: 2022-10-11

## 2022-09-20 RX ORDER — SODIUM CHLORIDE 9 MG/ML
20 INJECTION, SOLUTION INTRAVENOUS ONCE
Status: CANCELLED | OUTPATIENT
Start: 2022-10-04

## 2022-09-20 RX ORDER — SODIUM CHLORIDE 9 MG/ML
20 INJECTION, SOLUTION INTRAVENOUS ONCE
Status: CANCELLED | OUTPATIENT
Start: 2022-09-27

## 2022-09-23 ENCOUNTER — TELEPHONE (OUTPATIENT)
Dept: HEMATOLOGY ONCOLOGY | Facility: CLINIC | Age: 62
End: 2022-09-23

## 2022-09-23 DIAGNOSIS — G89.3 CANCER RELATED PAIN: ICD-10-CM

## 2022-09-23 DIAGNOSIS — R10.30 LOWER ABDOMINAL PAIN: ICD-10-CM

## 2022-09-23 RX ORDER — OXYCODONE HYDROCHLORIDE 15 MG/1
TABLET ORAL
Qty: 90 TABLET | Refills: 0 | Status: SHIPPED | OUTPATIENT
Start: 2022-09-23 | End: 2022-10-27

## 2022-09-23 NOTE — TELEPHONE ENCOUNTER
Appointment Cancellation Or Reschedule     Person calling in [facility]     Provider Dr Deric Rosario   Office Visit Date and Time 10/3/22 @ 9am   Office Visit Location [place]   Did patient want to reschedule their office appointment? If so, when was it scheduled to? No   Did you have STAR scheduled for this appointment? No   Do you need STAR set up for your new appointment? If yes, please send to "PATIENT RIDESHARE" pool for STAR rescheduling No   If you are cancelling appointment, can we notify STAR to cancel ride? If yes, please send to "PATIENT RIDESHARE" pool for STAR to cancel service Na   Is this patient calling to reschedule an infusion appointment? No   When is their next infusion appointment? Na   Is this patient a Chemo patient? No   Reason for Cancellation or Reschedule Patient had death in the family, will have to reschedule appt due to traveling, not sure when she will return       If the patient is a treatment patient, please route this to the office nurse  If the patient is not on treatment, please route to the office MA  If the patient is a surgical oncology patient, please route to surg/onc clinical pool

## 2022-09-23 NOTE — TELEPHONE ENCOUNTER
Primary palliative medicine provider:  Dr Driss Anthony     Medication requested: Oxycodone 15 mg     If for pain, how has the patient been taking their pain medicine? Last appointment: 7/27     Next scheduled appointment: 10/27     Patient called office crying and screaming patient's father passed away this morning patient is leaving to [facility] for two to three weeks           PDMP review:   PATIENT ID PRESCRIPTION # FILLED WRITTEN DRUG LABEL QTY DAYS STRENGTH MME** PRESCRIBER PHARMACY PAYMENT REFILL #/AUTH STATE DETAIL   1 [id number] 08/26/2022 08/25/2022 oxyCODONE HCL (Tablet) 90 0 30 15 MG 67 50 [facility] [employer] [employer] [facility] Private [place] 0 / 0 [place]    1 [id number] 07/27/2022 07/27/2022 oxyCODONE HCL (Tablet) 90 0 23 15 MG 88 04 [employer]I [facility] [facility] [employer] [facility] Medicaid 0 / 0 [place]    1 [id number] 06/24/2022 06/24/2022 oxyCODONE HCL (Tablet) 90 0 23 15 MG 88 04 [facility] [employer] [facility] Medicaid 0 / 0 [place]    1 [id number] 05/23/2022 05/23/2022 oxyCODONE HCL (Tablet) 90 0 24 15 MG 84 38 [employer] [facility] Medicaid 0 / 0 [place]

## 2022-09-23 NOTE — PROGRESS NOTES
Received phone call from Teo from [address] transport to inform us that patient called Palliative care to let them know she is leaving because her father passed away  Teo is taking patient off of schedule for the week of Sept 26th  Advised Jorge Clark Rn that appts Monday 9-26 canceled for labs and Tuesday 9-27 canceled for chemo  Patient to have PET scan on 29th, Coco Velasco in PET notified

## 2022-09-26 ENCOUNTER — HOSPITAL ENCOUNTER (OUTPATIENT)
Dept: INFUSION CENTER | Facility: HOSPITAL | Age: 62
Discharge: HOME | End: 2022-09-26

## 2022-10-10 ENCOUNTER — HOSPITAL ENCOUNTER (OUTPATIENT)
Dept: INFUSION CENTER | Facility: HOSPITAL | Age: 62
Discharge: HOME | End: 2022-10-10
Attending: INTERNAL MEDICINE
Payer: COMMERCIAL

## 2022-10-10 DIAGNOSIS — Z45.2 ENCOUNTER FOR CENTRAL LINE CARE: Primary | ICD-10-CM

## 2022-10-10 DIAGNOSIS — C82.18 GRADE 2 FOLLICULAR LYMPHOMA OF LYMPH NODES OF MULTIPLE REGIONS (HCC): Primary | ICD-10-CM

## 2022-10-10 DIAGNOSIS — C82.18 GRADE 2 FOLLICULAR LYMPHOMA OF LYMPH NODES OF MULTIPLE REGIONS (HCC): ICD-10-CM

## 2022-10-10 LAB
ALBUMIN SERPL BCP-MCNC: 3.9 G/DL (ref 3.5–5)
ALP SERPL-CCNC: 102 U/L (ref 43–122)
ALT SERPL W P-5'-P-CCNC: 22 U/L
ANION GAP SERPL CALCULATED.3IONS-SCNC: 6 MMOL/L (ref 5–14)
AST SERPL W P-5'-P-CCNC: 21 U/L (ref 14–36)
BASOPHILS # BLD AUTO: 0.05 THOUSANDS/ΜL (ref 0–0.1)
BASOPHILS NFR BLD AUTO: 1 % (ref 0–1)
BILIRUB SERPL-MCNC: 0.45 MG/DL (ref 0.2–1)
BUN SERPL-MCNC: 14 MG/DL (ref 5–25)
CALCIUM SERPL-MCNC: 8.8 MG/DL (ref 8.4–10.2)
CHLORIDE SERPL-SCNC: 106 MMOL/L (ref 96–108)
CO2 SERPL-SCNC: 26 MMOL/L (ref 21–32)
CREAT SERPL-MCNC: 0.63 MG/DL (ref 0.6–1.2)
EOSINOPHIL # BLD AUTO: 0.3 THOUSAND/ΜL (ref 0–0.61)
EOSINOPHIL NFR BLD AUTO: 4 % (ref 0–6)
ERYTHROCYTE [DISTWIDTH] IN BLOOD BY AUTOMATED COUNT: 18.2 % (ref 11.6–15.1)
GFR SERPL CREATININE-BSD FRML MDRD: 97 ML/MIN/1.73SQ M
GLUCOSE SERPL-MCNC: 93 MG/DL (ref 70–99)
HCT VFR BLD AUTO: 36.7 % (ref 34.8–46.1)
HGB BLD-MCNC: 10.7 G/DL (ref 11.5–15.4)
IMM GRANULOCYTES # BLD AUTO: 0.05 THOUSAND/UL (ref 0–0.2)
IMM GRANULOCYTES NFR BLD AUTO: 1 % (ref 0–2)
LDH SERPL-CCNC: 564 U/L (ref 313–618)
LYMPHOCYTES # BLD AUTO: 1.92 THOUSANDS/ΜL (ref 0.6–4.47)
LYMPHOCYTES NFR BLD AUTO: 25 % (ref 14–44)
MCH RBC QN AUTO: 20.3 PG (ref 26.8–34.3)
MCHC RBC AUTO-ENTMCNC: 29.2 G/DL (ref 31.4–37.4)
MCV RBC AUTO: 70 FL (ref 82–98)
MONOCYTES # BLD AUTO: 0.46 THOUSAND/ΜL (ref 0.17–1.22)
MONOCYTES NFR BLD AUTO: 6 % (ref 4–12)
NEUTROPHILS # BLD AUTO: 4.83 THOUSANDS/ΜL (ref 1.85–7.62)
NEUTS SEG NFR BLD AUTO: 63 % (ref 43–75)
NRBC BLD AUTO-RTO: 0 /100 WBCS
PLATELET # BLD AUTO: 225 THOUSANDS/UL (ref 149–390)
PMV BLD AUTO: 10.4 FL (ref 8.9–12.7)
POTASSIUM SERPL-SCNC: 3.9 MMOL/L (ref 3.5–5.3)
PROT SERPL-MCNC: 6.6 G/DL (ref 6.4–8.4)
RBC # BLD AUTO: 5.26 MILLION/UL (ref 3.81–5.12)
SODIUM SERPL-SCNC: 138 MMOL/L (ref 135–147)
WBC # BLD AUTO: 7.61 THOUSAND/UL (ref 4.31–10.16)

## 2022-10-10 PROCEDURE — 83615 LACTATE (LD) (LDH) ENZYME: CPT | Performed by: INTERNAL MEDICINE

## 2022-10-10 PROCEDURE — 85025 COMPLETE CBC W/AUTO DIFF WBC: CPT | Performed by: INTERNAL MEDICINE

## 2022-10-10 PROCEDURE — 80053 COMPREHEN METABOLIC PANEL: CPT | Performed by: INTERNAL MEDICINE

## 2022-10-10 RX ORDER — CYANOCOBALAMIN 1000 UG/ML
1000 INJECTION, SOLUTION INTRAMUSCULAR; SUBCUTANEOUS ONCE
Status: CANCELLED | OUTPATIENT
Start: 2022-10-25

## 2022-10-10 RX ORDER — SODIUM CHLORIDE 9 MG/ML
20 INJECTION, SOLUTION INTRAVENOUS ONCE
Status: CANCELLED | OUTPATIENT
Start: 2022-10-11

## 2022-10-10 NOTE — PROGRESS NOTES
Port flushed per protocol, central labs drawn and sent  Port remains accessed for chemotherapy appointment tomorrow  Appointment confirmed with patient, AVS declined

## 2022-10-11 ENCOUNTER — HOSPITAL ENCOUNTER (OUTPATIENT)
Dept: INFUSION CENTER | Facility: HOSPITAL | Age: 62
Discharge: HOME | End: 2022-10-11
Attending: INTERNAL MEDICINE
Payer: COMMERCIAL

## 2022-10-11 VITALS
RESPIRATION RATE: 18 BRPM | TEMPERATURE: 97.1 F | DIASTOLIC BLOOD PRESSURE: 72 MMHG | HEART RATE: 63 BPM | OXYGEN SATURATION: 98 % | SYSTOLIC BLOOD PRESSURE: 130 MMHG

## 2022-10-11 DIAGNOSIS — C82.18 GRADE 2 FOLLICULAR LYMPHOMA OF LYMPH NODES OF MULTIPLE REGIONS (HCC): Primary | ICD-10-CM

## 2022-10-11 DIAGNOSIS — D51.8 OTHER VITAMIN B12 DEFICIENCY ANEMIAS: ICD-10-CM

## 2022-10-11 DIAGNOSIS — D51.8 OTHER VITAMIN B12 DEFICIENCY ANEMIAS: Primary | ICD-10-CM

## 2022-10-11 LAB — GLUCOSE SERPL-MCNC: 93 MG/DL (ref 65–140)

## 2022-10-11 PROCEDURE — 96367 TX/PROPH/DG ADDL SEQ IV INF: CPT

## 2022-10-11 PROCEDURE — 96413 CHEMO IV INFUSION 1 HR: CPT

## 2022-10-11 PROCEDURE — 82948 REAGENT STRIP/BLOOD GLUCOSE: CPT

## 2022-10-11 PROCEDURE — 96372 THER/PROPH/DIAG INJ SC/IM: CPT

## 2022-10-11 RX ORDER — CYANOCOBALAMIN 1000 UG/ML
1000 INJECTION, SOLUTION INTRAMUSCULAR; SUBCUTANEOUS ONCE
OUTPATIENT
Start: 2022-11-08

## 2022-10-11 RX ORDER — SODIUM CHLORIDE 9 MG/ML
20 INJECTION, SOLUTION INTRAVENOUS ONCE
Status: CANCELLED | OUTPATIENT
Start: 2022-10-20

## 2022-10-11 RX ORDER — SODIUM CHLORIDE 9 MG/ML
20 INJECTION, SOLUTION INTRAVENOUS ONCE
Status: COMPLETED | OUTPATIENT
Start: 2022-10-11 | End: 2022-10-11

## 2022-10-11 RX ORDER — CYANOCOBALAMIN 1000 UG/ML
1000 INJECTION, SOLUTION INTRAMUSCULAR; SUBCUTANEOUS ONCE
Status: COMPLETED | OUTPATIENT
Start: 2022-10-11 | End: 2022-10-11

## 2022-10-11 RX ORDER — CYANOCOBALAMIN 1000 UG/ML
1000 INJECTION, SOLUTION INTRAMUSCULAR; SUBCUTANEOUS ONCE
Status: CANCELLED | OUTPATIENT
Start: 2022-10-11

## 2022-10-11 RX ADMIN — COPANLISIB 60 MG: 15 INJECTION, POWDER, LYOPHILIZED, FOR SOLUTION INTRAVENOUS at 11:16

## 2022-10-11 RX ADMIN — CYANOCOBALAMIN 1000 MCG: 1000 INJECTION INTRAMUSCULAR; SUBCUTANEOUS at 11:20

## 2022-10-11 RX ADMIN — SODIUM CHLORIDE 20 ML/HR: 0.9 INJECTION, SOLUTION INTRAVENOUS at 10:35

## 2022-10-11 RX ADMIN — ONDANSETRON 8 MG: 2 INJECTION INTRAMUSCULAR; INTRAVENOUS at 10:45

## 2022-10-11 NOTE — PROGRESS NOTES
Pt tolerated B12 injection to L deltoid and Aliqopa infusion without difficulty  No s/s reaction noted  Port flushed and deaccessed per protocol  Next appt confirmed  AVS provided  Left ambulatory with [facility] transport

## 2022-10-12 PROBLEM — E86.0 DEHYDRATION: Status: RESOLVED | Noted: 2021-07-20 | Resolved: 2022-10-12

## 2022-10-14 ENCOUNTER — TELEPHONE (OUTPATIENT)
Dept: HEMATOLOGY ONCOLOGY | Facility: CLINIC | Age: 62
End: 2022-10-14

## 2022-10-14 ENCOUNTER — OFFICE VISIT (OUTPATIENT)
Dept: HEMATOLOGY ONCOLOGY | Facility: CLINIC | Age: 62
End: 2022-10-14
Payer: COMMERCIAL

## 2022-10-14 VITALS
HEIGHT: 64 IN | BODY MASS INDEX: 39.71 KG/M2 | DIASTOLIC BLOOD PRESSURE: 82 MMHG | TEMPERATURE: 98.2 F | HEART RATE: 65 BPM | OXYGEN SATURATION: 98 % | SYSTOLIC BLOOD PRESSURE: 124 MMHG | WEIGHT: 232.6 LBS | RESPIRATION RATE: 18 BRPM

## 2022-10-14 DIAGNOSIS — D50.9 MICROCYTIC ANEMIA: ICD-10-CM

## 2022-10-14 DIAGNOSIS — M79.604 PAIN IN BOTH LOWER EXTREMITIES: ICD-10-CM

## 2022-10-14 DIAGNOSIS — D51.8 OTHER VITAMIN B12 DEFICIENCY ANEMIAS: ICD-10-CM

## 2022-10-14 DIAGNOSIS — M79.605 PAIN IN BOTH LOWER EXTREMITIES: ICD-10-CM

## 2022-10-14 DIAGNOSIS — C82.18 GRADE 2 FOLLICULAR LYMPHOMA OF LYMPH NODES OF MULTIPLE REGIONS (HCC): Primary | ICD-10-CM

## 2022-10-14 PROCEDURE — 99214 OFFICE O/P EST MOD 30 MIN: CPT | Performed by: NURSE PRACTITIONER
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
## 2022-10-17 ENCOUNTER — HOSPITAL ENCOUNTER (OUTPATIENT)
Dept: INFUSION CENTER | Facility: HOSPITAL | Age: 62
Discharge: HOME | End: 2022-10-17
Payer: COMMERCIAL

## 2022-10-17 DIAGNOSIS — C82.18 GRADE 2 FOLLICULAR LYMPHOMA OF LYMPH NODES OF MULTIPLE REGIONS (HCC): ICD-10-CM

## 2022-10-17 DIAGNOSIS — E87.6 HYPOKALEMIA: Primary | ICD-10-CM

## 2022-10-17 DIAGNOSIS — D50.8 IRON DEFICIENCY ANEMIA SECONDARY TO INADEQUATE DIETARY IRON INTAKE: ICD-10-CM

## 2022-10-17 DIAGNOSIS — Z91.89 AT HIGH RISK OF TUMOR LYSIS SYNDROME: ICD-10-CM

## 2022-10-17 LAB
ALBUMIN SERPL BCP-MCNC: 4.1 G/DL (ref 3.5–5)
ALP SERPL-CCNC: 101 U/L (ref 43–122)
ALT SERPL W P-5'-P-CCNC: 24 U/L
ANION GAP SERPL CALCULATED.3IONS-SCNC: 9 MMOL/L (ref 5–14)
AST SERPL W P-5'-P-CCNC: 25 U/L (ref 14–36)
BASOPHILS # BLD AUTO: 0.03 THOUSANDS/ΜL (ref 0–0.1)
BASOPHILS NFR BLD AUTO: 1 % (ref 0–1)
BILIRUB SERPL-MCNC: 0.85 MG/DL (ref 0.2–1)
BUN SERPL-MCNC: 18 MG/DL (ref 5–25)
CALCIUM SERPL-MCNC: 9 MG/DL (ref 8.4–10.2)
CHLORIDE SERPL-SCNC: 95 MMOL/L (ref 96–108)
CO2 SERPL-SCNC: 32 MMOL/L (ref 21–32)
CREAT SERPL-MCNC: 0.69 MG/DL (ref 0.6–1.2)
EOSINOPHIL # BLD AUTO: 0.11 THOUSAND/ΜL (ref 0–0.61)
EOSINOPHIL NFR BLD AUTO: 2 % (ref 0–6)
ERYTHROCYTE [DISTWIDTH] IN BLOOD BY AUTOMATED COUNT: 18.1 % (ref 11.6–15.1)
GFR SERPL CREATININE-BSD FRML MDRD: 94 ML/MIN/1.73SQ M
GLUCOSE SERPL-MCNC: 259 MG/DL (ref 70–99)
HCT VFR BLD AUTO: 39.1 % (ref 34.8–46.1)
HGB BLD-MCNC: 12 G/DL (ref 11.5–15.4)
IMM GRANULOCYTES # BLD AUTO: 0.02 THOUSAND/UL (ref 0–0.2)
IMM GRANULOCYTES NFR BLD AUTO: 0 % (ref 0–2)
LYMPHOCYTES # BLD AUTO: 1.61 THOUSANDS/ΜL (ref 0.6–4.47)
LYMPHOCYTES NFR BLD AUTO: 28 % (ref 14–44)
MCH RBC QN AUTO: 20.7 PG (ref 26.8–34.3)
MCHC RBC AUTO-ENTMCNC: 30.7 G/DL (ref 31.4–37.4)
MCV RBC AUTO: 67 FL (ref 82–98)
MONOCYTES # BLD AUTO: 0.31 THOUSAND/ΜL (ref 0.17–1.22)
MONOCYTES NFR BLD AUTO: 5 % (ref 4–12)
NEUTROPHILS # BLD AUTO: 3.78 THOUSANDS/ΜL (ref 1.85–7.62)
NEUTS SEG NFR BLD AUTO: 64 % (ref 43–75)
NRBC BLD AUTO-RTO: 0 /100 WBCS
PLATELET # BLD AUTO: 243 THOUSANDS/UL (ref 149–390)
PMV BLD AUTO: 10.7 FL (ref 8.9–12.7)
POTASSIUM SERPL-SCNC: 3 MMOL/L (ref 3.5–5.3)
PROT SERPL-MCNC: 7.1 G/DL (ref 6.4–8.4)
RBC # BLD AUTO: 5.81 MILLION/UL (ref 3.81–5.12)
SODIUM SERPL-SCNC: 136 MMOL/L (ref 135–147)
WBC # BLD AUTO: 5.86 THOUSAND/UL (ref 4.31–10.16)

## 2022-10-17 PROCEDURE — 85025 COMPLETE CBC W/AUTO DIFF WBC: CPT

## 2022-10-17 PROCEDURE — 80053 COMPREHEN METABOLIC PANEL: CPT

## 2022-10-17 RX ORDER — WATER 1000 ML/1000ML
INJECTION, SOLUTION INTRAVENOUS
Status: COMPLETED
Start: 2022-10-17 | End: 2022-10-17

## 2022-10-17 RX ADMIN — WATER 2.2 ML: 1 INJECTION INTRAMUSCULAR; INTRAVENOUS; SUBCUTANEOUS at 11:47

## 2022-10-17 RX ADMIN — ALTEPLASE 2 MG: 2.2 INJECTION, POWDER, LYOPHILIZED, FOR SOLUTION INTRAVENOUS at 11:49

## 2022-10-17 NOTE — PROGRESS NOTES
Port unable to give blood return, cathflo instilled at 1149 and blood returned around 1320  Central labs drawn and sent  Port flushed per protocol and remains accessed for tomorrow's treatment  Tomorrow's appointment confirmed, AVS declined

## 2022-10-18 ENCOUNTER — HOSPITAL ENCOUNTER (OUTPATIENT)
Dept: INFUSION CENTER | Facility: HOSPITAL | Age: 62
Discharge: HOME | End: 2022-10-18
Attending: INTERNAL MEDICINE
Payer: COMMERCIAL

## 2022-10-18 VITALS
SYSTOLIC BLOOD PRESSURE: 142 MMHG | HEART RATE: 71 BPM | OXYGEN SATURATION: 97 % | DIASTOLIC BLOOD PRESSURE: 93 MMHG | RESPIRATION RATE: 18 BRPM | TEMPERATURE: 97.8 F

## 2022-10-18 DIAGNOSIS — E87.6 HYPOKALEMIA: ICD-10-CM

## 2022-10-18 DIAGNOSIS — D50.8 IRON DEFICIENCY ANEMIA SECONDARY TO INADEQUATE DIETARY IRON INTAKE: ICD-10-CM

## 2022-10-18 DIAGNOSIS — C82.18 GRADE 2 FOLLICULAR LYMPHOMA OF LYMPH NODES OF MULTIPLE REGIONS (HCC): Primary | ICD-10-CM

## 2022-10-18 DIAGNOSIS — Z91.89 AT HIGH RISK OF TUMOR LYSIS SYNDROME: ICD-10-CM

## 2022-10-18 LAB — GLUCOSE SERPL-MCNC: 265 MG/DL (ref 65–140)

## 2022-10-18 PROCEDURE — 96366 THER/PROPH/DIAG IV INF ADDON: CPT

## 2022-10-18 PROCEDURE — 96365 THER/PROPH/DIAG IV INF INIT: CPT

## 2022-10-18 PROCEDURE — 82948 REAGENT STRIP/BLOOD GLUCOSE: CPT

## 2022-10-18 RX ADMIN — POTASSIUM CHLORIDE: 2 INJECTION, SOLUTION, CONCENTRATE INTRAVENOUS at 10:50

## 2022-10-18 NOTE — PROGRESS NOTES
Patient blood sugar 265 today  Per Dr Kendra Toribio, hold chemo today  Teddy Centeno in pharmacy aware  Patient rescheduled to this Thursday 10-20-22  Patient received potassium 20meq today in 500mL over 2 hours for level 3 0 yesterday  AVS provided to pt

## 2022-10-20 ENCOUNTER — HOSPITAL ENCOUNTER (OUTPATIENT)
Dept: INFUSION CENTER | Facility: HOSPITAL | Age: 62
End: 2022-10-20
Attending: INTERNAL MEDICINE
Payer: COMMERCIAL

## 2022-10-20 VITALS
OXYGEN SATURATION: 98 % | HEART RATE: 75 BPM | DIASTOLIC BLOOD PRESSURE: 72 MMHG | RESPIRATION RATE: 18 BRPM | TEMPERATURE: 97.6 F | SYSTOLIC BLOOD PRESSURE: 145 MMHG

## 2022-10-20 DIAGNOSIS — C82.18 GRADE 2 FOLLICULAR LYMPHOMA OF LYMPH NODES OF MULTIPLE REGIONS (HCC): Primary | ICD-10-CM

## 2022-10-20 LAB — GLUCOSE SERPL-MCNC: 158 MG/DL (ref 65–140)

## 2022-10-20 PROCEDURE — 82948 REAGENT STRIP/BLOOD GLUCOSE: CPT

## 2022-10-20 PROCEDURE — 96367 TX/PROPH/DG ADDL SEQ IV INF: CPT

## 2022-10-20 PROCEDURE — 96413 CHEMO IV INFUSION 1 HR: CPT

## 2022-10-20 PROCEDURE — 36593 DECLOT VASCULAR DEVICE: CPT

## 2022-10-20 RX ORDER — WATER 1000 ML/1000ML
INJECTION, SOLUTION INTRAVENOUS
Status: COMPLETED
Start: 2022-10-20 | End: 2022-10-20

## 2022-10-20 RX ORDER — SODIUM CHLORIDE 9 MG/ML
20 INJECTION, SOLUTION INTRAVENOUS ONCE
Status: COMPLETED | OUTPATIENT
Start: 2022-10-20 | End: 2022-10-20

## 2022-10-20 RX ADMIN — ALTEPLASE 2 MG: 2.2 INJECTION, POWDER, LYOPHILIZED, FOR SOLUTION INTRAVENOUS at 11:49

## 2022-10-20 RX ADMIN — COPANLISIB 60 MG: 15 INJECTION, POWDER, LYOPHILIZED, FOR SOLUTION INTRAVENOUS at 13:11

## 2022-10-20 RX ADMIN — WATER 2.2 ML: 1 INJECTION INTRAMUSCULAR; INTRAVENOUS; SUBCUTANEOUS at 11:49

## 2022-10-20 RX ADMIN — ONDANSETRON 8 MG: 2 INJECTION INTRAMUSCULAR; INTRAVENOUS at 12:05

## 2022-10-20 RX ADMIN — SODIUM CHLORIDE 20 ML/HR: 0.9 INJECTION, SOLUTION INTRAVENOUS at 12:05

## 2022-10-20 NOTE — PROGRESS NOTES
No blood return noted from port, cathflo instilled in port, blood return noted after 1 hr dwell time  Patient tolerated IV chemotherapy without issues   Next appointment confirmed, AVS given

## 2022-10-21 DIAGNOSIS — C82.18 GRADE 2 FOLLICULAR LYMPHOMA OF LYMPH NODES OF MULTIPLE REGIONS (HCC): Primary | ICD-10-CM

## 2022-10-21 RX ORDER — SODIUM CHLORIDE 9 MG/ML
20 INJECTION, SOLUTION INTRAVENOUS ONCE
Status: CANCELLED | OUTPATIENT
Start: 2022-10-27

## 2022-10-26 ENCOUNTER — HOSPITAL ENCOUNTER (OUTPATIENT)
Dept: NUCLEAR MEDICINE | Facility: HOSPITAL | Age: 62
Discharge: HOME | End: 2022-10-26
Attending: INTERNAL MEDICINE

## 2022-10-26 ENCOUNTER — HOSPITAL ENCOUNTER (OUTPATIENT)
Dept: INFUSION CENTER | Facility: HOSPITAL | Age: 62
Discharge: HOME | End: 2022-10-26
Attending: INTERNAL MEDICINE

## 2022-10-26 DIAGNOSIS — C82.18 GRADE 2 FOLLICULAR LYMPHOMA OF LYMPH NODES OF MULTIPLE REGIONS (HCC): Primary | ICD-10-CM

## 2022-10-26 DIAGNOSIS — Z45.2 ENCOUNTER FOR CENTRAL LINE CARE: ICD-10-CM

## 2022-10-26 LAB
ALBUMIN SERPL BCP-MCNC: 4.1 G/DL (ref 3.5–5)
ALP SERPL-CCNC: 114 U/L (ref 43–122)
ALT SERPL W P-5'-P-CCNC: 21 U/L
ANION GAP SERPL CALCULATED.3IONS-SCNC: 5 MMOL/L (ref 5–14)
AST SERPL W P-5'-P-CCNC: 22 U/L (ref 14–36)
BASOPHILS # BLD AUTO: 0.03 THOUSANDS/ÂΜL (ref 0–0.1)
BASOPHILS NFR BLD AUTO: 1 % (ref 0–1)
BILIRUB SERPL-MCNC: 0.66 MG/DL (ref 0.2–1)
BUN SERPL-MCNC: 16 MG/DL (ref 5–25)
CALCIUM SERPL-MCNC: 9.2 MG/DL (ref 8.4–10.2)
CHLORIDE SERPL-SCNC: 98 MMOL/L (ref 96–108)
CO2 SERPL-SCNC: 32 MMOL/L (ref 21–32)
CREAT SERPL-MCNC: 0.67 MG/DL (ref 0.6–1.2)
EOSINOPHIL # BLD AUTO: 0.11 THOUSAND/ÂΜL (ref 0–0.61)
EOSINOPHIL NFR BLD AUTO: 2 % (ref 0–6)
ERYTHROCYTE [DISTWIDTH] IN BLOOD BY AUTOMATED COUNT: 16.9 % (ref 11.6–15.1)
GFR SERPL CREATININE-BSD FRML MDRD: 95 ML/MIN/1.73SQ M
GLUCOSE SERPL-MCNC: 151 MG/DL (ref 70–99)
GLUCOSE SERPL-MCNC: 186 MG/DL (ref 65–140)
HCT VFR BLD AUTO: 39.7 % (ref 34.8–46.1)
HGB BLD-MCNC: 11.8 G/DL (ref 11.5–15.4)
IMM GRANULOCYTES # BLD AUTO: 0.01 THOUSAND/UL (ref 0–0.2)
IMM GRANULOCYTES NFR BLD AUTO: 0 % (ref 0–2)
LDH SERPL-CCNC: 481 U/L (ref 313–618)
LYMPHOCYTES # BLD AUTO: 1.75 THOUSANDS/ÂΜL (ref 0.6–4.47)
LYMPHOCYTES NFR BLD AUTO: 29 % (ref 14–44)
MCH RBC QN AUTO: 20.2 PG (ref 26.8–34.3)
MCHC RBC AUTO-ENTMCNC: 29.7 G/DL (ref 31.4–37.4)
MCV RBC AUTO: 68 FL (ref 82–98)
MONOCYTES # BLD AUTO: 0.31 THOUSAND/ÂΜL (ref 0.17–1.22)
MONOCYTES NFR BLD AUTO: 5 % (ref 4–12)
NEUTROPHILS # BLD AUTO: 3.81 THOUSANDS/ÂΜL (ref 1.85–7.62)
NEUTS SEG NFR BLD AUTO: 63 % (ref 43–75)
NRBC BLD AUTO-RTO: 0 /100 WBCS
PLATELET # BLD AUTO: 219 THOUSANDS/UL (ref 149–390)
PMV BLD AUTO: 10.2 FL (ref 8.9–12.7)
POTASSIUM SERPL-SCNC: 3.5 MMOL/L (ref 3.5–5.3)
PROT SERPL-MCNC: 7 G/DL (ref 6.4–8.4)
RBC # BLD AUTO: 5.84 MILLION/UL (ref 3.81–5.12)
SODIUM SERPL-SCNC: 135 MMOL/L (ref 135–147)
WBC # BLD AUTO: 6.02 THOUSAND/UL (ref 4.31–10.16)

## 2022-10-26 NOTE — PROGRESS NOTES
Pt arrived on unit for lab draw via port  Labs drawn and sent as ordered  Port flushed per protocol  Remains accessed for treatment tomorrow

## 2022-10-27 ENCOUNTER — TELEPHONE (OUTPATIENT)
Dept: PALLIATIVE MEDICINE | Facility: CLINIC | Age: 62
End: 2022-10-27

## 2022-10-27 ENCOUNTER — OFFICE VISIT (OUTPATIENT)
Dept: PALLIATIVE MEDICINE | Facility: CLINIC | Age: 62
End: 2022-10-27

## 2022-10-27 ENCOUNTER — HOSPITAL ENCOUNTER (OUTPATIENT)
Dept: INFUSION CENTER | Facility: HOSPITAL | Age: 62
End: 2022-10-27
Attending: INTERNAL MEDICINE
Payer: COMMERCIAL

## 2022-10-27 VITALS
WEIGHT: 235.6 LBS | HEART RATE: 76 BPM | TEMPERATURE: 96.6 F | BODY MASS INDEX: 40.44 KG/M2 | RESPIRATION RATE: 16 BRPM | DIASTOLIC BLOOD PRESSURE: 88 MMHG | SYSTOLIC BLOOD PRESSURE: 150 MMHG | OXYGEN SATURATION: 99 %

## 2022-10-27 VITALS
HEART RATE: 76 BPM | DIASTOLIC BLOOD PRESSURE: 66 MMHG | OXYGEN SATURATION: 98 % | TEMPERATURE: 97.1 F | SYSTOLIC BLOOD PRESSURE: 147 MMHG | RESPIRATION RATE: 18 BRPM

## 2022-10-27 DIAGNOSIS — R10.30 LOWER ABDOMINAL PAIN: ICD-10-CM

## 2022-10-27 DIAGNOSIS — Z45.2 ENCOUNTER FOR CENTRAL LINE CARE: ICD-10-CM

## 2022-10-27 DIAGNOSIS — C82.18 GRADE 2 FOLLICULAR LYMPHOMA OF LYMPH NODES OF MULTIPLE REGIONS (HCC): Primary | ICD-10-CM

## 2022-10-27 DIAGNOSIS — G89.3 CANCER RELATED PAIN: Primary | ICD-10-CM

## 2022-10-27 DIAGNOSIS — K59.03 DRUG INDUCED CONSTIPATION: ICD-10-CM

## 2022-10-27 DIAGNOSIS — C82.18 GRADE 2 FOLLICULAR LYMPHOMA OF LYMPH NODES OF MULTIPLE REGIONS (HCC): ICD-10-CM

## 2022-10-27 PROBLEM — R53.83 DECREASED ENERGY: Status: RESOLVED | Noted: 2020-04-21 | Resolved: 2022-10-27

## 2022-10-27 LAB — GLUCOSE SERPL-MCNC: 180 MG/DL (ref 65–140)

## 2022-10-27 PROCEDURE — 82948 REAGENT STRIP/BLOOD GLUCOSE: CPT

## 2022-10-27 RX ORDER — OXYCODONE HYDROCHLORIDE 10 MG/1
10 TABLET ORAL EVERY 4 HOURS PRN
Qty: 90 TABLET | Refills: 0 | Status: SHIPPED | OUTPATIENT
Start: 2022-10-27

## 2022-10-27 RX ORDER — SODIUM CHLORIDE 9 MG/ML
20 INJECTION, SOLUTION INTRAVENOUS ONCE
Status: COMPLETED | OUTPATIENT
Start: 2022-10-27 | End: 2022-10-27

## 2022-10-27 RX ORDER — WATER 1000 ML/1000ML
INJECTION, SOLUTION INTRAVENOUS
Status: COMPLETED
Start: 2022-10-27 | End: 2022-10-27

## 2022-10-27 RX ADMIN — ALTEPLASE 2 MG: 2.2 INJECTION, POWDER, LYOPHILIZED, FOR SOLUTION INTRAVENOUS at 11:04

## 2022-10-27 RX ADMIN — COPANLISIB 60 MG: 15 INJECTION, POWDER, LYOPHILIZED, FOR SOLUTION INTRAVENOUS at 12:26

## 2022-10-27 RX ADMIN — ONDANSETRON 8 MG: 2 INJECTION INTRAMUSCULAR; INTRAVENOUS at 11:43

## 2022-10-27 RX ADMIN — WATER 10 ML: 1 INJECTION, SOLUTION INTRAMUSCULAR; INTRAVENOUS; SUBCUTANEOUS at 11:04

## 2022-10-27 RX ADMIN — SODIUM CHLORIDE 20 ML/HR: 0.9 INJECTION, SOLUTION INTRAVENOUS at 11:42

## 2022-10-27 NOTE — PATIENT INSTRUCTIONS
PRESCRIPTION REFILL REMINDER:  All medication refills should be requested prior to [facility] on Friday  Any refill requests after noon on Friday would be addressed the following Monday  Please protect yourself from [place]   = Wash your hands  Soap and water, or hand  with at least 60% alcohol, are both effective at killing the virus  = Wear a mask  This will help protect others from any virus particles you might spread  Your mouth and nose BOTH need to be covered  = Keep the distance  Keep 6 feet of distance from other people, even if they seem healthy  Keeping distance protects you from the other person's virus spread     = Get a vaccine, and boost it  Three vaccines are approved for use in the [place] for all adults  These vaccines do provide protection against all known variants, and the new 'bi-valent' booster is predicted to be more protective against Omicron variants   + Pfizer is FDA approved for all persons age 11 and older   + Christelle Batman may be given to all person age 25 and older   - We do NOT recommend [address] vaccine for our Palliative Care patients  - [facility] is no longer offering regular COVID vaccine clinics  You may ask your primary doctor for help and advice  = you may also visit the CDC's complete list of approved sites for new vaccines: Vaccines  gov - Vaccine Location Search Results   (You may also visit Vaccines  gov and search for 'Bi-valent booster' in your zip code )  = [address] ([facility]), [employer], [employer], [employer], and many [facility] locations ALL have shots   + The CDC recommends booster shots on ALL vaccines for ALL adults  = Test to treat  If you have symptoms, get tested, and get treatment!   New drugs like Paxlovid can prevent you from ever having to go to the hospital , and may be covered completely by your insurance or special government programs    - https://aspr hhs gov/TestToTreat/ Informacion en espanol sobre vacunas, de nos companeros de [facility] --  Amy rosales    Check out Reclip.It for Asif data that are updated daily:    http://www ASOCS/     Global Epidemics  Org, from [facility]), will give you Nizjjz-pm-Igvjov information on virus cases and vaccination rates:    Https://globalepidemics  org/    Frequently Asked Questions about COVID, answered by [facility]    [employer]

## 2022-10-27 NOTE — TELEPHONE ENCOUNTER
Prior Authorization needed for Oxycodone 10mg tablet    [address]  Phone: [phone]    Prior Authorization initiated through [facility]

## 2022-10-27 NOTE — PROGRESS NOTES
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 ([address] )    3  Drug induced constipation    4  Lower abdominal pain       · Chronic cancer related pain mostly in her abdomen is controlled with current regimen  Trial taper as her pain appears well controlled and she is now very constipated  · Decrease oxyIR from 15mg PO to 10mg PO q6H prn  Max of 4 a day  · She is requesting a fill from Nov 22 to January 2 when she goes to [place]  I will fill her one month script now, but she will have to call back a few days for the fill from Nov  I am not sure if her insurance will allow fill for that long  · 1 senokot and 1 miralax BID for constipation  · RTO in February 2023, after she's back from [place]    Controlled Substance Review    [place] or [place]  reviewed: No red flags were identified; safe to proceed with prescription        09/23/2022 09/23/2022   2  Oxycodone Hcl (Ir) 15 Mg Tab   90 00  23  Ti Joselito  [id number]   All ([id number])   88 04 MME  [place]     08/26/2022 08/25/2022   2  Oxycodone Hcl (Ir) 15 Mg Tab   90 00  30  Ti Joselito  [id number]   All ([id number])   67 50 MME  [place]     07/27/2022 07/27/2022   2  Oxycodone Hcl (Ir) 15 Mg Tab   90 00  23  Ti Joselito  [id number]   All ([id number])   88 04 MME  [place]     06/24/2022 06/24/2022   2  Oxycodone Hcl (Ir) 15 Mg Tab   90 00  23  Ti Joselito              Requested Prescriptions     Signed Prescriptions Disp Refills   • oxyCODONE (ROXICODONE) 10 MG TABS 90 tablet 0     Sig: Take 1 tablet (10 mg total) by mouth every 4 (four) hours as needed for moderate pain Max Daily Amount: 60 mg     Medications Discontinued During This Encounter   Medication Reason   • oxyCODONE (ROXICODONE) 15 mg immediate release tablet        Representatives have queried the patient's controlled substance dispensing history in the Prescription Drug Monitoring Program in compliance with regulations before I have prescribed any controlled substances  The prescription history is consistent with prescribed therapy and our practice policies  30 minutes were spent face to face with Dodie Favre with greater than 50% of the time spent in counseling or coordination of care including discussions of etiology of diagnosis, diagnostic results, impression, and recommendations, risks and benefits of treatment, instructions for disease self management, treatment instructions, follow up requirements, risk factors and risk reduction of disease, patient and family counseling/involvement in care and compliance with treatment regimen   All of the patient's questions were answered during this discussion  No follow-ups on file  Subjective:   Chief Complaint  Follow up visit for:  symptom management, pain, neoplasm related, assessment of goals of care, disease process education and discussion of prognosis, advance care planning    HPI     Dodie Favre is a 63 y o  female with grade 2 follicular lymphoma initially diagnosed in Nov 2018  Started on chemo (rituximab + bendamustine) soon after  Developed tumor lysis syndrome in Dec 2018, bendamustine removed  She completed rituxan on 4/2019 and had been on maintenance Rituxan since 5/2019  In March 2020, she had evidence of disease progression and was switched to rituxan+Revlimid  Per review of notes, there are some concerns with noncompliance and f/u with bloodwork   Per Oncology note, long discussion with patient regarding refractory and progressive grade 2 follicular lymphoma   Patient with c/o increase L neck swelling/tenderness, US done, concerning for progression of her disease  Her biopsy from the L iliac bone and L cervical LN came back positive for follicular lymphoma  Plan was to start Obinutuzumab/CHOP with Udenyca but because of neutropenia, decision was to do copanlisib instead   However, there was a delay in her treatment when she had Covid-19 as well as when she went to [facility] to relocate  She was going to stay there to get treatments, but it didn't work out so she came back  There are frequent interruptions in her care, with her frequent trips to [facility] for personal/social reasons  Her PET-CT 2021 unfortunately showed significant progression of disease in the neck, chest, abdomen, pelvis, including new lesion in the L ribs  She is currently on copanlisib 60 mg 3 weeks on with 1 week of break since 2021  Her latest PET-CT in 2022 showed possible new progression in the mesenteric area with new LN behind the L ear on exam  For this reason, she was referred to Dr Akin Gasca who believes she can be candidate for CAR-T therapy  She was last seen 2022 where we again talked about eating healthy and exercising  Her pain is controlled with her regimen  Since her last visit, she has seen oncology who ordered a PET-CT and venous dopplers which are pending  She comes in today by herself  She looks healthy and stable  She is anxious about her imaging  She started to cry and started to talk about her mother's and sister's and father's cancers  She is flying back to [place] from  to 2023 to help her daughter care for her   Her chronic abdominal/pelvic pain continues to be well-controlled with her regimen and she continues to be largely comfortable  She is now also very constipated per her reporting  I discussed with her lowering oxycodone to 10mg q6H prn as she will benefit to a trial to find the lowest most effective dose  The following portions of the medical history were reviewed: past medical history, problem list, medication list, and social history      Current Outpatient Medications:   •  al mag oxide-diphenhydramine-lidocaine viscous (MAGIC MOUTHWASH) 1:1:1 suspension, Swish and spit 10 mL every 4 (four) hours as needed for mouth pain or discomfort or mucositis, Disp: 180 mL, Rfl: 3  •  allopurinol (ZYLOPRIM) 100 mg tablet, Take 1 tablet (100 mg total) by mouth daily, Disp: 30 tablet, Rfl: 0  •  amLODIPine (NORVASC) 10 mg tablet, Take 10 mg by mouth daily, Disp: , Rfl:   •  atorvastatin (LIPITOR) 40 mg tablet, Take 1 tablet (40 mg total) by mouth daily with dinner, Disp: 12 tablet, Rfl: 0  •  Blood Glucose Monitoring Suppl (OneTouch Verio) w/Device KIT, Use in the morning, Disp: 1 kit, Rfl: 0  •  DULoxetine (CYMBALTA) 20 mg capsule, Take 2 capsules (40 mg total) by mouth daily, Disp: 30 capsule, Rfl: 3  •  folic acid (FOLVITE) 1 mg tablet, Take 1 tablet (1 mg total) by mouth daily, Disp: 90 tablet, Rfl: 4  •  guaiFENesin (ROBITUSSIN) 100 mg/5 mL oral solution, Take 10 mL (200 mg total) by mouth every 4 (four) hours, Disp: 473 mL, Rfl: 0  •  meclizine (ANTIVERT) 12 5 MG tablet, Take 1 tablet (12 5 mg total) by mouth every 8 (eight) hours as needed for dizziness, Disp: 30 tablet, Rfl: 0  •  metFORMIN (GLUCOPHAGE) 500 mg tablet, Take 2 tablets (1,000 mg total) by mouth 2 (two) times a day with meals, Disp: 60 tablet, Rfl: 5  •  omeprazole (PriLOSEC) 20 mg delayed release capsule, Take 2 capsules (40 mg total) by mouth daily To prevent stomach upset, Disp: 60 capsule, Rfl: 5  •  oxyCODONE (ROXICODONE) 10 MG TABS, Take 1 tablet (10 mg total) by mouth every 4 (four) hours as needed for moderate pain Max Daily Amount: 60 mg, Disp: 90 tablet, Rfl: 0  •  potassium chloride (K-DUR,KLOR-CON) 20 mEq tablet, Take 1 tablet (20 mEq total) by mouth daily, Disp: 30 tablet, Rfl: 11  •  acetaminophen (TYLENOL) 325 mg tablet, Take 2 tablets (650 mg total) by mouth every 6 (six) hours as needed for mild pain, moderate pain, headaches or fever (Patient not taking: Reported on 10/27/2022), Disp: 30 tablet, Rfl: 0  •  acetaminophen (TYLENOL) 500 mg tablet, Take 1 tablet (500 mg total) by mouth every 6 (six) hours as needed (pain) (Patient not taking: Reported on 10/27/2022), Disp: 30 tablet, Rfl: 0  • acyclovir (ZOVIRAX) 400 MG tablet, Take 1 tablet (400 mg total) by mouth 2 (two) times a day, Disp: 60 tablet, Rfl: 11  •  ascorbic acid (VITAMIN C) 1000 MG tablet, Take 1 tablet (1,000 mg total) by mouth every 12 (twelve) hours for 10 doses, Disp: 10 tablet, Rfl: 0  •  aspirin (ECOTRIN LOW STRENGTH) 81 mg EC tablet, Take 1 tablet (81 mg total) by mouth daily (Patient not taking: Reported on 10/27/2022), Disp: 30 tablet, Rfl: 11  •  aspirin-acetaminophen-caffeine (EXCEDRIN MIGRAINE) 250-250-65 MG per tablet, Take 1 tablet by mouth every 6 (six) hours as needed for headaches Erica 1 tableta cada 6 horas fernandez sea necesario para el dolor de russ (Patient not taking: Reported on 10/27/2022), Disp: 30 tablet, Rfl: 0  •  benzocaine (ORAJEL) 10 % mucosal gel, Apply 1 application to the mouth or throat as needed for mucositis (Patient not taking: Reported on 10/27/2022), Disp: 5 3 g, Rfl: 0  •  benzonatate (TESSALON) 200 MG capsule, Take 1 capsule (200 mg total) by mouth 3 (three) times a day as needed for cough (Patient not taking: Reported on 10/27/2022), Disp: 20 capsule, Rfl: 1  •  carbamide peroxide (DEBROX) 6 5 % otic solution, Administer 5 drops to the right ear 2 (two) times a day (Patient not taking: Reported on 10/27/2022), Disp: 15 mL, Rfl: 0  •  chlorthalidone 25 mg tablet, Take 1 tablet (25 mg total) by mouth daily (Patient not taking: Reported on 10/27/2022), Disp: 30 tablet, Rfl: 5  •  cholecalciferol (VITAMIN D3) 1,000 units tablet, Take 2 tablets (2,000 Units total) by mouth daily for 5 days, Disp: 10 tablet, Rfl: 0  •  cyanocobalamin 1000 MCG tablet, Take 1 tablet (1,000 mcg total) by mouth daily (Patient not taking: Reported on 10/27/2022), Disp: 90 tablet, Rfl: 3  •  glucose blood (OneTouch Verio) test strip, Use 1 each in the morning Use as instructed, Disp: 50 strip, Rfl: 6  •  ibuprofen (MOTRIN) 400 mg tablet, Take 1 tablet (400 mg total) by mouth every 6 (six) hours as needed for mild pain (Patient not taking: Reported on 10/27/2022), Disp: 12 tablet, Rfl: 0  •  lidocaine (LMX) 4 % cream, Apply topically as needed for mild pain To neck, Disp: 30 g, Rfl: 0  •  LORazepam (ATIVAN) 0 5 mg tablet, Take 1 tablet 30-60 minutes before MRI, can take an additional tablet if needed before test, Disp: 2 tablet, Rfl: 0  •  naloxone (NARCAN) 4 mg/0 1 mL nasal spray, Administer 1 spray into a nostril  If no response after 2-3 minutes, give another dose in the other nostril using a new spray , Disp: 1 each, Rfl: 0  •  ondansetron (ZOFRAN) 4 mg tablet, Take 1 tablet (4 mg total) by mouth every 8 (eight) hours as needed for nausea or vomiting (Patient not taking: Reported on 10/27/2022), Disp: 30 tablet, Rfl: 3  •  OneTouch Delica Lancets 35W MISC, Use in the morning, Disp: 100 each, Rfl: 4  •  polyethylene glycol (MIRALAX) 17 g packet, Take 17 g by mouth daily (Patient not taking: Reported on 10/27/2022), Disp: 30 each, Rfl: 3  •  senna (SENOKOT) 8 6 mg, Take 1 tablet (8 6 mg total) by mouth 2 (two) times a day (Patient not taking: Reported on 10/27/2022), Disp: 60 each, Rfl: 3  •  zinc sulfate (ZINCATE) 220 mg capsule, Take 1 capsule (220 mg total) by mouth daily for 4 doses, Disp: 4 capsule, Rfl: 0  No current facility-administered medications for this visit  Facility-Administered Medications Ordered in Other Visits:   •  alteplase (CATHFLO) injection 2 mg, 2 mg, Intracatheter, Q2H PRN, Olive Tran MD  •  alteplase (CATHFLO) injection 2 mg, 2 mg, Intracatheter, Q2H PRN, Olive Tran MD, 2 mg at 10/27/22 1104  Review of Systems   Constitutional: Negative for activity change, appetite change and unexpected weight change  HENT: Negative for trouble swallowing  Respiratory: Negative for shortness of breath  Cardiovascular: Negative for chest pain  Gastrointestinal: Positive for constipation  Negative for abdominal distention, diarrhea, nausea and vomiting          Chronic abdominal pain   Musculoskeletal: Negative for back pain  Neurological: Negative for speech difficulty and light-headedness  Psychiatric/Behavioral: Negative for sleep disturbance  The patient is not nervous/anxious  All other systems reviewed and are negative  All other systems negative    Objective:  Vital Signs  /88 (BP Location: Left arm, Patient Position: Sitting, Cuff Size: Standard) Comment (BP Location): l wrist  Pulse 76   Temp (!) 96 6 °F (35 9 °C) (Temporal)   Resp 16   Wt 107 kg (235 lb 9 6 oz)   SpO2 99%   BMI 40 44 kg/m²    Physical Exam    Constitutional: Appears well-developed and well-nourished, she does not look sick, nor toxic-looking  Pleasant, well-kempt  Head: Normocephalic and atraumatic  Eyes: EOM are normal  No ocular discharge  No scleral icterus  Respiratory: Effort normal  No stridor  No respiratory distress  Gastrointestinal: No abdominal distension  Musculoskeletal: No edema  Neurological: Alert, oriented and appropriately conversant  Skin: No diaphoresis, no rashes seen on exposed areas of skin  Psychiatric: Displays a normal mood and affect   Behavior, judgement and thought content appear normal     Roberto Puente MD  Palliative Medicine & Supportive Care  Internal Medicine  Available via [facility] Text  Office: [phone]  Fax: [phone]

## 2022-10-27 NOTE — PROGRESS NOTES
Patient presents for aliqopa  Port accessed from yesterday, no blood return and cath flow instilled and brisk blood return after half hour  Patient tolerated chemo without complications  Reached out to Destin Dinero RN regarding patient needing port study after multiple uses of cath flow  Also, no blood return at end of visit today despite using cath flow earlier  Patient aware that IR will be calling to schedule  Video interpretation used:  Patient nauseous at the end but states she hasn't eaten all day and is going home to eat  Patient also states she's leaving the country to [place] 11-22-22 because her daughter is underage and is about to have a baby and she has to sign forms and be there for the delivery  Next cycle confirmed and AVS provided  Pt will be seeing Monica Iglesias before leaving for [place] and will await ok from office to cancel off chemo that week

## 2022-10-28 ENCOUNTER — HOSPITAL ENCOUNTER (OUTPATIENT)
Dept: NON INVASIVE DIAGNOSTICS | Facility: HOSPITAL | Age: 62
Discharge: HOME | End: 2022-10-28
Payer: COMMERCIAL

## 2022-10-28 DIAGNOSIS — M79.604 PAIN IN BOTH LOWER EXTREMITIES: ICD-10-CM

## 2022-10-28 DIAGNOSIS — M79.605 PAIN IN BOTH LOWER EXTREMITIES: ICD-10-CM

## 2022-10-28 PROCEDURE — 93970 EXTREMITY STUDY: CPT

## 2022-10-31 NOTE — TELEPHONE ENCOUNTER
PA received for this medication  Oxycodone 10 MG Qty 136 for 34 days, approved six months- 10/28/22-4/28/-23  I will fax pharmacy

## 2022-11-03 DIAGNOSIS — C82.18 GRADE 2 FOLLICULAR LYMPHOMA OF LYMPH NODES OF MULTIPLE REGIONS (HCC): Primary | ICD-10-CM

## 2022-11-03 RX ORDER — SODIUM CHLORIDE 9 MG/ML
20 INJECTION, SOLUTION INTRAVENOUS ONCE
Status: CANCELLED | OUTPATIENT
Start: 2022-11-10

## 2022-11-03 RX ORDER — SODIUM CHLORIDE 9 MG/ML
20 INJECTION, SOLUTION INTRAVENOUS ONCE
OUTPATIENT
Start: 2022-11-17

## 2022-11-03 RX ORDER — SODIUM CHLORIDE 9 MG/ML
20 INJECTION, SOLUTION INTRAVENOUS ONCE
Status: CANCELLED | OUTPATIENT
Start: 2022-11-24

## 2022-11-09 ENCOUNTER — HOSPITAL ENCOUNTER (OUTPATIENT)
Dept: INFUSION CENTER | Facility: HOSPITAL | Age: 62
Discharge: HOME | End: 2022-11-09
Attending: INTERNAL MEDICINE

## 2022-11-09 DIAGNOSIS — Z45.2 ENCOUNTER FOR CENTRAL LINE CARE: Primary | ICD-10-CM

## 2022-11-09 DIAGNOSIS — C82.18 GRADE 2 FOLLICULAR LYMPHOMA OF LYMPH NODES OF MULTIPLE REGIONS (HCC): ICD-10-CM

## 2022-11-09 DIAGNOSIS — D51.8 OTHER VITAMIN B12 DEFICIENCY ANEMIAS: ICD-10-CM

## 2022-11-09 LAB
ALBUMIN SERPL BCP-MCNC: 3.9 G/DL (ref 3.5–5)
ALP SERPL-CCNC: 100 U/L (ref 43–122)
ALT SERPL W P-5'-P-CCNC: 17 U/L
ANION GAP SERPL CALCULATED.3IONS-SCNC: 9 MMOL/L (ref 5–14)
AST SERPL W P-5'-P-CCNC: 18 U/L (ref 14–36)
BASOPHILS # BLD AUTO: 0.03 THOUSANDS/ÂΜL (ref 0–0.1)
BASOPHILS NFR BLD AUTO: 1 % (ref 0–1)
BILIRUB SERPL-MCNC: 0.36 MG/DL (ref 0.2–1)
BUN SERPL-MCNC: 15 MG/DL (ref 5–25)
CALCIUM SERPL-MCNC: 9.2 MG/DL (ref 8.4–10.2)
CHLORIDE SERPL-SCNC: 100 MMOL/L (ref 96–108)
CO2 SERPL-SCNC: 29 MMOL/L (ref 21–32)
CREAT SERPL-MCNC: 0.75 MG/DL (ref 0.6–1.2)
CRP SERPL QL: 28.2 MG/L
EOSINOPHIL # BLD AUTO: 0.14 THOUSAND/ÂΜL (ref 0–0.61)
EOSINOPHIL NFR BLD AUTO: 2 % (ref 0–6)
ERYTHROCYTE [DISTWIDTH] IN BLOOD BY AUTOMATED COUNT: 16.7 % (ref 11.6–15.1)
ERYTHROCYTE [SEDIMENTATION RATE] IN BLOOD: 58 MM/HOUR (ref 0–29)
GFR SERPL CREATININE-BSD FRML MDRD: 86 ML/MIN/1.73SQ M
GLUCOSE SERPL-MCNC: 154 MG/DL (ref 70–99)
HCT VFR BLD AUTO: 37.7 % (ref 34.8–46.1)
HGB BLD-MCNC: 11.1 G/DL (ref 11.5–15.4)
IMM GRANULOCYTES # BLD AUTO: 0.02 THOUSAND/UL (ref 0–0.2)
IMM GRANULOCYTES NFR BLD AUTO: 0 % (ref 0–2)
LDH SERPL-CCNC: 468 U/L (ref 313–618)
LYMPHOCYTES # BLD AUTO: 1.66 THOUSANDS/ÂΜL (ref 0.6–4.47)
LYMPHOCYTES NFR BLD AUTO: 26 % (ref 14–44)
MCH RBC QN AUTO: 20.2 PG (ref 26.8–34.3)
MCHC RBC AUTO-ENTMCNC: 29.4 G/DL (ref 31.4–37.4)
MCV RBC AUTO: 69 FL (ref 82–98)
MONOCYTES # BLD AUTO: 0.45 THOUSAND/ÂΜL (ref 0.17–1.22)
MONOCYTES NFR BLD AUTO: 7 % (ref 4–12)
NEUTROPHILS # BLD AUTO: 4.09 THOUSANDS/ÂΜL (ref 1.85–7.62)
NEUTS SEG NFR BLD AUTO: 64 % (ref 43–75)
NRBC BLD AUTO-RTO: 0 /100 WBCS
PLATELET # BLD AUTO: 198 THOUSANDS/UL (ref 149–390)
PMV BLD AUTO: 10.1 FL (ref 8.9–12.7)
POTASSIUM SERPL-SCNC: 3.3 MMOL/L (ref 3.5–5.3)
PROT SERPL-MCNC: 6.9 G/DL (ref 6.4–8.4)
RBC # BLD AUTO: 5.5 MILLION/UL (ref 3.81–5.12)
SODIUM SERPL-SCNC: 138 MMOL/L (ref 135–147)
VIT B12 SERPL-MCNC: 964 PG/ML (ref 100–900)
WBC # BLD AUTO: 6.39 THOUSAND/UL (ref 4.31–10.16)

## 2022-11-09 NOTE — PROGRESS NOTES
Central labs drawn and sent, port flushed per protocol  Port remains accessed for treatment therapy tomorrow  Appointment confirmed, AVS declined

## 2022-11-10 ENCOUNTER — HOSPITAL ENCOUNTER (OUTPATIENT)
Dept: INFUSION CENTER | Facility: HOSPITAL | Age: 62
End: 2022-11-10
Attending: INTERNAL MEDICINE

## 2022-11-10 VITALS
DIASTOLIC BLOOD PRESSURE: 78 MMHG | OXYGEN SATURATION: 99 % | HEART RATE: 60 BPM | TEMPERATURE: 97.5 F | SYSTOLIC BLOOD PRESSURE: 130 MMHG | RESPIRATION RATE: 18 BRPM

## 2022-11-10 DIAGNOSIS — C82.18 GRADE 2 FOLLICULAR LYMPHOMA OF LYMPH NODES OF MULTIPLE REGIONS (HCC): Primary | ICD-10-CM

## 2022-11-10 DIAGNOSIS — D51.8 OTHER VITAMIN B12 DEFICIENCY ANEMIAS: ICD-10-CM

## 2022-11-10 LAB — GLUCOSE SERPL-MCNC: 158 MG/DL (ref 65–140)

## 2022-11-10 RX ORDER — SODIUM CHLORIDE 9 MG/ML
20 INJECTION, SOLUTION INTRAVENOUS ONCE
Status: COMPLETED | OUTPATIENT
Start: 2022-11-10 | End: 2022-11-10

## 2022-11-10 RX ORDER — CYANOCOBALAMIN 1000 UG/ML
1000 INJECTION, SOLUTION INTRAMUSCULAR; SUBCUTANEOUS ONCE
OUTPATIENT
Start: 2022-12-06

## 2022-11-10 RX ORDER — CYANOCOBALAMIN 1000 UG/ML
1000 INJECTION, SOLUTION INTRAMUSCULAR; SUBCUTANEOUS ONCE
Status: COMPLETED | OUTPATIENT
Start: 2022-11-10 | End: 2022-11-10

## 2022-11-10 RX ADMIN — COPANLISIB 60 MG: 15 INJECTION, POWDER, LYOPHILIZED, FOR SOLUTION INTRAVENOUS at 12:51

## 2022-11-10 RX ADMIN — ONDANSETRON 8 MG: 2 INJECTION INTRAMUSCULAR; INTRAVENOUS at 11:58

## 2022-11-10 RX ADMIN — SODIUM CHLORIDE 20 ML/HR: 0.9 INJECTION, SOLUTION INTRAVENOUS at 11:30

## 2022-11-10 RX ADMIN — CYANOCOBALAMIN 1000 MCG: 1000 INJECTION INTRAMUSCULAR; SUBCUTANEOUS at 12:52

## 2022-11-10 NOTE — PROGRESS NOTES
Pt tolerated Aliqopa infusion without difficulty  No s/s reaction noted  Port flushed and deaccessed per protocol  Next appt confirmed  AVS provided  Left ambulatory with [facility]

## 2022-11-11 ENCOUNTER — OFFICE VISIT (OUTPATIENT)
Dept: HEMATOLOGY ONCOLOGY | Facility: CLINIC | Age: 62
End: 2022-11-11

## 2022-11-11 ENCOUNTER — TELEPHONE (OUTPATIENT)
Dept: HEMATOLOGY ONCOLOGY | Facility: CLINIC | Age: 62
End: 2022-11-11

## 2022-11-11 VITALS
HEART RATE: 64 BPM | HEIGHT: 64 IN | WEIGHT: 237.2 LBS | DIASTOLIC BLOOD PRESSURE: 76 MMHG | TEMPERATURE: 96.6 F | OXYGEN SATURATION: 98 % | BODY MASS INDEX: 40.49 KG/M2 | SYSTOLIC BLOOD PRESSURE: 122 MMHG | RESPIRATION RATE: 18 BRPM

## 2022-11-11 DIAGNOSIS — C82.18 GRADE 2 FOLLICULAR LYMPHOMA OF LYMPH NODES OF MULTIPLE REGIONS (HCC): Primary | ICD-10-CM

## 2022-11-11 NOTE — TELEPHONE ENCOUNTER
Please schedule out labs & treatments  Labs on Weds around 1pm, and Treatments on Thursday around 11am  Thank you!
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
## 2022-11-11 NOTE — TELEPHONE ENCOUNTER
I believe pt will be on vacation from 11/22 through the first week of January, if I am to schedule more could the orders reflect these new dates? Thank you!

## 2022-11-16 ENCOUNTER — HOSPITAL ENCOUNTER (OUTPATIENT)
Dept: INFUSION CENTER | Facility: HOSPITAL | Age: 62
Discharge: HOME | End: 2022-11-16
Attending: INTERNAL MEDICINE

## 2022-11-16 DIAGNOSIS — Z45.2 ENCOUNTER FOR CENTRAL LINE CARE: ICD-10-CM

## 2022-11-16 DIAGNOSIS — C82.18 GRADE 2 FOLLICULAR LYMPHOMA OF LYMPH NODES OF MULTIPLE REGIONS (HCC): Primary | ICD-10-CM

## 2022-11-16 LAB
ALBUMIN SERPL BCP-MCNC: 4.1 G/DL (ref 3.5–5)
ALP SERPL-CCNC: 111 U/L (ref 43–122)
ALT SERPL W P-5'-P-CCNC: 21 U/L
ANION GAP SERPL CALCULATED.3IONS-SCNC: 8 MMOL/L (ref 5–14)
AST SERPL W P-5'-P-CCNC: 21 U/L (ref 14–36)
BASOPHILS # BLD AUTO: 0.04 THOUSANDS/ÂΜL (ref 0–0.1)
BASOPHILS NFR BLD AUTO: 1 % (ref 0–1)
BILIRUB SERPL-MCNC: 0.45 MG/DL (ref 0.2–1)
BUN SERPL-MCNC: 15 MG/DL (ref 5–25)
CALCIUM SERPL-MCNC: 9.7 MG/DL (ref 8.4–10.2)
CHLORIDE SERPL-SCNC: 100 MMOL/L (ref 96–108)
CO2 SERPL-SCNC: 28 MMOL/L (ref 21–32)
CREAT SERPL-MCNC: 0.69 MG/DL (ref 0.6–1.2)
EOSINOPHIL # BLD AUTO: 0.11 THOUSAND/ÂΜL (ref 0–0.61)
EOSINOPHIL NFR BLD AUTO: 1 % (ref 0–6)
ERYTHROCYTE [DISTWIDTH] IN BLOOD BY AUTOMATED COUNT: 17.2 % (ref 11.6–15.1)
GFR SERPL CREATININE-BSD FRML MDRD: 94 ML/MIN/1.73SQ M
GLUCOSE SERPL-MCNC: 122 MG/DL (ref 70–99)
HCT VFR BLD AUTO: 40.4 % (ref 34.8–46.1)
HGB BLD-MCNC: 11.8 G/DL (ref 11.5–15.4)
IMM GRANULOCYTES # BLD AUTO: 0.02 THOUSAND/UL (ref 0–0.2)
IMM GRANULOCYTES NFR BLD AUTO: 0 % (ref 0–2)
LDH SERPL-CCNC: 467 U/L (ref 313–618)
LYMPHOCYTES # BLD AUTO: 2.12 THOUSANDS/ÂΜL (ref 0.6–4.47)
LYMPHOCYTES NFR BLD AUTO: 27 % (ref 14–44)
MCH RBC QN AUTO: 20 PG (ref 26.8–34.3)
MCHC RBC AUTO-ENTMCNC: 29.2 G/DL (ref 31.4–37.4)
MCV RBC AUTO: 69 FL (ref 82–98)
MONOCYTES # BLD AUTO: 0.36 THOUSAND/ÂΜL (ref 0.17–1.22)
MONOCYTES NFR BLD AUTO: 5 % (ref 4–12)
NEUTROPHILS # BLD AUTO: 5.09 THOUSANDS/ÂΜL (ref 1.85–7.62)
NEUTS SEG NFR BLD AUTO: 66 % (ref 43–75)
NRBC BLD AUTO-RTO: 0 /100 WBCS
PLATELET # BLD AUTO: 177 THOUSANDS/UL (ref 149–390)
POTASSIUM SERPL-SCNC: 3.8 MMOL/L (ref 3.5–5.3)
PROT SERPL-MCNC: 7.3 G/DL (ref 6.4–8.4)
RBC # BLD AUTO: 5.89 MILLION/UL (ref 3.81–5.12)
SODIUM SERPL-SCNC: 136 MMOL/L (ref 135–147)
WBC # BLD AUTO: 7.74 THOUSAND/UL (ref 4.31–10.16)

## 2022-11-16 NOTE — PROGRESS NOTES
Pt arrived on unit today for labs  Lab work drawn and sent as ordered via port  Port remains accessed for treatment tomorrow  Port flushed per protocol  Left unit ambulatory with a steady gait

## 2022-11-16 NOTE — PLAN OF CARE
Problem: Knowledge Deficit  Goal: Patient/family/caregiver demonstrates understanding of disease process, treatment plan, medications, and discharge instructions  Description: Complete learning assessment and assess knowledge base    Interventions:  - Provide teaching at level of understanding  - Provide teaching via preferred learning methods  11/16/2022 1355 by Madan Vaughn RN  Outcome: Progressing  11/16/2022 1355 by Madan Vaughn RN  Outcome: Progressing

## 2022-11-17 ENCOUNTER — HOSPITAL ENCOUNTER (OUTPATIENT)
Dept: INFUSION CENTER | Facility: HOSPITAL | Age: 62
End: 2022-11-17
Attending: INTERNAL MEDICINE

## 2022-11-17 VITALS
TEMPERATURE: 97.2 F | OXYGEN SATURATION: 99 % | HEART RATE: 68 BPM | RESPIRATION RATE: 18 BRPM | SYSTOLIC BLOOD PRESSURE: 164 MMHG | DIASTOLIC BLOOD PRESSURE: 80 MMHG

## 2022-11-17 DIAGNOSIS — C82.18 GRADE 2 FOLLICULAR LYMPHOMA OF LYMPH NODES OF MULTIPLE REGIONS (HCC): Primary | ICD-10-CM

## 2022-11-17 DIAGNOSIS — Z45.2 ENCOUNTER FOR CENTRAL LINE CARE: ICD-10-CM

## 2022-11-17 DIAGNOSIS — G89.3 CANCER RELATED PAIN: ICD-10-CM

## 2022-11-17 LAB — GLUCOSE SERPL-MCNC: 227 MG/DL (ref 65–140)

## 2022-11-17 RX ORDER — OXYCODONE HYDROCHLORIDE 10 MG/1
10 TABLET ORAL EVERY 4 HOURS PRN
Qty: 180 TABLET | Refills: 0 | Status: SHIPPED | OUTPATIENT
Start: 2022-11-17

## 2022-11-17 NOTE — TELEPHONE ENCOUNTER
Will provide 180 tablets to get her through to January  Please see if she needs anything else as [place]'ll be [place] of the country [place] time

## 2022-11-17 NOTE — PROGRESS NOTES
Pt arrives for chemo today  Blood sugar 227 and blood pressure 164/80, spoke with Abdias Maurer Rn and per Dr Gilbert Mccall, hold treatment today  Since patient is going to [place] next week, will see patient back in the new year  Patient aware and agreeable  Annie Mc in pharmacy aware

## 2022-11-17 NOTE — TELEPHONE ENCOUNTER
Fang Blount is calling to get a refill of her medication she is going to [place] [employer] and she will be staying until January due to waiting to see grandchild be born she wanted to know if she can have enough to have for this trip  Please advise

## 2022-12-27 DIAGNOSIS — G89.3 CANCER RELATED PAIN: ICD-10-CM

## 2022-12-27 RX ORDER — OXYCODONE HYDROCHLORIDE 10 MG/1
10 TABLET ORAL EVERY 4 HOURS PRN
Qty: 90 TABLET | Refills: 0 | Status: SHIPPED | OUTPATIENT
Start: 2022-12-27

## 2022-12-27 NOTE — TELEPHONE ENCOUNTER
Patient Id Prescription #  Filled Written Drug Label Qty Days Strength MME** Prescriber Pharmacy Payment REFILL #/Auth State Detail   1  [id number] 11/17/2022 11/17/2022 oxyCODONE HCL (Tablet)  180 0 30 10 MG 90 0 FRANCO HOGAN  [facility]         10/27 last  2/7 next

## 2023-01-05 DIAGNOSIS — C82.18 GRADE 2 FOLLICULAR LYMPHOMA OF LYMPH NODES OF MULTIPLE REGIONS (HCC): Primary | ICD-10-CM

## 2023-01-05 DIAGNOSIS — D51.8 OTHER VITAMIN B12 DEFICIENCY ANEMIAS: Primary | ICD-10-CM

## 2023-01-05 RX ORDER — CYANOCOBALAMIN 1000 UG/ML
1000 INJECTION, SOLUTION INTRAMUSCULAR; SUBCUTANEOUS ONCE
Status: CANCELLED | OUTPATIENT
Start: 2023-01-12

## 2023-01-05 RX ORDER — SODIUM CHLORIDE 9 MG/ML
20 INJECTION, SOLUTION INTRAVENOUS ONCE
Status: CANCELLED | OUTPATIENT
Start: 2023-01-30

## 2023-01-09 ENCOUNTER — TELEPHONE (OUTPATIENT)
Dept: HEMATOLOGY ONCOLOGY | Facility: CLINIC | Age: 63
End: 2023-01-09

## 2023-01-09 DIAGNOSIS — C82.18 GRADE 2 FOLLICULAR LYMPHOMA OF LYMPH NODES OF MULTIPLE REGIONS (HCC): Primary | ICD-10-CM

## 2023-01-09 NOTE — TELEPHONE ENCOUNTER
Please schedule cycle 18 and B12 starting 1/30  Pt has 9am appt with Dunia Navarrete that day  Thank you!

## 2023-01-10 DIAGNOSIS — D51.8 OTHER VITAMIN B12 DEFICIENCY ANEMIAS: Primary | ICD-10-CM

## 2023-01-10 RX ORDER — CYANOCOBALAMIN 1000 UG/ML
1000 INJECTION, SOLUTION INTRAMUSCULAR; SUBCUTANEOUS ONCE
OUTPATIENT
Start: 2023-01-30

## 2023-01-25 RX ORDER — SODIUM CHLORIDE 9 MG/ML
20 INJECTION, SOLUTION INTRAVENOUS ONCE
Status: CANCELLED | OUTPATIENT
Start: 2023-02-06

## 2023-01-25 RX ORDER — SODIUM CHLORIDE 9 MG/ML
20 INJECTION, SOLUTION INTRAVENOUS ONCE
Status: CANCELLED | OUTPATIENT
Start: 2023-01-30

## 2023-01-25 RX ORDER — SODIUM CHLORIDE 9 MG/ML
20 INJECTION, SOLUTION INTRAVENOUS ONCE
Status: CANCELLED | OUTPATIENT
Start: 2023-02-13

## 2023-01-30 ENCOUNTER — TELEPHONE (OUTPATIENT)
Dept: HEMATOLOGY ONCOLOGY | Facility: CLINIC | Age: 63
End: 2023-01-30

## 2023-01-30 ENCOUNTER — OFFICE VISIT (OUTPATIENT)
Dept: HEMATOLOGY ONCOLOGY | Facility: CLINIC | Age: 63
End: 2023-01-30

## 2023-01-30 ENCOUNTER — HOSPITAL ENCOUNTER (OUTPATIENT)
Dept: INFUSION CENTER | Facility: HOSPITAL | Age: 63
Discharge: HOME | End: 2023-01-30
Attending: INTERNAL MEDICINE

## 2023-01-30 ENCOUNTER — HOSPITAL ENCOUNTER (EMERGENCY)
Facility: HOSPITAL | Age: 63
Discharge: HOME | End: 2023-01-30
Attending: EMERGENCY MEDICINE

## 2023-01-30 ENCOUNTER — APPOINTMENT (EMERGENCY)
Dept: CT IMAGING | Facility: HOSPITAL | Age: 63
End: 2023-01-30

## 2023-01-30 VITALS
OXYGEN SATURATION: 97 % | WEIGHT: 240 LBS | HEIGHT: 64 IN | HEART RATE: 86 BPM | TEMPERATURE: 98.2 F | RESPIRATION RATE: 18 BRPM | DIASTOLIC BLOOD PRESSURE: 80 MMHG | SYSTOLIC BLOOD PRESSURE: 142 MMHG | BODY MASS INDEX: 40.97 KG/M2

## 2023-01-30 VITALS
OXYGEN SATURATION: 96 % | SYSTOLIC BLOOD PRESSURE: 159 MMHG | HEART RATE: 81 BPM | TEMPERATURE: 97.8 F | RESPIRATION RATE: 22 BRPM | WEIGHT: 241.4 LBS | BODY MASS INDEX: 41.44 KG/M2 | DIASTOLIC BLOOD PRESSURE: 62 MMHG

## 2023-01-30 DIAGNOSIS — D51.8 OTHER VITAMIN B12 DEFICIENCY ANEMIAS: ICD-10-CM

## 2023-01-30 DIAGNOSIS — R05.8 OTHER COUGH: ICD-10-CM

## 2023-01-30 DIAGNOSIS — R10.84 DIFFUSE ABDOMINAL PAIN: ICD-10-CM

## 2023-01-30 DIAGNOSIS — C82.18 GRADE 2 FOLLICULAR LYMPHOMA OF LYMPH NODES OF MULTIPLE REGIONS (HCC): Primary | ICD-10-CM

## 2023-01-30 DIAGNOSIS — R06.02 SHORTNESS OF BREATH: ICD-10-CM

## 2023-01-30 LAB
ALBUMIN SERPL BCP-MCNC: 3.9 G/DL (ref 3.5–5)
ALP SERPL-CCNC: 103 U/L (ref 43–122)
ALT SERPL W P-5'-P-CCNC: 18 U/L
AMORPH URATE CRY URNS QL MICRO: ABNORMAL /HPF
ANION GAP SERPL CALCULATED.3IONS-SCNC: 9 MMOL/L (ref 5–14)
AST SERPL W P-5'-P-CCNC: 21 U/L (ref 14–36)
ATRIAL RATE: 65 BPM
BACTERIA UR QL AUTO: ABNORMAL /HPF
BASOPHILS # BLD AUTO: 0.06 THOUSANDS/ÂΜL (ref 0–0.1)
BASOPHILS NFR BLD AUTO: 1 % (ref 0–1)
BILIRUB SERPL-MCNC: 0.58 MG/DL (ref 0.2–1)
BILIRUB UR QL STRIP: NEGATIVE
BUN SERPL-MCNC: 16 MG/DL (ref 5–25)
CALCIUM SERPL-MCNC: 9.2 MG/DL (ref 8.4–10.2)
CARDIAC TROPONIN I PNL SERPL HS: 5 NG/L
CHLORIDE SERPL-SCNC: 106 MMOL/L (ref 96–108)
CLARITY UR: CLEAR
CO2 SERPL-SCNC: 26 MMOL/L (ref 21–32)
COLOR UR: ABNORMAL
CREAT SERPL-MCNC: 0.86 MG/DL (ref 0.6–1.2)
EOSINOPHIL # BLD AUTO: 0.7 THOUSAND/ÂΜL (ref 0–0.61)
EOSINOPHIL NFR BLD AUTO: 10 % (ref 0–6)
ERYTHROCYTE [DISTWIDTH] IN BLOOD BY AUTOMATED COUNT: 18 % (ref 11.6–15.1)
FLUAV RNA RESP QL NAA+PROBE: NEGATIVE
FLUBV RNA RESP QL NAA+PROBE: NEGATIVE
GFR SERPL CREATININE-BSD FRML MDRD: 73 ML/MIN/1.73SQ M
GLUCOSE SERPL-MCNC: 140 MG/DL (ref 70–99)
GLUCOSE UR STRIP-MCNC: NEGATIVE MG/DL
HCT VFR BLD AUTO: 38.8 % (ref 34.8–46.1)
HGB BLD-MCNC: 11.2 G/DL (ref 11.5–15.4)
HGB UR QL STRIP.AUTO: NEGATIVE
IMM GRANULOCYTES # BLD AUTO: 0.03 THOUSAND/UL (ref 0–0.2)
IMM GRANULOCYTES NFR BLD AUTO: 0 % (ref 0–2)
KETONES UR STRIP-MCNC: NEGATIVE MG/DL
LEUKOCYTE ESTERASE UR QL STRIP: NEGATIVE
LYMPHOCYTES # BLD AUTO: 1.89 THOUSANDS/ÂΜL (ref 0.6–4.47)
LYMPHOCYTES NFR BLD AUTO: 28 % (ref 14–44)
MAGNESIUM SERPL-MCNC: 1.9 MG/DL (ref 1.6–2.3)
MCH RBC QN AUTO: 20.2 PG (ref 26.8–34.3)
MCHC RBC AUTO-ENTMCNC: 28.9 G/DL (ref 31.4–37.4)
MCV RBC AUTO: 70 FL (ref 82–98)
MONOCYTES # BLD AUTO: 0.49 THOUSAND/ÂΜL (ref 0.17–1.22)
MONOCYTES NFR BLD AUTO: 7 % (ref 4–12)
MUCOUS THREADS UR QL AUTO: ABNORMAL
NEUTROPHILS # BLD AUTO: 3.67 THOUSANDS/ÂΜL (ref 1.85–7.62)
NEUTS SEG NFR BLD AUTO: 54 % (ref 43–75)
NITRITE UR QL STRIP: NEGATIVE
NON-SQ EPI CELLS URNS QL MICRO: ABNORMAL /HPF
NRBC BLD AUTO-RTO: 0 /100 WBCS
NT-PROBNP SERPL-MCNC: 84.7 PG/ML (ref 0–299)
P AXIS: 35 DEGREES
PH UR STRIP.AUTO: 5 [PH]
PHOSPHATE SERPL-MCNC: 3.9 MG/DL (ref 2.5–4.8)
PLATELET # BLD AUTO: 171 THOUSANDS/UL (ref 149–390)
PMV BLD AUTO: 9.8 FL (ref 8.9–12.7)
POTASSIUM SERPL-SCNC: 3.9 MMOL/L (ref 3.5–5.3)
PR INTERVAL: 166 MS
PROT SERPL-MCNC: 6.7 G/DL (ref 6.4–8.4)
PROT UR STRIP-MCNC: ABNORMAL MG/DL
QRS AXIS: 21 DEGREES
QRSD INTERVAL: 88 MS
QT INTERVAL: 428 MS
QTC INTERVAL: 445 MS
RBC # BLD AUTO: 5.55 MILLION/UL (ref 3.81–5.12)
RBC #/AREA URNS AUTO: ABNORMAL /HPF
RSV RNA RESP QL NAA+PROBE: NEGATIVE
SARS-COV-2 RNA RESP QL NAA+PROBE: NEGATIVE
SODIUM SERPL-SCNC: 141 MMOL/L (ref 135–147)
SP GR UR STRIP.AUTO: 1.03 (ref 1–1.04)
T WAVE AXIS: 32 DEGREES
UROBILINOGEN UA: NEGATIVE MG/DL
VENTRICULAR RATE: 65 BPM
WBC # BLD AUTO: 6.84 THOUSAND/UL (ref 4.31–10.16)
WBC #/AREA URNS AUTO: ABNORMAL /HPF

## 2023-01-30 RX ORDER — METHYLPREDNISOLONE SODIUM SUCCINATE 125 MG/2ML
125 INJECTION, POWDER, LYOPHILIZED, FOR SOLUTION INTRAMUSCULAR; INTRAVENOUS ONCE
Status: COMPLETED | OUTPATIENT
Start: 2023-01-30 | End: 2023-01-30

## 2023-01-30 RX ORDER — ONDANSETRON 2 MG/ML
4 INJECTION INTRAMUSCULAR; INTRAVENOUS ONCE
Status: COMPLETED | OUTPATIENT
Start: 2023-01-30 | End: 2023-01-30

## 2023-01-30 RX ORDER — SODIUM CHLORIDE 9 MG/ML
20 INJECTION, SOLUTION INTRAVENOUS ONCE
Status: CANCELLED | OUTPATIENT
Start: 2023-03-13

## 2023-01-30 RX ORDER — IPRATROPIUM BROMIDE AND ALBUTEROL SULFATE 2.5; .5 MG/3ML; MG/3ML
3 SOLUTION RESPIRATORY (INHALATION) ONCE
Status: COMPLETED | OUTPATIENT
Start: 2023-01-30 | End: 2023-01-30

## 2023-01-30 RX ORDER — KETOROLAC TROMETHAMINE 30 MG/ML
15 INJECTION, SOLUTION INTRAMUSCULAR; INTRAVENOUS ONCE
Status: COMPLETED | OUTPATIENT
Start: 2023-01-30 | End: 2023-01-30

## 2023-01-30 RX ORDER — SODIUM CHLORIDE 9 MG/ML
20 INJECTION, SOLUTION INTRAVENOUS ONCE
Status: CANCELLED | OUTPATIENT
Start: 2023-03-06

## 2023-01-30 RX ORDER — OXYCODONE HYDROCHLORIDE 5 MG/1
10 TABLET ORAL ONCE
Status: COMPLETED | OUTPATIENT
Start: 2023-01-30 | End: 2023-01-30

## 2023-01-30 RX ORDER — SODIUM CHLORIDE 9 MG/ML
20 INJECTION, SOLUTION INTRAVENOUS ONCE
Status: CANCELLED | OUTPATIENT
Start: 2023-02-27

## 2023-01-30 RX ORDER — OXYCODONE HYDROCHLORIDE 10 MG/1
10 TABLET ORAL EVERY 6 HOURS PRN
Qty: 20 TABLET | Refills: 0 | Status: SHIPPED | OUTPATIENT
Start: 2023-01-30 | End: 2023-02-07 | Stop reason: SDUPTHER

## 2023-01-30 RX ADMIN — SODIUM CHLORIDE 1000 ML: 0.9 INJECTION, SOLUTION INTRAVENOUS at 10:04

## 2023-01-30 RX ADMIN — OXYCODONE HYDROCHLORIDE 10 MG: 5 TABLET ORAL at 12:42

## 2023-01-30 RX ADMIN — ONDANSETRON 4 MG: 2 INJECTION INTRAMUSCULAR; INTRAVENOUS at 10:11

## 2023-01-30 RX ADMIN — KETOROLAC TROMETHAMINE 15 MG: 30 INJECTION, SOLUTION INTRAMUSCULAR; INTRAVENOUS at 10:11

## 2023-01-30 RX ADMIN — IOHEXOL 100 ML: 350 INJECTION, SOLUTION INTRAVENOUS at 11:03

## 2023-01-30 RX ADMIN — METHYLPREDNISOLONE SODIUM SUCCINATE 125 MG: 125 INJECTION, POWDER, FOR SOLUTION INTRAMUSCULAR; INTRAVENOUS at 10:11

## 2023-01-30 RX ADMIN — IPRATROPIUM BROMIDE AND ALBUTEROL SULFATE 3 ML: .5; 3 SOLUTION RESPIRATORY (INHALATION) at 10:11

## 2023-01-30 NOTE — ED PROVIDER NOTES
History  Chief Complaint   Patient presents with   • Medical Problem     Told she has PNA earlier this month, feels itchy head to toe and eyes  JASON perez  27-year-old female with history of follicular lymphoma presented to the emergency department with chest pain shortness of breath diffuse abdominal pain  Patient has not gotten her chemotherapy for the past 2 months as she was in [facility]   Patient notes that she was diagnosed with pneumonia in [facility], has been taking medicines  She does note that she has not taken her oxycodone for 1 week  No fever chills  No headache or vision changes  History provided by:  Patient  Medical Problem  Associated symptoms: abdominal pain, cough and shortness of breath        Prior to Admission Medications   Prescriptions Last Dose Informant Patient Reported? Taking?    Blood Glucose Monitoring Suppl (OneTouch Verio) w/Device KIT   No No   Sig: Use in the morning   DULoxetine (CYMBALTA) 20 mg capsule   No No   Sig: Take 2 capsules (40 mg total) by mouth daily   LORazepam (ATIVAN) 0 5 mg tablet   No No   Sig: Take 1 tablet 30-60 minutes before MRI, can take an additional tablet if needed before test   OneTouch Delica Lancets 35K MISC   No No   Sig: Use in the morning   acetaminophen (TYLENOL) 325 mg tablet   No No   Sig: Take 2 tablets (650 mg total) by mouth every 6 (six) hours as needed for mild pain, moderate pain, headaches or fever   acetaminophen (TYLENOL) 500 mg tablet   No No   Sig: Take 1 tablet (500 mg total) by mouth every 6 (six) hours as needed (pain)   acyclovir (ZOVIRAX) 400 MG tablet   No No   Sig: Take 1 tablet (400 mg total) by mouth 2 (two) times a day   al mag oxide-diphenhydramine-lidocaine viscous (MAGIC MOUTHWASH) 1:1:1 suspension   No No   Sig: Swish and spit 10 mL every 4 (four) hours as needed for mouth pain or discomfort or mucositis   allopurinol (ZYLOPRIM) 100 mg tablet   No No   Sig: Take 1 tablet (100 mg total) by mouth daily   amLODIPine (NORVASC) 10 mg tablet   Yes No   Sig: Take 10 mg by mouth daily   ascorbic acid (VITAMIN C) 1000 MG tablet   No No   Sig: Take 1 tablet (1,000 mg total) by mouth every 12 (twelve) hours for 10 doses   aspirin (ECOTRIN LOW STRENGTH) 81 mg EC tablet   No No   Sig: Take 1 tablet (81 mg total) by mouth daily   aspirin-acetaminophen-caffeine (EXCEDRIN MIGRAINE) 250-250-65 MG per tablet   No No   Sig: Take 1 tablet by mouth every 6 (six) hours as needed for headaches Erica 1 tableta cada 6 horas fernandez sea necesario para el dolor de russ   atorvastatin (LIPITOR) 40 mg tablet   No No   Sig: Take 1 tablet (40 mg total) by mouth daily with dinner   benzocaine (ORAJEL) 10 % mucosal gel   No No   Sig: Apply 1 application to the mouth or throat as needed for mucositis   benzonatate (TESSALON) 200 MG capsule   No No   Sig: Take 1 capsule (200 mg total) by mouth 3 (three) times a day as needed for cough   carbamide peroxide (DEBROX) 6 5 % otic solution   No No   Sig: Administer 5 drops to the right ear 2 (two) times a day   chlorthalidone 25 mg tablet   No No   Sig: Take 1 tablet (25 mg total) by mouth daily   cholecalciferol (VITAMIN D3) 1,000 units tablet   No No   Sig: Take 2 tablets (2,000 Units total) by mouth daily for 5 days   cyanocobalamin 1000 MCG tablet   No No   Sig: Take 1 tablet (1,000 mcg total) by mouth daily   folic acid (FOLVITE) 1 mg tablet   No No   Sig: Take 1 tablet (1 mg total) by mouth daily   glucose blood (OneTouch Verio) test strip   No No   Sig: Use 1 each in the morning Use as instructed   guaiFENesin (ROBITUSSIN) 100 mg/5 mL oral solution   No No   Sig: Take 10 mL (200 mg total) by mouth every 4 (four) hours   ibuprofen (MOTRIN) 400 mg tablet   No No   Sig: Take 1 tablet (400 mg total) by mouth every 6 (six) hours as needed for mild pain   lidocaine (LMX) 4 % cream   No No   Sig: Apply topically as needed for mild pain To neck   meclizine (ANTIVERT) 12 5 MG tablet   No No   Sig: Take 1 tablet (12 5 mg total) by mouth every 8 (eight) hours as needed for dizziness   metFORMIN (GLUCOPHAGE) 500 mg tablet   No No   Sig: Take 2 tablets (1,000 mg total) by mouth 2 (two) times a day with meals   naloxone (NARCAN) 4 mg/0 1 mL nasal spray   No No   Sig: Administer 1 spray into a nostril  If no response after 2-3 minutes, give another dose in the other nostril using a new spray     omeprazole (PriLOSEC) 20 mg delayed release capsule   No No   Sig: Take 2 capsules (40 mg total) by mouth daily To prevent stomach upset   ondansetron (ZOFRAN) 4 mg tablet   No No   Sig: Take 1 tablet (4 mg total) by mouth every 8 (eight) hours as needed for nausea or vomiting   oxyCODONE (ROXICODONE) 10 MG TABS   No No   Sig: Take 1 tablet (10 mg total) by mouth every 4 (four) hours as needed for moderate pain Max Daily Amount: 60 mg   polyethylene glycol (MIRALAX) 17 g packet   No No   Sig: Take 17 g by mouth daily   potassium chloride (K-DUR,KLOR-CON) 20 mEq tablet   No No   Sig: Take 1 tablet (20 mEq total) by mouth daily   senna (SENOKOT) 8 6 mg   No No   Sig: Take 1 tablet (8 6 mg total) by mouth 2 (two) times a day   zinc sulfate (ZINCATE) 220 mg capsule   No No   Sig: Take 1 capsule (220 mg total) by mouth daily for 4 doses      Facility-Administered Medications: None       Past Medical History:   Diagnosis Date   • Anemia     iron and B12   • Arthritis    • Asthma    • Cough    • Depression    • Falls frequently    • Lupus (HCC)    • Lymphoma (HCC)    • Lymphoma (HCC)    • Migraine    • Osteoporosis    • Psychiatric disorder    • 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[facility] [address] )    • Vertigo        Past Surgical History:   Procedure Laterality Date   • CHOLECYSTECTOMY     • FL GUIDED CENTRAL VENOUS ACCESS DEVICE INSERTION  11/9/2018   • HYSTERECTOMY     • IR BIOPSY BONE MARROW  11/24/2020   • IR BIOPSY LYMPH NODE  11/24/2020   • LYMPH NODE BIOPSY Left 11/9/2018    Procedure: EXCISION BIOPSY LYMPH NODE SUPRACLAVICULAR;  Surgeon: Manny Brambila MD;  Location: [address] [place] [address] OR;  Service: General   • RI TENDON SHEATH INCISION Right 2020    Procedure: RELEASE TRIGGER FINGER RIGHT THUMB;  Surgeon: Ford Silverman MD;  Location: [address] [place] [address];  Service: Orthopedics   • TUNNELED VENOUS PORT PLACEMENT N/A 2018    Procedure: INSERTION OF PORT-A-CATH;  Surgeon: Felipa Rapp MD;  Location: [address] [place] [address] OR;  Service: General       Family History   Problem Relation Age of Onset   • Cancer Mother    • Diabetes Mother    • Cancer Father    • Diabetes Father    • Breast cancer Sister    • No Known Problems Sister    • No Known Problems Sister    • No Known Problems Sister    • No Known Problems Maternal Aunt    • No Known Problems Maternal Aunt    • No Known Problems Maternal Aunt    • No Known Problems Paternal Aunt      I have reviewed and agree with the history as documented  E-Cigarette/Vaping   • E-Cigarette Use Never User      E-Cigarette/Vaping Substances   • Nicotine No    • THC No    • CBD No    • Flavoring No    • Other No    • Unknown No      Social History     Tobacco Use   • Smoking status: Former     Types: Cigarettes     Quit date: 2017     Years since quittin 6   • Smokeless tobacco: Never   Vaping Use   • Vaping Use: Never used   Substance Use Topics   • Alcohol use: Never   • Drug use: Never       Review of Systems   Respiratory: Positive for cough and shortness of breath  Gastrointestinal: Positive for abdominal pain  Physical Exam  Physical Exam  Vitals and nursing note reviewed  Constitutional:       General: She is not in acute distress  Appearance: She is well-developed  HENT:      Head: Normocephalic and atraumatic  Nose: Nose normal       Mouth/Throat:      Mouth: Mucous membranes are dry  Eyes:      Conjunctiva/sclera: Conjunctivae normal    Cardiovascular:      Rate and Rhythm: Normal rate and regular rhythm  Heart sounds: No murmur heard    Pulmonary:      Effort: Pulmonary effort is normal  No respiratory distress  Breath sounds: Wheezing present  Abdominal:      General: Abdomen is flat  Palpations: Abdomen is soft  Tenderness: There is abdominal tenderness  There is guarding  There is no rebound  Musculoskeletal:         General: No swelling  Cervical back: Neck supple  Skin:     General: Skin is warm and dry  Capillary Refill: Capillary refill takes less than 2 seconds  Neurological:      Mental Status: She is alert and oriented to person, place, and time     Psychiatric:         Mood and Affect: Mood normal          Vital Signs  ED Triage Vitals   Temperature Pulse Respirations Blood Pressure SpO2   01/30/23 0936 01/30/23 0936 01/30/23 0936 01/30/23 0936 01/30/23 0936   97 8 °F (36 6 °C) 78 20 138/65 99 %      Temp Source Heart Rate Source Patient Position - Orthostatic VS BP Location FiO2 (%)   01/30/23 0936 01/30/23 0936 01/30/23 0936 01/30/23 0936 --   Tympanic Monitor Sitting Left arm       Pain Score       01/30/23 1242       8           Vitals:    01/30/23 0936 01/30/23 1250   BP: 138/65 159/62   Pulse: 78 81   Patient Position - Orthostatic VS: Sitting          Visual Acuity      ED Medications  Medications   sodium chloride 0 9 % bolus 1,000 mL (0 mL Intravenous Stopped 1/30/23 1234)   ketorolac (TORADOL) injection 15 mg (15 mg Intravenous Given 1/30/23 1011)   ondansetron (ZOFRAN) injection 4 mg (4 mg Intravenous Given 1/30/23 1011)   methylPREDNISolone sodium succinate (Solu-MEDROL) injection 125 mg (125 mg Intravenous Given 1/30/23 1011)   ipratropium-albuterol (DUO-NEB) 0 5-2 5 mg/3 mL inhalation solution 3 mL (3 mL Nebulization Given 1/30/23 1011)   iohexol (OMNIPAQUE) 350 MG/ML injection (SINGLE-DOSE) 100 mL (100 mL Intravenous Given 1/30/23 1103)   oxyCODONE (ROXICODONE) IR tablet 10 mg (10 mg Oral Given 1/30/23 1242)       Diagnostic Studies  Results Reviewed     Procedure Component Value Units Date/Time    HS Troponin I 4hr [[id number]]     Lab Status: No result Specimen: Blood     NT-BNP PRO-BE,SH,[place],[facility],[place] campuses only [[id number]]  (Normal) Collected: 01/30/23 1004    Lab Status: Final result Specimen: Blood from Arm, Left Updated: 01/30/23 1134     NT-proBNP 84 7 pg/mL     FLU/RSV/COVID - if FLU/RSV clinically relevant [[id number]]  (Normal) Collected: 01/30/23 1004    Lab Status: Final result Specimen: Nares from Nose Updated: 01/30/23 1109     SARS-CoV-2 Negative     INFLUENZA A PCR Negative     INFLUENZA B PCR Negative     RSV PCR Negative    Narrative:      FOR PEDIATRIC PATIENTS - copy/paste COVID Guidelines URL to browser: [email or web address]  [employer]    SARS-CoV-2 assay is a Nucleic Acid Amplification assay intended for the  qualitative detection of nucleic acid from SARS-CoV-2 in nasopharyngeal  swabs  Results are for the presumptive identification of SARS-CoV-2 RNA  Positive results are indicative of infection with SARS-CoV-2, the virus  causing COVID-19, but do not rule out bacterial infection or co-infection  with other viruses  Laboratories within the [place] and its  territories are required to report all positive results to the appropriate  public health authorities  Negative results do not preclude SARS-CoV-2  infection and should not be used as the sole basis for treatment or other  patient management decisions  Negative results must be combined with  clinical observations, patient history, and epidemiological information  This test has not been FDA cleared or approved  This test has been authorized by FDA under an Emergency Use Authorization  (EUA)  This test is only authorized for the duration of time the  declaration that circumstances exist justifying the authorization of the  emergency use of an in vitro diagnostic tests for detection of SARS-CoV-2  virus and/or diagnosis of COVID-19 infection under section 564(b)(1) of  the Act, 21 U  S C  377MTO-5(R)(9), unless the authorization is terminated  or revoked sooner  The test has been validated but independent review by FDA  and CLIA is pending  Test performed using [employer] GeneXpert: This RT-PCR assay targets N2,  a region unique to SARS-CoV-2  A conserved region in the E-gene was chosen  for pan-Sarbecovirus detection which includes SARS-CoV-2  According to CMS-2020-01-R, this platform meets the definition of high-throughput technology      HS Troponin 0hr (reflex protocol) [[id number]]  (Normal) Collected: 01/30/23 1004    Lab Status: Final result Specimen: Blood from Arm, Left Updated: 01/30/23 1055     hs TnI 0hr 5 ng/L     HS Troponin I 2hr [[id number]]     Lab Status: No result Specimen: Blood     Comprehensive metabolic panel [[id number]]  (Abnormal) Collected: 01/30/23 1004    Lab Status: Final result Specimen: Blood from Arm, Left Updated: 01/30/23 1047     Sodium 141 mmol/L      Potassium 3 9 mmol/L      Chloride 106 mmol/L      CO2 26 mmol/L      ANION GAP 9 mmol/L      BUN 16 mg/dL      Creatinine 0 86 mg/dL      Glucose 140 mg/dL      Calcium 9 2 mg/dL      AST 21 U/L      ALT 18 U/L      Alkaline Phosphatase 103 U/L      Total Protein 6 7 g/dL      Albumin 3 9 g/dL      Total Bilirubin 0 58 mg/dL      eGFR 73 ml/min/1 73sq m     Narrative:      [place] guidelines for Chronic Kidney Disease (CKD):   •  Stage 1 with normal or high GFR (GFR > 90 mL/min/1 73 square meters)  •  Stage 2 Mild CKD (GFR = 60-89 mL/min/1 73 square meters)  •  Stage 3A Moderate CKD (GFR = 45-59 mL/min/1 73 square meters)  •  Stage 3B Moderate CKD (GFR = 30-44 mL/min/1 73 square meters)  •  Stage 4 Severe CKD (GFR = 15-29 mL/min/1 73 square meters)  •  Stage 5 End Stage CKD (GFR <15 mL/min/1 73 square meters)  Note: GFR calculation is accurate only with a steady state creatinine    Phosphorus [[id number]]  (Normal) Collected: 01/30/23 1004    Lab Status: Final result Specimen: Blood from Arm, Left Updated: 01/30/23 1046     Phosphorus 3 9 mg/dL     Magnesium [[id number]]  (Normal) Collected: 01/30/23 1004    Lab Status: Final result Specimen: Blood from Arm, Left Updated: 01/30/23 1046     Magnesium 1 9 mg/dL     Urine Microscopic [[id number]]  (Abnormal) Collected: 01/30/23 1006    Lab Status: Final result Specimen: Urine, Clean Catch Updated: 01/30/23 1045     RBC, UA None Seen /hpf      WBC, UA 1-2 /hpf      Epithelial Cells Moderate /hpf      Bacteria, UA Occasional /hpf      AMORPH URATES Occasional /hpf      MUCUS THREADS Occasional    UA w Reflex to Microscopic w Reflex to Culture [[id number]]  (Abnormal) Collected: 01/30/23 1006    Lab Status: Final result Specimen: Urine, Clean Catch Updated: 01/30/23 1039     Color, UA [place]     Clarity, UA Clear     Specific [place], UA 1 030     pH, UA 5 0     Leukocytes, UA Negative     Nitrite, UA Negative     Protein, UA 15 (Trace) mg/dl      Glucose, UA Negative mg/dl      Ketones, UA Negative mg/dl      Bilirubin, UA Negative     Occult Blood, UA Negative     UROBILINOGEN UA Negative mg/dL     CBC and differential [[id number]]  (Abnormal) Collected: 01/30/23 1004    Lab Status: Final result Specimen: Blood from Arm, Left Updated: 01/30/23 1032     WBC 6 84 Thousand/uL      RBC 5 55 Million/uL      Hemoglobin 11 2 g/dL      Hematocrit 38 8 %      MCV 70 fL      MCH 20 2 pg      MCHC 28 9 g/dL      RDW 18 0 %      MPV 9 8 fL      Platelets 516 Thousands/uL      nRBC 0 /100 WBCs      Neutrophils Relative 54 %      Immat GRANS % 0 %      Lymphocytes Relative 28 %      Monocytes Relative 7 %      Eosinophils Relative 10 %      Basophils Relative 1 %      Neutrophils Absolute 3 67 Thousands/µL      Immature Grans Absolute 0 03 Thousand/uL      Lymphocytes Absolute 1 89 Thousands/µL      Monocytes Absolute 0 49 Thousand/µL      Eosinophils Absolute 0 70 Thousand/µL      Basophils Absolute 0 06 Thousands/µL     Blood culture #2 [[id number]] Collected: 01/30/23 1028    Lab Status:  In process Specimen: Blood from Arm, Right Updated: 01/30/23 1032    Blood culture #1 [[id number]] Collected: 01/30/23 1004    Lab Status: In process Specimen: Blood from Arm, Left Updated: 01/30/23 1029                 CT pe study w abdomen pelvis w contrast   Final Result by Abrahan Schafer MD (01/30 1204)      No evidence of pulmonary embolism  No evidence of pneumonia  Mild bronchial thickening could be related to bronchitis  New adenopathy in the chest, abdomen and pelvis as described above is consistent with worsening lymphoma  This includes enlargement of the spleen  Hematology oncology follow-up is advised      This was discussed with Dr Lindy Tse at 11:57 AM               Workstation performed: [id number]                    Procedures  Procedures         ED Course                               SBIRT 20yo+    Flowsheet Row Most Recent Value   SBIRT (25 yo +)    In order to provide better care to our patients, we are screening all of our patients for alcohol and drug use  Would it be okay to ask you these screening questions? No Filed at: 01/30/2023 3123                    Medical Decision Making  72-year-old female presenting to the emergency department with diffuse abdominal pain chest pain shortness of breath  Differential includes asthma exacerbation, worsening follicular lymphoma, appendicitis, cholecystitis  Patient's wheezing resolved after DuoNeb  IV Solu-Medrol given  Case discussed with patient's oncology NP [facility] after CT showed worsening lymphoma burden  She recommends outpatient follow-up if no acute findings  Patient has no acute infectious findings  Laboratory evaluation unremarkable  Patient did stop taking her opioids 1 week ago which is likely exacerbating acute pain, milligram oxycodone prescribed      Diffuse abdominal pain: complicated acute illness or injury  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 [facility]): self-limited or minor problem  Amount and/or Complexity of Data Reviewed  Labs: ordered  Radiology: ordered  Risk  Prescription drug management  Disposition  Final diagnoses:   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 ([facility] [address] )   Diffuse abdominal pain     Time reflects when diagnosis was documented in both MDM as applicable and the Disposition within this note     Time User Action Codes Description Comment    1/30/2023 12:33 PM Aurelio Piña [H03 [id number]] 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 ([facility] [address] )     1/30/2023  2:02 PM Aurelio Piña [R10 [id number]] Diffuse abdominal pain       ED Disposition     ED Disposition   Discharge    Condition   Stable    Date/Time   Mon Jan 30, 2023 12:39 PM    Comment   [facility] discharge to home/self care  Follow-up Information    None         Discharge Medication List as of 1/30/2023 12:39 PM      START taking these medications    Details   !! oxyCODONE (ROXICODONE) 10 MG TABS Take 1 tablet (10 mg total) by mouth every 6 (six) hours as needed for moderate pain for up to 10 days Max Daily Amount: 40 mg, Starting Mon 1/30/2023, Until Thu 2/9/2023 at 2359, Print       !! - Potential duplicate medications found  Please discuss with provider        CONTINUE these medications which have NOT CHANGED    Details   !! acetaminophen (TYLENOL) 325 mg tablet Take 2 tablets (650 mg total) by mouth every 6 (six) hours as needed for mild pain, moderate pain, headaches or fever, Starting Tue 1/26/2021, Normal      !! acetaminophen (TYLENOL) 500 mg tablet Take 1 tablet (500 mg total) by mouth every 6 (six) hours as needed (pain), Starting Wed 8/17/2022, Normal      acyclovir (ZOVIRAX) 400 MG tablet Take 1 tablet (400 mg total) by mouth 2 (two) times a day, Starting Mon 6/28/2021, Until Wed 7/28/2021, Normal      al mag oxide-diphenhydramine-lidocaine viscous (MAGIC MOUTHWASH) 1:1:1 suspension Swish and spit 10 mL every 4 (four) hours as needed for mouth pain or discomfort or mucositis, Starting Mon 1/24/2022, Normal      allopurinol (ZYLOPRIM) 100 mg tablet Take 1 tablet (100 mg total) by mouth daily, Starting Wed 6/23/2021, Normal      amLODIPine (NORVASC) 10 mg tablet Take 10 mg by mouth daily, Starting Thu 6/2/2022, Historical Med      ascorbic acid (VITAMIN C) 1000 MG tablet Take 1 tablet (1,000 mg total) by mouth every 12 (twelve) hours for 10 doses, Starting Tue 1/26/2021, Until Mon 6/7/2021, Normal      aspirin (ECOTRIN LOW STRENGTH) 81 mg EC tablet Take 1 tablet (81 mg total) by mouth daily, Starting Wed 3/18/2020, Normal      aspirin-acetaminophen-caffeine (EXCEDRIN MIGRAINE) 250-250-65 MG per tablet Take 1 tablet by mouth every 6 (six) hours as needed for headaches Erica 1 tableta cada 6 horas fernandez sea necesario para el dolor de russ, Starting Thu 11/19/2020, Normal      atorvastatin (LIPITOR) 40 mg tablet Take 1 tablet (40 mg total) by mouth daily with dinner, Starting Tue 1/26/2021, Normal      benzocaine (ORAJEL) 10 % mucosal gel Apply 1 application to the mouth or throat as needed for mucositis, Starting Wed 8/17/2022, Normal      benzonatate (TESSALON) 200 MG capsule Take 1 capsule (200 mg total) by mouth 3 (three) times a day as needed for cough, Starting Mon 1/24/2022, Normal      Blood Glucose Monitoring Suppl (OneTouch Verio) w/Device KIT Use in the morning, Starting Fri 4/8/2022, Normal      carbamide peroxide (DEBROX) 6 5 % otic solution Administer 5 drops to the right ear 2 (two) times a day, Starting Wed 6/12/2019, Normal      chlorthalidone 25 mg tablet Take 1 tablet (25 mg total) by mouth daily, Starting Tue 5/31/2022, Normal      cholecalciferol (VITAMIN D3) 1,000 units tablet Take 2 tablets (2,000 Units total) by mouth daily for 5 days, Starting Wed 1/27/2021, Until Mon 6/7/2021, Normal      cyanocobalamin 1000 MCG tablet Take 1 tablet (1,000 mcg total) by mouth daily, Starting Wed 4/17/2019, Normal      DULoxetine (CYMBALTA) 20 mg capsule Take 2 capsules (40 mg total) by mouth daily, Starting Tue 2/8/4726, Normal      folic acid (FOLVITE) 1 mg tablet Take 1 tablet (1 mg total) by mouth daily, Starting Wed 6/23/2021, Normal      glucose blood (OneTouch Verio) test strip Use 1 each in the morning Use as instructed, Starting Fri 4/8/2022, Normal      guaiFENesin (ROBITUSSIN) 100 mg/5 mL oral solution Take 10 mL (200 mg total) by mouth every 4 (four) hours, Starting Tue 1/26/2021, Normal      ibuprofen (MOTRIN) 400 mg tablet Take 1 tablet (400 mg total) by mouth every 6 (six) hours as needed for mild pain, Starting Wed 8/17/2022, Normal      lidocaine (LMX) 4 % cream Apply topically as needed for mild pain To neck, Starting Tue 9/15/2020, Normal      LORazepam (ATIVAN) 0 5 mg tablet Take 1 tablet 30-60 minutes before MRI, can take an additional tablet if needed before test, Normal      meclizine (ANTIVERT) 12 5 MG tablet Take 1 tablet (12 5 mg total) by mouth every 8 (eight) hours as needed for dizziness, Starting Tue 1/26/2021, Normal      metFORMIN (GLUCOPHAGE) 500 mg tablet Take 2 tablets (1,000 mg total) by mouth 2 (two) times a day with meals, Starting Tue 4/5/2022, Normal      naloxone (NARCAN) 4 mg/0 1 mL nasal spray Administer 1 spray into a nostril   If no response after 2-3 minutes, give another dose in the other nostril using a new spray , Normal      omeprazole (PriLOSEC) 20 mg delayed release capsule Take 2 capsules (40 mg total) by mouth daily To prevent stomach upset, Starting Wed 3/25/2020, Normal      ondansetron (ZOFRAN) 4 mg tablet Take 1 tablet (4 mg total) by mouth every 8 (eight) hours as needed for nausea or vomiting, Starting Tue 9/15/2020, Normal      OneTouch Delica Lancets 38I MISC Use in the morning, Starting Fri 4/8/2022, Normal      !! oxyCODONE (ROXICODONE) 10 MG TABS Take 1 tablet (10 mg total) by mouth every 4 (four) hours as needed for moderate pain Max Daily Amount: 60 mg, Starting Tue 12/27/2022, Normal      polyethylene glycol (MIRALAX) 17 g packet Take 17 g by mouth daily, Starting Tue 12/17/2019, Normal      potassium chloride (K-DUR,KLOR-CON) 20 mEq tablet Take 1 tablet (20 mEq total) by mouth daily, Starting Tue 6/14/2022, Normal      senna (SENOKOT) 8 6 mg Take 1 tablet (8 6 mg total) by mouth 2 (two) times a day, Starting Tue 12/17/2019, Normal      zinc sulfate (ZINCATE) 220 mg capsule Take 1 capsule (220 mg total) by mouth daily for 4 doses, Starting Wed 1/27/2021, Until Sun 1/31/2021, Normal       !! - Potential duplicate medications found  Please discuss with provider  No discharge procedures on file      PDMP Review       Value Time User    PDMP Reviewed  Yes 12/27/2022 10:26 AM Agata Ovalles MD          ED Provider  Electronically Signed by           Haja Melton MD  01/30/23 [id number]

## 2023-01-30 NOTE — TELEPHONE ENCOUNTER
Phoned [facility] staff and sooke to [employer] to let her know pt not coming to infusion she is being sent to ED due to pneumonia  Pt to have Pet Ct scan and then has a F/U with office on 2/13/23 at 10:20 AM  Pt's inf appt changed to after OV
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
## 2023-02-01 ENCOUNTER — VBI (OUTPATIENT)
Dept: ADMINISTRATIVE | Facility: OTHER | Age: 63
End: 2023-02-01

## 2023-02-02 ENCOUNTER — TELEPHONE (OUTPATIENT)
Dept: HEMATOLOGY ONCOLOGY | Facility: CLINIC | Age: 63
End: 2023-02-02

## 2023-02-02 DIAGNOSIS — C82.18 GRADE 2 FOLLICULAR LYMPHOMA OF LYMPH NODES OF MULTIPLE REGIONS (HCC): Primary | ICD-10-CM

## 2023-02-02 RX ORDER — SODIUM CHLORIDE 9 MG/ML
20 INJECTION, SOLUTION INTRAVENOUS ONCE
Status: CANCELLED | OUTPATIENT
Start: 2023-02-06

## 2023-02-02 NOTE — TELEPHONE ENCOUNTER
----- Message from Reed De Leon sent at 2/2/2023 10:32 AM EST -----  Pt scheduled!  ----- Message -----  From: Maria Luz Mcarthur RN  Sent: 1/31/2023   4:31 PM EST  To: [facility]    Girls,    Can you please put Abraham Sauce on the schedule for 2/6/23 she only has bronchitis and Cheryle Ache said she could be put on for tx on 2/6/23 I will change the date

## 2023-02-04 LAB
BACTERIA BLD CULT: NORMAL
BACTERIA BLD CULT: NORMAL

## 2023-02-06 ENCOUNTER — HOSPITAL ENCOUNTER (OUTPATIENT)
Dept: INFUSION CENTER | Facility: HOSPITAL | Age: 63
Discharge: HOME | End: 2023-02-06
Attending: INTERNAL MEDICINE

## 2023-02-06 VITALS
TEMPERATURE: 97.2 F | DIASTOLIC BLOOD PRESSURE: 75 MMHG | SYSTOLIC BLOOD PRESSURE: 147 MMHG | RESPIRATION RATE: 20 BRPM | HEART RATE: 70 BPM

## 2023-02-06 DIAGNOSIS — C82.18 GRADE 2 FOLLICULAR LYMPHOMA OF LYMPH NODES OF MULTIPLE REGIONS (HCC): Primary | ICD-10-CM

## 2023-02-06 DIAGNOSIS — D51.8 OTHER VITAMIN B12 DEFICIENCY ANEMIAS: Primary | ICD-10-CM

## 2023-02-06 LAB — GLUCOSE SERPL-MCNC: 98 MG/DL (ref 65–140)

## 2023-02-06 RX ORDER — SODIUM CHLORIDE 9 MG/ML
20 INJECTION, SOLUTION INTRAVENOUS ONCE
Status: COMPLETED | OUTPATIENT
Start: 2023-02-06 | End: 2023-02-06

## 2023-02-06 RX ORDER — CYANOCOBALAMIN 1000 UG/ML
1000 INJECTION, SOLUTION INTRAMUSCULAR; SUBCUTANEOUS ONCE
Status: CANCELLED | OUTPATIENT
Start: 2023-02-14

## 2023-02-06 RX ADMIN — COPANLISIB 60 MG: 15 INJECTION, POWDER, LYOPHILIZED, FOR SOLUTION INTRAVENOUS at 10:43

## 2023-02-06 RX ADMIN — SODIUM CHLORIDE 20 ML/HR: 0.9 INJECTION, SOLUTION INTRAVENOUS at 09:56

## 2023-02-06 RX ADMIN — ONDANSETRON 8 MG: 2 INJECTION INTRAMUSCULAR; INTRAVENOUS at 09:56

## 2023-02-07 ENCOUNTER — OFFICE VISIT (OUTPATIENT)
Dept: PALLIATIVE MEDICINE | Facility: CLINIC | Age: 63
End: 2023-02-07

## 2023-02-07 VITALS
TEMPERATURE: 96.8 F | HEART RATE: 62 BPM | SYSTOLIC BLOOD PRESSURE: 170 MMHG | RESPIRATION RATE: 16 BRPM | WEIGHT: 241.8 LBS | OXYGEN SATURATION: 98 % | BODY MASS INDEX: 41.5 KG/M2 | DIASTOLIC BLOOD PRESSURE: 80 MMHG

## 2023-02-07 DIAGNOSIS — K59.03 DRUG INDUCED CONSTIPATION: ICD-10-CM

## 2023-02-07 DIAGNOSIS — G89.3 CANCER RELATED PAIN: Primary | ICD-10-CM

## 2023-02-07 DIAGNOSIS — C82.18 GRADE 2 FOLLICULAR LYMPHOMA OF LYMPH NODES OF MULTIPLE REGIONS (HCC): ICD-10-CM

## 2023-02-07 DIAGNOSIS — R10.84 GENERALIZED ABDOMINAL PAIN: ICD-10-CM

## 2023-02-07 DIAGNOSIS — C82.18 GRADE 2 FOLLICULAR LYMPHOMA OF LYMPH NODES OF MULTIPLE REGIONS (HCC): Primary | ICD-10-CM

## 2023-02-07 DIAGNOSIS — K59.01 SLOW TRANSIT CONSTIPATION: ICD-10-CM

## 2023-02-07 RX ORDER — OXYCODONE HYDROCHLORIDE 10 MG/1
10 TABLET ORAL EVERY 6 HOURS PRN
Qty: 90 TABLET | Refills: 0 | Status: SHIPPED | OUTPATIENT
Start: 2023-02-07

## 2023-02-07 RX ORDER — SODIUM CHLORIDE 9 MG/ML
20 INJECTION, SOLUTION INTRAVENOUS ONCE
Status: CANCELLED | OUTPATIENT
Start: 2023-02-14

## 2023-02-07 RX ORDER — POLYETHYLENE GLYCOL 3350 17 G/17G
17 POWDER, FOR SOLUTION ORAL DAILY
Qty: 30 EACH | Refills: 3 | Status: SHIPPED | OUTPATIENT
Start: 2023-02-07

## 2023-02-07 NOTE — PROGRESS NOTES
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 ([address] )    3  Generalized abdominal pain    4  Slow transit constipation    5  Drug induced constipation       · Chronic cancer related pain mostly in her abdomen is still well-controlled with current regimen  Continuing efforts to taper more in the future if her pain appears well controlled despite evidence of disease progression   · Continue OxyIR 10mg PO q6H prn  Max of 4 a day  Only providing #90 tabs each time given some compliance issues and some poor comprehension of overall condition, that safety can be an issue  · 1 senokot and 1 miralax BID for constipation - again emphasized the need for these medications to prevent OIC  Currently only drinking juices that she thinks can help, but does not help consistently  · RTO in 3 months, call for refills    Controlled Substance Review    [place]MP or [place]  reviewed: No red flags were identified; safe to proceed with prescription     01/16/2023 12/27/2022   2  Oxycodone Hcl (Ir) 10 Mg Tab 90 00  23  Ti Joselito  [id number]   All (3848)   58 70 MME  [place]     11/17/2022 11/17/2022   2  Oxycodone Hcl (Ir) 10 Mg Tab 180 00  30  Er Fabio  [id number]   All ([id number])   90 00 M[place]     10/27/2022  10/27/2022   2  Oxycodone Hcl (Ir) 10 Mg Tab 90 00  23  Ti Joselito  [id number]   All ([id number])   58 70 MME  [place]     09/23/2022 09/23/2022   2  Oxycodone Hcl (Ir) 15 Mg Tab 90 00  23  Ti Joselito  [id number]   All ([id number])   88 04 [place]     08/26/2022 08/25/2022   2  Oxycodone Hcl (Ir) 15 Mg Tab 90 00  30  Ti Joselito  [id number]   All ([id number])   67 50 MME  [place]          Requested Prescriptions     Signed Prescriptions Disp Refills   • oxyCODONE (ROXICODONE) 10 MG TABS 90 tablet 0     Sig: Take 1 tablet (10 mg total) by mouth every 6 (six) hours as needed for severe pain Erica 1 tableta cada 6 horas fernandez sea necesario por dolor intenso Max Daily Amount: 40 mg   • polyethylene glycol (MIRALAX) 17 g packet 30 each 3     Sig: Take 17 g by mouth daily     Medications Discontinued During This Encounter   Medication Reason   • oxyCODONE (ROXICODONE) 10 MG TABS    • polyethylene glycol (MIRALAX) 17 g packet Reorder   • oxyCODONE (ROXICODONE) 10 MG TABS Reorder       Representatives have queried the patient's controlled substance dispensing history in the Prescription Drug Monitoring Program in compliance with regulations before I have prescribed any controlled substances  The prescription history is consistent with prescribed therapy and our practice policies  30 minutes were spent face to face with Rosina Braden with greater than 50% of the time spent in counseling or coordination of care including discussions of etiology of diagnosis, diagnostic results, impression, and recommendations, risks and benefits of treatment, instructions for disease self management, treatment instructions, follow up requirements, risk factors and risk reduction of disease, patient and family counseling/involvement in care and compliance with treatment regimen   All of the patient's questions were answered during this discussion  No follow-ups on file  Subjective:   Chief Complaint  Follow up visit for:  symptom management, pain, neoplasm related, assessment of goals of care, disease process education and discussion of prognosis, advance care planning    HPI     Rosina Braden is a 63 y o  female with grade 2 follicular lymphoma initially diagnosed in Nov 2018  Started on chemo (rituximab + bendamustine) soon after  Developed tumor lysis syndrome in Dec 2018, bendamustine removed  She completed rituxan on 4/2019 and had been on maintenance Rituxan since 5/2019  In March 2020, she had evidence of disease progression and was switched to rituxan+Revlimid   Per review of notes, there are some concerns with noncompliance and f/u with bloodwork   Per Oncology note, long discussion with patient regarding refractory and progressive grade 2 follicular lymphoma   Patient with c/o increase L neck swelling/tenderness, US done, concerning for progression of her disease  Her biopsy from the L iliac bone and L cervical LN came back positive for follicular lymphoma  Plan was to start Obinutuzumab/CHOP with Udenyca but because of neutropenia, decision was to do copanlisib instead  However, there was a delay in her treatment when she had Covid-19 as well as when she went to [facility] to relocate  She was going to stay there to get treatments, but it didn't work out so she came back  There are frequent interruptions in her care, with her frequent trips to [facility] for personal/social reasons  Her PET-CT 6/4/2021 unfortunately showed significant progression of disease in the neck, chest, abdomen, pelvis, including new lesion in the L ribs  She is currently on copanlisib 60 mg 3 weeks on with 1 week of break since 6/29/2021  PET-CT in 5/2022 showed possible new progression in the mesenteric area with new LN behind the L ear on exam  For this reason, she was referred to Dr Bia Guajardo who believes she can be candidate for CAR-T therapy  However, she was not able to get this done due to a lot of issues including transportation and compliance  She is instead continued on copanlisib  Her latest PET-CT on 10/26/2022 disease recurrence/progression  She went to [place] from 11/2022 to 1/2023, where she reportedly was hospitalized with a PNA  She was sent to the ED for evaluation of possible infection in 1/30 where CT PE showed worsening lymphoma with new LNs in the chest abdomen, pelvis and enlarged spleen  She only resumed her chemo on 2/6/2023  She was last seen in 10/2022 where she reports going to [place] to take care of family affairs  We started to taper her oxycodone from 15mg to 10mg   She had a PET-CT done on 10/26/22 before she left for [place] that showed disease recurrence/progression  She was seen by oncology in 1/30/203 where she was sent to the ED for evaluation of infection  A CT PE done in the ED on 1/30/23 showed worsening lymphoma with new LNs in the chest abdomen, pelvis and enlarged spleen  Of note, she did not receive any chemo from 11/2022 to 2/2023 because [place] was [place] of the [place]  She resumed her chemo yesterday, 2/6/23  She returns today for routine follow up  Interpreted via [employer], ID# [id number]  She appears stable and healthy  She was coughing in the office, she was diagnosed with PNA while in [place]  She tells me she had a great time with her family there  She is happy to spend time with her new grandchild  She is sad she is back  She tells me her cancer has progressed  We spoke about the importance of compliance with her cancer treatments to prevent progression from happening, if possible  Her chronic cancer related pain the abdominal area is well-controlled with oxycodone that she takes up to 4 times day  She admits to feeling some constipation  She drinks juice that helps sometimes, but she admits to pushing hard at times because of continued constipation  I asked her to take her senokot and miralax daily to prevent OIC  We discussed again future weaning of oxycodone, if appropriate  The following portions of the medical history were reviewed: past medical history, problem list, medication list, and social history      Current Outpatient Medications:   •  allopurinol (ZYLOPRIM) 100 mg tablet, Take 1 tablet (100 mg total) by mouth daily, Disp: 30 tablet, Rfl: 0  •  amLODIPine (NORVASC) 10 mg tablet, Take 10 mg by mouth daily, Disp: , Rfl:   •  aspirin (ECOTRIN LOW STRENGTH) 81 mg EC tablet, Take 1 tablet (81 mg total) by mouth daily, Disp: 30 tablet, Rfl: 11  •  aspirin-acetaminophen-caffeine (EXCEDRIN MIGRAINE) 428-727-17 MG per tablet, Take 1 tablet by mouth every 6 (six) hours as needed for headaches Erica 1 tableta cada 6 horas fernadnez sea necesario para el dolor de russ, Disp: 30 tablet, Rfl: 0  •  atorvastatin (LIPITOR) 40 mg tablet, Take 1 tablet (40 mg total) by mouth daily with dinner, Disp: 12 tablet, Rfl: 0  •  benzocaine (ORAJEL) 10 % mucosal gel, Apply 1 application to the mouth or throat as needed for mucositis, Disp: 5 3 g, Rfl: 0  •  benzonatate (TESSALON) 200 MG capsule, Take 1 capsule (200 mg total) by mouth 3 (three) times a day as needed for cough, Disp: 20 capsule, Rfl: 1  •  Blood Glucose Monitoring Suppl (OneTouch Verio) w/Device KIT, Use in the morning, Disp: 1 kit, Rfl: 0  •  chlorthalidone 25 mg tablet, Take 1 tablet (25 mg total) by mouth daily, Disp: 30 tablet, Rfl: 5  •  cyanocobalamin 1000 MCG tablet, Take 1 tablet (1,000 mcg total) by mouth daily, Disp: 90 tablet, Rfl: 3  •  DULoxetine (CYMBALTA) 20 mg capsule, Take 2 capsules (40 mg total) by mouth daily, Disp: 30 capsule, Rfl: 3  •  folic acid (FOLVITE) 1 mg tablet, Take 1 tablet (1 mg total) by mouth daily, Disp: 90 tablet, Rfl: 4  •  glucose blood (OneTouch Verio) test strip, Use 1 each in the morning Use as instructed, Disp: 50 strip, Rfl: 6  •  guaiFENesin (ROBITUSSIN) 100 mg/5 mL oral solution, Take 10 mL (200 mg total) by mouth every 4 (four) hours, Disp: 473 mL, Rfl: 0  •  ibuprofen (MOTRIN) 400 mg tablet, Take 1 tablet (400 mg total) by mouth every 6 (six) hours as needed for mild pain, Disp: 12 tablet, Rfl: 0  •  lidocaine (LMX) 4 % cream, Apply topically as needed for mild pain To neck, Disp: 30 g, Rfl: 0  •  meclizine (ANTIVERT) 12 5 MG tablet, Take 1 tablet (12 5 mg total) by mouth every 8 (eight) hours as needed for dizziness, Disp: 30 tablet, Rfl: 0  •  metFORMIN (GLUCOPHAGE) 500 mg tablet, Take 2 tablets (1,000 mg total) by mouth 2 (two) times a day with meals, Disp: 60 tablet, Rfl: 5  •  omeprazole (PriLOSEC) 20 mg delayed release capsule, Take 2 capsules (40 mg total) by mouth daily To prevent stomach upset, Disp: 60 capsule, Rfl: 5  •  OneTouch Delica Lancets 96I MISC, Use in the morning, Disp: 100 each, Rfl: 4  •  oxyCODONE (ROXICODONE) 10 MG TABS, Take 1 tablet (10 mg total) by mouth every 6 (six) hours as needed for severe pain Erica 1 tableta cada 6 horas fernandez sea necesario por dolor intenso Max Daily Amount: 40 mg, Disp: 90 tablet, Rfl: 0  •  polyethylene glycol (MIRALAX) 17 g packet, Take 17 g by mouth daily, Disp: 30 each, Rfl: 3  •  potassium chloride (K-DUR,KLOR-CON) 20 mEq tablet, Take 1 tablet (20 mEq total) by mouth daily, Disp: 30 tablet, Rfl: 11  •  senna (SENOKOT) 8 6 mg, Take 1 tablet (8 6 mg total) by mouth 2 (two) times a day, Disp: 60 each, Rfl: 3  •  acetaminophen (TYLENOL) 325 mg tablet, Take 2 tablets (650 mg total) by mouth every 6 (six) hours as needed for mild pain, moderate pain, headaches or fever (Patient not taking: Reported on 2/7/2023), Disp: 30 tablet, Rfl: 0  •  acetaminophen (TYLENOL) 500 mg tablet, Take 1 tablet (500 mg total) by mouth every 6 (six) hours as needed (pain) (Patient not taking: Reported on 2/7/2023), Disp: 30 tablet, Rfl: 0  •  acyclovir (ZOVIRAX) 400 MG tablet, Take 1 tablet (400 mg total) by mouth 2 (two) times a day, Disp: 60 tablet, Rfl: 11  •  al mag oxide-diphenhydramine-lidocaine viscous (MAGIC MOUTHWASH) 1:1:1 suspension, Swish and spit 10 mL every 4 (four) hours as needed for mouth pain or discomfort or mucositis (Patient not taking: Reported on 2/7/2023), Disp: 180 mL, Rfl: 3  •  ascorbic acid (VITAMIN C) 1000 MG tablet, Take 1 tablet (1,000 mg total) by mouth every 12 (twelve) hours for 10 doses, Disp: 10 tablet, Rfl: 0  •  carbamide peroxide (DEBROX) 6 5 % otic solution, Administer 5 drops to the right ear 2 (two) times a day (Patient not taking: Reported on 2/7/2023), Disp: 15 mL, Rfl: 0  •  cholecalciferol (VITAMIN D3) 1,000 units tablet, Take 2 tablets (2,000 Units total) by mouth daily for 5 days, Disp: 10 tablet, Rfl: 0  •  LORazepam (ATIVAN) 0 5 mg tablet, Take 1 tablet 30-60 minutes before MRI, can take an additional tablet if needed before test (Patient not taking: Reported on 2/7/2023), Disp: 2 tablet, Rfl: 0  •  naloxone (NARCAN) 4 mg/0 1 mL nasal spray, Administer 1 spray into a nostril  If no response after 2-3 minutes, give another dose in the other nostril using a new spray , Disp: 1 each, Rfl: 0  •  ondansetron (ZOFRAN) 4 mg tablet, Take 1 tablet (4 mg total) by mouth every 8 (eight) hours as needed for nausea or vomiting, Disp: 30 tablet, Rfl: 3  •  zinc sulfate (ZINCATE) 220 mg capsule, Take 1 capsule (220 mg total) by mouth daily for 4 doses, Disp: 4 capsule, Rfl: 0  No current facility-administered medications for this visit  Review of Systems   Constitutional: Negative for activity change, appetite change and unexpected weight change  HENT: Negative for trouble swallowing  Respiratory: Positive for cough  Negative for shortness of breath  Cardiovascular: Negative for chest pain  Gastrointestinal: Positive for constipation  Negative for abdominal distention, diarrhea, nausea and vomiting  Chronic abdominal pain   Musculoskeletal: Negative for back pain  Neurological: Negative for speech difficulty and light-headedness  Psychiatric/Behavioral: Negative for sleep disturbance  The patient is not nervous/anxious  All other systems reviewed and are negative  All other systems negative    Objective:  Vital Signs  /80 (BP Location: Left arm, Patient Position: Sitting, Cuff Size: Large)   Pulse 62   Temp (!) 96 8 °F (36 °C) (Temporal)   Resp 16   Wt 110 kg (241 lb 12 8 oz)   SpO2 98%   BMI 41 50 kg/m²    Physical Exam    Constitutional: Appears well-developed and well-nourished, she does not look sick, nor toxic-looking  Looks stable and healthy  Pleasant, well-kempt  Head: Normocephalic and atraumatic  Eyes: EOM are normal  No ocular discharge  No scleral icterus     Respiratory: Effort normal  No stridor  No respiratory distress  Gastrointestinal: No abdominal distension  Musculoskeletal: No edema  Neurological: Alert, oriented and appropriately conversant  Skin: No diaphoresis, no rashes seen on exposed areas of skin  Psychiatric: Displays a normal mood and affect   Behavior, judgement and thought content appear normal     Favian Mackenzie MD  Palliative Medicine & Supportive Care  Internal Medicine  Available via [facility] Text  Office: [phone]  Fax: [phone]

## 2023-02-08 ENCOUNTER — HOSPITAL ENCOUNTER (OUTPATIENT)
Dept: NUCLEAR MEDICINE | Facility: HOSPITAL | Age: 63
Discharge: HOME | End: 2023-02-08

## 2023-02-08 DIAGNOSIS — C82.18 GRADE 2 FOLLICULAR LYMPHOMA OF LYMPH NODES OF MULTIPLE REGIONS (HCC): ICD-10-CM

## 2023-02-08 LAB — GLUCOSE SERPL-MCNC: 233 MG/DL (ref 65–140)

## 2023-02-10 ENCOUNTER — HOSPITAL ENCOUNTER (OUTPATIENT)
Dept: NUCLEAR MEDICINE | Facility: HOSPITAL | Age: 63
Discharge: HOME | End: 2023-02-10

## 2023-02-10 LAB — GLUCOSE SERPL-MCNC: 105 MG/DL (ref 65–140)

## 2023-02-13 ENCOUNTER — OFFICE VISIT (OUTPATIENT)
Dept: HEMATOLOGY ONCOLOGY | Facility: CLINIC | Age: 63
End: 2023-02-13

## 2023-02-13 ENCOUNTER — HOSPITAL ENCOUNTER (OUTPATIENT)
Dept: INFUSION CENTER | Facility: HOSPITAL | Age: 63
Discharge: HOME | End: 2023-02-13
Attending: INTERNAL MEDICINE

## 2023-02-13 VITALS
DIASTOLIC BLOOD PRESSURE: 80 MMHG | HEART RATE: 64 BPM | RESPIRATION RATE: 18 BRPM | TEMPERATURE: 98.1 F | BODY MASS INDEX: 40.8 KG/M2 | WEIGHT: 239 LBS | OXYGEN SATURATION: 100 % | SYSTOLIC BLOOD PRESSURE: 140 MMHG | HEIGHT: 64 IN

## 2023-02-13 DIAGNOSIS — C82.18 GRADE 2 FOLLICULAR LYMPHOMA OF LYMPH NODES OF MULTIPLE REGIONS (HCC): Primary | ICD-10-CM

## 2023-02-13 DIAGNOSIS — C82.18 GRADE 2 FOLLICULAR LYMPHOMA OF LYMPH NODES OF MULTIPLE REGIONS (HCC): ICD-10-CM

## 2023-02-13 DIAGNOSIS — B02.9 HERPES ZOSTER WITHOUT COMPLICATION: ICD-10-CM

## 2023-02-13 RX ORDER — VALACYCLOVIR HYDROCHLORIDE 1 G/1
1000 TABLET, FILM COATED ORAL 3 TIMES DAILY
Qty: 21 TABLET | Refills: 0 | Status: SHIPPED | OUTPATIENT
Start: 2023-02-13 | End: 2023-02-25

## 2023-02-13 NOTE — TELEPHONE ENCOUNTER
Please only schedule cycle 1  We can schedule her out once we confirm that she can start on 2/27  Thank you!

## 2023-02-13 NOTE — TELEPHONE ENCOUNTER
While we try to accommodate patient requests, our priority is to schedule treatment according to Doctor's orders and site availability  1  Does the Provider use the intake sheet or checkout note? Yes for new treatment plans   2  What would be a preferred day of the week that would work best for your infusion appointment? Mon  3  Do you prefer mornings or afternoons for your appointments?mornings  4  Are there any days or dates that do not work for your schedule, including any upcoming vacations? Not aware of but ask pt   5  We are going to try our best to schedule you at the infusion center closest to your home  In the event that we are unable to what would be your next preferred infusion site or sites? 1 [facility]  [facility]    6  Do you have transportation to take you to all of your appointments? [facility]  7  Would you like the infusion center to draw labs from your port?   Yes the day before adam  (disregard if patient doesn't have a port or need labs for infusion appointment)    Pt will need a F/U with Dr Whit Belcher or Chang Barker for Day 8 of new plan Pt needs her Rx Revlimid to be able to start and she needs to be cleared of the Shingles virus which just started a few days ago

## 2023-02-13 NOTE — TELEPHONE ENCOUNTER
Spoke with patient  She is aware of first appt  She will get a print out when she comes in for tx  [facility] has been sent a schedule update  Bhaskar Aldrich, only 1 cycle was scheduled  Please let us know that all other factors were addressed and we will schedule out more cycles  Thank you!
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
## 2023-02-13 NOTE — PROGRESS NOTES
Educated pt via [employer] translation service regarding Obinutuzumab and Rx Revlimid  Provided pt with medication information sheets in [facility] [place] and [place]  Phone contact sheet and Diarrhea management sheets in [address] [place] [address]  and [facility] [place]  Made pt aware to not start the Revlimid until we get her scheduled for IV chemo and she would be phoned  Pt made aware that she will have to answer the [facility] call [facility] in order for Revlimid to be delivered  Pt presently with Shingles and was prescribed Valtrex, pt will not start tx until her shingles are resolved  Chemo consent was signed

## 2023-02-14 RX ORDER — ACYCLOVIR 400 MG/1
400 TABLET ORAL 2 TIMES DAILY
Qty: 60 TABLET | Refills: 11 | Status: SHIPPED | OUTPATIENT
Start: 2023-02-14 | End: 2023-03-16

## 2023-02-14 RX ORDER — ALLOPURINOL 100 MG/1
100 TABLET ORAL DAILY
Qty: 30 TABLET | Refills: 11 | Status: SHIPPED | OUTPATIENT
Start: 2023-02-14

## 2023-02-15 ENCOUNTER — APPOINTMENT (EMERGENCY)
Dept: CT IMAGING | Facility: HOSPITAL | Age: 63
End: 2023-02-15

## 2023-02-15 ENCOUNTER — HOSPITAL ENCOUNTER (EMERGENCY)
Facility: HOSPITAL | Age: 63
Discharge: HOME | End: 2023-02-15
Attending: EMERGENCY MEDICINE

## 2023-02-15 VITALS
HEART RATE: 83 BPM | DIASTOLIC BLOOD PRESSURE: 76 MMHG | RESPIRATION RATE: 22 BRPM | SYSTOLIC BLOOD PRESSURE: 152 MMHG | TEMPERATURE: 98.2 F | WEIGHT: 238 LBS | OXYGEN SATURATION: 98 % | BODY MASS INDEX: 40.85 KG/M2

## 2023-02-15 DIAGNOSIS — C82.18 GRADE 2 FOLLICULAR LYMPHOMA OF LYMPH NODES OF MULTIPLE REGIONS (HCC): Primary | ICD-10-CM

## 2023-02-15 DIAGNOSIS — R07.89 OTHER CHEST PAIN: ICD-10-CM

## 2023-02-15 DIAGNOSIS — B02.9 SHINGLES RASH: Primary | ICD-10-CM

## 2023-02-15 LAB
2HR DELTA HS TROPONIN: 1 NG/L
ALBUMIN SERPL BCP-MCNC: 4 G/DL (ref 3.5–5)
ALP SERPL-CCNC: 96 U/L (ref 43–122)
ALT SERPL W P-5'-P-CCNC: 17 U/L
ANION GAP SERPL CALCULATED.3IONS-SCNC: 10 MMOL/L (ref 5–14)
AST SERPL W P-5'-P-CCNC: 24 U/L (ref 14–36)
ATRIAL RATE: 94 BPM
BACTERIA UR QL AUTO: NORMAL /HPF
BASOPHILS # BLD AUTO: 0.02 THOUSANDS/ÂΜL (ref 0–0.1)
BASOPHILS NFR BLD AUTO: 1 % (ref 0–1)
BILIRUB SERPL-MCNC: 0.81 MG/DL (ref 0.2–1)
BILIRUB UR QL STRIP: NEGATIVE
BUN SERPL-MCNC: 9 MG/DL (ref 5–25)
CALCIUM SERPL-MCNC: 8.9 MG/DL (ref 8.4–10.2)
CARDIAC TROPONIN I PNL SERPL HS: 6 NG/L
CARDIAC TROPONIN I PNL SERPL HS: 7 NG/L
CHLORIDE SERPL-SCNC: 101 MMOL/L (ref 96–108)
CLARITY UR: CLEAR
CO2 SERPL-SCNC: 24 MMOL/L (ref 21–32)
COLOR UR: ABNORMAL
CREAT SERPL-MCNC: 0.75 MG/DL (ref 0.6–1.2)
EOSINOPHIL # BLD AUTO: 0.05 THOUSAND/ÂΜL (ref 0–0.61)
EOSINOPHIL NFR BLD AUTO: 2 % (ref 0–6)
ERYTHROCYTE [DISTWIDTH] IN BLOOD BY AUTOMATED COUNT: 18.4 % (ref 11.6–15.1)
FLUAV RNA RESP QL NAA+PROBE: NEGATIVE
FLUBV RNA RESP QL NAA+PROBE: NEGATIVE
GFR SERPL CREATININE-BSD FRML MDRD: 86 ML/MIN/1.73SQ M
GLUCOSE SERPL-MCNC: 197 MG/DL (ref 70–99)
GLUCOSE SERPL-MCNC: 94 MG/DL (ref 65–140)
GLUCOSE UR STRIP-MCNC: ABNORMAL MG/DL
HCT VFR BLD AUTO: 41.6 % (ref 34.8–46.1)
HGB BLD-MCNC: 12 G/DL (ref 11.5–15.4)
HGB UR QL STRIP.AUTO: 10
IMM GRANULOCYTES # BLD AUTO: 0.02 THOUSAND/UL (ref 0–0.2)
IMM GRANULOCYTES NFR BLD AUTO: 1 % (ref 0–2)
KETONES UR STRIP-MCNC: NEGATIVE MG/DL
LEUKOCYTE ESTERASE UR QL STRIP: NEGATIVE
LIPASE SERPL-CCNC: 26 U/L (ref 23–300)
LYMPHOCYTES # BLD AUTO: 0.76 THOUSANDS/ÂΜL (ref 0.6–4.47)
LYMPHOCYTES NFR BLD AUTO: 23 % (ref 14–44)
MAGNESIUM SERPL-MCNC: 1.8 MG/DL (ref 1.6–2.3)
MCH RBC QN AUTO: 19.9 PG (ref 26.8–34.3)
MCHC RBC AUTO-ENTMCNC: 28.8 G/DL (ref 31.4–37.4)
MCV RBC AUTO: 69 FL (ref 82–98)
MONOCYTES # BLD AUTO: 0.23 THOUSAND/ÂΜL (ref 0.17–1.22)
MONOCYTES NFR BLD AUTO: 7 % (ref 4–12)
NEUTROPHILS # BLD AUTO: 2.18 THOUSANDS/ÂΜL (ref 1.85–7.62)
NEUTS SEG NFR BLD AUTO: 66 % (ref 43–75)
NITRITE UR QL STRIP: NEGATIVE
NON-SQ EPI CELLS URNS QL MICRO: NORMAL /HPF
NRBC BLD AUTO-RTO: 0 /100 WBCS
NT-PROBNP SERPL-MCNC: 108 PG/ML (ref 0–299)
P AXIS: 54 DEGREES
PH UR STRIP.AUTO: 6.5 [PH]
PLATELET # BLD AUTO: 134 THOUSANDS/UL (ref 149–390)
PMV BLD AUTO: 10 FL (ref 8.9–12.7)
POTASSIUM SERPL-SCNC: 3.7 MMOL/L (ref 3.5–5.3)
PR INTERVAL: 152 MS
PROT SERPL-MCNC: 7 G/DL (ref 6.4–8.4)
PROT UR STRIP-MCNC: ABNORMAL MG/DL
QRS AXIS: 11 DEGREES
QRSD INTERVAL: 76 MS
QT INTERVAL: 350 MS
QTC INTERVAL: 437 MS
RBC # BLD AUTO: 6.03 MILLION/UL (ref 3.81–5.12)
RBC #/AREA URNS AUTO: NORMAL /HPF
RSV RNA RESP QL NAA+PROBE: NEGATIVE
SARS-COV-2 RNA RESP QL NAA+PROBE: NEGATIVE
SODIUM SERPL-SCNC: 135 MMOL/L (ref 135–147)
SP GR UR STRIP.AUTO: 1.01 (ref 1–1.04)
T WAVE AXIS: 48 DEGREES
UROBILINOGEN UA: 1 MG/DL
VENTRICULAR RATE: 94 BPM
WBC # BLD AUTO: 3.26 THOUSAND/UL (ref 4.31–10.16)
WBC #/AREA URNS AUTO: NORMAL /HPF

## 2023-02-15 RX ORDER — MORPHINE SULFATE 4 MG/ML
4 INJECTION, SOLUTION INTRAMUSCULAR; INTRAVENOUS ONCE
Status: COMPLETED | OUTPATIENT
Start: 2023-02-15 | End: 2023-02-15

## 2023-02-15 RX ORDER — LIDOCAINE 50 MG/G
1 PATCH TOPICAL ONCE
Status: DISCONTINUED | OUTPATIENT
Start: 2023-02-15 | End: 2023-02-15 | Stop reason: HOSPADM

## 2023-02-15 RX ORDER — LENALIDOMIDE 20 MG/1
20 CAPSULE ORAL DAILY
Qty: 21 CAPSULE | Refills: 0 | Status: SHIPPED | OUTPATIENT
Start: 2023-02-15 | End: 2023-02-16 | Stop reason: SDUPTHER

## 2023-02-15 RX ORDER — VALACYCLOVIR HYDROCHLORIDE 500 MG/1
1000 TABLET, FILM COATED ORAL EVERY 8 HOURS SCHEDULED
Status: DISCONTINUED | OUTPATIENT
Start: 2023-02-15 | End: 2023-02-15 | Stop reason: HOSPADM

## 2023-02-15 RX ORDER — DIPHENHYDRAMINE HCL 25 MG
25 TABLET ORAL ONCE
Status: COMPLETED | OUTPATIENT
Start: 2023-02-15 | End: 2023-02-15

## 2023-02-15 RX ORDER — DIPHENHYDRAMINE HCL 25 MG
25 TABLET ORAL EVERY 6 HOURS PRN
Qty: 20 TABLET | Refills: 0 | Status: SHIPPED | OUTPATIENT
Start: 2023-02-15

## 2023-02-15 RX ORDER — LIDOCAINE 50 MG/G
1 PATCH TOPICAL EVERY 24 HOURS
Qty: 15 PATCH | Refills: 0 | Status: SHIPPED | OUTPATIENT
Start: 2023-02-15 | End: 2023-02-25

## 2023-02-15 RX ADMIN — IOHEXOL 100 ML: 350 INJECTION, SOLUTION INTRAVENOUS at 11:12

## 2023-02-15 RX ADMIN — MORPHINE SULFATE 4 MG: 4 INJECTION, SOLUTION INTRAMUSCULAR; INTRAVENOUS at 10:13

## 2023-02-15 RX ADMIN — LIDOCAINE 1 PATCH: 50 PATCH TOPICAL at 16:52

## 2023-02-15 RX ADMIN — VALACYCLOVIR HYDROCHLORIDE 1000 MG: 500 TABLET, FILM COATED ORAL at 14:17

## 2023-02-15 RX ADMIN — DIPHENHYDRAMINE HCL 25 MG: 25 TABLET ORAL at 14:17

## 2023-02-15 NOTE — ED PROVIDER NOTES
History  Chief Complaint   Patient presents with   • Chest Pain     Yesterday- no trigger, mid chest and travels to the back     • Rash     X3 days - caused by medication (hematology aware)   • Headache     72-year-old female past medical history of lymphoma, diabetes mellitus, hypertension, lupus, osteoporosis, psychiatric disorder, asthma, anemia presents to emergency department complaining of chest pain x1 day  Rash x3 days  Per chart review she was seen by oncologist during office visit where rash was first noted she was diagnosed with herpes zoster, she was prescribed valacyclovir  Patient did not  and has not been taking this medication  She describes the rash as painful, itchy, burning  History provided by:  Patient and medical records   used: Yes    Chest Pain  Pain location:  L chest  Pain radiates to the back: no (upon clarification the chest pain does not go to the back, the rash does )    Duration:  1 day  Timing:  Constant  Chronicity:  New  Context: at rest    Relieved by:  Nothing  Worsened by:  Nothing tried  Ineffective treatments:  None tried  Associated symptoms: headache and shortness of breath    Associated symptoms: no abdominal pain, no altered mental status, no back pain, no cough, no diaphoresis, no dizziness, no dysphagia, no fatigue, no fever, no heartburn, no lower extremity edema, no nausea, no numbness, no orthopnea, no palpitations, no PND, no syncope, not vomiting and no weakness    Headaches:     Severity:  Mild    Progression:  Resolved      Prior to Admission Medications   Prescriptions Last Dose Informant Patient Reported? Taking?    Blood Glucose Monitoring Suppl (OneTouch Verio) w/Device KIT   No No   Sig: Use in the morning   DULoxetine (CYMBALTA) 20 mg capsule   No No   Sig: Take 2 capsules (40 mg total) by mouth daily   LORazepam (ATIVAN) 0 5 mg tablet   No No   Sig: Take 1 tablet 30-60 minutes before MRI, can take an additional tablet if needed before test   Patient not taking: Reported on 9/4/7593   OneTouch Delica Lancets 81B MISC   No No   Sig: Use in the morning   acetaminophen (TYLENOL) 325 mg tablet   No No   Sig: Take 2 tablets (650 mg total) by mouth every 6 (six) hours as needed for mild pain, moderate pain, headaches or fever   Patient not taking: Reported on 2/7/2023   acetaminophen (TYLENOL) 500 mg tablet   No No   Sig: Take 1 tablet (500 mg total) by mouth every 6 (six) hours as needed (pain)   Patient not taking: Reported on 2/7/2023   acyclovir (ZOVIRAX) 400 MG tablet   No No   Sig: Take 1 tablet (400 mg total) by mouth 2 (two) times a day   al mag oxide-diphenhydramine-lidocaine viscous (MAGIC MOUTHWASH) 1:1:1 suspension   No No   Sig: Swish and spit 10 mL every 4 (four) hours as needed for mouth pain or discomfort or mucositis   Patient not taking: Reported on 2/13/2023   allopurinol (ZYLOPRIM) 100 mg tablet   No No   Sig: Take 1 tablet (100 mg total) by mouth daily   allopurinol (ZYLOPRIM) 100 mg tablet   No No   Sig: Take 1 tablet (100 mg total) by mouth daily   amLODIPine (NORVASC) 10 mg tablet   Yes No   Sig: Take 10 mg by mouth daily   ascorbic acid (VITAMIN C) 1000 MG tablet   No No   Sig: Take 1 tablet (1,000 mg total) by mouth every 12 (twelve) hours for 10 doses   aspirin (ECOTRIN LOW STRENGTH) 81 mg EC tablet   No No   Sig: Take 1 tablet (81 mg total) by mouth daily   aspirin-acetaminophen-caffeine (EXCEDRIN MIGRAINE) 250-250-65 MG per tablet   No No   Sig: Take 1 tablet by mouth every 6 (six) hours as needed for headaches Erica 1 tableta cada 6 horas fernandez sea necesario para el dolor de russ   atorvastatin (LIPITOR) 40 mg tablet   No No   Sig: Take 1 tablet (40 mg total) by mouth daily with dinner   benzocaine (ORAJEL) 10 % mucosal gel   No No   Sig: Apply 1 application to the mouth or throat as needed for mucositis   benzonatate (TESSALON) 200 MG capsule   No No   Sig: Take 1 capsule (200 mg total) by mouth 3 (three) times a day as needed for cough   Patient not taking: Reported on 2/13/2023   carbamide peroxide (DEBROX) 6 5 % otic solution   No No   Sig: Administer 5 drops to the right ear 2 (two) times a day   Patient not taking: Reported on 2/7/2023   chlorthalidone 25 mg tablet   No No   Sig: Take 1 tablet (25 mg total) by mouth daily   cholecalciferol (VITAMIN D3) 1,000 units tablet   No No   Sig: Take 2 tablets (2,000 Units total) by mouth daily for 5 days   cyanocobalamin 1000 MCG tablet   No No   Sig: Take 1 tablet (1,000 mcg total) by mouth daily   folic acid (FOLVITE) 1 mg tablet   No No   Sig: Take 1 tablet (1 mg total) by mouth daily   glucose blood (OneTouch Verio) test strip   No No   Sig: Use 1 each in the morning Use as instructed   guaiFENesin (ROBITUSSIN) 100 mg/5 mL oral solution   No No   Sig: Take 10 mL (200 mg total) by mouth every 4 (four) hours   ibuprofen (MOTRIN) 400 mg tablet   No No   Sig: Take 1 tablet (400 mg total) by mouth every 6 (six) hours as needed for mild pain   lidocaine (LMX) 4 % cream   No No   Sig: Apply topically as needed for mild pain To neck   meclizine (ANTIVERT) 12 5 MG tablet   No No   Sig: Take 1 tablet (12 5 mg total) by mouth every 8 (eight) hours as needed for dizziness   metFORMIN (GLUCOPHAGE) 500 mg tablet   No No   Sig: Take 2 tablets (1,000 mg total) by mouth 2 (two) times a day with meals   naloxone (NARCAN) 4 mg/0 1 mL nasal spray   No No   Sig: Administer 1 spray into a nostril  If no response after 2-3 minutes, give another dose in the other nostril using a new spray     omeprazole (PriLOSEC) 20 mg delayed release capsule   No No   Sig: Take 2 capsules (40 mg total) by mouth daily To prevent stomach upset   ondansetron (ZOFRAN) 4 mg tablet   No No   Sig: Take 1 tablet (4 mg total) by mouth every 8 (eight) hours as needed for nausea or vomiting   oxyCODONE (ROXICODONE) 10 MG TABS   No No   Sig: Take 1 tablet (10 mg total) by mouth every 6 (six) hours as needed for severe pain Erica 1 tableta cada 6 horas fernandez sea necesario por dolor intenso Max Daily Amount: 40 mg   polyethylene glycol (MIRALAX) 17 g packet   No No   Sig: Take 17 g by mouth daily   potassium chloride (K-DUR,KLOR-CON) 20 mEq tablet   No No   Sig: Take 1 tablet (20 mEq total) by mouth daily   senna (SENOKOT) 8 6 mg   No No   Sig: Take 1 tablet (8 6 mg total) by mouth 2 (two) times a day   valACYclovir (VALTREX) 1,000 mg tablet   No No   Sig: Take 1 tablet (1,000 mg total) by mouth 3 (three) times a day for 7 days   zinc sulfate (ZINCATE) 220 mg capsule   No No   Sig: Take 1 capsule (220 mg total) by mouth daily for 4 doses      Facility-Administered Medications: None       Past Medical History:   Diagnosis Date   • Anemia     iron and B12   • Arthritis    • Asthma    • Cough    • Depression    • Diabetes mellitus ([place] [address] )    • Falls frequently    • Hypertension    • Lupus (HCC)    • Lymphoma (HCC)    • Lymphoma (HCC)    • Migraine    • Osteoporosis    • Psychiatric disorder    • 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[facility] )    • Vertigo        Past Surgical History:   Procedure Laterality Date   • CHOLECYSTECTOMY     • FL GUIDED CENTRAL VENOUS ACCESS DEVICE INSERTION  11/9/2018   • HYSTERECTOMY     • IR BIOPSY BONE MARROW  11/24/2020   • IR BIOPSY LYMPH NODE  11/24/2020   • LYMPH NODE BIOPSY Left 11/9/2018    Procedure: EXCISION BIOPSY LYMPH NODE SUPRACLAVICULAR;  Surgeon: Charlee Castellanos MD;  Location: [facility] MAIN OR;  Service: General   • PA TENDON SHEATH INCISION Right 1/16/2020    Procedure: RELEASE TRIGGER FINGER RIGHT THUMB;  Surgeon: Jan Miles MD;  Location: [facility] MAIN OR;  Service: Orthopedics   • TUNNELED VENOUS PORT PLACEMENT N/A 11/9/2018    Procedure: INSERTION OF PORT-A-CATH;  Surgeon: Charlee Castellanos MD;  Location: [facility] MAIN OR;  Service: General       Family History   Problem Relation Age of Onset   • Cancer Mother    • Diabetes Mother    • Cancer Father    • Diabetes Father    • Breast cancer Sister    • No Known Problems Sister    • No Known Problems Sister    • No Known Problems Sister    • No Known Problems Maternal Aunt    • No Known Problems Maternal Aunt    • No Known Problems Maternal Aunt    • No Known Problems Paternal Aunt      I have reviewed and agree with the history as documented  E-Cigarette/Vaping   • E-Cigarette Use Never User      E-Cigarette/Vaping Substances   • Nicotine No    • THC No    • CBD No    • Flavoring No    • Other No    • Unknown No      Social History     Tobacco Use   • Smoking status: Former     Types: Cigarettes     Quit date: 2017     Years since quittin 6   • Smokeless tobacco: Never   Vaping Use   • Vaping Use: Never used   Substance Use Topics   • Alcohol use: Never   • Drug use: Never       Review of Systems   Constitutional: Negative for diaphoresis, fatigue and fever  HENT: Negative for trouble swallowing  Eyes: Negative for pain and visual disturbance  Respiratory: Positive for shortness of breath  Negative for cough  Cardiovascular: Positive for chest pain  Negative for palpitations, orthopnea, leg swelling, syncope and PND  Gastrointestinal: Negative for abdominal pain, anal bleeding, heartburn, nausea and vomiting  Genitourinary: Negative for decreased urine volume and difficulty urinating  Musculoskeletal: Negative for back pain and neck pain  Skin: Positive for rash  Negative for wound  Neurological: Positive for headaches  Negative for dizziness, syncope, weakness and numbness  All other systems reviewed and are negative  Physical Exam  Physical Exam  Vitals and nursing note reviewed  Constitutional:       General: She is not in acute distress  Appearance: She is well-developed  She is not toxic-appearing or diaphoretic  HENT:      Head: Normocephalic and atraumatic  No right periorbital erythema or left periorbital erythema  Mouth/Throat:      Lips: No lesions  Mouth: Mucous membranes are moist  No oral lesions or angioedema  Pharynx: Oropharynx is clear  Uvula midline  Eyes:      Conjunctiva/sclera: Conjunctivae normal       Pupils: Pupils are equal, round, and reactive to light  Cardiovascular:      Rate and Rhythm: Normal rate and regular rhythm  Pulses:           Radial pulses are 2+ on the right side and 2+ on the left side  Heart sounds: Normal heart sounds  Pulmonary:      Effort: Pulmonary effort is normal  No tachypnea or respiratory distress  Breath sounds: Normal breath sounds  No rales  Abdominal:      General: There is no distension  Palpations: Abdomen is soft  Tenderness: There is abdominal tenderness in the right upper quadrant, epigastric area and left upper quadrant  There is no right CVA tenderness or left CVA tenderness  Musculoskeletal:      Right lower leg: No edema  Left lower leg: No edema  Skin:     General: Skin is warm  Capillary Refill: Capillary refill takes less than 2 seconds  Findings: Rash (follows dermatomal pattern, L upper abdomen to back  erythema, vesicles noted  does not cross midline  ) present  Rash is vesicular  Neurological:      Mental Status: She is alert and oriented to person, place, and time  Gait: Gait normal    Psychiatric:         Behavior: Behavior is cooperative           Vital Signs  ED Triage Vitals   Temperature Pulse Respirations Blood Pressure SpO2   02/15/23 0940 02/15/23 0940 02/15/23 0940 02/15/23 0940 02/15/23 0940   98 2 °F (36 8 °C) 91 18 127/83 97 %      Temp Source Heart Rate Source Patient Position - Orthostatic VS BP Location FiO2 (%)   02/15/23 0940 02/15/23 0940 02/15/23 1030 02/15/23 1030 --   Tympanic Monitor Sitting Left arm       Pain Score       02/15/23 0940       10 - Worst Possible Pain           Vitals:    02/15/23 1030 02/15/23 1339 02/15/23 1422 02/15/23 1619   BP: 137/68 165/77 137/63 152/76   Pulse: 76 76 75 83   Patient Position - Orthostatic VS: Sitting Sitting  Sitting         Visual Acuity      ED Medications  Medications   morphine injection 4 mg (4 mg Intravenous Given 2/15/23 1013)   iohexol (OMNIPAQUE) 350 MG/ML injection (SINGLE-DOSE) 100 mL (100 mL Intravenous Given 2/15/23 1112)   diphenhydrAMINE (BENADRYL) tablet 25 mg (25 mg Oral Given 2/15/23 1417)       Diagnostic Studies  Results Reviewed     Procedure Component Value Units Date/Time    Fingerstick Glucose (POCT) [[id number]]  (Normal) Collected: 02/15/23 1348    Lab Status: Final result Updated: 02/15/23 1349     POC Glucose 94 mg/dl     HS Troponin I 2hr [[id number]]  (Normal) Collected: 02/15/23 1251    Lab Status: Final result Specimen: Blood from Line, Venous Updated: 02/15/23 1320     hs TnI 2hr 7 ng/L      Delta 2hr hsTnI 1 ng/L     Urine Microscopic [[id number]]  (Normal) Collected: 02/15/23 1151    Lab Status: Final result Specimen: Urine, Clean Catch Updated: 02/15/23 1227     RBC, UA 1-2 /hpf      WBC, UA None Seen /hpf      Epithelial Cells Occasional /hpf      Bacteria, UA Occasional /hpf     UA (URINE) with reflex to Scope [[id number]]  (Abnormal) Collected: 02/15/23 1151    Lab Status: Final result Specimen: Urine, Clean Catch Updated: 02/15/23 1215     Color, UA Alaina     Clarity, UA Clear     Specific Gravity, UA 1 010     pH, UA 6 5     Leukocytes, UA Negative     Nitrite, UA Negative     Protein, UA 15 (Trace) mg/dl      Glucose, UA 50 (1/25%) mg/dl      Ketones, UA Negative mg/dl      Bilirubin, UA Negative     Occult Blood, UA 10 0     UROBILINOGEN UA 1 0 mg/dL     FLU/RSV/COVID - if FLU/RSV clinically relevant [[id number]]  (Normal) Collected: 02/15/23 1010    Lab Status: Final result Specimen: Nares from Nose Updated: 02/15/23 1105     SARS-CoV-2 Negative     INFLUENZA A PCR Negative     INFLUENZA B PCR Negative     RSV PCR Negative    Narrative:      FOR PEDIATRIC PATIENTS - copy/paste COVID Guidelines URL to browser: [email or web address]  [email or web address]    SARS-CoV-2 assay is a Nucleic Acid Amplification assay intended for the  qualitative detection of nucleic acid from SARS-CoV-2 in nasopharyngeal  swabs  Results are for the presumptive identification of SARS-CoV-2 RNA  Positive results are indicative of infection with SARS-CoV-2, the virus  causing COVID-19, but do not rule out bacterial infection or co-infection  with other viruses  Laboratories within the [place] and its  territories are required to report all positive results to the appropriate  public health authorities  Negative results do not preclude SARS-CoV-2  infection and should not be used as the sole basis for treatment or other  patient management decisions  Negative results must be combined with  clinical observations, patient history, and epidemiological information  This test has not been FDA cleared or approved  This test has been authorized by FDA under an Emergency Use Authorization  (EUA)  This test is only authorized for the duration of time the  declaration that circumstances exist justifying the authorization of the  emergency use of an in vitro diagnostic tests for detection of SARS-CoV-2  virus and/or diagnosis of COVID-19 infection under section 564(b)(1) of  the Act, 21 U  S C  137QTE-8(V)(8), unless the authorization is terminated  or revoked sooner  The test has been validated but independent review by FDA  and CLIA is pending  Test performed using [employer] Gene[employer]pert: This RT-PCR assay targets N2,  a region unique to SARS-CoV-2  A conserved region in the E-gene was chosen  for pan-Sarbecovirus detection which includes SARS-CoV-2  According to CMS-2020-01-R, this platform meets the definition of high-throughput technology      HS Troponin 0hr (reflex protocol) [[id number]]  (Normal) Collected: 02/15/23 1010    Lab Status: Final result Specimen: Blood from Arm, Right Updated: 02/15/23 1050     hs TnI 0hr 6 ng/L     NT-BNP PRO-BE,[facility] only           [[id number]]  (Normal) Collected: 02/15/23 1010    Lab Status: Final result Specimen: Blood from Arm, Right Updated: 02/15/23 1050     NT-proBNP 108 pg/mL     Comprehensive metabolic panel [[id number]]  (Abnormal) Collected: 02/15/23 1010    Lab Status: Final result Specimen: Blood from Arm, Right Updated: 02/15/23 1041     Sodium 135 mmol/L      Potassium 3 7 mmol/L      Chloride 101 mmol/L      CO2 24 mmol/L      ANION GAP 10 mmol/L      BUN 9 mg/dL      Creatinine 0 75 mg/dL      Glucose 197 mg/dL      Calcium 8 9 mg/dL      AST 24 U/L      ALT 17 U/L      Alkaline Phosphatase 96 U/L      Total Protein 7 0 g/dL      Albumin 4 0 g/dL      Total Bilirubin 0 81 mg/dL      eGFR 86 ml/min/1 73sq m     Narrative:      [place] guidelines for Chronic Kidney Disease (CKD):   •  Stage 1 with normal or high GFR (GFR > 90 mL/min/1 73 square meters)  •  Stage 2 Mild CKD (GFR = 60-89 mL/min/1 73 square meters)  •  Stage 3A Moderate CKD (GFR = 45-59 mL/min/1 73 square meters)  •  Stage 3B Moderate CKD (GFR = 30-44 mL/min/1 73 square meters)  •  Stage 4 Severe CKD (GFR = 15-29 mL/min/1 73 square meters)  •  Stage 5 End Stage CKD (GFR <15 mL/min/1 73 square meters)  Note: GFR calculation is accurate only with a steady state creatinine    Magnesium [[id number]]  (Normal) Collected: 02/15/23 1010    Lab Status: Final result Specimen: Blood from Arm, Right Updated: 02/15/23 1041     Magnesium 1 8 mg/dL     Lipase [[id number]]  (Normal) Collected: 02/15/23 1010    Lab Status: Final result Specimen: Blood from Arm, Right Updated: 02/15/23 1041     Lipase 26 u/L     CBC and differential [[id number]]  (Abnormal) Collected: 02/15/23 1010    Lab Status: Final result Specimen: Blood from Arm, Right Updated: 02/15/23 1032     WBC 3 26 Thousand/uL      RBC 6 03 Million/uL      Hemoglobin 12 0 g/dL      Hematocrit 41 6 %      MCV 69 fL      MCH 19 9 pg      MCHC 28 8 g/dL      RDW 18 4 %      MPV 10 0 fL      Platelets 992 Thousands/uL      nRBC 0 /100 WBCs      Neutrophils Relative 66 %      Immat GRANS % 1 %      Lymphocytes Relative 23 %      Monocytes Relative 7 %      Eosinophils Relative 2 %      Basophils Relative 1 %      Neutrophils Absolute 2 18 Thousands/µL      Immature Grans Absolute 0 02 Thousand/uL      Lymphocytes Absolute 0 76 Thousands/µL      Monocytes Absolute 0 23 Thousand/µL      Eosinophils Absolute 0 05 Thousand/µL      Basophils Absolute 0 02 Thousands/µL                  PE Study with CT Abdomen and Pelvis with contrast   Final Result by eNd Avilez MD (02/15 1619)         1  No evidence for acute pulmonary embolism or other acute intrathoracic abnormality  2   Relatively stable adenopathy in the chest, abdomen, and pelvis as described above and detailed on recent PET/CT 2/10/2023 and CT chest abdomen pelvis 1/30/2023 as well as splenomegaly consistent with known lymphoma  3   No acute abnormality identified in the abdomen or pelvis  4   Additional findings as noted  Workstation performed: [id number]                    Procedures  Procedures         ED Course  ED Course as of 02/17/23 1554   Wed Feb 15, 2023   8555 Per chart review as she was diagnosed with shingles 2 days ago and prescribed valacyclovir by heme/onc   1105 Procedure Note: EKG  Date/Time: 02/15/23 11:05 AM   Performed by: Zandra Alarcon by: Taylor Orozco  ECG interpreted by me, the ED Provider: yes   The EKG demonstrates:  Rate 94 bpm  Rhythm NSR   QTc 437  No ST elevations/depressions  Evaluation limited by artifact    1257 Spoke with Dr Cyn Horan of Radiology regarding CT scan  He is unable to complete dictation at this time due to upcoming lecture  He informed me verbally that he did not see any acute pathology, findings were similar to most recent CT scan   He did not see a PE     1321 Delta 2hr hsTnI: 1   1344 Discussed with patient, she does not want to wait for results at home she would like to wait in the ED for the final CT results  States she does not have transportation  1344 Is complaining of pruritus  Will get dose of Benadryl  1435 Per chart review, The patient had a PET CT scan on 2/10/2023 which showed:     IMPRESSION:     1  Interval progression of FDG avid adenopathy in the neck, chest, abdomen and pelvis as above delineated  2  The spleen has mildly increased in size  3  0 6 cm groundglass opacity in the right upper lobe, which can be reassessed during the course of follow-up      The patient today complained about significant abdominal pain  She also noted a skin rash with blisters in the left flank mid abdominal and mid back area which was discovered today  Recent CBC on 1/30/2023 showed hemoglobin of 11 2 with normal white cells and platelets  [address] Discussed with Dr Rosemary Gordillo regarding patient's insight into medical condition and involvement of case management, he is going to have  reach out     1622 PE Study with CT Abdomen and Pelvis with contrast  IMPRESSION:        1  No evidence for acute pulmonary embolism or other acute intrathoracic abnormality  2   Relatively stable adenopathy in the chest, abdomen, and pelvis as described above and detailed on recent PET/CT 2/10/2023 and CT chest abdomen pelvis 1/30/2023 as well as splenomegaly consistent with known lymphoma  3   No acute abnormality identified in the abdomen or pelvis    4   Additional findings as noted                 HEART Risk Score    Flowsheet Row Most Recent Value   Heart Score Risk Calculator    History 0 Filed at: 02/15/2023 1321   ECG 1 Filed at: 02/15/2023 1321   Age 1 Filed at: 02/15/2023 1321   Risk Factors 2 Filed at: 02/15/2023 1321   Troponin 0 Filed at: 02/15/2023 1321   HEART Score 4 Filed at: 02/15/2023 1321                [address]  Rule for PE    Flowsheet Row Most Recent Value   PERC Rule for PE    Age >=50 1 Filed at: 02/15/2023 1000   HR >=100 0 Filed at: 02/15/2023 1000   O2 Sat on room air < 95% 0 Filed at: 02/15/2023 1000   History of PE or DVT 0 Filed at: 02/15/2023 1000   Recent trauma or surgery 0 Filed at: 02/15/2023 1000   Hemoptysis 0 Filed at: 02/15/2023 1000   Exogenous estrogen 0 Filed at: 02/15/2023 1000   Unilateral leg swelling 0 Filed at: 02/15/2023 1000   PERC Rule for PE Results 1 Filed at: 02/15/2023 1000              SBIRT 22yo+    Flowsheet Row Most Recent Value   SBIRT (23 yo +)    In order to provide better care to our patients, we are screening all of our patients for alcohol and drug use  Would it be okay to ask you these screening questions? No Filed at: 02/15/2023 1014                    Medical Decision Making  Background: 63 y o  female with chest pain, shortness of breath since yesterday  Lymphoma, currently undergoing treatment  Patient in no acute distress  No respiratory distress  No adventitious lung sounds bilaterally  Upper abdominal tenderness without peritoneal signs noted on exam   No pitting edema  No diaphoresis  Rash noted  Afebrile  Vital signs are stable  Differential DX includes but is not limited to: acs/mi, pe, pleurisy, dissection, pneumonia, musculoskeletal chest pain, anemia, drug reaction     Plan: cardiac workup  PERC positive, high risk factors due to cancer, will order a CT PE study  ECG, CBC, CMP, lipase, magnesium, troponin, BNP also ordered  Mild leukopenia noted, no neutropenia  hemoglobin is stable  ECG without acute ischemic changes or dysrhythmia on my read  Electrolytes within normal limits  Kidney function, liver function tests within normal limits  BNP within normal limits  No hypoglycemia  No clinically significant hyperglycemia  High-sensitivity troponin of 6, 2-hour delta of 1, heart score of 4, low clinical suspicion for ACS at this time, ambulatory referral to cardiology placed for outpatient follow-up  CTA chest abdomen pelvis without any acute changes    Adenopathy found to be relatively stable, findings consistent with diagnosis  Rash x3 days  Left abdomen, back  Painful  Does not cross midline  Vesicular  Was diagnosed with herpes zoster and prescribed valacyclovir  She has not picked up or started the prescription  On evaluation today, rash does appear consistent with herpes zoster  Educated patient's on importance of picking up prescription  Will give first dose of valacyclovir here  Will give Benadryl for itching, Lidoderm patches for pain  Patient was AA0x4  However, she was confused about her chemo schedule, was unsure which medications she should be taking, demonstrating poor health literacy  Reached out to Oncologist about her health literacy, who stated he will involve Case Management  All imaging and/or lab testing discussed with patient, strict return to ED precautions discussed  Patient recommended to follow up promptly with appropriate outpatient provider  Patient and/or family members verbalizes understanding and agrees with plan  Patient and/or family members were given opportunity to ask questions, all questions were answered at this time  Patient is stable for discharge      Portions of the record may have been created with voice recognition software  Occasional wrong word or "sound a like" substitutions may have occurred due to the inherent limitations of voice recognition software  Read the chart carefully and recognize, using context, where substitutions have occurred  Other chest pain: acute illness or injury  Shingles rash: acute illness or injury  Amount and/or Complexity of Data Reviewed  Labs: ordered  Decision-making details documented in ED Course  Radiology: ordered  Decision-making details documented in ED Course  Risk  OTC drugs  Prescription drug management            Disposition  Final diagnoses:   Shingles rash   Other chest pain     Time reflects when diagnosis was documented in both MDM as applicable and the Disposition within this note     Time User Action Codes Description Comment    2/15/2023  4:29 PM Heather Yoouson Add [B02 9] Shingles rash     2/15/2023  4:29 PM Heather Sanna Add [R07 89] Other chest pain       ED Disposition     ED Disposition   Discharge    Condition   Stable    Date/Time   Wed Feb 15, 2023  4:31 PM    Comment   Helen Quintero discharge to home/self care                 Follow-up Information     Follow up With Specialties Details Why Alycia Escoto MD Hematology and Oncology, Hematology, Oncology, Internal Medicine   [address] [facility]  [address] [address] [address]  [address] [facility], [address]   [address]  [phone]            Discharge Medication List as of 2/15/2023  4:33 PM      START taking these medications    Details   diphenhydrAMINE (BENADRYL) 25 mg tablet Take 1 tablet (25 mg total) by mouth every 6 (six) hours as needed for itching, Starting Wed 2/15/2023, Normal      lidocaine (LIDODERM) 5 % Apply 1 patch topically over 12 hours every 24 hours for 15 days Remove & Discard patch within 12 hours or as directed by MD, Starting Wed 2/15/2023, Until u 3/2/2023, Normal      Lenalidomide 20 MG CAPS Take 1 capsule (20 mg total) by mouth in the morning For 21 days then 7 days rest, Starting Wed 2/15/2023, Normal         CONTINUE these medications which have NOT CHANGED    Details   !! acetaminophen (TYLENOL) 325 mg tablet Take 2 tablets (650 mg total) by mouth every 6 (six) hours as needed for mild pain, moderate pain, headaches or fever, Starting Tue 1/26/2021, Normal      !! acetaminophen (TYLENOL) 500 mg tablet Take 1 tablet (500 mg total) by mouth every 6 (six) hours as needed (pain), Starting Wed 8/17/2022, Normal      acyclovir (ZOVIRAX) 400 MG tablet Take 1 tablet (400 mg total) by mouth 2 (two) times a day, Starting Tue 2/14/2023, Until u 3/16/2023, Normal      al mag oxide-diphenhydramine-lidocaine viscous (MAGIC MOUTHWASH) 1:1:1 suspension Swish and spit 10 mL every 4 (four) hours as needed for mouth pain or discomfort or mucositis, Starting Mon 1/24/2022, Normal      !! allopurinol (ZYLOPRIM) 100 mg tablet Take 1 tablet (100 mg total) by mouth daily, Starting Wed 6/23/2021, Normal      !! allopurinol (ZYLOPRIM) 100 mg tablet Take 1 tablet (100 mg total) by mouth daily, Starting Tue 2/14/2023, Normal      amLODIPine (NORVASC) 10 mg tablet Take 10 mg by mouth daily, Starting Thu 6/2/2022, Historical Med      ascorbic acid (VITAMIN C) 1000 MG tablet Take 1 tablet (1,000 mg total) by mouth every 12 (twelve) hours for 10 doses, Starting Tue 1/26/2021, Until Mon 6/7/2021, Normal      aspirin (ECOTRIN LOW STRENGTH) 81 mg EC tablet Take 1 tablet (81 mg total) by mouth daily, Starting Wed 3/18/2020, Normal      aspirin-acetaminophen-caffeine (EXCEDRIN MIGRAINE) 250-250-65 MG per tablet Take 1 tablet by mouth every 6 (six) hours as needed for headaches Erica 1 tableta cada 6 horas fernandez sea necesario para el dolor de russ, Starting Thu 11/19/2020, Normal      atorvastatin (LIPITOR) 40 mg tablet Take 1 tablet (40 mg total) by mouth daily with dinner, Starting Tue 1/26/2021, Normal      benzocaine (ORAJEL) 10 % mucosal gel Apply 1 application to the mouth or throat as needed for mucositis, Starting Wed 8/17/2022, Normal      benzonatate (TESSALON) 200 MG capsule Take 1 capsule (200 mg total) by mouth 3 (three) times a day as needed for cough, Starting Mon 1/24/2022, Normal      Blood Glucose Monitoring Suppl (OneTouch Verio) w/Device KIT Use in the morning, Starting Fri 4/8/2022, Normal      carbamide peroxide (DEBROX) 6 5 % otic solution Administer 5 drops to the right ear 2 (two) times a day, Starting Wed 6/12/2019, Normal      chlorthalidone 25 mg tablet Take 1 tablet (25 mg total) by mouth daily, Starting Tue 5/31/2022, Normal      cholecalciferol (VITAMIN D3) 1,000 units tablet Take 2 tablets (2,000 Units total) by mouth daily for 5 days, Starting Wed 1/27/2021, Until Mon 6/7/2021, Normal      cyanocobalamin 1000 MCG tablet Take 1 tablet (1,000 mcg total) by mouth daily, Starting Wed 4/17/2019, Normal      DULoxetine (CYMBALTA) 20 mg capsule Take 2 capsules (40 mg total) by mouth daily, Starting Tue 0/5/2869, Normal      folic acid (FOLVITE) 1 mg tablet Take 1 tablet (1 mg total) by mouth daily, Starting Wed 6/23/2021, Normal      glucose blood (OneTouch Verio) test strip Use 1 each in the morning Use as instructed, Starting Fri 4/8/2022, Normal      guaiFENesin (ROBITUSSIN) 100 mg/5 mL oral solution Take 10 mL (200 mg total) by mouth every 4 (four) hours, Starting Tue 1/26/2021, Normal      ibuprofen (MOTRIN) 400 mg tablet Take 1 tablet (400 mg total) by mouth every 6 (six) hours as needed for mild pain, Starting Wed 8/17/2022, Normal      lidocaine (LMX) 4 % cream Apply topically as needed for mild pain To neck, Starting Tue 9/15/2020, Normal      LORazepam (ATIVAN) 0 5 mg tablet Take 1 tablet 30-60 minutes before MRI, can take an additional tablet if needed before test, Normal      meclizine (ANTIVERT) 12 5 MG tablet Take 1 tablet (12 5 mg total) by mouth every 8 (eight) hours as needed for dizziness, Starting Tue 1/26/2021, Normal      metFORMIN (GLUCOPHAGE) 500 mg tablet Take 2 tablets (1,000 mg total) by mouth 2 (two) times a day with meals, Starting Tue 4/5/2022, Normal      naloxone (NARCAN) 4 mg/0 1 mL nasal spray Administer 1 spray into a nostril   If no response after 2-3 minutes, give another dose in the other nostril using a new spray , Normal      omeprazole (PriLOSEC) 20 mg delayed release capsule Take 2 capsules (40 mg total) by mouth daily To prevent stomach upset, Starting Wed 3/25/2020, Normal      ondansetron (ZOFRAN) 4 mg tablet Take 1 tablet (4 mg total) by mouth every 8 (eight) hours as needed for nausea or vomiting, Starting Tue 9/15/2020, Normal      OneTouch Delica Lancets 77W MISC Use in the morning, Starting Fri 4/8/2022, Normal      oxyCODONE (Bridger Heckle) 10 MG TABS Take 1 tablet (10 mg total) by mouth every 6 (six) hours as needed for severe pain Erica 1 tableta cada 6 horas fernandez sea necesario por dolor intenso Max Daily Amount: 40 mg, Starting Tue 2/7/2023, Normal      polyethylene glycol (MIRALAX) 17 g packet Take 17 g by mouth daily, Starting Tue 2/7/2023, Normal      potassium chloride (K-DUR,KLOR-CON) 20 mEq tablet Take 1 tablet (20 mEq total) by mouth daily, Starting Tue 6/14/2022, Normal      senna (SENOKOT) 8 6 mg Take 1 tablet (8 6 mg total) by mouth 2 (two) times a day, Starting Tue 12/17/2019, Normal      valACYclovir (VALTREX) 1,000 mg tablet Take 1 tablet (1,000 mg total) by mouth 3 (three) times a day for 7 days, Starting Mon 2/13/2023, Until Mon 2/20/2023, Normal      zinc sulfate (ZINCATE) 220 mg capsule Take 1 capsule (220 mg total) by mouth daily for 4 doses, Starting Wed 1/27/2021, Until Sun 1/31/2021, Normal       !! - Potential duplicate medications found  Please discuss with provider                PDMP Review       Value Time User    PDMP Reviewed  Yes 2/7/2023  9:50 AM Sonia Silverman MD          ED Provider  Electronically Signed by           Faisal Lauren PA-C  02/17/23 1125

## 2023-02-15 NOTE — DISCHARGE INSTRUCTIONS
Take your valacyclovir as prescribed  Use other medications as needed for symptoms  Continue to follow-up with your PCP, oncologist  Follow-up with cardiology  Return to ED for new or worsening symptoms as discussed

## 2023-02-15 NOTE — ED NOTES
Spoke to partner to  the 3 medications that are in pharmacy  Pt aware of current plan         Army Law, RN  02/15/23 [id number]

## 2023-02-16 DIAGNOSIS — C82.18 GRADE 2 FOLLICULAR LYMPHOMA OF LYMPH NODES OF MULTIPLE REGIONS (HCC): ICD-10-CM

## 2023-02-16 RX ORDER — LENALIDOMIDE 20 MG/1
20 CAPSULE ORAL DAILY
Qty: 21 CAPSULE | Refills: 0 | Status: SHIPPED | OUTPATIENT
Start: 2023-02-16

## 2023-02-19 NOTE — ED ATTENDING ATTESTATION
I was the attending physician on duty at the time the patient visited the emergency department  The patient was evaluated by the Advanced Practitioner  I was personally available for consultation; however the patient’s care, medical decisions and disposition fell within the Advanced Practitioner’s scope of practice to independently manage the patient, unless otherwise noted      Anthony Layton MD

## 2023-02-21 DIAGNOSIS — C82.18 GRADE 2 FOLLICULAR LYMPHOMA OF LYMPH NODES OF MULTIPLE REGIONS (HCC): Primary | ICD-10-CM

## 2023-02-23 DIAGNOSIS — C82.18 GRADE 2 FOLLICULAR LYMPHOMA OF LYMPH NODES OF MULTIPLE REGIONS (HCC): Primary | ICD-10-CM

## 2023-02-23 DIAGNOSIS — D51.8 OTHER VITAMIN B12 DEFICIENCY ANEMIAS: Primary | ICD-10-CM

## 2023-02-23 RX ORDER — ACETAMINOPHEN 325 MG/1
650 TABLET ORAL ONCE
OUTPATIENT
Start: 2023-03-14

## 2023-02-23 RX ORDER — SODIUM CHLORIDE 9 MG/ML
20 INJECTION, SOLUTION INTRAVENOUS ONCE
OUTPATIENT
Start: 2023-03-14

## 2023-02-23 RX ORDER — SODIUM CHLORIDE 9 MG/ML
20 INJECTION, SOLUTION INTRAVENOUS ONCE
OUTPATIENT
Start: 2023-03-21

## 2023-02-23 RX ORDER — SODIUM CHLORIDE 9 MG/ML
20 INJECTION, SOLUTION INTRAVENOUS ONCE
OUTPATIENT
Start: 2023-03-15

## 2023-02-23 RX ORDER — ACETAMINOPHEN 325 MG/1
650 TABLET ORAL ONCE
OUTPATIENT
Start: 2023-03-28

## 2023-02-23 RX ORDER — ACETAMINOPHEN 325 MG/1
650 TABLET ORAL ONCE
OUTPATIENT
Start: 2023-03-15

## 2023-02-23 RX ORDER — CYANOCOBALAMIN 1000 UG/ML
1000 INJECTION, SOLUTION INTRAMUSCULAR; SUBCUTANEOUS ONCE
OUTPATIENT
Start: 2023-02-27

## 2023-02-23 RX ORDER — ACETAMINOPHEN 325 MG/1
650 TABLET ORAL ONCE
OUTPATIENT
Start: 2023-03-21

## 2023-02-23 RX ORDER — SODIUM CHLORIDE 9 MG/ML
20 INJECTION, SOLUTION INTRAVENOUS ONCE
OUTPATIENT
Start: 2023-03-28

## 2023-02-24 DIAGNOSIS — C82.18 GRADE 2 FOLLICULAR LYMPHOMA OF LYMPH NODES OF MULTIPLE REGIONS (HCC): ICD-10-CM

## 2023-02-24 RX ORDER — OXYCODONE HYDROCHLORIDE 10 MG/1
10 TABLET ORAL EVERY 6 HOURS PRN
Qty: 90 TABLET | Refills: 0 | Status: SHIPPED | OUTPATIENT
Start: 2023-02-24

## 2023-02-24 NOTE — TELEPHONE ENCOUNTER
Primary palliative medicine provider: Dr Christopher Newberry    Medication requested: Oxycodone 10 mg     If for pain, how has the patient been taking their pain medicine?      Last appointment: 2/7     Next scheduled appointment:  5/9     PDMP review:   PATIENT ID PRESCRIPTION # FILLED WRITTEN DRUG LABEL QTY DAYS STRENGTH MME** PRESCRIBER PHARMACY PAYMENT REFILL #/AUTH STATE DETAIL   1 [id number] 02/07/2023 02/07/2023 oxyCODONE HCL (Tablet) 90 0 23 10 MG 58 70 [facility] Medicaid 0 / 0 [place]    1 [id number] 01/16/2023 12/27/2022 oxyCODONE HCL (Tablet) 90 0 23 10 MG 58 70 [facility] Medicaid 0 / 0 [place]    1 [id number] 11/17/2022 11/17/2022 oxyCODONE HCL (Tablet) 180 0 30 10 MG 90 0 [facility] [place] [facility] Private Pay 0 / 0 [place]    1 [id number] 10/27/2022 10/27/2022 oxyCODONE HCL (Tablet) 90 0 23 10 MG 58 70 [facility] Private Pay 0 / 0 [place]          Pharmacy: [facility]

## 2023-02-25 ENCOUNTER — HOSPITAL ENCOUNTER (EMERGENCY)
Facility: HOSPITAL | Age: 63
Discharge: HOME | End: 2023-02-25
Attending: EMERGENCY MEDICINE

## 2023-02-25 VITALS
OXYGEN SATURATION: 99 % | BODY MASS INDEX: 40.34 KG/M2 | RESPIRATION RATE: 22 BRPM | TEMPERATURE: 98.1 F | SYSTOLIC BLOOD PRESSURE: 153 MMHG | WEIGHT: 235 LBS | HEART RATE: 90 BPM | DIASTOLIC BLOOD PRESSURE: 80 MMHG

## 2023-02-25 DIAGNOSIS — B02.9 HERPES ZOSTER: Primary | ICD-10-CM

## 2023-02-25 DIAGNOSIS — B02.29 POSTHERPETIC NEURALGIA: ICD-10-CM

## 2023-02-25 LAB
ATRIAL RATE: 65 BPM
P AXIS: 44 DEGREES
PR INTERVAL: 182 MS
QRS AXIS: 32 DEGREES
QRSD INTERVAL: 86 MS
QT INTERVAL: 416 MS
QTC INTERVAL: 432 MS
T WAVE AXIS: 47 DEGREES
VENTRICULAR RATE: 65 BPM

## 2023-02-25 RX ORDER — DIPHENHYDRAMINE HYDROCHLORIDE 25 MG/1
CAPSULE ORAL
COMMUNITY
Start: 2023-02-15

## 2023-02-25 RX ORDER — MORPHINE SULFATE 4 MG/ML
6 INJECTION, SOLUTION INTRAMUSCULAR; INTRAVENOUS ONCE
Status: COMPLETED | OUTPATIENT
Start: 2023-02-25 | End: 2023-02-25

## 2023-02-25 RX ORDER — CAPSAICIN 0.75 MG/G
CREAM TOPICAL 3 TIMES DAILY
Qty: 57 G | Refills: 0 | Status: SHIPPED | OUTPATIENT
Start: 2023-02-25

## 2023-02-25 RX ADMIN — MORPHINE SULFATE 6 MG: 4 INJECTION, SOLUTION INTRAMUSCULAR; INTRAVENOUS at 07:22

## 2023-02-25 NOTE — ED PROVIDER NOTES
History  Chief Complaint   Patient presents with   • Skin Problem     Patient has had shingles for 2 weeks, spots crusted over  Took oxycodone yesterday, still having a lot of pain  The patient is a 29-year-old female with a complex medical history including follicular lymphoma for which she is currently undergoing chemotherapy, chemo induced leukopenia, cancer-related pain, type 2 diabetes mellitus, anemia, thrombocytopenia, lupus, migraines, and hypertension  She was recently seen in the ED on 2/15/23 for chest pain and a rash on her upper abdomen  She underwent a full work-up for the chest pain which was unremarkable  Patient was diagnosed with herpes zoster and prescribed lidocaine cream and patches  She was also instructed to take the valacyclovir that was previously prescribed by her oncologist on 2/13/23  Tonight patient returns for persistent left-sided chest and back pain  She has been taking 10 mg of oxycodone every 4 hours with minimal relief  The patient did use lidocaine patches at first but states the patches make her rash burn more, so she hasn't been using them as often  She is still taking acyclovir but states it does not help her pain  Patient is requesting a cream for pain relief  Prior to Admission Medications   Prescriptions Last Dose Informant Patient Reported? Taking?    Banophen 25 MG capsule   Yes No   Sig: TAKE 1 CAPSULE(25 MG TOTAL) BY MOUTH EVERY 6 (SIX) HOURS AS NEEDED FOR ITCHING   Blood Glucose Monitoring Suppl (OneTouch Verio) w/Device KIT   No No   Sig: Use in the morning   DULoxetine (CYMBALTA) 20 mg capsule   No No   Sig: Take 2 capsules (40 mg total) by mouth daily   LORazepam (ATIVAN) 0 5 mg tablet   No No   Sig: Take 1 tablet 30-60 minutes before MRI, can take an additional tablet if needed before test   Patient not taking: Reported on 1/2/4779   OneTouch Delica Lancets 86V MISC   No No   Sig: Use in the morning   acyclovir (ZOVIRAX) 400 MG tablet   No No Sig: Take 1 tablet (400 mg total) by mouth 2 (two) times a day   allopurinol (ZYLOPRIM) 100 mg tablet   No No   Sig: Take 1 tablet (100 mg total) by mouth daily   allopurinol (ZYLOPRIM) 100 mg tablet   No No   Sig: Take 1 tablet (100 mg total) by mouth daily   amLODIPine (NORVASC) 10 mg tablet   Yes No   Sig: Take 10 mg by mouth daily   ascorbic acid (VITAMIN C) 1000 MG tablet   No No   Sig: Take 1 tablet (1,000 mg total) by mouth every 12 (twelve) hours for 10 doses   aspirin (ECOTRIN LOW STRENGTH) 81 mg EC tablet   No No   Sig: Take 1 tablet (81 mg total) by mouth daily   aspirin-acetaminophen-caffeine (EXCEDRIN MIGRAINE) 250-250-65 MG per tablet   No No   Sig: Take 1 tablet by mouth every 6 (six) hours as needed for headaches Erica 1 tableta cada 6 horas fernandez sea necesario para el dolor de russ   atorvastatin (LIPITOR) 40 mg tablet   No No   Sig: Take 1 tablet (40 mg total) by mouth daily with dinner   benzocaine (ORAJEL) 10 % mucosal gel   No No   Sig: Apply 1 application to the mouth or throat as needed for mucositis   benzonatate (TESSALON) 200 MG capsule   No No   Sig: Take 1 capsule (200 mg total) by mouth 3 (three) times a day as needed for cough   Patient not taking: Reported on 2/13/2023   chlorthalidone 25 mg tablet   No No   Sig: Take 1 tablet (25 mg total) by mouth daily   cholecalciferol (VITAMIN D3) 1,000 units tablet   No No   Sig: Take 2 tablets (2,000 Units total) by mouth daily for 5 days   cyanocobalamin 1000 MCG tablet   No No   Sig: Take 1 tablet (1,000 mcg total) by mouth daily   diphenhydrAMINE (BENADRYL) 25 mg tablet   No No   Sig: Take 1 tablet (25 mg total) by mouth every 6 (six) hours as needed for itching   folic acid (FOLVITE) 1 mg tablet   No No   Sig: Take 1 tablet (1 mg total) by mouth daily   glucose blood (OneTouch Verio) test strip   No No   Sig: Use 1 each in the morning Use as instructed   lidocaine (LMX) 4 % cream   No No   Sig: Apply topically as needed for mild pain To neck   meclizine (ANTIVERT) 12 5 MG tablet   No No   Sig: Take 1 tablet (12 5 mg total) by mouth every 8 (eight) hours as needed for dizziness   metFORMIN (GLUCOPHAGE) 500 mg tablet   No No   Sig: Take 2 tablets (1,000 mg total) by mouth 2 (two) times a day with meals   naloxone (NARCAN) 4 mg/0 1 mL nasal spray   No No   Sig: Administer 1 spray into a nostril  If no response after 2-3 minutes, give another dose in the other nostril using a new spray     omeprazole (PriLOSEC) 20 mg delayed release capsule   No No   Sig: Take 2 capsules (40 mg total) by mouth daily To prevent stomach upset   ondansetron (ZOFRAN) 4 mg tablet   No No   Sig: Take 1 tablet (4 mg total) by mouth every 8 (eight) hours as needed for nausea or vomiting   oxyCODONE (ROXICODONE) 10 MG TABS   No No   Sig: Take 1 tablet (10 mg total) by mouth every 6 (six) hours as needed for severe pain Erica 1 tableta cada 6 horas fernandez sea necesario por dolor intenso Max Daily Amount: 40 mg   polyethylene glycol (MIRALAX) 17 g packet   No No   Sig: Take 17 g by mouth daily   potassium chloride (K-DUR,KLOR-CON) 20 mEq tablet   No No   Sig: Take 1 tablet (20 mEq total) by mouth daily   senna (SENOKOT) 8 6 mg   No No   Sig: Take 1 tablet (8 6 mg total) by mouth 2 (two) times a day      Facility-Administered Medications: None       Past Medical History:   Diagnosis Date   • Anemia     iron and B12   • Arthritis    • Asthma    • Cough    • Depression    • Diabetes mellitus ([facility] [address] )    • Falls frequently    • Hypertension    • Lupus ([facility] [address] )    • Lymphoma ([facility] [address] )    • Lymphoma (HCC)    • Migraine    • Osteoporosis    • Psychiatric disorder    • 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    • Vertigo        Past Surgical History:   Procedure Laterality Date   • CHOLECYSTECTOMY     • FL GUIDED CENTRAL VENOUS ACCESS DEVICE INSERTION  11/9/2018   • HYSTERECTOMY     • IR BIOPSY BONE MARROW  11/24/2020   • IR BIOPSY LYMPH NODE  11/24/2020   • LYMPH NODE BIOPSY Left 11/9/2018    Procedure: EXCISION BIOPSY LYMPH NODE SUPRACLAVICULAR;  Surgeon: Saud Lockhart MD;  Location: [address] [place] [address] OR;  Service: General   • MS TENDON SHEATH INCISION Right 2020    Procedure: RELEASE TRIGGER FINGER RIGHT THUMB;  Surgeon: Efren Damon MD;  Location: [address] [place] [address] OR;  Service: Orthopedics   • TUNNELED VENOUS PORT PLACEMENT N/A 2018    Procedure: INSERTION OF PORT-A-CATH;  Surgeon: Saud Lockhart MD;  Location: [address] [place] [address];  Service: General       Family History   Problem Relation Age of Onset   • Cancer Mother    • Diabetes Mother    • Cancer Father    • Diabetes Father    • Breast cancer Sister    • No Known Problems Sister    • No Known Problems Sister    • No Known Problems Sister    • No Known Problems Maternal Aunt    • No Known Problems Maternal Aunt    • No Known Problems Maternal Aunt    • No Known Problems Paternal Aunt      I have reviewed and agree with the history as documented  E-Cigarette/Vaping   • E-Cigarette Use Never User      E-Cigarette/Vaping Substances   • Nicotine No    • THC No    • CBD No    • Flavoring No    • Other No    • Unknown No      Social History     Tobacco Use   • Smoking status: Former     Types: Cigarettes     Quit date: 2017     Years since quittin 7   • Smokeless tobacco: Never   Vaping Use   • Vaping Use: Never used   Substance Use Topics   • Alcohol use: Never   • Drug use: Never       Review of Systems   Constitutional: Negative for chills and fever  HENT: Negative for ear pain and sore throat  Eyes: Negative for pain and visual disturbance  Respiratory: Negative for cough and shortness of breath  Cardiovascular: Positive for chest pain  Negative for palpitations  Gastrointestinal: Negative for nausea and vomiting  Genitourinary: Negative for dysuria and hematuria  Musculoskeletal: Positive for back pain  Negative for arthralgias  Skin: Positive for rash  Negative for color change     Neurological: Negative for seizures, syncope, light-headedness and headaches  All other systems reviewed and are negative  Physical Exam  Physical Exam  Vitals and nursing note reviewed  Exam conducted with a chaperone present  Constitutional:       General: She is awake  She is in acute distress  Appearance: She is well-developed  She is obese  She is not toxic-appearing or diaphoretic  HENT:      Head: Normocephalic and atraumatic  Right Ear: External ear normal       Left Ear: External ear normal       Nose: Nose normal       Mouth/Throat:      Lips: Pink  Mouth: Mucous membranes are moist    Eyes:      General: Lids are normal  Gaze aligned appropriately  Conjunctiva/sclera: Conjunctivae normal       Pupils: Pupils are equal, round, and reactive to light  Cardiovascular:      Rate and Rhythm: Normal rate and regular rhythm  Heart sounds: S1 normal and S2 normal  No murmur heard  No friction rub  No gallop  Pulmonary:      Effort: Pulmonary effort is normal  No respiratory distress  Breath sounds: Normal breath sounds and air entry  No wheezing, rhonchi or rales  Chest:      Chest wall: Tenderness present  No deformity, swelling, crepitus or edema  Abdominal:      General: Abdomen is flat  A surgical scar is present  There is no distension  Palpations: Abdomen is soft  There is no mass  Tenderness: There is no guarding or rebound  Musculoskeletal:      Cervical back: Normal, full passive range of motion without pain and neck supple  Thoracic back: Tenderness present  No spasms or bony tenderness  Lumbar back: Normal  No spasms, tenderness or bony tenderness  Back:    Skin:     General: Skin is warm and dry  Capillary Refill: Capillary refill takes less than 2 seconds  Coloration: Skin is not cyanotic, jaundiced or pale  Findings: Rash present  No abrasion, bruising or erythema  Rash is crusting        Comments: Crusted rash on the left thoracic back and the left upper abdomen underneath the diaphragm  The rash follows a dermatomal pattern (T7 or T8)  The portions of the back are crusted over and there is one small area on the abdomen that has yellow slough  No active drainage/weeping seen  Neurological:      Mental Status: She is alert and oriented to person, place, and time  Psychiatric:         Mood and Affect: Mood is anxious  Affect is tearful  Speech: Speech normal          Behavior: Behavior is cooperative           Vital Signs  ED Triage Vitals [02/25/23 0615]   Temperature Pulse Respirations Blood Pressure SpO2   98 1 °F (36 7 °C) 90 22 153/80 99 %      Temp Source Heart Rate Source Patient Position - Orthostatic VS BP Location FiO2 (%)   Oral Monitor Sitting Left arm --      Pain Score       9           Vitals:    02/25/23 0615   BP: 153/80   Pulse: 90   Patient Position - Orthostatic VS: Sitting       ED Medications  Medications   morphine injection 6 mg (6 mg Intramuscular Given 2/25/23 7126)       Diagnostic Studies  Results Reviewed     None                 No orders to display              Procedures  ECG 12 Lead Documentation Only    Date/Time: 2/25/2023 7:08 AM  Performed by: Alise Mccain PA-C  Authorized by: Alise Mccain PA-C     Indications / Diagnosis:  Chest/upper abdomen pain  ECG reviewed by me, the ED Provider: yes    Patient location:  ED  Previous ECG:     Previous ECG:  Compared to current    Comparison ECG info:  1/30/23    Similarity:  No change  Interpretation:     Interpretation: normal    Rate:     ECG rate:  65    ECG rate assessment: normal    Rhythm:     Rhythm: sinus rhythm    Ectopy:     Ectopy: none    QRS:     QRS axis:  Normal    QRS intervals:  Normal  Conduction:     Conduction: normal    ST segments:     ST segments:  Normal  T waves:     T waves: normal    Comments:      MI interval: 182ms  QRS duration: 86ms  QT/QTc: 416/432ms         ED Course          Medical Decision Making  Patient diagnosed with shingles approximately two weeks ago returns for persistent pain chest/abdominal pain  She is very tearful and in acute distress due to pain  On exam there is a rash on the left thoracic back and the upper abdomen underneath the diaphragm that follows a mid-thoracic dermatomal pattern (suspect T7 or T8)  Most of the rash is crusted over but there is a small area on the abdomen that has yellow slough and is not actively weeping  Auscultation of the heart and lungs without significant abnormalities  Differential diagnosis includes but is not limited to herpes zoster virus, postherpetic neuralgia, chest wall pain, or cardiac etiology  EKG showed a normal sinus rhythm unchanged from her prior EKGs on 1/30/23 and 2/15/23  As the patient's pain seems to correlate with the dermatomal rash and her EKG is unremarkable, very low suspicion this is cardiac in nature  Patient given IM morphine for more immediate pain relief and she can continue to take the po oxycodone she is prescribed for her cancer-related pain  Capsaicin cream prescribed for suspected postherpetic neuralgia and patient instructed to not use the lidocaine patches in addition to the cream   She is already on Duloxetine which may help with the neuralgic but encouraged her to speak with her PCP about other medications to try (i e  Amitriptyline or Gabapentin) if the capsaicin cream does not alleviate her symptoms  Patient is in agreement with the treatment plan and all her questions were answered  Strict return precautions discussed and she verbalized understanding  Follow up with previously scheduled PCP appointment this coming week and return to the ED if symptoms acutely worsen  Herpes zoster: acute illness or injury  Postherpetic neuralgia: acute illness or injury  Amount and/or Complexity of Data Reviewed  External Data Reviewed: ECG and notes       Details: Reviewed recent oncologist visit as well as her prior ED visit shortly after  ECG/medicine tests: ordered and independent interpretation performed  Risk  OTC drugs  Prescription drug management  Disposition  Final diagnoses:   Herpes zoster   Postherpetic neuralgia     Time reflects when diagnosis was documented in both MDM as applicable and the Disposition within this note     Time User Action Codes Description Comment    2/25/2023  7:02 AM Shanna Rush Add [B02 9] Herpes zoster     2/25/2023  7:03 AM Shanna Rush [B02 29] Postherpetic neuralgia       ED Disposition     ED Disposition   Discharge    Condition   Stable    Date/Time   Sat Feb 25, 2023  7:24 AM    Comment   Polo Clam JimenezMercado discharge to home/self care                 Follow-up Information     Follow up With Specialties Details Why Contact Info Additional [address] [facility] Schedule an appointment as soon as possible for a visit in 2 days  [address] [facility] [address], [address] [facility] [id number]  [address], [address] [facility] [address], [address] [place], [place], [address]          Discharge Medication List as of 2/25/2023  7:24 AM      START taking these medications    Details   capsicum (ZOSTRIX) 0 075 % topical cream Apply topically 3 (three) times a day Apply to affected area, Starting Sat 2/25/2023, Print         CONTINUE these medications which have NOT CHANGED    Details   acyclovir (ZOVIRAX) 400 MG tablet Take 1 tablet (400 mg total) by mouth 2 (two) times a day, Starting Tue 2/14/2023, Until Thu 3/16/2023, Normal      !! allopurinol (ZYLOPRIM) 100 mg tablet Take 1 tablet (100 mg total) by mouth daily, Starting Wed 6/23/2021, Normal      !! allopurinol (ZYLOPRIM) 100 mg tablet Take 1 tablet (100 mg total) by mouth daily, Starting Tue 2/14/2023, Normal      amLODIPine (NORVASC) 10 mg tablet Take 10 mg by mouth daily, Starting Thu 6/2/2022, Historical Med      ascorbic acid (VITAMIN C) 1000 MG tablet Take 1 tablet (1,000 mg total) by mouth every 12 (twelve) hours for 10 doses, Starting Tue 1/26/2021, Until Mon 6/7/2021, Normal      aspirin (ECOTRIN LOW STRENGTH) 81 mg EC tablet Take 1 tablet (81 mg total) by mouth daily, Starting Wed 3/18/2020, Normal      aspirin-acetaminophen-caffeine (EXCEDRIN MIGRAINE) 250-250-65 MG per tablet Take 1 tablet by mouth every 6 (six) hours as needed for headaches Erica 1 tableta cada 6 horas fernandez sea necesario para el dolor de russ, Starting Thu 11/19/2020, Normal      atorvastatin (LIPITOR) 40 mg tablet Take 1 tablet (40 mg total) by mouth daily with dinner, Starting Tue 1/26/2021, Normal      Banophen 25 MG capsule TAKE 1 CAPSULE(25 MG TOTAL) BY MOUTH EVERY 6 (SIX) HOURS AS NEEDED FOR ITCHING, Historical Med      benzocaine (ORAJEL) 10 % mucosal gel Apply 1 application to the mouth or throat as needed for mucositis, Starting Wed 8/17/2022, Normal      benzonatate (TESSALON) 200 MG capsule Take 1 capsule (200 mg total) by mouth 3 (three) times a day as needed for cough, Starting Mon 1/24/2022, Normal      Blood Glucose Monitoring Suppl (OneTouch Verio) w/Device KIT Use in the morning, Starting Fri 4/8/2022, Normal      chlorthalidone 25 mg tablet Take 1 tablet (25 mg total) by mouth daily, Starting Tue 5/31/2022, Normal      cholecalciferol (VITAMIN D3) 1,000 units tablet Take 2 tablets (2,000 Units total) by mouth daily for 5 days, Starting Wed 1/27/2021, Until Mon 6/7/2021, Normal      cyanocobalamin 1000 MCG tablet Take 1 tablet (1,000 mcg total) by mouth daily, Starting Wed 4/17/2019, Normal      diphenhydrAMINE (BENADRYL) 25 mg tablet Take 1 tablet (25 mg total) by mouth every 6 (six) hours as needed for itching, Starting Wed 2/15/2023, Normal      DULoxetine (CYMBALTA) 20 mg capsule Take 2 capsules (40 mg total) by mouth daily, Starting Tue 3/9/8704, Normal      folic acid (FOLVITE) 1 mg tablet Take 1 tablet (1 mg total) by mouth daily, Starting Wed 6/23/2021, Normal glucose blood (OneTouch Verio) test strip Use 1 each in the morning Use as instructed, Starting Fri 4/8/2022, Normal      lidocaine (LMX) 4 % cream Apply topically as needed for mild pain To neck, Starting Tue 9/15/2020, Normal      LORazepam (ATIVAN) 0 5 mg tablet Take 1 tablet 30-60 minutes before MRI, can take an additional tablet if needed before test, Normal      meclizine (ANTIVERT) 12 5 MG tablet Take 1 tablet (12 5 mg total) by mouth every 8 (eight) hours as needed for dizziness, Starting Tue 1/26/2021, Normal      metFORMIN (GLUCOPHAGE) 500 mg tablet Take 2 tablets (1,000 mg total) by mouth 2 (two) times a day with meals, Starting Tue 4/5/2022, Normal      naloxone (NARCAN) 4 mg/0 1 mL nasal spray Administer 1 spray into a nostril   If no response after 2-3 minutes, give another dose in the other nostril using a new spray , Normal      omeprazole (PriLOSEC) 20 mg delayed release capsule Take 2 capsules (40 mg total) by mouth daily To prevent stomach upset, Starting Wed 3/25/2020, Normal      ondansetron (ZOFRAN) 4 mg tablet Take 1 tablet (4 mg total) by mouth every 8 (eight) hours as needed for nausea or vomiting, Starting Tue 9/15/2020, Normal      OneTouch Delica Lancets 67X MISC Use in the morning, Starting Fri 4/8/2022, Normal      oxyCODONE (ROXICODONE) 10 MG TABS Take 1 tablet (10 mg total) by mouth every 6 (six) hours as needed for severe pain Erica 1 tableta cada 6 horas fernandez sea necesario por dolor intenso Max Daily Amount: 40 mg, Starting Fri 2/24/2023, Normal      polyethylene glycol (MIRALAX) 17 g packet Take 17 g by mouth daily, Starting Tue 2/7/2023, Normal      potassium chloride (K-DUR,KLOR-CON) 20 mEq tablet Take 1 tablet (20 mEq total) by mouth daily, Starting Tue 6/14/2022, Normal      senna (SENOKOT) 8 6 mg Take 1 tablet (8 6 mg total) by mouth 2 (two) times a day, Starting Tue 12/17/2019, Normal      Lenalidomide 20 MG CAPS Take 1 capsule (20 mg total) by mouth in the morning For 21 days then 7 days rest, Starting Thu 2/16/2023, Normal       !! - Potential duplicate medications found  Please discuss with provider  No discharge procedures on file      PDMP Review       Value Time User    PDMP Reviewed  Yes 2/25/2023  7:17 AM Shanna Stubbs PA-C          ED Provider  Electronically Signed by           Sultana Sloan PA-C  02/28/23 6565

## 2023-02-27 ENCOUNTER — TELEPHONE (OUTPATIENT)
Dept: HEMATOLOGY ONCOLOGY | Facility: CLINIC | Age: 63
End: 2023-02-27

## 2023-02-27 ENCOUNTER — HOSPITAL ENCOUNTER (OUTPATIENT)
Dept: INFUSION CENTER | Facility: HOSPITAL | Age: 63
Discharge: HOME | End: 2023-02-27
Attending: INTERNAL MEDICINE

## 2023-02-27 VITALS
DIASTOLIC BLOOD PRESSURE: 74 MMHG | RESPIRATION RATE: 18 BRPM | HEART RATE: 72 BPM | TEMPERATURE: 98.7 F | SYSTOLIC BLOOD PRESSURE: 138 MMHG

## 2023-02-27 DIAGNOSIS — C82.18 GRADE 2 FOLLICULAR LYMPHOMA OF LYMPH NODES OF MULTIPLE REGIONS (HCC): ICD-10-CM

## 2023-02-27 RX ORDER — LENALIDOMIDE 20 MG/1
20 CAPSULE ORAL DAILY
Qty: 21 CAPSULE | Refills: 0 | Status: SHIPPED | OUTPATIENT
Start: 2023-02-27

## 2023-02-27 NOTE — PROGRESS NOTES
Pt arrives for first time gazyva  Upon chart review it was noted that patient was in the ED over the weekend for her shingles and it stated that she hasn't started her medication yet  Assessed patient in the waiting room, patient lifted shirt and patient's shingles sores open and painful all along her left back, patient has pain going into her upper abdomen as well  Lithuanian video  used for entire assessment which ended up taking one hour  Spoke with Unruly Cohen RN of concerns  Patient stated that she is going to [facility] this week and she wants to see a healer since medication is not helping  Patient states she has been taking her meds for shingles  It's hard to interpret if patient has been taking acyclovir or not as per ED documentation  Patient also stated she did not receive her Revlimid either  Per Dr Brianna Lua, hold treatment today  Patient states she will be in [place] the next two weeks and treatment rescheduled to 3/14/23  Patient aware of new schedule, will be going to out patient lab for labs today as well

## 2023-02-27 NOTE — TELEPHONE ENCOUNTER
Phoned pharmacy and was asked to send a new Rx because the first Rx was a transfer from another pharmacy

## 2023-02-27 NOTE — TELEPHONE ENCOUNTER
CALL RETURN FORM   Reason for patient call? Eulogio kaur calling from [facility] specialty pharmacy is regards to the patients medication for Lenalidomide 20 MG CAPS  Patient's primary oncologist? Dr Jared Everett    Name of person the patient was calling for? Dr Rina Resendizusing team    Any additional information to add, if applicable? Bob Collado states they need a new script sent over with the authorization #   Informed patient that the message will be forwarded to the team and someone will get back to them as soon as possible    Did you relay this information to the patient?   YES

## 2023-02-28 ENCOUNTER — TELEPHONE (OUTPATIENT)
Dept: HEMATOLOGY ONCOLOGY | Facility: CLINIC | Age: 63
End: 2023-02-28

## 2023-02-28 ENCOUNTER — HOSPITAL ENCOUNTER (OUTPATIENT)
Dept: INFUSION CENTER | Facility: HOSPITAL | Age: 63
End: 2023-02-28
Attending: INTERNAL MEDICINE

## 2023-02-28 ENCOUNTER — APPOINTMENT (OUTPATIENT)
Dept: LAB | Facility: HOSPITAL | Age: 63
End: 2023-02-28

## 2023-02-28 DIAGNOSIS — C82.18 GRADE 2 FOLLICULAR LYMPHOMA OF LYMPH NODES OF MULTIPLE REGIONS (HCC): ICD-10-CM

## 2023-02-28 LAB
BACTERIA UR QL AUTO: NORMAL /HPF
BILIRUB UR QL STRIP: NEGATIVE
CLARITY UR: CLEAR
COLOR UR: ABNORMAL
CREAT UR-MCNC: 118 MG/DL
GLUCOSE UR STRIP-MCNC: NEGATIVE MG/DL
HGB UR QL STRIP.AUTO: 10
KETONES UR STRIP-MCNC: NEGATIVE MG/DL
LDH SERPL-CCNC: 416 U/L (ref 313–618)
LEUKOCYTE ESTERASE UR QL STRIP: NEGATIVE
MAGNESIUM SERPL-MCNC: 2.1 MG/DL (ref 1.6–2.3)
MICROALBUMIN UR-MCNC: 10 MG/L (ref 0–20)
MICROALBUMIN/CREAT 24H UR: 8 MG/G CREATININE (ref 0–30)
NITRITE UR QL STRIP: NEGATIVE
NON-SQ EPI CELLS URNS QL MICRO: NORMAL /HPF
PH UR STRIP.AUTO: 6 [PH]
PROT UR STRIP-MCNC: ABNORMAL MG/DL
RBC #/AREA URNS AUTO: NORMAL /HPF
SP GR UR STRIP.AUTO: 1.02 (ref 1–1.04)
URATE SERPL-MCNC: 3.5 MG/DL (ref 2–7.5)
URINE COMMENT: NORMAL
UROBILINOGEN UA: NEGATIVE MG/DL
WBC #/AREA URNS AUTO: NORMAL /HPF

## 2023-02-28 NOTE — TELEPHONE ENCOUNTER
Received a phone call from Pedrito Sweet at [facility] to report that pharmacy contacted pt and was doing counseling on Rx Revlimid  Pt is going to [place] and will not be back until Mar 12 so that is when she will start her Revlimid as pt is leaving tomorrow evening and she is concerned that her drug will not arrive at her home in time before she leaves for airport

## 2023-03-13 ENCOUNTER — HOSPITAL ENCOUNTER (OUTPATIENT)
Dept: INFUSION CENTER | Facility: HOSPITAL | Age: 63
Discharge: HOME | End: 2023-03-13

## 2023-03-13 ENCOUNTER — OFFICE VISIT (OUTPATIENT)
Dept: HEMATOLOGY ONCOLOGY | Facility: CLINIC | Age: 63
End: 2023-03-13

## 2023-03-13 VITALS
DIASTOLIC BLOOD PRESSURE: 66 MMHG | SYSTOLIC BLOOD PRESSURE: 140 MMHG | RESPIRATION RATE: 18 BRPM | WEIGHT: 234 LBS | TEMPERATURE: 97.2 F | HEART RATE: 72 BPM | OXYGEN SATURATION: 98 % | BODY MASS INDEX: 40.17 KG/M2

## 2023-03-13 DIAGNOSIS — C82.18 GRADE 2 FOLLICULAR LYMPHOMA OF LYMPH NODES OF MULTIPLE REGIONS (HCC): Primary | ICD-10-CM

## 2023-03-13 DIAGNOSIS — Z91.89 AT HIGH RISK OF TUMOR LYSIS SYNDROME: Primary | ICD-10-CM

## 2023-03-13 DIAGNOSIS — B02.9 HERPES ZOSTER WITHOUT COMPLICATION: ICD-10-CM

## 2023-03-13 DIAGNOSIS — Z45.2 ENCOUNTER FOR CENTRAL LINE CARE: ICD-10-CM

## 2023-03-13 DIAGNOSIS — C82.18 GRADE 2 FOLLICULAR LYMPHOMA OF LYMPH NODES OF MULTIPLE REGIONS (HCC): ICD-10-CM

## 2023-03-13 DIAGNOSIS — D50.8 IRON DEFICIENCY ANEMIA SECONDARY TO INADEQUATE DIETARY IRON INTAKE: ICD-10-CM

## 2023-03-13 DIAGNOSIS — Z91.89 AT HIGH RISK OF TUMOR LYSIS SYNDROME: ICD-10-CM

## 2023-03-13 DIAGNOSIS — D51.8 OTHER VITAMIN B12 DEFICIENCY ANEMIAS: ICD-10-CM

## 2023-03-13 LAB
ALBUMIN SERPL BCP-MCNC: 4.1 G/DL (ref 3.5–5)
ALP SERPL-CCNC: 94 U/L (ref 43–122)
ALT SERPL W P-5'-P-CCNC: 12 U/L
ANION GAP SERPL CALCULATED.3IONS-SCNC: 7 MMOL/L (ref 5–14)
AST SERPL W P-5'-P-CCNC: 16 U/L (ref 14–36)
BASOPHILS # BLD AUTO: 0.04 THOUSANDS/ÂΜL (ref 0–0.1)
BASOPHILS NFR BLD AUTO: 1 % (ref 0–1)
BILIRUB SERPL-MCNC: 0.73 MG/DL (ref 0.2–1)
BUN SERPL-MCNC: 18 MG/DL (ref 5–25)
CALCIUM SERPL-MCNC: 9 MG/DL (ref 8.4–10.2)
CHLORIDE SERPL-SCNC: 108 MMOL/L (ref 96–108)
CO2 SERPL-SCNC: 24 MMOL/L (ref 21–32)
CREAT SERPL-MCNC: 0.68 MG/DL (ref 0.6–1.2)
EOSINOPHIL # BLD AUTO: 0.31 THOUSAND/ÂΜL (ref 0–0.61)
EOSINOPHIL NFR BLD AUTO: 5 % (ref 0–6)
ERYTHROCYTE [DISTWIDTH] IN BLOOD BY AUTOMATED COUNT: 18.8 % (ref 11.6–15.1)
GFR SERPL CREATININE-BSD FRML MDRD: 95 ML/MIN/1.73SQ M
GLUCOSE SERPL-MCNC: 104 MG/DL (ref 70–99)
HCT VFR BLD AUTO: 36.7 % (ref 34.8–46.1)
HGB BLD-MCNC: 10.5 G/DL (ref 11.5–15.4)
IMM GRANULOCYTES # BLD AUTO: 0.02 THOUSAND/UL (ref 0–0.2)
IMM GRANULOCYTES NFR BLD AUTO: 0 % (ref 0–2)
LDH SERPL-CCNC: 401 U/L (ref 313–618)
LYMPHOCYTES # BLD AUTO: 1.91 THOUSANDS/ÂΜL (ref 0.6–4.47)
LYMPHOCYTES NFR BLD AUTO: 31 % (ref 14–44)
MCH RBC QN AUTO: 20.1 PG (ref 26.8–34.3)
MCHC RBC AUTO-ENTMCNC: 28.6 G/DL (ref 31.4–37.4)
MCV RBC AUTO: 70 FL (ref 82–98)
MONOCYTES # BLD AUTO: 0.43 THOUSAND/ÂΜL (ref 0.17–1.22)
MONOCYTES NFR BLD AUTO: 7 % (ref 4–12)
NEUTROPHILS # BLD AUTO: 3.53 THOUSANDS/ÂΜL (ref 1.85–7.62)
NEUTS SEG NFR BLD AUTO: 56 % (ref 43–75)
NRBC BLD AUTO-RTO: 0 /100 WBCS
PHOSPHATE SERPL-MCNC: 3.9 MG/DL (ref 2.5–4.8)
PLATELET # BLD AUTO: 201 THOUSANDS/UL (ref 149–390)
PMV BLD AUTO: 9.8 FL (ref 8.9–12.7)
POTASSIUM SERPL-SCNC: 3.9 MMOL/L (ref 3.5–5.3)
PROT SERPL-MCNC: 6.7 G/DL (ref 6.4–8.4)
RBC # BLD AUTO: 5.23 MILLION/UL (ref 3.81–5.12)
SODIUM SERPL-SCNC: 139 MMOL/L (ref 135–147)
URATE SERPL-MCNC: 4.6 MG/DL (ref 2–7.5)
WBC # BLD AUTO: 6.24 THOUSAND/UL (ref 4.31–10.16)

## 2023-03-13 RX ORDER — ALLOPURINOL 100 MG/1
100 TABLET ORAL DAILY
Qty: 30 TABLET | Refills: 11 | Status: SHIPPED | OUTPATIENT
Start: 2023-03-13

## 2023-03-13 RX ORDER — ACYCLOVIR 400 MG/1
400 TABLET ORAL 2 TIMES DAILY
Qty: 60 TABLET | Refills: 11 | Status: SHIPPED | OUTPATIENT
Start: 2023-03-13 | End: 2023-04-12
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
## 2023-03-14 ENCOUNTER — HOSPITAL ENCOUNTER (OUTPATIENT)
Dept: INFUSION CENTER | Facility: HOSPITAL | Age: 63
Discharge: HOME | End: 2023-03-14
Attending: INTERNAL MEDICINE

## 2023-03-14 VITALS
HEIGHT: 64 IN | BODY MASS INDEX: 39.9 KG/M2 | DIASTOLIC BLOOD PRESSURE: 81 MMHG | RESPIRATION RATE: 20 BRPM | WEIGHT: 233.69 LBS | HEART RATE: 63 BPM | SYSTOLIC BLOOD PRESSURE: 125 MMHG | TEMPERATURE: 97.1 F

## 2023-03-14 DIAGNOSIS — D51.8 OTHER VITAMIN B12 DEFICIENCY ANEMIAS: ICD-10-CM

## 2023-03-14 DIAGNOSIS — C82.18 GRADE 2 FOLLICULAR LYMPHOMA OF LYMPH NODES OF MULTIPLE REGIONS (HCC): Primary | ICD-10-CM

## 2023-03-14 DIAGNOSIS — D51.8 OTHER VITAMIN B12 DEFICIENCY ANEMIAS: Primary | ICD-10-CM

## 2023-03-14 RX ORDER — CYANOCOBALAMIN 1000 UG/ML
1000 INJECTION, SOLUTION INTRAMUSCULAR; SUBCUTANEOUS ONCE
OUTPATIENT
Start: 2023-04-11

## 2023-03-14 RX ORDER — SODIUM CHLORIDE 9 MG/ML
20 INJECTION, SOLUTION INTRAVENOUS ONCE
Status: COMPLETED | OUTPATIENT
Start: 2023-03-14 | End: 2023-03-14

## 2023-03-14 RX ORDER — CYANOCOBALAMIN 1000 UG/ML
1000 INJECTION, SOLUTION INTRAMUSCULAR; SUBCUTANEOUS ONCE
Status: COMPLETED | OUTPATIENT
Start: 2023-03-14 | End: 2023-03-14

## 2023-03-14 RX ORDER — ACETAMINOPHEN 325 MG/1
650 TABLET ORAL ONCE
Status: COMPLETED | OUTPATIENT
Start: 2023-03-14 | End: 2023-03-14

## 2023-03-14 RX ORDER — CYANOCOBALAMIN 1000 UG/ML
1000 INJECTION, SOLUTION INTRAMUSCULAR; SUBCUTANEOUS ONCE
Status: CANCELLED | OUTPATIENT
Start: 2023-03-14

## 2023-03-14 RX ADMIN — SODIUM CHLORIDE 20 ML/HR: 0.9 INJECTION, SOLUTION INTRAVENOUS at 08:53

## 2023-03-14 RX ADMIN — OBINUTUZUMAB 100 MG: 1000 INJECTION, SOLUTION, CONCENTRATE INTRAVENOUS at 10:25

## 2023-03-14 RX ADMIN — DIPHENHYDRAMINE HYDROCHLORIDE 25 MG: 50 INJECTION, SOLUTION INTRAMUSCULAR; INTRAVENOUS at 09:17

## 2023-03-14 RX ADMIN — ACETAMINOPHEN 650 MG: 325 TABLET ORAL at 08:52

## 2023-03-14 RX ADMIN — DEXAMETHASONE SODIUM PHOSPHATE 20 MG: 10 INJECTION, SOLUTION INTRAMUSCULAR; INTRAVENOUS at 09:39

## 2023-03-14 RX ADMIN — CYANOCOBALAMIN 1000 MCG: 1000 INJECTION, SOLUTION INTRAMUSCULAR; SUBCUTANEOUS at 15:29

## 2023-03-14 NOTE — PROGRESS NOTES
Pt tolerated treatment today with no adverse reactions  Port remains accessed for day 2 of treatment tomorrow  Flushed freely with brisk blood return  Left unit ambulatory with a steady gait

## 2023-03-15 ENCOUNTER — HOSPITAL ENCOUNTER (OUTPATIENT)
Dept: INFUSION CENTER | Facility: HOSPITAL | Age: 63
Discharge: HOME | End: 2023-03-15
Attending: INTERNAL MEDICINE

## 2023-03-15 VITALS
SYSTOLIC BLOOD PRESSURE: 137 MMHG | TEMPERATURE: 97.6 F | HEART RATE: 56 BPM | RESPIRATION RATE: 18 BRPM | OXYGEN SATURATION: 98 % | DIASTOLIC BLOOD PRESSURE: 77 MMHG

## 2023-03-15 DIAGNOSIS — Z91.89 AT HIGH RISK OF TUMOR LYSIS SYNDROME: ICD-10-CM

## 2023-03-15 DIAGNOSIS — D50.8 IRON DEFICIENCY ANEMIA SECONDARY TO INADEQUATE DIETARY IRON INTAKE: ICD-10-CM

## 2023-03-15 DIAGNOSIS — C82.18 GRADE 2 FOLLICULAR LYMPHOMA OF LYMPH NODES OF MULTIPLE REGIONS (HCC): Primary | ICD-10-CM

## 2023-03-15 LAB
ALBUMIN SERPL BCP-MCNC: 3.8 G/DL (ref 3.5–5)
ALP SERPL-CCNC: 75 U/L (ref 34–104)
ALT SERPL W P-5'-P-CCNC: 10 U/L (ref 7–52)
ANION GAP SERPL CALCULATED.3IONS-SCNC: 10 MMOL/L (ref 4–13)
AST SERPL W P-5'-P-CCNC: 14 U/L (ref 13–39)
BASOPHILS # BLD AUTO: 0.01 THOUSANDS/ÂΜL (ref 0–0.1)
BASOPHILS NFR BLD AUTO: 0 % (ref 0–1)
BILIRUB SERPL-MCNC: 0.5 MG/DL (ref 0.2–1)
BUN SERPL-MCNC: 16 MG/DL (ref 5–25)
CALCIUM SERPL-MCNC: 9 MG/DL (ref 8.4–10.2)
CHLORIDE SERPL-SCNC: 106 MMOL/L (ref 96–108)
CO2 SERPL-SCNC: 23 MMOL/L (ref 21–32)
CREAT SERPL-MCNC: 0.74 MG/DL (ref 0.6–1.3)
EOSINOPHIL # BLD AUTO: 0.02 THOUSAND/ÂΜL (ref 0–0.61)
EOSINOPHIL NFR BLD AUTO: 0 % (ref 0–6)
ERYTHROCYTE [DISTWIDTH] IN BLOOD BY AUTOMATED COUNT: 18.6 % (ref 11.6–15.1)
GFR SERPL CREATININE-BSD FRML MDRD: 88 ML/MIN/1.73SQ M
GLUCOSE SERPL-MCNC: 135 MG/DL (ref 65–140)
HCT VFR BLD AUTO: 34.1 % (ref 34.8–46.1)
HGB BLD-MCNC: 10.2 G/DL (ref 11.5–15.4)
IMM GRANULOCYTES # BLD AUTO: 0.02 THOUSAND/UL (ref 0–0.2)
IMM GRANULOCYTES NFR BLD AUTO: 0 % (ref 0–2)
LDH SERPL-CCNC: 190 U/L (ref 140–271)
LYMPHOCYTES # BLD AUTO: 0.58 THOUSANDS/ÂΜL (ref 0.6–4.47)
LYMPHOCYTES NFR BLD AUTO: 8 % (ref 14–44)
MCH RBC QN AUTO: 20.7 PG (ref 26.8–34.3)
MCHC RBC AUTO-ENTMCNC: 29.9 G/DL (ref 31.4–37.4)
MCV RBC AUTO: 69 FL (ref 82–98)
MONOCYTES # BLD AUTO: 0.19 THOUSAND/ÂΜL (ref 0.17–1.22)
MONOCYTES NFR BLD AUTO: 3 % (ref 4–12)
NEUTROPHILS # BLD AUTO: 6.84 THOUSANDS/ÂΜL (ref 1.85–7.62)
NEUTS SEG NFR BLD AUTO: 89 % (ref 43–75)
NRBC BLD AUTO-RTO: 0 /100 WBCS
PHOSPHATE SERPL-MCNC: 3 MG/DL (ref 2.3–4.1)
PLATELET # BLD AUTO: 184 THOUSANDS/UL (ref 149–390)
POTASSIUM SERPL-SCNC: 3.8 MMOL/L (ref 3.5–5.3)
PROT SERPL-MCNC: 6 G/DL (ref 6.4–8.4)
RBC # BLD AUTO: 4.92 MILLION/UL (ref 3.81–5.12)
SODIUM SERPL-SCNC: 139 MMOL/L (ref 135–147)
URATE SERPL-MCNC: 3.1 MG/DL (ref 2–7.5)
WBC # BLD AUTO: 7.66 THOUSAND/UL (ref 4.31–10.16)

## 2023-03-15 RX ORDER — SODIUM CHLORIDE 9 MG/ML
20 INJECTION, SOLUTION INTRAVENOUS ONCE
Status: COMPLETED | OUTPATIENT
Start: 2023-03-15 | End: 2023-03-15

## 2023-03-15 RX ORDER — ACETAMINOPHEN 325 MG/1
650 TABLET ORAL ONCE
Status: COMPLETED | OUTPATIENT
Start: 2023-03-15 | End: 2023-03-15

## 2023-03-15 RX ADMIN — OBINUTUZUMAB 900 MG: 1000 INJECTION, SOLUTION, CONCENTRATE INTRAVENOUS at 09:37

## 2023-03-15 RX ADMIN — SODIUM CHLORIDE 20 ML/HR: 0.9 INJECTION, SOLUTION INTRAVENOUS at 08:26

## 2023-03-15 RX ADMIN — DIPHENHYDRAMINE HYDROCHLORIDE 25 MG: 50 INJECTION, SOLUTION INTRAMUSCULAR; INTRAVENOUS at 08:29

## 2023-03-15 RX ADMIN — DEXAMETHASONE SODIUM PHOSPHATE 20 MG: 10 INJECTION, SOLUTION INTRAMUSCULAR; INTRAVENOUS at 08:50

## 2023-03-15 RX ADMIN — ACETAMINOPHEN 650 MG: 325 TABLET ORAL at 08:29

## 2023-03-15 NOTE — PROGRESS NOTES
Pt tolerated treatment today with no adverse reactions  Port remains accessed for lab draw tomorrow  Port flushed freely and has brisk blood return  Left unit ambulatory with a steady gait

## 2023-03-16 ENCOUNTER — HOSPITAL ENCOUNTER (OUTPATIENT)
Dept: INFUSION CENTER | Facility: HOSPITAL | Age: 63
End: 2023-03-16
Attending: INTERNAL MEDICINE

## 2023-03-16 DIAGNOSIS — C82.18 GRADE 2 FOLLICULAR LYMPHOMA OF LYMPH NODES OF MULTIPLE REGIONS (HCC): Primary | ICD-10-CM

## 2023-03-16 DIAGNOSIS — Z45.2 ENCOUNTER FOR CENTRAL LINE CARE: ICD-10-CM

## 2023-03-16 DIAGNOSIS — Z91.89 AT HIGH RISK OF TUMOR LYSIS SYNDROME: ICD-10-CM

## 2023-03-16 LAB
ALBUMIN SERPL BCP-MCNC: 3.8 G/DL (ref 3.5–5)
ALP SERPL-CCNC: 71 U/L (ref 34–104)
ALT SERPL W P-5'-P-CCNC: 10 U/L (ref 7–52)
ANION GAP SERPL CALCULATED.3IONS-SCNC: 9 MMOL/L (ref 4–13)
AST SERPL W P-5'-P-CCNC: 10 U/L (ref 13–39)
BASOPHILS # BLD AUTO: 0.02 THOUSANDS/ÂΜL (ref 0–0.1)
BASOPHILS NFR BLD AUTO: 0 % (ref 0–1)
BILIRUB SERPL-MCNC: 0.47 MG/DL (ref 0.2–1)
BUN SERPL-MCNC: 18 MG/DL (ref 5–25)
CALCIUM SERPL-MCNC: 9.3 MG/DL (ref 8.4–10.2)
CHLORIDE SERPL-SCNC: 105 MMOL/L (ref 96–108)
CO2 SERPL-SCNC: 26 MMOL/L (ref 21–32)
CREAT SERPL-MCNC: 0.82 MG/DL (ref 0.6–1.3)
EOSINOPHIL # BLD AUTO: 0.18 THOUSAND/ÂΜL (ref 0–0.61)
EOSINOPHIL NFR BLD AUTO: 3 % (ref 0–6)
ERYTHROCYTE [DISTWIDTH] IN BLOOD BY AUTOMATED COUNT: 18.5 % (ref 11.6–15.1)
GFR SERPL CREATININE-BSD FRML MDRD: 78 ML/MIN/1.73SQ M
GLUCOSE SERPL-MCNC: 123 MG/DL (ref 65–140)
HCT VFR BLD AUTO: 34 % (ref 34.8–46.1)
HGB BLD-MCNC: 10.3 G/DL (ref 11.5–15.4)
IMM GRANULOCYTES # BLD AUTO: 0.02 THOUSAND/UL (ref 0–0.2)
IMM GRANULOCYTES NFR BLD AUTO: 0 % (ref 0–2)
LDH SERPL-CCNC: 141 U/L (ref 140–271)
LYMPHOCYTES # BLD AUTO: 0.36 THOUSANDS/ÂΜL (ref 0.6–4.47)
LYMPHOCYTES NFR BLD AUTO: 5 % (ref 14–44)
MCH RBC QN AUTO: 21.2 PG (ref 26.8–34.3)
MCHC RBC AUTO-ENTMCNC: 30.3 G/DL (ref 31.4–37.4)
MCV RBC AUTO: 70 FL (ref 82–98)
MONOCYTES # BLD AUTO: 0.3 THOUSAND/ÂΜL (ref 0.17–1.22)
MONOCYTES NFR BLD AUTO: 4 % (ref 4–12)
NEUTROPHILS # BLD AUTO: 6.18 THOUSANDS/ÂΜL (ref 1.85–7.62)
NEUTS SEG NFR BLD AUTO: 88 % (ref 43–75)
NRBC BLD AUTO-RTO: 0 /100 WBCS
PHOSPHATE SERPL-MCNC: 2.9 MG/DL (ref 2.3–4.1)
PLATELET # BLD AUTO: 119 THOUSANDS/UL (ref 149–390)
POTASSIUM SERPL-SCNC: 3.5 MMOL/L (ref 3.5–5.3)
PROT SERPL-MCNC: 5.8 G/DL (ref 6.4–8.4)
RBC # BLD AUTO: 4.86 MILLION/UL (ref 3.81–5.12)
SODIUM SERPL-SCNC: 140 MMOL/L (ref 135–147)
URATE SERPL-MCNC: 3.2 MG/DL (ref 2–7.5)
WBC # BLD AUTO: 7.06 THOUSAND/UL (ref 4.31–10.16)

## 2023-03-16 NOTE — PROGRESS NOTES
Pt's labs drawn and sent as ordered  Pt arrived with dressing peeling off  Dressing changed  Port flushed freely and has brisk blood return  Confirmed tomorrow's apt time  Port remains accessed  Left unit ambulatory for star

## 2023-03-17 ENCOUNTER — HOSPITAL ENCOUNTER (EMERGENCY)
Facility: HOSPITAL | Age: 63
Discharge: HOME | End: 2023-03-17
Attending: STUDENT IN AN ORGANIZED HEALTH CARE EDUCATION/TRAINING PROGRAM

## 2023-03-17 ENCOUNTER — APPOINTMENT (EMERGENCY)
Dept: CT IMAGING | Facility: HOSPITAL | Age: 63
End: 2023-03-17

## 2023-03-17 ENCOUNTER — HOSPITAL ENCOUNTER (OUTPATIENT)
Dept: INFUSION CENTER | Facility: HOSPITAL | Age: 63
End: 2023-03-17
Attending: INTERNAL MEDICINE

## 2023-03-17 ENCOUNTER — APPOINTMENT (EMERGENCY)
Dept: RADIOLOGY | Facility: HOSPITAL | Age: 63
End: 2023-03-17

## 2023-03-17 VITALS
TEMPERATURE: 97.9 F | HEART RATE: 67 BPM | RESPIRATION RATE: 18 BRPM | DIASTOLIC BLOOD PRESSURE: 69 MMHG | WEIGHT: 240 LBS | SYSTOLIC BLOOD PRESSURE: 131 MMHG | BODY MASS INDEX: 41.18 KG/M2 | OXYGEN SATURATION: 98 %

## 2023-03-17 VITALS
SYSTOLIC BLOOD PRESSURE: 105 MMHG | HEART RATE: 76 BPM | DIASTOLIC BLOOD PRESSURE: 68 MMHG | RESPIRATION RATE: 18 BRPM | TEMPERATURE: 97.5 F

## 2023-03-17 DIAGNOSIS — C82.18 GRADE 2 FOLLICULAR LYMPHOMA OF LYMPH NODES OF MULTIPLE REGIONS (HCC): ICD-10-CM

## 2023-03-17 DIAGNOSIS — M54.2 NECK PAIN: ICD-10-CM

## 2023-03-17 DIAGNOSIS — Z91.89 AT HIGH RISK OF TUMOR LYSIS SYNDROME: ICD-10-CM

## 2023-03-17 DIAGNOSIS — R59.1 LYMPHADENOPATHY: Primary | ICD-10-CM

## 2023-03-17 DIAGNOSIS — D50.8 IRON DEFICIENCY ANEMIA SECONDARY TO INADEQUATE DIETARY IRON INTAKE: Primary | ICD-10-CM

## 2023-03-17 LAB
ALBUMIN SERPL BCP-MCNC: 3.7 G/DL (ref 3.5–5)
ALP SERPL-CCNC: 71 U/L (ref 34–104)
ALT SERPL W P-5'-P-CCNC: 15 U/L (ref 7–52)
ANION GAP SERPL CALCULATED.3IONS-SCNC: 9 MMOL/L (ref 4–13)
AST SERPL W P-5'-P-CCNC: 22 U/L (ref 13–39)
BASOPHILS # BLD AUTO: 0.02 THOUSANDS/ÂΜL (ref 0–0.1)
BASOPHILS NFR BLD AUTO: 0 % (ref 0–1)
BILIRUB SERPL-MCNC: 0.98 MG/DL (ref 0.2–1)
BUN SERPL-MCNC: 13 MG/DL (ref 5–25)
CALCIUM SERPL-MCNC: 9.4 MG/DL (ref 8.4–10.2)
CHLORIDE SERPL-SCNC: 100 MMOL/L (ref 96–108)
CO2 SERPL-SCNC: 27 MMOL/L (ref 21–32)
CREAT SERPL-MCNC: 0.91 MG/DL (ref 0.6–1.3)
EOSINOPHIL # BLD AUTO: 0.29 THOUSAND/ÂΜL (ref 0–0.61)
EOSINOPHIL NFR BLD AUTO: 7 % (ref 0–6)
ERYTHROCYTE [DISTWIDTH] IN BLOOD BY AUTOMATED COUNT: 18.6 % (ref 11.6–15.1)
GFR SERPL CREATININE-BSD FRML MDRD: 68 ML/MIN/1.73SQ M
GLUCOSE P FAST SERPL-MCNC: 113 MG/DL (ref 65–99)
GLUCOSE SERPL-MCNC: 113 MG/DL (ref 65–140)
HCT VFR BLD AUTO: 36.6 % (ref 34.8–46.1)
HGB BLD-MCNC: 10.8 G/DL (ref 11.5–15.4)
IMM GRANULOCYTES # BLD AUTO: 0.02 THOUSAND/UL (ref 0–0.2)
IMM GRANULOCYTES NFR BLD AUTO: 0 % (ref 0–2)
LDH SERPL-CCNC: 326 U/L (ref 140–271)
LYMPHOCYTES # BLD AUTO: 0.56 THOUSANDS/ÂΜL (ref 0.6–4.47)
LYMPHOCYTES NFR BLD AUTO: 13 % (ref 14–44)
MCH RBC QN AUTO: 20.3 PG (ref 26.8–34.3)
MCHC RBC AUTO-ENTMCNC: 29.5 G/DL (ref 31.4–37.4)
MCV RBC AUTO: 69 FL (ref 82–98)
MONOCYTES # BLD AUTO: 0.37 THOUSAND/ÂΜL (ref 0.17–1.22)
MONOCYTES NFR BLD AUTO: 8 % (ref 4–12)
NEUTROPHILS # BLD AUTO: 3.2 THOUSANDS/ÂΜL (ref 1.85–7.62)
NEUTS SEG NFR BLD AUTO: 72 % (ref 43–75)
NRBC BLD AUTO-RTO: 0 /100 WBCS
PHOSPHATE SERPL-MCNC: 4.6 MG/DL (ref 2.3–4.1)
PLATELET # BLD AUTO: 98 THOUSANDS/UL (ref 149–390)
POTASSIUM SERPL-SCNC: 4.1 MMOL/L (ref 3.5–5.3)
PROT SERPL-MCNC: 6 G/DL (ref 6.4–8.4)
RBC # BLD AUTO: 5.32 MILLION/UL (ref 3.81–5.12)
SODIUM SERPL-SCNC: 136 MMOL/L (ref 135–147)
URATE SERPL-MCNC: 3.4 MG/DL (ref 2–7.5)
WBC # BLD AUTO: 4.46 THOUSAND/UL (ref 4.31–10.16)

## 2023-03-17 RX ORDER — MORPHINE SULFATE 4 MG/ML
4 INJECTION, SOLUTION INTRAMUSCULAR; INTRAVENOUS ONCE
Status: COMPLETED | OUTPATIENT
Start: 2023-03-17 | End: 2023-03-17

## 2023-03-17 RX ORDER — OXYCODONE HYDROCHLORIDE 5 MG/1
10 TABLET ORAL ONCE
Status: COMPLETED | OUTPATIENT
Start: 2023-03-17 | End: 2023-03-17

## 2023-03-17 RX ORDER — POLYETHYLENE GLYCOL 3350 17 G/17G
POWDER ORAL
COMMUNITY
Start: 2023-03-13

## 2023-03-17 RX ORDER — LIDOCAINE 50 MG/G
1 PATCH TOPICAL ONCE
Status: DISCONTINUED | OUTPATIENT
Start: 2023-03-17 | End: 2023-03-17 | Stop reason: HOSPADM

## 2023-03-17 RX ORDER — WATER 1000 ML/1000ML
INJECTION, SOLUTION INTRAVENOUS
Status: COMPLETED
Start: 2023-03-17 | End: 2023-03-17

## 2023-03-17 RX ORDER — OXYCODONE HYDROCHLORIDE 5 MG/1
10 TABLET ORAL ONCE
Status: DISCONTINUED | OUTPATIENT
Start: 2023-03-17 | End: 2023-03-17

## 2023-03-17 RX ADMIN — SODIUM CHLORIDE 1000 ML: 0.9 INJECTION, SOLUTION INTRAVENOUS at 09:31

## 2023-03-17 RX ADMIN — LIDOCAINE 1 PATCH: 50 PATCH TOPICAL at 12:23

## 2023-03-17 RX ADMIN — IOHEXOL 85 ML: 350 INJECTION, SOLUTION INTRAVENOUS at 13:48

## 2023-03-17 RX ADMIN — ALTEPLASE 2 MG: 2.2 INJECTION, POWDER, LYOPHILIZED, FOR SOLUTION INTRAVENOUS at 09:24

## 2023-03-17 RX ADMIN — WATER: 1 INJECTION, SOLUTION INTRAMUSCULAR; INTRAVENOUS; SUBCUTANEOUS at 09:24

## 2023-03-17 RX ADMIN — OXYCODONE HYDROCHLORIDE 10 MG: 5 TABLET ORAL at 14:29

## 2023-03-17 RX ADMIN — MORPHINE SULFATE 4 MG: 4 INJECTION, SOLUTION INTRAMUSCULAR; INTRAVENOUS at 12:24

## 2023-03-17 NOTE — DISCHARGE INSTRUCTIONS
Please follow-up with your oncologist and your primary care doctor  Continue taking your pain medications as directed

## 2023-03-17 NOTE — ED ATTENDING ATTESTATION
3/17/2023  I, Felipa Ayala MD, saw and evaluated the patient  I have discussed the patient with the resident/non-physician practitioner and agree with the resident's/non-physician practitioner's findings, Plan of Care, and MDM as documented in the resident's/non-physician practitioner's note, except where noted  All available labs and Radiology studies were reviewed  I was present for key portions of any procedure(s) performed by the resident/non-physician practitioner and I was immediately available to provide assistance  At this point I agree with the current assessment done in the Emergency Department  I have conducted an independent evaluation of this patient a history and physical is as follows:    62yoF w/lymphoma on chemo here with R-sided neck pain  No stiffness  No headache  Overall non-toxic appearing  Palpable, tender R-sided lymphadenopathy  CT to r/o infection/abscess, pain control    Had unremarkable labs earlier, no indications for repeat currently    ED Course         Critical Care Time  Procedures

## 2023-03-17 NOTE — PROGRESS NOTES
After 2hrs of cath flow pt still does not have blood return  Notified Mony Padgett RN  Per Sharla Reed okay to de access port, she will talk it over with Sofia Sams NP  Pt' IV left intact for evaluation in the ER  Report Called to the ER Charge nurse  Pt then was taken via w/c to ED

## 2023-03-17 NOTE — ED PROVIDER NOTES
History  Chief Complaint   Patient presents with   • Neck Pain     Patient reports right sided neck pain, tender to touch, started this am  No meds taken for pain  Denies any injury to that area  She reports starting on new chemo pills 2 days ago  62-year-old female with past medical history of grade 2 follicular lymphoma, currently on chemotherapy, anemia, diabetes, hypertension, depression presents for evaluation of right neck pain with subjective fevers that started yesterday afternoon  Patient reports she was recently started on new chemotherapy medications, on chart review this appears to be on 3/13/2023 and 3/14/2023  She reports she has had pain similar to this in the past from chemotherapy medications however today the pain became severe  She denies associated symptoms of difficulty swallowing, vision changes, ear pain, neck stiffness, chest pain, abdominal pain, N/V/D, chills, dysuria or any other symptoms  Patient went in for a fluid infusion treatment today at which point she was advised to present to the ED for further management of knee pain  Patient has a history of shingles to left abdomen radiating to the left lumbar back however she believes neck pain today feels very different from previous shingles pain  No other concerns  Prior to Admission Medications   Prescriptions Last Dose Informant Patient Reported? Taking?    Banophen 25 MG capsule   Yes No   Sig: TAKE 1 CAPSULE(25 MG TOTAL) BY MOUTH EVERY 6 (SIX) HOURS AS NEEDED FOR ITCHING   Blood Glucose Monitoring Suppl (OneTouch Verio) w/Device KIT   No No   Sig: Use in the morning   DULoxetine (CYMBALTA) 20 mg capsule   No No   Sig: Take 2 capsules (40 mg total) by mouth daily   LORazepam (ATIVAN) 0 5 mg tablet   No No   Sig: Take 1 tablet 30-60 minutes before MRI, can take an additional tablet if needed before test   Patient not taking: Reported on 2/7/2023   Lenalidomide 20 MG CAPS   No No   Sig: Take 1 capsule (20 mg total) by mouth in the morning For 21 days then 7 days rest   OneTouch Delica Lancets 89J MISC   No No   Sig: Use in the morning   acyclovir (ZOVIRAX) 400 MG tablet   No No   Sig: Take 1 tablet (400 mg total) by mouth 2 (two) times a day   allopurinol (ZYLOPRIM) 100 mg tablet   No No   Sig: Take 1 tablet (100 mg total) by mouth daily   amLODIPine (NORVASC) 10 mg tablet   Yes No   Sig: Take 10 mg by mouth daily   ascorbic acid (VITAMIN C) 1000 MG tablet   No No   Sig: Take 1 tablet (1,000 mg total) by mouth every 12 (twelve) hours for 10 doses   aspirin (ECOTRIN LOW STRENGTH) 81 mg EC tablet   No No   Sig: Take 1 tablet (81 mg total) by mouth daily   aspirin-acetaminophen-caffeine (EXCEDRIN MIGRAINE) 250-250-65 MG per tablet   No No   Sig: Take 1 tablet by mouth every 6 (six) hours as needed for headaches Erica 1 tableta cada 6 horas fernandez sea necesario para el dolor de russ   atorvastatin (LIPITOR) 40 mg tablet   No No   Sig: Take 1 tablet (40 mg total) by mouth daily with dinner   benzocaine (ORAJEL) 10 % mucosal gel   No No   Sig: Apply 1 application to the mouth or throat as needed for mucositis   benzonatate (TESSALON) 200 MG capsule   No No   Sig: Take 1 capsule (200 mg total) by mouth 3 (three) times a day as needed for cough   Patient not taking: Reported on 2/13/2023   capsicum (ZOSTRIX) 0 075 % topical cream   No No   Sig: Apply topically 3 (three) times a day Apply to affected area   chlorthalidone 25 mg tablet   No No   Sig: Take 1 tablet (25 mg total) by mouth daily   cholecalciferol (VITAMIN D3) 1,000 units tablet   No No   Sig: Take 2 tablets (2,000 Units total) by mouth daily for 5 days   cyanocobalamin 1000 MCG tablet   No No   Sig: Take 1 tablet (1,000 mcg total) by mouth daily   diphenhydrAMINE (BENADRYL) 25 mg tablet   No No   Sig: Take 1 tablet (25 mg total) by mouth every 6 (six) hours as needed for itching   folic acid (FOLVITE) 1 mg tablet   No No   Sig: Take 1 tablet (1 mg total) by mouth daily glucose blood (OneTouch Verio) test strip   No No   Sig: Use 1 each in the morning Use as instructed   lidocaine (LMX) 4 % cream   No No   Sig: Apply topically as needed for mild pain To neck   meclizine (ANTIVERT) 12 5 MG tablet   No No   Sig: Take 1 tablet (12 5 mg total) by mouth every 8 (eight) hours as needed for dizziness   metFORMIN (GLUCOPHAGE) 500 mg tablet   No No   Sig: Take 2 tablets (1,000 mg total) by mouth 2 (two) times a day with meals   naloxone (NARCAN) 4 mg/0 1 mL nasal spray   No No   Sig: Administer 1 spray into a nostril  If no response after 2-3 minutes, give another dose in the other nostril using a new spray     omeprazole (PriLOSEC) 20 mg delayed release capsule   No No   Sig: Take 2 capsules (40 mg total) by mouth daily To prevent stomach upset   ondansetron (ZOFRAN) 4 mg tablet   No No   Sig: Take 1 tablet (4 mg total) by mouth every 8 (eight) hours as needed for nausea or vomiting   oxyCODONE (ROXICODONE) 10 MG TABS   No No   Sig: Take 1 tablet (10 mg total) by mouth every 6 (six) hours as needed for severe pain Erica 1 tableta cada 6 horas fernandez sea necesario por dolor intenso Max Daily Amount: 40 mg   polyethylene glycol (GLYCOLAX) 17 GM/SCOOP   Yes No   Sig: TAKE 17 G BY MOUTH DAILY MIX WITH WATER   polyethylene glycol (MIRALAX) 17 g packet   No No   Sig: Take 17 g by mouth daily   potassium chloride (K-DUR,KLOR-CON) 20 mEq tablet   No No   Sig: Take 1 tablet (20 mEq total) by mouth daily   senna (SENOKOT) 8 6 mg   No No   Sig: Take 1 tablet (8 6 mg total) by mouth 2 (two) times a day      Facility-Administered Medications: None       Past Medical History:   Diagnosis Date   • Anemia     iron and B12   • Arthritis    • Asthma    • Cough    • Depression    • Diabetes mellitus ([address] )    • Falls frequently    • Hypertension    • Lupus (HCC)    • Lymphoma (HCC)    • Lymphoma (HCC)    • Migraine    • Osteoporosis    • Psychiatric disorder    • 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HCC)    • Vertigo        Past Surgical History:   Procedure Laterality Date   • CHOLECYSTECTOMY     • FL GUIDED CENTRAL VENOUS ACCESS DEVICE INSERTION  2018   • HYSTERECTOMY     • IR BIOPSY BONE MARROW  2020   • IR BIOPSY LYMPH NODE  2020   • LYMPH NODE BIOPSY Left 2018    Procedure: EXCISION BIOPSY LYMPH NODE SUPRACLAVICULAR;  Surgeon: Paola Vela MD;  Location: [address] [place] [address] OR;  Service: General   • PA TENDON SHEATH INCISION Right 2020    Procedure: RELEASE TRIGGER FINGER RIGHT THUMB;  Surgeon: Gaby Esposito MD;  Location: [address] [place] [address] OR;  Service: Orthopedics   • TUNNELED VENOUS PORT PLACEMENT N/A 2018    Procedure: INSERTION OF PORT-A-CATH;  Surgeon: Paola Vela MD;  Location: [address] [place] [address] OR;  Service: General       Family History   Problem Relation Age of Onset   • Cancer Mother    • Diabetes Mother    • Cancer Father    • Diabetes Father    • Breast cancer Sister    • No Known Problems Sister    • No Known Problems Sister    • No Known Problems Sister    • No Known Problems Maternal Aunt    • No Known Problems Maternal Aunt    • No Known Problems Maternal Aunt    • No Known Problems Paternal Aunt      I have reviewed and agree with the history as documented  E-Cigarette/Vaping   • E-Cigarette Use Never User      E-Cigarette/Vaping Substances   • Nicotine No    • THC No    • CBD No    • Flavoring No    • Other No    • Unknown No      Social History     Tobacco Use   • Smoking status: Former     Types: Cigarettes     Quit date: 2017     Years since quittin 7   • Smokeless tobacco: Never   Vaping Use   • Vaping Use: Never used   Substance Use Topics   • Alcohol use: Never   • Drug use: Never        Review of Systems   Constitutional: Positive for fever  Negative for chills  HENT: Negative for ear pain and sore throat  Right neck pain   Eyes: Negative for pain and visual disturbance  Respiratory: Negative for cough and shortness of breath      Cardiovascular: Negative for chest pain and palpitations  Gastrointestinal: Negative for abdominal pain, nausea and vomiting  Genitourinary: Negative for dysuria and hematuria  Musculoskeletal: Negative for arthralgias and back pain  Skin: Negative for color change and rash  Neurological: Negative for seizures and syncope  All other systems reviewed and are negative  Physical Exam  ED Triage Vitals   Temperature Pulse Respirations Blood Pressure SpO2   03/17/23 1144 03/17/23 1144 03/17/23 1144 03/17/23 1144 03/17/23 1144   97 9 °F (36 6 °C) 66 16 137/61 99 %      Temp Source Heart Rate Source Patient Position - Orthostatic VS BP Location FiO2 (%)   03/17/23 1144 03/17/23 1144 03/17/23 1144 03/17/23 1144 --   Oral Monitor Lying Left arm       Pain Score       03/17/23 1224       8             Orthostatic Vital Signs  Vitals:    03/17/23 1144 03/17/23 1448   BP: 137/61 131/69   Pulse: 66 67   Patient Position - Orthostatic VS: Lying Sitting       Physical Exam  Vitals and nursing note reviewed  Constitutional:       General: She is not in acute distress  Appearance: Normal appearance  She is well-developed  HENT:      Head: Normocephalic and atraumatic  Right Ear: External ear normal       Left Ear: External ear normal       Nose: Nose normal       Mouth/Throat:      Mouth: Mucous membranes are moist       Pharynx: No posterior oropharyngeal erythema  Eyes:      Extraocular Movements: Extraocular movements intact  Conjunctiva/sclera: Conjunctivae normal       Pupils: Pupils are equal, round, and reactive to light  Neck:      Vascular: No carotid bruit  Comments: Moderate to severe tenderness to touch and tenderness palpation of the soft tissue of the right lateral neck with underlying lymphadenopathy, similar to size on L  No overlying skin changes, no underlying crepitus  Intact swallowing without difficulty  No posterior neck rigidity    Negative Brudzinski  Cardiovascular:      Rate and Rhythm: Normal rate and regular rhythm  Pulses: Normal pulses  Heart sounds: Normal heart sounds  No murmur heard  Pulmonary:      Effort: Pulmonary effort is normal  No respiratory distress  Breath sounds: Normal breath sounds  Abdominal:      General: Abdomen is flat  Palpations: Abdomen is soft  Tenderness: There is abdominal tenderness  Comments: Mild tenderness palpation of the abdomen over well-healing, dry shingles lesions on the left mid abdomen   Musculoskeletal:         General: No swelling  Cervical back: Normal range of motion and neck supple  Tenderness present  No rigidity  Lymphadenopathy:      Cervical: Cervical adenopathy present  Skin:     General: Skin is warm and dry  Capillary Refill: Capillary refill takes less than 2 seconds  Neurological:      Mental Status: She is alert and oriented to person, place, and time  Mental status is at baseline  Psychiatric:         Mood and Affect: Mood normal          Behavior: Behavior normal          ED Medications  Medications   lidocaine (LIDODERM) 5 % patch 1 patch (1 patch Topical Medication Applied 3/17/23 1223)   morphine injection 4 mg (4 mg Intravenous Given 3/17/23 1224)   iohexol (OMNIPAQUE) 350 MG/ML injection (SINGLE-DOSE) 85 mL (85 mL Intravenous Given 3/17/23 1348)   oxyCODONE (ROXICODONE) IR tablet 10 mg (10 mg Oral Given 3/17/23 1429)       Diagnostic Studies  Results Reviewed     None                 CT soft tissue neck with contrast   Final Result by Kate Steele MD (03/17 2434)   Adenopathy slightly increased compared with most PET/CT and chest CT  No acute inflammatory changes or soft tissue abscess  Poor dentition        Workstation performed: [id number]         XR chest 1 view portable   ED Interpretation by Keely Caraballo MD (03/17 8328)   Mild dependent pleural effusions vs positional artifact as CXR is semi-upright            Procedures  Procedures      ED Course  ED Course as of 03/17/23 5247 Fri Mar 17, 2023   1220 eGFR 68 as of CMP done this AM   1406 Feeling better s/p 4mg morphine IV, however reports wanting more pain medicine  Will provide home dose of oxycodone 10 mg PO  Medical Decision Making  Patient remained stable throughout ED course and was found to be afebrile, vital signs within normal limits  Given report of diffuse right lateral neck pain in the context of stage II follicular lymphoma currently on chemotherapy, starting on new chemotherapy regimen 2 days ago, there were concerns for soft tissue infection of the right neck versus worsening lymphadenopathy  CT soft tissue neck with IV contrast was obtained which showed "Adenopathy slightly increased compared with most PET/CT and chest CT" without any other signs of inflammation or indication of soft tissue abscess  Patient underwent a full blood work panel this morning just prior to arrival to the ED which showed mild elevation of LD however otherwise was nonacute-doubt tumor lysis syndrome  Doubt Matthew angina, intraoral infection, dissection, meningitis, GCA or any other vascular abnormality given patient's well-appearing status, normal vital signs, and significant improvement of pain after 4 mg morphine IV and her home dose of 10 mg oxycodone IR p o  Chest x-ray was nonacute today  Patient has very close follow-up with her oncologist and PCP, she has any appointments with both on 3/20/2023  She was advised to make sure to follow-up with them and to return to ER for any worsening concerns such as worsening neck pain, difficulty swallowing, high fevers, shortness of breath, vision changes or any other new or worsening concerns  Stable for discharge home at this time  Pt understands and agreed with plan  All questions answered  No other concerns  Amount and/or Complexity of Data Reviewed  Radiology: ordered and independent interpretation performed        Risk  Prescription drug management  Disposition  Final diagnoses:   Lymphadenopathy   Neck pain - improved     Time reflects when diagnosis was documented in both MDM as applicable and the Disposition within this note     Time User Action Codes Description Comment    3/17/2023  3:21 PM Patel Michael Add [R59 1] Lymphadenopathy     3/17/2023  3:21 PM Patel Michael Add [M54 2] Neck pain     3/17/2023  3:21 PM Patel Michael Modify [M54 2] Neck pain improved      ED Disposition     ED Disposition   Discharge    Condition   Stable    Date/Time   Fri Mar 17, 2023  3:14 PM    Comment   Helen Suarez discharge to home/self care  Follow-up Information     Follow up With Specialties Details Why Contact Info Additional [address] [facility] Emergency Department Emergency Medicine Go to  As needed, If symptoms worsen as discussed such as high fevers, worsening neck pain, difficulty swallowing, chest pain, lightheadedness/dizziness or any other new or worsening concerns   [address] [id number]  [address] [place] [address] Emergency Department          Discharge Medication List as of 3/17/2023  3:27 PM      CONTINUE these medications which have NOT CHANGED    Details   acyclovir (ZOVIRAX) 400 MG tablet Take 1 tablet (400 mg total) by mouth 2 (two) times a day, Starting Mon 3/13/2023, Until Wed 4/12/2023, Normal      allopurinol (ZYLOPRIM) 100 mg tablet Take 1 tablet (100 mg total) by mouth daily, Starting Mon 3/13/2023, Normal      amLODIPine (NORVASC) 10 mg tablet Take 10 mg by mouth daily, Starting Thu 6/2/2022, Historical Med      ascorbic acid (VITAMIN C) 1000 MG tablet Take 1 tablet (1,000 mg total) by mouth every 12 (twelve) hours for 10 doses, Starting Tue 1/26/2021, Until Mon 6/7/2021, Normal      aspirin (ECOTRIN LOW STRENGTH) 81 mg EC tablet Take 1 tablet (81 mg total) by mouth daily, Starting Wed 3/18/2020, Normal aspirin-acetaminophen-caffeine (EXCEDRIN MIGRAINE) 429-939-67 MG per tablet Take 1 tablet by mouth every 6 (six) hours as needed for headaches Erica 1 tableta cada 6 horas fernandez sea necesario para el dolor de russ, Starting Thu 11/19/2020, Normal      atorvastatin (LIPITOR) 40 mg tablet Take 1 tablet (40 mg total) by mouth daily with dinner, Starting Tue 1/26/2021, Normal      Banophen 25 MG capsule TAKE 1 CAPSULE(25 MG TOTAL) BY MOUTH EVERY 6 (SIX) HOURS AS NEEDED FOR ITCHING, Historical Med      benzocaine (ORAJEL) 10 % mucosal gel Apply 1 application to the mouth or throat as needed for mucositis, Starting Wed 8/17/2022, Normal      benzonatate (TESSALON) 200 MG capsule Take 1 capsule (200 mg total) by mouth 3 (three) times a day as needed for cough, Starting Mon 1/24/2022, Normal      Blood Glucose Monitoring Suppl (OneTouch Verio) w/Device KIT Use in the morning, Starting Fri 4/8/2022, Normal      capsicum (ZOSTRIX) 0 075 % topical cream Apply topically 3 (three) times a day Apply to affected area, Starting Sat 2/25/2023, Print      chlorthalidone 25 mg tablet Take 1 tablet (25 mg total) by mouth daily, Starting Tue 5/31/2022, Normal      cholecalciferol (VITAMIN D3) 1,000 units tablet Take 2 tablets (2,000 Units total) by mouth daily for 5 days, Starting Wed 1/27/2021, Until Mon 6/7/2021, Normal      cyanocobalamin 1000 MCG tablet Take 1 tablet (1,000 mcg total) by mouth daily, Starting Wed 4/17/2019, Normal      diphenhydrAMINE (BENADRYL) 25 mg tablet Take 1 tablet (25 mg total) by mouth every 6 (six) hours as needed for itching, Starting Wed 2/15/2023, Normal      DULoxetine (CYMBALTA) 20 mg capsule Take 2 capsules (40 mg total) by mouth daily, Starting Tue 2/9/8994, Normal      folic acid (FOLVITE) 1 mg tablet Take 1 tablet (1 mg total) by mouth daily, Starting Wed 6/23/2021, Normal      glucose blood (OneTouch Verio) test strip Use 1 each in the morning Use as instructed, Starting Fri 4/8/2022, Normal Lenalidomide 20 MG CAPS Take 1 capsule (20 mg total) by mouth in the morning For 21 days then 7 days rest, Starting Mon 2/27/2023, Normal      lidocaine (LMX) 4 % cream Apply topically as needed for mild pain To neck, Starting Tue 9/15/2020, Normal      LORazepam (ATIVAN) 0 5 mg tablet Take 1 tablet 30-60 minutes before MRI, can take an additional tablet if needed before test, Normal      meclizine (ANTIVERT) 12 5 MG tablet Take 1 tablet (12 5 mg total) by mouth every 8 (eight) hours as needed for dizziness, Starting Tue 1/26/2021, Normal      metFORMIN (GLUCOPHAGE) 500 mg tablet Take 2 tablets (1,000 mg total) by mouth 2 (two) times a day with meals, Starting Tue 4/5/2022, Normal      naloxone (NARCAN) 4 mg/0 1 mL nasal spray Administer 1 spray into a nostril   If no response after 2-3 minutes, give another dose in the other nostril using a new spray , Normal      omeprazole (PriLOSEC) 20 mg delayed release capsule Take 2 capsules (40 mg total) by mouth daily To prevent stomach upset, Starting Wed 3/25/2020, Normal      ondansetron (ZOFRAN) 4 mg tablet Take 1 tablet (4 mg total) by mouth every 8 (eight) hours as needed for nausea or vomiting, Starting Tue 9/15/2020, Normal      OneTouch Delica Lancets 43O MISC Use in the morning, Starting Fri 4/8/2022, Normal      oxyCODONE (ROXICODONE) 10 MG TABS Take 1 tablet (10 mg total) by mouth every 6 (six) hours as needed for severe pain Erica 1 tableta cada 6 horas fernandez sea necesario por dolor intenso Max Daily Amount: 40 mg, Starting Fri 2/24/2023, Normal      polyethylene glycol (GLYCOLAX) 17 GM/SCOOP TAKE 17 G BY MOUTH DAILY MIX WITH WATER, Historical Med      polyethylene glycol (MIRALAX) 17 g packet Take 17 g by mouth daily, Starting Tue 2/7/2023, Normal      potassium chloride (K-DUR,KLOR-CON) 20 mEq tablet Take 1 tablet (20 mEq total) by mouth daily, Starting Tue 6/14/2022, Normal      senna (SENOKOT) 8 6 mg Take 1 tablet (8 6 mg total) by mouth 2 (two) times a day, Starting Tue 12/17/2019, Normal           No discharge procedures on file  PDMP Review       Value Time User    PDMP Reviewed  Yes 2/25/2023  7:17 AM Shanna Ibarra PA-C           ED Provider  Attending physically available and evaluated Abimbola Garcia I managed the patient along with the ED Attending      Electronically Signed by         Heide Vila MD  03/17/23 7764

## 2023-03-17 NOTE — PROGRESS NOTES
Pt arrived on unit for Hydration and labs  Pt complained of pain and swelling under her jaw line and ear Bilateral  Notified Pastor Cuellar Rn  [facility] a [facility] abesses with similar pain  Okay per Tonya Rubin to draw labs and give hydration and then  Have her evaluated in ED  Pt's port with no blood return today  De accessed and re accessed with no blood return  Cath flow instilled  Will monitor

## 2023-03-20 ENCOUNTER — HOSPITAL ENCOUNTER (OUTPATIENT)
Dept: INFUSION CENTER | Facility: HOSPITAL | Age: 63
Discharge: HOME | End: 2023-03-20

## 2023-03-20 DIAGNOSIS — Z91.89 AT HIGH RISK OF TUMOR LYSIS SYNDROME: ICD-10-CM

## 2023-03-20 DIAGNOSIS — C82.18 GRADE 2 FOLLICULAR LYMPHOMA OF LYMPH NODES OF MULTIPLE REGIONS (HCC): Primary | ICD-10-CM

## 2023-03-20 DIAGNOSIS — Z45.2 ENCOUNTER FOR CENTRAL LINE CARE: ICD-10-CM

## 2023-03-20 LAB
ALBUMIN SERPL BCP-MCNC: 3.9 G/DL (ref 3.5–5)
ALP SERPL-CCNC: 69 U/L (ref 34–104)
ALT SERPL W P-5'-P-CCNC: 14 U/L (ref 7–52)
ANION GAP SERPL CALCULATED.3IONS-SCNC: 8 MMOL/L (ref 4–13)
AST SERPL W P-5'-P-CCNC: 11 U/L (ref 13–39)
BASOPHILS # BLD AUTO: 0.03 THOUSANDS/ÂΜL (ref 0–0.1)
BASOPHILS NFR BLD AUTO: 1 % (ref 0–1)
BILIRUB SERPL-MCNC: 0.58 MG/DL (ref 0.2–1)
BUN SERPL-MCNC: 15 MG/DL (ref 5–25)
CALCIUM SERPL-MCNC: 8.7 MG/DL (ref 8.4–10.2)
CHLORIDE SERPL-SCNC: 106 MMOL/L (ref 96–108)
CO2 SERPL-SCNC: 25 MMOL/L (ref 21–32)
CREAT SERPL-MCNC: 0.79 MG/DL (ref 0.6–1.3)
EOSINOPHIL # BLD AUTO: 0.6 THOUSAND/ÂΜL (ref 0–0.61)
EOSINOPHIL NFR BLD AUTO: 10 % (ref 0–6)
ERYTHROCYTE [DISTWIDTH] IN BLOOD BY AUTOMATED COUNT: 18.5 % (ref 11.6–15.1)
GFR SERPL CREATININE-BSD FRML MDRD: 81 ML/MIN/1.73SQ M
GLUCOSE SERPL-MCNC: 129 MG/DL (ref 65–140)
HCT VFR BLD AUTO: 37.6 % (ref 34.8–46.1)
HGB BLD-MCNC: 11 G/DL (ref 11.5–15.4)
IMM GRANULOCYTES # BLD AUTO: 0.02 THOUSAND/UL (ref 0–0.2)
IMM GRANULOCYTES NFR BLD AUTO: 0 % (ref 0–2)
LDH SERPL-CCNC: 166 U/L (ref 140–271)
LYMPHOCYTES # BLD AUTO: 2.07 THOUSANDS/ÂΜL (ref 0.6–4.47)
LYMPHOCYTES NFR BLD AUTO: 34 % (ref 14–44)
MCH RBC QN AUTO: 20.4 PG (ref 26.8–34.3)
MCHC RBC AUTO-ENTMCNC: 29.3 G/DL (ref 31.4–37.4)
MCV RBC AUTO: 70 FL (ref 82–98)
MONOCYTES # BLD AUTO: 0.29 THOUSAND/ÂΜL (ref 0.17–1.22)
MONOCYTES NFR BLD AUTO: 5 % (ref 4–12)
NEUTROPHILS # BLD AUTO: 3.02 THOUSANDS/ÂΜL (ref 1.85–7.62)
NEUTS SEG NFR BLD AUTO: 50 % (ref 43–75)
NRBC BLD AUTO-RTO: 0 /100 WBCS
PHOSPHATE SERPL-MCNC: 3 MG/DL (ref 2.3–4.1)
PLATELET # BLD AUTO: 104 THOUSANDS/UL (ref 149–390)
POTASSIUM SERPL-SCNC: 3.8 MMOL/L (ref 3.5–5.3)
PROT SERPL-MCNC: 6.1 G/DL (ref 6.4–8.4)
RBC # BLD AUTO: 5.38 MILLION/UL (ref 3.81–5.12)
SODIUM SERPL-SCNC: 139 MMOL/L (ref 135–147)
URATE SERPL-MCNC: 4 MG/DL (ref 2–7.5)
WBC # BLD AUTO: 6.03 THOUSAND/UL (ref 4.31–10.16)

## 2023-03-20 NOTE — PROGRESS NOTES
Central labs drawn and sent  Port flushed per protocol, remained accessed for treatment tomorrow  Confirmed appointment time tomorrow, AVS declined

## 2023-03-21 ENCOUNTER — HOSPITAL ENCOUNTER (OUTPATIENT)
Dept: INFUSION CENTER | Facility: HOSPITAL | Age: 63
Discharge: HOME | End: 2023-03-21
Attending: INTERNAL MEDICINE

## 2023-03-21 VITALS
RESPIRATION RATE: 18 BRPM | HEART RATE: 57 BPM | TEMPERATURE: 98.6 F | DIASTOLIC BLOOD PRESSURE: 75 MMHG | OXYGEN SATURATION: 98 % | SYSTOLIC BLOOD PRESSURE: 119 MMHG

## 2023-03-21 DIAGNOSIS — C82.18 GRADE 2 FOLLICULAR LYMPHOMA OF LYMPH NODES OF MULTIPLE REGIONS (HCC): Primary | ICD-10-CM

## 2023-03-21 RX ORDER — SODIUM CHLORIDE 9 MG/ML
20 INJECTION, SOLUTION INTRAVENOUS ONCE
Status: COMPLETED | OUTPATIENT
Start: 2023-03-21 | End: 2023-03-21

## 2023-03-21 RX ORDER — ACETAMINOPHEN 325 MG/1
650 TABLET ORAL ONCE
Status: COMPLETED | OUTPATIENT
Start: 2023-03-21 | End: 2023-03-21

## 2023-03-21 RX ADMIN — DEXAMETHASONE SODIUM PHOSPHATE 20 MG: 10 INJECTION, SOLUTION INTRAMUSCULAR; INTRAVENOUS at 08:54

## 2023-03-21 RX ADMIN — DIPHENHYDRAMINE HYDROCHLORIDE 25 MG: 50 INJECTION, SOLUTION INTRAMUSCULAR; INTRAVENOUS at 08:33

## 2023-03-21 RX ADMIN — OBINUTUZUMAB 1000 MG: 1000 INJECTION, SOLUTION, CONCENTRATE INTRAVENOUS at 10:33

## 2023-03-21 RX ADMIN — ACETAMINOPHEN 650 MG: 325 TABLET ORAL at 08:29

## 2023-03-21 RX ADMIN — SODIUM CHLORIDE 20 ML/HR: 0.9 INJECTION, SOLUTION INTRAVENOUS at 08:28

## 2023-03-21 NOTE — PROGRESS NOTES
Pt tolerated Day 8 of Gazyva today with no adverse reactions  AVS provided  Left unit ambulatory with a steady gait

## 2023-03-23 DIAGNOSIS — C82.18 GRADE 2 FOLLICULAR LYMPHOMA OF LYMPH NODES OF MULTIPLE REGIONS (HCC): ICD-10-CM

## 2023-03-23 RX ORDER — OXYCODONE HYDROCHLORIDE 10 MG/1
10 TABLET ORAL EVERY 6 HOURS PRN
Qty: 90 TABLET | Refills: 0 | Status: SHIPPED | OUTPATIENT
Start: 2023-03-23

## 2023-03-23 NOTE — TELEPHONE ENCOUNTER
Primary palliative medicine provider: Dr Jo Ann Hazel     Medication requested: oxycodone 10 mg tablet     If for pain, how has the patient been taking their pain medicine?      Last appointment: 2/7    Next scheduled appointment: 5/9    PDMP review:    PATIENT ID PRESCRIPTION # FILLED WRITTEN DRUG LABEL QTY DAYS STRENGTH MME** PRESCRIBER PHARMACY PAYMENT REFILL #/AUTH STATE DETAIL   1 [id number] 03/01/2023 02/24/2023 oxyCODONE HCL (Tablet) 90 0 23 10 MG 15 0 [facility] Medicaid 0 / 0 [place]    1 [id number] 02/07/2023 02/07/2023 oxyCODONE HCL (Tablet) 90 0 23 10 MG 15 0 [facility] [place]    1 [id number] 01/16/2023 12/27/2022 oxyCODONE HCL (Tablet) 90 0 23 10 MG 15 0 [facility] Medicaid 0 / 0 [place]    1 [id number] 11/17/2022 11/17/2022 oxyCODONE HCL (Tablet) 180 0 30 10 MG 15 0 [address] Private Pay 0 / 0 P          Pharmacy: [address] [place] [address]

## 2023-03-27 ENCOUNTER — HOSPITAL ENCOUNTER (OUTPATIENT)
Dept: INFUSION CENTER | Facility: HOSPITAL | Age: 63
Discharge: HOME | End: 2023-03-27

## 2023-03-27 ENCOUNTER — OFFICE VISIT (OUTPATIENT)
Dept: HEMATOLOGY ONCOLOGY | Facility: CLINIC | Age: 63
End: 2023-03-27

## 2023-03-27 VITALS
SYSTOLIC BLOOD PRESSURE: 130 MMHG | HEART RATE: 66 BPM | BODY MASS INDEX: 41.35 KG/M2 | WEIGHT: 241 LBS | DIASTOLIC BLOOD PRESSURE: 80 MMHG | TEMPERATURE: 98 F | OXYGEN SATURATION: 97 % | RESPIRATION RATE: 18 BRPM

## 2023-03-27 DIAGNOSIS — H66.001 NON-RECURRENT ACUTE SUPPURATIVE OTITIS MEDIA OF RIGHT EAR WITHOUT SPONTANEOUS RUPTURE OF TYMPANIC MEMBRANE: ICD-10-CM

## 2023-03-27 DIAGNOSIS — D51.8 OTHER VITAMIN B12 DEFICIENCY ANEMIAS: ICD-10-CM

## 2023-03-27 DIAGNOSIS — K04.7 INFECTION OF TOOTH: ICD-10-CM

## 2023-03-27 DIAGNOSIS — C82.18 GRADE 2 FOLLICULAR LYMPHOMA OF LYMPH NODES OF MULTIPLE REGIONS (HCC): Primary | ICD-10-CM

## 2023-03-27 DIAGNOSIS — Z45.2 ENCOUNTER FOR CENTRAL LINE CARE: ICD-10-CM

## 2023-03-27 LAB
ALBUMIN SERPL BCP-MCNC: 4.2 G/DL (ref 3.5–5)
ALP SERPL-CCNC: 96 U/L (ref 34–104)
ALT SERPL W P-5'-P-CCNC: 10 U/L (ref 7–52)
ANION GAP SERPL CALCULATED.3IONS-SCNC: 9 MMOL/L (ref 4–13)
AST SERPL W P-5'-P-CCNC: 10 U/L (ref 13–39)
BASOPHILS # BLD AUTO: 0.07 THOUSANDS/ÂΜL (ref 0–0.1)
BASOPHILS NFR BLD AUTO: 1 % (ref 0–1)
BILIRUB SERPL-MCNC: 0.71 MG/DL (ref 0.2–1)
BUN SERPL-MCNC: 16 MG/DL (ref 5–25)
CALCIUM SERPL-MCNC: 9 MG/DL (ref 8.4–10.2)
CHLORIDE SERPL-SCNC: 105 MMOL/L (ref 96–108)
CO2 SERPL-SCNC: 25 MMOL/L (ref 21–32)
CREAT SERPL-MCNC: 0.77 MG/DL (ref 0.6–1.3)
CRP SERPL QL: 10.2 MG/L
EOSINOPHIL # BLD AUTO: 0.37 THOUSAND/ÂΜL (ref 0–0.61)
EOSINOPHIL NFR BLD AUTO: 4 % (ref 0–6)
ERYTHROCYTE [DISTWIDTH] IN BLOOD BY AUTOMATED COUNT: 18.9 % (ref 11.6–15.1)
ERYTHROCYTE [SEDIMENTATION RATE] IN BLOOD: 26 MM/HOUR (ref 0–29)
GFR SERPL CREATININE-BSD FRML MDRD: 84 ML/MIN/1.73SQ M
GLUCOSE SERPL-MCNC: 91 MG/DL (ref 65–140)
HCT VFR BLD AUTO: 38.4 % (ref 34.8–46.1)
HGB BLD-MCNC: 11.2 G/DL (ref 11.5–15.4)
IMM GRANULOCYTES # BLD AUTO: 0.02 THOUSAND/UL (ref 0–0.2)
IMM GRANULOCYTES NFR BLD AUTO: 0 % (ref 0–2)
LDH SERPL-CCNC: 132 U/L (ref 140–271)
LYMPHOCYTES # BLD AUTO: 2.86 THOUSANDS/ÂΜL (ref 0.6–4.47)
LYMPHOCYTES NFR BLD AUTO: 31 % (ref 14–44)
MCH RBC QN AUTO: 20.3 PG (ref 26.8–34.3)
MCHC RBC AUTO-ENTMCNC: 29.2 G/DL (ref 31.4–37.4)
MCV RBC AUTO: 70 FL (ref 82–98)
MONOCYTES # BLD AUTO: 1.25 THOUSAND/ÂΜL (ref 0.17–1.22)
MONOCYTES NFR BLD AUTO: 13 % (ref 4–12)
NEUTROPHILS # BLD AUTO: 4.74 THOUSANDS/ÂΜL (ref 1.85–7.62)
NEUTS SEG NFR BLD AUTO: 51 % (ref 43–75)
NRBC BLD AUTO-RTO: 0 /100 WBCS
PLATELET # BLD AUTO: 125 THOUSANDS/UL (ref 149–390)
POTASSIUM SERPL-SCNC: 3.8 MMOL/L (ref 3.5–5.3)
PROT SERPL-MCNC: 6.2 G/DL (ref 6.4–8.4)
RBC # BLD AUTO: 5.51 MILLION/UL (ref 3.81–5.12)
SODIUM SERPL-SCNC: 139 MMOL/L (ref 135–147)
WBC # BLD AUTO: 9.31 THOUSAND/UL (ref 4.31–10.16)

## 2023-03-27 RX ORDER — LEVOFLOXACIN 500 MG/1
500 TABLET, FILM COATED ORAL EVERY 24 HOURS
Qty: 7 TABLET | Refills: 0 | Status: SHIPPED | OUTPATIENT
Start: 2023-03-27 | End: 2023-04-03

## 2023-03-27 RX ORDER — ACETAMINOPHEN 325 MG/1
650 TABLET ORAL ONCE
OUTPATIENT
Start: 2023-04-11

## 2023-03-27 RX ORDER — AMOXICILLIN 500 MG/1
500 CAPSULE ORAL EVERY 8 HOURS SCHEDULED
Qty: 21 CAPSULE | Refills: 0 | Status: CANCELLED | OUTPATIENT
Start: 2023-03-27 | End: 2023-04-03

## 2023-03-27 RX ORDER — SODIUM CHLORIDE 9 MG/ML
20 INJECTION, SOLUTION INTRAVENOUS ONCE
OUTPATIENT
Start: 2023-04-11
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
## 2023-03-27 NOTE — PROGRESS NOTES
Port flushed per protocol, central labs drawn per orders for chemo tomorrow, port remaining accessed per patient preference

## 2023-03-28 ENCOUNTER — HOSPITAL ENCOUNTER (OUTPATIENT)
Dept: INFUSION CENTER | Facility: HOSPITAL | Age: 63
Discharge: HOME | End: 2023-03-28
Attending: INTERNAL MEDICINE

## 2023-03-28 VITALS
HEART RATE: 67 BPM | OXYGEN SATURATION: 97 % | SYSTOLIC BLOOD PRESSURE: 129 MMHG | TEMPERATURE: 97.9 F | DIASTOLIC BLOOD PRESSURE: 78 MMHG | RESPIRATION RATE: 18 BRPM

## 2023-03-28 DIAGNOSIS — C82.18 GRADE 2 FOLLICULAR LYMPHOMA OF LYMPH NODES OF MULTIPLE REGIONS (HCC): ICD-10-CM

## 2023-03-28 DIAGNOSIS — C82.18 GRADE 2 FOLLICULAR LYMPHOMA OF LYMPH NODES OF MULTIPLE REGIONS (HCC): Primary | ICD-10-CM

## 2023-03-28 RX ORDER — LENALIDOMIDE 20 MG/1
20 CAPSULE ORAL DAILY
Qty: 21 CAPSULE | Refills: 0 | Status: SHIPPED | OUTPATIENT
Start: 2023-03-28

## 2023-03-28 RX ORDER — SODIUM CHLORIDE 9 MG/ML
20 INJECTION, SOLUTION INTRAVENOUS ONCE
Status: COMPLETED | OUTPATIENT
Start: 2023-03-28 | End: 2023-03-28

## 2023-03-28 RX ORDER — ACETAMINOPHEN 325 MG/1
650 TABLET ORAL ONCE
Status: COMPLETED | OUTPATIENT
Start: 2023-03-28 | End: 2023-03-28

## 2023-03-28 RX ADMIN — ACETAMINOPHEN 650 MG: 325 TABLET ORAL at 08:35

## 2023-03-28 RX ADMIN — DEXAMETHASONE SODIUM PHOSPHATE 20 MG: 10 INJECTION, SOLUTION INTRAMUSCULAR; INTRAVENOUS at 09:00

## 2023-03-28 RX ADMIN — DIPHENHYDRAMINE HYDROCHLORIDE 25 MG: 50 INJECTION, SOLUTION INTRAMUSCULAR; INTRAVENOUS at 08:39

## 2023-03-28 RX ADMIN — SODIUM CHLORIDE 20 ML/HR: 9 INJECTION, SOLUTION INTRAVENOUS at 08:32

## 2023-03-28 RX ADMIN — OBINUTUZUMAB 1000 MG: 1000 INJECTION, SOLUTION, CONCENTRATE INTRAVENOUS at 10:19

## 2023-04-01 ENCOUNTER — HOSPITAL ENCOUNTER (EMERGENCY)
Facility: HOSPITAL | Age: 63
Discharge: HOME | End: 2023-04-01
Attending: EMERGENCY MEDICINE

## 2023-04-01 VITALS
HEART RATE: 63 BPM | WEIGHT: 237.66 LBS | OXYGEN SATURATION: 100 % | RESPIRATION RATE: 20 BRPM | TEMPERATURE: 97.6 F | BODY MASS INDEX: 40.77 KG/M2 | DIASTOLIC BLOOD PRESSURE: 82 MMHG | SYSTOLIC BLOOD PRESSURE: 149 MMHG

## 2023-04-01 DIAGNOSIS — K08.89 PAIN, DENTAL: Primary | ICD-10-CM

## 2023-04-01 RX ORDER — CLINDAMYCIN HYDROCHLORIDE 300 MG/1
300 CAPSULE ORAL 4 TIMES DAILY
Qty: 40 CAPSULE | Refills: 0 | Status: SHIPPED | OUTPATIENT
Start: 2023-04-01 | End: 2023-04-11

## 2023-04-01 RX ORDER — OXYCODONE HYDROCHLORIDE AND ACETAMINOPHEN 5; 325 MG/1; MG/1
1 TABLET ORAL ONCE
Status: COMPLETED | OUTPATIENT
Start: 2023-04-01 | End: 2023-04-01

## 2023-04-01 RX ORDER — IBUPROFEN 400 MG/1
400 TABLET ORAL EVERY 6 HOURS PRN
Qty: 30 TABLET | Refills: 0 | Status: SHIPPED | OUTPATIENT
Start: 2023-04-01 | End: 2023-04-08

## 2023-04-01 RX ORDER — CLINDAMYCIN HYDROCHLORIDE 150 MG/1
300 CAPSULE ORAL ONCE
Status: COMPLETED | OUTPATIENT
Start: 2023-04-01 | End: 2023-04-01

## 2023-04-01 RX ADMIN — CLINDAMYCIN HYDROCHLORIDE 300 MG: 150 CAPSULE ORAL at 11:49

## 2023-04-01 RX ADMIN — OXYCODONE HYDROCHLORIDE AND ACETAMINOPHEN 1 TABLET: 5; 325 TABLET ORAL at 11:49

## 2023-04-01 NOTE — ED PROVIDER NOTES
History  Chief Complaint   Patient presents with   • Dental Pain     Pt came to ER with two weeks of left sided dental pain  Pt reports she completed a course of antibiotics  Pt reports she does not have an appointment for a dentist  Pt reports she has a shingles rash on her left side  82-year-old female presenting to the emergency department with right lower dental pain that started 2 weeks ago  Patient notes that she was started on Levaquin by her cancer doctor but still having pain  Has not scheduled an appointment with a dentist yet  No further swelling  No fever chills cough congestion rhinorrhea  No nausea vomiting  Prior to Admission Medications   Prescriptions Last Dose Informant Patient Reported? Taking?    Banophen 25 MG capsule   Yes No   Sig: TAKE 1 CAPSULE(25 MG TOTAL) BY MOUTH EVERY 6 (SIX) HOURS AS NEEDED FOR ITCHING   Blood Glucose Monitoring Suppl (OneTouch Verio) w/Device KIT   No No   Sig: Use in the morning   DULoxetine (CYMBALTA) 20 mg capsule   No No   Sig: Take 2 capsules (40 mg total) by mouth daily   LORazepam (ATIVAN) 0 5 mg tablet   No No   Sig: Take 1 tablet 30-60 minutes before MRI, can take an additional tablet if needed before test   Patient not taking: Reported on 2/7/2023   Lenalidomide 20 MG CAPS   No No   Sig: Take 1 capsule (20 mg total) by mouth in the morning For 21 days then 7 days rest   OneTouch Delica Lancets 42E MISC   No No   Sig: Use in the morning   acyclovir (ZOVIRAX) 400 MG tablet   No No   Sig: Take 1 tablet (400 mg total) by mouth 2 (two) times a day   allopurinol (ZYLOPRIM) 100 mg tablet   No No   Sig: Take 1 tablet (100 mg total) by mouth daily   amLODIPine (NORVASC) 10 mg tablet   Yes No   Sig: Take 10 mg by mouth daily   ascorbic acid (VITAMIN C) 1000 MG tablet   No No   Sig: Take 1 tablet (1,000 mg total) by mouth every 12 (twelve) hours for 10 doses   aspirin (ECOTRIN LOW STRENGTH) 81 mg EC tablet   No No   Sig: Take 1 tablet (81 mg total) by mouth daily   aspirin-acetaminophen-caffeine ([facility]) 250-250-65 MG per tablet   No No   Sig: Take 1 tablet by mouth every 6 (six) hours as needed for headaches Erica 1 tableta cada 6 horas fern[place] necesario para el dolor de russ   atorvastatin (LIPITOR) 40 mg tablet   No No   Sig: Take 1 tablet (40 mg total) by mouth daily with dinner   benzocaine (ORAJEL) 10 % mucosal gel   No No   Sig: Apply 1 application to the mouth or throat as needed for mucositis   benzonatate (TESSALON) 200 MG capsule   No No   Sig: Take 1 capsule (200 mg total) by mouth 3 (three) times a day as needed for cough   Patient not taking: Reported on 2/13/2023   capsicum (ZOSTRIX) 0 075 % topical cream   No No   Sig: Apply topically 3 (three) times a day Apply to affected area   chlorthalidone 25 mg tablet   No No   Sig: Take 1 tablet (25 mg total) by mouth daily   cholecalciferol (VITAMIN D3) 1,000 units tablet   No No   Sig: Take 2 tablets (2,000 Units total) by mouth daily for 5 days   cyanocobalamin 1000 MCG tablet   No No   Sig: Take 1 tablet (1,000 mcg total) by mouth daily   diphenhydrAMINE (BENADRYL) 25 mg tablet   No No   Sig: Take 1 tablet (25 mg total) by mouth every 6 (six) hours as needed for itching   folic acid (FOLVITE) 1 mg tablet   No No   Sig: Take 1 tablet (1 mg total) by mouth daily   glucose blood (OneTouch Verio) test strip   No No   Sig: Use 1 each in the morning Use as instructed   levofloxacin (LEVAQUIN) 500 mg tablet   No No   Sig: Take 1 tablet (500 mg total) by mouth every 24 hours for 7 days   lidocaine (LMX) 4 % cream   No No   Sig: Apply topically as needed for mild pain To neck   meclizine (ANTIVERT) 12 5 MG tablet   No No   Sig: Take 1 tablet (12 5 mg total) by mouth every 8 (eight) hours as needed for dizziness   metFORMIN (GLUCOPHAGE) 500 mg tablet   No No   Sig: Take 2 tablets (1,000 mg total) by mouth 2 (two) times a day with meals   naloxone (NARCAN) 4 mg/0 1 mL nasal spray   No No   Sig: Administer 1 spray into a nostril  If no response after 2-3 minutes, give another dose in the other nostril using a new spray     omeprazole (PriLOSEC) 20 mg delayed release capsule   No No   Sig: Take 2 capsules (40 mg total) by mouth daily To prevent stomach upset   ondansetron (ZOFRAN) 4 mg tablet   No No   Sig: Take 1 tablet (4 mg total) by mouth every 8 (eight) hours as needed for nausea or vomiting   oxyCODONE (ROXICODONE) 10 MG TABS   No No   Sig: Take 1 tablet (10 mg total) by mouth every 6 (six) hours as needed for severe pain Erica 1 tableta cada 6 horas fernandez sea necesario por dolor intenso Max Daily Amount: 40 mg   polyethylene glycol (GLYCOLAX) 17 GM/SCOOP   Yes No   Sig: TAKE 17 G BY MOUTH DAILY MIX WITH WATER   polyethylene glycol (MIRALAX) 17 g packet   No No   Sig: Take 17 g by mouth daily   potassium chloride (K-DUR,KLOR-CON) 20 mEq tablet   No No   Sig: Take 1 tablet (20 mEq total) by mouth daily   senna (SENOKOT) 8 6 mg   No No   Sig: Take 1 tablet (8 6 mg total) by mouth 2 (two) times a day      Facility-Administered Medications: None       Past Medical History:   Diagnosis Date   • Anemia     iron and B12   • Arthritis    • Asthma    • Cough    • Depression    • Diabetes mellitus [facility] )    • Falls frequently    • Hypertension    • Lupus (HCC)    • Lymphoma (HCC)    • Lymphoma (HCC)    • Migraine    • Osteoporosis    • Psychiatric disorder    • 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[facility] )    • Vertigo        Past Surgical History:   Procedure Laterality Date   • CHOLECYSTECTOMY     • FL GUIDED CENTRAL VENOUS ACCESS DEVICE INSERTION  11/9/2018   • HYSTERECTOMY     • IR BIOPSY BONE MARROW  11/24/2020   • IR BIOPSY LYMPH NODE  11/24/2020   • LYMPH NODE BIOPSY Left 11/9/2018    Procedure: EXCISION BIOPSY LYMPH NODE SUPRACLAVICULAR;  Surgeon: Oneida Chan MD;  Location: [facility] MAIN OR;  Service: General   • [place] TENDON SHEATH INCISION Right 1/16/2020    Procedure: RELEASE TRIGGER FINGER RIGHT THUMB;  Surgeon: Tyler Harris MD; Location:  MAIN OR;  Service: Orthopedics   • TUNNELED VENOUS PORT PLACEMENT N/A 2018    Procedure: INSERTION OF PORT-A-CATH;  Surgeon: Dee Jeffers MD;  Location: [address] [place] [address] MAIN OR;  Service: General       Family History   Problem Relation Age of Onset   • Cancer Mother    • Diabetes Mother    • Cancer Father    • Diabetes Father    • Breast cancer Sister    • No Known Problems Sister    • No Known Problems Sister    • No Known Problems Sister    • No Known Problems Maternal Aunt    • No Known Problems Maternal Aunt    • No Known Problems Maternal Aunt    • No Known Problems Paternal Aunt      I have reviewed and agree with the history as documented  E-Cigarette/Vaping   • E-Cigarette Use Never User      E-Cigarette/Vaping Substances   • Nicotine No    • THC No    • CBD No    • Flavoring No    • Other No    • Unknown No      Social History     Tobacco Use   • Smoking status: Former     Types: Cigarettes     Quit date: 2017     Years since quittin 8   • Smokeless tobacco: Never   Vaping Use   • Vaping Use: Never used   Substance Use Topics   • Alcohol use: Never   • Drug use: Never       Review of Systems   Constitutional: Negative for chills and fever  HENT: Positive for dental problem  Negative for ear pain and sore throat  Eyes: Negative for pain and visual disturbance  Respiratory: Negative for cough and shortness of breath  Cardiovascular: Negative for chest pain and palpitations  Gastrointestinal: Negative for abdominal pain and vomiting  Genitourinary: Negative for dysuria and hematuria  Musculoskeletal: Negative for arthralgias and back pain  Skin: Negative for color change and rash  Neurological: Negative for seizures and syncope  All other systems reviewed and are negative  Physical Exam  Physical Exam  Vitals and nursing note reviewed  Constitutional:       General: She is not in acute distress  Appearance: She is well-developed     HENT:      Head: Normocephalic and atraumatic  Mouth/Throat:      Comments: Broken tooth #28 with cavity of the root  Other multiple teeth with cavities,  Eyes:      Conjunctiva/sclera: Conjunctivae normal    Cardiovascular:      Rate and Rhythm: Normal rate and regular rhythm  Heart sounds: No murmur heard  Pulmonary:      Effort: Pulmonary effort is normal  No respiratory distress  Breath sounds: Normal breath sounds  Abdominal:      Palpations: Abdomen is soft  Tenderness: There is no abdominal tenderness  Musculoskeletal:         General: No swelling  Cervical back: Neck supple  Skin:     General: Skin is warm and dry  Capillary Refill: Capillary refill takes less than 2 seconds  Neurological:      Mental Status: She is alert  Psychiatric:         Mood and Affect: Mood normal          Vital Signs  ED Triage Vitals [04/01/23 1115]   Temperature Pulse Respirations Blood Pressure SpO2   97 6 °F (36 4 °C) 63 20 149/82 100 %      Temp Source Heart Rate Source Patient Position - Orthostatic VS BP Location FiO2 (%)   Tympanic Monitor Sitting Left arm --      Pain Score       --           Vitals:    04/01/23 1115   BP: 149/82   Pulse: 63   Patient Position - Orthostatic VS: Sitting         Visual Acuity      ED Medications  Medications   clindamycin (CLEOCIN) capsule 300 mg (has no administration in time range)   oxyCODONE-acetaminophen (PERCOCET) 5-325 mg per tablet 1 tablet (has no administration in time range)       Diagnostic Studies  Results Reviewed     None                 No orders to display              Procedures  Procedures         ED Course                                             Medical Decision Making  74-year-old female with dental pain  Multiple cavities  Patient on oxycodone for chronic cancer related pain  We will start patient on clindamycin, patient currently on Levaquin  Dentist phone number given, ibuprofen and benzocaine prescribed as well      Risk  OTC drugs   Prescription drug management  Disposition  Final diagnoses:   Pain, dental     Time reflects when diagnosis was documented in both MDM as applicable and the Disposition within this note     Time User Action Codes Description Comment    4/1/2023 11:32 AM Micheal Gildardo Piña [K08 [id number]] Pain, dental       ED Disposition     ED Disposition   Discharge    Condition   Stable    Date/Time   Sat Apr 1, 2023 11:32 AM    Comment   Lidya Moreno discharge to home/self care  Follow-up Information     Follow up With Specialties Details Why [id number]  [facility]  [address] [place] [address]          Patient's Medications   Discharge Prescriptions    BENZOCAINE (BABY ORAJEL) 7 5 % ORAL GEL    Apply to the mouth or throat 3 (three) times a day as needed for mucositis       Start Date: 4/1/2023  End Date: --       Order Dose: --       Quantity: 9 45 g    Refills: 0    CLINDAMYCIN (CLEOCIN) 300 MG CAPSULE    Take 1 capsule (300 mg total) by mouth 4 (four) times a day for 10 days       Start Date: 4/1/2023  End Date: 4/11/2023       Order Dose: 300 mg       Quantity: 40 capsule    Refills: 0    IBUPROFEN (MOTRIN) 400 MG TABLET    Take 1 tablet (400 mg total) by mouth every 6 (six) hours as needed for mild pain (Body aches or fever) for up to 7 days       Start Date: 4/1/2023  End Date: 4/8/2023       Order Dose: 400 mg       Quantity: 30 tablet    Refills: 0       No discharge procedures on file      PDMP Review       Value Time User    PDMP Reviewed  Yes 2/25/2023  7:17 AM Shanna Whipple PA-C          ED Provider  Electronically Signed by           Diogo Mckenna MD  04/01/23 0030

## 2023-04-21 ENCOUNTER — HOSPITAL ENCOUNTER (OUTPATIENT)
Dept: INFUSION CENTER | Facility: HOSPITAL | Age: 63
Discharge: HOME | End: 2023-04-21
Attending: INTERNAL MEDICINE

## 2023-04-21 DIAGNOSIS — D69.6 THROMBOCYTOPENIA (HCC): ICD-10-CM

## 2023-04-21 DIAGNOSIS — C82.18 GRADE 2 FOLLICULAR LYMPHOMA OF LYMPH NODES OF MULTIPLE REGIONS (HCC): ICD-10-CM

## 2023-04-21 DIAGNOSIS — Z45.2 ENCOUNTER FOR CENTRAL LINE CARE: Primary | ICD-10-CM

## 2023-04-21 LAB
BASOPHILS # BLD AUTO: 0.04 THOUSANDS/ΜL (ref 0–0.1)
BASOPHILS NFR BLD AUTO: 1 % (ref 0–1)
EOSINOPHIL # BLD AUTO: 0.25 THOUSAND/ΜL (ref 0–0.61)
EOSINOPHIL NFR BLD AUTO: 4 % (ref 0–6)
ERYTHROCYTE [DISTWIDTH] IN BLOOD BY AUTOMATED COUNT: 18.7 % (ref 11.6–15.1)
HCT VFR BLD AUTO: 37.1 % (ref 34.8–46.1)
HGB BLD-MCNC: 10.8 G/DL (ref 11.5–15.4)
IMM GRANULOCYTES # BLD AUTO: 0.02 THOUSAND/UL (ref 0–0.2)
IMM GRANULOCYTES NFR BLD AUTO: 0 % (ref 0–2)
LYMPHOCYTES # BLD AUTO: 1.79 THOUSANDS/ΜL (ref 0.6–4.47)
LYMPHOCYTES NFR BLD AUTO: 27 % (ref 14–44)
MCH RBC QN AUTO: 20.5 PG (ref 26.8–34.3)
MCHC RBC AUTO-ENTMCNC: 29.1 G/DL (ref 31.4–37.4)
MCV RBC AUTO: 70 FL (ref 82–98)
MONOCYTES # BLD AUTO: 0.59 THOUSAND/ΜL (ref 0.17–1.22)
MONOCYTES NFR BLD AUTO: 9 % (ref 4–12)
NEUTROPHILS # BLD AUTO: 3.99 THOUSANDS/ΜL (ref 1.85–7.62)
NEUTS SEG NFR BLD AUTO: 59 % (ref 43–75)
NRBC BLD AUTO-RTO: 0 /100 WBCS
PLATELET # BLD AUTO: 51 THOUSANDS/UL (ref 149–390)
RBC # BLD AUTO: 5.28 MILLION/UL (ref 3.81–5.12)
WBC # BLD AUTO: 6.68 THOUSAND/UL (ref 4.31–10.16)

## 2023-04-28 ENCOUNTER — HOSPITAL ENCOUNTER (OUTPATIENT)
Dept: INFUSION CENTER | Facility: HOSPITAL | Age: 63
Discharge: HOME | End: 2023-04-28
Attending: INTERNAL MEDICINE

## 2023-04-28 DIAGNOSIS — C82.18 GRADE 2 FOLLICULAR LYMPHOMA OF LYMPH NODES OF MULTIPLE REGIONS (HCC): ICD-10-CM

## 2023-04-28 DIAGNOSIS — Z45.2 ENCOUNTER FOR CENTRAL LINE CARE: Primary | ICD-10-CM

## 2023-04-28 LAB
ALBUMIN SERPL BCP-MCNC: 3.8 G/DL (ref 3.5–5)
ALP SERPL-CCNC: 110 U/L (ref 34–104)
ALT SERPL W P-5'-P-CCNC: 7 U/L (ref 7–52)
ANION GAP SERPL CALCULATED.3IONS-SCNC: 8 MMOL/L (ref 4–13)
AST SERPL W P-5'-P-CCNC: 11 U/L (ref 13–39)
BASOPHILS # BLD AUTO: 0.04 THOUSANDS/ΜL (ref 0–0.1)
BASOPHILS NFR BLD AUTO: 1 % (ref 0–1)
BILIRUB SERPL-MCNC: 0.44 MG/DL (ref 0.2–1)
BUN SERPL-MCNC: 21 MG/DL (ref 5–25)
CALCIUM SERPL-MCNC: 8.7 MG/DL (ref 8.4–10.2)
CHLORIDE SERPL-SCNC: 107 MMOL/L (ref 96–108)
CO2 SERPL-SCNC: 25 MMOL/L (ref 21–32)
CREAT SERPL-MCNC: 0.7 MG/DL (ref 0.6–1.3)
CRP SERPL QL: 10.7 MG/L
EOSINOPHIL # BLD AUTO: 0.26 THOUSAND/ΜL (ref 0–0.61)
EOSINOPHIL NFR BLD AUTO: 4 % (ref 0–6)
ERYTHROCYTE [DISTWIDTH] IN BLOOD BY AUTOMATED COUNT: 17.9 % (ref 11.6–15.1)
ERYTHROCYTE [SEDIMENTATION RATE] IN BLOOD: 25 MM/HOUR (ref 0–29)
GFR SERPL CREATININE-BSD FRML MDRD: 94 ML/MIN/1.73SQ M
GLUCOSE SERPL-MCNC: 126 MG/DL (ref 65–140)
HCT VFR BLD AUTO: 35.7 % (ref 34.8–46.1)
HGB BLD-MCNC: 10.6 G/DL (ref 11.5–15.4)
IMM GRANULOCYTES # BLD AUTO: 0.02 THOUSAND/UL (ref 0–0.2)
IMM GRANULOCYTES NFR BLD AUTO: 0 % (ref 0–2)
LDH SERPL-CCNC: 145 U/L (ref 140–271)
LYMPHOCYTES # BLD AUTO: 1.91 THOUSANDS/ΜL (ref 0.6–4.47)
LYMPHOCYTES NFR BLD AUTO: 26 % (ref 14–44)
MCH RBC QN AUTO: 20.7 PG (ref 26.8–34.3)
MCHC RBC AUTO-ENTMCNC: 29.7 G/DL (ref 31.4–37.4)
MCV RBC AUTO: 70 FL (ref 82–98)
MONOCYTES # BLD AUTO: 0.58 THOUSAND/ΜL (ref 0.17–1.22)
MONOCYTES NFR BLD AUTO: 8 % (ref 4–12)
NEUTROPHILS # BLD AUTO: 4.47 THOUSANDS/ΜL (ref 1.85–7.62)
NEUTS SEG NFR BLD AUTO: 61 % (ref 43–75)
NRBC BLD AUTO-RTO: 0 /100 WBCS
PLATELET # BLD AUTO: 70 THOUSANDS/UL (ref 149–390)
POTASSIUM SERPL-SCNC: 4 MMOL/L (ref 3.5–5.3)
PROT SERPL-MCNC: 6.1 G/DL (ref 6.4–8.4)
RBC # BLD AUTO: 5.12 MILLION/UL (ref 3.81–5.12)
SODIUM SERPL-SCNC: 140 MMOL/L (ref 135–147)
URATE SERPL-MCNC: 3.4 MG/DL (ref 2–7.5)
WBC # BLD AUTO: 7.28 THOUSAND/UL (ref 4.31–10.16)

## 2023-04-28 RX ORDER — LENALIDOMIDE 20 MG/1
CAPSULE ORAL
Qty: 21 CAPSULE | Refills: 0 | Status: SHIPPED | OUTPATIENT
Start: 2023-04-28

## 2023-05-03 ENCOUNTER — HOSPITAL ENCOUNTER (EMERGENCY)
Facility: HOSPITAL | Age: 63
Discharge: HOME | End: 2023-05-03
Attending: EMERGENCY MEDICINE

## 2023-05-03 VITALS
TEMPERATURE: 98 F | BODY MASS INDEX: 41.35 KG/M2 | SYSTOLIC BLOOD PRESSURE: 212 MMHG | DIASTOLIC BLOOD PRESSURE: 113 MMHG | OXYGEN SATURATION: 97 % | WEIGHT: 241 LBS | RESPIRATION RATE: 20 BRPM | HEART RATE: 80 BPM

## 2023-05-03 DIAGNOSIS — K08.89 TOOTHACHE: Primary | ICD-10-CM

## 2023-05-03 RX ORDER — CLINDAMYCIN HYDROCHLORIDE 150 MG/1
300 CAPSULE ORAL ONCE
Status: COMPLETED | OUTPATIENT
Start: 2023-05-03 | End: 2023-05-03

## 2023-05-03 RX ORDER — CLINDAMYCIN HYDROCHLORIDE 300 MG/1
300 CAPSULE ORAL 4 TIMES DAILY
Qty: 40 CAPSULE | Refills: 0 | Status: SHIPPED | OUTPATIENT
Start: 2023-05-03 | End: 2023-05-13

## 2023-05-03 RX ORDER — ACETAMINOPHEN 325 MG/1
650 TABLET ORAL ONCE
Status: COMPLETED | OUTPATIENT
Start: 2023-05-03 | End: 2023-05-03

## 2023-05-03 RX ORDER — ACETAMINOPHEN 325 MG/1
650 TABLET ORAL EVERY 6 HOURS PRN
Qty: 30 TABLET | Refills: 0 | Status: SHIPPED | OUTPATIENT
Start: 2023-05-03

## 2023-05-03 RX ADMIN — ACETAMINOPHEN 650 MG: 325 TABLET ORAL at 13:12

## 2023-05-03 RX ADMIN — CLINDAMYCIN HYDROCHLORIDE 300 MG: 150 CAPSULE ORAL at 13:12

## 2023-05-03 NOTE — Clinical Note
COLTEN Goodrich 61 discharge to home/self care  Head,  normocephalic,  atraumatic,  Face,  Face within normal limits,  Ears,  External ears within normal limits,  Nose/Nasopharynx,  External nose  normal appearance,  nares patent,  no nasal discharge,  Mouth and Throat,  Oral cavity appearance normal,  Breath odor normal,  Lips,  Appearance normal

## 2023-05-03 NOTE — ED PROVIDER NOTES
History  Chief Complaint   Patient presents with   • Dental Pain     Right lower rear molar pain for weeks     Pt with dental pain x 1 month       Dental Pain  Location:  Lower  Lower teeth location:  25/RL central incisor, 26/RL lateral incisor and 27/RL cuspid  Quality:  Aching  Severity:  Moderate  Onset quality:  Gradual  Duration:  4 weeks  Timing:  Intermittent  Progression:  Waxing and waning  Chronicity:  Recurrent  Context: not abscess    Relieved by:  Nothing  Worsened by:  Nothing  Ineffective treatments:  None tried  Associated symptoms: no congestion    Risk factors: no alcohol problem        Prior to Admission Medications   Prescriptions Last Dose Informant Patient Reported? Taking?    Banophen 25 MG capsule   Yes No   Sig: TAKE 1 CAPSULE(25 MG TOTAL) BY MOUTH EVERY 6 (SIX) HOURS AS NEEDED FOR ITCHING   Blood Glucose Monitoring Suppl (OneTouch Verio) w/Device KIT   No No   Sig: Use in the morning   DULoxetine (CYMBALTA) 20 mg capsule   No No   Sig: Take 2 capsules (40 mg total) by mouth daily   LORazepam (ATIVAN) 0 5 mg tablet   No No   Sig: Take 1 tablet 30-60 minutes before MRI, can take an additional tablet if needed before test   Patient not taking: Reported on 2/7/2023   Lenalidomide (Revlimid) 20 MG CAPS   No No   Sig: TAKE 1 CAPSULE (20 MG TOTAL) BY MOUTH IN THE MORNING FOR 21 DAYS THEN 7 DAYS REST   OneTouch Delica Lancets 14V MISC   No No   Sig: Use in the morning   acyclovir (ZOVIRAX) 400 MG tablet   No No   Sig: Take 1 tablet (400 mg total) by mouth 2 (two) times a day   allopurinol (ZYLOPRIM) 100 mg tablet   No No   Sig: Take 1 tablet (100 mg total) by mouth daily   amLODIPine (NORVASC) 10 mg tablet   Yes No   Sig: Take 10 mg by mouth daily   ascorbic acid (VITAMIN C) 1000 MG tablet   No No   Sig: Take 1 tablet (1,000 mg total) by mouth every 12 (twelve) hours for 10 doses   aspirin (ECOTRIN LOW STRENGTH) 81 mg EC tablet   No No   Sig: Take 1 tablet (81 mg total) by mouth daily aspirin-acetaminophen-caffeine ([facility]) 250-250-65 MG per tablet   No No   Sig: Take 1 tablet by mouth every 6 (six) hours as needed for headaches Erica 1 tableta cada 6 horas fernandez sea necesario para el dolor de russ   atorvastatin (LIPITOR) 40 mg tablet   No No   Sig: Take 1 tablet (40 mg total) by mouth daily with dinner   benzocaine (BABY ORAJEL) 7 5 % oral gel   No No   Sig: Apply to the mouth or throat 3 (three) times a day as needed for mucositis   benzocaine (ORAJEL) 10 % mucosal gel   No No   Sig: Apply 1 application to the mouth or throat as needed for mucositis   benzonatate (TESSALON) 200 MG capsule   No No   Sig: Take 1 capsule (200 mg total) by mouth 3 (three) times a day as needed for cough   Patient not taking: Reported on 4/17/2023   capsicum (ZOSTRIX) 0 075 % topical cream   No No   Sig: Apply topically 3 (three) times a day Apply to affected area   chlorthalidone 25 mg tablet   No No   Sig: Take 1 tablet (25 mg total) by mouth daily   cholecalciferol (VITAMIN D3) 1,000 units tablet   No No   Sig: Take 2 tablets (2,000 Units total) by mouth daily for 5 days   cyanocobalamin 1000 MCG tablet   No No   Sig: Take 1 tablet (1,000 mcg total) by mouth daily   diphenhydrAMINE (BENADRYL) 25 mg tablet   No No   Sig: Take 1 tablet (25 mg total) by mouth every 6 (six) hours as needed for itching   folic acid (FOLVITE) 1 mg tablet   No No   Sig: Take 1 tablet (1 mg total) by mouth daily   glucose blood (OneTouch Verio) test strip   No No   Sig: Use 1 each in the morning Use as instructed   ibuprofen (MOTRIN) 400 mg tablet   No No   Sig: Take 1 tablet (400 mg total) by mouth every 6 (six) hours as needed for mild pain (Body aches or fever) for up to 7 days   lidocaine (LMX) 4 % cream   No No   Sig: Apply topically as needed for mild pain To neck   meclizine (ANTIVERT) 12 5 MG tablet   No No   Sig: Take 1 tablet (12 5 mg total) by mouth every 8 (eight) hours as needed for dizziness   metFORMIN (GLUCOPHAGE) 500 mg tablet   No No   Sig: Take 2 tablets (1,000 mg total) by mouth 2 (two) times a day with meals   naloxone (NARCAN) 4 mg/0 1 mL nasal spray   No No   Sig: Administer 1 spray into a nostril  If no response after 2-3 minutes, give another dose in the other nostril using a new spray     omeprazole (PriLOSEC) 20 mg delayed release capsule   No No   Sig: Take 2 capsules (40 mg total) by mouth daily To prevent stomach upset   ondansetron (ZOFRAN) 4 mg tablet   No No   Sig: Take 1 tablet (4 mg total) by mouth every 8 (eight) hours as needed for nausea or vomiting   oxyCODONE (ROXICODONE) 10 MG TABS   No No   Sig: Take 1 tablet (10 mg total) by mouth every 6 (six) hours as needed for severe pain Erica 1 tableta cada 6 horas fernandez sea necesario por dolor intenso Max Daily Amount: 40 mg   polyethylene glycol (GLYCOLAX) 17 GM/SCOOP   Yes No   Sig: TAKE 17 G BY MOUTH DAILY MIX WITH WATER   polyethylene glycol (MIRALAX) 17 g packet   No No   Sig: Take 17 g by mouth daily   potassium chloride (K-DUR,KLOR-CON) 20 mEq tablet   No No   Sig: Take 1 tablet (20 mEq total) by mouth daily   senna (SENOKOT) 8 6 mg   No No   Sig: Take 1 tablet (8 6 mg total) by mouth 2 (two) times a day      Facility-Administered Medications: None       Past Medical History:   Diagnosis Date   • Anemia     iron and B12   • Arthritis    • Asthma    • Cough    • Depression    • Diabetes mellitus ([facility] )    • Falls frequently    • Hypertension    • Lupus ([facility] )    • Lymphoma ([facility] )    • Lymphoma (H[facility])    • Migraine    • Osteoporosis    • Psychiatric disorder    • 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[facility] )    • Vertigo        Past Surgical History:   Procedure Laterality Date   • CHOLECYSTECTOMY     • FL GUIDED CENTRAL VENOUS ACCESS DEVICE INSERTION  11/9/2018   • HYSTERECTOMY     • IR BIOPSY BONE MARROW  11/24/2020   • IR BIOPSY LYMPH NODE  11/24/2020   • LYMPH NODE BIOPSY Left 11/9/2018    Procedure: EXCISION BIOPSY LYMPH NODE SUPRACLAVICULAR;  Surgeon: Irene Patel MD;  Location: [address] [place] [address] OR;  Service: General   • KS TENDON SHEATH INCISION Right 2020    Procedure: RELEASE TRIGGER FINGER RIGHT THUMB;  Surgeon: Valeria Vazquez MD;  Location: [address] [place] [address];  Service: Orthopedics   • TUNNELED VENOUS PORT PLACEMENT N/A 2018    Procedure: INSERTION OF PORT-A-CATH;  Surgeon: Leonardo Rios MD;  Location: [address] [place] [address];  Service: General       Family History   Problem Relation Age of Onset   • Cancer Mother    • Diabetes Mother    • Cancer Father    • Diabetes Father    • Breast cancer Sister    • No Known Problems Sister    • No Known Problems Sister    • No Known Problems Sister    • No Known Problems Maternal Aunt    • No Known Problems Maternal Aunt    • No Known Problems Maternal Aunt    • No Known Problems Paternal Aunt      I have reviewed and agree with the history as documented  E-Cigarette/Vaping   • E-Cigarette Use Never User      E-Cigarette/Vaping Substances   • Nicotine No    • THC No    • CBD No    • Flavoring No    • Other No    • Unknown No      Social History     Tobacco Use   • Smoking status: Former     Types: Cigarettes     Quit date: 2017     Years since quittin 8   • Smokeless tobacco: Never   Vaping Use   • Vaping Use: Never used   Substance Use Topics   • Alcohol use: Never   • Drug use: Never       Review of Systems   Constitutional: Negative  HENT: Positive for dental problem  Negative for congestion  Eyes: Negative  Respiratory: Negative  Cardiovascular: Negative  Gastrointestinal: Negative  Endocrine: Negative  Genitourinary: Negative  Musculoskeletal: Negative  Skin: Negative  Allergic/Immunologic: Negative  Neurological: Negative  Hematological: Negative  Psychiatric/Behavioral: Negative  All other systems reviewed and are negative  Physical Exam  Physical Exam  Vitals and nursing note reviewed  Constitutional:       Appearance: Normal appearance  She is normal weight     HENT:      Head: Normocephalic and atraumatic  Right Ear: Tympanic membrane, ear canal and external ear normal       Left Ear: Tympanic membrane, ear canal and external ear normal       Mouth/Throat:      Mouth: Mucous membranes are moist       Pharynx: Oropharynx is clear  Comments: Right lower teeth 25 26 27 tendereness  Gum recession, gum pain  Floor mouth wnl  Minor jaw pain no neck pain   No painful swallowing or talking   Cardiovascular:      Rate and Rhythm: Normal rate and regular rhythm  Pulses: Normal pulses  Heart sounds: Normal heart sounds  Pulmonary:      Effort: Pulmonary effort is normal       Breath sounds: Normal breath sounds  Abdominal:      General: Abdomen is flat  Musculoskeletal:         General: Normal range of motion  Cervical back: Normal range of motion  Skin:     General: Skin is warm  Capillary Refill: Capillary refill takes less than 2 seconds  Neurological:      General: No focal deficit present  Mental Status: She is alert           Vital Signs  ED Triage Vitals   Temperature Pulse Respirations Blood Pressure SpO2   05/03/23 1251 05/03/23 1251 05/03/23 1251 05/03/23 1251 05/03/23 1251   98 °F (36 7 °C) 80 20 (!) 212/113 97 %      Temp src Heart Rate Source Patient Position - Orthostatic VS BP Location FiO2 (%)   -- 05/03/23 1251 05/03/23 1251 05/03/23 1251 --    Monitor Sitting Left arm       Pain Score       05/03/23 1312       10 - Worst Possible Pain           Vitals:    05/03/23 1251   BP: (!) 212/113   Pulse: 80   Patient Position - Orthostatic VS: Sitting         Visual Acuity      ED Medications  Medications   acetaminophen (TYLENOL) tablet 650 mg (650 mg Oral Given 5/3/23 1312)   clindamycin (CLEOCIN) capsule 300 mg (300 mg Oral Given 5/3/23 1312)       Diagnostic Studies  Results Reviewed     None                 No orders to display              Procedures  Procedures         ED Course MDM    Disposition  Final diagnoses:   Toothache     Time reflects when diagnosis was documented in both MDM as applicable and the Disposition within this note     Time User Action Codes Description Comment    5/3/2023  1:06 PM Charisse Joshua  Add [K08 [id number]] Toothache       ED Disposition     ED Disposition   Discharge    Condition   --    Date/Time   Wed May 3, 2023  2:03 PM    Comment   Helen Rolon  discharge to home/self care                 Follow-up Information     Follow up With Specialties Details Why [facility] oral surgeons  [phone] [address] [place] [address]  [phone]          Discharge Medication List as of 5/3/2023  1:10 PM      START taking these medications    Details   acetaminophen (TYLENOL) 325 mg tablet Take 2 tablets (650 mg total) by mouth every 6 (six) hours as needed for mild pain, Starting Wed 5/3/2023, Print      clindamycin (CLEOCIN) 300 MG capsule Take 1 capsule (300 mg total) by mouth 4 (four) times a day for 10 days, Starting Wed 5/3/2023, Until Sat 5/13/2023, Print         CONTINUE these medications which have NOT CHANGED    Details   acyclovir (ZOVIRAX) 400 MG tablet Take 1 tablet (400 mg total) by mouth 2 (two) times a day, Starting Mon 3/13/2023, Until Wed 4/12/2023, Normal      allopurinol (ZYLOPRIM) 100 mg tablet Take 1 tablet (100 mg total) by mouth daily, Starting Mon 3/13/2023, Normal      amLODIPine (NORVASC) 10 mg tablet Take 10 mg by mouth daily, Starting Thu 6/2/2022, Historical Med      ascorbic acid (VITAMIN C) 1000 MG tablet Take 1 tablet (1,000 mg total) by mouth every 12 (twelve) hours for 10 doses, Starting Tue 1/26/2021, Until Mon 6/7/2021, Normal      aspirin (ECOTRIN LOW STRENGTH) 81 mg EC tablet Take 1 tablet (81 mg total) by mouth daily, Starting Wed 3/18/2020, Normal      aspirin-acetaminophen-caffeine ([facility]) 608-014-87 MG per tablet Take 1 tablet by mouth every 6 (six) hours as needed for headaches Erica 1 tableta cada 6 horas fernandez sea necesario para el dolor de russ, Starting Thu 11/19/2020, Normal      atorvastatin (LIPITOR) 40 mg tablet Take 1 tablet (40 mg total) by mouth daily with dinner, Starting Tue 1/26/2021, Normal      Banophen 25 MG capsule TAKE 1 CAPSULE(25 MG TOTAL) BY MOUTH EVERY 6 (SIX) HOURS AS NEEDED FOR ITCHING, Historical Med      benzocaine (BABY ORAJEL) 7 5 % oral gel Apply to the mouth or throat 3 (three) times a day as needed for mucositis, Starting Sat 4/1/2023, Normal      benzocaine (ORAJEL) 10 % mucosal gel Apply 1 application to the mouth or throat as needed for mucositis, Starting Wed 8/17/2022, Normal      benzonatate (TESSALON) 200 MG capsule Take 1 capsule (200 mg total) by mouth 3 (three) times a day as needed for cough, Starting Mon 1/24/2022, Normal      Blood Glucose Monitoring Suppl (OneTouch Verio) w/Device KIT Use in the morning, Starting Fri 4/8/2022, Normal      capsicum (ZOSTRIX) 0 075 % topical cream Apply topically 3 (three) times a day Apply to affected area, Starting Sat 2/25/2023, Print      chlorthalidone 25 mg tablet Take 1 tablet (25 mg total) by mouth daily, Starting Tue 5/31/2022, Normal      cholecalciferol (VITAMIN D3) 1,000 units tablet Take 2 tablets (2,000 Units total) by mouth daily for 5 days, Starting Wed 1/27/2021, Until Mon 6/7/2021, Normal      cyanocobalamin 1000 MCG tablet Take 1 tablet (1,000 mcg total) by mouth daily, Starting Wed 4/17/2019, Normal      diphenhydrAMINE (BENADRYL) 25 mg tablet Take 1 tablet (25 mg total) by mouth every 6 (six) hours as needed for itching, Starting Wed 2/15/2023, Normal      DULoxetine (CYMBALTA) 20 mg capsule Take 2 capsules (40 mg total) by mouth daily, Starting Tue 7/7/1161, Normal      folic acid (FOLVITE) 1 mg tablet Take 1 tablet (1 mg total) by mouth daily, Starting Wed 6/23/2021, Normal      glucose blood (OneTouch Verio) test strip Use 1 each in the morning Use as instructed, Starting Fri 4/8/2022, Normal      ibuprofen (MOTRIN) 400 mg tablet Take 1 tablet (400 mg total) by mouth every 6 (six) hours as needed for mild pain (Body aches or fever) for up to 7 days, Starting Sat 4/1/2023, Until Sat 4/8/2023 at 2359, Normal      Lenalidomide (Revlimid) 20 MG CAPS TAKE 1 CAPSULE (20 MG TOTAL) BY MOUTH IN THE MORNING FOR 21 DAYS THEN 7 DAYS REST, Normal      lidocaine (LMX) 4 % cream Apply topically as needed for mild pain To neck, Starting Tue 9/15/2020, Normal      LORazepam (ATIVAN) 0 5 mg tablet Take 1 tablet 30-60 minutes before MRI, can take an additional tablet if needed before test, Normal      meclizine (ANTIVERT) 12 5 MG tablet Take 1 tablet (12 5 mg total) by mouth every 8 (eight) hours as needed for dizziness, Starting Tue 1/26/2021, Normal      metFORMIN (GLUCOPHAGE) 500 mg tablet Take 2 tablets (1,000 mg total) by mouth 2 (two) times a day with meals, Starting Tue 4/5/2022, Normal      naloxone (NARCAN) 4 mg/0 1 mL nasal spray Administer 1 spray into a nostril   If no response after 2-3 minutes, give another dose in the other nostril using a new spray , Normal      omeprazole (PriLOSEC) 20 mg delayed release capsule Take 2 capsules (40 mg total) by mouth daily To prevent stomach upset, Starting Wed 3/25/2020, Normal      ondansetron (ZOFRAN) 4 mg tablet Take 1 tablet (4 mg total) by mouth every 8 (eight) hours as needed for nausea or vomiting, Starting Tue 9/15/2020, Normal      OneTouch Delica Lancets 42Z MISC Use in the morning, Starting Fri 4/8/2022, Normal      oxyCODONE (ROXICODONE) 10 MG TABS Take 1 tablet (10 mg total) by mouth every 6 (six) hours as needed for severe pain Erica 1 tableta cada 6 horas fernandez sea necesario por dolor intenso Max Daily Amount: 40 mg, Starting Thu 3/23/2023, Normal      polyethylene glycol (GLYCOLAX) 17 GM/SCOOP TAKE 17 G BY MOUTH DAILY MIX WITH WATER, Historical Med      polyethylene glycol (MIRALAX) 17 g packet Take 17 g by mouth daily, Starting Tue 2/7/2023, Normal      potassium chloride (K-DUR,KLOR-CON) 20 mEq tablet Take 1 tablet (20 mEq total) by mouth daily, Starting Tue 6/14/2022, Normal      senna (SENOKOT) 8 6 mg Take 1 tablet (8 6 mg total) by mouth 2 (two) times a day, Starting Tue 12/17/2019, Normal             No discharge procedures on file      PDMP Review       Value Time User    PDMP Reviewed  Yes 2/25/2023  7:17 AM Shanna Phelps KENNEY          ED Provider  Electronically Signed by           Rita Gross PA-C  05/03/23 0239

## 2023-05-04 ENCOUNTER — OFFICE VISIT (OUTPATIENT)
Dept: DENTISTRY | Facility: CLINIC | Age: 63
End: 2023-05-04

## 2023-05-04 VITALS — TEMPERATURE: 97.8 F

## 2023-05-04 DIAGNOSIS — K02.9 CARIES: Primary | ICD-10-CM

## 2023-05-04 NOTE — PROGRESS NOTES
Patient here for emergency dental exam; seen in Er for Toothache Liz Huang and given Rx for antibiotic and pain med; taking as directed per pt report  Referred from ER  Chief complaint Ta LRQ for one month; no acute distress  ASAII  Pain Scale II  Past medical history see Epic; being tx for cancer  Social history see Epic  Medications Epic  Radiographs Panorex- multiple missing and broken teeth; condyles and Max sinuses wnl bilat  PA #31- severe bone loss; no pap  Extraoral exam nsf; no apparent swelling; points to face on Right soreness  Intraoral exam multiple carious teeth/broken; does no wear partials; last dental exam ?  Soft tissue exam no intraoral swelling appreciated; but several broken teeth  Discussed with pt to remove posterior broken teeth; then should return for cleaning and possible removal of lower anteriors-slight mobility noted but did not want taken out at this point  She is considering partial or full denture in future  Recommendation 1) refer to OS due to complicated significant Med hx  Follow-up * return for comp exam/cleaning and tx plan  Referral given to OS/then return to clinic    INext visit  New pt exam and cleaning

## 2023-05-05 ENCOUNTER — HOSPITAL ENCOUNTER (OUTPATIENT)
Dept: INFUSION CENTER | Facility: HOSPITAL | Age: 63
End: 2023-05-05
Attending: INTERNAL MEDICINE

## 2023-05-05 DIAGNOSIS — Z45.2 ENCOUNTER FOR CENTRAL LINE CARE: ICD-10-CM

## 2023-05-05 DIAGNOSIS — C82.18 GRADE 2 FOLLICULAR LYMPHOMA OF LYMPH NODES OF MULTIPLE REGIONS (HCC): Primary | ICD-10-CM

## 2023-05-05 LAB
ALBUMIN SERPL BCP-MCNC: 4.1 G/DL (ref 3.5–5)
ALP SERPL-CCNC: 114 U/L (ref 34–104)
ALT SERPL W P-5'-P-CCNC: 8 U/L (ref 7–52)
ANION GAP SERPL CALCULATED.3IONS-SCNC: 8 MMOL/L (ref 4–13)
AST SERPL W P-5'-P-CCNC: 11 U/L (ref 13–39)
BASOPHILS # BLD AUTO: 0.03 THOUSANDS/ÂΜL (ref 0–0.1)
BASOPHILS NFR BLD AUTO: 1 % (ref 0–1)
BILIRUB SERPL-MCNC: 0.44 MG/DL (ref 0.2–1)
BUN SERPL-MCNC: 17 MG/DL (ref 5–25)
CALCIUM SERPL-MCNC: 8.8 MG/DL (ref 8.4–10.2)
CHLORIDE SERPL-SCNC: 106 MMOL/L (ref 96–108)
CO2 SERPL-SCNC: 25 MMOL/L (ref 21–32)
CREAT SERPL-MCNC: 0.81 MG/DL (ref 0.6–1.3)
EOSINOPHIL # BLD AUTO: 0.28 THOUSAND/ÂΜL (ref 0–0.61)
EOSINOPHIL NFR BLD AUTO: 4 % (ref 0–6)
ERYTHROCYTE [DISTWIDTH] IN BLOOD BY AUTOMATED COUNT: 17.9 % (ref 11.6–15.1)
GFR SERPL CREATININE-BSD FRML MDRD: 79 ML/MIN/1.73SQ M
GLUCOSE SERPL-MCNC: 141 MG/DL (ref 65–140)
HCT VFR BLD AUTO: 36.8 % (ref 34.8–46.1)
HGB BLD-MCNC: 11 G/DL (ref 11.5–15.4)
IMM GRANULOCYTES # BLD AUTO: 0.01 THOUSAND/UL (ref 0–0.2)
IMM GRANULOCYTES NFR BLD AUTO: 0 % (ref 0–2)
LYMPHOCYTES # BLD AUTO: 1.78 THOUSANDS/ÂΜL (ref 0.6–4.47)
LYMPHOCYTES NFR BLD AUTO: 27 % (ref 14–44)
MCH RBC QN AUTO: 20.6 PG (ref 26.8–34.3)
MCHC RBC AUTO-ENTMCNC: 29.9 G/DL (ref 31.4–37.4)
MCV RBC AUTO: 69 FL (ref 82–98)
MONOCYTES # BLD AUTO: 0.44 THOUSAND/ÂΜL (ref 0.17–1.22)
MONOCYTES NFR BLD AUTO: 7 % (ref 4–12)
NEUTROPHILS # BLD AUTO: 4.03 THOUSANDS/ÂΜL (ref 1.85–7.62)
NEUTS SEG NFR BLD AUTO: 61 % (ref 43–75)
NRBC BLD AUTO-RTO: 0 /100 WBCS
PLATELET # BLD AUTO: 103 THOUSANDS/UL (ref 149–390)
POTASSIUM SERPL-SCNC: 3.9 MMOL/L (ref 3.5–5.3)
PROT SERPL-MCNC: 6.3 G/DL (ref 6.4–8.4)
RBC # BLD AUTO: 5.35 MILLION/UL (ref 3.81–5.12)
SODIUM SERPL-SCNC: 139 MMOL/L (ref 135–147)
WBC # BLD AUTO: 6.57 THOUSAND/UL (ref 4.31–10.16)

## 2023-05-05 NOTE — PROGRESS NOTES
Port flushed per protocol, central albs drawn per orders, confirmed appts next week, habib Monday, chemo Tuesday, AVS provided

## 2023-05-08 ENCOUNTER — TELEPHONE (OUTPATIENT)
Dept: SURGICAL ONCOLOGY | Facility: CLINIC | Age: 63
End: 2023-05-08

## 2023-05-08 ENCOUNTER — TELEPHONE (OUTPATIENT)
Dept: HEMATOLOGY ONCOLOGY | Facility: CLINIC | Age: 63
End: 2023-05-08

## 2023-05-08 ENCOUNTER — OFFICE VISIT (OUTPATIENT)
Dept: HEMATOLOGY ONCOLOGY | Facility: CLINIC | Age: 63
End: 2023-05-08

## 2023-05-08 VITALS
RESPIRATION RATE: 16 BRPM | HEIGHT: 64 IN | BODY MASS INDEX: 40.8 KG/M2 | DIASTOLIC BLOOD PRESSURE: 82 MMHG | SYSTOLIC BLOOD PRESSURE: 142 MMHG | HEART RATE: 61 BPM | TEMPERATURE: 97.9 F | WEIGHT: 239 LBS | OXYGEN SATURATION: 99 %

## 2023-05-08 DIAGNOSIS — D51.8 OTHER VITAMIN B12 DEFICIENCY ANEMIAS: ICD-10-CM

## 2023-05-08 DIAGNOSIS — C82.18 GRADE 2 FOLLICULAR LYMPHOMA OF LYMPH NODES OF MULTIPLE REGIONS (HCC): Primary | ICD-10-CM

## 2023-05-08 DIAGNOSIS — D69.6 THROMBOCYTOPENIA (HCC): ICD-10-CM

## 2023-05-08 RX ORDER — SODIUM CHLORIDE 9 MG/ML
20 INJECTION, SOLUTION INTRAVENOUS ONCE
OUTPATIENT
Start: 2023-06-06

## 2023-05-08 RX ORDER — ACETAMINOPHEN 325 MG/1
650 TABLET ORAL ONCE
OUTPATIENT
Start: 2023-06-06

## 2023-05-08 NOTE — TELEPHONE ENCOUNTER
Left a message with the new date and time of the appointment  I notified [address] transport  If the date and time does not work for the patient, I provided the hopeline for the patient
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
## 2023-05-08 NOTE — TELEPHONE ENCOUNTER
Patient Call    Who are you speaking with? Patient    If it is not the patient, are they listed on an active communication consent form? N/A   What is the reason for this call? Pt wanted to confirm transportation for today   Does this require a call back? Yes   If a call back is required, please list best call back number [phone]   If a call back is required, advise that a message will be forwarded to their care team and someone will return their call as soon as possible  Did you relay this information to the patient?  Yes

## 2023-05-09 ENCOUNTER — TELEPHONE (OUTPATIENT)
Dept: DENTISTRY | Facility: CLINIC | Age: 63
End: 2023-05-09

## 2023-05-09 ENCOUNTER — OFFICE VISIT (OUTPATIENT)
Dept: PALLIATIVE MEDICINE | Facility: CLINIC | Age: 63
End: 2023-05-09

## 2023-05-09 ENCOUNTER — HOSPITAL ENCOUNTER (OUTPATIENT)
Dept: INFUSION CENTER | Facility: HOSPITAL | Age: 63
Discharge: HOME | End: 2023-05-09
Attending: INTERNAL MEDICINE

## 2023-05-09 VITALS
SYSTOLIC BLOOD PRESSURE: 143 MMHG | TEMPERATURE: 97.3 F | RESPIRATION RATE: 18 BRPM | HEART RATE: 57 BPM | DIASTOLIC BLOOD PRESSURE: 80 MMHG

## 2023-05-09 VITALS
WEIGHT: 241 LBS | OXYGEN SATURATION: 97 % | SYSTOLIC BLOOD PRESSURE: 150 MMHG | HEART RATE: 67 BPM | BODY MASS INDEX: 41.34 KG/M2 | DIASTOLIC BLOOD PRESSURE: 90 MMHG | TEMPERATURE: 96.8 F

## 2023-05-09 DIAGNOSIS — D51.8 OTHER VITAMIN B12 DEFICIENCY ANEMIAS: ICD-10-CM

## 2023-05-09 DIAGNOSIS — K59.01 SLOW TRANSIT CONSTIPATION: ICD-10-CM

## 2023-05-09 DIAGNOSIS — C82.18 GRADE 2 FOLLICULAR LYMPHOMA OF LYMPH NODES OF MULTIPLE REGIONS (HCC): Primary | ICD-10-CM

## 2023-05-09 DIAGNOSIS — G89.3 CANCER RELATED PAIN: Primary | ICD-10-CM

## 2023-05-09 DIAGNOSIS — R10.84 GENERALIZED ABDOMINAL PAIN: ICD-10-CM

## 2023-05-09 DIAGNOSIS — C82.18 GRADE 2 FOLLICULAR LYMPHOMA OF LYMPH NODES OF MULTIPLE REGIONS (HCC): ICD-10-CM

## 2023-05-09 DIAGNOSIS — K59.03 DRUG INDUCED CONSTIPATION: ICD-10-CM

## 2023-05-09 RX ORDER — CYANOCOBALAMIN 1000 UG/ML
1000 INJECTION, SOLUTION INTRAMUSCULAR; SUBCUTANEOUS ONCE
OUTPATIENT
Start: 2023-06-06

## 2023-05-09 RX ORDER — ACETAMINOPHEN 325 MG/1
650 TABLET ORAL ONCE
Status: COMPLETED | OUTPATIENT
Start: 2023-05-09 | End: 2023-05-09

## 2023-05-09 RX ORDER — CYANOCOBALAMIN 1000 UG/ML
1000 INJECTION, SOLUTION INTRAMUSCULAR; SUBCUTANEOUS ONCE
Status: COMPLETED | OUTPATIENT
Start: 2023-05-09 | End: 2023-05-09

## 2023-05-09 RX ORDER — SODIUM CHLORIDE 9 MG/ML
20 INJECTION, SOLUTION INTRAVENOUS ONCE
Status: COMPLETED | OUTPATIENT
Start: 2023-05-09 | End: 2023-05-09

## 2023-05-09 RX ORDER — POLYETHYLENE GLYCOL 3350 17 G/17G
POWDER ORAL
Qty: 510 G | Refills: 3 | Status: SHIPPED | OUTPATIENT
Start: 2023-05-09

## 2023-05-09 RX ORDER — OXYCODONE HYDROCHLORIDE 10 MG/1
10 TABLET ORAL EVERY 8 HOURS PRN
Qty: 90 TABLET | Refills: 0 | Status: SHIPPED | OUTPATIENT
Start: 2023-05-09

## 2023-05-09 RX ADMIN — DEXAMETHASONE SODIUM PHOSPHATE 20 MG: 10 INJECTION, SOLUTION INTRAMUSCULAR; INTRAVENOUS at 11:05

## 2023-05-09 RX ADMIN — OBINUTUZUMAB 1000 MG: 1000 INJECTION, SOLUTION, CONCENTRATE INTRAVENOUS at 12:42

## 2023-05-09 RX ADMIN — SODIUM CHLORIDE 20 ML/HR: 9 INJECTION, SOLUTION INTRAVENOUS at 11:05

## 2023-05-09 RX ADMIN — ACETAMINOPHEN 650 MG: 325 TABLET ORAL at 11:12

## 2023-05-09 RX ADMIN — CYANOCOBALAMIN 1000 MCG: 1000 INJECTION INTRAMUSCULAR; SUBCUTANEOUS at 11:20

## 2023-05-09 RX ADMIN — DIPHENHYDRAMINE HYDROCHLORIDE 25 MG: 50 INJECTION, SOLUTION INTRAMUSCULAR; INTRAVENOUS at 11:34

## 2023-05-09 NOTE — PROGRESS NOTES
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 ([address] )    3  Drug induced constipation       Symptoms - Chronic cancer related pain mostly in her abdomen and neck, stable  · Start trial of weaning given chronic use of opioid and stable cancer, to prevent/avoid dependence/tolerance  This was explained to her in detail and with repetition so she understands why we are decreasing dose  · Decrease OxyIR 10mg PO (from 4 times a day/q6H prn) to 3 times a day/q8H prn  Max of 3 a day  · Daily miralax OD for constipation - OD dosing is enough to get her bowels moving regularly    Follow up  · RTO in 6 months, call for refills    Cont[place] Sub[place]    [place]: No red flags were identified; safe to proceed with prescription  03/23/2023 03/23/2023   2  Oxycodone Hcl (Ir) 10 Mg Tab 90 00  23  Ti Joselito  [id number]   All (3848)   58 70 MME  [place]     03/01/2023 02/24/2023   2  Oxycodone Hcl (Ir) 10 Mg Tab 90 00  23  Ti Joselito  [id number]   All (3848)   58 70 [place]     02/07/2023 02/07/2023   2  Oxycodone Hcl (Ir) 10 Mg Tab 90 00  23  Ti Joselito  [id number]   All (3848)   58 70 [place]     01/16/2023 12/27/2022   2  Oxycodone Hcl (Ir) 10 Mg Tab 90 00  23  Ti Joselito  [id number]   All (3848)   58 70 M[place]     11/17/2022 11/17/2022   2  Oxycodone Hcl (Ir) 10 Mg Tab 180 00  30  Er Fabio            Requested Prescriptions      No prescriptions requested or ordered in this encounter     There are no discontinued medications  Representatives have queried the patient's controlled substance dispensing history in the Prescription Drug Monitoring Program in compliance with regulations before I have prescribed any controlled substances  The prescription history is consistent with prescribed therapy and our practice policies        27 minutes were spent face to face with Dewey Drew with greater than 50% of the time spent in counseling or coordination of care including discussions of etiology of diagnosis, diagnostic results, impression, and recommendations, risks and benefits of treatment, instructions for disease self management, treatment instructions, follow up requirements, risk factors and risk reduction of disease, patient and family counseling/involvement in care and compliance with treatment regimen   All of the patient's questions were answered during this discussion  No follow-ups on file  Subjective:   Chief Complaint  Follow up visit for:  symptom management, pain, neoplasm related, assessment of goals of care, disease process education and discussion of prognosis, advance care planning    HPI     Dewey Drew is a 63 y o  female with grade 2 follicular lymphoma initially diagnosed in Nov 2018  Started on chemo (rituximab + bendamustine) soon after  Developed tumor lysis syndrome in Dec 2018, bendamustine removed  She completed rituxan on 4/2019 and had been on maintenance Rituxan since 5/2019  In March 2020, she had evidence of disease progression and was switched to rituxan+Revlimid  Per review of notes, there are some concerns with noncompliance and f/u with bloodwork   Per Oncology note, long discussion with patient regarding refractory and progressive grade 2 follicular lymphoma   Patient with c/o increase L neck swelling/tenderness, US done, concerning for progression of her disease  Her biopsy from the L iliac bone and L cervical LN came back positive for follicular lymphoma  Plan was to start Obinutuzumab/CHOP with Udenyca but because of neutropenia, decision was to do copanlisib instead  However, there was a delay in her treatment when she had Covid-19 as well as when she went to [facility] to relocate   She was going to stay there to get treatments, but it didn't work out so she came back  There are frequent interruptions in her care, with her frequent trips to [facility] for personal/social reasons  Her PET-CT 6/4/2021 unfortunately showed significant progression of disease in the neck, chest, abdomen, pelvis, including new lesion in the L ribs  She is currently on copanlisib 60 mg 3 weeks on with 1 week of break since 6/29/2021  PET-CT in 5/2022 showed possible new progression in the mesenteric area with new LN behind the L ear on exam  For this reason, she was referred to Dr Devyn Campos who believes she can be candidate for CAR-T therapy  However, she was not able to get this done due to a lot of issues including transportation and compliance  She is instead continued on copanlisib  Her latest PET-CT on 10/26/2022 disease recurrence/progression  She went to OR from 11/2022 to 1/2023, where she reportedly was hospitalized with a PNA  She was sent to the ED for evaluation of possible infection in 1/30 where CT PE showed worsening lymphoma with new LNs in the chest abdomen, pelvis and enlarged spleen  She only resumed her chemo on 2/6/2023  Since last visit, PET-CT on 2/10/2023 showed interval progression of FDG avid adenopathy in the neck, chest, abdomen and pelvis and CT neck on 3/17/2023 also showed Adenopathy slightly increased compared with most PET/CT and chest CT  Given this, her regimen was switched to Revlimid and obinutuzumab that she is tolerating well  Also started to have issues with dentition/toothaches  /Imtiaz ID #[id number] utilized  She looks stable and healthy, comfortable  Pain in the abdomen and neck are well-controlled  She is using oxycodone 4times a day  I spent time discussing importance of weaning down opioids to avoid chronic long term side effects of opioids such as dependence, tolerance, constipation, mood changes, osteoporosis, fractures, etc  For this reason, I am lowering her oxycodone from 4 times a day to only 3 times a day   This was repeated several times to her with her saying this back to me so she understands that we are lowering down the dose  She is agreeable and willing to do a decrease is her dose  She is moving her bowels regularly with daily miralax  She is also taking tylenol, but not sure what dose she is taking 2 times a day  I asked her to check what she has at home and report to the office so we can document and send requested refill appropriately  She is going to see the oral surgeon soon to extract her teeth  The following portions of the medical history were reviewed: past medical history, problem list, medication list, and social history      Current Outpatient Medications:   •  acetaminophen (TYLENOL) 325 mg tablet, Take 2 tablets (650 mg total) by mouth every 6 (six) hours as needed for mild pain, Disp: 30 tablet, Rfl: 0  •  oxyCODONE (ROXICODONE) 10 MG TABS, Take 1 tablet (10 mg total) by mouth every 6 (six) hours as needed for severe pain Erica 1 tableta cada 6 horas fernandez sea necesario por dolor intenso Max Daily Amount: 40 mg, Disp: 90 tablet, Rfl: 0  •  polyethylene glycol (GLYCOLAX) 17 GM/SCOOP, TAKE 17 G BY MOUTH DAILY MIX WITH WATER, Disp: , Rfl:   •  senna (SENOKOT) 8 6 mg, Take 1 tablet (8 6 mg total) by mouth 2 (two) times a day, Disp: 60 each, Rfl: 3  •  acyclovir (ZOVIRAX) 400 MG tablet, Take 1 tablet (400 mg total) by mouth 2 (two) times a day, Disp: 60 tablet, Rfl: 11  •  allopurinol (ZYLOPRIM) 100 mg tablet, Take 1 tablet (100 mg total) by mouth daily, Disp: 30 tablet, Rfl: 11  •  amLODIPine (NORVASC) 10 mg tablet, Take 10 mg by mouth daily, Disp: , Rfl:   •  ascorbic acid (VITAMIN C) 1000 MG tablet, Take 1 tablet (1,000 mg total) by mouth every 12 (twelve) hours for 10 doses, Disp: 10 tablet, Rfl: 0  •  aspirin (ECOTRIN LOW STRENGTH) 81 mg EC tablet, Take 1 tablet (81 mg total) by mouth daily, Disp: 30 tablet, Rfl: 11  •  aspirin-acetaminophen-caffeine (EXCEDRIN MIGRAINE) 436-872-14 MG per tablet, Take 1 tablet by mouth every 6 (six) hours as needed for headaches Erica 1 tableta cada 6 horas fernandez sea necesario para el dolor de russ, Disp: 30 tablet, Rfl: 0  •  atorvastatin (LIPITOR) 40 mg tablet, Take 1 tablet (40 mg total) by mouth daily with dinner, Disp: 12 tablet, Rfl: 0  •  Banophen 25 MG capsule, TAKE 1 CAPSULE(25 MG TOTAL) BY MOUTH EVERY 6 (SIX) HOURS AS NEEDED FOR ITCHING, Disp: , Rfl:   •  benzocaine (BABY ORAJEL) 7 5 % oral gel, Apply to the mouth or throat 3 (three) times a day as needed for mucositis, Disp: 9 45 g, Rfl: 0  •  benzocaine (ORAJEL) 10 % mucosal gel, Apply 1 application to the mouth or throat as needed for mucositis, Disp: 5 3 g, Rfl: 0  •  benzonatate (TESSALON) 200 MG capsule, Take 1 capsule (200 mg total) by mouth 3 (three) times a day as needed for cough (Patient not taking: Reported on 4/17/2023), Disp: 20 capsule, Rfl: 1  •  Blood Glucose Monitoring Suppl (OneTouch Verio) w/Device KIT, Use in the morning, Disp: 1 kit, Rfl: 0  •  capsicum (ZOSTRIX) 0 075 % topical cream, Apply topically 3 (three) times a day Apply to affected area, Disp: 57 g, Rfl: 0  •  chlorthalidone 25 mg tablet, Take 1 tablet (25 mg total) by mouth daily, Disp: 30 tablet, Rfl: 5  •  cholecalciferol (VITAMIN D3) 1,000 units tablet, Take 2 tablets (2,000 Units total) by mouth daily for 5 days, Disp: 10 tablet, Rfl: 0  •  clindamycin (CLEOCIN) 300 MG capsule, Take 1 capsule (300 mg total) by mouth 4 (four) times a day for 10 days, Disp: 40 capsule, Rfl: 0  •  cyanocobalamin 1000 MCG tablet, Take 1 tablet (1,000 mcg total) by mouth daily, Disp: 90 tablet, Rfl: 3  •  diphenhydrAMINE (BENADRYL) 25 mg tablet, Take 1 tablet (25 mg total) by mouth every 6 (six) hours as needed for itching, Disp: 20 tablet, Rfl: 0  •  DULoxetine (CYMBALTA) 20 mg capsule, Take 2 capsules (40 mg total) by mouth daily, Disp: 30 capsule, Rfl: 3  •  folic acid (FOLVITE) 1 mg tablet, Take 1 tablet (1 mg total) by mouth daily, Disp: 90 tablet, Rfl: 4  • glucose blood (OneTouch Verio) test strip, Use 1 each in the morning Use as instructed, Disp: 50 strip, Rfl: 6  •  ibuprofen (MOTRIN) 400 mg tablet, Take 1 tablet (400 mg total) by mouth every 6 (six) hours as needed for mild pain (Body aches or fever) for up to 7 days, Disp: 30 tablet, Rfl: 0  •  Lenalidomide (Revlimid) 20 MG CAPS, TAKE 1 CAPSULE (20 MG TOTAL) BY MOUTH IN THE MORNING FOR 21 DAYS THEN 7 DAYS REST, Disp: 21 capsule, Rfl: 0  •  lidocaine (LMX) 4 % cream, Apply topically as needed for mild pain To neck, Disp: 30 g, Rfl: 0  •  LORazepam (ATIVAN) 0 5 mg tablet, Take 1 tablet 30-60 minutes before MRI, can take an additional tablet if needed before test (Patient not taking: Reported on 2/7/2023), Disp: 2 tablet, Rfl: 0  •  meclizine (ANTIVERT) 12 5 MG tablet, Take 1 tablet (12 5 mg total) by mouth every 8 (eight) hours as needed for dizziness (Patient not taking: Reported on 5/8/2023), Disp: 30 tablet, Rfl: 0  •  metFORMIN (GLUCOPHAGE) 500 mg tablet, Take 2 tablets (1,000 mg total) by mouth 2 (two) times a day with meals, Disp: 60 tablet, Rfl: 5  •  naloxone (NARCAN) 4 mg/0 1 mL nasal spray, Administer 1 spray into a nostril  If no response after 2-3 minutes, give another dose in the other nostril using a new spray   (Patient not taking: Reported on 5/8/2023), Disp: 1 each, Rfl: 0  •  omeprazole (PriLOSEC) 20 mg delayed release capsule, Take 2 capsules (40 mg total) by mouth daily To prevent stomach upset, Disp: 60 capsule, Rfl: 5  •  ondansetron (ZOFRAN) 4 mg tablet, Take 1 tablet (4 mg total) by mouth every 8 (eight) hours as needed for nausea or vomiting, Disp: 30 tablet, Rfl: 3  •  OneTouch Delica Lancets 07N MISC, Use in the morning, Disp: 100 each, Rfl: 4  •  polyethylene glycol (MIRALAX) 17 g packet, Take 17 g by mouth daily (Patient not taking: Reported on 5/9/2023), Disp: 30 each, Rfl: 3  •  potassium chloride (K-DUR,KLOR-CON) 20 mEq tablet, Take 1 tablet (20 mEq total) by mouth daily, Disp: 30 tablet, Rfl: 11  No current facility-administered medications for this visit  Review of Systems   Constitutional: Negative for activity change, appetite change and unexpected weight change  HENT: Negative for trouble swallowing  Chronic neck pain from enlarged LNs   Respiratory: Negative for cough and shortness of breath  Cardiovascular: Negative for chest pain  Gastrointestinal: Negative for abdominal distention, constipation, diarrhea, nausea and vomiting  Chronic abdominal pain   Musculoskeletal: Negative for back pain  Neurological: Negative for speech difficulty and light-headedness  Psychiatric/Behavioral: Negative for sleep disturbance  The patient is not nervous/anxious  All other systems reviewed and are negative  All other systems negative    Objective:  Vital Signs  /90 (BP Location: Left arm, Patient Position: Sitting, Cuff Size: Large)   Pulse 67   Temp (!) 96 8 °F (36 °C) (Temporal)   Wt 109 kg (241 lb)   SpO2 97%   BMI 41 34 kg/m²    Physical Exam    Constitutional: Appears well-developed and well-nourished, she does not look sick, nor toxic-looking  Looks stable and healthy  Pleasant, well-kempt  Head: Normocephalic and atraumatic  Eyes: EOM are normal  No ocular discharge  No scleral icterus  Respiratory: Effort normal  No stridor  No respiratory distress  Gastrointestinal: No abdominal distension  Musculoskeletal: No edema  Neurological: Alert, oriented and appropriately conversant  Skin: No diaphoresis, no rashes seen on exposed areas of skin  Psychiatric: Displays a normal mood and affect   Behavior, judgement and thought content appear normal     Chyna Whitaker MD  Palliative Medicine & Supportive Care  Internal Medicine  Available via [facility] Text  Office: [phone]  Fax: [phone]

## 2023-05-09 NOTE — PROGRESS NOTES
Patient tolerated IV chemotherapy and L arm B12 injection without issue  Confirmed patient will be in [facility] from 5/12-5/19  Next appointment confirmed, AVS given

## 2023-05-24 ENCOUNTER — TELEPHONE (OUTPATIENT)
Dept: HEMATOLOGY ONCOLOGY | Facility: CLINIC | Age: 63
End: 2023-05-24

## 2023-05-24 DIAGNOSIS — K08.89 TOOTHACHE: ICD-10-CM

## 2023-05-24 DIAGNOSIS — C82.18 GRADE 2 FOLLICULAR LYMPHOMA OF LYMPH NODES OF MULTIPLE REGIONS (HCC): ICD-10-CM

## 2023-05-24 RX ORDER — OXYCODONE HYDROCHLORIDE 10 MG/1
10 TABLET ORAL EVERY 8 HOURS PRN
Qty: 90 TABLET | Refills: 0 | Status: CANCELLED | OUTPATIENT
Start: 2023-05-24

## 2023-05-24 RX ORDER — ACETAMINOPHEN 325 MG/1
650 TABLET ORAL EVERY 6 HOURS PRN
Qty: 30 TABLET | Refills: 0 | Status: SHIPPED | OUTPATIENT
Start: 2023-05-24

## 2023-05-24 NOTE — TELEPHONE ENCOUNTER
Patient Call    Who are you speaking with? Patient    If it is not the patient, are they listed on an active communication consent form? N/A   What is the reason for this call? Patient calling to speak with Palliative Care regarding her prescriptions  Patient states she will call Palliative care and denied being transferred   Does this require a call back? No   If a call back is required, please list best call back number N/A   If a call back is required, advise that a message will be forwarded to their care team and someone will return their call as soon as possible  Did you relay this information to the patient?  N/A

## 2023-05-24 NOTE — TELEPHONE ENCOUNTER
Primary palliative medicine provider: Dr Tamy Huang    Medication requested:tylenol 325mg and oxycodone 10mg        Next scheduled appointment:11/07/23      [facility]

## 2023-05-24 NOTE — TELEPHONE ENCOUNTER
I called patient told her that she has 2 weeks left she stated yes she will call next week to ask for refill I told her she should call 3 days prior of needing it

## 2023-05-26 ENCOUNTER — HOSPITAL ENCOUNTER (OUTPATIENT)
Dept: INFUSION CENTER | Facility: HOSPITAL | Age: 63
End: 2023-05-26

## 2023-05-26 DIAGNOSIS — C82.18 GRADE 2 FOLLICULAR LYMPHOMA OF LYMPH NODES OF MULTIPLE REGIONS (HCC): ICD-10-CM

## 2023-05-26 DIAGNOSIS — D69.6 THROMBOCYTOPENIA (HCC): Primary | ICD-10-CM

## 2023-05-26 DIAGNOSIS — Z45.2 ENCOUNTER FOR CENTRAL LINE CARE: ICD-10-CM

## 2023-05-26 LAB
BASOPHILS # BLD AUTO: 0.04 THOUSANDS/ÂΜL (ref 0–0.1)
BASOPHILS NFR BLD AUTO: 1 % (ref 0–1)
EOSINOPHIL # BLD AUTO: 0.28 THOUSAND/ÂΜL (ref 0–0.61)
EOSINOPHIL NFR BLD AUTO: 5 % (ref 0–6)
ERYTHROCYTE [DISTWIDTH] IN BLOOD BY AUTOMATED COUNT: 16.6 % (ref 11.6–15.1)
HCT VFR BLD AUTO: 35 % (ref 34.8–46.1)
HGB BLD-MCNC: 10.5 G/DL (ref 11.5–15.4)
IMM GRANULOCYTES # BLD AUTO: 0.01 THOUSAND/UL (ref 0–0.2)
IMM GRANULOCYTES NFR BLD AUTO: 0 % (ref 0–2)
LYMPHOCYTES # BLD AUTO: 1.43 THOUSANDS/ÂΜL (ref 0.6–4.47)
LYMPHOCYTES NFR BLD AUTO: 25 % (ref 14–44)
MCH RBC QN AUTO: 20.8 PG (ref 26.8–34.3)
MCHC RBC AUTO-ENTMCNC: 30 G/DL (ref 31.4–37.4)
MCV RBC AUTO: 69 FL (ref 82–98)
MONOCYTES # BLD AUTO: 0.41 THOUSAND/ÂΜL (ref 0.17–1.22)
MONOCYTES NFR BLD AUTO: 7 % (ref 4–12)
NEUTROPHILS # BLD AUTO: 3.54 THOUSANDS/ÂΜL (ref 1.85–7.62)
NEUTS SEG NFR BLD AUTO: 62 % (ref 43–75)
NRBC BLD AUTO-RTO: 0 /100 WBCS
PLATELET # BLD AUTO: 107 THOUSANDS/UL (ref 149–390)
RBC # BLD AUTO: 5.06 MILLION/UL (ref 3.81–5.12)
WBC # BLD AUTO: 5.71 THOUSAND/UL (ref 4.31–10.16)

## 2023-05-26 NOTE — PLAN OF CARE
Problem: Potential for Falls  Goal: Patient will remain free of falls  Description: INTERVENTIONS:  - Educate patient/family on patient safety including physical limitations  - Instruct patient to call for assistance with activity   - Consult OT/PT to assist with strengthening/mobility   - Keep Call bell within reach  - Keep bed low and locked with side rails adjusted as appropriate  - Keep care items and personal belongings within reach  - Initiate and maintain comfort rounds  - Make Fall Risk Sign visible to staff  - Apply yellow socks and bracelet for high fall risk patients  - Consider moving patient to room near nurses station  Outcome: Progressing
99

## 2023-05-30 DIAGNOSIS — C82.18 GRADE 2 FOLLICULAR LYMPHOMA OF LYMPH NODES OF MULTIPLE REGIONS (HCC): ICD-10-CM

## 2023-05-30 RX ORDER — LENALIDOMIDE 20 MG/1
CAPSULE ORAL
Qty: 21 CAPSULE | Refills: 0 | Status: SHIPPED | OUTPATIENT
Start: 2023-05-30

## 2023-06-02 ENCOUNTER — HOSPITAL ENCOUNTER (OUTPATIENT)
Dept: INFUSION CENTER | Facility: HOSPITAL | Age: 63
End: 2023-06-02
Payer: COMMERCIAL

## 2023-06-02 DIAGNOSIS — C82.18 GRADE 2 FOLLICULAR LYMPHOMA OF LYMPH NODES OF MULTIPLE REGIONS (HCC): Primary | ICD-10-CM

## 2023-06-02 DIAGNOSIS — Z45.2 ENCOUNTER FOR CENTRAL LINE CARE: ICD-10-CM

## 2023-06-02 DIAGNOSIS — C82.18 GRADE 2 FOLLICULAR LYMPHOMA OF LYMPH NODES OF MULTIPLE REGIONS (HCC): ICD-10-CM

## 2023-06-02 DIAGNOSIS — D69.6 THROMBOCYTOPENIA (HCC): ICD-10-CM

## 2023-06-02 LAB
ALBUMIN SERPL BCP-MCNC: 4 G/DL (ref 3.5–5)
ALP SERPL-CCNC: 107 U/L (ref 34–104)
ALT SERPL W P-5'-P-CCNC: 11 U/L (ref 7–52)
ANION GAP SERPL CALCULATED.3IONS-SCNC: 8 MMOL/L (ref 4–13)
AST SERPL W P-5'-P-CCNC: 10 U/L (ref 13–39)
BASOPHILS # BLD AUTO: 0.05 THOUSANDS/ÂΜL (ref 0–0.1)
BASOPHILS NFR BLD AUTO: 1 % (ref 0–1)
BILIRUB SERPL-MCNC: 0.51 MG/DL (ref 0.2–1)
BUN SERPL-MCNC: 12 MG/DL (ref 5–25)
CALCIUM SERPL-MCNC: 9 MG/DL (ref 8.4–10.2)
CHLORIDE SERPL-SCNC: 107 MMOL/L (ref 96–108)
CO2 SERPL-SCNC: 25 MMOL/L (ref 21–32)
CREAT SERPL-MCNC: 0.78 MG/DL (ref 0.6–1.3)
EOSINOPHIL # BLD AUTO: 0.28 THOUSAND/ÂΜL (ref 0–0.61)
EOSINOPHIL NFR BLD AUTO: 5 % (ref 0–6)
ERYTHROCYTE [DISTWIDTH] IN BLOOD BY AUTOMATED COUNT: 16.4 % (ref 11.6–15.1)
GFR SERPL CREATININE-BSD FRML MDRD: 82 ML/MIN/1.73SQ M
GLUCOSE SERPL-MCNC: 171 MG/DL (ref 65–140)
HCT VFR BLD AUTO: 36.9 % (ref 34.8–46.1)
HGB BLD-MCNC: 10.9 G/DL (ref 11.5–15.4)
IMM GRANULOCYTES # BLD AUTO: 0.02 THOUSAND/UL (ref 0–0.2)
IMM GRANULOCYTES NFR BLD AUTO: 0 % (ref 0–2)
LDH SERPL-CCNC: 155 U/L (ref 140–271)
LYMPHOCYTES # BLD AUTO: 1.34 THOUSANDS/ÂΜL (ref 0.6–4.47)
LYMPHOCYTES NFR BLD AUTO: 22 % (ref 14–44)
MAGNESIUM SERPL-MCNC: 2 MG/DL (ref 1.9–2.7)
MCH RBC QN AUTO: 20.5 PG (ref 26.8–34.3)
MCHC RBC AUTO-ENTMCNC: 29.5 G/DL (ref 31.4–37.4)
MCV RBC AUTO: 70 FL (ref 82–98)
MONOCYTES # BLD AUTO: 0.45 THOUSAND/ÂΜL (ref 0.17–1.22)
MONOCYTES NFR BLD AUTO: 7 % (ref 4–12)
NEUTROPHILS # BLD AUTO: 3.99 THOUSANDS/ÂΜL (ref 1.85–7.62)
NEUTS SEG NFR BLD AUTO: 65 % (ref 43–75)
NRBC BLD AUTO-RTO: 0 /100 WBCS
PLATELET # BLD AUTO: 91 THOUSANDS/UL (ref 149–390)
POTASSIUM SERPL-SCNC: 3.9 MMOL/L (ref 3.5–5.3)
PROT SERPL-MCNC: 6.3 G/DL (ref 6.4–8.4)
RBC # BLD AUTO: 5.31 MILLION/UL (ref 3.81–5.12)
SODIUM SERPL-SCNC: 140 MMOL/L (ref 135–147)
URATE SERPL-MCNC: 4.2 MG/DL (ref 2–7.5)
WBC # BLD AUTO: 6.13 THOUSAND/UL (ref 4.31–10.16)

## 2023-06-02 PROCEDURE — 85025 COMPLETE CBC W/AUTO DIFF WBC: CPT

## 2023-06-02 PROCEDURE — 80053 COMPREHEN METABOLIC PANEL: CPT

## 2023-06-02 PROCEDURE — 83735 ASSAY OF MAGNESIUM: CPT

## 2023-06-02 PROCEDURE — 84550 ASSAY OF BLOOD/URIC ACID: CPT

## 2023-06-02 PROCEDURE — 83615 LACTATE (LD) (LDH) ENZYME: CPT

## 2023-06-02 RX ORDER — OXYCODONE HYDROCHLORIDE 10 MG/1
10 TABLET ORAL EVERY 8 HOURS PRN
Qty: 90 TABLET | Refills: 0 | Status: SHIPPED | OUTPATIENT
Start: 2023-06-06

## 2023-06-02 NOTE — TELEPHONE ENCOUNTER
Cont[place] Sub[place]    [place]  reviewed: No red flags were identified; safe to proceed with prescription  Joy Mccains 05/09/2023 05/09/2023   2  Oxycodone Hcl (Ir) 10 Mg Tab 90 00  30  Ti Joselito  [id number]   All ([id number])   45 00 MME  [place]    03/23/2023 03/23/2023   2  Oxycodone Hcl (Ir) 10 Mg Tab 90 00  23  Ti Joselito  [id number]   All (3848)   58 70 MME  [place]    03/01/2023 02/24/2023   2  Oxycodone Hcl (Ir) 10 Mg Tab 90 00  23  Ti Joselito  [id number]   All (3848)   58 70 M[place]    02/07/2023 02/07/2023   2  Oxycodone Hcl (Ir) 10 Mg Tab 90 00  23  Ti Joselito  [id number]   All (3848)   58 70 MME  [place]      Fill appropriate on 6/7 per PDMP   Will send accordingly, with note to pharmacy    Jace Kitchen MD  Palliative Medicine & Supportive Care  Internal Medicine  Available via [facility] Text  Office: [phone]  Fax: [phone]

## 2023-06-02 NOTE — PROGRESS NOTES
Pt tolerated central lab draw without difficulty  Port flushed and deaccessed per protocol  Next appt confirmed  AVS declined  Left ambulatory in stable condition

## 2023-06-02 NOTE — TELEPHONE ENCOUNTER
Primary palliative medicine provider:   Osmin Escobar     Medication requested:  Oxycodone     If for pain, how has the patient been taking their pain medicine?      Last appointment:  5/9/23    Next scheduled appointment: 11/07/23      Pharmacy: [facility]

## 2023-06-06 ENCOUNTER — HOSPITAL ENCOUNTER (OUTPATIENT)
Dept: INFUSION CENTER | Facility: HOSPITAL | Age: 63
Discharge: HOME | End: 2023-06-06
Attending: INTERNAL MEDICINE
Payer: COMMERCIAL

## 2023-06-06 VITALS
HEART RATE: 62 BPM | DIASTOLIC BLOOD PRESSURE: 64 MMHG | TEMPERATURE: 97.5 F | RESPIRATION RATE: 18 BRPM | SYSTOLIC BLOOD PRESSURE: 134 MMHG

## 2023-06-06 DIAGNOSIS — E87.6 HYPOKALEMIA: ICD-10-CM

## 2023-06-06 DIAGNOSIS — C82.18 GRADE 2 FOLLICULAR LYMPHOMA OF LYMPH NODES OF MULTIPLE REGIONS (HCC): Primary | ICD-10-CM

## 2023-06-06 DIAGNOSIS — D51.8 OTHER VITAMIN B12 DEFICIENCY ANEMIAS: ICD-10-CM

## 2023-06-06 RX ORDER — POTASSIUM CHLORIDE 20 MEQ/1
20 TABLET, EXTENDED RELEASE ORAL DAILY
Qty: 30 TABLET | Refills: 11 | Status: SHIPPED | OUTPATIENT
Start: 2023-06-06

## 2023-06-06 RX ORDER — SODIUM CHLORIDE 9 MG/ML
20 INJECTION, SOLUTION INTRAVENOUS ONCE
Status: COMPLETED | OUTPATIENT
Start: 2023-06-06 | End: 2023-06-06

## 2023-06-06 RX ORDER — ACETAMINOPHEN 325 MG/1
650 TABLET ORAL ONCE
Status: COMPLETED | OUTPATIENT
Start: 2023-06-06 | End: 2023-06-06

## 2023-06-06 RX ADMIN — OBINUTUZUMAB 1000 MG: 1000 INJECTION, SOLUTION, CONCENTRATE INTRAVENOUS at 09:41

## 2023-06-06 RX ADMIN — SODIUM CHLORIDE 20 ML/HR: 0.9 INJECTION, SOLUTION INTRAVENOUS at 08:20

## 2023-06-06 RX ADMIN — DIPHENHYDRAMINE HYDROCHLORIDE 25 MG: 50 INJECTION, SOLUTION INTRAMUSCULAR; INTRAVENOUS at 09:00

## 2023-06-06 RX ADMIN — ACETAMINOPHEN 650 MG: 325 TABLET ORAL at 08:39

## 2023-06-06 RX ADMIN — DEXAMETHASONE SODIUM PHOSPHATE 20 MG: 10 INJECTION, SOLUTION INTRAMUSCULAR; INTRAVENOUS at 08:41

## 2023-06-06 NOTE — PLAN OF CARE
Problem: Potential for Falls  Goal: Patient will remain free of falls  Description: INTERVENTIONS:  - Educate patient/family on patient safety including physical limitations  - Instruct patient to call for assistance with activity   - Consult OT/PT to assist with strengthening/mobility   - Keep Call bell within reach  - Keep bed low and locked with side rails adjusted as appropriate  - Keep care items and personal belongings within reach  - Initiate and maintain comfort rounds  - Make Fall Risk Sign visible to staff  - Offer Toileting every Hours, in advance of need  - Initiate/Maintain alarm  - Obtain necessary fall risk management equipment:  - Apply yellow socks and bracelet for high fall risk patients  - Consider moving patient to room near nurses station  Outcome: Completed

## 2023-06-06 NOTE — PROGRESS NOTES
Pt received chemo infusion w/out any adverse effects, offers no complaints  Labs w/in parameters   Confirmed next appt, AVS given

## 2023-06-09 ENCOUNTER — HOSPITAL ENCOUNTER (OUTPATIENT)
Dept: INFUSION CENTER | Facility: HOSPITAL | Age: 63
End: 2023-06-09
Attending: INTERNAL MEDICINE
Payer: COMMERCIAL

## 2023-06-09 DIAGNOSIS — C82.18 GRADE 2 FOLLICULAR LYMPHOMA OF LYMPH NODES OF MULTIPLE REGIONS (HCC): ICD-10-CM

## 2023-06-09 DIAGNOSIS — Z45.2 ENCOUNTER FOR CENTRAL LINE CARE: ICD-10-CM

## 2023-06-09 DIAGNOSIS — D51.8 OTHER VITAMIN B12 DEFICIENCY ANEMIAS: Primary | ICD-10-CM

## 2023-06-09 LAB
ALBUMIN SERPL BCP-MCNC: 3.9 G/DL (ref 3.5–5)
ALP SERPL-CCNC: 94 U/L (ref 34–104)
ALT SERPL W P-5'-P-CCNC: 28 U/L (ref 7–52)
ANION GAP SERPL CALCULATED.3IONS-SCNC: 6 MMOL/L (ref 4–13)
AST SERPL W P-5'-P-CCNC: 27 U/L (ref 13–39)
BASOPHILS # BLD AUTO: 0.04 THOUSANDS/ÂΜL (ref 0–0.1)
BASOPHILS NFR BLD AUTO: 1 % (ref 0–1)
BILIRUB SERPL-MCNC: 0.44 MG/DL (ref 0.2–1)
BUN SERPL-MCNC: 13 MG/DL (ref 5–25)
CALCIUM SERPL-MCNC: 9 MG/DL (ref 8.4–10.2)
CHLORIDE SERPL-SCNC: 107 MMOL/L (ref 96–108)
CO2 SERPL-SCNC: 27 MMOL/L (ref 21–32)
CREAT SERPL-MCNC: 0.67 MG/DL (ref 0.6–1.3)
EOSINOPHIL # BLD AUTO: 0.21 THOUSAND/ÂΜL (ref 0–0.61)
EOSINOPHIL NFR BLD AUTO: 3 % (ref 0–6)
ERYTHROCYTE [DISTWIDTH] IN BLOOD BY AUTOMATED COUNT: 16.1 % (ref 11.6–15.1)
GFR SERPL CREATININE-BSD FRML MDRD: 95 ML/MIN/1.73SQ M
GLUCOSE SERPL-MCNC: 119 MG/DL (ref 65–140)
HCT VFR BLD AUTO: 35.5 % (ref 34.8–46.1)
HGB BLD-MCNC: 10.7 G/DL (ref 11.5–15.4)
IMM GRANULOCYTES # BLD AUTO: 0.03 THOUSAND/UL (ref 0–0.2)
IMM GRANULOCYTES NFR BLD AUTO: 0 % (ref 0–2)
LYMPHOCYTES # BLD AUTO: 1.54 THOUSANDS/ÂΜL (ref 0.6–4.47)
LYMPHOCYTES NFR BLD AUTO: 21 % (ref 14–44)
MCH RBC QN AUTO: 20.7 PG (ref 26.8–34.3)
MCHC RBC AUTO-ENTMCNC: 30.1 G/DL (ref 31.4–37.4)
MCV RBC AUTO: 69 FL (ref 82–98)
MONOCYTES # BLD AUTO: 0.72 THOUSAND/ÂΜL (ref 0.17–1.22)
MONOCYTES NFR BLD AUTO: 10 % (ref 4–12)
NEUTROPHILS # BLD AUTO: 4.97 THOUSANDS/ÂΜL (ref 1.85–7.62)
NEUTS SEG NFR BLD AUTO: 65 % (ref 43–75)
NRBC BLD AUTO-RTO: 0 /100 WBCS
PLATELET # BLD AUTO: 62 THOUSANDS/UL (ref 149–390)
POTASSIUM SERPL-SCNC: 3.6 MMOL/L (ref 3.5–5.3)
PROT SERPL-MCNC: 5.6 G/DL (ref 6.4–8.4)
RBC # BLD AUTO: 5.16 MILLION/UL (ref 3.81–5.12)
SODIUM SERPL-SCNC: 140 MMOL/L (ref 135–147)
WBC # BLD AUTO: 7.51 THOUSAND/UL (ref 4.31–10.16)

## 2023-06-09 PROCEDURE — 85025 COMPLETE CBC W/AUTO DIFF WBC: CPT | Performed by: INTERNAL MEDICINE

## 2023-06-09 PROCEDURE — 80053 COMPREHEN METABOLIC PANEL: CPT | Performed by: INTERNAL MEDICINE

## 2023-06-09 RX ORDER — CYANOCOBALAMIN 1000 UG/ML
1000 INJECTION, SOLUTION INTRAMUSCULAR; SUBCUTANEOUS ONCE
Status: COMPLETED | OUTPATIENT
Start: 2023-06-09 | End: 2023-06-09

## 2023-06-09 RX ORDER — CYANOCOBALAMIN 1000 UG/ML
1000 INJECTION, SOLUTION INTRAMUSCULAR; SUBCUTANEOUS ONCE
OUTPATIENT
Start: 2023-07-04

## 2023-06-09 RX ADMIN — CYANOCOBALAMIN 1000 MCG: 1000 INJECTION, SOLUTION INTRAMUSCULAR; SUBCUTANEOUS at 13:29

## 2023-06-09 NOTE — PROGRESS NOTES
Central labs drawn and sent  B12 injection given in L deltoid, tolerated without issue  Next appointment confirmed, AVS declined

## 2023-06-12 ENCOUNTER — OFFICE VISIT (OUTPATIENT)
Dept: HEMATOLOGY ONCOLOGY | Facility: CLINIC | Age: 63
End: 2023-06-12
Payer: COMMERCIAL

## 2023-06-12 VITALS
OXYGEN SATURATION: 98 % | HEART RATE: 64 BPM | BODY MASS INDEX: 40.97 KG/M2 | SYSTOLIC BLOOD PRESSURE: 130 MMHG | DIASTOLIC BLOOD PRESSURE: 80 MMHG | WEIGHT: 240 LBS | TEMPERATURE: 97 F | RESPIRATION RATE: 18 BRPM | HEIGHT: 64 IN

## 2023-06-12 DIAGNOSIS — K04.7 INFECTION OF TOOTH: ICD-10-CM

## 2023-06-12 DIAGNOSIS — C82.18 GRADE 2 FOLLICULAR LYMPHOMA OF LYMPH NODES OF MULTIPLE REGIONS (HCC): Primary | ICD-10-CM

## 2023-06-12 DIAGNOSIS — D69.6 THROMBOCYTOPENIA (HCC): ICD-10-CM

## 2023-06-12 DIAGNOSIS — D51.8 OTHER VITAMIN B12 DEFICIENCY ANEMIAS: ICD-10-CM

## 2023-06-12 PROCEDURE — 99214 OFFICE O/P EST MOD 30 MIN: CPT | Performed by: INTERNAL MEDICINE

## 2023-06-12 RX ORDER — ACETAMINOPHEN 325 MG/1
650 TABLET ORAL ONCE
OUTPATIENT
Start: 2023-07-03

## 2023-06-12 RX ORDER — SODIUM CHLORIDE 9 MG/ML
20 INJECTION, SOLUTION INTRAVENOUS ONCE
OUTPATIENT
Start: 2023-07-03
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
## 2023-06-16 ENCOUNTER — TELEPHONE (OUTPATIENT)
Dept: HEMATOLOGY ONCOLOGY | Facility: CLINIC | Age: 63
End: 2023-06-16

## 2023-06-16 ENCOUNTER — HOSPITAL ENCOUNTER (OUTPATIENT)
Dept: INFUSION CENTER | Facility: HOSPITAL | Age: 63
End: 2023-06-16
Attending: INTERNAL MEDICINE
Payer: COMMERCIAL

## 2023-06-16 DIAGNOSIS — C82.18 GRADE 2 FOLLICULAR LYMPHOMA OF LYMPH NODES OF MULTIPLE REGIONS (HCC): ICD-10-CM

## 2023-06-16 DIAGNOSIS — Z45.2 ENCOUNTER FOR CENTRAL LINE CARE: Primary | ICD-10-CM

## 2023-06-16 LAB
ALBUMIN SERPL BCP-MCNC: 4.2 G/DL (ref 3.5–5)
ALP SERPL-CCNC: 110 U/L (ref 34–104)
ALT SERPL W P-5'-P-CCNC: 10 U/L (ref 7–52)
ANION GAP SERPL CALCULATED.3IONS-SCNC: 9 MMOL/L (ref 4–13)
AST SERPL W P-5'-P-CCNC: 10 U/L (ref 13–39)
BASOPHILS # BLD AUTO: 0.04 THOUSANDS/ÂΜL (ref 0–0.1)
BASOPHILS NFR BLD AUTO: 1 % (ref 0–1)
BILIRUB SERPL-MCNC: 0.44 MG/DL (ref 0.2–1)
BUN SERPL-MCNC: 15 MG/DL (ref 5–25)
CALCIUM SERPL-MCNC: 8.9 MG/DL (ref 8.4–10.2)
CHLORIDE SERPL-SCNC: 106 MMOL/L (ref 96–108)
CO2 SERPL-SCNC: 24 MMOL/L (ref 21–32)
CREAT SERPL-MCNC: 0.74 MG/DL (ref 0.6–1.3)
EOSINOPHIL # BLD AUTO: 0.3 THOUSAND/ÂΜL (ref 0–0.61)
EOSINOPHIL NFR BLD AUTO: 4 % (ref 0–6)
ERYTHROCYTE [DISTWIDTH] IN BLOOD BY AUTOMATED COUNT: 16.1 % (ref 11.6–15.1)
GFR SERPL CREATININE-BSD FRML MDRD: 87 ML/MIN/1.73SQ M
GLUCOSE SERPL-MCNC: 94 MG/DL (ref 65–140)
HCT VFR BLD AUTO: 38.6 % (ref 34.8–46.1)
HGB BLD-MCNC: 11.5 G/DL (ref 11.5–15.4)
IMM GRANULOCYTES # BLD AUTO: 0.03 THOUSAND/UL (ref 0–0.2)
IMM GRANULOCYTES NFR BLD AUTO: 0 % (ref 0–2)
LYMPHOCYTES # BLD AUTO: 2.18 THOUSANDS/ÂΜL (ref 0.6–4.47)
LYMPHOCYTES NFR BLD AUTO: 28 % (ref 14–44)
MCH RBC QN AUTO: 20.7 PG (ref 26.8–34.3)
MCHC RBC AUTO-ENTMCNC: 29.8 G/DL (ref 31.4–37.4)
MCV RBC AUTO: 69 FL (ref 82–98)
MONOCYTES # BLD AUTO: 0.6 THOUSAND/ÂΜL (ref 0.17–1.22)
MONOCYTES NFR BLD AUTO: 8 % (ref 4–12)
NEUTROPHILS # BLD AUTO: 4.68 THOUSANDS/ÂΜL (ref 1.85–7.62)
NEUTS SEG NFR BLD AUTO: 59 % (ref 43–75)
NRBC BLD AUTO-RTO: 0 /100 WBCS
PLATELET # BLD AUTO: 54 THOUSANDS/UL (ref 149–390)
POTASSIUM SERPL-SCNC: 3.7 MMOL/L (ref 3.5–5.3)
PROT SERPL-MCNC: 6.4 G/DL (ref 6.4–8.4)
RBC # BLD AUTO: 5.56 MILLION/UL (ref 3.81–5.12)
SODIUM SERPL-SCNC: 139 MMOL/L (ref 135–147)
WBC # BLD AUTO: 7.83 THOUSAND/UL (ref 4.31–10.16)

## 2023-06-16 PROCEDURE — 80053 COMPREHEN METABOLIC PANEL: CPT

## 2023-06-16 PROCEDURE — 85025 COMPLETE CBC W/AUTO DIFF WBC: CPT

## 2023-06-16 NOTE — TELEPHONE ENCOUNTER
Phoned pt using "[employer] service  [employer] #[id number] to ask pt where she was in her Revlimid cycle. Apparently pt was saying that she understood she was to take Revlimid one week on and one week off, Dr Reed López most recent office visit note states pt was to continue revlimid 25 mg po daily Days 1-21 with 7 days off then after Cycle 6 she would go to 10 mg daily days 1-21 with 7 days off. Pt is going to [place] tomorrow as her sister is sick so instructed pt to not take revlimid until she gets home. Pt verbalized understanding.

## 2023-06-28 DIAGNOSIS — C82.18 GRADE 2 FOLLICULAR LYMPHOMA OF LYMPH NODES OF MULTIPLE REGIONS (HCC): ICD-10-CM

## 2023-06-28 DIAGNOSIS — K08.89 TOOTHACHE: ICD-10-CM

## 2023-06-28 RX ORDER — OXYCODONE HYDROCHLORIDE 10 MG/1
10 TABLET ORAL EVERY 8 HOURS PRN
Qty: 90 TABLET | Refills: 0 | Status: SHIPPED | OUTPATIENT
Start: 2023-07-05

## 2023-06-28 RX ORDER — ACETAMINOPHEN 325 MG/1
650 TABLET ORAL EVERY 6 HOURS PRN
Qty: 30 TABLET | Refills: 0 | Status: SHIPPED | OUTPATIENT
Start: 2023-06-28

## 2023-06-28 NOTE — TELEPHONE ENCOUNTER
Controlled Substance Review    [place] or [place]  reviewed: No red flags were identified; safe to proceed with prescription  06/06/2023 06/02/2023   2  Oxycodone Hcl (Ir) 10 Mg Tab 90 00  30  Ti Joselito  [id number]   All ([id number])   45 00 MME  [place]   05/09/2023 05/09/2023   2  Oxycodone Hcl (Ir) 10 Mg Tab 90 00  30  Ti Joselito  [id number]   All ([id number])   45 00 MME  [place]   03/23/2023 03/23/2023   2  Oxycodone Hcl (Ir) 10 Mg Tab 90 00  23  Ti Joselito  [id number]   All ([id number])   58 70 MME  [place]  [place]     Patient should still have another week left  I will send refills with appropriate fill date, should not be filled sooner than 7/5       Logan Sainz MD  Palliative Medicine & Supportive Care  Internal Medicine  Available via [employer]  Text  Office: [phone]  Fax: [phone]

## 2023-06-30 ENCOUNTER — HOSPITAL ENCOUNTER (OUTPATIENT)
Dept: INFUSION CENTER | Facility: HOSPITAL | Age: 63
End: 2023-06-30
Attending: INTERNAL MEDICINE
Payer: COMMERCIAL

## 2023-06-30 DIAGNOSIS — C82.18 GRADE 2 FOLLICULAR LYMPHOMA OF LYMPH NODES OF MULTIPLE REGIONS (HCC): Primary | ICD-10-CM

## 2023-06-30 DIAGNOSIS — Z45.2 ENCOUNTER FOR CENTRAL LINE CARE: ICD-10-CM

## 2023-06-30 LAB
ALBUMIN SERPL BCP-MCNC: 4.2 G/DL (ref 3.5–5)
ALP SERPL-CCNC: 94 U/L (ref 34–104)
ALT SERPL W P-5'-P-CCNC: 9 U/L (ref 7–52)
ANION GAP SERPL CALCULATED.3IONS-SCNC: 9 MMOL/L
AST SERPL W P-5'-P-CCNC: 11 U/L (ref 13–39)
BASOPHILS # BLD AUTO: 0.04 THOUSANDS/ÂΜL (ref 0–0.1)
BASOPHILS NFR BLD AUTO: 1 % (ref 0–1)
BILIRUB SERPL-MCNC: 0.59 MG/DL (ref 0.2–1)
BUN SERPL-MCNC: 14 MG/DL (ref 5–25)
CALCIUM SERPL-MCNC: 8.8 MG/DL (ref 8.4–10.2)
CHLORIDE SERPL-SCNC: 107 MMOL/L (ref 96–108)
CO2 SERPL-SCNC: 24 MMOL/L (ref 21–32)
CREAT SERPL-MCNC: 0.73 MG/DL (ref 0.6–1.3)
CRP SERPL QL: 11.7 MG/L
EOSINOPHIL # BLD AUTO: 0.28 THOUSAND/ÂΜL (ref 0–0.61)
EOSINOPHIL NFR BLD AUTO: 5 % (ref 0–6)
ERYTHROCYTE [DISTWIDTH] IN BLOOD BY AUTOMATED COUNT: 16 % (ref 11.6–15.1)
ERYTHROCYTE [SEDIMENTATION RATE] IN BLOOD: 26 MM/HOUR (ref 0–29)
GFR SERPL CREATININE-BSD FRML MDRD: 89 ML/MIN/1.73SQ M
GLUCOSE SERPL-MCNC: 89 MG/DL (ref 65–140)
HCT VFR BLD AUTO: 37.2 % (ref 34.8–46.1)
HGB BLD-MCNC: 10.8 G/DL (ref 11.5–15.4)
IMM GRANULOCYTES # BLD AUTO: 0.01 THOUSAND/UL (ref 0–0.2)
IMM GRANULOCYTES NFR BLD AUTO: 0 % (ref 0–2)
LDH SERPL-CCNC: 153 U/L (ref 140–271)
LYMPHOCYTES # BLD AUTO: 1.85 THOUSANDS/ÂΜL (ref 0.6–4.47)
LYMPHOCYTES NFR BLD AUTO: 32 % (ref 14–44)
MAGNESIUM SERPL-MCNC: 2.1 MG/DL (ref 1.9–2.7)
MCH RBC QN AUTO: 20.1 PG (ref 26.8–34.3)
MCHC RBC AUTO-ENTMCNC: 29 G/DL (ref 31.4–37.4)
MCV RBC AUTO: 69 FL (ref 82–98)
MONOCYTES # BLD AUTO: 0.56 THOUSAND/ÂΜL (ref 0.17–1.22)
MONOCYTES NFR BLD AUTO: 10 % (ref 4–12)
NEUTROPHILS # BLD AUTO: 3.14 THOUSANDS/ÂΜL (ref 1.85–7.62)
NEUTS SEG NFR BLD AUTO: 52 % (ref 43–75)
NRBC BLD AUTO-RTO: 0 /100 WBCS
PLATELET # BLD AUTO: 153 THOUSANDS/UL (ref 149–390)
POTASSIUM SERPL-SCNC: 3.8 MMOL/L (ref 3.5–5.3)
PROT SERPL-MCNC: 6.4 G/DL (ref 6.4–8.4)
RBC # BLD AUTO: 5.37 MILLION/UL (ref 3.81–5.12)
SODIUM SERPL-SCNC: 140 MMOL/L (ref 135–147)
WBC # BLD AUTO: 5.88 THOUSAND/UL (ref 4.31–10.16)

## 2023-06-30 PROCEDURE — 83615 LACTATE (LD) (LDH) ENZYME: CPT

## 2023-06-30 PROCEDURE — 86140 C-REACTIVE PROTEIN: CPT

## 2023-06-30 PROCEDURE — 85652 RBC SED RATE AUTOMATED: CPT

## 2023-06-30 PROCEDURE — 83735 ASSAY OF MAGNESIUM: CPT

## 2023-06-30 PROCEDURE — 85025 COMPLETE CBC W/AUTO DIFF WBC: CPT

## 2023-06-30 PROCEDURE — 80053 COMPREHEN METABOLIC PANEL: CPT

## 2023-06-30 NOTE — PROGRESS NOTES
Pt's labs drawn and sent as ordered  Port flushed freely with a brisk blood return  Left unit ambulatory with a steady gait

## 2023-07-03 ENCOUNTER — HOSPITAL ENCOUNTER (OUTPATIENT)
Dept: INFUSION CENTER | Facility: HOSPITAL | Age: 63
Discharge: HOME | End: 2023-07-03
Attending: INTERNAL MEDICINE
Payer: COMMERCIAL

## 2023-07-03 VITALS
TEMPERATURE: 97.2 F | HEART RATE: 58 BPM | SYSTOLIC BLOOD PRESSURE: 140 MMHG | RESPIRATION RATE: 18 BRPM | DIASTOLIC BLOOD PRESSURE: 80 MMHG

## 2023-07-03 DIAGNOSIS — C82.18 GRADE 2 FOLLICULAR LYMPHOMA OF LYMPH NODES OF MULTIPLE REGIONS (HCC): ICD-10-CM

## 2023-07-03 DIAGNOSIS — C82.18 GRADE 2 FOLLICULAR LYMPHOMA OF LYMPH NODES OF MULTIPLE REGIONS (HCC): Primary | ICD-10-CM

## 2023-07-03 DIAGNOSIS — D51.8 OTHER VITAMIN B12 DEFICIENCY ANEMIAS: ICD-10-CM

## 2023-07-03 RX ORDER — LENALIDOMIDE 20 MG/1
CAPSULE ORAL
Qty: 21 CAPSULE | Refills: 0 | Status: SHIPPED | OUTPATIENT
Start: 2023-07-03

## 2023-07-03 RX ORDER — CYANOCOBALAMIN 1000 UG/ML
1000 INJECTION, SOLUTION INTRAMUSCULAR; SUBCUTANEOUS ONCE
Status: COMPLETED | OUTPATIENT
Start: 2023-07-03 | End: 2023-07-03

## 2023-07-03 RX ORDER — ACETAMINOPHEN 325 MG/1
650 TABLET ORAL ONCE
Status: COMPLETED | OUTPATIENT
Start: 2023-07-03 | End: 2023-07-03

## 2023-07-03 RX ORDER — CYANOCOBALAMIN 1000 UG/ML
1000 INJECTION, SOLUTION INTRAMUSCULAR; SUBCUTANEOUS ONCE
OUTPATIENT
Start: 2023-07-07

## 2023-07-03 RX ORDER — SODIUM CHLORIDE 9 MG/ML
20 INJECTION, SOLUTION INTRAVENOUS ONCE
Status: COMPLETED | OUTPATIENT
Start: 2023-07-03 | End: 2023-07-03

## 2023-07-03 RX ADMIN — SODIUM CHLORIDE 20 ML/HR: 0.9 INJECTION, SOLUTION INTRAVENOUS at 10:25

## 2023-07-03 RX ADMIN — DIPHENHYDRAMINE HYDROCHLORIDE 25 MG: 50 INJECTION, SOLUTION INTRAMUSCULAR; INTRAVENOUS at 10:38

## 2023-07-03 RX ADMIN — OBINUTUZUMAB 1000 MG: 1000 INJECTION, SOLUTION, CONCENTRATE INTRAVENOUS at 11:46

## 2023-07-03 RX ADMIN — DEXAMETHASONE SODIUM PHOSPHATE 20 MG: 10 INJECTION INTRAMUSCULAR; INTRAVENOUS at 10:58

## 2023-07-03 RX ADMIN — ACETAMINOPHEN 650 MG: 325 TABLET ORAL at 10:37

## 2023-07-03 RX ADMIN — CYANOCOBALAMIN 1000 MCG: 1000 INJECTION INTRAMUSCULAR; SUBCUTANEOUS at 11:00

## 2023-07-03 NOTE — PROGRESS NOTES
Pt tolerated treatment today with no adverse reactions. AVS provided. Left unit ambulatory with a steady gait.

## 2023-07-05 ENCOUNTER — HOSPITAL ENCOUNTER (OUTPATIENT)
Dept: NUCLEAR MEDICINE | Facility: HOSPITAL | Age: 63
Discharge: HOME | End: 2023-07-05
Attending: INTERNAL MEDICINE
Payer: COMMERCIAL

## 2023-07-05 ENCOUNTER — TELEPHONE (OUTPATIENT)
Dept: PALLIATIVE MEDICINE | Facility: CLINIC | Age: 63
End: 2023-07-05

## 2023-07-05 DIAGNOSIS — C82.18 GRADE 2 FOLLICULAR LYMPHOMA OF LYMPH NODES OF MULTIPLE REGIONS (HCC): ICD-10-CM

## 2023-07-05 LAB — GLUCOSE SERPL-MCNC: 114 MG/DL (ref 65–140)

## 2023-07-05 PROCEDURE — A9552 F18 FDG: HCPCS

## 2023-07-05 PROCEDURE — 82948 REAGENT STRIP/BLOOD GLUCOSE: CPT

## 2023-07-05 NOTE — TELEPHONE ENCOUNTER
Received prior authorization approval for Oxycodone through 1/5/24. Faxed results to Pharmacy. Pharmacy has been asked to inform pt of outcome.

## 2023-07-05 NOTE — TELEPHONE ENCOUNTER
Prior Authorization INITIATED VIA formerly [facility] for OXYCODONE     [address] [facility] [facility]   Phone:  [phone]

## 2023-07-07 ENCOUNTER — HOSPITAL ENCOUNTER (OUTPATIENT)
Dept: INFUSION CENTER | Facility: HOSPITAL | Age: 63
Discharge: HOME | End: 2023-07-07

## 2023-07-07 DIAGNOSIS — C82.18 GRADE 2 FOLLICULAR LYMPHOMA OF LYMPH NODES OF MULTIPLE REGIONS (HCC): ICD-10-CM

## 2023-07-07 DIAGNOSIS — Z45.2 ENCOUNTER FOR CENTRAL LINE CARE: Primary | ICD-10-CM

## 2023-07-10 ENCOUNTER — TELEPHONE (OUTPATIENT)
Dept: SURGICAL ONCOLOGY | Facility: CLINIC | Age: 63
End: 2023-07-10

## 2023-07-11 ENCOUNTER — PATIENT OUTREACH (OUTPATIENT)
Dept: CASE MANAGEMENT | Facility: OTHER | Age: 63
End: 2023-07-11

## 2023-07-11 ENCOUNTER — TELEPHONE (OUTPATIENT)
Dept: HEMATOLOGY ONCOLOGY | Facility: CLINIC | Age: 63
End: 2023-07-11

## 2023-07-11 NOTE — PROGRESS NOTES
OSW performed chart review. Pt has not had any SW needs, therefore the referral will be closed. If any needs present in the future another referral can be placed at that time.

## 2023-07-11 NOTE — TELEPHONE ENCOUNTER
Phoned pt via [employer]  jasmyne # [phone] and left a voice message for pt to phone [phone] as her Pet Scan need to be re done due to improper prep.

## 2023-07-14 ENCOUNTER — HOSPITAL ENCOUNTER (OUTPATIENT)
Dept: INFUSION CENTER | Facility: HOSPITAL | Age: 63
End: 2023-07-14
Attending: INTERNAL MEDICINE
Payer: COMMERCIAL

## 2023-07-14 DIAGNOSIS — C82.18 GRADE 2 FOLLICULAR LYMPHOMA OF LYMPH NODES OF MULTIPLE REGIONS (HCC): Primary | ICD-10-CM

## 2023-07-14 DIAGNOSIS — Z45.2 ENCOUNTER FOR CENTRAL LINE CARE: ICD-10-CM

## 2023-07-14 LAB
ALBUMIN SERPL BCP-MCNC: 4.3 G/DL (ref 3.5–5)
ALP SERPL-CCNC: 102 U/L (ref 34–104)
ALT SERPL W P-5'-P-CCNC: 11 U/L (ref 7–52)
ANION GAP SERPL CALCULATED.3IONS-SCNC: 8 MMOL/L
AST SERPL W P-5'-P-CCNC: 12 U/L (ref 13–39)
BASOPHILS # BLD AUTO: 0.04 THOUSANDS/ÂΜL (ref 0–0.1)
BASOPHILS NFR BLD AUTO: 1 % (ref 0–1)
BILIRUB SERPL-MCNC: 0.5 MG/DL (ref 0.2–1)
BUN SERPL-MCNC: 17 MG/DL (ref 5–25)
CALCIUM SERPL-MCNC: 9.3 MG/DL (ref 8.4–10.2)
CHLORIDE SERPL-SCNC: 105 MMOL/L (ref 96–108)
CO2 SERPL-SCNC: 26 MMOL/L (ref 21–32)
CREAT SERPL-MCNC: 0.8 MG/DL (ref 0.6–1.3)
EOSINOPHIL # BLD AUTO: 0.25 THOUSAND/ÂΜL (ref 0–0.61)
EOSINOPHIL NFR BLD AUTO: 4 % (ref 0–6)
ERYTHROCYTE [DISTWIDTH] IN BLOOD BY AUTOMATED COUNT: 16.7 % (ref 11.6–15.1)
GFR SERPL CREATININE-BSD FRML MDRD: 79 ML/MIN/1.73SQ M
GLUCOSE SERPL-MCNC: 144 MG/DL (ref 65–140)
HCT VFR BLD AUTO: 38.5 % (ref 34.8–46.1)
HGB BLD-MCNC: 11.4 G/DL (ref 11.5–15.4)
IMM GRANULOCYTES # BLD AUTO: 0.02 THOUSAND/UL (ref 0–0.2)
IMM GRANULOCYTES NFR BLD AUTO: 0 % (ref 0–2)
LYMPHOCYTES # BLD AUTO: 1.91 THOUSANDS/ÂΜL (ref 0.6–4.47)
LYMPHOCYTES NFR BLD AUTO: 30 % (ref 14–44)
MCH RBC QN AUTO: 20.4 PG (ref 26.8–34.3)
MCHC RBC AUTO-ENTMCNC: 29.6 G/DL (ref 31.4–37.4)
MCV RBC AUTO: 69 FL (ref 82–98)
MONOCYTES # BLD AUTO: 0.56 THOUSAND/ÂΜL (ref 0.17–1.22)
MONOCYTES NFR BLD AUTO: 9 % (ref 4–12)
NEUTROPHILS # BLD AUTO: 3.52 THOUSANDS/ÂΜL (ref 1.85–7.62)
NEUTS SEG NFR BLD AUTO: 56 % (ref 43–75)
NRBC BLD AUTO-RTO: 0 /100 WBCS
PLATELET # BLD AUTO: 67 THOUSANDS/UL (ref 149–390)
POTASSIUM SERPL-SCNC: 4.1 MMOL/L (ref 3.5–5.3)
PROT SERPL-MCNC: 6.5 G/DL (ref 6.4–8.4)
RBC # BLD AUTO: 5.58 MILLION/UL (ref 3.81–5.12)
SODIUM SERPL-SCNC: 139 MMOL/L (ref 135–147)
WBC # BLD AUTO: 6.3 THOUSAND/UL (ref 4.31–10.16)

## 2023-07-14 PROCEDURE — 80053 COMPREHEN METABOLIC PANEL: CPT | Performed by: INTERNAL MEDICINE

## 2023-07-14 PROCEDURE — 36593 DECLOT VASCULAR DEVICE: CPT

## 2023-07-14 PROCEDURE — 85025 COMPLETE CBC W/AUTO DIFF WBC: CPT | Performed by: INTERNAL MEDICINE

## 2023-07-14 RX ORDER — WATER 1000 ML/1000ML
INJECTION, SOLUTION INTRAVENOUS
Status: COMPLETED
Start: 2023-07-14 | End: 2023-07-14

## 2023-07-14 RX ADMIN — WATER 10 ML: 1 INJECTION INTRAMUSCULAR; INTRAVENOUS; SUBCUTANEOUS at 14:10

## 2023-07-14 RX ADMIN — ALTEPLASE 2 MG: 2.2 INJECTION, POWDER, LYOPHILIZED, FOR SOLUTION INTRAVENOUS at 14:10

## 2023-07-14 NOTE — PROGRESS NOTES
Pt arrived for central lab draw. Port accessed per protocol. Very minimal blood return noted, and unable to draw labs despite multiple flushes and repositioning pt. Cathflo instilled per order.

## 2023-07-14 NOTE — PROGRESS NOTES
Blood return noted from port, but not enough to draw labs. Pt unable to stay longer. Labs drawn peripherally. Port flushed and deaccessed per protocol. Next appt confirmed. AVS declined. Left ambulatory in stable condition. Spontaneous, unlabored and symmetrical

## 2023-07-20 ENCOUNTER — TELEPHONE (OUTPATIENT)
Dept: SURGICAL ONCOLOGY | Facility: CLINIC | Age: 63
End: 2023-07-20

## 2023-07-20 NOTE — TELEPHONE ENCOUNTER
Spoke to pt, [facility] did not pick her up due to wrong address on file, updated address, called [facility] for new appt

## 2023-07-21 ENCOUNTER — HOSPITAL ENCOUNTER (OUTPATIENT)
Dept: INFUSION CENTER | Facility: HOSPITAL | Age: 63
End: 2023-07-21
Attending: INTERNAL MEDICINE
Payer: COMMERCIAL

## 2023-07-21 DIAGNOSIS — C82.18 GRADE 2 FOLLICULAR LYMPHOMA OF LYMPH NODES OF MULTIPLE REGIONS (HCC): Primary | ICD-10-CM

## 2023-07-21 DIAGNOSIS — Z45.2 ENCOUNTER FOR CENTRAL LINE CARE: ICD-10-CM

## 2023-07-21 LAB
ALBUMIN SERPL BCP-MCNC: 4 G/DL (ref 3.5–5)
ALP SERPL-CCNC: 101 U/L (ref 34–104)
ALT SERPL W P-5'-P-CCNC: 12 U/L (ref 7–52)
ANION GAP SERPL CALCULATED.3IONS-SCNC: 7 MMOL/L
AST SERPL W P-5'-P-CCNC: 11 U/L (ref 13–39)
BASOPHILS # BLD AUTO: 0.03 THOUSANDS/ÂΜL (ref 0–0.1)
BASOPHILS NFR BLD AUTO: 0 % (ref 0–1)
BILIRUB SERPL-MCNC: 0.43 MG/DL (ref 0.2–1)
BUN SERPL-MCNC: 13 MG/DL (ref 5–25)
CALCIUM SERPL-MCNC: 8.9 MG/DL (ref 8.4–10.2)
CHLORIDE SERPL-SCNC: 107 MMOL/L (ref 96–108)
CO2 SERPL-SCNC: 26 MMOL/L (ref 21–32)
CREAT SERPL-MCNC: 0.72 MG/DL (ref 0.6–1.3)
EOSINOPHIL # BLD AUTO: 0.31 THOUSAND/ÂΜL (ref 0–0.61)
EOSINOPHIL NFR BLD AUTO: 4 % (ref 0–6)
ERYTHROCYTE [DISTWIDTH] IN BLOOD BY AUTOMATED COUNT: 16.7 % (ref 11.6–15.1)
GFR SERPL CREATININE-BSD FRML MDRD: 90 ML/MIN/1.73SQ M
GLUCOSE SERPL-MCNC: 139 MG/DL (ref 65–140)
HCT VFR BLD AUTO: 38 % (ref 34.8–46.1)
HGB BLD-MCNC: 11.6 G/DL (ref 11.5–15.4)
IMM GRANULOCYTES # BLD AUTO: 0.02 THOUSAND/UL (ref 0–0.2)
IMM GRANULOCYTES NFR BLD AUTO: 0 % (ref 0–2)
LYMPHOCYTES # BLD AUTO: 1.94 THOUSANDS/ÂΜL (ref 0.6–4.47)
LYMPHOCYTES NFR BLD AUTO: 25 % (ref 14–44)
MCH RBC QN AUTO: 21.1 PG (ref 26.8–34.3)
MCHC RBC AUTO-ENTMCNC: 30.5 G/DL (ref 31.4–37.4)
MCV RBC AUTO: 69 FL (ref 82–98)
MONOCYTES # BLD AUTO: 0.48 THOUSAND/ÂΜL (ref 0.17–1.22)
MONOCYTES NFR BLD AUTO: 6 % (ref 4–12)
NEUTROPHILS # BLD AUTO: 5.07 THOUSANDS/ÂΜL (ref 1.85–7.62)
NEUTS SEG NFR BLD AUTO: 65 % (ref 43–75)
NRBC BLD AUTO-RTO: 0 /100 WBCS
PLATELET # BLD AUTO: 124 THOUSANDS/UL (ref 149–390)
POTASSIUM SERPL-SCNC: 3.7 MMOL/L (ref 3.5–5.3)
PROT SERPL-MCNC: 5.9 G/DL (ref 6.4–8.4)
RBC # BLD AUTO: 5.5 MILLION/UL (ref 3.81–5.12)
SODIUM SERPL-SCNC: 140 MMOL/L (ref 135–147)
WBC # BLD AUTO: 7.85 THOUSAND/UL (ref 4.31–10.16)

## 2023-07-21 PROCEDURE — 85025 COMPLETE CBC W/AUTO DIFF WBC: CPT | Performed by: INTERNAL MEDICINE

## 2023-07-21 PROCEDURE — 80053 COMPREHEN METABOLIC PANEL: CPT | Performed by: INTERNAL MEDICINE

## 2023-07-21 NOTE — PROGRESS NOTES
Labs drawn via port per protocol without complications. No complaints offered. AVS provided. Left unit in stable condition.

## 2023-07-24 ENCOUNTER — OFFICE VISIT (OUTPATIENT)
Dept: HEMATOLOGY ONCOLOGY | Facility: CLINIC | Age: 63
End: 2023-07-24
Payer: COMMERCIAL

## 2023-07-24 VITALS
WEIGHT: 247 LBS | OXYGEN SATURATION: 99 % | HEART RATE: 59 BPM | HEIGHT: 64 IN | SYSTOLIC BLOOD PRESSURE: 140 MMHG | TEMPERATURE: 97.5 F | BODY MASS INDEX: 42.17 KG/M2 | DIASTOLIC BLOOD PRESSURE: 88 MMHG

## 2023-07-24 DIAGNOSIS — C82.18 GRADE 2 FOLLICULAR LYMPHOMA OF LYMPH NODES OF MULTIPLE REGIONS (HCC): Primary | ICD-10-CM

## 2023-07-24 DIAGNOSIS — T45.1X5A CHEMOTHERAPY INDUCED NAUSEA AND VOMITING: ICD-10-CM

## 2023-07-24 DIAGNOSIS — R42 EPISODE OF DIZZINESS: ICD-10-CM

## 2023-07-24 DIAGNOSIS — Z95.828 PORT-A-CATH IN PLACE: ICD-10-CM

## 2023-07-24 DIAGNOSIS — R11.2 CHEMOTHERAPY INDUCED NAUSEA AND VOMITING: ICD-10-CM

## 2023-07-24 PROCEDURE — 99215 OFFICE O/P EST HI 40 MIN: CPT | Performed by: PHYSICIAN ASSISTANT

## 2023-07-24 RX ORDER — LIDOCAINE AND PRILOCAINE 25; 25 MG/G; MG/G
CREAM TOPICAL AS NEEDED
Qty: 30 G | Refills: 0 | Status: SHIPPED | OUTPATIENT
Start: 2023-07-24

## 2023-07-24 NOTE — PATIENT INSTRUCTIONS
[facility] Oncology and Hematology Team  [facility] - [phone]    Your Team Members:  Advanced Practitioner:  Elsie Lal PA-C  Oncology Nurse:   Haider Moseley RN ([phone]) M-F 8am - 4:30pm    Please answer Private and Unavailable Calls - this may be your team(s) contacting you. I  If you have medical questions/concerns/issues - contact us either by (1) My Chart (2) Hope Line    Ibuprofen 400 mg as needed for back pain. Try gentle stretching. Please use EMLA cream  over the port. Use it 30 minutes prior to infusion appointment and cover with Saran Wrap to keep it from rubbing off on you clothing. 4 we

## 2023-07-24 NOTE — PROGRESS NOTES
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 Lymphoma ([address])    • Lymphoma ([address])    • Migraine    • Osteoporosis    • Psychiatric disorder    • 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[address])    • Vertigo      Past Surgical History:   Procedure Laterality Date   • CHOLECYSTECTOMY     • FL GUIDED CENTRAL VENOUS ACCESS DEVICE INSERTION  11/9/2018   • HYSTERECTOMY     • IR BIOPSY BONE MARROW  11/24/2020   • IR BIOPSY LYMPH NODE  11/24/2020   • LYMPH NODE BIOPSY Left 11/9/2018    Procedure: EXCISION BIOPSY LYMPH NODE SUPRACLAVICULAR;  Surgeon: Timo Bryant MD;  Location: [address] [place] [address];  Service: General   • IL TENDON SHEATH INCISION Right 1/16/2020    Procedure: RELEASE TRIGGER FINGER RIGHT THUMB;  Surgeon: Karyn Cortes MD;  Location: [address] [place] [address];  Service: Orthopedics   • TUNNELED VENOUS PORT PLACEMENT N/A 11/9/2018    Procedure: Elle Etienne;  Surgeon: Timo Bryant MD;  Location: [address] [place] [address];  Service: General     Social History Socioeconomic History   • Marital status: Single     Spouse name: None   • Number of children: None   • Years of education: None   • Highest education level: None   Occupational History   • None   Tobacco Use   • Smoking status: Former     Types: Cigarettes     Quit date: 2017     Years since quittin.1     Passive exposure: Past   • Smokeless tobacco: Never   Vaping Use   • Vaping Use: Never used   Substance and Sexual Activity   • Alcohol use: Never   • Drug use: Never   • Sexual activity: None   Other Topics Concern   • None   Social History Narrative   • None     Social Determinants of Health     Financial Resource Strain: Medium Risk (2021)    Overall Financial Resource Strain (CARDIA)    • Difficulty of Paying Living Expenses: Somewhat hard   Food Insecurity: Food Insecurity Present (2021)    Hunger Vital Sign    • Worried About Running Out of Food in the Last Year: Sometimes true    • Ran Out of Food in the Last Year: Sometimes true   Transportation Needs: No Transportation Needs (2021)    PRAPARE - Transportation    • Lack of Transportation (Medical): No    • Lack of Transportation (Non-Medical): No   Physical Activity: Not on file   Stress: Not on file   Social Connections: Not on file   Intimate Partner Violence: Not on file   Housing Stability: Not on file     Family History   Problem Relation Age of Onset   • Cancer Mother    • Diabetes Mother    • Cancer Father    • Diabetes Father    • Breast cancer Sister    • No Known Problems Sister    • No Known Problems Sister    • No Known Problems Sister    • No Known Problems Maternal Aunt    • No Known Problems Maternal Aunt    • No Known Problems Maternal Aunt    • No Known Problems Paternal Aunt        Please note: This report has been generated by a voice recognition software system. Therefore there may be syntax, spelling, and/or grammatical errors. Please call if you have any questions.

## 2023-07-26 DIAGNOSIS — C82.18 GRADE 2 FOLLICULAR LYMPHOMA OF LYMPH NODES OF MULTIPLE REGIONS (HCC): ICD-10-CM

## 2023-07-26 RX ORDER — OXYCODONE HYDROCHLORIDE 10 MG/1
10 TABLET ORAL EVERY 8 HOURS PRN
Qty: 90 TABLET | Refills: 0 | Status: SHIPPED | OUTPATIENT
Start: 2023-08-03

## 2023-07-26 NOTE — TELEPHONE ENCOUNTER
Controlled Substance Review    [place] or [place]  reviewed: red flags were identified; safe to proceed with prescription only on date provided. No changes in current regimen. I will only allow 3 tablets a day without evidence of disease progression. Script sent to be filled no sooner than 8/3/2023.     Dariusz Cunningham MD  Palliative Medicine & Supportive Care  Internal Medicine  Available via [facility] Text  Office: [phone]  Fax: [phone]

## 2023-07-26 NOTE — TELEPHONE ENCOUNTER
Oxycodone     Last appointment:  5/9/23    Next scheduled appointment: 11/07/23    Pharmacy:     [facility] Reminded pt on the main number. Had friend take call re language. Noted last order for Oxycodone was 07/05/23 90/30. ( 1 tab every 8 hours as needed total 3 tablets)  Per friend translating pt is taking 1 tablet every 4 hours as needed and has 6 or 7 tablets remaining. Instructed that not due to be filled until late next week. Believe friend translated this to pt and then left pt home. Pt verbalizing Mauro Alva ( its all good) and verbalized understanding of 3 nataliia tabletas.

## 2023-07-26 NOTE — TELEPHONE ENCOUNTER
I will not allow for an early fill. We have made it clear in her office visits that she is to only take 3 doses a day. Please remind patient that this is a violation of contract and next one may result in termination of relationship with our office.      Lucinda Nela MD  Palliative Medicine & Supportive Care  Internal Medicine  Available via [facility] Text  Office: [phone]  Fax: [phone]

## 2023-07-28 ENCOUNTER — HOSPITAL ENCOUNTER (OUTPATIENT)
Dept: INFUSION CENTER | Facility: HOSPITAL | Age: 63
End: 2023-07-28
Attending: INTERNAL MEDICINE
Payer: COMMERCIAL

## 2023-07-28 DIAGNOSIS — Z45.2 ENCOUNTER FOR CENTRAL LINE CARE: Primary | ICD-10-CM

## 2023-07-28 DIAGNOSIS — C82.18 GRADE 2 FOLLICULAR LYMPHOMA OF LYMPH NODES OF MULTIPLE REGIONS (HCC): ICD-10-CM

## 2023-07-28 LAB
ALBUMIN SERPL BCP-MCNC: 4.1 G/DL (ref 3.5–5)
ALP SERPL-CCNC: 103 U/L (ref 34–104)
ALT SERPL W P-5'-P-CCNC: 12 U/L (ref 7–52)
ANION GAP SERPL CALCULATED.3IONS-SCNC: 7 MMOL/L
AST SERPL W P-5'-P-CCNC: 12 U/L (ref 13–39)
BASOPHILS # BLD AUTO: 0.05 THOUSANDS/ÂΜL (ref 0–0.1)
BASOPHILS NFR BLD AUTO: 1 % (ref 0–1)
BILIRUB SERPL-MCNC: 0.5 MG/DL (ref 0.2–1)
BUN SERPL-MCNC: 14 MG/DL (ref 5–25)
CALCIUM SERPL-MCNC: 8.8 MG/DL (ref 8.4–10.2)
CHLORIDE SERPL-SCNC: 107 MMOL/L (ref 96–108)
CO2 SERPL-SCNC: 27 MMOL/L (ref 21–32)
CREAT SERPL-MCNC: 0.79 MG/DL (ref 0.6–1.3)
EOSINOPHIL # BLD AUTO: 0.27 THOUSAND/ÂΜL (ref 0–0.61)
EOSINOPHIL NFR BLD AUTO: 4 % (ref 0–6)
ERYTHROCYTE [DISTWIDTH] IN BLOOD BY AUTOMATED COUNT: 17.1 % (ref 11.6–15.1)
GFR SERPL CREATININE-BSD FRML MDRD: 81 ML/MIN/1.73SQ M
GLUCOSE SERPL-MCNC: 126 MG/DL (ref 65–140)
HCT VFR BLD AUTO: 37.2 % (ref 34.8–46.1)
HGB BLD-MCNC: 11.2 G/DL (ref 11.5–15.4)
IMM GRANULOCYTES # BLD AUTO: 0.01 THOUSAND/UL (ref 0–0.2)
IMM GRANULOCYTES NFR BLD AUTO: 0 % (ref 0–2)
LYMPHOCYTES # BLD AUTO: 1.47 THOUSANDS/ÂΜL (ref 0.6–4.47)
LYMPHOCYTES NFR BLD AUTO: 22 % (ref 14–44)
MCH RBC QN AUTO: 20.6 PG (ref 26.8–34.3)
MCHC RBC AUTO-ENTMCNC: 30.1 G/DL (ref 31.4–37.4)
MCV RBC AUTO: 68 FL (ref 82–98)
MONOCYTES # BLD AUTO: 0.61 THOUSAND/ÂΜL (ref 0.17–1.22)
MONOCYTES NFR BLD AUTO: 9 % (ref 4–12)
NEUTROPHILS # BLD AUTO: 4.36 THOUSANDS/ÂΜL (ref 1.85–7.62)
NEUTS SEG NFR BLD AUTO: 64 % (ref 43–75)
NRBC BLD AUTO-RTO: 0 /100 WBCS
PLATELET # BLD AUTO: 105 THOUSANDS/UL (ref 149–390)
POTASSIUM SERPL-SCNC: 4 MMOL/L (ref 3.5–5.3)
PROT SERPL-MCNC: 6.2 G/DL (ref 6.4–8.4)
RBC # BLD AUTO: 5.45 MILLION/UL (ref 3.81–5.12)
SODIUM SERPL-SCNC: 141 MMOL/L (ref 135–147)
WBC # BLD AUTO: 6.77 THOUSAND/UL (ref 4.31–10.16)

## 2023-07-28 PROCEDURE — 80053 COMPREHEN METABOLIC PANEL: CPT | Performed by: INTERNAL MEDICINE

## 2023-07-28 PROCEDURE — 85025 COMPLETE CBC W/AUTO DIFF WBC: CPT | Performed by: INTERNAL MEDICINE

## 2023-07-29 DIAGNOSIS — C82.18 GRADE 2 FOLLICULAR LYMPHOMA OF LYMPH NODES OF MULTIPLE REGIONS (HCC): Primary | ICD-10-CM

## 2023-07-29 RX ORDER — SODIUM CHLORIDE 9 MG/ML
20 INJECTION, SOLUTION INTRAVENOUS ONCE
Status: CANCELLED | OUTPATIENT
Start: 2023-08-01

## 2023-07-29 RX ORDER — ACETAMINOPHEN 325 MG/1
650 TABLET ORAL ONCE
Status: CANCELLED | OUTPATIENT
Start: 2023-08-01

## 2023-08-01 ENCOUNTER — HOSPITAL ENCOUNTER (OUTPATIENT)
Dept: INFUSION CENTER | Facility: HOSPITAL | Age: 63
Discharge: HOME | End: 2023-08-01
Attending: INTERNAL MEDICINE
Payer: COMMERCIAL

## 2023-08-01 VITALS
DIASTOLIC BLOOD PRESSURE: 88 MMHG | RESPIRATION RATE: 20 BRPM | TEMPERATURE: 97.2 F | HEART RATE: 88 BPM | SYSTOLIC BLOOD PRESSURE: 138 MMHG

## 2023-08-01 DIAGNOSIS — C82.18 GRADE 2 FOLLICULAR LYMPHOMA OF LYMPH NODES OF MULTIPLE REGIONS (HCC): Primary | ICD-10-CM

## 2023-08-01 DIAGNOSIS — D51.8 OTHER VITAMIN B12 DEFICIENCY ANEMIAS: ICD-10-CM

## 2023-08-01 PROCEDURE — 96415 CHEMO IV INFUSION ADDL HR: CPT

## 2023-08-01 PROCEDURE — 96367 TX/PROPH/DG ADDL SEQ IV INF: CPT

## 2023-08-01 PROCEDURE — 96413 CHEMO IV INFUSION 1 HR: CPT

## 2023-08-01 RX ORDER — SODIUM CHLORIDE 9 MG/ML
20 INJECTION, SOLUTION INTRAVENOUS ONCE
Status: COMPLETED | OUTPATIENT
Start: 2023-08-01 | End: 2023-08-01

## 2023-08-01 RX ORDER — ACETAMINOPHEN 325 MG/1
650 TABLET ORAL ONCE
Status: COMPLETED | OUTPATIENT
Start: 2023-08-01 | End: 2023-08-01

## 2023-08-01 RX ADMIN — OBINUTUZUMAB 1000 MG: 1000 INJECTION, SOLUTION, CONCENTRATE INTRAVENOUS at 10:21

## 2023-08-01 RX ADMIN — ACETAMINOPHEN 650 MG: 325 TABLET ORAL at 08:52

## 2023-08-01 RX ADMIN — DEXAMETHASONE SODIUM PHOSPHATE 20 MG: 10 INJECTION, SOLUTION INTRAMUSCULAR; INTRAVENOUS at 08:54

## 2023-08-01 RX ADMIN — DIPHENHYDRAMINE HYDROCHLORIDE 25 MG: 50 INJECTION, SOLUTION INTRAMUSCULAR; INTRAVENOUS at 09:14

## 2023-08-01 RX ADMIN — SODIUM CHLORIDE 20 ML/HR: 0.9 INJECTION, SOLUTION INTRAVENOUS at 08:36

## 2023-08-01 NOTE — PROGRESS NOTES
Pt arrives c/o headache 7/10 and dizziness. States it's been going on for the past week. Advised with Maya Rosario RN and per Sepideh Forrest pt is ok to proceed with treatment today and no need for MRI at this time.

## 2023-08-04 ENCOUNTER — HOSPITAL ENCOUNTER (OUTPATIENT)
Dept: INFUSION CENTER | Facility: HOSPITAL | Age: 63
End: 2023-08-04
Attending: INTERNAL MEDICINE
Payer: COMMERCIAL

## 2023-08-04 DIAGNOSIS — Z45.2 ENCOUNTER FOR CENTRAL LINE CARE: ICD-10-CM

## 2023-08-04 DIAGNOSIS — C82.18 GRADE 2 FOLLICULAR LYMPHOMA OF LYMPH NODES OF MULTIPLE REGIONS (HCC): Primary | ICD-10-CM

## 2023-08-04 LAB
ALBUMIN SERPL BCP-MCNC: 4 G/DL (ref 3.5–5)
ALP SERPL-CCNC: 92 U/L (ref 34–104)
ALT SERPL W P-5'-P-CCNC: 24 U/L (ref 7–52)
ANION GAP SERPL CALCULATED.3IONS-SCNC: 7 MMOL/L
AST SERPL W P-5'-P-CCNC: 15 U/L (ref 13–39)
BASOPHILS # BLD AUTO: 0.05 THOUSANDS/ÂΜL (ref 0–0.1)
BASOPHILS NFR BLD AUTO: 1 % (ref 0–1)
BILIRUB SERPL-MCNC: 0.46 MG/DL (ref 0.2–1)
BUN SERPL-MCNC: 16 MG/DL (ref 5–25)
CALCIUM SERPL-MCNC: 8.6 MG/DL (ref 8.4–10.2)
CHLORIDE SERPL-SCNC: 107 MMOL/L (ref 96–108)
CO2 SERPL-SCNC: 27 MMOL/L (ref 21–32)
CREAT SERPL-MCNC: 0.68 MG/DL (ref 0.6–1.3)
EOSINOPHIL # BLD AUTO: 0.25 THOUSAND/ÂΜL (ref 0–0.61)
EOSINOPHIL NFR BLD AUTO: 3 % (ref 0–6)
ERYTHROCYTE [DISTWIDTH] IN BLOOD BY AUTOMATED COUNT: 17.1 % (ref 11.6–15.1)
GFR SERPL CREATININE-BSD FRML MDRD: 94 ML/MIN/1.73SQ M
GLUCOSE SERPL-MCNC: 133 MG/DL (ref 65–140)
HCT VFR BLD AUTO: 36.6 % (ref 34.8–46.1)
HGB BLD-MCNC: 11.1 G/DL (ref 11.5–15.4)
IMM GRANULOCYTES # BLD AUTO: 0.03 THOUSAND/UL (ref 0–0.2)
IMM GRANULOCYTES NFR BLD AUTO: 0 % (ref 0–2)
LYMPHOCYTES # BLD AUTO: 1.43 THOUSANDS/ÂΜL (ref 0.6–4.47)
LYMPHOCYTES NFR BLD AUTO: 19 % (ref 14–44)
MCH RBC QN AUTO: 20.7 PG (ref 26.8–34.3)
MCHC RBC AUTO-ENTMCNC: 30.3 G/DL (ref 31.4–37.4)
MCV RBC AUTO: 68 FL (ref 82–98)
MONOCYTES # BLD AUTO: 0.55 THOUSAND/ÂΜL (ref 0.17–1.22)
MONOCYTES NFR BLD AUTO: 8 % (ref 4–12)
NEUTROPHILS # BLD AUTO: 5.05 THOUSANDS/ÂΜL (ref 1.85–7.62)
NEUTS SEG NFR BLD AUTO: 69 % (ref 43–75)
NRBC BLD AUTO-RTO: 0 /100 WBCS
PLATELET # BLD AUTO: 75 THOUSANDS/UL (ref 149–390)
POTASSIUM SERPL-SCNC: 3.7 MMOL/L (ref 3.5–5.3)
PROT SERPL-MCNC: 5.7 G/DL (ref 6.4–8.4)
RBC # BLD AUTO: 5.37 MILLION/UL (ref 3.81–5.12)
SODIUM SERPL-SCNC: 141 MMOL/L (ref 135–147)
WBC # BLD AUTO: 7.36 THOUSAND/UL (ref 4.31–10.16)

## 2023-08-04 PROCEDURE — 80053 COMPREHEN METABOLIC PANEL: CPT | Performed by: INTERNAL MEDICINE

## 2023-08-04 PROCEDURE — 85025 COMPLETE CBC W/AUTO DIFF WBC: CPT | Performed by: INTERNAL MEDICINE

## 2023-08-09 ENCOUNTER — TELEPHONE (OUTPATIENT)
Dept: HEMATOLOGY ONCOLOGY | Facility: CLINIC | Age: 63
End: 2023-08-09

## 2023-08-09 ENCOUNTER — HOSPITAL ENCOUNTER (OUTPATIENT)
Dept: NUCLEAR MEDICINE | Facility: HOSPITAL | Age: 63
Discharge: HOME | End: 2023-08-09
Attending: INTERNAL MEDICINE

## 2023-08-09 DIAGNOSIS — C82.18 GRADE 2 FOLLICULAR LYMPHOMA OF LYMPH NODES OF MULTIPLE REGIONS (HCC): ICD-10-CM

## 2023-08-09 DIAGNOSIS — C82.18 GRADE 2 FOLLICULAR LYMPHOMA OF LYMPH NODES OF MULTIPLE REGIONS (HCC): Primary | ICD-10-CM

## 2023-08-09 NOTE — TELEPHONE ENCOUNTER
Phoned [id number] and spoke to [facility] chemo  to ask that pt get scheduled for a possible cycle 7 on 8/30/23 her Pet Scan has been R/S to 8/22/23.

## 2023-08-11 ENCOUNTER — HOSPITAL ENCOUNTER (OUTPATIENT)
Dept: INFUSION CENTER | Facility: HOSPITAL | Age: 63
End: 2023-08-11
Payer: COMMERCIAL

## 2023-08-11 DIAGNOSIS — Z45.2 ENCOUNTER FOR CENTRAL LINE CARE: ICD-10-CM

## 2023-08-11 DIAGNOSIS — C82.18 GRADE 2 FOLLICULAR LYMPHOMA OF LYMPH NODES OF MULTIPLE REGIONS (HCC): ICD-10-CM

## 2023-08-11 DIAGNOSIS — D51.8 OTHER VITAMIN B12 DEFICIENCY ANEMIAS: Primary | ICD-10-CM

## 2023-08-11 LAB
ALBUMIN SERPL BCP-MCNC: 4.2 G/DL (ref 3.5–5)
ALP SERPL-CCNC: 99 U/L (ref 34–104)
ALT SERPL W P-5'-P-CCNC: 13 U/L (ref 7–52)
ANION GAP SERPL CALCULATED.3IONS-SCNC: 7 MMOL/L
AST SERPL W P-5'-P-CCNC: 12 U/L (ref 13–39)
BASOPHILS # BLD AUTO: 0.04 THOUSANDS/ÂΜL (ref 0–0.1)
BASOPHILS NFR BLD AUTO: 1 % (ref 0–1)
BILIRUB SERPL-MCNC: 0.48 MG/DL (ref 0.2–1)
BUN SERPL-MCNC: 17 MG/DL (ref 5–25)
CALCIUM SERPL-MCNC: 9.1 MG/DL (ref 8.4–10.2)
CHLORIDE SERPL-SCNC: 105 MMOL/L (ref 96–108)
CO2 SERPL-SCNC: 27 MMOL/L (ref 21–32)
CREAT SERPL-MCNC: 0.66 MG/DL (ref 0.6–1.3)
EOSINOPHIL # BLD AUTO: 0.2 THOUSAND/ÂΜL (ref 0–0.61)
EOSINOPHIL NFR BLD AUTO: 3 % (ref 0–6)
ERYTHROCYTE [DISTWIDTH] IN BLOOD BY AUTOMATED COUNT: 17.2 % (ref 11.6–15.1)
GFR SERPL CREATININE-BSD FRML MDRD: 95 ML/MIN/1.73SQ M
GLUCOSE SERPL-MCNC: 90 MG/DL (ref 65–140)
HCT VFR BLD AUTO: 38.4 % (ref 34.8–46.1)
HGB BLD-MCNC: 11.5 G/DL (ref 11.5–15.4)
IMM GRANULOCYTES # BLD AUTO: 0.03 THOUSAND/UL (ref 0–0.2)
IMM GRANULOCYTES NFR BLD AUTO: 0 % (ref 0–2)
LYMPHOCYTES # BLD AUTO: 1.61 THOUSANDS/ÂΜL (ref 0.6–4.47)
LYMPHOCYTES NFR BLD AUTO: 21 % (ref 14–44)
MCH RBC QN AUTO: 20.9 PG (ref 26.8–34.3)
MCHC RBC AUTO-ENTMCNC: 29.9 G/DL (ref 31.4–37.4)
MCV RBC AUTO: 70 FL (ref 82–98)
MONOCYTES # BLD AUTO: 0.6 THOUSAND/ÂΜL (ref 0.17–1.22)
MONOCYTES NFR BLD AUTO: 8 % (ref 4–12)
NEUTROPHILS # BLD AUTO: 5.26 THOUSANDS/ÂΜL (ref 1.85–7.62)
NEUTS SEG NFR BLD AUTO: 67 % (ref 43–75)
NRBC BLD AUTO-RTO: 0 /100 WBCS
PLATELET # BLD AUTO: 53 THOUSANDS/UL (ref 149–390)
POTASSIUM SERPL-SCNC: 4.3 MMOL/L (ref 3.5–5.3)
PROT SERPL-MCNC: 6.2 G/DL (ref 6.4–8.4)
RBC # BLD AUTO: 5.51 MILLION/UL (ref 3.81–5.12)
SODIUM SERPL-SCNC: 139 MMOL/L (ref 135–147)
WBC # BLD AUTO: 7.74 THOUSAND/UL (ref 4.31–10.16)

## 2023-08-11 PROCEDURE — 96372 THER/PROPH/DIAG INJ SC/IM: CPT

## 2023-08-11 PROCEDURE — 80053 COMPREHEN METABOLIC PANEL: CPT | Performed by: INTERNAL MEDICINE

## 2023-08-11 PROCEDURE — 85025 COMPLETE CBC W/AUTO DIFF WBC: CPT | Performed by: INTERNAL MEDICINE

## 2023-08-11 RX ORDER — CYANOCOBALAMIN 1000 UG/ML
1000 INJECTION, SOLUTION INTRAMUSCULAR; SUBCUTANEOUS ONCE
OUTPATIENT
Start: 2023-08-28

## 2023-08-11 RX ORDER — CYANOCOBALAMIN 1000 UG/ML
1000 INJECTION, SOLUTION INTRAMUSCULAR; SUBCUTANEOUS ONCE
Status: COMPLETED | OUTPATIENT
Start: 2023-08-11 | End: 2023-08-11

## 2023-08-11 RX ADMIN — CYANOCOBALAMIN 1000 MCG: 1000 INJECTION INTRAMUSCULAR; SUBCUTANEOUS at 14:28

## 2023-08-11 NOTE — PROGRESS NOTES
Port flushed per protocol, blood return noted, not enough blood flow to obtain labs at this time. Labs drawn peripherally and sent. Tolerated R arm B12 injection without issue. Next appointment confirmed, AVS declined.

## 2023-08-14 DIAGNOSIS — C82.18 GRADE 2 FOLLICULAR LYMPHOMA OF LYMPH NODES OF MULTIPLE REGIONS (HCC): ICD-10-CM

## 2023-08-14 RX ORDER — LENALIDOMIDE 20 MG/1
CAPSULE ORAL
Qty: 21 CAPSULE | Refills: 0 | Status: SHIPPED | OUTPATIENT
Start: 2023-08-14

## 2023-08-18 ENCOUNTER — HOSPITAL ENCOUNTER (OUTPATIENT)
Dept: INFUSION CENTER | Facility: HOSPITAL | Age: 63
End: 2023-08-18
Attending: INTERNAL MEDICINE
Payer: COMMERCIAL

## 2023-08-18 DIAGNOSIS — C82.18 GRADE 2 FOLLICULAR LYMPHOMA OF LYMPH NODES OF MULTIPLE REGIONS (HCC): Primary | ICD-10-CM

## 2023-08-18 DIAGNOSIS — Z45.2 ENCOUNTER FOR CENTRAL LINE CARE: ICD-10-CM

## 2023-08-18 LAB
ALBUMIN SERPL BCP-MCNC: 4.2 G/DL (ref 3.5–5)
ALP SERPL-CCNC: 104 U/L (ref 34–104)
ALT SERPL W P-5'-P-CCNC: 10 U/L (ref 7–52)
ANION GAP SERPL CALCULATED.3IONS-SCNC: 7 MMOL/L
AST SERPL W P-5'-P-CCNC: 10 U/L (ref 13–39)
BASOPHILS # BLD AUTO: 0.04 THOUSANDS/ÂΜL (ref 0–0.1)
BASOPHILS NFR BLD AUTO: 1 % (ref 0–1)
BILIRUB SERPL-MCNC: 0.41 MG/DL (ref 0.2–1)
BUN SERPL-MCNC: 14 MG/DL (ref 5–25)
CALCIUM SERPL-MCNC: 9 MG/DL (ref 8.4–10.2)
CHLORIDE SERPL-SCNC: 106 MMOL/L (ref 96–108)
CO2 SERPL-SCNC: 26 MMOL/L (ref 21–32)
CREAT SERPL-MCNC: 0.66 MG/DL (ref 0.6–1.3)
EOSINOPHIL # BLD AUTO: 0.33 THOUSAND/ÂΜL (ref 0–0.61)
EOSINOPHIL NFR BLD AUTO: 5 % (ref 0–6)
ERYTHROCYTE [DISTWIDTH] IN BLOOD BY AUTOMATED COUNT: 17.3 % (ref 11.6–15.1)
GFR SERPL CREATININE-BSD FRML MDRD: 95 ML/MIN/1.73SQ M
GLUCOSE SERPL-MCNC: 92 MG/DL (ref 65–140)
HCT VFR BLD AUTO: 39.6 % (ref 34.8–46.1)
HGB BLD-MCNC: 11.6 G/DL (ref 11.5–15.4)
IMM GRANULOCYTES # BLD AUTO: 0.02 THOUSAND/UL (ref 0–0.2)
IMM GRANULOCYTES NFR BLD AUTO: 0 % (ref 0–2)
LYMPHOCYTES # BLD AUTO: 1.81 THOUSANDS/ÂΜL (ref 0.6–4.47)
LYMPHOCYTES NFR BLD AUTO: 26 % (ref 14–44)
MCH RBC QN AUTO: 20.3 PG (ref 26.8–34.3)
MCHC RBC AUTO-ENTMCNC: 29.3 G/DL (ref 31.4–37.4)
MCV RBC AUTO: 69 FL (ref 82–98)
MONOCYTES # BLD AUTO: 0.51 THOUSAND/ÂΜL (ref 0.17–1.22)
MONOCYTES NFR BLD AUTO: 7 % (ref 4–12)
NEUTROPHILS # BLD AUTO: 4.24 THOUSANDS/ÂΜL (ref 1.85–7.62)
NEUTS SEG NFR BLD AUTO: 61 % (ref 43–75)
NRBC BLD AUTO-RTO: 0 /100 WBCS
PLATELET # BLD AUTO: 94 THOUSANDS/UL (ref 149–390)
POTASSIUM SERPL-SCNC: 3.9 MMOL/L (ref 3.5–5.3)
PROT SERPL-MCNC: 6.2 G/DL (ref 6.4–8.4)
RBC # BLD AUTO: 5.72 MILLION/UL (ref 3.81–5.12)
SODIUM SERPL-SCNC: 139 MMOL/L (ref 135–147)
WBC # BLD AUTO: 6.95 THOUSAND/UL (ref 4.31–10.16)

## 2023-08-18 PROCEDURE — 80053 COMPREHEN METABOLIC PANEL: CPT | Performed by: INTERNAL MEDICINE

## 2023-08-18 PROCEDURE — 85025 COMPLETE CBC W/AUTO DIFF WBC: CPT | Performed by: INTERNAL MEDICINE

## 2023-08-18 NOTE — PROGRESS NOTES
Labs drawn and sent as ordered. Port has brisk blood return and flushes freely. Left unit ambulatory with a steady gait.

## 2023-08-22 ENCOUNTER — HOSPITAL ENCOUNTER (OUTPATIENT)
Dept: NUCLEAR MEDICINE | Facility: HOSPITAL | Age: 63
Discharge: HOME | End: 2023-08-22
Attending: INTERNAL MEDICINE
Payer: COMMERCIAL

## 2023-08-22 LAB — GLUCOSE SERPL-MCNC: 96 MG/DL (ref 65–140)

## 2023-08-22 PROCEDURE — 82948 REAGENT STRIP/BLOOD GLUCOSE: CPT

## 2023-08-22 PROCEDURE — A9552 F18 FDG: HCPCS

## 2023-08-22 PROCEDURE — 78815 PET IMAGE W/CT SKULL-THIGH: CPT

## 2023-08-23 DIAGNOSIS — C82.18 GRADE 2 FOLLICULAR LYMPHOMA OF LYMPH NODES OF MULTIPLE REGIONS (HCC): Primary | ICD-10-CM

## 2023-08-23 RX ORDER — SODIUM CHLORIDE 9 MG/ML
20 INJECTION, SOLUTION INTRAVENOUS ONCE
Status: CANCELLED | OUTPATIENT
Start: 2023-08-30

## 2023-08-23 RX ORDER — ACETAMINOPHEN 325 MG/1
650 TABLET ORAL ONCE
Status: CANCELLED | OUTPATIENT
Start: 2023-08-30

## 2023-08-25 ENCOUNTER — HOSPITAL ENCOUNTER (OUTPATIENT)
Dept: INFUSION CENTER | Facility: HOSPITAL | Age: 63
End: 2023-08-25
Attending: INTERNAL MEDICINE
Payer: COMMERCIAL

## 2023-08-25 DIAGNOSIS — C82.18 GRADE 2 FOLLICULAR LYMPHOMA OF LYMPH NODES OF MULTIPLE REGIONS (HCC): ICD-10-CM

## 2023-08-25 DIAGNOSIS — Z45.2 ENCOUNTER FOR CENTRAL LINE CARE: Primary | ICD-10-CM

## 2023-08-25 LAB
ALBUMIN SERPL BCP-MCNC: 4 G/DL (ref 3.5–5)
ALP SERPL-CCNC: 102 U/L (ref 34–104)
ALT SERPL W P-5'-P-CCNC: 12 U/L (ref 7–52)
ANION GAP SERPL CALCULATED.3IONS-SCNC: 6 MMOL/L
AST SERPL W P-5'-P-CCNC: 12 U/L (ref 13–39)
BASOPHILS # BLD AUTO: 0.04 THOUSANDS/ÂΜL (ref 0–0.1)
BASOPHILS NFR BLD AUTO: 1 % (ref 0–1)
BILIRUB SERPL-MCNC: 0.44 MG/DL (ref 0.2–1)
BUN SERPL-MCNC: 16 MG/DL (ref 5–25)
CALCIUM SERPL-MCNC: 8.8 MG/DL (ref 8.4–10.2)
CHLORIDE SERPL-SCNC: 107 MMOL/L (ref 96–108)
CO2 SERPL-SCNC: 26 MMOL/L (ref 21–32)
CREAT SERPL-MCNC: 0.7 MG/DL (ref 0.6–1.3)
EOSINOPHIL # BLD AUTO: 0.31 THOUSAND/ÂΜL (ref 0–0.61)
EOSINOPHIL NFR BLD AUTO: 4 % (ref 0–6)
ERYTHROCYTE [DISTWIDTH] IN BLOOD BY AUTOMATED COUNT: 17.7 % (ref 11.6–15.1)
GFR SERPL CREATININE-BSD FRML MDRD: 93 ML/MIN/1.73SQ M
GLUCOSE SERPL-MCNC: 87 MG/DL (ref 65–140)
HCT VFR BLD AUTO: 37 % (ref 34.8–46.1)
HGB BLD-MCNC: 10.9 G/DL (ref 11.5–15.4)
IMM GRANULOCYTES # BLD AUTO: 0.04 THOUSAND/UL (ref 0–0.2)
IMM GRANULOCYTES NFR BLD AUTO: 1 % (ref 0–2)
LYMPHOCYTES # BLD AUTO: 1.59 THOUSANDS/ÂΜL (ref 0.6–4.47)
LYMPHOCYTES NFR BLD AUTO: 20 % (ref 14–44)
MCH RBC QN AUTO: 20.4 PG (ref 26.8–34.3)
MCHC RBC AUTO-ENTMCNC: 29.5 G/DL (ref 31.4–37.4)
MCV RBC AUTO: 69 FL (ref 82–98)
MONOCYTES # BLD AUTO: 0.62 THOUSAND/ÂΜL (ref 0.17–1.22)
MONOCYTES NFR BLD AUTO: 8 % (ref 4–12)
NEUTROPHILS # BLD AUTO: 5.18 THOUSANDS/ÂΜL (ref 1.85–7.62)
NEUTS SEG NFR BLD AUTO: 66 % (ref 43–75)
NRBC BLD AUTO-RTO: 0 /100 WBCS
PLATELET # BLD AUTO: 108 THOUSANDS/UL (ref 149–390)
POTASSIUM SERPL-SCNC: 3.8 MMOL/L (ref 3.5–5.3)
PROT SERPL-MCNC: 6 G/DL (ref 6.4–8.4)
RBC # BLD AUTO: 5.34 MILLION/UL (ref 3.81–5.12)
SODIUM SERPL-SCNC: 139 MMOL/L (ref 135–147)
WBC # BLD AUTO: 7.78 THOUSAND/UL (ref 4.31–10.16)

## 2023-08-25 PROCEDURE — 80053 COMPREHEN METABOLIC PANEL: CPT | Performed by: INTERNAL MEDICINE

## 2023-08-25 PROCEDURE — 85025 COMPLETE CBC W/AUTO DIFF WBC: CPT | Performed by: INTERNAL MEDICINE

## 2023-08-29 DIAGNOSIS — C82.18 GRADE 2 FOLLICULAR LYMPHOMA OF LYMPH NODES OF MULTIPLE REGIONS (HCC): ICD-10-CM

## 2023-08-29 DIAGNOSIS — R10.84 GENERALIZED ABDOMINAL PAIN: ICD-10-CM

## 2023-08-29 DIAGNOSIS — K08.89 TOOTHACHE: ICD-10-CM

## 2023-08-29 DIAGNOSIS — K59.01 SLOW TRANSIT CONSTIPATION: ICD-10-CM

## 2023-08-29 RX ORDER — POLYETHYLENE GLYCOL 3350 17 G/17G
POWDER ORAL
Qty: 510 G | Refills: 3 | Status: SHIPPED | OUTPATIENT
Start: 2023-08-29

## 2023-08-29 RX ORDER — ACETAMINOPHEN 325 MG/1
650 TABLET ORAL EVERY 6 HOURS PRN
Qty: 30 TABLET | Refills: 0 | Status: SHIPPED | OUTPATIENT
Start: 2023-08-29

## 2023-08-29 RX ORDER — OXYCODONE HYDROCHLORIDE 10 MG/1
10 TABLET ORAL EVERY 8 HOURS PRN
Qty: 90 TABLET | Refills: 0 | Status: SHIPPED | OUTPATIENT
Start: 2023-08-31

## 2023-08-29 NOTE — TELEPHONE ENCOUNTER
Cont[place] Sub[place] Review    [place]  reviewed: No red flags were identified; safe to proceed with prescription. Charisse Lilly     PATIENT ID PRESCRIPTION # FILLED WRITTEN DRUG LABEL QTY DAYS STRENGTH MME** PRESCRIBER PHARMACY PAYMENT REFILL #/AUTH STATE DETAIL  1 [id number] 08/03/2023 07/26/2023 oxyCODONE HCL (Tablet) 90.0 30 10 MG 45.0 [facility] Medicaid 0 / 0 [place]   1 [id number] 07/05/2023 06/28/2023 oxyCODONE HCL (Tablet) 90.0 30 10 MG 45.0 [facility] Private Pay 0 / 0 [place]   1 [id number] 06/06/2023 06/02/2023 oxyCODONE HCL (Tablet) 90.0 30 10 MG 45.0 [facility] Private Pay 0 / 0 [place]   1 [id number] 05/09/2023 05/09/2023 oxyCODONE HCL (Tablet) 90.0 30 10 MG 45.0 [facility] Private Pay 0 / 0 [place]   1 [id number] 03/23/2023 03/23/2023 oxyCODONE HCL (Tablet) 90.0 23 10 MG 58.70 [facility] Medicaid 0 / 0 [place]   1 [id number] 03/01/2023 02/24/2023 oxyCODONE HCL (Tablet) 90.0 23 10 MG 58.70 [facility] Medicaid 0 / 0 [place]   1 [id number] 02/07/2023 02/07/2023 oxyCODONE HCL (Tablet) 90.0 23 10 MG 58.70 [facility] Medicaid 0 / 0 [place]     Bebeto Oconnor MD  Palliative Medicine & Supportive Care  Internal Medicine  Available via [address] [place] [address] Text  Office: [phone]  Fax: [phone]

## 2023-08-30 ENCOUNTER — HOSPITAL ENCOUNTER (OUTPATIENT)
Dept: INFUSION CENTER | Facility: HOSPITAL | Age: 63
Discharge: HOME | End: 2023-08-30
Attending: INTERNAL MEDICINE
Payer: COMMERCIAL

## 2023-08-30 VITALS
SYSTOLIC BLOOD PRESSURE: 141 MMHG | RESPIRATION RATE: 18 BRPM | TEMPERATURE: 98.3 F | OXYGEN SATURATION: 98 % | HEART RATE: 71 BPM | DIASTOLIC BLOOD PRESSURE: 85 MMHG

## 2023-08-30 DIAGNOSIS — C82.18 GRADE 2 FOLLICULAR LYMPHOMA OF LYMPH NODES OF MULTIPLE REGIONS (HCC): Primary | ICD-10-CM

## 2023-08-30 PROCEDURE — 96367 TX/PROPH/DG ADDL SEQ IV INF: CPT

## 2023-08-30 PROCEDURE — 96413 CHEMO IV INFUSION 1 HR: CPT

## 2023-08-30 PROCEDURE — 96415 CHEMO IV INFUSION ADDL HR: CPT

## 2023-08-30 RX ORDER — ACETAMINOPHEN 325 MG/1
650 TABLET ORAL ONCE
Status: COMPLETED | OUTPATIENT
Start: 2023-08-30 | End: 2023-08-30

## 2023-08-30 RX ORDER — SODIUM CHLORIDE 9 MG/ML
20 INJECTION, SOLUTION INTRAVENOUS ONCE
Status: COMPLETED | OUTPATIENT
Start: 2023-08-30 | End: 2023-08-30

## 2023-08-30 RX ADMIN — SODIUM CHLORIDE 20 ML/HR: 0.9 INJECTION, SOLUTION INTRAVENOUS at 08:59

## 2023-08-30 RX ADMIN — DIPHENHYDRAMINE HYDROCHLORIDE 25 MG: 50 INJECTION, SOLUTION INTRAMUSCULAR; INTRAVENOUS at 09:26

## 2023-08-30 RX ADMIN — ACETAMINOPHEN 650 MG: 325 TABLET ORAL at 08:58

## 2023-08-30 RX ADMIN — OBINUTUZUMAB 1000 MG: 1000 INJECTION, SOLUTION, CONCENTRATE INTRAVENOUS at 10:42

## 2023-08-30 RX ADMIN — DEXAMETHASONE SODIUM PHOSPHATE 20 MG: 10 INJECTION, SOLUTION INTRAMUSCULAR; INTRAVENOUS at 08:59

## 2023-09-06 ENCOUNTER — APPOINTMENT (OUTPATIENT)
Dept: LAB | Facility: HOSPITAL | Age: 63
End: 2023-09-06
Payer: COMMERCIAL

## 2023-09-06 DIAGNOSIS — C82.18 GRADE 2 FOLLICULAR LYMPHOMA OF LYMPH NODES OF MULTIPLE REGIONS (HCC): ICD-10-CM

## 2023-09-06 LAB
ALBUMIN SERPL BCP-MCNC: 4.3 G/DL (ref 3.5–5)
ALP SERPL-CCNC: 110 U/L (ref 34–104)
ALT SERPL W P-5'-P-CCNC: 13 U/L (ref 7–52)
ANION GAP SERPL CALCULATED.3IONS-SCNC: 8 MMOL/L
AST SERPL W P-5'-P-CCNC: 15 U/L (ref 13–39)
BASOPHILS # BLD AUTO: 0.06 THOUSANDS/ÂΜL (ref 0–0.1)
BASOPHILS NFR BLD AUTO: 1 % (ref 0–1)
BILIRUB SERPL-MCNC: 0.57 MG/DL (ref 0.2–1)
BUN SERPL-MCNC: 12 MG/DL (ref 5–25)
CALCIUM SERPL-MCNC: 9.2 MG/DL (ref 8.4–10.2)
CHLORIDE SERPL-SCNC: 106 MMOL/L (ref 96–108)
CO2 SERPL-SCNC: 26 MMOL/L (ref 21–32)
CREAT SERPL-MCNC: 0.8 MG/DL (ref 0.6–1.3)
EOSINOPHIL # BLD AUTO: 0.3 THOUSAND/ÂΜL (ref 0–0.61)
EOSINOPHIL NFR BLD AUTO: 3 % (ref 0–6)
ERYTHROCYTE [DISTWIDTH] IN BLOOD BY AUTOMATED COUNT: 17.8 % (ref 11.6–15.1)
GFR SERPL CREATININE-BSD FRML MDRD: 79 ML/MIN/1.73SQ M
GLUCOSE P FAST SERPL-MCNC: 79 MG/DL (ref 65–99)
HCT VFR BLD AUTO: 38.9 % (ref 34.8–46.1)
HGB BLD-MCNC: 11.5 G/DL (ref 11.5–15.4)
IMM GRANULOCYTES # BLD AUTO: 0.07 THOUSAND/UL (ref 0–0.2)
IMM GRANULOCYTES NFR BLD AUTO: 1 % (ref 0–2)
LYMPHOCYTES # BLD AUTO: 1.4 THOUSANDS/ÂΜL (ref 0.6–4.47)
LYMPHOCYTES NFR BLD AUTO: 16 % (ref 14–44)
MCH RBC QN AUTO: 20.5 PG (ref 26.8–34.3)
MCHC RBC AUTO-ENTMCNC: 29.6 G/DL (ref 31.4–37.4)
MCV RBC AUTO: 69 FL (ref 82–98)
MONOCYTES # BLD AUTO: 0.88 THOUSAND/ÂΜL (ref 0.17–1.22)
MONOCYTES NFR BLD AUTO: 10 % (ref 4–12)
NEUTROPHILS # BLD AUTO: 6.24 THOUSANDS/ÂΜL (ref 1.85–7.62)
NEUTS SEG NFR BLD AUTO: 69 % (ref 43–75)
NRBC BLD AUTO-RTO: 0 /100 WBCS
PLATELET # BLD AUTO: 56 THOUSANDS/UL (ref 149–390)
POTASSIUM SERPL-SCNC: 4.3 MMOL/L (ref 3.5–5.3)
PROT SERPL-MCNC: 6.2 G/DL (ref 6.4–8.4)
RBC # BLD AUTO: 5.62 MILLION/UL (ref 3.81–5.12)
SODIUM SERPL-SCNC: 140 MMOL/L (ref 135–147)
WBC # BLD AUTO: 8.95 THOUSAND/UL (ref 4.31–10.16)

## 2023-09-06 PROCEDURE — 36415 COLL VENOUS BLD VENIPUNCTURE: CPT

## 2023-09-06 PROCEDURE — 80053 COMPREHEN METABOLIC PANEL: CPT

## 2023-09-06 PROCEDURE — 85025 COMPLETE CBC W/AUTO DIFF WBC: CPT

## 2023-09-08 ENCOUNTER — HOSPITAL ENCOUNTER (OUTPATIENT)
Dept: INFUSION CENTER | Facility: HOSPITAL | Age: 63
End: 2023-09-08
Attending: INTERNAL MEDICINE
Payer: COMMERCIAL

## 2023-09-08 DIAGNOSIS — D51.8 OTHER VITAMIN B12 DEFICIENCY ANEMIAS: Primary | ICD-10-CM

## 2023-09-08 DIAGNOSIS — C82.18 GRADE 2 FOLLICULAR LYMPHOMA OF LYMPH NODES OF MULTIPLE REGIONS (HCC): ICD-10-CM

## 2023-09-08 DIAGNOSIS — Z45.2 ENCOUNTER FOR CENTRAL LINE CARE: ICD-10-CM

## 2023-09-08 LAB
ALBUMIN SERPL BCP-MCNC: 4.2 G/DL (ref 3.5–5)
ALP SERPL-CCNC: 99 U/L (ref 34–104)
ALT SERPL W P-5'-P-CCNC: 11 U/L (ref 7–52)
ANION GAP SERPL CALCULATED.3IONS-SCNC: 7 MMOL/L
AST SERPL W P-5'-P-CCNC: 14 U/L (ref 13–39)
BASOPHILS # BLD AUTO: 0.06 THOUSANDS/ÂΜL (ref 0–0.1)
BASOPHILS NFR BLD AUTO: 1 % (ref 0–1)
BILIRUB SERPL-MCNC: 0.53 MG/DL (ref 0.2–1)
BUN SERPL-MCNC: 16 MG/DL (ref 5–25)
CALCIUM SERPL-MCNC: 9 MG/DL (ref 8.4–10.2)
CHLORIDE SERPL-SCNC: 107 MMOL/L (ref 96–108)
CO2 SERPL-SCNC: 25 MMOL/L (ref 21–32)
CREAT SERPL-MCNC: 0.75 MG/DL (ref 0.6–1.3)
EOSINOPHIL # BLD AUTO: 0.17 THOUSAND/ÂΜL (ref 0–0.61)
EOSINOPHIL NFR BLD AUTO: 2 % (ref 0–6)
ERYTHROCYTE [DISTWIDTH] IN BLOOD BY AUTOMATED COUNT: 17.6 % (ref 11.6–15.1)
GFR SERPL CREATININE-BSD FRML MDRD: 86 ML/MIN/1.73SQ M
GLUCOSE SERPL-MCNC: 160 MG/DL (ref 65–140)
HCT VFR BLD AUTO: 37.7 % (ref 34.8–46.1)
HGB BLD-MCNC: 11.2 G/DL (ref 11.5–15.4)
IMM GRANULOCYTES # BLD AUTO: 0.13 THOUSAND/UL (ref 0–0.2)
IMM GRANULOCYTES NFR BLD AUTO: 1 % (ref 0–2)
LYMPHOCYTES # BLD AUTO: 1.09 THOUSANDS/ÂΜL (ref 0.6–4.47)
LYMPHOCYTES NFR BLD AUTO: 12 % (ref 14–44)
MCH RBC QN AUTO: 20.4 PG (ref 26.8–34.3)
MCHC RBC AUTO-ENTMCNC: 29.7 G/DL (ref 31.4–37.4)
MCV RBC AUTO: 69 FL (ref 82–98)
MONOCYTES # BLD AUTO: 0.67 THOUSAND/ÂΜL (ref 0.17–1.22)
MONOCYTES NFR BLD AUTO: 7 % (ref 4–12)
NEUTROPHILS # BLD AUTO: 6.92 THOUSANDS/ÂΜL (ref 1.85–7.62)
NEUTS SEG NFR BLD AUTO: 77 % (ref 43–75)
NRBC BLD AUTO-RTO: 0 /100 WBCS
PLATELET # BLD AUTO: 55 THOUSANDS/UL (ref 149–390)
POTASSIUM SERPL-SCNC: 3.7 MMOL/L (ref 3.5–5.3)
PROT SERPL-MCNC: 6.4 G/DL (ref 6.4–8.4)
RBC # BLD AUTO: 5.49 MILLION/UL (ref 3.81–5.12)
SODIUM SERPL-SCNC: 139 MMOL/L (ref 135–147)
WBC # BLD AUTO: 9.04 THOUSAND/UL (ref 4.31–10.16)

## 2023-09-08 PROCEDURE — 85025 COMPLETE CBC W/AUTO DIFF WBC: CPT | Performed by: INTERNAL MEDICINE

## 2023-09-08 PROCEDURE — 80053 COMPREHEN METABOLIC PANEL: CPT | Performed by: INTERNAL MEDICINE

## 2023-09-08 PROCEDURE — 96372 THER/PROPH/DIAG INJ SC/IM: CPT

## 2023-09-08 RX ORDER — CYANOCOBALAMIN 1000 UG/ML
1000 INJECTION, SOLUTION INTRAMUSCULAR; SUBCUTANEOUS ONCE
OUTPATIENT
Start: 2023-10-06

## 2023-09-08 RX ORDER — CYANOCOBALAMIN 1000 UG/ML
1000 INJECTION, SOLUTION INTRAMUSCULAR; SUBCUTANEOUS ONCE
Status: COMPLETED | OUTPATIENT
Start: 2023-09-08 | End: 2023-09-08

## 2023-09-08 RX ADMIN — CYANOCOBALAMIN 1000 MCG: 1000 INJECTION INTRAMUSCULAR; SUBCUTANEOUS at 14:39

## 2023-09-08 NOTE — PROGRESS NOTES
B12 IM inj given in left deltoid & labs obtained via port. Good blood return noted, flushed per protocol.  Confirmed next appt

## 2023-09-11 ENCOUNTER — OFFICE VISIT (OUTPATIENT)
Dept: HEMATOLOGY ONCOLOGY | Facility: CLINIC | Age: 63
End: 2023-09-11
Payer: COMMERCIAL

## 2023-09-11 VITALS
WEIGHT: 244.5 LBS | SYSTOLIC BLOOD PRESSURE: 138 MMHG | BODY MASS INDEX: 41.74 KG/M2 | TEMPERATURE: 97.7 F | HEIGHT: 64 IN | DIASTOLIC BLOOD PRESSURE: 80 MMHG | RESPIRATION RATE: 20 BRPM | OXYGEN SATURATION: 98 % | HEART RATE: 70 BPM

## 2023-09-11 DIAGNOSIS — C82.18 GRADE 2 FOLLICULAR LYMPHOMA OF LYMPH NODES OF MULTIPLE REGIONS (HCC): Primary | ICD-10-CM

## 2023-09-11 DIAGNOSIS — D69.6 THROMBOCYTOPENIA (HCC): ICD-10-CM

## 2023-09-11 PROCEDURE — 99214 OFFICE O/P EST MOD 30 MIN: CPT | Performed by: INTERNAL MEDICINE
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
## 2023-09-15 ENCOUNTER — HOSPITAL ENCOUNTER (OUTPATIENT)
Dept: INFUSION CENTER | Facility: HOSPITAL | Age: 63
Discharge: HOME | End: 2023-09-15
Attending: INTERNAL MEDICINE

## 2023-09-15 DIAGNOSIS — C82.18 GRADE 2 FOLLICULAR LYMPHOMA OF LYMPH NODES OF MULTIPLE REGIONS (HCC): ICD-10-CM

## 2023-09-15 RX ORDER — LENALIDOMIDE 10 MG/1
CAPSULE ORAL
Qty: 21 CAPSULE | Refills: 0 | Status: SHIPPED | OUTPATIENT
Start: 2023-09-15

## 2023-09-15 NOTE — PROGRESS NOTES
Pt arrived for today's appt, however there are no lab orders for today. Per Khalida Vanegas RN at Dr. Ya [place] office, pt does not need labs until next chemo appt. Pt made aware and discharged to home with [facility].

## 2023-09-21 DIAGNOSIS — C82.18 GRADE 2 FOLLICULAR LYMPHOMA OF LYMPH NODES OF MULTIPLE REGIONS (HCC): Primary | ICD-10-CM

## 2023-09-21 RX ORDER — SODIUM CHLORIDE 9 MG/ML
20 INJECTION, SOLUTION INTRAVENOUS ONCE
Status: CANCELLED | OUTPATIENT
Start: 2023-09-28

## 2023-09-21 RX ORDER — ACETAMINOPHEN 325 MG/1
650 TABLET ORAL ONCE
Status: CANCELLED | OUTPATIENT
Start: 2023-09-28

## 2023-09-22 ENCOUNTER — HOSPITAL ENCOUNTER (OUTPATIENT)
Dept: INFUSION CENTER | Facility: HOSPITAL | Age: 63
End: 2023-09-22
Payer: COMMERCIAL

## 2023-09-22 DIAGNOSIS — C82.18 GRADE 2 FOLLICULAR LYMPHOMA OF LYMPH NODES OF MULTIPLE REGIONS (HCC): Primary | ICD-10-CM

## 2023-09-22 DIAGNOSIS — Z45.2 ENCOUNTER FOR CENTRAL LINE CARE: ICD-10-CM

## 2023-09-22 LAB
ALBUMIN SERPL BCP-MCNC: 4 G/DL (ref 3.5–5)
ALP SERPL-CCNC: 111 U/L (ref 34–104)
ALT SERPL W P-5'-P-CCNC: 17 U/L (ref 7–52)
ANION GAP SERPL CALCULATED.3IONS-SCNC: 8 MMOL/L
AST SERPL W P-5'-P-CCNC: 15 U/L (ref 13–39)
BASOPHILS # BLD AUTO: 0.03 THOUSANDS/ÂΜL (ref 0–0.1)
BASOPHILS NFR BLD AUTO: 1 % (ref 0–1)
BILIRUB SERPL-MCNC: 0.36 MG/DL (ref 0.2–1)
BUN SERPL-MCNC: 13 MG/DL (ref 5–25)
CALCIUM SERPL-MCNC: 8.9 MG/DL (ref 8.4–10.2)
CHLORIDE SERPL-SCNC: 107 MMOL/L (ref 96–108)
CO2 SERPL-SCNC: 27 MMOL/L (ref 21–32)
CREAT SERPL-MCNC: 0.68 MG/DL (ref 0.6–1.3)
CRP SERPL QL: 23.9 MG/L
EOSINOPHIL # BLD AUTO: 0.27 THOUSAND/ÂΜL (ref 0–0.61)
EOSINOPHIL NFR BLD AUTO: 5 % (ref 0–6)
ERYTHROCYTE [DISTWIDTH] IN BLOOD BY AUTOMATED COUNT: 16.7 % (ref 11.6–15.1)
ERYTHROCYTE [SEDIMENTATION RATE] IN BLOOD: 45 MM/HOUR (ref 0–29)
GFR SERPL CREATININE-BSD FRML MDRD: 94 ML/MIN/1.73SQ M
GLUCOSE SERPL-MCNC: 144 MG/DL (ref 65–140)
HCT VFR BLD AUTO: 37 % (ref 34.8–46.1)
HGB BLD-MCNC: 10.9 G/DL (ref 11.5–15.4)
IMM GRANULOCYTES # BLD AUTO: 0.04 THOUSAND/UL (ref 0–0.2)
IMM GRANULOCYTES NFR BLD AUTO: 1 % (ref 0–2)
LDH SERPL-CCNC: 200 U/L (ref 140–271)
LYMPHOCYTES # BLD AUTO: 1.37 THOUSANDS/ÂΜL (ref 0.6–4.47)
LYMPHOCYTES NFR BLD AUTO: 24 % (ref 14–44)
MAGNESIUM SERPL-MCNC: 2.1 MG/DL (ref 1.9–2.7)
MCH RBC QN AUTO: 20.3 PG (ref 26.8–34.3)
MCHC RBC AUTO-ENTMCNC: 29.5 G/DL (ref 31.4–37.4)
MCV RBC AUTO: 69 FL (ref 82–98)
MONOCYTES # BLD AUTO: 0.5 THOUSAND/ÂΜL (ref 0.17–1.22)
MONOCYTES NFR BLD AUTO: 9 % (ref 4–12)
NEUTROPHILS # BLD AUTO: 3.44 THOUSANDS/ÂΜL (ref 1.85–7.62)
NEUTS SEG NFR BLD AUTO: 60 % (ref 43–75)
NRBC BLD AUTO-RTO: 0 /100 WBCS
PLATELET # BLD AUTO: 126 THOUSANDS/UL (ref 149–390)
POTASSIUM SERPL-SCNC: 3.6 MMOL/L (ref 3.5–5.3)
PROT SERPL-MCNC: 6.1 G/DL (ref 6.4–8.4)
RBC # BLD AUTO: 5.38 MILLION/UL (ref 3.81–5.12)
SODIUM SERPL-SCNC: 142 MMOL/L (ref 135–147)
WBC # BLD AUTO: 5.65 THOUSAND/UL (ref 4.31–10.16)

## 2023-09-22 PROCEDURE — 83735 ASSAY OF MAGNESIUM: CPT

## 2023-09-22 PROCEDURE — 86140 C-REACTIVE PROTEIN: CPT

## 2023-09-22 PROCEDURE — 83615 LACTATE (LD) (LDH) ENZYME: CPT

## 2023-09-22 PROCEDURE — 85652 RBC SED RATE AUTOMATED: CPT

## 2023-09-22 PROCEDURE — 85025 COMPLETE CBC W/AUTO DIFF WBC: CPT | Performed by: INTERNAL MEDICINE

## 2023-09-22 PROCEDURE — 80053 COMPREHEN METABOLIC PANEL: CPT | Performed by: INTERNAL MEDICINE

## 2023-09-22 NOTE — PROGRESS NOTES
Labs drawn via port per protocol without complications. No complaints offered. AVS declined. Left unit in stable condition.

## 2023-09-26 ENCOUNTER — TELEPHONE (OUTPATIENT)
Dept: HEMATOLOGY ONCOLOGY | Facility: CLINIC | Age: 63
End: 2023-09-26

## 2023-09-26 NOTE — TELEPHONE ENCOUNTER
Patient Call    Who are you speaking with? Pharmacy    If it is not the patient, are they listed on an active communication consent form? N/A   What is the reason for this call? Ashia from [facility] was calling on clarification on her Revlmid 10mg but when they called patient to schedule delivery she was unaware of this and thought she was getting a new treatment. They would like a call back to confirm the correct direction. And also to advise the patient on what she needs to do   Does this require a call back? Yes   If a call back is required, please list best call back number [phone]   If a call back is required, advise that a message will be forwarded to their care team and someone will return their call as soon as possible. Did you relay this information to the patient?  Yes

## 2023-09-27 DIAGNOSIS — C82.18 GRADE 2 FOLLICULAR LYMPHOMA OF LYMPH NODES OF MULTIPLE REGIONS (HCC): Primary | ICD-10-CM

## 2023-09-27 NOTE — TELEPHONE ENCOUNTER
Phoned [facility] and explained that it is documented in office notes that the new plan is Obintuzumab once a month with maintenance Revlimid 10 mg po Days 1-21.

## 2023-09-28 ENCOUNTER — HOSPITAL ENCOUNTER (OUTPATIENT)
Dept: INFUSION CENTER | Facility: HOSPITAL | Age: 63
End: 2023-09-28
Attending: INTERNAL MEDICINE
Payer: COMMERCIAL

## 2023-09-28 VITALS
WEIGHT: 244.49 LBS | BODY MASS INDEX: 41.74 KG/M2 | HEART RATE: 69 BPM | OXYGEN SATURATION: 99 % | TEMPERATURE: 97.8 F | SYSTOLIC BLOOD PRESSURE: 154 MMHG | HEIGHT: 64 IN | DIASTOLIC BLOOD PRESSURE: 77 MMHG | RESPIRATION RATE: 18 BRPM

## 2023-09-28 DIAGNOSIS — C82.18 GRADE 2 FOLLICULAR LYMPHOMA OF LYMPH NODES OF MULTIPLE REGIONS (HCC): Primary | ICD-10-CM

## 2023-09-28 DIAGNOSIS — C82.18 GRADE 2 FOLLICULAR LYMPHOMA OF LYMPH NODES OF MULTIPLE REGIONS (HCC): ICD-10-CM

## 2023-09-28 PROCEDURE — 96413 CHEMO IV INFUSION 1 HR: CPT

## 2023-09-28 PROCEDURE — 96415 CHEMO IV INFUSION ADDL HR: CPT

## 2023-09-28 PROCEDURE — 96367 TX/PROPH/DG ADDL SEQ IV INF: CPT

## 2023-09-28 RX ORDER — ACETAMINOPHEN 325 MG/1
650 TABLET ORAL ONCE
Status: COMPLETED | OUTPATIENT
Start: 2023-09-28 | End: 2023-09-28

## 2023-09-28 RX ORDER — SODIUM CHLORIDE 9 MG/ML
20 INJECTION, SOLUTION INTRAVENOUS ONCE
Status: COMPLETED | OUTPATIENT
Start: 2023-09-28 | End: 2023-09-28

## 2023-09-28 RX ORDER — OXYCODONE HYDROCHLORIDE 10 MG/1
10 TABLET ORAL EVERY 8 HOURS PRN
Qty: 90 TABLET | Refills: 0 | Status: SHIPPED | OUTPATIENT
Start: 2023-09-28

## 2023-09-28 RX ADMIN — SODIUM CHLORIDE 20 ML/HR: 0.9 INJECTION, SOLUTION INTRAVENOUS at 08:46

## 2023-09-28 RX ADMIN — DIPHENHYDRAMINE HYDROCHLORIDE 25 MG: 50 INJECTION, SOLUTION INTRAMUSCULAR; INTRAVENOUS at 09:18

## 2023-09-28 RX ADMIN — ACETAMINOPHEN 650 MG: 325 TABLET ORAL at 09:18

## 2023-09-28 RX ADMIN — DEXAMETHASONE SODIUM PHOSPHATE 20 MG: 10 INJECTION, SOLUTION INTRAMUSCULAR; INTRAVENOUS at 08:49

## 2023-09-28 RX ADMIN — OBINUTUZUMAB 1000 MG: 1000 INJECTION, SOLUTION, CONCENTRATE INTRAVENOUS at 10:15

## 2023-09-28 NOTE — PROGRESS NOTES
Treatment tolerated well without complications. No complaints offered. AVS provided. Left unit in stable condition.

## 2023-09-28 NOTE — TELEPHONE ENCOUNTER
Controlled Substance Review    [place] or [place]  reviewed: No red flags were identified; safe to proceed with prescription. Emma Owens     08/31/2023 08/29/2023   2  Oxycodone Hcl (Ir) 10 Mg Tab 90.00  25  Ti Joselito  [id number]   All (3848)  0  54.00 MME  [place]   08/03/2023 07/26/2023   2  Oxycodone Hcl (Ir) 10 Mg Tab 90.00  30  Ti Joselito  [id number]   All (3848)  0  45.00 MME  [place]   07/05/2023 06/28/2023   2  Oxycodone Hcl (Ir) 10 Mg Tab 90.00  30  Ti Joselito  [id number]   All (3848)  0  45.00 MME  [place]   06/06/2023 06/02/2023   2  Oxycodone Hcl (Ir) 10 Mg Tab 90.00  30  Ti Joselito  [id number]   All (3848)  0  45.00 MME  [place]   05/09/2023 05/09/2023   2  Oxycodone Hcl (Ir) 10 Mg Tab 90.00  30  Ti Joselito  [id number]   All (3848)  0  45.00 MME  [place]    Melissa Man MD  Palliative Medicine & Supportive Care  Internal Medicine  Available via [facility] Text  Office: [phone]  Fax: [phone]

## 2023-10-02 ENCOUNTER — TELEPHONE (OUTPATIENT)
Dept: HEMATOLOGY ONCOLOGY | Facility: CLINIC | Age: 63
End: 2023-10-02

## 2023-10-02 ENCOUNTER — OFFICE VISIT (OUTPATIENT)
Dept: HEMATOLOGY ONCOLOGY | Facility: CLINIC | Age: 63
End: 2023-10-02
Payer: COMMERCIAL

## 2023-10-02 VITALS
OXYGEN SATURATION: 97 % | SYSTOLIC BLOOD PRESSURE: 124 MMHG | TEMPERATURE: 98.4 F | DIASTOLIC BLOOD PRESSURE: 78 MMHG | HEIGHT: 64 IN | WEIGHT: 243 LBS | RESPIRATION RATE: 18 BRPM | HEART RATE: 71 BPM | BODY MASS INDEX: 41.48 KG/M2

## 2023-10-02 DIAGNOSIS — R59.0 CERVICAL ADENOPATHY: ICD-10-CM

## 2023-10-02 DIAGNOSIS — D69.6 THROMBOCYTOPENIA (HCC): ICD-10-CM

## 2023-10-02 DIAGNOSIS — R05.2 SUBACUTE COUGH: Primary | ICD-10-CM

## 2023-10-02 DIAGNOSIS — C82.18 GRADE 2 FOLLICULAR LYMPHOMA OF LYMPH NODES OF MULTIPLE REGIONS (HCC): ICD-10-CM

## 2023-10-02 PROCEDURE — 99215 OFFICE O/P EST HI 40 MIN: CPT | Performed by: PHYSICIAN ASSISTANT

## 2023-10-02 RX ORDER — METHYLPREDNISOLONE 4 MG/1
TABLET ORAL
Qty: 1 EACH | Refills: 0 | Status: SHIPPED | OUTPATIENT
Start: 2023-10-02

## 2023-10-02 RX ORDER — AZITHROMYCIN 250 MG/1
TABLET, FILM COATED ORAL
Qty: 6 TABLET | Refills: 0 | Status: SHIPPED | OUTPATIENT
Start: 2023-10-02 | End: 2023-10-06
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
## 2023-10-02 NOTE — TELEPHONE ENCOUNTER
Patient Call    Who are you speaking with? Patient    If it is not the patient, are they listed on an active communication consent form? N/A   What is the reason for this call? Patient calling to see if transportation has been set up for her appointment today at 1:00pm with Martir Valdez at [facility]. Does this require a call back? Yes   If a call back is required, please list best call back number [phone]   If a call back is required, advise that a message will be forwarded to their care team and someone will return their call as soon as possible. Did you relay this information to the patient?  Yes

## 2023-10-02 NOTE — PATIENT INSTRUCTIONS
[facility]  ACUITY [facility] AT [place] - [phone]    Your Team Members:  Advanced Practitioner:  Licha Coello PA-C  Oncology Nurse:   Donita Cormier RN ([phone]) M-F 8am - 4:30pm    Please answer Private and Unavailable Calls - this may be your team(s) contacting you.   If you have medical questions/concerns/issues - contact us either by (1) My Chart (2) Hope Line

## 2023-10-06 ENCOUNTER — DOCUMENTATION (OUTPATIENT)
Dept: HEMATOLOGY ONCOLOGY | Facility: MEDICAL CENTER | Age: 63
End: 2023-10-06

## 2023-10-06 NOTE — PROGRESS NOTES
I spoke with patient this morning and she is aware that [facility] will be picking her up the morning of Mon. 10/9/23 for her appointment at 8:30am with Dr. America Anaya) to the [facility] F[facility].

## 2023-10-13 ENCOUNTER — VBI (OUTPATIENT)
Dept: ADMINISTRATIVE | Facility: OTHER | Age: 63
End: 2023-10-13

## 2023-10-20 ENCOUNTER — HOSPITAL ENCOUNTER (OUTPATIENT)
Dept: INFUSION CENTER | Facility: HOSPITAL | Age: 63
End: 2023-10-20
Attending: INTERNAL MEDICINE
Payer: COMMERCIAL

## 2023-10-20 ENCOUNTER — OFFICE VISIT (OUTPATIENT)
Dept: FAMILY MEDICINE CLINIC | Facility: CLINIC | Age: 63
End: 2023-10-20

## 2023-10-20 VITALS
HEART RATE: 59 BPM | BODY MASS INDEX: 42.17 KG/M2 | SYSTOLIC BLOOD PRESSURE: 142 MMHG | DIASTOLIC BLOOD PRESSURE: 106 MMHG | TEMPERATURE: 98 F | RESPIRATION RATE: 22 BRPM | WEIGHT: 247 LBS | HEIGHT: 64 IN | OXYGEN SATURATION: 99 %

## 2023-10-20 DIAGNOSIS — E11.69 TYPE 2 DIABETES MELLITUS WITH OTHER SPECIFIED COMPLICATION, UNSPECIFIED WHETHER LONG TERM INSULIN USE (HCC): ICD-10-CM

## 2023-10-20 DIAGNOSIS — C82.18 GRADE 2 FOLLICULAR LYMPHOMA OF LYMPH NODES OF MULTIPLE REGIONS (HCC): ICD-10-CM

## 2023-10-20 DIAGNOSIS — C82.18 GRADE 2 FOLLICULAR LYMPHOMA OF LYMPH NODES OF MULTIPLE REGIONS (HCC): Primary | ICD-10-CM

## 2023-10-20 DIAGNOSIS — Z45.2 ENCOUNTER FOR CENTRAL LINE CARE: Primary | ICD-10-CM

## 2023-10-20 DIAGNOSIS — R53.83 OTHER FATIGUE: Primary | ICD-10-CM

## 2023-10-20 DIAGNOSIS — Z23 ENCOUNTER FOR IMMUNIZATION: ICD-10-CM

## 2023-10-20 DIAGNOSIS — J06.9 UPPER RESPIRATORY TRACT INFECTION, UNSPECIFIED TYPE: ICD-10-CM

## 2023-10-20 DIAGNOSIS — I10 ESSENTIAL HYPERTENSION: ICD-10-CM

## 2023-10-20 LAB
ALBUMIN SERPL BCP-MCNC: 4 G/DL (ref 3.5–5)
ALP SERPL-CCNC: 108 U/L (ref 34–104)
ALT SERPL W P-5'-P-CCNC: 13 U/L (ref 7–52)
ANION GAP SERPL CALCULATED.3IONS-SCNC: 7 MMOL/L
AST SERPL W P-5'-P-CCNC: 12 U/L (ref 13–39)
BASOPHILS # BLD AUTO: 0.02 THOUSANDS/ÂΜL (ref 0–0.1)
BASOPHILS NFR BLD AUTO: 0 % (ref 0–1)
BILIRUB SERPL-MCNC: 0.42 MG/DL (ref 0.2–1)
BUN SERPL-MCNC: 14 MG/DL (ref 5–25)
CALCIUM SERPL-MCNC: 8.7 MG/DL (ref 8.4–10.2)
CHLORIDE SERPL-SCNC: 107 MMOL/L (ref 96–108)
CO2 SERPL-SCNC: 26 MMOL/L (ref 21–32)
CREAT SERPL-MCNC: 0.64 MG/DL (ref 0.6–1.3)
EOSINOPHIL # BLD AUTO: 0.27 THOUSAND/ÂΜL (ref 0–0.61)
EOSINOPHIL NFR BLD AUTO: 4 % (ref 0–6)
ERYTHROCYTE [DISTWIDTH] IN BLOOD BY AUTOMATED COUNT: 17.2 % (ref 11.6–15.1)
GFR SERPL CREATININE-BSD FRML MDRD: 96 ML/MIN/1.73SQ M
GLUCOSE SERPL-MCNC: 110 MG/DL (ref 65–140)
HCT VFR BLD AUTO: 36.8 % (ref 34.8–46.1)
HGB BLD-MCNC: 11.1 G/DL (ref 11.5–15.4)
IMM GRANULOCYTES # BLD AUTO: 0.02 THOUSAND/UL (ref 0–0.2)
IMM GRANULOCYTES NFR BLD AUTO: 0 % (ref 0–2)
LYMPHOCYTES # BLD AUTO: 1.45 THOUSANDS/ÂΜL (ref 0.6–4.47)
LYMPHOCYTES NFR BLD AUTO: 23 % (ref 14–44)
MCH RBC QN AUTO: 20.6 PG (ref 26.8–34.3)
MCHC RBC AUTO-ENTMCNC: 30.2 G/DL (ref 31.4–37.4)
MCV RBC AUTO: 68 FL (ref 82–98)
MONOCYTES # BLD AUTO: 0.44 THOUSAND/ÂΜL (ref 0.17–1.22)
MONOCYTES NFR BLD AUTO: 7 % (ref 4–12)
NEUTROPHILS # BLD AUTO: 4.21 THOUSANDS/ÂΜL (ref 1.85–7.62)
NEUTS SEG NFR BLD AUTO: 66 % (ref 43–75)
NRBC BLD AUTO-RTO: 0 /100 WBCS
PLATELET # BLD AUTO: 169 THOUSANDS/UL (ref 149–390)
POTASSIUM SERPL-SCNC: 3.6 MMOL/L (ref 3.5–5.3)
PROT SERPL-MCNC: 6.1 G/DL (ref 6.4–8.4)
RBC # BLD AUTO: 5.4 MILLION/UL (ref 3.81–5.12)
SODIUM SERPL-SCNC: 140 MMOL/L (ref 135–147)
WBC # BLD AUTO: 6.41 THOUSAND/UL (ref 4.31–10.16)

## 2023-10-20 PROCEDURE — 80053 COMPREHEN METABOLIC PANEL: CPT | Performed by: INTERNAL MEDICINE

## 2023-10-20 PROCEDURE — 87636 SARSCOV2 & INF A&B AMP PRB: CPT | Performed by: STUDENT IN AN ORGANIZED HEALTH CARE EDUCATION/TRAINING PROGRAM

## 2023-10-20 PROCEDURE — 85025 COMPLETE CBC W/AUTO DIFF WBC: CPT

## 2023-10-20 RX ORDER — ACETAMINOPHEN 325 MG/1
650 TABLET ORAL ONCE
OUTPATIENT
Start: 2023-10-27

## 2023-10-20 RX ORDER — SODIUM CHLORIDE 9 MG/ML
20 INJECTION, SOLUTION INTRAVENOUS ONCE
OUTPATIENT
Start: 2023-10-27

## 2023-10-20 RX ORDER — BENZONATATE 200 MG/1
200 CAPSULE ORAL 3 TIMES DAILY PRN
Qty: 20 CAPSULE | Refills: 0 | Status: SHIPPED | OUTPATIENT
Start: 2023-10-20

## 2023-10-20 RX ORDER — GUAIFENESIN/DEXTROMETHORPHAN 100-10MG/5
5 SYRUP ORAL 3 TIMES DAILY PRN
Qty: 473 ML | Refills: 0 | Status: SHIPPED | OUTPATIENT
Start: 2023-10-20

## 2023-10-20 NOTE — ASSESSMENT & PLAN NOTE
BP Readings from Last 3 Encounters:   10/20/23 (!) 142/106   10/02/23 124/78   09/28/23 154/77     Currently on amlodipine 10 mg. We will recheck blood pressure in subsequent visit. Can consider adding additional agent if continues to be high.

## 2023-10-20 NOTE — ASSESSMENT & PLAN NOTE
Lab Results   Component Value Date    HGBA1C 7.9 (H) 03/29/2022   Previous A1c 7.9. A1c during this visit was 6.4. Unlikely a source of her fatigue. Currently on metformin at 1000 mg twice daily. Continue with this current regimen.

## 2023-10-21 NOTE — PROGRESS NOTES
Assessment/Plan:    1. Fatigue  Assessment & Plan: On the differential includes infection of an immunocompromise state. Other consideration can be elevated blood sugar in the setting of her diabetes. TSH should also be evaluated as a potentiating cause. Patient is currently receiving chemotherapy and this could also be a effect side effect of her treatment. Plan  -Obtain hemoglobin A1c  -Obtain COVID and flu swab  -Obtain chest x-ray  -Obtain TSH, CBC  -Symptomatically treat with cough syrup and Tessalon Perles    Orders:  -     TSH, 3rd generation with Free T4 reflex; Future  -     HEMOGLOBIN A1C W/ EAG ESTIMATION; Future    2. Type 2 diabetes mellitus with other specified complication, unspecified whether long term insulin use (HCC)  Assessment & Plan:    Lab Results   Component Value Date    HGBA1C 7.9 (H) 03/29/2022   Previous A1c 7.9. A1c during this visit was 6.4. Unlikely a source of her fatigue. Currently on metformin at 1000 mg twice daily. Continue with this current regimen. 3. Essential hypertension  Assessment & Plan:  BP Readings from Last 3 Encounters:   10/20/23 (!) 142/106   10/02/23 124/78   09/28/23 154/77     Currently on amlodipine 10 mg. We will recheck blood pressure in subsequent visit. Can consider adding additional agent if continues to be high. 4. Encounter for immunization  -     influenza vaccine, quadrivalent, 0.5 mL, preservative-free, for adult and pediatric patients 6 mos+ (AFLURIA, FLUARIX, FLULAVAL, FLUZONE)    5. 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 Treatment regimen includes Revlimid and Gazyva. Immunocompromise as a result and could be a component of underlying infection. Infectious work-up initiated. 6. Upper respiratory tract infection, unspecified type  Comments:  URI-like symptoms. We will follow-up with a work-up and symptomatic management.   Orders:  - Covid/Flu- Office Collect; Future  -     benzonatate (TESSALON) 200 MG capsule; Take 1 capsule (200 mg total) by mouth 3 (three) times a day as needed for cough  -     dextromethorphan-guaiFENesin (ROBITUSSIN DM)  mg/5 mL syrup; Take 5 mL by mouth 3 (three) times a day as needed for cough  -     CBC and differential; Future  -     XR chest pa & lateral; Future; Expected date: 10/20/2023  -     Covid/Flu- Office Collect         Subjective:      Patient ID: Beatriz Lanier is a 66 y.o. female. Past medical history of grade 2 follicular lymphoma, in partial remission currently receiving therapy with Revlimid and Vivi Daily after subsequently found to have new inguinal adenopathy is coming in for chief complaint of fatigue and persistent cough. Patient was seen at her oncology visit 10/2 with similar symptoms. Patient was treated with Medrol Dosepak as well as azithromycin. Patient continues to have lingering cough as well as persistent fatigue that is different than her chemotherapy fatigue. Patient endorses some chills. She denies any fevers. Of note, oncologist sent her for chest x-ray however patient has not pleated the examination. The following portions of the patient's history were reviewed and updated as appropriate: allergies, current medications, past family history, past medical history, past social history, past surgical history, and problem list.    Review of Systems   Constitutional:  Positive for chills and fatigue. Negative for fever. HENT:  Positive for congestion. Negative for ear pain and sore throat. Eyes:  Negative for pain and visual disturbance. Respiratory:  Positive for cough. Negative for shortness of breath. Cardiovascular:  Negative for chest pain and palpitations. Gastrointestinal:  Negative for abdominal pain and vomiting. Genitourinary:  Negative for dysuria and hematuria. Musculoskeletal:  Negative for arthralgias and back pain.    Skin: Negative for color change and rash. Neurological:  Negative for seizures and syncope. All other systems reviewed and are negative. Objective:      BP (!) 142/106 (BP Location: Left arm, Patient Position: Sitting, Cuff Size: Standard)   Pulse 59   Temp 98 °F (36.7 °C) (Temporal)   Resp 22   Ht 5' 4" (1.626 m)   Wt 112 kg (247 lb)   SpO2 99%   BMI 42.40 kg/m²          Physical Exam  Constitutional:       General: She is not in acute distress. Appearance: Normal appearance. She is obese. HENT:      Nose: No congestion or rhinorrhea. Eyes:      Extraocular Movements: Extraocular movements intact. Pupils: Pupils are equal, round, and reactive to light. Cardiovascular:      Rate and Rhythm: Normal rate and regular rhythm. Pulses: Normal pulses. Heart sounds: Normal heart sounds. No murmur heard. No gallop. Pulmonary:      Effort: Pulmonary effort is normal.      Breath sounds: Normal breath sounds. No wheezing, rhonchi or rales. Abdominal:      General: Bowel sounds are normal. There is no distension. Palpations: Abdomen is soft. There is no mass. Tenderness: There is no abdominal tenderness. Hernia: No hernia is present. Skin:     General: Skin is warm. Coloration: Skin is not jaundiced. Findings: No bruising. Neurological:      General: No focal deficit present. Mental Status: She is alert and oriented to person, place, and time. Psychiatric:         Mood and Affect: Mood normal.         Behavior: Behavior normal.         Thought Content:  Thought content normal.           Manpreet Carranza DO   Family Medicine PGY-3  10/20/2023

## 2023-10-21 NOTE — ASSESSMENT & PLAN NOTE
Currently followed by hematology. Treatment regimen includes Revlimid and Gazyva. Immunocompromise as a result and could be a component of underlying infection. Infectious work-up initiated.

## 2023-10-21 NOTE — ASSESSMENT & PLAN NOTE
On the differential includes infection of an immunocompromise state. Other consideration can be elevated blood sugar in the setting of her diabetes. TSH should also be evaluated as a potentiating cause. Patient is currently receiving chemotherapy and this could also be a effect side effect of her treatment.     Plan  -Obtain hemoglobin A1c  -Obtain COVID and flu swab  -Obtain chest x-ray  -Obtain TSH, CBC  -Symptomatically treat with cough syrup and Tessalon Perles

## 2023-10-22 LAB
FLUAV RNA RESP QL NAA+PROBE: NEGATIVE
FLUBV RNA RESP QL NAA+PROBE: NEGATIVE
SARS-COV-2 RNA RESP QL NAA+PROBE: NEGATIVE

## 2023-10-24 ENCOUNTER — APPOINTMENT (EMERGENCY)
Dept: RADIOLOGY | Facility: HOSPITAL | Age: 63
End: 2023-10-24
Payer: COMMERCIAL

## 2023-10-24 ENCOUNTER — HOSPITAL ENCOUNTER (EMERGENCY)
Facility: HOSPITAL | Age: 63
Discharge: HOME | End: 2023-10-24
Attending: EMERGENCY MEDICINE
Payer: COMMERCIAL

## 2023-10-24 VITALS
HEART RATE: 77 BPM | DIASTOLIC BLOOD PRESSURE: 96 MMHG | RESPIRATION RATE: 20 BRPM | OXYGEN SATURATION: 99 % | SYSTOLIC BLOOD PRESSURE: 168 MMHG | TEMPERATURE: 98 F

## 2023-10-24 DIAGNOSIS — S83.90XA KNEE SPRAIN: Primary | ICD-10-CM

## 2023-10-24 PROCEDURE — 99284 EMERGENCY DEPT VISIT MOD MDM: CPT | Performed by: EMERGENCY MEDICINE

## 2023-10-24 PROCEDURE — 73564 X-RAY EXAM KNEE 4 OR MORE: CPT

## 2023-10-24 PROCEDURE — 99284 EMERGENCY DEPT VISIT MOD MDM: CPT

## 2023-10-24 RX ORDER — ACETAMINOPHEN 325 MG/1
650 TABLET ORAL ONCE
Status: COMPLETED | OUTPATIENT
Start: 2023-10-24 | End: 2023-10-24

## 2023-10-24 RX ORDER — IBUPROFEN 600 MG/1
600 TABLET ORAL ONCE
Status: COMPLETED | OUTPATIENT
Start: 2023-10-24 | End: 2023-10-24

## 2023-10-24 RX ADMIN — IBUPROFEN 600 MG: 600 TABLET, FILM COATED ORAL at 17:10

## 2023-10-24 RX ADMIN — ACETAMINOPHEN 650 MG: 325 TABLET ORAL at 17:10

## 2023-10-24 NOTE — ED PROVIDER NOTES
History  Chief Complaint   Patient presents with    Fall     Mechanical trip/fall on last step, landed on right knee and c/o pain to same. No dizzy/lightheaded or LOC before or after fall. No head injury. HPI  66year old female presenting with mechanical fall resulting in right knee pain. Fall occurred this morning. Denies head injury and reports no other injury. Patient does have chronic knee pain due to osteoarthritis. States that she is able to ambulate but with difficulty due to the right knee pain. Pain is localized to the patellar area as well as posterior knee. Denies any swelling or redness. Patient states that she has not taken any pain medication since the injury. Denies any numbness or weakness distal to the painful area. Patient also reports no ankle or hip pain. Prior to Admission Medications   Prescriptions Last Dose Informant Patient Reported? Taking?    Banophen 25 MG capsule  Self, Other (Specify) Yes No   Sig: TAKE 1 CAPSULE(25 MG TOTAL) BY MOUTH EVERY 6 (SIX) HOURS AS NEEDED FOR ITCHING   Blood Glucose Monitoring Suppl (OneTouch Verio) w/Device KIT  Self, Other (Specify) No No   Sig: Use in the morning   DULoxetine (CYMBALTA) 20 mg capsule  Self, Other (Specify) No No   Sig: Take 2 capsules (40 mg total) by mouth daily   OneTouch Delica Lancets 08Q MISC  Self, Other (Specify) No No   Sig: Use in the morning   acetaminophen (TYLENOL) 325 mg tablet   No No   Sig: Take 2 tablets (650 mg total) by mouth every 6 (six) hours as needed for mild pain   acyclovir (ZOVIRAX) 400 MG tablet   No No   Sig: Take 1 tablet (400 mg total) by mouth 2 (two) times a day   allopurinol (ZYLOPRIM) 100 mg tablet  Self, Other (Specify) No No   Sig: Take 1 tablet (100 mg total) by mouth daily   amLODIPine (NORVASC) 10 mg tablet  Self, Other (Specify) Yes No   Sig: Take 10 mg by mouth daily   ascorbic acid (VITAMIN C) 1000 MG tablet  Self No No   Sig: Take 1 tablet (1,000 mg total) by mouth every 12 (twelve) hours for 10 doses   aspirin (ECOTRIN LOW STRENGTH) 81 mg EC tablet  Self, Other (Specify) No No   Sig: Take 1 tablet (81 mg total) by mouth daily   aspirin-acetaminophen-caffeine (EXCEDRIN MIGRAINE) 250-250-65 MG per tablet  Self, Other (Specify) No No   Sig: Take 1 tablet by mouth every 6 (six) hours as needed for headaches Erica 1 tableta cada 6 horas fernandez sea necesario para el dolor de russ   atorvastatin (LIPITOR) 40 mg tablet  Self, Other (Specify) No No   Sig: Take 1 tablet (40 mg total) by mouth daily with dinner   benzocaine (BABY ORAJEL) 7.5 % oral gel  Self, Other (Specify) No No   Sig: Apply to the mouth or throat 3 (three) times a day as needed for mucositis   benzocaine (ORAJEL) 10 % mucosal gel  Self, Other (Specify) No No   Sig: Apply 1 application to the mouth or throat as needed for mucositis   benzonatate (TESSALON) 200 MG capsule   No No   Sig: Take 1 capsule (200 mg total) by mouth 3 (three) times a day as needed for cough   capsicum (ZOSTRIX) 0.075 % topical cream  Self, Other (Specify) No No   Sig: Apply topically 3 (three) times a day Apply to affected area   chlorthalidone 25 mg tablet  Self, Other (Specify) No No   Sig: Take 1 tablet (25 mg total) by mouth daily   cholecalciferol (VITAMIN D3) 1,000 units tablet  Self No No   Sig: Take 2 tablets (2,000 Units total) by mouth daily for 5 days   cyanocobalamin 1000 MCG tablet  Self, Other (Specify) No No   Sig: Take 1 tablet (1,000 mcg total) by mouth daily   dextromethorphan-guaiFENesin (ROBITUSSIN DM)  mg/5 mL syrup   No No   Sig: Take 5 mL by mouth 3 (three) times a day as needed for cough   diphenhydrAMINE (BENADRYL) 25 mg tablet  Self, Other (Specify) No No   Sig: Take 1 tablet (25 mg total) by mouth every 6 (six) hours as needed for itching   folic acid (FOLVITE) 1 mg tablet  Self, Other (Specify) No No   Sig: Take 1 tablet (1 mg total) by mouth daily   glucose blood (OneTouch Verio) test strip  Self, Other (Specify) No No   Sig: Use 1 each in the morning Use as instructed   ibuprofen (MOTRIN) 400 mg tablet   No No   Sig: Take 1 tablet (400 mg total) by mouth every 6 (six) hours as needed for mild pain (Body aches or fever) for up to 7 days   lenalidomide (REVLIMID) 10 MG CAPS   No No   Sig: Take 1 capsule by mouth daily 3 weeks on followed by 1 week off   lidocaine (LMX) 4 % cream  Self, Other (Specify) No No   Sig: Apply topically as needed for mild pain To neck   metFORMIN (GLUCOPHAGE) 500 mg tablet  Self, Other (Specify) No No   Sig: Take 2 tablets (1,000 mg total) by mouth 2 (two) times a day with meals   omeprazole (PriLOSEC) 20 mg delayed release capsule  Self, Other (Specify) No No   Sig: Take 2 capsules (40 mg total) by mouth daily To prevent stomach upset   ondansetron (ZOFRAN) 4 mg tablet  Self, Other (Specify) No No   Sig: Take 1 tablet (4 mg total) by mouth every 8 (eight) hours as needed for nausea or vomiting   oxyCODONE (ROXICODONE) 10 MG TABS   No No   Sig: Take 1 tablet (10 mg total) by mouth every 8 (eight) hours as needed for severe pain Erica 1 tableta cada 8 horas fernandez sea necesario por dolor intenso.  Dosis maxima 3 Max Daily Amount: 30 mg   polyethylene glycol (GLYCOLAX) 17 GM/SCOOP  Self, Other (Specify) Yes No   Sig: TAKE 17 G BY MOUTH DAILY MIX WITH WATER   polyethylene glycol (GLYCOLAX) 17 GM/SCOOP   No No   Sig: TAKE 17 G BY MOUTH DAILY MIX WITH WATER   potassium chloride (K-DUR,KLOR-CON) 20 mEq tablet  Self, Other (Specify) No No   Sig: TAKE 1 TABLET (20 MEQ TOTAL) BY MOUTH DAILY   senna (SENOKOT) 8.6 mg  Self, Other (Specify) No No   Sig: Take 1 tablet (8.6 mg total) by mouth 2 (two) times a day      Facility-Administered Medications: None       Past Medical History:   Diagnosis Date    Anemia     iron and B12    Arthritis     Asthma     Cough     Depression     Diabetes mellitus (HCC)     Falls frequently     Hypertension     Lupus (HCC)     Lymphoma (HCC)     Lymphoma (HCC)     Migraine Osteoporosis     Psychiatric disorder     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[address])     Vertigo        Past Surgical History:   Procedure Laterality Date    CHOLECYSTECTOMY      FL GUIDED CENTRAL VENOUS ACCESS DEVICE INSERTION  2018    HYSTERECTOMY      IR BIOPSY BONE MARROW  2020    IR BIOPSY LYMPH NODE  2020    LYMPH NODE BIOPSY Left 2018    Procedure: EXCISION BIOPSY LYMPH NODE SUPRACLAVICULAR;  Surgeon: Colette Vaughn MD;  Location: [address] [place] [address];  Service: General    DE TENDON SHEATH INCISION Right 2020    Procedure: RELEASE TRIGGER FINGER RIGHT THUMB;  Surgeon: Milagro Hermosillo MD;  Location: [address] [place] [address] OR;  Service: Orthopedics    TUNNELED VENOUS PORT PLACEMENT N/A 2018    Procedure: INSERTION OF PORT-A-CATH;  Surgeon: Colette Vaughn MD;  Location: [address] [place] [address];  Service: General       Family History   Problem Relation Age of Onset    Cancer Mother     Diabetes Mother     Cancer Father     Diabetes Father     Breast cancer Sister     No Known Problems Sister     No Known Problems Sister     No Known Problems Sister     No Known Problems Maternal Aunt     No Known Problems Maternal Aunt     No Known Problems Maternal Aunt     No Known Problems Paternal Aunt      I have reviewed and agree with the history as documented. E-Cigarette/Vaping    E-Cigarette Use Never User      E-Cigarette/Vaping Substances    Nicotine No     THC No     CBD No     Flavoring No     Other No     Unknown No      Social History     Tobacco Use    Smoking status: Former     Types: Cigarettes     Quit date: 2017     Years since quittin.3     Passive exposure: Past    Smokeless tobacco: Never   Vaping Use    Vaping Use: Never used   Substance Use Topics    Alcohol use: Never    Drug use: Never       Review of Systems   Constitutional:  Negative for chills, diaphoresis, fever and unexpected weight change. HENT:  Negative for ear pain and sore throat. Eyes:  Negative for visual disturbance.    Respiratory:  Negative for cough, chest tightness and shortness of breath. Cardiovascular:  Negative for chest pain and leg swelling. Gastrointestinal:  Negative for abdominal distention, abdominal pain, constipation, diarrhea, nausea and vomiting. Endocrine: Negative. Genitourinary:  Negative for difficulty urinating and dysuria. Musculoskeletal:  Positive for arthralgias. Skin: Negative. Allergic/Immunologic: Negative. Neurological: Negative. Hematological: Negative. Psychiatric/Behavioral: Negative. All other systems reviewed and are negative. Physical Exam  Physical Exam  Vitals and nursing note reviewed. Constitutional:       General: She is not in acute distress. Appearance: Normal appearance. She is not ill-appearing. HENT:      Head: Normocephalic and atraumatic. Right Ear: External ear normal.      Left Ear: External ear normal.      Nose: Nose normal.      Mouth/Throat:      Mouth: Mucous membranes are moist.      Pharynx: Oropharynx is clear. Eyes:      General: No scleral icterus. Right eye: No discharge. Left eye: No discharge. Extraocular Movements: Extraocular movements intact. Conjunctiva/sclera: Conjunctivae normal.      Pupils: Pupils are equal, round, and reactive to light. Cardiovascular:      Rate and Rhythm: Normal rate and regular rhythm. Pulses: Normal pulses. Heart sounds: Normal heart sounds. Pulmonary:      Effort: Pulmonary effort is normal.      Breath sounds: Normal breath sounds. Abdominal:      General: Abdomen is flat. Bowel sounds are normal. There is no distension. Palpations: Abdomen is soft. Tenderness: There is no abdominal tenderness. There is no guarding or rebound. Musculoskeletal:         General: Tenderness (Right knee) present. No swelling or deformity. Normal range of motion. Cervical back: Normal range of motion and neck supple. Skin:     General: Skin is warm and dry.       Capillary Refill: Capillary refill takes less than 2 seconds. Neurological:      General: No focal deficit present. Mental Status: She is alert and oriented to person, place, and time. Mental status is at baseline. Psychiatric:         Mood and Affect: Mood normal.         Behavior: Behavior normal.         Thought Content: Thought content normal.         Judgment: Judgment normal.         Vital Signs  ED Triage Vitals   Temperature Pulse Respirations Blood Pressure SpO2   10/24/23 1630 10/24/23 1630 10/24/23 1630 10/24/23 1630 10/24/23 1630   98 °F (36.7 °C) 77 20 168/96 99 %      Temp Source Heart Rate Source Patient Position - Orthostatic VS BP Location FiO2 (%)   10/24/23 1630 10/24/23 1630 10/24/23 1630 10/24/23 1630 --   Tympanic Monitor Sitting Right arm       Pain Score       10/24/23 1710       10 - Worst Possible Pain           Vitals:    10/24/23 1630   BP: 168/96   Pulse: 77   Patient Position - Orthostatic VS: Sitting         Visual Acuity      ED Medications  Medications   ibuprofen (MOTRIN) tablet 600 mg (600 mg Oral Given 10/24/23 1710)   acetaminophen (TYLENOL) tablet 650 mg (650 mg Oral Given 10/24/23 1710)       Diagnostic Studies  Results Reviewed       None                   XR knee 4+ views Right injury   ED Interpretation by Armando Loera MD (10/24 1722)   Degenerative changes but otherwise no fractures noted                 Procedures  Procedures         ED Course                               SBIRT 20yo+      Flowsheet Row Most Recent Value   Initial Alcohol Screen: US AUDIT-C     1. How often do you have a drink containing alcohol? 0 Filed at: 10/24/2023 1712   2. How many drinks containing alcohol do you have on a typical day you are drinking? 0 Filed at: 10/24/2023 1712   3a. Male UNDER 65: How often do you have five or more drinks on one occasion? 0 Filed at: 10/24/2023 1712   3b. FEMALE Any Age, or MALE 65+: How often do you have 4 or more drinks on one occassion?  0 Filed at: 10/24/2023 1712 Audit-C Score 0 Filed at: 10/24/2023 1712   GIOVANNY: How many times in the past year have you. .. Used an illegal drug or used a prescription medication for non-medical reasons? Never Filed at: 10/24/2023 1712                      Medical Decision Making  62-year-old female presenting with right knee pain after a fall  Will obtain x-ray to rule out any fracture dislocation  X-ray showed no osseous abnormalities  Patient given crutches due to difficulty ambulating  Patient discharged with return precautions provided    Problems Addressed:  Knee sprain: acute illness or injury    Amount and/or Complexity of Data Reviewed  Radiology: ordered and independent interpretation performed. Risk  OTC drugs. Prescription drug management. Disposition  Final diagnoses:   Knee sprain     Time reflects when diagnosis was documented in both MDM as applicable and the Disposition within this note       Time User Action Codes Description Comment    10/24/2023  5:22 PM Melita, [address] [place] [address]  Knee sprain           ED Disposition       ED Disposition   Discharge    Condition   Stable    Date/Time   Tue Oct 24, 2023 [address] [place] [address] Drive discharge to home/self care.                    Follow-up Information       Follow up With Specialties Details Why Contact Info Additional [address] Specialists [facility] Orthopedic Surgery Schedule an appointment as soon as possible for a visit   [address] [place] [id number] [phone] 2 [facility], [address] [place] [address], [address] [place], [place], [address] [phone]            Discharge Medication List as of 10/24/2023  5:23 PM        CONTINUE these medications which have NOT CHANGED    Details   acetaminophen (TYLENOL) 325 mg tablet Take 2 tablets (650 mg total) by mouth every 6 (six) hours as needed for mild pain, Starting Tue 8/29/2023, Normal acyclovir (ZOVIRAX) 400 MG tablet Take 1 tablet (400 mg total) by mouth 2 (two) times a day, Starting Mon 3/13/2023, Until Mon 5/8/2023, Normal      allopurinol (ZYLOPRIM) 100 mg tablet Take 1 tablet (100 mg total) by mouth daily, Starting Mon 3/13/2023, Normal      amLODIPine (NORVASC) 10 mg tablet Take 10 mg by mouth daily, Starting Thu 6/2/2022, Historical Med      ascorbic acid (VITAMIN C) 1000 MG tablet Take 1 tablet (1,000 mg total) by mouth every 12 (twelve) hours for 10 doses, Starting Tue 1/26/2021, Until Mon 5/8/2023, Normal      aspirin (ECOTRIN LOW STRENGTH) 81 mg EC tablet Take 1 tablet (81 mg total) by mouth daily, Starting Wed 3/18/2020, Normal      aspirin-acetaminophen-caffeine (EXCEDRIN MIGRAINE) 250-250-65 MG per tablet Take 1 tablet by mouth every 6 (six) hours as needed for headaches Erica 1 tableta cada 6 horas fernandez sea necesario para el dolor de russ, Starting Thu 11/19/2020, Normal      atorvastatin (LIPITOR) 40 mg tablet Take 1 tablet (40 mg total) by mouth daily with dinner, Starting Tue 1/26/2021, Normal      Banophen 25 MG capsule TAKE 1 CAPSULE(25 MG TOTAL) BY MOUTH EVERY 6 (SIX) HOURS AS NEEDED FOR ITCHING, Historical Med      benzocaine (BABY ORAJEL) 7.5 % oral gel Apply to the mouth or throat 3 (three) times a day as needed for mucositis, Starting Sat 4/1/2023, Normal      benzocaine (ORAJEL) 10 % mucosal gel Apply 1 application to the mouth or throat as needed for mucositis, Starting Wed 8/17/2022, Normal      benzonatate (TESSALON) 200 MG capsule Take 1 capsule (200 mg total) by mouth 3 (three) times a day as needed for cough, Starting Fri 10/20/2023, Normal      Blood Glucose Monitoring Suppl (OneTouch Verio) w/Device KIT Use in the morning, Starting Fri 4/8/2022, Normal      capsicum (ZOSTRIX) 0.075 % topical cream Apply topically 3 (three) times a day Apply to affected area, Starting Sat 2/25/2023, Print      chlorthalidone 25 mg tablet Take 1 tablet (25 mg total) by mouth daily, Starting Tue 5/31/2022, Normal      cholecalciferol (VITAMIN D3) 1,000 units tablet Take 2 tablets (2,000 Units total) by mouth daily for 5 days, Starting Wed 1/27/2021, Until Mon 5/8/2023, Normal      cyanocobalamin 1000 MCG tablet Take 1 tablet (1,000 mcg total) by mouth daily, Starting Wed 4/17/2019, Normal      dextromethorphan-guaiFENesin (ROBITUSSIN DM)  mg/5 mL syrup Take 5 mL by mouth 3 (three) times a day as needed for cough, Starting Fri 10/20/2023, Normal      diphenhydrAMINE (BENADRYL) 25 mg tablet Take 1 tablet (25 mg total) by mouth every 6 (six) hours as needed for itching, Starting Wed 2/15/2023, Normal      DULoxetine (CYMBALTA) 20 mg capsule Take 2 capsules (40 mg total) by mouth daily, Starting Tue 2/3/0048, Normal      folic acid (FOLVITE) 1 mg tablet Take 1 tablet (1 mg total) by mouth daily, Starting Wed 6/23/2021, Normal      glucose blood (OneTouch Verio) test strip Use 1 each in the morning Use as instructed, Starting Fri 4/8/2022, Normal      ibuprofen (MOTRIN) 400 mg tablet Take 1 tablet (400 mg total) by mouth every 6 (six) hours as needed for mild pain (Body aches or fever) for up to 7 days, Starting Sat 4/1/2023, Until Sat 4/8/2023 at 2359, Normal      lenalidomide (REVLIMID) 10 MG CAPS Take 1 capsule by mouth daily 3 weeks on followed by 1 week off, Normal      lidocaine (LMX) 4 % cream Apply topically as needed for mild pain To neck, Starting Tue 9/15/2020, Normal      metFORMIN (GLUCOPHAGE) 500 mg tablet Take 2 tablets (1,000 mg total) by mouth 2 (two) times a day with meals, Starting Tue 4/5/2022, Normal      omeprazole (PriLOSEC) 20 mg delayed release capsule Take 2 capsules (40 mg total) by mouth daily To prevent stomach upset, Starting Wed 3/25/2020, Normal      ondansetron (ZOFRAN) 4 mg tablet Take 1 tablet (4 mg total) by mouth every 8 (eight) hours as needed for nausea or vomiting, Starting Tue 9/15/2020, Normal      OneTouch Delica Lancets 02T MISC Use in the morning, Starting Fri 4/8/2022, Normal      oxyCODONE (ROXICODONE) 10 MG TABS Take 1 tablet (10 mg total) by mouth every 8 (eight) hours as needed for severe pain Erica 1 tableta cada 8 horas fernnadez sea necesario por dolor intenso. Dosis maxima 3 Max Daily Amount: 30 mg, Starting Thu 9/28/2023, Normal      !! polyethylene glycol (GLYCOLAX) 17 GM/SCOOP TAKE 17 G BY MOUTH DAILY MIX WITH WATER, Historical Med      !! polyethylene glycol (GLYCOLAX) 17 GM/SCOOP TAKE 17 G BY MOUTH DAILY MIX WITH WATER, Normal      potassium chloride (K-DUR,KLOR-CON) 20 mEq tablet TAKE 1 TABLET (20 MEQ TOTAL) BY MOUTH DAILY, Starting Tue 6/6/2023, Normal      senna (SENOKOT) 8.6 mg Take 1 tablet (8.6 mg total) by mouth 2 (two) times a day, Starting Tue 12/17/2019, Normal       !! - Potential duplicate medications found. Please discuss with provider. No discharge procedures on file.     PDMP Review         Value Time User    PDMP Reviewed  Yes 9/28/2023 11:43 AM Eliu Mcwilliams MD            ED Provider  Electronically Signed by             Vangie Archuleta MD  10/24/23 0669

## 2023-10-26 DIAGNOSIS — D51.8 OTHER VITAMIN B12 DEFICIENCY ANEMIAS: Primary | ICD-10-CM

## 2023-10-26 RX ORDER — CYANOCOBALAMIN 1000 UG/ML
1000 INJECTION, SOLUTION INTRAMUSCULAR; SUBCUTANEOUS ONCE
Status: CANCELLED | OUTPATIENT
Start: 2023-10-27

## 2023-10-27 ENCOUNTER — HOSPITAL ENCOUNTER (OUTPATIENT)
Dept: INFUSION CENTER | Facility: HOSPITAL | Age: 63
Discharge: HOME | End: 2023-10-27
Attending: INTERNAL MEDICINE
Payer: COMMERCIAL

## 2023-10-27 VITALS
DIASTOLIC BLOOD PRESSURE: 70 MMHG | RESPIRATION RATE: 18 BRPM | TEMPERATURE: 97.7 F | HEART RATE: 75 BPM | OXYGEN SATURATION: 98 % | SYSTOLIC BLOOD PRESSURE: 138 MMHG

## 2023-10-27 DIAGNOSIS — C82.18 GRADE 2 FOLLICULAR LYMPHOMA OF LYMPH NODES OF MULTIPLE REGIONS (HCC): Primary | ICD-10-CM

## 2023-10-27 DIAGNOSIS — D51.8 OTHER VITAMIN B12 DEFICIENCY ANEMIAS: ICD-10-CM

## 2023-10-27 DIAGNOSIS — C82.18 GRADE 2 FOLLICULAR LYMPHOMA OF LYMPH NODES OF MULTIPLE REGIONS (HCC): ICD-10-CM

## 2023-10-27 PROCEDURE — 96372 THER/PROPH/DIAG INJ SC/IM: CPT

## 2023-10-27 PROCEDURE — 96413 CHEMO IV INFUSION 1 HR: CPT

## 2023-10-27 PROCEDURE — 96367 TX/PROPH/DG ADDL SEQ IV INF: CPT

## 2023-10-27 PROCEDURE — 96415 CHEMO IV INFUSION ADDL HR: CPT

## 2023-10-27 RX ORDER — CYANOCOBALAMIN 1000 UG/ML
1000 INJECTION, SOLUTION INTRAMUSCULAR; SUBCUTANEOUS ONCE
Status: COMPLETED | OUTPATIENT
Start: 2023-10-27 | End: 2023-10-27

## 2023-10-27 RX ORDER — LENALIDOMIDE 10 MG/1
CAPSULE ORAL
Qty: 21 CAPSULE | Refills: 0 | Status: SHIPPED | OUTPATIENT
Start: 2023-10-27

## 2023-10-27 RX ORDER — SODIUM CHLORIDE 9 MG/ML
20 INJECTION, SOLUTION INTRAVENOUS ONCE
Status: COMPLETED | OUTPATIENT
Start: 2023-10-27 | End: 2023-10-27

## 2023-10-27 RX ORDER — ACETAMINOPHEN 325 MG/1
650 TABLET ORAL ONCE
Status: COMPLETED | OUTPATIENT
Start: 2023-10-27 | End: 2023-10-27

## 2023-10-27 RX ORDER — CYANOCOBALAMIN 1000 UG/ML
1000 INJECTION, SOLUTION INTRAMUSCULAR; SUBCUTANEOUS ONCE
OUTPATIENT
Start: 2023-11-24

## 2023-10-27 RX ADMIN — CYANOCOBALAMIN 1000 MCG: 1000 INJECTION INTRAMUSCULAR; SUBCUTANEOUS at 09:05

## 2023-10-27 RX ADMIN — DIPHENHYDRAMINE HYDROCHLORIDE 25 MG: 50 INJECTION, SOLUTION INTRAMUSCULAR; INTRAVENOUS at 09:05

## 2023-10-27 RX ADMIN — OBINUTUZUMAB 1000 MG: 1000 INJECTION, SOLUTION, CONCENTRATE INTRAVENOUS at 10:25

## 2023-10-27 RX ADMIN — DEXAMETHASONE SODIUM PHOSPHATE 20 MG: 10 INJECTION, SOLUTION INTRAMUSCULAR; INTRAVENOUS at 09:27

## 2023-10-27 RX ADMIN — SODIUM CHLORIDE 20 ML/HR: 0.9 INJECTION, SOLUTION INTRAVENOUS at 09:01

## 2023-10-27 RX ADMIN — ACETAMINOPHEN 650 MG: 325 TABLET ORAL at 09:01

## 2023-10-27 NOTE — PROGRESS NOTES
Pt tolerated Gazayva treatment well with no complications. Port flushed and blood return noted before de accessing. Pt aware of future appt on 11/17/23 at 1500 pm. AVS printed and given to pt.

## 2023-11-02 ENCOUNTER — OFFICE VISIT (OUTPATIENT)
Dept: PALLIATIVE MEDICINE | Facility: CLINIC | Age: 63
End: 2023-11-02

## 2023-11-02 VITALS
HEART RATE: 64 BPM | SYSTOLIC BLOOD PRESSURE: 170 MMHG | BODY MASS INDEX: 42.61 KG/M2 | OXYGEN SATURATION: 98 % | TEMPERATURE: 97.7 F | DIASTOLIC BLOOD PRESSURE: 76 MMHG | WEIGHT: 248.21 LBS

## 2023-11-02 DIAGNOSIS — C82.18 GRADE 2 FOLLICULAR LYMPHOMA OF LYMPH NODES OF MULTIPLE REGIONS (HCC): ICD-10-CM

## 2023-11-02 DIAGNOSIS — G89.3 CANCER RELATED PAIN: Primary | ICD-10-CM

## 2023-11-02 DIAGNOSIS — Z71.89 ADVANCED CARE PLANNING/COUNSELING DISCUSSION: ICD-10-CM

## 2023-11-02 PROBLEM — K59.03 DRUG INDUCED CONSTIPATION: Status: RESOLVED | Noted: 2021-06-09 | Resolved: 2023-11-02

## 2023-11-02 RX ORDER — OXYCODONE HYDROCHLORIDE 10 MG/1
10 TABLET ORAL EVERY 8 HOURS PRN
Qty: 90 TABLET | Refills: 0 | Status: SHIPPED | OUTPATIENT
Start: 2023-11-02

## 2023-11-02 NOTE — PROGRESS NOTES
Palliative and Supportive Care   Michelle Barrett 66 y.o. female [id number]    Assessment/Plan:  1. Cancer related pain    2. 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 Advanced care planning/counseling discussion      Symptoms - Chronic cancer related pain mostly in her abdomen and neck, stable. Continue OxyIR 10mg PO 3 times a day/q8H prn. Max of 3 a day. Daily miralax OD for constipation - OD dosing is enough to get her bowels moving regularly    Support  Tearful on today's visit. Worried about herself and her family members who also have cancer. Encouraged her to focus on her own health before she can help others. She has progressive disease despite treatments. She also has progression before when she delayed treatments to take care of other family members. Encouraged to outweigh risks and benefits of proposed treatments including BM transplant. If at some point, her cancer continues to progress and no treatments are available (whether by choice or just lack thereof), then we can talk about keeping her comfortable on hospice so she can spend time with her family until her last day. Follow up  RTO in 6 months, call for refills    Controlled Substance Review    [place] PDMP or [place] [facility] reviewed: No red flags were identified; safe to proceed with prescription.      Filled  Written  ID  Drug  QTY  Days  Prescriber  RX #  Dispenser  Refill  Daily Dose*  Pymt Type      09/28/2023 09/28/2023 2 Oxycodone Hcl (Ir) 10 Mg Tab 90.00 30 Ti Joselito [id number] All ([id number]) 0 45.00 MME [place]   08/31/2023 08/29/2023 2 Oxycodone Hcl (Ir) 10 Mg Tab 90.00 25 Ti Joselito [id number] All ([id number]) 0 54.00 MME [place]   08/03/2023 07/26/2023 2 Oxycodone Hcl (Ir) 10 Mg Tab 90.00 30 Ti Joselito [id number] All ([id number]) 0 45.00 [place]   07/05/2023 06/28/2023 2 Oxycodone Hcl (Ir) 10 Mg Tab 90.00 30 Ti Joselito [id number] All ([id number]) 0 45.00 [place]   06/06/2023 06/02/2023 2 Oxycodone Hcl (Ir) 10 Mg Tab 90.00 30 Ti Joselito [id number] All ([id number]) 0 45.00 [place]   05/09/2023 05/09/2023 2 Oxycodone Hcl (Ir) 10 Mg Tab 90.00 30 Ti Joselito [id number] All ([id number]) 0 45.00 [place]   03/23/2023 03/23/2023 2 Oxycodone Hcl (Ir) 10 Mg Tab 90.00 23 Ti Joselito [id number] All ([id number]) 0 58.70 [place]   03/01/2023 02/24/2023 2 Oxycodone Hcl (Ir) 10 Mg Tab 90.00 23 Ti Joselito [id number] All ([id number]) 0 58.70 [place]   02/07/2023 02/07/2023 2 Oxycodone Hcl (Ir) 10 Mg Tab 90.00 23 Ti Joselito [id number] All ([id number]) 0 58.70 [place]     Requested Prescriptions      No prescriptions requested or ordered in this encounter     There are no discontinued medications. Representatives have queried the patient's controlled substance dispensing history in the Prescription Drug Monitoring Program in compliance with regulations before I have prescribed any controlled substances. The prescription history is consistent with prescribed therapy and our practice policies. 30 minutes were spent face to face with Cayla Long with greater than 50% of the time spent in counseling or coordination of care including discussions of etiology of diagnosis, diagnostic results, impression, and recommendations, risks and benefits of treatment, instructions for disease self management, treatment instructions, follow up requirements, risk factors and risk reduction of disease, patient and family counseling/involvement in care and compliance with treatment regimen . All of the patient's questions were answered during this discussion. No follow-ups on file. Subjective:   Chief Complaint  Follow up visit for:  symptom management, pain, neoplasm related, assessment of goals of care, disease process education and discussion of prognosis, advance care planning    HPI     Cayla Long is a 66 y.o. female with grade 2 follicular lymphoma initially diagnosed in Nov 2018. Started on chemo (rituximab + bendamustine) soon after. Developed tumor lysis syndrome in Dec 2018, bendamustine removed. She completed rituxan on 4/2019 and had been on maintenance Rituxan since 5/2019. In March 2020, she had evidence of disease progression and was switched to rituxan+Revlimid. Per review of notes, there are some concerns with noncompliance and f/u with bloodwork. Per Oncology note, long discussion with patient regarding refractory and progressive grade 2 follicular lymphoma. Patient with c/o increase L neck swelling/tenderness, US done, concerning for progression of her disease. Her biopsy from the L iliac bone and L cervical LN came back positive for follicular lymphoma. Plan was to start Obinutuzumab/CHOP with Udenyca but because of neutropenia, decision was to do copanlisib instead. However, there was a delay in her treatment when she had Covid-19 as well as when she went to [place] to relocate. She was going to stay there to get treatments, but it didn't work out so she came back. There are frequent interruptions in her care, with her frequent trips to [place] for personal/social reasons. Her PET-CT 6/4/2021 unfortunately showed significant progression of disease in the neck, chest, abdomen, pelvis, including new lesion in the L ribs. She is currently on copanlisib 60 mg 3 weeks on with 1 week of break since 6/29/2021. PET-CT in 5/2022 showed possible new progression in the mesenteric area with new LN behind the L ear on exam. For this reason, she was referred to Dr. Merri Closs who believes she can be candidate for CAR-T therapy. However, she was not able to get this done due to a lot of issues including transportation and compliance. She is instead continued on copanlisib. Her latest PET-CT on 10/26/2022 disease recurrence/progression. She went to [place] from 11/2022 to 1/2023, where she reportedly was hospitalized with a PNA.  She was sent to the ED for evaluation of possible infection in 1/30 where CT PE showed worsening lymphoma with new LNs in the chest abdomen, pelvis and enlarged spleen. She only resumed her chemo on 2/6/2023. She was switched to obinutuzumab + revlimid since 3/14/2023 and has been on this since. PET on 8/2023 showed partial response with new L inguinal nodes. Since last visit, she was switched to a new regimen due to progression as noted in PET-CT of 2/10/2023. Repeat PET on 8/2023 showed partial response but with new L inguinal nodes. She remains on Revlimid and obinutuzumab that she is tolerating well. She saw [facility] on 10/9 who discussed with her BM transplant/CAR-T maybe in the near future.  ID #[id number] utilized. She looks stable and healthy, comfortable. Pain in the abdomen and neck are well-controlled with oxycodone 3 times a day. No issues with OIC, takes miralax daily. She was tearful on today's visit. She was worried about herself and her family members who also have cancer. She is waiting to see who can come with her to [place] for the proposed BM transplant or CAR-T, she said onc SW helping with this. She wonders if she will survive the treatment. She is especially worried about her son who also has cancer. I ecouraged her to focus on her own health before she can help others. She has progressive disease despite treatments. She also has progression before when she delayed treatments to take care of other family members. She is encouraged to outweigh risks and benefits of proposed treatments including BM transplant. If at some point, her cancer continues to progress and no treatments are available (whether by choice or just lack thereof), then we can talk about keeping her comfortable on hospice so she can spend time with her family until her last day. The following portions of the medical history were reviewed: past medical history, problem list, medication list, and social history.     Current Outpatient Medications:     acetaminophen (TYLENOL) 325 mg tablet, Take 2 tablets (650 mg total) by mouth every 6 (six) hours as needed for mild pain, Disp: 30 tablet, Rfl: 0    allopurinol (ZYLOPRIM) 100 mg tablet, Take 1 tablet (100 mg total) by mouth daily, Disp: 30 tablet, Rfl: 11    amLODIPine (NORVASC) 10 mg tablet, Take 10 mg by mouth daily, Disp: , Rfl:     aspirin (ECOTRIN LOW STRENGTH) 81 mg EC tablet, Take 1 tablet (81 mg total) by mouth daily, Disp: 30 tablet, Rfl: 11    aspirin-acetaminophen-caffeine (EXCEDRIN MIGRAINE) 250-250-65 MG per tablet, Take 1 tablet by mouth every 6 (six) hours as needed for headaches Erica 1 tableta cada 6 horas fernandez sea necesario para el dolor de russ, Disp: 30 tablet, Rfl: 0    atorvastatin (LIPITOR) 40 mg tablet, Take 1 tablet (40 mg total) by mouth daily with dinner, Disp: 12 tablet, Rfl: 0    Banophen 25 MG capsule, TAKE 1 CAPSULE(25 MG TOTAL) BY MOUTH EVERY 6 (SIX) HOURS AS NEEDED FOR ITCHING, Disp: , Rfl:     benzocaine (BABY ORAJEL) 7.5 % oral gel, Apply to the mouth or throat 3 (three) times a day as needed for mucositis, Disp: 9.45 g, Rfl: 0    benzocaine (ORAJEL) 10 % mucosal gel, Apply 1 application to the mouth or throat as needed for mucositis, Disp: 5.3 g, Rfl: 0    benzonatate (TESSALON) 200 MG capsule, Take 1 capsule (200 mg total) by mouth 3 (three) times a day as needed for cough, Disp: 20 capsule, Rfl: 0    capsicum (ZOSTRIX) 0.075 % topical cream, Apply topically 3 (three) times a day Apply to affected area, Disp: 57 g, Rfl: 0    chlorthalidone 25 mg tablet, Take 1 tablet (25 mg total) by mouth daily, Disp: 30 tablet, Rfl: 5    cyanocobalamin 1000 MCG tablet, Take 1 tablet (1,000 mcg total) by mouth daily, Disp: 90 tablet, Rfl: 3    dextromethorphan-guaiFENesin (ROBITUSSIN DM)  mg/5 mL syrup, Take 5 mL by mouth 3 (three) times a day as needed for cough, Disp: 473 mL, Rfl: 0    DULoxetine (CYMBALTA) 20 mg capsule, Take 2 capsules (40 mg total) by mouth daily, Disp: 30 capsule, Rfl: 3    folic acid (FOLVITE) 1 mg tablet, Take 1 tablet (1 mg total) by mouth daily, Disp: 90 tablet, Rfl: 4    lenalidomide (Revlimid) 10 MG CAPS, TAKE 1 CAPSULE BY MOUTH DAILY 3 WEEKS ON FOLLOWED BY 1 WEEK OFF, Disp: 21 capsule, Rfl: 0    metFORMIN (GLUCOPHAGE) 500 mg tablet, Take 2 tablets (1,000 mg total) by mouth 2 (two) times a day with meals, Disp: 60 tablet, Rfl: 5    omeprazole (PriLOSEC) 20 mg delayed release capsule, Take 2 capsules (40 mg total) by mouth daily To prevent stomach upset, Disp: 60 capsule, Rfl: 5    ondansetron (ZOFRAN) 4 mg tablet, Take 1 tablet (4 mg total) by mouth every 8 (eight) hours as needed for nausea or vomiting, Disp: 30 tablet, Rfl: 3    oxyCODONE (ROXICODONE) 10 MG TABS, Take 1 tablet (10 mg total) by mouth every 8 (eight) hours as needed for severe pain Erica 1 tableta cada 8 horas fernandez sea necesario por dolor intenso.  Dosis maxima 3 Max Daily Amount: 30 mg, Disp: 90 tablet, Rfl: 0    polyethylene glycol (GLYCOLAX) 17 GM/SCOOP, TAKE 17 G BY MOUTH DAILY MIX WITH WATER, Disp: , Rfl:     polyethylene glycol (GLYCOLAX) 17 GM/SCOOP, TAKE 17 G BY MOUTH DAILY MIX WITH WATER, Disp: 510 g, Rfl: 3    potassium chloride (K-DUR,KLOR-CON) 20 mEq tablet, TAKE 1 TABLET (20 MEQ TOTAL) BY MOUTH DAILY, Disp: 30 tablet, Rfl: 11    senna (SENOKOT) 8.6 mg, Take 1 tablet (8.6 mg total) by mouth 2 (two) times a day, Disp: 60 each, Rfl: 3    acyclovir (ZOVIRAX) 400 MG tablet, Take 1 tablet (400 mg total) by mouth 2 (two) times a day, Disp: 60 tablet, Rfl: 11    ascorbic acid (VITAMIN C) 1000 MG tablet, Take 1 tablet (1,000 mg total) by mouth every 12 (twelve) hours for 10 doses, Disp: 10 tablet, Rfl: 0    Blood Glucose Monitoring Suppl (OneTouch Verio) w/Device KIT, Use in the morning, Disp: 1 kit, Rfl: 0    cholecalciferol (VITAMIN D3) 1,000 units tablet, Take 2 tablets (2,000 Units total) by mouth daily for 5 days, Disp: 10 tablet, Rfl: 0    diphenhydrAMINE (BENADRYL) 25 mg tablet, Take 1 tablet (25 mg total) by mouth every 6 (six) hours as needed for itching, Disp: 20 tablet, Rfl: 0    glucose blood (OneTouch Verio) test strip, Use 1 each in the morning Use as instructed, Disp: 50 strip, Rfl: 6    ibuprofen (MOTRIN) 400 mg tablet, Take 1 tablet (400 mg total) by mouth every 6 (six) hours as needed for mild pain (Body aches or fever) for up to 7 days, Disp: 30 tablet, Rfl: 0    lidocaine (LMX) 4 % cream, Apply topically as needed for mild pain To neck, Disp: 30 g, Rfl: 0    OneTouch Delica Lancets 72T MISC, Use in the morning, Disp: 100 each, Rfl: 4  Review of Systems   Constitutional:  Negative for activity change, appetite change and unexpected weight change. HENT:  Negative for trouble swallowing. Chronic neck pain from enlarged LNs   Respiratory:  Negative for cough and shortness of breath. Cardiovascular:  Negative for chest pain. Gastrointestinal:  Negative for abdominal distention, constipation, diarrhea, nausea and vomiting. Chronic abdominal pain   Musculoskeletal:  Negative for back pain. Neurological:  Negative for speech difficulty and light-headedness. Psychiatric/Behavioral:  Negative for sleep disturbance. The patient is not nervous/anxious. All other systems reviewed and are negative. All other systems negative    Objective:  Vital Signs  There were no vitals taken for this visit. Physical Exam    Constitutional: Appears well-developed and well-nourished, she does not look sick, nor toxic-looking. Looks stable and healthy. Appears to have gained more weight. Pleasant, well-kempt. Head: Normocephalic and atraumatic. Eyes: EOM are normal. No ocular discharge. No scleral icterus. Respiratory: Effort normal. No stridor. No respiratory distress. Gastrointestinal: No abdominal distension. Musculoskeletal: No edema. R knee in a brace  Neurological: Alert, oriented and appropriately conversant. Skin: No diaphoresis, no rashes seen on exposed areas of skin. Psychiatric: Displays a normal mood and affect. Behavior, judgement and thought content appear normal. Appropriately tearful    Eliu Mcwilliams MD  Palliative Medicine & Supportive Care  Internal Medicine  Available via [facility] Text  Office: [phone]  Fax: [phone]

## 2023-11-03 ENCOUNTER — TELEPHONE (OUTPATIENT)
Dept: HEMATOLOGY ONCOLOGY | Facility: CLINIC | Age: 63
End: 2023-11-03

## 2023-11-03 NOTE — TELEPHONE ENCOUNTER
Appointment Change  Cancel, Reschedule, Change to Virtual      Who are you speaking with? Patient   If it is not the patient, is the caller listed on the communication consent form? N/A   Which provider is the appointment scheduled with? Dr. Erika Nobles   When was the original appointment scheduled? Please list date and time 11/03/2023 @1PM    At which location is the appointment scheduled to take place? [facility]   Was the appointment rescheduled? Was the appointment changed from an in person visit to a virtual visit? If so, please list the details of the change. Yes, 01/05/2024 @ 1:20PM    What is the reason for the appointment change? Patient does not have transportation        Was STAR transport scheduled? No   Does STAR transport need to be scheduled for the new visit (if applicable) Yes   Does the patient need an infusion appointment rescheduled? No   Does the patient have an upcoming infusion appointment scheduled? If so, when? Yes, 11/17/2023   Is the patient undergoing chemotherapy? Yes   For appointments cancelled with less than 24 hours:  Was the no-show policy reviewed?  Yes

## 2023-11-15 ENCOUNTER — TELEPHONE (OUTPATIENT)
Dept: FAMILY MEDICINE CLINIC | Facility: CLINIC | Age: 63
End: 2023-11-15

## 2023-11-17 ENCOUNTER — HOSPITAL ENCOUNTER (OUTPATIENT)
Dept: INFUSION CENTER | Facility: HOSPITAL | Age: 63
Discharge: HOME | End: 2023-11-17
Payer: COMMERCIAL

## 2023-11-17 ENCOUNTER — HOSPITAL ENCOUNTER (EMERGENCY)
Facility: HOSPITAL | Age: 63
Discharge: HOME | End: 2023-11-17
Attending: EMERGENCY MEDICINE
Payer: COMMERCIAL

## 2023-11-17 ENCOUNTER — APPOINTMENT (EMERGENCY)
Dept: RADIOLOGY | Facility: HOSPITAL | Age: 63
End: 2023-11-17
Payer: COMMERCIAL

## 2023-11-17 VITALS
OXYGEN SATURATION: 99 % | TEMPERATURE: 98.2 F | SYSTOLIC BLOOD PRESSURE: 168 MMHG | RESPIRATION RATE: 18 BRPM | HEART RATE: 69 BPM | DIASTOLIC BLOOD PRESSURE: 75 MMHG

## 2023-11-17 DIAGNOSIS — C82.18 GRADE 2 FOLLICULAR LYMPHOMA OF LYMPH NODES OF MULTIPLE REGIONS (HCC): Primary | ICD-10-CM

## 2023-11-17 DIAGNOSIS — Z45.2 ENCOUNTER FOR CENTRAL LINE CARE: ICD-10-CM

## 2023-11-17 DIAGNOSIS — Z78.9 PROBLEM WITH VASCULAR ACCESS: Primary | ICD-10-CM

## 2023-11-17 PROCEDURE — 96523 IRRIG DRUG DELIVERY DEVICE: CPT

## 2023-11-17 PROCEDURE — 99283 EMERGENCY DEPT VISIT LOW MDM: CPT

## 2023-11-17 PROCEDURE — 71046 X-RAY EXAM CHEST 2 VIEWS: CPT

## 2023-11-17 PROCEDURE — 99284 EMERGENCY DEPT VISIT MOD MDM: CPT

## 2023-11-17 NOTE — DISCHARGE INSTRUCTIONS
Follow up outpatient with Dr. Jake Fernando, call his office to make an appointment. Call Oncology office regarding upcoming infusions. Return to ED for new symptoms as discussed.

## 2023-11-17 NOTE — ED PROVIDER NOTES
History  Chief Complaint   Patient presents with    Vascular Access Problem     Patient sent from infusion. Infusion nurse reports that they did not get blood from port. And report the port lines appear to be out of place. Reports that she needs to come down for an xray      59-year-old female past medical history of lymphoma, lupus, diabetes mellitus, hypertension, osteoporosis, stomach cancer, Ferdous, asthma, anemia Zentz to emergency department for concern about Port-A-Cath. Patient brought down from infusion center due to concern for displaced Port-A-Cat. She presented to infusion center for catheter maintenance, infusion center rn reported that they flushed the cath, initially got blood return, then stopped getting blood return, despite repositioning they were still unable to get blood return and were concerned about the Port-A-Cath appearing out of place. She denies trauma to the chest/neck. She is asymptomatic. No acute complaints including no shortness of breath, no chest pain, arm pain, neck pain. History provided by:  Patient (infusion center RN)   used: Yes        Prior to Admission Medications   Prescriptions Last Dose Informant Patient Reported? Taking?    Banophen 25 MG capsule  Self, Other (Specify) Yes No   Sig: TAKE 1 CAPSULE(25 MG TOTAL) BY MOUTH EVERY 6 (SIX) HOURS AS NEEDED FOR ITCHING   Blood Glucose Monitoring Suppl (OneTouch Verio) w/Device KIT  Self, Other (Specify) No No   Sig: Use in the morning   DULoxetine (CYMBALTA) 20 mg capsule  Self, Other (Specify) No No   Sig: Take 2 capsules (40 mg total) by mouth daily   OneTouch Delica Lancets 13C MISC  Self, Other (Specify) No No   Sig: Use in the morning   acetaminophen (TYLENOL) 325 mg tablet   No No   Sig: Take 2 tablets (650 mg total) by mouth every 6 (six) hours as needed for mild pain   acyclovir (ZOVIRAX) 400 MG tablet   No No   Sig: Take 1 tablet (400 mg total) by mouth 2 (two) times a day   allopurinol (ZYLOPRIM) 100 mg tablet  Self, Other (Specify) No No   Sig: Take 1 tablet (100 mg total) by mouth daily   amLODIPine (NORVASC) 10 mg tablet  Self, Other (Specify) Yes No   Sig: Take 10 mg by mouth daily   ascorbic acid (VITAMIN C) 1000 MG tablet  Self No No   Sig: Take 1 tablet (1,000 mg total) by mouth every 12 (twelve) hours for 10 doses   aspirin (ECOTRIN LOW STRENGTH) 81 mg EC tablet  Self, Other (Specify) No No   Sig: Take 1 tablet (81 mg total) by mouth daily   aspirin-acetaminophen-caffeine (EXCEDRIN MIGRAINE) 250-250-65 MG per tablet  Self, Other (Specify) No No   Sig: Take 1 tablet by mouth every 6 (six) hours as needed for headaches Erica 1 tableta cada 6 horas fernandez sea necesario para el dolor de russ   atorvastatin (LIPITOR) 40 mg tablet  Self, Other (Specify) No No   Sig: Take 1 tablet (40 mg total) by mouth daily with dinner   benzocaine (BABY ORAJEL) 7.5 % oral gel  Self, Other (Specify) No No   Sig: Apply to the mouth or throat 3 (three) times a day as needed for mucositis   benzocaine (ORAJEL) 10 % mucosal gel  Self, Other (Specify) No No   Sig: Apply 1 application to the mouth or throat as needed for mucositis   benzonatate (TESSALON) 200 MG capsule   No No   Sig: Take 1 capsule (200 mg total) by mouth 3 (three) times a day as needed for cough   capsicum (ZOSTRIX) 0.075 % topical cream  Self, Other (Specify) No No   Sig: Apply topically 3 (three) times a day Apply to affected area   chlorthalidone 25 mg tablet  Self, Other (Specify) No No   Sig: Take 1 tablet (25 mg total) by mouth daily   cholecalciferol (VITAMIN D3) 1,000 units tablet  Self No No   Sig: Take 2 tablets (2,000 Units total) by mouth daily for 5 days   cyanocobalamin 1000 MCG tablet  Self, Other (Specify) No No   Sig: Take 1 tablet (1,000 mcg total) by mouth daily   dextromethorphan-guaiFENesin (ROBITUSSIN DM)  mg/5 mL syrup   No No   Sig: Take 5 mL by mouth 3 (three) times a day as needed for cough   diphenhydrAMINE (BENADRYL) 25 mg tablet  Self, Other (Specify) No No   Sig: Take 1 tablet (25 mg total) by mouth every 6 (six) hours as needed for itching   folic acid (FOLVITE) 1 mg tablet  Self, Other (Specify) No No   Sig: Take 1 tablet (1 mg total) by mouth daily   glucose blood (OneTouch Verio) test strip  Self, Other (Specify) No No   Sig: Use 1 each in the morning Use as instructed   ibuprofen (MOTRIN) 400 mg tablet   No No   Sig: Take 1 tablet (400 mg total) by mouth every 6 (six) hours as needed for mild pain (Body aches or fever) for up to 7 days   lenalidomide (Revlimid) 10 MG CAPS   No No   Sig: TAKE 1 CAPSULE BY MOUTH DAILY 3 WEEKS ON FOLLOWED BY 1 WEEK OFF   lidocaine (LMX) 4 % cream  Self, Other (Specify) No No   Sig: Apply topically as needed for mild pain To neck   metFORMIN (GLUCOPHAGE) 500 mg tablet  Self, Other (Specify) No No   Sig: Take 2 tablets (1,000 mg total) by mouth 2 (two) times a day with meals   omeprazole (PriLOSEC) 20 mg delayed release capsule  Self, Other (Specify) No No   Sig: Take 2 capsules (40 mg total) by mouth daily To prevent stomach upset   ondansetron (ZOFRAN) 4 mg tablet  Self, Other (Specify) No No   Sig: Take 1 tablet (4 mg total) by mouth every 8 (eight) hours as needed for nausea or vomiting   oxyCODONE (ROXICODONE) 10 MG TABS   No No   Sig: Take 1 tablet (10 mg total) by mouth every 8 (eight) hours as needed for severe pain Erica 1 tableta cada 8 horas fernandez sea necesario por dolor intenso.  Dosis maxima 3 Max Daily Amount: 30 mg   polyethylene glycol (GLYCOLAX) 17 GM/SCOOP  Self, Other (Specify) Yes No   Sig: TAKE 17 G BY MOUTH DAILY MIX WITH WATER   polyethylene glycol (GLYCOLAX) 17 GM/SCOOP   No No   Sig: TAKE 17 G BY MOUTH DAILY MIX WITH WATER   potassium chloride (K-DUR,KLOR-CON) 20 mEq tablet  Self, Other (Specify) No No   Sig: TAKE 1 TABLET (20 MEQ TOTAL) BY MOUTH DAILY   senna (SENOKOT) 8.6 mg  Self, Other (Specify) No No   Sig: Take 1 tablet (8.6 mg total) by mouth 2 (two) times a day      Facility-Administered Medications: None       Past Medical History:   Diagnosis Date    Anemia     iron and B12    Arthritis     Asthma     Cough     Depression     Diabetes mellitus ([address])     Falls frequently     Hypertension     Lupus (HCC)     Lymphoma (HCC)     Lymphoma (HCC)     Migraine     Osteoporosis     Psychiatric disorder     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[address])     Vertigo        Past Surgical History:   Procedure Laterality Date    CHOLECYSTECTOMY      FL GUIDED CENTRAL VENOUS ACCESS DEVICE INSERTION  2018    HYSTERECTOMY      IR BIOPSY BONE MARROW  2020    IR BIOPSY LYMPH NODE  2020    LYMPH NODE BIOPSY Left 2018    Procedure: EXCISION BIOPSY LYMPH NODE SUPRACLAVICULAR;  Surgeon: Familia Coppola MD;  Location: [address] [place], [place] [address];  Service: General    NY TENDON SHEATH INCISION Right 2020    Procedure: RELEASE TRIGGER FINGER RIGHT THUMB;  Surgeon: Bradley Gonzales MD;  Location: [address] [place] [address] [facility];  Service: Orthopedics    TUNNELED VENOUS PORT PLACEMENT N/A 2018    Procedure: INSERTION OF PORT-A-CATH;  Surgeon: Familia Coppola MD;  Location: [address] [place] [address] [facility];  Service: General       Family History   Problem Relation Age of Onset    Cancer Mother     Diabetes Mother     Cancer Father     Diabetes Father     Breast cancer Sister     No Known Problems Sister     No Known Problems Sister     No Known Problems Sister     No Known Problems Maternal Aunt     No Known Problems Maternal Aunt     No Known Problems Maternal Aunt     No Known Problems Paternal Aunt      I have reviewed and agree with the history as documented.     E-Cigarette/Vaping    E-Cigarette Use Never User      E-Cigarette/Vaping Substances    Nicotine No     THC No     CBD No     Flavoring No     Other No     Unknown No      Social History     Tobacco Use    Smoking status: Former     Types: Cigarettes     Quit date: 2017     Years since quittin.4     Passive exposure: Past    Smokeless tobacco: Never   Vaping Use    Vaping Use: Never used   Substance Use Topics    Alcohol use: Never    Drug use: Never       Review of Systems   Constitutional:  Negative for chills and fever. Respiratory:  Negative for shortness of breath. Cardiovascular:  Negative for chest pain. Port a cath problem    Musculoskeletal:  Negative for arthralgias and myalgias. Skin:  Negative for color change, rash and wound. Neurological:  Negative for weakness and headaches. All other systems reviewed and are negative. Physical Exam  Physical Exam  Vitals and nursing note reviewed. Constitutional:       General: She is not in acute distress. Appearance: Normal appearance. She is obese. She is not ill-appearing, toxic-appearing or diaphoretic. HENT:      Mouth/Throat:      Mouth: Mucous membranes are moist.   Cardiovascular:      Rate and Rhythm: Normal rate and regular rhythm. Heart sounds: Normal heart sounds. Pulmonary:      Effort: Pulmonary effort is normal. No tachypnea or respiratory distress. Breath sounds: Normal breath sounds. No stridor. No decreased breath sounds, wheezing, rhonchi or rales. Chest:       Skin:     General: Skin is warm and dry. Findings: No erythema or rash. Neurological:      Mental Status: She is alert.       Gait: Gait normal.         Vital Signs  ED Triage Vitals [11/17/23 1612]   Temperature Pulse Respirations Blood Pressure SpO2   98.2 °F (36.8 °C) 69 18 168/75 99 %      Temp Source Heart Rate Source Patient Position - Orthostatic VS BP Location FiO2 (%)   Oral Monitor Sitting Right arm --      Pain Score       --           Vitals:    11/17/23 1612   BP: 168/75   Pulse: 69   Patient Position - Orthostatic VS: Sitting         Visual Acuity      ED Medications  Medications - No data to display    Diagnostic Studies  Results Reviewed       None                   XR chest 2 views   Final Result by Leah Bloom MD (11/17 0985)      Left chest port with tip overlying distal brachiocephalic vein/proximal SVC. There is a slightly increased kink along the proximal aspect of the catheter course. No acute cardiopulmonary disease. The study was marked in [facility] for immediate notification. Workstation performed: [id number]                    Procedures  Procedures         ED Course  ED Course as of 11/17/23 1757   Fri Nov 17, 2023   1633 Per chart review L port-a-cath placed in 2018 that terminates svc    1721 Discussed with General Surgeon who placed catheter, Dr. Yusef Muhammad. As port is in the right place, he recommends patient call office for non-emergent outpatient appointment. Medical Decision Making  Concern for displaced port a cath after blood return stopped. She is asymptomatic. There is no trauma. No evidence of acute infection. No acute respiratory distress, decreased breath sounds. Chest x-ray ordered to confirm placement. Tip of Port-A-Cath confirmed to be in correct position, however kinking was noted on final read. Discussed with general surgeon who placed Port-A-Cath, agrees with plan for outpatient follow-up. No emergent intervention indicated at this time. Patient to also inform/follow up with oncology regarding upcoming infusions. All imaging and/or lab testing discussed with patient, strict return to ED precautions discussed. Patient recommended to follow up promptly with appropriate outpatient provider. Patient and/or family members verbalizes understanding and agrees with plan. Patient and/or family members were given opportunity to ask questions, all questions were answered at this time. Patient is stable for discharge. Portions of the record may have been created with voice recognition software. Occasional wrong word or "sound a like" substitutions may have occurred due to the inherent limitations of voice recognition software.  Read the chart carefully and recognize, using context, where substitutions have occurred. Amount and/or Complexity of Data Reviewed  Radiology: ordered. Disposition  Final diagnoses:   Problem with vascular access     Time reflects when diagnosis was documented in both MDM as applicable and the Disposition within this note       Time User Action Codes Description Comment    11/17/2023  5:17 PM Dahlia Curiel Add [Z78.9] Problem with vascular access           ED Disposition       ED Disposition   Discharge    Condition   Stable    Date/Time   Fri Nov 17, 2023  5:21 PM    Comment   Helen Stanley Savage discharge to home/self care.                    Follow-up Information       Follow up With Specialties Details Why Contact Info    Ramez Baeza MD General Surgery Schedule an appointment as soon as possible for a visit  For follow up regarding your port a cath [address]   [phone]      Swetha Bell MD Hematology and Oncology, Hematology, Oncology, Internal Medicine Call  regarding port [address]  [phone]              Discharge Medication List as of 11/17/2023  5:23 PM        CONTINUE these medications which have NOT CHANGED    Details   acetaminophen (TYLENOL) 325 mg tablet Take 2 tablets (650 mg total) by mouth every 6 (six) hours as needed for mild pain, Starting Tue 8/29/2023, Normal      acyclovir (ZOVIRAX) 400 MG tablet Take 1 tablet (400 mg total) by mouth 2 (two) times a day, Starting Mon 3/13/2023, Until Mon 5/8/2023, Normal      allopurinol (ZYLOPRIM) 100 mg tablet Take 1 tablet (100 mg total) by mouth daily, Starting Mon 3/13/2023, Normal      amLODIPine (NORVASC) 10 mg tablet Take 10 mg by mouth daily, Starting u 6/2/2022, Historical Med      ascorbic acid (VITAMIN C) 1000 MG tablet Take 1 tablet (1,000 mg total) by mouth every 12 (twelve) hours for 10 doses, Starting Tue 1/26/2021, Until Mon 5/8/2023, Normal      aspirin (ECOTRIN LOW STRENGTH) 81 mg EC tablet Take 1 tablet (81 mg total) by mouth daily, Starting Wed 3/18/2020, Normal      aspirin-acetaminophen-caffeine (EXCEDRIN MIGRAINE) 250-250-65 MG per tablet Take 1 tablet by mouth every 6 (six) hours as needed for headaches Erica 1 tableta cada 6 horas fernandez sea necesario para el dolor de russ, Starting Thu 11/19/2020, Normal      atorvastatin (LIPITOR) 40 mg tablet Take 1 tablet (40 mg total) by mouth daily with dinner, Starting Tue 1/26/2021, Normal      Banophen 25 MG capsule TAKE 1 CAPSULE(25 MG TOTAL) BY MOUTH EVERY 6 (SIX) HOURS AS NEEDED FOR ITCHING, Historical Med      benzocaine (BABY ORAJEL) 7.5 % oral gel Apply to the mouth or throat 3 (three) times a day as needed for mucositis, Starting Sat 4/1/2023, Normal      benzocaine (ORAJEL) 10 % mucosal gel Apply 1 application to the mouth or throat as needed for mucositis, Starting Wed 8/17/2022, Normal      benzonatate (TESSALON) 200 MG capsule Take 1 capsule (200 mg total) by mouth 3 (three) times a day as needed for cough, Starting Fri 10/20/2023, Normal      Blood Glucose Monitoring Suppl (OneTouch Verio) w/Device KIT Use in the morning, Starting Fri 4/8/2022, Normal      capsicum (ZOSTRIX) 0.075 % topical cream Apply topically 3 (three) times a day Apply to affected area, Starting Sat 2/25/2023, Print      chlorthalidone 25 mg tablet Take 1 tablet (25 mg total) by mouth daily, Starting Tue 5/31/2022, Normal      cholecalciferol (VITAMIN D3) 1,000 units tablet Take 2 tablets (2,000 Units total) by mouth daily for 5 days, Starting Wed 1/27/2021, Until Mon 5/8/2023, Normal      cyanocobalamin 1000 MCG tablet Take 1 tablet (1,000 mcg total) by mouth daily, Starting Wed 4/17/2019, Normal      dextromethorphan-guaiFENesin (ROBITUSSIN DM)  mg/5 mL syrup Take 5 mL by mouth 3 (three) times a day as needed for cough, Starting Fri 10/20/2023, Normal      diphenhydrAMINE (BENADRYL) 25 mg tablet Take 1 tablet (25 mg total) by mouth every 6 (six) hours as needed for itching, Starting Wed 2/15/2023, Normal      DULoxetine (CYMBALTA) 20 mg capsule Take 2 capsules (40 mg total) by mouth daily, Starting Tue 4/3/8244, Normal      folic acid (FOLVITE) 1 mg tablet Take 1 tablet (1 mg total) by mouth daily, Starting Wed 6/23/2021, Normal      glucose blood (OneTouch Verio) test strip Use 1 each in the morning Use as instructed, Starting Fri 4/8/2022, Normal      ibuprofen (MOTRIN) 400 mg tablet Take 1 tablet (400 mg total) by mouth every 6 (six) hours as needed for mild pain (Body aches or fever) for up to 7 days, Starting Sat 4/1/2023, Until Sat 4/8/2023 at 2359, Normal      lenalidomide (Revlimid) 10 MG CAPS TAKE 1 CAPSULE BY MOUTH DAILY 3 WEEKS ON FOLLOWED BY 1 WEEK OFF, Normal      lidocaine (LMX) 4 % cream Apply topically as needed for mild pain To neck, Starting Tue 9/15/2020, Normal      metFORMIN (GLUCOPHAGE) 500 mg tablet Take 2 tablets (1,000 mg total) by mouth 2 (two) times a day with meals, Starting Tue 4/5/2022, Normal      omeprazole (PriLOSEC) 20 mg delayed release capsule Take 2 capsules (40 mg total) by mouth daily To prevent stomach upset, Starting Wed 3/25/2020, Normal      ondansetron (ZOFRAN) 4 mg tablet Take 1 tablet (4 mg total) by mouth every 8 (eight) hours as needed for nausea or vomiting, Starting Tue 9/15/2020, Normal      OneTouch Delica Lancets 37R MISC Use in the morning, Starting Fri 4/8/2022, Normal      oxyCODONE (ROXICODONE) 10 MG TABS Take 1 tablet (10 mg total) by mouth every 8 (eight) hours as needed for severe pain Erica 1 tableta cada 8 horas fernandez sea necesario por dolor intenso.  Dosis maxima 3 Max Daily Amount: 30 mg, Starting u 11/2/2023, Normal      !! polyethylene glycol (GLYCOLAX) 17 GM/SCOOP TAKE 17 G BY MOUTH DAILY MIX WITH WATER, Historical Med      !! polyethylene glycol (GLYCOLAX) 17 GM/SCOOP TAKE 17 G BY MOUTH DAILY MIX WITH WATER, Normal      potassium chloride (K-DUR,KLOR-CON) 20 mEq tablet TAKE 1 TABLET (20 MEQ TOTAL) BY MOUTH DAILY, Starting Tue 6/6/2023, Normal      senna (SENOKOT) 8.6 mg Take 1 tablet (8.6 mg total) by mouth 2 (two) times a day, Starting Tue 12/17/2019, Normal       !! - Potential duplicate medications found. Please discuss with provider. No discharge procedures on file.     PDMP Review         Value Time User    PDMP Reviewed  Yes 11/2/2023 12:34 PM Eliu Mcwilliams MD            ED Provider  Electronically Signed by             Frieda Emanuel PA-C  11/17/23 5578

## 2023-11-20 ENCOUNTER — VBI (OUTPATIENT)
Dept: ADMINISTRATIVE | Facility: OTHER | Age: 63
End: 2023-11-20

## 2023-11-21 DIAGNOSIS — C82.18 GRADE 2 FOLLICULAR LYMPHOMA OF LYMPH NODES OF MULTIPLE REGIONS (HCC): Primary | ICD-10-CM

## 2023-11-21 RX ORDER — ACETAMINOPHEN 325 MG/1
650 TABLET ORAL ONCE
Status: CANCELLED | OUTPATIENT
Start: 2023-12-01

## 2023-11-21 RX ORDER — SODIUM CHLORIDE 9 MG/ML
20 INJECTION, SOLUTION INTRAVENOUS ONCE
Status: CANCELLED | OUTPATIENT
Start: 2023-12-01

## 2023-11-22 ENCOUNTER — CONSULT (OUTPATIENT)
Dept: SURGERY | Facility: CLINIC | Age: 63
End: 2023-11-22
Payer: COMMERCIAL

## 2023-11-22 VITALS
DIASTOLIC BLOOD PRESSURE: 72 MMHG | WEIGHT: 248 LBS | BODY MASS INDEX: 41.32 KG/M2 | SYSTOLIC BLOOD PRESSURE: 134 MMHG | HEART RATE: 62 BPM | HEIGHT: 65 IN | TEMPERATURE: 98 F | OXYGEN SATURATION: 99 %

## 2023-11-22 DIAGNOSIS — Z95.828 PORT-A-CATH IN PLACE: Primary | ICD-10-CM

## 2023-11-22 DIAGNOSIS — Z78.9 PROBLEM WITH VASCULAR ACCESS: Primary | ICD-10-CM

## 2023-11-22 PROCEDURE — 99244 OFF/OP CNSLTJ NEW/EST MOD 40: CPT | Performed by: SPECIALIST

## 2023-11-22 RX ORDER — TRAZODONE HYDROCHLORIDE 50 MG/1
TABLET ORAL
COMMUNITY
Start: 2023-11-10

## 2023-11-22 RX ORDER — FLUOXETINE 10 MG/1
CAPSULE ORAL
COMMUNITY
Start: 2023-11-10

## 2023-11-22 RX ORDER — HYDROXYZINE HYDROCHLORIDE 10 MG/1
TABLET, FILM COATED ORAL
COMMUNITY
Start: 2023-11-10

## 2023-11-22 NOTE — LETTER
November 29, 2023     [place] [id number]    Patient: Adan Danielle   YOB: 1960   Date of Visit: 11/22/2023       Dear Chief,    Thank you for referring Chely Cuello to me for evaluation. Below are my notes for this consultation. If you have questions, please do not hesitate to call me. I look forward to following your patient along with you. Sincerely,    Riley Pandya MD        CC: No Recipients    Kerri Valero MD  11/29/2023  8:35 AM  Sign when Signing Visit  Chief Complaint: Port-A-Cath malposition? History of Present Illness: Patient is a 68-year-old  female on whom we placed a Port-A-Cath for chemotherapy back in 2019. She had follicular cell lymphoma and continues to undergo chemotherapy for this. When last seen in infusion the perception was that the catheter had spontaneously become malpositioned. Apparently they aspirated blood the first time but then when they tried to aspirate blood the second time they could not get blood out even though it flushed. They promptly sent the patient to the emergency room where chest x-ray was obtained. This was read as "left chest port with tip overlying distal brachiocephalic vein/proximal SVC. There is a slightly increased kink along the proximal aspect "at that point we were notified by the emergency room and she is here today.       Past Medical History:   Past Medical History:   Diagnosis Date   • Anemia     iron and B12   • Arthritis    • Asthma    • Cough    • Depression    • Diabetes mellitus ([address])    • Falls frequently    • Hypertension    • Lupus ([address])    • Lymphoma ([address])    • Lymphoma (HCC)    • Migraine    • Osteoporosis    • Psychiatric disorder    • 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[address])    • Vertigo          Past Surgical History:    Past Surgical History:   Procedure Laterality Date   • CHOLECYSTECTOMY     • FL GUIDED CENTRAL VENOUS ACCESS DEVICE INSERTION  11/9/2018   • HYSTERECTOMY     • IR BIOPSY BONE MARROW  11/24/2020   • IR BIOPSY LYMPH NODE  11/24/2020   • LYMPH NODE BIOPSY Left 11/9/2018    Procedure: EXCISION BIOPSY LYMPH NODE SUPRACLAVICULAR;  Surgeon: Familia Coppola MD;  Location: [address] [place] [address] OR;  Service: General   • HI TENDON SHEATH INCISION Right 1/16/2020    Procedure: RELEASE TRIGGER FINGER RIGHT THUMB;  Surgeon: Bradley Gonzales MD;  Location: [address] [place] [address] OR;  Service: Orthopedics   • TUNNELED VENOUS PORT PLACEMENT N/A 11/9/2018    Procedure: INSERTION OF PORT-A-CATH;  Surgeon: Familia Coppola MD;  Location: [address] [place] [address] OR;  Service: General         Allergies:     Allergies   Allergen Reactions   • Penicillins Anaphylaxis         Medications:    Current Outpatient Medications:   •  acetaminophen (TYLENOL) 325 mg tablet, Take 2 tablets (650 mg total) by mouth every 6 (six) hours as needed for mild pain, Disp: 30 tablet, Rfl: 0  •  acyclovir (ZOVIRAX) 400 MG tablet, Take 1 tablet (400 mg total) by mouth 2 (two) times a day, Disp: 60 tablet, Rfl: 11  •  allopurinol (ZYLOPRIM) 100 mg tablet, Take 1 tablet (100 mg total) by mouth daily, Disp: 30 tablet, Rfl: 11  •  amLODIPine (NORVASC) 10 mg tablet, Take 10 mg by mouth daily, Disp: , Rfl:   •  ascorbic acid (VITAMIN C) 1000 MG tablet, Take 1 tablet (1,000 mg total) by mouth every 12 (twelve) hours for 10 doses, Disp: 10 tablet, Rfl: 0  •  aspirin (ECOTRIN LOW STRENGTH) 81 mg EC tablet, Take 1 tablet (81 mg total) by mouth daily, Disp: 30 tablet, Rfl: 11  •  aspirin-acetaminophen-caffeine (EXCEDRIN MIGRAINE) 250-250-65 MG per tablet, Take 1 tablet by mouth every 6 (six) hours as needed for headaches Erica 1 tableta cada 6 horas fernandez sea necesario para el dolor de russ, Disp: 30 tablet, Rfl: 0  •  atorvastatin (LIPITOR) 40 mg tablet, Take 1 tablet (40 mg total) by mouth daily with dinner, Disp: 12 tablet, Rfl: 0  •  Banophen 25 MG capsule, TAKE 1 CAPSULE(25 MG TOTAL) BY MOUTH EVERY 6 (SIX) HOURS AS NEEDED FOR ITCHING, Disp: , Rfl:   •  benzocaine (BABY ORAJEL) 7.5 % oral gel, Apply to the mouth or throat 3 (three) times a day as needed for mucositis, Disp: 9.45 g, Rfl: 0  •  benzocaine (ORAJEL) 10 % mucosal gel, Apply 1 application to the mouth or throat as needed for mucositis, Disp: 5.3 g, Rfl: 0  •  benzonatate (TESSALON) 200 MG capsule, Take 1 capsule (200 mg total) by mouth 3 (three) times a day as needed for cough, Disp: 20 capsule, Rfl: 0  •  Blood Glucose Monitoring Suppl (OneTouch Verio) w/Device KIT, Use in the morning, Disp: 1 kit, Rfl: 0  •  capsicum (ZOSTRIX) 0.075 % topical cream, Apply topically 3 (three) times a day Apply to affected area, Disp: 57 g, Rfl: 0  •  chlorthalidone 25 mg tablet, Take 1 tablet (25 mg total) by mouth daily, Disp: 30 tablet, Rfl: 5  •  cholecalciferol (VITAMIN D3) 1,000 units tablet, Take 2 tablets (2,000 Units total) by mouth daily for 5 days, Disp: 10 tablet, Rfl: 0  •  cyanocobalamin 1000 MCG tablet, Take 1 tablet (1,000 mcg total) by mouth daily, Disp: 90 tablet, Rfl: 3  •  dextromethorphan-guaiFENesin (ROBITUSSIN DM)  mg/5 mL syrup, Take 5 mL by mouth 3 (three) times a day as needed for cough, Disp: 473 mL, Rfl: 0  •  diphenhydrAMINE (BENADRYL) 25 mg tablet, Take 1 tablet (25 mg total) by mouth every 6 (six) hours as needed for itching, Disp: 20 tablet, Rfl: 0  •  DULoxetine (CYMBALTA) 20 mg capsule, Take 2 capsules (40 mg total) by mouth daily, Disp: 30 capsule, Rfl: 3  •  FLUoxetine (PROzac) 10 mg capsule, TAKE 1 ORAL CAPSULE ONCE A DAY, Disp: , Rfl:   •  folic acid (FOLVITE) 1 mg tablet, Take 1 tablet (1 mg total) by mouth daily, Disp: 90 tablet, Rfl: 4  •  hydrOXYzine HCL (ATARAX) 10 mg tablet, TAKE 1 ORAL TABLET 2 TIMES A DAY PRN FOR ANXIETY, Disp: , Rfl:   •  ibuprofen (MOTRIN) 400 mg tablet, Take 1 tablet (400 mg total) by mouth every 6 (six) hours as needed for mild pain (Body aches or fever) for up to 7 days, Disp: 30 tablet, Rfl: 0  •  lidocaine (LMX) 4 % cream, Apply topically as needed for mild pain To neck, Disp: 30 g, Rfl: 0  •  metFORMIN (GLUCOPHAGE) 500 mg tablet, Take 2 tablets (1,000 mg total) by mouth 2 (two) times a day with meals, Disp: 60 tablet, Rfl: 5  •  omeprazole (PriLOSEC) 20 mg delayed release capsule, Take 2 capsules (40 mg total) by mouth daily To prevent stomach upset, Disp: 60 capsule, Rfl: 5  •  ondansetron (ZOFRAN) 4 mg tablet, Take 1 tablet (4 mg total) by mouth every 8 (eight) hours as needed for nausea or vomiting, Disp: 30 tablet, Rfl: 3  •  OneTouch Delica Lancets 47L MISC, Use in the morning, Disp: 100 each, Rfl: 4  •  polyethylene glycol (GLYCOLAX) 17 GM/SCOOP, TAKE 17 G BY MOUTH DAILY MIX WITH WATER, Disp: , Rfl:   •  potassium chloride (K-DUR,KLOR-CON) 20 mEq tablet, TAKE 1 TABLET (20 MEQ TOTAL) BY MOUTH DAILY, Disp: 30 tablet, Rfl: 11  •  senna (SENOKOT) 8.6 mg, Take 1 tablet (8.6 mg total) by mouth 2 (two) times a day, Disp: 60 each, Rfl: 3  •  traZODone (DESYREL) 50 mg tablet, TAKE 1 ORAL TABLET AT BEDTIME PRN FOR SLEEP, Disp: , Rfl:   •  glucose blood (OneTouch Verio) test strip, Use 1 each in the morning Use as instructed, Disp: 50 strip, Rfl: 6  •  lenalidomide (Revlimid) 10 MG CAPS, TAKE 1 CAPSULE BY MOUTH DAILY 3 WEEKS ON FOLLOWED BY 1 WEEK OFF, Disp: 21 capsule, Rfl: 0  •  oxyCODONE (ROXICODONE) 10 MG TABS, Take 1 tablet (10 mg total) by mouth every 8 (eight) hours as needed for severe pain Erica 1 tableta cada 8 horas fernandez sea necesario por dolor intenso.  Dosis maxima 3 Max Daily Amount: 30 mg, Disp: 90 tablet, Rfl: 0  •  polyethylene glycol (GLYCOLAX) 17 GM/SCOOP, TAKE 17 G BY MOUTH DAILY MIX WITH WATER (Patient not taking: Reported on 11/22/2023), Disp: 510 g, Rfl: 3      Social History:  Social History    Social History     Substance and Sexual Activity   Alcohol Use Never     Social History     Substance and Sexual Activity   Drug Use Never     Social History     Tobacco Use   Smoking Status Former   • Types: Cigarettes   • Quit date: 2017   • Years since quittin.4   • Passive exposure: Past   Smokeless Tobacco Never         Family History:    Family History   Problem Relation Age of Onset   • Cancer Mother    • Diabetes Mother    • Cancer Father    • Diabetes Father    • Breast cancer Sister    • No Known Problems Sister    • No Known Problems Sister    • No Known Problems Sister    • No Known Problems Maternal Aunt    • No Known Problems Maternal Aunt    • No Known Problems Maternal Aunt    • No Known Problems Paternal Aunt          Review of Systems:    Negative for 12 system reviewed    Vitals:  Vitals:    23 0953   BP: 134/72   Pulse: 62   Temp: 98 °F (36.7 °C)   SpO2: 99%       Physical Exam:  Middle-aged  female who speaks no English awake and alert in no distress. Vital signs as above    Chest demonstrates Port-A-Cath left side. Appears unremarkable. Lab Results: I have personally reviewed pertinent reports. See below. Imaging: I have personally reviewed pertinent imaging studies primarily chest x-ray. EKG, Pathology, and Other Studies: I have personally reviewed pertinent reports. No visits with results within 1 Day(s) from this visit.    Latest known visit with results is:   Hospital Outpatient Visit on 10/20/2023   Component Date Value   • WBC 10/20/2023 6.41    • RBC 10/20/2023 5.40 (H)    • Hemoglobin 10/20/2023 11.1 (L)    • Hematocrit 10/20/2023 36.8    • MCV 10/20/2023 68 (L)    • MCH 10/20/2023 20.6 (L)    • MCHC 10/20/2023 30.2 (L)    • RDW 10/20/2023 17.2 (H)    • Platelets  169    • nRBC 10/20/2023 0    • Neutrophils Relative 10/20/2023 66    • Immat GRANS % 10/20/2023 0    • Lymphocytes Relative 10/20/2023 23    • Monocytes Relative 10/20/2023 7    • Eosinophils Relative 10/20/2023 4    • Basophils Relative 10/20/2023 0    • Neutrophils Absolute 10/20/2023 4.21    • Immature Grans Absolute 10/20/2023 0.02    • Lymphocytes Absolute 10/20/2023 1.45    • Monocytes Absolute 10/20/2023 0.44    • Eosinophils Absolute 10/20/2023 0.27    • Basophils Absolute 10/20/2023 0.02    • Sodium 10/20/2023 140    • Potassium 10/20/2023 3.6    • Chloride 10/20/2023 107    • CO2 10/20/2023 26    • ANION GAP 10/20/2023 7    • BUN 10/20/2023 14    • Creatinine 10/20/2023 0.64    • Glucose 10/20/2023 110    • Calcium 10/20/2023 8.7    • AST 10/20/2023 12 (L)    • ALT 10/20/2023 13    • Alkaline Phosphatase 10/20/2023 108 (H)    • Total Protein 10/20/2023 6.1 (L)    • Albumin 10/20/2023 4.0    • Total Bilirubin 10/20/2023 0.42    • eGFR 10/20/2023 96          Impression:  Port-A-Cath in situ. Questionable malposition? The question is what needs to be done and what should be done. Surgical attempt to reposition the catheter? Replace catheter? I feel the least invasive method would be the best method. Plan: At this point I notified interventional radiology Dr. Faith Corral who was nice enough to discuss this situation. He said that she should be referred to interventional radiology and that they should be able to straighten it out under fluoroscopic guidance. At this point we will proceed with this recommendation.

## 2023-11-22 NOTE — PROGRESS NOTES
Interventional Radiology Note    I was contacted by Dr. Brown Lang regarding Mr. Perico Hess malfunctioning port. The port apparently does not aspirate but is able to be flushed. Recent chest radiograph was reviewed. At this time, we will schedule patient for port check with tPA infusion and if that does not resolve the issue, then we can consider possible catheter manipulation vs new port placement after discussing with the patient, as the port is now 11years old.     Royal Kaur MD  Interventional Radiology - - -

## 2023-11-27 ENCOUNTER — HOSPITAL ENCOUNTER (OUTPATIENT)
Dept: INTERVENTIONAL RADIOLOGY/VASCULAR | Facility: HOSPITAL | Age: 63
Discharge: HOME | End: 2023-11-27
Attending: INTERNAL MEDICINE | Admitting: RADIOLOGY
Payer: COMMERCIAL

## 2023-11-27 VITALS
OXYGEN SATURATION: 100 % | HEART RATE: 71 BPM | RESPIRATION RATE: 20 BRPM | DIASTOLIC BLOOD PRESSURE: 56 MMHG | SYSTOLIC BLOOD PRESSURE: 135 MMHG

## 2023-11-27 DIAGNOSIS — Z95.828 PORT-A-CATH IN PLACE: ICD-10-CM

## 2023-11-27 DIAGNOSIS — C82.18 GRADE 2 FOLLICULAR LYMPHOMA OF LYMPH NODES OF MULTIPLE REGIONS (HCC): ICD-10-CM

## 2023-11-27 PROCEDURE — 36593 DECLOT VASCULAR DEVICE: CPT

## 2023-11-27 PROCEDURE — 36598 INJ W/FLUOR EVAL CV DEVICE: CPT | Performed by: RADIOLOGY

## 2023-11-27 RX ORDER — OXYCODONE HYDROCHLORIDE 10 MG/1
10 TABLET ORAL EVERY 8 HOURS PRN
Qty: 90 TABLET | Refills: 0 | Status: SHIPPED | OUTPATIENT
Start: 2023-11-27

## 2023-11-27 RX ADMIN — ALTEPLASE 2 MG: 2.2 INJECTION, POWDER, LYOPHILIZED, FOR SOLUTION INTRAVENOUS at 16:43

## 2023-11-27 NOTE — TELEPHONE ENCOUNTER
Last appointment:11/02/2024    Next scheduled appointment:03/21/2024    Pharm[facility] will be going to [place] from 12/28/2023 -01/14/2024 would like enough for her stay would like for Zion Quesada to know .

## 2023-11-27 NOTE — DISCHARGE INSTRUCTIONS
Iqra de acceso venoso implantado      LO QUE NECESITA SABER: Un iqra de acceso venoso implantado es un dispositivo que se [place] para administrar tratamientos y extraer ana rosa. También puede denominarse dispositivo de acceso venoso central (CVAD). El iqra es un pequeño recipiente que se coloca debajo de la piel, generalmente en la parte superior del pecho. El iqra está conectado a un catéter que ingresa a amaury vena berenice. INSTRUCCIONES DE LESLI: Reanude smith dieta normal. Pequeños sorbos de refresco sin gas ayudarán con las náuseas leves. Prevenir amaury infección:      Lávese las josefina con frecuencia. Use agua y Ashish. Lávese las josefina antes y después de cuidar smith iqra. Recuerda a todos los que cuidan tu iqra que se [employer]. Revise smith piel para detectar infecciones todos los días. Busque enrojecimiento, hinchazón o supuración de líquido del sitio del iqra. Cuidado de smith iqra:      Puede ducharse a partir de las 48 horas posteriores al procedimiento. Deje el pegamento en smith lugar. Es normal que se produzcan algunos moretones. Use Tylenol para el dolor. Limite el uso del brazo del lado en que se colocó el Ely. No levante nada que pese más de 5 libras adriano 1 semana y luego aumente gradualmente la actividad según lo tolere. NO aplique ungüento, loción o crema en el sitio del iqra hasta que sane la incisión. Permita que el pegamento se caiga. NO intente quitar el pegamento de la piel, incluso si comienza a descascararse. Después de que cicatrice la incisión del iqra, puede nadar y bañarse. Notifique al radiólogo intervencionista si tiene alguno de los siguientes:      Norm[place]ean Forte superior a 101 F     Aumento del enrojecimiento o hinchazón después del primer día. Aumento del dolor después del primer día. Cualquier signo de infección (drenaje del sitio del iqra, separación de la piel, calor al tacto). Náuseas o vómitos persistentes. Comuníquese con Valeen Bi al [phone] [facility]: Comuníquese con Valeen Bi al [phone]) ([facility]ES [facility]ER[facility]: Comuníquese con Valeen Bi al [phone]).

## 2023-11-27 NOTE — TELEPHONE ENCOUNTER
Please let her know that she will just call for her next refill before she leaves for ID. I will not fill for 2 months at this time. I will just fill for this month. Ty    Controlled Substance Review    [place] PDMP or [facility] reviewed: No red flags were identified; safe to proceed with prescription. Soham Muñoz  Written  ID  Drug  QTY  Days  Prescriber  RX #  Dispenser  Refill  Daily Dose*  Pymt Type     11/02/2023 11/02/2023 2 Oxycodone Hcl (Ir) 10 Mg Tab 90.00 30 Ti [address] [id number] All ([id number]) 0 45.00 [place] [place]  09/28/2023 09/28/2023 1 Oxycodone Hcl (Ir) 10 Mg Tab 90.00 30 Ti Josel[place] [id number] All ([id number]) 0 45.00 [place]  08/31/2023 08/29/2023 1 Oxycodone Hcl (Ir) 10 Mg Tab 90.00 25 Ti Joselito [id number] All ([id number]) 0 54.00 [place]  08/03/2023 07/26/2023 1 Oxycodone Hcl (Ir) 10 Mg Tab 90.00 30 Ti Joselito [id number] All ([id number]) 0 45.00 [place]  07/05/2023 06/28/2023 1 Oxycodone Hcl (Ir) 10 Mg Tab 90.00 30 Ti Joselito [id number] All ([id number]) 0 45.00 [place]  06/06/2023 06/02/2023 1 Oxycodone Hcl (Ir) 10 Mg Tab 90.00 30 Ti Joselito [id number] All ([id number]) 0 45.00 [place]  05/09/2023 05/09/2023 1 Oxycodone Hcl (Ir) 10 Mg Tab 90.00 30 Ti Josel[place] [id number] All ([id number]) 0       John Thomas MD  Palliative Medicine & Supportive Care  Internal Medicine  Available via [facility]  Office: [phone]  Fax: [phone]

## 2023-11-28 ENCOUNTER — TELEPHONE (OUTPATIENT)
Dept: HEMATOLOGY ONCOLOGY | Facility: CLINIC | Age: 63
End: 2023-11-28

## 2023-11-28 DIAGNOSIS — C82.18 GRADE 2 FOLLICULAR LYMPHOMA OF LYMPH NODES OF MULTIPLE REGIONS (HCC): ICD-10-CM

## 2023-11-28 RX ORDER — LENALIDOMIDE 10 MG/1
CAPSULE ORAL
Qty: 21 CAPSULE | Refills: 0 | Status: SHIPPED | OUTPATIENT
Start: 2023-11-28

## 2023-11-28 NOTE — TELEPHONE ENCOUNTER
Appointment Change  Cancel, Reschedule, Change to Virtual      Who are you speaking with? Patient   If it is not the patient, is the caller listed on the communication consent form? N/A   Which provider is the appointment scheduled with? Dr. Kayla Leger   When was the original appointment scheduled? Please list date and time 1/5/24 120   At which location is the appointment scheduled to take place? [place]   Was the appointment rescheduled? Was the appointment changed from an in person visit to a virtual visit? If so, please list the details of the change. 2/9/24 10am   What is the reason for the appointment change[place]       Was STAR transport scheduled? N/A   Does STAR transport need to be scheduled for the new visit (if applicable) N/A   Does the patient need an infusion appointment rescheduled? N/A   Does the patient have an upcoming infusion appointment scheduled? If so, when? No   Is the patient undergoing chemotherapy? N/A   For appointments cancelled with less than 24 hours:  Was the no-show policy reviewed?  N/A

## 2023-11-29 DIAGNOSIS — D51.8 OTHER VITAMIN B12 DEFICIENCY ANEMIAS: Primary | ICD-10-CM

## 2023-11-29 RX ORDER — CYANOCOBALAMIN 1000 UG/ML
1000 INJECTION, SOLUTION INTRAMUSCULAR; SUBCUTANEOUS ONCE
Status: CANCELLED | OUTPATIENT
Start: 2023-12-01

## 2023-11-29 NOTE — PROGRESS NOTES
Chief Complaint: Port-A-Cath malposition? History of Present Illness: Patient is a 45-year-old  female on whom we placed a Port-A-Cath for chemotherapy back in 2019. She had follicular cell lymphoma and continues to undergo chemotherapy for this. When last seen in infusion the perception was that the catheter had spontaneously become malpositioned. Apparently they aspirated blood the first time but then when they tried to aspirate blood the second time they could not get blood out even though it flushed. They promptly sent the patient to the emergency room where chest x-ray was obtained. This was read as "left chest port with tip overlying distal brachiocephalic vein/proximal SVC. There is a slightly increased kink along the proximal aspect "at that point we were notified by the emergency room and she is here today.       Past Medical History:   Past Medical History:   Diagnosis Date    Anemia     iron and B12    Arthritis     Asthma     Cough     Depression     Diabetes mellitus ([address])     Falls frequently     Hypertension     Lupus ([address])     Lymphoma (HCC)     Lymphoma (HCC)     Migraine     Osteoporosis     Psychiatric disorder     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[address])     Vertigo          Past Surgical History:    Past Surgical History:   Procedure Laterality Date    CHOLECYSTECTOMY      FL GUIDED CENTRAL VENOUS ACCESS DEVICE INSERTION  11/9/2018    HYSTERECTOMY      IR BIOPSY BONE MARROW  11/24/2020    IR BIOPSY LYMPH NODE  11/24/2020    LYMPH NODE BIOPSY Left 11/9/2018    Procedure: EXCISION BIOPSY LYMPH NODE SUPRACLAVICULAR;  Surgeon: Carlos Trivedi MD;  Location: [address] [place] [address];  Service: General    AR TENDON SHEATH INCISION Right 1/16/2020    Procedure: RELEASE TRIGGER FINGER RIGHT THUMB;  Surgeon: Jerrell Hamilton MD;  Location: [address] [place] [address] OR;  Service: Orthopedics    TUNNELED VENOUS PORT PLACEMENT N/A 11/9/2018    Procedure: Dahlia Pressley;  Surgeon: Carlos Trivedi MD;  Location: [facility] OR;  Service: General         Allergies:     Allergies   Allergen Reactions    Penicillins Anaphylaxis         Medications:    Current Outpatient Medications:     acetaminophen (TYLENOL) 325 mg tablet, Take 2 tablets (650 mg total) by mouth every 6 (six) hours as needed for mild pain, Disp: 30 tablet, Rfl: 0    acyclovir (ZOVIRAX) 400 MG tablet, Take 1 tablet (400 mg total) by mouth 2 (two) times a day, Disp: 60 tablet, Rfl: 11    allopurinol (ZYLOPRIM) 100 mg tablet, Take 1 tablet (100 mg total) by mouth daily, Disp: 30 tablet, Rfl: 11    amLODIPine (NORVASC) 10 mg tablet, Take 10 mg by mouth daily, Disp: , Rfl:     ascorbic acid (VITAMIN C) 1000 MG tablet, Take 1 tablet (1,000 mg total) by mouth every 12 (twelve) hours for 10 doses, Disp: 10 tablet, Rfl: 0    aspirin (ECOTRIN LOW STRENGTH) 81 mg EC tablet, Take 1 tablet (81 mg total) by mouth daily, Disp: 30 tablet, Rfl: 11    aspirin-acetaminophen-caffeine (EXCEDRIN MIGRAINE) 250-250-65 MG per tablet, Take 1 tablet by mouth every 6 (six) hours as needed for headaches Erica 1 tableta cada 6 horas fernandez sea necesario para el dolor de russ, Disp: 30 tablet, Rfl: 0    atorvastatin (LIPITOR) 40 mg tablet, Take 1 tablet (40 mg total) by mouth daily with dinner, Disp: 12 tablet, Rfl: 0    Banophen 25 MG capsule, TAKE 1 CAPSULE(25 MG TOTAL) BY MOUTH EVERY 6 (SIX) HOURS AS NEEDED FOR ITCHING, Disp: , Rfl:     benzocaine (BABY ORAJEL) 7.5 % oral gel, Apply to the mouth or throat 3 (three) times a day as needed for mucositis, Disp: 9.45 g, Rfl: 0    benzocaine (ORAJEL) 10 % mucosal gel, Apply 1 application to the mouth or throat as needed for mucositis, Disp: 5.3 g, Rfl: 0    benzonatate (TESSALON) 200 MG capsule, Take 1 capsule (200 mg total) by mouth 3 (three) times a day as needed for cough, Disp: 20 capsule, Rfl: 0    Blood Glucose Monitoring Suppl (OneTouch Verio) w/Device KIT, Use in the morning, Disp: 1 kit, Rfl: 0    capsicum (ZOSTRIX) 0.075 % topical cream, Apply topically 3 (three) times a day Apply to affected area, Disp: 57 g, Rfl: 0    chlorthalidone 25 mg tablet, Take 1 tablet (25 mg total) by mouth daily, Disp: 30 tablet, Rfl: 5    cholecalciferol (VITAMIN D3) 1,000 units tablet, Take 2 tablets (2,000 Units total) by mouth daily for 5 days, Disp: 10 tablet, Rfl: 0    cyanocobalamin 1000 MCG tablet, Take 1 tablet (1,000 mcg total) by mouth daily, Disp: 90 tablet, Rfl: 3    dextromethorphan-guaiFENesin (ROBITUSSIN DM)  mg/5 mL syrup, Take 5 mL by mouth 3 (three) times a day as needed for cough, Disp: 473 mL, Rfl: 0    diphenhydrAMINE (BENADRYL) 25 mg tablet, Take 1 tablet (25 mg total) by mouth every 6 (six) hours as needed for itching, Disp: 20 tablet, Rfl: 0    DULoxetine (CYMBALTA) 20 mg capsule, Take 2 capsules (40 mg total) by mouth daily, Disp: 30 capsule, Rfl: 3    FLUoxetine (PROzac) 10 mg capsule, TAKE 1 ORAL CAPSULE ONCE A DAY, Disp: , Rfl:     folic acid (FOLVITE) 1 mg tablet, Take 1 tablet (1 mg total) by mouth daily, Disp: 90 tablet, Rfl: 4    hydrOXYzine HCL (ATARAX) 10 mg tablet, TAKE 1 ORAL TABLET 2 TIMES A DAY PRN FOR ANXIETY, Disp: , Rfl:     ibuprofen (MOTRIN) 400 mg tablet, Take 1 tablet (400 mg total) by mouth every 6 (six) hours as needed for mild pain (Body aches or fever) for up to 7 days, Disp: 30 tablet, Rfl: 0    lidocaine (LMX) 4 % cream, Apply topically as needed for mild pain To neck, Disp: 30 g, Rfl: 0    metFORMIN (GLUCOPHAGE) 500 mg tablet, Take 2 tablets (1,000 mg total) by mouth 2 (two) times a day with meals, Disp: 60 tablet, Rfl: 5    omeprazole (PriLOSEC) 20 mg delayed release capsule, Take 2 capsules (40 mg total) by mouth daily To prevent stomach upset, Disp: 60 capsule, Rfl: 5    ondansetron (ZOFRAN) 4 mg tablet, Take 1 tablet (4 mg total) by mouth every 8 (eight) hours as needed for nausea or vomiting, Disp: 30 tablet, Rfl: 3    OneTouch Delica Lancets 16G MISC, Use in the morning, Disp: 100 each, Rfl: 4    polyethylene glycol (GLYCOLAX) 17 GM/SCOOP, TAKE 17 G BY MOUTH DAILY MIX WITH WATER, Disp: , Rfl:     potassium chloride (K-DUR,KLOR-CON) 20 mEq tablet, TAKE 1 TABLET (20 MEQ TOTAL) BY MOUTH DAILY, Disp: 30 tablet, Rfl: 11    senna (SENOKOT) 8.6 mg, Take 1 tablet (8.6 mg total) by mouth 2 (two) times a day, Disp: 60 each, Rfl: 3    traZODone (DESYREL) 50 mg tablet, TAKE 1 ORAL TABLET AT BEDTIME PRN FOR SLEEP, Disp: , Rfl:     glucose blood (OneTouch Verio) test strip, Use 1 each in the morning Use as instructed, Disp: 50 strip, Rfl: 6    lenalidomide (Revlimid) 10 MG CAPS, TAKE 1 CAPSULE BY MOUTH DAILY 3 WEEKS ON FOLLOWED BY 1 WEEK OFF, Disp: 21 capsule, Rfl: 0    oxyCODONE (ROXICODONE) 10 MG TABS, Take 1 tablet (10 mg total) by mouth every 8 (eight) hours as needed for severe pain Erica 1 tableta cada 8 horas fernandez sea necesario por dolor intenso.  Dosis maxima 3 Max Daily Amount: 30 mg, Disp: 90 tablet, Rfl: 0    polyethylene glycol (GLYCOLAX) 17 GM/SCOOP, TAKE 17 G BY MOUTH DAILY MIX WITH WATER (Patient not taking: Reported on 2023), Disp: 510 g, Rfl: 3      Social History:  Social History     Social History     Substance and Sexual Activity   Alcohol Use Never     Social History     Substance and Sexual Activity   Drug Use Never     Social History     Tobacco Use   Smoking Status Former    Types: Cigarettes    Quit date: 2017    Years since quittin.4    Passive exposure: Past   Smokeless Tobacco Never         Family History:    Family History   Problem Relation Age of Onset    Cancer Mother     Diabetes Mother     Cancer Father     Diabetes Father     Breast cancer Sister     No Known Problems Sister     No Known Problems Sister     No Known Problems Sister     No Known Problems Maternal Aunt     No Known Problems Maternal Aunt     No Known Problems Maternal Aunt     No Known Problems Paternal Aunt          Review of Systems:    Negative for 12 system reviewed    Vitals:  Vitals:    23 0953   BP: 134/72   Pulse: 62   Temp: 98 °F (36.7 °C)   SpO2: 99%       Physical Exam:  Middle-aged  female who speaks no English awake and alert in no distress. Vital signs as above    Chest demonstrates Port-A-Cath left side. Appears unremarkable. Lab Results: I have personally reviewed pertinent reports. See below. Imaging: I have personally reviewed pertinent imaging studies primarily chest x-ray. EKG, Pathology, and Other Studies: I have personally reviewed pertinent reports. No visits with results within 1 Day(s) from this visit. Latest known visit with results is:   Hospital Outpatient Visit on 10/20/2023   Component Date Value    WBC 10/20/2023 6.41     RBC 10/20/2023 5.40 (H)     Hemoglobin 10/20/2023 11.1 (L)     Hematocrit 10/20/2023 36.8     MCV 10/20/2023 68 (L)     MCH 10/20/2023 20.6 (L)     MCHC 10/20/2023 30.2 (L)     RDW 10/20/2023 17.2 (H)     Platelets 19/26/1336 169     nRBC 10/20/2023 0     Neutrophils Relative 10/20/2023 66     Immat GRANS % 10/20/2023 0     Lymphocytes Relative 10/20/2023 23     Monocytes Relative 10/20/2023 7     Eosinophils Relative 10/20/2023 4     Basophils Relative 10/20/2023 0     Neutrophils Absolute 10/20/2023 4.21     Immature Grans Absolute 10/20/2023 0.02     Lymphocytes Absolute 10/20/2023 1.45     Monocytes Absolute 10/20/2023 0.44     Eosinophils Absolute 10/20/2023 0.27     Basophils Absolute 10/20/2023 0.02     Sodium 10/20/2023 140     Potassium 10/20/2023 3.6     Chloride 10/20/2023 107     CO2 10/20/2023 26     ANION GAP 10/20/2023 7     BUN 10/20/2023 14     Creatinine 10/20/2023 0.64     Glucose 10/20/2023 110     Calcium 10/20/2023 8.7     AST 10/20/2023 12 (L)     ALT 10/20/2023 13     Alkaline Phosphatase 10/20/2023 108 (H)     Total Protein 10/20/2023 6.1 (L)     Albumin 10/20/2023 4.0     Total Bilirubin 10/20/2023 0.42     eGFR 10/20/2023 96          Impression:  Port-A-Cath in situ. Questionable malposition?   The question is what needs to be done and what should be done. Surgical attempt to reposition the catheter? Replace catheter? I feel the least invasive method would be the best method. Plan: At this point I notified interventional radiology Dr. Janes Moran who was nice enough to discuss this situation. He said that she should be referred to interventional radiology and that they should be able to straighten it out under fluoroscopic guidance. At this point we will proceed with this recommendation.

## 2023-11-30 ENCOUNTER — HOSPITAL ENCOUNTER (OUTPATIENT)
Dept: INFUSION CENTER | Facility: HOSPITAL | Age: 63
End: 2023-11-30
Payer: COMMERCIAL

## 2023-11-30 DIAGNOSIS — Z45.2 ENCOUNTER FOR CENTRAL LINE CARE: Primary | ICD-10-CM

## 2023-11-30 DIAGNOSIS — C82.18 GRADE 2 FOLLICULAR LYMPHOMA OF LYMPH NODES OF MULTIPLE REGIONS (HCC): ICD-10-CM

## 2023-11-30 LAB
ALBUMIN SERPL BCP-MCNC: 4 G/DL (ref 3.5–5)
ALP SERPL-CCNC: 96 U/L (ref 34–104)
ALT SERPL W P-5'-P-CCNC: 9 U/L (ref 7–52)
ANION GAP SERPL CALCULATED.3IONS-SCNC: 8 MMOL/L
AST SERPL W P-5'-P-CCNC: 11 U/L (ref 13–39)
BASOPHILS # BLD AUTO: 0.03 THOUSANDS/ÂΜL (ref 0–0.1)
BASOPHILS NFR BLD AUTO: 0 % (ref 0–1)
BILIRUB SERPL-MCNC: 0.54 MG/DL (ref 0.2–1)
BUN SERPL-MCNC: 18 MG/DL (ref 5–25)
CALCIUM SERPL-MCNC: 8.8 MG/DL (ref 8.4–10.2)
CHLORIDE SERPL-SCNC: 105 MMOL/L (ref 96–108)
CO2 SERPL-SCNC: 25 MMOL/L (ref 21–32)
CREAT SERPL-MCNC: 0.73 MG/DL (ref 0.6–1.3)
EOSINOPHIL # BLD AUTO: 0.24 THOUSAND/ÂΜL (ref 0–0.61)
EOSINOPHIL NFR BLD AUTO: 3 % (ref 0–6)
ERYTHROCYTE [DISTWIDTH] IN BLOOD BY AUTOMATED COUNT: 17.9 % (ref 11.6–15.1)
GFR SERPL CREATININE-BSD FRML MDRD: 89 ML/MIN/1.73SQ M
GLUCOSE SERPL-MCNC: 122 MG/DL (ref 65–140)
HCT VFR BLD AUTO: 39 % (ref 34.8–46.1)
HGB BLD-MCNC: 11.4 G/DL (ref 11.5–15.4)
IMM GRANULOCYTES # BLD AUTO: 0.02 THOUSAND/UL (ref 0–0.2)
IMM GRANULOCYTES NFR BLD AUTO: 0 % (ref 0–2)
LYMPHOCYTES # BLD AUTO: 1.16 THOUSANDS/ÂΜL (ref 0.6–4.47)
LYMPHOCYTES NFR BLD AUTO: 16 % (ref 14–44)
MCH RBC QN AUTO: 20.4 PG (ref 26.8–34.3)
MCHC RBC AUTO-ENTMCNC: 29.2 G/DL (ref 31.4–37.4)
MCV RBC AUTO: 70 FL (ref 82–98)
MONOCYTES # BLD AUTO: 0.46 THOUSAND/ÂΜL (ref 0.17–1.22)
MONOCYTES NFR BLD AUTO: 6 % (ref 4–12)
NEUTROPHILS # BLD AUTO: 5.52 THOUSANDS/ÂΜL (ref 1.85–7.62)
NEUTS SEG NFR BLD AUTO: 75 % (ref 43–75)
NRBC BLD AUTO-RTO: 0 /100 WBCS
PLATELET # BLD AUTO: 175 THOUSANDS/UL (ref 149–390)
POTASSIUM SERPL-SCNC: 3.8 MMOL/L (ref 3.5–5.3)
PROT SERPL-MCNC: 5.9 G/DL (ref 6.4–8.4)
RBC # BLD AUTO: 5.58 MILLION/UL (ref 3.81–5.12)
SODIUM SERPL-SCNC: 138 MMOL/L (ref 135–147)
WBC # BLD AUTO: 7.43 THOUSAND/UL (ref 4.31–10.16)

## 2023-11-30 PROCEDURE — 85025 COMPLETE CBC W/AUTO DIFF WBC: CPT | Performed by: INTERNAL MEDICINE

## 2023-11-30 PROCEDURE — 80053 COMPREHEN METABOLIC PANEL: CPT | Performed by: INTERNAL MEDICINE

## 2023-11-30 NOTE — PROGRESS NOTES
Pt tolerated central lab draw without difficulty. Brisk blood return noted. Port flushed and deaccessed per protocol. Confirmed tomorrow's chemo appt time. AVS declined. Left ambulatory in stable condition.

## 2023-11-30 NOTE — PLAN OF CARE
Problem: Potential for Falls  Goal: Patient will remain free of falls  Description: INTERVENTIONS:  - Educate patient/family on patient safety including physical limitations  - Instruct patient to call for assistance with activity   - Consult OT/PT to assist with strengthening/mobility   - Keep Call bell within reach  - Keep bed low and locked with side rails adjusted as appropriate  - Keep care items and personal belongings within reach  - Initiate and maintain comfort rounds  - Make Fall Risk Sign visible to staff  - Offer Toileting every  Hours, in advance of need  - Initiate/Maintain alarm  - Obtain necessary fall risk management equipment:   - Apply yellow socks and bracelet for high fall risk patients  - Consider moving patient to room near nurses station  11/30/2023 1353 by Michel Tang RN  Outcome: Progressing  11/30/2023 1353 by Michel Tang RN  Outcome: Progressing     Problem: Knowledge Deficit  Goal: Patient/family/caregiver demonstrates understanding of disease process, treatment plan, medications, and discharge instructions  Description: Complete learning assessment and assess knowledge base.   Interventions:  - Provide teaching at level of understanding  - Provide teaching via preferred learning methods  11/30/2023 1353 by Michel Tang RN  Outcome: Progressing  11/30/2023 1353 by Michel Tang RN  Outcome: Progressing

## 2023-12-01 ENCOUNTER — HOSPITAL ENCOUNTER (OUTPATIENT)
Dept: INFUSION CENTER | Facility: HOSPITAL | Age: 63
End: 2023-12-01
Attending: INTERNAL MEDICINE
Payer: COMMERCIAL

## 2023-12-01 VITALS
SYSTOLIC BLOOD PRESSURE: 166 MMHG | OXYGEN SATURATION: 99 % | DIASTOLIC BLOOD PRESSURE: 69 MMHG | RESPIRATION RATE: 18 BRPM | TEMPERATURE: 97.9 F | HEART RATE: 65 BPM

## 2023-12-01 DIAGNOSIS — D51.8 OTHER VITAMIN B12 DEFICIENCY ANEMIAS: ICD-10-CM

## 2023-12-01 DIAGNOSIS — C82.18 GRADE 2 FOLLICULAR LYMPHOMA OF LYMPH NODES OF MULTIPLE REGIONS (HCC): Primary | ICD-10-CM

## 2023-12-01 PROCEDURE — 96415 CHEMO IV INFUSION ADDL HR: CPT

## 2023-12-01 PROCEDURE — 96372 THER/PROPH/DIAG INJ SC/IM: CPT

## 2023-12-01 PROCEDURE — 96367 TX/PROPH/DG ADDL SEQ IV INF: CPT

## 2023-12-01 PROCEDURE — 96413 CHEMO IV INFUSION 1 HR: CPT

## 2023-12-01 RX ORDER — CYANOCOBALAMIN 1000 UG/ML
1000 INJECTION, SOLUTION INTRAMUSCULAR; SUBCUTANEOUS ONCE
Status: COMPLETED | OUTPATIENT
Start: 2023-12-01 | End: 2023-12-01

## 2023-12-01 RX ORDER — CYANOCOBALAMIN 1000 UG/ML
1000 INJECTION, SOLUTION INTRAMUSCULAR; SUBCUTANEOUS ONCE
OUTPATIENT
Start: 2023-12-29

## 2023-12-01 RX ORDER — ACETAMINOPHEN 325 MG/1
650 TABLET ORAL ONCE
Status: COMPLETED | OUTPATIENT
Start: 2023-12-01 | End: 2023-12-01

## 2023-12-01 RX ORDER — SODIUM CHLORIDE 9 MG/ML
20 INJECTION, SOLUTION INTRAVENOUS ONCE
Status: COMPLETED | OUTPATIENT
Start: 2023-12-01 | End: 2023-12-01

## 2023-12-01 RX ADMIN — ACETAMINOPHEN 650 MG: 325 TABLET ORAL at 09:33

## 2023-12-01 RX ADMIN — SODIUM CHLORIDE 20 ML/HR: 0.9 INJECTION, SOLUTION INTRAVENOUS at 09:13

## 2023-12-01 RX ADMIN — OBINUTUZUMAB 1000 MG: 1000 INJECTION, SOLUTION, CONCENTRATE INTRAVENOUS at 10:10

## 2023-12-01 RX ADMIN — DIPHENHYDRAMINE HYDROCHLORIDE 25 MG: 50 INJECTION, SOLUTION INTRAMUSCULAR; INTRAVENOUS at 09:39

## 2023-12-01 RX ADMIN — DEXAMETHASONE SODIUM PHOSPHATE 20 MG: 10 INJECTION INTRAMUSCULAR; INTRAVENOUS at 09:13

## 2023-12-01 RX ADMIN — CYANOCOBALAMIN 1000 MCG: 1000 INJECTION INTRAMUSCULAR; SUBCUTANEOUS at 09:29

## 2023-12-01 NOTE — PROGRESS NOTES
Patient chemo infusion completed without complication. Patient given AVS at this time and confirmed next appointment. Patient discharged in stable condition to home via ambulation.

## 2023-12-07 ENCOUNTER — OFFICE VISIT (OUTPATIENT)
Dept: FAMILY MEDICINE CLINIC | Facility: CLINIC | Age: 63
End: 2023-12-07

## 2023-12-07 VITALS
TEMPERATURE: 96.8 F | SYSTOLIC BLOOD PRESSURE: 130 MMHG | RESPIRATION RATE: 18 BRPM | OXYGEN SATURATION: 97 % | DIASTOLIC BLOOD PRESSURE: 80 MMHG | WEIGHT: 245 LBS | BODY MASS INDEX: 40.77 KG/M2 | HEART RATE: 74 BPM

## 2023-12-07 DIAGNOSIS — K02.9 TEETH DECAYED: ICD-10-CM

## 2023-12-07 DIAGNOSIS — M25.561 RIGHT KNEE PAIN, UNSPECIFIED CHRONICITY: ICD-10-CM

## 2023-12-07 DIAGNOSIS — E11.69 TYPE 2 DIABETES MELLITUS WITH OTHER SPECIFIED COMPLICATION, UNSPECIFIED WHETHER LONG TERM INSULIN USE (HCC): ICD-10-CM

## 2023-12-07 DIAGNOSIS — K12.0 APHTHOUS ULCER: ICD-10-CM

## 2023-12-07 DIAGNOSIS — J06.9 UPPER RESPIRATORY TRACT INFECTION, UNSPECIFIED TYPE: ICD-10-CM

## 2023-12-07 DIAGNOSIS — I10 ESSENTIAL HYPERTENSION: Primary | ICD-10-CM

## 2023-12-07 PROCEDURE — 3075F SYST BP GE 130 - 139MM HG: CPT | Performed by: FAMILY MEDICINE

## 2023-12-07 PROCEDURE — 3079F DIAST BP 80-89 MM HG: CPT | Performed by: FAMILY MEDICINE

## 2023-12-07 PROCEDURE — 99213 OFFICE O/P EST LOW 20 MIN: CPT | Performed by: FAMILY MEDICINE

## 2023-12-07 RX ORDER — TRIAMCINOLONE ACETONIDE 1 MG/G
OINTMENT TOPICAL 2 TIMES DAILY
Qty: 15 G | Refills: 0 | Status: SHIPPED | OUTPATIENT
Start: 2023-12-07

## 2023-12-07 RX ORDER — BLOOD PRESSURE TEST KIT
KIT MISCELLANEOUS DAILY
Qty: 1 KIT | Refills: 0 | Status: SHIPPED | OUTPATIENT
Start: 2023-12-07

## 2023-12-10 NOTE — ASSESSMENT & PLAN NOTE
BP Readings from Last 3 Encounters:   12/07/23 130/80   12/01/23 166/69   11/27/23 135/56     Well-controlled. Continue amlodipine 10 mg. Continue blood pressure monitoring at home.

## 2023-12-10 NOTE — PROGRESS NOTES
Assessment/Plan:    1. Essential hypertension  Assessment & Plan:  BP Readings from Last 3 Encounters:   12/07/23 130/80   12/01/23 166/69   11/27/23 135/56     Well-controlled. Continue amlodipine 10 mg. Continue blood pressure monitoring at home. Orders:  -     Blood Pressure KIT; Use in the morning    2. Teeth decayed  Comments:  Currently followed by dentist.  Continue with their management plan. Orders:  -     Ambulatory Referral to Oral Maxillofacial Surgery; Future  -     Ambulatory Referral to Oral Maxillofacial Surgery; Future    3. Right knee pain, unspecified chronicity  Comments:  Would benefit from x-ray of the knee. Will follow-up for possible joint injection subsequently. Orders:  -     XR knee 4+ vw right injury; Future; Expected date: 12/07/2023    4. Upper respiratory tract infection, unspecified type  Comments:  Continues cough. Workup including chest x-ray was negative. Will give Robitussin PE for symptomatic management  Orders:  -     pseudoephedrine-dextromethorphan-guaifenesin (ROBITUSSIN-PE) 30- MG/5ML solution; Take 10 mL by mouth 4 (four) times a day as needed for allergies    5. Aphthous ulcer  Comments:  Would benefit from Kenalog cream application for symptomatic management  Orders:  -     triamcinolone (KENALOG) 0.1 % ointment; Apply topically 2 (two) times a day    6. Type 2 diabetes mellitus with other specified complication, unspecified whether long term insulin use (HCC)  Assessment & Plan:    Lab Results   Component Value Date    HGBA1C 7.9 (H) 03/29/2022     Has not had recent A1c. Currently on metformin 1000 mg twice daily. Will repeat A1c in subsequent visit. Subjective:      Patient ID: Sarabjit Resendiz is a 66 y.o. female. Past medical history notable for B cell lymphoma currently receiving chemotherapy treatment with rituximab + bendamustine is here to follow-up on her chronic condition.   Patient main chief complaint includes knee pain and cough. Patient reports that the knee pain is right-sided and denies any trauma to the area. With regards to her teeth pain, patient is currently seeing [facility] and needs a referral for tooth removal.  Patient denies any fevers, chills or systemic signs of infection. The following portions of the patient's history were reviewed and updated as appropriate: allergies, current medications, past family history, past medical history, past social history, past surgical history, and problem list.    Review of Systems   Constitutional:  Negative for chills and fever. HENT:  Positive for mouth sores. Negative for ear pain and sore throat. Eyes:  Negative for pain and visual disturbance. Respiratory:  Negative for cough and shortness of breath. Cardiovascular:  Negative for chest pain and palpitations. Gastrointestinal:  Negative for abdominal pain and vomiting. Genitourinary:  Negative for dysuria and hematuria. Musculoskeletal:  Positive for arthralgias (right knee). Negative for back pain. Skin:  Negative for color change and rash. Neurological:  Negative for seizures and syncope. All other systems reviewed and are negative. Objective:      /80 (BP Location: Left arm, Patient Position: Sitting, Cuff Size: Large)   Pulse 74   Temp (!) 96.8 °F (36 °C)   Resp 18   Wt 111 kg (245 lb)   SpO2 97%   BMI 40.77 kg/m²          Physical Exam  Constitutional:       General: She is not in acute distress. Appearance: Normal appearance. HENT:      Nose: No congestion or rhinorrhea. Mouth/Throat:      Lips: Lesions (anterior aspect of lip) present. Eyes:      Extraocular Movements: Extraocular movements intact. Pupils: Pupils are equal, round, and reactive to light. Cardiovascular:      Rate and Rhythm: Normal rate and regular rhythm. Pulses: Normal pulses. Heart sounds: Normal heart sounds. No murmur heard. No gallop.    Pulmonary: Effort: Pulmonary effort is normal.      Breath sounds: Normal breath sounds. No wheezing, rhonchi or rales. Abdominal:      General: Bowel sounds are normal. There is no distension. Palpations: Abdomen is soft. There is no mass. Tenderness: There is no abdominal tenderness. Hernia: No hernia is present. Musculoskeletal:      Right knee: Normal range of motion. Tenderness present over the medial joint line. No LCL laxity or MCL laxity. Left knee: Normal range of motion. No tenderness. No medial joint line tenderness. No LCL laxity or MCL laxity. Skin:     General: Skin is warm. Coloration: Skin is not jaundiced. Findings: No bruising. Neurological:      General: No focal deficit present. Mental Status: She is alert and oriented to person, place, and time. Psychiatric:         Mood and Affect: Mood normal.         Behavior: Behavior normal.         Thought Content:  Thought content normal.           Carlos Resendiz DO   Family Medicine PGY-3  12/10/2023

## 2023-12-10 NOTE — ASSESSMENT & PLAN NOTE
Lab Results   Component Value Date    HGBA1C 7.9 (H) 03/29/2022     Has not had recent A1c. Currently on metformin 1000 mg twice daily. Will repeat A1c in subsequent visit.

## 2023-12-26 DIAGNOSIS — R10.84 GENERALIZED ABDOMINAL PAIN: ICD-10-CM

## 2023-12-26 DIAGNOSIS — C82.18 GRADE 2 FOLLICULAR LYMPHOMA OF LYMPH NODES OF MULTIPLE REGIONS (HCC): ICD-10-CM

## 2023-12-26 DIAGNOSIS — K08.89 TOOTHACHE: ICD-10-CM

## 2023-12-26 DIAGNOSIS — K59.01 SLOW TRANSIT CONSTIPATION: ICD-10-CM

## 2023-12-26 RX ORDER — ACETAMINOPHEN 325 MG/1
650 TABLET ORAL EVERY 6 HOURS PRN
Qty: 30 TABLET | Refills: 0 | Status: SHIPPED | OUTPATIENT
Start: 2023-12-26

## 2023-12-26 RX ORDER — OXYCODONE HYDROCHLORIDE 10 MG/1
10 TABLET ORAL EVERY 8 HOURS PRN
Qty: 90 TABLET | Refills: 0 | Status: SHIPPED | OUTPATIENT
Start: 2023-12-26

## 2023-12-26 RX ORDER — POLYETHYLENE GLYCOL 3350 17 G/17G
POWDER ORAL
Qty: 510 G | Refills: 3 | Status: SHIPPED | OUTPATIENT
Start: 2023-12-26

## 2023-12-28 DIAGNOSIS — C82.18 GRADE 2 FOLLICULAR LYMPHOMA OF LYMPH NODES OF MULTIPLE REGIONS (HCC): ICD-10-CM

## 2023-12-28 RX ORDER — LENALIDOMIDE 10 MG/1
CAPSULE ORAL
Qty: 21 CAPSULE | Refills: 0 | Status: SHIPPED | OUTPATIENT
Start: 2023-12-28

## 2024-01-11 DIAGNOSIS — C82.18 GRADE 2 FOLLICULAR LYMPHOMA OF LYMPH NODES OF MULTIPLE REGIONS (HCC): Primary | ICD-10-CM

## 2024-01-11 RX ORDER — ACETAMINOPHEN 325 MG/1
650 TABLET ORAL ONCE
OUTPATIENT
Start: 2024-01-26

## 2024-01-11 RX ORDER — SODIUM CHLORIDE 9 MG/ML
20 INJECTION, SOLUTION INTRAVENOUS ONCE
OUTPATIENT
Start: 2024-01-26

## 2024-01-17 ENCOUNTER — HOSPITAL ENCOUNTER (EMERGENCY)
Facility: HOSPITAL | Age: 64
Discharge: HOME | End: 2024-01-17
Attending: EMERGENCY MEDICINE
Payer: COMMERCIAL

## 2024-01-17 ENCOUNTER — APPOINTMENT (EMERGENCY)
Dept: RADIOLOGY | Facility: HOSPITAL | Age: 64
End: 2024-01-17
Payer: COMMERCIAL

## 2024-01-17 VITALS
WEIGHT: 253.3 LBS | RESPIRATION RATE: 20 BRPM | DIASTOLIC BLOOD PRESSURE: 90 MMHG | SYSTOLIC BLOOD PRESSURE: 170 MMHG | HEART RATE: 81 BPM | TEMPERATURE: 100.9 F | BODY MASS INDEX: 42.15 KG/M2 | OXYGEN SATURATION: 97 %

## 2024-01-17 DIAGNOSIS — U07.1 COVID-19: Primary | ICD-10-CM

## 2024-01-17 LAB
FLUAV RNA RESP QL NAA+PROBE: NEGATIVE
FLUBV RNA RESP QL NAA+PROBE: NEGATIVE
RSV RNA RESP QL NAA+PROBE: NEGATIVE
SARS-COV-2 RNA RESP QL NAA+PROBE: POSITIVE

## 2024-01-17 PROCEDURE — 0241U HB NFCT DS VIR RESP RNA 4 TRGT: CPT | Performed by: EMERGENCY MEDICINE

## 2024-01-17 PROCEDURE — 99283 EMERGENCY DEPT VISIT LOW MDM: CPT

## 2024-01-17 PROCEDURE — 71045 X-RAY EXAM CHEST 1 VIEW: CPT

## 2024-01-17 PROCEDURE — 99284 EMERGENCY DEPT VISIT MOD MDM: CPT | Performed by: EMERGENCY MEDICINE

## 2024-01-17 RX ORDER — IBUPROFEN 600 MG/1
600 TABLET ORAL EVERY 6 HOURS PRN
Qty: 30 TABLET | Refills: 0 | Status: SHIPPED | OUTPATIENT
Start: 2024-01-17

## 2024-01-17 RX ORDER — ONDANSETRON 4 MG/1
4 TABLET, FILM COATED ORAL EVERY 6 HOURS
Qty: 12 TABLET | Refills: 0 | Status: SHIPPED | OUTPATIENT
Start: 2024-01-17

## 2024-01-17 RX ORDER — ACETAMINOPHEN 325 MG/1
975 TABLET ORAL EVERY 6 HOURS PRN
Qty: 30 TABLET | Refills: 0 | Status: SHIPPED | OUTPATIENT
Start: 2024-01-17 | End: 2024-01-24 | Stop reason: SDUPTHER

## 2024-01-17 NOTE — ED PROVIDER NOTES
History  Chief Complaint   Patient presents with    Cold Like Symptoms     I having headaches, bodyaches for about a week. Unsure if she has a fever. Reports she is coughing - bring up yellow stuff.      Viral symptoms for several days, cough productive of yellow sputum. Hx of gastric cancer, on chemo therapy. Last dose was December in [place]. +sick contacts with viral symptoms at home.          Prior to Admission Medications   Prescriptions Last Dose Informant Patient Reported? Taking?   Banophen 25 MG capsule  Self, Other (Specify) Yes No   Sig: TAKE 1 CAPSULE(25 MG TOTAL) BY MOUTH EVERY 6 (SIX) HOURS AS NEEDED FOR ITCHING   Blood Glucose Monitoring Suppl (OneTouch Verio) w/Device KIT  Self, Other (Specify) No No   Sig: Use in the morning   Blood Pressure KIT   No No   Sig: Use in the morning   DULoxetine (CYMBALTA) 20 mg capsule  Self, Other (Specify) No No   Sig: Take 2 capsules (40 mg total) by mouth daily   FLUoxetine (PROzac) 10 mg capsule   Yes No   Sig: TAKE 1 ORAL CAPSULE ONCE A DAY   OneTouch Delica Lancets 33G MISC  Self, Other (Specify) No No   Sig: Use in the morning   acetaminophen (TYLENOL) 325 mg tablet   No No   Sig: Take 2 tablets (650 mg total) by mouth every 6 (six) hours as needed for mild pain   acyclovir (ZOVIRAX) 400 MG tablet   No No   Sig: Take 1 tablet (400 mg total) by mouth 2 (two) times a day   allopurinol (ZYLOPRIM) 100 mg tablet  Self, Other (Specify) No No   Sig: Take 1 tablet (100 mg total) by mouth daily   amLODIPine (NORVASC) 10 mg tablet  Self, Other (Specify) Yes No   Sig: Take 10 mg by mouth daily   ascorbic acid (VITAMIN C) 1000 MG tablet  Self No No   Sig: Take 1 tablet (1,000 mg total) by mouth every 12 (twelve) hours for 10 doses   aspirin (ECOTRIN LOW STRENGTH) 81 mg EC tablet  Self, Other (Specify) No No   Sig: Take 1 tablet (81 mg total) by mouth daily   aspirin-acetaminophen-caffeine (EXCEDRIN MIGRAINE) 250-250-65 MG per tablet  Self, Other (Specify) No No   Sig:  Take 1 tablet by mouth every 6 (six) hours as needed for headaches Erica 1 tableta cada 6 horas fernandez sea necesario para el dolor de russ   atorvastatin (LIPITOR) 40 mg tablet  Self, Other (Specify) No No   Sig: Take 1 tablet (40 mg total) by mouth daily with dinner   benzocaine (BABY ORAJEL) 7.5 % oral gel  Self, Other (Specify) No No   Sig: Apply to the mouth or throat 3 (three) times a day as needed for mucositis   benzocaine (ORAJEL) 10 % mucosal gel  Self, Other (Specify) No No   Sig: Apply 1 application to the mouth or throat as needed for mucositis   benzonatate (TESSALON) 200 MG capsule   No No   Sig: Take 1 capsule (200 mg total) by mouth 3 (three) times a day as needed for cough   capsicum (ZOSTRIX) 0.075 % topical cream  Self, Other (Specify) No No   Sig: Apply topically 3 (three) times a day Apply to affected area   chlorthalidone 25 mg tablet  Self, Other (Specify) No No   Sig: Take 1 tablet (25 mg total) by mouth daily   cholecalciferol (VITAMIN D3) 1,000 units tablet  Self No No   Sig: Take 2 tablets (2,000 Units total) by mouth daily for 5 days   cyanocobalamin 1000 MCG tablet  Self, Other (Specify) No No   Sig: Take 1 tablet (1,000 mcg total) by mouth daily   dextromethorphan-guaiFENesin (ROBITUSSIN DM)  mg/5 mL syrup   No No   Sig: Take 5 mL by mouth 3 (three) times a day as needed for cough   diphenhydrAMINE (BENADRYL) 25 mg tablet  Self, Other (Specify) No No   Sig: Take 1 tablet (25 mg total) by mouth every 6 (six) hours as needed for itching   folic acid (FOLVITE) 1 mg tablet  Self, Other (Specify) No No   Sig: Take 1 tablet (1 mg total) by mouth daily   glucose blood (OneTouch Verio) test strip  Self, Other (Specify) No No   Sig: Use 1 each in the morning Use as instructed   hydrOXYzine HCL (ATARAX) 10 mg tablet   Yes No   Sig: TAKE 1 ORAL TABLET 2 TIMES A DAY PRN FOR ANXIETY   ibuprofen (MOTRIN) 400 mg tablet   No No   Sig: Take 1 tablet (400 mg total) by mouth every 6 (six) hours as  needed for mild pain (Body aches or fever) for up to 7 days   lenalidomide (Revlimid) 10 MG CAPS   No No   Sig: TAKE 1 CAPSULE BY MOUTH DAILY 3 WEEKS ON FOLLOWED BY 1 WEEK OFF   lidocaine (LMX) 4 % cream  Self, Other (Specify) No No   Sig: Apply topically as needed for mild pain To neck   metFORMIN (GLUCOPHAGE) 500 mg tablet  Self, Other (Specify) No No   Sig: Take 2 tablets (1,000 mg total) by mouth 2 (two) times a day with meals   omeprazole (PriLOSEC) 20 mg delayed release capsule  Self, Other (Specify) No No   Sig: Take 2 capsules (40 mg total) by mouth daily To prevent stomach upset   ondansetron (ZOFRAN) 4 mg tablet  Self, Other (Specify) No No   Sig: Take 1 tablet (4 mg total) by mouth every 8 (eight) hours as needed for nausea or vomiting   oxyCODONE (ROXICODONE) 10 MG TABS   No No   Sig: Take 1 tablet (10 mg total) by mouth every 8 (eight) hours as needed for severe pain Erica 1 tableta cada 8 horas fernandez sea necesario por dolor intenso. Dosis maxima 3 Max Daily Amount: 30 mg   polyethylene glycol (GLYCOLAX) 17 GM/SCOOP  Self, Other (Specify) Yes No   Sig: TAKE 17 G BY MOUTH DAILY MIX WITH WATER   polyethylene glycol (GLYCOLAX) 17 GM/SCOOP   No No   Sig: TAKE 17 G BY MOUTH DAILY MIX WITH WATER   potassium chloride (K-DUR,KLOR-CON) 20 mEq tablet  Self, Other (Specify) No No   Sig: TAKE 1 TABLET (20 MEQ TOTAL) BY MOUTH DAILY   pseudoephedrine-dextromethorphan-guaifenesin (ROBITUSSIN-PE) 30- MG/5ML solution   No No   Sig: Take 10 mL by mouth 4 (four) times a day as needed for allergies   senna (SENOKOT) 8.6 mg  Self, Other (Specify) No No   Sig: Take 1 tablet (8.6 mg total) by mouth 2 (two) times a day   traZODone (DESYREL) 50 mg tablet   Yes No   Sig: TAKE 1 ORAL TABLET AT BEDTIME PRN FOR SLEEP   triamcinolone (KENALOG) 0.1 % ointment   No No   Sig: Apply topically 2 (two) times a day      Facility-Administered Medications: None       Past Medical History:   Diagnosis Date    Anemia     iron and B12     Arthritis     Asthma     Cough     Depression     Diabetes mellitus (HCC)     Falls frequently     Hypertension     Lupus (HCC)     Lymphoma (HCC)     Lymphoma (HCC)     Migraine     Osteoporosis     Psychiatric disorder     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HCC)     Vertigo        Past Surgical History:   Procedure Laterality Date    CHOLECYSTECTOMY      FL GUIDED CENTRAL VENOUS ACCESS DEVICE INSERTION  2018    HYSTERECTOMY      IR BIOPSY BONE MARROW  2020    IR BIOPSY LYMPH NODE  2020    IR PORT CHECK  2023    LYMPH NODE BIOPSY Left 2018    Procedure: EXCISION BIOPSY LYMPH NODE SUPRACLAVICULAR;  Surgeon: Rajan Wiley MD;  Location:  MAIN OR;  Service: General    UT TENDON SHEATH INCISION Right 2020    Procedure: RELEASE TRIGGER FINGER RIGHT THUMB;  Surgeon: Brent Snaders MD;  Location:  MAIN OR;  Service: Orthopedics    TUNNELED VENOUS PORT PLACEMENT N/A 2018    Procedure: INSERTION OF PORT-A-CATH;  Surgeon: Rajan Wiley MD;  Location:  MAIN OR;  Service: General       Family History   Problem Relation Age of Onset    Cancer Mother     Diabetes Mother     Cancer Father     Diabetes Father     Breast cancer Sister     No Known Problems Sister     No Known Problems Sister     No Known Problems Sister     No Known Problems Maternal Aunt     No Known Problems Maternal Aunt     No Known Problems Maternal Aunt     No Known Problems Paternal Aunt      I have reviewed and agree with the history as documented.    E-Cigarette/Vaping    E-Cigarette Use Never User      E-Cigarette/Vaping Substances    Nicotine No     THC No     CBD No     Flavoring No     Other No     Unknown No      Social History     Tobacco Use    Smoking status: Former     Current packs/day: 0.00     Types: Cigarettes     Quit date: 2017     Years since quittin.6     Passive exposure: Past    Smokeless tobacco: Never   Vaping Use    Vaping status: Never Used   Substance Use Topics    Alcohol use: Never    Drug use:  Never       Review of Systems   Constitutional:  Positive for fever. Negative for chills.   HENT: Negative.  Negative for rhinorrhea, sore throat, trouble swallowing and voice change.    Eyes: Negative.  Negative for pain and visual disturbance.   Respiratory:  Positive for cough. Negative for shortness of breath and wheezing.    Cardiovascular: Negative.  Negative for chest pain and palpitations.   Gastrointestinal:  Positive for nausea. Negative for abdominal pain, diarrhea and vomiting.   Genitourinary: Negative.  Negative for dysuria and frequency.   Musculoskeletal:  Positive for myalgias. Negative for neck pain and neck stiffness.   Skin: Negative.  Negative for rash.   Neurological: Negative.  Negative for dizziness, speech difficulty, weakness, light-headedness and numbness.       Physical Exam  Physical Exam  Vitals and nursing note reviewed.   Constitutional:       General: She is not in acute distress.     Appearance: She is well-developed.   HENT:      Head: Normocephalic and atraumatic.   Eyes:      Conjunctiva/sclera: Conjunctivae normal.      Pupils: Pupils are equal, round, and reactive to light.   Neck:      Trachea: No tracheal deviation.   Cardiovascular:      Rate and Rhythm: Normal rate and regular rhythm.   Pulmonary:      Effort: Pulmonary effort is normal. No respiratory distress.      Breath sounds: Normal breath sounds. No wheezing or rales.   Abdominal:      General: Bowel sounds are normal. There is no distension.      Palpations: Abdomen is soft.      Tenderness: There is no abdominal tenderness. There is no guarding or rebound.       Musculoskeletal:         General: No tenderness or deformity. Normal range of motion.      Cervical back: Normal range of motion and neck supple.   Skin:     General: Skin is warm and dry.      Capillary Refill: Capillary refill takes less than 2 seconds.      Findings: No rash.   Neurological:      Mental Status: She is alert and oriented to person, place,  and time.   Psychiatric:         Behavior: Behavior normal.         Vital Signs  ED Triage Vitals [01/17/24 1049]   Temperature Pulse Respirations Blood Pressure SpO2   (!) 100.9 °F (38.3 °C) 81 20 (!) 195/98 97 %      Temp Source Heart Rate Source Patient Position - Orthostatic VS BP Location FiO2 (%)   Tympanic Monitor Sitting Left arm --      Pain Score       --           Vitals:    01/17/24 1049 01/17/24 1051   BP: (!) 195/98 170/90   Pulse: 81    Patient Position - Orthostatic VS: Sitting Sitting         Visual Acuity      ED Medications  Medications - No data to display    Diagnostic Studies  Results Reviewed       Procedure Component Value Units Date/Time    FLU/RSV/COVID - if FLU/RSV clinically relevant [[id number]]  (Abnormal) Collected: 01/17/24 1102    Lab Status: Final result Specimen: Nares from Nose Updated: 01/17/24 1146     SARS-CoV-2 Positive     INFLUENZA A PCR Negative     INFLUENZA B PCR Negative     RSV PCR Negative    Narrative:      FOR PEDIATRIC PATIENTS - copy/paste COVID Guidelines URL to browser: [email or web address]    SARS-CoV-2 assay is a Nucleic Acid Amplification assay intended for the  qualitative detection of nucleic acid from SARS-CoV-2 in nasopharyngeal  swabs. Results are for the presumptive identification of SARS-CoV-2 RNA.    Positive results are indicative of infection with SARS-CoV-2, the virus  causing COVID-19, but do not rule out bacterial infection or co-infection  with other viruses. Laboratories within the [place] and its  territories are required to report all positive results to the appropriate  public health authorities. Negative results do not preclude SARS-CoV-2  infection and should not be used as the sole basis for treatment or other  patient management decisions. Negative results must be combined with  clinical observations, patient history, and epidemiological information.  This test has not been FDA  cleared or approved.    This test has been authorized by FDA under an Emergency Use Authorization  (EUA). This test is only authorized for the duration of time the  declaration that circumstances exist justifying the authorization of the  emergency use of an in vitro diagnostic tests for detection of SARS-CoV-2  virus and/or diagnosis of COVID-19 infection under section 564(b)(1) of  the Act, 21 U.S.C. 360bbb-3(b)(1), unless the authorization is terminated  or revoked sooner. The test has been validated but independent review by FDA  and CLIA is pending.    Test performed using [employer] GeneXpert: This RT-PCR assay targets N2,  a region unique to SARS-CoV-2. A conserved region in the E-gene was chosen  for pan-Sarbecovirus detection which includes SARS-CoV-2.    According to CMS-2020-01-R, this platform meets the definition of high-throughput technology.                   XR chest 1 view portable   ED Interpretation by Petar Buenrostro DO (01/17 1204)   No acute focal infiltrate appreciated.      Final Result by Shakir Hawkins MD (01/17 1122)      No focal airspace consolidation. Mild bilateral interstitial prominence and mild peribronchial cuffing may indicate underlying bronchiolitis or viral pneumonitis.                  Workstation performed: [id number]                    Procedures  Procedures         ED Course                                             Medical Decision Making  63 year old female, arrives febrile, but without other signs of SIRS. Viral complaints, benign exam, found to be COVID positive. Has received dual vaccine. Feels comfortable going home. Will follow up with PCP. Supportive care reviewed. Patient stable for discharge.    Amount and/or Complexity of Data Reviewed  Radiology: ordered.    Risk  OTC drugs.  Prescription drug management.             Disposition  Final diagnoses:   COVID-19     Time reflects when diagnosis was documented in both MDM as applicable and the Disposition within  this note       Time User Action Codes Description Comment    1/17/2024 12:04 PM Petar Buenrostro Add [U07.1] COVID-19           ED Disposition       ED Disposition   Discharge    Condition   Stable    Date/Time   Wed Jan 17, 2024 12:04 PM    Comment   Helen Moreno discharge to home/self care.                   Follow-up Information       Follow up With Specialties Details Why Contact Info Additional Information    [facility] Medicine In 1 week  [address] [place] [address], [address] [place] [id number] [phone] [facility], [address] [place] [address], [place] [address], [place], [place], [address] [phone]            Discharge Medication List as of 1/17/2024 12:06 PM        START taking these medications    Details   !! acetaminophen (TYLENOL) 325 mg tablet Take 3 tablets (975 mg total) by mouth every 6 (six) hours as needed for mild pain or fever, Starting Wed 1/17/2024, Normal      !! ondansetron (ZOFRAN) 4 mg tablet Take 1 tablet (4 mg total) by mouth every 6 (six) hours, Starting Wed 1/17/2024, Normal       !! - Potential duplicate medications found. Please discuss with provider.        CONTINUE these medications which have CHANGED    Details   ibuprofen (MOTRIN) 600 mg tablet Take 1 tablet (600 mg total) by mouth every 6 (six) hours as needed for mild pain or fever, Starting Wed 1/17/2024, Normal           CONTINUE these medications which have NOT CHANGED    Details   !! acetaminophen (TYLENOL) 325 mg tablet Take 2 tablets (650 mg total) by mouth every 6 (six) hours as needed for mild pain, Starting Tue 12/26/2023, Normal      acyclovir (ZOVIRAX) 400 MG tablet Take 1 tablet (400 mg total) by mouth 2 (two) times a day, Starting Mon 3/13/2023, Until Wed 11/22/2023, Normal      allopurinol (ZYLOPRIM) 100 mg tablet Take 1 tablet (100 mg total) by mouth daily, Starting Mon 3/13/2023, Normal      amLODIPine (NORVASC) 10 mg  tablet Take 10 mg by mouth daily, Starting Thu 6/2/2022, Historical Med      ascorbic acid (VITAMIN C) 1000 MG tablet Take 1 tablet (1,000 mg total) by mouth every 12 (twelve) hours for 10 doses, Starting Tue 1/26/2021, Until Wed 11/22/2023, Normal      aspirin (ECOTRIN LOW STRENGTH) 81 mg EC tablet Take 1 tablet (81 mg total) by mouth daily, Starting Wed 3/18/2020, Normal      aspirin-acetaminophen-caffeine (EXCEDRIN MIGRAINE) 250-250-65 MG per tablet Take 1 tablet by mouth every 6 (six) hours as needed for headaches Erica 1 tableta cada 6 horas fernandez sea necesario para el dolor de russ, Starting Thu 11/19/2020, Normal      atorvastatin (LIPITOR) 40 mg tablet Take 1 tablet (40 mg total) by mouth daily with dinner, Starting Tue 1/26/2021, Normal      Banophen 25 MG capsule TAKE 1 CAPSULE(25 MG TOTAL) BY MOUTH EVERY 6 (SIX) HOURS AS NEEDED FOR ITCHING, Historical Med      benzocaine (BABY ORAJEL) 7.5 % oral gel Apply to the mouth or throat 3 (three) times a day as needed for mucositis, Starting Sat 4/1/2023, Normal      benzocaine (ORAJEL) 10 % mucosal gel Apply 1 application to the mouth or throat as needed for mucositis, Starting Wed 8/17/2022, Normal      benzonatate (TESSALON) 200 MG capsule Take 1 capsule (200 mg total) by mouth 3 (three) times a day as needed for cough, Starting Fri 10/20/2023, Normal      Blood Glucose Monitoring Suppl (OneTouch Verio) w/Device KIT Use in the morning, Starting Fri 4/8/2022, Normal      Blood Pressure KIT Use in the morning, Starting Thu 12/7/2023, Normal      capsicum (ZOSTRIX) 0.075 % topical cream Apply topically 3 (three) times a day Apply to affected area, Starting Sat 2/25/2023, Print      chlorthalidone 25 mg tablet Take 1 tablet (25 mg total) by mouth daily, Starting Tue 5/31/2022, Normal      cholecalciferol (VITAMIN D3) 1,000 units tablet Take 2 tablets (2,000 Units total) by mouth daily for 5 days, Starting Wed 1/27/2021, Until Wed 11/22/2023, Normal       cyanocobalamin 1000 MCG tablet Take 1 tablet (1,000 mcg total) by mouth daily, Starting Wed 4/17/2019, Normal      dextromethorphan-guaiFENesin (ROBITUSSIN DM)  mg/5 mL syrup Take 5 mL by mouth 3 (three) times a day as needed for cough, Starting Fri 10/20/2023, Normal      diphenhydrAMINE (BENADRYL) 25 mg tablet Take 1 tablet (25 mg total) by mouth every 6 (six) hours as needed for itching, Starting Wed 2/15/2023, Normal      DULoxetine (CYMBALTA) 20 mg capsule Take 2 capsules (40 mg total) by mouth daily, Starting Tue 4/5/2022, Normal      FLUoxetine (PROzac) 10 mg capsule TAKE 1 ORAL CAPSULE ONCE A DAY, Historical Med      folic acid (FOLVITE) 1 mg tablet Take 1 tablet (1 mg total) by mouth daily, Starting Wed 6/23/2021, Normal      glucose blood (OneTouch Verio) test strip Use 1 each in the morning Use as instructed, Starting Fri 4/8/2022, Normal      hydrOXYzine HCL (ATARAX) 10 mg tablet TAKE 1 ORAL TABLET 2 TIMES A DAY PRN FOR ANXIETY, Historical Med      lenalidomide (Revlimid) 10 MG CAPS TAKE 1 CAPSULE BY MOUTH DAILY 3 WEEKS ON FOLLOWED BY 1 WEEK OFF, Normal      lidocaine (LMX) 4 % cream Apply topically as needed for mild pain To neck, Starting Tue 9/15/2020, Normal      metFORMIN (GLUCOPHAGE) 500 mg tablet Take 2 tablets (1,000 mg total) by mouth 2 (two) times a day with meals, Starting Tue 4/5/2022, Normal      omeprazole (PriLOSEC) 20 mg delayed release capsule Take 2 capsules (40 mg total) by mouth daily To prevent stomach upset, Starting Wed 3/25/2020, Normal      !! ondansetron (ZOFRAN) 4 mg tablet Take 1 tablet (4 mg total) by mouth every 8 (eight) hours as needed for nausea or vomiting, Starting Tue 9/15/2020, Normal      OneTouch Delica Lancets 33G MISC Use in the morning, Starting Fri 4/8/2022, Normal      oxyCODONE (ROXICODONE) 10 MG TABS Take 1 tablet (10 mg total) by mouth every 8 (eight) hours as needed for severe pain Erica 1 tableta cada 8 horas fernandez sea necesario por dolor intenso.  Dosis maxima 3 Max Daily Amount: 30 mg, Starting Tue 12/26/2023, Normal      !! polyethylene glycol (GLYCOLAX) 17 GM/SCOOP TAKE 17 G BY MOUTH DAILY MIX WITH WATER, Historical Med      !! polyethylene glycol (GLYCOLAX) 17 GM/SCOOP TAKE 17 G BY MOUTH DAILY MIX WITH WATER, Normal      potassium chloride (K-DUR,KLOR-CON) 20 mEq tablet TAKE 1 TABLET (20 MEQ TOTAL) BY MOUTH DAILY, Starting Tue 6/6/2023, Normal      pseudoephedrine-dextromethorphan-guaifenesin (ROBITUSSIN-PE) 30- MG/5ML solution Take 10 mL by mouth 4 (four) times a day as needed for allergies, Starting Thu 12/7/2023, Normal      senna (SENOKOT) 8.6 mg Take 1 tablet (8.6 mg total) by mouth 2 (two) times a day, Starting Tue 12/17/2019, Normal      traZODone (DESYREL) 50 mg tablet TAKE 1 ORAL TABLET AT BEDTIME PRN FOR SLEEP, Historical Med      triamcinolone (KENALOG) 0.1 % ointment Apply topically 2 (two) times a day, Starting Thu 12/7/2023, Normal       !! - Potential duplicate medications found. Please discuss with provider.          No discharge procedures on file.    PDMP Review         Value Time User    PDMP Reviewed  Yes 12/26/2023  9:02 AM Boyd Edmonds MD            ED Provider  Electronically Signed by             Petar Buenrostro DO  01/17/24 2008

## 2024-01-17 NOTE — Clinical Note
Helen Moreno was seen and treated in our emergency department on 1/17/2024.                Diagnosis:     Helen  .    She may return on this date: 01/20/2024         If you have any questions or concerns, please don't hesitate to call.      Petar Buenrostro, DO    ______________________________           _______________          _______________  Hospital Representative                              Date                                Time

## 2024-01-18 ENCOUNTER — HOSPITAL ENCOUNTER (OUTPATIENT)
Dept: INFUSION CENTER | Facility: HOSPITAL | Age: 64
Discharge: HOME | End: 2024-01-18
Attending: INTERNAL MEDICINE

## 2024-01-19 ENCOUNTER — HOSPITAL ENCOUNTER (OUTPATIENT)
Dept: INFUSION CENTER | Facility: HOSPITAL | Age: 64
End: 2024-01-19
Attending: INTERNAL MEDICINE

## 2024-01-24 ENCOUNTER — TELEPHONE (OUTPATIENT)
Dept: PALLIATIVE MEDICINE | Facility: CLINIC | Age: 64
End: 2024-01-24

## 2024-01-24 DIAGNOSIS — C82.18 GRADE 2 FOLLICULAR LYMPHOMA OF LYMPH NODES OF MULTIPLE REGIONS (HCC): ICD-10-CM

## 2024-01-24 DIAGNOSIS — D51.8 OTHER VITAMIN B12 DEFICIENCY ANEMIAS: ICD-10-CM

## 2024-01-24 DIAGNOSIS — U07.1 COVID-19: ICD-10-CM

## 2024-01-24 DIAGNOSIS — C82.18 GRADE 2 FOLLICULAR LYMPHOMA OF LYMPH NODES OF MULTIPLE REGIONS (HCC): Primary | ICD-10-CM

## 2024-01-24 RX ORDER — CYANOCOBALAMIN 1000 UG/ML
1000 INJECTION, SOLUTION INTRAMUSCULAR; SUBCUTANEOUS ONCE
Status: CANCELLED | OUTPATIENT
Start: 2024-01-26

## 2024-01-24 RX ORDER — OXYCODONE HYDROCHLORIDE 10 MG/1
10 TABLET ORAL EVERY 8 HOURS PRN
Qty: 90 TABLET | Refills: 0 | Status: SHIPPED | OUTPATIENT
Start: 2024-01-24

## 2024-01-24 RX ORDER — ACETAMINOPHEN 325 MG/1
975 TABLET ORAL EVERY 6 HOURS PRN
Qty: 120 TABLET | Refills: 0 | Status: SHIPPED | OUTPATIENT
Start: 2024-01-24

## 2024-01-24 NOTE — TELEPHONE ENCOUNTER
Last appointment: 11/2/2023    Next scheduled appointment: 3/1/2024    Pharmacy:  [facility]

## 2024-01-24 NOTE — TELEPHONE ENCOUNTER
Received prior authorization APPROVED for Oxycodone HCL 10mg Oral Tablet through 01/24/2024-07/24/2024    Faxed results to Pharmacy. Pharmacy has been asked to inform pt of outcome.

## 2024-01-24 NOTE — TELEPHONE ENCOUNTER
Controlled Substance Review    [place] PDMP or [place]  reviewed: No red flags were identified; safe to proceed with prescription..      Filled  Written  ID  Drug  QTY  Days  Prescriber  RX #  Dispenser  Refill  Daily Dose*  Pymt Type     12/26/2023 12/26/2023 2 Oxycodone Hcl (Ir) 10 Mg Tab 90.00 30 Mi Pip [id number] All ([id number]) 0 45.00 MME [place]  11/29/2023 11/27/2023 2 Oxycodone Hcl (Ir) 10 Mg Tab 90.00 30 Ti Joselito [id number] All ([id number]) 0 45.00 [place]ME [place]  11/02/2023 11/02/2023 2 Oxycodone Hcl (Ir) 10 Mg Tab 90.00 30 Ti Joselito [id number] All ([id number]) 0 45.00 [place]  09/28/2023 09/28/2023 1 Oxycodone Hcl (Ir) 10 Mg Tab 90.00 30 Ti Joselito [id number] All ([id number]) 0 45.00 [place]  08/31/2023 08/29/2023 1 Oxycodone Hcl (Ir) 10 Mg Tab 90.00 25 Ti Joselito [id number] All ([id number]) 0 54.00 MME Private [place]  08/03/2023 07/26/2023 1 Oxycodone Hcl (Ir) 10 Mg Tab 90.00 30 Ti Joselito [id number] All ([id number]) 0 45.00 [place]ME [place]  07/05/2023 06/28/2023 1 Oxycodone Hcl (Ir) 10 Mg Tab 90.00 30 Ti Joselito [id number] All ([id number]) 0 45.00 MME [place]      Jennifer Garay MD  Palliative Medicine & Supportive Care  Internal Medicine  Available via [employer] Text  Office: [phone]  Fax: [phone]

## 2024-01-24 NOTE — TELEPHONE ENCOUNTER
Prior Authorization STARTED for Oxycodone HCl 10mg Tablets     KEY#[id number]    Pharmacy: [facility]   Phone: [phone]  Fax: [phone]

## 2024-01-25 ENCOUNTER — HOSPITAL ENCOUNTER (OUTPATIENT)
Dept: INFUSION CENTER | Facility: HOSPITAL | Age: 64
End: 2024-01-25
Payer: COMMERCIAL

## 2024-01-25 DIAGNOSIS — Z45.2 ENCOUNTER FOR CENTRAL LINE CARE: ICD-10-CM

## 2024-01-25 DIAGNOSIS — C82.18 GRADE 2 FOLLICULAR LYMPHOMA OF LYMPH NODES OF MULTIPLE REGIONS (HCC): Primary | ICD-10-CM

## 2024-01-25 DIAGNOSIS — D51.8 OTHER VITAMIN B12 DEFICIENCY ANEMIAS: ICD-10-CM

## 2024-01-25 DIAGNOSIS — R53.83 OTHER FATIGUE: ICD-10-CM

## 2024-01-25 LAB
ALBUMIN SERPL BCP-MCNC: 4 G/DL (ref 3.5–5)
ALP SERPL-CCNC: 106 U/L (ref 34–104)
ALT SERPL W P-5'-P-CCNC: 19 U/L (ref 7–52)
ANION GAP SERPL CALCULATED.3IONS-SCNC: 9 MMOL/L
AST SERPL W P-5'-P-CCNC: 13 U/L (ref 13–39)
BASOPHILS # BLD AUTO: 0.02 THOUSANDS/ÂΜL (ref 0–0.1)
BASOPHILS NFR BLD AUTO: 0 % (ref 0–1)
BILIRUB SERPL-MCNC: 0.37 MG/DL (ref 0.2–1)
BUN SERPL-MCNC: 16 MG/DL (ref 5–25)
CALCIUM SERPL-MCNC: 8.6 MG/DL (ref 8.4–10.2)
CHLORIDE SERPL-SCNC: 105 MMOL/L (ref 96–108)
CO2 SERPL-SCNC: 25 MMOL/L (ref 21–32)
CREAT SERPL-MCNC: 1 MG/DL (ref 0.6–1.3)
CRP SERPL QL: 33.4 MG/L
EOSINOPHIL # BLD AUTO: 0.24 THOUSAND/ÂΜL (ref 0–0.61)
EOSINOPHIL NFR BLD AUTO: 3 % (ref 0–6)
ERYTHROCYTE [DISTWIDTH] IN BLOOD BY AUTOMATED COUNT: 15.8 % (ref 11.6–15.1)
ERYTHROCYTE [SEDIMENTATION RATE] IN BLOOD: 54 MM/HOUR (ref 0–29)
EST. AVERAGE GLUCOSE BLD GHB EST-MCNC: 134 MG/DL
GFR SERPL CREATININE-BSD FRML MDRD: 60 ML/MIN/1.73SQ M
GLUCOSE SERPL-MCNC: 145 MG/DL (ref 65–140)
HBA1C MFR BLD: 6.3 %
HCT VFR BLD AUTO: 38.4 % (ref 34.8–46.1)
HGB BLD-MCNC: 11.3 G/DL (ref 11.5–15.4)
IMM GRANULOCYTES # BLD AUTO: 0.02 THOUSAND/UL (ref 0–0.2)
IMM GRANULOCYTES NFR BLD AUTO: 0 % (ref 0–2)
LDH SERPL-CCNC: 171 U/L (ref 140–271)
LYMPHOCYTES # BLD AUTO: 1.44 THOUSANDS/ÂΜL (ref 0.6–4.47)
LYMPHOCYTES NFR BLD AUTO: 20 % (ref 14–44)
MCH RBC QN AUTO: 20.2 PG (ref 26.8–34.3)
MCHC RBC AUTO-ENTMCNC: 29.4 G/DL (ref 31.4–37.4)
MCV RBC AUTO: 69 FL (ref 82–98)
MONOCYTES # BLD AUTO: 0.4 THOUSAND/ÂΜL (ref 0.17–1.22)
MONOCYTES NFR BLD AUTO: 6 % (ref 4–12)
NEUTROPHILS # BLD AUTO: 5.07 THOUSANDS/ÂΜL (ref 1.85–7.62)
NEUTS SEG NFR BLD AUTO: 71 % (ref 43–75)
NRBC BLD AUTO-RTO: 0 /100 WBCS
PLATELET # BLD AUTO: 198 THOUSANDS/UL (ref 149–390)
PMV BLD AUTO: 10.6 FL (ref 8.9–12.7)
POTASSIUM SERPL-SCNC: 4 MMOL/L (ref 3.5–5.3)
PROT SERPL-MCNC: 6.4 G/DL (ref 6.4–8.4)
RBC # BLD AUTO: 5.59 MILLION/UL (ref 3.81–5.12)
SODIUM SERPL-SCNC: 139 MMOL/L (ref 135–147)
TSH SERPL DL<=0.05 MIU/L-ACNC: 1.28 UIU/ML (ref 0.45–4.5)
WBC # BLD AUTO: 7.19 THOUSAND/UL (ref 4.31–10.16)

## 2024-01-25 PROCEDURE — 85652 RBC SED RATE AUTOMATED: CPT

## 2024-01-25 PROCEDURE — 83036 HEMOGLOBIN GLYCOSYLATED A1C: CPT

## 2024-01-25 PROCEDURE — 80053 COMPREHEN METABOLIC PANEL: CPT | Performed by: INTERNAL MEDICINE

## 2024-01-25 PROCEDURE — 86140 C-REACTIVE PROTEIN: CPT

## 2024-01-25 PROCEDURE — 84443 ASSAY THYROID STIM HORMONE: CPT

## 2024-01-25 PROCEDURE — 85025 COMPLETE CBC W/AUTO DIFF WBC: CPT | Performed by: INTERNAL MEDICINE

## 2024-01-25 PROCEDURE — 82607 VITAMIN B-12: CPT

## 2024-01-25 PROCEDURE — 83615 LACTATE (LD) (LDH) ENZYME: CPT

## 2024-01-25 NOTE — PROGRESS NOTES
Helen Moreno  tolerated central lab draw well with no complications.  Port flushed per protocol.     Helen Moreno is aware of future appt on jan 26 at 0800-sh infusion.     AVS printed and given to Helen Moreno: Yes

## 2024-01-26 ENCOUNTER — HOSPITAL ENCOUNTER (OUTPATIENT)
Dept: INFUSION CENTER | Facility: HOSPITAL | Age: 64
End: 2024-01-26
Attending: INTERNAL MEDICINE
Payer: COMMERCIAL

## 2024-01-26 VITALS
OXYGEN SATURATION: 98 % | DIASTOLIC BLOOD PRESSURE: 72 MMHG | TEMPERATURE: 97.6 F | RESPIRATION RATE: 18 BRPM | SYSTOLIC BLOOD PRESSURE: 160 MMHG | HEART RATE: 72 BPM

## 2024-01-26 DIAGNOSIS — C82.18 GRADE 2 FOLLICULAR LYMPHOMA OF LYMPH NODES OF MULTIPLE REGIONS (HCC): Primary | ICD-10-CM

## 2024-01-26 DIAGNOSIS — D51.8 OTHER VITAMIN B12 DEFICIENCY ANEMIAS: ICD-10-CM

## 2024-01-26 LAB — VIT B12 SERPL-MCNC: 695 PG/ML (ref 180–914)

## 2024-01-26 PROCEDURE — 96413 CHEMO IV INFUSION 1 HR: CPT

## 2024-01-26 PROCEDURE — 96372 THER/PROPH/DIAG INJ SC/IM: CPT

## 2024-01-26 PROCEDURE — 96367 TX/PROPH/DG ADDL SEQ IV INF: CPT

## 2024-01-26 PROCEDURE — 96415 CHEMO IV INFUSION ADDL HR: CPT

## 2024-01-26 RX ORDER — ACETAMINOPHEN 325 MG/1
650 TABLET ORAL ONCE
Status: COMPLETED | OUTPATIENT
Start: 2024-01-26 | End: 2024-01-26

## 2024-01-26 RX ORDER — SODIUM CHLORIDE 9 MG/ML
20 INJECTION, SOLUTION INTRAVENOUS ONCE
Status: COMPLETED | OUTPATIENT
Start: 2024-01-26 | End: 2024-01-26

## 2024-01-26 RX ORDER — CYANOCOBALAMIN 1000 UG/ML
1000 INJECTION, SOLUTION INTRAMUSCULAR; SUBCUTANEOUS ONCE
Status: COMPLETED | OUTPATIENT
Start: 2024-01-26 | End: 2024-01-26

## 2024-01-26 RX ORDER — CYANOCOBALAMIN 1000 UG/ML
1000 INJECTION, SOLUTION INTRAMUSCULAR; SUBCUTANEOUS ONCE
OUTPATIENT
Start: 2024-02-23

## 2024-01-26 RX ADMIN — DIPHENHYDRAMINE HYDROCHLORIDE 25 MG: 50 INJECTION, SOLUTION INTRAMUSCULAR; INTRAVENOUS at 09:10

## 2024-01-26 RX ADMIN — CYANOCOBALAMIN 1000 MCG: 1000 INJECTION INTRAMUSCULAR; SUBCUTANEOUS at 08:46

## 2024-01-26 RX ADMIN — DEXAMETHASONE SODIUM PHOSPHATE 20 MG: 10 INJECTION, SOLUTION INTRAMUSCULAR; INTRAVENOUS at 08:45

## 2024-01-26 RX ADMIN — OBINUTUZUMAB 1000 MG: 1000 INJECTION, SOLUTION, CONCENTRATE INTRAVENOUS at 10:35

## 2024-01-26 RX ADMIN — SODIUM CHLORIDE 20 ML/HR: 0.9 INJECTION, SOLUTION INTRAVENOUS at 08:43

## 2024-01-26 RX ADMIN — ACETAMINOPHEN 650 MG: 325 TABLET ORAL at 08:44

## 2024-01-26 NOTE — PROGRESS NOTES
Helen Moreno  tolerated treatment well with no complications.      Helen Moreno is aware of future appt on 2/22/24 at 230pm.     AVS printed and given to Helen Moreno:  Yes

## 2024-01-26 NOTE — PLAN OF CARE
Problem: Potential for Falls  Goal: Patient will remain free of falls  Description: INTERVENTIONS:  - Educate patient/family on patient safety including physical limitations  - Instruct patient to call for assistance with activity   - Keep Call bell within reach  - Keep care items and personal belongings within reach  - Initiate and maintain comfort rounds    Outcome: Progressing

## 2024-02-09 ENCOUNTER — HOSPITAL ENCOUNTER (EMERGENCY)
Facility: HOSPITAL | Age: 64
Discharge: HOME | End: 2024-02-09
Attending: EMERGENCY MEDICINE
Payer: COMMERCIAL

## 2024-02-09 ENCOUNTER — APPOINTMENT (EMERGENCY)
Dept: CT IMAGING | Facility: HOSPITAL | Age: 64
End: 2024-02-09
Payer: COMMERCIAL

## 2024-02-09 ENCOUNTER — OFFICE VISIT (OUTPATIENT)
Dept: HEMATOLOGY ONCOLOGY | Facility: CLINIC | Age: 64
End: 2024-02-09
Payer: COMMERCIAL

## 2024-02-09 VITALS
HEART RATE: 84 BPM | WEIGHT: 242.29 LBS | BODY MASS INDEX: 40.32 KG/M2 | SYSTOLIC BLOOD PRESSURE: 121 MMHG | RESPIRATION RATE: 18 BRPM | TEMPERATURE: 97.7 F | DIASTOLIC BLOOD PRESSURE: 61 MMHG | OXYGEN SATURATION: 97 %

## 2024-02-09 VITALS
TEMPERATURE: 97.8 F | OXYGEN SATURATION: 97 % | DIASTOLIC BLOOD PRESSURE: 70 MMHG | SYSTOLIC BLOOD PRESSURE: 128 MMHG | WEIGHT: 242 LBS | HEART RATE: 70 BPM | RESPIRATION RATE: 16 BRPM | BODY MASS INDEX: 40.32 KG/M2 | HEIGHT: 65 IN

## 2024-02-09 DIAGNOSIS — C82.18 GRADE 2 FOLLICULAR LYMPHOMA OF LYMPH NODES OF MULTIPLE REGIONS (HCC): ICD-10-CM

## 2024-02-09 DIAGNOSIS — J18.9 PNEUMONIA: Primary | ICD-10-CM

## 2024-02-09 DIAGNOSIS — R05.2 SUBACUTE COUGH: Primary | ICD-10-CM

## 2024-02-09 LAB
ALBUMIN SERPL BCP-MCNC: 4 G/DL (ref 3.5–5)
ALP SERPL-CCNC: 117 U/L (ref 34–104)
ALT SERPL W P-5'-P-CCNC: 22 U/L (ref 7–52)
ANION GAP SERPL CALCULATED.3IONS-SCNC: 7 MMOL/L
AST SERPL W P-5'-P-CCNC: 17 U/L (ref 13–39)
ATRIAL RATE: 61 BPM
BASOPHILS # BLD AUTO: 0.02 THOUSANDS/ÂΜL (ref 0–0.1)
BASOPHILS NFR BLD AUTO: 0 % (ref 0–1)
BILIRUB SERPL-MCNC: 0.57 MG/DL (ref 0.2–1)
BUN SERPL-MCNC: 10 MG/DL (ref 5–25)
CALCIUM SERPL-MCNC: 9.2 MG/DL (ref 8.4–10.2)
CARDIAC TROPONIN I PNL SERPL HS: 4 NG/L
CHLORIDE SERPL-SCNC: 105 MMOL/L (ref 96–108)
CO2 SERPL-SCNC: 28 MMOL/L (ref 21–32)
CREAT SERPL-MCNC: 0.72 MG/DL (ref 0.6–1.3)
EOSINOPHIL # BLD AUTO: 0.22 THOUSAND/ÂΜL (ref 0–0.61)
EOSINOPHIL NFR BLD AUTO: 3 % (ref 0–6)
ERYTHROCYTE [DISTWIDTH] IN BLOOD BY AUTOMATED COUNT: 15.7 % (ref 11.6–15.1)
GFR SERPL CREATININE-BSD FRML MDRD: 90 ML/MIN/1.73SQ M
GLUCOSE SERPL-MCNC: 105 MG/DL (ref 65–140)
HCT VFR BLD AUTO: 39.2 % (ref 34.8–46.1)
HGB BLD-MCNC: 11.7 G/DL (ref 11.5–15.4)
IMM GRANULOCYTES # BLD AUTO: 0.02 THOUSAND/UL (ref 0–0.2)
IMM GRANULOCYTES NFR BLD AUTO: 0 % (ref 0–2)
LIPASE SERPL-CCNC: <6 U/L (ref 11–82)
LYMPHOCYTES # BLD AUTO: 1.49 THOUSANDS/ÂΜL (ref 0.6–4.47)
LYMPHOCYTES NFR BLD AUTO: 22 % (ref 14–44)
MCH RBC QN AUTO: 20.2 PG (ref 26.8–34.3)
MCHC RBC AUTO-ENTMCNC: 29.8 G/DL (ref 31.4–37.4)
MCV RBC AUTO: 68 FL (ref 82–98)
MONOCYTES # BLD AUTO: 0.52 THOUSAND/ÂΜL (ref 0.17–1.22)
MONOCYTES NFR BLD AUTO: 8 % (ref 4–12)
NEUTROPHILS # BLD AUTO: 4.5 THOUSANDS/ÂΜL (ref 1.85–7.62)
NEUTS SEG NFR BLD AUTO: 67 % (ref 43–75)
NRBC BLD AUTO-RTO: 0 /100 WBCS
P AXIS: 44 DEGREES
PLATELET # BLD AUTO: 277 THOUSANDS/UL (ref 149–390)
POTASSIUM SERPL-SCNC: 4 MMOL/L (ref 3.5–5.3)
PR INTERVAL: 172 MS
PROT SERPL-MCNC: 6.7 G/DL (ref 6.4–8.4)
QRS AXIS: 32 DEGREES
QRSD INTERVAL: 86 MS
QT INTERVAL: 428 MS
QTC INTERVAL: 430 MS
RBC # BLD AUTO: 5.78 MILLION/UL (ref 3.81–5.12)
SODIUM SERPL-SCNC: 140 MMOL/L (ref 135–147)
T WAVE AXIS: 37 DEGREES
VENTRICULAR RATE: 61 BPM
WBC # BLD AUTO: 6.77 THOUSAND/UL (ref 4.31–10.16)

## 2024-02-09 PROCEDURE — 99284 EMERGENCY DEPT VISIT MOD MDM: CPT

## 2024-02-09 PROCEDURE — 96365 THER/PROPH/DIAG IV INF INIT: CPT

## 2024-02-09 PROCEDURE — 83690 ASSAY OF LIPASE: CPT

## 2024-02-09 PROCEDURE — 36415 COLL VENOUS BLD VENIPUNCTURE: CPT

## 2024-02-09 PROCEDURE — 99285 EMERGENCY DEPT VISIT HI MDM: CPT | Performed by: EMERGENCY MEDICINE

## 2024-02-09 PROCEDURE — 96375 TX/PRO/DX INJ NEW DRUG ADDON: CPT

## 2024-02-09 PROCEDURE — 80053 COMPREHEN METABOLIC PANEL: CPT

## 2024-02-09 PROCEDURE — 71260 CT THORAX DX C+: CPT

## 2024-02-09 PROCEDURE — 94640 AIRWAY INHALATION TREATMENT: CPT

## 2024-02-09 PROCEDURE — 99214 OFFICE O/P EST MOD 30 MIN: CPT | Performed by: INTERNAL MEDICINE

## 2024-02-09 PROCEDURE — 85025 COMPLETE CBC W/AUTO DIFF WBC: CPT

## 2024-02-09 PROCEDURE — 70450 CT HEAD/BRAIN W/O DYE: CPT

## 2024-02-09 PROCEDURE — 84484 ASSAY OF TROPONIN QUANT: CPT

## 2024-02-09 PROCEDURE — 93005 ELECTROCARDIOGRAM TRACING: CPT

## 2024-02-09 PROCEDURE — G1004 CDSM NDSC: HCPCS

## 2024-02-09 RX ORDER — METOCLOPRAMIDE HYDROCHLORIDE 5 MG/ML
10 INJECTION INTRAMUSCULAR; INTRAVENOUS ONCE
Status: COMPLETED | OUTPATIENT
Start: 2024-02-09 | End: 2024-02-09

## 2024-02-09 RX ORDER — MAGNESIUM SULFATE HEPTAHYDRATE 40 MG/ML
2 INJECTION, SOLUTION INTRAVENOUS ONCE
Status: COMPLETED | OUTPATIENT
Start: 2024-02-09 | End: 2024-02-09

## 2024-02-09 RX ORDER — KETOROLAC TROMETHAMINE 30 MG/ML
30 INJECTION, SOLUTION INTRAMUSCULAR; INTRAVENOUS ONCE
Status: COMPLETED | OUTPATIENT
Start: 2024-02-09 | End: 2024-02-09

## 2024-02-09 RX ORDER — LEVOFLOXACIN 500 MG/1
500 TABLET, FILM COATED ORAL ONCE
Status: COMPLETED | OUTPATIENT
Start: 2024-02-09 | End: 2024-02-09

## 2024-02-09 RX ORDER — ALBUTEROL SULFATE 2.5 MG/3ML
5 SOLUTION RESPIRATORY (INHALATION) ONCE
Status: COMPLETED | OUTPATIENT
Start: 2024-02-09 | End: 2024-02-09

## 2024-02-09 RX ORDER — GUAIFENESIN/DEXTROMETHORPHAN 100-10MG/5
10 SYRUP ORAL ONCE
Status: COMPLETED | OUTPATIENT
Start: 2024-02-09 | End: 2024-02-09

## 2024-02-09 RX ORDER — LEVOFLOXACIN 500 MG/1
500 TABLET, FILM COATED ORAL EVERY 24 HOURS
Qty: 10 TABLET | Refills: 0 | Status: SHIPPED | OUTPATIENT
Start: 2024-02-09 | End: 2024-02-19

## 2024-02-09 RX ADMIN — MAGNESIUM SULFATE HEPTAHYDRATE 2 G: 40 INJECTION, SOLUTION INTRAVENOUS at 12:50

## 2024-02-09 RX ADMIN — GUAIFENESIN AND DEXTROMETHORPHAN 10 ML: 100; 10 SYRUP ORAL at 12:54

## 2024-02-09 RX ADMIN — IPRATROPIUM BROMIDE 0.5 MG: 0.5 SOLUTION RESPIRATORY (INHALATION) at 13:41

## 2024-02-09 RX ADMIN — LEVOFLOXACIN 500 MG: 500 TABLET, FILM COATED ORAL at 15:12

## 2024-02-09 RX ADMIN — ALBUTEROL SULFATE 5 MG: 2.5 SOLUTION RESPIRATORY (INHALATION) at 13:41

## 2024-02-09 RX ADMIN — SODIUM CHLORIDE 1000 ML: 0.9 INJECTION, SOLUTION INTRAVENOUS at 12:54

## 2024-02-09 RX ADMIN — IOHEXOL 85 ML: 350 INJECTION, SOLUTION INTRAVENOUS at 13:37

## 2024-02-09 RX ADMIN — KETOROLAC TROMETHAMINE 30 MG: 30 INJECTION, SOLUTION INTRAMUSCULAR; INTRAVENOUS at 12:47

## 2024-02-09 RX ADMIN — METOCLOPRAMIDE 10 MG: 5 INJECTION, SOLUTION INTRAMUSCULAR; INTRAVENOUS at 12:49

## 2024-02-09 NOTE — ED ATTENDING ATTESTATION
2/9/2024  I, Linda Cowart DO, saw and evaluated the patient. I have discussed the patient with the resident/non-physician practitioner and agree with the resident's/non-physician practitioner's findings, Plan of Care, and MDM as documented in the resident's/non-physician practitioner's note, except where noted. All available labs and Radiology studies were reviewed.  I was present for key portions of any procedure(s) performed by the resident/non-physician practitioner and I was immediately available to provide assistance.       At this point I agree with the current assessment done in the Emergency Department.  I have conducted an independent evaluation of this patient a history and physical is as follows:63y F here for worsening cough. Sent in from [facility] - pt w/ active chemo for lymphoma. Has had worsening productive cough now w/ dark green sputum.  Has chronic dyspnea that is unchanged from baseline. Denies f/c/s, congestion.  Sent in for evaluation for possible pneumonia w/ recommendations for labs, ct chest. Exam WNWD nad, mmm, neck supple, resp non-labored, scattered coarse wheeze/rhonchi, cv regular rate, abd nd, ext no cce, skin dry, neuro non-focal, normal mood.      ED Course     Labs Reviewed   CBC AND DIFFERENTIAL - Abnormal       Result Value Ref Range Status    WBC 6.77  4.31 - 10.16 Thousand/uL Final    RBC 5.78 (*) 3.81 - 5.12 Million/uL Final    Hemoglobin 11.7  11.5 - 15.4 g/dL Final    Hematocrit 39.2  34.8 - 46.1 % Final    MCV 68 (*) 82 - 98 fL Final    MCH 20.2 (*) 26.8 - 34.3 pg Final    MCHC 29.8 (*) 31.4 - 37.4 g/dL Final    RDW 15.7 (*) 11.6 - 15.1 % Final    Platelets 277  149 - 390 Thousands/uL Final    nRBC 0  /100 WBCs Final    Neutrophils Relative 67  43 - 75 % Final    Immat GRANS % 0  0 - 2 % Final    Lymphocytes Relative 22  14 - 44 % Final    Monocytes Relative 8  4 - 12 % Final    Eosinophils Relative 3  0 - 6 % Final    Basophils Relative 0  0 - 1 % Final    Neutrophils  Absolute 4.50  1.85 - 7.62 Thousands/µL Final    Immature Grans Absolute 0.02  0.00 - 0.20 Thousand/uL Final    Lymphocytes Absolute 1.49  0.60 - 4.47 Thousands/µL Final    Monocytes Absolute 0.52  0.17 - 1.22 Thousand/µL Final    Eosinophils Absolute 0.22  0.00 - 0.61 Thousand/µL Final    Basophils Absolute 0.02  0.00 - 0.10 Thousands/µL Final   COMPREHENSIVE METABOLIC PANEL - Abnormal    Sodium 140  135 - 147 mmol/L Final    Potassium 4.0  3.5 - 5.3 mmol/L Final    Chloride 105  96 - 108 mmol/L Final    CO2 28  21 - 32 mmol/L Final    ANION GAP 7  mmol/L Final    BUN 10  5 - 25 mg/dL Final    Creatinine 0.72  0.60 - 1.30 mg/dL Final    Comment: Standardized to IDMS reference method    Glucose 105  65 - 140 mg/dL Final    Comment: If the patient is fasting, the ADA then defines impaired fasting glucose as > 100 mg/dL and diabetes as > or equal to 123 mg/dL.    Calcium 9.2  8.4 - 10.2 mg/dL Final    AST 17  13 - 39 U/L Final    ALT 22  7 - 52 U/L Final    Comment: Specimen collection should occur prior to Sulfasalazine administration due to the potential for falsely depressed results.     Alkaline Phosphatase 117 (*) 34 - 104 U/L Final    Total Protein 6.7  6.4 - 8.4 g/dL Final    Albumin 4.0  3.5 - 5.0 g/dL Final    Total Bilirubin 0.57  0.20 - 1.00 mg/dL Final    Comment: Use of this assay is not recommended for patients undergoing treatment with eltrombopag due to the potential for falsely elevated results.  N-acetyl-p-benzoquinone imine (metabolite of Acetaminophen) will generate erroneously low results in samples for patients that have taken an overdose of Acetaminophen.    eGFR 90  ml/min/1.73sq m Final    Narrative:     National Kid[employer] guidelines for Chronic Kidney Disease (CKD):     Stage 1 with normal or high GFR (GFR > 90 mL/min/1.73 square meters)    Stage 2 Mild CKD (GFR = 60-89 mL/min/1.73 square meters)    Stage 3A Moderate CKD (GFR = 45-59 mL/min/1.73 square meters)    Stage 3B  "Moderate CKD (GFR = 30-44 mL/min/1.73 square meters)    Stage 4 Severe CKD (GFR = 15-29 mL/min/1.73 square meters)    Stage 5 End Stage CKD (GFR <15 mL/min/1.73 square meters)  Note: GFR calculation is accurate only with a steady state creatinine   LIPASE - Abnormal    Lipase <6 (*) 11 - 82 u/L Final   HS TROPONIN I 0HR - Normal    hs TnI 0hr 4  \"Refer to ACS Flowchart\"- see link ng/L Final    Comment:                                              Initial (time 0) result  If >=50 ng/L, Myocardial injury suggested ;  Type of myocardial injury and treatment strategy  to be determined.  If 5-49 ng/L, a delta result at 2 hours and or 4 hours will be needed to further evaluate.  If <4 ng/L, and chest pain has been >3 hours since onset, patient may qualify for discharge based on the HEART score in the ED.  If <5 ng/L and <3hours since onset of chest pain, a delta result at 2 hours will be needed to further evaluate.    HS Troponin 99th Percentile URL of a Health Population=12 ng/L with a 95% Confidence Interval of 8-18 ng/L.    Second Troponin (time 2 hours)  If calculated delta >= 20 ng/L,  Myocardial injury suggested ; Type of myocardial injury and treatment strategy to be determined.  If 5-49 ng/L and the calculated delta is 5-19 ng/L, consult medical service for evaluation.  Continue evaluation for ischemia on ecg and other possible etiology and repeat hs troponin at 4 hours.  If delta is <5 ng/L at 2 hours, consider discharge based on risk stratification via the HEART score (if in ED), or SUSI risk score in IP/Observation.    HS Troponin 99th Percentile URL of a Health Population=12 ng/L with a 95% Confidence Interval of 8-18 ng/L.     CT chest with contrast   Final Result      New since prior study, bilateral predominantly upper lobe patchy groundglass opacities, most concerning for pneumonia.               Workstation performed: [id number]         CT head wo contrast   Final Result   1. 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  No acute intracranial abnormality.   2. Diffuse paranasal sinus inflammatory changes as above, most concerning for pansinusitis.                  Workstation performed: [id number]               Critical Care Time  Procedures    1. Pneumonia          Time reflects when diagnosis was documented in both MDM as applicable and the Disposition within this note       Time User Action Codes Description Comment    2/9/2024  2:30 PM SlimbraxtoniaChriss Ayana [J18.9] Pneumonia           ED Disposition       ED Disposition   Discharge    Condition   Stable    Date/Time   Fri Feb 9, 2024  2:30 PM    Comment   Helen Moreno discharge to home/self care.                   Follow-up Information       Follow up With Specialties Details Why Contact Info Additional Information    [facility] Emergency Department Emergency Medicine Go to  If symptoms worsen or if you have any other specific concerns [address] [place] [id number] [phone] [facility] Emergency Department, [address], [place], [place], [address]    [facility] Family Medicine Schedule an appointment as soon as possible for a visit  If you do not have a family doctor. [address] [place] [address], [address] [address] [phone] [facility], [address] [place] [address], [place] [address], [place], [place], [address] [phone]
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
## 2024-02-09 NOTE — DISCHARGE INSTRUCTIONS
Please take Levaquin as prescribed.  Please use Tylenol and Motrin for pain.  Please follow-up with your family doctor in the next 3 days to be reevaluated.  Please return to the emergency department with any new or concerning symptoms including fevers, worsening cough, or difficulty breathing.

## 2024-02-09 NOTE — ED PROVIDER NOTES
History  Chief Complaint   Patient presents with    Dizziness     Pt coming from cancer center on [address] via EMS. Pt c/o cough since January, chronic headache and dizziness reports generalized body ache since dx with COVID x2 months ago.      Patient is a 63-year-old female with history of lymphoma, stomach cancer, diabetes, and lupus who presents for cough.  Patient presents from her hematology oncology office where she was being seen for follow-up for her lymphoma.  Patient states that for the past 2 months she has been having chronic cough and headache.  She states the cough has been worsening and is now productive of thick greenish sputum.  She endorses shortness of breath as well as chronic chest pain which she says is normal for her.  She states that her headaches feel like her normal migraine headache but it been continuous for the past 2 months.  She denies any vision changes, lightheadedness, nausea, vomiting.  She endorses chronic abdominal pain as well which she says is normal from her stomach cancer.        Prior to Admission Medications   Prescriptions Last Dose Informant Patient Reported? Taking?   Banophen 25 MG capsule  Self, Other (Specify) Yes No   Sig: TAKE 1 CAPSULE(25 MG TOTAL) BY MOUTH EVERY 6 (SIX) HOURS AS NEEDED FOR ITCHING   Blood Glucose Monitoring Suppl (OneTouch Verio) w/Device KIT  Self, Other (Specify) No No   Sig: Use in the morning   Blood Pressure KIT  Self, Other (Specify) No No   Sig: Use in the morning   DULoxetine (CYMBALTA) 20 mg capsule  Self, Other (Specify) No No   Sig: Take 2 capsules (40 mg total) by mouth daily   FLUoxetine (PROzac) 10 mg capsule  Self, Other (Specify) Yes No   Sig: TAKE 1 ORAL CAPSULE ONCE A DAY   OneTouch Delica Lancets 33G MISC  Self, Other (Specify) No No   Sig: Use in the morning   acetaminophen (TYLENOL) 325 mg tablet  Self, Other (Specify) No No   Sig: Take 2 tablets (650 mg total) by mouth every 6 (six) hours as needed for mild pain    acetaminophen (TYLENOL) 325 mg tablet  Self, Other (Specify) No No   Sig: Take 3 tablets (975 mg total) by mouth every 6 (six) hours as needed for mild pain or fever   acyclovir (ZOVIRAX) 400 MG tablet   No No   Sig: Take 1 tablet (400 mg total) by mouth 2 (two) times a day   allopurinol (ZYLOPRIM) 100 mg tablet  Self, Other (Specify) No No   Sig: Take 1 tablet (100 mg total) by mouth daily   amLODIPine (NORVASC) 10 mg tablet  Self, Other (Specify) Yes No   Sig: Take 10 mg by mouth daily   ascorbic acid (VITAMIN C) 1000 MG tablet  Self No No   Sig: Take 1 tablet (1,000 mg total) by mouth every 12 (twelve) hours for 10 doses   aspirin (ECOTRIN LOW STRENGTH) 81 mg EC tablet  Self, Other (Specify) No No   Sig: Take 1 tablet (81 mg total) by mouth daily   aspirin-acetaminophen-caffeine (EXCEDRIN MIGRAINE) 250-250-65 MG per tablet  Self, Other (Specify) No No   Sig: Take 1 tablet by mouth every 6 (six) hours as needed for headaches Erica 1 tableta cada 6 horas fernandez sea necesario para el dolor de russ   atorvastatin (LIPITOR) 40 mg tablet  Self, Other (Specify) No No   Sig: Take 1 tablet (40 mg total) by mouth daily with dinner   benzocaine (BABY ORAJEL) 7.5 % oral gel  Self, Other (Specify) No No   Sig: Apply to the mouth or throat 3 (three) times a day as needed for mucositis   benzocaine (ORAJEL) 10 % mucosal gel  Self, Other (Specify) No No   Sig: Apply 1 application to the mouth or throat as needed for mucositis   benzonatate (TESSALON) 200 MG capsule  Self, Other (Specify) No No   Sig: Take 1 capsule (200 mg total) by mouth 3 (three) times a day as needed for cough   capsicum (ZOSTRIX) 0.075 % topical cream  Self, Other (Specify) No No   Sig: Apply topically 3 (three) times a day Apply to affected area   chlorthalidone 25 mg tablet  Self, Other (Specify) No No   Sig: Take 1 tablet (25 mg total) by mouth daily   cholecalciferol (VITAMIN D3) 1,000 units tablet  Self No No   Sig: Take 2 tablets (2,000 Units total)  by mouth daily for 5 days   cyanocobalamin 1000 MCG tablet  Self, Other (Specify) No No   Sig: Take 1 tablet (1,000 mcg total) by mouth daily   dextromethorphan-guaiFENesin (ROBITUSSIN DM)  mg/5 mL syrup  Self, Other (Specify) No No   Sig: Take 5 mL by mouth 3 (three) times a day as needed for cough   diphenhydrAMINE (BENADRYL) 25 mg tablet  Self, Other (Specify) No No   Sig: Take 1 tablet (25 mg total) by mouth every 6 (six) hours as needed for itching   folic acid (FOLVITE) 1 mg tablet  Self, Other (Specify) No No   Sig: Take 1 tablet (1 mg total) by mouth daily   glucose blood (OneTouch Verio) test strip  Self, Other (Specify) No No   Sig: Use 1 each in the morning Use as instructed   hydrOXYzine HCL (ATARAX) 10 mg tablet  Self, Other (Specify) Yes No   Sig: TAKE 1 ORAL TABLET 2 TIMES A DAY PRN FOR ANXIETY   ibuprofen (MOTRIN) 400 mg tablet   No No   Sig: Take 1 tablet (400 mg total) by mouth every 6 (six) hours as needed for mild pain (Body aches or fever) for up to 7 days   ibuprofen (MOTRIN) 600 mg tablet  Self, Other (Specify) No No   Sig: Take 1 tablet (600 mg total) by mouth every 6 (six) hours as needed for mild pain or fever   lenalidomide (Revlimid) 10 MG CAPS  Self, Other (Specify) No No   Sig: TAKE 1 CAPSULE BY MOUTH DAILY 3 WEEKS ON FOLLOWED BY 1 WEEK OFF   levofloxacin (LEVAQUIN) 500 mg tablet   No No   Sig: Take 1 tablet (500 mg total) by mouth every 24 hours for 10 days   lidocaine (LMX) 4 % cream  Self, Other (Specify) No No   Sig: Apply topically as needed for mild pain To neck   metFORMIN (GLUCOPHAGE) 500 mg tablet  Self, Other (Specify) No No   Sig: Take 2 tablets (1,000 mg total) by mouth 2 (two) times a day with meals   omeprazole (PriLOSEC) 20 mg delayed release capsule  Self, Other (Specify) No No   Sig: Take 2 capsules (40 mg total) by mouth daily To prevent stomach upset   ondansetron (ZOFRAN) 4 mg tablet  Self, Other (Specify) No No   Sig: Take 1 tablet (4 mg total) by mouth every  8 (eight) hours as needed for nausea or vomiting   ondansetron (ZOFRAN) 4 mg tablet  Self, Other (Specify) No No   Sig: Take 1 tablet (4 mg total) by mouth every 6 (six) hours   oxyCODONE (ROXICODONE) 10 MG TABS  Self, Other (Specify) No No   Sig: Take 1 tablet (10 mg total) by mouth every 8 (eight) hours as needed for severe pain Erica 1 tableta cada 8 horas fernandez sea necesario por dolor intenso. Dosis maxima 3 Max Daily Amount: 30 mg   polyethylene glycol (GLYCOLAX) 17 GM/SCOOP  Self, Other (Specify) Yes No   Sig: TAKE 17 G BY MOUTH DAILY MIX WITH WATER   polyethylene glycol (GLYCOLAX) 17 GM/SCOOP  Self, Other (Specify) No No   Sig: TAKE 17 G BY MOUTH DAILY MIX WITH WATER   potassium chloride (K-DUR,KLOR-CON) 20 mEq tablet  Self, Other (Specify) No No   Sig: TAKE 1 TABLET (20 MEQ TOTAL) BY MOUTH DAILY   pseudoephedrine-dextromethorphan-guaifenesin (ROBITUSSIN-PE) 30- MG/5ML solution  Self, Other (Specify) No No   Sig: Take 10 mL by mouth 4 (four) times a day as needed for allergies   senna (SENOKOT) 8.6 mg  Self, Other (Specify) No No   Sig: Take 1 tablet (8.6 mg total) by mouth 2 (two) times a day   traZODone (DESYREL) 50 mg tablet  Self, Other (Specify) Yes No   Sig: TAKE 1 ORAL TABLET AT BEDTIME PRN FOR SLEEP   triamcinolone (KENALOG) 0.1 % ointment  Self, Other (Specify) No No   Sig: Apply topically 2 (two) times a day      Facility-Administered Medications: None       Past Medical History:   Diagnosis Date    Anemia     iron and B12    Arthritis     Asthma     Cough     Depression     Diabetes mellitus (HCC)     Falls frequently     Hypertension     Lupus (HCC)     Lymphoma (HCC)     Lymphoma (HCC)     Migraine     Osteoporosis     Psychiatric disorder     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HCC)     Vertigo        Past Surgical History:   Procedure Laterality Date    CHOLECYSTECTOMY      FL GUIDED CENTRAL VENOUS ACCESS DEVICE INSERTION  11/9/2018    HYSTERECTOMY      IR BIOPSY BONE MARROW  11/24/2020    IR BIOPSY LYMPH  NODE  2020    IR PORT CHECK  2023    LYMPH NODE BIOPSY Left 2018    Procedure: EXCISION BIOPSY LYMPH NODE SUPRACLAVICULAR;  Surgeon: Rajan Wiley MD;  Location:  MAIN OR;  Service: General    HI TENDON SHEATH INCISION Right 2020    Procedure: RELEASE TRIGGER FINGER RIGHT THUMB;  Surgeon: Brent Sanders MD;  Location:  MAIN OR;  Service: Orthopedics    TUNNELED VENOUS PORT PLACEMENT N/A 2018    Procedure: INSERTION OF PORT-A-CATH;  Surgeon: Rajan Wiley MD;  Location:  MAIN OR;  Service: General       Family History   Problem Relation Age of Onset    Cancer Mother     Diabetes Mother     Cancer Father     Diabetes Father     Breast cancer Sister     No Known Problems Sister     No Known Problems Sister     No Known Problems Sister     No Known Problems Maternal Aunt     No Known Problems Maternal Aunt     No Known Problems Maternal Aunt     No Known Problems Paternal Aunt      I have reviewed and agree with the history as documented.    E-Cigarette/Vaping    E-Cigarette Use Never User      E-Cigarette/Vaping Substances    Nicotine No     THC No     CBD No     Flavoring No     Other No     Unknown No      Social History     Tobacco Use    Smoking status: Former     Current packs/day: 0.00     Types: Cigarettes     Quit date: 2017     Years since quittin.6     Passive exposure: Past    Smokeless tobacco: Never   Vaping Use    Vaping status: Never Used   Substance Use Topics    Alcohol use: Never    Drug use: Never        Review of Systems   Constitutional:  Negative for chills and fever.   HENT:  Negative for congestion, rhinorrhea and sore throat.    Eyes:  Negative for pain and visual disturbance.   Respiratory:  Positive for cough, shortness of breath and wheezing.    Cardiovascular:  Positive for chest pain. Negative for palpitations.   Gastrointestinal:  Positive for abdominal pain. Negative for constipation, diarrhea, nausea and vomiting.   Genitourinary:  Negative for  dysuria and hematuria.   Musculoskeletal:  Negative for back pain and neck pain.   Skin:  Negative for color change and rash.   Neurological:  Negative for weakness and numbness.   All other systems reviewed and are negative.      Physical Exam  ED Triage Vitals [02/09/24 1111]   Temperature Pulse Respirations Blood Pressure SpO2   97.7 °F (36.5 °C) 65 18 136/63 100 %      Temp Source Heart Rate Source Patient Position - Orthostatic VS BP Location FiO2 (%)   Oral Monitor Sitting Left arm --      Pain Score       6             Orthostatic Vital Signs  Vitals:    02/09/24 1111 02/09/24 1318 02/09/24 1445   BP: 136/63 119/55 121/61   Pulse: 65 64 84   Patient Position - Orthostatic VS: Sitting Lying Sitting       Physical Exam  Vitals and nursing note reviewed.   Constitutional:       General: She is not in acute distress.     Appearance: Normal appearance. She is well-developed. She is obese. She is not ill-appearing, toxic-appearing or diaphoretic.   HENT:      Head: Normocephalic and atraumatic.      Right Ear: External ear normal.      Left Ear: External ear normal.      Nose: Nose normal. No congestion or rhinorrhea.      Mouth/Throat:      Mouth: Mucous membranes are moist.      Pharynx: Oropharynx is clear. Posterior oropharyngeal erythema present. No oropharyngeal exudate.   Eyes:      General: No scleral icterus.        Right eye: No discharge.         Left eye: No discharge.      Extraocular Movements: Extraocular movements intact.      Conjunctiva/sclera: Conjunctivae normal.      Pupils: Pupils are equal, round, and reactive to light.   Cardiovascular:      Rate and Rhythm: Normal rate and regular rhythm.      Pulses: Normal pulses.      Heart sounds: Normal heart sounds. No murmur heard.     No friction rub. No gallop.   Pulmonary:      Effort: Pulmonary effort is normal. No respiratory distress.      Breath sounds: Normal breath sounds. No stridor. No wheezing, rhonchi or rales.      Comments: Patient  with expiratory wheezing with coughing, however no wheezing heard on lung auscultation  Abdominal:      General: Abdomen is flat. Bowel sounds are normal. There is no distension.      Palpations: Abdomen is soft.      Tenderness: There is abdominal tenderness (Diffuse tenderness to palpation). There is no right CVA tenderness, left CVA tenderness or guarding.   Musculoskeletal:         General: No tenderness.      Cervical back: Neck supple.      Right lower leg: No edema.      Left lower leg: No edema.   Skin:     General: Skin is warm and dry.      Capillary Refill: Capillary refill takes less than 2 seconds.      Coloration: Skin is not jaundiced or pale.   Neurological:      General: No focal deficit present.      Mental Status: She is alert and oriented to person, place, and time.      Cranial Nerves: No cranial nerve deficit.      Sensory: No sensory deficit.      Motor: No weakness.      Coordination: Coordination normal.   Psychiatric:         Mood and Affect: Mood normal.         Behavior: Behavior normal.         ED Medications  Medications   ketorolac (TORADOL) injection 30 mg (30 mg Intravenous Given 2/9/24 1247)   metoclopramide (REGLAN) injection 10 mg (10 mg Intravenous Given 2/9/24 1249)   magnesium sulfate 2 g/50 mL IVPB (premix) 2 g (0 g Intravenous Stopped 2/9/24 1354)   sodium chloride 0.9 % bolus 1,000 mL (0 mL Intravenous Stopped 2/9/24 1333)   dextromethorphan-guaiFENesin (ROBITUSSIN DM) oral syrup 10 mL (10 mL Oral Given 2/9/24 1254)   albuterol inhalation solution 5 mg (5 mg Nebulization Given 2/9/24 1341)   ipratropium (ATROVENT) 0.02 % inhalation solution 0.5 mg (0.5 mg Nebulization Given 2/9/24 1341)   iohexol (OMNIPAQUE) 350 MG/ML injection (MULTI-DOSE) 85 mL (85 mL Intravenous Given 2/9/24 1337)   levofloxacin (LEVAQUIN) tablet 500 mg (500 mg Oral Given 2/9/24 1512)       Diagnostic Studies  Results Reviewed       Procedure Component Value Units Date/Time    HS Troponin 0hr (reflex  protocol) [[id number]]  (Normal) Collected: 02/09/24 1255    Lab Status: Final result Specimen: Blood from Arm, Right Updated: 02/09/24 1322     hs TnI 0hr 4 ng/L     Comprehensive metabolic panel [[id number]]  (Abnormal) Collected: 02/09/24 1255    Lab Status: Final result Specimen: Blood from Arm, Right Updated: 02/09/24 1316     Sodium 140 mmol/L      Potassium 4.0 mmol/L      Chloride 105 mmol/L      CO2 28 mmol/L      ANION GAP 7 mmol/L      BUN 10 mg/dL      Creatinine 0.72 mg/dL      Glucose 105 mg/dL      Calcium 9.2 mg/dL      AST 17 U/L      ALT 22 U/L      Alkaline Phosphatase 117 U/L      Total Protein 6.7 g/dL      Albumin 4.0 g/dL      Total Bilirubin 0.57 mg/dL      eGFR 90 ml/min/1.73sq m     Narrative:      National Kid[employer] Foundation guidelines for Chronic Kidney Disease (CKD):     Stage 1 with normal or high GFR (GFR > 90 mL/min/1.73 square meters)    Stage 2 Mild CKD (GFR = 60-89 mL/min/1.73 square meters)    Stage 3A Moderate CKD (GFR = 45-59 mL/min/1.73 square meters)    Stage 3B Moderate CKD (GFR = 30-44 mL/min/1.73 square meters)    Stage 4 Severe CKD (GFR = 15-29 mL/min/1.73 square meters)    Stage 5 End Stage CKD (GFR <15 mL/min/1.73 square meters)  Note: GFR calculation is accurate only with a steady state creatinine    Lipase [[id number]]  (Abnormal) Collected: 02/09/24 1255    Lab Status: Final result Specimen: Blood from Arm, Right Updated: 02/09/24 1316     Lipase <6 u/L     CBC and differential [[id number]]  (Abnormal) Collected: 02/09/24 1255    Lab Status: Final result Specimen: Blood from Arm, Right Updated: 02/09/24 1301     WBC 6.77 Thousand/uL      RBC 5.78 Million/uL      Hemoglobin 11.7 g/dL      Hematocrit 39.2 %      MCV 68 fL      MCH 20.2 pg      MCHC 29.8 g/dL      RDW 15.7 %      Platelets 277 Thousands/uL      nRBC 0 /100 WBCs      Neutrophils Relative 67 %      Immat GRANS % 0 %      Lymphocytes Relative 22 %      Monocytes Relative 8 %      Eosinophils Relative 3 %       Basophils Relative 0 %      Neutrophils Absolute 4.50 Thousands/µL      Immature Grans Absolute 0.02 Thousand/uL      Lymphocytes Absolute 1.49 Thousands/µL      Monocytes Absolute 0.52 Thousand/µL      Eosinophils Absolute 0.22 Thousand/µL      Basophils Absolute 0.02 Thousands/µL                    CT chest with contrast   Final Result by Asif Gallo MD (02/09 1417)      New since prior study, bilateral predominantly upper lobe patchy groundglass opacities, most concerning for pneumonia.               Workstation performed: [id number]         CT head wo contrast   Final Result by Asif Gallo MD (02/09 1410)   1. 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  No acute intracranial abnormality.   2. Diffuse paranasal sinus inflammatory changes as above, most concerning for pansinusitis.                  Workstation performed: [id number]               Procedures  ECG 12 Lead Documentation Only    Date/Time: 2/9/2024 3:43 PM    Performed by: Chriss Peacock MD  Authorized by: Chriss Peacock MD    Patient location:  ED  Interpretation:     Interpretation: normal    Rate:     ECG rate:  60    ECG rate assessment: normal    Rhythm:     Rhythm: sinus rhythm    Ectopy:     Ectopy: none    QRS:     QRS axis:  Normal    QRS intervals:  Normal  Conduction:     Conduction: abnormal      Abnormal conduction: incomplete RBBB    ST segments:     ST segments:  Normal  T waves:     T waves: normal          ED Course                                       Medical Decision Making  Patient is a 63-year-old female with history of lymphoma, stomach cancer, diabetes, and lupus presents for cough.    DDx includes not limited to pneumonia, cancer metastasis, pneumothorax, pneumonitis, bronchitis, metastatic brain disease, intracranial abnormality.  Will obtain CBC, CMP, lipase, troponin, EKG, CT chest and CT head.  Patient in no acute distress at this  time.  Will treat patient with migraine cocktail, Robitussin, DuoNeb.    Patient's labs are overall unremarkable.  Her CT chest shows bilateral predominant upper lobe patchy groundglass opacities most concerning for pneumonia.  Her CT head shows a stable old left hemispheric lentiform nuclei lacunar infarct as well as diffuse paranasal sinus telemetry changes.  Per note from patient's hematology oncology provider, patient has been sent Levaquin in case patient was discharged for outpatient pneumonia treatment.  Advised patient to use these medications.  First dose given here.  Patient stable for discharge.  Return precautions given, all question answered.    Amount and/or Complexity of Data Reviewed  Labs: ordered.  Radiology: ordered.    Risk  OTC drugs.  Prescription drug management.          Disposition  Final diagnoses:   Pneumonia     Time reflects when diagnosis was documented in both MDM as applicable and the Disposition within this note       Time User Action Codes Description Comment    2/9/2024  2:30 PM Chriss Peacock Add [J18.9] Pneumonia           ED Disposition       ED Disposition   Discharge    Condition   Stable    Date/Time   Fri Feb 9, 2024  2:30 PM    Comment   Helen Moreno discharge to home/self care.                   Follow-up Information       Follow up With Specialties Details Why Contact Info Additional Information    [facility] Emergency Medicine Go to  If symptoms worsen or if you have any other specific concerns [address] [place] [id number] [phone] [facility] Emergency Department, [address] [place] [address], [id number]    [facility] Schedule an appointment as soon as possible for a visit  If you do not have a family doctor. [address] [place] [address], [address]  [facility] [place] [address] [phone] [facility]  [facility], [address] [place] [address], [place] [address], [place], [place], [address] [phone]            Discharge Medication List as of 2/9/2024  2:32 PM        CONTINUE these medications which have NOT CHANGED    Details   !! acetaminophen (TYLENOL) 325 mg tablet Take 2 tablets (650 mg total) by mouth every 6 (six) hours as needed for mild pain, Starting Tue 12/26/2023, Normal      !! acetaminophen (TYLENOL) 325 mg tablet Take 3 tablets (975 mg total) by mouth every 6 (six) hours as needed for mild pain or fever, Starting Wed 1/24/2024, Normal      acyclovir (ZOVIRAX) 400 MG tablet Take 1 tablet (400 mg total) by mouth 2 (two) times a day, Starting Mon 3/13/2023, Until Wed 11/22/2023, Normal      allopurinol (ZYLOPRIM) 100 mg tablet Take 1 tablet (100 mg total) by mouth daily, Starting Mon 3/13/2023, Normal      amLODIPine (NORVASC) 10 mg tablet Take 10 mg by mouth daily, Starting Thu 6/2/2022, Historical Med      ascorbic acid (VITAMIN C) 1000 MG tablet Take 1 tablet (1,000 mg total) by mouth every 12 (twelve) hours for 10 doses, Starting Tue 1/26/2021, Until Wed 11/22/2023, Normal      aspirin (ECOTRIN LOW STRENGTH) 81 mg EC tablet Take 1 tablet (81 mg total) by mouth daily, Starting Wed 3/18/2020, Normal      aspirin-acetaminophen-caffeine (EXCEDRIN MIGRAINE) 250-250-65 MG per tablet Take 1 tablet by mouth every 6 (six) hours as needed for headaches Erica 1 tableta cada 6 horas fernandez sea necesario para el dolor de russ, Starting Thu 11/19/2020, Normal      atorvastatin (LIPITOR) 40 mg tablet Take 1 tablet (40 mg total) by mouth daily with dinner, Starting Tue 1/26/2021, Normal      Banophen 25 MG capsule TAKE 1 CAPSULE(25 MG TOTAL) BY MOUTH EVERY 6 (SIX) HOURS AS NEEDED FOR ITCHING, Historical Med      benzocaine (BABY ORAJEL) 7.5 % oral gel Apply to the mouth or throat 3 (three) times a day as needed for mucositis, Starting Sat 4/1/2023, Normal      benzocaine (ORAJEL) 10 % mucosal gel Apply 1 application to the  mouth or throat as needed for mucositis, Starting Wed 8/17/2022, Normal      benzonatate (TESSALON) 200 MG capsule Take 1 capsule (200 mg total) by mouth 3 (three) times a day as needed for cough, Starting Fri 10/20/2023, Normal      Blood Glucose Monitoring Suppl (OneTouch Verio) w/Device KIT Use in the morning, Starting Fri 4/8/2022, Normal      Blood Pressure KIT Use in the morning, Starting Thu 12/7/2023, Normal      capsicum (ZOSTRIX) 0.075 % topical cream Apply topically 3 (three) times a day Apply to affected area, Starting Sat 2/25/2023, Print      chlorthalidone 25 mg tablet Take 1 tablet (25 mg total) by mouth daily, Starting Tue 5/31/2022, Normal      cholecalciferol (VITAMIN D3) 1,000 units tablet Take 2 tablets (2,000 Units total) by mouth daily for 5 days, Starting Wed 1/27/2021, Until Wed 11/22/2023, Normal      cyanocobalamin 1000 MCG tablet Take 1 tablet (1,000 mcg total) by mouth daily, Starting Wed 4/17/2019, Normal      dextromethorphan-guaiFENesin (ROBITUSSIN DM)  mg/5 mL syrup Take 5 mL by mouth 3 (three) times a day as needed for cough, Starting Fri 10/20/2023, Normal      diphenhydrAMINE (BENADRYL) 25 mg tablet Take 1 tablet (25 mg total) by mouth every 6 (six) hours as needed for itching, Starting Wed 2/15/2023, Normal      DULoxetine (CYMBALTA) 20 mg capsule Take 2 capsules (40 mg total) by mouth daily, Starting Tue 4/5/2022, Normal      FLUoxetine (PROzac) 10 mg capsule TAKE 1 ORAL CAPSULE ONCE A DAY, Historical Med      folic acid (FOLVITE) 1 mg tablet Take 1 tablet (1 mg total) by mouth daily, Starting Wed 6/23/2021, Normal      glucose blood (OneTouch Verio) test strip Use 1 each in the morning Use as instructed, Starting Fri 4/8/2022, Normal      hydrOXYzine HCL (ATARAX) 10 mg tablet TAKE 1 ORAL TABLET 2 TIMES A DAY PRN FOR ANXIETY, Historical Med      ibuprofen (MOTRIN) 600 mg tablet Take 1 tablet (600 mg total) by mouth every 6 (six) hours as needed for mild pain or fever,  Starting Wed 1/17/2024, Normal      lenalidomide (Revlimid) 10 MG CAPS TAKE 1 CAPSULE BY MOUTH DAILY 3 WEEKS ON FOLLOWED BY 1 WEEK OFF, Normal      levofloxacin (LEVAQUIN) 500 mg tablet Take 1 tablet (500 mg total) by mouth every 24 hours for 10 days, Starting Fri 2/9/2024, Until Mon 2/19/2024, Normal      lidocaine (LMX) 4 % cream Apply topically as needed for mild pain To neck, Starting Tue 9/15/2020, Normal      metFORMIN (GLUCOPHAGE) 500 mg tablet Take 2 tablets (1,000 mg total) by mouth 2 (two) times a day with meals, Starting Tue 4/5/2022, Normal      omeprazole (PriLOSEC) 20 mg delayed release capsule Take 2 capsules (40 mg total) by mouth daily To prevent stomach upset, Starting Wed 3/25/2020, Normal      !! ondansetron (ZOFRAN) 4 mg tablet Take 1 tablet (4 mg total) by mouth every 8 (eight) hours as needed for nausea or vomiting, Starting Tue 9/15/2020, Normal      !! ondansetron (ZOFRAN) 4 mg tablet Take 1 tablet (4 mg total) by mouth every 6 (six) hours, Starting Wed 1/17/2024, Normal      OneTouch Delica Lancets 33G MISC Use in the morning, Starting Fri 4/8/2022, Normal      oxyCODONE (ROXICODONE) 10 MG TABS Take 1 tablet (10 mg total) by mouth every 8 (eight) hours as needed for severe pain Erica 1 tableta cada 8 horas fernandez sea necesario por dolor intenso. Dosis maxima 3 Max Daily Amount: 30 mg, Starting Wed 1/24/2024, Normal      !! polyethylene glycol (GLYCOLAX) 17 GM/SCOOP TAKE 17 G BY MOUTH DAILY MIX WITH WATER, Historical Med      !! polyethylene glycol (GLYCOLAX) 17 GM/SCOOP TAKE 17 G BY MOUTH DAILY MIX WITH WATER, Normal      potassium chloride (K-DUR,KLOR-CON) 20 mEq tablet TAKE 1 TABLET (20 MEQ TOTAL) BY MOUTH DAILY, Starting Tue 6/6/2023, Normal      pseudoephedrine-dextromethorphan-guaifenesin (ROBITUSSIN-PE) 30- MG/5ML solution Take 10 mL by mouth 4 (four) times a day as needed for allergies, Starting Thu 12/7/2023, Normal      senna (SENOKOT) 8.6 mg Take 1 tablet (8.6 mg total) by  mouth 2 (two) times a day, Starting Tue 12/17/2019, Normal      traZODone (DESYREL) 50 mg tablet TAKE 1 ORAL TABLET AT BEDTIME PRN FOR SLEEP, Historical Med      triamcinolone (KENALOG) 0.1 % ointment Apply topically 2 (two) times a day, Starting u 12/7/2023, Normal       !! - Potential duplicate medications found. Please discuss with provider.        No discharge procedures on file.    PDMP Review         Value Time User    PDMP Reviewed  Yes 1/24/2024 12:33 PM Jennifer Garay MD             ED Provider  Attending physically available and evaluated Helen Moreno. I managed the patient along with the ED Attending.    Electronically Signed by           Chriss Peacock MD  02/09/24 [id number]

## 2024-02-11 DIAGNOSIS — C82.18 GRADE 2 FOLLICULAR LYMPHOMA OF LYMPH NODES OF MULTIPLE REGIONS (HCC): ICD-10-CM

## 2024-02-12 RX ORDER — LENALIDOMIDE 10 MG/1
CAPSULE ORAL
Qty: 21 CAPSULE | Refills: 0 | Status: SHIPPED | OUTPATIENT
Start: 2024-02-12

## 2024-02-19 DIAGNOSIS — C82.18 GRADE 2 FOLLICULAR LYMPHOMA OF LYMPH NODES OF MULTIPLE REGIONS (HCC): ICD-10-CM

## 2024-02-20 RX ORDER — ALLOPURINOL 100 MG/1
100 TABLET ORAL DAILY
Qty: 30 TABLET | Refills: 11 | Status: SHIPPED | OUTPATIENT
Start: 2024-02-20

## 2024-02-20 RX ORDER — ACYCLOVIR 400 MG/1
400 TABLET ORAL 2 TIMES DAILY
Qty: 60 TABLET | Refills: 11 | Status: SHIPPED | OUTPATIENT
Start: 2024-02-20 | End: 2025-02-14

## 2024-02-21 DIAGNOSIS — C82.18 GRADE 2 FOLLICULAR LYMPHOMA OF LYMPH NODES OF MULTIPLE REGIONS (HCC): Primary | ICD-10-CM

## 2024-02-21 PROBLEM — R05.9 COUGH: Status: RESOLVED | Noted: 2020-02-19 | Resolved: 2024-02-21

## 2024-02-21 RX ORDER — SODIUM CHLORIDE 9 MG/ML
20 INJECTION, SOLUTION INTRAVENOUS ONCE
Status: CANCELLED | OUTPATIENT
Start: 2024-02-23

## 2024-02-21 RX ORDER — ACETAMINOPHEN 325 MG/1
650 TABLET ORAL ONCE
Status: CANCELLED | OUTPATIENT
Start: 2024-02-23

## 2024-02-22 ENCOUNTER — HOSPITAL ENCOUNTER (OUTPATIENT)
Dept: INFUSION CENTER | Facility: HOSPITAL | Age: 64
End: 2024-02-22
Payer: COMMERCIAL

## 2024-02-22 DIAGNOSIS — C82.18 GRADE 2 FOLLICULAR LYMPHOMA OF LYMPH NODES OF MULTIPLE REGIONS (HCC): Primary | ICD-10-CM

## 2024-02-22 DIAGNOSIS — Z45.2 ENCOUNTER FOR CENTRAL LINE CARE: ICD-10-CM

## 2024-02-22 LAB
ALBUMIN SERPL BCP-MCNC: 4.1 G/DL (ref 3.5–5)
ALP SERPL-CCNC: 127 U/L (ref 34–104)
ALT SERPL W P-5'-P-CCNC: 19 U/L (ref 7–52)
ANION GAP SERPL CALCULATED.3IONS-SCNC: 10 MMOL/L
AST SERPL W P-5'-P-CCNC: 15 U/L (ref 13–39)
BASOPHILS # BLD AUTO: 0.03 THOUSANDS/ÂΜL (ref 0–0.1)
BASOPHILS NFR BLD AUTO: 1 % (ref 0–1)
BILIRUB SERPL-MCNC: 0.61 MG/DL (ref 0.2–1)
BUN SERPL-MCNC: 10 MG/DL (ref 5–25)
CALCIUM SERPL-MCNC: 9 MG/DL (ref 8.4–10.2)
CHLORIDE SERPL-SCNC: 104 MMOL/L (ref 96–108)
CO2 SERPL-SCNC: 25 MMOL/L (ref 21–32)
CREAT SERPL-MCNC: 0.68 MG/DL (ref 0.6–1.3)
EOSINOPHIL # BLD AUTO: 0.14 THOUSAND/ÂΜL (ref 0–0.61)
EOSINOPHIL NFR BLD AUTO: 2 % (ref 0–6)
ERYTHROCYTE [DISTWIDTH] IN BLOOD BY AUTOMATED COUNT: 15.9 % (ref 11.6–15.1)
GFR SERPL CREATININE-BSD FRML MDRD: 94 ML/MIN/1.73SQ M
GLUCOSE SERPL-MCNC: 113 MG/DL (ref 65–140)
HCT VFR BLD AUTO: 38.6 % (ref 34.8–46.1)
HGB BLD-MCNC: 11.7 G/DL (ref 11.5–15.4)
IMM GRANULOCYTES # BLD AUTO: 0.03 THOUSAND/UL (ref 0–0.2)
IMM GRANULOCYTES NFR BLD AUTO: 1 % (ref 0–2)
LYMPHOCYTES # BLD AUTO: 1.19 THOUSANDS/ÂΜL (ref 0.6–4.47)
LYMPHOCYTES NFR BLD AUTO: 19 % (ref 14–44)
MCH RBC QN AUTO: 20.4 PG (ref 26.8–34.3)
MCHC RBC AUTO-ENTMCNC: 30.3 G/DL (ref 31.4–37.4)
MCV RBC AUTO: 67 FL (ref 82–98)
MONOCYTES # BLD AUTO: 0.48 THOUSAND/ÂΜL (ref 0.17–1.22)
MONOCYTES NFR BLD AUTO: 8 % (ref 4–12)
NEUTROPHILS # BLD AUTO: 4.45 THOUSANDS/ÂΜL (ref 1.85–7.62)
NEUTS SEG NFR BLD AUTO: 69 % (ref 43–75)
NRBC BLD AUTO-RTO: 0 /100 WBCS
PLATELET # BLD AUTO: 251 THOUSANDS/UL (ref 149–390)
POTASSIUM SERPL-SCNC: 3.4 MMOL/L (ref 3.5–5.3)
PROT SERPL-MCNC: 6.5 G/DL (ref 6.4–8.4)
RBC # BLD AUTO: 5.74 MILLION/UL (ref 3.81–5.12)
SODIUM SERPL-SCNC: 139 MMOL/L (ref 135–147)
WBC # BLD AUTO: 6.32 THOUSAND/UL (ref 4.31–10.16)

## 2024-02-22 PROCEDURE — 85025 COMPLETE CBC W/AUTO DIFF WBC: CPT | Performed by: INTERNAL MEDICINE

## 2024-02-22 PROCEDURE — 80053 COMPREHEN METABOLIC PANEL: CPT | Performed by: INTERNAL MEDICINE

## 2024-02-22 RX ORDER — WATER 10 ML/10ML
INJECTION INTRAMUSCULAR; INTRAVENOUS; SUBCUTANEOUS
Status: COMPLETED
Start: 2024-02-22 | End: 2024-02-22

## 2024-02-22 RX ADMIN — ALTEPLASE 2 MG: 2.2 INJECTION, POWDER, LYOPHILIZED, FOR SOLUTION INTRAVENOUS at 14:31

## 2024-02-22 RX ADMIN — WATER 10 ML: 1 INJECTION, SOLUTION INTRAMUSCULAR; INTRAVENOUS; SUBCUTANEOUS at 14:32

## 2024-02-22 NOTE — PROGRESS NOTES
Port flushed per protocol, central labs drawn per orders for chemo tomorrow, cath gaston needed today, took 30 minutes to get blood return, confirmed appt tomorrow for chemo 0800, STAR notified.

## 2024-02-23 ENCOUNTER — HOSPITAL ENCOUNTER (OUTPATIENT)
Dept: INFUSION CENTER | Facility: HOSPITAL | Age: 64
End: 2024-02-23
Attending: INTERNAL MEDICINE
Payer: COMMERCIAL

## 2024-02-23 VITALS
SYSTOLIC BLOOD PRESSURE: 138 MMHG | OXYGEN SATURATION: 97 % | RESPIRATION RATE: 18 BRPM | DIASTOLIC BLOOD PRESSURE: 86 MMHG | TEMPERATURE: 98 F | HEART RATE: 70 BPM

## 2024-02-23 DIAGNOSIS — C82.18 GRADE 2 FOLLICULAR LYMPHOMA OF LYMPH NODES OF MULTIPLE REGIONS (HCC): ICD-10-CM

## 2024-02-23 DIAGNOSIS — D51.8 OTHER VITAMIN B12 DEFICIENCY ANEMIAS: Primary | ICD-10-CM

## 2024-02-23 RX ORDER — CYANOCOBALAMIN 1000 UG/ML
1000 INJECTION, SOLUTION INTRAMUSCULAR; SUBCUTANEOUS ONCE
OUTPATIENT
Start: 2024-03-22

## 2024-02-23 RX ORDER — SODIUM CHLORIDE 9 MG/ML
20 INJECTION, SOLUTION INTRAVENOUS ONCE
Status: COMPLETED | OUTPATIENT
Start: 2024-02-23 | End: 2024-02-23

## 2024-02-23 RX ORDER — ACETAMINOPHEN 325 MG/1
650 TABLET ORAL ONCE
Status: COMPLETED | OUTPATIENT
Start: 2024-02-23 | End: 2024-02-23

## 2024-02-23 RX ORDER — CYANOCOBALAMIN 1000 UG/ML
1000 INJECTION, SOLUTION INTRAMUSCULAR; SUBCUTANEOUS ONCE
Status: COMPLETED | OUTPATIENT
Start: 2024-02-23 | End: 2024-02-23

## 2024-02-23 RX ADMIN — DIPHENHYDRAMINE HYDROCHLORIDE 25 MG: 50 INJECTION, SOLUTION INTRAMUSCULAR; INTRAVENOUS at 08:23

## 2024-02-23 RX ADMIN — DEXAMETHASONE SODIUM PHOSPHATE 20 MG: 10 INJECTION, SOLUTION INTRAMUSCULAR; INTRAVENOUS at 08:45

## 2024-02-23 RX ADMIN — ACETAMINOPHEN 650 MG: 325 TABLET ORAL at 08:32

## 2024-02-23 RX ADMIN — OBINUTUZUMAB 1000 MG: 1000 INJECTION, SOLUTION, CONCENTRATE INTRAVENOUS at 10:31

## 2024-02-23 RX ADMIN — SODIUM CHLORIDE 20 ML/HR: 9 INJECTION, SOLUTION INTRAVENOUS at 08:23

## 2024-02-23 RX ADMIN — CYANOCOBALAMIN 1000 MCG: 1000 INJECTION INTRAMUSCULAR; SUBCUTANEOUS at 08:34

## 2024-02-23 NOTE — PROGRESS NOTES
Patient tolerated R arm B12 injection and IV chemotherapy without issue.     Next appointment MARCH 21 @ 1400,  INFUSION confirmed.     AVS provided.

## 2024-02-23 NOTE — PLAN OF CARE
Problem: Potential for Falls  Goal: Patient will remain free of falls  Description: INTERVENTIONS:  - Educate patient/family on patient safety including physical limitations  - Apply yellow socks and bracelet for high fall risk patients  - Consider moving patient to room near nurses station  Outcome: Progressing

## 2024-02-26 DIAGNOSIS — C82.18 GRADE 2 FOLLICULAR LYMPHOMA OF LYMPH NODES OF MULTIPLE REGIONS (HCC): ICD-10-CM

## 2024-02-26 RX ORDER — OXYCODONE HYDROCHLORIDE 10 MG/1
10 TABLET ORAL EVERY 8 HOURS PRN
Qty: 90 TABLET | Refills: 0 | Status: SHIPPED | OUTPATIENT
Start: 2024-02-26

## 2024-02-26 NOTE — TELEPHONE ENCOUNTER
Last appointment: 11/02/2023    Next scheduled appointment: 03/01/2024    Pharmacy: [facility]      PDMP review:

## 2024-03-01 ENCOUNTER — OFFICE VISIT (OUTPATIENT)
Dept: PALLIATIVE MEDICINE | Facility: CLINIC | Age: 64
End: 2024-03-01
Payer: COMMERCIAL

## 2024-03-01 VITALS
HEART RATE: 69 BPM | WEIGHT: 241.4 LBS | BODY MASS INDEX: 40.17 KG/M2 | DIASTOLIC BLOOD PRESSURE: 70 MMHG | TEMPERATURE: 97.5 F | OXYGEN SATURATION: 97 % | SYSTOLIC BLOOD PRESSURE: 110 MMHG | RESPIRATION RATE: 15 BRPM

## 2024-03-01 DIAGNOSIS — G89.3 CANCER RELATED PAIN: Primary | ICD-10-CM

## 2024-03-01 DIAGNOSIS — C82.18 GRADE 2 FOLLICULAR LYMPHOMA OF LYMPH NODES OF MULTIPLE REGIONS (HCC): ICD-10-CM

## 2024-03-01 PROCEDURE — 99214 OFFICE O/P EST MOD 30 MIN: CPT | Performed by: INTERNAL MEDICINE

## 2024-03-01 NOTE — PROGRESS NOTES
Palliative and Supportive Care   Helen Moreno 63 y.o. female [id number]    Assessment/Plan:  1. Cancer related pain    2. 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     1. Cancer related pain  Assessment & Plan:  Continue OxyIR 10mg PO 3 times a day/q8H prn. Max of 3 a day.   No further increase unless with evidence of cancer progression  Daily miralax OD for constipation - OD dosing is enough to get her bowels moving regularly    Controlled Sub[place] Review    [place] or [place]  reviewed: No red flags were identified; safe to proceed with prescription..    Filled  Written  ID  Drug  QTY  Days  Prescriber  RX #  Dispenser  Refill  Daily Dose*  Pymt Type      02/26/2024 02/26/2024 2 Oxycodone Hcl (Ir) 10 Mg Tab 90.00 30 Viki Int [id number] All ([id number]) 0 45.00 [place]ME [place]   01/25/2024 01/24/2024 2 Oxycodone Hcl (Ir) 10 Mg Tab 90.00 30 Ti Joselito [id number] All (3848) 0 45.00 [place]   12/26/2023 12/26/2023 2 Oxycodone Hcl (Ir) 10 Mg Tab 90.00 30 Mi Pip [id number] All ([id number]) 0 45.00 [place]   11/29/2023 11/27/2023 2 Oxycodone Hcl (Ir) 10 Mg Tab 90.00 30 Ti Joselito [id number] All ([id number]) 0 45.00 [place]   11/02/2023 11/02/2023 2 Oxycodone Hcl (Ir) 10 Mg Tab 90.00 30 Ti Joselito [id number] All (3848) 0 45.00 [place]   09/28/2023 09/28/2023 1 Oxycodone Hcl (Ir) 10 Mg Tab 90.00 30 Ti Joselito [id number] All ([id number]) 0 45.00 [place]   08/31/2023 08/29/2023 1 Oxycodone Hcl (Ir) 10 Mg Tab 90.00 25 Ti Joselito [id number] All ([id number]) 0 54.00 MME [place]         2. 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  Has upcoming PET to gauge response        Follow up  RTO in 6 months, call for refills    Requested Prescriptions      No prescriptions requested or ordered in this encounter     There are no discontinued  medications.    Representatives have queried the patient's controlled substance dispensing history in the Prescription Drug Monitoring Program in compliance with regulations before I have prescribed any controlled substances.  The prescription history is consistent with prescribed therapy and our practice policies.      30 minutes were spent face to face with Helen Moreno with greater than 50% of the time spent in counseling or coordination of care including discussions of etiology of diagnosis, diagnostic results, impression, and recommendations, risks and benefits of treatment, instructions for disease self management, treatment instructions, follow up requirements, risk factors and risk reduction of disease, patient and family counseling/involvement in care and compliance with treatment regimen .  All of the patient's questions were answered during this discussion.    No follow-ups on file.    Subjective:   Chief Complaint  Follow up visit for:  symptom management, pain, neoplasm related, assessment of goals of care, disease process education and discussion of prognosis, advance care planning    HPI     Helen Moreno is a 63 y.o. female with grade 2 follicular lymphoma initially diagnosed in Nov 2018. Started on chemo (rituximab + bendamustine) soon after. Developed tumor lysis syndrome in Dec 2018, bendamustine removed. She completed rituxan on 4/2019 and had been on maintenance Rituxan since 5/2019. In March 2020, she had evidence of disease progression and was switched to rituxan+Revlimid. Per review of notes, there are some concerns with noncompliance and f/u with bloodwork.  Per Oncology note, long discussion with patient regarding refractory and progressive grade 2 follicular lymphoma.  Patient with c/o increase L neck swelling/tenderness, US done, concerning for progression of her disease. Her biopsy from the L iliac bone and L cervical LN came back positive for follicular  lymphoma. Plan was to start Obinutuzumab/CHOP with Udenyca but because of neutropenia, decision was to do copanlisib instead. However, there was a delay in her treatment when she had Covid-19 as well as when she went to [place] to relocate. She was going to stay there to get treatments, but it didn't work out so she came back. There are frequent interruptions in her care, with her frequent trips to [place] for personal/social reasons. Her PET-CT 6/4/2021 unfortunately showed significant progression of disease in the neck, chest, abdomen, pelvis, including new lesion in the L ribs. She is currently on copanlisib 60 mg 3 weeks on with 1 week of break since 6/29/2021. PET-CT in 5/2022 showed possible new progression in the mesenteric area with new LN behind the L ear on exam. For this reason, she was referred to Dr. Ta who believes she can be candidate for CAR-T therapy. However, she was not able to get this done due to a lot of issues including transportation and compliance. She is instead continued on copanlisib. Her latest PET-CT on 10/26/2022 disease recurrence/progression. She went to [place] from 11/2022 to 1/2023, where she reportedly was hospitalized with a PNA. She was sent to the ED for evaluation of possible infection in 1/30 where CT PE showed worsening lymphoma with new LNs in the chest abdomen, pelvis and enlarged spleen. She only resumed her chemo on 2/6/2023. She was switched to obinutuzumab + revlimid since 3/14/2023 and has been on this since. PET on 8/2023 showed partial response with new L inguinal nodes.      ID #[id number] utilized. She looks stable and healthy, comfortable. Pain in the abdomen and neck are well-controlled with oxycodone 3 times a day. No issues with OIC, takes miralax daily. Tolerating treatments well. Seems to be more compliant with cancer cares now. Recovering also from recent Covid PNA. Done with antibiotics.     The following portions of the medical  history were reviewed: past medical history, problem list, medication list, and social history.    Current Outpatient Medications:     acetaminophen (TYLENOL) 325 mg tablet, Take 2 tablets (650 mg total) by mouth every 6 (six) hours as needed for mild pain, Disp: 30 tablet, Rfl: 0    acetaminophen (TYLENOL) 325 mg tablet, Take 3 tablets (975 mg total) by mouth every 6 (six) hours as needed for mild pain or fever, Disp: 120 tablet, Rfl: 0    acyclovir (ZOVIRAX) 400 MG tablet, Take 1 tablet (400 mg total) by mouth 2 (two) times a day, Disp: 60 tablet, Rfl: 11    allopurinol (ZYLOPRIM) 100 mg tablet, TAKE 1 TABLET (100 MG TOTAL) BY MOUTH DAILY, Disp: 30 tablet, Rfl: 11    amLODIPine (NORVASC) 10 mg tablet, Take 10 mg by mouth daily, Disp: , Rfl:     aspirin (ECOTRIN LOW STRENGTH) 81 mg EC tablet, Take 1 tablet (81 mg total) by mouth daily, Disp: 30 tablet, Rfl: 11    aspirin-acetaminophen-caffeine (EXCEDRIN MIGRAINE) 250-250-65 MG per tablet, Take 1 tablet by mouth every 6 (six) hours as needed for headaches Erica 1 tableta cada 6 horas fernandez sea necesario para el dolor de russ, Disp: 30 tablet, Rfl: 0    atorvastatin (LIPITOR) 40 mg tablet, Take 1 tablet (40 mg total) by mouth daily with dinner, Disp: 12 tablet, Rfl: 0    Banophen 25 MG capsule, TAKE 1 CAPSULE(25 MG TOTAL) BY MOUTH EVERY 6 (SIX) HOURS AS NEEDED FOR ITCHING, Disp: , Rfl:     benzocaine (BABY ORAJEL) 7.5 % oral gel, Apply to the mouth or throat 3 (three) times a day as needed for mucositis, Disp: 9.45 g, Rfl: 0    benzocaine (ORAJEL) 10 % mucosal gel, Apply 1 application to the mouth or throat as needed for mucositis, Disp: 5.3 g, Rfl: 0    benzonatate (TESSALON) 200 MG capsule, Take 1 capsule (200 mg total) by mouth 3 (three) times a day as needed for cough, Disp: 20 capsule, Rfl: 0    Blood Glucose Monitoring Suppl (OneTouch Verio) w/Device KIT, Use in the morning, Disp: 1 kit, Rfl: 0    Blood Pressure KIT, Use in the morning, Disp: 1 kit, Rfl: 0     capsicum (ZOSTRIX) 0.075 % topical cream, Apply topically 3 (three) times a day Apply to affected area, Disp: 57 g, Rfl: 0    chlorthalidone 25 mg tablet, Take 1 tablet (25 mg total) by mouth daily, Disp: 30 tablet, Rfl: 5    cyanocobalamin 1000 MCG tablet, Take 1 tablet (1,000 mcg total) by mouth daily, Disp: 90 tablet, Rfl: 3    dextromethorphan-guaiFENesin (ROBITUSSIN DM)  mg/5 mL syrup, Take 5 mL by mouth 3 (three) times a day as needed for cough, Disp: 473 mL, Rfl: 0    diphenhydrAMINE (BENADRYL) 25 mg tablet, Take 1 tablet (25 mg total) by mouth every 6 (six) hours as needed for itching, Disp: 20 tablet, Rfl: 0    DULoxetine (CYMBALTA) 20 mg capsule, Take 2 capsules (40 mg total) by mouth daily, Disp: 30 capsule, Rfl: 3    Flowflex COVID-19 Ag Home Test KIT, USE AS DIRECTED COVID 19 AT HOME KIT, Disp: , Rfl:     FLUoxetine (PROzac) 10 mg capsule, TAKE 1 ORAL CAPSULE ONCE A DAY, Disp: , Rfl:     folic acid (FOLVITE) 1 mg tablet, Take 1 tablet (1 mg total) by mouth daily, Disp: 90 tablet, Rfl: 4    glucose blood (OneTouch Verio) test strip, Use 1 each in the morning Use as instructed, Disp: 50 strip, Rfl: 6    hydrOXYzine HCL (ATARAX) 10 mg tablet, TAKE 1 ORAL TABLET 2 TIMES A DAY PRN FOR ANXIETY, Disp: , Rfl:     ibuprofen (MOTRIN) 600 mg tablet, Take 1 tablet (600 mg total) by mouth every 6 (six) hours as needed for mild pain or fever, Disp: 30 tablet, Rfl: 0    lenalidomide (Revlimid) 10 MG CAPS, TAKE 1 CAPSULE BY MOUTH DAILY 3 WEEKS ON FOLLOWED BY 1 WEEK OFF, Disp: 21 capsule, Rfl: 0    lidocaine (LMX) 4 % cream, Apply topically as needed for mild pain To neck, Disp: 30 g, Rfl: 0    metFORMIN (GLUCOPHAGE) 500 mg tablet, Take 2 tablets (1,000 mg total) by mouth 2 (two) times a day with meals, Disp: 60 tablet, Rfl: 5    omeprazole (PriLOSEC) 20 mg delayed release capsule, Take 2 capsules (40 mg total) by mouth daily To prevent stomach upset, Disp: 60 capsule, Rfl: 5    ondansetron (ZOFRAN) 4 mg tablet,  Take 1 tablet (4 mg total) by mouth every 8 (eight) hours as needed for nausea or vomiting, Disp: 30 tablet, Rfl: 3    ondansetron (ZOFRAN) 4 mg tablet, Take 1 tablet (4 mg total) by mouth every 6 (six) hours, Disp: 12 tablet, Rfl: 0    OneTouch Delica Lancets 33G MISC, Use in the morning, Disp: 100 each, Rfl: 4    oxyCODONE (ROXICODONE) 10 MG TABS, Take 1 tablet (10 mg total) by mouth every 8 (eight) hours as needed for severe pain Erica 1 tableta cada 8 horas fernandez sea necesario por dolor intenso. Dosis maxima 3 Max Daily Amount: 30 mg, Disp: 90 tablet, Rfl: 0    polyethylene glycol (GLYCOLAX) 17 GM/SCOOP, TAKE 17 G BY MOUTH DAILY MIX WITH WATER, Disp: , Rfl:     polyethylene glycol (GLYCOLAX) 17 GM/SCOOP, TAKE 17 G BY MOUTH DAILY MIX WITH WATER, Disp: 510 g, Rfl: 3    potassium chloride (K-DUR,KLOR-CON) 20 mEq tablet, TAKE 1 TABLET (20 MEQ TOTAL) BY MOUTH DAILY, Disp: 30 tablet, Rfl: 11    pseudoephedrine-dextromethorphan-guaifenesin (ROBITUSSIN-PE) 30- MG/5ML solution, Take 10 mL by mouth 4 (four) times a day as needed for allergies, Disp: 118 mL, Rfl: 0    senna (SENOKOT) 8.6 mg, Take 1 tablet (8.6 mg total) by mouth 2 (two) times a day, Disp: 60 each, Rfl: 3    traZODone (DESYREL) 50 mg tablet, TAKE 1 ORAL TABLET AT BEDTIME PRN FOR SLEEP, Disp: , Rfl:     triamcinolone (KENALOG) 0.1 % ointment, Apply topically 2 (two) times a day, Disp: 15 g, Rfl: 0    ascorbic acid (VITAMIN C) 1000 MG tablet, Take 1 tablet (1,000 mg total) by mouth every 12 (twelve) hours for 10 doses, Disp: 10 tablet, Rfl: 0    cholecalciferol (VITAMIN D3) 1,000 units tablet, Take 2 tablets (2,000 Units total) by mouth daily for 5 days, Disp: 10 tablet, Rfl: 0    ibuprofen (MOTRIN) 400 mg tablet, Take 1 tablet (400 mg total) by mouth every 6 (six) hours as needed for mild pain (Body aches or fever) for up to 7 days, Disp: 30 tablet, Rfl: 0  Review of Systems   Constitutional:  Negative for activity change, appetite change and  unexpected weight change.   HENT:  Negative for trouble swallowing.         Chronic neck pain from enlarged LNs   Respiratory:  Negative for cough and shortness of breath.    Cardiovascular:  Negative for chest pain.   Gastrointestinal:  Negative for abdominal distention, constipation, diarrhea, nausea and vomiting.        Chronic abdominal pain   Musculoskeletal:  Negative for back pain.   Neurological:  Negative for speech difficulty and light-headedness.   Psychiatric/Behavioral:  Negative for sleep disturbance. The patient is not nervous/anxious.    All other systems reviewed and are negative.      All other systems negative    Objective:  Vital Signs  /70 (BP Location: Right arm, Patient Position: Sitting, Cuff Size: Standard)   Pulse 69   Temp 97.5 °F (36.4 °C) (Temporal)   Resp 15   Wt 109 kg (241 lb 6.4 oz)   SpO2 97%   BMI 40.17 kg/m²    Physical Exam    Constitutional: Appears well-developed and well-nourished, she does not look sick, nor toxic-looking. Looks stable and healthy. Pleasant, well-kempt.   Head: Normocephalic and atraumatic.   Eyes: EOM are normal. No ocular discharge. No scleral icterus.   Respiratory: Effort normal. No stridor. No respiratory distress. Occasional cough  Gastrointestinal: No abdominal distension.   Musculoskeletal: No edema.   Neurological: Alert, oriented and appropriately conversant.   Skin: No diaphoresis, no rashes seen on exposed areas of skin.   Psychiatric: Displays a normal mood and affect. Behavior, judgement and thought content appear normal. Appropriately tearful    Jennifre Garay MD  Palliative Medicine & Supportive Care  Internal Medicine  Available via [employer] Text  Office: [phone]  Fax: [phone]

## 2024-03-01 NOTE — ASSESSMENT & PLAN NOTE
Continue OxyIR 10mg PO 3 times a day/q8H prn. Max of 3 a day.   No further increase unless with evidence of cancer progression  Daily miralax OD for constipation - OD dosing is enough to get her bowels moving regularly    Controlled Sub[place] Review    [place]  reviewed: No red flags were identified; safe to proceed with prescription..    Filled  Written  ID  Drug  QTY  Days  Prescriber  RX #  Dispenser  Refill  Daily Dose*  Pymt Type      02/26/2024 02/26/2024 2 Oxycodone Hcl (Ir) 10 Mg Tab 90.00 30 Viki Int [id number] All (3848) 0 45.00 [place]   01/25/2024 01/24/2024 2 Oxycodone Hcl (Ir) 10 Mg Tab 90.00 30 Ti Joselito [id number] All (3848) 0 45.00 [place]   12/26/2023 12/26/2023 2 Oxycodone Hcl (Ir) 10 Mg Tab 90.00 30 Mi Pip [id number] All (3848) 0 45.00 [place]   11/29/2023 11/27/2023 2 Oxycodone Hcl (Ir) 10 Mg Tab 90.00 30 Ti Joselito [id number] All (3848) 0 45.00 [place]   11/02/2023 11/02/2023 2 Oxycodone Hcl (Ir) 10 Mg Tab 90.00 30 Ti Joselito [id number] All ([id number]) 0 45.00 [place]   09/28/2023 09/28/2023 1 Oxycodone Hcl (Ir) 10 Mg Tab 90.00 30 Ti Joselito [id number] All ([id number]) 0 45.00 [place]   08/31/2023 08/29/2023 1 Oxycodone Hcl (Ir) 10 Mg Tab 90.00 25 Ti Joselito [id number] All ([id number]) 0 54.00 MME [place]

## 2024-03-07 ENCOUNTER — TELEPHONE (OUTPATIENT)
Dept: HEMATOLOGY ONCOLOGY | Facility: CLINIC | Age: 64
End: 2024-03-07

## 2024-03-07 NOTE — TELEPHONE ENCOUNTER
Hiwot, [id number]    Spoke with patient regarding appointment reschedule due to providers schedule, patient verbalized understanding.

## 2024-03-15 DIAGNOSIS — C82.18 GRADE 2 FOLLICULAR LYMPHOMA OF LYMPH NODES OF MULTIPLE REGIONS (HCC): ICD-10-CM

## 2024-03-16 DIAGNOSIS — C82.18 GRADE 2 FOLLICULAR LYMPHOMA OF LYMPH NODES OF MULTIPLE REGIONS (HCC): Primary | ICD-10-CM

## 2024-03-16 RX ORDER — ACETAMINOPHEN 325 MG/1
650 TABLET ORAL ONCE
Status: CANCELLED | OUTPATIENT
Start: 2024-03-22

## 2024-03-16 RX ORDER — LENALIDOMIDE 10 MG/1
CAPSULE ORAL
Qty: 21 CAPSULE | Refills: 0 | Status: SHIPPED | OUTPATIENT
Start: 2024-03-16

## 2024-03-16 RX ORDER — SODIUM CHLORIDE 9 MG/ML
20 INJECTION, SOLUTION INTRAVENOUS ONCE
Status: CANCELLED | OUTPATIENT
Start: 2024-03-22

## 2024-03-21 ENCOUNTER — HOSPITAL ENCOUNTER (OUTPATIENT)
Dept: INFUSION CENTER | Facility: HOSPITAL | Age: 64
End: 2024-03-21
Attending: INTERNAL MEDICINE
Payer: COMMERCIAL

## 2024-03-21 DIAGNOSIS — Z45.2 ENCOUNTER FOR CENTRAL LINE CARE: Primary | ICD-10-CM

## 2024-03-21 DIAGNOSIS — C82.18 GRADE 2 FOLLICULAR LYMPHOMA OF LYMPH NODES OF MULTIPLE REGIONS (HCC): ICD-10-CM

## 2024-03-21 LAB
ALBUMIN SERPL BCP-MCNC: 4 G/DL (ref 3.5–5)
ALP SERPL-CCNC: 116 U/L (ref 34–104)
ALT SERPL W P-5'-P-CCNC: 20 U/L (ref 7–52)
ANION GAP SERPL CALCULATED.3IONS-SCNC: 8 MMOL/L (ref 4–13)
AST SERPL W P-5'-P-CCNC: 20 U/L (ref 13–39)
BASOPHILS # BLD AUTO: 0.03 THOUSANDS/ÂΜL (ref 0–0.1)
BASOPHILS NFR BLD AUTO: 0 % (ref 0–1)
BILIRUB SERPL-MCNC: 0.51 MG/DL (ref 0.2–1)
BUN SERPL-MCNC: 13 MG/DL (ref 5–25)
CALCIUM SERPL-MCNC: 9.2 MG/DL (ref 8.4–10.2)
CHLORIDE SERPL-SCNC: 102 MMOL/L (ref 96–108)
CO2 SERPL-SCNC: 27 MMOL/L (ref 21–32)
CREAT SERPL-MCNC: 0.84 MG/DL (ref 0.6–1.3)
EOSINOPHIL # BLD AUTO: 0.38 THOUSAND/ÂΜL (ref 0–0.61)
EOSINOPHIL NFR BLD AUTO: 6 % (ref 0–6)
ERYTHROCYTE [DISTWIDTH] IN BLOOD BY AUTOMATED COUNT: 17.4 % (ref 11.6–15.1)
GFR SERPL CREATININE-BSD FRML MDRD: 75 ML/MIN/1.73SQ M
GLUCOSE SERPL-MCNC: 99 MG/DL (ref 65–140)
HCT VFR BLD AUTO: 37.6 % (ref 34.8–46.1)
HGB BLD-MCNC: 11.3 G/DL (ref 11.5–15.4)
IMM GRANULOCYTES # BLD AUTO: 0.02 THOUSAND/UL (ref 0–0.2)
IMM GRANULOCYTES NFR BLD AUTO: 0 % (ref 0–2)
LYMPHOCYTES # BLD AUTO: 1.75 THOUSANDS/ÂΜL (ref 0.6–4.47)
LYMPHOCYTES NFR BLD AUTO: 25 % (ref 14–44)
MCH RBC QN AUTO: 19.7 PG (ref 26.8–34.3)
MCHC RBC AUTO-ENTMCNC: 30.1 G/DL (ref 31.4–37.4)
MCV RBC AUTO: 66 FL (ref 82–98)
MONOCYTES # BLD AUTO: 0.69 THOUSAND/ÂΜL (ref 0.17–1.22)
MONOCYTES NFR BLD AUTO: 10 % (ref 4–12)
NEUTROPHILS # BLD AUTO: 4.02 THOUSANDS/ÂΜL (ref 1.85–7.62)
NEUTS SEG NFR BLD AUTO: 59 % (ref 43–75)
NRBC BLD AUTO-RTO: 0 /100 WBCS
PLATELET # BLD AUTO: 311 THOUSANDS/UL (ref 149–390)
POTASSIUM SERPL-SCNC: 4.1 MMOL/L (ref 3.5–5.3)
PROT SERPL-MCNC: 6.6 G/DL (ref 6.4–8.4)
RBC # BLD AUTO: 5.73 MILLION/UL (ref 3.81–5.12)
SODIUM SERPL-SCNC: 137 MMOL/L (ref 135–147)
WBC # BLD AUTO: 6.89 THOUSAND/UL (ref 4.31–10.16)

## 2024-03-21 PROCEDURE — 85025 COMPLETE CBC W/AUTO DIFF WBC: CPT | Performed by: INTERNAL MEDICINE

## 2024-03-21 PROCEDURE — 80053 COMPREHEN METABOLIC PANEL: CPT | Performed by: INTERNAL MEDICINE

## 2024-03-21 NOTE — PROGRESS NOTES
Helen Moreno  tolerated labs well with no complications. Aware of future appt on 3/22/24 at 8:30. AVS declined. Patient left clinic ambulatory.

## 2024-03-22 ENCOUNTER — HOSPITAL ENCOUNTER (OUTPATIENT)
Dept: INFUSION CENTER | Facility: HOSPITAL | Age: 64
End: 2024-03-22
Attending: INTERNAL MEDICINE
Payer: COMMERCIAL

## 2024-03-22 VITALS
SYSTOLIC BLOOD PRESSURE: 152 MMHG | HEART RATE: 74 BPM | DIASTOLIC BLOOD PRESSURE: 86 MMHG | RESPIRATION RATE: 18 BRPM | TEMPERATURE: 96.9 F

## 2024-03-22 DIAGNOSIS — C82.18 GRADE 2 FOLLICULAR LYMPHOMA OF LYMPH NODES OF MULTIPLE REGIONS (HCC): ICD-10-CM

## 2024-03-22 DIAGNOSIS — D51.8 OTHER VITAMIN B12 DEFICIENCY ANEMIAS: Primary | ICD-10-CM

## 2024-03-22 PROCEDURE — 96367 TX/PROPH/DG ADDL SEQ IV INF: CPT

## 2024-03-22 PROCEDURE — 96372 THER/PROPH/DIAG INJ SC/IM: CPT

## 2024-03-22 PROCEDURE — 96415 CHEMO IV INFUSION ADDL HR: CPT

## 2024-03-22 PROCEDURE — 96413 CHEMO IV INFUSION 1 HR: CPT

## 2024-03-22 RX ORDER — CYANOCOBALAMIN 1000 UG/ML
1000 INJECTION, SOLUTION INTRAMUSCULAR; SUBCUTANEOUS ONCE
OUTPATIENT
Start: 2024-04-19

## 2024-03-22 RX ORDER — CYANOCOBALAMIN 1000 UG/ML
1000 INJECTION, SOLUTION INTRAMUSCULAR; SUBCUTANEOUS ONCE
Status: COMPLETED | OUTPATIENT
Start: 2024-03-22 | End: 2024-03-22

## 2024-03-22 RX ORDER — SODIUM CHLORIDE 9 MG/ML
20 INJECTION, SOLUTION INTRAVENOUS ONCE
Status: COMPLETED | OUTPATIENT
Start: 2024-03-22 | End: 2024-03-22

## 2024-03-22 RX ORDER — ACETAMINOPHEN 325 MG/1
650 TABLET ORAL ONCE
Status: COMPLETED | OUTPATIENT
Start: 2024-03-22 | End: 2024-03-22

## 2024-03-22 RX ADMIN — OBINUTUZUMAB 1000 MG: 1000 INJECTION, SOLUTION, CONCENTRATE INTRAVENOUS at 10:20

## 2024-03-22 RX ADMIN — DEXAMETHASONE SODIUM PHOSPHATE 20 MG: 10 INJECTION, SOLUTION INTRAMUSCULAR; INTRAVENOUS at 08:47

## 2024-03-22 RX ADMIN — DIPHENHYDRAMINE HYDROCHLORIDE 25 MG: 50 INJECTION, SOLUTION INTRAMUSCULAR; INTRAVENOUS at 09:10

## 2024-03-22 RX ADMIN — SODIUM CHLORIDE 20 ML/HR: 0.9 INJECTION, SOLUTION INTRAVENOUS at 08:47

## 2024-03-22 RX ADMIN — ACETAMINOPHEN 650 MG: 325 TABLET ORAL at 08:50

## 2024-03-22 RX ADMIN — CYANOCOBALAMIN 1000 MCG: 1000 INJECTION, SOLUTION INTRAMUSCULAR; SUBCUTANEOUS at 08:52

## 2024-03-22 NOTE — PROGRESS NOTES
Patient tolerated IV Gazyva infusion and L arm B12 injection without issue. Next appointment confirmed for April 18th at 2pm-SH infusion. AVS given.

## 2024-03-25 ENCOUNTER — OFFICE VISIT (OUTPATIENT)
Dept: HEMATOLOGY ONCOLOGY | Facility: CLINIC | Age: 64
End: 2024-03-25
Payer: COMMERCIAL

## 2024-03-25 VITALS
HEIGHT: 66 IN | WEIGHT: 234 LBS | SYSTOLIC BLOOD PRESSURE: 136 MMHG | RESPIRATION RATE: 18 BRPM | TEMPERATURE: 97.2 F | OXYGEN SATURATION: 94 % | HEART RATE: 77 BPM | DIASTOLIC BLOOD PRESSURE: 80 MMHG | BODY MASS INDEX: 37.61 KG/M2

## 2024-03-25 DIAGNOSIS — C82.18 GRADE 2 FOLLICULAR LYMPHOMA OF LYMPH NODES OF MULTIPLE REGIONS (HCC): ICD-10-CM

## 2024-03-25 DIAGNOSIS — D51.8 OTHER VITAMIN B12 DEFICIENCY ANEMIAS: ICD-10-CM

## 2024-03-25 DIAGNOSIS — R05.2 SUBACUTE COUGH: ICD-10-CM

## 2024-03-25 DIAGNOSIS — C82.18 GRADE 2 FOLLICULAR LYMPHOMA OF LYMPH NODES OF MULTIPLE REGIONS (HCC): Primary | ICD-10-CM

## 2024-03-25 PROCEDURE — 99214 OFFICE O/P EST MOD 30 MIN: CPT | Performed by: INTERNAL MEDICINE

## 2024-03-25 RX ORDER — OXYCODONE HYDROCHLORIDE 10 MG/1
10 TABLET ORAL EVERY 8 HOURS PRN
Qty: 90 TABLET | Refills: 0 | Status: SHIPPED | OUTPATIENT
Start: 2024-03-25

## 2024-03-25 RX ORDER — BENZONATATE 100 MG/1
100 CAPSULE ORAL 3 TIMES DAILY PRN
Qty: 30 CAPSULE | Refills: 2 | Status: SHIPPED | OUTPATIENT
Start: 2024-03-25

## 2024-03-25 NOTE — TELEPHONE ENCOUNTER
Last appointment:03/01/2024    Next scheduled appointment: 09/03/2024    [facility]

## 2024-03-25 NOTE — PROGRESS NOTES
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 Reflexes: Reflexes are normal and symmetric.   Psychiatric:         Behavior: Behavior normal.         Thought Content: Thought content normal.         Judgment: Judgment normal.           Labs:  Lab Results   Component Value Date    WBC 6.89 03/21/2024    HGB 11.3 (L) 03/21/2024    HCT 37.6 03/21/2024    MCV 66 (L) 03/21/2024     03/21/2024     Lab Results   Component Value Date    K 4.1 03/21/2024     03/21/2024    CO2 27 03/21/2024    BUN 13 03/21/2024    CREATININE 0.84 03/21/2024    GLUF 79 09/06/2023    CALCIUM 9.2 03/21/2024    CORRECTEDCA 8.7 01/25/2021    AST 20 03/21/2024    ALT 20 03/21/2024    ALKPHOS 116 (H) 03/21/2024    EGFR 75 03/21/2024     No results found for: \"TSH\"    Patient voiced understanding and agreement in the above discussion. Aware to contact our office with questions/symptoms in the interim.   "

## 2024-04-03 ENCOUNTER — HOSPITAL ENCOUNTER (OUTPATIENT)
Dept: NUCLEAR MEDICINE | Facility: HOSPITAL | Age: 64
Discharge: HOME | End: 2024-04-03
Attending: INTERNAL MEDICINE
Payer: COMMERCIAL

## 2024-04-03 DIAGNOSIS — C82.18 GRADE 2 FOLLICULAR LYMPHOMA OF LYMPH NODES OF MULTIPLE REGIONS (HCC): ICD-10-CM

## 2024-04-03 LAB — GLUCOSE SERPL-MCNC: 91 MG/DL (ref 65–140)

## 2024-04-03 PROCEDURE — G1004 CDSM NDSC: HCPCS

## 2024-04-03 PROCEDURE — A9552 F18 FDG: HCPCS

## 2024-04-03 PROCEDURE — 78815 PET IMAGE W/CT SKULL-THIGH: CPT

## 2024-04-03 PROCEDURE — 82948 REAGENT STRIP/BLOOD GLUCOSE: CPT

## 2024-04-09 ENCOUNTER — HOSPITAL ENCOUNTER (OUTPATIENT)
Facility: HOSPITAL | Age: 64
Setting detail: OBSERVATION
Discharge: HOME | End: 2024-04-10
Attending: EMERGENCY MEDICINE | Admitting: FAMILY MEDICINE
Payer: COMMERCIAL

## 2024-04-09 ENCOUNTER — APPOINTMENT (EMERGENCY)
Dept: CT IMAGING | Facility: HOSPITAL | Age: 64
End: 2024-04-09
Payer: COMMERCIAL

## 2024-04-09 DIAGNOSIS — J18.9 PNEUMONIA: Primary | ICD-10-CM

## 2024-04-09 LAB
ALBUMIN SERPL BCP-MCNC: 4 G/DL (ref 3.5–5)
ALP SERPL-CCNC: 110 U/L (ref 34–104)
ALT SERPL W P-5'-P-CCNC: 15 U/L (ref 7–52)
ANION GAP SERPL CALCULATED.3IONS-SCNC: 9 MMOL/L (ref 4–13)
AST SERPL W P-5'-P-CCNC: 18 U/L (ref 13–39)
BASOPHILS # BLD AUTO: 0.03 THOUSANDS/ÂΜL (ref 0–0.1)
BASOPHILS NFR BLD AUTO: 0 % (ref 0–1)
BILIRUB SERPL-MCNC: 0.64 MG/DL (ref 0.2–1)
BUN SERPL-MCNC: 15 MG/DL (ref 5–25)
CALCIUM SERPL-MCNC: 9.2 MG/DL (ref 8.4–10.2)
CHLORIDE SERPL-SCNC: 106 MMOL/L (ref 96–108)
CHOLEST SERPL-MCNC: 143 MG/DL
CO2 SERPL-SCNC: 25 MMOL/L (ref 21–32)
CREAT SERPL-MCNC: 0.75 MG/DL (ref 0.6–1.3)
EOSINOPHIL # BLD AUTO: 0.37 THOUSAND/ÂΜL (ref 0–0.61)
EOSINOPHIL NFR BLD AUTO: 4 % (ref 0–6)
ERYTHROCYTE [DISTWIDTH] IN BLOOD BY AUTOMATED COUNT: 19.5 % (ref 11.6–15.1)
GFR SERPL CREATININE-BSD FRML MDRD: 86 ML/MIN/1.73SQ M
GLUCOSE SERPL-MCNC: 100 MG/DL (ref 65–140)
GLUCOSE SERPL-MCNC: 106 MG/DL (ref 65–140)
GLUCOSE SERPL-MCNC: 130 MG/DL (ref 65–140)
GLUCOSE SERPL-MCNC: 95 MG/DL (ref 65–140)
HCT VFR BLD AUTO: 39.6 % (ref 34.8–46.1)
HDLC SERPL-MCNC: 47 MG/DL
HGB BLD-MCNC: 11.6 G/DL (ref 11.5–15.4)
IMM GRANULOCYTES # BLD AUTO: 0.03 THOUSAND/UL (ref 0–0.2)
IMM GRANULOCYTES NFR BLD AUTO: 0 % (ref 0–2)
LDLC SERPL CALC-MCNC: 83 MG/DL (ref 0–100)
LIPASE SERPL-CCNC: <6 U/L (ref 11–82)
LYMPHOCYTES # BLD AUTO: 1.24 THOUSANDS/ÂΜL (ref 0.6–4.47)
LYMPHOCYTES NFR BLD AUTO: 14 % (ref 14–44)
MAGNESIUM SERPL-MCNC: 2 MG/DL (ref 1.9–2.7)
MCH RBC QN AUTO: 19.4 PG (ref 26.8–34.3)
MCHC RBC AUTO-ENTMCNC: 29.3 G/DL (ref 31.4–37.4)
MCV RBC AUTO: 66 FL (ref 82–98)
MONOCYTES # BLD AUTO: 0.59 THOUSAND/ÂΜL (ref 0.17–1.22)
MONOCYTES NFR BLD AUTO: 6 % (ref 4–12)
NEUTROPHILS # BLD AUTO: 6.9 THOUSANDS/ÂΜL (ref 1.85–7.62)
NEUTS SEG NFR BLD AUTO: 76 % (ref 43–75)
NONHDLC SERPL-MCNC: 96 MG/DL
NRBC BLD AUTO-RTO: 0 /100 WBCS
PLATELET # BLD AUTO: 333 THOUSANDS/UL (ref 149–390)
POTASSIUM SERPL-SCNC: 4.1 MMOL/L (ref 3.5–5.3)
PROCALCITONIN SERPL-MCNC: <0.05 NG/ML
PROT SERPL-MCNC: 6.7 G/DL (ref 6.4–8.4)
RBC # BLD AUTO: 5.97 MILLION/UL (ref 3.81–5.12)
SODIUM SERPL-SCNC: 140 MMOL/L (ref 135–147)
TRIGL SERPL-MCNC: 63 MG/DL
WBC # BLD AUTO: 9.16 THOUSAND/UL (ref 4.31–10.16)

## 2024-04-09 PROCEDURE — 96375 TX/PRO/DX INJ NEW DRUG ADDON: CPT

## 2024-04-09 PROCEDURE — 84145 PROCALCITONIN (PCT): CPT | Performed by: FAMILY MEDICINE

## 2024-04-09 PROCEDURE — 74177 CT ABD & PELVIS W/CONTRAST: CPT

## 2024-04-09 PROCEDURE — 83690 ASSAY OF LIPASE: CPT | Performed by: EMERGENCY MEDICINE

## 2024-04-09 PROCEDURE — 96374 THER/PROPH/DIAG INJ IV PUSH: CPT

## 2024-04-09 PROCEDURE — 99284 EMERGENCY DEPT VISIT MOD MDM: CPT

## 2024-04-09 PROCEDURE — 85025 COMPLETE CBC W/AUTO DIFF WBC: CPT | Performed by: EMERGENCY MEDICINE

## 2024-04-09 PROCEDURE — 80061 LIPID PANEL: CPT

## 2024-04-09 PROCEDURE — 36415 COLL VENOUS BLD VENIPUNCTURE: CPT | Performed by: EMERGENCY MEDICINE

## 2024-04-09 PROCEDURE — 96361 HYDRATE IV INFUSION ADD-ON: CPT

## 2024-04-09 PROCEDURE — 80053 COMPREHEN METABOLIC PANEL: CPT | Performed by: EMERGENCY MEDICINE

## 2024-04-09 PROCEDURE — 87040 BLOOD CULTURE FOR BACTERIA: CPT | Performed by: EMERGENCY MEDICINE

## 2024-04-09 PROCEDURE — 82948 REAGENT STRIP/BLOOD GLUCOSE: CPT

## 2024-04-09 PROCEDURE — 83735 ASSAY OF MAGNESIUM: CPT | Performed by: EMERGENCY MEDICINE

## 2024-04-09 PROCEDURE — 99285 EMERGENCY DEPT VISIT HI MDM: CPT | Performed by: EMERGENCY MEDICINE

## 2024-04-09 RX ORDER — ONDANSETRON 2 MG/ML
4 INJECTION INTRAMUSCULAR; INTRAVENOUS ONCE
Status: COMPLETED | OUTPATIENT
Start: 2024-04-09 | End: 2024-04-09

## 2024-04-09 RX ORDER — CEFTRIAXONE 1 G/50ML
1000 INJECTION, SOLUTION INTRAVENOUS EVERY 24 HOURS
Status: DISCONTINUED | OUTPATIENT
Start: 2024-04-10 | End: 2024-04-09

## 2024-04-09 RX ORDER — AZITHROMYCIN 250 MG/1
500 TABLET, FILM COATED ORAL ONCE
Status: COMPLETED | OUTPATIENT
Start: 2024-04-09 | End: 2024-04-09

## 2024-04-09 RX ORDER — AMLODIPINE BESYLATE 10 MG/1
10 TABLET ORAL DAILY
Status: DISCONTINUED | OUTPATIENT
Start: 2024-04-09 | End: 2024-04-10 | Stop reason: HOSPADM

## 2024-04-09 RX ORDER — BENZONATATE 100 MG/1
100 CAPSULE ORAL 3 TIMES DAILY PRN
Status: DISCONTINUED | OUTPATIENT
Start: 2024-04-09 | End: 2024-04-10 | Stop reason: HOSPADM

## 2024-04-09 RX ORDER — AZITHROMYCIN 250 MG/1
500 TABLET, FILM COATED ORAL EVERY 24 HOURS
Status: DISCONTINUED | OUTPATIENT
Start: 2024-04-10 | End: 2024-04-09

## 2024-04-09 RX ORDER — ENOXAPARIN SODIUM 100 MG/ML
40 INJECTION SUBCUTANEOUS
Status: DISCONTINUED | OUTPATIENT
Start: 2024-04-09 | End: 2024-04-10 | Stop reason: HOSPADM

## 2024-04-09 RX ORDER — ATORVASTATIN CALCIUM 40 MG/1
40 TABLET, FILM COATED ORAL
Status: DISCONTINUED | OUTPATIENT
Start: 2024-04-09 | End: 2024-04-10 | Stop reason: HOSPADM

## 2024-04-09 RX ORDER — ACETAMINOPHEN 325 MG/1
650 TABLET ORAL EVERY 6 HOURS PRN
Status: DISCONTINUED | OUTPATIENT
Start: 2024-04-09 | End: 2024-04-10 | Stop reason: HOSPADM

## 2024-04-09 RX ORDER — INSULIN LISPRO 100 [IU]/ML
1-6 INJECTION, SOLUTION INTRAVENOUS; SUBCUTANEOUS
Status: DISCONTINUED | OUTPATIENT
Start: 2024-04-09 | End: 2024-04-10 | Stop reason: HOSPADM

## 2024-04-09 RX ORDER — POLYETHYLENE GLYCOL 3350 17 G/17G
17 POWDER, FOR SOLUTION ORAL DAILY PRN
Status: DISCONTINUED | OUTPATIENT
Start: 2024-04-09 | End: 2024-04-10 | Stop reason: HOSPADM

## 2024-04-09 RX ORDER — CEFTRIAXONE 1 G/50ML
1000 INJECTION, SOLUTION INTRAVENOUS ONCE
Status: COMPLETED | OUTPATIENT
Start: 2024-04-09 | End: 2024-04-09

## 2024-04-09 RX ORDER — LEVOFLOXACIN 5 MG/ML
750 INJECTION, SOLUTION INTRAVENOUS EVERY 24 HOURS
Status: DISCONTINUED | OUTPATIENT
Start: 2024-04-10 | End: 2024-04-10

## 2024-04-09 RX ORDER — OXYCODONE HYDROCHLORIDE 10 MG/1
10 TABLET ORAL EVERY 8 HOURS PRN
Status: DISCONTINUED | OUTPATIENT
Start: 2024-04-09 | End: 2024-04-10 | Stop reason: HOSPADM

## 2024-04-09 RX ORDER — ONDANSETRON 2 MG/ML
4 INJECTION INTRAMUSCULAR; INTRAVENOUS EVERY 8 HOURS PRN
Status: DISCONTINUED | OUTPATIENT
Start: 2024-04-09 | End: 2024-04-10 | Stop reason: HOSPADM

## 2024-04-09 RX ORDER — MORPHINE SULFATE 4 MG/ML
4 INJECTION, SOLUTION INTRAMUSCULAR; INTRAVENOUS ONCE
Status: COMPLETED | OUTPATIENT
Start: 2024-04-09 | End: 2024-04-09

## 2024-04-09 RX ORDER — LENALIDOMIDE 10 MG/1
10 CAPSULE ORAL DAILY
Status: DISCONTINUED | OUTPATIENT
Start: 2024-04-09 | End: 2024-04-10 | Stop reason: HOSPADM

## 2024-04-09 RX ADMIN — MORPHINE SULFATE 4 MG: 4 INJECTION, SOLUTION INTRAMUSCULAR; INTRAVENOUS at 08:26

## 2024-04-09 RX ADMIN — ENOXAPARIN SODIUM 40 MG: 40 INJECTION SUBCUTANEOUS at 12:18

## 2024-04-09 RX ADMIN — SODIUM CHLORIDE 1000 ML: 0.9 INJECTION, SOLUTION INTRAVENOUS at 08:23

## 2024-04-09 RX ADMIN — AZITHROMYCIN DIHYDRATE 500 MG: 250 TABLET ORAL at 10:35

## 2024-04-09 RX ADMIN — ONDANSETRON 4 MG: 2 INJECTION INTRAMUSCULAR; INTRAVENOUS at 08:25

## 2024-04-09 RX ADMIN — ACETAMINOPHEN 650 MG: 325 TABLET ORAL at 17:05

## 2024-04-09 RX ADMIN — ATORVASTATIN CALCIUM 40 MG: 40 TABLET, FILM COATED ORAL at 17:05

## 2024-04-09 RX ADMIN — CEFTRIAXONE 1000 MG: 1 INJECTION, SOLUTION INTRAVENOUS at 10:35

## 2024-04-09 RX ADMIN — AMLODIPINE BESYLATE 10 MG: 10 TABLET ORAL at 12:18

## 2024-04-09 RX ADMIN — IOHEXOL 100 ML: 350 INJECTION, SOLUTION INTRAVENOUS at 09:35

## 2024-04-09 NOTE — H&P
"  History and Physical - [facility]    Patient Information: Helen Moreno 63 y.o. female MRN: [id number]  Unit/Bed#: 5T -01 Encounter: [id number]  Admitting Physician: George Norris MD  PCP: Benny De La Garza DO  Date of Admission:  04/09/24    Assessment and Plan    * Pneumonia  Assessment & Plan  Pneumonia as noted by CT imaging - pulmonary infiltrates bilaterally groundglass to semisolid appearance.  Will opt for inpatient IV antibiotics treatment due to chemotherapy and possibility of current immunosuppression.      PLAN  - Change Abx from ceftriaxone to Levaquin due to penicillin allergy. Will resume Levaquin 750 mg daily starting tomorrow. Day1/5  -Tessalon Perles as needed for cough  -Procalcitonin  -Incentive spirometry  - Am CBC.     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    Last infusion: 3/22/2024    PLAN:  - Continue Revlimid 10mg daily.   - Follow-up with heme-onc outpatient and to continue chemotherapy upon discharge  -Tylenol as needed and oxycodone 10 mg as needed for pain    Type 2 diabetes mellitus (HCC)  Assessment & Plan  Lab Results   Component Value Date    HGBA1C 6.3 (H) 01/25/2024       No results for input(s): \"POCGLU\" in the last 72 hours.    Blood Sugar Average: Last 72 hrs:    Home medication metformin 1000 mg twice daily    -Hold metformin.  -SSI + Accu-Cheks ACHS            Transaminitis  Assessment & Plan  Chronic for patient.  AST - 110    - AM CMP    Essential hypertension  Assessment & Plan  Stable  BP Readings from Last 3 Encounters:   04/09/24 138/78   03/25/24 136/80   03/22/24 152/86      Home medications: Amlodipine 10 mg daily    -Continue with home medication        VTE " Prophylaxis: VTE Score: 6 High Risk (Score >/= 5) - Pharmacological DVT Prophylaxis Ordered: enoxaparin (Lovenox). Sequential Compression Devices Ordered.  Code Status: Level 1 - Full Code  Anticipated Length of Stay:  Patient will be admitted on an Observation basis with an anticipated length of stay of  less than 2 midnights.     Justification for Hospital Stay: Pneumonia while undergoing chemotherapy  Total Time for Visit, including Counseling / Coordination of Care: 60 mins. Greater than 50% of this total time spent on direct patient counseling and coordination of care.    Chief Complaint:     Chief Complaint   Patient presents with    Abdominal Pain     Since last night 7pm. Diarrhea x 7 up to today. Nausea and only vomited once. Hx of abdominal cancer     History of Present Illness:    Helen Moreno is a 63 y.o. female with a PMH of  lymphoma undergoing chemotherapy, T2DM, HTN presents with 1 day history of diarrhea (x7), abdominal pain and 4 days history of cough. Abdominal pain is generalized described as achy and generalized.  Diarrhea was loose with no blood present.  Denies trying any new foods.  States she has had a cough for several days that sometimes responds to Tessalon Perles.  Other symptoms endorsed or denied per ROS.    In the ED, vitals were stable, labs remarkable except for microcytic anemia and mild transaminitis. Blood cultures obtained. CT scan showed Groundglass opacities in the lower lungs. She received 1 g ceftriaxone, 500 mg azithromycin, 1 L NS, 4 mg IV Zofran, and 4 mg morphine.    Patient admitted to family medicine service for pneumonia.      Review of Systems:  Review of Systems   Constitutional:  Negative for chills and fever.   HENT: Negative.     Respiratory:  Positive for cough. Negative for shortness of breath and wheezing.    Cardiovascular:  Negative for chest pain, palpitations and leg swelling.   Gastrointestinal:  Positive for abdominal pain, diarrhea, nausea  and vomiting.   Genitourinary: Negative.    Musculoskeletal:  Negative for arthralgias and back pain.   Neurological:  Negative for dizziness, light-headedness and headaches.   All other systems reviewed and are negative.      Past Medical and Surgical History:   Past Medical History:   Diagnosis Date    Anemia     iron and B12    Arthritis     Asthma     Cough     Depression     Diabetes mellitus (HCC)     Falls frequently     Hypertension     Lupus (HCC)     Lymphoma (HCC)     Lymphoma (HCC)     Migraine     Osteoporosis     Psychiatric disorder     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HCC)     Vertigo      Past Surgical History:   Procedure Laterality Date    CHOLECYSTECTOMY      FL GUIDED CENTRAL VENOUS ACCESS DEVICE INSERTION  11/9/2018    HYSTERECTOMY      IR BIOPSY BONE MARROW  11/24/2020    IR BIOPSY LYMPH NODE  11/24/2020    IR PORT CHECK  11/27/2023    LYMPH NODE BIOPSY Left 11/9/2018    Procedure: EXCISION BIOPSY LYMPH NODE SUPRACLAVICULAR;  Surgeon: Rajan Wiley MD;  Location:  MAIN OR;  Service: General    DC TENDON SHEATH INCISION Right 1/16/2020    Procedure: RELEASE TRIGGER FINGER RIGHT THUMB;  Surgeon: Brent Sanders MD;  Location:  MAIN OR;  Service: Orthopedics    TUNNELED VENOUS PORT PLACEMENT N/A 11/9/2018    Procedure: INSERTION OF PORT-A-CATH;  Surgeon: Rajan Wiley MD;  Location:  MAIN OR;  Service: General     Meds/Allergies:  Allergies:   Allergies   Allergen Reactions    Penicillins Anaphylaxis     Prior to Admission Medications   Prescriptions Last Dose Informant Patient Reported? Taking?   Banophen 25 MG capsule Not Taking Self Yes No   Sig: TAKE 1 CAPSULE(25 MG TOTAL) BY MOUTH EVERY 6 (SIX) HOURS AS NEEDED FOR ITCHING   Patient not taking: Reported on 4/9/2024   Blood Glucose Monitoring Suppl (OneTouch Verio) w/Device KIT  Self No No   Sig: Use in the morning   Blood Pressure KIT  Self No No   Sig: Use in the morning   DULoxetine (CYMBALTA) 20 mg capsule Not Taking Self No No   Sig: Take 2 capsules  (40 mg total) by mouth daily   Patient not taking: Reported on 4/9/2024   FLUoxetine (PROzac) 10 mg capsule Not Taking Self Yes No   Sig: TAKE 1 ORAL CAPSULE ONCE A DAY   Patient not taking: Reported on 4/9/2024   Flowflex COVID-19 Ag Home Test KIT Not Taking Self Yes No   Sig: USE AS DIRECTED COVID 19 AT HOME KIT   Patient not taking: Reported on 4/9/2024   OneTouch Delica Lancets 33G MISC  Self No No   Sig: Use in the morning   acetaminophen (TYLENOL) 325 mg tablet 4/8/2024 Self No Yes   Sig: Take 2 tablets (650 mg total) by mouth every 6 (six) hours as needed for mild pain   acetaminophen (TYLENOL) 325 mg tablet Not Taking Self No No   Sig: Take 3 tablets (975 mg total) by mouth every 6 (six) hours as needed for mild pain or fever   Patient not taking: Reported on 4/9/2024   allopurinol (ZYLOPRIM) 100 mg tablet More than a month Self No No   Sig: TAKE 1 TABLET (100 MG TOTAL) BY MOUTH DAILY   amLODIPine (NORVASC) 10 mg tablet More than a month Self Yes No   Sig: Take 10 mg by mouth daily   ascorbic acid (VITAMIN C) 1000 MG tablet  Self No No   Sig: Take 1 tablet (1,000 mg total) by mouth every 12 (twelve) hours for 10 doses   aspirin (ECOTRIN LOW STRENGTH) 81 mg EC tablet Past Month Self No Yes   Sig: Take 1 tablet (81 mg total) by mouth daily   aspirin-acetaminophen-caffeine (EXCEDRIN MIGRAINE) 250-250-65 MG per tablet Past Week Self No Yes   Sig: Take 1 tablet by mouth every 6 (six) hours as needed for headaches Erica 1 tableta cada 6 horas fernandez sea necesario para el dolor de russ   atorvastatin (LIPITOR) 40 mg tablet Past Week Self No Yes   Sig: Take 1 tablet (40 mg total) by mouth daily with dinner   benzocaine (BABY ORAJEL) 7.5 % oral gel Not Taking Self No No   Sig: Apply to the mouth or throat 3 (three) times a day as needed for mucositis   Patient not taking: Reported on 4/9/2024   benzocaine (ORAJEL) 10 % mucosal gel Not Taking Self No No   Sig: Apply 1 application to the mouth or throat as needed for  mucositis   Patient not taking: Reported on 4/9/2024   benzonatate (TESSALON PERLES) 100 mg capsule Past Week  No Yes   Sig: Take 1 capsule (100 mg total) by mouth 3 (three) times a day as needed for cough   benzonatate (TESSALON) 200 MG capsule Not Taking Self No No   Sig: Take 1 capsule (200 mg total) by mouth 3 (three) times a day as needed for cough   Patient not taking: Reported on 4/9/2024   capsicum (ZOSTRIX) 0.075 % topical cream Not Taking Self No No   Sig: Apply topically 3 (three) times a day Apply to affected area   Patient not taking: Reported on 4/9/2024   chlorthalidone 25 mg tablet Past Week Self No Yes   Sig: Take 1 tablet (25 mg total) by mouth daily   cholecalciferol (VITAMIN D3) 1,000 units tablet  Self No No   Sig: Take 2 tablets (2,000 Units total) by mouth daily for 5 days   cyanocobalamin 1000 MCG tablet  Self No No   Sig: Take 1 tablet (1,000 mcg total) by mouth daily   dextromethorphan-guaiFENesin (ROBITUSSIN DM)  mg/5 mL syrup Not Taking Self No No   Sig: Take 5 mL by mouth 3 (three) times a day as needed for cough   Patient not taking: Reported on 4/9/2024   diphenhydrAMINE (BENADRYL) 25 mg tablet Not Taking Self No No   Sig: Take 1 tablet (25 mg total) by mouth every 6 (six) hours as needed for itching   Patient not taking: Reported on 4/9/2024   folic acid (FOLVITE) 1 mg tablet Past Week Self No Yes   Sig: Take 1 tablet (1 mg total) by mouth daily   glucose blood (OneTouch Verio) test strip  Self No No   Sig: Use 1 each in the morning Use as instructed   hydrOXYzine HCL (ATARAX) 10 mg tablet Not Taking Self Yes No   Sig: TAKE 1 ORAL TABLET 2 TIMES A DAY PRN FOR ANXIETY   Patient not taking: Reported on 4/9/2024   ibuprofen (MOTRIN) 400 mg tablet   No No   Sig: Take 1 tablet (400 mg total) by mouth every 6 (six) hours as needed for mild pain (Body aches or fever) for up to 7 days   ibuprofen (MOTRIN) 600 mg tablet Not Taking Self No No   Sig: Take 1 tablet (600 mg total) by mouth  every 6 (six) hours as needed for mild pain or fever   Patient not taking: Reported on 4/9/2024   lenalidomide (Revlimid) 10 MG CAPS Past Week Self No Yes   Sig: TAKE 1 CAPSULE BY MOUTH DAILY 3 WEEKS ON FOLLOWED BY 1 WEEK OFF   lidocaine (LMX) 4 % cream  Self No No   Sig: Apply topically as needed for mild pain To neck   metFORMIN (GLUCOPHAGE) 500 mg tablet Past Week Self No Yes   Sig: Take 2 tablets (1,000 mg total) by mouth 2 (two) times a day with meals   omeprazole (PriLOSEC) 20 mg delayed release capsule Past Week Self No Yes   Sig: Take 2 capsules (40 mg total) by mouth daily To prevent stomach upset   ondansetron (ZOFRAN) 4 mg tablet  Self No No   Sig: Take 1 tablet (4 mg total) by mouth every 8 (eight) hours as needed for nausea or vomiting   ondansetron (ZOFRAN) 4 mg tablet  Self No No   Sig: Take 1 tablet (4 mg total) by mouth every 6 (six) hours   oxyCODONE (ROXICODONE) 10 MG TABS 4/8/2024  No Yes   Sig: Take 1 tablet (10 mg total) by mouth every 8 (eight) hours as needed for severe pain Erica 1 tableta cada 8 horas fernandez sea necesario por dolor intenso. Dosis maxima 3 Max Daily Amount: 30 mg   polyethylene glycol (GLYCOLAX) 17 GM/SCOOP Not Taking Self Yes No   Sig: TAKE 17 G BY MOUTH DAILY MIX WITH WATER   Patient not taking: Reported on 4/9/2024   polyethylene glycol (GLYCOLAX) 17 GM/SCOOP Not Taking Self No No   Sig: TAKE 17 G BY MOUTH DAILY MIX WITH WATER   Patient not taking: Reported on 4/9/2024   potassium chloride (K-DUR,KLOR-CON) 20 mEq tablet Not Taking Self No No   Sig: TAKE 1 TABLET (20 MEQ TOTAL) BY MOUTH DAILY   Patient not taking: Reported on 4/9/2024   pseudoephedrine-dextromethorphan-guaifenesin (ROBITUSSIN-PE) 30- MG/5ML solution Not Taking Self No No   Sig: Take 10 mL by mouth 4 (four) times a day as needed for allergies   Patient not taking: Reported on 4/9/2024   senna (SENOKOT) 8.6 mg Not Taking Self No No   Sig: Take 1 tablet (8.6 mg total) by mouth 2 (two) times a day    Patient not taking: Reported on 2024   traZODone (DESYREL) 50 mg tablet Not Taking Self Yes No   Sig: TAKE 1 ORAL TABLET AT BEDTIME PRN FOR SLEEP   Patient not taking: Reported on 2024   triamcinolone (KENALOG) 0.1 % ointment Not Taking Self No No   Sig: Apply topically 2 (two) times a day   Patient not taking: Reported on 2024      Facility-Administered Medications: None     Social History:     Social History     Socioeconomic History    Marital status: Single     Spouse name: Not on file    Number of children: Not on file    Years of education: Not on file    Highest education level: Not on file   Occupational History    Not on file   Tobacco Use    Smoking status: Former     Current packs/day: 0.00     Types: Cigarettes     Quit date: 2017     Years since quittin.8     Passive exposure: Past    Smokeless tobacco: Never   Vaping Use    Vaping status: Never Used   Substance and Sexual Activity    Alcohol use: Never    Drug use: Never    Sexual activity: Not Currently     Partners: Male   Other Topics Concern    Not on file   Social History Narrative    Not on file     Social Determinants of Health     Financial Resource Strain: High Risk (2023)    Overall Financial Resource Strain (CARDIA)     Difficulty of Paying Living Expenses: Hard   Food Insecurity: Food Insecurity Present (2023)    Hunger Vital Sign     Worried About Running Out of Food in the Last Year: Sometimes true     Ran Out of Food in the Last Year: Sometimes true   Transportation Needs: Unmet Transportation Needs (2023)    PRAPARE - Transportation     Lack of Transportation (Medical): Yes     Lack of Transportation (Non-Medical): Yes   Physical Activity: Insufficiently Active (2022)    Received from [facility]    Exercise Vital Sign     Days of Exercise per Week: 4 days     Minutes of Exercise per Session: 30 min   Stress: No Stress Concern Present (2022)    [facility]    [employer] [place] [employer] - Occupational Stress Questionnaire     Feeling of Stress : Not at all   Social Connections: Moderately Integrated (6/21/2022)    Received from [facility]    Social Connection and Isolation Panel [NHANES]     Frequency of Communication with Friends and Family: More than three times a week     Frequency of Social Gatherings with Friends and Family: Twice a week     Attends Protestant Services: 1 to 4 times per year     Active Member of Clubs or Organizations: No     Attends Club or Organization Meetings: Never     Marital Status: Living with partner   Intimate Partner Violence: Not At Risk (6/21/2022)    Received from [facility]    Humiliation, Afraid, Rape, and Kick questionnaire     Fear of Current or Ex-Partner: No     Emotionally Abused: No     Physically Abused: No     Sexually Abused: No   Housing Stability: High Risk (6/21/2022)    Received from [facility]    Housing Stability Vital Sign     Unable to Pay for Housing in the Last Year: Yes     Number of Places Lived in the Last Year: 4     Unstable Housing in the Last Year: No     Patient Pre-hospital Living Situation: With   Patient Pre-hospital Level of Mobility: Fully mobile  Patient Pre-hospital Diet Restrictions: None    Family History:  Family History   Problem Relation Age of Onset    Cancer Mother     Diabetes Mother     Cancer Father     Diabetes Father     Breast cancer Sister     No Known Problems Sister     No Known Problems Sister     No Known Problems Sister     No Known Problems Maternal Aunt     No Known Problems Maternal Aunt     No Known Problems Maternal Aunt     No Known Problems Paternal Aunt        Physical Exam:   Vitals:   Blood Pressure: 138/78 (04/09/24 1135)  Pulse: 64 (04/09/24 1135)  Temperature: 97.7 °F (36.5 °C) (04/09/24 1135)  Temp Source: Temporal (04/09/24 1135)  Respirations: 16 (04/09/24 1135)  Height: 5' 6\" " (167.6 cm) (04/09/24 1135)  Weight - Scale: 106 kg (233 lb 11 oz) (04/09/24 1135)  SpO2: 99 % (04/09/24 1135)    Physical Exam  Vitals reviewed.   Constitutional:       General: She is not in acute distress.     Appearance: She is well-developed. She is obese. She is not diaphoretic.   HENT:      Head: Normocephalic and atraumatic.      Nose: Nose normal. No congestion or rhinorrhea.   Eyes:      Extraocular Movements: Extraocular movements intact.      Conjunctiva/sclera: Conjunctivae normal.   Cardiovascular:      Rate and Rhythm: Normal rate and regular rhythm.      Heart sounds: Normal heart sounds. No murmur heard.     No friction rub.   Pulmonary:      Effort: Pulmonary effort is normal. No respiratory distress.      Breath sounds: Examination of the right-lower field reveals rales. Examination of the left-lower field reveals rales. Rales present. No wheezing.   Chest:       Abdominal:      General: Bowel sounds are normal.      Palpations: Abdomen is soft.      Tenderness: There is no abdominal tenderness. There is no right CVA tenderness, left CVA tenderness, guarding or rebound.   Musculoskeletal:         General: Normal range of motion.      Cervical back: Normal range of motion and neck supple.      Right lower leg: No edema.      Left lower leg: No edema.   Skin:     General: Skin is warm and dry.      Findings: No erythema or rash.   Neurological:      Mental Status: She is alert and oriented to person, place, and time.   Psychiatric:         Mood and Affect: Mood normal.         Behavior: Behavior normal.         Lab Results: I have personally reviewed pertinent reports.    Results from last 7 days   Lab Units 04/09/24  0824   WBC Thousand/uL 9.16   HEMOGLOBIN g/dL 11.6   HEMATOCRIT % 39.6   PLATELETS Thousands/uL 333   SEGS PCT % 76*   LYMPHO PCT % 14   MONO PCT % 6   EOS PCT % 4     Results from last 7 days   Lab Units 04/09/24  0824   POTASSIUM mmol/L 4.1   CHLORIDE mmol/L 106   CO2 mmol/L 25   BUN  "mg/dL 15   CREATININE mg/dL 0.75   CALCIUM mg/dL 9.2   ALK PHOS U/L 110*   ALT U/L 15   AST U/L 18   EGFR ml/min/1.73sq m 86   MAGNESIUM mg/dL 2.0                                Invalid input(s): \"URIBILINOGEN\"          Imaging: I have personally reviewed pertinent reports.    CT abdomen pelvis with contrast    Result Date: 4/9/2024  Narrative: CT ABDOMEN AND PELVIS WITH IV CONTRAST INDICATION: diffuse abd pain. COMPARISON: PET/CT April 3, 2024 TECHNIQUE: CT examination of the abdomen and pelvis was performed. Multiplanar 2D reformatted images were created from the source data. This examination, like all CT scans performed in the [facility], was performed utilizing techniques to minimize radiation dose exposure, including the use of iterative reconstruction and automated exposure control. Radiation dose length product (DLP) for this visit: 1385 mGy-cm IV Contrast: 100 mL of iohexol (OMNIPAQUE) Enteric Contrast: Not administered. FINDINGS: ABDOMEN LOWER CHEST: Groundglass opacities in the lower lung, most pronounced in the right lower lobe, are similar to April 3, 2024. LIVER/BILIARY TREE: Unremarkable. GALLBLADDER: Post cholecystectomy. SPLEEN: Unremarkable. PANCREAS: Unremarkable. ADRENAL GLANDS: Unremarkable. KIDNEYS/URETERS: Nonobstructing 2 mm left renal calculus. No hydroureteronephrosis. STOMACH AND BOWEL: Unremarkable. APPENDIX: No findings to suggest appendicitis. ABDOMINOPELVIC CAVITY: Karon appearance of the mesentery is similar to the February 15, 2023 CT. No lymphadenopathy. No pneumoperitoneum or ascites. VESSELS: Atherosclerosis without abdominal aortic aneurysm. PELVIS REPRODUCTIVE ORGANS: Unremarkable for patient's age. URINARY BLADDER: Unremarkable. ABDOMINAL WALL/INGUINAL REGIONS: Left inguinal lymph node, which was FDG avid on the prior PET/CT, measures 1.1 x 0.8 cm, and measured 0.8 x 0.6 cm on February 15, 2023. BONES: No acute fracture or suspicious osseous lesion. "     Impression: 1.  Groundglass opacities in the lower lungs are similar to the April 3, 2024 PET/CT, and can represent pneumonia in the appropriate clinical setting. Given history of lymphoma, pulmonary lymphoma remains an additional diagnostic consideration. 2.  No acute abdominopelvic pathology. 3.  Nonobstructing 2 mm left renal calculus. No hydroureteronephrosis. Workstation performed: [id number]     NM PET CT skull base to mid thigh    Result Date: 4/3/2024  Narrative: PET/CT SCAN INDICATION: History of stage IV follicular lymphoma. On maintenance chemotherapy. C82.18: Follicular lymphoma grade ii, lymph nodes of multiple sites MODIFIER: PS COMPARISON: PET/CT 8/22/2023, CT chest 2/9/2024 CELL TYPE: Follicular lymphoma TECHNIQUE:   10.8 mCi F-18-FDG administered IV. Multiplanar attenuation corrected and non attenuation corrected PET images are available for interpretation, and contiguous, low dose, axial CT sections were obtained from the skull base through the femurs.  Intravenous contrast material was not utilized. This examination, like all CT scans performed in the [facility], was performed utilizing techniques to minimize radiation dose exposure, including the use of iterative reconstruction and automated exposure control. Fasting serum glucose: 91 mg/dl FINDINGS: VISUALIZED BRAIN: No acute abnormalities are seen. HEAD/NECK: There is a physiologic distribution of FDG. No residual activity at the previously seen small right subareolar lymph node. No FDG avid cervical adenopathy is seen. CT images: Stable cluster of small calcifications posterior to the lower sternocleidomastoid muscle. CHEST: New FDG avid lymph nodes in the left axilla and subpectoral region. A new left subpectoral lymph node demonstrates SUV max of 8.1. This measures 1 cm short axis image 58 series 4. A new left axillary lymph node more laterally demonstrates SUV max of 5.1. This measures 7 mm short axis image 56 series 4.  New FDG-avid pulmonary infiltrates bilaterally compared to prior PET/CT, semisolid to  groundglass appearance on CT. Some of these were present on most recent chest CT. Left apical curvilinear infiltrate laterally demonstrates SUV max of 14. See image 44 series 603. This is new from the recent chest CT as well. A left upper lobe infiltrate more anteriorly demonstrates SUV max of 10. There is groundglass/semisolid opacity appears partially improved from most recent chest CT of 2/9/2024. See for example image 58 series 4. Patchy FDG uptake related to right lung infiltrates to a lesser extent. Right lower lobe groundglass infiltrate medially demonstrates SUV max of 5.4, new from prior CT imaging. CT images: Left-sided Mediport line tip terminates at the SVC. Stable hyperdensity in the right ventricle question calcification. ABDOMEN: Haziness of the central mesentery with mild nodularity and mild FDG uptake, SUV max of 2.7, previously 2.8. Otherwise no FDG-avid lymph nodes. Variable gastric and bowel activity probably physiologic. CT images: Gallbladder again not visualized. 1 to 2 mm intrarenal calculus at the left lower pole. PELVIS: New FDG-avid lymph nodes along the bilateral external iliac chains and inguinal regions. New left external iliac chain lymph node demonstrates SUV max of 6.6. This measures 9 mm short axis image 166 series 4. New FDG avid right external iliac chain lymph node demonstrates SUV max of 6.1. This measures 9 mm short axis image 165 series 4. New FDG avid left inguinal lymph node laterally demonstrates a SUV max of 6.5. This measures 6 mm short axis image 167 series 4. Previously noted FDG avid left inguinal lymph node more inferiorly demonstrates SUV max of 5.8, previously 5.2. This measures 7 mm short axis image 196 series 4, unchanged. CT images: No additional significant findings OSSEOUS STRUCTURES: Increasing heterogeneous FDG uptake in the osseous structures. This is most evident in the  spine. For example, at the T11-T12 level the SUV max now is 5.9, previously 4.9. At the L3 vertebral body the SUV max now is 5.5, previously 4.7. CT images: Multilevel degenerative changes of the spine. For reference: SUV max of the mediastinal blood pool: 3.8 SUV max of the liver: 4.6     Impression: 1. Overall findings concerning for disease progression. 2. New FDG-avid lymph nodes in the left axillary and subpectoral region suspicious for malignancy. Deauville score of 5. 3. Progression of scattered FDG-avid lymph nodes in the bilateral external iliac and inguinal regions. 4. FDG avid pulmonary infiltrates bilaterally groundglass to semisolid appearance. Some of these have improved from the most recent chest CT of 2/9/2024 while others are new. While these could be inflammatory a pulmonary manifestation of lymphoma is not entirely excluded. 5. Increasing heterogeneous FDG uptake in the osseous structures nonspecific but osseous involvement is not excluded. The study was marked in EPIC for significant notification. Workstation performed: [id number]       Entire H&P was discussed with Dr. Andrew Melton who agreed to what is noted above    George Norris MD  04/09/24  1:39 PM

## 2024-04-09 NOTE — ASSESSMENT & PLAN NOTE
Lab Results   Component Value Date    HGBA1C 6.3 (H) 01/25/2024       Recent Labs     04/09/24  1610 04/09/24  2037 04/10/24  0455 04/10/24  1058   POCGLU 106 100 101 92       Blood Sugar Average: Last 72 hrs:    Home medication metformin 1000 mg twice daily    -Continue metformin 1000 mg twice daily      (P) 98.8

## 2024-04-09 NOTE — ASSESSMENT & PLAN NOTE
Currently undergoing chemotherapy and stable per last heme-onc notes  Treatment plan per last heme-onc note: Maintenance obinutuzumab infusion q4 weeks and Revlimid 10 mg daily 3 weeks on 1 week off.    Last infusion: 3/22/2024    PLAN:  - Continue Maintenance obinutuzumab infusion q4 weeks and Revlimid 10 mg daily 3 weeks on 1 week off  - Follow-up with heme-onc  -Tylenol as needed and oxycodone 10 mg as needed for pain

## 2024-04-09 NOTE — PLAN OF CARE
Problem: INFECTION - ADULT  Goal: Absence or prevention of progression during hospitalization  Description: INTERVENTIONS:  - Assess and monitor for signs and symptoms of infection  - Monitor lab/diagnostic results  - Monitor all insertion sites, i.e. indwelling lines, tubes, and drains  - Monitor endotracheal if appropriate and nasal secretions for changes in amount and color  - [place] appropriate cooling/warming therapies per order  - Administer medications as ordered  - Instruct and encourage patient and family to use good hand hygiene technique  - Identify and instruct in appropriate isolation precautions for identified infection/condition  Outcome: Progressing  Goal: Absence of fever/infection during neutropenic period  Description: INTERVENTIONS:  - Monitor WBC    Outcome: Progressing     Problem: PAIN - ADULT  Goal: Verbalizes/displays adequate comfort level or baseline comfort level  Description: Interventions:  - Encourage patient to monitor pain and request assistance  - Assess pain using appropriate pain scale  - Administer analgesics based on type and severity of pain and evaluate response  - Implement non-pharmacological measures as appropriate and evaluate response  - Consider cultural and social influences on pain and pain management  - Notify physician/advanced practitioner if interventions unsuccessful or patient reports new pain  Outcome: Progressing

## 2024-04-09 NOTE — ED NOTES
Patient transported to CT     Casi Seals RN  04/09/24 0919    
Quality 110: Preventive Care And Screening: Influenza Immunization: Influenza Immunization Administered during Influenza season
Detail Level: Detailed
Quality 111:Pneumonia Vaccination Status For Older Adults: Pneumococcal Vaccination not Administered or Previously Received, Reason not Otherwise Specified

## 2024-04-09 NOTE — ED PROVIDER NOTES
History  Chief Complaint   Patient presents with    Abdominal Pain     Since last night 7pm. Diarrhea x 7 up to today. Nausea and only vomited once. Hx of abdominal cancer     63-year-old female presenting emerged department with diarrhea since last night 7 episodes along with nausea vomiting 1 episode.  Also notes cough for a few days.  Currently on infusions for lymphoma so probably immunocompromised        Prior to Admission Medications   Prescriptions Last Dose Informant Patient Reported? Taking?   Banophen 25 MG capsule  Self Yes No   Sig: TAKE 1 CAPSULE(25 MG TOTAL) BY MOUTH EVERY 6 (SIX) HOURS AS NEEDED FOR ITCHING   Blood Glucose Monitoring Suppl (OneTouch Verio) w/Device KIT  Self No No   Sig: Use in the morning   Blood Pressure KIT  Self No No   Sig: Use in the morning   DULoxetine (CYMBALTA) 20 mg capsule  Self No No   Sig: Take 2 capsules (40 mg total) by mouth daily   FLUoxetine (PROzac) 10 mg capsule  Self Yes No   Sig: TAKE 1 ORAL CAPSULE ONCE A DAY   Flowflex COVID-19 Ag Home Test KIT  Self Yes No   Sig: USE AS DIRECTED COVID 19 AT HOME KIT   OneTouch Delica Lancets 33G MISC  Self No No   Sig: Use in the morning   acetaminophen (TYLENOL) 325 mg tablet  Self No No   Sig: Take 2 tablets (650 mg total) by mouth every 6 (six) hours as needed for mild pain   acetaminophen (TYLENOL) 325 mg tablet  Self No No   Sig: Take 3 tablets (975 mg total) by mouth every 6 (six) hours as needed for mild pain or fever   acyclovir (ZOVIRAX) 400 MG tablet  Self No No   Sig: Take 1 tablet (400 mg total) by mouth 2 (two) times a day   allopurinol (ZYLOPRIM) 100 mg tablet  Self No No   Sig: TAKE 1 TABLET (100 MG TOTAL) BY MOUTH DAILY   amLODIPine (NORVASC) 10 mg tablet  Self Yes No   Sig: Take 10 mg by mouth daily   ascorbic acid (VITAMIN C) 1000 MG tablet  Self No No   Sig: Take 1 tablet (1,000 mg total) by mouth every 12 (twelve) hours for 10 doses   aspirin (ECOTRIN LOW STRENGTH) 81 mg EC tablet  Self No No   Sig: Take 1  tablet (81 mg total) by mouth daily   aspirin-acetaminophen-caffeine (EXCEDRIN MIGRAINE) 250-250-65 MG per tablet  Self No No   Sig: Take 1 tablet by mouth every 6 (six) hours as needed for headaches Erica 1 tableta cada 6 horas fernandez sea necesario para el dolor de russ   atorvastatin (LIPITOR) 40 mg tablet  Self No No   Sig: Take 1 tablet (40 mg total) by mouth daily with dinner   benzocaine (BABY ORAJEL) 7.5 % oral gel  Self No No   Sig: Apply to the mouth or throat 3 (three) times a day as needed for mucositis   benzocaine (ORAJEL) 10 % mucosal gel  Self No No   Sig: Apply 1 application to the mouth or throat as needed for mucositis   benzonatate (TESSALON PERLES) 100 mg capsule   No No   Sig: Take 1 capsule (100 mg total) by mouth 3 (three) times a day as needed for cough   benzonatate (TESSALON) 200 MG capsule  Self No No   Sig: Take 1 capsule (200 mg total) by mouth 3 (three) times a day as needed for cough   capsicum (ZOSTRIX) 0.075 % topical cream  Self No No   Sig: Apply topically 3 (three) times a day Apply to affected area   chlorthalidone 25 mg tablet  Self No No   Sig: Take 1 tablet (25 mg total) by mouth daily   cholecalciferol (VITAMIN D3) 1,000 units tablet  Self No No   Sig: Take 2 tablets (2,000 Units total) by mouth daily for 5 days   cyanocobalamin 1000 MCG tablet  Self No No   Sig: Take 1 tablet (1,000 mcg total) by mouth daily   dextromethorphan-guaiFENesin (ROBITUSSIN DM)  mg/5 mL syrup  Self No No   Sig: Take 5 mL by mouth 3 (three) times a day as needed for cough   diphenhydrAMINE (BENADRYL) 25 mg tablet  Self No No   Sig: Take 1 tablet (25 mg total) by mouth every 6 (six) hours as needed for itching   folic acid (FOLVITE) 1 mg tablet  Self No No   Sig: Take 1 tablet (1 mg total) by mouth daily   glucose blood (OneTouch Verio) test strip  Self No No   Sig: Use 1 each in the morning Use as instructed   hydrOXYzine HCL (ATARAX) 10 mg tablet  Self Yes No   Sig: TAKE 1 ORAL TABLET 2  TIMES A DAY PRN FOR ANXIETY   ibuprofen (MOTRIN) 400 mg tablet   No No   Sig: Take 1 tablet (400 mg total) by mouth every 6 (six) hours as needed for mild pain (Body aches or fever) for up to 7 days   ibuprofen (MOTRIN) 600 mg tablet  Self No No   Sig: Take 1 tablet (600 mg total) by mouth every 6 (six) hours as needed for mild pain or fever   lenalidomide (Revlimid) 10 MG CAPS  Self No No   Sig: TAKE 1 CAPSULE BY MOUTH DAILY 3 WEEKS ON FOLLOWED BY 1 WEEK OFF   lidocaine (LMX) 4 % cream  Self No No   Sig: Apply topically as needed for mild pain To neck   metFORMIN (GLUCOPHAGE) 500 mg tablet  Self No No   Sig: Take 2 tablets (1,000 mg total) by mouth 2 (two) times a day with meals   omeprazole (PriLOSEC) 20 mg delayed release capsule  Self No No   Sig: Take 2 capsules (40 mg total) by mouth daily To prevent stomach upset   ondansetron (ZOFRAN) 4 mg tablet  Self No No   Sig: Take 1 tablet (4 mg total) by mouth every 8 (eight) hours as needed for nausea or vomiting   ondansetron (ZOFRAN) 4 mg tablet  Self No No   Sig: Take 1 tablet (4 mg total) by mouth every 6 (six) hours   oxyCODONE (ROXICODONE) 10 MG TABS   No No   Sig: Take 1 tablet (10 mg total) by mouth every 8 (eight) hours as needed for severe pain Erica 1 tableta cada 8 horas fernandez sea necesario por dolor intenso. Dosis maxima 3 Max Daily Amount: 30 mg   polyethylene glycol (GLYCOLAX) 17 GM/SCOOP  Self Yes No   Sig: TAKE 17 G BY MOUTH DAILY MIX WITH WATER   polyethylene glycol (GLYCOLAX) 17 GM/SCOOP  Self No No   Sig: TAKE 17 G BY MOUTH DAILY MIX WITH WATER   potassium chloride (K-DUR,KLOR-CON) 20 mEq tablet  Self No No   Sig: TAKE 1 TABLET (20 MEQ TOTAL) BY MOUTH DAILY   pseudoephedrine-dextromethorphan-guaifenesin (ROBITUSSIN-PE) 30- MG/5ML solution  Self No No   Sig: Take 10 mL by mouth 4 (four) times a day as needed for allergies   senna (SENOKOT) 8.6 mg  Self No No   Sig: Take 1 tablet (8.6 mg total) by mouth 2 (two) times a day   traZODone  (DESYREL) 50 mg tablet  Self Yes No   Sig: TAKE 1 ORAL TABLET AT BEDTIME PRN FOR SLEEP   triamcinolone (KENALOG) 0.1 % ointment  Self No No   Sig: Apply topically 2 (two) times a day      Facility-Administered Medications: None       Past Medical History:   Diagnosis Date    Anemia     iron and B12    Arthritis     Asthma     Cough     Depression     Diabetes mellitus (HCC)     Falls frequently     Hypertension     Lupus (HCC)     Lymphoma (HCC)     Lymphoma (HCC)     Migraine     Osteoporosis     Psychiatric disorder     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HCC)     Vertigo        Past Surgical History:   Procedure Laterality Date    CHOLECYSTECTOMY      FL GUIDED CENTRAL VENOUS ACCESS DEVICE INSERTION  11/9/2018    HYSTERECTOMY      IR BIOPSY BONE MARROW  11/24/2020    IR BIOPSY LYMPH NODE  11/24/2020    IR PORT CHECK  11/27/2023    LYMPH NODE BIOPSY Left 11/9/2018    Procedure: EXCISION BIOPSY LYMPH NODE SUPRACLAVICULAR;  Surgeon: Rajan Wiley MD;  Location:  MAIN OR;  Service: General    NE TENDON SHEATH INCISION Right 1/16/2020    Procedure: RELEASE TRIGGER FINGER RIGHT THUMB;  Surgeon: Brent Sanders MD;  Location:  MAIN OR;  Service: Orthopedics    TUNNELED VENOUS PORT PLACEMENT N/A 11/9/2018    Procedure: INSERTION OF PORT-A-CATH;  Surgeon: Rajan Wiley MD;  Location:  MAIN OR;  Service: General       Family History   Problem Relation Age of Onset    Cancer Mother     Diabetes Mother     Cancer Father     Diabetes Father     Breast cancer Sister     No Known Problems Sister     No Known Problems Sister     No Known Problems Sister     No Known Problems Maternal Aunt     No Known Problems Maternal Aunt     No Known Problems Maternal Aunt     No Known Problems Paternal Aunt      I have reviewed and agree with the history as documented.    E-Cigarette/Vaping    E-Cigarette Use Never User      E-Cigarette/Vaping Substances    Nicotine No     THC No     CBD No     Flavoring No     Other No     Unknown No      Social History      Tobacco Use    Smoking status: Former     Current packs/day: 0.00     Types: Cigarettes     Quit date: 2017     Years since quittin.8     Passive exposure: Past    Smokeless tobacco: Never   Vaping Use    Vaping status: Never Used   Substance Use Topics    Alcohol use: Never    Drug use: Never       Review of Systems   Constitutional:  Negative for chills and fever.   HENT:  Negative for ear pain and sore throat.    Eyes:  Negative for pain and visual disturbance.   Respiratory:  Positive for cough. Negative for shortness of breath.    Cardiovascular:  Negative for chest pain and palpitations.   Gastrointestinal:  Positive for abdominal pain, nausea and vomiting.   Genitourinary:  Negative for dysuria and hematuria.   Musculoskeletal:  Negative for arthralgias and back pain.   Skin:  Negative for color change and rash.   Neurological:  Negative for seizures and syncope.   All other systems reviewed and are negative.      Physical Exam  Physical Exam  Vitals and nursing note reviewed.   Constitutional:       General: She is not in acute distress.     Appearance: She is well-developed.   HENT:      Head: Normocephalic and atraumatic.   Eyes:      Conjunctiva/sclera: Conjunctivae normal.   Cardiovascular:      Rate and Rhythm: Normal rate and regular rhythm.      Heart sounds: No murmur heard.  Pulmonary:      Effort: Pulmonary effort is normal. No respiratory distress.      Breath sounds: No rales.      Comments: Crackles in bilateral bases  Abdominal:      Palpations: Abdomen is soft.      Tenderness: There is no abdominal tenderness.   Musculoskeletal:         General: No swelling.      Cervical back: Neck supple.   Skin:     General: Skin is warm and dry.      Capillary Refill: Capillary refill takes less than 2 seconds.   Neurological:      Mental Status: She is alert.   Psychiatric:         Mood and Affect: Mood normal.         Vital Signs  ED Triage Vitals   Temperature Pulse Respirations Blood  Pressure SpO2   04/09/24 0804 04/09/24 0804 04/09/24 0804 04/09/24 0804 04/09/24 0804   97.8 °F (36.6 °C) 90 16 146/70 99 %      Temp Source Heart Rate Source Patient Position - Orthostatic VS BP Location FiO2 (%)   04/09/24 0804 04/09/24 0804 04/09/24 0804 04/09/24 0804 --   Tympanic Monitor Sitting Left arm       Pain Score       04/09/24 0808       9           Vitals:    04/09/24 0804   BP: 146/70   Pulse: 90   Patient Position - Orthostatic VS: Sitting         Visual Acuity      ED Medications  Medications   cefTRIAXone (ROCEPHIN) IVPB (premix in dextrose) 1,000 mg 50 mL (has no administration in time range)   azithromycin (ZITHROMAX) tablet 500 mg (has no administration in time range)   sodium chloride 0.9 % bolus 1,000 mL (0 mL Intravenous Stopped 4/9/24 0938)   ondansetron (ZOFRAN) injection 4 mg (4 mg Intravenous Given 4/9/24 0825)   morphine injection 4 mg (4 mg Intravenous Given 4/9/24 0826)   iohexol (OMNIPAQUE) 350 MG/ML injection (MULTI-DOSE) 100 mL (100 mL Intravenous Given 4/9/24 0935)       Diagnostic Studies  Results Reviewed       Procedure Component Value Units Date/Time    Blood culture #1 [[id number]]     Lab Status: No result Specimen: Blood     Blood culture #2 [[id number]]     Lab Status: No result Specimen: Blood     Comprehensive metabolic panel [[id number]]  (Abnormal) Collected: 04/09/24 0824    Lab Status: Final result Specimen: Blood from Arm, Right Updated: 04/09/24 0855     Sodium 140 mmol/L      Potassium 4.1 mmol/L      Chloride 106 mmol/L      CO2 25 mmol/L      ANION GAP 9 mmol/L      BUN 15 mg/dL      Creatinine 0.75 mg/dL      Glucose 130 mg/dL      Calcium 9.2 mg/dL      AST 18 U/L      ALT 15 U/L      Alkaline Phosphatase 110 U/L      Total Protein 6.7 g/dL      Albumin 4.0 g/dL      Total Bilirubin 0.64 mg/dL      eGFR 86 ml/min/1.73sq m     Narrative:      National Kid[employer] Disease Foundation guidelines for Chronic Kidney Disease (CKD):     Stage 1 with normal or high GFR (GFR >  90 mL/min/1.73 square meters)    Stage 2 Mild CKD (GFR = 60-89 mL/min/1.73 square meters)    Stage 3A Moderate CKD (GFR = 45-59 mL/min/1.73 square meters)    Stage 3B Moderate CKD (GFR = 30-44 mL/min/1.73 square meters)    Stage 4 Severe CKD (GFR = 15-29 mL/min/1.73 square meters)    Stage 5 End Stage CKD (GFR <15 mL/min/1.73 square meters)  Note: GFR calculation is accurate only with a steady state creatinine    Lipase [[id number]]  (Abnormal) Collected: 04/09/24 0824    Lab Status: Final result Specimen: Blood from Arm, Right Updated: 04/09/24 0855     Lipase <6 u/L     Magnesium [[id number]]  (Normal) Collected: 04/09/24 0824    Lab Status: Final result Specimen: Blood from Arm, Right Updated: 04/09/24 0855     Magnesium 2.0 mg/dL     CBC and differential [[id number]]  (Abnormal) Collected: 04/09/24 0824    Lab Status: Final result Specimen: Blood from Arm, Right Updated: 04/09/24 0833     WBC 9.16 Thousand/uL      RBC 5.97 Million/uL      Hemoglobin 11.6 g/dL      Hematocrit 39.6 %      MCV 66 fL      MCH 19.4 pg      MCHC 29.3 g/dL      RDW 19.5 %      Platelets 333 Thousands/uL      nRBC 0 /100 WBCs      Segmented % 76 %      Immature Grans % 0 %      Lymphocytes % 14 %      Monocytes % 6 %      Eosinophils Relative 4 %      Basophils Relative 0 %      Absolute Neutrophils 6.90 Thousands/µL      Absolute Immature Grans 0.03 Thousand/uL      Absolute Lymphocytes 1.24 Thousands/µL      Absolute Monocytes 0.59 Thousand/µL      Eosinophils Absolute 0.37 Thousand/µL      Basophils Absolute 0.03 Thousands/µL     UA w Reflex to Microscopic w Reflex to Culture [[id number]]     Lab Status: No result Specimen: Urine, Clean Catch                    CT abdomen pelvis with contrast   Final Result by Maximus Roberts MD (04/09 1011)      1.  Groundglass opacities in the lower lungs are similar to the April 3, 2024 PET/CT, and can represent pneumonia in the appropriate clinical setting. Given history of lymphoma, pulmonary  lymphoma remains an additional diagnostic consideration.   2.  No acute abdominopelvic pathology.   3.  Nonobstructing 2 mm left renal calculus. No hydroureteronephrosis.         Workstation performed: [id number]                    Procedures  Procedures         ED Course                               SBIRT 22yo+      Flowsheet Row Most Recent Value   Initial Alcohol Screen: US AUDIT-C     1. How often do you have a drink containing alcohol? 0 Filed at: 04/09/2024 0806   2. How many drinks containing alcohol do you have on a typical day you are drinking?  0 Filed at: 04/09/2024 0806   3a. Male UNDER 65: How often do you have five or more drinks on one occasion? 0 Filed at: 04/09/2024 0806   3b. FEMALE Any Age, or MALE 65+: How often do you have 4 or more drinks on one occassion? 0 Filed at: 04/09/2024 0806   Audit-C Score 0 Filed at: 04/09/2024 0806   GIOVANNY: How many times in the past year have you...    Used an illegal drug or used a prescription medication for non-medical reasons? Never Filed at: 04/09/2024 0806                      Medical Decision Making  63-year-old female presenting emerged department with cough nausea vomiting diarrhea abdominal pain.  Differential diagnose includes diverticulitis, gastroenteritis, pneumonia.  Patient having mild elevation in white count compared to baseline also currently on immunocompromise state from lymphoma treatment infusions.  CT shows lower lobe pneumonia, will treat with IV antibiotics and admit to medicine for further management.    Amount and/or Complexity of Data Reviewed  Labs: ordered.  Radiology: ordered.    Risk  Prescription drug management.             Disposition  Final diagnoses:   Pneumonia     Time reflects when diagnosis was documented in both MDM as applicable and the Disposition within this note       Time User Action Codes Description Comment    4/9/2024 10:21 AM Duran Marie Add [J18.9] Pneumonia           ED Disposition       ED Disposition   Admit     Condition   Stable    Date/Time   Tue Apr 9, 2024 1021    Comment   Case was discussed with Dr. Norris and the patient's admission status was agreed to be Admission Status: observation status to the service of Dr. Melton .               Follow-up Information    None         Patient's Medications   Discharge Prescriptions    No medications on file       No discharge procedures on file.    PDMP Review         Value Time User    PDMP Reviewed  Yes 3/25/2024 10:10 AM Miller Morrell MD            ED Provider  Electronically Signed by             Duran Marie MD  04/09/24 1021

## 2024-04-09 NOTE — ASSESSMENT & PLAN NOTE
Stable  BP Readings from Last 3 Encounters:   04/09/24 138/78   03/25/24 136/80   03/22/24 152/86      Home medications: Amlodipine 10 mg daily    -Continue with home medication

## 2024-04-09 NOTE — ASSESSMENT & PLAN NOTE
Upon further review, groundglass opacities noted on CT imaging were also present on previous imagings and seem to be chronic for patient.  Based on patient's current presentation, normal white count, normal procalcitonin level; this is less likely to be pneumonia.    PLAN  -Discontinue Levaquin   -Tessalon Perles as needed for cough

## 2024-04-10 ENCOUNTER — TELEPHONE (OUTPATIENT)
Dept: HEMATOLOGY ONCOLOGY | Facility: CLINIC | Age: 64
End: 2024-04-10

## 2024-04-10 ENCOUNTER — TRANSITIONAL CARE MANAGEMENT (OUTPATIENT)
Dept: FAMILY MEDICINE CLINIC | Facility: CLINIC | Age: 64
End: 2024-04-10

## 2024-04-10 VITALS
SYSTOLIC BLOOD PRESSURE: 104 MMHG | OXYGEN SATURATION: 98 % | RESPIRATION RATE: 18 BRPM | HEIGHT: 66 IN | HEART RATE: 70 BPM | BODY MASS INDEX: 37.56 KG/M2 | DIASTOLIC BLOOD PRESSURE: 73 MMHG | WEIGHT: 233.69 LBS | TEMPERATURE: 97.8 F

## 2024-04-10 PROBLEM — R74.01 TRANSAMINITIS: Status: RESOLVED | Noted: 2018-11-08 | Resolved: 2024-04-10

## 2024-04-10 PROBLEM — J18.9 PNEUMONIA: Status: RESOLVED | Noted: 2024-04-09 | Resolved: 2024-04-10

## 2024-04-10 LAB
ALBUMIN SERPL BCP-MCNC: 3.4 G/DL (ref 3.5–5)
ALP SERPL-CCNC: 98 U/L (ref 34–104)
ALT SERPL W P-5'-P-CCNC: 21 U/L (ref 7–52)
ANION GAP SERPL CALCULATED.3IONS-SCNC: 9 MMOL/L (ref 4–13)
AST SERPL W P-5'-P-CCNC: 24 U/L (ref 13–39)
BASOPHILS # BLD AUTO: 0.01 THOUSANDS/ÂΜL (ref 0–0.1)
BASOPHILS NFR BLD AUTO: 0 % (ref 0–1)
BILIRUB SERPL-MCNC: 0.59 MG/DL (ref 0.2–1)
BILIRUB UR QL STRIP: NEGATIVE
BUN SERPL-MCNC: 12 MG/DL (ref 5–25)
CALCIUM ALBUM COR SERPL-MCNC: 9.1 MG/DL (ref 8.3–10.1)
CALCIUM SERPL-MCNC: 8.6 MG/DL (ref 8.4–10.2)
CHLORIDE SERPL-SCNC: 107 MMOL/L (ref 96–108)
CLARITY UR: CLEAR
CO2 SERPL-SCNC: 25 MMOL/L (ref 21–32)
COLOR UR: YELLOW
CREAT SERPL-MCNC: 0.71 MG/DL (ref 0.6–1.3)
EOSINOPHIL # BLD AUTO: 0.32 THOUSAND/ÂΜL (ref 0–0.61)
EOSINOPHIL NFR BLD AUTO: 6 % (ref 0–6)
ERYTHROCYTE [DISTWIDTH] IN BLOOD BY AUTOMATED COUNT: 18.6 % (ref 11.6–15.1)
GFR SERPL CREATININE-BSD FRML MDRD: 92 ML/MIN/1.73SQ M
GLUCOSE P FAST SERPL-MCNC: 97 MG/DL (ref 65–99)
GLUCOSE SERPL-MCNC: 101 MG/DL (ref 65–140)
GLUCOSE SERPL-MCNC: 92 MG/DL (ref 65–140)
GLUCOSE SERPL-MCNC: 97 MG/DL (ref 65–140)
GLUCOSE UR STRIP-MCNC: NEGATIVE MG/DL
HCT VFR BLD AUTO: 33.7 % (ref 34.8–46.1)
HGB BLD-MCNC: 10 G/DL (ref 11.5–15.4)
HGB UR QL STRIP.AUTO: NEGATIVE
IMM GRANULOCYTES # BLD AUTO: 0.02 THOUSAND/UL (ref 0–0.2)
IMM GRANULOCYTES NFR BLD AUTO: 0 % (ref 0–2)
KETONES UR STRIP-MCNC: NEGATIVE MG/DL
LEUKOCYTE ESTERASE UR QL STRIP: NEGATIVE
LYMPHOCYTES # BLD AUTO: 0.94 THOUSANDS/ÂΜL (ref 0.6–4.47)
LYMPHOCYTES NFR BLD AUTO: 18 % (ref 14–44)
MCH RBC QN AUTO: 19.5 PG (ref 26.8–34.3)
MCHC RBC AUTO-ENTMCNC: 29.7 G/DL (ref 31.4–37.4)
MCV RBC AUTO: 66 FL (ref 82–98)
MONOCYTES # BLD AUTO: 0.47 THOUSAND/ÂΜL (ref 0.17–1.22)
MONOCYTES NFR BLD AUTO: 9 % (ref 4–12)
NEUTROPHILS # BLD AUTO: 3.51 THOUSANDS/ÂΜL (ref 1.85–7.62)
NEUTS SEG NFR BLD AUTO: 67 % (ref 43–75)
NITRITE UR QL STRIP: NEGATIVE
NRBC BLD AUTO-RTO: 0 /100 WBCS
PH UR STRIP.AUTO: 5 [PH]
PLATELET # BLD AUTO: 267 THOUSANDS/UL (ref 149–390)
POTASSIUM SERPL-SCNC: 4.1 MMOL/L (ref 3.5–5.3)
PROT SERPL-MCNC: 5.6 G/DL (ref 6.4–8.4)
PROT UR STRIP-MCNC: NEGATIVE MG/DL
RBC # BLD AUTO: 5.12 MILLION/UL (ref 3.81–5.12)
SODIUM SERPL-SCNC: 141 MMOL/L (ref 135–147)
SP GR UR STRIP.AUTO: 1.01 (ref 1–1.04)
UROBILINOGEN UA: NEGATIVE MG/DL
WBC # BLD AUTO: 5.27 THOUSAND/UL (ref 4.31–10.16)

## 2024-04-10 PROCEDURE — 99236 HOSP IP/OBS SAME DATE HI 85: CPT | Performed by: FAMILY MEDICINE

## 2024-04-10 PROCEDURE — NC001 PR NO CHARGE: Performed by: FAMILY MEDICINE

## 2024-04-10 PROCEDURE — 80053 COMPREHEN METABOLIC PANEL: CPT

## 2024-04-10 PROCEDURE — 82948 REAGENT STRIP/BLOOD GLUCOSE: CPT

## 2024-04-10 PROCEDURE — 81003 URINALYSIS AUTO W/O SCOPE: CPT

## 2024-04-10 PROCEDURE — 85025 COMPLETE CBC W/AUTO DIFF WBC: CPT

## 2024-04-10 RX ADMIN — AMLODIPINE BESYLATE 10 MG: 10 TABLET ORAL at 08:56

## 2024-04-10 RX ADMIN — ENOXAPARIN SODIUM 40 MG: 40 INJECTION SUBCUTANEOUS at 08:57

## 2024-04-10 NOTE — PLAN OF CARE
Problem: Knowledge Deficit  Goal: Patient/family/caregiver demonstrates understanding of disease process, treatment plan, medications, and discharge instructions  Description: Complete learning assessment and assess knowledge base.  Interventions:  - Provide teaching at level of understanding  - Provide teaching via preferred learning methods  4/10/2024 1041 by Juan Diego Orellana RN  Outcome: Adequate for Discharge  4/10/2024 1037 by Juan Diego Orellana, RN  Outcome: Progressing

## 2024-04-10 NOTE — PLAN OF CARE
Problem: INFECTION - ADULT  Goal: Absence or prevention of progression during hospitalization  Description: INTERVENTIONS:  - Assess and monitor for signs and symptoms of infection  - Monitor lab/diagnostic results  - Monitor all insertion sites, i.e. indwelling lines, tubes, and drains  - Monitor endotracheal if appropriate and nasal secretions for changes in amount and color  - [place] appropriate cooling/warming therapies per order  - Administer medications as ordered  - Instruct and encourage patient and family to use good hand hygiene technique  - Identify and instruct in appropriate isolation precautions for identified infection/condition  Outcome: Progressing  Goal: Absence of fever/infection during neutropenic period  Description: INTERVENTIONS:  - Monitor WBC    Outcome: Progressing     Problem: PAIN - ADULT  Goal: Verbalizes/displays adequate comfort level or baseline comfort level  Description: Interventions:  - Encourage patient to monitor pain and request assistance  - Assess pain using appropriate pain scale  - Administer analgesics based on type and severity of pain and evaluate response  - Implement non-pharmacological measures as appropriate and evaluate response  - Consider cultural and social influences on pain and pain management  - Notify physician/advanced practitioner if interventions unsuccessful or patient reports new pain  Outcome: Progressing     Problem: SAFETY ADULT  Goal: Patient will remain free of falls  Description: INTERVENTIONS:  - Educate patient/family on patient safety including physical limitations  - Instruct patient to call for assistance with activity   - Consult OT/PT to assist with strengthening/mobility   - Keep Call bell within reach  - Keep bed low and locked with side rails adjusted as appropriate  - Keep care items and personal belongings within reach  - Initiate and maintain comfort rounds  - Make Fall Risk Sign visible to staff  - Apply yellow socks and bracelet  for high fall risk patients  - Consider moving patient to room near nurses station  Outcome: Progressing  Goal: Maintain or return to baseline ADL function  Description: INTERVENTIONS:  -  Assess patient's ability to carry out ADLs; assess patient's baseline for ADL function and identify physical deficits which impact ability to perform ADLs (bathing, care of mouth/teeth, toileting, grooming, dressing, etc.)  - Assess/evaluate cause of self-care deficits   - Assess range of motion  - Assess patient's mobility; develop plan if impaired  - Assess patient's need for assistive devices and provide as appropriate  - Encourage maximum independence but intervene and supervise when necessary  - Involve family in performance of ADLs  - Assess for home care needs following discharge   - Consider OT consult to assist with ADL evaluation and planning for discharge  - Provide patient education as appropriate  Outcome: Progressing  Goal: Maintains/Returns to pre admission functional level  Description: INTERVENTIONS:  - Perform AM-PAC 6 Click Basic Mobility/ Daily Activity assessment daily.  - Set and communicate daily mobility goal to care team and patient/family/caregiver.   - Collaborate with rehabilitation services on mobility goals if consulted  - Out of bed for toileting  - Record patient progress and toleration of activity level   Outcome: Progressing

## 2024-04-10 NOTE — PLAN OF CARE
Pediatric Well Child Exam: 9 Months of age    Chief Complaint   Patient presents with   • Well Infant Birth to 23 Months     9 months   • Well Infant Birth to 23 Months       SUBJECTIVE:  Clovis Encarnacion is a 9 month old male who presents for a  well child exam.  Patient presents with Mother.         CONCERNS RAISED TODAY:per nurses notes    SLEEP PATTERN:  Hours / Night: 11.    NAPS: Hours / Day: <1 hr at time.    DIET/GI:  APPETITE: Good.  FORMULA:6oz 5x/d    WATER SUPPLY AT HOME: [facility].  STOOLS:1 time in 5 days    /HOMECARE:   none        FAMILY/HOME ENVIRONMENT:  Parental Adjustment: no issues  Sibling Adjustment: No problem  Maternal Depression:  · Little interest or pleasure in doing anything:  []  YES    [x]  NO   · Feeling down, depressed or hopeless:         []  YES    [x]  NO   Parents working outside the home: father  Toxic Exposure:  Tobacco Use: Not Asked         DEVELOPMENT:  Physical  [x]  YES    []  NO     []  UNKNOWN   Sits well?  [x]  YES    []  NO     []  UNKNOWN   Crawls?  [x]  YES    []  NO     []  UNKNOWN   Pulls to feet with support?  Cognitive  [x]  YES    []  NO     []  UNKNOWN    Plays peek-a-vieyra?  [x]  YES    []  NO     []  UNKNOWN    Object permanence?  [x]  YES    []  NO     []  UNKNOWN    Looks at pictures/books?  Communicative  [x]  YES    []  NO     []  UNKNOWN    Imitates sounds?  [x]  YES    []  NO     []  UNKNOWN    Points out objects?  Social-Emotional  [x]  YES    []  NO     []  UNKNOWN    Stranger anxiety?  [x]  YES    []  NO     []  UNKNOWN    Seeks parent for comfort?    OBJECTIVE:  PAST HISTORIES:  Allergies, Medications, Medical HX, Surgical HX, Family HX reviewed and updated.  IMMUNIZATION STATUS:Up to date per review of the electronic health records.  IMMUNIZATION REACTIONS: None  VARICELLA STATUS: non-immune    RECENT HEALTH EVENTS:  Illnesses:None.  Hospitalizations:None.  Injuries or Accidents:None.    REVIEW OF SYSTEMS:    All systems reviewed and negative    Problem: DISCHARGE PLANNING  Goal: Discharge to home or other facility with appropriate resources  Description: INTERVENTIONS:  - Identify barriers to discharge w/patient and caregiver  - Arrange for needed discharge resources and transportation as appropriate  - Identify discharge learning needs (meds, wound care, etc.)  - Arrange for interpretive services to assist at discharge as needed  - Refer to Case Management Department for coordinating discharge planning if the patient needs post-hospital services based on physician/advanced practitioner order or complex needs related to functional status, cognitive ability, or social support system  Outcome: Progressing      except as documented in \"Concerns raised\".    PHYSICAL EXAM:      VITAL SIGNS:   Visit Vitals  Ht 29.25\" (74.3 cm)   Wt 8.959 kg   HC 48.5 cm (19.09\")   BMI 16.23 kg/m²    49 %ile (Z= -0.03) based on WHO (Boys, 0-2 years) weight-for-age data using vitals from 6/4/2018.  GENERAL:  Well appearing male child.  Alert, active and consolable.  SKIN:  Warm, normal turgor.  No cyanosis.  No rash.  HEAD:  Normocephalic, atraumatic.    EYES:  Conjunctivae appear normal, noninjected, nonicteric, positive red reflex.  NOSE:  Appears normal without drainage.  EARS:  Normal external auditory canals. Tympanic membranes are transparent with normal landmarks.  THROAT:  Moist mucus membranes without lesions.  NECK:  Supple, no lymphadenopathy or masses.  HEART:  Regular rate and rhythm.  Normal S1, S2.  No murmurs, rubs, gallops.   LUNGS:  Clear to auscultation.  No wheezes, rales, rhonchi.  Normal work effort with breathing.  ABDOMEN:  Bowel sounds present. Soft, nontender.  No hepatomegaly.  No splenomegaly.  No masses.  GENITOURINARY: Jalen stage 1. Bilateral testes without masses or hernia.  Rectum/anus patent.  EXTREMITIES: Normal bilateral range of motion of upper and lower extremities. Peripheral pulses 2/4. Equal femoral pulses.  BACK: Spine straight.   NEUROLOGIC:  Normal muscle tone and symmetrical strength. Symmetrical facial motor function.         Assessment:  9 month old male well child.  Severe dermatitits, followed by derm  Discussed w mom - strongly recommend completely cutting dairy products from diet to try to improve skin findings. Reviewed I suspect nutritional etiolgoy of symptoms and would like mom to try back on other formula. Samples given. Mom will continue and has b/u derm in 2 wks. Reviewed lab eval may be necessary    Plan:  1. All parental concerns and questions discussed.  2. Anticipatory guidance provided, handout/s given   Development  Diet  Accident prevention: Childproof home, Water safety  Name  and vocalize objects  No bottle use: Transition to sippy cup  Pacifier user discussion  Analgesics/antipyretics  Sun exposure  Tobacco-free home  Dental care  Lead exposure risk  Fluoride supplementation discussed  3. Immunizations per orders.  Risks, benefits, and side effects discussed.  4. Orders for immunizations given today per the recommended schedule.  5. VIS for Immunizations given    Follow up:Return in about 3 months (around 9/4/2018) for Well Child Check, sooner if needed.    Encounter for routine child health examination without abnormal findings    Need for vaccination  - DTAP HIB IPV VACC 0 THRU 4 YRS  - PNEUMOCOCCAL CONJUGATE 13 VALENT VACC  - HEP B VACC PEDIATRIC/ADOLESCENT, IM    Infantile atopic dermatitis

## 2024-04-10 NOTE — UTILIZATION REVIEW
Initial Clinical Review    Admission: Date/Time/Statement:   Admission Orders (From admission, onward)       Ordered        04/09/24 1021  Place in Observation  Once                          Orders Placed This Encounter   Procedures    Place in Observation     Standing Status:   Standing     Number of Occurrences:   1     Order Specific Question:   Level of Care     Answer:   Med Surg [16]     ED Arrival Information       Expected   -    Arrival   4/9/2024 07:55    Acuity   Urgent              Means of arrival   Walk-In    Escorted by   Self    Service   Family Medicine    Admission type   Emergency              Arrival complaint   abdominal pain             Chief Complaint   Patient presents with    Abdominal Pain     Since last night 7pm. Diarrhea x 7 up to today. Nausea and only vomited once. Hx of abdominal cancer       Initial Presentation: 63 y.o. female who presented self from home to [facility] ED. Admitted in observation status for evaluation and treatment of pneumonia. PMHx: anemia, arthritis, asthma, T2DM, HTN, lupus, lymphoma, migraine, psychiatric disorder, vertigo. Presented w/ diarrhea for 1 day, and 4 days of cough, abdominal pain. On exam, rales, abdominal tenderness. Imaging showed groundglass opacities in lowe lungs. Plan: IV ABX, tessalon perles, incentive spirometry, continuous pulse ox, continue PTA meds, SSI w/ BG checks ACHS, Trend labs, replete electrolytes as needed.     ED Triage Vitals   Temperature Pulse Respirations Blood Pressure SpO2   04/09/24 0804 04/09/24 0804 04/09/24 0804 04/09/24 0804 04/09/24 0804   97.8 °F (36.6 °C) 90 16 146/70 99 %      Temp Source Heart Rate Source Patient Position - Orthostatic VS BP Location FiO2 (%)   04/09/24 0804 04/09/24 0804 04/09/24 0804 04/09/24 0804 --   Tympanic Monitor Sitting Left arm       Pain Score       04/09/24 0808       9          Wt Readings from Last 1 Encounters:   04/09/24 106 kg (233 lb 11 oz)     Additional Vital  "Signs:   Date/Time Temp Pulse Resp BP MAP (mmHg) SpO2 O2 Device   04/09/24 1930 98.4 °F (36.9 °C) 67 18 148/73 -- 97 % None (Room air)   04/09/24 1540 97.6 °F (36.4 °C) 70 18 124/68 86 93 % None (Room air)   04/09/24 1135 97.7 °F (36.5 °C) 64 16 138/78 98 99 % None (Room air)     Pertinent Labs/Diagnostic Test Results:   CT abdomen pelvis with contrast   Final Result by Maximus Roberts MD (04/09 1011)      1.  Groundglass opacities in the lower lungs are similar to the April 3, 2024 PET/CT, and can represent pneumonia in the appropriate clinical setting. Given history of lymphoma, pulmonary lymphoma remains an additional diagnostic consideration.   2.  No acute abdominopelvic pathology.   3.  Nonobstructing 2 mm left renal calculus. No hydroureteronephrosis.         Workstation performed: [id number]               Results from last 7 days   Lab Units 04/10/24  0448 04/09/24  0824   WBC Thousand/uL 5.27 9.16   HEMOGLOBIN g/dL 10.0* 11.6   HEMATOCRIT % 33.7* 39.6   PLATELETS Thousands/uL 267 333   TOTAL NEUT ABS Thousands/µL 3.51 6.90         Results from last 7 days   Lab Units 04/09/24  0824   SODIUM mmol/L 140   POTASSIUM mmol/L 4.1   CHLORIDE mmol/L 106   CO2 mmol/L 25   ANION GAP mmol/L 9   BUN mg/dL 15   CREATININE mg/dL 0.75   EGFR ml/min/1.73sq m 86   CALCIUM mg/dL 9.2   MAGNESIUM mg/dL 2.0     Results from last 7 days   Lab Units 04/09/24  0824   AST U/L 18   ALT U/L 15   ALK PHOS U/L 110*   TOTAL PROTEIN g/dL 6.7   ALBUMIN g/dL 4.0   TOTAL BILIRUBIN mg/dL 0.64     Results from last 7 days   Lab Units 04/10/24  0455 04/09/24  2037 04/09/24  1610 04/09/24  1223 04/03/24  1252   POC GLUCOSE mg/dl 101 100 106 95 91     Results from last 7 days   Lab Units 04/09/24  0824   GLUCOSE RANDOM mg/dL 130             No results found for: \"BETA-HYDROXYBUTYRATE\"                                   Results from last 7 days   Lab Units 04/09/24  0824   PROCALCITONIN ng/ml <0.05                                     Results from " last 7 days   Lab Units 04/09/24  0824   LIPASE u/L <6*                 Results from last 7 days   Lab Units 04/10/24  0457   CLARITY UA  Clear   COLOR UA  Yellow   SPEC GRAV UA  1.015   PH UA  5.0   GLUCOSE UA mg/dl Negative   KETONES UA mg/dl Negative   BLOOD UA  Negative   PROTEIN UA mg/dl Negative   NITRITE UA  Negative   BILIRUBIN UA  Negative   UROBILINOGEN UA mg/dL Negative   LEUKOCYTES UA  Negative                                 Results from last 7 days   Lab Units 04/09/24  1030 04/09/24  1024   BLOOD CULTURE  Received in Microbiology Lab. Culture in Progress. Received in Microbiology Lab. Culture in Progress.                   ED Treatment:   Medication Administration from 04/09/2024 0755 to 04/09/2024 1120         Date/Time Order Dose Route Action     04/09/2024 0823 EDT sodium chloride 0.9 % bolus 1,000 mL 1,000 mL Intravenous New Bag     04/09/2024 0825 EDT ondansetron (ZOFRAN) injection 4 mg 4 mg Intravenous Given     04/09/2024 0826 EDT morphine injection 4 mg 4 mg Intravenous Given     04/09/2024 0935 EDT iohexol (OMNIPAQUE) 350 MG/ML injection (MULTI-DOSE) 100 mL 100 mL Intravenous Given     04/09/2024 1035 EDT cefTRIAXone (ROCEPHIN) IVPB (premix in dextrose) 1,000 mg 50 mL 1,000 mg Intravenous New Bag     04/09/2024 1035 EDT azithromycin (ZITHROMAX) tablet 500 mg 500 mg Oral Given          Past Medical History:   Diagnosis Date    Anemia     iron and B12    Arthritis     Asthma     Cough     Depression     Diabetes mellitus (HCC)     Falls frequently     Hypertension     Lupus (HCC)     Lymphoma (HCC)     Lymphoma (HCC)     Migraine     Osteoporosis     Psychiatric disorder     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HCC)     Vertigo      Present on Admission:   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 female  Admission Orders:  Consistent Carbohydrate  Diet.  Blood glucose checks ACHS.  Incentive Spirometry.  SCDs.    Scheduled Medications:  amLODIPine, 10 mg, Oral, Daily  atorvastatin, 40 mg, Oral, Daily With Dinner  enoxaparin, 40 mg, Subcutaneous, Q24H JORDEN  insulin lispro, 1-6 Units, Subcutaneous, 4x Daily (AC & HS)  lenalidomide, 10 mg, Oral, Daily  levofloxacin, 750 mg, Intravenous, Q24H    Continuous IV Infusions:     PRN Meds:  acetaminophen, 650 mg, Oral, Q6H PRN  benzonatate, 100 mg, Oral, TID PRN  ondansetron, 4 mg, Intravenous, Q8H PRN  oxyCODONE, 10 mg, Oral, Q8H PRN  polyethylene glycol, 17 g, Oral, Daily PRN        None    Network Utilization Review Department  ATTENTION: Please call with any questions or concerns to [phone] and carefully listen to the prompts so that you are directed to the right person. All voicemails are confidential.   For Discharge needs, contact Care Management DC Support Team at [phone] opt. 2  Send all requests for admission clinical reviews, approved or denied determinations and any other requests to dedicated fax number below belonging to the campus where the patient is receiving treatment. List of dedicated fax numbers for the Facilities:  FACILITY NAME UR FAX NUMBER   ADMISSION DENIALS (Administrative/Medical Necessity) [phone]   DISCHARGE SUPPORT TEAM (NETWORK) [phone]   PARENT CHILD HEALTH (Maternity/NICU/Pediatrics) [phone]   [facility] [phone]   [facility] [phone]   [facility] [phone]   [facility] [phone]   [facility] [phone]   [facility] [phone]   [facility] [phone]   [facility] [phone]   [facility] [phone]   [facility] [phone]   [facility]  [phone]   [facility] [phone]

## 2024-04-10 NOTE — DISCHARGE SUMMARY
Discharge Summary - [facility]    Patient Information: Helen Moreno 63 y.o. female MRN: [id number]  Unit/Bed#: [facility] 504-01 Encounter: [id number]    Discharging Physician / Practitioner: Dr. 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  Based on patient's current presentation, normal white count, normal procalcitonin level; this is less likely to be pneumonia.    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    Last infusion: 3/22/2024    PLAN:  - Continue Maintenance obinutuzumab infusion q4 weeks and Revlimid 10 mg daily 3 weeks on 1 week off  - Follow-up with heme-onc  -Tylenol as needed and oxycodone 10 mg as needed for pain    Type 2 diabetes mellitus (HCC)  Assessment & Plan  Lab Results   Component Value Date    HGBA1C 6.3 (H) 01/25/2024       Recent Labs     04/09/24  1610 04/09/24  2037 04/10/24  0455 04/10/24  1058   POCGLU 106 100 101 92       Blood Sugar Average: Last 72 hrs:    Home medication  metformin 1000 mg twice daily    -Continue metformin 1000 mg twice daily      (P) 98.8      Essential hypertension  Assessment & Plan  Stable  BP Readings from Last 3 Encounters:   04/09/24 138/78   03/25/24 136/80   03/22/24 152/86      Home medications: Amlodipine 10 mg daily    -Continue with home medication    Transaminitis-resolved as of 4/10/2024  Assessment & Plan  Resolved         Consultations During Hospital Stay:  None    Procedures Performed:   None    Significant Findings / Test Results:   CT abdomen/pelvis w contrast -   1.  Groundglass opacities in the lower lungs are similar to the April 3, 2024 PET/CT, and can represent pneumonia in the appropriate clinical setting. Given history of lymphoma, pulmonary lymphoma remains an additional diagnostic consideration.  2.  No acute abdominopelvic pathology.  3.  Nonobstructing 2 mm left renal calculus. No hydroureteronephrosis    Incidental Findings:   None     Test Results Pending at Discharge (will require follow up):   None     Outpatient Tests Requested:  None      Outpatient follow-up Requested:  PCP  Heme/Onc    Complications:  None    Hospital Course:     Helen Moreno is a 63 y.o. female patient who originally presented to the hospital on 4/9/2024 due to 1 day history of diarrhea (x7), abdominal pain and 4 days history of cough. Carolyn was found to have a possible pneumonia on CT abdomen/pelvis and was admitted to receive IV antibiotics because she currently undergoes chemotherapy and there was concern for possibly immunosuppression.  During hospital stay, patient received 1 g ceftriaxone, 500 mg azithromycin, 4 mg morphine, and 1 L normal saline.  By the time patient got to the floor she was feeling better and tolerating diet with no concerns.  Upon further review groundglass, opacities seen on CT scan judged to be chronic as similar findings are present in previous CT scans.   On day of discharge, patient was in no distress and  "saturating well on room air with no dyspnea or fever present.  Plan to discontinue antibiotics and discharge home was shared with patient.  Patient agreeable to plan.    Review of Systems   Constitutional:  Negative for chills and fever.   HENT: Negative.     Respiratory:  Negative for cough, shortness of breath and wheezing.    Cardiovascular:  Negative for chest pain, palpitations and leg swelling.   Gastrointestinal:  Negative for abdominal pain, diarrhea, nausea and vomiting.   Genitourinary: Negative.    Musculoskeletal:  Negative for arthralgias and back pain.   Skin:  Negative for color change and rash.   Neurological:  Negative for dizziness and headaches.   All other systems reviewed and are negative.      Condition at Discharge: stable     Discharge Day Visit / Exam:     Vitals: Blood Pressure: 104/73 (04/10/24 0722)  Pulse: 70 (04/10/24 0722)  Temperature: 97.8 °F (36.6 °C) (04/10/24 0722)  Temp Source: Temporal (04/10/24 0722)  Respirations: 18 (04/10/24 0722)  Height: 5' 6\" (167.6 cm) (04/09/24 1135)  Weight - Scale: 106 kg (233 lb 11 oz) (04/09/24 1135)  SpO2: 98 % (04/10/24 0722)  Exam:   Physical Exam  Vitals reviewed.   Constitutional:       General: She is not in acute distress.     Appearance: Normal appearance. She is well-developed. She is not diaphoretic.   HENT:      Head: Normocephalic and atraumatic.      Nose: Nose normal.   Eyes:      Extraocular Movements: Extraocular movements intact.      Conjunctiva/sclera: Conjunctivae normal.   Cardiovascular:      Rate and Rhythm: Normal rate and regular rhythm.      Heart sounds: Normal heart sounds. No murmur heard.     No friction rub. No gallop.   Pulmonary:      Effort: Pulmonary effort is normal. No respiratory distress.      Breath sounds: Normal breath sounds. No wheezing or rales.   Chest:       Abdominal:      General: Bowel sounds are normal. There is no distension.      Palpations: Abdomen is soft.      Tenderness: There is no abdominal " tenderness.   Musculoskeletal:         General: Normal range of motion.      Cervical back: Normal range of motion and neck supple.   Skin:     General: Skin is warm and dry.      Findings: No erythema or rash.   Neurological:      Mental Status: She is alert and oriented to person, place, and time.         Discussion with Family: Yes  Patient Information Sharing: With the consent of Helen Moreno , their loved ones () were notified today by inpatient team of the patient’s condition and current plan.  All questions answered    Discharge instructions/Information to patient and family:   See after visit summary for information provided to patient and family.      Discharge Medications:  Current Outpatient Medications   Medication Instructions    acetaminophen (TYLENOL) 650 mg, Oral, Every 6 hours PRN    allopurinol (ZYLOPRIM) 100 mg, Oral, Daily    amLODIPine (NORVASC) 10 mg, Oral, Daily    ascorbic acid (VITAMIN C) 1,000 mg, Oral, Every 12 hours scheduled    aspirin (ECOTRIN LOW STRENGTH) 81 mg, Oral, Daily    aspirin-acetaminophen-caffeine (EXCEDRIN MIGRAINE) 250-250-65 MG per tablet 1 tablet, Oral, Every 6 hours PRN, Erica 1 tableta cada 6 horas fernandez sea necesario para el dolor de russ    atorvastatin (LIPITOR) 40 mg, Oral, Daily with dinner    Banophen 25 MG capsule TAKE 1 CAPSULE(25 MG TOTAL) BY MOUTH EVERY 6 (SIX) HOURS AS NEEDED FOR ITCHING    benzonatate (TESSALON PERLES) 100 mg, Oral, 3 times daily PRN    Blood Glucose Monitoring Suppl (OneTouch Verio) w/Device KIT Does not apply, Daily    Blood Pressure KIT Does not apply, Daily    cyanocobalamin (VITAMIN B-12) 1,000 mcg, Oral, Daily    dextromethorphan-guaiFENesin (ROBITUSSIN DM)  mg/5 mL syrup 5 mL, Oral, 3 times daily PRN    DULoxetine (CYMBALTA) 40 mg, Oral, Daily    FLUoxetine (PROzac) 10 mg capsule TAKE 1 ORAL CAPSULE ONCE A DAY    folic acid (FOLVITE) 1 mg, Oral, Daily    glucose blood (OneTouch Verio) test strip 1 each,  Other, Daily, Use as instructed    hydrOXYzine HCL (ATARAX) 10 mg tablet TAKE 1 ORAL TABLET 2 TIMES A DAY PRN FOR ANXIETY    ibuprofen (MOTRIN) 600 mg, Oral, Every 6 hours PRN    lenalidomide (Revlimid) 10 MG CAPS TAKE 1 CAPSULE BY MOUTH DAILY 3 WEEKS ON FOLLOWED BY 1 WEEK OFF    lidocaine (LMX) 4 % cream Topical, As needed, To neck    metFORMIN (GLUCOPHAGE) 1,000 mg, Oral, 2 times daily with meals    omeprazole (PRILOSEC) 40 mg, Oral, Daily, To prevent stomach upset    ondansetron (ZOFRAN) 4 mg, Oral, Every 8 hours PRN    OneTouch Delica Lancets 33G MISC Does not apply, Daily    oxyCODONE (ROXICODONE) 10 mg, Oral, Every 8 hours PRN, Erica 1 tableta cada 8 horas fernandez sea necesario por dolor intenso. Dosis maxima 3    pseudoephedrine-dextromethorphan-guaifenesin (ROBITUSSIN-PE) 30- MG/5ML solution 10 mL, Oral, 4 times daily PRN    traZODone (DESYREL) 50 mg tablet TAKE 1 ORAL TABLET AT BEDTIME PRN FOR SLEEP        Provisions for Follow-Up Care:  See after visit summary for information related to follow-up care and any pertinent home health orders.      Disposition:     Home    For Discharges to [facility] SNF:   Not Applicable to this Patient - Not Applicable to this Patient    Planned Readmission: None     Discharge Statement:  I spent 30 minutes discharging the patient. This time was spent on the day of discharge. I had direct contact with the patient on the day of discharge. Greater than 50% of the total time was spent examining patient, answering all patient questions, arranging and discussing plan of care with patient as well as directly providing post-discharge instructions.  Additional time then spent on discharge activities.    ** Please Note: This note has been constructed using a voice recognition system **    George Norris MD  04/10/24  8:01 PM

## 2024-04-10 NOTE — NURSING NOTE
Discharge instructions given in [place] reviewing d/u apts and medications. Denies questions. Dressed in street clothes. IV d/c with catheter intact. Escorted to the lobby for ride home.

## 2024-04-14 LAB
BACTERIA BLD CULT: NORMAL
BACTERIA BLD CULT: NORMAL

## 2024-04-15 ENCOUNTER — OFFICE VISIT (OUTPATIENT)
Dept: FAMILY MEDICINE CLINIC | Facility: CLINIC | Age: 64
End: 2024-04-15

## 2024-04-15 VITALS
BODY MASS INDEX: 37.12 KG/M2 | RESPIRATION RATE: 18 BRPM | SYSTOLIC BLOOD PRESSURE: 129 MMHG | TEMPERATURE: 97.9 F | HEIGHT: 66 IN | DIASTOLIC BLOOD PRESSURE: 83 MMHG | HEART RATE: 82 BPM | WEIGHT: 231 LBS | OXYGEN SATURATION: 95 %

## 2024-04-15 DIAGNOSIS — C82.18 GRADE 2 FOLLICULAR LYMPHOMA OF LYMPH NODES OF MULTIPLE REGIONS (HCC): Primary | ICD-10-CM

## 2024-04-15 DIAGNOSIS — M79.642 PAIN IN BOTH HANDS: ICD-10-CM

## 2024-04-15 DIAGNOSIS — Z76.89 ENCOUNTER FOR SUPPORT AND COORDINATION OF TRANSITION OF CARE: Primary | ICD-10-CM

## 2024-04-15 DIAGNOSIS — E11.69 TYPE 2 DIABETES MELLITUS WITH OTHER SPECIFIED COMPLICATION, UNSPECIFIED WHETHER LONG TERM INSULIN USE (HCC): ICD-10-CM

## 2024-04-15 DIAGNOSIS — M79.641 PAIN IN BOTH HANDS: ICD-10-CM

## 2024-04-15 DIAGNOSIS — R05.2 SUBACUTE COUGH: ICD-10-CM

## 2024-04-15 PROCEDURE — 99496 TRANSJ CARE MGMT HIGH F2F 7D: CPT | Performed by: FAMILY MEDICINE

## 2024-04-15 RX ORDER — SODIUM CHLORIDE 9 MG/ML
20 INJECTION, SOLUTION INTRAVENOUS ONCE
Status: CANCELLED | OUTPATIENT
Start: 2024-04-19

## 2024-04-15 RX ORDER — BENZONATATE 200 MG/1
200 CAPSULE ORAL 3 TIMES DAILY PRN
Qty: 30 CAPSULE | Refills: 3 | Status: SHIPPED | OUTPATIENT
Start: 2024-04-15

## 2024-04-15 RX ORDER — METFORMIN HYDROCHLORIDE 750 MG/1
750 TABLET, EXTENDED RELEASE ORAL
Qty: 90 TABLET | Refills: 3 | Status: SHIPPED | OUTPATIENT
Start: 2024-04-15

## 2024-04-15 RX ORDER — ACETAMINOPHEN 325 MG/1
650 TABLET ORAL ONCE
Status: CANCELLED | OUTPATIENT
Start: 2024-04-19

## 2024-04-15 NOTE — ASSESSMENT & PLAN NOTE
Lab Results   Component Value Date    HGBA1C 6.3 (H) 01/25/2024   A1c at goal.  Will change over metformin 500 mg twice daily to 750 mg XR.

## 2024-04-15 NOTE — ASSESSMENT & PLAN NOTE
Paresthesia like pain in her hands.  No neurological deficits.  Would benefit from diclofenac cream to apply to her hand area twice a day.

## 2024-04-15 NOTE — PROGRESS NOTES
Assessment & Plan     1. Encounter for support and coordination of transition of care  Comments:  Went over medication list.  Patient reportedly feeling better.    2. Subacute cough  Comments:  Could be a component of bronchitis.  Will symptomatically manage with Tessalon Perles 200 mg 3 times daily.  Orders:  -     benzonatate (TESSALON) 200 MG capsule; Take 1 capsule (200 mg total) by mouth 3 (three) times a day as needed for cough    3. Type 2 diabetes mellitus with other specified complication, unspecified whether long term insulin use (HCC)  Assessment & Plan:    Lab Results   Component Value Date    HGBA1C 6.3 (H) 01/25/2024   A1c at goal.  Will change over metformin 500 mg twice daily to 750 mg XR.    Orders:  -     metFORMIN (GLUCOPHAGE-XR) 750 mg 24 hr tablet; Take 1 tablet (750 mg total) by mouth daily with breakfast    4. Pain in both hands  Assessment & Plan:  Paresthesia like pain in her hands.  No neurological deficits.  Would benefit from diclofenac cream to apply to her hand area twice a day.    Orders:  -     Diclofenac Sodium (VOLTAREN) 1 %; Apply 2 g topically 4 (four) times a day         Subjective     Transitional Care Management Review:   Helen Moreno is a 63 y.o. female here for TCM follow up.     During the TCM phone call patient stated:  TCM Call       Date and time call was made  4/10/2024  1:51 PM    Hospital care reviewed  Records reviewed    Patient was hospitialized at  [facility]    Date of Admission  04/09/24    Date of discharge  04/10/24    Diagnosis  Pneumonia    Disposition  Home    Current Symptoms  None          TCM Call       Post hospital issues  None    Scheduled for follow up?  Yes    Did you obtain your prescribed medications  Yes    Do you need help managing your prescriptions or medications  No    Is transportation to your appointment needed  No    I have advised the patient to call PCP with any new or worsening symptoms  Callie De  "Balbir MA    Living Arrangements  Family members    Counseling  Patient          Past medical history notable for B cell lymphoma, recently admitted to the hospital due to what was presumed to be a pneumonia infection and treated with ceftriaxone and Levaquin, however patient reports to not be feeling better.  Upon further investigation with the hospital team, pneumonia was ruled out and was found that CT findings was more suggestive of her cancer.  Patient currently denies any chest pain or shortness of breath.  She does have a cough.  She denies any fevers, chills or systemic signs of infection.      Review of Systems   Constitutional:  Negative for chills and fever.   HENT:  Negative for ear pain and sore throat.    Eyes:  Negative for pain and visual disturbance.   Respiratory:  Positive for cough. Negative for shortness of breath.    Cardiovascular:  Negative for chest pain and palpitations.   Gastrointestinal:  Negative for abdominal pain and vomiting.   Genitourinary:  Negative for dysuria and hematuria.   Musculoskeletal:  Negative for arthralgias and back pain.   Skin:  Negative for color change and rash.   Neurological:  Negative for seizures and syncope.   All other systems reviewed and are negative.      Objective     /83 (BP Location: Left arm, Patient Position: Sitting, Cuff Size: Large)   Pulse 82   Temp 97.9 °F (36.6 °C) (Temporal)   Resp 18   Ht 5' 6\" (1.676 m)   Wt 105 kg (231 lb)   SpO2 95%   BMI 37.28 kg/m²      Physical Exam  Vitals and nursing note reviewed.   Constitutional:       General: She is not in acute distress.     Appearance: She is well-developed.   HENT:      Head: Normocephalic and atraumatic.   Eyes:      Conjunctiva/sclera: Conjunctivae normal.   Cardiovascular:      Rate and Rhythm: Normal rate and regular rhythm.      Heart sounds: No murmur heard.  Pulmonary:      Effort: Pulmonary effort is normal. No respiratory distress.      Breath sounds: Normal breath " sounds.   Abdominal:      Palpations: Abdomen is soft.      Tenderness: There is no abdominal tenderness.   Musculoskeletal:         General: No swelling.      Cervical back: Neck supple.   Skin:     General: Skin is warm and dry.      Capillary Refill: Capillary refill takes less than 2 seconds.   Neurological:      Mental Status: She is alert.   Psychiatric:         Mood and Affect: Mood normal.       Medications have been reviewed by provider in current encounter    Benny De La Garza DO

## 2024-04-16 DIAGNOSIS — C82.18 GRADE 2 FOLLICULAR LYMPHOMA OF LYMPH NODES OF MULTIPLE REGIONS (HCC): Primary | ICD-10-CM

## 2024-04-18 ENCOUNTER — HOSPITAL ENCOUNTER (OUTPATIENT)
Dept: INFUSION CENTER | Facility: HOSPITAL | Age: 64
Discharge: HOME | End: 2024-04-18
Attending: INTERNAL MEDICINE
Payer: COMMERCIAL

## 2024-04-18 DIAGNOSIS — Z45.2 ENCOUNTER FOR CENTRAL LINE CARE: Primary | ICD-10-CM

## 2024-04-18 DIAGNOSIS — C82.18 GRADE 2 FOLLICULAR LYMPHOMA OF LYMPH NODES OF MULTIPLE REGIONS (HCC): ICD-10-CM

## 2024-04-18 LAB
ALBUMIN SERPL BCP-MCNC: 3.7 G/DL (ref 3.5–5)
ALP SERPL-CCNC: 86 U/L (ref 34–104)
ALT SERPL W P-5'-P-CCNC: 9 U/L (ref 7–52)
ANION GAP SERPL CALCULATED.3IONS-SCNC: 8 MMOL/L (ref 4–13)
AST SERPL W P-5'-P-CCNC: 10 U/L (ref 13–39)
BASOPHILS # BLD AUTO: 0.03 THOUSANDS/ÂΜL (ref 0–0.1)
BASOPHILS NFR BLD AUTO: 1 % (ref 0–1)
BILIRUB SERPL-MCNC: 0.44 MG/DL (ref 0.2–1)
BUN SERPL-MCNC: 9 MG/DL (ref 5–25)
CALCIUM SERPL-MCNC: 8.4 MG/DL (ref 8.4–10.2)
CHLORIDE SERPL-SCNC: 102 MMOL/L (ref 96–108)
CO2 SERPL-SCNC: 26 MMOL/L (ref 21–32)
CREAT SERPL-MCNC: 0.62 MG/DL (ref 0.6–1.3)
EOSINOPHIL # BLD AUTO: 0.39 THOUSAND/ÂΜL (ref 0–0.61)
EOSINOPHIL NFR BLD AUTO: 6 % (ref 0–6)
ERYTHROCYTE [DISTWIDTH] IN BLOOD BY AUTOMATED COUNT: 18.9 % (ref 11.6–15.1)
GFR SERPL CREATININE-BSD FRML MDRD: 97 ML/MIN/1.73SQ M
GLUCOSE SERPL-MCNC: 136 MG/DL (ref 65–140)
HCT VFR BLD AUTO: 33.8 % (ref 34.8–46.1)
HGB BLD-MCNC: 9.9 G/DL (ref 11.5–15.4)
IMM GRANULOCYTES # BLD AUTO: 0.03 THOUSAND/UL (ref 0–0.2)
IMM GRANULOCYTES NFR BLD AUTO: 1 % (ref 0–2)
LYMPHOCYTES # BLD AUTO: 1.18 THOUSANDS/ÂΜL (ref 0.6–4.47)
LYMPHOCYTES NFR BLD AUTO: 19 % (ref 14–44)
MCH RBC QN AUTO: 19.1 PG (ref 26.8–34.3)
MCHC RBC AUTO-ENTMCNC: 29.3 G/DL (ref 31.4–37.4)
MCV RBC AUTO: 65 FL (ref 82–98)
MONOCYTES # BLD AUTO: 0.6 THOUSAND/ÂΜL (ref 0.17–1.22)
MONOCYTES NFR BLD AUTO: 9 % (ref 4–12)
NEUTROPHILS # BLD AUTO: 4.14 THOUSANDS/ÂΜL (ref 1.85–7.62)
NEUTS SEG NFR BLD AUTO: 64 % (ref 43–75)
NRBC BLD AUTO-RTO: 0 /100 WBCS
PLATELET # BLD AUTO: 192 THOUSANDS/UL (ref 149–390)
POTASSIUM SERPL-SCNC: 3.9 MMOL/L (ref 3.5–5.3)
PROT SERPL-MCNC: 6.2 G/DL (ref 6.4–8.4)
RBC # BLD AUTO: 5.17 MILLION/UL (ref 3.81–5.12)
SODIUM SERPL-SCNC: 136 MMOL/L (ref 135–147)
WBC # BLD AUTO: 6.37 THOUSAND/UL (ref 4.31–10.16)

## 2024-04-18 PROCEDURE — 80053 COMPREHEN METABOLIC PANEL: CPT | Performed by: INTERNAL MEDICINE

## 2024-04-18 PROCEDURE — 36593 DECLOT VASCULAR DEVICE: CPT

## 2024-04-18 PROCEDURE — 85025 COMPLETE CBC W/AUTO DIFF WBC: CPT | Performed by: INTERNAL MEDICINE

## 2024-04-18 RX ORDER — LENALIDOMIDE 10 MG/1
CAPSULE ORAL
Qty: 21 CAPSULE | Refills: 0 | Status: SHIPPED | OUTPATIENT
Start: 2024-04-18 | End: 2024-04-26

## 2024-04-18 RX ORDER — WATER 10 ML/10ML
INJECTION INTRAMUSCULAR; INTRAVENOUS; SUBCUTANEOUS
Status: COMPLETED
Start: 2024-04-18 | End: 2024-04-18

## 2024-04-18 RX ADMIN — WATER 10 ML: 1 INJECTION, SOLUTION INTRAMUSCULAR; INTRAVENOUS; SUBCUTANEOUS at 14:17

## 2024-04-18 RX ADMIN — ALTEPLASE 2 MG: 2.2 INJECTION, POWDER, LYOPHILIZED, FOR SOLUTION INTRAVENOUS at 14:17

## 2024-04-18 NOTE — PROGRESS NOTES
Port now with +blood return.  Central labs collected.  Port flushed and deaccessed per protocol.  Confirmed tomorrow's chemo appt at 0830, and AVS provided.  Left ambulatory in stable condition.

## 2024-04-18 NOTE — PROGRESS NOTES
Pt here for central lab draw.  Port accessed per protocol with no blood return noted.  Flushes freely.  Pt repositioned multiple times, still with no blood return from port.  Cathflo administered per order.  Will continue to monitor.

## 2024-04-19 ENCOUNTER — HOSPITAL ENCOUNTER (OUTPATIENT)
Dept: INFUSION CENTER | Facility: HOSPITAL | Age: 64
Discharge: HOME | End: 2024-04-19
Attending: INTERNAL MEDICINE
Payer: COMMERCIAL

## 2024-04-19 VITALS
DIASTOLIC BLOOD PRESSURE: 72 MMHG | SYSTOLIC BLOOD PRESSURE: 155 MMHG | OXYGEN SATURATION: 98 % | TEMPERATURE: 97.6 F | HEART RATE: 83 BPM

## 2024-04-19 DIAGNOSIS — C82.18 GRADE 2 FOLLICULAR LYMPHOMA OF LYMPH NODES OF MULTIPLE REGIONS (HCC): Primary | ICD-10-CM

## 2024-04-19 DIAGNOSIS — D51.8 OTHER VITAMIN B12 DEFICIENCY ANEMIAS: ICD-10-CM

## 2024-04-19 PROCEDURE — 96415 CHEMO IV INFUSION ADDL HR: CPT

## 2024-04-19 PROCEDURE — 96372 THER/PROPH/DIAG INJ SC/IM: CPT

## 2024-04-19 PROCEDURE — 96367 TX/PROPH/DG ADDL SEQ IV INF: CPT

## 2024-04-19 PROCEDURE — 96413 CHEMO IV INFUSION 1 HR: CPT

## 2024-04-19 RX ORDER — CYANOCOBALAMIN 1000 UG/ML
1000 INJECTION, SOLUTION INTRAMUSCULAR; SUBCUTANEOUS ONCE
OUTPATIENT
Start: 2024-05-17

## 2024-04-19 RX ORDER — SODIUM CHLORIDE 9 MG/ML
20 INJECTION, SOLUTION INTRAVENOUS ONCE
Status: COMPLETED | OUTPATIENT
Start: 2024-04-19 | End: 2024-04-19

## 2024-04-19 RX ORDER — ACETAMINOPHEN 325 MG/1
650 TABLET ORAL ONCE
Status: COMPLETED | OUTPATIENT
Start: 2024-04-19 | End: 2024-04-19

## 2024-04-19 RX ORDER — CYANOCOBALAMIN 1000 UG/ML
1000 INJECTION, SOLUTION INTRAMUSCULAR; SUBCUTANEOUS ONCE
Status: COMPLETED | OUTPATIENT
Start: 2024-04-19 | End: 2024-04-19

## 2024-04-19 RX ADMIN — SODIUM CHLORIDE 20 ML/HR: 0.9 INJECTION, SOLUTION INTRAVENOUS at 08:44

## 2024-04-19 RX ADMIN — DEXAMETHASONE SODIUM PHOSPHATE 20 MG: 10 INJECTION, SOLUTION INTRAMUSCULAR; INTRAVENOUS at 08:44

## 2024-04-19 RX ADMIN — ACETAMINOPHEN 650 MG: 325 TABLET ORAL at 08:51

## 2024-04-19 RX ADMIN — DIPHENHYDRAMINE HYDROCHLORIDE 25 MG: 50 INJECTION, SOLUTION INTRAMUSCULAR; INTRAVENOUS at 09:08

## 2024-04-19 RX ADMIN — OBINUTUZUMAB 1000 MG: 1000 INJECTION, SOLUTION, CONCENTRATE INTRAVENOUS at 10:15

## 2024-04-19 RX ADMIN — CYANOCOBALAMIN 1000 MCG: 1000 INJECTION INTRAMUSCULAR; SUBCUTANEOUS at 08:53

## 2024-04-19 NOTE — PROGRESS NOTES
Helen Moreno  tolerated chemo well with no complications. Aware of future appt on 5/15/24 at 2:00. AVS printed. Patient left clinic ambulatory.    Shift Summary:    Overall uneventful shift. Patient tearful with signs of pain from new trach placement; PRN dilaudid given twice seeming to provide relief. Minimal command following throughout shift and MIRTA move purposefully. BP stable overnight. CHG bath completed and all invasive lines wiped down.      Drips:    TPN @ 100 mL/hr  KVO x2 @ 3 mL/hr

## 2024-04-25 DIAGNOSIS — C82.18 GRADE 2 FOLLICULAR LYMPHOMA OF LYMPH NODES OF MULTIPLE REGIONS (HCC): ICD-10-CM

## 2024-04-25 DIAGNOSIS — K08.89 TOOTHACHE: ICD-10-CM

## 2024-04-25 RX ORDER — OXYCODONE HYDROCHLORIDE 10 MG/1
10 TABLET ORAL EVERY 8 HOURS PRN
Qty: 90 TABLET | Refills: 0 | Status: SHIPPED | OUTPATIENT
Start: 2024-04-25

## 2024-04-25 RX ORDER — ACETAMINOPHEN 325 MG/1
650 TABLET ORAL EVERY 6 HOURS PRN
Qty: 30 TABLET | Refills: 0 | Status: SHIPPED | OUTPATIENT
Start: 2024-04-25

## 2024-04-25 NOTE — TELEPHONE ENCOUNTER
Controlled Substance Review    [place] or [place]  reviewed: No red flags were identified; safe to proceed with prescription..    Filled  Written  ID  Drug  QTY  Days  Prescriber  RX #  Dispenser  Refill  Daily Dose*  Pymt Type     03/25/2024 03/25/2024 2 Oxycodone Hcl (Ir) 10 Mg Tab 90.00 30 Ma Ker [id number] All ([id number]) 0 45.00 MME [place]  02/26/2024 02/26/2024 2 Oxycodone Hcl (Ir) 10 Mg Tab 90.00 30 Viki Int [id number] All (3848) 0 45.00 [place]  01/25/2024 01/24/2024 2 Oxycodone Hcl (Ir) 10 Mg Tab 90.00 30 Ti Joselito [id number] All ([id number]) 0 45.00 [place]  12/26/2023 12/26/2023 2 Oxycodone Hcl (Ir) 10 Mg Tab 90.00 30 Mi Pip [id number] All ([id number]) 0 45.00 [place]  11/29/2023 11/27/2023 2 Oxycodone Hcl (Ir) 10 Mg Tab 90.00 30 Ti Joselito [id number] All ([id number]) 0 45.00 [place]    Jennifer Garay MD  Palliative Medicine & Supportive Care  Internal Medicine  Available via [place] [employer]  Office: [phone]  Fax: [phone]

## 2024-04-26 DIAGNOSIS — C82.18 GRADE 2 FOLLICULAR LYMPHOMA OF LYMPH NODES OF MULTIPLE REGIONS (HCC): Primary | ICD-10-CM

## 2024-04-26 RX ORDER — ZANUBRUTINIB 80 MG/1
160 CAPSULE, GELATIN COATED ORAL 2 TIMES DAILY
Qty: 120 CAPSULE | Refills: 11 | Status: SHIPPED | OUTPATIENT
Start: 2024-04-26 | End: 2024-04-29 | Stop reason: SDUPTHER

## 2024-04-29 DIAGNOSIS — C82.18 GRADE 2 FOLLICULAR LYMPHOMA OF LYMPH NODES OF MULTIPLE REGIONS (HCC): ICD-10-CM

## 2024-04-29 RX ORDER — ZANUBRUTINIB 80 MG/1
160 CAPSULE, GELATIN COATED ORAL 2 TIMES DAILY
Qty: 120 CAPSULE | Refills: 11 | Status: SHIPPED | OUTPATIENT
Start: 2024-04-29

## 2024-05-20 DIAGNOSIS — C82.18 GRADE 2 FOLLICULAR LYMPHOMA OF LYMPH NODES OF MULTIPLE REGIONS (HCC): Primary | ICD-10-CM

## 2024-05-20 RX ORDER — SODIUM CHLORIDE 9 MG/ML
20 INJECTION, SOLUTION INTRAVENOUS ONCE
Status: CANCELLED | OUTPATIENT
Start: 2024-05-24

## 2024-05-20 RX ORDER — ACETAMINOPHEN 325 MG/1
650 TABLET ORAL ONCE
Status: CANCELLED | OUTPATIENT
Start: 2024-05-24

## 2024-05-22 DIAGNOSIS — D51.8 OTHER VITAMIN B12 DEFICIENCY ANEMIAS: Primary | ICD-10-CM

## 2024-05-22 RX ORDER — CYANOCOBALAMIN 1000 UG/ML
1000 INJECTION, SOLUTION INTRAMUSCULAR; SUBCUTANEOUS ONCE
OUTPATIENT
Start: 2024-05-24

## 2024-05-23 ENCOUNTER — HOSPITAL ENCOUNTER (OUTPATIENT)
Dept: INFUSION CENTER | Facility: HOSPITAL | Age: 64
End: 2024-05-23
Attending: INTERNAL MEDICINE
Payer: COMMERCIAL

## 2024-05-23 DIAGNOSIS — Z45.2 ENCOUNTER FOR CENTRAL LINE CARE: Primary | ICD-10-CM

## 2024-05-23 DIAGNOSIS — K08.89 TOOTHACHE: ICD-10-CM

## 2024-05-23 DIAGNOSIS — C82.18 GRADE 2 FOLLICULAR LYMPHOMA OF LYMPH NODES OF MULTIPLE REGIONS (HCC): ICD-10-CM

## 2024-05-23 LAB
ALBUMIN SERPL BCP-MCNC: 3.8 G/DL (ref 3.5–5)
ALP SERPL-CCNC: 86 U/L (ref 34–104)
ALT SERPL W P-5'-P-CCNC: 10 U/L (ref 7–52)
ANION GAP SERPL CALCULATED.3IONS-SCNC: 11 MMOL/L (ref 4–13)
AST SERPL W P-5'-P-CCNC: 10 U/L (ref 13–39)
BASOPHILS # BLD AUTO: 0.04 THOUSANDS/ÂΜL (ref 0–0.1)
BASOPHILS NFR BLD AUTO: 1 % (ref 0–1)
BILIRUB SERPL-MCNC: 0.44 MG/DL (ref 0.2–1)
BUN SERPL-MCNC: 11 MG/DL (ref 5–25)
CALCIUM SERPL-MCNC: 9 MG/DL (ref 8.4–10.2)
CHLORIDE SERPL-SCNC: 103 MMOL/L (ref 96–108)
CO2 SERPL-SCNC: 24 MMOL/L (ref 21–32)
CREAT SERPL-MCNC: 0.56 MG/DL (ref 0.6–1.3)
EOSINOPHIL # BLD AUTO: 0.36 THOUSAND/ÂΜL (ref 0–0.61)
EOSINOPHIL NFR BLD AUTO: 5 % (ref 0–6)
ERYTHROCYTE [DISTWIDTH] IN BLOOD BY AUTOMATED COUNT: 20.4 % (ref 11.6–15.1)
GFR SERPL CREATININE-BSD FRML MDRD: 100 ML/MIN/1.73SQ M
GLUCOSE SERPL-MCNC: 82 MG/DL (ref 65–140)
HCT VFR BLD AUTO: 35.3 % (ref 34.8–46.1)
HGB BLD-MCNC: 10.2 G/DL (ref 11.5–15.4)
IMM GRANULOCYTES # BLD AUTO: 0.03 THOUSAND/UL (ref 0–0.2)
IMM GRANULOCYTES NFR BLD AUTO: 0 % (ref 0–2)
LYMPHOCYTES # BLD AUTO: 1.34 THOUSANDS/ÂΜL (ref 0.6–4.47)
LYMPHOCYTES NFR BLD AUTO: 18 % (ref 14–44)
MCH RBC QN AUTO: 19.5 PG (ref 26.8–34.3)
MCHC RBC AUTO-ENTMCNC: 28.9 G/DL (ref 31.4–37.4)
MCV RBC AUTO: 68 FL (ref 82–98)
MONOCYTES # BLD AUTO: 0.79 THOUSAND/ÂΜL (ref 0.17–1.22)
MONOCYTES NFR BLD AUTO: 10 % (ref 4–12)
NEUTROPHILS # BLD AUTO: 5.04 THOUSANDS/ÂΜL (ref 1.85–7.62)
NEUTS SEG NFR BLD AUTO: 66 % (ref 43–75)
NRBC BLD AUTO-RTO: 0 /100 WBCS
PLATELET # BLD AUTO: 326 THOUSANDS/UL (ref 149–390)
POTASSIUM SERPL-SCNC: 3.7 MMOL/L (ref 3.5–5.3)
PROT SERPL-MCNC: 6.4 G/DL (ref 6.4–8.4)
RBC # BLD AUTO: 5.23 MILLION/UL (ref 3.81–5.12)
SODIUM SERPL-SCNC: 138 MMOL/L (ref 135–147)
WBC # BLD AUTO: 7.6 THOUSAND/UL (ref 4.31–10.16)

## 2024-05-23 PROCEDURE — 80053 COMPREHEN METABOLIC PANEL: CPT | Performed by: INTERNAL MEDICINE

## 2024-05-23 PROCEDURE — 85025 COMPLETE CBC W/AUTO DIFF WBC: CPT | Performed by: INTERNAL MEDICINE

## 2024-05-23 NOTE — PROGRESS NOTES
Labs drawn via port and port flushed per protocol.  Pt aware of appt tomorrow at 9am.  AVS Declined.

## 2024-05-24 ENCOUNTER — HOSPITAL ENCOUNTER (OUTPATIENT)
Dept: INFUSION CENTER | Facility: HOSPITAL | Age: 64
End: 2024-05-24
Attending: INTERNAL MEDICINE
Payer: COMMERCIAL

## 2024-05-24 VITALS
DIASTOLIC BLOOD PRESSURE: 68 MMHG | TEMPERATURE: 97.5 F | WEIGHT: 225 LBS | BODY MASS INDEX: 36.32 KG/M2 | SYSTOLIC BLOOD PRESSURE: 135 MMHG | RESPIRATION RATE: 18 BRPM

## 2024-05-24 DIAGNOSIS — C82.18 GRADE 2 FOLLICULAR LYMPHOMA OF LYMPH NODES OF MULTIPLE REGIONS (HCC): Primary | ICD-10-CM

## 2024-05-24 PROCEDURE — 96367 TX/PROPH/DG ADDL SEQ IV INF: CPT

## 2024-05-24 PROCEDURE — 96415 CHEMO IV INFUSION ADDL HR: CPT

## 2024-05-24 PROCEDURE — 96413 CHEMO IV INFUSION 1 HR: CPT

## 2024-05-24 RX ORDER — ACETAMINOPHEN 325 MG/1
650 TABLET ORAL EVERY 6 HOURS PRN
Qty: 30 TABLET | Refills: 0 | Status: SHIPPED | OUTPATIENT
Start: 2024-05-24

## 2024-05-24 RX ORDER — ACETAMINOPHEN 325 MG/1
650 TABLET ORAL ONCE
Status: COMPLETED | OUTPATIENT
Start: 2024-05-24 | End: 2024-05-24

## 2024-05-24 RX ORDER — OXYCODONE HYDROCHLORIDE 10 MG/1
10 TABLET ORAL EVERY 8 HOURS PRN
Qty: 90 TABLET | Refills: 0 | Status: SHIPPED | OUTPATIENT
Start: 2024-05-24

## 2024-05-24 RX ORDER — SODIUM CHLORIDE 9 MG/ML
20 INJECTION, SOLUTION INTRAVENOUS ONCE
Status: COMPLETED | OUTPATIENT
Start: 2024-05-24 | End: 2024-05-24

## 2024-05-24 RX ADMIN — DIPHENHYDRAMINE HYDROCHLORIDE 25 MG: 50 INJECTION, SOLUTION INTRAMUSCULAR; INTRAVENOUS at 09:46

## 2024-05-24 RX ADMIN — SODIUM CHLORIDE 20 ML/HR: 0.9 INJECTION, SOLUTION INTRAVENOUS at 09:15

## 2024-05-24 RX ADMIN — DEXAMETHASONE SODIUM PHOSPHATE 20 MG: 10 INJECTION, SOLUTION INTRAMUSCULAR; INTRAVENOUS at 09:27

## 2024-05-24 RX ADMIN — ACETAMINOPHEN 650 MG: 325 TABLET ORAL at 09:25

## 2024-05-24 RX ADMIN — OBINUTUZUMAB 1000 MG: 1000 INJECTION, SOLUTION, CONCENTRATE INTRAVENOUS at 10:26

## 2024-05-24 NOTE — TELEPHONE ENCOUNTER
Cont[place] Sub[place]    [place]  reviewed: No red flags were identified; safe to proceed with prescription..    Filled  Written  ID  Drug  QTY  Days  Prescriber  RX #  Dispenser  Refill  Daily Dose*  Pymt Type     04/25/2024 04/25/2024 2 Oxycodone Hcl (Ir) 10 Mg Tab 90.00 30 Ti Joselito [id number] All ([id number]) 0 45.00 [place]  03/25/2024 03/25/2024 2 Oxycodone Hcl (Ir) 10 Mg Tab 90.00 30 Ma Ker [id number] All ([id number]) 0 45.00 [place]  02/26/2024 02/26/2024 2 Oxycodone Hcl (Ir) 10 Mg Tab 90.00 30 Viki Int [id number] All ([id number]) 0           Jennifer Garay MD  Palliative Medicine & Supportive Care  Internal Medicine  Available via [place] [employer]  Office: [phone]  Fax: [phone]

## 2024-05-24 NOTE — PROGRESS NOTES
Pt received chemo infusion w/out any adverse effects, labs w/in parameters. Offers no complaints. Confirmed next appt, AVS given

## 2024-06-07 ENCOUNTER — OFFICE VISIT (OUTPATIENT)
Dept: HEMATOLOGY ONCOLOGY | Facility: CLINIC | Age: 64
End: 2024-06-07
Payer: COMMERCIAL

## 2024-06-07 ENCOUNTER — TELEPHONE (OUTPATIENT)
Dept: HEMATOLOGY ONCOLOGY | Facility: CLINIC | Age: 64
End: 2024-06-07

## 2024-06-07 VITALS
DIASTOLIC BLOOD PRESSURE: 78 MMHG | HEIGHT: 66 IN | WEIGHT: 225 LBS | SYSTOLIC BLOOD PRESSURE: 128 MMHG | HEART RATE: 71 BPM | BODY MASS INDEX: 36.16 KG/M2 | OXYGEN SATURATION: 98 % | RESPIRATION RATE: 18 BRPM | TEMPERATURE: 97.8 F

## 2024-06-07 DIAGNOSIS — R60.9 SWELLING: ICD-10-CM

## 2024-06-07 DIAGNOSIS — R05.2 SUBACUTE COUGH: Primary | ICD-10-CM

## 2024-06-07 DIAGNOSIS — C82.18 GRADE 2 FOLLICULAR LYMPHOMA OF LYMPH NODES OF MULTIPLE REGIONS (HCC): ICD-10-CM

## 2024-06-07 PROCEDURE — 99214 OFFICE O/P EST MOD 30 MIN: CPT | Performed by: INTERNAL MEDICINE

## 2024-06-07 RX ORDER — SODIUM CHLORIDE 9 MG/ML
20 INJECTION, SOLUTION INTRAVENOUS ONCE
OUTPATIENT
Start: 2024-06-21

## 2024-06-07 RX ORDER — ACETAMINOPHEN 325 MG/1
650 TABLET ORAL ONCE
OUTPATIENT
Start: 2024-07-19

## 2024-06-07 RX ORDER — LEVOFLOXACIN 500 MG/1
500 TABLET, FILM COATED ORAL EVERY 24 HOURS
Qty: 10 TABLET | Refills: 0 | Status: SHIPPED | OUTPATIENT
Start: 2024-06-07 | End: 2024-06-17

## 2024-06-07 RX ORDER — SODIUM CHLORIDE 9 MG/ML
20 INJECTION, SOLUTION INTRAVENOUS ONCE
OUTPATIENT
Start: 2024-07-19

## 2024-06-07 RX ORDER — ACETAMINOPHEN 325 MG/1
650 TABLET ORAL ONCE
OUTPATIENT
Start: 2024-06-21

## 2024-06-07 RX ORDER — ACETAMINOPHEN 325 MG/1
650 TABLET ORAL ONCE
OUTPATIENT
Start: 2024-08-16

## 2024-06-07 RX ORDER — SODIUM CHLORIDE 9 MG/ML
20 INJECTION, SOLUTION INTRAVENOUS ONCE
OUTPATIENT
Start: 2024-08-16
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
## 2024-06-15 ENCOUNTER — APPOINTMENT (EMERGENCY)
Dept: RADIOLOGY | Facility: HOSPITAL | Age: 64
End: 2024-06-15
Payer: COMMERCIAL

## 2024-06-15 ENCOUNTER — APPOINTMENT (EMERGENCY)
Dept: NON INVASIVE DIAGNOSTICS | Facility: HOSPITAL | Age: 64
End: 2024-06-15
Payer: COMMERCIAL

## 2024-06-15 ENCOUNTER — HOSPITAL ENCOUNTER (EMERGENCY)
Facility: HOSPITAL | Age: 64
Discharge: HOME | End: 2024-06-15
Attending: EMERGENCY MEDICINE | Admitting: EMERGENCY MEDICINE
Payer: COMMERCIAL

## 2024-06-15 VITALS
RESPIRATION RATE: 20 BRPM | SYSTOLIC BLOOD PRESSURE: 131 MMHG | BODY MASS INDEX: 36.74 KG/M2 | TEMPERATURE: 98 F | DIASTOLIC BLOOD PRESSURE: 85 MMHG | HEART RATE: 77 BPM | WEIGHT: 227.6 LBS | OXYGEN SATURATION: 95 %

## 2024-06-15 DIAGNOSIS — M25.461 EFFUSION, RIGHT KNEE: Primary | ICD-10-CM

## 2024-06-15 DIAGNOSIS — M17.11 PRIMARY OSTEOARTHRITIS OF RIGHT KNEE: ICD-10-CM

## 2024-06-15 PROCEDURE — 99285 EMERGENCY DEPT VISIT HI MDM: CPT | Performed by: EMERGENCY MEDICINE

## 2024-06-15 PROCEDURE — 93971 EXTREMITY STUDY: CPT

## 2024-06-15 PROCEDURE — 73564 X-RAY EXAM KNEE 4 OR MORE: CPT

## 2024-06-15 PROCEDURE — 93971 EXTREMITY STUDY: CPT | Performed by: SURGERY

## 2024-06-15 PROCEDURE — 99285 EMERGENCY DEPT VISIT HI MDM: CPT

## 2024-06-15 NOTE — ED PROVIDER NOTES
History  Chief Complaint   Patient presents with    Leg Pain     B/L leg pain with rt worse than left. States she took oxycodone, tylenol and an antibiotic this morning     HPI      All information was obtained via  at the bedside.    This is a mildly obese, 64-year-old female who presents emergency department via private vehicle with a chief complaint of lateral knee pain right greater than left.  Patient has a relevant past medical history of grade 2 follicular lymphoma currently being treated as an outpatient with infusions managed by Dr. Mathis.  Patient also reporting right-sided calf pain without any history of chest pain, shortness of breath, syncopal symptoms, jaw pain, back pain or unexplained nausea.  No recent long distance travel or trauma.  Patient reports that she has a longstanding history of severe osteoarthritic knees and is currently wearing a right-sided knee brace.  For the pain she took an oxycodone this morning along with the Tylenol and she took an antibiotic.  Patient also has been putting on over-the-counter cream for pain control.  No induration erythema noted on examination.    Patient was seen and evaluated and noted in their report that she had a right lower extremity swelling, there were upper extremity venous Dopplers ordered for the future.    She denies any abdominal pain, chest pain, hemoptysis, melena.  Remaining 12 point review of systems is unremarkable.  Prior to Admission Medications   Prescriptions Last Dose Informant Patient Reported? Taking?   Blood Glucose Monitoring Suppl (OneTouch Verio) w/Device KIT  Self No No   Sig: Use in the morning   Blood Pressure KIT  Self No No   Sig: Use in the morning   Diclofenac Sodium (VOLTAREN) 1 %  Self No No   Sig: Apply 2 g topically 4 (four) times a day   Zanubrutinib (Brukinsa) 80 MG CAPS  Self No No   Sig: Take 160 mg by mouth 2 (two) times a day   acetaminophen (TYLENOL) 325 mg tablet  Self No No   Sig: Take 2  tablets (650 mg total) by mouth every 6 (six) hours as needed for mild pain   allopurinol (ZYLOPRIM) 100 mg tablet  Self No No   Sig: TAKE 1 TABLET (100 MG TOTAL) BY MOUTH DAILY   amLODIPine (NORVASC) 10 mg tablet  Self Yes No   Sig: Take 10 mg by mouth daily   ascorbic acid (VITAMIN C) 1000 MG tablet  Self No No   Sig: Take 1 tablet (1,000 mg total) by mouth every 12 (twelve) hours for 10 doses   aspirin (ECOTRIN LOW STRENGTH) 81 mg EC tablet  Self No No   Sig: Take 1 tablet (81 mg total) by mouth daily   aspirin-acetaminophen-caffeine (EXCEDRIN MIGRAINE) 250-250-65 MG per tablet  Self No No   Sig: Take 1 tablet by mouth every 6 (six) hours as needed for headaches Erica 1 tableta cada 6 horas fernandez sea necesario para el dolor de russ   atorvastatin (LIPITOR) 40 mg tablet  Self No No   Sig: Take 1 tablet (40 mg total) by mouth daily with dinner   benzonatate (TESSALON) 200 MG capsule  Self No No   Sig: Take 1 capsule (200 mg total) by mouth 3 (three) times a day as needed for cough   cyanocobalamin 1000 MCG tablet  Self No No   Sig: Take 1 tablet (1,000 mcg total) by mouth daily   folic acid (FOLVITE) 1 mg tablet  Self No No   Sig: Take 1 tablet (1 mg total) by mouth daily   glucose blood (OneTouch Verio) test strip  Self No No   Sig: Use 1 each in the morning Use as instructed   levofloxacin (LEVAQUIN) 500 mg tablet   No No   Sig: Take 1 tablet (500 mg total) by mouth every 24 hours for 10 days   lidocaine (LMX) 4 % cream  Self No No   Sig: Apply topically as needed for mild pain To neck   metFORMIN (GLUCOPHAGE-XR) 750 mg 24 hr tablet  Self No No   Sig: Take 1 tablet (750 mg total) by mouth daily with breakfast   omeprazole (PriLOSEC) 20 mg delayed release capsule  Self No No   Sig: Take 2 capsules (40 mg total) by mouth daily To prevent stomach upset   ondansetron (ZOFRAN) 4 mg tablet  Self No No   Sig: Take 1 tablet (4 mg total) by mouth every 8 (eight) hours as needed for nausea or vomiting   oxyCODONE  (ROXICODONE) 10 MG TABS  Self No No   Sig: Take 1 tablet (10 mg total) by mouth every 8 (eight) hours as needed for severe pain Erica 1 tableta cada 8 horas fernandez sea necesario por dolor intenso. Dosis maxima 3 Max Daily Amount: 30 mg   pseudoephedrine-dextromethorphan-guaifenesin (ROBITUSSIN-PE) 30- MG/5ML solution  Self No No   Sig: Take 10 mL by mouth 4 (four) times a day as needed for allergies   Patient not taking: Reported on 4/9/2024   traZODone (DESYREL) 50 mg tablet  Self Yes No   Sig: TAKE 1 ORAL TABLET AT BEDTIME PRN FOR SLEEP   Patient not taking: Reported on 4/9/2024      Facility-Administered Medications: None       Past Medical History:   Diagnosis Date    Anemia     iron and B12    Arthritis     Asthma     Cough     Depression     Diabetes mellitus (HCC)     Falls frequently     Hypertension     Lupus (HCC)     Lymphoma (HCC)     Lymphoma (HCC)     Migraine     Osteoporosis     Psychiatric disorder     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HCC)     Vertigo        Past Surgical History:   Procedure Laterality Date    CHOLECYSTECTOMY      FL GUIDED CENTRAL VENOUS ACCESS DEVICE INSERTION  11/9/2018    HYSTERECTOMY      IR BIOPSY BONE MARROW  11/24/2020    IR BIOPSY LYMPH NODE  11/24/2020    IR PORT CHECK  11/27/2023    LYMPH NODE BIOPSY Left 11/9/2018    Procedure: EXCISION BIOPSY LYMPH NODE SUPRACLAVICULAR;  Surgeon: Rajan Wiley MD;  Location:  MAIN OR;  Service: General    MT TENDON SHEATH INCISION Right 1/16/2020    Procedure: RELEASE TRIGGER FINGER RIGHT THUMB;  Surgeon: Brent Sanders MD;  Location:  MAIN OR;  Service: Orthopedics    TUNNELED VENOUS PORT PLACEMENT N/A 11/9/2018    Procedure: INSERTION OF PORT-A-CATH;  Surgeon: Rajan Wiley MD;  Location:  MAIN OR;  Service: General       Family History   Problem Relation Age of Onset    Cancer Mother     Diabetes Mother     Cancer Father     Diabetes Father     Breast cancer Sister     No Known Problems Sister     No Known Problems Sister     No Known  Problems Sister     No Known Problems Maternal Aunt     No Known Problems Maternal Aunt     No Known Problems Maternal Aunt     No Known Problems Paternal Aunt      I have reviewed and agree with the history as documented.    E-Cigarette/Vaping    E-Cigarette Use Never User      E-Cigarette/Vaping Substances    Nicotine No     THC No     CBD No     Flavoring No     Other No     Unknown No      Social History     Tobacco Use    Smoking status: Former     Current packs/day: 0.00     Types: Cigarettes     Quit date: 2017     Years since quittin.0     Passive exposure: Past    Smokeless tobacco: Never   Vaping Use    Vaping status: Never Used   Substance Use Topics    Alcohol use: Never    Drug use: Never       Review of Systems   Constitutional: Negative.  Negative for chills and fever.   HENT: Negative.     Eyes: Negative.    Respiratory: Negative.  Negative for chest tightness and shortness of breath.    Cardiovascular: Negative.    Gastrointestinal: Negative.    Endocrine: Negative.    Genitourinary: Negative.    Musculoskeletal: Negative.    Skin: Negative.    Allergic/Immunologic: Negative.    Neurological: Negative.  Negative for headaches.   Hematological: Negative.    Psychiatric/Behavioral: Negative.         Physical Exam  Physical Exam  Vitals and nursing note reviewed.   Constitutional:       General: She is not in acute distress.     Appearance: Normal appearance. She is not ill-appearing, toxic-appearing or diaphoretic.   HENT:      Head: Normocephalic and atraumatic.      Right Ear: External ear normal.      Left Ear: External ear normal.      Nose: Nose normal.      Mouth/Throat:      Mouth: Mucous membranes are moist.      Pharynx: Oropharynx is clear.   Eyes:      Extraocular Movements: Extraocular movements intact.      Conjunctiva/sclera: Conjunctivae normal.      Pupils: Pupils are equal, round, and reactive to light.   Cardiovascular:      Rate and Rhythm: Normal rate and regular rhythm.       Pulses: Normal pulses.      Heart sounds: Normal heart sounds.   Pulmonary:      Effort: Pulmonary effort is normal. No respiratory distress.      Breath sounds: No stridor. No wheezing, rhonchi or rales.   Chest:      Chest wall: No tenderness.   Abdominal:      General: Abdomen is flat. Bowel sounds are normal.   Musculoskeletal:         General: Swelling and tenderness present. No deformity or signs of injury.      Cervical back: Normal range of motion.      Right lower leg: Swelling present. No edema.      Left lower leg: No edema.        Legs:       Comments: R knee brace in place, taken off for examination.  Dorsalis pedis pulse, posterior tibial pulses intact, good capillary refill, no induration erythema over the right joint capsule.  No breakdown the skin, no rash, no significant pitting edema, Mild calf pain noted upon palpation.     Skin:     General: Skin is warm.      Capillary Refill: Capillary refill takes less than 2 seconds.   Neurological:      General: No focal deficit present.      Mental Status: She is alert and oriented to person, place, and time. Mental status is at baseline.   Psychiatric:         Mood and Affect: Mood normal.         Behavior: Behavior normal.         Thought Content: Thought content normal.         Judgment: Judgment normal.         Vital Signs  ED Triage Vitals [06/15/24 0939]   Temperature Pulse Respirations Blood Pressure SpO2   98 °F (36.7 °C) 77 20 131/85 95 %      Temp Source Heart Rate Source Patient Position - Orthostatic VS BP Location FiO2 (%)   Oral Monitor Sitting Left arm --      Pain Score       --           Vitals:    06/15/24 0939   BP: 131/85   Pulse: 77   Patient Position - Orthostatic VS: Sitting         Visual Acuity      ED Medications  Medications - No data to display    Diagnostic Studies  Results Reviewed       None                   XR knee 4+ views Right injury   ED Interpretation by Wilber Morgan III,  (06/15 1042)   Severe  osteoarthritic changes noted throughout the joint capsule of the right knee, 4 view x-ray of the right knee shows no acute fractures or dislocations.      Final Result by Natasha Hua MD (06/15 1134)      1. No acute osseous abnormality.      2. Severe tricompartmental right knee osteoarthritis.            Resident: MALIKA LAGUERRE I, the attending radiologist, have reviewed the images and agree with the final report above.      Workstation performed: [id number]         VAS VENOUS DUPLEX -LOWER LIMB UNILATERAL    (Results Pending)              Procedures  Procedures         ED Course  ED Course as of 06/15/24 1140   Sat Gabriel 15, 2024   0955  at bedside, pt seen and examined, 65 y/o  female, hx of lymphoma, has L sided port presents s/ R sided knee pain x 2 weeks, established hx of b/l knee osteoarthritis, + R sided calf pain    Brief focused differential dx in this patient is as follows: Right knee effusion secondary to arthritis versus lower extremity DVT.    She denies diffuse muscle pain.   1037 As per vascular technician, Matthias Schwab, venous Doppler negative.   1108 Discharge instructions with reviewed with the patient at the bedside with the , x-rays were shown to patient to illustrate the point that she has severe osteoarthritic changes in the knee joint.  Patient verbalized her understanding regarding discharge instructions.                               SBIRT 22yo+      Flowsheet Row Most Recent Value   Initial Alcohol Screen: US AUDIT-C     1. How often do you have a drink containing alcohol? 0 Filed at: 06/15/2024 0957   2. How many drinks containing alcohol do you have on a typical day you are drinking?  0 Filed at: 06/15/2024 0957   3b. FEMALE Any Age, or MALE 65+: How often do you have 4 or more drinks on one occassion? 0 Filed at: 06/15/2024 0957   Audit-C Score 0 Filed at: 06/15/2024 0957   GIOVANNY: How many times in the past year have you...   "  Used an illegal drug or used a prescription medication for non-medical reasons? Never Filed at: 06/15/2024 0957                      Medical Decision Making  Longstanding established history of bilateral osteoarthritic knee joint x-rays show significant amount of osteoarthritic changes noted within the joint capsule, due to the fact that she has a grade 2 follicular lymphoma currently receiving active treatment with Dr. Mathis, outpatient note noted that she had right lower remedy swelling concern for DVT, DVT studies ordered today negative as per vascular tech.  Patient stable for discharge able to walk out of the emergency department on her own power without difficulty and without assistance.    Portions of the record may have been created with voice recognition software. Occasional wrong word or \"sound a like\" substitutions may have occurred due to the inherent limitations of voice recognition software. Read the chart carefully and recognize, using context, where substitutions have occurred.       Counseling: I had a detailed discussion with the patient and/or guardian regarding: the historical points, exam findings, and any diagnostic results supporting the discharge diagnosis, lab results, radiology results, discharge instructions reviewed with patient and/or family/caregiver and understanding was verbalized. Instructions given to return to the emergency department if symptoms worsen or persist, or if there are any questions or concerns that arise at home.        Amount and/or Complexity of Data Reviewed  Radiology: ordered and independent interpretation performed.             Disposition  Final diagnoses:   Effusion, right knee   Primary osteoarthritis of right knee     Time reflects when diagnosis was documented in both MDM as applicable and the Disposition within this note       Time User Action Codes Description Comment    6/15/2024 10:42 AM Wilber Morgan Add [M25.461] Effusion, right knee     6/15/2024 " 10:43 AM Wilber Morgan [M17.11] Primary osteoarthritis of right knee           ED Disposition       ED Disposition   Discharge    Condition   Stable    Date/Time   Sat Gabriel 15, 2024 1042    Comment   Helen Moreno discharge to home/self care.                   Follow-up Information    None         Discharge Medication List as of 6/15/2024 10:44 AM        CONTINUE these medications which have NOT CHANGED    Details   acetaminophen (TYLENOL) 325 mg tablet Take 2 tablets (650 mg total) by mouth every 6 (six) hours as needed for mild pain, Starting Fri 5/24/2024, Normal      allopurinol (ZYLOPRIM) 100 mg tablet TAKE 1 TABLET (100 MG TOTAL) BY MOUTH DAILY, Starting Tue 2/20/2024, Normal      amLODIPine (NORVASC) 10 mg tablet Take 10 mg by mouth daily, Starting Thu 6/2/2022, Historical Med      ascorbic acid (VITAMIN C) 1000 MG tablet Take 1 tablet (1,000 mg total) by mouth every 12 (twelve) hours for 10 doses, Starting Tue 1/26/2021, Until Wed 11/22/2023, Normal      aspirin (ECOTRIN LOW STRENGTH) 81 mg EC tablet Take 1 tablet (81 mg total) by mouth daily, Starting Wed 3/18/2020, Normal      aspirin-acetaminophen-caffeine (EXCEDRIN MIGRAINE) 250-250-65 MG per tablet Take 1 tablet by mouth every 6 (six) hours as needed for headaches Erica 1 tableta cada 6 horas fernandez sea necesario para el dolor de russ, Starting Thu 11/19/2020, Normal      atorvastatin (LIPITOR) 40 mg tablet Take 1 tablet (40 mg total) by mouth daily with dinner, Starting Tue 1/26/2021, Normal      benzonatate (TESSALON) 200 MG capsule Take 1 capsule (200 mg total) by mouth 3 (three) times a day as needed for cough, Starting Mon 4/15/2024, Normal      Blood Glucose Monitoring Suppl (OneTouch Verio) w/Device KIT Use in the morning, Starting Fri 4/8/2022, Normal      Blood Pressure KIT Use in the morning, Starting Thu 12/7/2023, Normal      cyanocobalamin 1000 MCG tablet Take 1 tablet (1,000 mcg total) by mouth daily, Starting Wed  4/17/2019, Normal      Diclofenac Sodium (VOLTAREN) 1 % Apply 2 g topically 4 (four) times a day, Starting Mon 4/15/2024, Normal      folic acid (FOLVITE) 1 mg tablet Take 1 tablet (1 mg total) by mouth daily, Starting Wed 6/23/2021, Normal      glucose blood (OneTouch Verio) test strip Use 1 each in the morning Use as instructed, Starting Fri 4/8/2022, Normal      levofloxacin (LEVAQUIN) 500 mg tablet Take 1 tablet (500 mg total) by mouth every 24 hours for 10 days, Starting Fri 6/7/2024, Until Mon 6/17/2024, Normal      lidocaine (LMX) 4 % cream Apply topically as needed for mild pain To neck, Starting Tue 9/15/2020, Normal      metFORMIN (GLUCOPHAGE-XR) 750 mg 24 hr tablet Take 1 tablet (750 mg total) by mouth daily with breakfast, Starting Mon 4/15/2024, Normal      omeprazole (PriLOSEC) 20 mg delayed release capsule Take 2 capsules (40 mg total) by mouth daily To prevent stomach upset, Starting Wed 3/25/2020, Normal      ondansetron (ZOFRAN) 4 mg tablet Take 1 tablet (4 mg total) by mouth every 8 (eight) hours as needed for nausea or vomiting, Starting Tue 9/15/2020, Normal      oxyCODONE (ROXICODONE) 10 MG TABS Take 1 tablet (10 mg total) by mouth every 8 (eight) hours as needed for severe pain Erica 1 tableta cada 8 horas fernandez sea necesario por dolor intenso. Dosis maxima 3 Max Daily Amount: 30 mg, Starting Fri 5/24/2024, Normal      pseudoephedrine-dextromethorphan-guaifenesin (ROBITUSSIN-PE) 30- MG/5ML solution Take 10 mL by mouth 4 (four) times a day as needed for allergies, Starting Thu 12/7/2023, Normal      traZODone (DESYREL) 50 mg tablet TAKE 1 ORAL TABLET AT BEDTIME PRN FOR SLEEP, Historical Med      Zanubrutinib (Brukinsa) 80 MG CAPS Take 160 mg by mouth 2 (two) times a day, Starting Mon 4/29/2024, Normal                 PDMP Review         Value Time User    PDMP Reviewed  Yes 5/24/2024  8:21 AM Jennifer Garay MD            ED Provider  Electronically Signed by             Wilber Anguiano  Eddie III, DO  06/15/24 1147

## 2024-06-18 DIAGNOSIS — D51.8 OTHER VITAMIN B12 DEFICIENCY ANEMIAS: Primary | ICD-10-CM

## 2024-06-18 RX ORDER — CYANOCOBALAMIN 1000 UG/ML
1000 INJECTION, SOLUTION INTRAMUSCULAR; SUBCUTANEOUS ONCE
Status: CANCELLED | OUTPATIENT
Start: 2024-06-19

## 2024-06-19 ENCOUNTER — HOSPITAL ENCOUNTER (OUTPATIENT)
Dept: INFUSION CENTER | Facility: HOSPITAL | Age: 64
Discharge: HOME | End: 2024-06-19
Payer: COMMERCIAL

## 2024-06-19 DIAGNOSIS — Z45.2 ENCOUNTER FOR CENTRAL LINE CARE: ICD-10-CM

## 2024-06-19 DIAGNOSIS — C82.18 GRADE 2 FOLLICULAR LYMPHOMA OF LYMPH NODES OF MULTIPLE REGIONS (HCC): Primary | ICD-10-CM

## 2024-06-19 DIAGNOSIS — D51.8 OTHER VITAMIN B12 DEFICIENCY ANEMIAS: ICD-10-CM

## 2024-06-19 LAB
ALBUMIN SERPL BCG-MCNC: 3.9 G/DL (ref 3.5–5)
ALP SERPL-CCNC: 97 U/L (ref 34–104)
ALT SERPL W P-5'-P-CCNC: 8 U/L (ref 7–52)
ANION GAP SERPL CALCULATED.3IONS-SCNC: 9 MMOL/L (ref 4–13)
AST SERPL W P-5'-P-CCNC: 10 U/L (ref 13–39)
BASOPHILS # BLD AUTO: 0.06 THOUSANDS/ÂΜL (ref 0–0.1)
BASOPHILS NFR BLD AUTO: 1 % (ref 0–1)
BILIRUB SERPL-MCNC: 0.44 MG/DL (ref 0.2–1)
BUN SERPL-MCNC: 11 MG/DL (ref 5–25)
CALCIUM SERPL-MCNC: 9.3 MG/DL (ref 8.4–10.2)
CHLORIDE SERPL-SCNC: 104 MMOL/L (ref 96–108)
CO2 SERPL-SCNC: 26 MMOL/L (ref 21–32)
CREAT SERPL-MCNC: 0.67 MG/DL (ref 0.6–1.3)
EOSINOPHIL # BLD AUTO: 0.38 THOUSAND/ÂΜL (ref 0–0.61)
EOSINOPHIL NFR BLD AUTO: 5 % (ref 0–6)
ERYTHROCYTE [DISTWIDTH] IN BLOOD BY AUTOMATED COUNT: 20.3 % (ref 11.6–15.1)
GFR SERPL CREATININE-BSD FRML MDRD: 94 ML/MIN/1.73SQ M
GLUCOSE SERPL-MCNC: 84 MG/DL (ref 65–140)
HCT VFR BLD AUTO: 35.3 % (ref 34.8–46.1)
HGB BLD-MCNC: 10.3 G/DL (ref 11.5–15.4)
IMM GRANULOCYTES # BLD AUTO: 0.05 THOUSAND/UL (ref 0–0.2)
IMM GRANULOCYTES NFR BLD AUTO: 1 % (ref 0–2)
LYMPHOCYTES # BLD AUTO: 1.57 THOUSANDS/ÂΜL (ref 0.6–4.47)
LYMPHOCYTES NFR BLD AUTO: 19 % (ref 14–44)
MCH RBC QN AUTO: 19 PG (ref 26.8–34.3)
MCHC RBC AUTO-ENTMCNC: 29.2 G/DL (ref 31.4–37.4)
MCV RBC AUTO: 65 FL (ref 82–98)
MONOCYTES # BLD AUTO: 0.91 THOUSAND/ÂΜL (ref 0.17–1.22)
MONOCYTES NFR BLD AUTO: 11 % (ref 4–12)
NEUTROPHILS # BLD AUTO: 5.45 THOUSANDS/ÂΜL (ref 1.85–7.62)
NEUTS SEG NFR BLD AUTO: 63 % (ref 43–75)
NRBC BLD AUTO-RTO: 0 /100 WBCS
PLATELET # BLD AUTO: 271 THOUSANDS/UL (ref 149–390)
POTASSIUM SERPL-SCNC: 3.8 MMOL/L (ref 3.5–5.3)
PROT SERPL-MCNC: 6.6 G/DL (ref 6.4–8.4)
RBC # BLD AUTO: 5.41 MILLION/UL (ref 3.81–5.12)
SODIUM SERPL-SCNC: 139 MMOL/L (ref 135–147)
WBC # BLD AUTO: 8.42 THOUSAND/UL (ref 4.31–10.16)

## 2024-06-19 PROCEDURE — 80053 COMPREHEN METABOLIC PANEL: CPT | Performed by: INTERNAL MEDICINE

## 2024-06-19 PROCEDURE — 85025 COMPLETE CBC W/AUTO DIFF WBC: CPT | Performed by: INTERNAL MEDICINE

## 2024-06-19 PROCEDURE — 96372 THER/PROPH/DIAG INJ SC/IM: CPT

## 2024-06-19 RX ORDER — CYANOCOBALAMIN 1000 UG/ML
1000 INJECTION, SOLUTION INTRAMUSCULAR; SUBCUTANEOUS ONCE
Status: COMPLETED | OUTPATIENT
Start: 2024-06-19 | End: 2024-06-19

## 2024-06-19 RX ORDER — CYANOCOBALAMIN 1000 UG/ML
1000 INJECTION, SOLUTION INTRAMUSCULAR; SUBCUTANEOUS ONCE
OUTPATIENT
Start: 2024-07-17

## 2024-06-19 RX ADMIN — CYANOCOBALAMIN 1000 MCG: 1000 INJECTION INTRAMUSCULAR; SUBCUTANEOUS at 14:18

## 2024-06-19 NOTE — PROGRESS NOTES
B12 IM inj given in RIGHT deltoid & labs obtained via port. Flushed per protocol, offers no complaints

## 2024-06-21 ENCOUNTER — HOSPITAL ENCOUNTER (OUTPATIENT)
Dept: INFUSION CENTER | Facility: HOSPITAL | Age: 64
End: 2024-06-21
Attending: INTERNAL MEDICINE
Payer: COMMERCIAL

## 2024-06-21 VITALS
HEIGHT: 66 IN | DIASTOLIC BLOOD PRESSURE: 76 MMHG | HEART RATE: 78 BPM | BODY MASS INDEX: 35.36 KG/M2 | RESPIRATION RATE: 18 BRPM | WEIGHT: 220.02 LBS | SYSTOLIC BLOOD PRESSURE: 138 MMHG | TEMPERATURE: 97.8 F

## 2024-06-21 DIAGNOSIS — D51.8 OTHER VITAMIN B12 DEFICIENCY ANEMIAS: ICD-10-CM

## 2024-06-21 DIAGNOSIS — C82.18 GRADE 2 FOLLICULAR LYMPHOMA OF LYMPH NODES OF MULTIPLE REGIONS (HCC): Primary | ICD-10-CM

## 2024-06-21 PROCEDURE — 96415 CHEMO IV INFUSION ADDL HR: CPT

## 2024-06-21 PROCEDURE — 96413 CHEMO IV INFUSION 1 HR: CPT

## 2024-06-21 PROCEDURE — 96367 TX/PROPH/DG ADDL SEQ IV INF: CPT

## 2024-06-21 RX ORDER — ACETAMINOPHEN 325 MG/1
650 TABLET ORAL ONCE
Status: COMPLETED | OUTPATIENT
Start: 2024-06-21 | End: 2024-06-21

## 2024-06-21 RX ORDER — SODIUM CHLORIDE 9 MG/ML
20 INJECTION, SOLUTION INTRAVENOUS ONCE
Status: COMPLETED | OUTPATIENT
Start: 2024-06-21 | End: 2024-06-21

## 2024-06-21 RX ADMIN — DEXAMETHASONE SODIUM PHOSPHATE 20 MG: 10 INJECTION, SOLUTION INTRAMUSCULAR; INTRAVENOUS at 09:35

## 2024-06-21 RX ADMIN — OBINUTUZUMAB 1000 MG: 1000 INJECTION, SOLUTION, CONCENTRATE INTRAVENOUS at 11:02

## 2024-06-21 RX ADMIN — DIPHENHYDRAMINE HYDROCHLORIDE 25 MG: 50 INJECTION, SOLUTION INTRAMUSCULAR; INTRAVENOUS at 09:56

## 2024-06-21 RX ADMIN — SODIUM CHLORIDE 20 ML/HR: 0.9 INJECTION, SOLUTION INTRAVENOUS at 09:33

## 2024-06-21 RX ADMIN — ACETAMINOPHEN 650 MG: 325 TABLET ORAL at 09:32

## 2024-06-21 NOTE — PROGRESS NOTES
Helen Moreno  tolerated chemo well with no complications. Aware of future appt on 7/17/24 at 1430. AVS printed. Patient left clinic ambulatory.  [facility] contacted.

## 2024-06-24 DIAGNOSIS — K08.89 TOOTHACHE: ICD-10-CM

## 2024-06-24 DIAGNOSIS — M79.641 PAIN IN BOTH HANDS: ICD-10-CM

## 2024-06-24 DIAGNOSIS — M79.642 PAIN IN BOTH HANDS: ICD-10-CM

## 2024-06-24 DIAGNOSIS — C82.18 GRADE 2 FOLLICULAR LYMPHOMA OF LYMPH NODES OF MULTIPLE REGIONS (HCC): ICD-10-CM

## 2024-06-24 RX ORDER — OXYCODONE HYDROCHLORIDE 10 MG/1
10 TABLET ORAL EVERY 8 HOURS PRN
Qty: 90 TABLET | Refills: 0 | Status: SHIPPED | OUTPATIENT
Start: 2024-06-24

## 2024-06-24 RX ORDER — ACETAMINOPHEN 325 MG/1
650 TABLET ORAL EVERY 6 HOURS PRN
Qty: 30 TABLET | Refills: 0 | Status: SHIPPED | OUTPATIENT
Start: 2024-06-24

## 2024-07-15 ENCOUNTER — TELEPHONE (OUTPATIENT)
Dept: HEMATOLOGY ONCOLOGY | Facility: CLINIC | Age: 64
End: 2024-07-15

## 2024-07-15 NOTE — TELEPHONE ENCOUNTER
"Received notification from [id number] that during Brukinsa refill call pt reported \"blood in cough, and had pain in all body\"    Left voicemail for pt using [employer] . Requested a call back to discuss above symptoms and provided my direct line.      "

## 2024-07-17 ENCOUNTER — HOSPITAL ENCOUNTER (OUTPATIENT)
Dept: INFUSION CENTER | Facility: HOSPITAL | Age: 64
Discharge: HOME | End: 2024-07-17
Payer: COMMERCIAL

## 2024-07-17 DIAGNOSIS — Z45.2 ENCOUNTER FOR CENTRAL LINE CARE: ICD-10-CM

## 2024-07-17 DIAGNOSIS — C82.18 GRADE 2 FOLLICULAR LYMPHOMA OF LYMPH NODES OF MULTIPLE REGIONS (HCC): Primary | ICD-10-CM

## 2024-07-17 LAB
ALBUMIN SERPL BCG-MCNC: 3.9 G/DL (ref 3.5–5)
ALP SERPL-CCNC: 112 U/L (ref 34–104)
ALT SERPL W P-5'-P-CCNC: 17 U/L (ref 7–52)
ANION GAP SERPL CALCULATED.3IONS-SCNC: 7 MMOL/L (ref 4–13)
AST SERPL W P-5'-P-CCNC: 16 U/L (ref 13–39)
BASOPHILS # BLD AUTO: 0.05 THOUSANDS/ÂΜL (ref 0–0.1)
BASOPHILS NFR BLD AUTO: 1 % (ref 0–1)
BILIRUB SERPL-MCNC: 0.57 MG/DL (ref 0.2–1)
BUN SERPL-MCNC: 13 MG/DL (ref 5–25)
CALCIUM SERPL-MCNC: 9.2 MG/DL (ref 8.4–10.2)
CHLORIDE SERPL-SCNC: 105 MMOL/L (ref 96–108)
CO2 SERPL-SCNC: 28 MMOL/L (ref 21–32)
CREAT SERPL-MCNC: 0.67 MG/DL (ref 0.6–1.3)
EOSINOPHIL # BLD AUTO: 0.34 THOUSAND/ÂΜL (ref 0–0.61)
EOSINOPHIL NFR BLD AUTO: 4 % (ref 0–6)
ERYTHROCYTE [DISTWIDTH] IN BLOOD BY AUTOMATED COUNT: 19.9 % (ref 11.6–15.1)
GFR SERPL CREATININE-BSD FRML MDRD: 94 ML/MIN/1.73SQ M
GLUCOSE SERPL-MCNC: 107 MG/DL (ref 65–140)
HCT VFR BLD AUTO: 34.3 % (ref 34.8–46.1)
HGB BLD-MCNC: 10.1 G/DL (ref 11.5–15.4)
IMM GRANULOCYTES # BLD AUTO: 0.03 THOUSAND/UL (ref 0–0.2)
IMM GRANULOCYTES NFR BLD AUTO: 0 % (ref 0–2)
LYMPHOCYTES # BLD AUTO: 1.14 THOUSANDS/ÂΜL (ref 0.6–4.47)
LYMPHOCYTES NFR BLD AUTO: 13 % (ref 14–44)
MCH RBC QN AUTO: 19.3 PG (ref 26.8–34.3)
MCHC RBC AUTO-ENTMCNC: 29.4 G/DL (ref 31.4–37.4)
MCV RBC AUTO: 66 FL (ref 82–98)
MONOCYTES # BLD AUTO: 0.9 THOUSAND/ÂΜL (ref 0.17–1.22)
MONOCYTES NFR BLD AUTO: 10 % (ref 4–12)
NEUTROPHILS # BLD AUTO: 6.3 THOUSANDS/ÂΜL (ref 1.85–7.62)
NEUTS SEG NFR BLD AUTO: 72 % (ref 43–75)
NRBC BLD AUTO-RTO: 0 /100 WBCS
PLATELET # BLD AUTO: 267 THOUSANDS/UL (ref 149–390)
POTASSIUM SERPL-SCNC: 3.8 MMOL/L (ref 3.5–5.3)
PROT SERPL-MCNC: 6.5 G/DL (ref 6.4–8.4)
RBC # BLD AUTO: 5.23 MILLION/UL (ref 3.81–5.12)
SODIUM SERPL-SCNC: 140 MMOL/L (ref 135–147)
WBC # BLD AUTO: 8.76 THOUSAND/UL (ref 4.31–10.16)

## 2024-07-17 PROCEDURE — 85025 COMPLETE CBC W/AUTO DIFF WBC: CPT | Performed by: INTERNAL MEDICINE

## 2024-07-17 PROCEDURE — 80053 COMPREHEN METABOLIC PANEL: CPT | Performed by: INTERNAL MEDICINE

## 2024-07-17 NOTE — PROGRESS NOTES
Labs obtained via port. Good blood return noted, flushed per protocol. Confirmed next appt, AVS given

## 2024-07-17 NOTE — TELEPHONE ENCOUNTER
"Spoke with pt at lab draw appt using [employer]  service. Pt states the pain \"all over\" is not new, and the hemoptysis has resolved. I will let [id number] know it's okay to send her Brukinsa refill.    Pt provided new address - demographics updated   "

## 2024-07-19 ENCOUNTER — HOSPITAL ENCOUNTER (OUTPATIENT)
Dept: INFUSION CENTER | Facility: HOSPITAL | Age: 64
End: 2024-07-19
Attending: INTERNAL MEDICINE
Payer: COMMERCIAL

## 2024-07-19 VITALS
TEMPERATURE: 97.3 F | HEART RATE: 70 BPM | SYSTOLIC BLOOD PRESSURE: 134 MMHG | HEIGHT: 66 IN | DIASTOLIC BLOOD PRESSURE: 80 MMHG | RESPIRATION RATE: 20 BRPM | BODY MASS INDEX: 35.53 KG/M2

## 2024-07-19 DIAGNOSIS — D51.8 OTHER VITAMIN B12 DEFICIENCY ANEMIAS: ICD-10-CM

## 2024-07-19 DIAGNOSIS — C82.18 GRADE 2 FOLLICULAR LYMPHOMA OF LYMPH NODES OF MULTIPLE REGIONS (HCC): Primary | ICD-10-CM

## 2024-07-19 PROCEDURE — 96367 TX/PROPH/DG ADDL SEQ IV INF: CPT

## 2024-07-19 PROCEDURE — 96413 CHEMO IV INFUSION 1 HR: CPT

## 2024-07-19 PROCEDURE — 96372 THER/PROPH/DIAG INJ SC/IM: CPT

## 2024-07-19 PROCEDURE — 96415 CHEMO IV INFUSION ADDL HR: CPT

## 2024-07-19 RX ORDER — SODIUM CHLORIDE 9 MG/ML
20 INJECTION, SOLUTION INTRAVENOUS ONCE
Status: COMPLETED | OUTPATIENT
Start: 2024-07-19 | End: 2024-07-19

## 2024-07-19 RX ORDER — CYANOCOBALAMIN 1000 UG/ML
1000 INJECTION, SOLUTION INTRAMUSCULAR; SUBCUTANEOUS ONCE
OUTPATIENT
Start: 2024-08-14

## 2024-07-19 RX ORDER — ACETAMINOPHEN 325 MG/1
650 TABLET ORAL ONCE
Status: COMPLETED | OUTPATIENT
Start: 2024-07-19 | End: 2024-07-19

## 2024-07-19 RX ORDER — CYANOCOBALAMIN 1000 UG/ML
1000 INJECTION, SOLUTION INTRAMUSCULAR; SUBCUTANEOUS ONCE
Status: COMPLETED | OUTPATIENT
Start: 2024-07-19 | End: 2024-07-19

## 2024-07-19 RX ADMIN — SODIUM CHLORIDE 20 ML/HR: 9 INJECTION, SOLUTION INTRAVENOUS at 09:03

## 2024-07-19 RX ADMIN — OBINUTUZUMAB 1000 MG: 1000 INJECTION, SOLUTION, CONCENTRATE INTRAVENOUS at 10:40

## 2024-07-19 RX ADMIN — ACETAMINOPHEN 650 MG: 325 TABLET ORAL at 09:08

## 2024-07-19 RX ADMIN — DIPHENHYDRAMINE HYDROCHLORIDE 25 MG: 50 INJECTION, SOLUTION INTRAMUSCULAR; INTRAVENOUS at 09:28

## 2024-07-19 RX ADMIN — DEXAMETHASONE SODIUM PHOSPHATE 20 MG: 10 INJECTION, SOLUTION INTRAMUSCULAR; INTRAVENOUS at 09:03

## 2024-07-19 RX ADMIN — CYANOCOBALAMIN 1000 MCG: 1000 INJECTION INTRAMUSCULAR; SUBCUTANEOUS at 09:10

## 2024-07-19 NOTE — PROGRESS NOTES
Pt tolerated Gazyva infusion without difficulty.  Port flushed and deaccessed per protocol.  AVS provided, and upcoming appts reviewed.  Pt states she will be in [place] for next chemo on 8/16.  Julita Fallon RN notified.  Escorted out in w/c by RN.

## 2024-07-24 DIAGNOSIS — K08.89 TOOTHACHE: ICD-10-CM

## 2024-07-24 DIAGNOSIS — C82.18 GRADE 2 FOLLICULAR LYMPHOMA OF LYMPH NODES OF MULTIPLE REGIONS (HCC): ICD-10-CM

## 2024-07-24 NOTE — TELEPHONE ENCOUNTER
Reason for call:   [x] Refill   [] Prior Auth  [] Other:     Office:   [] PCP/Provider -   [x] Specialty/Provider - Palliative: Dr. Jarrod MD    Medication:   oxyCODONE (ROXICODONE) 10 MG/Take 1 tablet (10 mg total) by mouth every 8 (eight) hours as needed     acetaminophen (TYLENOL) 325 mg/Take 2 tablets (650 mg total) by mouth every 6 (six) hours as needed    Quantity: 90/30    Pharmacy: [facility] [place] - [facility] DONALD Amato     Does the patient have enough for 3 days?   [x] Yes   [] No - Send as HP to POD

## 2024-07-25 ENCOUNTER — TELEPHONE (OUTPATIENT)
Age: 64
End: 2024-07-25

## 2024-07-25 RX ORDER — ACETAMINOPHEN 325 MG/1
650 TABLET ORAL EVERY 6 HOURS PRN
Qty: 30 TABLET | Refills: 0 | Status: SHIPPED | OUTPATIENT
Start: 2024-07-25

## 2024-07-25 RX ORDER — OXYCODONE HYDROCHLORIDE 10 MG/1
10 TABLET ORAL EVERY 8 HOURS PRN
Qty: 90 TABLET | Refills: 0 | Status: SHIPPED | OUTPATIENT
Start: 2024-07-25

## 2024-07-25 NOTE — TELEPHONE ENCOUNTER
Pt called to check on the status of her Tylenol refill and her Oxycodone 10mg. Advised it was pending.    Spoke to pt using language line jimena # [phone]    Pt would like a call back once the refill is ready at [phone]

## 2024-07-25 NOTE — TELEPHONE ENCOUNTER
Controlled Substance Review    [place] or [place]  reviewed: No red flags were identified; safe to proceed with prescription..    PATIENT ID PRESCRIPTION # FILLED WRITTEN DRUG LABEL QTY DAYS STRENGTH MME** PRESCRIBER PHARMACY PAYMENT REFILL #/AUTH STATE DETAIL  1 [id number] 06/24/2024 06/24/2024 oxyCODONE HCL (Tablet) 90.0 30 10 MG 45.0 FRANCO HOGAN [facility] [place] 0 / 0 [place]   1 [id number] 05/24/2024 05/24/2024 oxyCODONE HCL (Tablet) 90.0 30 10 MG 45.0 TIFFANY GONZALES [facility] [place] 0 / 0 [place]   1 [id number] 04/25/2024 04/25/2024 oxyCODONE HCL (Tablet) 90.0 30 10 MG 45.0 TIFFANY GONZALES [facility] [place] 0 [place] 0 [place]   1 [id number] 03/25/2024 03/25/2024 oxyCODONE HCL (Tablet) 90.0 30 10 MG 45.0 LEONOR SPARKS [facility] [place] 0 / 0 [place]   1 [id number] 02/26/2024 02/26/2024 oxyCODONE HCL (Tablet) 90.0 30 10 MG 45.0 JESSICA ALBERT [facility] [place] 0 / 0 [place]   1 [id number] 01/25/2024 01/24/2024 oxyCODONE HCL (Tablet) 90.0 30 10 MG 45.0 TIFFANY GONZALES [facility] [place] 0 [place] 0 [place]   1 [id number] 12/26/2023 12/26/2023 oxyCODONE HCL (Tablet) 90.0 30 10 MG 45.0 MEJIA RODRÍGUEZ [facility] [place] 0 [place] 0 [place]   1 [id number] 11/29/2023 11/27/2023 oxyCODONE HCL (Tablet) 90.0 30 10 MG 45.0 [facility] [place]   1 [id number] 11/02/2023 11/02/2023 oxyCODONE HCL (Tablet) 90.0 30 10 MG 45.0 [facility] [place]   2 [id number] 09/28/2023 09/28/2023 oxyCODONE HCL (Tablet) 90.0 30 10 MG 45.0 JENNIFER ARROYO [facility] [place] 0 / 0 [place]       Jennifer Arroyo MD  Palliative Medicine & Supportive Care  Internal Medicine  Available via [employer]  Office: [phone]  Fax: [phone]

## 2024-08-14 ENCOUNTER — HOSPITAL ENCOUNTER (OUTPATIENT)
Dept: INFUSION CENTER | Facility: HOSPITAL | Age: 64
Discharge: HOME | End: 2024-08-14
Payer: COMMERCIAL

## 2024-08-14 DIAGNOSIS — Z45.2 ENCOUNTER FOR CENTRAL LINE CARE: Primary | ICD-10-CM

## 2024-08-14 DIAGNOSIS — C82.18 GRADE 2 FOLLICULAR LYMPHOMA OF LYMPH NODES OF MULTIPLE REGIONS (HCC): ICD-10-CM

## 2024-08-14 LAB
ALBUMIN SERPL BCG-MCNC: 3.6 G/DL (ref 3.5–5)
ALP SERPL-CCNC: 94 U/L (ref 34–104)
ALT SERPL W P-5'-P-CCNC: 13 U/L (ref 7–52)
ANION GAP SERPL CALCULATED.3IONS-SCNC: 8 MMOL/L (ref 4–13)
AST SERPL W P-5'-P-CCNC: 13 U/L (ref 13–39)
BASOPHILS # BLD AUTO: 0.04 THOUSANDS/ÂΜL (ref 0–0.1)
BASOPHILS NFR BLD AUTO: 1 % (ref 0–1)
BILIRUB SERPL-MCNC: 0.54 MG/DL (ref 0.2–1)
BUN SERPL-MCNC: 13 MG/DL (ref 5–25)
CALCIUM SERPL-MCNC: 8.7 MG/DL (ref 8.4–10.2)
CHLORIDE SERPL-SCNC: 105 MMOL/L (ref 96–108)
CO2 SERPL-SCNC: 25 MMOL/L (ref 21–32)
CREAT SERPL-MCNC: 0.57 MG/DL (ref 0.6–1.3)
EOSINOPHIL # BLD AUTO: 0.34 THOUSAND/ÂΜL (ref 0–0.61)
EOSINOPHIL NFR BLD AUTO: 5 % (ref 0–6)
ERYTHROCYTE [DISTWIDTH] IN BLOOD BY AUTOMATED COUNT: 20 % (ref 11.6–15.1)
GFR SERPL CREATININE-BSD FRML MDRD: 99 ML/MIN/1.73SQ M
GLUCOSE SERPL-MCNC: 86 MG/DL (ref 65–140)
HCT VFR BLD AUTO: 32.7 % (ref 34.8–46.1)
HGB BLD-MCNC: 9.6 G/DL (ref 11.5–15.4)
IMM GRANULOCYTES # BLD AUTO: 0.04 THOUSAND/UL (ref 0–0.2)
IMM GRANULOCYTES NFR BLD AUTO: 1 % (ref 0–2)
LYMPHOCYTES # BLD AUTO: 0.94 THOUSANDS/ÂΜL (ref 0.6–4.47)
LYMPHOCYTES NFR BLD AUTO: 13 % (ref 14–44)
MCH RBC QN AUTO: 19.2 PG (ref 26.8–34.3)
MCHC RBC AUTO-ENTMCNC: 29.4 G/DL (ref 31.4–37.4)
MCV RBC AUTO: 65 FL (ref 82–98)
MONOCYTES # BLD AUTO: 0.75 THOUSAND/ÂΜL (ref 0.17–1.22)
MONOCYTES NFR BLD AUTO: 10 % (ref 4–12)
NEUTROPHILS # BLD AUTO: 5.38 THOUSANDS/ÂΜL (ref 1.85–7.62)
NEUTS SEG NFR BLD AUTO: 70 % (ref 43–75)
NRBC BLD AUTO-RTO: 0 /100 WBCS
PLATELET # BLD AUTO: 203 THOUSANDS/UL (ref 149–390)
POTASSIUM SERPL-SCNC: 3.5 MMOL/L (ref 3.5–5.3)
PROT SERPL-MCNC: 6 G/DL (ref 6.4–8.4)
RBC # BLD AUTO: 5.01 MILLION/UL (ref 3.81–5.12)
SODIUM SERPL-SCNC: 138 MMOL/L (ref 135–147)
WBC # BLD AUTO: 7.49 THOUSAND/UL (ref 4.31–10.16)

## 2024-08-14 PROCEDURE — 80053 COMPREHEN METABOLIC PANEL: CPT | Performed by: INTERNAL MEDICINE

## 2024-08-14 PROCEDURE — 85025 COMPLETE CBC W/AUTO DIFF WBC: CPT | Performed by: INTERNAL MEDICINE

## 2024-08-14 PROCEDURE — 36593 DECLOT VASCULAR DEVICE: CPT

## 2024-08-14 RX ORDER — WATER 10 ML/10ML
INJECTION INTRAMUSCULAR; INTRAVENOUS; SUBCUTANEOUS
Status: COMPLETED
Start: 2024-08-14 | End: 2024-08-14

## 2024-08-14 RX ADMIN — ALTEPLASE 2 MG: 2.2 INJECTION, POWDER, LYOPHILIZED, FOR SOLUTION INTRAVENOUS at 14:02

## 2024-08-14 RX ADMIN — WATER 10 ML: 1 INJECTION, SOLUTION INTRAMUSCULAR; INTRAVENOUS; SUBCUTANEOUS at 14:02

## 2024-08-14 NOTE — PROGRESS NOTES
Helen Moreno  tolerated treatment well with no complications.      Helen Moreno is aware of future appt on 8/16/24 at 830.     AVS printed and given to Helen Moreno:  Yes

## 2024-08-16 ENCOUNTER — HOSPITAL ENCOUNTER (OUTPATIENT)
Dept: INFUSION CENTER | Facility: HOSPITAL | Age: 64
End: 2024-08-16
Attending: INTERNAL MEDICINE
Payer: COMMERCIAL

## 2024-08-16 VITALS
HEART RATE: 76 BPM | SYSTOLIC BLOOD PRESSURE: 125 MMHG | RESPIRATION RATE: 20 BRPM | DIASTOLIC BLOOD PRESSURE: 74 MMHG | TEMPERATURE: 97.6 F

## 2024-08-16 DIAGNOSIS — D51.8 OTHER VITAMIN B12 DEFICIENCY ANEMIAS: ICD-10-CM

## 2024-08-16 DIAGNOSIS — C82.18 GRADE 2 FOLLICULAR LYMPHOMA OF LYMPH NODES OF MULTIPLE REGIONS (HCC): Primary | ICD-10-CM

## 2024-08-16 PROCEDURE — 96367 TX/PROPH/DG ADDL SEQ IV INF: CPT

## 2024-08-16 PROCEDURE — 96415 CHEMO IV INFUSION ADDL HR: CPT

## 2024-08-16 PROCEDURE — 96413 CHEMO IV INFUSION 1 HR: CPT

## 2024-08-16 RX ORDER — SODIUM CHLORIDE 9 MG/ML
20 INJECTION, SOLUTION INTRAVENOUS ONCE
Status: COMPLETED | OUTPATIENT
Start: 2024-08-16 | End: 2024-08-16

## 2024-08-16 RX ORDER — ACETAMINOPHEN 325 MG/1
650 TABLET ORAL ONCE
Status: COMPLETED | OUTPATIENT
Start: 2024-08-16 | End: 2024-08-16

## 2024-08-16 RX ADMIN — SODIUM CHLORIDE 20 ML/HR: 0.9 INJECTION, SOLUTION INTRAVENOUS at 08:40

## 2024-08-16 RX ADMIN — OBINUTUZUMAB 1000 MG: 1000 INJECTION, SOLUTION, CONCENTRATE INTRAVENOUS at 09:50

## 2024-08-16 RX ADMIN — ACETAMINOPHEN 650 MG: 325 TABLET ORAL at 09:51

## 2024-08-16 RX ADMIN — DEXAMETHASONE SODIUM PHOSPHATE 20 MG: 10 INJECTION, SOLUTION INTRAMUSCULAR; INTRAVENOUS at 08:46

## 2024-08-16 RX ADMIN — DIPHENHYDRAMINE HYDROCHLORIDE 25 MG: 50 INJECTION, SOLUTION INTRAMUSCULAR; INTRAVENOUS at 09:08

## 2024-08-16 NOTE — PROGRESS NOTES
Pt received chemo infusion w/out any adverse effects, labs w/in parameters. Pt stated she was moving to [place] on 8/22 & will received tx there. She stated the facility is not nearby &hopefully she will be able to find transportation. Sent a message to Julita Fallon RN to inform her of pt's plans

## 2024-08-19 DIAGNOSIS — C82.18 GRADE 2 FOLLICULAR LYMPHOMA OF LYMPH NODES OF MULTIPLE REGIONS (HCC): ICD-10-CM

## 2024-08-19 DIAGNOSIS — C82.18 GRADE 2 FOLLICULAR LYMPHOMA OF LYMPH NODES OF MULTIPLE REGIONS (HCC): Primary | ICD-10-CM

## 2024-08-19 RX ORDER — OXYCODONE HYDROCHLORIDE 10 MG/1
10 TABLET ORAL EVERY 8 HOURS PRN
Qty: 90 TABLET | Refills: 0 | Status: SHIPPED | OUTPATIENT
Start: 2024-08-23 | End: 2024-08-19 | Stop reason: SDUPTHER

## 2024-08-19 NOTE — TELEPHONE ENCOUNTER
Sending script with fill date no sooner than 8/23/2024. No early fill allowed.     Controlled Substance Review    [place] or [place]  reviewed: No red flags were identified; safe to proceed with prescription..    Filled  Written  ID  Drug  QTY  Days  Prescriber  RX #  Dispenser  Refill  Daily Dose*  Pymt Type     07/25/2024 07/25/2024 2 Oxycodone Hcl (Ir) 10 Mg Tab 90.00 30 Ti Joselito [id number] All ([id number]) 0 45.00 [facility] [place]  06/24/2024 06/24/2024 2 Oxycodone Hcl (Ir) 10 Mg Tab 90.00 30 Er Fabio [id number] All ([id number]) 0 45.00 MME [place]  05/24/2024 05/24/2024 2 Oxycodone Hcl (Ir) 10 Mg Tab 90.00 30 Ti Joselito [id number] All ([id number]) 0 45.00 MME [place]  04/25/2024 04/25/2024 2 Oxycodone Hcl (Ir) 10 Mg Tab 90.00 30 Ti Joselito [id number] All ([id number]) 0 45.00 [place]ME [place]  03/25/2024 03/25/2024 2 Oxycodone Hcl (Ir) 10 Mg Tab 90.00 30 Ma Ker [id number] All ([id number]) 0 45.00 [place]ME [place]  02/26/2024 02/26/2024 2 Oxycodone Hcl (Ir) 10 Mg Tab 90.00 30 Viki Int [id number] All ([id number]) 0 45.00 MME [place]  01/25/2024 01/24/2024 2 Oxycodone Hcl (Ir) 10 Mg Tab 90.00 30 Ti Joselito [id number] All ([id number]) 0 45.00 MME [place]        Jennifer Garay MD  Palliative Medicine & Supportive Care  Internal Medicine  Available via [employer]  Office: [phone]  Fax: [phone]

## 2024-08-19 NOTE — TELEPHONE ENCOUNTER
Patient is leaving to [place] on 8/22/24 for an emergency , her daughter is having complications with her pregnancy.       Reason for call:   [x] Refill   [] Prior Auth  [] Other:     Office:   [] PCP/Provider -   [x] Specialty/Provider - Palliative call    Medication: Oxycodone 10 mg, take 1 tablet by mouth every 8 hours as needed       Pharmacy: [facility]    Does the patient have enough for 3 days?   [x] Yes   [] No - Send as HP to POD

## 2024-08-19 NOTE — TELEPHONE ENCOUNTER
Medication: oxycodone 10 mg  PDMP   Refill must be reviewed and completed by the office or provider. The refill is unable to be approved or denied by the medication management team.

## 2024-08-19 NOTE — TELEPHONE ENCOUNTER
Pt is calling to refill Oxycodone 10 mg qty 90.      RX is marked to start on 8/23/24.    Pt is leaving on a trip on 8/22/24 to [place].      Can Dr. Jiang allow her to  the Oxycodone on 8/22/24?

## 2024-08-20 RX ORDER — OXYCODONE HYDROCHLORIDE 10 MG/1
10 TABLET ORAL EVERY 8 HOURS PRN
Qty: 90 TABLET | Refills: 0 | Status: SHIPPED | OUTPATIENT
Start: 2024-08-22

## 2024-08-20 NOTE — TELEPHONE ENCOUNTER
Medication: Oxycodone 10mg  PDMP  07/25/2024 07/25/2024 oxyCODONE HCL (Tablet) 90.0 30 10 MG 45.0 DIONI ARROYO                 06/24/2024 06/24/2024  oxyCODONE HCL (Tablet) 90.0 30 10 MG 45.0 FRANCO HOGAN       Patient is leaving for a trip and needs his medication sent  a day early

## 2024-08-22 ENCOUNTER — TELEPHONE (OUTPATIENT)
Age: 64
End: 2024-08-22

## 2024-08-22 ENCOUNTER — TELEPHONE (OUTPATIENT)
Dept: PALLIATIVE MEDICINE | Facility: CLINIC | Age: 64
End: 2024-08-22

## 2024-08-22 NOTE — TELEPHONE ENCOUNTER
Prior Authorization COMPLETED for OXYCODONE HCL 10MG TABLETS    KEY#[id number]    Pharmacy: [facility]  Phone: [phone]  Fax: [phone]

## 2024-08-22 NOTE — TELEPHONE ENCOUNTER
Patient &  [id number], calling to confirm refill for medication was sent to pharmacy.OXYCODONE HCL 10MG TABLETS . She is leaving for [place] today at 6pm.  She can be reached at [phone]. She is under Jennifer Garay' care

## 2024-08-22 NOTE — TELEPHONE ENCOUNTER
Received prior authorization APPROVAL for OXYCODONE HCL 10MG TABLET. through 02/22/2025.    Faxed results to Pharmacy. Pharmacy has been asked to inform pt of outcome.

## 2024-08-22 NOTE — TELEPHONE ENCOUNTER
Called pts pharmacy. Oxycodone prescription that was sent today requires a prior auth per pharmacistRoger. [facility] sent the prior auth request to CoverMyMeds. Pt is leaving today to [place] and needs the medication.

## 2024-09-11 ENCOUNTER — TELEPHONE (OUTPATIENT)
Age: 64
End: 2024-09-11

## 2024-09-11 NOTE — TELEPHONE ENCOUNTER
Pt son called using language line [place] [phone] he would like his mothers records sent to [place] fax # [phone]. Advised pt we would need the provider from [place] to fax medical request and fax number was provided to pt son and he will get that taken care.

## 2024-09-20 ENCOUNTER — TELEPHONE (OUTPATIENT)
Age: 64
End: 2024-09-20

## 2024-09-20 DIAGNOSIS — K08.89 TOOTHACHE: ICD-10-CM

## 2024-09-20 DIAGNOSIS — C82.18 GRADE 2 FOLLICULAR LYMPHOMA OF LYMPH NODES OF MULTIPLE REGIONS (HCC): ICD-10-CM

## 2024-09-20 RX ORDER — ACETAMINOPHEN 325 MG/1
650 TABLET ORAL EVERY 6 HOURS PRN
Qty: 30 TABLET | Refills: 0 | Status: SHIPPED | OUTPATIENT
Start: 2024-09-20

## 2024-09-20 RX ORDER — OXYCODONE HYDROCHLORIDE 10 MG/1
10 TABLET ORAL EVERY 8 HOURS PRN
Qty: 90 TABLET | Refills: 0 | Status: SHIPPED | OUTPATIENT
Start: 2024-09-20

## 2024-09-20 NOTE — TELEPHONE ENCOUNTER
Patient calling with     HIPAA verified.    Requesting:    acetaminophen (TYLENOL) 325 mg tablet     oxyCODONE (ROXICODONE) 10 MG TABS     [facility] [place] [place] - [address] [place] [id number]  Phone: [phone]  Fax: [phone]

## 2024-09-20 NOTE — TELEPHONE ENCOUNTER
Medication: oxycodone 10mg  PDMP   08/22/2024 08/20/2024 oxyCODONE HCL (Tablet) 90.0 30 10 MG 45.0 DIONI ARROYO  07/25/2024 07/25/2024 oxyCODONE HCL (Tablet) 90.0 30 10 MG 45.0 DIONI ARROYO

## 2024-10-01 ENCOUNTER — TELEPHONE (OUTPATIENT)
Dept: PALLIATIVE MEDICINE | Facility: CLINIC | Age: 64
End: 2024-10-01

## 2024-10-02 ENCOUNTER — VBI (OUTPATIENT)
Dept: ADMINISTRATIVE | Facility: OTHER | Age: 64
End: 2024-10-02

## 2024-10-02 NOTE — TELEPHONE ENCOUNTER
10/02/24 11:36 AM     Chart reviewed for CRC: Colonoscopy and Diabetic Eye Exam ; nothing is submitted to the patient's insurance at this time.     Raya Corley MA   [facility]

## 2024-10-11 DIAGNOSIS — M79.642 PAIN IN BOTH HANDS: ICD-10-CM

## 2024-10-11 DIAGNOSIS — M79.641 PAIN IN BOTH HANDS: ICD-10-CM

## 2024-10-11 NOTE — TELEPHONE ENCOUNTER
Spoke to pt about rescheduling missed appt with . Pt is living in [place] at this time. Pt does not want to follow up. x2

## 2024-10-11 NOTE — TELEPHONE ENCOUNTER
Patient stated she is currently in [place] with no returning date. She will contact office once she is back. Thanks!

## 2024-10-15 ENCOUNTER — TELEPHONE (OUTPATIENT)
Dept: HEMATOLOGY ONCOLOGY | Facility: CLINIC | Age: 64
End: 2024-10-15

## 2024-10-15 NOTE — TELEPHONE ENCOUNTER
Unable to reach patient. Per telephone encounter in patients chart from 10/11/24 - 'Pt is living in [place] at this time. Pt does not want to follow up'

## 2025-01-09 NOTE — PROGRESS NOTES
----- Message from CAROLYN Springer sent at 1/9/2025  2:47 PM CST -----  Please call Veronica and inform her of normal knee imaging. I would recommend PT at this time for knee pain.    Patient port accessed and flushed per protocol, blood return initially noted and then was lost. Upon repositioning patient to achieve blood return, port site appeared to be in the incorrect position. Patient port de-accessed and patient sent to emergency department in wheelchair with infusion [facility] RN.  Spoke with Arti Martin RN in ED prior to patient arrival.

## 2025-07-27 NOTE — PLAN OF CARE
Problem: Potential for Falls  Goal: Patient will remain free of falls  Description: INTERVENTIONS:  - Educate patient/family on patient safety including phys  Problem: Knowledge Deficit  Goal: Patient/family/caregiver demonstrates understanding of disease process, treatment plan, medications, and discharge instructions  Description: Complete learning assessment and assess knowledge base    Interventions:  - Provide teaching at level of understanding  - Provide teaching via preferred learning methods  Outcome: Progressing   ical limitations  - Instruct patient to call for assistance with activity   - Consult OT/PT to assist with strengthening/mobility   - Keep Call bell within reach  - Keep bed low and locked with side rails adjusted as appropriate  - Keep care items and personal belongings within reach  - Initiate and maintain comfort rounds  - Make Fall Risk Sign visible to staff  - Offer Toileting every  Hours, in advance of need  - Initiate/Maintain alarm  - Obtain necessary fall risk management equipment: - Apply yellow socks and bracelet for high fall risk patients  - Consider moving patient to room near nurses station  7/9/2021 0834 by Delmi Cardona RN  Outcome: Progressing  7/9/2021 0834 by Delmi Cardona RN  Outcome: Progressing [id number]

## 2025-07-28 NOTE — PROGRESS NOTES
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 [facility]  The patient will be continued on cycle 3 of rituximab and bendamustine which she is tolerating relatively well  Clinically she seems to be responding to the current treatment with less distention and pain of the abdomen  We will aim for 6 cycles followed by a PET-CT scan  - CBC and differential; Future  - Comprehensive metabolic panel; Future  - Magnesium; Future  - LD,Blood; Future  - oxyCODONE (ROXICODONE) 5 mg immediate release tablet; Take 1 tablet (5 mg total) by mouth every 4 (four) hours as needed for moderate pain for up to 30 doses Max Daily Amount: 30 mg  Dispense: 30 tablet; Refill: 0    2  Generalized abdominal pain  Her abdominal pain is less  She continues to need oxycodone once a day for intermittent abdominal pain  - oxyCODONE (ROXICODONE) 5 mg immediate release tablet; Take 1 tablet (5 mg total) by mouth every 4 (four) hours as needed for moderate pain for up to 30 doses Max Daily Amount: 30 mg  Dispense: 30 tablet; Refill: 0          HPI:  Patient came today for a follow-up visit  She is due for cycle 3 of bendamustine and rituximab with the supportive Neulasta  She tolerated cycle 2 relatively well with less distention of her abdomen  She was recently evaluated in the emergency room due to migraine headaches where she had a CT scan of the brain on the 31st of January which revealed no acute intracranial abnormality  She does seem to have encephalomalacia within the left frontal lobe  Patient continues to complain about some mild abdominal pain mainly in the left lower quadrant area on and off  She takes oxycodone for her abdominal pain at least once a day  She denied any a hint of upper respiratory airway infection or sinusitis at this point    Her blood work from the 31st of January showed a white cell count of 7 0 with hemoglobin of 11 0 and platelet of 755  Her creatinine was 0 5 with normal liver enzymes  Oncology History    The patient started to notice significant abdominal pain about 8 months prior to presentation, she then was evaluated in the hospital with a CT scan of the chest abdomen pelvis on the 7th of November   This showed massive lymphadenopathy throughout the abdomen and pelvis with hepatic and massive splenomegaly   She also was found to have bulky supraclavicular, axillary and mediastinal adenopathy  She then had a supra clavicular lymph node excisional biopsy on the 9th of November , the pathology was compatible with Follicular lymphoma, WHO grade 1-2  She then had a bone marrow biopsy on the 20th of November which showed also low grade B-cell lymphoma CD10 positive compromising 63% of the total cells  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 ([facility] [address] )    11/7/2018 Initial Diagnosis     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 ([facility] [address] )         12/6/2018 -  Chemotherapy     Cycle 1 of rituximab and bendamustine was started on 12/6/2018 12/7/2018 Adverse Reaction     C1D2 labs reflective of tumor lysis syndrome, dose of rasburicase given x1 and admitted for close observation/hydration            Interval history:    ROS: Review of Systems   Constitutional: Negative for activity change, appetite change, chills, diaphoresis, fatigue, fever and unexpected weight change  HENT: Negative for congestion, dental problem, ear discharge, ear pain, facial swelling, hearing loss, mouth sores, nosebleeds, postnasal drip, sore throat, tinnitus and trouble swallowing  Eyes: Negative for discharge, redness, itching and visual disturbance  Respiratory: Positive for cough and shortness of breath  Negative for chest tightness and wheezing  Cardiovascular: Negative for chest pain, palpitations and leg swelling  Gastrointestinal: Positive for abdominal pain, constipation and nausea  Negative for abdominal distention, anal bleeding, blood in stool, diarrhea and vomiting  Genitourinary: Negative for difficulty urinating, dysuria, flank pain, frequency, hematuria and urgency  Musculoskeletal: Negative for arthralgias, back pain, gait problem, joint swelling, myalgias and neck pain  Skin: Positive for rash  Negative for color change, pallor and wound  Neurological: Positive for dizziness and headaches  Negative for syncope, speech difficulty, weakness, light-headedness and numbness  Hematological: Negative for adenopathy  Does not bruise/bleed easily  Psychiatric/Behavioral: Negative for agitation, behavioral problems, confusion and sleep disturbance         Past Medical History:   Diagnosis Date    Arthritis     Asthma     Lupus     Migraine     Osteoporosis     Psychiatric disorder        Past Surgical History:   Procedure Laterality Date    CHOLECYSTECTOMY      FL GUIDED CENTRAL VENOUS ACCESS REPLACEMENT  11/9/2018    HYSTERECTOMY      LYMPH NODE BIOPSY Left 11/9/2018    Procedure: EXCISION BIOPSY LYMPH NODE SUPRACLAVICULAR;  Surgeon: Janice Devi MD;  Location: [address] [place] [address];  Service: General    TUNNELED VENOUS PORT PLACEMENT N/A 11/9/2018    Procedure: INSERTION OF PORT-A-CATH;  Surgeon: Janice Devi MD;  Location: [address] [place] [address];  Service: General       Social History     Social History    Marital status: Single     Spouse name: N/A    Number of children: N/A    Years of education: N/A     Social History Main Topics    Smoking status: Former Smoker    Smokeless tobacco: Never Used    Alcohol use No    Drug use: No    Sexual activity: Not Asked     Other Topics Concern    None     Social History Narrative    None       Family History   Problem Relation Age of Onset    Cancer Mother     Cancer Father        Allergies   Allergen Reactions    Penicillins          Current Outpatient Prescriptions:     allopurinol (ZYLOPRIM) 100 mg tablet, Take 1 tablet (100 mg total) by mouth daily For 30 days than stop, Disp: 30 tablet, Rfl: 0    amLODIPine (NORVASC) 5 mg tablet, Take 1 tablet (5 mg total) by mouth daily, Disp: 30 tablet, Rfl: 0    citalopram (CeleXA) 20 mg tablet, Take 10 mg by mouth daily, Disp: , Rfl:     omeprazole (PriLOSEC) 20 mg delayed release capsule, Take 1 capsule (20 mg total) by mouth daily To prevent stomach upset, Disp: 30 capsule, Rfl: 5    ondansetron (ZOFRAN) 8 mg tablet, Take 1 tablet (8 mg total) by mouth every 8 (eight) hours as needed for nausea or vomiting, Disp: 20 tablet, Rfl: 5    oxyCODONE (ROXICODONE) 5 mg immediate release tablet, Take 1 tablet (5 mg total) by mouth every 4 (four) hours as needed for moderate pain for up to 30 doses Max Daily Amount: 30 mg, Disp: 30 tablet, Rfl: 0    polyethylene glycol (MIRALAX) 17 g packet, Take 17 g by mouth daily, Disp: 14 each, Rfl: 0    senna (SENOKOT) 8 6 mg, Take 1 tablet (8 6 mg total) by mouth daily at bedtime, Disp: 120 each, Rfl: 0  No current facility-administered medications for this visit       Facility-Administered Medications Ordered in Other Visits:     acetaminophen (TYLENOL) tablet 650 mg, 650 mg, Oral, Once, Beverly Gamez MD    bendamustine HCl (BENDEKA) 174 mg in sodium chloride 0 9 % 50 mL IVPB, 174 mg, Intravenous, Once, Beverly Gamez MD    diphenhydrAMINE (BENADRYL) 50 mg in sodium chloride 0 9 % 50 mL IVPB, 50 mg, Intravenous, Once, Beverly Gamez MD    fosaprepitant (EMEND) 150 mg in sodium chloride 0 9 % 250 mL IVPB, 150 mg, Intravenous, Once, Beverly Gamez MD    ondansetron (ZOFRAN) 16 mg, dexamethasone (DECADRON) 10 mg in sodium chloride 0 9 % 50 mL IVPB, , Intravenous, Once, Beverly Gamez MD    riTUXimab (RITUXAN) 700 mg in sodium chloride 0 9 % 280 mL first titrated chemo infusion, 700 mg, Intravenous, Once, Beverly Gamez MD    sodium chloride 0 9 % infusion, 20 mL/hr, Intravenous, Once, Darinel Janette Tsang MD      Physical Exam:  /78 (BP Location: Left arm, Cuff Size: Adult)   Pulse 64   Temp 97 7 °F (36 5 °C) (Tympanic)   Resp 18   Ht 5' 5" (1 651 m)   Wt 85 kg (187 lb 6 4 oz)   BMI 31 18 kg/m²     Physical Exam   Constitutional: She is oriented to person, place, and time  She appears well-developed and well-nourished  No distress  HENT:   Head: Normocephalic and atraumatic  Nose: Nose normal    Mouth/Throat: Oropharynx is clear and moist    Eyes: Pupils are equal, round, and reactive to light  Conjunctivae and EOM are normal  Right eye exhibits no discharge  Left eye exhibits no discharge  No scleral icterus  Neck: Normal range of motion  Neck supple  No JVD present  No tracheal deviation present  No thyromegaly present  Cardiovascular: Normal rate, regular rhythm and normal heart sounds  Exam reveals no friction rub  No murmur heard  Pulmonary/Chest: Effort normal and breath sounds normal  No stridor  No respiratory distress  She has no wheezes  She has no rales  She exhibits no tenderness  Abdominal: Soft  Bowel sounds are normal  She exhibits no distension and no mass  There is no hepatosplenomegaly, splenomegaly or hepatomegaly  There is tenderness (On mild palpation all over her abdomen)  There is no rebound and no guarding  Musculoskeletal: Normal range of motion  She exhibits no edema, tenderness or deformity  Lymphadenopathy:     She has no cervical adenopathy  Neurological: She is alert and oriented to person, place, and time  She has normal reflexes  No cranial nerve deficit  Coordination normal    Skin: Skin is warm and dry  No rash noted  She is not diaphoretic  No erythema  No pallor  Psychiatric: She has a normal mood and affect   Her behavior is normal  Judgment and thought content normal          Labs:  Lab Results   Component Value Date    WBC 7 00 01/31/2019    HGB 11 0 (L) 01/31/2019    HCT 35 2 (L) 01/31/2019    MCV 72 (L) 01/31/2019     01/31/2019 Lab Results   Component Value Date    K 3 9 01/31/2019     01/31/2019    CO2 27 01/31/2019    BUN 13 01/31/2019    CREATININE 0 58 (L) 01/31/2019    GLUF 99 01/22/2019    CALCIUM 9 6 01/31/2019    AST 27 01/31/2019    ALT 19 01/31/2019    ALKPHOS 119 01/31/2019    EGFR 103 01/31/2019     No results found for: TSH    Patient voiced understanding and agreement in the above discussion  Aware to contact our office with questions/symptoms in the interim  97

## (undated) DEVICE — STERILE POLYISOPRENE POWDER-FREE SURGICAL GLOVES WITH EMOLLIENT COATING: Brand: PROTEXIS

## (undated) DEVICE — DRAPE C-ARM X-RAY

## (undated) DEVICE — GAUZE ROLL,3 PLY: Brand: DERMACEA

## (undated) DEVICE — GAUZE SPONGES,16 PLY: Brand: CURITY

## (undated) DEVICE — NEEDLE BLUNT 18 G X 1 1/2IN

## (undated) DEVICE — STERILE POLYISOPRENE POWDER-FREE SURGICAL GLOVES: Brand: PROTEXIS

## (undated) DEVICE — INTENDED FOR TISSUE SEPARATION, AND OTHER PROCEDURES THAT REQUIRE A SHARP SURGICAL BLADE TO PUNCTURE OR CUT.: Brand: BARD-PARKER SAFETY BLADES SIZE 15, STERILE

## (undated) DEVICE — SUT PROLENE 2-0 SH 30 IN 8833H

## (undated) DEVICE — TIBURON EXTREMITY DRAPE WITH ARMBOARD COVERS: Brand: CONVERTORS

## (undated) DEVICE — X-RAY DETECTABLE SPONGES,16 PLY: Brand: VISTEC

## (undated) DEVICE — LIGACLIP MCA MULTIPLE CLIP APPLIERS, 20 SMALL CLIPS: Brand: LIGACLIP

## (undated) DEVICE — NDL CNTR 20CT FM MAG: Brand: MEDLINE INDUSTRIES, INC.

## (undated) DEVICE — CUFF TOURNIQUET DISP SZ18

## (undated) DEVICE — ACE WRAP 3 IN STERILE

## (undated) DEVICE — VIOLET BRAIDED (POLYGLACTIN 910), SYNTHETIC ABSORBABLE SUTURE: Brand: COATED VICRYL

## (undated) DEVICE — BASIC PACK: Brand: CONVERTORS

## (undated) DEVICE — NEEDLE 25G X 5/8 SAFETY

## (undated) DEVICE — DRAPE SHEET THREE QUARTER

## (undated) DEVICE — SPONGE LAP STERILE 4X18

## (undated) DEVICE — SMARTGOWN SURGICAL GOWN, LARGE: Brand: CONVERTORS

## (undated) DEVICE — SUT MONOCRYL 4-0 PS-2 27 IN Y426H

## (undated) DEVICE — 3M™ STERI-STRIP™ REINFORCED ADHESIVE SKIN CLOSURES, R1547, 1/2 IN X 4 IN (12 MM X 100 MM), 6 STRIPS/ENVELOPE: Brand: 3M™ STERI-STRIP™

## (undated) DEVICE — BULB SYRINGE,IRRIGATION WITH PROTECTIVE CAP: Brand: DOVER

## (undated) DEVICE — SYRINGE 30ML LL

## (undated) DEVICE — U-DRAPE: Brand: CONVERTORS

## (undated) DEVICE — PAD GROUNDING ADULT

## (undated) DEVICE — CHLORAPREP HI-LITE 26ML ORANGE

## (undated) DEVICE — PENCIL ELECTROSURG E-Z CLEAN -0035H

## (undated) DEVICE — SYRINGE 10ML LL CONTROL TOP

## (undated) DEVICE — BANDAGE, ESMARK LF STR 4"X9'(20/CS): Brand: CYPRESS

## (undated) DEVICE — LIGHT GLOVE GREEN

## (undated) DEVICE — ALCON SURGICAL BLADE 64: Brand: ALCON

## (undated) DEVICE — SYRINGE 10ML LL

## (undated) DEVICE — SWABSTCK, BENZOIN TINCTURE, 1/PK, STRL: Brand: APLICARE

## (undated) DEVICE — KERLIX BANDAGE ROLL: Brand: KERLIX

## (undated) DEVICE — CURITY NON-ADHERENT STRIPS: Brand: CURITY

## (undated) DEVICE — ASTOUND STANDARD SURGICAL GOWN, XL: Brand: CONVERTORS

## (undated) DEVICE — SYRINGE 3ML LL

## (undated) DEVICE — CABLE BIPOLAR DISP MEGADYNE

## (undated) DEVICE — BAG DECANTER

## (undated) DEVICE — SKIN MARKER DUAL TIP WITH RULER CAP, FLEXIBLE RULER AND LABELS: Brand: DEVON

## (undated) DEVICE — 3M™ MICROFOAM™ SURGICAL TAPE 4 ROLLS/CARTON 6 CARTONS/CASE 1528-3: Brand: 3M™ MICROFOAM™

## (undated) DEVICE — SYRINGE 5ML LL

## (undated) DEVICE — THYROID SHEET: Brand: CONVERTORS

## (undated) DEVICE — NEEDLE 25G X 1 1/2

## (undated) DEVICE — STOCKINETTE 2P PREROLLD 4X48